# Patient Record
Sex: MALE | Race: WHITE | NOT HISPANIC OR LATINO | Employment: OTHER | URBAN - METROPOLITAN AREA
[De-identification: names, ages, dates, MRNs, and addresses within clinical notes are randomized per-mention and may not be internally consistent; named-entity substitution may affect disease eponyms.]

---

## 2017-01-03 ENCOUNTER — ALLSCRIPTS OFFICE VISIT (OUTPATIENT)
Dept: OTHER | Facility: OTHER | Age: 67
End: 2017-01-03

## 2017-05-04 DIAGNOSIS — E78.5 HYPERLIPIDEMIA: ICD-10-CM

## 2017-05-04 DIAGNOSIS — E11.40 TYPE 2 DIABETES MELLITUS WITH DIABETIC NEUROPATHY (HCC): ICD-10-CM

## 2017-05-04 DIAGNOSIS — Z12.5 ENCOUNTER FOR SCREENING FOR MALIGNANT NEOPLASM OF PROSTATE: ICD-10-CM

## 2017-05-19 ENCOUNTER — ALLSCRIPTS OFFICE VISIT (OUTPATIENT)
Dept: OTHER | Facility: OTHER | Age: 67
End: 2017-05-19

## 2017-09-21 ENCOUNTER — ALLSCRIPTS OFFICE VISIT (OUTPATIENT)
Dept: OTHER | Facility: OTHER | Age: 67
End: 2017-09-21

## 2017-09-21 ENCOUNTER — GENERIC CONVERSION - ENCOUNTER (OUTPATIENT)
Dept: OTHER | Facility: OTHER | Age: 67
End: 2017-09-21

## 2017-09-21 DIAGNOSIS — R42 DIZZINESS AND GIDDINESS: ICD-10-CM

## 2017-09-21 DIAGNOSIS — E11.40 TYPE 2 DIABETES MELLITUS WITH DIABETIC NEUROPATHY (HCC): ICD-10-CM

## 2017-09-21 DIAGNOSIS — Z12.5 ENCOUNTER FOR SCREENING FOR MALIGNANT NEOPLASM OF PROSTATE: ICD-10-CM

## 2017-09-21 DIAGNOSIS — M19.90 OSTEOARTHRITIS: ICD-10-CM

## 2017-09-21 DIAGNOSIS — E78.5 HYPERLIPIDEMIA: ICD-10-CM

## 2017-10-04 ENCOUNTER — OFFICE VISIT (OUTPATIENT)
Dept: URGENT CARE | Facility: MEDICAL CENTER | Age: 67
End: 2017-10-04
Payer: COMMERCIAL

## 2017-10-04 LAB — GLUCOSE SERPL-MCNC: 96 MG/DL (ref 65–140)

## 2017-10-04 PROCEDURE — G0463 HOSPITAL OUTPT CLINIC VISIT: HCPCS

## 2017-10-04 PROCEDURE — 82948 REAGENT STRIP/BLOOD GLUCOSE: CPT

## 2017-10-04 PROCEDURE — 99213 OFFICE O/P EST LOW 20 MIN: CPT

## 2017-10-05 NOTE — PROGRESS NOTES
Assessment  1  Vertigo (780 4) (R42)    Plan  Dizziness    · Blood Glucose- POC; Status:Resulted - Requires Verification,Retrospective By Protocol  Authorization;   Done: 83IKI6336 04:34PM  Fasting (Y/N) : No - N  Vertigo    · Meclizine HCl - 25 MG Oral Tablet; TAKE 1 TABLET 3 TIMES DAILY AS NEEDED   · *1 - [facility]  *  Status: Active  Requested for: 58BHW0889  Care Summary provided  : Yes    check labs that PCP ordered  likely vertigo as when he changes position  may benefit from PT for epley     Chief Complaint  Chief Complaint Free Text Note Form: dizziness off and on, especially when getting out of bed, feels as though he may pass out, occurring for a couple weeks, also has URI sx currently,      History of Present Illness  HPI: 78 y/o M c/o spinning and dizziness  feels like room is spinning  This has been going on for 2-3 weeks  No cp/sob  no palpitations  He says when he gets up and rolls over in bed  Saw PCP and had labs ordered but not done yet  Does not check his sugars at home  Denies dizziness with stairs or walking  Hospital Based Practices Required Assessment:   Pain Assessment   the patient states they do not have pain  Reason DV Screen not done: with wife    Depression And Suicide Screen  Reason suicide screen not done: with wife  Prefered Language is  english  Primary Language is  english  Readiness To Learn: Receptive  Barriers To Learning: none  Preferred Learning: verbal      Active Problems  1  Adhesive capsulitis of left shoulder (726 0) (M75 02)   2  Arthritis (716 90) (M19 90)   3  Depression screen (V79 0) (Z13 89)   4  Encounter for prostate cancer screening (V76 44) (Z12 5)   5  Glaucoma Screening   6  Hyperlipidemia (272 4) (E78 5)   7  Medicare annual wellness visit, subsequent (V70 0) (Z00 00)   8  Need for influenza vaccination (V04 81) (Z23)   9  Need for pneumococcal vaccination (V03 82) (Z23)   10  Pancreas cyst (577 2) (K86 2)   11   Screening for colon cancer (V76 51) (Z12 11)   12  Screening for genitourinary condition (V81 6) (Z13 89)   13  Type 2 diabetes mellitus with diabetic neuropathy (250 60,357 2) (E11 40)    Past Medical History  1  History of Anxiety (300 00) (F41 9)   2  History of Blister of left lower leg, initial encounter (916 2) (E52 356Z)   3  History of Denial of substance abuse   4  History of gastroesophageal reflux (GERD) (V12 79) (Z87 19)   5  History of hematuria (V13 09) (Z87 448)   6  History of Nuclear senile cataract of left eye (366 16) (H25 12)   7  History of Right knee pain (719 46) (M25 561)    Family History  Mother    1  Family history of CAD (coronary artery disease)  Father    2  Family history of diabetes mellitus (V18 0) (Z83 3)  Family History    3  Denied: Family history of Denial of substance abuse   4  Denied: No family history of mental disorder    Social History   · Denied: History of Drug use   · Never a smoker   · Never Drank Alcohol    Surgical History  1  History of Cataract Surgery   2  History of Cholecystectomy   3  History of Knee Surgery    Current Meds   1  GlipiZIDE 10 MG Oral Tablet; take 1 tablet by mouth twice a day; Therapy: 54LOV7716 to (Evaluate:15Bwu8758)  Requested for: 64Bir5069; Last   Rx:36Vxw9161 Ordered   2  MetFORMIN HCl - 1000 MG Oral Tablet; TAKE 1/2 TABLET BY MOUTH EVERY MORNING   AND 1 TABLET IN THE EVENING; Therapy: 58BHE0084 to (Evaluate:65Pxx7076)  Requested for: 88Jpr6782; Last   Rx:70Puc2694 Ordered    Allergies  1  No Known Drug Allergies    Vitals  Signs   Recorded: 63QIS0279 04:13PM   Temperature: 97 7 F  Heart Rate: 86  Respiration: 20  Systolic: 021  Diastolic: 90  Weight: 753 lb   BMI Calculated: 28 59  BSA Calculated: 1 99  Pain Scale: 0    Physical Exam    Constitutional   General appearance: No acute distress, well appearing and well nourished  Eyes   Conjunctiva and lids: No swelling, erythema, or discharge      Pupils and irises: Equal, round and reactive to light     Ears, Nose, Mouth, and Throat   External inspection of ears and nose: Normal     Otoscopic examination: Tympanic membrance translucent with normal light reflex  Canals patent without erythema  Nasal mucosa, septum, and turbinates: Normal without edema or erythema  Oropharynx: Normal with no erythema, edema, exudate or lesions  Pulmonary   Respiratory effort: No increased work of breathing or signs of respiratory distress  Auscultation of lungs: Clear to auscultation  Cardiovascular   Auscultation of heart: Normal rate and rhythm, normal S1 and S2, without murmurs  Abdomen   Abdomen: Non-tender, no masses  Lymphatic   Palpation of lymph nodes in neck: No lymphadenopathy  Musculoskeletal   Gait and station: Normal     Neurologic   Cranial nerves: Cranial nerves 2-12 intact  Additional Exam:  martha hallpike no nystagmus but did reproduce symptoms  Results/Data  Blood Glucose- POC [id number] 04:34PM Екатерина Gonzlaes     Test Name Result Flag Reference   Glucose Finger Stick 96                     Future Appointments    Date/Time Provider Specialty Site   12/21/2017 10:00 AM MARGO Benitez   [address] [place] [address]   10/05/2017 01:30 PM Lynnette Vail [facility] [address]   Electronically signed by : Yesy Luther [facility]; Oct  4 2017  4:51PM EST                       (Author)    Electronically signed by : CHRISTIN Quiroz ; Oct  4 2017  7:21PM EST                       (Co-author)

## 2017-10-16 ENCOUNTER — APPOINTMENT (OUTPATIENT)
Dept: PHYSICAL THERAPY | Facility: MEDICAL CENTER | Age: 67
End: 2017-10-16
Attending: PHYSICIAN ASSISTANT
Payer: COMMERCIAL

## 2017-10-16 DIAGNOSIS — R42 DIZZINESS AND GIDDINESS: ICD-10-CM

## 2017-10-16 PROCEDURE — 97162 PT EVAL MOD COMPLEX 30 MIN: CPT

## 2017-10-16 PROCEDURE — G8990 OTHER PT/OT CURRENT STATUS: HCPCS

## 2017-10-16 PROCEDURE — G8991 OTHER PT/OT GOAL STATUS: HCPCS

## 2017-10-18 ENCOUNTER — APPOINTMENT (OUTPATIENT)
Dept: PHYSICAL THERAPY | Facility: MEDICAL CENTER | Age: 67
End: 2017-10-18
Attending: PHYSICIAN ASSISTANT
Payer: COMMERCIAL

## 2017-10-18 PROCEDURE — 97140 MANUAL THERAPY 1/> REGIONS: CPT

## 2017-10-19 ENCOUNTER — GENERIC CONVERSION - ENCOUNTER (OUTPATIENT)
Dept: OTHER | Facility: OTHER | Age: 67
End: 2017-10-19

## 2017-10-30 ENCOUNTER — APPOINTMENT (OUTPATIENT)
Dept: PHYSICAL THERAPY | Facility: MEDICAL CENTER | Age: 67
End: 2017-10-30
Attending: PHYSICIAN ASSISTANT
Payer: COMMERCIAL

## 2017-11-20 ENCOUNTER — GENERIC CONVERSION - ENCOUNTER (OUTPATIENT)
Dept: OTHER | Facility: OTHER | Age: 67
End: 2017-11-20

## 2017-11-24 ENCOUNTER — OFFICE VISIT (OUTPATIENT)
Dept: URGENT CARE | Facility: MEDICAL CENTER | Age: 67
End: 2017-11-24
Payer: COMMERCIAL

## 2017-11-24 PROCEDURE — G0463 HOSPITAL OUTPT CLINIC VISIT: HCPCS

## 2017-11-24 PROCEDURE — 99203 OFFICE O/P NEW LOW 30 MIN: CPT

## 2017-12-15 ENCOUNTER — GENERIC CONVERSION - ENCOUNTER (OUTPATIENT)
Dept: OTHER | Facility: OTHER | Age: 67
End: 2017-12-15

## 2017-12-21 ENCOUNTER — LAB CONVERSION - ENCOUNTER (OUTPATIENT)
Dept: OTHER | Facility: OTHER | Age: 67
End: 2017-12-21

## 2017-12-21 LAB
A/G RATIO (HISTORICAL): 1.9 (CALC) (ref 1–2.5)
ALBUMIN SERPL BCP-MCNC: 4.1 G/DL (ref 3.6–5.1)
ALP SERPL-CCNC: 85 U/L (ref 40–115)
ALT SERPL W P-5'-P-CCNC: 11 U/L (ref 9–46)
AST SERPL W P-5'-P-CCNC: 13 U/L (ref 10–35)
BASOPHILS # BLD AUTO: 0.6 %
BASOPHILS # BLD AUTO: 47 CELLS/UL (ref 0–200)
BILIRUB SERPL-MCNC: 0.6 MG/DL (ref 0.2–1.2)
BUN SERPL-MCNC: 14 MG/DL (ref 7–25)
BUN/CREA RATIO (HISTORICAL): 11 (CALC) (ref 6–22)
CALCIUM SERPL-MCNC: 9 MG/DL (ref 8.6–10.3)
CHLORIDE SERPL-SCNC: 102 MMOL/L (ref 98–110)
CHOLEST SERPL-MCNC: 189 MG/DL
CHOLEST/HDLC SERPL: 5 (CALC)
CO2 SERPL-SCNC: 29 MMOL/L (ref 20–31)
CREAT SERPL-MCNC: 1.26 MG/DL (ref 0.7–1.25)
CREATININE, RANDOM URINE (HISTORICAL): 108 MG/DL (ref 20–370)
DEPRECATED RDW RBC AUTO: 13.1 % (ref 11–15)
EGFR AFRICAN AMERICAN (HISTORICAL): 68 ML/MIN/1.73M2
EGFR-AMERICAN CALC (HISTORICAL): 59 ML/MIN/1.73M2
EOSINOPHIL # BLD AUTO: 506 CELLS/UL (ref 15–500)
EOSINOPHIL # BLD AUTO: 6.4 %
GAMMA GLOBULIN (HISTORICAL): 2.2 G/DL (CALC) (ref 1.9–3.7)
GLUCOSE (HISTORICAL): 89 MG/DL (ref 65–99)
HBA1C MFR BLD HPLC: 6.2 % OF TOTAL HGB
HCT VFR BLD AUTO: 42.3 % (ref 38.5–50)
HDLC SERPL-MCNC: 38 MG/DL
HGB BLD-MCNC: 14.1 G/DL (ref 13.2–17.1)
LDL CHOLESTEROL (HISTORICAL): 132 MG/DL (CALC)
LYMPHOCYTES # BLD AUTO: 1201 CELLS/UL (ref 850–3900)
LYMPHOCYTES # BLD AUTO: 15.2 %
MAGNESIUM, UR (HISTORICAL): 1 MG/DL
MCH RBC QN AUTO: 28 PG (ref 27–33)
MCHC RBC AUTO-ENTMCNC: 33.3 G/DL (ref 32–36)
MCV RBC AUTO: 84.1 FL (ref 80–100)
MICROALBUMIN/CREATININE RATIO (HISTORICAL): 9 MCG/MG CREAT
MONOCYTES # BLD AUTO: 679 CELLS/UL (ref 200–950)
MONOCYTES (HISTORICAL): 8.6 %
NEUTROPHILS # BLD AUTO: 5467 CELLS/UL (ref 1500–7800)
NEUTROPHILS # BLD AUTO: 69.2 %
NON-HDL-CHOL (CHOL-HDL) (HISTORICAL): 151 MG/DL (CALC)
PLATELET # BLD AUTO: 275 THOUSAND/UL (ref 140–400)
PMV BLD AUTO: 10 FL (ref 7.5–12.5)
POTASSIUM SERPL-SCNC: 4 MMOL/L (ref 3.5–5.3)
PROSTATE SPECIFIC ANTIGEN TOTAL (HISTORICAL): 2.9 NG/ML
RBC # BLD AUTO: 5.03 MILLION/UL (ref 4.2–5.8)
SODIUM SERPL-SCNC: 139 MMOL/L (ref 135–146)
TOTAL PROTEIN (HISTORICAL): 6.3 G/DL (ref 6.1–8.1)
TRIGL SERPL-MCNC: 86 MG/DL
WBC # BLD AUTO: 7.9 THOUSAND/UL (ref 3.8–10.8)

## 2018-01-11 NOTE — PROGRESS NOTES
History of Present Illness  Care Coordination Encounter Information:   Type of Encounter: Telephonic   Contact: Follow-Up    Spoke to Spouse   Ofelia Rico  Care Coordination  Nurse [address] [place] [address] [address]:   The reason for call is to discuss outreach for follow up/needed services  Doing good  Spoke with Ofelia Rico, spouse  States has not checked Tha's blood sugar due to getting a new glucometer and not knowing how to use it  Educated Ofelia Rico step by step on how to set up and use glucometer  Has active lab orders  Again, discussed importance of having labs drawn in management of diseases  Reminded to schedule eye exam  States that Aster Aguilar is no longer going to physical therapy and feels he is doing much better  States that he denies any complaints  Denies any questions or concerns and encouraged to call if needed  Active Problems    1  Adhesive capsulitis of left shoulder (726 0) (M75 02)   2  Arthritis (716 90) (M19 90)   3  Depression screen (V79 0) (Z13 89)   4  Dizziness (780 4) (R42)   5  Encounter for prostate cancer screening (V76 44) (Z12 5)   6  Glaucoma Screening   7  Hyperlipidemia (272 4) (E78 5)   8  Medicare annual wellness visit, subsequent (V70 0) (Z00 00)   9  Need for influenza vaccination (V04 81) (Z23)   10  Need for pneumococcal vaccination (V03 82) (Z23)   11  Pancreas cyst (577 2) (K86 2)   12  Screening for colon cancer (V76 51) (Z12 11)   13  Screening for genitourinary condition (V81 6) (Z13 89)   14  Type 2 diabetes mellitus with diabetic neuropathy (250 60,357 2) (E11 40)   15  Vertigo (780 4) (R42)    Past Medical History    1  History of Anxiety (300 00) (F41 9)   2  History of Blister of left lower leg, initial encounter (916 2) (Q25 125K)   3  History of Denial of substance abuse   4  History of gastroesophageal reflux (GERD) (V12 79) (Z87 19)   5  History of hematuria (V13 09) (Z87 448)   6  History of Nuclear senile cataract of left eye (366 16) (H25 12)   7   History of Right knee pain (719 46) (M25 561)    Surgical History    1  History of Cataract Surgery   2  History of Cholecystectomy   3  History of Knee Surgery    Family History  Mother    1  Family history of CAD (coronary artery disease)  Father    2  Family history of diabetes mellitus (V18 0) (Z83 3)  Family History    3  Denied: Family history of Denial of substance abuse   4  Denied: No family history of mental disorder    Social History    · Denied: History of Drug use   · Never a smoker   · Never Drank Alcohol    Current Meds    1  GlipiZIDE 10 MG Oral Tablet; take 1 tablet by mouth twice a day; Therapy: 51KUO9071 to (Evaluate:05Jan2018)  Requested for: 60QXU9535; Last   Rx:06Nov2017 Ordered   2  MetFORMIN HCl - 1000 MG Oral Tablet; TAKE 1/2 TABLET BY MOUTH EVERY MORNING   AND 1 TABLET IN THE EVENING; Therapy: 60UOX6841 to (Evaluate:30Tpg1058)  Requested for: 11Cny6052; Last   Rx:29Ynl6909 Ordered    3  Meclizine HCl - 25 MG Oral Tablet; TAKE 1 TABLET 3 TIMES DAILY AS NEEDED; Therapy: 53OIF5674 to (Evaluate:14Oct2017)  Requested for: 04Oct2017; Last   Rx:04Oct2017 Ordered    Allergies    1  No Known Drug Allergies    End of Encounter Meds    1  GlipiZIDE 10 MG Oral Tablet; take 1 tablet by mouth twice a day; Therapy: 52PYD7320 to (Evaluate:05Jan2018)  Requested for: 85ZUX0313; Last   Rx:06Nov2017 Ordered   2  MetFORMIN HCl - 1000 MG Oral Tablet; TAKE 1/2 TABLET BY MOUTH EVERY MORNING   AND 1 TABLET IN THE EVENING; Therapy: 46EVI7919 to (Evaluate:79Usm7803)  Requested for: 52Ahi4647; Last   Rx:92Fdz2172 Ordered    3  Meclizine HCl - 25 MG Oral Tablet; TAKE 1 TABLET 3 TIMES DAILY AS NEEDED; Therapy: 20MBZ3194 to 40-45-11-94)  Requested for: 78IYZ2075; Last   Rx:59Uht1647 Ordered    Future Appointments    Date/Time Provider Specialty Site   12/21/2017 10:00 AM MARGO Mattson   [facility]     Message   Recorded as Task   Date: 11/15/2017 09:56 AM, Created By: Rashida Brand   Task Name: Call Back   Assigned To: Nay Salazar   Regarding Patient: Rosana Pineda, Status: In Progress   KavehKacykanwal Starch - 15 Nov 2017 9:56 AM     TASK CREATED  Follow up call  There was no answer  Left message on machine with my contact information to return call  [facility] - 15 Nov 2017 9:56 AM     TASK IN PROGRESS   [facility] - 20 Nov 2017 3:26 PM     TASK EDITED  Second attempt to contact patient  Spoke with spouse  See CC note       Patient Care Team    Care Team Member Role Specialty Office Number   [facility] [address] [facility] [phone]     Signatures   Electronically signed by : Radha Maciel; Nov 20 2017  3:44PM EST                       (Author)

## 2018-01-14 VITALS
BODY MASS INDEX: 30.62 KG/M2 | HEART RATE: 86 BPM | TEMPERATURE: 97.7 F | SYSTOLIC BLOOD PRESSURE: 146 MMHG | DIASTOLIC BLOOD PRESSURE: 84 MMHG | WEIGHT: 202 LBS | RESPIRATION RATE: 16 BRPM | OXYGEN SATURATION: 98 % | HEIGHT: 68 IN

## 2018-01-14 VITALS
HEIGHT: 68 IN | SYSTOLIC BLOOD PRESSURE: 130 MMHG | DIASTOLIC BLOOD PRESSURE: 82 MMHG | TEMPERATURE: 98.2 F | BODY MASS INDEX: 28.61 KG/M2 | HEART RATE: 92 BPM | WEIGHT: 188.8 LBS | RESPIRATION RATE: 16 BRPM | OXYGEN SATURATION: 97 %

## 2018-01-15 NOTE — RESULT NOTES
Verified Results  (1) BASIC METABOLIC PROFILE [id number] 08:27AM Zoe Terrell     Test Name Result Flag Reference   GLUCOSE 112 mg/dL H 65-99   Fasting reference interval   UREA NITROGEN (BUN) 15 mg/dL  7-25   CREATININE 1 06 mg/dL  0 70-1 25   For patients >52years of age, the reference limit  for Creatinine is approximately 13% higher for people  identified as -American  eGFR NON-AFR  AMERICAN 73 mL/min/1 73m2  > OR = 60   eGFR AFRICAN AMERICAN 84 mL/min/1 73m2  > OR = 60   BUN/CREATININE RATIO   3-98   NOT APPLICABLE (calc)   SODIUM 141 mmol/L  135-146   POTASSIUM 4 2 mmol/L  3 5-5 3   CHLORIDE 103 mmol/L     CARBON DIOXIDE 30 mmol/L  20-31   CALCIUM 9 1 mg/dL  8 6-10 3     (Q) HEMOGLOBIN A1c 80Orj5844 08:27AM Zoe Terrell   REPORT COMMENT:  FASTING:YES     Test Name Result Flag Reference   HEMOGLOBIN A1c 6 8 % of total Hgb H <5 7   According to ADA guidelines, hemoglobin A1c <7 0%  represents optimal control in non-pregnant diabetic  patients  Different metrics may apply to specific  patient populations  Standards of Medical Care in  [phone]  Diabetes Care  2013;36:s11-s66     For the purpose of screening for the presence of  diabetes  <5 7%       Consistent with the absence of diabetes  5 7-6 4%    Consistent with increased risk for diabetes              (prediabetes)  >or=6 5%    Consistent with diabetes     This assay result is consistent with diabetes  mellitus  Currently, no consensus exists for use of hemoglobin  A1c for diagnosis of diabetes for children

## 2018-01-18 NOTE — PROGRESS NOTES
History of Present Illness  Care Coordination Encounter Information:   Type of Encounter: Telephonic   Contact: Initial Contact    Spoke to Spouse   Rachael Pena  Care Coordination  Nurse Melba Watts:   The reason for call is to discuss outreach for follow up/needed services  Referred by Dr Aldair Glass for diabetic management and compliance  Doing good  Spoke with Rachael Pena, spouse  States that she will check Tha's blood sugar occasionally, however ran out of diabetic testing supplies and needs to go to pharmacy to   Has active lab orders  Discussed with Rachael Pena the importance of having labs drawn in management of diseases  Verbalized understanding  Offered to resend lab scripts, but states that she has lab orders at home  Discussed diabetic diet  States that she does the food shopping and will also go to food bank  Discussed food choices  Discussed scheduling an appt for an exam  States that they usually go to an eye doctor in [place] and is refusing contact information of optho consult ordered  Encouraged to call and schedule  States he is currently going to outpatient therapy for "crystals in his ear " Denies any falls  Does not use ambulatory device  All prescriptions filled and taking as prescribed  Denies any questions or concerns  My contact information provided and encouraged to call if needed  Active Problems    1  Adhesive capsulitis of left shoulder (726 0) (M75 02)   2  Arthritis (716 90) (M19 90)   3  Depression screen (V79 0) (Z13 89)   4  Dizziness (780 4) (R42)   5  Encounter for prostate cancer screening (V76 44) (Z12 5)   6  Glaucoma Screening   7  Hyperlipidemia (272 4) (E78 5)   8  Medicare annual wellness visit, subsequent (V70 0) (Z00 00)   9  Need for influenza vaccination (V04 81) (Z23)   10  Need for pneumococcal vaccination (V03 82) (Z23)   11  Pancreas cyst (577 2) (K86 2)   12  Screening for colon cancer (V76 51) (Z12 11)   13   Screening for genitourinary condition (V81 6) (Z13 89)   14  Type 2 diabetes mellitus with diabetic neuropathy (250 60,357 2) (E11 40)   15  Vertigo (780 4) (R42)    Past Medical History    1  History of Anxiety (300 00) (F41 9)   2  History of Blister of left lower leg, initial encounter (916 2) (N20 701F)   3  History of Denial of substance abuse   4  History of gastroesophageal reflux (GERD) (V12 79) (Z87 19)   5  History of hematuria (V13 09) (Z87 448)   6  History of Nuclear senile cataract of left eye (366 16) (H25 12)   7  History of Right knee pain (719 46) (M25 561)    Surgical History    1  History of Cataract Surgery   2  History of Cholecystectomy   3  History of Knee Surgery    Family History  Mother    1  Family history of CAD (coronary artery disease)  Father    2  Family history of diabetes mellitus (V18 0) (Z83 3)  Family History    3  Denied: Family history of Denial of substance abuse   4  Denied: No family history of mental disorder    Social History    · Denied: History of Drug use   · Never a smoker   · Never Drank Alcohol    Current Meds    1  GlipiZIDE 10 MG Oral Tablet; take 1 tablet by mouth twice a day; Therapy: 36JYM4777 to (Evaluate:25Dtt5818)  Requested for: 43Jgr2778; Last   Rx:40Jau9310 Ordered   2  MetFORMIN HCl - 1000 MG Oral Tablet; TAKE 1/2 TABLET BY MOUTH EVERY MORNING   AND 1 TABLET IN THE EVENING; Therapy: 36GDU8179 to (Evaluate:75Ozq8780)  Requested for: 07Apx2699; Last   Rx:47Bmb0708 Ordered    3  Meclizine HCl - 25 MG Oral Tablet; TAKE 1 TABLET 3 TIMES DAILY AS NEEDED; Therapy: 38GBT9587 to (Evaluate:67Wyb3723)  Requested for: 63Wfu8385; Last   Rx:01Xwt0392 Ordered    Allergies    1  No Known Drug Allergies    End of Encounter Meds    1  GlipiZIDE 10 MG Oral Tablet; take 1 tablet by mouth twice a day; Therapy: 98VME9388 to (Evaluate:72Cdt3478)  Requested for: 67Rfq6593; Last   Rx:50Rfl8950 Ordered   2   MetFORMIN HCl - 1000 MG Oral Tablet; TAKE 1/2 TABLET BY MOUTH EVERY MORNING   AND 1 TABLET IN THE EVENING; Therapy: 77FZX3436 to (Evaluate:03Vbl3699)  Requested for: 99Wiv6378; Last   Rx:96Uix7513 Ordered    3  Meclizine HCl - 25 MG Oral Tablet; TAKE 1 TABLET 3 TIMES DAILY AS NEEDED; Therapy: 16MBD5544 to 21 )  Requested for: 68YGI5652; Last   Rx:57Vzx7440 Ordered    Future Appointments    Date/Time Provider Specialty Site   12/21/2017 10:00 AM MARGO Wallace  Family Medicine [facility]     Message   Recorded as Task   Date: 09/21/2017 02:26 PM, Created By: Adelfo An   Task Name: Care Coordination   Assigned To: Nay Salazar   Regarding Patient: Cristy Sterling, Status: In Progress   Comment:    Shelley Dodd - 21 Sep 2017 2:26 PM     TASK CREATED  Hi Alyssia Wong,     Can you check in on pt, make sure he gets his labs done, DM  Some intellectual delay  Also due for eye exam     Echeverria Field - 28 Sep 2017 2:18 PM     TASK IN PROGRESS   Lee Srivastava - 28 Sep 2017 2:19 PM     TASK EDITED  Called patient at this time  There was no answer  Left message on machine with my contact information to return call  Nay Salazar - 10 Oct 2017 11:01 AM     TASK EDITED  Second attempt to contact patient  There was no answer  Left message on machine with my contact information to return call  Nay Salazar - 19 Oct 2017 10:20 AM     TASK EDITED  Third attempt to contact patient  See CC note       Patient Care Team    Care Team Member Role Specialty Office Number   Katie Arauz [facility]  Family Medicine [phone]     Signatures   Electronically signed by : Jazmin Briceño; Oct 19 2017 10:38AM EST                       (Author)

## 2018-01-22 VITALS
OXYGEN SATURATION: 98 % | SYSTOLIC BLOOD PRESSURE: 126 MMHG | WEIGHT: 187.25 LBS | HEIGHT: 68 IN | TEMPERATURE: 98.3 F | DIASTOLIC BLOOD PRESSURE: 78 MMHG | BODY MASS INDEX: 28.38 KG/M2 | HEART RATE: 90 BPM | RESPIRATION RATE: 16 BRPM

## 2018-01-23 NOTE — PROGRESS NOTES
History of Present Illness  Care Coordination Encounter Information:   Type of Encounter: Telephonic   Contact: Follow-Up    Spoke to Spouse  Care Coordination  Nurse [address] [facility] [address]   The reason for call is to discuss outreach for follow up/needed services  Doing good  Spoke with Landen Borrego, spouse  Patient has an appt with PCP on 12/21/17  Has active lab orders  Encouraged to have labs completed prior to appt  Patient has not schedule eye exam as previously discussed  Discussed importance of exam and encouraged to schedule  Patient not checking blood sugars at home  As per Landen Borrego, patient "does not want to be poked " Discussed importance of monitoring blood sugars  Discussed diabetic diet and food choices  Denies any falls  Feels that his balance has improved and denies concerns  Encouraged to call if needed  Active Problems    1  Adhesive capsulitis of left shoulder (726 0) (M75 02)   2  Arthritis (716 90) (M19 90)   3  Depression screen (V79 0) (Z13 89)   4  Dizziness (780 4) (R42)   5  Encounter for prostate cancer screening (V76 44) (Z12 5)   6  Glaucoma Screening   7  Hyperlipidemia (272 4) (E78 5)   8  Lower back pain (724 2) (M54 5)   9  Medicare annual wellness visit, subsequent (V70 0) (Z00 00)   10  Need for influenza vaccination (V04 81) (Z23)   11  Need for pneumococcal vaccination (V03 82) (Z23)   12  Pancreas cyst (577 2) (K86 2)   13  Screening for colon cancer (V76 51) (Z12 11)   14  Screening for genitourinary condition (V81 6) (Z13 89)   15  Type 2 diabetes mellitus with diabetic neuropathy (250 60,357 2) (E11 40)   16  Vertigo (780 4) (R42)    Past Medical History    1  History of Anxiety (300 00) (F41 9)   2  History of Blister of left lower leg, initial encounter (916 2) (P87 049K)   3  History of Denial of substance abuse   4  History of gastroesophageal reflux (GERD) (V12 79) (Z87 19)   5  History of hematuria (V13 09) (Z87 448)   6   History of Nuclear senile cataract of left eye (366 16) (H25 12)   7  History of Right knee pain (719 46) (M25 561)    Surgical History    1  History of Cataract Surgery   2  History of Cholecystectomy   3  History of Knee Surgery    Family History  Mother    1  Family history of CAD (coronary artery disease)  Father    2  Family history of diabetes mellitus (V18 0) (Z83 3)  Family History    3  Denied: Family history of Denial of substance abuse   4  Denied: No family history of mental disorder    Social History    · Denied: History of Drug use   · Never a smoker   · Never Drank Alcohol    Current Meds    1  GlipiZIDE 10 MG Oral Tablet; take 1 tablet by mouth twice a day; Therapy: 23WFX3335 to (Evaluate:05Jan2018)  Requested for: 47ZZL6021; Last   Rx:06Nov2017 Ordered   2  MetFORMIN HCl - 1000 MG Oral Tablet; TAKE 1/2 TABLET BY MOUTH EVERY MORNING   AND 1 TABLET IN THE EVENING; Therapy: 14GAR1659 to (Evaluate:97Yql8602)  Requested for: 86Bgf6935; Last   Rx:38Vgt0585 Ordered    3  Meclizine HCl - 25 MG Oral Tablet; TAKE 1 TABLET 3 TIMES DAILY AS NEEDED; Therapy: 59IPI5401 to (Evaluate:14Oct2017)  Requested for: 04Oct2017; Last   Rx:04Oct2017 Ordered    Allergies    1  No Known Drug Allergies    End of Encounter Meds    1  GlipiZIDE 10 MG Oral Tablet; take 1 tablet by mouth twice a day; Therapy: 87BYV6375 to (Evaluate:05Jan2018)  Requested for: 70PYV8858; Last   Rx:06Nov2017 Ordered   2  MetFORMIN HCl - 1000 MG Oral Tablet; TAKE 1/2 TABLET BY MOUTH EVERY MORNING   AND 1 TABLET IN THE EVENING; Therapy: 30XTE9662 to (Evaluate:08Kbj2976)  Requested for: 60Lnu4195; Last   Rx:09Sta7334 Ordered    3  Meclizine HCl - 25 MG Oral Tablet; TAKE 1 TABLET 3 TIMES DAILY AS NEEDED; Therapy: 25WNC6417 to Illona Buerger)  Requested for: 91QGC0144; Last   Rx:04Oct2017 Ordered    Future Appointments    Date/Time Provider Specialty Site   12/21/2017 10:00 AM MARGO Bangura   Family Medicine [facility]     Patient Care Team    Care Team Member Role Specialty Office Number   Essie Severs [address]  Family Medicine [phone]     Signatures   Electronically signed by : Sara Gagnon; Dec 15 2017  4:13PM EST                       (Author)

## 2018-03-01 DIAGNOSIS — E11.9 DIABETES (HCC): Primary | ICD-10-CM

## 2018-03-01 RX ORDER — GLIPIZIDE 10 MG/1
TABLET ORAL
Qty: 60 TABLET | Refills: 1 | Status: SHIPPED | OUTPATIENT
Start: 2018-03-01 | End: 2018-05-03 | Stop reason: SDUPTHER

## 2018-03-07 ENCOUNTER — APPOINTMENT (OUTPATIENT)
Dept: PHYSICAL THERAPY | Facility: MEDICAL CENTER | Age: 68
End: 2018-03-07
Attending: PHYSICIAN ASSISTANT
Payer: COMMERCIAL

## 2018-03-14 ENCOUNTER — APPOINTMENT (OUTPATIENT)
Dept: PHYSICAL THERAPY | Facility: MEDICAL CENTER | Age: 68
End: 2018-03-14
Attending: PHYSICIAN ASSISTANT
Payer: COMMERCIAL

## 2018-03-23 ENCOUNTER — APPOINTMENT (OUTPATIENT)
Dept: PHYSICAL THERAPY | Facility: MEDICAL CENTER | Age: 68
End: 2018-03-23
Attending: PHYSICIAN ASSISTANT
Payer: COMMERCIAL

## 2018-03-31 ENCOUNTER — APPOINTMENT (EMERGENCY)
Dept: RADIOLOGY | Facility: HOSPITAL | Age: 68
End: 2018-03-31
Payer: COMMERCIAL

## 2018-03-31 ENCOUNTER — HOSPITAL ENCOUNTER (EMERGENCY)
Facility: HOSPITAL | Age: 68
Discharge: HOME | End: 2018-03-31
Attending: EMERGENCY MEDICINE
Payer: COMMERCIAL

## 2018-03-31 VITALS
DIASTOLIC BLOOD PRESSURE: 82 MMHG | OXYGEN SATURATION: 99 % | HEART RATE: 90 BPM | TEMPERATURE: 98.6 F | SYSTOLIC BLOOD PRESSURE: 173 MMHG | RESPIRATION RATE: 18 BRPM
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
## 2018-03-31 DIAGNOSIS — S02.2XXA NASAL BONE FRACTURES: Primary | ICD-10-CM

## 2018-03-31 DIAGNOSIS — M54.50 ACUTE LUMBAR BACK PAIN: ICD-10-CM

## 2018-03-31 DIAGNOSIS — S01.21XA LACERATION OF NOSE, INITIAL ENCOUNTER: ICD-10-CM

## 2018-03-31 PROCEDURE — 99284 EMERGENCY DEPT VISIT MOD MDM: CPT

## 2018-03-31 PROCEDURE — 72100 X-RAY EXAM L-S SPINE 2/3 VWS: CPT

## 2018-03-31 PROCEDURE — 70450 CT HEAD/BRAIN W/O DYE: CPT

## 2018-03-31 PROCEDURE — 93005 ELECTROCARDIOGRAM TRACING: CPT

## 2018-03-31 PROCEDURE — 72125 CT NECK SPINE W/O DYE: CPT

## 2018-03-31 PROCEDURE — 90715 TDAP VACCINE 7 YRS/> IM: CPT | Performed by: EMERGENCY MEDICINE

## 2018-03-31 PROCEDURE — 71045 X-RAY EXAM CHEST 1 VIEW: CPT

## 2018-03-31 PROCEDURE — 90471 IMMUNIZATION ADMIN: CPT

## 2018-03-31 PROCEDURE — 70486 CT MAXILLOFACIAL W/O DYE: CPT

## 2018-03-31 PROCEDURE — 73560 X-RAY EXAM OF KNEE 1 OR 2: CPT

## 2018-03-31 RX ORDER — AMOXICILLIN AND CLAVULANATE POTASSIUM 875; 125 MG/1; MG/1
1 TABLET, FILM COATED ORAL EVERY 12 HOURS
Qty: 10 TABLET | Refills: 0 | Status: SHIPPED | OUTPATIENT
Start: 2018-03-31 | End: 2018-04-05

## 2018-03-31 RX ORDER — GINSENG 100 MG
1 CAPSULE ORAL ONCE
Status: DISCONTINUED | OUTPATIENT
Start: 2018-03-31 | End: 2018-03-31 | Stop reason: HOSPADM

## 2018-03-31 RX ORDER — LIDOCAINE HYDROCHLORIDE 10 MG/ML
5 INJECTION, SOLUTION EPIDURAL; INFILTRATION; INTRACAUDAL; PERINEURAL ONCE
Status: COMPLETED | OUTPATIENT
Start: 2018-03-31 | End: 2018-03-31

## 2018-03-31 RX ADMIN — TETANUS TOXOID, REDUCED DIPHTHERIA TOXOID AND ACELLULAR PERTUSSIS VACCINE, ADSORBED 0.5 ML: 5; 2.5; 8; 8; 2.5 SUSPENSION INTRAMUSCULAR at 13:49

## 2018-03-31 RX ADMIN — LIDOCAINE HYDROCHLORIDE 5 ML: 10 INJECTION, SOLUTION EPIDURAL; INFILTRATION; INTRACAUDAL; PERINEURAL at 13:49

## 2018-03-31 NOTE — ED PROVIDER NOTES
History  Chief Complaint   Patient presents with    Fall     Patient lost his balance falling forward onto the street  No LOC  Presents with b/l knee abrasions, small abrasions to both palms and abrasion to nose  A 69-year-old male with past history of diabetes; presents with a laceration over his nose, nose pain, low back pain and bilateral knee pain after a fall  Patient states that he lost his balance, causing him to fall forward striking his face on the concrete  Patient denies loss of consciousness, however was unable to get up secondary to his knee pain  Patient states he remained on the ground for approximately 20 minutes  Patient denies having dizziness, lightheadedness, chest pain or shortness of breath prior to the fall  Currently patient continues to deny headache, blurred vision, dizziness, lightheadedness, neck pain, back pain, chest pain, shortness of breath, abdominal pain, nausea, vomiting, diarrhea, paresthesias and weakness  Patient denies radiation of the back pain down his legs  Patient did initially have a nosebleed however that has spontaneously resolved  Patient denies blood thinners  A/P:  Mechanical fall, now with a laceration over the nasal bridge  Also suspect underlying nasal bone fractures due to developing swelling  Spine nontender without step-offs  Patient is neurologically intact  Will obtain imaging to rule out traumatic injury  Will plan to place suture in laceration  Will update tetanus shot  History provided by:  Patient      Prior to Admission Medications   Prescriptions Last Dose Informant Patient Reported?  Taking?   glipiZIDE (GLUCOTROL) 10 mg tablet   No Yes   Sig: TAKE 1 TABLET BY MOUTH TWICE A DAY   metFORMIN (GLUCOPHAGE) 1000 MG tablet   No Yes   Sig: TAKE 1/2 TABLET BY MOUTH EVERY MORNING AND 1 TABLET IN THE EVENING      Facility-Administered Medications: None       Past Medical History:   Diagnosis Date    Diabetes mellitus ([facility] [address] )        History reviewed  No pertinent surgical history  History reviewed  No pertinent family history  I have reviewed and agree with the history as documented  Social History   Substance Use Topics    Smoking status: Never Smoker    Smokeless tobacco: Never Used    Alcohol use No        Review of Systems   Musculoskeletal: Positive for arthralgias ( bilateral knees ) and back pain ( lumbar)  Skin: Positive for wound ( nose)  All other systems reviewed and are negative  Physical Exam  ED Triage Vitals   Temperature Pulse Respirations Blood Pressure SpO2   03/31/18 1213 03/31/18 1213 03/31/18 1213 03/31/18 1213 03/31/18 1213   98 6 °F (37 °C) 85 16 (!) 171/82 96 %      Temp Source Heart Rate Source Patient Position - Orthostatic VS BP Location FiO2 (%)   03/31/18 1213 03/31/18 1310 03/31/18 1310 03/31/18 1310 --   Oral Monitor Lying Right arm       Pain Score       --                  Orthostatic Vital Signs  Vitals:    03/31/18 1213 03/31/18 1310 03/31/18 1410   BP: (!) 171/82 156/75 (!) 173/82   Pulse: 85 87 90   Patient Position - Orthostatic VS:  Lying        Physical Exam   General Appearance: alert and oriented, nad, non toxic appearing; disheveled appearing  Skin:  Warm, dry  0 5cm laceration over bridge of the nose  Scattered abrasions to the bilateral palms and anterior knees  HEENT: Normocephalic  PERRLA, EOMI  Facial bones nontender  Teeth in tact  No septal hematomas  Neck: Supple, trachea midline  No midline cervical spine tenderness, cervical collar in place  Cardiac: RRR; no murmurs, rub, gallops  Pulmonary: lungs CTAB; no wheezes, rales, rhonchi; mild tenderness over left chest wall, no deformities appreciated, no crepitance palpated  Gastrointestinal: abdomen soft, nontender, nondistended; no guarding or rebound tenderness; good bowel sounds, no mass or bruits  Extremities:  No midline spinal tenderness, no step offs  Bilateral upper and lower extremities nontender with full ROM    No pedal edema, 2+ pulses; no calf tenderness, no clubbing, no cyanosis  Neuro:  no focal motor or sensory deficits, CN 2-12 grossly intact  Psych:  Normal mood and affect, normal judgement and insight      ED Medications  Medications   bacitracin topical ointment 1 small application (not administered)   tetanus-diphtheria-acellular pertussis (BOOSTRIX) IM injection 0 5 mL (0 5 mL Intramuscular Given 3/31/18 1349)   lidocaine (PF) (XYLOCAINE-MPF) 1 % injection 5 mL (5 mL Infiltration Given 3/31/18 1349)       Diagnostic Studies  Results Reviewed     None                 CT facial bones without contrast   Final Result by Cherylene Maiers, MD (03/31 1331)      Bilateral comminuted nasal bone fractures and bony nasal septal fracture in the midline  Bilateral lamina papyracea fractures, likely chronic  Blood in the right maxillary sinus  Workstation performed: [id number]         CT cervical spine without contrast   Final Result by Cherylene Maiers, MD (03/31 1318)      No cervical spine fracture or traumatic malalignment  Workstation performed: [id number]         CT head without contrast   Final Result by Cherylene Maiers, MD (03/31 1315)      No acute intracranial abnormality  Bilateral nasal bone fractures and bony nasal septum fracture  Workstation performed: [id number]         XR lumbar spine 2 or 3 views   ED Interpretation by Princess Wang DO (03/31 1256)   No acute disease    Severe osteoarthritis appreciated      XR chest 1 view   ED Interpretation by 90Ramos Wang DO (03/31 1256)   No acute disease       by Anjum Brito (03/31 1239)      XR knee 1 or 2 vw left   ED Interpretation by 90Ramos Wang DO (03/31 1256)   No acute fracture or dislocation       by Anjum Brito (03/31 1240)      XR knee 1 or 2 vw right   ED Interpretation by Princess Wang DO (03/31 1256)   No acute fracture or dislocation       by Anjum Brito (03/31 1240) Procedures  Lac Repair  Date/Time: 3/31/2018 1:59 PM  Performed by: Johnathan Edwards  Authorized by: Ashley Kirk   Consent: Verbal consent obtained  Risks and benefits: risks, benefits and alternatives were discussed  Consent given by: patient  Body area: head/neck  Location details: nose  Laceration length: 0 5 cm  Anesthesia: local infiltration    Anesthesia:  Local Anesthetic: lidocaine 1% without epinephrine  Anesthetic total: 4 mL      Procedure Details:  Irrigation solution: saline  Irrigation method: syringe  Amount of cleaning: standard  Debridement: none  Skin closure: 5-0 nylon  Number of sutures: 1  Technique: simple  Dressing: antibiotic ointment  Patient tolerance: Patient tolerated the procedure well with no immediate complications         ECG 12 Lead Documentation  Date/Time: today/date: 3/31/2018  Performed by: Regina Galaviz    ECG reviewed by me, the ED Provider: yes    Patient location:  ED   Previous ECG:  Compared to current, no change   Rate:  83  ECG rate assessment: normal    Rhythm: sinus rhythm    Ectopy:  none    QRS axis:  Normal  Intervals: incomplete RBBB   Q waves: None   ST segments:  Normal  T waves: normal      Impression: Incomplete RBBB otherwise NSR          Phone Consults  ED Phone Contact    ED Course  ED Course as of Mar 31 1446   Sat Mar 31, 2018   1333 Consistent with nasal bone fractures  Given overlying laceration to nasal bridge will treat for possible open fracture  Will give sinus precautions and ENT follow up  CT facial bones without contrast           Identification of Seniors at Risk    [facility] Most Recent Value   (ISAR) Identification of Seniors at Risk   Before the illness or injury that brought you to the Emergency, did you need someone to help you on a regular basis?   0 Filed at: 03/31/2018 1214   In the last 24 hours, have you needed more help than usual?  0 Filed at: 03/31/2018 1214   Have you been hospitalized for one or more nights during the past 6 months? 0 Filed at: 03/31/2018 1214   In general, do you see well?  0 Filed at: 03/31/2018 1214   In general, do you have serious problems with your memory? 0 Filed at: 03/31/2018 1214   Do you take more than three different medications every day?  0 Filed at: 03/31/2018 1214   ISAR Score  0 Filed at: 03/31/2018 1214                          [facility]  CritCare Time    Disposition  Final diagnoses:   Nasal bone fractures   Laceration of nose, initial encounter   Acute lumbar back pain   Fall from standing, initial encounter     Time reflects when diagnosis was documented in both MDM as applicable and the Disposition within this note     Time User Action Codes Description Comment    3/31/2018  2:01 PM Pedrito Groves Add [S02  2XXA] Nasal bone fractures     3/31/2018  2:01 PM [place], [address] [O36 33DE] Laceration of nose, initial encounter     3/31/2018  2:01 PM [place], [address] [M54 5] Acute lumbar back pain     3/31/2018  2:01 PM Piedad Stein Add [W19  XXXA] Fall from standing, initial encounter       ED Disposition     ED Disposition Condition Comment    Discharge  Yadi Quarto discharge to home/self care  Condition at discharge: Good        Follow-up Information     Follow up With Specialties Details Why Contact Info Additional Information    Theo Hernández DO Internal Medicine, Family Medicine Schedule an appointment as soon as possible for a visit in 3 days If symptoms worsen [address] [facility] [address] [facility] Emergency Department Emergency Medicine Go to For suture removal in 3-5 days [address] [facility] ED, [address] [place] [address], [address] [facility]  Schedule an appointment as soon as possible for a visit in 3 days For re-evaluation [address] [facility]    [address]         Discharge Medication List as of 3/31/2018 2:04 PM      START taking these medications    Details   amoxicillin-clavulanate (AUGMENTIN) 875-125 mg per tablet Take 1 tablet by mouth every 12 (twelve) hours for 5 days, Starting Sat 3/31/2018, Until u 4/5/2018, Print      sodium chloride (OCEAN) 0 65 % nasal spray 1 spray into each nostril as needed for congestion or rhinitis, Starting Sat 3/31/2018, Print         CONTINUE these medications which have NOT CHANGED    Details   glipiZIDE (GLUCOTROL) 10 mg tablet TAKE 1 TABLET BY MOUTH TWICE A DAY, Normal      metFORMIN (GLUCOPHAGE) 1000 MG tablet TAKE 1/2 TABLET BY MOUTH EVERY MORNING AND 1 TABLET IN THE EVENING, Normal           No discharge procedures on file  ED Provider  Attending physically available and evaluated Kinza Wray I managed the patient along with the ED Attending      Electronically Signed by         Kierra Pearson DO  03/31/18 0481

## 2018-03-31 NOTE — DISCHARGE INSTRUCTIONS
Keep the stitches clean and dry  You may get the stitches wet, however do not soak  Apply triple antibiotic ointment (neosporin or bacitracin) to the wound 2-3 times per day  Return to the ED immediately if you develop significant swelling, redness, drainage, bleeding or fevers  Have the stitches removed in 3-5 days  Avoid blowing your nose  Use saline nasal spray multiple times per week to keep nares moist   Follow up with ENT for re-evaluation of your nasal bone fractures  Complete course of antibiotics  Nasal Fracture   WHAT YOU NEED TO KNOW:   A nasal fracture is a crack or break in your nose  You may have a break in the upper nose (bridge), the side, or in the septum  The septum is in the middle of the nose and divides your nostrils  Nasal fractures are caused by a hard hit to the nose  They may be caused by a motor vehicle crash, sports injury, fall, or a fight  DISCHARGE INSTRUCTIONS:   Seek care immediately if:   · You feel like one or both of your nasal passages are blocked and you have trouble breathing  · Clear fluid is leaking from your nose  · Your have severe nose pain, even after you take medicine  · You have double vision or have problems moving your eyes  Contact your healthcare provider if:   · You have a fever  · You continue to have nosebleeds  · You have a headache that gets worse, even after you take pain medicine  · Your splint or packing are loose  · You have questions about your condition or care  Medicines:   · Medicine  may be given to decrease pain or help prevent a bacterial infection  Ask how to take pain medicine safely  Medicine may also be given to decrease nasal swelling and help make breathing easier  · Take your medicine as directed  Contact your healthcare provider if you think your medicine is not helping or if you have side effects  Tell him or her if you are allergic to any medicine   Keep a list of the medicines, vitamins, and herbs you take  Include the amounts, and when and why you take them  Bring the list or the pill bottles to follow-up visits  Carry your medicine list with you in case of an emergency  Wound care:  Ask your healthcare provider how to care for your wounds, splint, or packing  How to care for your nasal fracture:   · Apply ice  on your nose for 15 to 20 minutes every hour or as directed  Use an ice pack, or put crushed ice in a plastic bag  Cover it with a towel  Ice helps prevent tissue damage and decreases swelling and pain  · Elevate  your head when you lie down  This will help decrease swelling and pain  You may need to see a specialist 3 to 5 days later for tests or more treatment after swelling has decreased  · Protect your nose  to prevent bleeding, bruising, or another fracture  Try not to bump your nose on anything  You may not be able to participate in sports for up to 6 weeks  Follow up with a specialist or your healthcare provider in 2 to 5 days as directed:  Write down any questions you have so you remember to ask them during your visits  Sometimes follow-up care is needed months or even years later to correct problems  © 2017 [facility] Information is for End User's use only and may not be sold, redistributed or otherwise used for commercial purposes  All illustrations and images included in CareNotes® are the copyrighted property of [employer]  or Alberto Yu  The above information is an  only  It is not intended as medical advice for individual conditions or treatments  Talk to your doctor, nurse or pharmacist before following any medical regimen to see if it is safe and effective for you

## 2018-03-31 NOTE — ED ATTENDING ATTESTATION
Brenden Myers MD, saw and evaluated the patient  I have discussed the patient with the resident/non-physician practitioner and agree with the resident's/non-physician practitioner's findings, Plan of Care, and MDM as documented in the resident's/non-physician practitioner's note, except where noted  All available labs and Radiology studies were reviewed  At this point I agree with the current assessment done in the Emergency Department  I have conducted an independent evaluation of this patient a history and physical is as follows:    Patient comes to the emergency department after a fall  The patient states he was walking down the street when he lost his footing causing him to fall forward  The patient denies near-syncope or syncope  The patient remembers falling and striking the ground and had no loss of consciousness after the fall  The patient reports that he was in his normal state of health prior to the fall  Since the fall the patient has noticed pain in his nose, upper chest, and bilateral knees  The patient also reports mild low back pain  The patient denies any chest pain, shortness of breath, headache, or change in mental status  The person accompanying him states his mental status is unchanged  Physical exam demonstrates an elderly male in no apparent distress  The patient is immobilized in a firm cervical collar  There is a 1 cm laceration to the bridge of the nose  There is no facial bone instability  The neck was nontender to palpation  There is no bony deformity to the skull  Thoracic and lumbar spines are nontender  There is mild lumbar paraspinal muscle tenderness  The upper extremities are nontender with a full range of motion  The heart had a regular rate and rhythm  The anterior chest was mildly tender to palpation over the upper central chest   There is no deformity or crepitance  Bilateral hips are nontender with full range of motion    There is erythema to the bilateral knees but that a full range of motion without deformity or crepitance  There is no effusion to either knee  The remainder of the lower extremity exam is normal neurologic exam is normal without focal deficits      Critical Care Time  CritCare Time    Procedures

## 2018-04-01 LAB
ATRIAL RATE: 83 BPM
P AXIS: 67 DEGREES
PR INTERVAL: 144 MS
QRS AXIS: 54 DEGREES
QRSD INTERVAL: 102 MS
QT INTERVAL: 384 MS
QTC INTERVAL: 451 MS
T WAVE AXIS: 67 DEGREES
VENTRICULAR RATE: 83 BPM

## 2018-04-01 PROCEDURE — 93010 ELECTROCARDIOGRAM REPORT: CPT | Performed by: INTERNAL MEDICINE

## 2018-04-03 ENCOUNTER — APPOINTMENT (OUTPATIENT)
Dept: PHYSICAL THERAPY | Facility: MEDICAL CENTER | Age: 68
End: 2018-04-03
Attending: PHYSICIAN ASSISTANT
Payer: COMMERCIAL

## 2018-04-04 ENCOUNTER — OFFICE VISIT (OUTPATIENT)
Dept: URGENT CARE | Facility: MEDICAL CENTER | Age: 68
End: 2018-04-04
Payer: COMMERCIAL

## 2018-04-04 ENCOUNTER — EVALUATION (OUTPATIENT)
Dept: PHYSICAL THERAPY | Facility: MEDICAL CENTER | Age: 68
End: 2018-04-04
Attending: PHYSICIAN ASSISTANT
Payer: COMMERCIAL

## 2018-04-04 VITALS
WEIGHT: 184.4 LBS | BODY MASS INDEX: 28.94 KG/M2 | HEIGHT: 67 IN | OXYGEN SATURATION: 98 % | TEMPERATURE: 98.1 F | HEART RATE: 96 BPM | DIASTOLIC BLOOD PRESSURE: 88 MMHG | RESPIRATION RATE: 18 BRPM | SYSTOLIC BLOOD PRESSURE: 153 MMHG

## 2018-04-04 DIAGNOSIS — Z48.02 ENCOUNTER FOR REMOVAL OF SUTURES: Primary | ICD-10-CM

## 2018-04-04 DIAGNOSIS — H81.10 BENIGN PAROXYSMAL POSITIONAL VERTIGO, UNSPECIFIED LATERALITY: Primary | ICD-10-CM

## 2018-04-04 PROCEDURE — G0463 HOSPITAL OUTPT CLINIC VISIT: HCPCS | Performed by: PHYSICIAN ASSISTANT

## 2018-04-04 PROCEDURE — 99211 OFF/OP EST MAY X REQ PHY/QHP: CPT | Performed by: PHYSICIAN ASSISTANT

## 2018-04-04 PROCEDURE — G8978 MOBILITY CURRENT STATUS: HCPCS | Performed by: PHYSICAL THERAPIST

## 2018-04-04 PROCEDURE — 97161 PT EVAL LOW COMPLEX 20 MIN: CPT | Performed by: PHYSICAL THERAPIST

## 2018-04-04 PROCEDURE — G8979 MOBILITY GOAL STATUS: HCPCS | Performed by: PHYSICAL THERAPIST

## 2018-04-04 NOTE — PROGRESS NOTES
PT Evaluation  and Discharge    Today's date: 2018  Patient name: Corbin Pitt  : 1950  MRN: [id number]  Referring provider: Darleen Rodriguez PA-C  Dx:   Encounter Diagnosis     ICD-10-CM    1  Benign paroxysmal positional vertigo, unspecified laterality H81 10                   Assessment    Assessment details: Corbin Pitt is a 79 y o  male who presents with Benign paroxysmal positional vertigo, unspecified laterality  (primary encounter diagnosis)  Patient presents  with the above impairments  No physical therapy indicated at this time due negative vestibular exam   No further referral appears necessary at this time based upon examination results  Advised to contact PCP if symptoms arise  Plan  Plan details: No treatment required  Subjective Evaluation    History of Present Illness  Mechanism of injury: Pt presents today w/ c/o dizziness when getting in and out of bed at times  He describes symptoms as spinning and short lasting less than one minute  He denies dizziness with any other activity throughout the day  He did fall last week, but denies any dizziness  He reports fall occurred due to tripping  He saw PCP in October for dizziness and was referred to physical therapy  He was evaluated, and treated for BPPV  Symptoms were resolved up until last month  He reports that dizziness only occurs intermittently when getting in bed; not daily  He has not recently seen PCP, but reports he scheduled appt in physical therapy due to his ears making his dizzy as they previously have  He was recently seen by urgent care following fall last week which required stitches in nose  Objective     Functional Assessment     Comments  Vestibular exam: Patient does have difficulty following commands    Cranial nerve screen; wnl;  Required cues to understand tracking; no saccades or end range nystagmus noted    [place]; negative  Gaze stabilization: wnl  Hallpike to R and  L ; negative; no symptoms, no nystagmus  Roll test to R and L; negative; no symptoms, no nystagmus          Precautions: DM    Daily Treatment Diary     Manual                                                                                   Exercise Diary                                                                                                                                                                                                                                                                                      Modalities

## 2018-04-04 NOTE — PROGRESS NOTES
[address] [facility]        NAME: Lissett Reid is a 79 y o  male  : 1950    MRN: [id number]  DATE: 2018  TIME: 12:45 PM    Assessment and Plan   Encounter for removal of sutures [Z48 02]  1  Encounter for removal of sutures           Patient Instructions     Keep the area clean and dry and watch for signs of infection  Follow up with PCP in 3-5 days  Proceed to  ER if symptoms worsen  Chief Complaint     Chief Complaint   Patient presents with    Suture / Staple Removal         History of Present Illness       This is a 79year old male presenting for suture removal  He has 1 suture placed over the bridge of his nose in the ER 5 days ago after a fall  He denies any complications- no fevers, pain, spreading redness, drainage  He tolerates removal of the suture well  Review of Systems   Review of Systems   Constitutional: Negative for chills, fatigue and fever  HENT: Negative for congestion, nosebleeds, postnasal drip, rhinorrhea and sinus pain  Respiratory: Negative for cough and shortness of breath  Musculoskeletal: Negative for myalgias  Skin: Positive for wound  Neurological: Negative for dizziness           Current Medications       Current Outpatient Prescriptions:     amoxicillin-clavulanate (AUGMENTIN) 875-125 mg per tablet, Take 1 tablet by mouth every 12 (twelve) hours for 5 days, Disp: 10 tablet, Rfl: 0    glipiZIDE (GLUCOTROL) 10 mg tablet, TAKE 1 TABLET BY MOUTH TWICE A DAY, Disp: 60 tablet, Rfl: 1    metFORMIN (GLUCOPHAGE) 1000 MG tablet, TAKE 1/2 TABLET BY MOUTH EVERY MORNING AND 1 TABLET IN THE EVENING, Disp: 45 tablet, Rfl: 2    sodium chloride (OCEAN) 0 65 % nasal spray, 1 spray into each nostril as needed for congestion or rhinitis, Disp: 15 mL, Rfl: 0    Current Allergies     Allergies as of 2018    (No Known Allergies)            The following portions of the patient's history were reviewed and updated as appropriate: allergies, current medications, past family history, past medical history, past social history, past surgical history and problem list      Past Medical History:   Diagnosis Date    Diabetes mellitus ([facility] [address] )        No past surgical history on file  No family history on file  Medications have been verified  Objective   /88   Pulse 96   Temp 98 1 °F (36 7 °C) (Temporal)   Resp 18   Ht 5' 7" (1 702 m)   Wt 83 6 kg (184 lb 6 4 oz)   SpO2 98%   BMI 28 88 kg/m²        Physical Exam     Physical Exam   Constitutional: He appears well-developed and well-nourished  No distress  HENT:   Head: Normocephalic  Right Ear: External ear normal    Left Ear: External ear normal    Nose: Nose lacerations present  Mouth/Throat: Oropharynx is clear and moist  No oropharyngeal exudate  Cardiovascular: Normal rate, regular rhythm and normal heart sounds  Pulmonary/Chest: Effort normal and breath sounds normal    Neurological: He is alert  Skin: Skin is warm and dry  He is not diaphoretic  Nursing note and vitals reviewed  One suture is removed from the bridge of the nose without complication

## 2018-04-05 ENCOUNTER — OFFICE VISIT (OUTPATIENT)
Dept: FAMILY MEDICINE CLINIC | Facility: CLINIC | Age: 68
End: 2018-04-05
Payer: COMMERCIAL

## 2018-04-05 VITALS
HEIGHT: 67 IN | OXYGEN SATURATION: 97 % | SYSTOLIC BLOOD PRESSURE: 154 MMHG | BODY MASS INDEX: 29.88 KG/M2 | DIASTOLIC BLOOD PRESSURE: 86 MMHG | HEART RATE: 86 BPM | WEIGHT: 190.4 LBS | TEMPERATURE: 97.6 F

## 2018-04-05 DIAGNOSIS — R42 DIZZINESS: Primary | ICD-10-CM

## 2018-04-05 DIAGNOSIS — S02.2XXA CLOSED FRACTURE OF NASAL BONE, INITIAL ENCOUNTER: ICD-10-CM

## 2018-04-05 PROCEDURE — 99214 OFFICE O/P EST MOD 30 MIN: CPT | Performed by: PHYSICIAN ASSISTANT

## 2018-04-05 PROCEDURE — 3008F BODY MASS INDEX DOCD: CPT | Performed by: PHYSICIAN ASSISTANT

## 2018-04-05 RX ORDER — MECLIZINE HYDROCHLORIDE 25 MG/1
25 TABLET ORAL EVERY 8 HOURS SCHEDULED
Qty: 30 TABLET | Refills: 0 | Status: SHIPPED | OUTPATIENT
Start: 2018-04-05 | End: 2018-10-24 | Stop reason: SDUPTHER

## 2018-04-05 NOTE — PROGRESS NOTES
Assessment/Plan:         Diagnoses and all orders for this visit:    Dizziness  Comments:    Script for meclizine given  Patient referred to ENT  Orders:  -     meclizine (ANTIVERT) 25 mg tablet; Take 1 tablet (25 mg total) by mouth every 8 (eight) hours    Closed fracture of nasal bone, initial encounter  Comments:    Patient referred to ENT  Orders:  -     Ambulatory Referral to Otolaryngology; Future          Subjective:      Patient ID: Lorelei Boyd is a 79 y o  male  Patient presents for follow up emergency room visit for fall  Follow-up from physical therapy  And follow-up from Urgent Care  Patient states he fell says he got dizzy and fell forward landing on his hands and knees and his face  He was transported to ER by EMS  He was treated in the ER and r released  Patient sustained multiple contusions hands knees  Patient sustained fractured nasal bones  ER studies reviewed show normal CT scan of brain  Facial CT scan shows fractured nasal bones  Patient was then seen at physical therapy for dizziness/ vertigo for possible crystal therapy  Patient was informed by the physical therapist he was not a candidate at this time  Patient was then seen yesterday in urgent care for suture removal in the bridge of his nose  Patient states he has no follow-up with ENT and or plastic surgeon  Patient states he gets dizziness when he looks up and lift his head  Patient states this has been going on for quite some time  Patient denies blurry vision hearing loss or ringing in his ear  Patient has no headache  Patient states he is able to breathe through his nose and has no bleeding from his nares  The following portions of the patient's history were reviewed and updated as appropriate:   He  has a past medical history of Diabetes mellitus ([facility] )    He   Patient Active Problem List    Diagnosis Date Noted    Dizziness 10/04/2017    Pancreas cyst 05/20/2016    Type 2 diabetes mellitus with diabetic neuropathy ([facility] ) 07/31/2015    Hyperlipidemia 06/13/2012     Current Outpatient Prescriptions   Medication Sig Dispense Refill    amoxicillin-clavulanate (AUGMENTIN) 875-125 mg per tablet Take 1 tablet by mouth every 12 (twelve) hours for 5 days 10 tablet 0    glipiZIDE (GLUCOTROL) 10 mg tablet TAKE 1 TABLET BY MOUTH TWICE A DAY 60 tablet 1    meclizine (ANTIVERT) 25 mg tablet Take 1 tablet (25 mg total) by mouth every 8 (eight) hours 30 tablet 0    metFORMIN (GLUCOPHAGE) 1000 MG tablet TAKE 1/2 TABLET BY MOUTH EVERY MORNING AND 1 TABLET IN THE EVENING 45 tablet 2    sodium chloride (OCEAN) 0 65 % nasal spray 1 spray into each nostril as needed for congestion or rhinitis 15 mL 0     No current facility-administered medications for this visit  Current Outpatient Prescriptions on File Prior to Visit   Medication Sig    amoxicillin-clavulanate (AUGMENTIN) 875-125 mg per tablet Take 1 tablet by mouth every 12 (twelve) hours for 5 days    glipiZIDE (GLUCOTROL) 10 mg tablet TAKE 1 TABLET BY MOUTH TWICE A DAY    metFORMIN (GLUCOPHAGE) 1000 MG tablet TAKE 1/2 TABLET BY MOUTH EVERY MORNING AND 1 TABLET IN THE EVENING    sodium chloride (OCEAN) 0 65 % nasal spray 1 spray into each nostril as needed for congestion or rhinitis     No current facility-administered medications on file prior to visit  He has No Known Allergies       Review of Systems   Constitutional: Negative for chills, fatigue and unexpected weight change  HENT: Negative for ear pain, hearing loss and tinnitus  Eyes: Negative for photophobia and visual disturbance  Respiratory: Negative for shortness of breath  Cardiovascular: Negative for chest pain  Gastrointestinal: Negative for abdominal pain  Musculoskeletal: Negative for arthralgias  Neurological: Negative for dizziness, speech difficulty and headaches  Psychiatric/Behavioral: Negative for confusion           Objective:      /86 (BP Location: Right arm, Patient Position: Sitting, Cuff Size: Standard)   Pulse 86   Temp 97 6 °F (36 4 °C)   Ht 5' 7" (1 702 m)   Wt 86 4 kg (190 lb 6 4 oz)   SpO2 97%   BMI 29 82 kg/m²          Physical Exam   Constitutional: He is oriented to person, place, and time  He appears well-developed and well-nourished  HENT:   Right Ear: External ear normal    Left Ear: External ear normal    Mouth/Throat: Oropharynx is clear and moist    Eyes: Conjunctivae and EOM are normal  Pupils are equal, round, and reactive to light  Cardiovascular: Normal rate and regular rhythm  No murmur heard  Pulmonary/Chest: Effort normal and breath sounds normal    Musculoskeletal: He exhibits no edema  Neurological: He is alert and oriented to person, place, and time  He displays no tremor  No cranial nerve deficit  He exhibits normal muscle tone  Gait abnormal    Reflex Scores:       Bicep reflexes are 2+ on the right side and 2+ on the left side  Brachioradialis reflexes are 2+ on the right side and 2+ on the left side  Shuffling  Gait  No  Cog  Wheel  Skin:   Patient has ecchymosis right and left lower lids a cross bridge of nose  Healed superficial lacerations scabbed no infection

## 2018-05-03 DIAGNOSIS — E11.9 DIABETES (HCC): ICD-10-CM

## 2018-05-03 RX ORDER — GLIPIZIDE 10 MG/1
TABLET ORAL
Qty: 60 TABLET | Refills: 1 | Status: SHIPPED | OUTPATIENT
Start: 2018-05-03 | End: 2018-07-31 | Stop reason: SDUPTHER

## 2018-05-09 DIAGNOSIS — E11.9 TYPE 2 DIABETES MELLITUS WITHOUT COMPLICATION, WITHOUT LONG-TERM CURRENT USE OF INSULIN (HCC): Primary | ICD-10-CM

## 2018-05-21 ENCOUNTER — TELEPHONE (OUTPATIENT)
Dept: FAMILY MEDICINE CLINIC | Facility: CLINIC | Age: 68
End: 2018-05-21

## 2018-05-21 NOTE — TELEPHONE ENCOUNTER
Dr Taylor Smith in [place] saw patient for toe nail clippings on 4/9/18  He had them down for [place]  They actually have [facility]  Can you provide a referral for that date?

## 2018-07-05 ENCOUNTER — TELEPHONE (OUTPATIENT)
Dept: FAMILY MEDICINE CLINIC | Facility: CLINIC | Age: 68
End: 2018-07-05

## 2018-07-31 DIAGNOSIS — E11.9 DIABETES (HCC): ICD-10-CM

## 2018-07-31 DIAGNOSIS — E11.40 TYPE 2 DIABETES MELLITUS WITH DIABETIC NEUROPATHY, WITHOUT LONG-TERM CURRENT USE OF INSULIN (HCC): ICD-10-CM

## 2018-07-31 DIAGNOSIS — E78.5 HYPERLIPIDEMIA, UNSPECIFIED HYPERLIPIDEMIA TYPE: Primary | ICD-10-CM

## 2018-07-31 RX ORDER — GLIPIZIDE 10 MG/1
TABLET ORAL
Qty: 60 TABLET | Refills: 0 | Status: SHIPPED | OUTPATIENT
Start: 2018-07-31 | End: 2018-11-21 | Stop reason: SDUPTHER

## 2018-10-17 DIAGNOSIS — E11.9 TYPE 2 DIABETES MELLITUS WITHOUT COMPLICATION, WITHOUT LONG-TERM CURRENT USE OF INSULIN (HCC): ICD-10-CM

## 2018-10-23 ENCOUNTER — OFFICE VISIT (OUTPATIENT)
Dept: FAMILY MEDICINE CLINIC | Facility: CLINIC | Age: 68
End: 2018-10-23
Payer: COMMERCIAL

## 2018-10-23 VITALS
WEIGHT: 191 LBS | RESPIRATION RATE: 16 BRPM | SYSTOLIC BLOOD PRESSURE: 104 MMHG | OXYGEN SATURATION: 98 % | HEART RATE: 94 BPM | BODY MASS INDEX: 29.98 KG/M2 | DIASTOLIC BLOOD PRESSURE: 72 MMHG | TEMPERATURE: 98.3 F | HEIGHT: 67 IN

## 2018-10-23 DIAGNOSIS — J06.9 UPPER RESPIRATORY TRACT INFECTION, UNSPECIFIED TYPE: Primary | ICD-10-CM

## 2018-10-23 PROCEDURE — 99213 OFFICE O/P EST LOW 20 MIN: CPT | Performed by: INTERNAL MEDICINE

## 2018-10-23 RX ORDER — AMOXICILLIN 500 MG/1
500 CAPSULE ORAL EVERY 8 HOURS SCHEDULED
Qty: 30 CAPSULE | Refills: 0 | Status: SHIPPED | OUTPATIENT
Start: 2018-10-23 | End: 2018-11-02

## 2018-10-23 NOTE — PROGRESS NOTES
Assessment/Plan:         Diagnoses and all orders for this visit:    Upper respiratory tract infection, unspecified type  -     amoxicillin (AMOXIL) 500 mg capsule; Take 1 capsule (500 mg total) by mouth every 8 (eight) hours for 10 days          Subjective:      Patient ID: Yosef Clements is a 76 y o  male  Pt has runny nose and productive cough  -sore throat  Denies sob  Ill x 2 days  Denies f/s/c  Cough   Associated symptoms include headaches, postnasal drip and rhinorrhea  Pertinent negatives include no fever  The following portions of the patient's history were reviewed and updated as appropriate:   He  has a past medical history of Anxiety; Diabetes mellitus ([facility] ); GERD (gastroesophageal reflux disease); Hematuria; and Nuclear senile cataract of left eye  He   Patient Active Problem List    Diagnosis Date Noted    Dizziness 10/04/2017    Pancreas cyst 05/20/2016    Type 2 diabetes mellitus with diabetic neuropathy ([facility] ) 07/31/2015    Hyperlipidemia 06/13/2012     He  has a past surgical history that includes Cataract extraction; Cholecystectomy; and Knee surgery  His family history includes Coronary artery disease in his mother; Diabetes in his father  He  reports that he has never smoked  He has never used smokeless tobacco  He reports that he does not drink alcohol or use drugs    Current Outpatient Prescriptions   Medication Sig Dispense Refill    glipiZIDE (GLUCOTROL) 10 mg tablet TAKE 1 TABLET BY MOUTH TWICE A DAY 60 tablet 0    meclizine (ANTIVERT) 25 mg tablet Take 1 tablet (25 mg total) by mouth every 8 (eight) hours 30 tablet 0    metFORMIN (GLUCOPHAGE) 1000 MG tablet Take 1 tablet (1,000 mg total) by mouth 2 (two) times a day Take   1/2  Tablet in  Am and   1  Whole  Tablet at  supper 135 tablet 0    amoxicillin (AMOXIL) 500 mg capsule Take 1 capsule (500 mg total) by mouth every 8 (eight) hours for 10 days 30 capsule 0    sodium chloride (OCEAN) 0 65 % nasal spray 1 spray into each nostril as needed for congestion or rhinitis (Patient not taking: Reported on 10/23/2018 ) 15 mL 0     No current facility-administered medications for this visit  Current Outpatient Prescriptions on File Prior to Visit   Medication Sig    glipiZIDE (GLUCOTROL) 10 mg tablet TAKE 1 TABLET BY MOUTH TWICE A DAY    meclizine (ANTIVERT) 25 mg tablet Take 1 tablet (25 mg total) by mouth every 8 (eight) hours    metFORMIN (GLUCOPHAGE) 1000 MG tablet Take 1 tablet (1,000 mg total) by mouth 2 (two) times a day Take   1/2  Tablet in  Am and   1  Whole  Tablet at  supper    sodium chloride (OCEAN) 0 65 % nasal spray 1 spray into each nostril as needed for congestion or rhinitis (Patient not taking: Reported on 10/23/2018 )     No current facility-administered medications on file prior to visit  He has No Known Allergies       Review of Systems   Constitutional: Negative for fever  HENT: Positive for congestion, postnasal drip and rhinorrhea  Negative for voice change  Respiratory: Positive for cough  Cardiovascular: Negative  Neurological: Positive for headaches  Objective:      /72 (BP Location: Left arm, Patient Position: Sitting, Cuff Size: Large)   Pulse 94   Temp 98 3 °F (36 8 °C) (Tympanic)   Resp 16   Ht 5' 7" (1 702 m)   Wt 86 6 kg (191 lb)   SpO2 98%   BMI 29 91 kg/m²          Physical Exam   Constitutional: He appears well-developed and well-nourished  HENT:   Head: Normocephalic and atraumatic  Right Ear: External ear normal    Left Ear: External ear normal    Nose: Nose normal    Mouth/Throat: Oropharynx is clear and moist    Neck: Normal range of motion  Neck supple  Cardiovascular: Normal rate and regular rhythm      Pulmonary/Chest: Effort normal and breath sounds normal

## 2018-10-24 DIAGNOSIS — R42 DIZZINESS: ICD-10-CM

## 2018-10-24 RX ORDER — MECLIZINE HYDROCHLORIDE 25 MG/1
25 TABLET ORAL EVERY 8 HOURS SCHEDULED
Qty: 30 TABLET | Refills: 0 | Status: SHIPPED | OUTPATIENT
Start: 2018-10-24 | End: 2019-02-19

## 2018-11-14 ENCOUNTER — EVALUATION (OUTPATIENT)
Dept: PHYSICAL THERAPY | Facility: MEDICAL CENTER | Age: 68
End: 2018-11-14
Attending: PHYSICIAN ASSISTANT
Payer: COMMERCIAL

## 2018-11-14 DIAGNOSIS — R26.9 GAIT ABNORMALITY: ICD-10-CM

## 2018-11-14 DIAGNOSIS — R42 DIZZINESS: Primary | ICD-10-CM

## 2018-11-14 PROCEDURE — 97162 PT EVAL MOD COMPLEX 30 MIN: CPT | Performed by: PHYSICAL THERAPIST

## 2018-11-14 PROCEDURE — G8991 OTHER PT/OT GOAL STATUS: HCPCS | Performed by: PHYSICAL THERAPIST

## 2018-11-14 PROCEDURE — G8990 OTHER PT/OT CURRENT STATUS: HCPCS | Performed by: PHYSICAL THERAPIST

## 2018-11-14 NOTE — PROGRESS NOTES
PT Evaluation     Today's date: 2018  Patient name: Julius Litten  : 1950  MRN: [id number]  Referring provider: Margot Hansen PA-C  Dx:   Encounter Diagnosis     ICD-10-CM    1  Dizziness R42    2  Gait abnormality R26 9                   Assessment  Impairments: abnormal gait and impaired balance  Other impairment: dizziness    Assessment details: Pt is an alert and oriented 75 yo male, referred to out-pt PT with dx of dizziness and gait abnormality  Pt states onset of dizziness starting a couple of months ago, unable to state when exactly or what he was doing when dizziness occurred  Pt states he notices the room spinning when he goes to lay down or get up from bed  Pt states he also notices his balance is "off"  Upon examination, pt demonstrates gait and balance deficits, and (-) positional testing for BPPV  Unable to complete full neurological assessment on pt due to inability to follow commands of tracking with his eyes ind of his head, staying on target, or relaxing his neck muscles to assess eye reflex  Pt demonstrates "shuffling" gait pattern with amb  Pt has f/u with PCP in 2 weeks, and due to $40 co-pay each visit will perform 1 more visit of skilled PT to reassess positional tests and balance as able  Thank you for your referral   Understanding of Dx/Px/POC: fair   Prognosis: good    Goals  ST  Pt will reduce dizziness rating to at worst 2/10 in 4 weeks  2  Pt will improve balance on noncompliant surface with EC for 30sec within 4 weeks  3  Pt will demonstrate independence with HEP in 4 weeks  LT  Pt will deny dizziness in 8 weeks  2  Pt will improve balance on noncompliant surface with EC for 30 sec with head movement in 8 weeks  3  Pt will demonstrate independence with HEP in 8 weeks      Plan  Patient would benefit from: skilled physical therapy  Planned therapy interventions: manual therapy, patient education, neuromuscular re-education, gait training, home exercise program and therapeutic training  Frequency: 2x week  Duration in visits: 8  Duration in weeks: 4  Plan of Care beginning date: 2018  Plan of Care expiration date: 2018  Treatment plan discussed with: patient        Subjective Evaluation    Pain  Current pain ratin  At best pain ratin  At worst pain ratin  Location: low back  Quality: dull ache  Aggravating factors: standing    Social Support  Steps to enter house: yes  18  Stairs in house: no   Lives in: [facility]  Lives with: spouse      Diagnostic Tests  X-ray: abnormal  Treatments  Current treatment: physical therapy  Patient Goals  Patient goals for therapy: improved balance and independence with ADLs/IADLs  Patient goal: decrease dizziness with position change        Objective     Postural Observations    Additional Postural Observation Details  Observation:  Decreased lordosis, increased trunk flexion in standing    Gait:  Pt amb ind without AD, slowed, shuffling gait, toe-heel gait pattern    Neurological Testing     Sensation   Cervical/Thoracic   Left   Diminished: light touch    Right   Diminished: light touch    Additional Neurological Details  Pt reports diabetic neuropathy in b/l LE's    Tests     Additional Tests Details  Vestibular Evaluation:    Common complaints:  Disequilibrium: yes  Lightheadedness: yes  Vertigo: yes  Rocking or swaying:yes  Oscillopsia: no  Diplopia: yes  Motion sickness: yes  Floating, Swimming, Disconnected: no    PHYSICAL FINDINGS:    MCTSIB: r/o feels imbalance with trial 1, LOB on trial 2  Unable to assess foam activities    CN screen: unable to assess, pt unable to follow commands of tracking  Modified VBI screen: (-)  Oculomotor ROM:  Unable to follow commands to assess  Smooth pursuit: unable to asses  Resting nystagmus: (-)  Gaze holding nystagmus: (-)  Convergence: NA  Head thrust: unable to assess, pt unable to relax  VOR vertical: unable to assess  VOR horizontal: unable to asses    Ligament laxity testing:  Sharp-luc: (-)  Cervical ROM: 50% limited all planes  Cervical palpation: (-)  Posture: mod forward head posture, decreased lordosis    Central tests:  Vertical saccades: (-)  Horizontal saccades:  (-)  VOR cancellation: unable to assess due to inability to relax neck muscles  Positional testing: (-) b/l hallpike-martha                                (-) roll test b/l      *pt denies diplopia, dysarthria, dysphagia, drop attacks, and perioral paresthesias*                      Flowsheet Rows      Most Recent Value   PT/OT G-Codes   Current Score  26   Projected Score  62   FOTO information reviewed  Yes   Assessment Type  Evaluation   G code set  Other PT/OT Primary   Other PT Primary Current Status ()  CL   Other PT Primary Goal Status ()  CJ          Precautions: DM, arthritis, dizziness, falls risk      Daily Treatment Diary     Manual                                                                                   Exercise Diary                                                                                                                                                                                                                                                                                      Modalities

## 2018-11-21 ENCOUNTER — OFFICE VISIT (OUTPATIENT)
Dept: FAMILY MEDICINE CLINIC | Facility: CLINIC | Age: 68
End: 2018-11-21
Payer: COMMERCIAL

## 2018-11-21 VITALS
SYSTOLIC BLOOD PRESSURE: 102 MMHG | DIASTOLIC BLOOD PRESSURE: 66 MMHG | OXYGEN SATURATION: 98 % | BODY MASS INDEX: 30.37 KG/M2 | HEART RATE: 84 BPM | WEIGHT: 189 LBS | TEMPERATURE: 98 F | RESPIRATION RATE: 16 BRPM | HEIGHT: 66 IN

## 2018-11-21 DIAGNOSIS — E11.42 TYPE 2 DIABETES MELLITUS WITH DIABETIC POLYNEUROPATHY, WITHOUT LONG-TERM CURRENT USE OF INSULIN (HCC): ICD-10-CM

## 2018-11-21 DIAGNOSIS — Z12.11 SCREEN FOR COLON CANCER: ICD-10-CM

## 2018-11-21 DIAGNOSIS — E78.5 DYSLIPIDEMIA: ICD-10-CM

## 2018-11-21 DIAGNOSIS — Z00.00 MEDICARE ANNUAL WELLNESS VISIT, SUBSEQUENT: Primary | ICD-10-CM

## 2018-11-21 DIAGNOSIS — E11.40 TYPE 2 DIABETES MELLITUS WITH DIABETIC NEUROPATHY, WITHOUT LONG-TERM CURRENT USE OF INSULIN (HCC): ICD-10-CM

## 2018-11-21 LAB — SL AMB POCT HEMOGLOBIN AIC: 7.3

## 2018-11-21 PROCEDURE — 1125F AMNT PAIN NOTED PAIN PRSNT: CPT | Performed by: INTERNAL MEDICINE

## 2018-11-21 PROCEDURE — G0439 PPPS, SUBSEQ VISIT: HCPCS | Performed by: INTERNAL MEDICINE

## 2018-11-21 PROCEDURE — 83036 HEMOGLOBIN GLYCOSYLATED A1C: CPT | Performed by: INTERNAL MEDICINE

## 2018-11-21 PROCEDURE — 99214 OFFICE O/P EST MOD 30 MIN: CPT | Performed by: INTERNAL MEDICINE

## 2018-11-21 PROCEDURE — 3008F BODY MASS INDEX DOCD: CPT | Performed by: INTERNAL MEDICINE

## 2018-11-21 PROCEDURE — 3045F PR MOST RECENT HEMOGLOBIN A1C LEVEL 7.0-9.0%: CPT | Performed by: INTERNAL MEDICINE

## 2018-11-21 PROCEDURE — 1170F FXNL STATUS ASSESSED: CPT | Performed by: INTERNAL MEDICINE

## 2018-11-21 RX ORDER — GLIPIZIDE 10 MG/1
10 TABLET ORAL 2 TIMES DAILY
Qty: 60 TABLET | Refills: 5 | Status: SHIPPED | OUTPATIENT
Start: 2018-11-21 | End: 2019-01-02 | Stop reason: SDUPTHER

## 2018-11-21 NOTE — PROGRESS NOTES
Assessment and Plan:    Problem List Items Addressed This Visit     Type 2 diabetes mellitus with diabetic neuropathy ([facility] )    Relevant Orders    POCT hemoglobin A1c      Other Visit Diagnoses     Screen for colon cancer        Type 2 diabetes mellitus with diabetic polyneuropathy, without long-term current use of insulin [facility])            Health Maintenance Due   Topic Date Due    Hepatitis C Screening  1950    Medicare Annual Wellness Visit (AWV)  1950    CRC Screening: Colonoscopy  1950    DM Eye Exam  07/06/1960    Pneumococcal PPSV23/PCV13 65+ Years / Low and Medium Risk (1 of 2 - PCV13) 07/06/2015    HEMOGLOBIN A1C  03/20/2018    Diabetic Foot Exam  05/19/2018    INFLUENZA VACCINE  07/01/2018         HPI:  Donna Hernandez is a 76 y o  male here for his Subsequent Wellness Visit      Patient Active Problem List   Diagnosis    Dizziness    Hyperlipidemia    Pancreas cyst    Type 2 diabetes mellitus with diabetic neuropathy ([facility] )     Past Medical History:   Diagnosis Date    Anxiety     Diabetes mellitus ([place] [address] )     GERD (gastroesophageal reflux disease)     last assessed 5/20/16    Hematuria     Nuclear senile cataract of left eye      Past Surgical History:   Procedure Laterality Date    CATARACT EXTRACTION      CHOLECYSTECTOMY      KNEE SURGERY       Family History   Problem Relation Age of Onset    Coronary artery disease Mother     Diabetes Father      History   Smoking Status    Never Smoker   Smokeless Tobacco    Never Used     History   Alcohol Use No      History   Drug Use No       Current Outpatient Prescriptions   Medication Sig Dispense Refill    meclizine (ANTIVERT) 25 mg tablet Take 1 tablet (25 mg total) by mouth every 8 (eight) hours 30 tablet 0    metFORMIN (GLUCOPHAGE) 1000 MG tablet Take 1 tablet (1,000 mg total) by mouth 2 (two) times a day Take   1/2  Tablet in  Am and   1  Whole  Tablet at  supper 135 tablet 0    glipiZIDE (GLUCOTROL) 10 mg tablet TAKE 1 TABLET BY MOUTH TWICE A DAY (Patient not taking: Reported on 11/21/2018) 60 tablet 0    sodium chloride (OCEAN) 0 65 % nasal spray 1 spray into each nostril as needed for congestion or rhinitis (Patient not taking: Reported on 10/23/2018 ) 15 mL 0     No current facility-administered medications for this visit  No Known Allergies  Immunization History   Administered Date(s) Administered    Tdap 09/02/2016, 03/31/2018       Patient Care Team:  Nancy Lobato PA-C as PCP - General (Family Medicine)  Nancy Lobato PA-C    Medicare Screening Tests and Risk Assessments:      Health Risk Assessment:  Patient rates overall health as fair  Patient feels that their physical health rating is Slightly worse  Eyesight was rated as Same  Hearing was rated as Same  Patient feels that their emotional and mental health rating is Same  Pain experienced by patient in the last 7 days has been Some  Patient's pain rating has been 3/10  Patient states that he has experienced no weight loss or gain in last 6 months  Emotional/Mental Health:  Patient has been feeling nervous/anxious  PHQ-9 Depression Screening:    Frequency of the following problems over the past two weeks:      1  Little interest or pleasure in doing things: 0 - not at all      2  Feeling down, depressed, or hopeless: 0 - not at all  PHQ-2 Score: 0          Broken Bones/Falls: Fall Risk Assessment:    In the past year, patient has experienced: History of falling in past year     Number of falls: 1          Bladder/Bowel:  Patient has not leaked urine accidently in the last six months  Patient reports no loss of bowel control  Immunizations:  Patient has not had a flu vaccination within the last year  Patient has not received a pneumonia shot  Patient has not received a shingles shot  Home Safety:  Patient has trouble with stairs inside or outside of their home     Patient currently reports that there are working smoke alarms, no working carbon monoxide detectors  Preventative Screenings:   prostate cancer screen performed, no colon cancer screen completed, no glaucoma eye exam completed    Nutrition:  Current diet: Regular with servings of the following:    Medications:  Patient is currently taking over-the-counter supplements  Patient is able to manage medications  Lifestyle Choices:  Patient reports no tobacco use  Patient has not smoked or used tobacco in the past   Patient reports no alcohol use  Patient drives a vehicle  Patient does not wear seat belt  Activities of Daily Living:  Can get out of bed by his or her self, able to dress self, able to make own meals, able to do own shopping, able to bathe self, can do own laundry/housekeeping, can manage own money, pay bills and track expenses    Previous Hospitalizations:  Hospitalization or ED visit in past 12 months        Advanced Directives:  Patient has not decided on power of   Patient has not completed advanced directive          Preventative Screening/Counseling:      Cardiovascular:      General: Risks and Benefits Discussed and Screening Current          Diabetes:      General: Risks and Benefits Discussed and Screening Current          Colorectal Cancer:      General: Risks and Benefits Discussed and Patient Declines          Prostate Cancer:      General: Risks and Benefits Discussed and Screening Current          Osteoporosis:      General: Risks and Benefits Discussed and Patient Declines          AAA:      General: Risks and Benefits Discussed and Patient Declines          Glaucoma:      General: Risks and Benefits Discussed and Screening Current          HIV:      General: Risks and Benefits Discussed and Patient Declines          Hepatitis C:      General: Risks and Benefits Discussed and Patient Declines        Advanced Directives:   Patient has no living will for healthcare, Additional Comments: Full code    Immunizations:      Influenza: Risks & Benefits Discussed and Patient Declines      Pneumococcal: Risks & Benefits Discussed and Patient Declines      Other Preventative Counseling (Non-Medicare):   Fall Prevention

## 2018-11-21 NOTE — PROGRESS NOTES
Assessment/Plan:         Diagnoses and all orders for this visit:    Medicare annual wellness visit, subsequent    Type 2 diabetes mellitus with diabetic neuropathy, without long-term current use of insulin ([facility] )  -     POCT hemoglobin A1c  -     Comprehensive metabolic panel; Future    Screen for colon cancer    Type 2 diabetes mellitus with diabetic polyneuropathy, without long-term current use of insulin ([facility])  -     glipiZIDE (GLUCOTROL) 10 mg tablet; Take 1 tablet (10 mg total) by mouth 2 (two) times a day    Dyslipidemia  -     Lipid panel; Future    Other orders  -     Cancel: Ambulatory referral to Colorectal Surgery; Future          Subjective:      Patient ID: Vandana Goldstein is a 76 y o  male  Pt with neuropathy  He denies retinopathy or ckd        The following portions of the patient's history were reviewed and updated as appropriate:   He  has a past medical history of Anxiety; Diabetes mellitus ([facility] ); GERD (gastroesophageal reflux disease); Hematuria; and Nuclear senile cataract of left eye  He   Patient Active Problem List    Diagnosis Date Noted    Dizziness 10/04/2017    Pancreas cyst 05/20/2016    Type 2 diabetes mellitus with diabetic neuropathy ([facility] ) 07/31/2015    Hyperlipidemia 06/13/2012     He  has a past surgical history that includes Cataract extraction; Cholecystectomy; and Knee surgery  His family history includes Coronary artery disease in his mother; Diabetes in his father  He  reports that he has never smoked  He has never used smokeless tobacco  He reports that he does not drink alcohol or use drugs    Current Outpatient Prescriptions   Medication Sig Dispense Refill    meclizine (ANTIVERT) 25 mg tablet Take 1 tablet (25 mg total) by mouth every 8 (eight) hours 30 tablet 0    metFORMIN (GLUCOPHAGE) 1000 MG tablet Take 1 tablet (1,000 mg total) by mouth 2 (two) times a day Take   1/2  Tablet in  Am and   1  Whole  Tablet at  supper 135 tablet 0    glipiZIDE (GLUCOTROL) 10 mg tablet Take 1 tablet (10 mg total) by mouth 2 (two) times a day 60 tablet 5    sodium chloride (OCEAN) 0 65 % nasal spray 1 spray into each nostril as needed for congestion or rhinitis (Patient not taking: Reported on 10/23/2018 ) 15 mL 0     No current facility-administered medications for this visit  Current Outpatient Prescriptions on File Prior to Visit   Medication Sig    meclizine (ANTIVERT) 25 mg tablet Take 1 tablet (25 mg total) by mouth every 8 (eight) hours    metFORMIN (GLUCOPHAGE) 1000 MG tablet Take 1 tablet (1,000 mg total) by mouth 2 (two) times a day Take   1/2  Tablet in  Am and   1  Whole  Tablet at  supper    sodium chloride (OCEAN) 0 65 % nasal spray 1 spray into each nostril as needed for congestion or rhinitis (Patient not taking: Reported on 10/23/2018 )    [DISCONTINUED] glipiZIDE (GLUCOTROL) 10 mg tablet TAKE 1 TABLET BY MOUTH TWICE A DAY (Patient not taking: Reported on 11/21/2018)     No current facility-administered medications on file prior to visit  He has No Known Allergies       Review of Systems   Constitutional: Negative  HENT: Negative  Eyes: Negative  Respiratory: Negative  Cardiovascular: Negative  Objective:      /66 (BP Location: Left arm, Patient Position: Sitting, Cuff Size: Large)   Pulse 84   Temp 98 °F (36 7 °C) (Tympanic)   Resp 16   Ht 5' 6" (1 676 m)   Wt 85 7 kg (189 lb)   SpO2 98%   BMI 30 51 kg/m²          Physical Exam   Constitutional: He appears well-developed and well-nourished  HENT:   Head: Normocephalic and atraumatic  Right Ear: External ear normal    Left Ear: External ear normal    Nose: Nose normal    Mouth/Throat: Oropharynx is clear and moist    Neck: Normal range of motion  Neck supple  Cardiovascular: Normal rate, regular rhythm and normal heart sounds  Pulses:       Dorsalis pedis pulses are 2+ on the right side, and 2+ on the left side     Pulmonary/Chest: Effort normal and breath sounds normal    Feet:   Right Foot:   Skin Integrity: Positive for warmth  Negative for erythema  Left Foot:   Skin Integrity: Positive for erythema and warmth  Patient's shoes and socks removed  Right Foot/Ankle   Right Foot Inspection  Skin Exam: warmth no erythema                            Sensory   Vibration: intact    Monofilament testing: intact  Vascular    The right DP pulse is 2+  Left Foot/Ankle  Left Foot Inspection  Skin Exam: warmth and erythema                                         Sensory   Vibration: intact    Monofilament: intact  Vascular    The left DP pulse is 2+  Assign Risk Category:  No deformity present;  No loss of protective sensation;        Risk: 0

## 2019-01-02 ENCOUNTER — OFFICE VISIT (OUTPATIENT)
Dept: FAMILY MEDICINE CLINIC | Facility: CLINIC | Age: 69
End: 2019-01-02
Payer: COMMERCIAL

## 2019-01-02 VITALS
HEART RATE: 101 BPM | BODY MASS INDEX: 29.09 KG/M2 | RESPIRATION RATE: 16 BRPM | DIASTOLIC BLOOD PRESSURE: 70 MMHG | HEIGHT: 66 IN | OXYGEN SATURATION: 98 % | WEIGHT: 181 LBS | SYSTOLIC BLOOD PRESSURE: 134 MMHG | TEMPERATURE: 98 F

## 2019-01-02 DIAGNOSIS — E11.40 TYPE 2 DIABETES MELLITUS WITH DIABETIC NEUROPATHY, WITHOUT LONG-TERM CURRENT USE OF INSULIN (HCC): ICD-10-CM

## 2019-01-02 DIAGNOSIS — R42 VERTIGO: Primary | ICD-10-CM

## 2019-01-02 DIAGNOSIS — I10 ESSENTIAL HYPERTENSION: ICD-10-CM

## 2019-01-02 DIAGNOSIS — E11.42 TYPE 2 DIABETES MELLITUS WITH DIABETIC POLYNEUROPATHY, WITHOUT LONG-TERM CURRENT USE OF INSULIN (HCC): ICD-10-CM

## 2019-01-02 PROCEDURE — 3078F DIAST BP <80 MM HG: CPT | Performed by: INTERNAL MEDICINE

## 2019-01-02 PROCEDURE — 3075F SYST BP GE 130 - 139MM HG: CPT | Performed by: INTERNAL MEDICINE

## 2019-01-02 PROCEDURE — 99214 OFFICE O/P EST MOD 30 MIN: CPT | Performed by: INTERNAL MEDICINE

## 2019-01-02 RX ORDER — GLIPIZIDE 10 MG/1
10 TABLET ORAL 2 TIMES DAILY
Qty: 60 TABLET | Refills: 5 | Status: SHIPPED | OUTPATIENT
Start: 2019-01-02 | End: 2019-01-07 | Stop reason: SDUPTHER

## 2019-01-02 NOTE — PROGRESS NOTES
Assessment/Plan:         Diagnoses and all orders for this visit:    Vertigo  -     Ambulatory referral to Neurology; Future    Type 2 diabetes mellitus with diabetic neuropathy, without long-term current use of insulin (HCC)  -     Microalbumin / creatinine urine ratio    Type 2 diabetes mellitus with diabetic polyneuropathy, without long-term current use of insulin (HCC)  -     glipiZIDE (GLUCOTROL) 10 mg tablet; Take 1 tablet (10 mg total) by mouth 2 (two) times a day    Essential hypertension    Other orders  -     Cancel: POCT hemoglobin A1c          Subjective:      Patient ID: Vamsi Gerardo is a 76 y o  male  Pt states his bp was elevated in hospital   He was started on losartan but states he is not taking it  He was diagnosed with benign postional vertigo  He was tx'd with meclizing  He states he is not on glipizide  He thinks he is taking metformen  His d/c instructions have that he is on glipizide and not metformen!!!  The following portions of the patient's history were reviewed and updated as appropriate:   He  has a past medical history of Anxiety; Diabetes mellitus ([facility] ); GERD (gastroesophageal reflux disease); Hematuria; and Nuclear senile cataract of left eye  He   Patient Active Problem List    Diagnosis Date Noted    Dizziness 10/04/2017    Pancreas cyst 05/20/2016    Type 2 diabetes mellitus with diabetic neuropathy ([facility] ) 07/31/2015    Hyperlipidemia 06/13/2012     He  has a past surgical history that includes Cataract extraction; Cholecystectomy; and Knee surgery  His family history includes Coronary artery disease in his mother; Diabetes in his father  He  reports that he has never smoked  He has never used smokeless tobacco  He reports that he does not drink alcohol or use drugs    Current Outpatient Prescriptions   Medication Sig Dispense Refill    meclizine (ANTIVERT) 25 mg tablet Take 1 tablet (25 mg total) by mouth every 8 (eight) hours 30 tablet 0    metFORMIN (GLUCOPHAGE) 1000 MG tablet Take 1 tablet (1,000 mg total) by mouth 2 (two) times a day Take   1/2  Tablet in  Am and   1  Whole  Tablet at  supper (Patient taking differently: Take 1,000 mg by mouth 2 (two) times a day  ) 135 tablet 0    glipiZIDE (GLUCOTROL) 10 mg tablet Take 1 tablet (10 mg total) by mouth 2 (two) times a day 60 tablet 5     No current facility-administered medications for this visit  Current Outpatient Prescriptions on File Prior to Visit   Medication Sig    meclizine (ANTIVERT) 25 mg tablet Take 1 tablet (25 mg total) by mouth every 8 (eight) hours    metFORMIN (GLUCOPHAGE) 1000 MG tablet Take 1 tablet (1,000 mg total) by mouth 2 (two) times a day Take   1/2  Tablet in  Am and   1  Whole  Tablet at  supper (Patient taking differently: Take 1,000 mg by mouth 2 (two) times a day  )     No current facility-administered medications on file prior to visit  He has No Known Allergies       Review of Systems   Constitutional: Negative  HENT: Negative  Respiratory: Negative  Cardiovascular: Negative  Neurological: Negative  Objective:      /70 (BP Location: Right arm, Patient Position: Sitting, Cuff Size: Large)   Pulse 101   Temp 98 °F (36 7 °C) (Tympanic)   Resp 16   Ht 5' 6" (1 676 m)   Wt 82 1 kg (181 lb)   SpO2 98%   BMI 29 21 kg/m²          Physical Exam   Constitutional: He appears well-developed and well-nourished  HENT:   Head: Normocephalic and atraumatic  Right Ear: External ear normal    Left Ear: External ear normal    Nose: Nose normal    Neck: Normal range of motion  Neck supple  Cardiovascular: Normal rate, regular rhythm and normal heart sounds  Pulmonary/Chest: Effort normal and breath sounds normal    Abdominal: Soft   Bowel sounds are normal

## 2019-01-02 NOTE — PATIENT INSTRUCTIONS
He agrees to do fasting bld work tomorrow and rto with med bottles    We will review his meds since there are some discrepency

## 2019-01-03 ENCOUNTER — APPOINTMENT (OUTPATIENT)
Dept: LAB | Facility: MEDICAL CENTER | Age: 69
End: 2019-01-03
Payer: COMMERCIAL

## 2019-01-03 DIAGNOSIS — E78.5 DYSLIPIDEMIA: ICD-10-CM

## 2019-01-03 DIAGNOSIS — E11.40 TYPE 2 DIABETES MELLITUS WITH DIABETIC NEUROPATHY, WITHOUT LONG-TERM CURRENT USE OF INSULIN (HCC): ICD-10-CM

## 2019-01-03 LAB
ALBUMIN SERPL BCP-MCNC: 3.6 G/DL (ref 3.5–5)
ALP SERPL-CCNC: 87 U/L (ref 46–116)
ALT SERPL W P-5'-P-CCNC: 19 U/L (ref 12–78)
ANION GAP SERPL CALCULATED.3IONS-SCNC: 6 MMOL/L (ref 4–13)
AST SERPL W P-5'-P-CCNC: 9 U/L (ref 5–45)
BILIRUB SERPL-MCNC: 0.5 MG/DL (ref 0.2–1)
BUN SERPL-MCNC: 18 MG/DL (ref 5–25)
CALCIUM SERPL-MCNC: 8.9 MG/DL (ref 8.3–10.1)
CHLORIDE SERPL-SCNC: 103 MMOL/L (ref 100–108)
CHOLEST SERPL-MCNC: 182 MG/DL (ref 50–200)
CO2 SERPL-SCNC: 28 MMOL/L (ref 21–32)
CREAT SERPL-MCNC: 1.27 MG/DL (ref 0.6–1.3)
CREAT UR-MCNC: 109 MG/DL
EST. AVERAGE GLUCOSE BLD GHB EST-MCNC: 177 MG/DL
GFR SERPL CREATININE-BSD FRML MDRD: 58 ML/MIN/1.73SQ M
GLUCOSE P FAST SERPL-MCNC: 159 MG/DL (ref 65–99)
HBA1C MFR BLD: 7.8 % (ref 4.2–6.3)
HDLC SERPL-MCNC: 34 MG/DL (ref 40–60)
LDLC SERPL CALC-MCNC: 130 MG/DL (ref 0–100)
MICROALBUMIN UR-MCNC: 12 MG/L (ref 0–20)
MICROALBUMIN/CREAT 24H UR: 11 MG/G CREATININE (ref 0–30)
NONHDLC SERPL-MCNC: 148 MG/DL
POTASSIUM SERPL-SCNC: 4 MMOL/L (ref 3.5–5.3)
PROT SERPL-MCNC: 7.2 G/DL (ref 6.4–8.2)
SODIUM SERPL-SCNC: 137 MMOL/L (ref 136–145)
TRIGL SERPL-MCNC: 92 MG/DL

## 2019-01-03 PROCEDURE — 82570 ASSAY OF URINE CREATININE: CPT | Performed by: PHYSICIAN ASSISTANT

## 2019-01-03 PROCEDURE — 83036 HEMOGLOBIN GLYCOSYLATED A1C: CPT | Performed by: PHYSICIAN ASSISTANT

## 2019-01-03 PROCEDURE — 82043 UR ALBUMIN QUANTITATIVE: CPT | Performed by: PHYSICIAN ASSISTANT

## 2019-01-03 PROCEDURE — 3061F NEG MICROALBUMINURIA REV: CPT | Performed by: INTERNAL MEDICINE

## 2019-01-03 PROCEDURE — 80061 LIPID PANEL: CPT | Performed by: PHYSICIAN ASSISTANT

## 2019-01-03 PROCEDURE — 36415 COLL VENOUS BLD VENIPUNCTURE: CPT | Performed by: PHYSICIAN ASSISTANT

## 2019-01-03 PROCEDURE — 80053 COMPREHEN METABOLIC PANEL: CPT | Performed by: PHYSICIAN ASSISTANT

## 2019-01-07 ENCOUNTER — TELEPHONE (OUTPATIENT)
Dept: NEUROLOGY | Facility: CLINIC | Age: 69
End: 2019-01-07

## 2019-01-07 ENCOUNTER — OFFICE VISIT (OUTPATIENT)
Dept: FAMILY MEDICINE CLINIC | Facility: CLINIC | Age: 69
End: 2019-01-07
Payer: COMMERCIAL

## 2019-01-07 VITALS
BODY MASS INDEX: 30.53 KG/M2 | WEIGHT: 190 LBS | SYSTOLIC BLOOD PRESSURE: 128 MMHG | TEMPERATURE: 97.4 F | HEIGHT: 66 IN | HEART RATE: 98 BPM | OXYGEN SATURATION: 98 % | DIASTOLIC BLOOD PRESSURE: 70 MMHG | RESPIRATION RATE: 16 BRPM

## 2019-01-07 DIAGNOSIS — E11.9 TYPE 2 DIABETES MELLITUS WITHOUT COMPLICATION, WITHOUT LONG-TERM CURRENT USE OF INSULIN (HCC): ICD-10-CM

## 2019-01-07 DIAGNOSIS — E11.42 TYPE 2 DIABETES MELLITUS WITH DIABETIC POLYNEUROPATHY, WITHOUT LONG-TERM CURRENT USE OF INSULIN (HCC): ICD-10-CM

## 2019-01-07 PROCEDURE — 99213 OFFICE O/P EST LOW 20 MIN: CPT | Performed by: INTERNAL MEDICINE

## 2019-01-07 RX ORDER — GLIPIZIDE 10 MG/1
10 TABLET ORAL 2 TIMES DAILY
Qty: 60 TABLET | Refills: 5 | Status: SHIPPED | OUTPATIENT
Start: 2019-01-07 | End: 2020-01-07 | Stop reason: SDUPTHER

## 2019-01-07 NOTE — PROGRESS NOTES
Assessment/Plan:         Diagnoses and all orders for this visit:    Type 2 diabetes mellitus without complication, without long-term current use of insulin (HCC)  -     metFORMIN (GLUCOPHAGE) 1000 MG tablet; Take 1 tablet (1,000 mg total) by mouth 2 (two) times a day    Type 2 diabetes mellitus with diabetic polyneuropathy, without long-term current use of insulin (HCC)  -     glipiZIDE (GLUCOTROL) 10 mg tablet; Take 1 tablet (10 mg total) by mouth 2 (two) times a day          Subjective:      Patient ID: Julius Litten is a 76 y o  male  Pt returns with his meds  He states he is taking glipizide(he does not have that med)  Reviewed his meds and he does not have glipizide  Will rx it  The following portions of the patient's history were reviewed and updated as appropriate:   He  has a past medical history of Anxiety; Diabetes mellitus ([facility] ); GERD (gastroesophageal reflux disease); Hematuria; and Nuclear senile cataract of left eye  He   Patient Active Problem List    Diagnosis Date Noted    Dizziness 10/04/2017    Pancreas cyst 05/20/2016    Type 2 diabetes mellitus with diabetic neuropathy ([facility] ) 07/31/2015    Hyperlipidemia 06/13/2012     He  has a past surgical history that includes Cataract extraction; Cholecystectomy; and Knee surgery  His family history includes Coronary artery disease in his mother; Diabetes in his father  He  reports that he has never smoked  He has never used smokeless tobacco  He reports that he does not drink alcohol or use drugs    Current Outpatient Prescriptions   Medication Sig Dispense Refill    meclizine (ANTIVERT) 25 mg tablet Take 1 tablet (25 mg total) by mouth every 8 (eight) hours 30 tablet 0    metFORMIN (GLUCOPHAGE) 1000 MG tablet Take 1 tablet (1,000 mg total) by mouth 2 (two) times a day 180 tablet 5    glipiZIDE (GLUCOTROL) 10 mg tablet Take 1 tablet (10 mg total) by mouth 2 (two) times a day 60 tablet 5     No current facility-administered medications for this visit  Current Outpatient Prescriptions on File Prior to Visit   Medication Sig    meclizine (ANTIVERT) 25 mg tablet Take 1 tablet (25 mg total) by mouth every 8 (eight) hours     No current facility-administered medications on file prior to visit  He has No Known Allergies       Review of Systems   Constitutional: Negative  HENT: Negative  Respiratory: Negative  Cardiovascular: Negative  Gastrointestinal: Negative  Objective:      /70 (BP Location: Left arm, Patient Position: Sitting, Cuff Size: Large)   Pulse 98   Temp (!) 97 4 °F (36 3 °C) (Tympanic)   Resp 16   Ht 5' 6" (1 676 m)   Wt 86 2 kg (190 lb)   SpO2 98%   BMI 30 67 kg/m²          Physical Exam   Constitutional: He appears well-developed and well-nourished  HENT:   Head: Normocephalic and atraumatic  Right Ear: External ear normal    Left Ear: External ear normal    Nose: Nose normal    Mouth/Throat: Oropharynx is clear and moist    Neck: Normal range of motion  Neck supple  Cardiovascular: Normal rate, regular rhythm and normal heart sounds      Pulmonary/Chest: Effort normal and breath sounds normal

## 2019-01-14 DIAGNOSIS — E11.9 TYPE 2 DIABETES MELLITUS WITHOUT COMPLICATION, WITHOUT LONG-TERM CURRENT USE OF INSULIN (HCC): ICD-10-CM

## 2019-02-18 NOTE — PROGRESS NOTES
Assessment    1  Lower back pain (724 2) (M54 5)    Discussion/Summary  Discussion Summary:   Take tylenol as directedice and moist heat as directedto the ER for worsening symptoms: uncontrolled pain, numbness, tingling, loss of sensation, bowel/bladder incontinence or any other concerning symptoms  Medication Side Effects Reviewed: Possible side effects of new medications were reviewed with the patient/guardian today  Understands and agrees with treatment plan: The treatment plan was reviewed with the patient/guardian  The patient/guardian understands and agrees with the treatment plan   Counseling Documentation With Imm: The patient was counseled regarding diagnostic results,-- instructions for management,-- risk factor reductions,-- prognosis,-- patient and family education,-- impressions,-- risks and benefits of treatment options,-- importance of compliance with treatment  Follow Up Instructions: Follow Up with your Primary Care Provider in 1-2 days  If your symptoms worsen, go to the nearest [place] [address] Emergency Department  Chief Complaint    1  Back Pain  Chief Complaint Free Text Note Form: Pt presents with low back pain x 1week  Took Tylenol with minimal relief  Denies any other symptoms  History of Present Illness  HPI: This is a 42-year-old male complaining of right-sided lower back pain x1 week  Patient denies any injury, trauma, accident or specific event where he injured his back  Patient reports he has had similar pain in the past  Patient denies numbness, tingling, loss of sensation, bowel bladder incontinence  Patient reports relief with Tylenol  Hospital Based Practices Required Assessment:  Pain Assessment  the patient states they have pain  The pain is located in the low back  Abuse And Domestic Violence Screen   Yes, the patient is safe at home  -- The patient states no one is hurting them  Depression And Suicide Screen   No, the patient has not had thoughts of hurting themself  No, the patient has not felt depressed in the past 7 days  Prefered Language is  english  Primary Language is  english  Readiness To Learn: Receptive  Barriers To Learning: none  Preferred Learning: demonstration  Education Completed: treatment/procedure  Teaching Method: verbal  Person Taught: patient  Evaluation Of Learning: verbalized/demonstrated understanding    Back Pain: Karan Crouch presents with complaints of back pain  Review of Systems  Focused-Male:  Constitutional: no fever or chills, feels well, no tiredness, no recent weight loss or weight gain  ENT: no complaints of earache, no loss of hearing, no nosebleeds or nasal discharge, no sore throat or hoarseness  Cardiovascular: no complaints of slow or fast heart rate, no chest pain, no palpitations, no leg claudication or lower extremity edema  Respiratory: no complaints of shortness of breath, no wheezing or cough, no dyspnea on exertion, no orthopnea or PND  Gastrointestinal: no complaints of abdominal pain, no constipation, no nausea or vomiting, no diarrhea or bloody stools  Genitourinary: no complaints of dysuria or incontinence, no hesitancy, no nocturia, no genital lesion, no inadequacy of penile erection  Musculoskeletal: no complaints of arthralgia, no myalgia, no joint swelling or stiffness, no limb pain or swelling-- and-- as noted in HPI  Integumentary: no complaints of skin rash or lesion, no itching or dry skin, no skin wounds  Neurological: no complaints of headache, no confusion, no numbness or tingling, no dizziness or fainting  Active Problems  1  Adhesive capsulitis of left shoulder (726 0) (M75 02)   2  Arthritis (716 90) (M19 90)   3  Depression screen (V79 0) (Z13 89)   4  Dizziness (780 4) (R42)   5  Encounter for prostate cancer screening (V76 44) (Z12 5)   6  Glaucoma Screening   7  Hyperlipidemia (272 4) (E78 5)   8  Medicare annual wellness visit, subsequent (V70 0) (Z00 00)   9   Need for influenza vaccination (V04 81) (Z23)   10  Need for pneumococcal vaccination (V03 82) (Z23)   11  Pancreas cyst (577 2) (K86 2)   12  Screening for colon cancer (V76 51) (Z12 11)   13  Screening for genitourinary condition (V81 6) (Z13 89)   14  Type 2 diabetes mellitus with diabetic neuropathy (250 60,357 2) (E11 40)   15  Vertigo (780 4) (R42)    Past Medical History  1  History of Anxiety (300 00) (F41 9)   2  History of Blister of left lower leg, initial encounter (916 2) (X37 558A)   3  History of Denial of substance abuse   4  History of gastroesophageal reflux (GERD) (V12 79) (Z87 19)   5  History of hematuria (V13 09) (Z87 448)   6  History of Nuclear senile cataract of left eye (366 16) (H25 12)   7  History of Right knee pain (719 46) (M25 561)  Active Problems And Past Medical History Reviewed: The active problems and past medical history were reviewed and updated today  Family History  Mother    1  Family history of CAD (coronary artery disease)  Father    2  Family history of diabetes mellitus (V18 0) (Z83 3)  Family History    3  Denied: Family history of Denial of substance abuse   4  Denied: No family history of mental disorder  Family History Reviewed: The family history was reviewed and updated today  Social History   · Denied: History of Drug use   · Never a smoker   · Never Drank Alcohol  Social History Reviewed: The social history was reviewed and updated today  Surgical History    1  History of Cataract Surgery   2  History of Cholecystectomy   3  History of Knee Surgery  Surgical History Reviewed: The surgical history was reviewed and updated today  Current Meds   1  GlipiZIDE 10 MG Oral Tablet; take 1 tablet by mouth twice a day; Therapy: [id number] to (Evaluate:05Jan2018)  Requested for: 71OFW9369; Last Rx:[id number] Ordered   2  Meclizine HCl - 25 MG Oral Tablet; TAKE 1 TABLET 3 TIMES DAILY AS NEEDED;  Therapy: 14TQY7101 to ()  Requested for: 39EZC6187; Last Rx:[id number] Ordered   3  MetFORMIN HCl - 1000 MG Oral Tablet; TAKE 1/2 TABLET BY MOUTH EVERY MORNING AND 1 TABLET IN THE EVENING; Therapy: [id number] to (Evaluate:53Aef8716)  Requested for: 89Drz5910; Last Rx:[id number] Ordered  Medication List Reviewed: The medication list was reviewed and updated today  Allergies    1  No Known Drug Allergies    Vitals  Signs   Recorded: 22ROS4062 10:17AM   Temperature: 97 8 F, Temporal  Heart Rate: 90  Pulse Quality: Normal  Respiration Quality: Normal  Respiration: 18  Systolic: 829, LUE, Sitting  Diastolic: 80, LUE, Sitting  Height: 5 ft 7 in  Weight: 188 lb   BMI Calculated: 29 45  BSA Calculated: 1 97  O2 Saturation: 98, RA  Pain Scale: 5/10    Physical Exam   Constitutional  General appearance: No acute distress, well appearing and well nourished  Pulmonary  Respiratory effort: No increased work of breathing or signs of respiratory distress  Auscultation of lungs: Clear to auscultation  Cardiovascular  Palpation of heart: Normal PMI, no thrills  Auscultation of heart: Normal rate and rhythm, normal S1 and S2, without murmurs  Examination of extremities for edema and/or varicosities: Normal    Musculoskeletal  Gait and station: Normal    Digits and nails: Normal without clubbing or cyanosis  -- Lower back: TTP R paraspinal muscles of lumbar spine, No spasm no tightness, + 5 strenght lower extremties, + 2 DTR, - SLE,  Future Appointments    Date/Time Provider Specialty Site   12/21/2017 10:00 AM MARGO Landis   Family Medicine [facility]   Electronically signed by : Steve Ryan, [facility]; Nov 24 2017 11:34AM EST                       (Author)    Electronically signed by : CHRISTIN Laurent ; Nov 28 2017  4:19PM EST                       (Co-author) RADHA Blackman

## 2019-02-19 ENCOUNTER — OFFICE VISIT (OUTPATIENT)
Dept: FAMILY MEDICINE CLINIC | Facility: CLINIC | Age: 69
End: 2019-02-19
Payer: COMMERCIAL

## 2019-02-19 VITALS
DIASTOLIC BLOOD PRESSURE: 72 MMHG | SYSTOLIC BLOOD PRESSURE: 126 MMHG | HEART RATE: 95 BPM | RESPIRATION RATE: 16 BRPM | HEIGHT: 66 IN | OXYGEN SATURATION: 97 % | WEIGHT: 201.2 LBS | BODY MASS INDEX: 32.33 KG/M2 | TEMPERATURE: 99.7 F

## 2019-02-19 DIAGNOSIS — L03.90 CELLULITIS, UNSPECIFIED CELLULITIS SITE: Primary | ICD-10-CM

## 2019-02-19 DIAGNOSIS — R60.9 EDEMA, UNSPECIFIED TYPE: ICD-10-CM

## 2019-02-19 PROCEDURE — 1160F RVW MEDS BY RX/DR IN RCRD: CPT | Performed by: INTERNAL MEDICINE

## 2019-02-19 PROCEDURE — 3008F BODY MASS INDEX DOCD: CPT | Performed by: INTERNAL MEDICINE

## 2019-02-19 PROCEDURE — 99214 OFFICE O/P EST MOD 30 MIN: CPT | Performed by: INTERNAL MEDICINE

## 2019-02-19 PROCEDURE — 1036F TOBACCO NON-USER: CPT | Performed by: INTERNAL MEDICINE

## 2019-02-19 RX ORDER — FUROSEMIDE 40 MG/1
40 TABLET ORAL 2 TIMES DAILY
Qty: 5 TABLET | Refills: 0 | Status: SHIPPED | OUTPATIENT
Start: 2019-02-19 | End: 2020-01-07 | Stop reason: SDUPTHER

## 2019-02-19 RX ORDER — SULFAMETHOXAZOLE AND TRIMETHOPRIM 800; 160 MG/1; MG/1
1 TABLET ORAL EVERY 12 HOURS SCHEDULED
Qty: 20 TABLET | Refills: 0 | Status: SHIPPED | OUTPATIENT
Start: 2019-02-19 | End: 2019-02-26

## 2019-02-19 NOTE — PROGRESS NOTES
Pt   Assessment/Plan:         Diagnoses and all orders for this visit:    Cellulitis, unspecified cellulitis site  -     sulfamethoxazole-trimethoprim (BACTRIM DS) 800-160 mg per tablet; Take 1 tablet by mouth every 12 (twelve) hours for 7 days    Edema, unspecified type  -     furosemide (LASIX) 40 mg tablet; Take 1 tablet (40 mg total) by mouth 2 (two) times a day One pill daily x 5 days          Subjective:      Patient ID: Melissa Mcdonough is a 76 y o  male  Patient states he had his legs dangling he now has swelling in both of them is his left is worse than his right he is not sure how long he has had it  There is slight calor or erythema of his left foot  Denies orthopnea  -guzmán -cp/sob      The following portions of the patient's history were reviewed and updated as appropriate: He  has a past medical history of Anxiety, Diabetes mellitus ([facility] ), GERD (gastroesophageal reflux disease), Hematuria, and Nuclear senile cataract of left eye  He   Patient Active Problem List    Diagnosis Date Noted    Dizziness 10/04/2017    Pancreas cyst 05/20/2016    Type 2 diabetes mellitus with diabetic neuropathy ([facility] ) 07/31/2015    Hyperlipidemia 06/13/2012     He  has a past surgical history that includes Cataract extraction; Cholecystectomy; and Knee surgery  His family history includes Coronary artery disease in his mother; Diabetes in his father  He  reports that he has never smoked  He has never used smokeless tobacco  He reports that he does not drink alcohol or use drugs    Current Outpatient Medications   Medication Sig Dispense Refill    glipiZIDE (GLUCOTROL) 10 mg tablet Take 1 tablet (10 mg total) by mouth 2 (two) times a day 60 tablet 5    furosemide (LASIX) 40 mg tablet Take 1 tablet (40 mg total) by mouth 2 (two) times a day One pill daily x 5 days 5 tablet 0    metFORMIN (GLUCOPHAGE) 1000 MG tablet Take 1 tablet (1,000 mg total) by mouth 2 (two) times a day 180 tablet 5    metFORMIN (GLUCOPHAGE) 1000 MG tablet TAKE 1/2 TABLET BY MOUTH IN THE MORNING AND 1 TABLET AT SUPPER 135 tablet 0    sulfamethoxazole-trimethoprim (BACTRIM DS) 800-160 mg per tablet Take 1 tablet by mouth every 12 (twelve) hours for 7 days 20 tablet 0     No current facility-administered medications for this visit  Current Outpatient Medications on File Prior to Visit   Medication Sig    glipiZIDE (GLUCOTROL) 10 mg tablet Take 1 tablet (10 mg total) by mouth 2 (two) times a day    metFORMIN (GLUCOPHAGE) 1000 MG tablet Take 1 tablet (1,000 mg total) by mouth 2 (two) times a day    metFORMIN (GLUCOPHAGE) 1000 MG tablet TAKE 1/2 TABLET BY MOUTH IN THE MORNING AND 1 TABLET AT SUPPER     No current facility-administered medications on file prior to visit  He has No Known Allergies       Review of Systems   Constitutional: Negative  HENT: Negative  Respiratory: Negative  Cardiovascular: Positive for leg swelling  Skin: Positive for color change  Objective:      /72 (BP Location: Left arm, Patient Position: Sitting, Cuff Size: Large)   Pulse 95   Temp 99 7 °F (37 6 °C) (Tympanic)   Resp 16   Ht 5' 6" (1 676 m)   Wt 91 3 kg (201 lb 3 2 oz)   SpO2 97%   BMI 32 47 kg/m²          Physical Exam   Constitutional: He appears well-developed and well-nourished  No distress  HENT:   Head: Normocephalic and atraumatic  Right Ear: External ear normal    Left Ear: External ear normal    Nose: Nose normal    Mouth/Throat: Oropharynx is clear and moist  No oropharyngeal exudate  Neck: Normal range of motion  Neck supple  No thyromegaly present  Cardiovascular: Normal rate, regular rhythm, normal heart sounds and intact distal pulses  Exam reveals no friction rub  No murmur heard  Pulmonary/Chest: Effort normal and breath sounds normal  No stridor  No respiratory distress  He has no wheezes  He has no rales  Abdominal: Soft  Bowel sounds are normal  He exhibits no distension  There is no tenderness  There is no guarding  Musculoskeletal: He exhibits edema and tenderness  +erythema and calor of left foot/ankle>rt  2/4 edema   Lymphadenopathy:     He has no cervical adenopathy  Skin: Skin is warm and dry  He is not diaphoretic  There is erythema

## 2019-03-27 ENCOUNTER — OFFICE VISIT (OUTPATIENT)
Dept: NEUROLOGY | Facility: CLINIC | Age: 69
End: 2019-03-27
Payer: COMMERCIAL

## 2019-03-27 VITALS
WEIGHT: 197 LBS | DIASTOLIC BLOOD PRESSURE: 70 MMHG | HEART RATE: 107 BPM | BODY MASS INDEX: 31.66 KG/M2 | SYSTOLIC BLOOD PRESSURE: 160 MMHG | RESPIRATION RATE: 18 BRPM | HEIGHT: 66 IN

## 2019-03-27 DIAGNOSIS — E78.5 HYPERLIPIDEMIA, UNSPECIFIED HYPERLIPIDEMIA TYPE: ICD-10-CM

## 2019-03-27 DIAGNOSIS — R26.2 AMBULATORY DYSFUNCTION: ICD-10-CM

## 2019-03-27 DIAGNOSIS — R29.898 LEG WEAKNESS, BILATERAL: ICD-10-CM

## 2019-03-27 DIAGNOSIS — E11.42 DIABETIC POLYNEUROPATHY ASSOCIATED WITH TYPE 2 DIABETES MELLITUS (HCC): ICD-10-CM

## 2019-03-27 DIAGNOSIS — R42 VERTIGO: Primary | ICD-10-CM

## 2019-03-27 PROCEDURE — 99214 OFFICE O/P EST MOD 30 MIN: CPT | Performed by: PSYCHIATRY & NEUROLOGY

## 2019-03-27 RX ORDER — ATORVASTATIN CALCIUM 20 MG/1
20 TABLET, FILM COATED ORAL DAILY
Qty: 30 TABLET | Refills: 2 | Status: SHIPPED | OUTPATIENT
Start: 2019-03-27 | End: 2019-06-25 | Stop reason: SDUPTHER

## 2019-03-27 NOTE — PROGRESS NOTES
Patient ID: Ashlyn Salomon is a 76 y o  male  Assessment/Plan:    No problem-specific Assessment & Plan notes found for this encounter  Diagnoses and all orders for this visit:    Vertigo- concern for peripheral versus central etiology especially as he does have a mild left lower facial droop and new onset ambulatory dysfunction  He does have vascular risk factors including high cholesterol, diabetes only moderately well controlled  I did ask him to start an aspirin 81 mg daily as well as started him on atorvastatin 20 mg daily to help with secondary stroke prevention  If there is no MRI brain completed in [facility] records which I am awaiting,  I will obtain an MRI brain   -     Ambulatory referral to Neurology  -     CTA head and neck w wo contrast; Future  -     Ambulatory referral to Physical Therapy; Future  -     TSH, 3rd generation with Free T4 reflex; Future  -     Vitamin B12; Future  -     Folate; Future  -     BUN; Future  -     Creatinine, serum; Future  Discussed the importance good glycemic control and cholesterol control and healthy diet to help reduce risk of stroke and heart disease as well as help reduce his neuropathy progression  Leg weakness, bilateral- worse on the left lower extremity  Concern for lumbar radiculopathy superimposed on a mild likely diabetic neuropathy   -     CTA head and neck w wo contrast; Future  -     Ambulatory referral to Physical Therapy; Future  -     TSH, 3rd generation with Free T4 reflex; Future  -     Vitamin B12; Future  -     Folate; Future  -     CK (with reflex to MB); Future  -     CT spine lumbar wo contrast; Future    Ambulatory dysfunction  -     CTA head and neck w wo contrast; Future  -     Ambulatory referral to Physical Therapy; Future for vestibular therapy and balance therapy   -     TSH, 3rd generation with Free T4 reflex; Future  -     Vitamin B12; Future  -     Folate;  Future  -     CT spine lumbar wo contrast; Future    Diabetic polyneuropathy associated with type 2 diabetes mellitus ([facility] )  -     Ambulatory referral to Physical Therapy; Future  -     TSH, 3rd generation with Free T4 reflex; Future  -     Vitamin B12; Future  -     Folate; Future    Hyperlipidemia, unspecified hyperlipidemia type  -     atorvastatin (LIPITOR) 20 mg tablet; Take 1 tablet (20 mg total) by mouth daily    If sudden onset of one-sided weakness, facial droop, significant vertigo with imbalance that is persistent for more than a few minutes, vision changes, speech changes should go to the nearest ER and or call 911 as this can be potential signs of a stroke  Further recommendations pending above  Will follow up in 3-4 months time  Subjective:    HPI    Mr Janice Obando is a pleasant 75 yo male seen in consultation for dizziness  States sudden onset about a year ago and states not progressively worse  States intermittent  Describes it as room spinninig  Does not go past 10 minutes; typically 2-3 minutes  Can have one episode a day or multiple episodes a day and have few days without dizzy spells  Denies associated hearing loss  No associated headaches  No tinnitus  No nausea or emesis  Reports occasional double vision  Denies any speech changes, motor weakness, sensory loss, facial droop  States no specific triggers other than movement  States staying perfectly still allows the dizziness to pass  He did have a fall 3/31/18--- secondary to the dizziness he was experiencing and had nasal bone fractures and nasal septum fractures with CT head no contrast showing no intraparenchymal abnormality  Denies prior history of TIA or stroke or cardiac disease or blood clots or abnormal heart rhythms  He also had cellulitis of bilateral LE per recent PCP notes  Also has had LE weakness for about a year now but states this started at least a few weeks or months after the dizziness    States he is having low back pain with sometimes shoots down to his left lower extremity  States he has completed physical therapy with some benefit  He was recommended to use a walker since January of this year and has been no falls  Denies numbness and tingling in the feet or hands  States he was hospitalized in [facility] December 2018 due to excessive dizziness more so than normal with no other associated deficits with that while watching a basketball game  He states he had workup including MRI brain that was reportedly normal  I do not have these records and have requested to obtain them  Has antivert- given by [facility]     States walking upstairs and downstairs and bending forward makes it worse  Hx alcohol abuse but not since 1985  No tobacco use    The following portions of the patient's history were reviewed and updated as appropriate: allergies, current medications, past family history, past medical history, past social history, past surgical history and problem list and ROS  Objective:    Blood pressure 160/70, pulse (!) 107, resp  rate 18, height 5' 6" (1 676 m), weight 89 4 kg (197 lb)  Physical Exam   Constitutional: He appears well-developed and well-nourished  HENT:   Head: Normocephalic and atraumatic  Eyes: Pupils are equal, round, and reactive to light  Conjunctivae are normal    Neck: Normal range of motion  Neck supple  Cardiovascular: Normal rate and regular rhythm  Pulmonary/Chest: Effort normal    Musculoskeletal: Normal range of motion  Neurological: He is alert  Coordination normal    Reflex Scores:       Achilles reflexes are Tr on the right side and Tr on the left side  Nursing note and vitals reviewed  Neurological Exam  Mental Status  Alert  Oriented to person, place, time and situation  Recent and remote memory are intact  no dysarthria present  Language is fluent with no aphasia  Attention and concentration are normal  Fund of knowledge is appropriate for level of education      Cranial Nerves  CN II: Visual acuity is normal  Visual fields full to confrontation  CN III, IV, VI: Extraocular movements intact bilaterally  Pupils equal round and reactive to light bilaterally  CN V: Facial sensation is normal   CN VII:  Left: There is central facial weakness  CN VIII: Hearing is normal   CN IX, X: Palate elevates symmetrically  Normal gag reflex  CN XI: Shoulder shrug strength is normal   CN XII: Tongue midline without atrophy or fasciculations  Left lower facial droop that does innervate to some degree with smile  Motor   Normal muscle tone  Strength is 5/5 in all four extremities except as noted  LLE 4/5 proximal and distal including dorsiflexion  Sensory  Light touch is normal in upper and lower extremities  Pinprick abnormality: Temperature is normal in upper and lower extremities  Vibration abnormality:  No right-sided hemispatial neglect  No left-sided hemispatial neglect  Mildly reduced sensation distal LE  Reflexes  Deep tendon reflexes are 2+ and symmetric except as noted  Right                      Left  Achilles                                Tr                         Tr    Coordination  Finger-to-nose, rapid alternating movements and heel-to-shin normal bilaterally without dysmetria  Gait  Wide based walks with walker  ROS:    Review of Systems   Constitutional: Negative  HENT: Negative  Eyes: Negative  Respiratory: Negative  Cardiovascular: Negative  Gastrointestinal: Negative  Endocrine: Negative  Genitourinary: Negative  Musculoskeletal: Positive for gait problem  Patient states his balance is off  Patient also uses a walker to help control his balance  Skin: Negative  Allergic/Immunologic: Negative  Neurological: Positive for dizziness  Hematological: Negative  Psychiatric/Behavioral: Negative

## 2019-03-27 NOTE — PATIENT INSTRUCTIONS
Take Aspirin 81 mg daily  Take a multivitamin every day  If sudden onset of one-sided weakness, facial droop, significant vertigo with imbalance that is persistent for more than a few minutes, vision changes, speech changes should go to the nearest ER and or call 911 as this can be potential signs of a stroke

## 2019-04-03 ENCOUNTER — EVALUATION (OUTPATIENT)
Dept: PHYSICAL THERAPY | Facility: MEDICAL CENTER | Age: 69
End: 2019-04-03
Attending: PHYSICIAN ASSISTANT
Payer: COMMERCIAL

## 2019-04-03 DIAGNOSIS — R29.898 LEG WEAKNESS, BILATERAL: Primary | ICD-10-CM

## 2019-04-03 DIAGNOSIS — R42 VERTIGO: ICD-10-CM

## 2019-04-03 DIAGNOSIS — E11.42 DIABETIC POLYNEUROPATHY ASSOCIATED WITH TYPE 2 DIABETES MELLITUS (HCC): ICD-10-CM

## 2019-04-03 DIAGNOSIS — R26.2 AMBULATORY DYSFUNCTION: ICD-10-CM

## 2019-04-03 PROCEDURE — 97162 PT EVAL MOD COMPLEX 30 MIN: CPT | Performed by: PHYSICAL MEDICINE & REHABILITATION

## 2019-04-11 ENCOUNTER — HOSPITAL ENCOUNTER (OUTPATIENT)
Dept: CT IMAGING | Facility: HOSPITAL | Age: 69
Discharge: HOME | End: 2019-04-11
Attending: PSYCHIATRY & NEUROLOGY

## 2019-04-12 ENCOUNTER — OFFICE VISIT (OUTPATIENT)
Dept: FAMILY MEDICINE CLINIC | Facility: CLINIC | Age: 69
End: 2019-04-12
Payer: COMMERCIAL

## 2019-04-12 ENCOUNTER — APPOINTMENT (OUTPATIENT)
Dept: LAB | Facility: MEDICAL CENTER | Age: 69
End: 2019-04-12
Payer: COMMERCIAL

## 2019-04-12 VITALS
WEIGHT: 199 LBS | DIASTOLIC BLOOD PRESSURE: 70 MMHG | RESPIRATION RATE: 98 BRPM | SYSTOLIC BLOOD PRESSURE: 132 MMHG | BODY MASS INDEX: 31.98 KG/M2 | HEIGHT: 66 IN | TEMPERATURE: 98.3 F | HEART RATE: 96 BPM

## 2019-04-12 DIAGNOSIS — E78.5 HYPERLIPIDEMIA, UNSPECIFIED HYPERLIPIDEMIA TYPE: ICD-10-CM

## 2019-04-12 DIAGNOSIS — R29.898 LEG WEAKNESS, BILATERAL: ICD-10-CM

## 2019-04-12 DIAGNOSIS — R26.2 AMBULATORY DYSFUNCTION: ICD-10-CM

## 2019-04-12 DIAGNOSIS — N18.30 TYPE 2 DIABETES MELLITUS WITH STAGE 3 CHRONIC KIDNEY DISEASE, WITHOUT LONG-TERM CURRENT USE OF INSULIN (HCC): ICD-10-CM

## 2019-04-12 DIAGNOSIS — E11.42 DIABETIC POLYNEUROPATHY ASSOCIATED WITH TYPE 2 DIABETES MELLITUS (HCC): ICD-10-CM

## 2019-04-12 DIAGNOSIS — E11.40 TYPE 2 DIABETES MELLITUS WITH DIABETIC NEUROPATHY, UNSPECIFIED WHETHER LONG TERM INSULIN USE (HCC): Primary | ICD-10-CM

## 2019-04-12 DIAGNOSIS — E11.22 TYPE 2 DIABETES MELLITUS WITH STAGE 3 CHRONIC KIDNEY DISEASE, WITHOUT LONG-TERM CURRENT USE OF INSULIN (HCC): ICD-10-CM

## 2019-04-12 DIAGNOSIS — Z11.59 ENCOUNTER FOR HEPATITIS C SCREENING TEST FOR LOW RISK PATIENT: ICD-10-CM

## 2019-04-12 DIAGNOSIS — R42 VERTIGO: ICD-10-CM

## 2019-04-12 DIAGNOSIS — N18.30 STAGE 3 CHRONIC KIDNEY DISEASE (HCC): ICD-10-CM

## 2019-04-12 LAB
BUN SERPL-MCNC: 19 MG/DL (ref 5–25)
CK SERPL-CCNC: 50 U/L (ref 39–308)
CREAT SERPL-MCNC: 1.44 MG/DL (ref 0.6–1.3)
FOLATE SERPL-MCNC: >20 NG/ML (ref 3.1–17.5)
GFR SERPL CREATININE-BSD FRML MDRD: 50 ML/MIN/1.73SQ M
SL AMB POCT HEMOGLOBIN AIC: 6.1 (ref ?–6.5)
TSH SERPL DL<=0.05 MIU/L-ACNC: 0.83 UIU/ML (ref 0.36–3.74)
VIT B12 SERPL-MCNC: 258 PG/ML (ref 100–900)

## 2019-04-12 PROCEDURE — 82565 ASSAY OF CREATININE: CPT

## 2019-04-12 PROCEDURE — 82607 VITAMIN B-12: CPT

## 2019-04-12 PROCEDURE — 84443 ASSAY THYROID STIM HORMONE: CPT

## 2019-04-12 PROCEDURE — 84153 ASSAY OF PSA TOTAL: CPT

## 2019-04-12 PROCEDURE — 99214 OFFICE O/P EST MOD 30 MIN: CPT | Performed by: INTERNAL MEDICINE

## 2019-04-12 PROCEDURE — 84154 ASSAY OF PSA FREE: CPT

## 2019-04-12 PROCEDURE — 36415 COLL VENOUS BLD VENIPUNCTURE: CPT

## 2019-04-12 PROCEDURE — 84520 ASSAY OF UREA NITROGEN: CPT

## 2019-04-12 PROCEDURE — 82746 ASSAY OF FOLIC ACID SERUM: CPT

## 2019-04-12 PROCEDURE — 82550 ASSAY OF CK (CPK): CPT

## 2019-04-12 PROCEDURE — 83036 HEMOGLOBIN GLYCOSYLATED A1C: CPT | Performed by: INTERNAL MEDICINE

## 2019-04-12 RX ORDER — LISINOPRIL 5 MG/1
5 TABLET ORAL DAILY
Qty: 30 TABLET | Refills: 1 | Status: SHIPPED | OUTPATIENT
Start: 2019-04-12 | End: 2019-04-30 | Stop reason: SDUPTHER

## 2019-04-13 LAB
PSA FREE MFR SERPL: 31.1 %
PSA FREE SERPL-MCNC: 0.87 NG/ML
PSA SERPL-MCNC: 2.8 NG/ML (ref 0–4)

## 2019-04-16 ENCOUNTER — TELEPHONE (OUTPATIENT)
Dept: FAMILY MEDICINE CLINIC | Facility: CLINIC | Age: 69
End: 2019-04-16

## 2019-04-16 ENCOUNTER — TELEPHONE (OUTPATIENT)
Dept: NEUROLOGY | Facility: CLINIC | Age: 69
End: 2019-04-16

## 2019-04-17 ENCOUNTER — TELEPHONE (OUTPATIENT)
Dept: FAMILY MEDICINE CLINIC | Facility: CLINIC | Age: 69
End: 2019-04-17

## 2019-04-17 DIAGNOSIS — R97.20 ABNORMAL PSA: Primary | ICD-10-CM

## 2019-04-24 ENCOUNTER — APPOINTMENT (OUTPATIENT)
Dept: PHYSICAL THERAPY | Facility: MEDICAL CENTER | Age: 69
End: 2019-04-24
Attending: PHYSICIAN ASSISTANT
Payer: COMMERCIAL

## 2019-04-30 DIAGNOSIS — N18.30 STAGE 3 CHRONIC KIDNEY DISEASE (HCC): ICD-10-CM

## 2019-04-30 RX ORDER — LISINOPRIL 5 MG/1
5 TABLET ORAL DAILY
Qty: 90 TABLET | Refills: 0 | Status: SHIPPED | OUTPATIENT
Start: 2019-04-30 | End: 2019-08-08 | Stop reason: SDUPTHER

## 2019-05-01 ENCOUNTER — HOSPITAL ENCOUNTER (OUTPATIENT)
Dept: CT IMAGING | Facility: HOSPITAL | Age: 69
Discharge: HOME | End: 2019-05-01
Attending: PSYCHIATRY & NEUROLOGY
Payer: COMMERCIAL

## 2019-05-01 DIAGNOSIS — R42 VERTIGO: ICD-10-CM

## 2019-05-01 DIAGNOSIS — R29.898 LEG WEAKNESS, BILATERAL: ICD-10-CM

## 2019-05-01 DIAGNOSIS — R26.2 AMBULATORY DYSFUNCTION: ICD-10-CM

## 2019-05-01 PROCEDURE — 70498 CT ANGIOGRAPHY NECK: CPT

## 2019-05-01 PROCEDURE — 72131 CT LUMBAR SPINE W/O DYE: CPT

## 2019-05-01 PROCEDURE — 70496 CT ANGIOGRAPHY HEAD: CPT

## 2019-05-01 RX ADMIN — IODIXANOL 85 ML: 320 INJECTION, SOLUTION INTRAVASCULAR at 07:26

## 2019-05-08 ENCOUNTER — TELEPHONE (OUTPATIENT)
Dept: NEUROLOGY | Facility: CLINIC | Age: 69
End: 2019-05-08

## 2019-05-20 ENCOUNTER — TELEPHONE (OUTPATIENT)
Dept: NEUROLOGY | Facility: CLINIC | Age: 69
End: 2019-05-20

## 2019-05-21 DIAGNOSIS — R26.2 AMBULATORY DYSFUNCTION: ICD-10-CM

## 2019-05-21 DIAGNOSIS — M47.816 LUMBAR SPONDYLOSIS: Primary | ICD-10-CM

## 2019-05-21 RX ORDER — AMOXICILLIN AND CLAVULANATE POTASSIUM 875; 125 MG/1; MG/1
TABLET, FILM COATED ORAL
COMMUNITY
End: 2019-07-12

## 2019-05-21 RX ORDER — MECLIZINE HYDROCHLORIDE 25 MG/1
TABLET ORAL
COMMUNITY
End: 2019-11-25 | Stop reason: SDUPTHER

## 2019-05-22 ENCOUNTER — TELEPHONE (OUTPATIENT)
Dept: NEUROLOGY | Facility: CLINIC | Age: 69
End: 2019-05-22

## 2019-05-23 ENCOUNTER — CONSULT (OUTPATIENT)
Dept: UROLOGY | Facility: CLINIC | Age: 69
End: 2019-05-23
Payer: COMMERCIAL

## 2019-05-23 VITALS
BODY MASS INDEX: 31.63 KG/M2 | WEIGHT: 196.8 LBS | SYSTOLIC BLOOD PRESSURE: 122 MMHG | HEIGHT: 66 IN | HEART RATE: 88 BPM | DIASTOLIC BLOOD PRESSURE: 88 MMHG

## 2019-05-23 DIAGNOSIS — R31.29 MICROSCOPIC HEMATURIA: Primary | ICD-10-CM

## 2019-05-23 DIAGNOSIS — R97.20 ABNORMAL PSA: ICD-10-CM

## 2019-05-23 PROCEDURE — 99204 OFFICE O/P NEW MOD 45 MIN: CPT | Performed by: UROLOGY

## 2019-06-25 DIAGNOSIS — E78.5 HYPERLIPIDEMIA, UNSPECIFIED HYPERLIPIDEMIA TYPE: ICD-10-CM

## 2019-06-28 RX ORDER — ATORVASTATIN CALCIUM 20 MG/1
TABLET, FILM COATED ORAL
Qty: 90 TABLET | Refills: 0 | Status: SHIPPED | OUTPATIENT
Start: 2019-06-28 | End: 2019-11-22 | Stop reason: SDUPTHER

## 2019-07-12 ENCOUNTER — OFFICE VISIT (OUTPATIENT)
Dept: FAMILY MEDICINE CLINIC | Facility: CLINIC | Age: 69
End: 2019-07-12
Payer: COMMERCIAL

## 2019-07-12 VITALS
RESPIRATION RATE: 16 BRPM | OXYGEN SATURATION: 98 % | SYSTOLIC BLOOD PRESSURE: 120 MMHG | WEIGHT: 196 LBS | HEIGHT: 66 IN | BODY MASS INDEX: 31.5 KG/M2 | HEART RATE: 96 BPM | TEMPERATURE: 99.6 F | DIASTOLIC BLOOD PRESSURE: 68 MMHG

## 2019-07-12 DIAGNOSIS — E11.40 TYPE 2 DIABETES MELLITUS WITH DIABETIC NEUROPATHY, WITHOUT LONG-TERM CURRENT USE OF INSULIN (HCC): Primary | ICD-10-CM

## 2019-07-12 DIAGNOSIS — E66.9 CLASS 1 OBESITY WITH BODY MASS INDEX (BMI) OF 31.0 TO 31.9 IN ADULT, UNSPECIFIED OBESITY TYPE, UNSPECIFIED WHETHER SERIOUS COMORBIDITY PRESENT: ICD-10-CM

## 2019-07-12 DIAGNOSIS — R53.83 FATIGUE, UNSPECIFIED TYPE: ICD-10-CM

## 2019-07-12 LAB — SL AMB POCT HEMOGLOBIN AIC: 6.3 (ref ?–6.5)

## 2019-07-12 PROCEDURE — 1160F RVW MEDS BY RX/DR IN RCRD: CPT | Performed by: INTERNAL MEDICINE

## 2019-07-12 PROCEDURE — 1036F TOBACCO NON-USER: CPT | Performed by: INTERNAL MEDICINE

## 2019-07-12 PROCEDURE — 83036 HEMOGLOBIN GLYCOSYLATED A1C: CPT | Performed by: INTERNAL MEDICINE

## 2019-07-12 PROCEDURE — 3008F BODY MASS INDEX DOCD: CPT | Performed by: INTERNAL MEDICINE

## 2019-07-12 PROCEDURE — 99214 OFFICE O/P EST MOD 30 MIN: CPT | Performed by: INTERNAL MEDICINE

## 2019-07-12 PROCEDURE — 3044F HG A1C LEVEL LT 7.0%: CPT | Performed by: INTERNAL MEDICINE

## 2019-07-12 NOTE — PROGRESS NOTES
Assessment/Plan:         Diagnoses and all orders for this visit:    Class 1 obesity with body mass index (BMI) of 31 0 to 31 9 in adult, unspecified obesity type, unspecified whether serious comorbidity present  Comments:  wt reduction rec  Orders:  -     Lipid panel; Future    Type 2 diabetes mellitus with diabetic neuropathy, without long-term current use of insulin ([facility] )  Comments:  due for lab  Orders:  -     POCT hemoglobin A1c  -     Comprehensive metabolic panel; Future    Fatigue, unspecified type  Comments:  check lab  Orders:  -     CBC; Future          Subjective:      Patient ID: Mukul Al is a 71 y o  male  +neuropathy denies retinopathy or ckd  He is not taking his statin or ace  He stopped his lasix  He does not know why he stopped his meds  The following portions of the patient's history were reviewed and updated as appropriate: He  has a past medical history of Anxiety, Diabetes mellitus ([facility] ), GERD (gastroesophageal reflux disease), Hematuria, and Nuclear senile cataract of left eye  He   Patient Active Problem List    Diagnosis Date Noted    Abnormal PSA 05/23/2019    Microscopic hematuria 05/23/2019    Dizziness 10/04/2017    Pancreas cyst 05/20/2016    Type 2 diabetes mellitus with diabetic neuropathy ([facility] ) 07/31/2015    Hyperlipidemia 06/13/2012     He  has a past surgical history that includes Cataract extraction; Cholecystectomy; and Knee surgery  His family history includes Coronary artery disease in his mother; Diabetes in his father  He  reports that he has never smoked  He has never used smokeless tobacco  He reports that he does not drink alcohol or use drugs    Current Outpatient Medications   Medication Sig Dispense Refill    glipiZIDE (GLUCOTROL) 10 mg tablet Take 1 tablet (10 mg total) by mouth 2 (two) times a day 60 tablet 5    meclizine (ANTIVERT) 25 mg tablet meclizine 25 mg tablet      metFORMIN (GLUCOPHAGE) 1000 MG tablet Take 1 tablet (1,000 mg total) by mouth 2 (two) times a day 180 tablet 5    atorvastatin (LIPITOR) 20 mg tablet TAKE 1 TABLET BY MOUTH EVERY DAY (Patient not taking: Reported on 7/12/2019) 90 tablet 0    furosemide (LASIX) 40 mg tablet Take 1 tablet (40 mg total) by mouth 2 (two) times a day One pill daily x 5 days (Patient not taking: Reported on 7/12/2019) 5 tablet 0    lisinopril (ZESTRIL) 5 mg tablet Take 1 tablet (5 mg total) by mouth daily (Patient not taking: Reported on 7/12/2019) 90 tablet 0     No current facility-administered medications for this visit  Current Outpatient Medications on File Prior to Visit   Medication Sig    glipiZIDE (GLUCOTROL) 10 mg tablet Take 1 tablet (10 mg total) by mouth 2 (two) times a day    meclizine (ANTIVERT) 25 mg tablet meclizine 25 mg tablet    metFORMIN (GLUCOPHAGE) 1000 MG tablet Take 1 tablet (1,000 mg total) by mouth 2 (two) times a day    atorvastatin (LIPITOR) 20 mg tablet TAKE 1 TABLET BY MOUTH EVERY DAY (Patient not taking: Reported on 7/12/2019)    furosemide (LASIX) 40 mg tablet Take 1 tablet (40 mg total) by mouth 2 (two) times a day One pill daily x 5 days (Patient not taking: Reported on 7/12/2019)    lisinopril (ZESTRIL) 5 mg tablet Take 1 tablet (5 mg total) by mouth daily (Patient not taking: Reported on 7/12/2019)     No current facility-administered medications on file prior to visit  He has No Known Allergies       Review of Systems   Constitutional: Negative  HENT: Negative  Eyes: Negative for visual disturbance  Respiratory: Negative  Cardiovascular: Negative  Neurological: Positive for numbness  Objective:      /68 (BP Location: Left arm, Patient Position: Sitting, Cuff Size: Large)   Pulse 96   Temp 99 6 °F (37 6 °C) (Tympanic)   Resp 16   Ht 5' 6" (1 676 m)   Wt 88 9 kg (196 lb)   SpO2 98%   BMI 31 64 kg/m²          Physical Exam   Constitutional: He appears well-developed and well-nourished  No distress     HENT: Head: Normocephalic and atraumatic  Right Ear: External ear normal    Left Ear: External ear normal    Nose: Nose normal    Mouth/Throat: Oropharynx is clear and moist  No oropharyngeal exudate  Neck: Normal range of motion  Neck supple  No tracheal deviation present  No thyromegaly present  Cardiovascular: Normal rate, regular rhythm, normal heart sounds and intact distal pulses  Exam reveals no gallop and no friction rub  No murmur heard  Pulmonary/Chest: Effort normal and breath sounds normal  No stridor  No respiratory distress  He has no wheezes  He has no rales  Abdominal: Soft  Bowel sounds are normal  He exhibits no distension and no mass  There is no tenderness  There is no guarding  Lymphadenopathy:     He has no cervical adenopathy  Skin: He is not diaphoretic

## 2019-08-08 DIAGNOSIS — N18.30 STAGE 3 CHRONIC KIDNEY DISEASE (HCC): ICD-10-CM

## 2019-08-08 PROCEDURE — 4010F ACE/ARB THERAPY RXD/TAKEN: CPT | Performed by: INTERNAL MEDICINE

## 2019-08-08 RX ORDER — LISINOPRIL 5 MG/1
TABLET ORAL
Qty: 90 TABLET | Refills: 0 | Status: SHIPPED | OUTPATIENT
Start: 2019-08-08 | End: 2019-11-22 | Stop reason: SDUPTHER

## 2019-11-22 ENCOUNTER — OFFICE VISIT (OUTPATIENT)
Dept: FAMILY MEDICINE CLINIC | Facility: CLINIC | Age: 69
End: 2019-11-22
Payer: COMMERCIAL

## 2019-11-22 VITALS
TEMPERATURE: 98.7 F | WEIGHT: 196 LBS | RESPIRATION RATE: 16 BRPM | DIASTOLIC BLOOD PRESSURE: 66 MMHG | HEART RATE: 101 BPM | HEIGHT: 66 IN | SYSTOLIC BLOOD PRESSURE: 120 MMHG | OXYGEN SATURATION: 98 % | BODY MASS INDEX: 31.5 KG/M2

## 2019-11-22 DIAGNOSIS — N18.30 STAGE 3 CHRONIC KIDNEY DISEASE (HCC): ICD-10-CM

## 2019-11-22 DIAGNOSIS — E78.5 HYPERLIPIDEMIA, UNSPECIFIED HYPERLIPIDEMIA TYPE: ICD-10-CM

## 2019-11-22 DIAGNOSIS — G83.9 PARESIS (HCC): ICD-10-CM

## 2019-11-22 DIAGNOSIS — J06.9 UPPER RESPIRATORY TRACT INFECTION, UNSPECIFIED TYPE: Primary | ICD-10-CM

## 2019-11-22 DIAGNOSIS — E11.40 TYPE 2 DIABETES MELLITUS WITH DIABETIC NEUROPATHY, WITHOUT LONG-TERM CURRENT USE OF INSULIN (HCC): ICD-10-CM

## 2019-11-22 LAB
CREAT UR-MCNC: 200 MG/DL
MICROALBUMIN UR-MCNC: 36.1 MG/L (ref 0–20)
MICROALBUMIN/CREAT 24H UR: 18 MG/G CREATININE (ref 0–30)
SL AMB POCT HEMOGLOBIN AIC: 6.8 (ref ?–6.5)

## 2019-11-22 PROCEDURE — 3044F HG A1C LEVEL LT 7.0%: CPT | Performed by: INTERNAL MEDICINE

## 2019-11-22 PROCEDURE — 82043 UR ALBUMIN QUANTITATIVE: CPT | Performed by: INTERNAL MEDICINE

## 2019-11-22 PROCEDURE — 83036 HEMOGLOBIN GLYCOSYLATED A1C: CPT | Performed by: INTERNAL MEDICINE

## 2019-11-22 PROCEDURE — 82570 ASSAY OF URINE CREATININE: CPT | Performed by: INTERNAL MEDICINE

## 2019-11-22 PROCEDURE — 3066F NEPHROPATHY DOC TX: CPT | Performed by: INTERNAL MEDICINE

## 2019-11-22 PROCEDURE — 4010F ACE/ARB THERAPY RXD/TAKEN: CPT | Performed by: INTERNAL MEDICINE

## 2019-11-22 PROCEDURE — 1160F RVW MEDS BY RX/DR IN RCRD: CPT | Performed by: INTERNAL MEDICINE

## 2019-11-22 PROCEDURE — 3061F NEG MICROALBUMINURIA REV: CPT | Performed by: INTERNAL MEDICINE

## 2019-11-22 PROCEDURE — 1036F TOBACCO NON-USER: CPT | Performed by: INTERNAL MEDICINE

## 2019-11-22 PROCEDURE — 3725F SCREEN DEPRESSION PERFORMED: CPT | Performed by: INTERNAL MEDICINE

## 2019-11-22 PROCEDURE — 99214 OFFICE O/P EST MOD 30 MIN: CPT | Performed by: INTERNAL MEDICINE

## 2019-11-22 PROCEDURE — G0439 PPPS, SUBSEQ VISIT: HCPCS | Performed by: INTERNAL MEDICINE

## 2019-11-22 RX ORDER — ATORVASTATIN CALCIUM 20 MG/1
20 TABLET, FILM COATED ORAL DAILY
Qty: 90 TABLET | Refills: 1 | Status: SHIPPED | OUTPATIENT
Start: 2019-11-22 | End: 2020-01-07 | Stop reason: SDUPTHER

## 2019-11-22 RX ORDER — LISINOPRIL 5 MG/1
5 TABLET ORAL DAILY
Qty: 90 TABLET | Refills: 1 | Status: SHIPPED | OUTPATIENT
Start: 2019-11-22 | End: 2020-01-07 | Stop reason: SDUPTHER

## 2019-11-22 RX ORDER — AMOXICILLIN 500 MG/1
500 CAPSULE ORAL EVERY 8 HOURS SCHEDULED
Qty: 30 CAPSULE | Refills: 0 | Status: SHIPPED | OUTPATIENT
Start: 2019-11-22 | End: 2019-12-02

## 2019-11-22 NOTE — PROGRESS NOTES
Assessment and Plan:     Problem List Items Addressed This Visit        Endocrine    Type 2 diabetes mellitus with diabetic neuropathy ([facility] )    Relevant Orders    POCT hemoglobin A1c    Microalbumin / creatinine urine ratio       Other    Hyperlipidemia           Preventive health issues were discussed with patient, and age appropriate screening tests were ordered as noted in patient's After Visit Summary  Personalized health advice and appropriate referrals for health education or preventive services given if needed, as noted in patient's After Visit Summary       History of Present Illness:     Patient presents for Medicare Annual Wellness visit    Patient Care Team:  Raj Kiser DO as PCP - General (Internal Medicine)     Problem List:     Patient Active Problem List   Diagnosis    Dizziness    Hyperlipidemia    Pancreas cyst    Type 2 diabetes mellitus with diabetic neuropathy ([facility] )    Abnormal PSA    Microscopic hematuria      Past Medical and Surgical History:     Past Medical History:   Diagnosis Date    Anxiety     Diabetes mellitus ([facility]     GERD (gastroesophageal reflux disease)     last assessed 5/20/16    Hematuria     Nuclear senile cataract of left eye      Past Surgical History:   Procedure Laterality Date    CATARACT EXTRACTION      CHOLECYSTECTOMY      KNEE SURGERY        Family History:     Family History   Problem Relation Age of Onset    Coronary artery disease Mother     Diabetes Father       Social History:     Social History     Socioeconomic History    Marital status: /Civil Union     Spouse name: None    Number of children: None    Years of education: None    Highest education level: None   Occupational History    None   Social Needs    Financial resource strain: None    Food insecurity:     Worry: None     Inability: None    Transportation needs:     Medical: None     Non-medical: None   Tobacco Use    Smoking status: Never Smoker    Smokeless tobacco: Never Used   Substance and Sexual Activity    Alcohol use: No    Drug use: No    Sexual activity: None   Lifestyle    Physical activity:     Days per week: None     Minutes per session: None    Stress: None   Relationships    Social connections:     Talks on phone: None     Gets together: None     Attends [employer] service: None     Active member of club or organization: None     Attends meetings of clubs or organizations: None     Relationship status: None    Intimate partner violence:     Fear of current or ex partner: None     Emotionally abused: None     Physically abused: None     Forced sexual activity: None   Other Topics Concern    None   Social History Narrative    None       Medications and Allergies:     Current Outpatient Medications   Medication Sig Dispense Refill    glipiZIDE (GLUCOTROL) 10 mg tablet Take 1 tablet (10 mg total) by mouth 2 (two) times a day 60 tablet 5    metFORMIN (GLUCOPHAGE) 1000 MG tablet Take 1 tablet (1,000 mg total) by mouth 2 (two) times a day 180 tablet 5    atorvastatin (LIPITOR) 20 mg tablet TAKE 1 TABLET BY MOUTH EVERY DAY (Patient not taking: Reported on 7/12/2019) 90 tablet 0    furosemide (LASIX) 40 mg tablet Take 1 tablet (40 mg total) by mouth 2 (two) times a day One pill daily x 5 days (Patient not taking: Reported on 7/12/2019) 5 tablet 0    lisinopril (ZESTRIL) 5 mg tablet TAKE 1 TABLET BY MOUTH EVERY DAY (Patient not taking: Reported on 11/22/2019) 90 tablet 0    meclizine (ANTIVERT) 25 mg tablet meclizine 25 mg tablet       No current facility-administered medications for this visit        No Known Allergies   Immunizations:     Immunization History   Administered Date(s) Administered    Tdap 09/02/2016, 03/31/2018      Health Maintenance:         Topic Date Due    Hepatitis C Screening  1950    CRC Screening: Colonoscopy  04/12/2020 (Originally 1950)         Topic Date Due    Hepatitis B Vaccine (1 of 3 - Risk 3-dose series) 07/06/1969  Pneumococcal Vaccine: 65+ Years (1 of 2 - PCV13) 07/06/2015    Influenza Vaccine  07/01/2019      Medicare Health Risk Assessment:     /66 (BP Location: Left arm, Patient Position: Sitting, Cuff Size: Large)   Pulse 101   Temp 98 7 °F (37 1 °C) (Temporal)   Resp 16   Ht 5' 5 5" (1 664 m)   Wt 88 9 kg (196 lb)   SpO2 98%   BMI 32 12 kg/m²      Thedora Ahumada is here for his Subsequent Wellness visit  Health Risk Assessment:   Patient rates overall health as fair  Patient feels that their physical health rating is slightly worse  Eyesight was rated as same  Hearing was rated as same  Patient feels that their emotional and mental health rating is same  Pain experienced in the last 7 days has been some  Patient's pain rating has been 3/10  Patient states that he has experienced weight loss or gain in last 6 months  Depression Screening:   PHQ-2 Score: 0      Fall Risk Screening: In the past year, patient has experienced: no history of falling in past year      Home Safety:  Patient has trouble with stairs inside or outside of their home  Patient has working smoke alarms and has no working carbon monoxide detector  Home safety hazards include: none  Nutrition:   Current diet is Regular  Medications:   Patient is currently taking over-the-counter supplements  OTC medications include: see medication list  Patient is able to manage medications  Activities of Daily Living (ADLs)/Instrumental Activities of Daily Living (IADLs):   Walk and transfer into and out of bed and chair?: Yes  Dress and groom yourself?: No    Bathe or shower yourself?: Yes    Feed yourself?  Yes  Do your laundry/housekeeping?: Yes  Manage your money, pay your bills and track your expenses?: Yes  Make your own meals?: Yes    Do your own shopping?: Yes    Previous Hospitalizations:   Any hospitalizations or ED visits within the last 12 months?: No      Advance Care Planning:   Living will: No    Durable POA for healthcare: Yes    Advanced directive: No      PREVENTIVE SCREENINGS      Cardiovascular Screening:    General: Screening Not Indicated and History Lipid Disorder      Diabetes Screening:     General: Screening Not Indicated and History Diabetes      Colorectal Cancer Screening:     General: Screening Current      Prostate Cancer Screening:    General: Screening Current      Abdominal Aortic Aneurysm (AAA) Screening:    Risk factors include: age between 73-67 yo      Other Counseling Topics:   Calcium and vitamin D intake         Camilo Mcdonald, DO

## 2019-11-22 NOTE — PATIENT INSTRUCTIONS

## 2019-11-22 NOTE — PROGRESS NOTES
Assessment/Plan:         Diagnoses and all orders for this visit:    Type 2 diabetes mellitus with diabetic neuropathy, without long-term current use of insulin ([facility])  -     POCT hemoglobin A1c  -     Microalbumin / creatinine urine ratio    Hyperlipidemia, unspecified hyperlipidemia type  Comments:  restart statin    Stage 3 chronic kidney disease ([facility] [address] )  Comments:  repeat lab in 1 week          Subjective:      Patient ID: Jose Antonio Livingston is a 71 y o  male  L? If pt taking statin and ace  Will reorder to be sure +runny nose sore throat ill x 1 weeks  Denies retinopathy, denies neuropathy, +ckd-3      The following portions of the patient's history were reviewed and updated as appropriate: He  has a past medical history of Anxiety, Diabetes mellitus ([facility] ), GERD (gastroesophageal reflux disease), Hematuria, and Nuclear senile cataract of left eye  He   Patient Active Problem List    Diagnosis Date Noted    Abnormal PSA 05/23/2019    Microscopic hematuria 05/23/2019    Dizziness 10/04/2017    Pancreas cyst 05/20/2016    Type 2 diabetes mellitus with diabetic neuropathy ([facility] ) 07/31/2015    Hyperlipidemia 06/13/2012     He  has a past surgical history that includes Cataract extraction; Cholecystectomy; and Knee surgery  His family history includes Coronary artery disease in his mother; Diabetes in his father  He  reports that he has never smoked  He has never used smokeless tobacco  He reports that he does not drink alcohol or use drugs    Current Outpatient Medications   Medication Sig Dispense Refill    glipiZIDE (GLUCOTROL) 10 mg tablet Take 1 tablet (10 mg total) by mouth 2 (two) times a day 60 tablet 5    metFORMIN (GLUCOPHAGE) 1000 MG tablet Take 1 tablet (1,000 mg total) by mouth 2 (two) times a day 180 tablet 5    atorvastatin (LIPITOR) 20 mg tablet TAKE 1 TABLET BY MOUTH EVERY DAY (Patient not taking: Reported on 7/12/2019) 90 tablet 0    furosemide (LASIX) 40 mg tablet Take 1 tablet (40 mg total) by mouth 2 (two) times a day One pill daily x 5 days (Patient not taking: Reported on 7/12/2019) 5 tablet 0    lisinopril (ZESTRIL) 5 mg tablet TAKE 1 TABLET BY MOUTH EVERY DAY (Patient not taking: Reported on 11/22/2019) 90 tablet 0    meclizine (ANTIVERT) 25 mg tablet meclizine 25 mg tablet       No current facility-administered medications for this visit  Current Outpatient Medications on File Prior to Visit   Medication Sig    glipiZIDE (GLUCOTROL) 10 mg tablet Take 1 tablet (10 mg total) by mouth 2 (two) times a day    metFORMIN (GLUCOPHAGE) 1000 MG tablet Take 1 tablet (1,000 mg total) by mouth 2 (two) times a day    atorvastatin (LIPITOR) 20 mg tablet TAKE 1 TABLET BY MOUTH EVERY DAY (Patient not taking: Reported on 7/12/2019)    furosemide (LASIX) 40 mg tablet Take 1 tablet (40 mg total) by mouth 2 (two) times a day One pill daily x 5 days (Patient not taking: Reported on 7/12/2019)    lisinopril (ZESTRIL) 5 mg tablet TAKE 1 TABLET BY MOUTH EVERY DAY (Patient not taking: Reported on 11/22/2019)    meclizine (ANTIVERT) 25 mg tablet meclizine 25 mg tablet     No current facility-administered medications on file prior to visit  He has No Known Allergies       Review of Systems   Constitutional: Negative for chills and fever  HENT: Positive for postnasal drip and sore throat  Respiratory: Positive for cough  Negative for shortness of breath  Cardiovascular: Negative  Neurological: Negative for headaches  Objective:      /66 (BP Location: Left arm, Patient Position: Sitting, Cuff Size: Large)   Pulse 101   Temp 98 7 °F (37 1 °C) (Temporal)   Resp 16   Ht 5' 5 5" (1 664 m)   Wt 88 9 kg (196 lb)   SpO2 98%   BMI 32 12 kg/m²          Physical Exam   Constitutional: He appears well-developed and well-nourished  No distress  HENT:   Head: Normocephalic and atraumatic     Right Ear: External ear normal    Left Ear: External ear normal    Nose: Nose normal  Mouth/Throat: Oropharynx is clear and moist  No oropharyngeal exudate  Neck: Normal range of motion  Neck supple  No tracheal deviation present  No thyromegaly present  Cardiovascular: Normal rate, regular rhythm, normal heart sounds and intact distal pulses  Exam reveals no gallop and no friction rub  No murmur heard  Pulses:       Dorsalis pedis pulses are 2+ on the right side, and 2+ on the left side  Pulmonary/Chest: Effort normal and breath sounds normal  No respiratory distress  He has no wheezes  He exhibits no tenderness  Feet:   Right Foot:   Skin Integrity: Positive for warmth  Negative for erythema  Left Foot:   Skin Integrity: Positive for warmth  Negative for erythema  Lymphadenopathy:     He has no cervical adenopathy  Skin: He is not diaphoretic  Diabetic Foot Exam    Patient's shoes and socks removed  Right Foot/Ankle   Right Foot Inspection  Skin Exam: warmth no erythema                            Sensory   Vibration: intact    Monofilament testing: intact  Vascular    The right DP pulse is 2+  Left Foot/Ankle  Left Foot Inspection  Skin Exam: warmthno erythema                                         Sensory   Vibration: intact    Monofilament: intact  Vascular    The left DP pulse is 2+  Assign Risk Category:  No deformity present;  No loss of protective sensation;        Risk: 0

## 2019-11-25 DIAGNOSIS — R42 VERTIGO: Primary | ICD-10-CM

## 2019-11-25 RX ORDER — MECLIZINE HYDROCHLORIDE 25 MG/1
25 TABLET ORAL EVERY 8 HOURS PRN
Qty: 30 TABLET | Refills: 0 | Status: SHIPPED | OUTPATIENT
Start: 2019-11-25 | End: 2020-01-07 | Stop reason: SDUPTHER

## 2019-11-28 LAB — HBA1C MFR BLD HPLC: 6.9 %

## 2019-12-20 ENCOUNTER — TELEPHONE (OUTPATIENT)
Dept: FAMILY MEDICINE CLINIC | Facility: CLINIC | Age: 69
End: 2019-12-20

## 2019-12-20 NOTE — TELEPHONE ENCOUNTER
Pt's wife, Lolaleslie Regan called stating pt was discharged from [facility] after being transferred there for an abdominal infection  Lola Regan stated pt was d/c'd w/ DX of sepsis  Dr Giuseppe Hansen first open appt  For a FLORES is 12/31/19  I'm unsure if pt should go this period of time for an appt  I told Pinky Cid or Lolita Winn would get back to her on Monday, 12/23/19   Lola Regan stated visiting nurse's are setting up visits beginning Sat  12/21/19

## 2019-12-23 ENCOUNTER — TRANSITIONAL CARE MANAGEMENT (OUTPATIENT)
Dept: FAMILY MEDICINE CLINIC | Facility: CLINIC | Age: 69
End: 2019-12-23

## 2019-12-31 ENCOUNTER — OFFICE VISIT (OUTPATIENT)
Dept: FAMILY MEDICINE CLINIC | Facility: CLINIC | Age: 69
End: 2019-12-31
Payer: COMMERCIAL

## 2019-12-31 VITALS
HEIGHT: 66 IN | WEIGHT: 188 LBS | TEMPERATURE: 99.1 F | SYSTOLIC BLOOD PRESSURE: 132 MMHG | DIASTOLIC BLOOD PRESSURE: 64 MMHG | BODY MASS INDEX: 30.22 KG/M2 | HEART RATE: 97 BPM | RESPIRATION RATE: 16 BRPM | OXYGEN SATURATION: 98 %

## 2019-12-31 DIAGNOSIS — R26.2 AMBULATORY DYSFUNCTION: ICD-10-CM

## 2019-12-31 DIAGNOSIS — E11.40 TYPE 2 DIABETES MELLITUS WITH DIABETIC NEUROPATHY, WITHOUT LONG-TERM CURRENT USE OF INSULIN (HCC): ICD-10-CM

## 2019-12-31 DIAGNOSIS — K86.2 PANCREATIC CYST: ICD-10-CM

## 2019-12-31 DIAGNOSIS — IMO0001 TRANSITION OF CARE PERFORMED WITH SHARING OF CLINICAL SUMMARY: ICD-10-CM

## 2019-12-31 DIAGNOSIS — N17.9 AKI (ACUTE KIDNEY INJURY) (HCC): ICD-10-CM

## 2019-12-31 DIAGNOSIS — N20.0 NEPHROLITHIASIS: ICD-10-CM

## 2019-12-31 DIAGNOSIS — A41.9 SEPSIS, DUE TO UNSPECIFIED ORGANISM, UNSPECIFIED WHETHER ACUTE ORGAN DYSFUNCTION PRESENT (HCC): Primary | ICD-10-CM

## 2019-12-31 DIAGNOSIS — I10 ESSENTIAL HYPERTENSION: ICD-10-CM

## 2019-12-31 PROCEDURE — 99496 TRANSJ CARE MGMT HIGH F2F 7D: CPT | Performed by: INTERNAL MEDICINE

## 2019-12-31 RX ORDER — SULFAMETHOXAZOLE AND TRIMETHOPRIM 800; 160 MG/1; MG/1
TABLET ORAL
COMMUNITY
End: 2020-01-08 | Stop reason: SDUPTHER

## 2019-12-31 NOTE — PROGRESS NOTES
TCM Call (since 12/4/2019)     Date and time call was made  12/23/2019  2:32 PM    Patient was hospitialized at  Other (comment)    [id number]    Date of Admission  12/02/19    Date of discharge  12/20/19    Diagnosis  abdominal infection    Disposition  Home      TCM Call (since 12/4/2019)     Scheduled for follow up? Yes    Did you obtain your prescribed medications  Yes    Do you need help managing your prescriptions or medications  No    Is transportation to your appointment needed  No    I have advised the patient to call PCP with any new or worsening symptoms  Alise Townsend,     Living Arrangements  Spouse or Significiant other    Are you recieving any outpatient services  No    Are you recieving home care services  Yes    Types of home care services  Nurse visit    Are you using any community resources  No    Current waiver services  No    Have you fallen in the last 12 months  No    Interperter language line needed  No    Counseling  Caregiver    Counseling topics  instructions for management; Importance of RX compliance        Assessment/Plan:         Diagnoses and all orders for this visit:    Sepsis, due to unspecified organism, unspecified whether acute organ dysfunction present [facility])  Comments:  resolved  not sure he took out pt antibiotics ? bactrim    Orders:  -     CBC; Future    Transition of care performed with sharing of clinical summary    CARLOS EDUARDO (acute kidney injury) ([facility] [address] )  Comments:  improved    Ambulatory dysfunction  Comments:  walks now with roller walker    Nephrolithiasis    Type 2 diabetes mellitus with diabetic neuropathy, without long-term current use of insulin ([facility])  Comments:  stable  Orders:  -     Comprehensive metabolic panel; Future    Essential hypertension  Comments:  stable    Pancreatic cyst  Comments:  will call hosp again and ? need mri of abd to assess        Other orders  -     sulfamethoxazole-trimethoprim (BACTRIM DS) 800-160 mg per tablet; sulfamethoxazole 800 mg-trimethoprim 160 mg tablet          Subjective:      Patient ID: Kate Raygoza is a 71 y o  male  Pt was admitted 11/26 to 12/2/19 and transferred to in rehab 12/2 to 12/20  He was admitted with severe sepsis, carlos eduardo, amb dysfunction 2nd severe illness  He also carries diag type 2 dm, vertigo , htn   tx'd iv rocephin and flagyl  He had a complex pancreatic cyst on ct and it was rec to have mri but unknown if had this  He was given rx for d/c but they do not know what they are  Also they state did not get d/c meds because did not have the money  Will contact hosp again for d/c summary and ? Mri   Will send for lab  Do not see list of meds in d/c pack pts wife brought      The following portions of the patient's history were reviewed and updated as appropriate: He  has a past medical history of Anxiety, Diabetes mellitus ([address] ), GERD (gastroesophageal reflux disease), Hematuria, and Nuclear senile cataract of left eye  He   Patient Active Problem List    Diagnosis Date Noted    Essential hypertension 12/31/2019    Type 2 diabetes mellitus with diabetic neuropathy, without long-term current use of insulin ([facility] [address] ) 12/31/2019    Nephrolithiasis 12/31/2019    Ambulatory dysfunction 12/31/2019    CARLOS EDUARDO (acute kidney injury) ([address] ) 12/31/2019    Sepsis ([facility] [address] ) 12/31/2019    Paresis ([facility] [address] ) 11/22/2019    Abnormal PSA 05/23/2019    Microscopic hematuria 05/23/2019    Dizziness 10/04/2017    Pancreas cyst 05/20/2016    Type 2 diabetes mellitus with diabetic neuropathy ([facility] [address] ) 07/31/2015    Hyperlipidemia 06/13/2012     He  has a past surgical history that includes Cataract extraction; Cholecystectomy; and Knee surgery  His family history includes Coronary artery disease in his mother; Diabetes in his father  He  reports that he has never smoked  He has never used smokeless tobacco  He reports that he does not drink alcohol or use drugs    Current Outpatient Medications   Medication Sig Dispense Refill    atorvastatin (LIPITOR) 20 mg tablet Take 1 tablet (20 mg total) by mouth daily 90 tablet 1    furosemide (LASIX) 40 mg tablet Take 1 tablet (40 mg total) by mouth 2 (two) times a day One pill daily x 5 days 5 tablet 0    glipiZIDE (GLUCOTROL) 10 mg tablet Take 1 tablet (10 mg total) by mouth 2 (two) times a day 60 tablet 5    lisinopril (ZESTRIL) 5 mg tablet Take 1 tablet (5 mg total) by mouth daily 90 tablet 1    meclizine (ANTIVERT) 25 mg tablet Take 1 tablet (25 mg total) by mouth every 8 (eight) hours as needed for dizziness Do not drive with med 30 tablet 0    metFORMIN (GLUCOPHAGE) 1000 MG tablet Take 1 tablet (1,000 mg total) by mouth 2 (two) times a day 180 tablet 5    sulfamethoxazole-trimethoprim (BACTRIM DS) 800-160 mg per tablet sulfamethoxazole 800 mg-trimethoprim 160 mg tablet       No current facility-administered medications for this visit  Current Outpatient Medications on File Prior to Visit   Medication Sig    atorvastatin (LIPITOR) 20 mg tablet Take 1 tablet (20 mg total) by mouth daily    furosemide (LASIX) 40 mg tablet Take 1 tablet (40 mg total) by mouth 2 (two) times a day One pill daily x 5 days    glipiZIDE (GLUCOTROL) 10 mg tablet Take 1 tablet (10 mg total) by mouth 2 (two) times a day    lisinopril (ZESTRIL) 5 mg tablet Take 1 tablet (5 mg total) by mouth daily    meclizine (ANTIVERT) 25 mg tablet Take 1 tablet (25 mg total) by mouth every 8 (eight) hours as needed for dizziness Do not drive with med    metFORMIN (GLUCOPHAGE) 1000 MG tablet Take 1 tablet (1,000 mg total) by mouth 2 (two) times a day    sulfamethoxazole-trimethoprim (BACTRIM DS) 800-160 mg per tablet sulfamethoxazole 800 mg-trimethoprim 160 mg tablet     No current facility-administered medications on file prior to visit  He has No Known Allergies       Review of Systems   Constitutional: Negative  Negative for chills and fever  HENT: Negative  Respiratory: Negative  Cardiovascular: Negative  Gastrointestinal: Positive for constipation  Negative for abdominal pain and diarrhea  Musculoskeletal: Positive for gait problem  Objective:      /64 (BP Location: Right arm, Patient Position: Sitting, Cuff Size: Large)   Pulse 97   Temp 99 1 °F (37 3 °C) (Temporal)   Resp 16   Ht 5' 5 5" (1 664 m)   Wt 85 3 kg (188 lb)   SpO2 98%   BMI 30 81 kg/m²          Physical Exam   Constitutional: He appears well-developed and well-nourished  No distress  HENT:   Head: Normocephalic and atraumatic  Right Ear: External ear normal    Left Ear: External ear normal    Mouth/Throat: Oropharynx is clear and moist  No oropharyngeal exudate  Neck: Normal range of motion  Neck supple  No thyromegaly present  Cardiovascular: Normal rate, regular rhythm, normal heart sounds and intact distal pulses  Exam reveals no gallop and no friction rub  No murmur heard  Pulmonary/Chest: Effort normal and breath sounds normal  No respiratory distress  He has no wheezes  He has no rales  Abdominal: Soft  Bowel sounds are normal  He exhibits no distension  There is no tenderness  There is no guarding  Lymphadenopathy:     He has no cervical adenopathy  Neurological: Coordination abnormal    Skin: He is not diaphoretic

## 2020-01-02 ENCOUNTER — TELEPHONE (OUTPATIENT)
Dept: FAMILY MEDICINE CLINIC | Facility: CLINIC | Age: 70
End: 2020-01-02

## 2020-01-02 NOTE — TELEPHONE ENCOUNTER
Patient's wife spoke with her insurance today and expressed the need for home health services to help with Morena Rosenberg as she is doing most of the transferring of him due to weaknesses from the sepsis  The [facility] rep also feels they could probably benefit from OT and PT to help strengthen the patient  The family thought the hospital was ordering home health services for them, but nothing every became of it  Please put order in for home health care evaluation if you feel appropriate based upon your visit with them on 12/31/19  They would like to use the [employer] agency which is covered by their insurance

## 2020-01-03 ENCOUNTER — PATIENT OUTREACH (OUTPATIENT)
Dept: FAMILY MEDICINE CLINIC | Facility: CLINIC | Age: 70
End: 2020-01-03

## 2020-01-03 DIAGNOSIS — Z71.89 COORDINATION OF COMPLEX CARE: Primary | ICD-10-CM

## 2020-01-03 DIAGNOSIS — R26.9 NEUROLOGIC GAIT DYSFUNCTION: Primary | ICD-10-CM

## 2020-01-03 NOTE — PROGRESS NOTES
Pharmacy representative stated patient should have supply of all medications except Metformin and glipizide    The cost for each is $9, total $18

## 2020-01-03 NOTE — PROGRESS NOTES
Outpatient care management was explained to patient's wife, [facility]  She consented to care management for patient and herself  [facility] answered assessment questions and identified goal      Patient is not familiar with his medications  He was recently hospitalized at [facility] with rehab at [facility]  Patient is recovering from stroke  He ambulates with wheeled walker  His income is $747/mo, Jeanne's income is $922/mo  Their rent is $900/mo +utilities  [employer] has been to the house  [employer] Whe[employer] was started  Patient's wife was given multiple applications for assistance to complete, but she is not able to complete the applications independently  Patient has [facility] Emiliana Page: [phone] extension [phone]

## 2020-01-03 NOTE — PROGRESS NOTES
Patient's wife, Lucien Law, notified that only Metformin and glipizide can be refilled at this time  Patient should have 90 day supply of atorvastatin and lisinopril  Lucien Law stated that those medications were refilled prior to patient being hospitalized at [facility]   Medications went with patient to the hospital but did not return home with patient upon discharge from [facility]

## 2020-01-03 NOTE — PROGRESS NOTES
[facility] [place]  Message left with contact information for outpatient care manager and request for return call

## 2020-01-03 NOTE — PROGRESS NOTES
Vj nurse  called to report that patient has not been referred to home care yet  List of agencies that participate with [employer] was provided  Patient and patient's wife have been referred to [address] [place] [address] worker, Fahad Daniels, to complete social security LIS application which will reduce the cost of medications for patient  Vj sent several frozen meals to patient due to meals-on-wheels not delivering during the holidays  Vj  is hoping that patient will receive home care services  She encouraged outpatient care manager to call if there are any additional needs

## 2020-01-03 NOTE — PROGRESS NOTES
Patient's wife, Mackenziechristinesreedhar Calvin called to report that she found prescriptions for patient and she would prefer to use [facility] in [address] Tere Simpson was not able to understand that medication cost will vary depending on pharmacy used  Collaboration with PCP  Order for home care is pending

## 2020-01-07 DIAGNOSIS — R60.9 EDEMA, UNSPECIFIED TYPE: ICD-10-CM

## 2020-01-07 DIAGNOSIS — N18.30 STAGE 3 CHRONIC KIDNEY DISEASE (HCC): ICD-10-CM

## 2020-01-07 DIAGNOSIS — A41.9 SEPSIS, DUE TO UNSPECIFIED ORGANISM, UNSPECIFIED WHETHER ACUTE ORGAN DYSFUNCTION PRESENT (HCC): Primary | ICD-10-CM

## 2020-01-07 DIAGNOSIS — E11.9 TYPE 2 DIABETES MELLITUS WITHOUT COMPLICATION, WITHOUT LONG-TERM CURRENT USE OF INSULIN (HCC): ICD-10-CM

## 2020-01-07 DIAGNOSIS — E78.5 HYPERLIPIDEMIA, UNSPECIFIED HYPERLIPIDEMIA TYPE: ICD-10-CM

## 2020-01-07 DIAGNOSIS — R42 VERTIGO: ICD-10-CM

## 2020-01-07 DIAGNOSIS — E11.42 TYPE 2 DIABETES MELLITUS WITH DIABETIC POLYNEUROPATHY, WITHOUT LONG-TERM CURRENT USE OF INSULIN (HCC): ICD-10-CM

## 2020-01-07 PROCEDURE — 3066F NEPHROPATHY DOC TX: CPT | Performed by: INTERNAL MEDICINE

## 2020-01-07 NOTE — TELEPHONE ENCOUNTER
Dylon Kerr called stating pt can get all or most of his Rx's for free if [address] is used over the local Rx    Please send 90 day supplies of meds to [facility]

## 2020-01-08 DIAGNOSIS — A41.9 SEPSIS, DUE TO UNSPECIFIED ORGANISM, UNSPECIFIED WHETHER ACUTE ORGAN DYSFUNCTION PRESENT (HCC): Primary | ICD-10-CM

## 2020-01-08 PROCEDURE — 4010F ACE/ARB THERAPY RXD/TAKEN: CPT | Performed by: INTERNAL MEDICINE

## 2020-01-08 RX ORDER — FUROSEMIDE 40 MG/1
40 TABLET ORAL 2 TIMES DAILY
Qty: 180 TABLET | Refills: 1 | Status: SHIPPED | OUTPATIENT
Start: 2020-01-08 | End: 2020-02-04 | Stop reason: SDUPTHER

## 2020-01-08 RX ORDER — ATORVASTATIN CALCIUM 20 MG/1
20 TABLET, FILM COATED ORAL DAILY
Qty: 90 TABLET | Refills: 1 | Status: SHIPPED | OUTPATIENT
Start: 2020-01-08 | End: 2020-05-31

## 2020-01-08 RX ORDER — LISINOPRIL 5 MG/1
5 TABLET ORAL DAILY
Qty: 90 TABLET | Refills: 1 | Status: SHIPPED | OUTPATIENT
Start: 2020-01-08 | End: 2020-06-03

## 2020-01-08 RX ORDER — MECLIZINE HYDROCHLORIDE 25 MG/1
25 TABLET ORAL EVERY 8 HOURS PRN
Qty: 90 TABLET | Refills: 1 | Status: SHIPPED | OUTPATIENT
Start: 2020-01-08 | End: 2020-03-18 | Stop reason: SDUPTHER

## 2020-01-08 RX ORDER — GLIPIZIDE 10 MG/1
10 TABLET ORAL 2 TIMES DAILY
Qty: 180 TABLET | Refills: 1 | Status: SHIPPED | OUTPATIENT
Start: 2020-01-08 | End: 2020-03-18 | Stop reason: SDUPTHER

## 2020-01-08 RX ORDER — SULFAMETHOXAZOLE AND TRIMETHOPRIM 800; 160 MG/1; MG/1
1 TABLET ORAL EVERY 12 HOURS SCHEDULED
Qty: 14 TABLET | Refills: 0 | Status: SHIPPED | OUTPATIENT
Start: 2020-01-08 | End: 2020-01-15

## 2020-01-08 RX ORDER — SULFAMETHOXAZOLE AND TRIMETHOPRIM 800; 160 MG/1; MG/1
1 TABLET ORAL EVERY 12 HOURS SCHEDULED
Qty: 14 TABLET | Refills: 0 | OUTPATIENT
Start: 2020-01-08 | End: 2020-01-15

## 2020-01-09 ENCOUNTER — PATIENT OUTREACH (OUTPATIENT)
Dept: FAMILY MEDICINE CLINIC | Facility: CLINIC | Age: 70
End: 2020-01-09

## 2020-01-09 NOTE — PROGRESS NOTES
Felecia Mendez is patient's  called to update regarding services that are being provided  Tristan provided resources including transportation  Patient would need to complete application or ask for assistance with completing application  Patient never requested assistance  Felecia Mendez stated she is able to assist with welfare application for food stamps/medical assistance application  Someone at [facility] will also help with Monona Diss application  Felecia Mendez stated that all resources from the [facility] are wait listed  Felecia Mendez stated patient never answers his phone and has not responded to Tristan's communication

## 2020-01-10 ENCOUNTER — PATIENT OUTREACH (OUTPATIENT)
Dept: FAMILY MEDICINE CLINIC | Facility: CLINIC | Age: 70
End: 2020-01-10

## 2020-01-10 ENCOUNTER — OFFICE VISIT (OUTPATIENT)
Dept: FAMILY MEDICINE CLINIC | Facility: CLINIC | Age: 70
End: 2020-01-10
Payer: COMMERCIAL

## 2020-01-10 VITALS
DIASTOLIC BLOOD PRESSURE: 64 MMHG | TEMPERATURE: 99.3 F | RESPIRATION RATE: 16 BRPM | WEIGHT: 184 LBS | HEART RATE: 98 BPM | SYSTOLIC BLOOD PRESSURE: 136 MMHG | OXYGEN SATURATION: 98 % | HEIGHT: 66 IN | BODY MASS INDEX: 29.57 KG/M2

## 2020-01-10 DIAGNOSIS — K86.2 PANCREAS CYST: ICD-10-CM

## 2020-01-10 DIAGNOSIS — E11.40 TYPE 2 DIABETES MELLITUS WITH DIABETIC NEUROPATHY, WITHOUT LONG-TERM CURRENT USE OF INSULIN (HCC): ICD-10-CM

## 2020-01-10 DIAGNOSIS — I10 ESSENTIAL HYPERTENSION: ICD-10-CM

## 2020-01-10 DIAGNOSIS — A41.9 SEPSIS, DUE TO UNSPECIFIED ORGANISM, UNSPECIFIED WHETHER ACUTE ORGAN DYSFUNCTION PRESENT (HCC): Primary | ICD-10-CM

## 2020-01-10 PROCEDURE — 1160F RVW MEDS BY RX/DR IN RCRD: CPT | Performed by: INTERNAL MEDICINE

## 2020-01-10 PROCEDURE — 99214 OFFICE O/P EST MOD 30 MIN: CPT | Performed by: INTERNAL MEDICINE

## 2020-01-10 NOTE — PROGRESS NOTES
BMI Counseling: Body mass index is 30 15 kg/m²  The BMI is above normal  Nutrition recommendations include decreasing portion sizes and limiting drinks that contain sugar  Exercise recommendations include exercising 3-5 times per week  No pharmacotherapy was ordered  Patient referred to PCP due to patient being overweight  Assessment/Plan:         Diagnoses and all orders for this visit:    Sepsis, due to unspecified organism, unspecified whether acute organ dysfunction present [facility])  Comments:  resolved  Essential hypertension  Comments:  stable    Type 2 diabetes mellitus with diabetic neuropathy, without long-term current use of insulin ([address] )    Pancreas cyst  Comments:  check abd mri with contrast   asked him to hold metformen the day of and 2 days after mri  pt later declines mri  will order later if he changes his mind  Orders:  -     MRI abdomen w wo contrast; Future    Paresis ([address] )          Subjective:      Patient ID: Elliott Paez is a 71 y o  male  Pt states his bowels are back to normal   He did not have mri of pancreatic cyst   Denies pacer, or metal,  denines shrapnel  He thinks had mri in the past   States he is walking better  He declines f/u mri of his pancreas  The following portions of the patient's history were reviewed and updated as appropriate: He  has a past medical history of Anxiety, Diabetes mellitus ([address] ), GERD (gastroesophageal reflux disease), Hematuria, and Nuclear senile cataract of left eye    He   Patient Active Problem List    Diagnosis Date Noted    Essential hypertension 12/31/2019    Type 2 diabetes mellitus with diabetic neuropathy, without long-term current use of insulin ([address] ) 12/31/2019    Nephrolithiasis 12/31/2019    Ambulatory dysfunction 12/31/2019    CARLOS EDUARDO (acute kidney injury) ([address] ) 12/31/2019    Sepsis ([address] ) 12/31/2019    Paresis ([address] ) 11/22/2019    Abnormal PSA 05/23/2019    Microscopic hematuria 05/23/2019    Dizziness 10/04/2017  Pancreas cyst 05/20/2016    Type 2 diabetes mellitus with diabetic neuropathy ([facility] [address] ) 07/31/2015    Hyperlipidemia 06/13/2012     He  has a past surgical history that includes Cataract extraction; Cholecystectomy; and Knee surgery  His family history includes Coronary artery disease in his mother; Diabetes in his father  He  reports that he has never smoked  He has never used smokeless tobacco  He reports that he does not drink alcohol or use drugs  Current Outpatient Medications   Medication Sig Dispense Refill    atorvastatin (LIPITOR) 20 mg tablet Take 1 tablet (20 mg total) by mouth daily 90 tablet 1    furosemide (LASIX) 40 mg tablet Take 1 tablet (40 mg total) by mouth 2 (two) times a day 180 tablet 1    glipiZIDE (GLUCOTROL) 10 mg tablet Take 1 tablet (10 mg total) by mouth 2 (two) times a day 180 tablet 1    lisinopril (ZESTRIL) 5 mg tablet Take 1 tablet (5 mg total) by mouth daily 90 tablet 1    meclizine (ANTIVERT) 25 mg tablet Take 1 tablet (25 mg total) by mouth every 8 (eight) hours as needed for dizziness Do not drive with med 90 tablet 1    metFORMIN (GLUCOPHAGE) 1000 MG tablet Take 1 tablet (1,000 mg total) by mouth 2 (two) times a day 180 tablet 1    sulfamethoxazole-trimethoprim (BACTRIM DS) 800-160 mg per tablet Take 1 tablet by mouth every 12 (twelve) hours for 7 days 14 tablet 0     No current facility-administered medications for this visit        Current Outpatient Medications on File Prior to Visit   Medication Sig    atorvastatin (LIPITOR) 20 mg tablet Take 1 tablet (20 mg total) by mouth daily    furosemide (LASIX) 40 mg tablet Take 1 tablet (40 mg total) by mouth 2 (two) times a day    glipiZIDE (GLUCOTROL) 10 mg tablet Take 1 tablet (10 mg total) by mouth 2 (two) times a day    lisinopril (ZESTRIL) 5 mg tablet Take 1 tablet (5 mg total) by mouth daily    meclizine (ANTIVERT) 25 mg tablet Take 1 tablet (25 mg total) by mouth every 8 (eight) hours as needed for dizziness Do not drive with med    metFORMIN (GLUCOPHAGE) 1000 MG tablet Take 1 tablet (1,000 mg total) by mouth 2 (two) times a day    sulfamethoxazole-trimethoprim (BACTRIM DS) 800-160 mg per tablet Take 1 tablet by mouth every 12 (twelve) hours for 7 days     No current facility-administered medications on file prior to visit  He has No Known Allergies       Review of Systems   Constitutional: Negative  HENT: Negative  Respiratory: Negative  Cardiovascular: Negative  Gastrointestinal: Negative  Negative for abdominal distention and abdominal pain  Genitourinary: Negative  Objective:      /64 (BP Location: Right arm, Patient Position: Sitting, Cuff Size: Large)   Pulse 98   Temp 99 3 °F (37 4 °C) (Temporal)   Resp 16   Ht 5' 5 5" (1 664 m)   Wt 83 5 kg (184 lb)   SpO2 98%   BMI 30 15 kg/m²          Physical Exam   Constitutional: He appears well-developed and well-nourished  No distress  HENT:   Head: Normocephalic and atraumatic  Right Ear: External ear normal    Left Ear: External ear normal    Nose: Nose normal    Mouth/Throat: Oropharynx is clear and moist  No oropharyngeal exudate  Neck: Normal range of motion  Neck supple  No tracheal deviation present  No thyromegaly present  Cardiovascular: Regular rhythm and normal heart sounds  Exam reveals no gallop and no friction rub  No murmur heard  Pulmonary/Chest: Effort normal and breath sounds normal  No stridor  No respiratory distress  He has no wheezes  He has no rales  Abdominal: Soft  Bowel sounds are normal  He exhibits no distension and no mass  There is no tenderness  There is no rebound and no guarding  Lymphadenopathy:     He has no cervical adenopathy  Skin: He is not diaphoretic

## 2020-01-10 NOTE — PROGRESS NOTES
Patient is currently receiving home PT from [facility] [phone]  Damienzohra Quintero is patient's PT  Patient's wife, Swapna Greer, called to ask when appointment is  Outpatient care manager provided contact information and asked Swapna Greer to call Cassie Barcenas Si seemed confused and requested assistance with notifying Cassie

## 2020-01-17 ENCOUNTER — TELEPHONE (OUTPATIENT)
Dept: FAMILY MEDICINE CLINIC | Facility: CLINIC | Age: 70
End: 2020-01-17

## 2020-01-17 ENCOUNTER — PATIENT OUTREACH (OUTPATIENT)
Dept: FAMILY MEDICINE CLINIC | Facility: CLINIC | Age: 70
End: 2020-01-17

## 2020-01-17 NOTE — TELEPHONE ENCOUNTER
Per Dr Alo Velarde,  Hold the Glipizide and Metformin tonight  Then tomorrow and after, Take one Glipizide 10 mg in the am, then only 5 mg in the pm   Metformin will resume as it was tomorrow  I spoke with Jennifer Palma and gave her this information

## 2020-01-17 NOTE — PROGRESS NOTES
Patient's wife, Aviva, stated she completed applications for public assistance  Tha's blood sugar this afternoon was 80 mg/dL  Cassie RN called PCP due to patient falling in the home while his wife was out getting blood work  No injury reported  Patient is having visiting nurses from [address] [facility]  Bria Mckee is the  for Ioana Turnre [phone]  Bria Mckee was given all of the contact information for resources that are assisting patient, [facility] , [employer] , [employer], [employer] friend who transport patient  [facility] prefilled med cassette and will see patient and his wife on Monday

## 2020-01-17 NOTE — TELEPHONE ENCOUNTER
[place] from [facility] called  Ivonne Crockett had some dizziness and fell as he was exiting a car and fell onto a curb today  She is currently walking with him to evaluate, but does not see concern at this time for some left hip soreness  Also, he forgot to take his blood sugar reading today  [facility] took it and it was [de-identified]  They gave him a sandwich  They want to know if he should hold his Glipizide and/or Metformin with dinner tonight (about 5:00pm)

## 2020-01-20 ENCOUNTER — TELEPHONE (OUTPATIENT)
Dept: FAMILY MEDICINE CLINIC | Facility: CLINIC | Age: 70
End: 2020-01-20

## 2020-01-20 NOTE — TELEPHONE ENCOUNTER
Loretta Garza called back to confirm my message  Jewel Xiao is going to work on getting transportation to bring Tonya Kilpatrick into the office  Loretta Garza will be back at the home on Thursday and will update us on his situation

## 2020-01-20 NOTE — TELEPHONE ENCOUNTER
Left message advising nurse to hold lasix, but patient needs to be seen so we can check him over given the multiple things going on right now

## 2020-01-20 NOTE — TELEPHONE ENCOUNTER
Margot Farias is at the home and the patients blood pressure is 90/54  She said when she arrived, he felt a little dizzy, she had him drink some water  He said he has been urinating a lot of the weekend with the furosemide he is taking  Margot Farias said his edema on the left side is a trace to 1+ and it is in the calf, not the foot  No edema on the right side at all  She wanted to know if he should stop the lasix right now? Patient also has a stage 2 pressure ulcer on his right buttocks  No drainage and they are applying triple paste to the area  However, with the lasix, the patient is urinating a lot more and not making to the bathroom all the time which is keeping the area wet  Please let her know what she should do about the lasix

## 2020-01-22 ENCOUNTER — PATIENT OUTREACH (OUTPATIENT)
Dept: FAMILY MEDICINE CLINIC | Facility: CLINIC | Age: 70
End: 2020-01-22

## 2020-01-22 NOTE — PROGRESS NOTES
Patient's wife, Leonid Morton, called to report that patient is experiencing "stomach pain"  Last meal was at approximately 12:30 PM   Patient ate mashed potatos, corn and meatloaf delivered by [employer]  Patient denied N/V/D  Last BM yesterday in the afternoon, normal BM  Leonid Morton stated that she did not know what to do for patient  Leonid Morton telephonically reviewed OTC medications on hand  OTC gas relief medication was in the home  Leonid Morton stated she would give gas relief medication to patient per the directions on the box  Outpatient care manager asked Karmenamie Barajasjarad to call back at 3 PM with update  Leonid Morton agreed

## 2020-01-23 NOTE — TELEPHONE ENCOUNTER
Spoke with Althea Weems and she needs to arrange transportation to get to the appointments  She will call back to schedule a sooner appointment for Brian Angulo once she can arrange transportation

## 2020-01-24 ENCOUNTER — TELEPHONE (OUTPATIENT)
Dept: FAMILY MEDICINE CLINIC | Facility: CLINIC | Age: 70
End: 2020-01-24

## 2020-01-24 NOTE — TELEPHONE ENCOUNTER
Christiane Modi just wanted to let you know that the patient blood sugar was 55 last night  Frandyla Kawasaki is only taking the readings once a day and it is usually in the morning  He has been averaging 150 blood sugar readings most days  They wrote out instructions for Marvalerila Kawasaki to make sure she checks his blood sugar twice a day  They are getting help with grocery shopping now for nutrition and hopefully will help with his numbers  The house has mostly carbs in it  Not sure if you wanted to make any adjustments to his Glimepiride or not  He has an appointment on 1/29 to follow up

## 2020-01-27 ENCOUNTER — TELEPHONE (OUTPATIENT)
Dept: FAMILY MEDICINE CLINIC | Facility: CLINIC | Age: 70
End: 2020-01-27

## 2020-01-27 NOTE — TELEPHONE ENCOUNTER
Called to make pt aware to change glipizide from 10 to 5 mg daily  Visiting nurse, Sherry Ham was at the home  She stated pt's weekend fasting blood sugars were between 109 & 150 & that was with taking 10 mg of glipizide in the AM & 5 mg at dinner time  Per Dr Nancy Valentin, pt is to take 5 mg once daily at breakfast   Pt is following up in the office w/ Dr Nancy Valentin this Wed

## 2020-01-29 ENCOUNTER — OFFICE VISIT (OUTPATIENT)
Dept: FAMILY MEDICINE CLINIC | Facility: CLINIC | Age: 70
End: 2020-01-29
Payer: COMMERCIAL

## 2020-01-29 VITALS
WEIGHT: 183 LBS | HEIGHT: 66 IN | RESPIRATION RATE: 16 BRPM | HEART RATE: 110 BPM | SYSTOLIC BLOOD PRESSURE: 118 MMHG | TEMPERATURE: 97.3 F | DIASTOLIC BLOOD PRESSURE: 56 MMHG | BODY MASS INDEX: 29.41 KG/M2 | OXYGEN SATURATION: 98 %

## 2020-01-29 DIAGNOSIS — E11.40 TYPE 2 DIABETES MELLITUS WITH DIABETIC NEUROPATHY, WITHOUT LONG-TERM CURRENT USE OF INSULIN (HCC): Primary | ICD-10-CM

## 2020-01-29 DIAGNOSIS — I10 ESSENTIAL HYPERTENSION: ICD-10-CM

## 2020-01-29 DIAGNOSIS — E78.5 HYPERLIPIDEMIA, UNSPECIFIED HYPERLIPIDEMIA TYPE: ICD-10-CM

## 2020-01-29 PROCEDURE — 3008F BODY MASS INDEX DOCD: CPT | Performed by: INTERNAL MEDICINE

## 2020-01-29 PROCEDURE — 1160F RVW MEDS BY RX/DR IN RCRD: CPT | Performed by: INTERNAL MEDICINE

## 2020-01-29 PROCEDURE — 3078F DIAST BP <80 MM HG: CPT | Performed by: INTERNAL MEDICINE

## 2020-01-29 PROCEDURE — 99214 OFFICE O/P EST MOD 30 MIN: CPT | Performed by: INTERNAL MEDICINE

## 2020-01-29 PROCEDURE — 3074F SYST BP LT 130 MM HG: CPT | Performed by: INTERNAL MEDICINE

## 2020-01-29 NOTE — PROGRESS NOTES
Assessment/Plan:         Diagnoses and all orders for this visit:    Type 2 diabetes mellitus with diabetic neuropathy, without long-term current use of insulin ([facility] )  Comments:  need to f/u with bld gluc and renal function    Essential hypertension  Comments:  stable/ on ace    Hyperlipidemia, unspecified hyperlipidemia type  Comments:  on statin          Subjective:      Patient ID: Leander Coello is a 71 y o  male  Pt bld gluc in 60's  He was lowered to 5mg of glipizide  Will maintain in 60's and asked his wife to check bid and call next week with #'s  Told them it looks like it is increasing but needs few more days off higher dose  Asked her to go home and check box that he is only getting 5mg  Also needs to do his lab      The following portions of the patient's history were reviewed and updated as appropriate: He  has a past medical history of Anxiety, Diabetes mellitus ([facility] ), GERD (gastroesophageal reflux disease), Hematuria, and Nuclear senile cataract of left eye  He   Patient Active Problem List    Diagnosis Date Noted    Essential hypertension 12/31/2019    Type 2 diabetes mellitus with diabetic neuropathy, without long-term current use of insulin ([facility] ) 12/31/2019    Nephrolithiasis 12/31/2019    Ambulatory dysfunction 12/31/2019    CARLOS EDUARDO (acute kidney injury) ([facility] ) 12/31/2019    Sepsis ([facility] ) 12/31/2019    Paresis ([facility] ) 11/22/2019    Abnormal PSA 05/23/2019    Microscopic hematuria 05/23/2019    Dizziness 10/04/2017    Pancreas cyst 05/20/2016    Type 2 diabetes mellitus with diabetic neuropathy ([facility] ) 07/31/2015    Hyperlipidemia 06/13/2012     He  has a past surgical history that includes Cataract extraction; Cholecystectomy; and Knee surgery  His family history includes Coronary artery disease in his mother; Diabetes in his father  He  reports that he has never smoked  He has never used smokeless tobacco  He reports that he does not drink alcohol or use drugs    Current Outpatient Medications   Medication Sig Dispense Refill    atorvastatin (LIPITOR) 20 mg tablet Take 1 tablet (20 mg total) by mouth daily 90 tablet 1    furosemide (LASIX) 40 mg tablet Take 1 tablet (40 mg total) by mouth 2 (two) times a day 180 tablet 1    glipiZIDE (GLUCOTROL) 10 mg tablet Take 1 tablet (10 mg total) by mouth 2 (two) times a day (Patient taking differently: Take 5 mg by mouth daily ) 180 tablet 1    lisinopril (ZESTRIL) 5 mg tablet Take 1 tablet (5 mg total) by mouth daily 90 tablet 1    meclizine (ANTIVERT) 25 mg tablet Take 1 tablet (25 mg total) by mouth every 8 (eight) hours as needed for dizziness Do not drive with med 90 tablet 1    metFORMIN (GLUCOPHAGE) 1000 MG tablet Take 1 tablet (1,000 mg total) by mouth 2 (two) times a day 180 tablet 1     No current facility-administered medications for this visit  Current Outpatient Medications on File Prior to Visit   Medication Sig    atorvastatin (LIPITOR) 20 mg tablet Take 1 tablet (20 mg total) by mouth daily    furosemide (LASIX) 40 mg tablet Take 1 tablet (40 mg total) by mouth 2 (two) times a day    glipiZIDE (GLUCOTROL) 10 mg tablet Take 1 tablet (10 mg total) by mouth 2 (two) times a day (Patient taking differently: Take 5 mg by mouth daily )    lisinopril (ZESTRIL) 5 mg tablet Take 1 tablet (5 mg total) by mouth daily    meclizine (ANTIVERT) 25 mg tablet Take 1 tablet (25 mg total) by mouth every 8 (eight) hours as needed for dizziness Do not drive with med    metFORMIN (GLUCOPHAGE) 1000 MG tablet Take 1 tablet (1,000 mg total) by mouth 2 (two) times a day     No current facility-administered medications on file prior to visit  He has No Known Allergies       Review of Systems   Constitutional: Negative  Negative for chills, diaphoresis and fever  HENT: Negative  Respiratory: Negative  Cardiovascular: Negative  Genitourinary: Negative  Neurological: Negative for tremors           Objective:      /56 (BP Location: Left arm, Patient Position: Sitting, Cuff Size: Large)   Pulse (!) 110   Temp (!) 97 3 °F (36 3 °C) (Temporal)   Resp 16   Ht 5' 5 5" (1 664 m)   Wt 83 kg (183 lb)   SpO2 98%   BMI 29 99 kg/m²          Physical Exam   Constitutional: He appears well-developed and well-nourished  No distress  HENT:   Head: Normocephalic and atraumatic  Right Ear: External ear normal    Left Ear: External ear normal    Nose: Nose normal    Mouth/Throat: Oropharynx is clear and moist  No oropharyngeal exudate  Neck: Neck supple  No thyromegaly present  Cardiovascular: Normal rate, regular rhythm, normal heart sounds and intact distal pulses  Exam reveals no gallop and no friction rub  No murmur heard  Pulmonary/Chest: Effort normal  No respiratory distress  He has no wheezes  He has no rales  Lymphadenopathy:     He has no cervical adenopathy  Skin: He is not diaphoretic

## 2020-02-03 ENCOUNTER — TELEPHONE (OUTPATIENT)
Dept: FAMILY MEDICINE CLINIC | Facility: CLINIC | Age: 70
End: 2020-02-03

## 2020-02-03 NOTE — TELEPHONE ENCOUNTER
Visiting nurse from [email or web address] [facility] called inrequesting  an anti fungal powder or cream be called in because the patient is incontinent of urine and now has a rash in his groin area  Also nurse is asking if 40mg twice daily of lasix is necessary because patient does not really have much edema  Brandy Serna from [email or web address] can be reached at [phone]

## 2020-02-03 NOTE — TELEPHONE ENCOUNTER
Please tell the nurse she can cut back on the lasix to daily    Also she can use desenex powder otc for his groin rash

## 2020-02-04 ENCOUNTER — HOSPITAL ENCOUNTER (OUTPATIENT)
Dept: MRI IMAGING | Facility: HOSPITAL | Age: 70
Discharge: HOME | End: 2020-02-04
Payer: COMMERCIAL

## 2020-02-04 DIAGNOSIS — R60.9 EDEMA, UNSPECIFIED TYPE: ICD-10-CM

## 2020-02-04 DIAGNOSIS — K86.2 PANCREAS CYST: ICD-10-CM

## 2020-02-04 PROCEDURE — 74183 MRI ABD W/O CNTR FLWD CNTR: CPT

## 2020-02-04 PROCEDURE — A9585 GADOBUTROL INJECTION: HCPCS | Performed by: INTERNAL MEDICINE

## 2020-02-04 RX ORDER — FUROSEMIDE 40 MG/1
40 TABLET ORAL DAILY
Qty: 90 TABLET | Refills: 1 | Status: SHIPPED | OUTPATIENT
Start: 2020-02-04 | End: 2020-03-18 | Stop reason: SDUPTHER

## 2020-02-04 RX ADMIN — GADOBUTROL 9 ML: 604.72 INJECTION INTRAVENOUS at 12:40

## 2020-02-04 NOTE — TELEPHONE ENCOUNTER
They can't find the Lasix anywhere in the house    Need to send new rx to [facility]

## 2020-02-11 ENCOUNTER — PATIENT OUTREACH (OUTPATIENT)
Dept: FAMILY MEDICINE CLINIC | Facility: CLINIC | Age: 70
End: 2020-02-11

## 2020-02-11 NOTE — PROGRESS NOTES
Patient's wife, [facility], called and asked outpatient care manager to speak with [facility] occupational therapist     Occupational therapist is requesting shower transfer bench for patient  Contact information for Mary Zhao , Estella Fernandez, provided to OT  OT is aware that if physician's order is required outpatient care manger may be able to facilitate the order  Patient's blood sugar has been stable  This morning fasting blood sugar was 85 mg/dL  Patient continues to have difficulty with ambulation  He uses a walker consistently  Sacral wound is healing well    All Cassie at [facility] are reminding patient and patient's wife, [facility], to apply moisture barrier cream

## 2020-02-17 ENCOUNTER — TELEPHONE (OUTPATIENT)
Dept: FAMILY MEDICINE CLINIC | Facility: CLINIC | Age: 70
End: 2020-02-17

## 2020-02-17 DIAGNOSIS — R60.9 EDEMA, UNSPECIFIED TYPE: Primary | ICD-10-CM

## 2020-02-17 RX ORDER — FUROSEMIDE 40 MG/1
40 TABLET ORAL DAILY
Qty: 20 TABLET | Refills: 1 | Status: SHIPPED | OUTPATIENT
Start: 2020-02-17 | End: 2020-06-23 | Stop reason: HOSPADM

## 2020-02-17 NOTE — TELEPHONE ENCOUNTER
Cassie called patients legs are swollen and have not received lasix from mail order   Wanted to know if you could send in a few to [facility] in [place] until they get their supply

## 2020-02-17 NOTE — TELEPHONE ENCOUNTER
Helena Vera from [place] called regarding patients medication, they found the medication at home  Also she addressed the wound on leg poss dry skin that got stuck on sock and broke open, not hot to the touch, blanches upon touch cleaned it with soapy water, applied zero from and dressing and will check it in 1-2 days

## 2020-02-18 ENCOUNTER — PATIENT OUTREACH (OUTPATIENT)
Dept: FAMILY MEDICINE CLINIC | Facility: CLINIC | Age: 70
End: 2020-02-18

## 2020-02-18 NOTE — PROGRESS NOTES
Patient has bilateral leg edema  Patient's wife, Hannah Sims, stated patient took diuretic last night and was awake every hour to urinate  Patient uses walker and assist of 1 person for toileting  Patient relies on Hannah Sims for assistance with toileting and ambulation  Patient continues to receive PT weekly from Cassie for gait training, safe ambulation and endurance  Hannah Sims administers medications to [facility]  Outpatient care manager instructed Hannah Sims to give diuretic before noon each day to reduce HS toileting  Hannah Sims stated patient's leg edema was slightly improved today  Hannah Sims is checking patient's blood sugar 1 - 2 times daily  Patient often refuses to have HS blood sugar  This morning patient's blood sugar was 124 mg/dL  Yesterday morning blood sugar was 143 mg/dL  Daniel defers to Hannah Sims for any health care teaching  He relies on her for medication management, meal preparation, assistance with ADL's and personal care  He is often impatient with Hannah Sims request ongoing outpatient care management

## 2020-02-20 ENCOUNTER — TELEPHONE (OUTPATIENT)
Dept: FAMILY MEDICINE CLINIC | Facility: CLINIC | Age: 70
End: 2020-02-20

## 2020-02-21 ENCOUNTER — TELEPHONE (OUTPATIENT)
Dept: FAMILY MEDICINE CLINIC | Facility: CLINIC | Age: 70
End: 2020-02-21

## 2020-02-21 DIAGNOSIS — K86.2 PANCREAS CYST: Primary | ICD-10-CM

## 2020-02-27 ENCOUNTER — PATIENT OUTREACH (OUTPATIENT)
Dept: FAMILY MEDICINE CLINIC | Facility: CLINIC | Age: 70
End: 2020-02-27

## 2020-02-28 ENCOUNTER — TELEPHONE (OUTPATIENT)
Dept: FAMILY MEDICINE CLINIC | Facility: CLINIC | Age: 70
End: 2020-02-28

## 2020-02-28 NOTE — TELEPHONE ENCOUNTER
Sol Cole called stating pt has a rash on his buttocks  She wanted to make Dr Alo Velarde aware  Sol Cole stated that pts visiting nurse is scheduled to come to the home today

## 2020-03-02 ENCOUNTER — PATIENT OUTREACH (OUTPATIENT)
Dept: FAMILY MEDICINE CLINIC | Facility: CLINIC | Age: 70
End: 2020-03-02

## 2020-03-02 NOTE — PROGRESS NOTES
Patient's blood sugar this morning was 134 mg/dL  Patient is not independent with dressing, toileting or wound care  Patient's wife is limited in her ability to help patient  Patient's wife, [facility], is aware that she will be receiving applications for assistance in the mail  [facility] is also aware that she should contact her [facility] , Benny Reeves, for assistance in completing the applications  [facility] has Tristan's phone number and stated she will call

## 2020-03-02 NOTE — PROGRESS NOTES
Cassie nurse, Jh Singh, stated thelma Patten is sending out application for IEB  Debemanisreedhar Celestina is also sending application for local fill for blister pack medications  Guardian is another type of waiver program   Jh Singh is unsure if Guardian is an additional resource or if patient must select either Guardian or Sandor  Jh Singh will update outpatient care manager as additional information becomes available  In the meanwhile she is requesting waiver physician's certification form be completed and faxed  Jh Singh will continue to provide home care until patient's wounds and medications are appropriately managed

## 2020-03-03 ENCOUNTER — OFFICE VISIT (OUTPATIENT)
Dept: FAMILY MEDICINE CLINIC | Facility: CLINIC | Age: 70
End: 2020-03-03
Payer: COMMERCIAL

## 2020-03-03 VITALS
HEART RATE: 97 BPM | DIASTOLIC BLOOD PRESSURE: 56 MMHG | RESPIRATION RATE: 16 BRPM | BODY MASS INDEX: 29.73 KG/M2 | TEMPERATURE: 99.2 F | HEIGHT: 66 IN | WEIGHT: 185 LBS | OXYGEN SATURATION: 98 % | SYSTOLIC BLOOD PRESSURE: 100 MMHG

## 2020-03-03 DIAGNOSIS — R26.81 GAIT INSTABILITY: ICD-10-CM

## 2020-03-03 DIAGNOSIS — E78.5 HYPERLIPIDEMIA, UNSPECIFIED HYPERLIPIDEMIA TYPE: ICD-10-CM

## 2020-03-03 DIAGNOSIS — I10 ESSENTIAL HYPERTENSION: ICD-10-CM

## 2020-03-03 DIAGNOSIS — E11.40 TYPE 2 DIABETES MELLITUS WITH DIABETIC NEUROPATHY, WITHOUT LONG-TERM CURRENT USE OF INSULIN (HCC): Primary | ICD-10-CM

## 2020-03-03 LAB — SL AMB POCT HEMOGLOBIN AIC: 6.6 (ref ?–6.5)

## 2020-03-03 PROCEDURE — 3008F BODY MASS INDEX DOCD: CPT | Performed by: INTERNAL MEDICINE

## 2020-03-03 PROCEDURE — 1160F RVW MEDS BY RX/DR IN RCRD: CPT | Performed by: INTERNAL MEDICINE

## 2020-03-03 PROCEDURE — 99214 OFFICE O/P EST MOD 30 MIN: CPT | Performed by: INTERNAL MEDICINE

## 2020-03-03 PROCEDURE — 83036 HEMOGLOBIN GLYCOSYLATED A1C: CPT | Performed by: INTERNAL MEDICINE

## 2020-03-03 PROCEDURE — 3066F NEPHROPATHY DOC TX: CPT | Performed by: INTERNAL MEDICINE

## 2020-03-03 PROCEDURE — 1036F TOBACCO NON-USER: CPT | Performed by: INTERNAL MEDICINE

## 2020-03-03 PROCEDURE — 3044F HG A1C LEVEL LT 7.0%: CPT | Performed by: INTERNAL MEDICINE

## 2020-03-03 PROCEDURE — 3078F DIAST BP <80 MM HG: CPT | Performed by: INTERNAL MEDICINE

## 2020-03-03 PROCEDURE — 3074F SYST BP LT 130 MM HG: CPT | Performed by: INTERNAL MEDICINE

## 2020-03-03 NOTE — PROGRESS NOTES
Assessment/Plan:         Diagnoses and all orders for this visit:    Type 2 diabetes mellitus with diabetic neuropathy, without long-term current use of insulin ([facility])  -     POCT hemoglobin A1c    Gait instability  Comments:  PT  Orders:  -     Ambulatory Referral to [address] [place] [address] Pete Boss; Future    Hyperlipidemia, unspecified hyperlipidemia type  Comments:  on statin    Essential hypertension  Comments:  on ace          Subjective:      Patient ID: Elliott Paez is a 71 y o  male  +neuropathy,  Denies retinopathy or ckd  On statin and ace  States he check glucose daily and gets 145 fbs   a1c 6 6      The following portions of the patient's history were reviewed and updated as appropriate: He  has a past medical history of Anxiety, Diabetes mellitus ([facility] ), GERD (gastroesophageal reflux disease), Hematuria, and Nuclear senile cataract of left eye  He   Patient Active Problem List    Diagnosis Date Noted    Essential hypertension 12/31/2019    Type 2 diabetes mellitus with diabetic neuropathy, without long-term current use of insulin ([facility] ) 12/31/2019    Nephrolithiasis 12/31/2019    Ambulatory dysfunction 12/31/2019    CARLOS EDUARDO (acute kidney injury) ([facility] ) 12/31/2019    Sepsis ([facility] ) 12/31/2019    Paresis ([facility] ) 11/22/2019    Abnormal PSA 05/23/2019    Microscopic hematuria 05/23/2019    Dizziness 10/04/2017    Pancreas cyst 05/20/2016    Type [facility] with [facility] ) 07/31/2015    Hyperlipidemia 06/13/2012     He  has a past surgical history that includes Cataract extraction; Cholecystectomy; and Knee surgery  His family history includes Coronary artery disease in his mother; Diabetes in his father  He  reports that he has never smoked  He has never used smokeless tobacco  He reports that he does not drink alcohol or use drugs    Current Outpatient Medications   Medication Sig Dispense Refill    atorvastatin (LIPITOR) 20 mg tablet Take 1 tablet (20 mg total) by mouth daily 90 tablet 1    furosemide (LASIX) 40 mg tablet Take 1 tablet (40 mg total) by mouth daily 90 tablet 1    furosemide (LASIX) 40 mg tablet Take 1 tablet (40 mg total) by mouth daily 20 tablet 1    glipiZIDE (GLUCOTROL) 10 mg tablet Take 1 tablet (10 mg total) by mouth 2 (two) times a day (Patient taking differently: Take 5 mg by mouth daily ) 180 tablet 1    lisinopril (ZESTRIL) 5 mg tablet Take 1 tablet (5 mg total) by mouth daily 90 tablet 1    meclizine (ANTIVERT) 25 mg tablet Take 1 tablet (25 mg total) by mouth every 8 (eight) hours as needed for dizziness Do not drive with med 90 tablet 1    metFORMIN (GLUCOPHAGE) 1000 MG tablet Take 1 tablet (1,000 mg total) by mouth 2 (two) times a day 180 tablet 1     No current facility-administered medications for this visit  Current Outpatient Medications on File Prior to Visit   Medication Sig    atorvastatin (LIPITOR) 20 mg tablet Take 1 tablet (20 mg total) by mouth daily    furosemide (LASIX) 40 mg tablet Take 1 tablet (40 mg total) by mouth daily    furosemide (LASIX) 40 mg tablet Take 1 tablet (40 mg total) by mouth daily    glipiZIDE (GLUCOTROL) 10 mg tablet Take 1 tablet (10 mg total) by mouth 2 (two) times a day (Patient taking differently: Take 5 mg by mouth daily )    lisinopril (ZESTRIL) 5 mg tablet Take 1 tablet (5 mg total) by mouth daily    meclizine (ANTIVERT) 25 mg tablet Take 1 tablet (25 mg total) by mouth every 8 (eight) hours as needed for dizziness Do not drive with med    metFORMIN (GLUCOPHAGE) 1000 MG tablet Take 1 tablet (1,000 mg total) by mouth 2 (two) times a day     No current facility-administered medications on file prior to visit  He has No Known Allergies       Review of Systems   Constitutional: Negative  HENT: Negative  Respiratory: Negative  Cardiovascular: Negative  Gastrointestinal: Negative  Neurological: Positive for numbness           Objective:      /56 (BP Location: Left arm, Patient Position: Sitting, Cuff Size: Large)   Pulse 97   Temp 99 2 °F (37 3 °C) (Temporal)   Resp 16   Ht 5' 5 5" (1 664 m)   Wt 83 9 kg (185 lb)   SpO2 98%   BMI 30 32 kg/m²          Physical Exam   Constitutional: He appears well-developed and well-nourished  No distress  HENT:   Head: Normocephalic and atraumatic  Right Ear: External ear normal    Left Ear: External ear normal    Nose: Nose normal    Mouth/Throat: Oropharynx is clear and moist  No oropharyngeal exudate  Neck: Normal range of motion  Neck supple  No thyromegaly present  Cardiovascular: Normal rate, regular rhythm, normal heart sounds and intact distal pulses  Exam reveals no gallop and no friction rub  No murmur heard  Pulses:       Dorsalis pedis pulses are 2+ on the right side, and 2+ on the left side  Pulmonary/Chest: Effort normal and breath sounds normal  No stridor  No respiratory distress  He has no wheezes  He has no rales  He exhibits no tenderness  Musculoskeletal:   Walks with walker   Feet:   Right Foot:   Skin Integrity: Positive for warmth  Negative for erythema  Left Foot:   Skin Integrity: Positive for warmth  Negative for erythema  Lymphadenopathy:     He has no cervical adenopathy  Skin: He is not diaphoretic  Diabetic Foot Exam    Patient's shoes and socks removed  Right Foot/Ankle   Right Foot Inspection  Skin Exam: warmth no erythema                            Sensory   Vibration: intact    Monofilament testing: intact  Vascular    The right DP pulse is 2+  Left Foot/Ankle  Left Foot Inspection  Skin Exam: warmthno erythema                                         Sensory   Vibration: intact    Monofilament: intact  Vascular    The left DP pulse is 2+  Assign Risk Category:  No deformity present;  No loss of protective sensation;        Risk: 0

## 2020-03-09 DIAGNOSIS — R26.81 GAIT INSTABILITY: Primary | ICD-10-CM

## 2020-03-17 ENCOUNTER — PATIENT OUTREACH (OUTPATIENT)
Dept: FAMILY MEDICINE CLINIC | Facility: CLINIC | Age: 70
End: 2020-03-17

## 2020-03-17 NOTE — PROGRESS NOTES
Patient's wife, Alex Holguin, answered the phone  She stated that patient's blood sugar this morning was 114 mg/dL  She stated that his blood sugars have been "good"  She was not able to review the previous glucometer readings, but said that he has not had readings under 70 or over 250  Patient continues to receive visiting nursing care from [facility] for wound care and medication management  Cassie has not been able to close case due to poor understanding of hygiene needed to heal wound  Patient does have in home visit schedules with [facility]  on 3/20/20  MA waiver medical certification form was reviewed  It will need to be re-submitted  The form was initially completed with incorrect box checked

## 2020-03-18 DIAGNOSIS — E11.42 TYPE 2 DIABETES MELLITUS WITH DIABETIC POLYNEUROPATHY, WITHOUT LONG-TERM CURRENT USE OF INSULIN (HCC): ICD-10-CM

## 2020-03-18 DIAGNOSIS — R60.9 EDEMA, UNSPECIFIED TYPE: ICD-10-CM

## 2020-03-18 DIAGNOSIS — E11.9 TYPE 2 DIABETES MELLITUS WITHOUT COMPLICATION, WITHOUT LONG-TERM CURRENT USE OF INSULIN (HCC): ICD-10-CM

## 2020-03-18 DIAGNOSIS — R42 VERTIGO: ICD-10-CM

## 2020-03-19 ENCOUNTER — PATIENT OUTREACH (OUTPATIENT)
Dept: FAMILY MEDICINE CLINIC | Facility: CLINIC | Age: 70
End: 2020-03-19

## 2020-03-19 RX ORDER — FUROSEMIDE 40 MG/1
40 TABLET ORAL DAILY
Qty: 90 TABLET | Refills: 1 | Status: SHIPPED | OUTPATIENT
Start: 2020-03-19 | End: 2020-06-23 | Stop reason: HOSPADM

## 2020-03-19 RX ORDER — GLIPIZIDE 10 MG/1
10 TABLET ORAL 2 TIMES DAILY
Qty: 180 TABLET | Refills: 1 | Status: SHIPPED | OUTPATIENT
Start: 2020-03-19 | End: 2020-04-04

## 2020-03-19 RX ORDER — MECLIZINE HYDROCHLORIDE 25 MG/1
25 TABLET ORAL EVERY 8 HOURS PRN
Qty: 90 TABLET | Refills: 1 | Status: SHIPPED | OUTPATIENT
Start: 2020-03-19 | End: 2020-05-31

## 2020-03-19 NOTE — PROGRESS NOTES
Call to Nayla Maldonado at PCP office  Patient needs MA waiver physician certification forms amended  Nayla Maldonado stated that PCP is not in the office today  Outpatient care manager e-mailed amended forms to [employer] to print for signature  Patient must be declared nursing home eligible in order to qualify for home services  Based on input from [address] at [facility] nurse, patient needs ongoing assistance for ADLs and his wife is not able to adequately help patient  Patient meets criteria based on diagnosis and level of function

## 2020-03-19 NOTE — PROGRESS NOTES
Call to patient  Wife, Swathi Kennedy answered the telephone  Swathi Night stated patient is doing well  He has all of his medications  He is "about the same" as far as mobility  Patient continues to require assistance for ADLs  Swathi Night is having increased difficulty assisting patient with ADLs  Patient's blood sugar this morning was 141 mg/dL  Swathi Kennedy stated that blood sugars usually run higher at night after dinner  She was not able to provide last nights reading  ADA diet reviewed

## 2020-04-01 ENCOUNTER — PATIENT OUTREACH (OUTPATIENT)
Dept: FAMILY MEDICINE CLINIC | Facility: CLINIC | Age: 70
End: 2020-04-01

## 2020-04-02 ENCOUNTER — TELEPHONE (OUTPATIENT)
Dept: GASTROENTEROLOGY | Facility: AMBULARY SURGERY CENTER | Age: 70
End: 2020-04-02

## 2020-04-04 DIAGNOSIS — E11.42 TYPE 2 DIABETES MELLITUS WITH DIABETIC POLYNEUROPATHY, WITHOUT LONG-TERM CURRENT USE OF INSULIN (HCC): ICD-10-CM

## 2020-04-04 RX ORDER — GLIPIZIDE 10 MG/1
TABLET ORAL
Qty: 180 TABLET | Refills: 1 | Status: SHIPPED | OUTPATIENT
Start: 2020-04-04 | End: 2020-06-04

## 2020-04-05 DIAGNOSIS — E11.9 TYPE 2 DIABETES MELLITUS WITHOUT COMPLICATION, WITHOUT LONG-TERM CURRENT USE OF INSULIN (HCC): ICD-10-CM

## 2020-04-17 ENCOUNTER — TELEMEDICINE (OUTPATIENT)
Dept: GASTROENTEROLOGY | Facility: CLINIC | Age: 70
End: 2020-04-17
Payer: COMMERCIAL

## 2020-04-17 DIAGNOSIS — Z12.11 COLON CANCER SCREENING: ICD-10-CM

## 2020-04-17 DIAGNOSIS — K86.2 PANCREAS CYST: Primary | ICD-10-CM

## 2020-04-17 PROCEDURE — G2012 BRIEF CHECK IN BY MD/QHP: HCPCS | Performed by: INTERNAL MEDICINE

## 2020-04-28 ENCOUNTER — TELEPHONE (OUTPATIENT)
Dept: GASTROENTEROLOGY | Facility: AMBULARY SURGERY CENTER | Age: 70
End: 2020-04-28

## 2020-04-29 ENCOUNTER — HOSPITAL ENCOUNTER (INPATIENT)
Facility: HOSPITAL | Age: 70
LOS: 2 days | Discharge: HOME | DRG: 690 | End: 2020-05-01
Attending: EMERGENCY MEDICINE | Admitting: INTERNAL MEDICINE
Payer: COMMERCIAL

## 2020-04-29 ENCOUNTER — APPOINTMENT (EMERGENCY)
Dept: CT IMAGING | Facility: HOSPITAL | Age: 70
DRG: 690 | End: 2020-04-29
Payer: COMMERCIAL

## 2020-04-29 DIAGNOSIS — N18.30 STAGE 3 CHRONIC KIDNEY DISEASE (HCC): Chronic | ICD-10-CM

## 2020-04-29 DIAGNOSIS — N39.0 UTI (URINARY TRACT INFECTION): Primary | ICD-10-CM

## 2020-04-29 DIAGNOSIS — N21.0 BLADDER STONE: ICD-10-CM

## 2020-04-29 DIAGNOSIS — D64.9 ANEMIA: ICD-10-CM

## 2020-04-29 DIAGNOSIS — K59.00 CONSTIPATION: ICD-10-CM

## 2020-04-29 PROBLEM — N30.90 CYSTITIS: Status: ACTIVE | Noted: 2020-04-29

## 2020-04-29 PROBLEM — R31.0 GROSS HEMATURIA: Status: ACTIVE | Noted: 2020-04-29

## 2020-04-29 LAB
ALBUMIN SERPL BCP-MCNC: 3.2 G/DL (ref 3.5–5)
ALP SERPL-CCNC: 73 U/L (ref 46–116)
ALT SERPL W P-5'-P-CCNC: 14 U/L (ref 12–78)
ANION GAP SERPL CALCULATED.3IONS-SCNC: 9 MMOL/L (ref 4–13)
AST SERPL W P-5'-P-CCNC: 10 U/L (ref 5–45)
BACTERIA UR QL AUTO: ABNORMAL /HPF
BASOPHILS # BLD AUTO: 0.04 THOUSANDS/ΜL (ref 0–0.1)
BASOPHILS NFR BLD AUTO: 0 % (ref 0–1)
BILIRUB DIRECT SERPL-MCNC: 0.09 MG/DL (ref 0–0.2)
BILIRUB SERPL-MCNC: 0.31 MG/DL (ref 0.2–1)
BILIRUB UR QL STRIP: ABNORMAL
BUN SERPL-MCNC: 19 MG/DL (ref 5–25)
CALCIUM SERPL-MCNC: 8.5 MG/DL (ref 8.3–10.1)
CHLORIDE SERPL-SCNC: 104 MMOL/L (ref 100–108)
CLARITY UR: ABNORMAL
CO2 SERPL-SCNC: 26 MMOL/L (ref 21–32)
COLOR UR: ABNORMAL
CREAT SERPL-MCNC: 1.41 MG/DL (ref 0.6–1.3)
EOSINOPHIL # BLD AUTO: 0.45 THOUSAND/ΜL (ref 0–0.61)
EOSINOPHIL NFR BLD AUTO: 5 % (ref 0–6)
ERYTHROCYTE [DISTWIDTH] IN BLOOD BY AUTOMATED COUNT: 13.6 % (ref 11.6–15.1)
GFR SERPL CREATININE-BSD FRML MDRD: 50 ML/MIN/1.73SQ M
GLUCOSE SERPL-MCNC: 104 MG/DL (ref 65–140)
GLUCOSE SERPL-MCNC: 142 MG/DL (ref 65–140)
GLUCOSE SERPL-MCNC: 153 MG/DL (ref 65–140)
GLUCOSE SERPL-MCNC: 67 MG/DL (ref 65–140)
GLUCOSE UR STRIP-MCNC: NEGATIVE MG/DL
HCT VFR BLD AUTO: 35.7 % (ref 36.5–49.3)
HGB BLD-MCNC: 11.7 G/DL (ref 12–17)
HGB UR QL STRIP.AUTO: ABNORMAL
IMM GRANULOCYTES # BLD AUTO: 0.07 THOUSAND/UL (ref 0–0.2)
IMM GRANULOCYTES NFR BLD AUTO: 1 % (ref 0–2)
KETONES UR STRIP-MCNC: NEGATIVE MG/DL
LEUKOCYTE ESTERASE UR QL STRIP: ABNORMAL
LYMPHOCYTES # BLD AUTO: 1.22 THOUSANDS/ΜL (ref 0.6–4.47)
LYMPHOCYTES NFR BLD AUTO: 12 % (ref 14–44)
MCH RBC QN AUTO: 29.3 PG (ref 26.8–34.3)
MCHC RBC AUTO-ENTMCNC: 32.8 G/DL (ref 31.4–37.4)
MCV RBC AUTO: 89 FL (ref 82–98)
MONOCYTES # BLD AUTO: 0.96 THOUSAND/ΜL (ref 0.17–1.22)
MONOCYTES NFR BLD AUTO: 10 % (ref 4–12)
NEUTROPHILS # BLD AUTO: 7.26 THOUSANDS/ΜL (ref 1.85–7.62)
NEUTS SEG NFR BLD AUTO: 72 % (ref 43–75)
NITRITE UR QL STRIP: POSITIVE
NON-SQ EPI CELLS URNS QL MICRO: ABNORMAL /HPF
NRBC BLD AUTO-RTO: 0 /100 WBCS
PH UR STRIP.AUTO: 6.5 [PH]
PLATELET # BLD AUTO: 283 THOUSANDS/UL (ref 149–390)
PMV BLD AUTO: 9.7 FL (ref 8.9–12.7)
POTASSIUM SERPL-SCNC: 3.8 MMOL/L (ref 3.5–5.3)
PROT SERPL-MCNC: 6.4 G/DL (ref 6.4–8.2)
PROT UR STRIP-MCNC: >=300 MG/DL
RBC # BLD AUTO: 4 MILLION/UL (ref 3.88–5.62)
RBC #/AREA URNS AUTO: ABNORMAL /HPF
SODIUM SERPL-SCNC: 139 MMOL/L (ref 136–145)
SP GR UR STRIP.AUTO: 1.02 (ref 1–1.03)
UROBILINOGEN UR QL STRIP.AUTO: 1 E.U./DL
WBC # BLD AUTO: 10 THOUSAND/UL (ref 4.31–10.16)
WBC #/AREA URNS AUTO: ABNORMAL /HPF

## 2020-04-29 PROCEDURE — 99222 1ST HOSP IP/OBS MODERATE 55: CPT | Performed by: NURSE PRACTITIONER

## 2020-04-29 PROCEDURE — 81001 URINALYSIS AUTO W/SCOPE: CPT | Performed by: EMERGENCY MEDICINE

## 2020-04-29 PROCEDURE — 93005 ELECTROCARDIOGRAM TRACING: CPT

## 2020-04-29 PROCEDURE — 99285 EMERGENCY DEPT VISIT HI MDM: CPT

## 2020-04-29 PROCEDURE — 80048 BASIC METABOLIC PNL TOTAL CA: CPT | Performed by: EMERGENCY MEDICINE

## 2020-04-29 PROCEDURE — 36415 COLL VENOUS BLD VENIPUNCTURE: CPT | Performed by: EMERGENCY MEDICINE

## 2020-04-29 PROCEDURE — 87086 URINE CULTURE/COLONY COUNT: CPT | Performed by: INTERNAL MEDICINE

## 2020-04-29 PROCEDURE — 99223 1ST HOSP IP/OBS HIGH 75: CPT | Performed by: INTERNAL MEDICINE

## 2020-04-29 PROCEDURE — 82948 REAGENT STRIP/BLOOD GLUCOSE: CPT

## 2020-04-29 PROCEDURE — 80076 HEPATIC FUNCTION PANEL: CPT | Performed by: EMERGENCY MEDICINE

## 2020-04-29 PROCEDURE — 74176 CT ABD & PELVIS W/O CONTRAST: CPT

## 2020-04-29 PROCEDURE — 99285 EMERGENCY DEPT VISIT HI MDM: CPT | Performed by: EMERGENCY MEDICINE

## 2020-04-29 PROCEDURE — 85025 COMPLETE CBC W/AUTO DIFF WBC: CPT | Performed by: EMERGENCY MEDICINE

## 2020-04-29 RX ORDER — HYDROMORPHONE HCL/PF 1 MG/ML
0.2 SYRINGE (ML) INJECTION
Status: DISCONTINUED | OUTPATIENT
Start: 2020-04-29 | End: 2020-05-01 | Stop reason: HOSPADM

## 2020-04-29 RX ORDER — MECLIZINE HYDROCHLORIDE 25 MG/1
25 TABLET ORAL EVERY 8 HOURS PRN
Status: DISCONTINUED | OUTPATIENT
Start: 2020-04-29 | End: 2020-05-01 | Stop reason: HOSPADM

## 2020-04-29 RX ORDER — SIMETHICONE 80 MG
80 TABLET,CHEWABLE ORAL EVERY 6 HOURS PRN
Status: DISCONTINUED | OUTPATIENT
Start: 2020-04-29 | End: 2020-05-01 | Stop reason: HOSPADM

## 2020-04-29 RX ORDER — SODIUM CHLORIDE 9 MG/ML
100 INJECTION, SOLUTION INTRAVENOUS CONTINUOUS
Status: DISCONTINUED | OUTPATIENT
Start: 2020-04-29 | End: 2020-04-30

## 2020-04-29 RX ORDER — CIPROFLOXACIN 500 MG/1
500 TABLET, FILM COATED ORAL ONCE
Status: DISCONTINUED | OUTPATIENT
Start: 2020-04-29 | End: 2020-04-29

## 2020-04-29 RX ORDER — SENNOSIDES 8.6 MG
1 TABLET ORAL DAILY
Status: DISCONTINUED | OUTPATIENT
Start: 2020-04-29 | End: 2020-05-01 | Stop reason: HOSPADM

## 2020-04-29 RX ORDER — OXYCODONE HYDROCHLORIDE 5 MG/1
5 TABLET ORAL EVERY 4 HOURS PRN
Status: DISCONTINUED | OUTPATIENT
Start: 2020-04-29 | End: 2020-05-01 | Stop reason: HOSPADM

## 2020-04-29 RX ORDER — HYDRALAZINE HYDROCHLORIDE 20 MG/ML
5 INJECTION INTRAMUSCULAR; INTRAVENOUS EVERY 4 HOURS PRN
Status: DISCONTINUED | OUTPATIENT
Start: 2020-04-29 | End: 2020-05-01 | Stop reason: HOSPADM

## 2020-04-29 RX ORDER — OXYCODONE HYDROCHLORIDE 5 MG/1
2.5 TABLET ORAL EVERY 4 HOURS PRN
Status: DISCONTINUED | OUTPATIENT
Start: 2020-04-29 | End: 2020-05-01 | Stop reason: HOSPADM

## 2020-04-29 RX ORDER — POLYETHYLENE GLYCOL 3350 17 G/17G
17 POWDER, FOR SOLUTION ORAL DAILY
Status: DISCONTINUED | OUTPATIENT
Start: 2020-04-29 | End: 2020-05-01 | Stop reason: HOSPADM

## 2020-04-29 RX ORDER — ACETAMINOPHEN 325 MG/1
650 TABLET ORAL EVERY 6 HOURS PRN
Status: DISCONTINUED | OUTPATIENT
Start: 2020-04-29 | End: 2020-05-01 | Stop reason: HOSPADM

## 2020-04-29 RX ORDER — ONDANSETRON 2 MG/ML
4 INJECTION INTRAMUSCULAR; INTRAVENOUS EVERY 6 HOURS PRN
Status: DISCONTINUED | OUTPATIENT
Start: 2020-04-29 | End: 2020-04-29

## 2020-04-29 RX ORDER — CIPROFLOXACIN 500 MG/1
500 TABLET, FILM COATED ORAL 2 TIMES DAILY
Qty: 14 TABLET | Refills: 0 | Status: SHIPPED | OUTPATIENT
Start: 2020-04-29 | End: 2020-04-30 | Stop reason: HOSPADM

## 2020-04-29 RX ORDER — ATORVASTATIN CALCIUM 20 MG/1
20 TABLET, FILM COATED ORAL DAILY
Status: DISCONTINUED | OUTPATIENT
Start: 2020-04-29 | End: 2020-05-01 | Stop reason: HOSPADM

## 2020-04-29 RX ADMIN — INSULIN LISPRO 1 UNITS: 100 INJECTION, SOLUTION INTRAVENOUS; SUBCUTANEOUS at 16:56

## 2020-04-29 RX ADMIN — OXYCODONE HYDROCHLORIDE 2.5 MG: 5 TABLET ORAL at 21:46

## 2020-04-29 RX ADMIN — ATORVASTATIN CALCIUM 20 MG: 20 TABLET, FILM COATED ORAL at 12:17

## 2020-04-29 RX ADMIN — ONDANSETRON 4 MG: 2 INJECTION INTRAMUSCULAR; INTRAVENOUS at 20:03

## 2020-04-29 RX ADMIN — SIMETHICONE CHEW TAB 80 MG 80 MG: 80 TABLET ORAL at 21:45

## 2020-04-29 RX ADMIN — SENNOSIDES 8.6 MG: 8.6 TABLET, FILM COATED ORAL at 12:17

## 2020-04-29 RX ADMIN — CEFTRIAXONE 1000 MG: 1 INJECTION, POWDER, FOR SOLUTION INTRAMUSCULAR; INTRAVENOUS at 14:26

## 2020-04-29 RX ADMIN — POLYETHYLENE GLYCOL 3350 17 G: 17 POWDER, FOR SOLUTION ORAL at 12:17

## 2020-04-29 RX ADMIN — SODIUM CHLORIDE 100 ML/HR: 0.9 INJECTION, SOLUTION INTRAVENOUS at 12:17

## 2020-04-30 ENCOUNTER — TELEPHONE (OUTPATIENT)
Dept: OTHER | Facility: HOSPITAL | Age: 70
End: 2020-04-30

## 2020-04-30 LAB
ANION GAP SERPL CALCULATED.3IONS-SCNC: 6 MMOL/L (ref 4–13)
ATRIAL RATE: 83 BPM
ATRIAL RATE: 92 BPM
BACTERIA UR CULT: NORMAL
BUN SERPL-MCNC: 17 MG/DL (ref 5–25)
CALCIUM SERPL-MCNC: 8.5 MG/DL (ref 8.3–10.1)
CHLORIDE SERPL-SCNC: 103 MMOL/L (ref 100–108)
CO2 SERPL-SCNC: 27 MMOL/L (ref 21–32)
CREAT SERPL-MCNC: 1.3 MG/DL (ref 0.6–1.3)
ERYTHROCYTE [DISTWIDTH] IN BLOOD BY AUTOMATED COUNT: 13.6 % (ref 11.6–15.1)
GFR SERPL CREATININE-BSD FRML MDRD: 56 ML/MIN/1.73SQ M
GLUCOSE SERPL-MCNC: 137 MG/DL (ref 65–140)
GLUCOSE SERPL-MCNC: 150 MG/DL (ref 65–140)
GLUCOSE SERPL-MCNC: 150 MG/DL (ref 65–140)
GLUCOSE SERPL-MCNC: 193 MG/DL (ref 65–140)
GLUCOSE SERPL-MCNC: 223 MG/DL (ref 65–140)
HCT VFR BLD AUTO: 35.9 % (ref 36.5–49.3)
HGB BLD-MCNC: 12 G/DL (ref 12–17)
MCH RBC QN AUTO: 29.2 PG (ref 26.8–34.3)
MCHC RBC AUTO-ENTMCNC: 33.4 G/DL (ref 31.4–37.4)
MCV RBC AUTO: 87 FL (ref 82–98)
P AXIS: 62 DEGREES
P AXIS: 64 DEGREES
PLATELET # BLD AUTO: 293 THOUSANDS/UL (ref 149–390)
PMV BLD AUTO: 9.7 FL (ref 8.9–12.7)
POTASSIUM SERPL-SCNC: 4.3 MMOL/L (ref 3.5–5.3)
PR INTERVAL: 144 MS
PR INTERVAL: 148 MS
QRS AXIS: 48 DEGREES
QRS AXIS: 54 DEGREES
QRSD INTERVAL: 102 MS
QRSD INTERVAL: 98 MS
QT INTERVAL: 386 MS
QT INTERVAL: 392 MS
QTC INTERVAL: 460 MS
QTC INTERVAL: 477 MS
RBC # BLD AUTO: 4.11 MILLION/UL (ref 3.88–5.62)
SODIUM SERPL-SCNC: 136 MMOL/L (ref 136–145)
T WAVE AXIS: 57 DEGREES
T WAVE AXIS: 66 DEGREES
VENTRICULAR RATE: 83 BPM
VENTRICULAR RATE: 92 BPM
WBC # BLD AUTO: 13.86 THOUSAND/UL (ref 4.31–10.16)

## 2020-04-30 PROCEDURE — 80048 BASIC METABOLIC PNL TOTAL CA: CPT | Performed by: INTERNAL MEDICINE

## 2020-04-30 PROCEDURE — 93010 ELECTROCARDIOGRAM REPORT: CPT | Performed by: INTERNAL MEDICINE

## 2020-04-30 PROCEDURE — 93005 ELECTROCARDIOGRAM TRACING: CPT

## 2020-04-30 PROCEDURE — 85027 COMPLETE CBC AUTOMATED: CPT | Performed by: INTERNAL MEDICINE

## 2020-04-30 PROCEDURE — 99232 SBSQ HOSP IP/OBS MODERATE 35: CPT | Performed by: NURSE PRACTITIONER

## 2020-04-30 PROCEDURE — 82948 REAGENT STRIP/BLOOD GLUCOSE: CPT

## 2020-04-30 PROCEDURE — 99232 SBSQ HOSP IP/OBS MODERATE 35: CPT | Performed by: INTERNAL MEDICINE

## 2020-04-30 RX ORDER — POLYETHYLENE GLYCOL 3350 17 G/17G
17 POWDER, FOR SOLUTION ORAL DAILY
Qty: 14 EACH | Refills: 0 | Status: SHIPPED | OUTPATIENT
Start: 2020-05-01 | End: 2020-06-03 | Stop reason: SDUPTHER

## 2020-04-30 RX ORDER — BISACODYL 10 MG
10 SUPPOSITORY, RECTAL RECTAL DAILY
Status: DISCONTINUED | OUTPATIENT
Start: 2020-04-30 | End: 2020-05-01 | Stop reason: HOSPADM

## 2020-04-30 RX ORDER — CEPHALEXIN 500 MG/1
500 CAPSULE ORAL EVERY 12 HOURS SCHEDULED
Qty: 6 CAPSULE | Refills: 0 | Status: SHIPPED | OUTPATIENT
Start: 2020-04-30 | End: 2020-05-03

## 2020-04-30 RX ORDER — SIMETHICONE 80 MG
80 TABLET,CHEWABLE ORAL EVERY 6 HOURS PRN
Qty: 30 TABLET | Refills: 0 | Status: SHIPPED | OUTPATIENT
Start: 2020-04-30 | End: 2021-01-08

## 2020-04-30 RX ORDER — SENNOSIDES 8.6 MG
1 TABLET ORAL DAILY
Qty: 120 EACH | Refills: 0 | Status: SHIPPED | OUTPATIENT
Start: 2020-05-01 | End: 2020-09-17 | Stop reason: SDUPTHER

## 2020-04-30 RX ADMIN — BISACODYL 10 MG: 10 SUPPOSITORY RECTAL at 12:10

## 2020-04-30 RX ADMIN — INSULIN LISPRO 2 UNITS: 100 INJECTION, SOLUTION INTRAVENOUS; SUBCUTANEOUS at 17:39

## 2020-04-30 RX ADMIN — SENNOSIDES 8.6 MG: 8.6 TABLET, FILM COATED ORAL at 09:14

## 2020-04-30 RX ADMIN — CEFTRIAXONE 1000 MG: 1 INJECTION, POWDER, FOR SOLUTION INTRAMUSCULAR; INTRAVENOUS at 12:01

## 2020-04-30 RX ADMIN — ATORVASTATIN CALCIUM 20 MG: 20 TABLET, FILM COATED ORAL at 09:14

## 2020-04-30 RX ADMIN — INSULIN LISPRO 2 UNITS: 100 INJECTION, SOLUTION INTRAVENOUS; SUBCUTANEOUS at 12:11

## 2020-04-30 RX ADMIN — POLYETHYLENE GLYCOL 3350 17 G: 17 POWDER, FOR SOLUTION ORAL at 09:13

## 2020-05-01 ENCOUNTER — TELEPHONE (OUTPATIENT)
Dept: FAMILY MEDICINE CLINIC | Facility: CLINIC | Age: 70
End: 2020-05-01

## 2020-05-01 VITALS
HEART RATE: 89 BPM | BODY MASS INDEX: 30.35 KG/M2 | OXYGEN SATURATION: 94 % | RESPIRATION RATE: 16 BRPM | TEMPERATURE: 98 F | SYSTOLIC BLOOD PRESSURE: 137 MMHG | DIASTOLIC BLOOD PRESSURE: 69 MMHG | WEIGHT: 193.34 LBS | HEIGHT: 67 IN

## 2020-05-01 LAB
GLUCOSE SERPL-MCNC: 143 MG/DL (ref 65–140)
GLUCOSE SERPL-MCNC: 207 MG/DL (ref 65–140)

## 2020-05-01 PROCEDURE — 99238 HOSP IP/OBS DSCHRG MGMT 30/<: CPT | Performed by: INTERNAL MEDICINE

## 2020-05-01 PROCEDURE — 82948 REAGENT STRIP/BLOOD GLUCOSE: CPT

## 2020-05-01 RX ADMIN — ATORVASTATIN CALCIUM 20 MG: 20 TABLET, FILM COATED ORAL at 08:56

## 2020-05-01 RX ADMIN — POLYETHYLENE GLYCOL 3350 17 G: 17 POWDER, FOR SOLUTION ORAL at 08:56

## 2020-05-01 RX ADMIN — BISACODYL 10 MG: 10 SUPPOSITORY RECTAL at 08:56

## 2020-05-01 RX ADMIN — SENNOSIDES 8.6 MG: 8.6 TABLET, FILM COATED ORAL at 08:56

## 2020-05-03 ENCOUNTER — TELEPHONE (OUTPATIENT)
Dept: UROLOGY | Facility: AMBULATORY SURGERY CENTER | Age: 70
End: 2020-05-03

## 2020-05-04 DIAGNOSIS — E78.5 HYPERLIPIDEMIA, UNSPECIFIED HYPERLIPIDEMIA TYPE: Primary | ICD-10-CM

## 2020-05-04 RX ORDER — FUROSEMIDE 40 MG/1
40 TABLET ORAL DAILY
Qty: 60 TABLET | Refills: 1 | Status: SHIPPED | OUTPATIENT
Start: 2020-05-04 | End: 2020-06-23 | Stop reason: HOSPADM

## 2020-05-05 ENCOUNTER — TRANSITIONAL CARE MANAGEMENT (OUTPATIENT)
Dept: FAMILY MEDICINE CLINIC | Facility: CLINIC | Age: 70
End: 2020-05-05

## 2020-05-06 ENCOUNTER — PATIENT OUTREACH (OUTPATIENT)
Dept: FAMILY MEDICINE CLINIC | Facility: CLINIC | Age: 70
End: 2020-05-06

## 2020-05-07 ENCOUNTER — HOSPITAL ENCOUNTER (INPATIENT)
Facility: HOSPITAL | Age: 70
LOS: 5 days | Discharge: NURSING, REHAB OR LONG-TERM CARE | DRG: 872 | End: 2020-05-13
Attending: EMERGENCY MEDICINE | Admitting: INTERNAL MEDICINE
Payer: COMMERCIAL

## 2020-05-07 DIAGNOSIS — N39.0 UTI (URINARY TRACT INFECTION): ICD-10-CM

## 2020-05-07 DIAGNOSIS — N21.0 BLADDER CALCULUS: ICD-10-CM

## 2020-05-07 DIAGNOSIS — R31.9 HEMATURIA: Primary | ICD-10-CM

## 2020-05-07 DIAGNOSIS — R53.1 GENERALIZED WEAKNESS: ICD-10-CM

## 2020-05-07 PROCEDURE — 99284 EMERGENCY DEPT VISIT MOD MDM: CPT | Performed by: EMERGENCY MEDICINE

## 2020-05-07 PROCEDURE — 99284 EMERGENCY DEPT VISIT MOD MDM: CPT

## 2020-05-08 ENCOUNTER — TELEPHONE (OUTPATIENT)
Dept: UROLOGY | Facility: CLINIC | Age: 70
End: 2020-05-08

## 2020-05-08 DIAGNOSIS — N21.0 BLADDER STONES: Primary | ICD-10-CM

## 2020-05-08 PROBLEM — N39.0 UTI (URINARY TRACT INFECTION): Status: ACTIVE | Noted: 2020-05-08

## 2020-05-08 LAB
ALBUMIN SERPL BCP-MCNC: 3.2 G/DL (ref 3.5–5)
ALP SERPL-CCNC: 76 U/L (ref 46–116)
ALT SERPL W P-5'-P-CCNC: 21 U/L (ref 12–78)
ANION GAP SERPL CALCULATED.3IONS-SCNC: 10 MMOL/L (ref 4–13)
ANION GAP SERPL CALCULATED.3IONS-SCNC: 8 MMOL/L (ref 4–13)
AST SERPL W P-5'-P-CCNC: 12 U/L (ref 5–45)
BACTERIA UR QL AUTO: ABNORMAL /HPF
BASOPHILS # BLD AUTO: 0.03 THOUSANDS/ΜL (ref 0–0.1)
BASOPHILS # BLD AUTO: 0.04 THOUSANDS/ΜL (ref 0–0.1)
BASOPHILS NFR BLD AUTO: 0 % (ref 0–1)
BASOPHILS NFR BLD AUTO: 0 % (ref 0–1)
BILIRUB SERPL-MCNC: 0.46 MG/DL (ref 0.2–1)
BILIRUB UR QL STRIP: ABNORMAL
BUN SERPL-MCNC: 26 MG/DL (ref 5–25)
BUN SERPL-MCNC: 29 MG/DL (ref 5–25)
CALCIUM SERPL-MCNC: 8.1 MG/DL (ref 8.3–10.1)
CALCIUM SERPL-MCNC: 8.5 MG/DL (ref 8.3–10.1)
CHLORIDE SERPL-SCNC: 103 MMOL/L (ref 100–108)
CHLORIDE SERPL-SCNC: 104 MMOL/L (ref 100–108)
CLARITY UR: ABNORMAL
CO2 SERPL-SCNC: 27 MMOL/L (ref 21–32)
CO2 SERPL-SCNC: 27 MMOL/L (ref 21–32)
COLOR UR: ABNORMAL
CREAT SERPL-MCNC: 1.3 MG/DL (ref 0.6–1.3)
CREAT SERPL-MCNC: 1.47 MG/DL (ref 0.6–1.3)
EOSINOPHIL # BLD AUTO: 0.37 THOUSAND/ΜL (ref 0–0.61)
EOSINOPHIL # BLD AUTO: 0.45 THOUSAND/ΜL (ref 0–0.61)
EOSINOPHIL NFR BLD AUTO: 3 % (ref 0–6)
EOSINOPHIL NFR BLD AUTO: 3 % (ref 0–6)
ERYTHROCYTE [DISTWIDTH] IN BLOOD BY AUTOMATED COUNT: 13.9 % (ref 11.6–15.1)
ERYTHROCYTE [DISTWIDTH] IN BLOOD BY AUTOMATED COUNT: 13.9 % (ref 11.6–15.1)
GFR SERPL CREATININE-BSD FRML MDRD: 48 ML/MIN/1.73SQ M
GFR SERPL CREATININE-BSD FRML MDRD: 56 ML/MIN/1.73SQ M
GLUCOSE SERPL-MCNC: 125 MG/DL (ref 65–140)
GLUCOSE SERPL-MCNC: 165 MG/DL (ref 65–140)
GLUCOSE SERPL-MCNC: 493 MG/DL (ref 65–140)
GLUCOSE SERPL-MCNC: 70 MG/DL (ref 65–140)
GLUCOSE SERPL-MCNC: 80 MG/DL (ref 65–140)
GLUCOSE SERPL-MCNC: 82 MG/DL (ref 65–140)
GLUCOSE UR STRIP-MCNC: NEGATIVE MG/DL
HCT VFR BLD AUTO: 34.4 % (ref 36.5–49.3)
HCT VFR BLD AUTO: 35.8 % (ref 36.5–49.3)
HGB BLD-MCNC: 11.3 G/DL (ref 12–17)
HGB BLD-MCNC: 11.7 G/DL (ref 12–17)
HGB UR QL STRIP.AUTO: ABNORMAL
IMM GRANULOCYTES # BLD AUTO: 0.06 THOUSAND/UL (ref 0–0.2)
IMM GRANULOCYTES # BLD AUTO: 0.07 THOUSAND/UL (ref 0–0.2)
IMM GRANULOCYTES NFR BLD AUTO: 1 % (ref 0–2)
IMM GRANULOCYTES NFR BLD AUTO: 1 % (ref 0–2)
KETONES UR STRIP-MCNC: NEGATIVE MG/DL
LEUKOCYTE ESTERASE UR QL STRIP: ABNORMAL
LYMPHOCYTES # BLD AUTO: 1.14 THOUSANDS/ΜL (ref 0.6–4.47)
LYMPHOCYTES # BLD AUTO: 1.28 THOUSANDS/ΜL (ref 0.6–4.47)
LYMPHOCYTES NFR BLD AUTO: 10 % (ref 14–44)
LYMPHOCYTES NFR BLD AUTO: 10 % (ref 14–44)
MCH RBC QN AUTO: 29.3 PG (ref 26.8–34.3)
MCH RBC QN AUTO: 29.6 PG (ref 26.8–34.3)
MCHC RBC AUTO-ENTMCNC: 32.7 G/DL (ref 31.4–37.4)
MCHC RBC AUTO-ENTMCNC: 32.8 G/DL (ref 31.4–37.4)
MCV RBC AUTO: 90 FL (ref 82–98)
MCV RBC AUTO: 90 FL (ref 82–98)
MONOCYTES # BLD AUTO: 1.02 THOUSAND/ΜL (ref 0.17–1.22)
MONOCYTES # BLD AUTO: 1.17 THOUSAND/ΜL (ref 0.17–1.22)
MONOCYTES NFR BLD AUTO: 9 % (ref 4–12)
MONOCYTES NFR BLD AUTO: 9 % (ref 4–12)
NEUTROPHILS # BLD AUTO: 10.06 THOUSANDS/ΜL (ref 1.85–7.62)
NEUTROPHILS # BLD AUTO: 8.71 THOUSANDS/ΜL (ref 1.85–7.62)
NEUTS SEG NFR BLD AUTO: 77 % (ref 43–75)
NEUTS SEG NFR BLD AUTO: 77 % (ref 43–75)
NITRITE UR QL STRIP: POSITIVE
NON-SQ EPI CELLS URNS QL MICRO: ABNORMAL /HPF
NRBC BLD AUTO-RTO: 0 /100 WBCS
NRBC BLD AUTO-RTO: 0 /100 WBCS
PH UR STRIP.AUTO: 5.5 [PH]
PLATELET # BLD AUTO: 264 THOUSANDS/UL (ref 149–390)
PLATELET # BLD AUTO: 299 THOUSANDS/UL (ref 149–390)
PMV BLD AUTO: 9.6 FL (ref 8.9–12.7)
PMV BLD AUTO: 9.7 FL (ref 8.9–12.7)
POTASSIUM SERPL-SCNC: 3.7 MMOL/L (ref 3.5–5.3)
POTASSIUM SERPL-SCNC: 3.7 MMOL/L (ref 3.5–5.3)
PROT SERPL-MCNC: 6.7 G/DL (ref 6.4–8.2)
PROT UR STRIP-MCNC: ABNORMAL MG/DL
RBC # BLD AUTO: 3.82 MILLION/UL (ref 3.88–5.62)
RBC # BLD AUTO: 3.99 MILLION/UL (ref 3.88–5.62)
RBC #/AREA URNS AUTO: ABNORMAL /HPF
SODIUM SERPL-SCNC: 139 MMOL/L (ref 136–145)
SODIUM SERPL-SCNC: 140 MMOL/L (ref 136–145)
SP GR UR STRIP.AUTO: 1.02 (ref 1–1.03)
UROBILINOGEN UR QL STRIP.AUTO: 0.2 E.U./DL
WBC # BLD AUTO: 11.33 THOUSAND/UL (ref 4.31–10.16)
WBC # BLD AUTO: 13.07 THOUSAND/UL (ref 4.31–10.16)
WBC #/AREA URNS AUTO: ABNORMAL /HPF

## 2020-05-08 PROCEDURE — 99221 1ST HOSP IP/OBS SF/LOW 40: CPT | Performed by: UROLOGY

## 2020-05-08 PROCEDURE — 36415 COLL VENOUS BLD VENIPUNCTURE: CPT | Performed by: EMERGENCY MEDICINE

## 2020-05-08 PROCEDURE — 99223 1ST HOSP IP/OBS HIGH 75: CPT | Performed by: FAMILY MEDICINE

## 2020-05-08 PROCEDURE — 80048 BASIC METABOLIC PNL TOTAL CA: CPT | Performed by: NURSE PRACTITIONER

## 2020-05-08 PROCEDURE — 97163 PT EVAL HIGH COMPLEX 45 MIN: CPT

## 2020-05-08 PROCEDURE — 97530 THERAPEUTIC ACTIVITIES: CPT

## 2020-05-08 PROCEDURE — 96360 HYDRATION IV INFUSION INIT: CPT

## 2020-05-08 PROCEDURE — 80053 COMPREHEN METABOLIC PANEL: CPT | Performed by: EMERGENCY MEDICINE

## 2020-05-08 PROCEDURE — 85025 COMPLETE CBC W/AUTO DIFF WBC: CPT | Performed by: EMERGENCY MEDICINE

## 2020-05-08 PROCEDURE — 82948 REAGENT STRIP/BLOOD GLUCOSE: CPT

## 2020-05-08 PROCEDURE — 85025 COMPLETE CBC W/AUTO DIFF WBC: CPT | Performed by: NURSE PRACTITIONER

## 2020-05-08 PROCEDURE — 81001 URINALYSIS AUTO W/SCOPE: CPT | Performed by: EMERGENCY MEDICINE

## 2020-05-08 RX ORDER — POLYETHYLENE GLYCOL 3350 17 G/17G
17 POWDER, FOR SOLUTION ORAL DAILY PRN
Status: DISCONTINUED | OUTPATIENT
Start: 2020-05-08 | End: 2020-05-13 | Stop reason: HOSPADM

## 2020-05-08 RX ORDER — POLYETHYLENE GLYCOL 3350 17 G/17G
17 POWDER, FOR SOLUTION ORAL DAILY
Status: DISCONTINUED | OUTPATIENT
Start: 2020-05-08 | End: 2020-05-13 | Stop reason: HOSPADM

## 2020-05-08 RX ORDER — CALCIUM CARBONATE 200(500)MG
1000 TABLET,CHEWABLE ORAL DAILY PRN
Status: DISCONTINUED | OUTPATIENT
Start: 2020-05-08 | End: 2020-05-13 | Stop reason: HOSPADM

## 2020-05-08 RX ORDER — SODIUM CHLORIDE 9 MG/ML
75 INJECTION, SOLUTION INTRAVENOUS CONTINUOUS
Status: DISCONTINUED | OUTPATIENT
Start: 2020-05-08 | End: 2020-05-10

## 2020-05-08 RX ORDER — SIMETHICONE 80 MG
80 TABLET,CHEWABLE ORAL EVERY 6 HOURS PRN
Status: DISCONTINUED | OUTPATIENT
Start: 2020-05-08 | End: 2020-05-13 | Stop reason: HOSPADM

## 2020-05-08 RX ORDER — MECLIZINE HYDROCHLORIDE 25 MG/1
25 TABLET ORAL EVERY 8 HOURS PRN
Status: DISCONTINUED | OUTPATIENT
Start: 2020-05-08 | End: 2020-05-13 | Stop reason: HOSPADM

## 2020-05-08 RX ORDER — ACETAMINOPHEN 325 MG/1
650 TABLET ORAL EVERY 6 HOURS PRN
Status: DISCONTINUED | OUTPATIENT
Start: 2020-05-08 | End: 2020-05-13 | Stop reason: HOSPADM

## 2020-05-08 RX ORDER — ATORVASTATIN CALCIUM 20 MG/1
20 TABLET, FILM COATED ORAL DAILY
Status: DISCONTINUED | OUTPATIENT
Start: 2020-05-08 | End: 2020-05-13 | Stop reason: HOSPADM

## 2020-05-08 RX ORDER — HEPARIN SODIUM 5000 [USP'U]/ML
5000 INJECTION, SOLUTION INTRAVENOUS; SUBCUTANEOUS EVERY 8 HOURS SCHEDULED
Status: DISCONTINUED | OUTPATIENT
Start: 2020-05-08 | End: 2020-05-13 | Stop reason: HOSPADM

## 2020-05-08 RX ORDER — FINASTERIDE 5 MG/1
5 TABLET, FILM COATED ORAL DAILY
Status: DISCONTINUED | OUTPATIENT
Start: 2020-05-08 | End: 2020-05-13 | Stop reason: HOSPADM

## 2020-05-08 RX ORDER — SENNOSIDES 8.6 MG
1 TABLET ORAL DAILY
Status: DISCONTINUED | OUTPATIENT
Start: 2020-05-08 | End: 2020-05-09

## 2020-05-08 RX ORDER — HYDRALAZINE HYDROCHLORIDE 20 MG/ML
10 INJECTION INTRAMUSCULAR; INTRAVENOUS EVERY 6 HOURS PRN
Status: DISCONTINUED | OUTPATIENT
Start: 2020-05-08 | End: 2020-05-13 | Stop reason: HOSPADM

## 2020-05-08 RX ADMIN — POLYETHYLENE GLYCOL 3350 17 G: 17 POWDER, FOR SOLUTION ORAL at 09:40

## 2020-05-08 RX ADMIN — HEPARIN SODIUM 5000 UNITS: 5000 INJECTION INTRAVENOUS; SUBCUTANEOUS at 14:03

## 2020-05-08 RX ADMIN — FINASTERIDE 5 MG: 5 TABLET, FILM COATED ORAL at 09:40

## 2020-05-08 RX ADMIN — INSULIN LISPRO 5 UNITS: 100 INJECTION, SOLUTION INTRAVENOUS; SUBCUTANEOUS at 17:28

## 2020-05-08 RX ADMIN — CEFTRIAXONE SODIUM 1000 MG: 10 INJECTION, POWDER, FOR SOLUTION INTRAVENOUS at 05:01

## 2020-05-08 RX ADMIN — SODIUM CHLORIDE 100 ML/HR: 0.9 INJECTION, SOLUTION INTRAVENOUS at 05:01

## 2020-05-08 RX ADMIN — HEPARIN SODIUM 5000 UNITS: 5000 INJECTION INTRAVENOUS; SUBCUTANEOUS at 05:18

## 2020-05-08 RX ADMIN — SENNOSIDES 8.6 MG: 8.6 TABLET, FILM COATED ORAL at 09:40

## 2020-05-08 RX ADMIN — SODIUM CHLORIDE 1000 ML: 0.9 INJECTION, SOLUTION INTRAVENOUS at 01:20

## 2020-05-08 RX ADMIN — SODIUM CHLORIDE 100 ML/HR: 0.9 INJECTION, SOLUTION INTRAVENOUS at 16:25

## 2020-05-08 RX ADMIN — ATORVASTATIN CALCIUM 20 MG: 20 TABLET, FILM COATED ORAL at 09:40

## 2020-05-08 RX ADMIN — INSULIN LISPRO 1 UNITS: 100 INJECTION, SOLUTION INTRAVENOUS; SUBCUTANEOUS at 21:32

## 2020-05-08 RX ADMIN — POLYETHYLENE GLYCOL 3350 17 G: 17 POWDER, FOR SOLUTION ORAL at 17:24

## 2020-05-08 RX ADMIN — ANORECTAL OINTMENT: 15.7; .44; 24; 20.6 OINTMENT TOPICAL at 05:26

## 2020-05-08 RX ADMIN — HEPARIN SODIUM 5000 UNITS: 5000 INJECTION INTRAVENOUS; SUBCUTANEOUS at 21:30

## 2020-05-09 LAB
ANION GAP SERPL CALCULATED.3IONS-SCNC: 8 MMOL/L (ref 4–13)
BASOPHILS # BLD AUTO: 0.04 THOUSANDS/ΜL (ref 0–0.1)
BASOPHILS NFR BLD AUTO: 0 % (ref 0–1)
BUN SERPL-MCNC: 20 MG/DL (ref 5–25)
CALCIUM SERPL-MCNC: 8.6 MG/DL (ref 8.3–10.1)
CHLORIDE SERPL-SCNC: 105 MMOL/L (ref 100–108)
CO2 SERPL-SCNC: 26 MMOL/L (ref 21–32)
CREAT SERPL-MCNC: 1.16 MG/DL (ref 0.6–1.3)
EOSINOPHIL # BLD AUTO: 0.46 THOUSAND/ΜL (ref 0–0.61)
EOSINOPHIL NFR BLD AUTO: 5 % (ref 0–6)
ERYTHROCYTE [DISTWIDTH] IN BLOOD BY AUTOMATED COUNT: 13.8 % (ref 11.6–15.1)
GFR SERPL CREATININE-BSD FRML MDRD: 64 ML/MIN/1.73SQ M
GLUCOSE SERPL-MCNC: 123 MG/DL (ref 65–140)
GLUCOSE SERPL-MCNC: 138 MG/DL (ref 65–140)
GLUCOSE SERPL-MCNC: 156 MG/DL (ref 65–140)
GLUCOSE SERPL-MCNC: 158 MG/DL (ref 65–140)
GLUCOSE SERPL-MCNC: 161 MG/DL (ref 65–140)
HCT VFR BLD AUTO: 35.2 % (ref 36.5–49.3)
HGB BLD-MCNC: 11.2 G/DL (ref 12–17)
IMM GRANULOCYTES # BLD AUTO: 0.05 THOUSAND/UL (ref 0–0.2)
IMM GRANULOCYTES NFR BLD AUTO: 1 % (ref 0–2)
LYMPHOCYTES # BLD AUTO: 1.09 THOUSANDS/ΜL (ref 0.6–4.47)
LYMPHOCYTES NFR BLD AUTO: 12 % (ref 14–44)
MAGNESIUM SERPL-MCNC: 1.9 MG/DL (ref 1.6–2.6)
MCH RBC QN AUTO: 28.9 PG (ref 26.8–34.3)
MCHC RBC AUTO-ENTMCNC: 31.8 G/DL (ref 31.4–37.4)
MCV RBC AUTO: 91 FL (ref 82–98)
MONOCYTES # BLD AUTO: 0.95 THOUSAND/ΜL (ref 0.17–1.22)
MONOCYTES NFR BLD AUTO: 10 % (ref 4–12)
NEUTROPHILS # BLD AUTO: 6.51 THOUSANDS/ΜL (ref 1.85–7.62)
NEUTS SEG NFR BLD AUTO: 72 % (ref 43–75)
NRBC BLD AUTO-RTO: 0 /100 WBCS
PLATELET # BLD AUTO: 271 THOUSANDS/UL (ref 149–390)
PMV BLD AUTO: 9.9 FL (ref 8.9–12.7)
POTASSIUM SERPL-SCNC: 4.1 MMOL/L (ref 3.5–5.3)
RBC # BLD AUTO: 3.87 MILLION/UL (ref 3.88–5.62)
SODIUM SERPL-SCNC: 139 MMOL/L (ref 136–145)
WBC # BLD AUTO: 9.1 THOUSAND/UL (ref 4.31–10.16)

## 2020-05-09 PROCEDURE — 83735 ASSAY OF MAGNESIUM: CPT | Performed by: FAMILY MEDICINE

## 2020-05-09 PROCEDURE — 99232 SBSQ HOSP IP/OBS MODERATE 35: CPT | Performed by: FAMILY MEDICINE

## 2020-05-09 PROCEDURE — 80048 BASIC METABOLIC PNL TOTAL CA: CPT | Performed by: FAMILY MEDICINE

## 2020-05-09 PROCEDURE — 85025 COMPLETE CBC W/AUTO DIFF WBC: CPT | Performed by: FAMILY MEDICINE

## 2020-05-09 PROCEDURE — 82948 REAGENT STRIP/BLOOD GLUCOSE: CPT

## 2020-05-09 RX ORDER — PHENAZOPYRIDINE HYDROCHLORIDE 100 MG/1
100 TABLET, FILM COATED ORAL
Status: COMPLETED | OUTPATIENT
Start: 2020-05-09 | End: 2020-05-11

## 2020-05-09 RX ORDER — DOCUSATE SODIUM 100 MG/1
100 CAPSULE, LIQUID FILLED ORAL 2 TIMES DAILY
Status: DISCONTINUED | OUTPATIENT
Start: 2020-05-09 | End: 2020-05-13 | Stop reason: HOSPADM

## 2020-05-09 RX ORDER — LISINOPRIL 5 MG/1
5 TABLET ORAL DAILY
Status: DISCONTINUED | OUTPATIENT
Start: 2020-05-09 | End: 2020-05-13 | Stop reason: HOSPADM

## 2020-05-09 RX ADMIN — FINASTERIDE 5 MG: 5 TABLET, FILM COATED ORAL at 08:31

## 2020-05-09 RX ADMIN — HEPARIN SODIUM 5000 UNITS: 5000 INJECTION INTRAVENOUS; SUBCUTANEOUS at 21:35

## 2020-05-09 RX ADMIN — LISINOPRIL 5 MG: 5 TABLET ORAL at 11:24

## 2020-05-09 RX ADMIN — INSULIN LISPRO 1 UNITS: 100 INJECTION, SOLUTION INTRAVENOUS; SUBCUTANEOUS at 16:36

## 2020-05-09 RX ADMIN — POLYETHYLENE GLYCOL 3350 17 G: 17 POWDER, FOR SOLUTION ORAL at 08:32

## 2020-05-09 RX ADMIN — HEPARIN SODIUM 5000 UNITS: 5000 INJECTION INTRAVENOUS; SUBCUTANEOUS at 14:20

## 2020-05-09 RX ADMIN — INSULIN LISPRO 1 UNITS: 100 INJECTION, SOLUTION INTRAVENOUS; SUBCUTANEOUS at 11:25

## 2020-05-09 RX ADMIN — PHENAZOPYRIDINE 100 MG: 100 TABLET ORAL at 11:29

## 2020-05-09 RX ADMIN — ACETAMINOPHEN 650 MG: 325 TABLET, FILM COATED ORAL at 22:45

## 2020-05-09 RX ADMIN — SODIUM CHLORIDE 75 ML/HR: 0.9 INJECTION, SOLUTION INTRAVENOUS at 15:31

## 2020-05-09 RX ADMIN — PHENAZOPYRIDINE 100 MG: 100 TABLET ORAL at 16:35

## 2020-05-09 RX ADMIN — DOCUSATE SODIUM 100 MG: 100 CAPSULE, LIQUID FILLED ORAL at 14:20

## 2020-05-09 RX ADMIN — ATORVASTATIN CALCIUM 20 MG: 20 TABLET, FILM COATED ORAL at 08:31

## 2020-05-09 RX ADMIN — SENNOSIDES 8.6 MG: 8.6 TABLET, FILM COATED ORAL at 08:30

## 2020-05-10 PROBLEM — A41.9 SEPSIS (HCC): Status: RESOLVED | Noted: 2019-12-31 | Resolved: 2020-05-10

## 2020-05-10 LAB
GLUCOSE SERPL-MCNC: 132 MG/DL (ref 65–140)
GLUCOSE SERPL-MCNC: 147 MG/DL (ref 65–140)
GLUCOSE SERPL-MCNC: 155 MG/DL (ref 65–140)
GLUCOSE SERPL-MCNC: 187 MG/DL (ref 65–140)

## 2020-05-10 PROCEDURE — 82948 REAGENT STRIP/BLOOD GLUCOSE: CPT

## 2020-05-10 PROCEDURE — 99233 SBSQ HOSP IP/OBS HIGH 50: CPT | Performed by: FAMILY MEDICINE

## 2020-05-10 RX ORDER — TAMSULOSIN HYDROCHLORIDE 0.4 MG/1
0.4 CAPSULE ORAL
Status: DISCONTINUED | OUTPATIENT
Start: 2020-05-10 | End: 2020-05-13 | Stop reason: HOSPADM

## 2020-05-10 RX ORDER — KETOROLAC TROMETHAMINE 30 MG/ML
15 INJECTION, SOLUTION INTRAMUSCULAR; INTRAVENOUS EVERY 6 HOURS PRN
Status: DISPENSED | OUTPATIENT
Start: 2020-05-10 | End: 2020-05-12

## 2020-05-10 RX ORDER — KETOROLAC TROMETHAMINE 30 MG/ML
15 INJECTION, SOLUTION INTRAMUSCULAR; INTRAVENOUS ONCE
Status: COMPLETED | OUTPATIENT
Start: 2020-05-10 | End: 2020-05-10

## 2020-05-10 RX ADMIN — DOCUSATE SODIUM 100 MG: 100 CAPSULE, LIQUID FILLED ORAL at 09:11

## 2020-05-10 RX ADMIN — PHENAZOPYRIDINE 100 MG: 100 TABLET ORAL at 11:24

## 2020-05-10 RX ADMIN — CEFTRIAXONE SODIUM 1000 MG: 10 INJECTION, POWDER, FOR SOLUTION INTRAVENOUS at 05:54

## 2020-05-10 RX ADMIN — LISINOPRIL 5 MG: 5 TABLET ORAL at 09:11

## 2020-05-10 RX ADMIN — PHENAZOPYRIDINE 100 MG: 100 TABLET ORAL at 18:38

## 2020-05-10 RX ADMIN — KETOROLAC TROMETHAMINE 15 MG: 30 INJECTION, SOLUTION INTRAMUSCULAR at 11:24

## 2020-05-10 RX ADMIN — INSULIN LISPRO 1 UNITS: 100 INJECTION, SOLUTION INTRAVENOUS; SUBCUTANEOUS at 13:18

## 2020-05-10 RX ADMIN — HEPARIN SODIUM 5000 UNITS: 5000 INJECTION INTRAVENOUS; SUBCUTANEOUS at 13:18

## 2020-05-10 RX ADMIN — PHENAZOPYRIDINE 100 MG: 100 TABLET ORAL at 09:11

## 2020-05-10 RX ADMIN — SODIUM CHLORIDE 75 ML/HR: 0.9 INJECTION, SOLUTION INTRAVENOUS at 05:48

## 2020-05-10 RX ADMIN — DOCUSATE SODIUM 100 MG: 100 CAPSULE, LIQUID FILLED ORAL at 18:38

## 2020-05-10 RX ADMIN — HEPARIN SODIUM 5000 UNITS: 5000 INJECTION INTRAVENOUS; SUBCUTANEOUS at 21:33

## 2020-05-10 RX ADMIN — INSULIN LISPRO 1 UNITS: 100 INJECTION, SOLUTION INTRAVENOUS; SUBCUTANEOUS at 09:12

## 2020-05-10 RX ADMIN — ACETAMINOPHEN 650 MG: 325 TABLET, FILM COATED ORAL at 05:48

## 2020-05-10 RX ADMIN — TAMSULOSIN HYDROCHLORIDE 0.4 MG: 0.4 CAPSULE ORAL at 18:39

## 2020-05-10 RX ADMIN — POLYETHYLENE GLYCOL 3350 17 G: 17 POWDER, FOR SOLUTION ORAL at 09:12

## 2020-05-10 RX ADMIN — ATORVASTATIN CALCIUM 20 MG: 20 TABLET, FILM COATED ORAL at 09:11

## 2020-05-10 RX ADMIN — HEPARIN SODIUM 5000 UNITS: 5000 INJECTION INTRAVENOUS; SUBCUTANEOUS at 05:49

## 2020-05-10 RX ADMIN — FINASTERIDE 5 MG: 5 TABLET, FILM COATED ORAL at 09:11

## 2020-05-11 LAB
ANION GAP SERPL CALCULATED.3IONS-SCNC: 8 MMOL/L (ref 4–13)
BASOPHILS # BLD AUTO: 0.03 THOUSANDS/ΜL (ref 0–0.1)
BASOPHILS NFR BLD AUTO: 0 % (ref 0–1)
BUN SERPL-MCNC: 18 MG/DL (ref 5–25)
CALCIUM SERPL-MCNC: 8.1 MG/DL (ref 8.3–10.1)
CHLORIDE SERPL-SCNC: 102 MMOL/L (ref 100–108)
CO2 SERPL-SCNC: 27 MMOL/L (ref 21–32)
CREAT SERPL-MCNC: 1.33 MG/DL (ref 0.6–1.3)
EOSINOPHIL # BLD AUTO: 0.5 THOUSAND/ΜL (ref 0–0.61)
EOSINOPHIL NFR BLD AUTO: 5 % (ref 0–6)
ERYTHROCYTE [DISTWIDTH] IN BLOOD BY AUTOMATED COUNT: 13.5 % (ref 11.6–15.1)
GFR SERPL CREATININE-BSD FRML MDRD: 54 ML/MIN/1.73SQ M
GLUCOSE SERPL-MCNC: 127 MG/DL (ref 65–140)
GLUCOSE SERPL-MCNC: 148 MG/DL (ref 65–140)
GLUCOSE SERPL-MCNC: 192 MG/DL (ref 65–140)
GLUCOSE SERPL-MCNC: 196 MG/DL (ref 65–140)
GLUCOSE SERPL-MCNC: 214 MG/DL (ref 65–140)
HCT VFR BLD AUTO: 36.2 % (ref 36.5–49.3)
HGB BLD-MCNC: 12 G/DL (ref 12–17)
IMM GRANULOCYTES # BLD AUTO: 0.07 THOUSAND/UL (ref 0–0.2)
IMM GRANULOCYTES NFR BLD AUTO: 1 % (ref 0–2)
LYMPHOCYTES # BLD AUTO: 1.06 THOUSANDS/ΜL (ref 0.6–4.47)
LYMPHOCYTES NFR BLD AUTO: 10 % (ref 14–44)
MAGNESIUM SERPL-MCNC: 1.7 MG/DL (ref 1.6–2.6)
MCH RBC QN AUTO: 29.3 PG (ref 26.8–34.3)
MCHC RBC AUTO-ENTMCNC: 33.1 G/DL (ref 31.4–37.4)
MCV RBC AUTO: 89 FL (ref 82–98)
MONOCYTES # BLD AUTO: 0.93 THOUSAND/ΜL (ref 0.17–1.22)
MONOCYTES NFR BLD AUTO: 9 % (ref 4–12)
NEUTROPHILS # BLD AUTO: 8.03 THOUSANDS/ΜL (ref 1.85–7.62)
NEUTS SEG NFR BLD AUTO: 75 % (ref 43–75)
NRBC BLD AUTO-RTO: 0 /100 WBCS
PLATELET # BLD AUTO: 226 THOUSANDS/UL (ref 149–390)
PMV BLD AUTO: 11 FL (ref 8.9–12.7)
POTASSIUM SERPL-SCNC: 3.9 MMOL/L (ref 3.5–5.3)
RBC # BLD AUTO: 4.09 MILLION/UL (ref 3.88–5.62)
SARS-COV-2 RNA RESP QL NAA+PROBE: NEGATIVE
SODIUM SERPL-SCNC: 137 MMOL/L (ref 136–145)
WBC # BLD AUTO: 10.62 THOUSAND/UL (ref 4.31–10.16)

## 2020-05-11 PROCEDURE — 87635 SARS-COV-2 COVID-19 AMP PRB: CPT | Performed by: NURSE PRACTITIONER

## 2020-05-11 PROCEDURE — 82948 REAGENT STRIP/BLOOD GLUCOSE: CPT

## 2020-05-11 PROCEDURE — 83735 ASSAY OF MAGNESIUM: CPT | Performed by: FAMILY MEDICINE

## 2020-05-11 PROCEDURE — 99232 SBSQ HOSP IP/OBS MODERATE 35: CPT | Performed by: NURSE PRACTITIONER

## 2020-05-11 PROCEDURE — 85025 COMPLETE CBC W/AUTO DIFF WBC: CPT | Performed by: FAMILY MEDICINE

## 2020-05-11 PROCEDURE — 80048 BASIC METABOLIC PNL TOTAL CA: CPT | Performed by: FAMILY MEDICINE

## 2020-05-11 RX ORDER — FUROSEMIDE 40 MG/1
40 TABLET ORAL DAILY
Status: DISCONTINUED | OUTPATIENT
Start: 2020-05-11 | End: 2020-05-12

## 2020-05-11 RX ADMIN — DOCUSATE SODIUM 100 MG: 100 CAPSULE, LIQUID FILLED ORAL at 17:20

## 2020-05-11 RX ADMIN — FINASTERIDE 5 MG: 5 TABLET, FILM COATED ORAL at 08:54

## 2020-05-11 RX ADMIN — TAMSULOSIN HYDROCHLORIDE 0.4 MG: 0.4 CAPSULE ORAL at 17:20

## 2020-05-11 RX ADMIN — POLYETHYLENE GLYCOL 3350 17 G: 17 POWDER, FOR SOLUTION ORAL at 08:55

## 2020-05-11 RX ADMIN — INSULIN LISPRO 1 UNITS: 100 INJECTION, SOLUTION INTRAVENOUS; SUBCUTANEOUS at 21:21

## 2020-05-11 RX ADMIN — INSULIN LISPRO 1 UNITS: 100 INJECTION, SOLUTION INTRAVENOUS; SUBCUTANEOUS at 12:43

## 2020-05-11 RX ADMIN — LISINOPRIL 5 MG: 5 TABLET ORAL at 08:54

## 2020-05-11 RX ADMIN — PHENAZOPYRIDINE 100 MG: 100 TABLET ORAL at 08:54

## 2020-05-11 RX ADMIN — HEPARIN SODIUM 5000 UNITS: 5000 INJECTION INTRAVENOUS; SUBCUTANEOUS at 12:45

## 2020-05-11 RX ADMIN — DOCUSATE SODIUM 100 MG: 100 CAPSULE, LIQUID FILLED ORAL at 08:54

## 2020-05-11 RX ADMIN — ATORVASTATIN CALCIUM 20 MG: 20 TABLET, FILM COATED ORAL at 08:54

## 2020-05-11 RX ADMIN — POLYETHYLENE GLYCOL 3350 17 G: 17 POWDER, FOR SOLUTION ORAL at 21:20

## 2020-05-11 RX ADMIN — FUROSEMIDE 40 MG: 40 TABLET ORAL at 10:47

## 2020-05-11 RX ADMIN — CEFTRIAXONE SODIUM 1000 MG: 10 INJECTION, POWDER, FOR SOLUTION INTRAVENOUS at 04:39

## 2020-05-11 RX ADMIN — INSULIN LISPRO 1 UNITS: 100 INJECTION, SOLUTION INTRAVENOUS; SUBCUTANEOUS at 18:31

## 2020-05-11 RX ADMIN — HEPARIN SODIUM 5000 UNITS: 5000 INJECTION INTRAVENOUS; SUBCUTANEOUS at 04:40

## 2020-05-11 RX ADMIN — HEPARIN SODIUM 5000 UNITS: 5000 INJECTION INTRAVENOUS; SUBCUTANEOUS at 21:20

## 2020-05-12 LAB
ANION GAP SERPL CALCULATED.3IONS-SCNC: 8 MMOL/L (ref 4–13)
BASOPHILS # BLD AUTO: 0.04 THOUSANDS/ΜL (ref 0–0.1)
BASOPHILS NFR BLD AUTO: 0 % (ref 0–1)
BUN SERPL-MCNC: 26 MG/DL (ref 5–25)
CALCIUM SERPL-MCNC: 8.7 MG/DL (ref 8.3–10.1)
CHLORIDE SERPL-SCNC: 102 MMOL/L (ref 100–108)
CO2 SERPL-SCNC: 28 MMOL/L (ref 21–32)
CREAT SERPL-MCNC: 1.54 MG/DL (ref 0.6–1.3)
EOSINOPHIL # BLD AUTO: 0.5 THOUSAND/ΜL (ref 0–0.61)
EOSINOPHIL NFR BLD AUTO: 4 % (ref 0–6)
ERYTHROCYTE [DISTWIDTH] IN BLOOD BY AUTOMATED COUNT: 13.9 % (ref 11.6–15.1)
GFR SERPL CREATININE-BSD FRML MDRD: 45 ML/MIN/1.73SQ M
GLUCOSE SERPL-MCNC: 174 MG/DL (ref 65–140)
GLUCOSE SERPL-MCNC: 189 MG/DL (ref 65–140)
GLUCOSE SERPL-MCNC: 202 MG/DL (ref 65–140)
GLUCOSE SERPL-MCNC: 227 MG/DL (ref 65–140)
GLUCOSE SERPL-MCNC: 237 MG/DL (ref 65–140)
HCT VFR BLD AUTO: 39.4 % (ref 36.5–49.3)
HGB BLD-MCNC: 12.9 G/DL (ref 12–17)
IMM GRANULOCYTES # BLD AUTO: 0.1 THOUSAND/UL (ref 0–0.2)
IMM GRANULOCYTES NFR BLD AUTO: 1 % (ref 0–2)
LYMPHOCYTES # BLD AUTO: 1 THOUSANDS/ΜL (ref 0.6–4.47)
LYMPHOCYTES NFR BLD AUTO: 7 % (ref 14–44)
MCH RBC QN AUTO: 28.9 PG (ref 26.8–34.3)
MCHC RBC AUTO-ENTMCNC: 32.7 G/DL (ref 31.4–37.4)
MCV RBC AUTO: 88 FL (ref 82–98)
MONOCYTES # BLD AUTO: 1.13 THOUSAND/ΜL (ref 0.17–1.22)
MONOCYTES NFR BLD AUTO: 8 % (ref 4–12)
NEUTROPHILS # BLD AUTO: 11.02 THOUSANDS/ΜL (ref 1.85–7.62)
NEUTS SEG NFR BLD AUTO: 80 % (ref 43–75)
NRBC BLD AUTO-RTO: 0 /100 WBCS
PLATELET # BLD AUTO: 295 THOUSANDS/UL (ref 149–390)
PMV BLD AUTO: 9.9 FL (ref 8.9–12.7)
POTASSIUM SERPL-SCNC: 3.7 MMOL/L (ref 3.5–5.3)
RBC # BLD AUTO: 4.46 MILLION/UL (ref 3.88–5.62)
SODIUM SERPL-SCNC: 138 MMOL/L (ref 136–145)
WBC # BLD AUTO: 13.79 THOUSAND/UL (ref 4.31–10.16)

## 2020-05-12 PROCEDURE — 85025 COMPLETE CBC W/AUTO DIFF WBC: CPT | Performed by: NURSE PRACTITIONER

## 2020-05-12 PROCEDURE — 80048 BASIC METABOLIC PNL TOTAL CA: CPT | Performed by: NURSE PRACTITIONER

## 2020-05-12 PROCEDURE — 99232 SBSQ HOSP IP/OBS MODERATE 35: CPT | Performed by: NURSE PRACTITIONER

## 2020-05-12 PROCEDURE — 82948 REAGENT STRIP/BLOOD GLUCOSE: CPT

## 2020-05-12 RX ORDER — SODIUM CHLORIDE 9 MG/ML
50 INJECTION, SOLUTION INTRAVENOUS CONTINUOUS
Status: DISCONTINUED | OUTPATIENT
Start: 2020-05-12 | End: 2020-05-13 | Stop reason: HOSPADM

## 2020-05-12 RX ADMIN — LISINOPRIL 5 MG: 5 TABLET ORAL at 08:15

## 2020-05-12 RX ADMIN — POLYETHYLENE GLYCOL 3350 17 G: 17 POWDER, FOR SOLUTION ORAL at 08:15

## 2020-05-12 RX ADMIN — CEFTRIAXONE SODIUM 1000 MG: 10 INJECTION, POWDER, FOR SOLUTION INTRAVENOUS at 05:16

## 2020-05-12 RX ADMIN — INSULIN LISPRO 2 UNITS: 100 INJECTION, SOLUTION INTRAVENOUS; SUBCUTANEOUS at 12:17

## 2020-05-12 RX ADMIN — INSULIN LISPRO 1 UNITS: 100 INJECTION, SOLUTION INTRAVENOUS; SUBCUTANEOUS at 21:35

## 2020-05-12 RX ADMIN — DOCUSATE SODIUM 100 MG: 100 CAPSULE, LIQUID FILLED ORAL at 08:15

## 2020-05-12 RX ADMIN — HEPARIN SODIUM 5000 UNITS: 5000 INJECTION INTRAVENOUS; SUBCUTANEOUS at 14:31

## 2020-05-12 RX ADMIN — INSULIN LISPRO 2 UNITS: 100 INJECTION, SOLUTION INTRAVENOUS; SUBCUTANEOUS at 17:43

## 2020-05-12 RX ADMIN — INSULIN LISPRO 1 UNITS: 100 INJECTION, SOLUTION INTRAVENOUS; SUBCUTANEOUS at 08:46

## 2020-05-12 RX ADMIN — SODIUM CHLORIDE 50 ML/HR: 0.9 INJECTION, SOLUTION INTRAVENOUS at 07:46

## 2020-05-12 RX ADMIN — KETOROLAC TROMETHAMINE 15 MG: 30 INJECTION, SOLUTION INTRAMUSCULAR at 05:18

## 2020-05-12 RX ADMIN — DOCUSATE SODIUM 100 MG: 100 CAPSULE, LIQUID FILLED ORAL at 17:46

## 2020-05-12 RX ADMIN — HEPARIN SODIUM 5000 UNITS: 5000 INJECTION INTRAVENOUS; SUBCUTANEOUS at 21:35

## 2020-05-12 RX ADMIN — FINASTERIDE 5 MG: 5 TABLET, FILM COATED ORAL at 08:15

## 2020-05-12 RX ADMIN — TAMSULOSIN HYDROCHLORIDE 0.4 MG: 0.4 CAPSULE ORAL at 17:46

## 2020-05-12 RX ADMIN — HEPARIN SODIUM 5000 UNITS: 5000 INJECTION INTRAVENOUS; SUBCUTANEOUS at 05:29

## 2020-05-12 RX ADMIN — ATORVASTATIN CALCIUM 20 MG: 20 TABLET, FILM COATED ORAL at 08:15

## 2020-05-13 VITALS
WEIGHT: 169.53 LBS | SYSTOLIC BLOOD PRESSURE: 125 MMHG | BODY MASS INDEX: 26.55 KG/M2 | TEMPERATURE: 97.5 F | DIASTOLIC BLOOD PRESSURE: 67 MMHG | HEART RATE: 86 BPM | RESPIRATION RATE: 18 BRPM | OXYGEN SATURATION: 98 %

## 2020-05-13 LAB
ANION GAP SERPL CALCULATED.3IONS-SCNC: 7 MMOL/L (ref 4–13)
BASOPHILS # BLD AUTO: 0.04 THOUSANDS/ΜL (ref 0–0.1)
BASOPHILS NFR BLD AUTO: 0 % (ref 0–1)
BUN SERPL-MCNC: 30 MG/DL (ref 5–25)
CALCIUM SERPL-MCNC: 8.2 MG/DL (ref 8.3–10.1)
CHLORIDE SERPL-SCNC: 104 MMOL/L (ref 100–108)
CO2 SERPL-SCNC: 27 MMOL/L (ref 21–32)
CREAT SERPL-MCNC: 1.35 MG/DL (ref 0.6–1.3)
EOSINOPHIL # BLD AUTO: 0.58 THOUSAND/ΜL (ref 0–0.61)
EOSINOPHIL NFR BLD AUTO: 5 % (ref 0–6)
ERYTHROCYTE [DISTWIDTH] IN BLOOD BY AUTOMATED COUNT: 14 % (ref 11.6–15.1)
GFR SERPL CREATININE-BSD FRML MDRD: 53 ML/MIN/1.73SQ M
GLUCOSE SERPL-MCNC: 161 MG/DL (ref 65–140)
GLUCOSE SERPL-MCNC: 182 MG/DL (ref 65–140)
GLUCOSE SERPL-MCNC: 195 MG/DL (ref 65–140)
GLUCOSE SERPL-MCNC: 225 MG/DL (ref 65–140)
HCT VFR BLD AUTO: 37.2 % (ref 36.5–49.3)
HGB BLD-MCNC: 11.7 G/DL (ref 12–17)
IMM GRANULOCYTES # BLD AUTO: 0.06 THOUSAND/UL (ref 0–0.2)
IMM GRANULOCYTES NFR BLD AUTO: 1 % (ref 0–2)
LYMPHOCYTES # BLD AUTO: 1.24 THOUSANDS/ΜL (ref 0.6–4.47)
LYMPHOCYTES NFR BLD AUTO: 11 % (ref 14–44)
MCH RBC QN AUTO: 29.2 PG (ref 26.8–34.3)
MCHC RBC AUTO-ENTMCNC: 31.5 G/DL (ref 31.4–37.4)
MCV RBC AUTO: 93 FL (ref 82–98)
MONOCYTES # BLD AUTO: 0.97 THOUSAND/ΜL (ref 0.17–1.22)
MONOCYTES NFR BLD AUTO: 9 % (ref 4–12)
NEUTROPHILS # BLD AUTO: 8.46 THOUSANDS/ΜL (ref 1.85–7.62)
NEUTS SEG NFR BLD AUTO: 74 % (ref 43–75)
NRBC BLD AUTO-RTO: 0 /100 WBCS
PLATELET # BLD AUTO: 251 THOUSANDS/UL (ref 149–390)
PMV BLD AUTO: 10.1 FL (ref 8.9–12.7)
POTASSIUM SERPL-SCNC: 4.2 MMOL/L (ref 3.5–5.3)
RBC # BLD AUTO: 4.01 MILLION/UL (ref 3.88–5.62)
SODIUM SERPL-SCNC: 138 MMOL/L (ref 136–145)
WBC # BLD AUTO: 11.35 THOUSAND/UL (ref 4.31–10.16)

## 2020-05-13 PROCEDURE — 82948 REAGENT STRIP/BLOOD GLUCOSE: CPT

## 2020-05-13 PROCEDURE — 85025 COMPLETE CBC W/AUTO DIFF WBC: CPT | Performed by: NURSE PRACTITIONER

## 2020-05-13 PROCEDURE — 80048 BASIC METABOLIC PNL TOTAL CA: CPT | Performed by: NURSE PRACTITIONER

## 2020-05-13 PROCEDURE — 99239 HOSP IP/OBS DSCHRG MGMT >30: CPT | Performed by: PHYSICIAN ASSISTANT

## 2020-05-13 RX ORDER — CEPHALEXIN 500 MG/1
500 CAPSULE ORAL EVERY 12 HOURS SCHEDULED
Qty: 4 CAPSULE | Refills: 0
Start: 2020-05-14 | End: 2020-05-16

## 2020-05-13 RX ORDER — TAMSULOSIN HYDROCHLORIDE 0.4 MG/1
0.4 CAPSULE ORAL
Qty: 30 CAPSULE | Refills: 0
Start: 2020-05-13

## 2020-05-13 RX ORDER — FINASTERIDE 5 MG/1
5 TABLET, FILM COATED ORAL DAILY
Qty: 30 TABLET | Refills: 0
Start: 2020-05-13

## 2020-05-13 RX ADMIN — INSULIN LISPRO 1 UNITS: 100 INJECTION, SOLUTION INTRAVENOUS; SUBCUTANEOUS at 09:37

## 2020-05-13 RX ADMIN — HEPARIN SODIUM 5000 UNITS: 5000 INJECTION INTRAVENOUS; SUBCUTANEOUS at 06:04

## 2020-05-13 RX ADMIN — ATORVASTATIN CALCIUM 20 MG: 20 TABLET, FILM COATED ORAL at 09:37

## 2020-05-13 RX ADMIN — FINASTERIDE 5 MG: 5 TABLET, FILM COATED ORAL at 09:37

## 2020-05-13 RX ADMIN — LISINOPRIL 5 MG: 5 TABLET ORAL at 09:37

## 2020-05-13 RX ADMIN — INSULIN LISPRO 2 UNITS: 100 INJECTION, SOLUTION INTRAVENOUS; SUBCUTANEOUS at 12:05

## 2020-05-13 RX ADMIN — CEFTRIAXONE SODIUM 1000 MG: 10 INJECTION, POWDER, FOR SOLUTION INTRAVENOUS at 06:00

## 2020-05-15 ENCOUNTER — TRANSITIONAL CARE MANAGEMENT (OUTPATIENT)
Dept: FAMILY MEDICINE CLINIC | Facility: CLINIC | Age: 70
End: 2020-05-15

## 2020-05-15 ENCOUNTER — PATIENT OUTREACH (OUTPATIENT)
Dept: FAMILY MEDICINE CLINIC | Facility: CLINIC | Age: 70
End: 2020-05-15

## 2020-05-19 ENCOUNTER — PATIENT OUTREACH (OUTPATIENT)
Dept: FAMILY MEDICINE CLINIC | Facility: CLINIC | Age: 70
End: 2020-05-19

## 2020-05-30 DIAGNOSIS — R42 VERTIGO: ICD-10-CM

## 2020-05-30 DIAGNOSIS — E78.5 HYPERLIPIDEMIA, UNSPECIFIED HYPERLIPIDEMIA TYPE: ICD-10-CM

## 2020-05-30 DIAGNOSIS — K59.00 CONSTIPATION: ICD-10-CM

## 2020-05-31 RX ORDER — MECLIZINE HYDROCHLORIDE 25 MG/1
TABLET ORAL
Qty: 90 TABLET | Refills: 1 | Status: SHIPPED | OUTPATIENT
Start: 2020-05-31 | End: 2021-09-27 | Stop reason: HOSPADM

## 2020-05-31 RX ORDER — ATORVASTATIN CALCIUM 20 MG/1
TABLET, FILM COATED ORAL
Qty: 90 TABLET | Refills: 1 | Status: ON HOLD | OUTPATIENT
Start: 2020-05-31 | End: 2020-06-17

## 2020-05-31 RX ORDER — FUROSEMIDE 40 MG/1
40 TABLET ORAL DAILY
Qty: 60 TABLET | Refills: 0 | Status: SHIPPED | OUTPATIENT
Start: 2020-05-31 | End: 2020-06-23 | Stop reason: HOSPADM

## 2020-06-02 RX ORDER — POLYETHYLENE GLYCOL 3350 17 G/17G
POWDER, FOR SOLUTION ORAL
Qty: 510 G | OUTPATIENT
Start: 2020-06-02

## 2020-06-03 DIAGNOSIS — K59.00 CONSTIPATION: ICD-10-CM

## 2020-06-03 DIAGNOSIS — N18.30 STAGE 3 CHRONIC KIDNEY DISEASE (HCC): ICD-10-CM

## 2020-06-03 RX ORDER — LISINOPRIL 5 MG/1
TABLET ORAL
Qty: 90 TABLET | Refills: 1 | Status: ON HOLD | OUTPATIENT
Start: 2020-06-03 | End: 2020-06-17

## 2020-06-03 RX ORDER — POLYETHYLENE GLYCOL 3350 17 G/17G
17 POWDER, FOR SOLUTION ORAL DAILY
Qty: 14 EACH | Refills: 2 | Status: SHIPPED | OUTPATIENT
Start: 2020-06-03

## 2020-06-04 DIAGNOSIS — E11.42 TYPE 2 DIABETES MELLITUS WITH DIABETIC POLYNEUROPATHY, WITHOUT LONG-TERM CURRENT USE OF INSULIN (HCC): ICD-10-CM

## 2020-06-04 DIAGNOSIS — E11.9 TYPE 2 DIABETES MELLITUS WITHOUT COMPLICATION, WITHOUT LONG-TERM CURRENT USE OF INSULIN (HCC): ICD-10-CM

## 2020-06-04 RX ORDER — GLIPIZIDE 10 MG/1
TABLET ORAL
Qty: 180 TABLET | Refills: 1 | Status: SHIPPED | OUTPATIENT
Start: 2020-06-04 | End: 2020-11-09

## 2020-06-11 NOTE — PROGRESS NOTES
Local with sedation Visiting RN called to report that patient's home care certification period is ending  Patient continues to have wounds on buttock that persist due to patient's wife being unable to provide adequate assistance with personal care  Patient's wife is not able to correctly fill patient's medication box  Wife, Venancio Bone, is exhibiting signs of caregiver burn out  Patient has insurance care manager, [facility] , Home care agency and [employer] friends who are all providing assistance  Arron Thomas stated she was given contact information, by Venancio Bone, for an additional resource  When Arron Thomas called the phone number provided, she was not clear about what agency the phone number was connected with  The agency representative stated that their goal was to obtain guardianship of patient and Venancio Bone which would include caregivers for both patient and spouse  Arrondeclan Thomas has telephonic appointment with that agency at 4 PM today  She will update care manager after that appointment  Arron Thomas also plans to contact the IEB to expedite MA waiver application

## 2020-06-13 ENCOUNTER — APPOINTMENT (EMERGENCY)
Dept: RADIOLOGY | Facility: HOSPITAL | Age: 70
DRG: 640 | End: 2020-06-13
Payer: COMMERCIAL

## 2020-06-13 ENCOUNTER — HOSPITAL ENCOUNTER (INPATIENT)
Facility: HOSPITAL | Age: 70
LOS: 9 days | Discharge: NURSING, REHAB OR LONG-TERM CARE | DRG: 640 | End: 2020-06-23
Attending: EMERGENCY MEDICINE | Admitting: INTERNAL MEDICINE
Payer: COMMERCIAL

## 2020-06-13 DIAGNOSIS — R31.0 GROSS HEMATURIA: ICD-10-CM

## 2020-06-13 DIAGNOSIS — M54.2 NECK PAIN: ICD-10-CM

## 2020-06-13 DIAGNOSIS — E55.9 VITAMIN D DEFICIENCY: ICD-10-CM

## 2020-06-13 DIAGNOSIS — R53.1 LEFT-SIDED WEAKNESS: ICD-10-CM

## 2020-06-13 DIAGNOSIS — S09.90XA CLOSED HEAD INJURY, INITIAL ENCOUNTER: Primary | ICD-10-CM

## 2020-06-13 DIAGNOSIS — R26.2 AMBULATORY DYSFUNCTION: ICD-10-CM

## 2020-06-13 DIAGNOSIS — K59.00 CONSTIPATION: ICD-10-CM

## 2020-06-13 DIAGNOSIS — R00.0 TACHYCARDIA: ICD-10-CM

## 2020-06-13 DIAGNOSIS — R42 VERTIGO: ICD-10-CM

## 2020-06-13 DIAGNOSIS — R55 NEAR SYNCOPE: ICD-10-CM

## 2020-06-13 DIAGNOSIS — R07.89 CHEST WALL PAIN: ICD-10-CM

## 2020-06-13 DIAGNOSIS — N30.01 ACUTE CYSTITIS WITH HEMATURIA: ICD-10-CM

## 2020-06-13 DIAGNOSIS — E53.8 B12 DEFICIENCY: ICD-10-CM

## 2020-06-13 PROBLEM — E86.0 DEHYDRATION: Status: ACTIVE | Noted: 2020-06-13

## 2020-06-13 LAB
ALBUMIN SERPL BCP-MCNC: 3.4 G/DL (ref 3.5–5)
ALP SERPL-CCNC: 79 U/L (ref 46–116)
ALT SERPL W P-5'-P-CCNC: 25 U/L (ref 12–78)
ANION GAP SERPL CALCULATED.3IONS-SCNC: 6 MMOL/L (ref 4–13)
AST SERPL W P-5'-P-CCNC: 13 U/L (ref 5–45)
BASOPHILS # BLD AUTO: 0.03 THOUSANDS/ΜL (ref 0–0.1)
BASOPHILS NFR BLD AUTO: 0 % (ref 0–1)
BILIRUB SERPL-MCNC: 0.45 MG/DL (ref 0.2–1)
BUN SERPL-MCNC: 32 MG/DL (ref 5–25)
CALCIUM SERPL-MCNC: 8.3 MG/DL (ref 8.3–10.1)
CHLORIDE SERPL-SCNC: 104 MMOL/L (ref 100–108)
CO2 SERPL-SCNC: 28 MMOL/L (ref 21–32)
CREAT SERPL-MCNC: 1.74 MG/DL (ref 0.6–1.3)
EOSINOPHIL # BLD AUTO: 0.3 THOUSAND/ΜL (ref 0–0.61)
EOSINOPHIL NFR BLD AUTO: 3 % (ref 0–6)
ERYTHROCYTE [DISTWIDTH] IN BLOOD BY AUTOMATED COUNT: 13.5 % (ref 11.6–15.1)
GFR SERPL CREATININE-BSD FRML MDRD: 39 ML/MIN/1.73SQ M
GLUCOSE SERPL-MCNC: 160 MG/DL (ref 65–140)
HCT VFR BLD AUTO: 35.5 % (ref 36.5–49.3)
HGB BLD-MCNC: 11.9 G/DL (ref 12–17)
IMM GRANULOCYTES # BLD AUTO: 0.06 THOUSAND/UL (ref 0–0.2)
IMM GRANULOCYTES NFR BLD AUTO: 1 % (ref 0–2)
LYMPHOCYTES # BLD AUTO: 0.42 THOUSANDS/ΜL (ref 0.6–4.47)
LYMPHOCYTES NFR BLD AUTO: 4 % (ref 14–44)
MCH RBC QN AUTO: 29.7 PG (ref 26.8–34.3)
MCHC RBC AUTO-ENTMCNC: 33.5 G/DL (ref 31.4–37.4)
MCV RBC AUTO: 89 FL (ref 82–98)
MONOCYTES # BLD AUTO: 1.33 THOUSAND/ΜL (ref 0.17–1.22)
MONOCYTES NFR BLD AUTO: 14 % (ref 4–12)
NEUTROPHILS # BLD AUTO: 7.47 THOUSANDS/ΜL (ref 1.85–7.62)
NEUTS SEG NFR BLD AUTO: 78 % (ref 43–75)
NRBC BLD AUTO-RTO: 0 /100 WBCS
PLATELET # BLD AUTO: 269 THOUSANDS/UL (ref 149–390)
PMV BLD AUTO: 10.1 FL (ref 8.9–12.7)
POTASSIUM SERPL-SCNC: 3.9 MMOL/L (ref 3.5–5.3)
PROT SERPL-MCNC: 6.9 G/DL (ref 6.4–8.2)
RBC # BLD AUTO: 4.01 MILLION/UL (ref 3.88–5.62)
SODIUM SERPL-SCNC: 138 MMOL/L (ref 136–145)
TROPONIN I SERPL-MCNC: <0.02 NG/ML
WBC # BLD AUTO: 9.61 THOUSAND/UL (ref 4.31–10.16)

## 2020-06-13 PROCEDURE — 99285 EMERGENCY DEPT VISIT HI MDM: CPT

## 2020-06-13 PROCEDURE — 36415 COLL VENOUS BLD VENIPUNCTURE: CPT

## 2020-06-13 PROCEDURE — 70450 CT HEAD/BRAIN W/O DYE: CPT

## 2020-06-13 PROCEDURE — 71045 X-RAY EXAM CHEST 1 VIEW: CPT

## 2020-06-13 PROCEDURE — 99285 EMERGENCY DEPT VISIT HI MDM: CPT | Performed by: EMERGENCY MEDICINE

## 2020-06-13 PROCEDURE — 85025 COMPLETE CBC W/AUTO DIFF WBC: CPT | Performed by: EMERGENCY MEDICINE

## 2020-06-13 PROCEDURE — 99220 PR INITIAL OBSERVATION CARE/DAY 70 MINUTES: CPT | Performed by: INTERNAL MEDICINE

## 2020-06-13 PROCEDURE — 93005 ELECTROCARDIOGRAM TRACING: CPT

## 2020-06-13 PROCEDURE — 84484 ASSAY OF TROPONIN QUANT: CPT | Performed by: EMERGENCY MEDICINE

## 2020-06-13 PROCEDURE — 72125 CT NECK SPINE W/O DYE: CPT

## 2020-06-13 PROCEDURE — 80053 COMPREHEN METABOLIC PANEL: CPT | Performed by: EMERGENCY MEDICINE

## 2020-06-14 LAB
ANION GAP SERPL CALCULATED.3IONS-SCNC: 4 MMOL/L (ref 4–13)
ATRIAL RATE: 102 BPM
BASOPHILS # BLD AUTO: 0.02 THOUSANDS/ΜL (ref 0–0.1)
BASOPHILS NFR BLD AUTO: 0 % (ref 0–1)
BUN SERPL-MCNC: 27 MG/DL (ref 5–25)
CALCIUM SERPL-MCNC: 8.7 MG/DL (ref 8.3–10.1)
CHLORIDE SERPL-SCNC: 105 MMOL/L (ref 100–108)
CO2 SERPL-SCNC: 29 MMOL/L (ref 21–32)
CREAT SERPL-MCNC: 1.69 MG/DL (ref 0.6–1.3)
EOSINOPHIL # BLD AUTO: 0.04 THOUSAND/ΜL (ref 0–0.61)
EOSINOPHIL NFR BLD AUTO: 1 % (ref 0–6)
ERYTHROCYTE [DISTWIDTH] IN BLOOD BY AUTOMATED COUNT: 13.6 % (ref 11.6–15.1)
EST. AVERAGE GLUCOSE BLD GHB EST-MCNC: 131 MG/DL
GFR SERPL CREATININE-BSD FRML MDRD: 41 ML/MIN/1.73SQ M
GLUCOSE P FAST SERPL-MCNC: 139 MG/DL (ref 65–99)
GLUCOSE SERPL-MCNC: 127 MG/DL (ref 65–140)
GLUCOSE SERPL-MCNC: 134 MG/DL (ref 65–140)
GLUCOSE SERPL-MCNC: 139 MG/DL (ref 65–140)
GLUCOSE SERPL-MCNC: 145 MG/DL (ref 65–140)
GLUCOSE SERPL-MCNC: 201 MG/DL (ref 65–140)
HBA1C MFR BLD: 6.2 %
HCT VFR BLD AUTO: 35.4 % (ref 36.5–49.3)
HGB BLD-MCNC: 11.4 G/DL (ref 12–17)
IMM GRANULOCYTES # BLD AUTO: 0.07 THOUSAND/UL (ref 0–0.2)
IMM GRANULOCYTES NFR BLD AUTO: 1 % (ref 0–2)
LYMPHOCYTES # BLD AUTO: 0.59 THOUSANDS/ΜL (ref 0.6–4.47)
LYMPHOCYTES NFR BLD AUTO: 7 % (ref 14–44)
MAGNESIUM SERPL-MCNC: 2.3 MG/DL (ref 1.6–2.6)
MCH RBC QN AUTO: 29.2 PG (ref 26.8–34.3)
MCHC RBC AUTO-ENTMCNC: 32.2 G/DL (ref 31.4–37.4)
MCV RBC AUTO: 91 FL (ref 82–98)
MONOCYTES # BLD AUTO: 1.41 THOUSAND/ΜL (ref 0.17–1.22)
MONOCYTES NFR BLD AUTO: 16 % (ref 4–12)
NEUTROPHILS # BLD AUTO: 6.71 THOUSANDS/ΜL (ref 1.85–7.62)
NEUTS SEG NFR BLD AUTO: 75 % (ref 43–75)
NRBC BLD AUTO-RTO: 0 /100 WBCS
P AXIS: 68 DEGREES
PHOSPHATE SERPL-MCNC: 3.4 MG/DL (ref 2.3–4.1)
PLATELET # BLD AUTO: 252 THOUSANDS/UL (ref 149–390)
PMV BLD AUTO: 10 FL (ref 8.9–12.7)
POTASSIUM SERPL-SCNC: 4.1 MMOL/L (ref 3.5–5.3)
PR INTERVAL: 142 MS
QRS AXIS: 53 DEGREES
QRSD INTERVAL: 98 MS
QT INTERVAL: 354 MS
QTC INTERVAL: 461 MS
RBC # BLD AUTO: 3.91 MILLION/UL (ref 3.88–5.62)
SODIUM SERPL-SCNC: 138 MMOL/L (ref 136–145)
T WAVE AXIS: 47 DEGREES
TROPONIN I SERPL-MCNC: 0.02 NG/ML
TROPONIN I SERPL-MCNC: <0.02 NG/ML
TSH SERPL DL<=0.05 MIU/L-ACNC: 0.33 UIU/ML (ref 0.36–3.74)
VENTRICULAR RATE: 102 BPM
WBC # BLD AUTO: 8.84 THOUSAND/UL (ref 4.31–10.16)

## 2020-06-14 PROCEDURE — 84443 ASSAY THYROID STIM HORMONE: CPT | Performed by: INTERNAL MEDICINE

## 2020-06-14 PROCEDURE — 84100 ASSAY OF PHOSPHORUS: CPT | Performed by: INTERNAL MEDICINE

## 2020-06-14 PROCEDURE — 99232 SBSQ HOSP IP/OBS MODERATE 35: CPT | Performed by: INTERNAL MEDICINE

## 2020-06-14 PROCEDURE — 82948 REAGENT STRIP/BLOOD GLUCOSE: CPT

## 2020-06-14 PROCEDURE — 85025 COMPLETE CBC W/AUTO DIFF WBC: CPT | Performed by: INTERNAL MEDICINE

## 2020-06-14 PROCEDURE — 3044F HG A1C LEVEL LT 7.0%: CPT | Performed by: INTERNAL MEDICINE

## 2020-06-14 PROCEDURE — 93010 ELECTROCARDIOGRAM REPORT: CPT | Performed by: INTERNAL MEDICINE

## 2020-06-14 PROCEDURE — 84484 ASSAY OF TROPONIN QUANT: CPT | Performed by: INTERNAL MEDICINE

## 2020-06-14 PROCEDURE — 83735 ASSAY OF MAGNESIUM: CPT | Performed by: INTERNAL MEDICINE

## 2020-06-14 PROCEDURE — 80048 BASIC METABOLIC PNL TOTAL CA: CPT | Performed by: INTERNAL MEDICINE

## 2020-06-14 PROCEDURE — 83036 HEMOGLOBIN GLYCOSYLATED A1C: CPT | Performed by: INTERNAL MEDICINE

## 2020-06-14 PROCEDURE — 36415 COLL VENOUS BLD VENIPUNCTURE: CPT | Performed by: INTERNAL MEDICINE

## 2020-06-14 RX ORDER — ACETAMINOPHEN 325 MG/1
650 TABLET ORAL EVERY 6 HOURS PRN
Status: DISCONTINUED | OUTPATIENT
Start: 2020-06-14 | End: 2020-06-14

## 2020-06-14 RX ORDER — ONDANSETRON 2 MG/ML
4 INJECTION INTRAMUSCULAR; INTRAVENOUS EVERY 6 HOURS PRN
Status: DISCONTINUED | OUTPATIENT
Start: 2020-06-14 | End: 2020-06-23 | Stop reason: HOSPADM

## 2020-06-14 RX ORDER — SODIUM CHLORIDE, SODIUM GLUCONATE, SODIUM ACETATE, POTASSIUM CHLORIDE, MAGNESIUM CHLORIDE, SODIUM PHOSPHATE, DIBASIC, AND POTASSIUM PHOSPHATE .53; .5; .37; .037; .03; .012; .00082 G/100ML; G/100ML; G/100ML; G/100ML; G/100ML; G/100ML; G/100ML
100 INJECTION, SOLUTION INTRAVENOUS CONTINUOUS
Status: DISCONTINUED | OUTPATIENT
Start: 2020-06-14 | End: 2020-06-15

## 2020-06-14 RX ORDER — SENNOSIDES 8.6 MG
1 TABLET ORAL DAILY
Status: DISCONTINUED | OUTPATIENT
Start: 2020-06-14 | End: 2020-06-20

## 2020-06-14 RX ORDER — ACETAMINOPHEN 325 MG/1
975 TABLET ORAL EVERY 8 HOURS SCHEDULED
Status: DISCONTINUED | OUTPATIENT
Start: 2020-06-14 | End: 2020-06-21

## 2020-06-14 RX ORDER — SENNOSIDES 8.6 MG
1 TABLET ORAL DAILY PRN
Status: DISCONTINUED | OUTPATIENT
Start: 2020-06-14 | End: 2020-06-20

## 2020-06-14 RX ORDER — TAMSULOSIN HYDROCHLORIDE 0.4 MG/1
0.4 CAPSULE ORAL
Status: DISCONTINUED | OUTPATIENT
Start: 2020-06-14 | End: 2020-06-23 | Stop reason: HOSPADM

## 2020-06-14 RX ORDER — MAGNESIUM HYDROXIDE/ALUMINUM HYDROXICE/SIMETHICONE 120; 1200; 1200 MG/30ML; MG/30ML; MG/30ML
15 SUSPENSION ORAL EVERY 6 HOURS PRN
Status: DISCONTINUED | OUTPATIENT
Start: 2020-06-14 | End: 2020-06-23 | Stop reason: HOSPADM

## 2020-06-14 RX ORDER — FINASTERIDE 5 MG/1
5 TABLET, FILM COATED ORAL DAILY
Status: DISCONTINUED | OUTPATIENT
Start: 2020-06-14 | End: 2020-06-23 | Stop reason: HOSPADM

## 2020-06-14 RX ORDER — ATORVASTATIN CALCIUM 20 MG/1
20 TABLET, FILM COATED ORAL DAILY
Status: DISCONTINUED | OUTPATIENT
Start: 2020-06-14 | End: 2020-06-23 | Stop reason: HOSPADM

## 2020-06-14 RX ORDER — LISINOPRIL 5 MG/1
5 TABLET ORAL DAILY
Status: DISCONTINUED | OUTPATIENT
Start: 2020-06-14 | End: 2020-06-14

## 2020-06-14 RX ORDER — MECLIZINE HYDROCHLORIDE 25 MG/1
25 TABLET ORAL EVERY 8 HOURS PRN
Status: DISCONTINUED | OUTPATIENT
Start: 2020-06-14 | End: 2020-06-23 | Stop reason: HOSPADM

## 2020-06-14 RX ORDER — LIDOCAINE 50 MG/G
1 PATCH TOPICAL DAILY
Status: DISCONTINUED | OUTPATIENT
Start: 2020-06-14 | End: 2020-06-23 | Stop reason: HOSPADM

## 2020-06-14 RX ORDER — SIMETHICONE 80 MG
80 TABLET,CHEWABLE ORAL EVERY 6 HOURS PRN
Status: DISCONTINUED | OUTPATIENT
Start: 2020-06-14 | End: 2020-06-23 | Stop reason: HOSPADM

## 2020-06-14 RX ADMIN — ACETAMINOPHEN 975 MG: 325 TABLET ORAL at 21:35

## 2020-06-14 RX ADMIN — SENNOSIDES 8.6 MG: 8.6 TABLET, FILM COATED ORAL at 08:57

## 2020-06-14 RX ADMIN — ACETAMINOPHEN 975 MG: 325 TABLET ORAL at 13:54

## 2020-06-14 RX ADMIN — ACETAMINOPHEN 975 MG: 325 TABLET ORAL at 08:56

## 2020-06-14 RX ADMIN — LIDOCAINE 1 PATCH: 50 PATCH CUTANEOUS at 08:59

## 2020-06-14 RX ADMIN — ENOXAPARIN SODIUM 40 MG: 40 INJECTION SUBCUTANEOUS at 08:59

## 2020-06-14 RX ADMIN — FINASTERIDE 5 MG: 5 TABLET, FILM COATED ORAL at 08:58

## 2020-06-14 RX ADMIN — SODIUM CHLORIDE, SODIUM GLUCONATE, SODIUM ACETATE, POTASSIUM CHLORIDE, MAGNESIUM CHLORIDE, SODIUM PHOSPHATE, DIBASIC, AND POTASSIUM PHOSPHATE 100 ML/HR: .53; .5; .37; .037; .03; .012; .00082 INJECTION, SOLUTION INTRAVENOUS at 19:53

## 2020-06-14 RX ADMIN — ATORVASTATIN CALCIUM 20 MG: 20 TABLET, FILM COATED ORAL at 08:58

## 2020-06-14 RX ADMIN — TAMSULOSIN HYDROCHLORIDE 0.4 MG: 0.4 CAPSULE ORAL at 16:48

## 2020-06-14 RX ADMIN — INSULIN LISPRO 2 UNITS: 100 INJECTION, SOLUTION INTRAVENOUS; SUBCUTANEOUS at 10:46

## 2020-06-14 RX ADMIN — SODIUM CHLORIDE, SODIUM GLUCONATE, SODIUM ACETATE, POTASSIUM CHLORIDE, MAGNESIUM CHLORIDE, SODIUM PHOSPHATE, DIBASIC, AND POTASSIUM PHOSPHATE 100 ML/HR: .53; .5; .37; .037; .03; .012; .00082 INJECTION, SOLUTION INTRAVENOUS at 03:16

## 2020-06-14 RX ADMIN — SODIUM CHLORIDE, SODIUM GLUCONATE, SODIUM ACETATE, POTASSIUM CHLORIDE, MAGNESIUM CHLORIDE, SODIUM PHOSPHATE, DIBASIC, AND POTASSIUM PHOSPHATE 100 ML/HR: .53; .5; .37; .037; .03; .012; .00082 INJECTION, SOLUTION INTRAVENOUS at 10:22

## 2020-06-15 ENCOUNTER — PATIENT OUTREACH (OUTPATIENT)
Dept: FAMILY MEDICINE CLINIC | Facility: CLINIC | Age: 70
End: 2020-06-15

## 2020-06-15 ENCOUNTER — APPOINTMENT (INPATIENT)
Dept: RADIOLOGY | Facility: HOSPITAL | Age: 70
DRG: 640 | End: 2020-06-15
Payer: COMMERCIAL

## 2020-06-15 PROBLEM — E86.0 DEHYDRATION: Status: ACTIVE | Noted: 2020-06-15

## 2020-06-15 LAB
ANION GAP SERPL CALCULATED.3IONS-SCNC: 4 MMOL/L (ref 4–13)
BASOPHILS # BLD AUTO: 0.01 THOUSANDS/ΜL (ref 0–0.1)
BASOPHILS NFR BLD AUTO: 0 % (ref 0–1)
BUN SERPL-MCNC: 22 MG/DL (ref 5–25)
CALCIUM SERPL-MCNC: 7.7 MG/DL (ref 8.3–10.1)
CHLORIDE SERPL-SCNC: 107 MMOL/L (ref 100–108)
CO2 SERPL-SCNC: 28 MMOL/L (ref 21–32)
CREAT SERPL-MCNC: 1.34 MG/DL (ref 0.6–1.3)
EOSINOPHIL # BLD AUTO: 0.19 THOUSAND/ΜL (ref 0–0.61)
EOSINOPHIL NFR BLD AUTO: 4 % (ref 0–6)
ERYTHROCYTE [DISTWIDTH] IN BLOOD BY AUTOMATED COUNT: 13.9 % (ref 11.6–15.1)
GFR SERPL CREATININE-BSD FRML MDRD: 54 ML/MIN/1.73SQ M
GLUCOSE SERPL-MCNC: 102 MG/DL (ref 65–140)
GLUCOSE SERPL-MCNC: 111 MG/DL (ref 65–140)
GLUCOSE SERPL-MCNC: 124 MG/DL (ref 65–140)
GLUCOSE SERPL-MCNC: 139 MG/DL (ref 65–140)
GLUCOSE SERPL-MCNC: 99 MG/DL (ref 65–140)
HCT VFR BLD AUTO: 32.5 % (ref 36.5–49.3)
HGB BLD-MCNC: 10.4 G/DL (ref 12–17)
IMM GRANULOCYTES # BLD AUTO: 0.03 THOUSAND/UL (ref 0–0.2)
IMM GRANULOCYTES NFR BLD AUTO: 1 % (ref 0–2)
LYMPHOCYTES # BLD AUTO: 1.13 THOUSANDS/ΜL (ref 0.6–4.47)
LYMPHOCYTES NFR BLD AUTO: 23 % (ref 14–44)
MCH RBC QN AUTO: 29.5 PG (ref 26.8–34.3)
MCHC RBC AUTO-ENTMCNC: 32 G/DL (ref 31.4–37.4)
MCV RBC AUTO: 92 FL (ref 82–98)
MONOCYTES # BLD AUTO: 0.85 THOUSAND/ΜL (ref 0.17–1.22)
MONOCYTES NFR BLD AUTO: 18 % (ref 4–12)
NEUTROPHILS # BLD AUTO: 2.61 THOUSANDS/ΜL (ref 1.85–7.62)
NEUTS SEG NFR BLD AUTO: 54 % (ref 43–75)
NRBC BLD AUTO-RTO: 0 /100 WBCS
PLATELET # BLD AUTO: 186 THOUSANDS/UL (ref 149–390)
PMV BLD AUTO: 10.4 FL (ref 8.9–12.7)
POTASSIUM SERPL-SCNC: 4.2 MMOL/L (ref 3.5–5.3)
RBC # BLD AUTO: 3.53 MILLION/UL (ref 3.88–5.62)
SODIUM SERPL-SCNC: 139 MMOL/L (ref 136–145)
T4 FREE SERPL-MCNC: 1.23 NG/DL (ref 0.76–1.46)
WBC # BLD AUTO: 4.82 THOUSAND/UL (ref 4.31–10.16)

## 2020-06-15 PROCEDURE — 85025 COMPLETE CBC W/AUTO DIFF WBC: CPT | Performed by: INTERNAL MEDICINE

## 2020-06-15 PROCEDURE — 97167 OT EVAL HIGH COMPLEX 60 MIN: CPT

## 2020-06-15 PROCEDURE — 84439 ASSAY OF FREE THYROXINE: CPT | Performed by: INTERNAL MEDICINE

## 2020-06-15 PROCEDURE — 70551 MRI BRAIN STEM W/O DYE: CPT

## 2020-06-15 PROCEDURE — 99232 SBSQ HOSP IP/OBS MODERATE 35: CPT | Performed by: INTERNAL MEDICINE

## 2020-06-15 PROCEDURE — 97163 PT EVAL HIGH COMPLEX 45 MIN: CPT

## 2020-06-15 PROCEDURE — 82948 REAGENT STRIP/BLOOD GLUCOSE: CPT

## 2020-06-15 PROCEDURE — 80048 BASIC METABOLIC PNL TOTAL CA: CPT | Performed by: INTERNAL MEDICINE

## 2020-06-15 RX ADMIN — STANDARDIZED SENNA CONCENTRATE 8.6 MG: 8.6 TABLET ORAL at 08:44

## 2020-06-15 RX ADMIN — SODIUM CHLORIDE, SODIUM GLUCONATE, SODIUM ACETATE, POTASSIUM CHLORIDE, MAGNESIUM CHLORIDE, SODIUM PHOSPHATE, DIBASIC, AND POTASSIUM PHOSPHATE 100 ML/HR: .53; .5; .37; .037; .03; .012; .00082 INJECTION, SOLUTION INTRAVENOUS at 05:22

## 2020-06-15 RX ADMIN — TAMSULOSIN HYDROCHLORIDE 0.4 MG: 0.4 CAPSULE ORAL at 17:08

## 2020-06-15 RX ADMIN — LIDOCAINE 1 PATCH: 50 PATCH CUTANEOUS at 08:45

## 2020-06-15 RX ADMIN — ACETAMINOPHEN 975 MG: 325 TABLET ORAL at 22:49

## 2020-06-15 RX ADMIN — ENOXAPARIN SODIUM 40 MG: 40 INJECTION SUBCUTANEOUS at 08:44

## 2020-06-15 RX ADMIN — SODIUM CHLORIDE, SODIUM GLUCONATE, SODIUM ACETATE, POTASSIUM CHLORIDE, MAGNESIUM CHLORIDE, SODIUM PHOSPHATE, DIBASIC, AND POTASSIUM PHOSPHATE 100 ML/HR: .53; .5; .37; .037; .03; .012; .00082 INJECTION, SOLUTION INTRAVENOUS at 13:16

## 2020-06-15 RX ADMIN — ACETAMINOPHEN 975 MG: 325 TABLET ORAL at 05:22

## 2020-06-15 RX ADMIN — ATORVASTATIN CALCIUM 20 MG: 20 TABLET, FILM COATED ORAL at 08:44

## 2020-06-15 RX ADMIN — FINASTERIDE 5 MG: 5 TABLET, FILM COATED ORAL at 08:44

## 2020-06-15 RX ADMIN — ACETAMINOPHEN 975 MG: 325 TABLET ORAL at 13:15

## 2020-06-16 DIAGNOSIS — E78.5 HYPERLIPIDEMIA, UNSPECIFIED HYPERLIPIDEMIA TYPE: ICD-10-CM

## 2020-06-16 DIAGNOSIS — N18.30 STAGE 3 CHRONIC KIDNEY DISEASE (HCC): ICD-10-CM

## 2020-06-16 PROBLEM — R53.1 LEFT-SIDED WEAKNESS: Status: ACTIVE | Noted: 2020-06-16

## 2020-06-16 PROBLEM — U07.1 COVID-19: Status: ACTIVE | Noted: 2020-06-16

## 2020-06-16 LAB
ANION GAP SERPL CALCULATED.3IONS-SCNC: 4 MMOL/L (ref 4–13)
BASOPHILS # BLD AUTO: 0.01 THOUSANDS/ΜL (ref 0–0.1)
BASOPHILS NFR BLD AUTO: 0 % (ref 0–1)
BUN SERPL-MCNC: 20 MG/DL (ref 5–25)
CALCIUM SERPL-MCNC: 8.4 MG/DL (ref 8.3–10.1)
CHLORIDE SERPL-SCNC: 109 MMOL/L (ref 100–108)
CO2 SERPL-SCNC: 26 MMOL/L (ref 21–32)
CREAT SERPL-MCNC: 1.21 MG/DL (ref 0.6–1.3)
EOSINOPHIL # BLD AUTO: 0.33 THOUSAND/ΜL (ref 0–0.61)
EOSINOPHIL NFR BLD AUTO: 7 % (ref 0–6)
ERYTHROCYTE [DISTWIDTH] IN BLOOD BY AUTOMATED COUNT: 13.6 % (ref 11.6–15.1)
GFR SERPL CREATININE-BSD FRML MDRD: 61 ML/MIN/1.73SQ M
GLUCOSE SERPL-MCNC: 105 MG/DL (ref 65–140)
GLUCOSE SERPL-MCNC: 114 MG/DL (ref 65–140)
GLUCOSE SERPL-MCNC: 148 MG/DL (ref 65–140)
GLUCOSE SERPL-MCNC: 171 MG/DL (ref 65–140)
HCT VFR BLD AUTO: 31.7 % (ref 36.5–49.3)
HGB BLD-MCNC: 10.3 G/DL (ref 12–17)
IMM GRANULOCYTES # BLD AUTO: 0.02 THOUSAND/UL (ref 0–0.2)
IMM GRANULOCYTES NFR BLD AUTO: 0 % (ref 0–2)
LYMPHOCYTES # BLD AUTO: 0.87 THOUSANDS/ΜL (ref 0.6–4.47)
LYMPHOCYTES NFR BLD AUTO: 19 % (ref 14–44)
MCH RBC QN AUTO: 29.2 PG (ref 26.8–34.3)
MCHC RBC AUTO-ENTMCNC: 32.5 G/DL (ref 31.4–37.4)
MCV RBC AUTO: 90 FL (ref 82–98)
MONOCYTES # BLD AUTO: 0.56 THOUSAND/ΜL (ref 0.17–1.22)
MONOCYTES NFR BLD AUTO: 12 % (ref 4–12)
NEUTROPHILS # BLD AUTO: 2.79 THOUSANDS/ΜL (ref 1.85–7.62)
NEUTS SEG NFR BLD AUTO: 62 % (ref 43–75)
NRBC BLD AUTO-RTO: 0 /100 WBCS
PLATELET # BLD AUTO: 197 THOUSANDS/UL (ref 149–390)
PMV BLD AUTO: 10.2 FL (ref 8.9–12.7)
POTASSIUM SERPL-SCNC: 4 MMOL/L (ref 3.5–5.3)
RBC # BLD AUTO: 3.53 MILLION/UL (ref 3.88–5.62)
SARS-COV-2 RNA RESP QL NAA+PROBE: POSITIVE
SODIUM SERPL-SCNC: 139 MMOL/L (ref 136–145)
VIT B12 SERPL-MCNC: 312 PG/ML (ref 100–900)
WBC # BLD AUTO: 4.58 THOUSAND/UL (ref 4.31–10.16)

## 2020-06-16 PROCEDURE — 99222 1ST HOSP IP/OBS MODERATE 55: CPT | Performed by: PSYCHIATRY & NEUROLOGY

## 2020-06-16 PROCEDURE — 97116 GAIT TRAINING THERAPY: CPT

## 2020-06-16 PROCEDURE — 99223 1ST HOSP IP/OBS HIGH 75: CPT | Performed by: PHYSICAL MEDICINE & REHABILITATION

## 2020-06-16 PROCEDURE — 82948 REAGENT STRIP/BLOOD GLUCOSE: CPT

## 2020-06-16 PROCEDURE — 80048 BASIC METABOLIC PNL TOTAL CA: CPT | Performed by: INTERNAL MEDICINE

## 2020-06-16 PROCEDURE — 99232 SBSQ HOSP IP/OBS MODERATE 35: CPT | Performed by: GENERAL PRACTICE

## 2020-06-16 PROCEDURE — U0003 INFECTIOUS AGENT DETECTION BY NUCLEIC ACID (DNA OR RNA); SEVERE ACUTE RESPIRATORY SYNDROME CORONAVIRUS 2 (SARS-COV-2) (CORONAVIRUS DISEASE [COVID-19]), AMPLIFIED PROBE TECHNIQUE, MAKING USE OF HIGH THROUGHPUT TECHNOLOGIES AS DESCRIBED BY CMS-2020-01-R: HCPCS | Performed by: GENERAL PRACTICE

## 2020-06-16 PROCEDURE — 82306 VITAMIN D 25 HYDROXY: CPT | Performed by: INTERNAL MEDICINE

## 2020-06-16 PROCEDURE — 97110 THERAPEUTIC EXERCISES: CPT

## 2020-06-16 PROCEDURE — 85025 COMPLETE CBC W/AUTO DIFF WBC: CPT | Performed by: INTERNAL MEDICINE

## 2020-06-16 PROCEDURE — 82607 VITAMIN B-12: CPT | Performed by: INTERNAL MEDICINE

## 2020-06-16 RX ORDER — MELATONIN
2000 DAILY
Status: DISCONTINUED | OUTPATIENT
Start: 2020-06-17 | End: 2020-06-23 | Stop reason: HOSPADM

## 2020-06-16 RX ADMIN — ATORVASTATIN CALCIUM 20 MG: 20 TABLET, FILM COATED ORAL at 08:42

## 2020-06-16 RX ADMIN — TAMSULOSIN HYDROCHLORIDE 0.4 MG: 0.4 CAPSULE ORAL at 17:00

## 2020-06-16 RX ADMIN — FINASTERIDE 5 MG: 5 TABLET, FILM COATED ORAL at 08:42

## 2020-06-16 RX ADMIN — ENOXAPARIN SODIUM 40 MG: 40 INJECTION SUBCUTANEOUS at 08:42

## 2020-06-16 RX ADMIN — LIDOCAINE 1 PATCH: 50 PATCH CUTANEOUS at 08:42

## 2020-06-16 RX ADMIN — INSULIN LISPRO 1 UNITS: 100 INJECTION, SOLUTION INTRAVENOUS; SUBCUTANEOUS at 11:31

## 2020-06-16 RX ADMIN — STANDARDIZED SENNA CONCENTRATE 8.6 MG: 8.6 TABLET ORAL at 08:42

## 2020-06-16 RX ADMIN — ACETAMINOPHEN 975 MG: 325 TABLET ORAL at 21:31

## 2020-06-16 RX ADMIN — ACETAMINOPHEN 975 MG: 325 TABLET ORAL at 05:22

## 2020-06-16 RX ADMIN — ACETAMINOPHEN 975 MG: 325 TABLET ORAL at 13:37

## 2020-06-17 LAB
ABO GROUP BLD: NORMAL
ANION GAP SERPL CALCULATED.3IONS-SCNC: 4 MMOL/L (ref 4–13)
BUN SERPL-MCNC: 17 MG/DL (ref 5–25)
CALCIUM SERPL-MCNC: 8.8 MG/DL (ref 8.3–10.1)
CHLORIDE SERPL-SCNC: 107 MMOL/L (ref 100–108)
CO2 SERPL-SCNC: 26 MMOL/L (ref 21–32)
CREAT SERPL-MCNC: 1.3 MG/DL (ref 0.6–1.3)
ERYTHROCYTE [DISTWIDTH] IN BLOOD BY AUTOMATED COUNT: 13.2 % (ref 11.6–15.1)
GFR SERPL CREATININE-BSD FRML MDRD: 56 ML/MIN/1.73SQ M
GLUCOSE SERPL-MCNC: 104 MG/DL (ref 65–140)
GLUCOSE SERPL-MCNC: 139 MG/DL (ref 65–140)
GLUCOSE SERPL-MCNC: 160 MG/DL (ref 65–140)
GLUCOSE SERPL-MCNC: 183 MG/DL (ref 65–140)
GLUCOSE SERPL-MCNC: 198 MG/DL (ref 65–140)
HCT VFR BLD AUTO: 34.6 % (ref 36.5–49.3)
HGB BLD-MCNC: 10.7 G/DL (ref 12–17)
MCH RBC QN AUTO: 29.1 PG (ref 26.8–34.3)
MCHC RBC AUTO-ENTMCNC: 30.9 G/DL (ref 31.4–37.4)
MCV RBC AUTO: 94 FL (ref 82–98)
PLATELET # BLD AUTO: 139 THOUSANDS/UL (ref 149–390)
PMV BLD AUTO: 10.3 FL (ref 8.9–12.7)
POTASSIUM SERPL-SCNC: 4.2 MMOL/L (ref 3.5–5.3)
RBC # BLD AUTO: 3.68 MILLION/UL (ref 3.88–5.62)
RH BLD: POSITIVE
SODIUM SERPL-SCNC: 137 MMOL/L (ref 136–145)
WBC # BLD AUTO: 3.57 THOUSAND/UL (ref 4.31–10.16)

## 2020-06-17 PROCEDURE — 99232 SBSQ HOSP IP/OBS MODERATE 35: CPT | Performed by: GENERAL PRACTICE

## 2020-06-17 PROCEDURE — 83520 IMMUNOASSAY QUANT NOS NONAB: CPT | Performed by: GENERAL PRACTICE

## 2020-06-17 PROCEDURE — 86901 BLOOD TYPING SEROLOGIC RH(D): CPT | Performed by: GENERAL PRACTICE

## 2020-06-17 PROCEDURE — 80048 BASIC METABOLIC PNL TOTAL CA: CPT | Performed by: GENERAL PRACTICE

## 2020-06-17 PROCEDURE — 85027 COMPLETE CBC AUTOMATED: CPT | Performed by: GENERAL PRACTICE

## 2020-06-17 PROCEDURE — 4010F ACE/ARB THERAPY RXD/TAKEN: CPT | Performed by: INTERNAL MEDICINE

## 2020-06-17 PROCEDURE — 82948 REAGENT STRIP/BLOOD GLUCOSE: CPT

## 2020-06-17 PROCEDURE — 86900 BLOOD TYPING SEROLOGIC ABO: CPT | Performed by: GENERAL PRACTICE

## 2020-06-17 RX ORDER — LISINOPRIL 5 MG/1
TABLET ORAL
Qty: 90 TABLET | Refills: 1 | Status: SHIPPED | OUTPATIENT
Start: 2020-06-17 | End: 2020-12-21

## 2020-06-17 RX ORDER — ATORVASTATIN CALCIUM 20 MG/1
TABLET, FILM COATED ORAL
Qty: 90 TABLET | Refills: 1 | Status: SHIPPED | OUTPATIENT
Start: 2020-06-17 | End: 2020-12-21

## 2020-06-17 RX ADMIN — FINASTERIDE 5 MG: 5 TABLET, FILM COATED ORAL at 08:55

## 2020-06-17 RX ADMIN — ACETAMINOPHEN 975 MG: 325 TABLET ORAL at 05:12

## 2020-06-17 RX ADMIN — ATORVASTATIN CALCIUM 20 MG: 20 TABLET, FILM COATED ORAL at 08:55

## 2020-06-17 RX ADMIN — MELATONIN 2000 UNITS: at 08:55

## 2020-06-17 RX ADMIN — TAMSULOSIN HYDROCHLORIDE 0.4 MG: 0.4 CAPSULE ORAL at 15:43

## 2020-06-17 RX ADMIN — ENOXAPARIN SODIUM 40 MG: 40 INJECTION SUBCUTANEOUS at 08:54

## 2020-06-17 RX ADMIN — ACETAMINOPHEN 975 MG: 325 TABLET ORAL at 15:42

## 2020-06-17 RX ADMIN — ACETAMINOPHEN 975 MG: 325 TABLET ORAL at 21:58

## 2020-06-17 RX ADMIN — STANDARDIZED SENNA CONCENTRATE 8.6 MG: 8.6 TABLET ORAL at 08:55

## 2020-06-17 RX ADMIN — LIDOCAINE 1 PATCH: 50 PATCH CUTANEOUS at 08:55

## 2020-06-18 LAB
BACTERIA UR QL AUTO: ABNORMAL /HPF
BILIRUB UR QL STRIP: NEGATIVE
CLARITY UR: ABNORMAL
COLOR UR: ABNORMAL
GLUCOSE SERPL-MCNC: 131 MG/DL (ref 65–140)
GLUCOSE SERPL-MCNC: 133 MG/DL (ref 65–140)
GLUCOSE SERPL-MCNC: 151 MG/DL (ref 65–140)
GLUCOSE SERPL-MCNC: 215 MG/DL (ref 65–140)
GLUCOSE UR STRIP-MCNC: NEGATIVE MG/DL
HGB UR QL STRIP.AUTO: ABNORMAL
KETONES UR STRIP-MCNC: NEGATIVE MG/DL
LEUKOCYTE ESTERASE UR QL STRIP: ABNORMAL
NITRITE UR QL STRIP: NEGATIVE
NON-SQ EPI CELLS URNS QL MICRO: ABNORMAL /HPF
PH UR STRIP.AUTO: 7 [PH]
PROT UR STRIP-MCNC: ABNORMAL MG/DL
RBC #/AREA URNS AUTO: ABNORMAL /HPF
SARS-COV-2 RNA RESP QL NAA+PROBE: POSITIVE
SP GR UR STRIP.AUTO: 1.01 (ref 1–1.03)
UROBILINOGEN UR QL STRIP.AUTO: 0.2 E.U./DL
WBC #/AREA URNS AUTO: ABNORMAL /HPF

## 2020-06-18 PROCEDURE — 87077 CULTURE AEROBIC IDENTIFY: CPT | Performed by: PHYSICIAN ASSISTANT

## 2020-06-18 PROCEDURE — 82948 REAGENT STRIP/BLOOD GLUCOSE: CPT

## 2020-06-18 PROCEDURE — 81001 URINALYSIS AUTO W/SCOPE: CPT | Performed by: PHYSICIAN ASSISTANT

## 2020-06-18 PROCEDURE — 87086 URINE CULTURE/COLONY COUNT: CPT | Performed by: PHYSICIAN ASSISTANT

## 2020-06-18 PROCEDURE — 99232 SBSQ HOSP IP/OBS MODERATE 35: CPT | Performed by: GENERAL PRACTICE

## 2020-06-18 PROCEDURE — 87186 SC STD MICRODIL/AGAR DIL: CPT | Performed by: PHYSICIAN ASSISTANT

## 2020-06-18 PROCEDURE — U0003 INFECTIOUS AGENT DETECTION BY NUCLEIC ACID (DNA OR RNA); SEVERE ACUTE RESPIRATORY SYNDROME CORONAVIRUS 2 (SARS-COV-2) (CORONAVIRUS DISEASE [COVID-19]), AMPLIFIED PROBE TECHNIQUE, MAKING USE OF HIGH THROUGHPUT TECHNOLOGIES AS DESCRIBED BY CMS-2020-01-R: HCPCS | Performed by: GENERAL PRACTICE

## 2020-06-18 PROCEDURE — 87147 CULTURE TYPE IMMUNOLOGIC: CPT | Performed by: PHYSICIAN ASSISTANT

## 2020-06-18 PROCEDURE — 99232 SBSQ HOSP IP/OBS MODERATE 35: CPT | Performed by: PHYSICIAN ASSISTANT

## 2020-06-18 RX ADMIN — ENOXAPARIN SODIUM 40 MG: 40 INJECTION SUBCUTANEOUS at 08:03

## 2020-06-18 RX ADMIN — STANDARDIZED SENNA CONCENTRATE 8.6 MG: 8.6 TABLET ORAL at 08:04

## 2020-06-18 RX ADMIN — ATORVASTATIN CALCIUM 20 MG: 20 TABLET, FILM COATED ORAL at 08:04

## 2020-06-18 RX ADMIN — ACETAMINOPHEN 975 MG: 325 TABLET ORAL at 21:39

## 2020-06-18 RX ADMIN — FINASTERIDE 5 MG: 5 TABLET, FILM COATED ORAL at 08:04

## 2020-06-18 RX ADMIN — LIDOCAINE 1 PATCH: 50 PATCH CUTANEOUS at 08:03

## 2020-06-18 RX ADMIN — ACETAMINOPHEN 975 MG: 325 TABLET ORAL at 05:41

## 2020-06-18 RX ADMIN — INSULIN LISPRO 2 UNITS: 100 INJECTION, SOLUTION INTRAVENOUS; SUBCUTANEOUS at 12:16

## 2020-06-18 RX ADMIN — ACETAMINOPHEN 975 MG: 325 TABLET ORAL at 13:58

## 2020-06-18 RX ADMIN — CEFEPIME HYDROCHLORIDE 2000 MG: 2 INJECTION, POWDER, FOR SOLUTION INTRAVENOUS at 23:39

## 2020-06-18 RX ADMIN — CEFEPIME HYDROCHLORIDE 2000 MG: 2 INJECTION, POWDER, FOR SOLUTION INTRAVENOUS at 13:14

## 2020-06-18 RX ADMIN — MELATONIN 2000 UNITS: at 08:04

## 2020-06-18 RX ADMIN — TAMSULOSIN HYDROCHLORIDE 0.4 MG: 0.4 CAPSULE ORAL at 19:47

## 2020-06-19 PROBLEM — B95.2 ENTEROCOCCUS UTI: Status: ACTIVE | Noted: 2020-06-19

## 2020-06-19 PROBLEM — N39.0 ENTEROCOCCUS UTI: Status: ACTIVE | Noted: 2020-06-19

## 2020-06-19 LAB
ANION GAP SERPL CALCULATED.3IONS-SCNC: 4 MMOL/L (ref 4–13)
BUN SERPL-MCNC: 18 MG/DL (ref 5–25)
CALCIUM SERPL-MCNC: 8.6 MG/DL (ref 8.3–10.1)
CHLORIDE SERPL-SCNC: 107 MMOL/L (ref 100–108)
CO2 SERPL-SCNC: 27 MMOL/L (ref 21–32)
CREAT SERPL-MCNC: 1.17 MG/DL (ref 0.6–1.3)
ERYTHROCYTE [DISTWIDTH] IN BLOOD BY AUTOMATED COUNT: 13.2 % (ref 11.6–15.1)
GFR SERPL CREATININE-BSD FRML MDRD: 63 ML/MIN/1.73SQ M
GLUCOSE SERPL-MCNC: 129 MG/DL (ref 65–140)
GLUCOSE SERPL-MCNC: 146 MG/DL (ref 65–140)
GLUCOSE SERPL-MCNC: 150 MG/DL (ref 65–140)
GLUCOSE SERPL-MCNC: 205 MG/DL (ref 65–140)
GLUCOSE SERPL-MCNC: 214 MG/DL (ref 65–140)
HCT VFR BLD AUTO: 36.4 % (ref 36.5–49.3)
HGB BLD-MCNC: 12.1 G/DL (ref 12–17)
IL6 SERPL-MCNC: 9.7 PG/ML (ref 0–12.2)
MCH RBC QN AUTO: 29.4 PG (ref 26.8–34.3)
MCHC RBC AUTO-ENTMCNC: 33.2 G/DL (ref 31.4–37.4)
MCV RBC AUTO: 89 FL (ref 82–98)
PLATELET # BLD AUTO: 206 THOUSANDS/UL (ref 149–390)
PMV BLD AUTO: 10.3 FL (ref 8.9–12.7)
POTASSIUM SERPL-SCNC: 4 MMOL/L (ref 3.5–5.3)
PROCALCITONIN SERPL-MCNC: <0.05 NG/ML
RBC # BLD AUTO: 4.11 MILLION/UL (ref 3.88–5.62)
SODIUM SERPL-SCNC: 138 MMOL/L (ref 136–145)
WBC # BLD AUTO: 5.08 THOUSAND/UL (ref 4.31–10.16)

## 2020-06-19 PROCEDURE — 82948 REAGENT STRIP/BLOOD GLUCOSE: CPT

## 2020-06-19 PROCEDURE — 84145 PROCALCITONIN (PCT): CPT | Performed by: GENERAL PRACTICE

## 2020-06-19 PROCEDURE — 99232 SBSQ HOSP IP/OBS MODERATE 35: CPT | Performed by: GENERAL PRACTICE

## 2020-06-19 PROCEDURE — 97530 THERAPEUTIC ACTIVITIES: CPT

## 2020-06-19 PROCEDURE — 80048 BASIC METABOLIC PNL TOTAL CA: CPT | Performed by: GENERAL PRACTICE

## 2020-06-19 PROCEDURE — 85027 COMPLETE CBC AUTOMATED: CPT | Performed by: GENERAL PRACTICE

## 2020-06-19 PROCEDURE — 99232 SBSQ HOSP IP/OBS MODERATE 35: CPT | Performed by: PHYSICIAN ASSISTANT

## 2020-06-19 PROCEDURE — 97116 GAIT TRAINING THERAPY: CPT

## 2020-06-19 PROCEDURE — 97535 SELF CARE MNGMENT TRAINING: CPT

## 2020-06-19 RX ADMIN — ACETAMINOPHEN 975 MG: 325 TABLET ORAL at 15:12

## 2020-06-19 RX ADMIN — MELATONIN 2000 UNITS: at 08:11

## 2020-06-19 RX ADMIN — STANDARDIZED SENNA CONCENTRATE 8.6 MG: 8.6 TABLET ORAL at 08:10

## 2020-06-19 RX ADMIN — MECLIZINE HYDROCHLORIDE 25 MG: 25 TABLET ORAL at 08:11

## 2020-06-19 RX ADMIN — ACETAMINOPHEN 975 MG: 325 TABLET ORAL at 21:20

## 2020-06-19 RX ADMIN — TAMSULOSIN HYDROCHLORIDE 0.4 MG: 0.4 CAPSULE ORAL at 16:33

## 2020-06-19 RX ADMIN — ACETAMINOPHEN 975 MG: 325 TABLET ORAL at 05:20

## 2020-06-19 RX ADMIN — VANCOMYCIN HYDROCHLORIDE 1750 MG: 1 INJECTION, POWDER, LYOPHILIZED, FOR SOLUTION INTRAVENOUS at 12:48

## 2020-06-19 RX ADMIN — ATORVASTATIN CALCIUM 20 MG: 20 TABLET, FILM COATED ORAL at 08:11

## 2020-06-19 RX ADMIN — FINASTERIDE 5 MG: 5 TABLET, FILM COATED ORAL at 08:11

## 2020-06-19 RX ADMIN — CEFEPIME HYDROCHLORIDE 2000 MG: 2 INJECTION, POWDER, FOR SOLUTION INTRAVENOUS at 12:05

## 2020-06-19 RX ADMIN — INSULIN LISPRO 2 UNITS: 100 INJECTION, SOLUTION INTRAVENOUS; SUBCUTANEOUS at 12:08

## 2020-06-19 RX ADMIN — LIDOCAINE 1 PATCH: 50 PATCH CUTANEOUS at 08:08

## 2020-06-19 RX ADMIN — INSULIN LISPRO 2 UNITS: 100 INJECTION, SOLUTION INTRAVENOUS; SUBCUTANEOUS at 16:32

## 2020-06-20 LAB
GLUCOSE SERPL-MCNC: 140 MG/DL (ref 65–140)
GLUCOSE SERPL-MCNC: 199 MG/DL (ref 65–140)
GLUCOSE SERPL-MCNC: 209 MG/DL (ref 65–140)
GLUCOSE SERPL-MCNC: 211 MG/DL (ref 65–140)

## 2020-06-20 PROCEDURE — 82948 REAGENT STRIP/BLOOD GLUCOSE: CPT

## 2020-06-20 PROCEDURE — 99232 SBSQ HOSP IP/OBS MODERATE 35: CPT | Performed by: GENERAL PRACTICE

## 2020-06-20 RX ORDER — SENNOSIDES 8.6 MG
1 TABLET ORAL
Status: DISCONTINUED | OUTPATIENT
Start: 2020-06-20 | End: 2020-06-23 | Stop reason: HOSPADM

## 2020-06-20 RX ORDER — DOCUSATE SODIUM 100 MG/1
100 CAPSULE, LIQUID FILLED ORAL 2 TIMES DAILY
Status: DISCONTINUED | OUTPATIENT
Start: 2020-06-20 | End: 2020-06-23 | Stop reason: HOSPADM

## 2020-06-20 RX ORDER — AMOXICILLIN 500 MG/1
500 CAPSULE ORAL EVERY 8 HOURS SCHEDULED
Status: DISCONTINUED | OUTPATIENT
Start: 2020-06-20 | End: 2020-06-23 | Stop reason: HOSPADM

## 2020-06-20 RX ADMIN — ACETAMINOPHEN 975 MG: 325 TABLET ORAL at 21:36

## 2020-06-20 RX ADMIN — ATORVASTATIN CALCIUM 20 MG: 20 TABLET, FILM COATED ORAL at 08:03

## 2020-06-20 RX ADMIN — INSULIN LISPRO 2 UNITS: 100 INJECTION, SOLUTION INTRAVENOUS; SUBCUTANEOUS at 11:19

## 2020-06-20 RX ADMIN — INSULIN LISPRO 2 UNITS: 100 INJECTION, SOLUTION INTRAVENOUS; SUBCUTANEOUS at 16:40

## 2020-06-20 RX ADMIN — LIDOCAINE 1 PATCH: 50 PATCH CUTANEOUS at 08:03

## 2020-06-20 RX ADMIN — AMOXICILLIN 500 MG: 500 CAPSULE ORAL at 14:00

## 2020-06-20 RX ADMIN — ACETAMINOPHEN 975 MG: 325 TABLET ORAL at 05:51

## 2020-06-20 RX ADMIN — AMOXICILLIN 500 MG: 500 CAPSULE ORAL at 21:36

## 2020-06-20 RX ADMIN — STANDARDIZED SENNA CONCENTRATE 8.6 MG: 8.6 TABLET ORAL at 08:03

## 2020-06-20 RX ADMIN — DOCUSATE SODIUM 100 MG: 100 CAPSULE, LIQUID FILLED ORAL at 18:34

## 2020-06-20 RX ADMIN — MELATONIN 2000 UNITS: at 08:03

## 2020-06-20 RX ADMIN — TAMSULOSIN HYDROCHLORIDE 0.4 MG: 0.4 CAPSULE ORAL at 16:40

## 2020-06-20 RX ADMIN — SENNOSIDES 8.6 MG: 8.6 TABLET ORAL at 21:36

## 2020-06-20 RX ADMIN — CEFEPIME HYDROCHLORIDE 2000 MG: 2 INJECTION, POWDER, FOR SOLUTION INTRAVENOUS at 11:19

## 2020-06-20 RX ADMIN — FINASTERIDE 5 MG: 5 TABLET, FILM COATED ORAL at 08:03

## 2020-06-20 RX ADMIN — CEFEPIME HYDROCHLORIDE 2000 MG: 2 INJECTION, POWDER, FOR SOLUTION INTRAVENOUS at 00:35

## 2020-06-20 RX ADMIN — ACETAMINOPHEN 975 MG: 325 TABLET ORAL at 14:00

## 2020-06-21 LAB
BACTERIA UR CULT: ABNORMAL
BACTERIA UR CULT: ABNORMAL
GLUCOSE SERPL-MCNC: 163 MG/DL (ref 65–140)
GLUCOSE SERPL-MCNC: 171 MG/DL (ref 65–140)
GLUCOSE SERPL-MCNC: 210 MG/DL (ref 65–140)
GLUCOSE SERPL-MCNC: 249 MG/DL (ref 65–140)
GLUCOSE SERPL-MCNC: 373 MG/DL (ref 65–140)
SARS-COV-2 RNA RESP QL NAA+PROBE: POSITIVE

## 2020-06-21 PROCEDURE — 82948 REAGENT STRIP/BLOOD GLUCOSE: CPT

## 2020-06-21 PROCEDURE — U0003 INFECTIOUS AGENT DETECTION BY NUCLEIC ACID (DNA OR RNA); SEVERE ACUTE RESPIRATORY SYNDROME CORONAVIRUS 2 (SARS-COV-2) (CORONAVIRUS DISEASE [COVID-19]), AMPLIFIED PROBE TECHNIQUE, MAKING USE OF HIGH THROUGHPUT TECHNOLOGIES AS DESCRIBED BY CMS-2020-01-R: HCPCS | Performed by: GENERAL PRACTICE

## 2020-06-21 PROCEDURE — 99232 SBSQ HOSP IP/OBS MODERATE 35: CPT | Performed by: GENERAL PRACTICE

## 2020-06-21 RX ORDER — ACETAMINOPHEN 325 MG/1
650 TABLET ORAL EVERY 6 HOURS PRN
Status: DISCONTINUED | OUTPATIENT
Start: 2020-06-21 | End: 2020-06-23 | Stop reason: HOSPADM

## 2020-06-21 RX ADMIN — MELATONIN 2000 UNITS: at 08:19

## 2020-06-21 RX ADMIN — INSULIN LISPRO 6 UNITS: 100 INJECTION, SOLUTION INTRAVENOUS; SUBCUTANEOUS at 17:16

## 2020-06-21 RX ADMIN — LIDOCAINE 1 PATCH: 50 PATCH CUTANEOUS at 08:20

## 2020-06-21 RX ADMIN — DOCUSATE SODIUM 100 MG: 100 CAPSULE, LIQUID FILLED ORAL at 17:17

## 2020-06-21 RX ADMIN — SENNOSIDES 8.6 MG: 8.6 TABLET ORAL at 21:09

## 2020-06-21 RX ADMIN — ATORVASTATIN CALCIUM 20 MG: 20 TABLET, FILM COATED ORAL at 08:20

## 2020-06-21 RX ADMIN — AMOXICILLIN 500 MG: 500 CAPSULE ORAL at 21:09

## 2020-06-21 RX ADMIN — AMOXICILLIN 500 MG: 500 CAPSULE ORAL at 14:17

## 2020-06-21 RX ADMIN — DOCUSATE SODIUM 100 MG: 100 CAPSULE, LIQUID FILLED ORAL at 08:19

## 2020-06-21 RX ADMIN — TAMSULOSIN HYDROCHLORIDE 0.4 MG: 0.4 CAPSULE ORAL at 17:17

## 2020-06-21 RX ADMIN — AMOXICILLIN 500 MG: 500 CAPSULE ORAL at 05:03

## 2020-06-21 RX ADMIN — FINASTERIDE 5 MG: 5 TABLET, FILM COATED ORAL at 08:19

## 2020-06-21 RX ADMIN — ONDANSETRON 4 MG: 2 INJECTION INTRAMUSCULAR; INTRAVENOUS at 05:09

## 2020-06-21 RX ADMIN — INSULIN LISPRO 1 UNITS: 100 INJECTION, SOLUTION INTRAVENOUS; SUBCUTANEOUS at 08:33

## 2020-06-21 RX ADMIN — ACETAMINOPHEN 975 MG: 325 TABLET ORAL at 05:03

## 2020-06-21 RX ADMIN — ACETAMINOPHEN 975 MG: 325 TABLET ORAL at 14:17

## 2020-06-21 RX ADMIN — INSULIN LISPRO 2 UNITS: 100 INJECTION, SOLUTION INTRAVENOUS; SUBCUTANEOUS at 12:11

## 2020-06-22 LAB
25(OH)D2 SERPL-MCNC: <1 NG/ML
25(OH)D3 SERPL-MCNC: 13 NG/ML
25(OH)D3+25(OH)D2 SERPL-MCNC: 13 NG/ML
ALBUMIN SERPL BCP-MCNC: 2.8 G/DL (ref 3.5–5)
ALP SERPL-CCNC: 71 U/L (ref 46–116)
ALT SERPL W P-5'-P-CCNC: 45 U/L (ref 12–78)
ANION GAP SERPL CALCULATED.3IONS-SCNC: 4 MMOL/L (ref 4–13)
AST SERPL W P-5'-P-CCNC: 19 U/L (ref 5–45)
BILIRUB SERPL-MCNC: 0.5 MG/DL (ref 0.2–1)
BUN SERPL-MCNC: 15 MG/DL (ref 5–25)
CALCIUM SERPL-MCNC: 8.7 MG/DL (ref 8.3–10.1)
CHLORIDE SERPL-SCNC: 105 MMOL/L (ref 100–108)
CK SERPL-CCNC: 18 U/L (ref 39–308)
CO2 SERPL-SCNC: 27 MMOL/L (ref 21–32)
CREAT SERPL-MCNC: 1.09 MG/DL (ref 0.6–1.3)
CRP SERPL QL: 14.5 MG/L
D DIMER PPP FEU-MCNC: 1.15 UG/ML FEU
ERYTHROCYTE [DISTWIDTH] IN BLOOD BY AUTOMATED COUNT: 13.5 % (ref 11.6–15.1)
FERRITIN SERPL-MCNC: 216 NG/ML (ref 8–388)
GFR SERPL CREATININE-BSD FRML MDRD: 69 ML/MIN/1.73SQ M
GLUCOSE SERPL-MCNC: 170 MG/DL (ref 65–140)
GLUCOSE SERPL-MCNC: 172 MG/DL (ref 65–140)
GLUCOSE SERPL-MCNC: 197 MG/DL (ref 65–140)
GLUCOSE SERPL-MCNC: 217 MG/DL (ref 65–140)
GLUCOSE SERPL-MCNC: 237 MG/DL (ref 65–140)
HCT VFR BLD AUTO: 33.3 % (ref 36.5–49.3)
HGB BLD-MCNC: 11 G/DL (ref 12–17)
MCH RBC QN AUTO: 28.7 PG (ref 26.8–34.3)
MCHC RBC AUTO-ENTMCNC: 33 G/DL (ref 31.4–37.4)
MCV RBC AUTO: 87 FL (ref 82–98)
NT-PROBNP SERPL-MCNC: 246 PG/ML
PLATELET # BLD AUTO: 213 THOUSANDS/UL (ref 149–390)
PMV BLD AUTO: 9.8 FL (ref 8.9–12.7)
POTASSIUM SERPL-SCNC: 3.9 MMOL/L (ref 3.5–5.3)
PROCALCITONIN SERPL-MCNC: <0.05 NG/ML
PROT SERPL-MCNC: 6 G/DL (ref 6.4–8.2)
RBC # BLD AUTO: 3.83 MILLION/UL (ref 3.88–5.62)
SODIUM SERPL-SCNC: 136 MMOL/L (ref 136–145)
TROPONIN I SERPL-MCNC: <0.02 NG/ML
WBC # BLD AUTO: 6.48 THOUSAND/UL (ref 4.31–10.16)

## 2020-06-22 PROCEDURE — 84484 ASSAY OF TROPONIN QUANT: CPT | Performed by: GENERAL PRACTICE

## 2020-06-22 PROCEDURE — 82728 ASSAY OF FERRITIN: CPT | Performed by: GENERAL PRACTICE

## 2020-06-22 PROCEDURE — 82948 REAGENT STRIP/BLOOD GLUCOSE: CPT

## 2020-06-22 PROCEDURE — 85027 COMPLETE CBC AUTOMATED: CPT | Performed by: GENERAL PRACTICE

## 2020-06-22 PROCEDURE — 85379 FIBRIN DEGRADATION QUANT: CPT | Performed by: GENERAL PRACTICE

## 2020-06-22 PROCEDURE — 83880 ASSAY OF NATRIURETIC PEPTIDE: CPT | Performed by: GENERAL PRACTICE

## 2020-06-22 PROCEDURE — 84145 PROCALCITONIN (PCT): CPT | Performed by: GENERAL PRACTICE

## 2020-06-22 PROCEDURE — 99232 SBSQ HOSP IP/OBS MODERATE 35: CPT | Performed by: GENERAL PRACTICE

## 2020-06-22 PROCEDURE — 82550 ASSAY OF CK (CPK): CPT | Performed by: GENERAL PRACTICE

## 2020-06-22 PROCEDURE — 80053 COMPREHEN METABOLIC PANEL: CPT | Performed by: GENERAL PRACTICE

## 2020-06-22 PROCEDURE — 86140 C-REACTIVE PROTEIN: CPT | Performed by: GENERAL PRACTICE

## 2020-06-22 RX ORDER — ERGOCALCIFEROL 1.25 MG/1
50000 CAPSULE ORAL WEEKLY
Status: DISCONTINUED | OUTPATIENT
Start: 2020-06-22 | End: 2020-06-23 | Stop reason: HOSPADM

## 2020-06-22 RX ADMIN — ACETAMINOPHEN 650 MG: 325 TABLET ORAL at 12:28

## 2020-06-22 RX ADMIN — MELATONIN 2000 UNITS: at 08:07

## 2020-06-22 RX ADMIN — TAMSULOSIN HYDROCHLORIDE 0.4 MG: 0.4 CAPSULE ORAL at 17:27

## 2020-06-22 RX ADMIN — ATORVASTATIN CALCIUM 20 MG: 20 TABLET, FILM COATED ORAL at 08:07

## 2020-06-22 RX ADMIN — INSULIN LISPRO 3 UNITS: 100 INJECTION, SOLUTION INTRAVENOUS; SUBCUTANEOUS at 12:21

## 2020-06-22 RX ADMIN — AMOXICILLIN 500 MG: 500 CAPSULE ORAL at 21:12

## 2020-06-22 RX ADMIN — LIDOCAINE 1 PATCH: 50 PATCH CUTANEOUS at 08:08

## 2020-06-22 RX ADMIN — AMOXICILLIN 500 MG: 500 CAPSULE ORAL at 05:57

## 2020-06-22 RX ADMIN — DOCUSATE SODIUM 100 MG: 100 CAPSULE, LIQUID FILLED ORAL at 08:07

## 2020-06-22 RX ADMIN — DOCUSATE SODIUM 100 MG: 100 CAPSULE, LIQUID FILLED ORAL at 17:27

## 2020-06-22 RX ADMIN — AMOXICILLIN 500 MG: 500 CAPSULE ORAL at 13:33

## 2020-06-22 RX ADMIN — FINASTERIDE 5 MG: 5 TABLET, FILM COATED ORAL at 08:06

## 2020-06-22 RX ADMIN — ERGOCALCIFEROL 50000 UNITS: 1.25 CAPSULE ORAL at 17:27

## 2020-06-22 RX ADMIN — ACETAMINOPHEN 650 MG: 325 TABLET ORAL at 02:33

## 2020-06-22 RX ADMIN — SENNOSIDES 8.6 MG: 8.6 TABLET ORAL at 21:12

## 2020-06-22 RX ADMIN — INSULIN LISPRO 2 UNITS: 100 INJECTION, SOLUTION INTRAVENOUS; SUBCUTANEOUS at 17:27

## 2020-06-22 RX ADMIN — INSULIN LISPRO 1 UNITS: 100 INJECTION, SOLUTION INTRAVENOUS; SUBCUTANEOUS at 08:06

## 2020-06-23 ENCOUNTER — PATIENT OUTREACH (OUTPATIENT)
Dept: FAMILY MEDICINE CLINIC | Facility: CLINIC | Age: 70
End: 2020-06-23

## 2020-06-23 ENCOUNTER — NURSING HOME VISIT (OUTPATIENT)
Dept: FAMILY MEDICINE CLINIC | Facility: CLINIC | Age: 70
End: 2020-06-23
Payer: COMMERCIAL

## 2020-06-23 VITALS
DIASTOLIC BLOOD PRESSURE: 76 MMHG | OXYGEN SATURATION: 96 % | SYSTOLIC BLOOD PRESSURE: 138 MMHG | HEART RATE: 80 BPM | RESPIRATION RATE: 16 BRPM | TEMPERATURE: 97.7 F | WEIGHT: 183.4 LBS | BODY MASS INDEX: 28.72 KG/M2

## 2020-06-23 VITALS
TEMPERATURE: 98.5 F | HEART RATE: 91 BPM | BODY MASS INDEX: 29.44 KG/M2 | WEIGHT: 187.6 LBS | RESPIRATION RATE: 18 BRPM | OXYGEN SATURATION: 95 % | DIASTOLIC BLOOD PRESSURE: 69 MMHG | HEIGHT: 67 IN | SYSTOLIC BLOOD PRESSURE: 138 MMHG

## 2020-06-23 DIAGNOSIS — E11.40 TYPE 2 DIABETES MELLITUS WITH DIABETIC NEUROPATHY, WITHOUT LONG-TERM CURRENT USE OF INSULIN (HCC): ICD-10-CM

## 2020-06-23 DIAGNOSIS — N17.9 AKI (ACUTE KIDNEY INJURY) (HCC): ICD-10-CM

## 2020-06-23 DIAGNOSIS — I10 ESSENTIAL HYPERTENSION: ICD-10-CM

## 2020-06-23 DIAGNOSIS — N18.30 STAGE 3 CHRONIC KIDNEY DISEASE (HCC): Primary | Chronic | ICD-10-CM

## 2020-06-23 DIAGNOSIS — U07.1 COVID-19: ICD-10-CM

## 2020-06-23 DIAGNOSIS — E78.5 HYPERLIPIDEMIA, UNSPECIFIED HYPERLIPIDEMIA TYPE: ICD-10-CM

## 2020-06-23 DIAGNOSIS — D64.9 ANEMIA, UNSPECIFIED TYPE: ICD-10-CM

## 2020-06-23 DIAGNOSIS — R97.20 ABNORMAL PSA: ICD-10-CM

## 2020-06-23 DIAGNOSIS — N21.0 BLADDER STONE: ICD-10-CM

## 2020-06-23 DIAGNOSIS — N39.0 URINARY TRACT INFECTION WITHOUT HEMATURIA, SITE UNSPECIFIED: ICD-10-CM

## 2020-06-23 DIAGNOSIS — R26.2 AMBULATORY DYSFUNCTION: ICD-10-CM

## 2020-06-23 DIAGNOSIS — R53.1 LEFT-SIDED WEAKNESS: ICD-10-CM

## 2020-06-23 DIAGNOSIS — E55.9 VITAMIN D DEFICIENCY: ICD-10-CM

## 2020-06-23 PROBLEM — N30.01 ACUTE CYSTITIS WITH HEMATURIA: Status: ACTIVE | Noted: 2020-04-29

## 2020-06-23 PROBLEM — E86.0 DEHYDRATION: Status: RESOLVED | Noted: 2020-06-15 | Resolved: 2020-06-23

## 2020-06-23 PROBLEM — B95.2 ENTEROCOCCUS UTI: Status: RESOLVED | Noted: 2020-06-19 | Resolved: 2020-06-23

## 2020-06-23 PROBLEM — N30.01 ACUTE CYSTITIS WITH HEMATURIA: Status: RESOLVED | Noted: 2020-04-29 | Resolved: 2020-06-23

## 2020-06-23 LAB — GLUCOSE SERPL-MCNC: 169 MG/DL (ref 65–140)

## 2020-06-23 PROCEDURE — 82948 REAGENT STRIP/BLOOD GLUCOSE: CPT

## 2020-06-23 PROCEDURE — 99306 1ST NF CARE HIGH MDM 50: CPT | Performed by: INTERNAL MEDICINE

## 2020-06-23 PROCEDURE — 99239 HOSP IP/OBS DSCHRG MGMT >30: CPT | Performed by: INTERNAL MEDICINE

## 2020-06-23 RX ORDER — LIDOCAINE 50 MG/G
1 PATCH TOPICAL DAILY
Refills: 0
Start: 2020-06-24 | End: 2021-09-27 | Stop reason: HOSPADM

## 2020-06-23 RX ORDER — AMOXICILLIN 500 MG/1
500 CAPSULE ORAL EVERY 8 HOURS SCHEDULED
Qty: 9 CAPSULE | Refills: 0
Start: 2020-06-23 | End: 2020-06-26

## 2020-06-23 RX ORDER — ERGOCALCIFEROL 1.25 MG/1
50000 CAPSULE ORAL WEEKLY
Qty: 4 CAPSULE | Refills: 0 | Status: SHIPPED | OUTPATIENT
Start: 2020-06-29 | End: 2020-07-15

## 2020-06-23 RX ORDER — DOCUSATE SODIUM 100 MG/1
100 CAPSULE, LIQUID FILLED ORAL 2 TIMES DAILY
Qty: 10 CAPSULE | Refills: 0 | Status: SHIPPED | OUTPATIENT
Start: 2020-06-23

## 2020-06-23 RX ORDER — MELATONIN
2000 DAILY
Refills: 0
Start: 2020-06-24 | End: 2022-04-05 | Stop reason: SDUPTHER

## 2020-06-23 RX ADMIN — INSULIN LISPRO 1 UNITS: 100 INJECTION, SOLUTION INTRAVENOUS; SUBCUTANEOUS at 08:36

## 2020-06-23 RX ADMIN — AMOXICILLIN 500 MG: 500 CAPSULE ORAL at 05:08

## 2020-06-23 RX ADMIN — FINASTERIDE 5 MG: 5 TABLET, FILM COATED ORAL at 08:36

## 2020-06-23 RX ADMIN — DOCUSATE SODIUM 100 MG: 100 CAPSULE, LIQUID FILLED ORAL at 08:36

## 2020-06-23 RX ADMIN — LIDOCAINE 1 PATCH: 50 PATCH CUTANEOUS at 08:35

## 2020-06-23 RX ADMIN — ATORVASTATIN CALCIUM 20 MG: 20 TABLET, FILM COATED ORAL at 08:36

## 2020-06-23 RX ADMIN — MELATONIN 2000 UNITS: at 08:36

## 2020-07-01 ENCOUNTER — NURSING HOME VISIT (OUTPATIENT)
Dept: FAMILY MEDICINE CLINIC | Facility: CLINIC | Age: 70
End: 2020-07-01
Payer: COMMERCIAL

## 2020-07-01 DIAGNOSIS — I10 ESSENTIAL HYPERTENSION: ICD-10-CM

## 2020-07-01 DIAGNOSIS — N17.9 AKI (ACUTE KIDNEY INJURY) (HCC): ICD-10-CM

## 2020-07-01 DIAGNOSIS — H81.10 BENIGN PAROXYSMAL POSITIONAL VERTIGO, UNSPECIFIED LATERALITY: ICD-10-CM

## 2020-07-01 DIAGNOSIS — N18.30 STAGE 3 CHRONIC KIDNEY DISEASE (HCC): Chronic | ICD-10-CM

## 2020-07-01 DIAGNOSIS — U07.1 COVID-19: ICD-10-CM

## 2020-07-01 DIAGNOSIS — N39.0 URINARY TRACT INFECTION WITHOUT HEMATURIA, SITE UNSPECIFIED: ICD-10-CM

## 2020-07-01 DIAGNOSIS — E78.5 HYPERLIPIDEMIA, UNSPECIFIED HYPERLIPIDEMIA TYPE: ICD-10-CM

## 2020-07-01 DIAGNOSIS — E11.40 TYPE 2 DIABETES MELLITUS WITH DIABETIC NEUROPATHY, WITHOUT LONG-TERM CURRENT USE OF INSULIN (HCC): Primary | ICD-10-CM

## 2020-07-01 DIAGNOSIS — R97.20 ABNORMAL PSA: ICD-10-CM

## 2020-07-01 PROCEDURE — 99308 SBSQ NF CARE LOW MDM 20: CPT | Performed by: INTERNAL MEDICINE

## 2020-07-07 ENCOUNTER — NURSING HOME VISIT (OUTPATIENT)
Dept: FAMILY MEDICINE CLINIC | Facility: CLINIC | Age: 70
End: 2020-07-07
Payer: COMMERCIAL

## 2020-07-07 DIAGNOSIS — E11.40 TYPE 2 DIABETES MELLITUS WITH DIABETIC NEUROPATHY, WITHOUT LONG-TERM CURRENT USE OF INSULIN (HCC): Primary | ICD-10-CM

## 2020-07-07 DIAGNOSIS — R26.2 AMBULATORY DYSFUNCTION: ICD-10-CM

## 2020-07-07 DIAGNOSIS — H81.10 BENIGN PAROXYSMAL POSITIONAL VERTIGO, UNSPECIFIED LATERALITY: ICD-10-CM

## 2020-07-07 DIAGNOSIS — U07.1 COVID-19: ICD-10-CM

## 2020-07-07 DIAGNOSIS — E78.5 HYPERLIPIDEMIA, UNSPECIFIED HYPERLIPIDEMIA TYPE: ICD-10-CM

## 2020-07-07 DIAGNOSIS — R53.1 LEFT-SIDED WEAKNESS: ICD-10-CM

## 2020-07-07 DIAGNOSIS — I10 ESSENTIAL HYPERTENSION: ICD-10-CM

## 2020-07-07 DIAGNOSIS — N17.9 AKI (ACUTE KIDNEY INJURY) (HCC): ICD-10-CM

## 2020-07-07 PROCEDURE — 99308 SBSQ NF CARE LOW MDM 20: CPT | Performed by: INTERNAL MEDICINE

## 2020-07-08 ENCOUNTER — PATIENT OUTREACH (OUTPATIENT)
Dept: FAMILY MEDICINE CLINIC | Facility: CLINIC | Age: 70
End: 2020-07-08

## 2020-07-08 VITALS
DIASTOLIC BLOOD PRESSURE: 75 MMHG | TEMPERATURE: 98.3 F | HEART RATE: 78 BPM | OXYGEN SATURATION: 98 % | RESPIRATION RATE: 18 BRPM | SYSTOLIC BLOOD PRESSURE: 130 MMHG

## 2020-07-08 VITALS
WEIGHT: 174.2 LBS | OXYGEN SATURATION: 94 % | TEMPERATURE: 97.3 F | SYSTOLIC BLOOD PRESSURE: 145 MMHG | HEART RATE: 80 BPM | RESPIRATION RATE: 18 BRPM | DIASTOLIC BLOOD PRESSURE: 80 MMHG | BODY MASS INDEX: 27.28 KG/M2

## 2020-07-08 PROBLEM — N40.0 BENIGN PROSTATIC HYPERPLASIA: Status: ACTIVE | Noted: 2020-07-08

## 2020-07-08 PROBLEM — H81.10 BENIGN PAROXYSMAL POSITIONAL VERTIGO: Status: ACTIVE | Noted: 2020-07-08

## 2020-07-08 NOTE — ASSESSMENT & PLAN NOTE
Stable  Creatinine on 06/26 was 1 06 and BUN 17  Improved from 1 7 in hospital   Will continue to monitor

## 2020-07-08 NOTE — ASSESSMENT & PLAN NOTE
Patient remains asymptomatic  No acute respiratory symptoms, no fever, no cough, no congestion  Vital signs have been stable

## 2020-07-08 NOTE — PROGRESS NOTES
[facility] [place]  [address]  [address] [id number] [phone]   Fax: [phone]     PROGRESS NOTE      Patient Location     [address]    Reason For Visit     Follow-up visit- DM, CARLOS EDUARDO, COVID-19, HTN, BPV, HLD,    Patients care was coordinated with nursing facility staff  Recent vitals, labs and updated medications were reviewed on [facility] system of facility  Past Medical, surgical, social, medication and allergy history and patients previous records reviewed  1  Type 2 diabetes mellitus with diabetic neuropathy, without long-term current use of insulin ([facility])  Assessment & Plan:    Lab Results   Component Value Date    HGBA1C 6 2 (H) 06/14/2020   Last hemoglobin A1c on 06/26 was 6 9  Blood sugars have been variable  Patient recently had a blood sugar of 55  Where glipizide was discontinued and metformin decreased  Blood sugar this morning was slightly elevated at 246  Patient remains on metformin 500 mg b i d  And Humalog sliding scale  Will continue to monitor hyperglycemia or hypoglycemia and adjust therapy if needed  2  CARLOS EDUARDO (acute kidney injury) ([facility] [address] )  Assessment & Plan:  Stable  Creatinine on 06/26 was 1 06 and BUN 17  Improved from 1 7 in hospital   Will continue to monitor  3  COVID-19  Assessment & Plan:  No fevers currently and cough or respiratory distress have been reported  4  Essential hypertension  Assessment & Plan:  Stable  Blood pressure today 130/75  Continue lisinopril 5 mg daily  5  Benign paroxysmal positional vertigo, unspecified laterality  Assessment & Plan:  Patient remains on meclizine 25 mg every 8 hours p r n  And Zofran 4 mg every 6 hours as needed  6  Abnormal PSA  Assessment & Plan:  Continue tamsulosin 0 4 mg daily and finasteride 5 mg daily  Follow-up with urology        7  Hyperlipidemia, unspecified hyperlipidemia type  Assessment & Plan:  Patient remains on atorvastatin 20 mg daily  8  Stage 3 chronic kidney disease ([facility]  Assessment & Plan:  BUN 17 and creatinine 1 06 on 06/26  Improved from in-hospital labs  9  Urinary tract infection without hematuria, site unspecified  Assessment & Plan:  Amoxicillin finished for urosepsis  HPI      Mr Gonzalo Montana is a 35-year-old male being seen today for follow-up visit  Discussed with staff patient's status  They report no acute issues at the moment  He continues to receive PT/OT  Patient recently finished antibiotics for urosepsis  On examination he appears in no acute distress, vital signs were stable, and he has no complaints  No fever, chills, nausea, vomiting, shortness of breath, abdominal pain, back pain have been reported  Review of Systems   Constitutional: Positive for fatigue  Negative for chills and fever  HENT: Negative for nosebleeds and rhinorrhea  Eyes: Negative for discharge and redness  Respiratory: Negative for cough, shortness of breath, wheezing and stridor  Cardiovascular: Negative for chest pain and leg swelling  Gastrointestinal: Negative for abdominal distention, abdominal pain, diarrhea and vomiting  Genitourinary: Negative for hematuria  Musculoskeletal: Positive for gait problem  Negative for arthralgias and back pain  Skin: Negative for pallor  Neurological: Positive for weakness (Generalized)  Negative for tremors, seizures and syncope  Psychiatric/Behavioral: Negative for agitation, behavioral problems and confusion         Past Medical History:   Diagnosis Date    Anxiety     Diabetes mellitus ([facility] )     GERD (gastroesophageal reflux disease)     last assessed 5/20/16    Hematuria     Nuclear senile cataract of left eye        Past Surgical History:   Procedure Laterality Date    CATARACT EXTRACTION      CHOLECYSTECTOMY      KNEE SURGERY         Social History     Social History     Tobacco Use   Smoking Status Never Smoker Smokeless Tobacco Never Used       Social History     Substance and Sexual Activity   Alcohol Use Not Currently          Family History   Problem Relation Age of Onset    Coronary artery disease Mother     Diabetes Father         No Known Allergies    Medications       Current Outpatient Medications:     atorvastatin (LIPITOR) 20 mg tablet, TAKE ONE (1) TABLET BY MOUTH DAILY, Disp: 90 tablet, Rfl: 1    cholecalciferol (VITAMIN D3) 1,000 units tablet, Take 2 tablets (2,000 Units total) by mouth daily, Disp: , Rfl: 0    cyanocobalamin (VITAMIN B-12) 1000 MCG tablet, Take 1 tablet (1,000 mcg total) by mouth daily, Disp: , Rfl: 0    docusate sodium (COLACE) 100 mg capsule, Take 1 capsule (100 mg total) by mouth 2 (two) times a day, Disp: 10 capsule, Rfl: 0    ergocalciferol (VITAMIN D2) 50,000 units, Take 1 capsule (50,000 Units total) by mouth once a week for 7 doses, Disp: 4 capsule, Rfl: 0    finasteride (PROSCAR) 5 mg tablet, Take 1 tablet (5 mg total) by mouth daily, Disp: 30 tablet, Rfl: 0    glipiZIDE (GLUCOTROL) 10 mg tablet, TAKE 1 TABLET BY MOUTH 2 TIMES A DAY, Disp: 180 tablet, Rfl: 1    lidocaine (LIDODERM) 5 %, Apply 1 patch topically daily Remove & Discard patch within 12 hours or as directed by MD, Disp: , Rfl: 0    lisinopril (ZESTRIL) 5 mg tablet, TAKE ONE (1) TABLET BY MOUTH DAILY, Disp: 90 tablet, Rfl: 1    meclizine (ANTIVERT) 25 mg tablet, TAKE 1 TABLET BY MOUTH EVERY 8 HOURS AS NEEDED FOR DIZZINESS DO NOT DRIVE WITH MED, Disp: 90 tablet, Rfl: 1    metFORMIN (GLUCOPHAGE) 1000 MG tablet, TAKE 1 TABLET BY MOUTH 2 TIMES A DAY, Disp: 180 tablet, Rfl: 1    polyethylene glycol (MIRALAX) 17 g packet, Take 17 g by mouth daily, Disp: 14 each, Rfl: 2    senna (SENOKOT) 8 6 mg, Take 1 tablet (8 6 mg total) by mouth daily, Disp: 120 each, Rfl: 0    simethicone (MYLICON) 80 mg chewable tablet, Chew 1 tablet (80 mg total) every 6 (six) hours as needed for flatulence, Disp: 30 tablet, Rfl: 0   tamsulosin (FLOMAX) 0 4 mg, Take 1 capsule (0 4 mg total) by mouth daily with dinner, Disp: 30 capsule, Rfl: 0    Updated list was reviewed in [address] [place] [address]  Vitals:    07/01/20 0742   BP: 130/75   Pulse: 78   Resp: 18   Temp: 98 3 °F (36 8 °C)   SpO2: 98%       Physical Exam   Constitutional: He appears well-developed and well-nourished  No distress  HENT:   Head: Normocephalic and atraumatic  Eyes: Right eye exhibits no discharge  Left eye exhibits no discharge  No scleral icterus  Pulmonary/Chest: No respiratory distress  He has no wheezes  Abdominal: He exhibits no distension  Musculoskeletal: He exhibits no edema or deformity  Neurological: He is alert  Skin: Skin is dry  He is not diaphoretic  Psychiatric: He has a normal mood and affect  Diagnostic Data     Recent labs were reviewed  Labs from 06/26 were reviewed  Hemoglobin A1c 6 9  WBC 8 1, HGB 11 3, HCT 34 4  Na 139, K 3 6, BUN 17, creatinine 1 06  Additional Notes     Care coordination with staff

## 2020-07-08 NOTE — ASSESSMENT & PLAN NOTE
Lab Results   Component Value Date    HGBA1C 6 2 (H) 06/14/2020   Last hemoglobin A1c on 06/26 was 6 9  Blood sugars have been variable  Patient recently had a blood sugar of 55  Where glipizide was discontinued and metformin decreased  Blood sugar this morning was slightly elevated at 246  Patient remains on metformin 500 mg b i d  And Humalog sliding scale  Will continue to monitor hyperglycemia or hypoglycemia and adjust therapy if needed

## 2020-07-08 NOTE — ASSESSMENT & PLAN NOTE
Lab Results   Component Value Date    HGBA1C 6 2 (H) 06/14/2020   Hemoglobin A1c on 06/26/2020 was 6 9  Blood sugar today at 5:52 a m  Was 216  Pt had a previous BS of 5 on 6/28/20  Will continue to monitor and adjust therapy if blood sugar continues to increase  For now continue metformin 500 mg b i d  And Humalog sliding scale

## 2020-07-08 NOTE — ASSESSMENT & PLAN NOTE
Stable  Blood pressures reviewed today  Blood pressure today was 145/80  Continue lisinopril 5 mg daily

## 2020-07-08 NOTE — PROGRESS NOTES
[facility] [address]  [address] [facility] [address] [phone]   Fax: [phone]     PROGRESS NOTE      Patient Location     [address]    Reason For Visit     Followup Visit-DM, a KI, HTN, HLD, ambulatory dysfunction, Covid  Patients care was coordinated with nursing facility staff  [facility]als, labs and [facility]  Past Medical, surgical, social, medication and allergy history and patients previous records reviewed  1  Type 2 diabetes mellitus with diabetic neuropathy, without long-term current use of insulin ([facility])  Assessment & Plan:    Lab Results   Component Value Date    HGBA1C 6 2 (H) 06/14/2020   Hemoglobin A1c on 06/26/2020 was 6 9  Blood sugar today at 5:52 a m  Was 216  Pt had a previous BS of 5 on 6/28/20  Will continue to monitor and adjust therapy if blood sugar continues to increase  For now continue metformin 500 mg b i d  And Humalog sliding scale  2  CARLOS EDUARDO (acute kidney injury) ([facility] [address] )  Assessment & Plan:  Labs from 07/07/2020  BUN 36, creatinine 1 7  Will continue to monitor  3  Essential hypertension  Assessment & Plan:  Stable  Blood pressures reviewed today  Blood pressure today was 145/80  Continue lisinopril 5 mg daily  4  Hyperlipidemia, unspecified hyperlipidemia type  Assessment & Plan:  Patient remains on atorvastatin 20 mg daily  5  Ambulatory dysfunction  Assessment & Plan:  Patient remains on PT-OT  6  COVID-19  Assessment & Plan:  Patient remains asymptomatic  No acute respiratory symptoms, no fever, no cough, no congestion  Vital signs have been stable  7  Left-sided weakness  Assessment & Plan:  Left-sided weakness is chronic in nature  8  Benign paroxysmal positional vertigo, unspecified laterality  Assessment & Plan:  Patient reports no symptoms today    Continue on meclizine 25 mg every 8 hours as needed and Zofran 4 mg every 6 hours as needed for nausea and vomiting  HPI      Mr Dashawn Reece is a 80-year-old male who is being seen for follow-up visit today  On examination, he reports he is sad and also appears teary eyed today  He had a birthday yesterday and was upset that he was not currently with his wife on his birthday  Patient otherwise reports no acute issues  He is answering questions appropriately  No fever, chills, cough, congestion, shortness of breath, nausea, vomiting, abdominal pain, or urinary symptoms were reported today  Labs from 07/07/2020 were reviewed  Review of Systems   Constitutional: Positive for fatigue  Negative for chills and fever  HENT: Negative for nosebleeds and rhinorrhea  Eyes: Negative for discharge and redness  Respiratory: Negative for cough, chest tightness, shortness of breath, wheezing and stridor  Cardiovascular: Negative for chest pain and leg swelling  Gastrointestinal: Negative for abdominal distention, abdominal pain, diarrhea and vomiting  Genitourinary: Negative for dysuria, flank pain, frequency and hematuria  Musculoskeletal: Positive for gait problem and joint swelling  Negative for arthralgias and back pain  Skin: Negative for pallor  Neurological: Positive for weakness (Generalized)  Negative for tremors, seizures, syncope and headaches  Psychiatric/Behavioral: Negative for agitation, behavioral problems and confusion          Teary-eyed       Past Medical History:   Diagnosis Date    Anxiety     Diabetes mellitus ([facility] )     GERD (gastroesophageal reflux disease)     last assessed 5/20/16    Hematuria     Nuclear senile cataract of left eye        Past Surgical History:   Procedure Laterality Date    CATARACT EXTRACTION      CHOLECYSTECTOMY      KNEE SURGERY         Social History     Social History     Tobacco Use   Smoking Status Never Smoker   Smokeless Tobacco Never Used       Social History Substance and Sexual Activity   Alcohol Use Not Currently          Family History   Problem Relation Age of Onset    Coronary artery disease Mother     Diabetes Father         No Known Allergies    Medications       Current Outpatient Medications:     atorvastatin (LIPITOR) 20 mg tablet, TAKE ONE (1) TABLET BY MOUTH DAILY, Disp: 90 tablet, Rfl: 1    cholecalciferol (VITAMIN D3) 1,000 units tablet, Take 2 tablets (2,000 Units total) by mouth daily, Disp: , Rfl: 0    cyanocobalamin (VITAMIN B-12) 1000 MCG tablet, Take 1 tablet (1,000 mcg total) by mouth daily, Disp: , Rfl: 0    docusate sodium (COLACE) 100 mg capsule, Take 1 capsule (100 mg total) by mouth 2 (two) times a day, Disp: 10 capsule, Rfl: 0    ergocalciferol (VITAMIN D2) 50,000 units, Take 1 capsule (50,000 Units total) by mouth once a week for 7 doses, Disp: 4 capsule, Rfl: 0    finasteride (PROSCAR) 5 mg tablet, Take 1 tablet (5 mg total) by mouth daily, Disp: 30 tablet, Rfl: 0    glipiZIDE (GLUCOTROL) 10 mg tablet, TAKE 1 TABLET BY MOUTH 2 TIMES A DAY, Disp: 180 tablet, Rfl: 1    lidocaine (LIDODERM) 5 %, Apply 1 patch topically daily Remove & Discard patch within 12 hours or as directed by MD, Disp: , Rfl: 0    lisinopril (ZESTRIL) 5 mg tablet, TAKE ONE (1) TABLET BY MOUTH DAILY, Disp: 90 tablet, Rfl: 1    meclizine (ANTIVERT) 25 mg tablet, TAKE 1 TABLET BY MOUTH EVERY 8 HOURS AS NEEDED FOR DIZZINESS DO NOT DRIVE WITH MED, Disp: 90 tablet, Rfl: 1    metFORMIN (GLUCOPHAGE) 1000 MG tablet, TAKE 1 TABLET BY MOUTH 2 TIMES A DAY, Disp: 180 tablet, Rfl: 1    polyethylene glycol (MIRALAX) 17 g packet, Take 17 g by mouth daily, Disp: 14 each, Rfl: 2    senna (SENOKOT) 8 6 mg, Take 1 tablet (8 6 mg total) by mouth daily, Disp: 120 each, Rfl: 0    simethicone (MYLICON) 80 mg chewable tablet, Chew 1 tablet (80 mg total) every 6 (six) hours as needed for flatulence, Disp: 30 tablet, Rfl: 0    tamsulosin (FLOMAX) 0 4 mg, Take 1 capsule (0 4 mg total) by mouth daily with dinner, Disp: 30 capsule, Rfl: 0    Updated list was reviewed in [address] [place] [address]  Vitals:    07/07/20 1144   BP: 145/80   Pulse: 80   Resp: 18   Temp: (!) 97 3 °F (36 3 °C)   SpO2: 94%       Physical Exam   Constitutional: He appears well-developed and well-nourished  No distress  HENT:   Head: Normocephalic and atraumatic  Eyes: Right eye exhibits no discharge  Left eye exhibits no discharge  No scleral icterus  Cardiovascular: Normal rate  Murmur heard  Pulmonary/Chest: Effort normal  No stridor  No respiratory distress  He has no wheezes  He has no rales  He exhibits no tenderness  Abdominal: Soft  There is no tenderness  There is no guarding  Neurological: He is alert  Skin: Skin is warm and dry  He is not diaphoretic  Diagnostic Data     Recent labs from 07/07/2020 were reviewed ,  , K 4 1, BUN 36, creatinine 1 7  WBC 10 1, HGB 9 0, HCT 30    Additional Notes     Care coordination with staff

## 2020-07-08 NOTE — ASSESSMENT & PLAN NOTE
Patient reports urinating well  No reports of urinary retention  Patient remains on tamsulosin 0 4 mg daily and finasteride 5 mg daily  Will continue to monitor

## 2020-07-08 NOTE — ASSESSMENT & PLAN NOTE
Patient reports no symptoms today  Continue on meclizine 25 mg every 8 hours as needed and Zofran 4 mg every 6 hours as needed for nausea and vomiting

## 2020-07-09 ENCOUNTER — TELEPHONE (OUTPATIENT)
Dept: UROLOGY | Facility: AMBULATORY SURGERY CENTER | Age: 70
End: 2020-07-09

## 2020-07-09 NOTE — TELEPHONE ENCOUNTER
Patient was supposed to have a virtual visit today with Dr Leisa Mendez  As per Dr Leisa Mendez we need to reschedule this visit  [facility] patients [facility] who [phone]  [address] [place] [address] and got transferred to a voicemail of a Tonny Schmidt who is a Unit Clerk on that floor that he is on  Left my name and number asking for a call back so we can reschedule patients appointment  Ok to reschedule to 8/12 with Dr Leisa Mendez as per FLORENTINO Edwards

## 2020-07-10 ENCOUNTER — NURSING HOME VISIT (OUTPATIENT)
Dept: FAMILY MEDICINE CLINIC | Facility: CLINIC | Age: 70
End: 2020-07-10
Payer: COMMERCIAL

## 2020-07-10 DIAGNOSIS — R26.2 AMBULATORY DYSFUNCTION: ICD-10-CM

## 2020-07-10 DIAGNOSIS — H81.10 BENIGN PAROXYSMAL POSITIONAL VERTIGO, UNSPECIFIED LATERALITY: ICD-10-CM

## 2020-07-10 DIAGNOSIS — K59.01 SLOW TRANSIT CONSTIPATION: ICD-10-CM

## 2020-07-10 DIAGNOSIS — I10 ESSENTIAL HYPERTENSION: ICD-10-CM

## 2020-07-10 DIAGNOSIS — E11.40 TYPE 2 DIABETES MELLITUS WITH DIABETIC NEUROPATHY, WITHOUT LONG-TERM CURRENT USE OF INSULIN (HCC): Primary | ICD-10-CM

## 2020-07-10 DIAGNOSIS — N40.0 BENIGN PROSTATIC HYPERPLASIA, UNSPECIFIED WHETHER LOWER URINARY TRACT SYMPTOMS PRESENT: ICD-10-CM

## 2020-07-10 PROCEDURE — 99308 SBSQ NF CARE LOW MDM 20: CPT | Performed by: NURSE PRACTITIONER

## 2020-07-10 RX ORDER — BLOOD-GLUCOSE METER
EACH MISCELLANEOUS AS NEEDED
COMMUNITY
Start: 2020-06-16

## 2020-07-10 RX ORDER — BLOOD SUGAR DIAGNOSTIC
1 STRIP MISCELLANEOUS AS NEEDED
COMMUNITY
Start: 2020-06-16

## 2020-07-10 RX ORDER — PYRIDOXINE HCL (VITAMIN B6) 50 MG
100 TABLET ORAL DAILY
COMMUNITY
Start: 2020-06-16

## 2020-07-10 RX ORDER — PEN NEEDLE, DIABETIC 32GX 5/32"
1 NEEDLE, DISPOSABLE MISCELLANEOUS DAILY
COMMUNITY
Start: 2020-06-16

## 2020-07-15 DIAGNOSIS — E55.9 VITAMIN D DEFICIENCY: ICD-10-CM

## 2020-07-15 RX ORDER — ERGOCALCIFEROL 1.25 MG/1
50000 CAPSULE ORAL WEEKLY
Qty: 4 CAPSULE | Refills: 0 | Status: SHIPPED | OUTPATIENT
Start: 2020-07-15 | End: 2020-08-03

## 2020-07-20 ENCOUNTER — PATIENT OUTREACH (OUTPATIENT)
Dept: FAMILY MEDICINE CLINIC | Facility: CLINIC | Age: 70
End: 2020-07-20

## 2020-07-20 NOTE — PROGRESS NOTES
Pt remains at [facility]  Left message for socialservices for patient update  Continue to follow patient via careport

## 2020-07-30 ENCOUNTER — NURSING HOME VISIT (OUTPATIENT)
Dept: FAMILY MEDICINE CLINIC | Facility: CLINIC | Age: 70
End: 2020-07-30
Payer: COMMERCIAL

## 2020-07-30 VITALS
BODY MASS INDEX: 27.21 KG/M2 | TEMPERATURE: 97.8 F | RESPIRATION RATE: 16 BRPM | OXYGEN SATURATION: 95 % | HEART RATE: 90 BPM | SYSTOLIC BLOOD PRESSURE: 99 MMHG | WEIGHT: 173.7 LBS | DIASTOLIC BLOOD PRESSURE: 52 MMHG

## 2020-07-30 DIAGNOSIS — N18.30 STAGE 3 CHRONIC KIDNEY DISEASE (HCC): Chronic | ICD-10-CM

## 2020-07-30 DIAGNOSIS — N40.0 BENIGN PROSTATIC HYPERPLASIA WITHOUT LOWER URINARY TRACT SYMPTOMS: ICD-10-CM

## 2020-07-30 DIAGNOSIS — U07.1 COVID-19: ICD-10-CM

## 2020-07-30 DIAGNOSIS — D64.9 ANEMIA, UNSPECIFIED TYPE: ICD-10-CM

## 2020-07-30 DIAGNOSIS — N30.90 CYSTITIS: ICD-10-CM

## 2020-07-30 DIAGNOSIS — E11.40 TYPE 2 DIABETES MELLITUS WITH DIABETIC NEUROPATHY, WITHOUT LONG-TERM CURRENT USE OF INSULIN (HCC): Primary | ICD-10-CM

## 2020-07-30 DIAGNOSIS — E55.9 VITAMIN D DEFICIENCY: ICD-10-CM

## 2020-07-30 DIAGNOSIS — N17.9 AKI (ACUTE KIDNEY INJURY) (HCC): ICD-10-CM

## 2020-07-30 DIAGNOSIS — I10 ESSENTIAL HYPERTENSION: ICD-10-CM

## 2020-07-30 DIAGNOSIS — E78.5 HYPERLIPIDEMIA, UNSPECIFIED HYPERLIPIDEMIA TYPE: ICD-10-CM

## 2020-07-30 DIAGNOSIS — R26.2 AMBULATORY DYSFUNCTION: ICD-10-CM

## 2020-07-30 PROCEDURE — 99308 SBSQ NF CARE LOW MDM 20: CPT | Performed by: INTERNAL MEDICINE

## 2020-07-30 NOTE — ASSESSMENT & PLAN NOTE
Patient remains weak with unsteady gait  He is needing maximum assist of staff with transfers and care  Incontinent of bowel and bladder at times    Needs continued care at SNF level for now

## 2020-07-30 NOTE — ASSESSMENT & PLAN NOTE
Blood pressure have been stable with systolic blood pressure mostly running between 130-140 range  Patient had an isolated episode of low blood sugar with reading of 99/52 today    Continue lisinopril with holding parameters

## 2020-07-30 NOTE — PROGRESS NOTES
Progress Note    [place]  [facility]    Reason for visit:  Ambulatory dysfunction, recent COVID-19 infection, diabetes mellitus type 2, hypertension, hyperlipidemia, BPH, recent UTI, vitamin-D deficiency    Patients care was coordinated with nursing facility staff  Recent vitals, labs and updated medications were reviewed on [employer] system of facility  Past Medical, surgical, social, medication and allergy history and patients previous records reviewed  Problem List Items Addressed This Visit        Endocrine    Type 2 diabetes mellitus with diabetic neuropathy, without long-term current use of insulin ([facility] [address] ) - Primary       Lab Results   Component Value Date    HGBA1C 6 2 (H) 06/14/2020   Blood sugars are running on the higher side mostly in 200 range  Fasting blood sugar was 204 this morning  Will increase metformin to 1000 mg in the morning and 500 mg q p m  And follow  Recent hemoglobin A1c was well controlled at 6 2            Cardiovascular and Mediastinum    Essential hypertension     Blood pressure have been stable with systolic blood pressure mostly running between 130-140 range  Patient had an isolated episode of low blood sugar with reading of 99/52 today  Continue lisinopril with holding parameters            Genitourinary    Stage 3 chronic kidney disease (HCC) (Chronic)     Recent creatinine was high at 1 0  Patient's baseline creatinine is between 1 1-1 3         CARLOS EDUARDO (acute kidney injury) ([facility] [address] )       Resolved   Recent creatinine was  Stable at 1 0,BUN 19, K 3 9         Cystitis     Resolved  Patient recently completed ciprofloxacin course  for UTI         Benign prostatic hyperplasia     Continue tamsulosin and finasteride            Other    Hyperlipidemia     Continue statin         Ambulatory dysfunction     Patient remains weak with unsteady gait  He is needing maximum assist of staff with transfers and care  Incontinent of bowel and bladder at times    Needs continued care at SNF level for now         Anemia     Recent hemoglobin was stable at 11         COVID-19     Patient tested positive for COVID-19 several weeks ago  He has recovered well  Remains afebrile with stable oxygen saturation         Vitamin D deficiency     Continue vitamin-D supplement             HPI:  Patient is being seen for a follow-up visit today  He is doing okay at present  Dysuria symptoms have resolved  He was recently treated with ciprofloxacin for UTI  Patient remains weak needing continued physical therapy  He is requiring max assist of staff with transfers in care  Incontinent of bowel and bladder at times  Short-term memory is impaired  Patient is able to make his needs known  His appetite is good  No fever chills chest congestion dyspnea wheezing vomiting or diarrhea  Review of Systems   Constitutional: Positive for fatigue  Negative for chills and fever  HENT: Negative for nosebleeds and rhinorrhea  Eyes: Negative for discharge and redness  Respiratory: Negative for cough, shortness of breath, wheezing and stridor  Cardiovascular: Negative for chest pain and leg swelling  Gastrointestinal: Negative for abdominal distention, abdominal pain, diarrhea and vomiting  Genitourinary: Negative for hematuria  Musculoskeletal: Positive for gait problem  Negative for arthralgias and back pain  Skin: Negative for pallor  Neurological: Positive for weakness (Generalized)  Negative for tremors, seizures and syncope  Psychiatric/Behavioral: Positive for confusion  Negative for agitation and behavioral problems         Past Medical History:   Past Medical History:   Diagnosis Date    Ambulatory dysfunction     Anxiety     Diabetes mellitus (HCC)     GERD (gastroesophageal reflux disease)     last assessed 5/20/16    Hematuria     Hyperlipidemia     Hypertension     Kidney disease     Left-sided weakness     Nuclear senile cataract of left eye     Vitamin D deficiency          Past Surgical History:   Past Surgical History:   Procedure Laterality Date    CATARACT EXTRACTION      CHOLECYSTECTOMY      KNEE SURGERY             Social History:     Social History     Tobacco Use   Smoking Status Never Smoker   Smokeless Tobacco Never Used       Social History     Substance and Sexual Activity   Alcohol Use Not Currently           Family History:   Family History   Problem Relation Age of Onset    Coronary artery disease Mother     Diabetes Father           Allergies: No Known Allergies        Medications:   Current Outpatient Medications:     ACCU-CHEK GUIDE test strip, 1 strip as needed, Disp: , Rfl:     Alcohol Swabs (PRO COMFORT ALCOHOL) 70 % PADS, 1 pad daily Use a pad when testing, Disp: , Rfl:     atorvastatin (LIPITOR) 20 mg tablet, TAKE ONE (1) TABLET BY MOUTH DAILY, Disp: 90 tablet, Rfl: 1    cholecalciferol (VITAMIN D3) 1,000 units tablet, Take 2 tablets (2,000 Units total) by mouth daily, Disp: , Rfl: 0    cyanocobalamin (VITAMIN B-12) 1000 MCG tablet, Take 1 tablet (1,000 mcg total) by mouth daily, Disp: , Rfl: 0    docusate sodium (COLACE) 100 mg capsule, Take 1 capsule (100 mg total) by mouth 2 (two) times a day, Disp: 10 capsule, Rfl: 0    Easy Comfort Lancets MISC, as needed, Disp: , Rfl:     ergocalciferol (VITAMIN D2) 50,000 units, TAKE 1 CAPSULE (50,000 UNITS TOTAL) BY MOUTH ONCE A WEEK FOR 7 DOSES, Disp: 4 capsule, Rfl: 0    finasteride (PROSCAR) 5 mg tablet, Take 1 tablet (5 mg total) by mouth daily, Disp: 30 tablet, Rfl: 0    glipiZIDE (GLUCOTROL) 10 mg tablet, TAKE 1 TABLET BY MOUTH 2 TIMES A DAY, Disp: 180 tablet, Rfl: 1    glucose blood test strip, 1 strip as needed, Disp: , Rfl:     lidocaine (LIDODERM) 5 %, Apply 1 patch topically daily Remove & Discard patch within 12 hours or as directed by MD, Disp: , Rfl: 0    lisinopril (ZESTRIL) 5 mg tablet, TAKE ONE (1) TABLET BY MOUTH DAILY, Disp: 90 tablet, Rfl: 1    meclizine (ANTIVERT) 25 mg tablet, TAKE 1 TABLET BY MOUTH EVERY 8 HOURS AS NEEDED FOR DIZZINESS DO NOT DRIVE WITH MED, Disp: 90 tablet, Rfl: 1    metFORMIN (GLUCOPHAGE) 1000 MG tablet, TAKE 1 TABLET BY MOUTH 2 TIMES A DAY, Disp: 180 tablet, Rfl: 1    polyethylene glycol (MIRALAX) 17 g packet, Take 17 g by mouth daily, Disp: 14 each, Rfl: 2    senna (SENOKOT) 8 6 mg, Take 1 tablet (8 6 mg total) by mouth daily, Disp: 120 each, Rfl: 0    simethicone (MYLICON) 80 mg chewable tablet, Chew 1 tablet (80 mg total) every 6 (six) hours as needed for flatulence, Disp: 30 tablet, Rfl: 0    tamsulosin (FLOMAX) 0 4 mg, Take 1 capsule (0 4 mg total) by mouth daily with dinner, Disp: 30 capsule, Rfl: 0    vitamin B-12 (CYANOCOBALAMIN) 100 MCG TABS, Take 100 mcg by mouth daily, Disp: , Rfl:     Updated list was reviewed in [facility] of facility  Vitals:    07/30/20 0909   BP: 99/52   Pulse: 90   Resp: 16   Temp: 97 8 °F (36 6 °C)   SpO2: 95%         Physical Exam   Constitutional: He appears well-developed and well-nourished  No distress  Eyes: Right eye exhibits no discharge  Left eye exhibits no discharge  No scleral icterus  Neck: Neck supple  Cardiovascular: Normal rate, regular rhythm and normal heart sounds  Pulmonary/Chest: No stridor  He has no wheezes  Abdominal: Soft  He exhibits no distension  There is no tenderness  There is no guarding  Musculoskeletal: He exhibits no edema  Neurological: He is alert  No cranial nerve deficit  Skin: He is not diaphoretic  No pallor  Psychiatric: He has a normal mood and affect  His behavior is normal        Diagnostic Data:    Recent labs were reviewed  7/8 Na 142, K 3 9, BUN 19, creatinine 1 0  HGB 9 7      Additional Notes:   Pt remains weak   Recommend continued PT/OT for now

## 2020-07-30 NOTE — ASSESSMENT & PLAN NOTE
Patient tested positive for COVID-19 several weeks ago  He has recovered well    Remains afebrile with stable oxygen saturation

## 2020-07-30 NOTE — ASSESSMENT & PLAN NOTE
Lab Results   Component Value Date    HGBA1C 6 2 (H) 06/14/2020   Blood sugars are running on the higher side mostly in 200 range  Fasting blood sugar was 204 this morning  Will increase metformin to 1000 mg in the morning and 500 mg q p m  And follow    Recent hemoglobin A1c was well controlled at 6 2

## 2020-08-01 DIAGNOSIS — E55.9 VITAMIN D DEFICIENCY: ICD-10-CM

## 2020-08-03 DIAGNOSIS — E55.9 VITAMIN D DEFICIENCY: ICD-10-CM

## 2020-08-03 RX ORDER — ERGOCALCIFEROL 1.25 MG/1
50000 CAPSULE ORAL WEEKLY
Qty: 4 CAPSULE | Refills: 0 | Status: SHIPPED | OUTPATIENT
Start: 2020-08-03 | End: 2020-08-04

## 2020-08-04 RX ORDER — ERGOCALCIFEROL 1.25 MG/1
50000 CAPSULE ORAL WEEKLY
Qty: 4 CAPSULE | Refills: 0 | Status: SHIPPED | OUTPATIENT
Start: 2020-08-04 | End: 2020-08-31

## 2020-08-06 ENCOUNTER — NURSING HOME VISIT (OUTPATIENT)
Dept: FAMILY MEDICINE CLINIC | Facility: CLINIC | Age: 70
End: 2020-08-06
Payer: COMMERCIAL

## 2020-08-06 VITALS
SYSTOLIC BLOOD PRESSURE: 119 MMHG | RESPIRATION RATE: 18 BRPM | OXYGEN SATURATION: 97 % | DIASTOLIC BLOOD PRESSURE: 70 MMHG | TEMPERATURE: 97.9 F | HEART RATE: 70 BPM

## 2020-08-06 DIAGNOSIS — U07.1 COVID-19: ICD-10-CM

## 2020-08-06 DIAGNOSIS — R53.1 LEFT-SIDED WEAKNESS: ICD-10-CM

## 2020-08-06 DIAGNOSIS — N40.0 BENIGN PROSTATIC HYPERPLASIA, UNSPECIFIED WHETHER LOWER URINARY TRACT SYMPTOMS PRESENT: ICD-10-CM

## 2020-08-06 DIAGNOSIS — E11.40 TYPE 2 DIABETES MELLITUS WITH DIABETIC NEUROPATHY, WITHOUT LONG-TERM CURRENT USE OF INSULIN (HCC): ICD-10-CM

## 2020-08-06 DIAGNOSIS — R26.2 AMBULATORY DYSFUNCTION: ICD-10-CM

## 2020-08-06 DIAGNOSIS — E78.2 MIXED HYPERLIPIDEMIA: ICD-10-CM

## 2020-08-06 DIAGNOSIS — N30.01 ACUTE CYSTITIS WITH HEMATURIA: Primary | ICD-10-CM

## 2020-08-06 DIAGNOSIS — I10 ESSENTIAL HYPERTENSION: ICD-10-CM

## 2020-08-06 PROCEDURE — 99309 SBSQ NF CARE MODERATE MDM 30: CPT | Performed by: INTERNAL MEDICINE

## 2020-08-06 NOTE — ASSESSMENT & PLAN NOTE
· Urine analysis results:  Urine nitrates positive leukocyte esterase trace, colony count greater than 100,000    · Still awaiting sensitivity

## 2020-08-06 NOTE — ASSESSMENT & PLAN NOTE
· Continue PT OT  · To address ambulatory deficits  · As per nose patient exhibits unsteady gait and impaired balance weakness

## 2020-08-06 NOTE — ASSESSMENT & PLAN NOTE
Lab Results   Component Value Date    HGBA1C 6 2 (H) 06/14/2020   · Patient remains on a consistent carbohydrate diet  · Blood sugar today 175 mg/dL at 5:13 a m  · Blood sugars remained very mostly in the 200s  · Patient remains on metformin 500 mg b i d    · Humalog sliding scale

## 2020-08-10 DIAGNOSIS — K59.00 CONSTIPATION: ICD-10-CM

## 2020-08-13 VITALS
TEMPERATURE: 97.8 F | HEART RATE: 88 BPM | DIASTOLIC BLOOD PRESSURE: 72 MMHG | OXYGEN SATURATION: 96 % | RESPIRATION RATE: 20 BRPM | SYSTOLIC BLOOD PRESSURE: 132 MMHG

## 2020-08-13 PROBLEM — K59.00 CONSTIPATION: Status: ACTIVE | Noted: 2020-08-13

## 2020-08-13 NOTE — ASSESSMENT & PLAN NOTE
· No acute urinary problems  Patient reports he is voiding well    · Continue finasteride 5 mg daily and tamsulosin 0 4 mg daily

## 2020-08-13 NOTE — PROGRESS NOTES
[address]  [address] [facility], [facility] Mariel Vaughan  [phone]      PROGRESS NOTE  Name: Sam Soni  AGE: 79 y o  SEX: male    MRN: [id number]    DATE OF ENCOUNTER:  07/10/2020    Patient Location     [facility]    Reason For Visit/ Chief Complaint     Follow-up visit- ambulatory dysfunction, BPH, HTN diabetes  Patients care was coordinated with nursing facility staff  Recent vitals, labs and [facility]  Past Medical, surgical, social, medication and allergy history and patients previous records reviewed  1  Type 2 diabetes mellitus with diabetic neuropathy, without long-term current use of insulin ([facility] )  Assessment & Plan:    Lab Results   Component Value Date    HGBA1C 6 2 (H) 06/14/2020   · Patient had a previous episode of hypoglycemia on 06/28 where blood sugars were 55  · Metformin reduced to 500 and glipizide DCd  · Patient also remains on Humalog sliding scale  · Blood sugars are slightly elevated but will continue to monitor in light of recent hypoglycemic episode      2  Ambulatory dysfunction  Assessment & Plan:  Continue PT/OT  3  Benign prostatic hyperplasia, unspecified whether lower urinary tract symptoms present  Assessment & Plan:  · No acute urinary problems  Patient reports he is voiding well  · Continue finasteride 5 mg daily and tamsulosin 0 4 mg daily      4  Slow transit constipation  Assessment & Plan:  · Patient is on docusate 1 capsule b i d , senna 8 6 mg q h s  And polyethylene glycol 17 g daily  · Reports he had a bowel movement today  5  Benign paroxysmal positional vertigo, unspecified laterality  Assessment & Plan:  · When asked patient states he has no symptoms  · Continue meclizine 25 mg every 8 hours and Zofran every 4 hours as needed  6  Essential hypertension  Assessment & Plan:  · Stable    Blood pressure today 132/72  · Patient remains on lisinopril 10 mg daily  HPI      Yosef Clements is a 79 y o  male who is being seen today for a follow-up visit  Nurses report no acute issues at this time  Patient is pleasant confused  He reports no pain at the moment and appears comfortable resting in wheelchair  He reports he misses his wife and would like to go home  Before leaving eye dial wife in in house phone and patient appears happy  No reports of fever, chills, nausea, vomiting, abdominal pain, shortness of breath, cough, wheezing, urinary symptoms at this time  Review of Systems   Constitutional: Positive for fatigue  Negative for chills and fever  HENT: Negative for nosebleeds and rhinorrhea  Eyes: Negative for discharge and redness  Respiratory: Negative for cough, shortness of breath, wheezing and stridor  Cardiovascular: Negative for chest pain and leg swelling  Gastrointestinal: Negative for abdominal distention, abdominal pain, diarrhea and vomiting  Genitourinary: Negative for difficulty urinating, dysuria and hematuria  Musculoskeletal: Positive for gait problem  Negative for arthralgias and back pain  Skin: Negative for pallor  Neurological: Positive for weakness (Generalized)  Negative for tremors, seizures and syncope  Psychiatric/Behavioral: Positive for confusion  Negative for agitation and behavioral problems         Past Medical History:   Diagnosis Date    Ambulatory dysfunction     Anxiety     Diabetes mellitus (HCC)     GERD (gastroesophageal reflux disease)     last assessed 5/20/16    Hematuria     Hyperlipidemia     Hypertension     Kidney disease     Left-sided weakness     Nuclear senile cataract of left eye     Vitamin D deficiency        Past Surgical History:   Procedure Laterality Date    CATARACT EXTRACTION      CHOLECYSTECTOMY      KNEE SURGERY         Social History     Social History     Tobacco Use   Smoking Status Never Smoker   Smokeless Tobacco Never Used       Social History     Substance and Sexual Activity   Alcohol Use Not Currently          Family History   Problem Relation Age of Onset    Coronary artery disease Mother     Diabetes Father         No Known Allergies    Medications       Current Outpatient Medications:     ACCU-CHEK GUIDE test strip, 1 strip as needed, Disp: , Rfl:     Alcohol Swabs (PRO COMFORT ALCOHOL) 70 % PADS, 1 pad daily Use a pad when testing, Disp: , Rfl:     atorvastatin (LIPITOR) 20 mg tablet, TAKE ONE (1) TABLET BY MOUTH DAILY, Disp: 90 tablet, Rfl: 1    cholecalciferol (VITAMIN D3) 1,000 units tablet, Take 2 tablets (2,000 Units total) by mouth daily, Disp: , Rfl: 0    cyanocobalamin (VITAMIN B-12) 1000 MCG tablet, Take 1 tablet (1,000 mcg total) by mouth daily, Disp: , Rfl: 0    docusate sodium (COLACE) 100 mg capsule, Take 1 capsule (100 mg total) by mouth 2 (two) times a day, Disp: 10 capsule, Rfl: 0    Easy Comfort Lancets MISC, as needed, Disp: , Rfl:     ergocalciferol (VITAMIN D2) 50,000 units, TAKE 1 CAPSULE (50,000 UNITS TOTAL) BY MOUTH ONCE A WEEK FOR 7 DOSES, Disp: 4 capsule, Rfl: 0    finasteride (PROSCAR) 5 mg tablet, Take 1 tablet (5 mg total) by mouth daily, Disp: 30 tablet, Rfl: 0    glipiZIDE (GLUCOTROL) 10 mg tablet, TAKE 1 TABLET BY MOUTH 2 TIMES A DAY, Disp: 180 tablet, Rfl: 1    glucose blood test strip, 1 strip as needed, Disp: , Rfl:     lidocaine (LIDODERM) 5 %, Apply 1 patch topically daily Remove & Discard patch within 12 hours or as directed by MD, Disp: , Rfl: 0    lisinopril (ZESTRIL) 5 mg tablet, TAKE ONE (1) TABLET BY MOUTH DAILY, Disp: 90 tablet, Rfl: 1    meclizine (ANTIVERT) 25 mg tablet, TAKE 1 TABLET BY MOUTH EVERY 8 HOURS AS NEEDED FOR DIZZINESS DO NOT DRIVE WITH MED, Disp: 90 tablet, Rfl: 1    metFORMIN (GLUCOPHAGE) 1000 MG tablet, TAKE 1 TABLET BY MOUTH 2 TIMES A DAY, Disp: 180 tablet, Rfl: 1    polyethylene glycol (MIRALAX) 17 g packet, Take 17 g by mouth daily, Disp: 14 each, Rfl: 2    senna (SENOKOT) 8 6 mg, Take 1 tablet (8 6 mg total) by mouth daily, Disp: 120 each, Rfl: 0    simethicone (MYLICON) 80 mg chewable tablet, Chew 1 tablet (80 mg total) every 6 (six) hours as needed for flatulence, Disp: 30 tablet, Rfl: 0    tamsulosin (FLOMAX) 0 4 mg, Take 1 capsule (0 4 mg total) by mouth daily with dinner, Disp: 30 capsule, Rfl: 0    vitamin B-12 (CYANOCOBALAMIN) 100 MCG TABS, Take 100 mcg by mouth daily, Disp: , Rfl:     Updated list was reviewed in [employer] system of facility  Vitals:    07/10/20 1158   BP: 132/72   Pulse: 88   Resp: 20   Temp: 97 8 °F (36 6 °C)   SpO2: 96%       Physical Exam  Constitutional:       General: He is not in acute distress  Appearance: Normal appearance  He is well-developed  He is not diaphoretic  HENT:      Head: Normocephalic and atraumatic  Nose: Nose normal       Mouth/Throat:      Mouth: Mucous membranes are moist       Pharynx: No oropharyngeal exudate or posterior oropharyngeal erythema  Eyes:      General: No scleral icterus  Right eye: No discharge  Left eye: No discharge  Cardiovascular:      Rate and Rhythm: Normal rate  Pulses: Normal pulses  Heart sounds: Normal heart sounds  Pulmonary:      Effort: Pulmonary effort is normal  No respiratory distress  Breath sounds: Normal breath sounds  No wheezing, rhonchi or rales  Abdominal:      General: Bowel sounds are normal  There is no distension  Musculoskeletal:         General: No deformity  Right lower leg: No edema  Left lower leg: No edema  Skin:     General: Skin is warm and dry  Neurological:      General: No focal deficit present  Mental Status: He is alert  Mental status is at baseline  Psychiatric:         Mood and Affect: Mood normal          Behavior: Behavior normal          Diagnostic Data     Recent labs were reviewed     06/14/2020 Hgb A1c 6 2    Additional Notes     Care coordination with staff      This was electronically signed by AJIT Purvis

## 2020-08-13 NOTE — ASSESSMENT & PLAN NOTE
Lab Results   Component Value Date    HGBA1C 6 2 (H) 06/14/2020   · Patient had a previous episode of hypoglycemia on 06/28 where blood sugars were 55  · Metformin reduced to 500 and glipizide DCd  · Patient also remains on Humalog sliding scale    · Blood sugars are slightly elevated but will continue to monitor in light of recent hypoglycemic episode

## 2020-08-13 NOTE — ASSESSMENT & PLAN NOTE
· Patient is on docusate 1 capsule b i d , senna 8 6 mg q h s  And polyethylene glycol 17 g daily  · Reports he had a bowel movement today

## 2020-08-13 NOTE — ASSESSMENT & PLAN NOTE
· When asked patient states he has no symptoms  · Continue meclizine 25 mg every 8 hours and Zofran every 4 hours as needed

## 2020-08-19 ENCOUNTER — NURSING HOME VISIT (OUTPATIENT)
Dept: FAMILY MEDICINE CLINIC | Facility: CLINIC | Age: 70
End: 2020-08-19
Payer: COMMERCIAL

## 2020-08-19 VITALS
BODY MASS INDEX: 27.21 KG/M2 | SYSTOLIC BLOOD PRESSURE: 124 MMHG | TEMPERATURE: 98.4 F | WEIGHT: 173.7 LBS | DIASTOLIC BLOOD PRESSURE: 73 MMHG | OXYGEN SATURATION: 97 % | HEART RATE: 87 BPM | RESPIRATION RATE: 20 BRPM

## 2020-08-19 DIAGNOSIS — U07.1 COVID-19: ICD-10-CM

## 2020-08-19 DIAGNOSIS — I10 ESSENTIAL HYPERTENSION: ICD-10-CM

## 2020-08-19 DIAGNOSIS — N39.0 URINARY TRACT INFECTION WITHOUT HEMATURIA, SITE UNSPECIFIED: ICD-10-CM

## 2020-08-19 DIAGNOSIS — E11.40 TYPE 2 DIABETES MELLITUS WITH DIABETIC NEUROPATHY, WITH LONG-TERM CURRENT USE OF INSULIN (HCC): Primary | ICD-10-CM

## 2020-08-19 DIAGNOSIS — H81.10 BENIGN PAROXYSMAL POSITIONAL VERTIGO, UNSPECIFIED LATERALITY: ICD-10-CM

## 2020-08-19 DIAGNOSIS — N18.30 STAGE 3 CHRONIC KIDNEY DISEASE (HCC): Chronic | ICD-10-CM

## 2020-08-19 DIAGNOSIS — E78.2 MIXED HYPERLIPIDEMIA: ICD-10-CM

## 2020-08-19 DIAGNOSIS — E55.9 VITAMIN D DEFICIENCY: ICD-10-CM

## 2020-08-19 DIAGNOSIS — N40.0 BENIGN PROSTATIC HYPERPLASIA, UNSPECIFIED WHETHER LOWER URINARY TRACT SYMPTOMS PRESENT: ICD-10-CM

## 2020-08-19 DIAGNOSIS — D64.9 ANEMIA, UNSPECIFIED TYPE: ICD-10-CM

## 2020-08-19 DIAGNOSIS — Z79.4 TYPE 2 DIABETES MELLITUS WITH DIABETIC NEUROPATHY, WITH LONG-TERM CURRENT USE OF INSULIN (HCC): Primary | ICD-10-CM

## 2020-08-19 PROCEDURE — 99309 SBSQ NF CARE MODERATE MDM 30: CPT | Performed by: INTERNAL MEDICINE

## 2020-08-19 NOTE — PROGRESS NOTES
Progress Note    Patient location:  [facility]    Reason for visit:  Follow-up diabetes mellitus type 2, hyperlipidemia, hypertension, BPH, anemia, vitamin-D deficiency      Patients care was coordinated with nursing facility staff  Recent vitals, labs and updated medications were [facility] on [facility]  Past Medical, surgical, social, medication and allergy history and patients previous records reviewed  Problem List Items Addressed This Visit        Endocrine    Type 2 diabetes mellitus with diabetic neuropathy, with long-term current use of insulin ([facility] [address] ) - Primary       Lab Results   Component Value Date    HGBA1C 6 2 (H) 06/14/2020   Blood sugars reviewed  Post breakfast readings have been running on the higher side 354 yesterday, 254 on 8/17/20 and 312 on 08/16/2020  Fasting blood sugars are slightly high with readings ranging from 145 to 181  Patient remains on metformin 500 mg b i d  along with Humalog 4 units t i d wiith meals  He was previously taken off of glipizide secondary to episodes of hypoglycemia  Will increase metformin to 1000 mg b i d  and follow  Patient may need increase in Humalog with breakfast will re-evaluate in few days             Cardiovascular and Mediastinum    Essential hypertension     Blood pressure stable on lisinopril 5 mg daily            Nervous and Auditory    Benign paroxysmal positional vertigo     Patient has p r n  Orders for meclizine  Overall improved            Genitourinary    Stage 3 chronic kidney disease (HCC) (Chronic)     Patient's baseline creatinine is between 1 1-1 3  Recent creatinine was stable  Repeat BMP is due on 8/28   Will follow         UTI (urinary tract infection)     Patient completed course of amoxicillin several weeks ago         Benign prostatic hyperplasia     Stable on finasteride and tamsulosin            Other    Hyperlipidemia     Continue atorvastatin 20 mg daily         Anemia     Recent hemoglobin was stable at 11         COVID-19     Patient tested positive for COVID-19 more than 8 weeks ago  He has recovered well  Remains afebrile with stable respiratory status         Vitamin D deficiency     Vitamin-D level was 13 in June 20  Resume vitamin-D supplements 82511 units q week for now             HPI:  Patient is being seen for a follow-up visit today  He is doing well  Denies any dyspnea chest pain nausea vomiting diarrhea or abdominal pain  Patient's postprandial blood sugars were recently noted to be on higher side  He was started on Humalog 4 units t i d  With meals  Glycemic control has improved although post breakfast and fasting readings at times remain on higher side  Patient was previously taken off of glipizide secondary to episodes of hypoglycemia  Blood sugar had gone down to 55  He additionally remains on metformin 500 mg b i d   Patient remains incontinent of bladder  Continues to exhibit unsteady gait with impaired balance needing continued physical therapy  No other active issues have been reported by the staff  Review of Systems   Constitutional: Positive for fatigue  Negative for chills and fever  HENT: Negative for nosebleeds and rhinorrhea  Eyes: Negative for discharge and redness  Respiratory: Negative for cough, chest tightness, shortness of breath, wheezing and stridor  Cardiovascular: Negative for chest pain and leg swelling  Gastrointestinal: Negative for abdominal distention, abdominal pain, diarrhea and vomiting  Genitourinary: Negative for dysuria, flank pain and hematuria  Urinary incontinence at times   Musculoskeletal: Positive for gait problem  Negative for arthralgias and back pain  Skin: Negative for pallor  Neurological: Positive for weakness (Generalized)  Negative for tremors, seizures, syncope and headaches  Psychiatric/Behavioral: Negative for agitation, behavioral problems and confusion         Past Medical History: Past Medical History:   Diagnosis Date    Ambulatory dysfunction     Anxiety     Diabetes mellitus (HCC)     GERD (gastroesophageal reflux disease)     last assessed 5/20/16    Hematuria     Hyperlipidemia     Hypertension     Kidney disease     Left-sided weakness     Nuclear senile cataract of left eye     Vitamin D deficiency      Past Surgical History:   Past Surgical History:   Procedure Laterality Date    CATARACT EXTRACTION      CHOLECYSTECTOMY      KNEE SURGERY       Social History:     Social History     Tobacco Use   Smoking Status Never Smoker   Smokeless Tobacco Never Used       Social History     Substance and Sexual Activity   Alcohol Use Not Currently           Family History:   Family History   Problem Relation Age of Onset    Coronary artery disease Mother     Diabetes Father         Allergies: No Known Allergies      Medications:   Current Outpatient Medications:     ACCU-CHEK GUIDE test strip, 1 strip as needed, Disp: , Rfl:     Alcohol Swabs (PRO COMFORT ALCOHOL) 70 % PADS, 1 pad daily Use a pad when testing, Disp: , Rfl:     atorvastatin (LIPITOR) 20 mg tablet, TAKE ONE (1) TABLET BY MOUTH DAILY, Disp: 90 tablet, Rfl: 1    cholecalciferol (VITAMIN D3) 1,000 units tablet, Take 2 tablets (2,000 Units total) by mouth daily, Disp: , Rfl: 0    cyanocobalamin (VITAMIN B-12) 1000 MCG tablet, Take 1 tablet (1,000 mcg total) by mouth daily, Disp: , Rfl: 0    docusate sodium (COLACE) 100 mg capsule, Take 1 capsule (100 mg total) by mouth 2 (two) times a day, Disp: 10 capsule, Rfl: 0    Easy Comfort Lancets MISC, as needed, Disp: , Rfl:     ergocalciferol (VITAMIN D2) 50,000 units, TAKE 1 CAPSULE (50,000 UNITS TOTAL) BY MOUTH ONCE A WEEK FOR 7 DOSES, Disp: 4 capsule, Rfl: 0    finasteride (PROSCAR) 5 mg tablet, Take 1 tablet (5 mg total) by mouth daily, Disp: 30 tablet, Rfl: 0    glipiZIDE (GLUCOTROL) 10 mg tablet, TAKE 1 TABLET BY MOUTH 2 TIMES A DAY, Disp: 180 tablet, Rfl: 1    glucose blood test strip, 1 strip as needed, Disp: , Rfl:     lidocaine (LIDODERM) 5 %, Apply 1 patch topically daily Remove & Discard patch within 12 hours or as directed by MD, Disp: , Rfl: 0    lisinopril (ZESTRIL) 5 mg tablet, TAKE ONE (1) TABLET BY MOUTH DAILY, Disp: 90 tablet, Rfl: 1    meclizine (ANTIVERT) 25 mg tablet, TAKE 1 TABLET BY MOUTH EVERY 8 HOURS AS NEEDED FOR DIZZINESS DO NOT DRIVE WITH MED, Disp: 90 tablet, Rfl: 1    metFORMIN (GLUCOPHAGE) 1000 MG tablet, TAKE 1 TABLET BY MOUTH 2 TIMES A DAY, Disp: 180 tablet, Rfl: 1    polyethylene glycol (MIRALAX) 17 g packet, Take 17 g by mouth daily, Disp: 14 each, Rfl: 2    senna (SENOKOT) 8 6 mg, Take 1 tablet (8 6 mg total) by mouth daily, Disp: 120 each, Rfl: 0    simethicone (MYLICON) 80 mg chewable tablet, Chew 1 tablet (80 mg total) every 6 (six) hours as needed for flatulence, Disp: 30 tablet, Rfl: 0    tamsulosin (FLOMAX) 0 4 mg, Take 1 capsule (0 4 mg total) by mouth daily with dinner, Disp: 30 capsule, Rfl: 0    vitamin B-12 (CYANOCOBALAMIN) 100 MCG TABS, Take 100 mcg by mouth daily, Disp: , Rfl:     Updated list was reviewed in [facility] system of facility  Vitals:    08/19/20 1035   BP: 124/73   Pulse: 87   Resp: 20   Temp: 98 4 °F (36 9 °C)   SpO2: 97%       Physical Exam  Constitutional:       General: He is not in acute distress  Appearance: He is well-developed  He is not diaphoretic  Eyes:      General: No scleral icterus  Right eye: No discharge  Left eye: No discharge  Cardiovascular:      Rate and Rhythm: Normal rate and regular rhythm  Heart sounds: Normal heart sounds  Pulmonary:      Breath sounds: No stridor  No wheezing, rhonchi or rales  Abdominal:      General: There is no distension  Palpations: Abdomen is soft  Tenderness: There is no abdominal tenderness  There is no guarding  Skin:     Coloration: Skin is not pale  Findings: No bruising     Neurological: Mental Status: He is alert  Mental status is at baseline  Cranial Nerves: No cranial nerve deficit  Psychiatric:         Mood and Affect: Mood normal          Behavior: Behavior normal        Diagnostic Data:    Recent labs were reviewed  On 08/14/2020 WBC count was 10 4, hemoglobin 10 8, platelets 764, sodium 134, potassium 5 3, BUN 21, creatinine 1 4, calcium 8 8    Additional Notes:   Increase metformin to 1000 mg b i d     Follow-up BMP on 08/28    Last potassium was borderline high   Continue PT OT

## 2020-08-19 NOTE — ASSESSMENT & PLAN NOTE
Lab Results   Component Value Date    HGBA1C 6 2 (H) 06/14/2020   Blood sugars reviewed  Post breakfast readings have been running on the higher side 354 yesterday, 254 on 8/17/20 and 312 on 08/16/2020  Fasting blood sugars are slightly high with readings ranging from 145 to 181  Patient remains on metformin 500 mg b i d  along with Humalog 4 units t i d wiith meals  He was previously taken off of glipizide secondary to episodes of hypoglycemia  Will increase metformin to 1000 mg b i d  and follow    Patient may need increase in Humalog with breakfast will re-evaluate in few days

## 2020-08-19 NOTE — ASSESSMENT & PLAN NOTE
Patient tested positive for COVID-19 more than 8 weeks ago  He has recovered well    Remains afebrile with stable respiratory status

## 2020-08-20 ENCOUNTER — PATIENT OUTREACH (OUTPATIENT)
Dept: FAMILY MEDICINE CLINIC | Facility: CLINIC | Age: 70
End: 2020-08-20

## 2020-08-20 NOTE — PROGRESS NOTES
Spoke with Devora Gomez,   Pt is at facility long term  Family requesting pt to be transferred to [facility], this facility is presently closed to admissions  Pt will transfer when [facility] reopens  Will close to outpatient care management at this time

## 2020-08-26 ENCOUNTER — NURSING HOME VISIT (OUTPATIENT)
Dept: FAMILY MEDICINE CLINIC | Facility: CLINIC | Age: 70
End: 2020-08-26
Payer: COMMERCIAL

## 2020-08-26 VITALS
RESPIRATION RATE: 20 BRPM | WEIGHT: 173.7 LBS | OXYGEN SATURATION: 97 % | DIASTOLIC BLOOD PRESSURE: 72 MMHG | BODY MASS INDEX: 27.21 KG/M2 | HEART RATE: 84 BPM | TEMPERATURE: 98.4 F | SYSTOLIC BLOOD PRESSURE: 143 MMHG

## 2020-08-26 DIAGNOSIS — R53.1 LEFT-SIDED WEAKNESS: ICD-10-CM

## 2020-08-26 DIAGNOSIS — N40.0 BENIGN PROSTATIC HYPERPLASIA, UNSPECIFIED WHETHER LOWER URINARY TRACT SYMPTOMS PRESENT: ICD-10-CM

## 2020-08-26 DIAGNOSIS — E55.9 VITAMIN D DEFICIENCY: ICD-10-CM

## 2020-08-26 DIAGNOSIS — G47.00 INSOMNIA, UNSPECIFIED TYPE: Primary | ICD-10-CM

## 2020-08-26 DIAGNOSIS — I10 ESSENTIAL HYPERTENSION: ICD-10-CM

## 2020-08-26 DIAGNOSIS — E78.2 MIXED HYPERLIPIDEMIA: ICD-10-CM

## 2020-08-26 PROCEDURE — 99309 SBSQ NF CARE MODERATE MDM 30: CPT | Performed by: NURSE PRACTITIONER

## 2020-08-30 DIAGNOSIS — E55.9 VITAMIN D DEFICIENCY: ICD-10-CM

## 2020-08-31 RX ORDER — ERGOCALCIFEROL 1.25 MG/1
50000 CAPSULE ORAL WEEKLY
Qty: 4 CAPSULE | Refills: 0 | Status: SHIPPED | OUTPATIENT
Start: 2020-08-31 | End: 2020-10-13

## 2020-09-01 PROBLEM — G47.00 INSOMNIA: Status: ACTIVE | Noted: 2020-09-01

## 2020-09-01 NOTE — ASSESSMENT & PLAN NOTE
Stable  No urinary issues reported  Patient remains on finasteride 5 mg daily and tamsulosin 0 5 mg daily

## 2020-09-01 NOTE — ASSESSMENT & PLAN NOTE
Lab Results   Component Value Date    HGBA1C 6 2 (H) 06/14/2020   Metformin recently increased due to elevated blood sugars  Blood sugars have decreased  Will continue to monitor  Continue metformin 1000 mg b i d  And Humalog 4 units with meals

## 2020-09-10 ENCOUNTER — NURSING HOME VISIT (OUTPATIENT)
Dept: FAMILY MEDICINE CLINIC | Facility: CLINIC | Age: 70
End: 2020-09-10
Payer: COMMERCIAL

## 2020-09-10 DIAGNOSIS — R26.2 AMBULATORY DYSFUNCTION: ICD-10-CM

## 2020-09-10 DIAGNOSIS — N30.01 ACUTE CYSTITIS WITH HEMATURIA: Primary | ICD-10-CM

## 2020-09-10 DIAGNOSIS — E55.9 VITAMIN D DEFICIENCY: ICD-10-CM

## 2020-09-10 DIAGNOSIS — Z79.4 TYPE 2 DIABETES MELLITUS WITH DIABETIC NEUROPATHY, WITH LONG-TERM CURRENT USE OF INSULIN (HCC): ICD-10-CM

## 2020-09-10 DIAGNOSIS — I10 ESSENTIAL HYPERTENSION: ICD-10-CM

## 2020-09-10 DIAGNOSIS — G47.00 INSOMNIA, UNSPECIFIED TYPE: ICD-10-CM

## 2020-09-10 DIAGNOSIS — E11.40 TYPE 2 DIABETES MELLITUS WITH DIABETIC NEUROPATHY, WITH LONG-TERM CURRENT USE OF INSULIN (HCC): ICD-10-CM

## 2020-09-10 DIAGNOSIS — N20.0 NEPHROLITHIASIS: ICD-10-CM

## 2020-09-10 PROCEDURE — 99309 SBSQ NF CARE MODERATE MDM 30: CPT | Performed by: NURSE PRACTITIONER

## 2020-09-15 RX ORDER — SENNOSIDES 8.6 MG
1 TABLET ORAL DAILY
Qty: 120 TABLET | OUTPATIENT
Start: 2020-09-15

## 2020-09-17 ENCOUNTER — NURSING HOME VISIT (OUTPATIENT)
Dept: FAMILY MEDICINE CLINIC | Facility: CLINIC | Age: 70
End: 2020-09-17
Payer: COMMERCIAL

## 2020-09-17 VITALS
SYSTOLIC BLOOD PRESSURE: 110 MMHG | HEART RATE: 82 BPM | WEIGHT: 173.7 LBS | RESPIRATION RATE: 18 BRPM | BODY MASS INDEX: 27.21 KG/M2 | TEMPERATURE: 97.3 F | OXYGEN SATURATION: 97 % | DIASTOLIC BLOOD PRESSURE: 64 MMHG

## 2020-09-17 DIAGNOSIS — N40.0 BENIGN PROSTATIC HYPERPLASIA, UNSPECIFIED WHETHER LOWER URINARY TRACT SYMPTOMS PRESENT: ICD-10-CM

## 2020-09-17 DIAGNOSIS — E11.40 TYPE 2 DIABETES MELLITUS WITH DIABETIC NEUROPATHY, WITH LONG-TERM CURRENT USE OF INSULIN (HCC): ICD-10-CM

## 2020-09-17 DIAGNOSIS — N39.0 URINARY TRACT INFECTION WITHOUT HEMATURIA, SITE UNSPECIFIED: ICD-10-CM

## 2020-09-17 DIAGNOSIS — N21.0 BLADDER STONE: ICD-10-CM

## 2020-09-17 DIAGNOSIS — R26.2 AMBULATORY DYSFUNCTION: ICD-10-CM

## 2020-09-17 DIAGNOSIS — D64.9 ANEMIA, UNSPECIFIED TYPE: ICD-10-CM

## 2020-09-17 DIAGNOSIS — N18.30 STAGE 3 CHRONIC KIDNEY DISEASE (HCC): Chronic | ICD-10-CM

## 2020-09-17 DIAGNOSIS — G47.00 INSOMNIA, UNSPECIFIED TYPE: ICD-10-CM

## 2020-09-17 DIAGNOSIS — N20.0 NEPHROLITHIASIS: ICD-10-CM

## 2020-09-17 DIAGNOSIS — K59.00 CONSTIPATION: ICD-10-CM

## 2020-09-17 DIAGNOSIS — E55.9 VITAMIN D DEFICIENCY: ICD-10-CM

## 2020-09-17 DIAGNOSIS — Z79.4 TYPE 2 DIABETES MELLITUS WITH DIABETIC NEUROPATHY, WITH LONG-TERM CURRENT USE OF INSULIN (HCC): ICD-10-CM

## 2020-09-17 DIAGNOSIS — E78.2 MIXED HYPERLIPIDEMIA: ICD-10-CM

## 2020-09-17 DIAGNOSIS — I10 ESSENTIAL HYPERTENSION: ICD-10-CM

## 2020-09-17 DIAGNOSIS — Z87.440 HISTORY OF RECURRENT UTIS: Primary | ICD-10-CM

## 2020-09-17 DIAGNOSIS — E11.40 TYPE 2 DIABETES MELLITUS WITH DIABETIC NEUROPATHY, WITHOUT LONG-TERM CURRENT USE OF INSULIN (HCC): ICD-10-CM

## 2020-09-17 PROCEDURE — 99309 SBSQ NF CARE MODERATE MDM 30: CPT | Performed by: INTERNAL MEDICINE

## 2020-09-17 RX ORDER — SENNOSIDES 8.6 MG
1 TABLET ORAL DAILY
Qty: 120 EACH | Refills: 0 | Status: SHIPPED | OUTPATIENT
Start: 2020-09-17 | End: 2021-01-19 | Stop reason: ALTCHOICE

## 2020-09-17 NOTE — ASSESSMENT & PLAN NOTE
Patient has had recurrent UTIs  UA on 09/04 was positive for microscopic hematuria  Urine cultures revealed ESBL Morganella morganii and Enterococcus faecalis  Patient was treated with amoxicillin  Patient complained of dysuria to the nurse yesterday  Reports feeling better today    Will hold off any further antimicrobial therapy

## 2020-09-17 NOTE — ASSESSMENT & PLAN NOTE
Patient with known history of nephrolithiasis and bladder stone  Has history of recurrent UTIs    Follow-up with urology

## 2020-09-17 NOTE — PROGRESS NOTES
Progress Note    Patient location:  [facility]      Reason for visit:  Dysuria, history of recurrent UTIs, bladder stone, diabetes mellitus  Hypertension, CKD, BPH, hyperlipidemia    Patients care was coordinated with nursing facility staff  Recent vitals, labs and [facility]  Past Medical, surgical, social, medication and allergy history and patients previous records reviewed  Problem List Items Addressed This Visit        Endocrine    Type 2 diabetes mellitus with diabetic neuropathy ([facility] [address] )       Lab Results   Component Value Date    HGBA1C 6 2 (H) 06/14/2020   Blood sugars are noted to be intermittently high  Highest being 347 on 09/14  Fasting blood sugar was 152, 277 post breakfast  Continue metformin 1000 mg twice a day  Will resume low-dose glipizide 2 5 mg daily  Patient had been previously on glipizide which was later discontinued secondary to episodes of hypoglycemia         Type 2 diabetes mellitus with diabetic neuropathy, with long-term current use of insulin (HCC)       Cardiovascular and Mediastinum    Essential hypertension     Blood pressure remains stable on lisinopril            Genitourinary    Stage 3 chronic kidney disease (HCC) (Chronic)     Creatinine was stable at 1 1 on 08/28/2020  Will repeat BMP in 1 week         Nephrolithiasis     Patient with known history of nephrolithiasis and bladder stone  Has history of recurrent UTIs  Follow-up with urology         Bladder stone     History of bladder stone  Will arrange follow-up with urology service for possible intervention  Urological intervention was delayed before due to COVID-19 positive status  Patient has had recurrent UTIs during last 3 months         UTI (urinary tract infection)     Patient has had recurrent UTIs  UA on 09/04 was positive for microscopic hematuria  Urine cultures revealed ESBL Morganella morganii and Enterococcus faecalis    Patient was treated with amoxicillin  Patient complained of dysuria to the nurse yesterday  Reports feeling better today  Will hold off any further antimicrobial therapy         Benign prostatic hyperplasia     Continue finasteride and tamsulosin  Follow-up with urology service            Other    Hyperlipidemia     Continue atorvastatin 20 mg daily         Ambulatory dysfunction    Anemia    Vitamin D deficiency     Continue vitamin-D 60137 units once a         Insomnia     Continue melatonin         History of recurrent UTIs - Primary     Patient has history of recurrent UTIs  He was treated for E faecalis UTI in the hospital 2 months ago  Patient has been treated with antibiotics twice during rehab stay here at  [facility]  Patient recently completed course of amoxicillin therapy for ESBL E faecalis UTI  Will hold off any further antibiotic therapy  Referred to urology               HPI:  Patient is being seen for a follow-up visit today  He is doing okay at present   Patient complained of dysuria to nursing staff yesterday, reports feeling better today Of note is patient recently completed amoxicillin covers for ESBL E faecalis and Morganella morganii UTI patient has history of recurrent UTIs  He was treated for urosepsis back in June in the hospital   Since then he was treated twice for UTIs  Patient is known to have bladder stone  He was scheduled  to follow-up with urology service however this was postponed  Urological intervention for stone could not be done earlier secondary to covered positive status  Patient has not had any fever or chills  Denies any dyspnea or chest pain  No nausea vomiting diarrhea abdominal pain      Review of Systems   Constitutional: Negative for chills, fatigue and fever  HENT: Negative for nosebleeds and rhinorrhea  Eyes: Negative for discharge and redness  Respiratory: Negative for cough, chest tightness, shortness of breath, wheezing and stridor  Cardiovascular: Negative for chest pain and leg swelling  Gastrointestinal: Negative for abdominal distention, abdominal pain, diarrhea and vomiting  Genitourinary: Positive for dysuria  Negative for flank pain and hematuria  Musculoskeletal: Positive for gait problem  Negative for arthralgias and back pain  Skin: Negative for pallor  Neurological: Positive for weakness (Generalized)  Negative for tremors, seizures, syncope and headaches  Psychiatric/Behavioral: Negative for agitation, behavioral problems and confusion         Past Medical History:   Diagnosis Date    Ambulatory dysfunction     Anxiety     Diabetes mellitus (HCC)     GERD (gastroesophageal reflux disease)     last assessed 5/20/16    Hematuria     Hyperlipidemia     Hypertension     Kidney disease     Left-sided weakness     Nuclear senile cataract of left eye     Vitamin D deficiency        Past Surgical History:   Procedure Laterality Date    CATARACT EXTRACTION      CHOLECYSTECTOMY      KNEE SURGERY         Social History     Tobacco Use   Smoking Status Never Smoker   Smokeless Tobacco Never Used       Social History     Substance and Sexual Activity   Alcohol Use Not Currently       Family History   Problem Relation Age of Onset    Coronary artery disease Mother     Diabetes Father         No Known Allergies      Current Outpatient Medications:     ACCU-CHEK GUIDE test strip, 1 strip as needed, Disp: , Rfl:     Alcohol Swabs (PRO COMFORT ALCOHOL) 70 % PADS, 1 pad daily Use a pad when testing, Disp: , Rfl:     atorvastatin (LIPITOR) 20 mg tablet, TAKE ONE (1) TABLET BY MOUTH DAILY, Disp: 90 tablet, Rfl: 1    cholecalciferol (VITAMIN D3) 1,000 units tablet, Take 2 tablets (2,000 Units total) by mouth daily, Disp: , Rfl: 0    cyanocobalamin (VITAMIN B-12) 1000 MCG tablet, Take 1 tablet (1,000 mcg total) by mouth daily, Disp: , Rfl: 0    docusate sodium (COLACE) 100 mg capsule, Take 1 capsule (100 mg total) by mouth 2 (two) times a day, Disp: 10 capsule, Rfl: 0    Easy Comfort Lancets MISC, as needed, Disp: , Rfl:     ergocalciferol (VITAMIN D2) 50,000 units, TAKE 1 CAPSULE (50,000 UNITS TOTAL) BY MOUTH ONCE A WEEK FOR 7 DOSES, Disp: 4 capsule, Rfl: 0    finasteride (PROSCAR) 5 mg tablet, Take 1 tablet (5 mg total) by mouth daily, Disp: 30 tablet, Rfl: 0    glipiZIDE (GLUCOTROL) 10 mg tablet, TAKE 1 TABLET BY MOUTH 2 TIMES A DAY, Disp: 180 tablet, Rfl: 1    glucose blood test strip, 1 strip as needed, Disp: , Rfl:     lidocaine (LIDODERM) 5 %, Apply 1 patch topically daily Remove & Discard patch within 12 hours or as directed by MD, Disp: , Rfl: 0    lisinopril (ZESTRIL) 5 mg tablet, TAKE ONE (1) TABLET BY MOUTH DAILY, Disp: 90 tablet, Rfl: 1    meclizine (ANTIVERT) 25 mg tablet, TAKE 1 TABLET BY MOUTH EVERY 8 HOURS AS NEEDED FOR DIZZINESS DO NOT DRIVE WITH MED, Disp: 90 tablet, Rfl: 1    metFORMIN (GLUCOPHAGE) 1000 MG tablet, TAKE 1 TABLET BY MOUTH 2 TIMES A DAY, Disp: 180 tablet, Rfl: 1    polyethylene glycol (MIRALAX) 17 g packet, Take 17 g by mouth daily, Disp: 14 each, Rfl: 2    senna (SENOKOT) 8 6 mg, Take 1 tablet (8 6 mg total) by mouth daily, Disp: 120 each, Rfl: 0    simethicone (MYLICON) 80 mg chewable tablet, Chew 1 tablet (80 mg total) every 6 (six) hours as needed for flatulence, Disp: 30 tablet, Rfl: 0    tamsulosin (FLOMAX) 0 4 mg, Take 1 capsule (0 4 mg total) by mouth daily with dinner, Disp: 30 capsule, Rfl: 0    vitamin B-12 (CYANOCOBALAMIN) 100 MCG TABS, Take 100 mcg by mouth daily, Disp: , Rfl:     Updated list was reviewed in [facility] system of facility  Vitals:    09/17/20 1236   BP: 110/64   Pulse: 82   Resp: 18   Temp: (!) 97 3 °F (36 3 °C)   SpO2: 97%       Physical Exam  Constitutional:       General: He is not in acute distress  Appearance: He is well-developed  He is not diaphoretic  Eyes:      General: No scleral icterus  Right eye: No discharge  Left eye: No discharge  Cardiovascular:      Rate and Rhythm: Normal rate and regular rhythm  Heart sounds: Normal heart sounds  Pulmonary:      Breath sounds: No stridor  No wheezing or rales  Abdominal:      General: There is no distension  Palpations: Abdomen is soft  Tenderness: There is no abdominal tenderness  There is no right CVA tenderness, left CVA tenderness or guarding  Musculoskeletal:      Right lower leg: No edema  Left lower leg: No edema  Skin:     Coloration: Skin is not jaundiced or pale  Findings: No bruising or erythema  Neurological:      General: No focal deficit present  Mental Status: He is alert  Cranial Nerves: No cranial nerve deficit  Motor: No weakness  Comments: Awake alert in no distress   Psychiatric:         Mood and Affect: Mood normal          Behavior: Behavior normal          Diagnostic Data:    Recent labs were reviewed  On 08/28/2020 sodium was 134, potassium 4 6, BUN 17, creatinine 1 1, blood glucose 230    Additional Notes:      This note was electronically signed by Dr Paula Ross

## 2020-09-17 NOTE — ASSESSMENT & PLAN NOTE
Lab Results   Component Value Date    HGBA1C 6 2 (H) 06/14/2020   Blood sugars are noted to be intermittently high  Highest being 347 on 09/14  Fasting blood sugar was 152, 277 post breakfast  Continue metformin 1000 mg twice a day  Will resume low-dose glipizide 2 5 mg daily    Patient had been previously on glipizide which was later discontinued secondary to episodes of hypoglycemia

## 2020-09-17 NOTE — ASSESSMENT & PLAN NOTE
Patient has history of recurrent UTIs  He was treated for E faecalis UTI in the hospital 2 months ago  Patient has been treated with antibiotics twice during rehab stay here at  [facility]  Patient recently completed course of amoxicillin therapy for ESBL E faecalis UTI  Will hold off any further antibiotic therapy    Referred to urology

## 2020-09-17 NOTE — ASSESSMENT & PLAN NOTE
History of bladder stone  Will arrange follow-up with urology service for possible intervention  Urological intervention was delayed before due to COVID-19 positive status    Patient has had recurrent UTIs during last 3 months

## 2020-09-25 ENCOUNTER — NURSING HOME VISIT (OUTPATIENT)
Dept: FAMILY MEDICINE CLINIC | Facility: CLINIC | Age: 70
End: 2020-09-25
Payer: COMMERCIAL

## 2020-09-25 DIAGNOSIS — E11.40 TYPE 2 DIABETES MELLITUS WITH DIABETIC NEUROPATHY, WITH LONG-TERM CURRENT USE OF INSULIN (HCC): Primary | ICD-10-CM

## 2020-09-25 DIAGNOSIS — N40.0 BENIGN PROSTATIC HYPERPLASIA WITHOUT LOWER URINARY TRACT SYMPTOMS: ICD-10-CM

## 2020-09-25 DIAGNOSIS — R53.1 LEFT-SIDED WEAKNESS: ICD-10-CM

## 2020-09-25 DIAGNOSIS — Z79.4 TYPE 2 DIABETES MELLITUS WITH DIABETIC NEUROPATHY, WITH LONG-TERM CURRENT USE OF INSULIN (HCC): Primary | ICD-10-CM

## 2020-09-25 DIAGNOSIS — I10 ESSENTIAL HYPERTENSION: ICD-10-CM

## 2020-09-25 DIAGNOSIS — R26.2 AMBULATORY DYSFUNCTION: ICD-10-CM

## 2020-09-25 DIAGNOSIS — H81.10 BENIGN PAROXYSMAL POSITIONAL VERTIGO, UNSPECIFIED LATERALITY: ICD-10-CM

## 2020-09-25 DIAGNOSIS — N18.30 STAGE 3 CHRONIC KIDNEY DISEASE (HCC): Chronic | ICD-10-CM

## 2020-09-25 PROCEDURE — 99309 SBSQ NF CARE MODERATE MDM 30: CPT | Performed by: NURSE PRACTITIONER

## 2020-09-26 NOTE — PROGRESS NOTES
Nursing Home Visit      NAME: Vamsi Gerardo  AGE: 79 y o  SEX: male [id number]    DATE OF ENCOUNTER: 9/25/2020    Assessment and Plan     Problem List Items Addressed This Visit        Endocrine    Type 2 diabetes mellitus with diabetic neuropathy ([facility] [address] ) - Primary       Lab Results   Component Value Date    HGBA1C 6 2 (H) 06/14/2020   Blood sugars between 148 to 300s            Cardiovascular and Mediastinum    Essential hypertension     Continue lisinopril  Blood pressure stable 116/66            Nervous and Auditory    Left-sided weakness     Continue PT and OT         Benign paroxysmal positional vertigo     Continue meclizine  No complaints or reports of dizziness            Genitourinary    Stage 3 chronic kidney disease (HCC) (Chronic)     Continue to monitor BUN and creatinine         Benign prostatic hyperplasia     Continue finasteride and tamsulosin  No complaints of retention, frequency or urgency            Other    Ambulatory dysfunction     Continue PT and OT                 Chief Complaint     Chief Complaint   Patient presents with    Diabetes       History of Present Illness     Patient seen and examined  Denies any nausea, vomiting, constipation or diarrhea  No complaints of chest pain, shortness of breath or palpitations  He has a history of recurrent UTIs and awaiting an appointment with urology      The following portions of the patient's history were reviewed and updated as appropriate: allergies, current medications, past family history, past medical history, past social history, past surgical history and problem list     Review of Systems     Review of Systems   Constitutional: Negative  Negative for appetite change, chills, diaphoresis, fatigue, fever and unexpected weight change  HENT: Negative  Negative for congestion, dental problem, ear discharge, ear pain, facial swelling, mouth sores, postnasal drip, rhinorrhea and sinus pain  Eyes: Negative    Negative for pain, discharge, redness, itching and visual disturbance  Respiratory: Negative  Negative for cough, shortness of breath and wheezing  Cardiovascular: Negative  Negative for chest pain, palpitations and leg swelling  Gastrointestinal: Negative  Negative for abdominal distention, constipation, diarrhea, nausea and vomiting  Endocrine: Negative  Negative for cold intolerance, heat intolerance, polydipsia, polyphagia and polyuria  Genitourinary: Negative  Negative for difficulty urinating, frequency, hematuria and urgency  Musculoskeletal: Positive for gait problem  Negative for arthralgias, back pain and myalgias  Skin: Negative  Negative for pallor, rash and wound  Allergic/Immunologic: Negative  Negative for immunocompromised state  Neurological: Negative for tremors, seizures, speech difficulty, weakness, light-headedness, numbness and headaches  Hematological: Negative  Does not bruise/bleed easily  Psychiatric/Behavioral: Negative  Negative for agitation, behavioral problems, hallucinations, sleep disturbance and suicidal ideas         Active Problem List     Patient Active Problem List   Diagnosis    Dizziness    Hyperlipidemia    Pancreas cyst    Type 2 diabetes mellitus with diabetic neuropathy ([facility])    Abnormal PSA    Microscopic hematuria    Paresis ([facility] )    Essential hypertension    Type 2 diabetes mellitus with diabetic neuropathy, with long-term current use of insulin ([facility])    Nephrolithiasis    Ambulatory dysfunction    CARLOS EDUARDO (acute kidney injury) ([facility] )    Colon cancer screening    Cystitis    Bladder stone    Stage 3 chronic kidney disease (HCC)    Acute cystitis with hematuria    Anemia    UTI (urinary tract infection)    Left-sided weakness    COVID-19    Vitamin D deficiency    Benign paroxysmal positional vertigo    Benign prostatic hyperplasia    Constipation    Insomnia    History of recurrent UTIs       Objective     /76   Pulse 78   Temp 98 2 °F (36 8 °C)   Resp 20   Wt 78 kg (172 lb)   SpO2 97%   BMI 26 94 kg/m²     Physical Exam  Vitals signs and nursing note reviewed  Constitutional:       General: He is not in acute distress  Appearance: Normal appearance  He is well-developed  He is not ill-appearing or toxic-appearing  HENT:      Head: Normocephalic and atraumatic  Right Ear: External ear normal       Left Ear: External ear normal       Nose: Nose normal  No congestion or rhinorrhea  Mouth/Throat:      Mouth: Mucous membranes are moist       Pharynx: Oropharynx is clear  No posterior oropharyngeal erythema  Eyes:      Extraocular Movements: Extraocular movements intact  Conjunctiva/sclera: Conjunctivae normal       Pupils: Pupils are equal, round, and reactive to light  Neck:      Musculoskeletal: Normal range of motion and neck supple  No neck rigidity or muscular tenderness  Cardiovascular:      Rate and Rhythm: Normal rate and regular rhythm  Pulses: Normal pulses  Dorsalis pedis pulses are 2+ on the right side and 2+ on the left side  Posterior tibial pulses are 2+ on the right side and 2+ on the left side  Heart sounds: Normal heart sounds  No murmur  No friction rub  No gallop  Pulmonary:      Effort: Pulmonary effort is normal       Breath sounds: Normal breath sounds  No stridor  No wheezing, rhonchi or rales  Chest:      Chest wall: No tenderness  Abdominal:      General: Abdomen is flat  Bowel sounds are normal  There is no distension  Palpations: Abdomen is soft  Tenderness: There is no abdominal tenderness  Musculoskeletal: Normal range of motion  General: No swelling, tenderness or deformity  Comments: Generalized weakness   Skin:     General: Skin is warm and dry  Capillary Refill: Capillary refill takes less than 2 seconds  Coloration: Skin is not jaundiced or pale  Findings: No erythema, lesion or rash     Neurological: General: No focal deficit present  Mental Status: He is alert and oriented to person, place, and time  Mental status is at baseline  Motor: Weakness present        Gait: Gait abnormal    Psychiatric:         Mood and Affect: Mood normal          Behavior: Behavior normal          Pertinent Laboratory/Diagnostic Studies:  K 5 5 will redraw and check accuracy      Current Medications     Current Outpatient Medications:     ACCU-CHEK GUIDE test strip, 1 strip as needed, Disp: , Rfl:     Alcohol Swabs (PRO COMFORT ALCOHOL) 70 % PADS, 1 pad daily Use a pad when testing, Disp: , Rfl:     atorvastatin (LIPITOR) 20 mg tablet, TAKE ONE (1) TABLET BY MOUTH DAILY, Disp: 90 tablet, Rfl: 1    cholecalciferol (VITAMIN D3) 1,000 units tablet, Take 2 tablets (2,000 Units total) by mouth daily, Disp: , Rfl: 0    cyanocobalamin (VITAMIN B-12) 1000 MCG tablet, Take 1 tablet (1,000 mcg total) by mouth daily, Disp: , Rfl: 0    docusate sodium (COLACE) 100 mg capsule, Take 1 capsule (100 mg total) by mouth 2 (two) times a day, Disp: 10 capsule, Rfl: 0    Easy Comfort Lancets MISC, as needed, Disp: , Rfl:     ergocalciferol (VITAMIN D2) 50,000 units, TAKE 1 CAPSULE (50,000 UNITS TOTAL) BY MOUTH ONCE A WEEK FOR 7 DOSES, Disp: 4 capsule, Rfl: 0    finasteride (PROSCAR) 5 mg tablet, Take 1 tablet (5 mg total) by mouth daily, Disp: 30 tablet, Rfl: 0    glipiZIDE (GLUCOTROL) 10 mg tablet, TAKE 1 TABLET BY MOUTH 2 TIMES A DAY, Disp: 180 tablet, Rfl: 1    glucose blood test strip, 1 strip as needed, Disp: , Rfl:     lidocaine (LIDODERM) 5 %, Apply 1 patch topically daily Remove & Discard patch within 12 hours or as directed by MD, Disp: , Rfl: 0    lisinopril (ZESTRIL) 5 mg tablet, TAKE ONE (1) TABLET BY MOUTH DAILY, Disp: 90 tablet, Rfl: 1    meclizine (ANTIVERT) 25 mg tablet, TAKE 1 TABLET BY MOUTH EVERY 8 HOURS AS NEEDED FOR DIZZINESS DO NOT DRIVE WITH MED, Disp: 90 tablet, Rfl: 1    metFORMIN (GLUCOPHAGE) 1000 MG tablet, TAKE 1 TABLET BY MOUTH 2 TIMES A DAY, Disp: 180 tablet, Rfl: 1    polyethylene glycol (MIRALAX) 17 g packet, Take 17 g by mouth daily, Disp: 14 each, Rfl: 2    senna (SENOKOT) 8 6 mg, Take 1 tablet (8 6 mg total) by mouth daily, Disp: 120 each, Rfl: 0    simethicone (MYLICON) 80 mg chewable tablet, Chew 1 tablet (80 mg total) every 6 (six) hours as needed for flatulence, Disp: 30 tablet, Rfl: 0    tamsulosin (FLOMAX) 0 4 mg, Take 1 capsule (0 4 mg total) by mouth daily with dinner, Disp: 30 capsule, Rfl: 0    vitamin B-12 (CYANOCOBALAMIN) 100 MCG TABS, Take 100 mcg by mouth daily, Disp: , Rfl:

## 2020-09-27 VITALS
HEART RATE: 78 BPM | DIASTOLIC BLOOD PRESSURE: 76 MMHG | WEIGHT: 172 LBS | BODY MASS INDEX: 26.94 KG/M2 | SYSTOLIC BLOOD PRESSURE: 126 MMHG | RESPIRATION RATE: 20 BRPM | OXYGEN SATURATION: 97 % | TEMPERATURE: 98.2 F

## 2020-09-28 NOTE — TELEPHONE ENCOUNTER
Jeramie Barnes from [facility] calling to set up appointment for bladder stone and recurrent uti  Please advise how to proceed

## 2020-09-29 NOTE — TELEPHONE ENCOUNTER
1st attempt called [phone] and was transferred to a voicemail   Left message asking to call our office back to schedule

## 2020-09-30 NOTE — TELEPHONE ENCOUNTER
Spoke with Carissa  Scheduled patient 10/21 @ 10:15  Gave office location and phone number  They will call with any issues

## 2020-10-01 VITALS
RESPIRATION RATE: 90 BRPM | DIASTOLIC BLOOD PRESSURE: 59 MMHG | SYSTOLIC BLOOD PRESSURE: 110 MMHG | OXYGEN SATURATION: 98 % | HEART RATE: 90 BPM | TEMPERATURE: 97.1 F

## 2020-10-05 NOTE — TELEPHONE ENCOUNTER
Left detailed message for Kevon Young to call me back regarding time change for patients appointment

## 2020-10-12 DIAGNOSIS — E55.9 VITAMIN D DEFICIENCY: ICD-10-CM

## 2020-10-13 RX ORDER — ERGOCALCIFEROL 1.25 MG/1
50000 CAPSULE ORAL WEEKLY
Qty: 4 CAPSULE | Refills: 0 | Status: SHIPPED | OUTPATIENT
Start: 2020-10-13 | End: 2020-11-04

## 2020-10-21 ENCOUNTER — OFFICE VISIT (OUTPATIENT)
Dept: UROLOGY | Facility: AMBULATORY SURGERY CENTER | Age: 70
End: 2020-10-21
Payer: COMMERCIAL

## 2020-10-21 VITALS — DIASTOLIC BLOOD PRESSURE: 82 MMHG | SYSTOLIC BLOOD PRESSURE: 132 MMHG | HEART RATE: 80 BPM | TEMPERATURE: 97.1 F

## 2020-10-21 DIAGNOSIS — N21.0 BLADDER STONES: Primary | ICD-10-CM

## 2020-10-21 PROCEDURE — 99214 OFFICE O/P EST MOD 30 MIN: CPT | Performed by: UROLOGY

## 2020-10-21 PROCEDURE — 1160F RVW MEDS BY RX/DR IN RCRD: CPT | Performed by: UROLOGY

## 2020-10-21 PROCEDURE — 3075F SYST BP GE 130 - 139MM HG: CPT | Performed by: UROLOGY

## 2020-10-21 PROCEDURE — 1036F TOBACCO NON-USER: CPT | Performed by: UROLOGY

## 2020-10-21 PROCEDURE — 3079F DIAST BP 80-89 MM HG: CPT | Performed by: UROLOGY

## 2020-10-21 RX ORDER — CIPROFLOXACIN 2 MG/ML
400 INJECTION, SOLUTION INTRAVENOUS ONCE
Status: CANCELLED | OUTPATIENT
Start: 2021-01-04 | End: 2020-10-21

## 2020-10-21 RX ORDER — SODIUM CHLORIDE 9 MG/ML
125 INJECTION, SOLUTION INTRAVENOUS CONTINUOUS
Status: CANCELLED | OUTPATIENT
Start: 2021-01-04

## 2020-11-02 ENCOUNTER — TELEPHONE (OUTPATIENT)
Dept: UROLOGY | Facility: AMBULATORY SURGERY CENTER | Age: 70
End: 2020-11-02

## 2020-11-04 DIAGNOSIS — E55.9 VITAMIN D DEFICIENCY: ICD-10-CM

## 2020-11-04 RX ORDER — ERGOCALCIFEROL 1.25 MG/1
50000 CAPSULE ORAL WEEKLY
Qty: 4 CAPSULE | Refills: 0 | Status: SHIPPED | OUTPATIENT
Start: 2020-11-04 | End: 2020-11-30

## 2020-11-09 ENCOUNTER — PREP FOR PROCEDURE (OUTPATIENT)
Dept: UROLOGY | Facility: AMBULATORY SURGERY CENTER | Age: 70
End: 2020-11-09

## 2020-11-09 DIAGNOSIS — E11.42 TYPE 2 DIABETES MELLITUS WITH DIABETIC POLYNEUROPATHY, WITHOUT LONG-TERM CURRENT USE OF INSULIN (HCC): ICD-10-CM

## 2020-11-09 DIAGNOSIS — R39.89 SUSPECTED UTI: ICD-10-CM

## 2020-11-09 DIAGNOSIS — Z79.4 TYPE 2 DIABETES MELLITUS WITH DIABETIC NEUROPATHY, WITH LONG-TERM CURRENT USE OF INSULIN (HCC): ICD-10-CM

## 2020-11-09 DIAGNOSIS — Z11.59 SCREENING FOR VIRAL DISEASE: ICD-10-CM

## 2020-11-09 DIAGNOSIS — N40.0 BENIGN PROSTATIC HYPERPLASIA WITHOUT LOWER URINARY TRACT SYMPTOMS: Primary | ICD-10-CM

## 2020-11-09 DIAGNOSIS — E11.9 TYPE 2 DIABETES MELLITUS WITHOUT COMPLICATION, WITHOUT LONG-TERM CURRENT USE OF INSULIN (HCC): ICD-10-CM

## 2020-11-09 DIAGNOSIS — Z01.818 PREOP EXAMINATION: ICD-10-CM

## 2020-11-09 DIAGNOSIS — E11.40 TYPE 2 DIABETES MELLITUS WITH DIABETIC NEUROPATHY, WITH LONG-TERM CURRENT USE OF INSULIN (HCC): ICD-10-CM

## 2020-11-09 DIAGNOSIS — N21.0 BLADDER STONES: ICD-10-CM

## 2020-11-09 RX ORDER — GLIPIZIDE 10 MG/1
TABLET ORAL
Qty: 180 TABLET | Refills: 1 | Status: SHIPPED | OUTPATIENT
Start: 2020-11-09 | End: 2021-02-10

## 2020-11-28 DIAGNOSIS — E55.9 VITAMIN D DEFICIENCY: ICD-10-CM

## 2020-11-30 RX ORDER — ERGOCALCIFEROL 1.25 MG/1
50000 CAPSULE ORAL WEEKLY
Qty: 4 CAPSULE | Refills: 0 | Status: SHIPPED | OUTPATIENT
Start: 2020-11-30 | End: 2020-12-21

## 2020-12-19 RX ORDER — NICOTINE POLACRILEX 4 MG
15 LOZENGE BUCCAL ONCE
COMMUNITY
End: 2021-09-27 | Stop reason: HOSPADM

## 2020-12-19 RX ORDER — LANOLIN ALCOHOL/MO/W.PET/CERES
3 CREAM (GRAM) TOPICAL
COMMUNITY

## 2020-12-19 RX ORDER — ONDANSETRON 4 MG/1
4 TABLET, FILM COATED ORAL EVERY 6 HOURS PRN
COMMUNITY
End: 2021-09-27 | Stop reason: HOSPADM

## 2020-12-19 RX ORDER — ACETAMINOPHEN 325 MG/1
650 TABLET ORAL EVERY 6 HOURS PRN
COMMUNITY

## 2020-12-20 DIAGNOSIS — E55.9 VITAMIN D DEFICIENCY: ICD-10-CM

## 2020-12-20 DIAGNOSIS — E78.5 HYPERLIPIDEMIA, UNSPECIFIED HYPERLIPIDEMIA TYPE: ICD-10-CM

## 2020-12-20 DIAGNOSIS — N18.30 STAGE 3 CHRONIC KIDNEY DISEASE (HCC): ICD-10-CM

## 2020-12-20 PROCEDURE — 4010F ACE/ARB THERAPY RXD/TAKEN: CPT | Performed by: UROLOGY

## 2020-12-21 RX ORDER — LISINOPRIL 5 MG/1
TABLET ORAL
Qty: 30 TABLET | Refills: 0 | Status: SHIPPED | OUTPATIENT
Start: 2020-12-21 | End: 2021-01-17

## 2020-12-21 RX ORDER — ERGOCALCIFEROL 1.25 MG/1
50000 CAPSULE ORAL WEEKLY
Qty: 4 CAPSULE | Refills: 0 | Status: SHIPPED | OUTPATIENT
Start: 2020-12-21 | End: 2021-01-11

## 2020-12-21 RX ORDER — ATORVASTATIN CALCIUM 20 MG/1
TABLET, FILM COATED ORAL
Qty: 30 TABLET | Refills: 0 | Status: SHIPPED | OUTPATIENT
Start: 2020-12-21 | End: 2021-01-17

## 2020-12-28 NOTE — PRE-PROCEDURE INSTRUCTIONS
Pre-Surgery Instructions:   Medication Instructions    acetaminophen (TYLENOL) 325 mg tablet Instructed patient per Anesthesia Guidelines   atorvastatin (LIPITOR) 20 mg tablet Instructed patient per Anesthesia Guidelines   cholecalciferol (VITAMIN D3) 1,000 units tablet Instructed patient per Anesthesia Guidelines   docusate sodium (COLACE) 100 mg capsule Instructed patient per Anesthesia Guidelines   finasteride (PROSCAR) 5 mg tablet Instructed patient per Anesthesia Guidelines   glipiZIDE (GLUCOTROL) 10 mg tablet Instructed patient per Anesthesia Guidelines   Glucagon, rDNA, (GLUCAGON EMERGENCY IJ) Instructed patient per Anesthesia Guidelines   glucose 40 % Instructed patient per Anesthesia Guidelines   insulin aspart (NovoLOG) 100 units/mL injection Instructed patient per Anesthesia Guidelines   lisinopril (ZESTRIL) 5 mg tablet Instructed patient per Anesthesia Guidelines   meclizine (ANTIVERT) 25 mg tablet Instructed patient per Anesthesia Guidelines   melatonin 3 mg Instructed patient per Anesthesia Guidelines   metFORMIN (GLUCOPHAGE) 1000 MG tablet Instructed patient per Anesthesia Guidelines   ondansetron (ZOFRAN) 4 mg tablet Instructed patient per Anesthesia Guidelines   polyethylene glycol (MIRALAX) 17 g packet Instructed patient per Anesthesia Guidelines   senna (SENOKOT) 8 6 mg Instructed patient per Anesthesia Guidelines   simethicone (MYLICON) 80 mg chewable tablet Instructed patient per Anesthesia Guidelines   tamsulosin (FLOMAX) 0 4 mg Instructed patient per Anesthesia Guidelines  Pre-op and bathing instructions faxed to nursing home  Sent guidelines to hold asa/ibuprofen and vitamins/supplements until after sx, and for pt to take ondansetron and atorvastatin only on am DOS  Pt has chlorhexidine prescribed for pm and am showers prior to sx

## 2020-12-31 ENCOUNTER — ANESTHESIA EVENT (OUTPATIENT)
Dept: PERIOP | Facility: HOSPITAL | Age: 70
DRG: 713 | End: 2020-12-31
Payer: COMMERCIAL

## 2021-01-04 ENCOUNTER — APPOINTMENT (OUTPATIENT)
Dept: RADIOLOGY | Facility: HOSPITAL | Age: 71
DRG: 713 | End: 2021-01-04
Payer: COMMERCIAL

## 2021-01-04 ENCOUNTER — ANESTHESIA (OUTPATIENT)
Dept: PERIOP | Facility: HOSPITAL | Age: 71
DRG: 713 | End: 2021-01-04
Payer: COMMERCIAL

## 2021-01-04 ENCOUNTER — TELEPHONE (OUTPATIENT)
Dept: UROLOGY | Facility: AMBULATORY SURGERY CENTER | Age: 71
End: 2021-01-04

## 2021-01-04 ENCOUNTER — HOSPITAL ENCOUNTER (INPATIENT)
Facility: HOSPITAL | Age: 71
LOS: 2 days | Discharge: NURSING, REHAB OR LONG-TERM CARE | DRG: 713 | End: 2021-01-07
Attending: UROLOGY | Admitting: UROLOGY
Payer: COMMERCIAL

## 2021-01-04 VITALS — HEART RATE: 73 BPM

## 2021-01-04 DIAGNOSIS — N21.0 BLADDER STONES: ICD-10-CM

## 2021-01-04 DIAGNOSIS — N40.0 BENIGN PROSTATIC HYPERPLASIA WITHOUT LOWER URINARY TRACT SYMPTOMS: Primary | ICD-10-CM

## 2021-01-04 LAB
ABO GROUP BLD: NORMAL
ANION GAP SERPL CALCULATED.3IONS-SCNC: 8 MMOL/L (ref 4–13)
BLD GP AB SCN SERPL QL: NEGATIVE
BUN SERPL-MCNC: 20 MG/DL (ref 5–25)
CALCIUM SERPL-MCNC: 8.3 MG/DL (ref 8.3–10.1)
CHLORIDE SERPL-SCNC: 105 MMOL/L (ref 100–108)
CO2 SERPL-SCNC: 27 MMOL/L (ref 21–32)
CREAT SERPL-MCNC: 1.03 MG/DL (ref 0.6–1.3)
ERYTHROCYTE [DISTWIDTH] IN BLOOD BY AUTOMATED COUNT: 13.8 % (ref 11.6–15.1)
FLUAV RNA RESP QL NAA+PROBE: NEGATIVE
FLUBV RNA RESP QL NAA+PROBE: NEGATIVE
GFR SERPL CREATININE-BSD FRML MDRD: 73 ML/MIN/1.73SQ M
GLUCOSE P FAST SERPL-MCNC: 117 MG/DL (ref 65–99)
GLUCOSE SERPL-MCNC: 101 MG/DL (ref 65–140)
GLUCOSE SERPL-MCNC: 110 MG/DL (ref 65–140)
GLUCOSE SERPL-MCNC: 117 MG/DL (ref 65–140)
HCT VFR BLD AUTO: 36.8 % (ref 36.5–49.3)
HGB BLD-MCNC: 11.8 G/DL (ref 12–17)
MCH RBC QN AUTO: 28.8 PG (ref 26.8–34.3)
MCHC RBC AUTO-ENTMCNC: 32.1 G/DL (ref 31.4–37.4)
MCV RBC AUTO: 90 FL (ref 82–98)
PLATELET # BLD AUTO: 244 THOUSANDS/UL (ref 149–390)
PMV BLD AUTO: 9.5 FL (ref 8.9–12.7)
POTASSIUM SERPL-SCNC: 4.3 MMOL/L (ref 3.5–5.3)
RBC # BLD AUTO: 4.1 MILLION/UL (ref 3.88–5.62)
RH BLD: POSITIVE
RSV RNA RESP QL NAA+PROBE: NEGATIVE
SARS-COV-2 RNA RESP QL NAA+PROBE: NEGATIVE
SODIUM SERPL-SCNC: 140 MMOL/L (ref 136–145)
SPECIMEN EXPIRATION DATE: NORMAL
WBC # BLD AUTO: 8.32 THOUSAND/UL (ref 4.31–10.16)

## 2021-01-04 PROCEDURE — 0VB08ZZ EXCISION OF PROSTATE, VIA NATURAL OR ARTIFICIAL OPENING ENDOSCOPIC: ICD-10-PCS | Performed by: UROLOGY

## 2021-01-04 PROCEDURE — C1769 GUIDE WIRE: HCPCS | Performed by: UROLOGY

## 2021-01-04 PROCEDURE — 52317 REMOVE BLADDER STONE: CPT | Performed by: UROLOGY

## 2021-01-04 PROCEDURE — 0241U HB NFCT DS VIR RESP RNA 4 TRGT: CPT | Performed by: UROLOGY

## 2021-01-04 PROCEDURE — 82948 REAGENT STRIP/BLOOD GLUCOSE: CPT

## 2021-01-04 PROCEDURE — 86900 BLOOD TYPING SEROLOGIC ABO: CPT | Performed by: UROLOGY

## 2021-01-04 PROCEDURE — 52601 PROSTATECTOMY (TURP): CPT | Performed by: UROLOGY

## 2021-01-04 PROCEDURE — NC001 PR NO CHARGE: Performed by: UROLOGY

## 2021-01-04 PROCEDURE — 82360 CALCULUS ASSAY QUANT: CPT | Performed by: UROLOGY

## 2021-01-04 PROCEDURE — 85027 COMPLETE CBC AUTOMATED: CPT | Performed by: UROLOGY

## 2021-01-04 PROCEDURE — 87086 URINE CULTURE/COLONY COUNT: CPT | Performed by: UROLOGY

## 2021-01-04 PROCEDURE — 80048 BASIC METABOLIC PNL TOTAL CA: CPT | Performed by: UROLOGY

## 2021-01-04 PROCEDURE — 86850 RBC ANTIBODY SCREEN: CPT | Performed by: UROLOGY

## 2021-01-04 PROCEDURE — 88305 TISSUE EXAM BY PATHOLOGIST: CPT | Performed by: PATHOLOGY

## 2021-01-04 PROCEDURE — 86901 BLOOD TYPING SEROLOGIC RH(D): CPT | Performed by: UROLOGY

## 2021-01-04 PROCEDURE — 0TCB8ZZ EXTIRPATION OF MATTER FROM BLADDER, VIA NATURAL OR ARTIFICIAL OPENING ENDOSCOPIC: ICD-10-PCS | Performed by: UROLOGY

## 2021-01-04 RX ORDER — DOCUSATE SODIUM 100 MG/1
100 CAPSULE, LIQUID FILLED ORAL 2 TIMES DAILY
Status: DISCONTINUED | OUTPATIENT
Start: 2021-01-04 | End: 2021-01-07 | Stop reason: HOSPADM

## 2021-01-04 RX ORDER — HYDROCODONE BITARTRATE AND ACETAMINOPHEN 5; 325 MG/1; MG/1
2 TABLET ORAL EVERY 4 HOURS PRN
Status: DISCONTINUED | OUTPATIENT
Start: 2021-01-04 | End: 2021-01-07 | Stop reason: HOSPADM

## 2021-01-04 RX ORDER — ATROPA BELLADONNA AND OPIUM 16.2; 3 MG/1; MG/1
30 SUPPOSITORY RECTAL EVERY 8 HOURS PRN
Status: DISCONTINUED | OUTPATIENT
Start: 2021-01-04 | End: 2021-01-07 | Stop reason: HOSPADM

## 2021-01-04 RX ORDER — POLYETHYLENE GLYCOL 3350 17 G/17G
17 POWDER, FOR SOLUTION ORAL DAILY
Status: DISCONTINUED | OUTPATIENT
Start: 2021-01-05 | End: 2021-01-07 | Stop reason: HOSPADM

## 2021-01-04 RX ORDER — SODIUM CHLORIDE 9 MG/ML
125 INJECTION, SOLUTION INTRAVENOUS CONTINUOUS
Status: DISCONTINUED | OUTPATIENT
Start: 2021-01-04 | End: 2021-01-07 | Stop reason: HOSPADM

## 2021-01-04 RX ORDER — MAGNESIUM HYDROXIDE 1200 MG/15ML
LIQUID ORAL AS NEEDED
Status: DISCONTINUED | OUTPATIENT
Start: 2021-01-04 | End: 2021-01-04 | Stop reason: HOSPADM

## 2021-01-04 RX ORDER — FINASTERIDE 5 MG/1
5 TABLET, FILM COATED ORAL DAILY
Status: DISCONTINUED | OUTPATIENT
Start: 2021-01-05 | End: 2021-01-07 | Stop reason: HOSPADM

## 2021-01-04 RX ORDER — ONDANSETRON 2 MG/ML
4 INJECTION INTRAMUSCULAR; INTRAVENOUS EVERY 6 HOURS PRN
Status: DISCONTINUED | OUTPATIENT
Start: 2021-01-04 | End: 2021-01-07 | Stop reason: HOSPADM

## 2021-01-04 RX ORDER — ONDANSETRON 2 MG/ML
4 INJECTION INTRAMUSCULAR; INTRAVENOUS ONCE AS NEEDED
Status: COMPLETED | OUTPATIENT
Start: 2021-01-04 | End: 2021-01-04

## 2021-01-04 RX ORDER — BACITRACIN, NEOMYCIN, POLYMYXIN B 400; 3.5; 5 [USP'U]/G; MG/G; [USP'U]/G
1 OINTMENT TOPICAL 2 TIMES DAILY
Status: DISCONTINUED | OUTPATIENT
Start: 2021-01-04 | End: 2021-01-07 | Stop reason: HOSPADM

## 2021-01-04 RX ORDER — ROCURONIUM BROMIDE 10 MG/ML
INJECTION, SOLUTION INTRAVENOUS AS NEEDED
Status: DISCONTINUED | OUTPATIENT
Start: 2021-01-04 | End: 2021-01-04

## 2021-01-04 RX ORDER — DOCUSATE SODIUM 100 MG/1
100 CAPSULE, LIQUID FILLED ORAL 2 TIMES DAILY
Status: DISCONTINUED | OUTPATIENT
Start: 2021-01-04 | End: 2021-01-05 | Stop reason: SDUPTHER

## 2021-01-04 RX ORDER — CIPROFLOXACIN 2 MG/ML
400 INJECTION, SOLUTION INTRAVENOUS ONCE
Status: DISCONTINUED | OUTPATIENT
Start: 2021-01-04 | End: 2021-01-04

## 2021-01-04 RX ORDER — SENNOSIDES 8.6 MG
1 TABLET ORAL DAILY
Status: DISCONTINUED | OUTPATIENT
Start: 2021-01-05 | End: 2021-01-07 | Stop reason: HOSPADM

## 2021-01-04 RX ORDER — ONDANSETRON 2 MG/ML
INJECTION INTRAMUSCULAR; INTRAVENOUS AS NEEDED
Status: DISCONTINUED | OUTPATIENT
Start: 2021-01-04 | End: 2021-01-04

## 2021-01-04 RX ORDER — HYDROMORPHONE HCL/PF 1 MG/ML
0.5 SYRINGE (ML) INJECTION
Status: DISCONTINUED | OUTPATIENT
Start: 2021-01-04 | End: 2021-01-04 | Stop reason: HOSPADM

## 2021-01-04 RX ORDER — HYDROCODONE BITARTRATE AND ACETAMINOPHEN 5; 325 MG/1; MG/1
1 TABLET ORAL EVERY 4 HOURS PRN
Status: DISCONTINUED | OUTPATIENT
Start: 2021-01-04 | End: 2021-01-07 | Stop reason: HOSPADM

## 2021-01-04 RX ORDER — ACETAMINOPHEN 325 MG/1
650 TABLET ORAL EVERY 6 HOURS SCHEDULED
Status: DISCONTINUED | OUTPATIENT
Start: 2021-01-05 | End: 2021-01-07 | Stop reason: HOSPADM

## 2021-01-04 RX ORDER — SIMETHICONE 80 MG
80 TABLET,CHEWABLE ORAL EVERY 6 HOURS PRN
Status: DISCONTINUED | OUTPATIENT
Start: 2021-01-04 | End: 2021-01-07 | Stop reason: HOSPADM

## 2021-01-04 RX ORDER — MEPERIDINE HYDROCHLORIDE 25 MG/ML
12.5 INJECTION INTRAMUSCULAR; INTRAVENOUS; SUBCUTANEOUS ONCE AS NEEDED
Status: DISCONTINUED | OUTPATIENT
Start: 2021-01-04 | End: 2021-01-04 | Stop reason: HOSPADM

## 2021-01-04 RX ORDER — LANOLIN ALCOHOL/MO/W.PET/CERES
3 CREAM (GRAM) TOPICAL
Status: DISCONTINUED | OUTPATIENT
Start: 2021-01-04 | End: 2021-01-07 | Stop reason: HOSPADM

## 2021-01-04 RX ORDER — PROPOFOL 10 MG/ML
INJECTION, EMULSION INTRAVENOUS AS NEEDED
Status: DISCONTINUED | OUTPATIENT
Start: 2021-01-04 | End: 2021-01-04

## 2021-01-04 RX ORDER — TAMSULOSIN HYDROCHLORIDE 0.4 MG/1
0.4 CAPSULE ORAL
Status: DISCONTINUED | OUTPATIENT
Start: 2021-01-05 | End: 2021-01-07 | Stop reason: HOSPADM

## 2021-01-04 RX ORDER — GLYCINE 1.5 G/100ML
SOLUTION IRRIGATION AS NEEDED
Status: DISCONTINUED | OUTPATIENT
Start: 2021-01-04 | End: 2021-01-04 | Stop reason: HOSPADM

## 2021-01-04 RX ORDER — GLYCOPYRROLATE 0.2 MG/ML
INJECTION INTRAMUSCULAR; INTRAVENOUS AS NEEDED
Status: DISCONTINUED | OUTPATIENT
Start: 2021-01-04 | End: 2021-01-04

## 2021-01-04 RX ORDER — DEXTROSE, SODIUM CHLORIDE, SODIUM LACTATE, POTASSIUM CHLORIDE, AND CALCIUM CHLORIDE 5; .6; .31; .03; .02 G/100ML; G/100ML; G/100ML; G/100ML; G/100ML
125 INJECTION, SOLUTION INTRAVENOUS CONTINUOUS
Status: DISCONTINUED | OUTPATIENT
Start: 2021-01-04 | End: 2021-01-07 | Stop reason: HOSPADM

## 2021-01-04 RX ORDER — MAGNESIUM HYDROXIDE 1200 MG/15ML
3000 LIQUID ORAL CONTINUOUS
Status: DISCONTINUED | OUTPATIENT
Start: 2021-01-04 | End: 2021-01-07 | Stop reason: HOSPADM

## 2021-01-04 RX ORDER — NEOSTIGMINE METHYLSULFATE 1 MG/ML
INJECTION INTRAVENOUS AS NEEDED
Status: DISCONTINUED | OUTPATIENT
Start: 2021-01-04 | End: 2021-01-04

## 2021-01-04 RX ORDER — DEXMEDETOMIDINE HYDROCHLORIDE 100 UG/ML
INJECTION, SOLUTION INTRAVENOUS AS NEEDED
Status: DISCONTINUED | OUTPATIENT
Start: 2021-01-04 | End: 2021-01-04

## 2021-01-04 RX ORDER — FENTANYL CITRATE/PF 50 MCG/ML
50 SYRINGE (ML) INJECTION
Status: DISCONTINUED | OUTPATIENT
Start: 2021-01-04 | End: 2021-01-04 | Stop reason: HOSPADM

## 2021-01-04 RX ORDER — FENTANYL CITRATE 50 UG/ML
INJECTION, SOLUTION INTRAMUSCULAR; INTRAVENOUS AS NEEDED
Status: DISCONTINUED | OUTPATIENT
Start: 2021-01-04 | End: 2021-01-04

## 2021-01-04 RX ADMIN — DEXTROSE, SODIUM CHLORIDE, SODIUM LACTATE, POTASSIUM CHLORIDE, AND CALCIUM CHLORIDE 125 ML/HR: 5; .6; .31; .03; .02 INJECTION, SOLUTION INTRAVENOUS at 23:29

## 2021-01-04 RX ADMIN — ROCURONIUM BROMIDE 35 MG: 10 INJECTION, SOLUTION INTRAVENOUS at 15:32

## 2021-01-04 RX ADMIN — PROPOFOL 150 MG: 10 INJECTION, EMULSION INTRAVENOUS at 15:32

## 2021-01-04 RX ADMIN — MELATONIN 3 MG: at 23:22

## 2021-01-04 RX ADMIN — PHENYLEPHRINE HYDROCHLORIDE 100 MCG: 10 INJECTION INTRAVENOUS at 15:52

## 2021-01-04 RX ADMIN — GENTAMICIN SULFATE 160 MG: 40 INJECTION, SOLUTION INTRAMUSCULAR; INTRAVENOUS at 15:27

## 2021-01-04 RX ADMIN — CIPROFLOXACIN 400 MG: 2 INJECTION, SOLUTION INTRAVENOUS at 15:08

## 2021-01-04 RX ADMIN — ONDANSETRON 4 MG: 2 INJECTION INTRAMUSCULAR; INTRAVENOUS at 18:31

## 2021-01-04 RX ADMIN — FENTANYL CITRATE 100 MCG: 50 INJECTION, SOLUTION INTRAMUSCULAR; INTRAVENOUS at 15:32

## 2021-01-04 RX ADMIN — SODIUM CHLORIDE: 0.9 INJECTION, SOLUTION INTRAVENOUS at 16:25

## 2021-01-04 RX ADMIN — NEOSTIGMINE METHYLSULFATE 3 MG: 1 INJECTION, SOLUTION INTRAVENOUS at 16:40

## 2021-01-04 RX ADMIN — DOCUSATE SODIUM 100 MG: 100 CAPSULE, LIQUID FILLED ORAL at 23:22

## 2021-01-04 RX ADMIN — AMPICILLIN SODIUM 2000 MG: 2 INJECTION, POWDER, FOR SOLUTION INTRAMUSCULAR; INTRAVENOUS at 15:43

## 2021-01-04 RX ADMIN — DEXTROSE, SODIUM CHLORIDE, SODIUM LACTATE, POTASSIUM CHLORIDE, AND CALCIUM CHLORIDE 125 ML/HR: 5; .6; .31; .03; .02 INJECTION, SOLUTION INTRAVENOUS at 18:37

## 2021-01-04 RX ADMIN — ONDANSETRON 4 MG: 2 INJECTION INTRAMUSCULAR; INTRAVENOUS at 16:36

## 2021-01-04 RX ADMIN — ENOXAPARIN SODIUM 40 MG: 40 INJECTION SUBCUTANEOUS at 23:22

## 2021-01-04 RX ADMIN — BACITRACIN ZINC NEOMYCIN SULFATE POLYMYXIN B SULFATE 1 LARGE APPLICATION: 400; 3.5; 5 OINTMENT TOPICAL at 22:00

## 2021-01-04 RX ADMIN — DEXMEDETOMIDINE HYDROCHLORIDE 12 MCG: 100 INJECTION, SOLUTION INTRAVENOUS at 15:32

## 2021-01-04 RX ADMIN — FENTANYL CITRATE 50 MCG: 50 INJECTION INTRAMUSCULAR; INTRAVENOUS at 17:21

## 2021-01-04 RX ADMIN — ACETAMINOPHEN 650 MG: 325 TABLET ORAL at 23:22

## 2021-01-04 RX ADMIN — PHENYLEPHRINE HYDROCHLORIDE 100 MCG: 10 INJECTION INTRAVENOUS at 15:46

## 2021-01-04 RX ADMIN — PHENYLEPHRINE HYDROCHLORIDE 100 MCG: 10 INJECTION INTRAVENOUS at 16:29

## 2021-01-04 RX ADMIN — SODIUM CHLORIDE 125 ML/HR: 0.9 INJECTION, SOLUTION INTRAVENOUS at 13:00

## 2021-01-04 RX ADMIN — GLYCOPYRROLATE 0.4 MG: 0.2 INJECTION, SOLUTION INTRAMUSCULAR; INTRAVENOUS at 16:40

## 2021-01-04 RX ADMIN — PHENYLEPHRINE HYDROCHLORIDE 50 MCG: 10 INJECTION INTRAVENOUS at 15:43

## 2021-01-04 NOTE — ANESTHESIA POSTPROCEDURE EVALUATION
Post-Op Assessment Note    CV Status:  Stable  Pain Score: 5    Pain management: adequate     Mental Status:  Alert and awake   Hydration Status:  Euvolemic   PONV Controlled:  Controlled   Airway Patency:  Patent      Post Op Vitals Reviewed: Yes      Staff: Anesthesiologist         No complications documented      BP      Temp      Pulse     Resp      SpO2

## 2021-01-04 NOTE — H&P
Assessment  BPH with bladder stones        Discussion  Based on his bladder stones, size of his prostate, and lower urinary tract symptoms, I recommend proceeding with cystoscopy with transurethral resection of the prostate along with cystolitholapaxy of his bladder stones  Risk and benefits of the procedure discussed and reviewed informed consent was obtained  We again discussed the risk of incontinence, infection, retrograde ejaculation, and electrolyte abnormalities  Preoperative ampicillin and gentamicin will be administered based on prior culture            History of Present Illness  79 y o  male previously known to Dr Denver Overly  In April 2020 he had a CT scan showing BPH with bladder stones x3  In June 2020 he had COVID  He has resided at a nursing facility  He presents today to undergo transurethral resection of the prostate along with removal of his bladder stones  /66   Pulse 86   Temp 98 3 °F (36 8 °C) (Temporal)   Resp 18   Ht 5' 8" (1 727 m)   Wt 79 kg (174 lb 3 2 oz)   SpO2 99%   BMI 26 49 kg/m²     On examination he is in no acute distress  Lungs clear  Cardiac regular    Abdomen soft nontender nondistended

## 2021-01-04 NOTE — OP NOTE
OPERATIVE REPORT  PATIENT NAME: Jose Antonio Livingston    :  1950  MRN: [id number]  Pt Location: [facility] OR ROOM 04    SURGERY DATE: 2021    Surgeon(s) and Role:     * Garrick Stoddard MD - Primary    Preop Diagnosis:  Bladder stones [N21 0]    Post-Op Diagnosis Codes: * Bladder stones [N21 0]    Procedure(s) (LRB):  T U R P  (N/A)  Cystolitholopaxy w/laser bladder stones (N/A)    Specimen(s):  ID Type Source Tests Collected by Time Destination   1 : prostate chips Tissue Prostate TISSUE EXAM Garrick Stoddard MD 2021 1631    A : urine culture Urine Urine, Cystoscopic URINE CULTURE Garrick Stoddard MD 2021 1601    B : bladder stones Calculus Urinary Bladder STONE ANALYSIS Garrick Stoddard MD 2021 1614        Estimated Blood Loss:   Minimal    Drains:  External Urinary Catheter (Active)   Number of days: 197       Continuous Bladder Irrigation Three-way (Active)   Number of days: 0       Anesthesia Type:   General    Operative Indications:  Bladder stones [N21 0]      Operative Findings: Three large bladder stones decimated and removed with holmium laser lithotripsy, standard TURP    Complications:   None    Procedure and Technique:  Maru Velasco is a 72-year-old male with BPH and 3 large bladder stones identified on a CT scan  Risk and benefits of TURP along with cystolitholapaxy were discussed and reviewed  Informed consent was obtained  The patient is brought to the operative room on 2021  After the smooth induction of general LMA anesthesia, the patient was placed in the dorsal lithotomy position  Intravenous anabiotic's were administered  Venodyne boots were applied  A timeout was performed with all members of the operative team confirming the patient's identity and procedure to be performed  A 22 [place] rigid cystoscope with 30° lens was inserted  The bladder was thoroughly inspected  There was lateral lobe hyperplasia of the prostate  No significant median lobe was noted    The bladder was entered  A urine culture was obtained  The bladder was thick-walled and trabeculated  There was no evidence of mucosal abnormalities or lesions  Three large bladder stones were appreciated  Ureteral orifices were visualized along the trigonal ridge  A 1000 micron holmium laser fiber was utilized to decimated the 3 large bladder stones  The Ellik was utilized to remove the stones  The stones were sent as specimen  The cystoscope was exchanged for the continuous-flow resectoscope  Both ureteral orifices were marked just distal to the opening  A standard TURP was performed  I first took down the bladder neck followed by floor tissue from the bladder neck proximally to the verumontanum distally  At no point during the procedure did any resection occur distal to the vera montanum  Next I took down both lateral lobes of the prostate from the bladder neck proximally to the verumontanum distally  A moderate amount of anterior tissue then fell into the urethra and this was taken down as well  The Ellik last was utilized to remove all chips  The bladder was thoroughly reinspected  There were no chips remaining  Both ureteral orifices were visualized intact  Cautery was used for hemostasis  A 24 [place] three-way hematuria Razo catheter was then inserted  40 cc were placed into the balloon  Continuous bladder irrigation was initiated and was noted to be clear upon departure from the operating room  Overall the patient tolerated the procedure well and there were no complications  The patient was extubated in the operating room transferred to the PACU in stable condition at the conclusion of the case      Patient Disposition:  PACU stable and extubated    SIGNATURE: Monique Shaffer MD  DATE: January 4, 2021  TIME: 4:51 PM

## 2021-01-04 NOTE — TELEPHONE ENCOUNTER
S/p TURP  Patient with wilkerson that will need to be removed later this week  Patient currently in rehab at [facility]  Please call to remove wilkerson this week  Thank you

## 2021-01-04 NOTE — ANESTHESIA PREPROCEDURE EVALUATION
Procedure:  T U R P  (N/A Abdomen)  Cystolitholopaxy w/laser bladder stones (N/A Ureter)    Relevant Problems   CARDIO   (+) Essential hypertension   (+) Hyperlipidemia      ENDO   (+) Type 2 diabetes mellitus with diabetic neuropathy (HCC)   (+) Type 2 diabetes mellitus with diabetic neuropathy, with long-term current use of insulin (HCC)      GI/HEPATIC   (+) Pancreas cyst      /RENAL   (+) CARLOS EDUARDO (acute kidney injury) ([facility] [address] )   (+) Benign prostatic hyperplasia   (+) Nephrolithiasis   (+) Stage 3 chronic kidney disease      HEMATOLOGY   (+) Anemia      NEURO/PSYCH   (+) History of recurrent UTIs   (+) Type 2 diabetes mellitus with diabetic neuropathy (HCC)      Other   (+) Ambulatory dysfunction   (+) Bladder stone   (+) COVID-19   (+) Left-sided weakness   (+) Paresis (HCC)        Physical Exam    Airway    Mallampati score: II  TM Distance: >3 FB  Neck ROM: full     Dental   No notable dental hx     Cardiovascular  Rate: normal, Cardiovascular exam normal    Pulmonary  Breath sounds clear to auscultation, Decreased breath sounds,     Other Findings        Anesthesia Plan  ASA Score- 4     Anesthesia Type- general with ASA Monitors  Additional Monitors:   Airway Plan:     Comment: Labs, EKG in "media", from 12/21/20  Patient is resident in [facility] Plan Factors-    Chart reviewed  EKG reviewed  Existing labs reviewed  Patient summary reviewed  Patient is not a current smoker  Patient instructed to abstain from smoking on day of procedure  Patient did not smoke on day of surgery  Induction- intravenous  Postoperative Plan-     Informed Consent- Anesthetic plan and risks discussed with patient

## 2021-01-04 NOTE — PROGRESS NOTES
Pt requested to speak with his wife via phone, I called her and they were able to talk, she is updated on times & confirmed the plan for pt

## 2021-01-05 ENCOUNTER — VBI (OUTPATIENT)
Dept: ADMINISTRATIVE | Facility: OTHER | Age: 71
End: 2021-01-05

## 2021-01-05 LAB
ANION GAP SERPL CALCULATED.3IONS-SCNC: 5 MMOL/L (ref 4–13)
BUN SERPL-MCNC: 17 MG/DL (ref 5–25)
CALCIUM SERPL-MCNC: 8.1 MG/DL (ref 8.3–10.1)
CHLORIDE SERPL-SCNC: 105 MMOL/L (ref 100–108)
CO2 SERPL-SCNC: 28 MMOL/L (ref 21–32)
CREAT SERPL-MCNC: 1.32 MG/DL (ref 0.6–1.3)
ERYTHROCYTE [DISTWIDTH] IN BLOOD BY AUTOMATED COUNT: 14 % (ref 11.6–15.1)
GFR SERPL CREATININE-BSD FRML MDRD: 54 ML/MIN/1.73SQ M
GLUCOSE P FAST SERPL-MCNC: 233 MG/DL (ref 65–99)
GLUCOSE SERPL-MCNC: 166 MG/DL (ref 65–140)
GLUCOSE SERPL-MCNC: 174 MG/DL (ref 65–140)
GLUCOSE SERPL-MCNC: 187 MG/DL (ref 65–140)
GLUCOSE SERPL-MCNC: 194 MG/DL (ref 65–140)
GLUCOSE SERPL-MCNC: 233 MG/DL (ref 65–140)
HCT VFR BLD AUTO: 35.3 % (ref 36.5–49.3)
HGB BLD-MCNC: 11.4 G/DL (ref 12–17)
MCH RBC QN AUTO: 28.5 PG (ref 26.8–34.3)
MCHC RBC AUTO-ENTMCNC: 32.3 G/DL (ref 31.4–37.4)
MCV RBC AUTO: 88 FL (ref 82–98)
PLATELET # BLD AUTO: 255 THOUSANDS/UL (ref 149–390)
PMV BLD AUTO: 9.6 FL (ref 8.9–12.7)
POTASSIUM SERPL-SCNC: 4.2 MMOL/L (ref 3.5–5.3)
RBC # BLD AUTO: 4 MILLION/UL (ref 3.88–5.62)
SODIUM SERPL-SCNC: 138 MMOL/L (ref 136–145)
WBC # BLD AUTO: 11.61 THOUSAND/UL (ref 4.31–10.16)

## 2021-01-05 PROCEDURE — 85027 COMPLETE CBC AUTOMATED: CPT | Performed by: UROLOGY

## 2021-01-05 PROCEDURE — 99024 POSTOP FOLLOW-UP VISIT: CPT | Performed by: NURSE PRACTITIONER

## 2021-01-05 PROCEDURE — 82948 REAGENT STRIP/BLOOD GLUCOSE: CPT

## 2021-01-05 PROCEDURE — 80048 BASIC METABOLIC PNL TOTAL CA: CPT | Performed by: UROLOGY

## 2021-01-05 RX ADMIN — SENNOSIDES 8.6 MG: 8.6 TABLET ORAL at 08:01

## 2021-01-05 RX ADMIN — ENOXAPARIN SODIUM 40 MG: 40 INJECTION SUBCUTANEOUS at 08:00

## 2021-01-05 RX ADMIN — INSULIN LISPRO 4 UNITS: 100 INJECTION, SOLUTION INTRAVENOUS; SUBCUTANEOUS at 12:15

## 2021-01-05 RX ADMIN — ACETAMINOPHEN 650 MG: 325 TABLET ORAL at 23:07

## 2021-01-05 RX ADMIN — BACITRACIN ZINC NEOMYCIN SULFATE POLYMYXIN B SULFATE 1 LARGE APPLICATION: 400; 3.5; 5 OINTMENT TOPICAL at 08:01

## 2021-01-05 RX ADMIN — POLYETHYLENE GLYCOL 3350 17 G: 17 POWDER, FOR SOLUTION ORAL at 08:01

## 2021-01-05 RX ADMIN — ACETAMINOPHEN 650 MG: 325 TABLET ORAL at 17:33

## 2021-01-05 RX ADMIN — DOCUSATE SODIUM 100 MG: 100 CAPSULE, LIQUID FILLED ORAL at 17:34

## 2021-01-05 RX ADMIN — BACITRACIN ZINC NEOMYCIN SULFATE POLYMYXIN B SULFATE 1 LARGE APPLICATION: 400; 3.5; 5 OINTMENT TOPICAL at 17:34

## 2021-01-05 RX ADMIN — ACETAMINOPHEN 650 MG: 325 TABLET ORAL at 12:14

## 2021-01-05 RX ADMIN — ACETAMINOPHEN 650 MG: 325 TABLET ORAL at 05:20

## 2021-01-05 RX ADMIN — MELATONIN 3 MG: at 23:07

## 2021-01-05 RX ADMIN — INSULIN LISPRO 4 UNITS: 100 INJECTION, SOLUTION INTRAVENOUS; SUBCUTANEOUS at 17:34

## 2021-01-05 RX ADMIN — INSULIN LISPRO 4 UNITS: 100 INJECTION, SOLUTION INTRAVENOUS; SUBCUTANEOUS at 08:01

## 2021-01-05 RX ADMIN — FINASTERIDE 5 MG: 5 TABLET, FILM COATED ORAL at 08:01

## 2021-01-05 RX ADMIN — DOCUSATE SODIUM 100 MG: 100 CAPSULE, LIQUID FILLED ORAL at 08:01

## 2021-01-05 RX ADMIN — TAMSULOSIN HYDROCHLORIDE 0.4 MG: 0.4 CAPSULE ORAL at 17:34

## 2021-01-05 NOTE — ASSESSMENT & PLAN NOTE
POD#3 TURP  Doing well  CBI clamped, urine clear yellow to pink   D/c today with Razo, for void trial Friday 1/8

## 2021-01-05 NOTE — UTILIZATION REVIEW
Notification of Inpatient Admission/Inpatient Authorization Request   This is a Notification of Inpatient Admission for [address]  Be advised that this patient was admitted to our facility under Inpatient Status  Contact Lilian Alvarez at [phone] for additional admission information  [facility]  DEDICATED -[phone]  Patient Name:   Leander Coello   YOB: 1950       [address]   [address]:   [address]   Tax ID: [id number]  NPI: [id number] Attending Provider/NPI:  Phone:  Address: [place], [place] [[id number]]  [phone]  Same as the facility   Place of Service Code: 24 Place of Service Name:  [address] [place] [address]   Start Date: 1/5/21 1332 Discharge Date & Time: No discharge date for patient encounter  Type of Admission: Inpatient Status Discharge Disposition   (if discharged): [facility] SNF/TCU/SNU   Patient Diagnoses: Bladder stones [N21 0]     Orders: Admission Orders (From admission, onward)     Ordered        01/05/21 1332  Inpatient Admission  Once                    Assigned Utilization Review Contact: Yuliana Rosado  Utilization   Network Utilization Review Department  Phone: [phone]; Fax [phone]  Email: [email or web address]   ATTENTION PAYERS: Please call the assigned Utilization  directly with any questions or concerns ALL voicemails in the department are confidential  Send all requests for admission clinical reviews, approved or denied determinations and any other requests to dedicated fax number belonging to the campus where the patient is receiving treatment

## 2021-01-05 NOTE — ASSESSMENT & PLAN NOTE
POD#3 TURP and cystolitholapaxy  -afebrile  -denies pain  - CBI clamped now urine clear yellow to light pink  - mobility at his baseline, in [facility] today  - PT/OT consult completed  - full diet

## 2021-01-05 NOTE — PLAN OF CARE
Problem: Prexisting or High Potential for Compromised Skin Integrity  Goal: Skin integrity is maintained or improved  Description: INTERVENTIONS:  - Identify patients at risk for skin breakdown  - Assess and monitor skin integrity  - Assess and monitor nutrition and hydration status  - Monitor labs   - Assess for incontinence   - Turn and reposition patient  - Assist with mobility/ambulation  - Relieve pressure over bony prominences  - Avoid friction and shearing  - Provide appropriate hygiene as needed including keeping skin clean and dry  - Evaluate need for skin moisturizer/barrier cream  - Collaborate with interdisciplinary team   - Patient/family teaching  - Consider wound care consult   Outcome: Progressing     Problem: Potential for Falls  Goal: Patient will remain free of falls  Description: INTERVENTIONS:  - Assess patient frequently for physical needs  -  Identify cognitive and physical deficits and behaviors that affect risk of falls    -  [place] fall precautions as indicated by assessment   - Educate patient/family on patient safety including physical limitations  - Instruct patient to call for assistance with activity based on assessment  - Modify environment to reduce risk of injury  - Consider OT/PT consult to assist with strengthening/mobility  Outcome: Progressing

## 2021-01-05 NOTE — PROGRESS NOTES
Progress Note - Urology  Braulio Lipscomb 1950, 79 y o  male MRN: [id number]    Unit/Bed#: E2 -01 Encounter: [id number]    Benign prostatic hyperplasia  Assessment & Plan  POD#1 status post TURP  -CBI clamped at this time, no clots noted draining blush colored urine  -tolerating diet  -remove Razo for traction  -CREAT 1 32 today was 1 03 yesterday; baseline is from 1 03-1 35  -plan discharge later today if urine remains without clots and punch to blush color   -using incentive spirometer  -no bowel movement yet but having good bowel sounds  -afebrile    * Bladder stones  Assessment & Plan  POD#1 cystolitholapaxy  -afebrile  -denies pain  -has CBI, clamped this morning  -no clots noted, draining pink to punch colored urine        Subjective:     24 HR EVENTS:   No significant events overnight  Patient has  no complaints  Review of Systems   Constitutional: Negative for activity change, appetite change, chills, fatigue, fever and unexpected weight change  HENT: Negative for facial swelling  Eyes: Negative for discharge  Respiratory: Negative  Negative for cough and shortness of breath  Cardiovascular: Negative for chest pain and leg swelling  Gastrointestinal: Negative  Negative for abdominal distention, abdominal pain, constipation, diarrhea, nausea and vomiting  Endocrine: Negative  Genitourinary: Positive for hematuria  Negative for decreased urine volume, difficulty urinating, dysuria, enuresis, flank pain, frequency, genital sores and urgency  CBI running with blush colored urine in the tubing   Musculoskeletal: Negative for back pain and myalgias  Skin: Negative for pallor and rash  Allergic/Immunologic: Negative  Negative for immunocompromised state  Neurological: Negative for facial asymmetry and speech difficulty  Psychiatric/Behavioral: Negative for agitation and confusion         Objective:  Nursing Rounds:   Vitals: Blood pressure 130/60, pulse 88, temperature (!) 96 6 °F (35 9 °C), temperature source Tympanic, resp  rate 18, height 5' 8" (1 727 m), weight 79 kg (174 lb 3 2 oz), SpO2 96 %  ,Body mass index is 26 49 kg/m²  INS & OUTS:  I/O last 24 hours: In: 3946 3 [P O :240; I V :3706 3]  Out: 2200 [Urine:2200]    Physical Exam  Vitals signs and nursing note reviewed  Constitutional:       General: He is not in acute distress  Appearance: Normal appearance  He is not ill-appearing or toxic-appearing  HENT:      Head: Normocephalic  Cardiovascular:      Rate and Rhythm: Normal rate and regular rhythm  Heart sounds: No murmur  Pulmonary:      Effort: Pulmonary effort is normal  No respiratory distress  Breath sounds: Normal breath sounds  Abdominal:      General: Abdomen is flat  Bowel sounds are normal  There is no distension  Tenderness: There is no abdominal tenderness  There is no guarding or rebound  Genitourinary:     Comments: CBI clamped this time  No clots  Urine pink/blush in color  Musculoskeletal:         General: No swelling  Skin:     General: Skin is warm and dry  Neurological:      General: No focal deficit present  Mental Status: He is alert and oriented to person, place, and time  Psychiatric:         Mood and Affect: Mood normal          Behavior: Behavior normal          Imaging:    Imaging reviewed - both report and images personally reviewed       Labs:  Recent Labs     01/04/21  1731 01/05/21  0516   WBC 8 32 11 61*       Recent Labs     01/04/21  1731 01/05/21  0516   HGB 11 8* 11 4*     Recent Labs     01/04/21  1731 01/05/21  0516   HCT 36 8 35 3*     Recent Labs     01/04/21  1731 01/05/21  0516   CREATININE 1 03 1 32*     Lab Results   Component Value Date    HGB 11 4 (L) 01/05/2021    HCT 35 3 (L) 01/05/2021    WBC 11 61 (H) 01/05/2021     01/05/2021     Lab Results   Component Value Date     12/20/2017    K 4 2 01/05/2021     01/05/2021    CO2 28 01/05/2021    BUN 17 01/05/2021    CREATININE 1 32 (H) 01/05/2021    CALCIUM 8 1 (L) 01/05/2021     Urinalysis:  Not done  Urine Culture: Pending   -stone analysis pending    History:    Past Medical History:   Diagnosis Date    Ambulatory dysfunction     uses walker with assist of 1    Anemia     Anxiety     At risk for falls     hx of falls    Benign paroxysmal vertigo     unspecified ear    BPH (benign prostatic hyperplasia)     for TURP today 1/4/2021    Calculus in bladder     for surgical removal today 1/4/2021    Cataract     left eye    Cervicalgia     Chest pain     Chronic kidney disease     stage 3    Constipation     CTS (carpal tunnel syndrome)     left    Cyst of pancreas     Cystitis 06/23/2020    acute cystitis with hematuria    Depression     Diabetes mellitus ([address] )     type II with neuropathy    Dizziness     and giddiness    Dysphagia     Elevated PSA     Facial weakness     GERD (gastroesophageal reflux disease)     last assessed 5/20/16    Gross hematuria     Hematuria     Hyperlipidemia     Hypertension     Kidney disease     Kidney stone     Left-sided weakness     Long term (current) use of oral hypoglycemic drugs     Muscle weakness     Nuclear senile cataract of left eye     OA (osteoarthritis) of knee     b/l    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[address] )     Syncope and collapse     Tachycardia     UTI (urinary tract infection)     Vertebro-basilar artery syndrome     Vitamin B deficiency     Vitamin D deficiency     Vitamin D deficiency      Past Surgical History:   Procedure Laterality Date    CATARACT EXTRACTION      CHOLECYSTECTOMY      KNEE SURGERY      MI REMOVE BLADDER STONE,<2 5 CM N/A 1/4/2021    Procedure: Cystolitholopaxy w/laser bladder stones;  Surgeon: Fanta Ferreira MD;  [facility];  Service: Urology    MI TRANSURETHRAL ELEC-SURG PROSTATECTOM N/A 1/4/2021    Procedure: T U R P ;  Surgeon: Fanta Ferreira MD;  [facility];  Service: Urology Family History   Problem Relation Age of Onset    Coronary artery disease Mother     Diabetes Father      Social History     Socioeconomic History    Marital status: /Civil Union     Spouse name: None    Number of children: None    Years of education: None    Highest education level: None   Occupational History    None   Social Needs    Financial resource strain: None    Food insecurity     Worry: None     Inability: None    Transportation needs     Medical: None     Non-medical: None   Tobacco Use    Smoking status: Never Smoker    Smokeless tobacco: Never Used   Substance and Sexual Activity    Alcohol use: Not Currently    Drug use: No    Sexual activity: Not Currently   Lifestyle    Physical activity     Days per week: None     Minutes per session: None    Stress: None   Relationships    Social connections     Talks on phone: None     Gets together: None     Attends Anabaptist service: None     Active member of club or organization: None     Attends meetings of clubs or organizations: None     Relationship status: None    Intimate partner violence     Fear of current or ex partner: None     Emotionally abused: None     Physically abused: None     Forced sexual activity: None   Other Topics Concern    None   Social History Narrative    None       Lance Kocher, CRNP  Date: 1/5/2021 Time: 9:47 AM

## 2021-01-05 NOTE — CASE MANAGEMENT
Pt from [facility]  [facility] reporting that he will have to admit to their first floor so they will work on opening a bed for him for when he is ready to return  CM following

## 2021-01-05 NOTE — PHYSICIAN ADVISOR
Current patient class: Inpatient  The patient is currently on Hospital Day: 2 at [address] [place] [address]      This patient was originally admitted to the hospital under observation status  After admission the patient was reevaluated and determined to require further hospitalization  The patient is now documented to require at least a 2nd midnight in the hospital  As such the patient is now expected to satisfy the 2 midnight benchmark and is therefore eligible for inpatient admission  After review of the relevant documentation, labs, vital signs and test results, the patient is appropriate for INPATIENT ADMISSION  Admission to the hospital as an inpatient is a complex decision making process which requires the practitioner to consider the patients presenting complaint, history and physical examination and all relevant testing  With this in mind, in this case, the patient was deemed appropriate for INPATIENT ADMISSION  After review of the documentation and testing available at the time of the admission I concur with this clinical determination of medical necessity  Rationale is as follows: The patient is a 79 yrs Male who presented to [facility] for TURP procedure  He had procedure done on day of admission  He tolerated procedure and cbi placed  CBI was clamped this morning and noted to have cherry colored urine and clots on recheck this afternoon  CBI restarted  WBC 11 61 this morning and and hgb 11 4  His cr is 1 32 today and was 1 03 yesterday; baseline is from 1 03-1  35  He continues to require cbi    Will continue IP status        The patients vitals on arrival were   ED Triage Vitals   Temperature Pulse Respirations Blood Pressure SpO2   01/04/21 1138 01/04/21 1138 01/04/21 1138 01/04/21 1138 01/04/21 1138   98 3 °F (36 8 °C) 86 18 131/66 99 %      Temp Source Heart Rate Source Patient Position - Orthostatic VS BP Location FiO2 (%)   01/04/21 1138 01/04/21 1138 01/04/21 2104 01/04/21 2104 -- Temporal Monitor Lying Right arm       Pain Score       01/04/21 1138       2           Past Medical History:   Diagnosis Date    Ambulatory dysfunction     uses walker with assist of 1    Anemia     Anxiety     At risk for falls     hx of falls    Benign paroxysmal vertigo     unspecified ear    BPH (benign prostatic hyperplasia)     for TURP today 1/4/2021    Calculus in bladder     for surgical removal today 1/4/2021    Cataract     left eye    Cervicalgia     Chest pain     Chronic kidney disease     stage 3    Constipation     CTS (carpal tunnel syndrome)     left    Cyst of pancreas     Cystitis 06/23/2020    acute cystitis with hematuria    Depression     Diabetes mellitus [address] [address] )     type II with neuropathy    Dizziness     and giddiness    Dysphagia     Elevated PSA     Facial weakness     GERD (gastroesophageal reflux disease)     last assessed 5/20/16    Gross hematuria     Hematuria     Hyperlipidemia     Hypertension     Kidney disease     Kidney stone     Left-sided weakness     Long term (current) use of oral hypoglycemic drugs     Muscle weakness     Nuclear senile cataract of left eye     OA (osteoarthritis) of knee     b/l    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[facility] [facility] )     Syncope and collapse     Tachycardia     UTI (urinary tract infection)     Vertebro-basilar artery syndrome     Vitamin B deficiency     Vitamin D deficiency     Vitamin D deficiency      Past Surgical History:   Procedure Laterality Date    CATARACT EXTRACTION      CHOLECYSTECTOMY      KNEE SURGERY      WI REMOVE BLADDER STONE,<2 5 CM N/A 1/4/2021    Procedure: Cystolitholopaxy w/laser bladder stones;  Surgeon: Monique Shaffer MD;  [facility];  Service: Urology    WI TRANSURETHRAL ELEC-SURG PROSTATECTOM N/A 1/4/2021    Procedure: T U R P ;  Surgeon: Monique Shaffer MD;  [facility];  Service: Urology       The patient was admitted to the hospital at [phone] on 1/5/21 for the following diagnosis:  Bladder stones [N21 0]    Consults have been placed to:   IP CONSULT TO CASE MANAGEMENT    Vitals:    01/04/21 2137 01/04/21 2207 01/05/21 0720 01/05/21 1100   BP: 137/67 167/77 130/60 132/78   BP Location: Right arm Right arm     Pulse: 88 84 88 82   Resp: 16 18 18 18   Temp: 97 5 °F (36 4 °C) 97 5 °F (36 4 °C) (!) 96 6 °F (35 9 °C) 98 6 °F (37 °C)   TempSrc: Temporal Temporal Tympanic    SpO2: 97% 97% 96% 96%   Weight:       Height:           Most recent labs:    Recent Labs     01/05/21  0516   WBC 11 61*   HGB 11 4*   HCT 35 3*      K 4 2   CALCIUM 8 1*   BUN 17   CREATININE 1 32*       Scheduled Meds:  Current Facility-Administered Medications   Medication Dose Route Frequency Provider Last Rate    acetaminophen  650 mg Oral Q6H [address] González Blanchard MD      belladonna-opium  30 mg Rectal Q8H PRN González Blanchard MD      dextrose 5% lactated ringer's  125 mL/hr Intravenous Continuous González Blanchard MD Stopped (01/05/21 1211)    docusate sodium  100 mg Oral BID González Blanchard MD      enoxaparin  40 mg Subcutaneous Daily González Blanchard MD      finasteride  5 mg Oral Daily González Blanchard MD      HYDROcodone-acetaminophen  1 tablet Oral Q4H PRN González Blanchard MD      HYDROcodone-acetaminophen  2 tablet Oral Q4H PRN González Blanchard MD      insulin lispro  4 Units Subcutaneous TID With Meals González Blanchard MD      lactated ringers  1,000 mL Intravenous Once PRN González Blanchard MD      And    lactated ringers  1,000 mL Intravenous Once PRN González Blanchard MD      melatonin  3 mg Oral HS González Blanchard MD      morphine injection  2 mg Intravenous Q2H PRN González Blanchard MD      neomycin-bacitracin-polymyxin b  1 large application Topical BID González Blanchard MD      ondansetron  4 mg Intravenous Q6H PRN González Blanchard MD      polyethylene glycol  17 g Oral Daily González Blanchard MD      senna  1 tablet Oral Daily González Blanchard MD      simethicone  80 mg Oral Q6H PRN Manette Signs Cheryle Rummage, MD      sodium chloride  1,000 mL Intravenous Once PRN Ricky Lyles MD      And    sodium chloride  1,000 mL Intravenous Once PRN Ricky Lyles MD      sodium chloride  125 mL/hr Intravenous Continuous Ricky Lyles MD      sodium chloride  125 mL/hr Intravenous Continuous Ricky Lyles MD Stopped (01/04/21 1837)    sodium chloride  3,000 mL Irrigation Continuous Ricky Lyles MD Stopped (01/05/21 1211)    tamsulosin  0 4 mg Oral Daily With Augusto Dee MD       Continuous Infusions:dextrose 5% lactated ringer's, 125 mL/hr, Last Rate: Stopped (01/05/21 1211)  sodium chloride, 125 mL/hr  sodium chloride, 125 mL/hr, Last Rate: Stopped (01/04/21 1837)  sodium chloride, 3,000 mL, Last Rate: Stopped (01/05/21 1211)      PRN Meds: belladonna-opium    HYDROcodone-acetaminophen    HYDROcodone-acetaminophen    lactated ringers **AND** lactated ringers    morphine injection    ondansetron    simethicone    sodium chloride **AND** sodium chloride    Surgical procedures (if appropriate):  Procedure(s):  T U R P    Cystolitholopaxy w/laser bladder stones

## 2021-01-05 NOTE — UTILIZATION REVIEW
Initial Clinical Review    Elective   OP    surgical procedure    01/05/21 1331  Inpatient Admission Once     Transfer Service: Urology       Question Answer Comment   Admitting Physician Hemalatha Coughlin    Level of Care Med Surg    Estimated length of stay More than 2 Midnights    Certification I certify that inpatient services are medically necessary for this patient for a duration of greater than two midnights  See H&P and MD Progress Notes for additional information about the patient's course of treatment  01/05/21 1332     OP procedure   1/4  Changed to Ip admission  1/5    Age/Sex: 79 y o  male     Surgery Date:  1/4/21    Procedure: T U R P  (N/A)  Cystolitholopaxy w/laser bladder stones (N/A)    Anesthesia:    general    Operative Findings: Three large bladder stones decimated and removed with holmium laser lithotripsy, standard TURP    POD#1 Progress Note:   1/5  Continue post op care  CBI clamped this am, no clots noted,  Urine pink to punch color  Continue to monitor labs  Encourage ambulation/incentive spirometer  Tolerating diet  Post op progress note  ( 1/5)     Signed             Return to evaluate patient after stopping his CBI this morning  His catheter is draining cherry colored urine with some small clots  Hand irrigated with 180 mL of sterile water with small clot return  His CBI was restarted at this time  Will continue to monitor  Most likely will keep overnight  He continues tolerate his diet and use incentive spirometer  Admission Orders: Date/Time/Statement:   No orders of the defined types were placed in this encounter      Vital Signs: /60   Pulse 88   Temp (!) 96 6 °F (35 9 °C) (Tympanic)   Resp 18   Ht 5' 8" (1 727 m)   Wt 79 kg (174 lb 3 2 oz)   SpO2 96%   BMI 26 49 kg/m²        Diet:   regular    Mobility:    OOB  As tolerated    DVT Prophylaxis:  SCD'S    Medications/Pain Control:   Scheduled Medications:  acetaminophen, 650 mg, Oral, Q6H [address]  docusate sodium, 100 mg, Oral, BID  enoxaparin, 40 mg, Subcutaneous, Daily  finasteride, 5 mg, Oral, Daily  insulin lispro, 4 Units, Subcutaneous, TID With Meals  melatonin, 3 mg, Oral, HS  neomycin-bacitracin-polymyxin b, 1 large application, Topical, BID  polyethylene glycol, 17 g, Oral, Daily  senna, 1 tablet, Oral, Daily  tamsulosin, 0 4 mg, Oral, Daily With Dinner      Continuous IV Infusions:  dextrose 5% lactated ringer's, 125 mL/hr, Intravenous, Continuous  sodium chloride, 125 mL/hr, Intravenous, Continuous  sodium chloride, 125 mL/hr, Intravenous, Continuous  sodium chloride, 3,000 mL, Irrigation, Continuous      PRN Meds:  belladonna-opium, 30 mg, Rectal, Q8H PRN  HYDROcodone-acetaminophen, 1 tablet, Oral, Q4H PRN  HYDROcodone-acetaminophen, 2 tablet, Oral, Q4H PRN  lactated ringers, 1,000 mL, Intravenous, Once PRN    And  lactated ringers, 1,000 mL, Intravenous, Once PRN  morphine injection, 2 mg, Intravenous, Q2H PRN  ondansetron, 4 mg, Intravenous, Q6H PRN  simethicone, 80 mg, Oral, Q6H PRN  sodium chloride, 1,000 mL, Intravenous, Once PRN    And  sodium chloride, 1,000 mL, Intravenous, Once PRN        Network Utilization Review Department  ATTENTION: Please call with any questions or concerns to [phone] and carefully listen to the prompts so that you are directed to the right person  All voicemails are confidential   Lorenzo Diana all requests for admission clinical reviews, approved or denied determinations and any other requests to dedicated fax number below belonging to the campus where the patient is receiving treatment   List of dedicated fax numbers for the Facilities:  [address] [place] [address] DENIALS (Administrative/Medical Necessity) [phone]   [address] (Maternity/NICU/Pediatrics) 261 [facility],[address] Floor [place] [address] [phone] 8049 [facility] [phone]     [address]  [facility] [address]) [address] [place] [address] [address] [phone]   [address] [phone]

## 2021-01-05 NOTE — TELEPHONE ENCOUNTER
Patient remains inpatient at this time  tenatively scheduled for nurse visit wilkerson removal on Friday 1/8/2021 (as office is closer to his home)  Will monitor for discharge and confirm with patient at that time

## 2021-01-05 NOTE — DISCHARGE INSTRUCTIONS
Bladder Stones   WHAT YOU NEED TO KNOW:   A bladder stone is a hard substance in your bladder  Bladder stones may form in your bladder, or they may first form in your kidney and then travel to your bladder  Bladder stones are made up of minerals such as calcium, uric acid, oxalate, and phosphate  You may have one or more bladder stone  DISCHARGE INSTRUCTIONS:   Seek care immediately if:   · You have severe pain that does not get better with medicine  · You are vomiting  · You have a fever  Contact your healthcare provider if:   · Your symptoms do not get better, or they get worse  · You have trouble urinating  · You have questions or concerns about your condition or care  Drink plenty of liquids: Your healthcare provider may tell you to drink up to 8 (eight-ounce) cups of liquids each day  This helps flush out the stones when you urinate  It may also help prevent bladder stones from forming again  Water is the best liquid to drink  Follow up with your healthcare provider as directed: You may need to return for more tests or treatment  Write down your questions so you remember to ask them during your visits  © Copyright [employer] Information is for End User's use only and may not be sold, redistributed or otherwise used for commercial purposes  All illustrations and images included in CareNotes® are the copyrighted property of [employer] A M , [employer]  or [address] [place] [address]  The above information is an  only  It is not intended as medical advice for individual conditions or treatments  Talk to your doctor, nurse or pharmacist before following any medical regimen to see if it is safe and effective for you  Transurethral Prostatectomy   WHAT YOU NEED TO KNOW:   A transurethral prostatectomy is surgery that is done to remove part or all of your prostate gland  This surgery is also called transurethral resection of the prostate (TURP)        DISCHARGE INSTRUCTIONS:   Medicines: · Pain medicine: You may be given a prescription medicine to decrease pain  Do not wait until the pain is severe before you take this medicine  · Antibiotics: This medicine is given to fight or prevent an infection caused by bacteria  Always take your antibiotics exactly as ordered by your healthcare provider  Do not stop taking your medicine unless directed by your healthcare provider  Never save antibiotics or take leftover antibiotics that were given to you for another illness  · Take your medicine as directed  Contact your healthcare provider if you think your medicine is not helping or if you have side effects  Tell him or her if you are allergic to any medicine  Keep a list of the medicines, vitamins, and herbs you take  Include the amounts, and when and why you take them  Bring the list or the pill bottles to follow-up visits  Carry your medicine list with you in case of an emergency  Follow up with your healthcare provider or urologist as directed: You may need to return to make sure you do not have an infection, or to have your Razo catheter removed  Write down your questions so you remember to ask them during your visits  Razo catheter care: A Razo catheter is a tube put into your bladder to drain your urine into a bag  Keep the bag of urine well below your waist  Lifting the urine bag higher will make the urine flow back into your bladder, which can cause an infection  Do not pull on the catheter  This may cause pain and bleeding, and the catheter may come out  Do not let the catheter tubing kink, because this will block the flow of urine  Ask for more information about how to care for yourself when you have a Razo catheter in place  Bladder control:  After surgery, you may leak urine and have trouble controlling when you urinate   Ask for more information about the following ways to help decrease urine leakage:  · Avoid caffeine:  Caffeine can cause problems with bladder control and increase your need to urinate  · Do pelvic floor muscle exercises:  Pelvic floor muscle exercises may help improve your bladder control, if you leak urine  These exercises are done by tightening and relaxing your pelvic muscles  Ask how to do pelvic floor muscle exercises, and how often to do them  · Limit your liquids:  Drink smaller amounts of liquid throughout the day  Do not drink before bedtime  Ask if you should decrease the amount of liquid you drink each day  This may help you control your bladder  · Wear a pad or adult diapers: These may help to absorb leaking urine and decrease the odor  Activity guidelines:  Ask when it is okay for you to return to work and activities, or to have sex  Contact your healthcare provider or urologist if:   · You have a fever  · You have new or more blood in your urine  · You have trouble starting to urinate, or have a weak stream of urine when you urinate  · You feel like you have a full bladder, even after you urinate  You may also leak urine  · You often wake up during the night to urinate  You may also feel the need to urinate right away  · You feel pain and burning when you urinate  · You feel pain or pressure in your lower abdomen  · Your urine looks cloudy, and smells bad  · You have trouble getting an erection or ejaculating  · You have questions or concerns about your condition or care  Seek care immediately or call 911 if:   · You urinate little or not at all  · You have severe abdominal or back pain  · You are dizzy or confused  · You have abdominal pain, nausea, and vomiting  · Your heartbeat is slower than usual     © Copyright [employer] 2018 Information is for End User's use only and may not be sold, redistributed or otherwise used for commercial purposes   All illustrations and images included in CareNotes® are the copyrighted property of [employer]  or Maria R Zazueta  The above information is an  only  It is not intended as medical advice for individual conditions or treatments  Talk to your doctor, nurse or pharmacist before following any medical regimen to see if it is safe and effective for you

## 2021-01-05 NOTE — QUICK NOTE
Return to evaluate patient after stopping his CBI this morning  His catheter is draining cherry colored urine with some small clots  Hand irrigated with 180 mL of sterile water with small clot return  His CBI was restarted at this time  Will continue to monitor  Most likely will keep overnight  He continues tolerate his diet and use incentive spirometer

## 2021-01-06 LAB
BACTERIA UR CULT: NORMAL
GLUCOSE SERPL-MCNC: 136 MG/DL (ref 65–140)
GLUCOSE SERPL-MCNC: 157 MG/DL (ref 65–140)
GLUCOSE SERPL-MCNC: 216 MG/DL (ref 65–140)

## 2021-01-06 PROCEDURE — 99024 POSTOP FOLLOW-UP VISIT: CPT | Performed by: PHYSICIAN ASSISTANT

## 2021-01-06 PROCEDURE — 82948 REAGENT STRIP/BLOOD GLUCOSE: CPT

## 2021-01-06 PROCEDURE — NC001 PR NO CHARGE: Performed by: PHYSICIAN ASSISTANT

## 2021-01-06 RX ORDER — HYDROCODONE BITARTRATE AND ACETAMINOPHEN 5; 325 MG/1; MG/1
1 TABLET ORAL 3 TIMES DAILY PRN
Qty: 10 TABLET | Refills: 0 | Status: SHIPPED | OUTPATIENT
Start: 2021-01-06 | End: 2021-09-27 | Stop reason: HOSPADM

## 2021-01-06 RX ORDER — CEPHALEXIN 500 MG/1
500 CAPSULE ORAL 2 TIMES DAILY
Qty: 6 CAPSULE | Refills: 0 | Status: SHIPPED | OUTPATIENT
Start: 2021-01-06 | End: 2021-01-09

## 2021-01-06 RX ADMIN — TAMSULOSIN HYDROCHLORIDE 0.4 MG: 0.4 CAPSULE ORAL at 16:55

## 2021-01-06 RX ADMIN — INSULIN LISPRO 4 UNITS: 100 INJECTION, SOLUTION INTRAVENOUS; SUBCUTANEOUS at 09:29

## 2021-01-06 RX ADMIN — SENNOSIDES 8.6 MG: 8.6 TABLET ORAL at 09:29

## 2021-01-06 RX ADMIN — FINASTERIDE 5 MG: 5 TABLET, FILM COATED ORAL at 09:29

## 2021-01-06 RX ADMIN — ENOXAPARIN SODIUM 40 MG: 40 INJECTION SUBCUTANEOUS at 09:28

## 2021-01-06 RX ADMIN — MELATONIN 3 MG: at 23:07

## 2021-01-06 RX ADMIN — BACITRACIN ZINC NEOMYCIN SULFATE POLYMYXIN B SULFATE 1 LARGE APPLICATION: 400; 3.5; 5 OINTMENT TOPICAL at 09:29

## 2021-01-06 RX ADMIN — ACETAMINOPHEN 650 MG: 325 TABLET ORAL at 05:56

## 2021-01-06 RX ADMIN — POLYETHYLENE GLYCOL 3350 17 G: 17 POWDER, FOR SOLUTION ORAL at 09:29

## 2021-01-06 RX ADMIN — ACETAMINOPHEN 650 MG: 325 TABLET ORAL at 17:27

## 2021-01-06 RX ADMIN — BACITRACIN ZINC NEOMYCIN SULFATE POLYMYXIN B SULFATE 1 LARGE APPLICATION: 400; 3.5; 5 OINTMENT TOPICAL at 17:28

## 2021-01-06 RX ADMIN — INSULIN LISPRO 4 UNITS: 100 INJECTION, SOLUTION INTRAVENOUS; SUBCUTANEOUS at 11:58

## 2021-01-06 RX ADMIN — DOCUSATE SODIUM 100 MG: 100 CAPSULE, LIQUID FILLED ORAL at 09:29

## 2021-01-06 RX ADMIN — DOCUSATE SODIUM 100 MG: 100 CAPSULE, LIQUID FILLED ORAL at 17:28

## 2021-01-06 RX ADMIN — ACETAMINOPHEN 650 MG: 325 TABLET ORAL at 23:07

## 2021-01-06 RX ADMIN — INSULIN LISPRO 4 UNITS: 100 INJECTION, SOLUTION INTRAVENOUS; SUBCUTANEOUS at 16:55

## 2021-01-06 RX ADMIN — ACETAMINOPHEN 650 MG: 325 TABLET ORAL at 11:58

## 2021-01-06 NOTE — PROGRESS NOTES
Progress Note - Urology  Vernadine Conception 1950, 79 y o  male MRN: [id number]    Unit/Bed#: E2 -01 Encounter: [id number]    Benign prostatic hyperplasia  Assessment & Plan  POD#2 TURP  Doing well  CBI clamped, urine clear  D/c today with Razo, for void trial Friday 1/8    * Bladder stones  Assessment & Plan  POD#2 TURP and cystolitholapaxy  -afebrile  -denies pain  - CBI clamped now urine clear yellow to light pink  - mobility at his baseline, in WC today  - full diet      Subjective: feels good this morning, urine light pink/yellow/orange  No fevers  No pain  Review of Systems   Constitutional: Negative for activity change, appetite change, chills, fever and unexpected weight change  HENT: Negative  Respiratory: Negative  Negative for shortness of breath  Cardiovascular: Negative  Negative for chest pain  Gastrointestinal: Negative for abdominal pain, diarrhea, nausea and vomiting  Endocrine: Negative  Genitourinary: Positive for hematuria  Negative for decreased urine volume, difficulty urinating, dysuria, flank pain, frequency and urgency  Musculoskeletal: Negative for back pain and gait problem  Skin: Negative  Allergic/Immunologic: Negative  Neurological: Negative  Hematological: Negative for adenopathy  Does not bruise/bleed easily  Objective:  Vitals: Blood pressure 116/78, pulse 88, temperature 98 °F (36 7 °C), resp  rate 18, height 5' 8" (1 727 m), weight 79 kg (174 lb 3 2 oz), SpO2 94 %  ,Body mass index is 26 49 kg/m²  Intake/Output Summary (Last 24 hours) at 1/6/2021 1547  Last data filed at 1/6/2021 0913  Gross per 24 hour   Intake --   Output 1900 ml   Net -1900 ml     Invasive Devices     Peripheral Intravenous Line            Peripheral IV 01/04/21 Left Wrist 2 days          Drain            External Urinary Catheter 199 days    Continuous Bladder Irrigation Three-way 2 days                Physical Exam  Vitals signs and nursing note reviewed  Constitutional:       General: He is not in acute distress  Appearance: He is well-developed  He is not diaphoretic  Comments: Seated in wheelchair, pleasant, conversive   HENT:      Head: Normocephalic and atraumatic  Pulmonary:      Effort: Pulmonary effort is normal    Abdominal:      General: Abdomen is flat  Bowel sounds are normal  There is no distension  Tenderness: There is no abdominal tenderness  Genitourinary:     Comments: Razo catheter per urethra clear yellow urine in tubing, light pink in bag  Skin:     General: Skin is warm and dry  Neurological:      Mental Status: He is alert and oriented to person, place, and time        Gait: Gait normal    Psychiatric:         Speech: Speech normal          Behavior: Behavior normal        Labs:  Recent Labs     01/04/21  1731 01/05/21  0516   WBC 8 32 11 61*     Recent Labs     01/04/21  1731 01/05/21  0516   HGB 11 8* 11 4*       Recent Labs     01/04/21  1731 01/05/21  0516   CREATININE 1 03 1 32*       History:    Past Medical History:   Diagnosis Date    Ambulatory dysfunction     uses walker with assist of 1    Anemia     Anxiety     At risk for falls     hx of falls    Benign paroxysmal vertigo     unspecified ear    BPH (benign prostatic hyperplasia)     for TURP today 1/4/2021    Calculus in bladder     for surgical removal today 1/4/2021    Cataract     left eye    Cervicalgia     Chest pain     Chronic kidney disease     stage 3    Constipation     CTS (carpal tunnel syndrome)     left    Cyst of pancreas     Cystitis 06/23/2020    acute cystitis with hematuria    Depression     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    Dizziness     and giddiness    Dysphagia     Elevated PSA     Facial weakness     GERD (gastroesophageal reflux disease)     last assessed 5/20/16    Gross hematuria     Hematuria     Hyperlipidemia     Hypertension     Kidney disease     Kidney stone     Left-sided weakness     Long term (current) use of oral hypoglycemic drugs     Muscle weakness     Nuclear senile cataract of left eye     OA (osteoarthritis) of knee     b/l    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HCC)     Syncope and collapse     Tachycardia     UTI (urinary tract infection)     Vertebro-basilar artery syndrome     Vitamin B deficiency     Vitamin D deficiency     Vitamin D deficiency      Past Surgical History:   Procedure Laterality Date    CATARACT EXTRACTION      CHOLECYSTECTOMY      KNEE SURGERY      SC REMOVE BLADDER STONE,<2 5 CM N/A 1/4/2021    Procedure: Cystolitholopaxy w/laser bladder stones;  Surgeon: Raiza Pascual MD;  Location: [facility];  Service: Urology    SC TRANSURETHRAL ELEC-SURG PROSTATECTOM N/A 1/4/2021    Procedure: T U R P ;  Surgeon: Raiza Pascual MD;  Location[facility];  Service: Urology     Family History   Problem Relation Age of Onset    Coronary artery disease Mother     Diabetes Father      Social History     Socioeconomic History    Marital status: /Civil Union     Spouse name: None    Number of children: None    Years of education: None    Highest education level: None   Occupational History    None   Social Needs    Financial resource strain: None    Food insecurity     Worry: None     Inability: None    Transportation needs     Medical: None     Non-medical: None   Tobacco Use    Smoking status: Never Smoker    Smokeless tobacco: Never Used   Substance and Sexual Activity    Alcohol use: Not Currently    Drug use: No    Sexual activity: Not Currently   Lifestyle    Physical activity     Days per week: None     Minutes per session: None    Stress: None   Relationships    Social connections     Talks on phone: None     Gets together: None     Attends [employer] service: None     Active member of club or organization: None     Attends meetings of clubs or organizations: None     Relationship status: None    Intimate partner violence     Fear of current or ex partner: None     Emotionally abused: None     Physically abused: None     Forced sexual activity: None   Other Topics Concern    None   Social History Narrative    None         Johan Tapia  Date: 1/6/2021 Time: 3:47 PM

## 2021-01-06 NOTE — DISCHARGE SUMMARY
Discharge Summary - Asmita Collazo 79 y o  male MRN: [id number]    Unit/Bed#: E2 -01 Encounter: [id number]    Admission Date: 1/4/2021     Discharge Date: 1/7/2021  HPI: Asmita Collazo is a 79 y o  male who presented for TURP, cystolitholapaxy bladder stones by Dr Abraham Mc  Procedure(s):  T U R P  Cystolitholopaxy w/laser bladder stones  Surgeon(s):  Farida Moreno MD  1/4/2021    Hospital Course: Patient was taken to the operating room for TURP cystolitholapaxy for treatment of BPH with bladder outlet obstruction and 3 large bladder stones  There were no intraoperative complications  Following surgery, patient was recovered in the PACU and then transferred to the [facility] floor for overnight observation  He has had excellent pain control, appropriate urinary output, with clear urine on CBI overnight  He remained overnight for additional CBI for recurrent hematuria after titration, which has now resolved to yellow this morning  He is tolerating a full diet, and passing gas  He is ambulatory and comfortable  His Razo catheter will be transferred to a leg bag  He will present to the office Friday 1/8 for Razo catheter removal  He is stable for discharge to home to continue his recovery there  Verbal and written discharge instructions were reviewed with patient at the bedside including prescriptions  Discharge Diagnosis: Bladder stones    Condition at Discharge: good     Discharge Medications:  See after visit summary for reconciled discharge medications provided to patient and family  Patient was discharged home on home medications with the addition of norco, keflex  Discharge instructions/Information to patient and family:   See after visit summary for information provided to patient and family  Provisions for Follow-Up Care:  See after visit summary for information related to follow-up care and any pertinent home health orders        Disposition: Home    Planned Readmission: No    Discharge Statement   I spent 15 minutes discharging the patient  This time was spent on the day of discharge  I had direct contact with the patient on the day of discharge  Additional documentation is required if more than 30 minutes were spent on discharge  Signature:    Karyn Crockett PA-C  Date: 1/15/2021 Time: 4:18 PM

## 2021-01-06 NOTE — DISCHARGE INSTR - AVS FIRST PAGE
- You have a Razo bladder catheter which is draining urine and allowing your prostate to heal following surgery  - Please continue to care for this catheter as you have been instructed by the nurses at the hospital  - Is normal to see some blood in your urine   It is also normal to on occasion have urine draining around the tubing of the catheter  - Please stick with light activity for 2 weeks including no shoveling or lawn mowing  - Please call for problems with her catheter or if the catheter were to stop draining, poorly controlled pain, fever greater than 101, or any other concerns that you feel warrants medical attention  - He will be scheduled for a follow-up appointment at [facility] urology office with the nurse for Razo catheter removal on Friday 1/8/2021    Medications  Norco (pain, narcotic) do not drive if taking  Keflex (antibiotic)

## 2021-01-06 NOTE — CASE MANAGEMENT
Gloria Santizo reporting that they will need to get an insurance authorization prior to pt returning  They ask that he is seen by PT/OT  CM made attending aware who will be placing the consult  CM following

## 2021-01-07 VITALS
HEIGHT: 68 IN | SYSTOLIC BLOOD PRESSURE: 131 MMHG | HEART RATE: 90 BPM | RESPIRATION RATE: 20 BRPM | OXYGEN SATURATION: 97 % | WEIGHT: 174.2 LBS | TEMPERATURE: 98.4 F | DIASTOLIC BLOOD PRESSURE: 65 MMHG | BODY MASS INDEX: 26.4 KG/M2

## 2021-01-07 LAB
FLUAV RNA RESP QL NAA+PROBE: NEGATIVE
FLUBV RNA RESP QL NAA+PROBE: NEGATIVE
GLUCOSE SERPL-MCNC: 149 MG/DL (ref 65–140)
GLUCOSE SERPL-MCNC: 179 MG/DL (ref 65–140)
GLUCOSE SERPL-MCNC: 205 MG/DL (ref 65–140)
RSV RNA RESP QL NAA+PROBE: NEGATIVE
SARS-COV-2 RNA RESP QL NAA+PROBE: NEGATIVE

## 2021-01-07 PROCEDURE — 99024 POSTOP FOLLOW-UP VISIT: CPT | Performed by: NURSE PRACTITIONER

## 2021-01-07 PROCEDURE — 82948 REAGENT STRIP/BLOOD GLUCOSE: CPT

## 2021-01-07 PROCEDURE — 97163 PT EVAL HIGH COMPLEX 45 MIN: CPT | Performed by: PHYSICAL THERAPIST

## 2021-01-07 PROCEDURE — 97167 OT EVAL HIGH COMPLEX 60 MIN: CPT

## 2021-01-07 PROCEDURE — 0241U HB NFCT DS VIR RESP RNA 4 TRGT: CPT | Performed by: UROLOGY

## 2021-01-07 RX ORDER — CIPROFLOXACIN 500 MG/1
500 TABLET, FILM COATED ORAL EVERY 12 HOURS SCHEDULED
Qty: 10 TABLET | Refills: 0 | Status: SHIPPED | OUTPATIENT
Start: 2021-01-07 | End: 2021-01-08

## 2021-01-07 RX ADMIN — POLYETHYLENE GLYCOL 3350 17 G: 17 POWDER, FOR SOLUTION ORAL at 08:18

## 2021-01-07 RX ADMIN — ENOXAPARIN SODIUM 40 MG: 40 INJECTION SUBCUTANEOUS at 08:17

## 2021-01-07 RX ADMIN — FINASTERIDE 5 MG: 5 TABLET, FILM COATED ORAL at 08:19

## 2021-01-07 RX ADMIN — DOCUSATE SODIUM 100 MG: 100 CAPSULE, LIQUID FILLED ORAL at 08:19

## 2021-01-07 RX ADMIN — ACETAMINOPHEN 650 MG: 325 TABLET ORAL at 12:28

## 2021-01-07 RX ADMIN — TAMSULOSIN HYDROCHLORIDE 0.4 MG: 0.4 CAPSULE ORAL at 16:40

## 2021-01-07 RX ADMIN — BACITRACIN ZINC NEOMYCIN SULFATE POLYMYXIN B SULFATE 1 LARGE APPLICATION: 400; 3.5; 5 OINTMENT TOPICAL at 08:19

## 2021-01-07 RX ADMIN — ACETAMINOPHEN 650 MG: 325 TABLET ORAL at 05:52

## 2021-01-07 RX ADMIN — INSULIN LISPRO 4 UNITS: 100 INJECTION, SOLUTION INTRAVENOUS; SUBCUTANEOUS at 16:40

## 2021-01-07 RX ADMIN — INSULIN LISPRO 4 UNITS: 100 INJECTION, SOLUTION INTRAVENOUS; SUBCUTANEOUS at 08:17

## 2021-01-07 RX ADMIN — SENNOSIDES 8.6 MG: 8.6 TABLET ORAL at 08:19

## 2021-01-07 RX ADMIN — INSULIN LISPRO 4 UNITS: 100 INJECTION, SOLUTION INTRAVENOUS; SUBCUTANEOUS at 12:29

## 2021-01-07 NOTE — PLAN OF CARE
Problem: OCCUPATIONAL THERAPY ADULT  Goal: Performs self-care activities at highest level of function for planned discharge setting  See evaluation for individualized goals  Description: Treatment Interventions: ADL retraining, Functional transfer training, UE strengthening/ROM, Endurance training, Cognitive reorientation, Patient/family training, Equipment evaluation/education, Compensatory technique education, Continued evaluation          See flowsheet documentation for full assessment, interventions and recommendations  Note: Limitation: Decreased ADL status, Decreased Safe judgement during ADL, Decreased UE strength, Decreased cognition, Decreased endurance, Decreased high-level ADLs  Prognosis: Fair  Assessment: Pt is a 75y/o male admitted to the hospital for an elected s/p TURP, cystolithoplaxy w/laser bladder stones(1/4)  Pt with PMH anxiety, BPH, bladder calculus, DM, ambulatory dysfunction, OA, UTI, and paralytic syndrome  PTA pt states that he had assistance with his ADLs, transfers; limited ambulation--with RW; +falls=1  During initial eval, pt demonstrated deficits with his functional balance, functional mobility, ADL status, transfer safety, activity tolerance(currently fair=15-20mins), b/l UE strength, and questionable cognition(i e problem-solving, judgement/safety)  Pt would benefit from continued OT tx for the above deficts  2-3xwk/1-2wks  OT Discharge Recommendation: (continue PT/OT at SNF)  OT - OK to Discharge:  Yes

## 2021-01-07 NOTE — PHYSICAL THERAPY NOTE
Physical Therapy Evaluation      Patient Active Problem List   Diagnosis    Dizziness    Hyperlipidemia    Pancreas cyst    Type 2 diabetes mellitus with diabetic neuropathy (HCC)    Abnormal PSA    Microscopic hematuria    [facility]    Essential hypertension    Type 2 diabetes mellitus with diabetic neuropathy, with long-term current use of insulin ([facility] )    Nephrolithiasis    Ambulatory dysfunction    CARLOS EDUARDO [facility]acute kidney [facility]) [facility]    Colon cancer screening    Cystitis    Bladder stones    Stage 3 chronic kidney disease    Acute cystitis with hematuria    Anemia    UTI (urinary tract infection)    Left-sided weakness    COVID-19    Vitamin D deficiency    Benign paroxysmal positional vertigo    Benign prostatic hyperplasia    Constipation    Insomnia    History of recurrent UTIs       Past Medical History:   Diagnosis Date    Ambulatory dysfunction     uses walker with assist of 1    Anemia     Anxiety     At risk for falls     hx of falls    Benign paroxysmal vertigo     unspecified ear    BPH (benign prostatic hyperplasia)     for TURP today 1/4/2021    Calculus in bladder     for surgical removal today 1/4/2021    Cataract     left eye    Cervicalgia     Chest pain     Chronic kidney disease     stage 3    Constipation     CTS (carpal tunnel syndrome)     left    Cyst of pancreas     Cystitis 06/23/2020    acute cystitis with hematuria    Depression     Diabetes mellitus ([facility] )     type II with neuropathy    Dizziness     and giddiness    Dysphagia     Elevated PSA     Facial weakness     GERD (gastroesophageal reflux disease)     last assessed 5/20/16    Gross hematuria     Hematuria     Hyperlipidemia     Hypertension     Kidney disease     Kidney stone     Left-sided weakness     Long term (current) use of oral hypoglycemic drugs     Muscle weakness     Nuclear senile cataract of left eye     OA (osteoarthritis) of knee     b/l    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[facility]     Syncope and collapse     Tachycardia     UTI (urinary tract infection)     Vertebro-basilar artery syndrome     Vitamin B deficiency     Vitamin D deficiency     Vitamin D deficiency        Past Surgical History:   Procedure Laterality Date    CATARACT EXTRACTION      CHOLECYSTECTOMY      KNEE SURGERY      OK REMOVE BLADDER STONE,<2 5 CM N/A 1/4/2021    Procedure: Cystolitholopaxy w/laser bladder stones;  Surgeon: Elsie Hidalgo MD;  [facility];  Service: Urology    OK TRANSURETHRAL ELEC-SURG PROSTATECTOM N/A 1/4/2021    Procedure: T U R P ;  Surgeon: Elsie Hidalgo MD;  [facility];  Service: Urology        01/07/21 0925   PT Last Visit   PT Visit Date 01/07/21   Note Type   Note type Evaluation   Pain Assessment   Pain Assessment Tool 0-10   Home Living   Type of Home SNF   Home Layout One level;Ramped entrance;Elevator   Home Equipment Wheelchair-manual;Walker   Prior Function   Level of [place] Needs assistance with IADLs; Needs assistance with ADLs and functional mobility   Lives With Facility staff   Receives Help From Personal care attendant   ADL Assistance Needs assistance   IADLs Needs assistance   Falls in the last 6 months 1 to 4  (1)   Restrictions/Precautions   Weight Bearing Precautions Per Order No   Other Precautions Cognitive; Chair Alarm; Fall Risk;Multiple lines   General   Family/Caregiver Present No   Cognition   Overall Cognitive Status Impaired   Orientation Level Oriented X4   RUE Assessment   RUE Assessment WFL   LUE Assessment   LUE Assessment WFL   RLE Assessment   RLE Assessment X  (mild knee flex contracture, str 4+/5)   LLE Assessment   LLE Assessment X  (mild knee flex contracture, str 4/5)   Coordination   Movements are Fluid and Coordinated 0   Coordination and Movement Description dysmetric ble when amb   Transfers   Sit to Stand 3  Moderate assistance   Additional items Assist x 1; Increased time required;Verbal cues   Stand to Sit 3  Moderate assistance   Additional items Assist x 1; Increased time required;Verbal cues   Stand pivot 3  Moderate assistance   Additional items Assist x 1; Increased time required;Verbal cues   Ambulation/Elevation   Gait pattern Festenating   Gait Assistance 4  Minimal assist   Additional items Assist x 1;Verbal cues; Tactile cues   Assistive Device Rolling walker   Distance 2' forward and back   Wheelchair Activities   Wheelchair Cushion Pressure relieving   Wheelchair Parts Management Yes  (needs assist brakes, steering  )   Balance   Static Sitting Good   Dynamic Sitting Fair   Static Standing Fair -   Dynamic Standing Poor +   Ambulatory Poor +   Endurance Deficit   Endurance Deficit No   Activity Tolerance   Activity Tolerance Patient tolerated treatment well   Medical Staff Made Aware OT   Nurse Made Aware yes   Assessment   Prognosis Fair   Problem List Decreased strength;Decreased endurance; Impaired balance;Decreased mobility; Decreased coordination; Impaired judgement;Decreased safety awareness   Assessment pt admitted from [facility] where he is residing since last admission  pt was not ambulating and has been using w/c for mobility, needing assist for propulsion due to inability to steer w/c  chart review notes pt needed min assist using rw for 40' distances last seen in june 2020  pt demonstrated moderate to severe functional limitations due to recent surgery and chronic debility  pt was ablet o mobilize with mod assist of 1 to w/c  able to take a few steps for hernandez but stride lnegth about 2-3", festinating and off balance even with rw  pt has needed assist all adl's  pt will be appropriate to return to snf  recommend retrial of PT to iprove mobility  pt will be seen while here to focus on balance and coordination, mobility using rwo for short distances   current deficits in strength, balance, coordination, locomotion either with rw or w/c  safety awareness   Barriers to Discharge None   Goals Patient Goals none offered   STG Expiration Date 01/14/21   Short Term Goal #1 bed mobility, transfers, amb 10' using rw with min assist  improve strength and balance by 1/2 grade, demonstrate good safety practices  Plan   Treatment/Interventions Functional transfer training;LE strengthening/ROM; Therapeutic exercise; Endurance training;Patient/family training;Equipment eval/education; Bed mobility;Gait training; Compensatory technique education;Spoke to nursing;OT   PT Frequency 2-3x/wk   Recommendation   PT Discharge Recommendation Return to previous environment with social support;Home with skilled therapy  (PT at snf)   Equipment Recommended Evelyn [place]; Wheelchair   PT - OK to Discharge Yes  (back to snf)   Modified [place] Scale   Modified [place] Scale 4   Barthel Index   Feeding 10   Bathing 0   Grooming Score 5   Dressing Score 5   Bladder Score 0   Bowels Score 10   Toilet Use Score 5   Transfers (Bed/Chair) Score 5   Mobility (Level Surface) Score 0   Stairs Score 0   Barthel Index Score 40   History: co - morbidities, fall risk, use of assistive device, assist for adl's, cognition, multiple lines  Exam: impairments in locomotion, musculoskeletal, balance,coordination, cognition   Clinical: unstable/unpredictable  Complexity:high    Rebecca Erie, PT

## 2021-01-07 NOTE — PLAN OF CARE
Problem: Prexisting or High Potential for Compromised Skin Integrity  Goal: Skin integrity is maintained or improved  Description: INTERVENTIONS:  - Identify patients at risk for skin breakdown  - Assess and monitor skin integrity  - Assess and monitor nutrition and hydration status  - Monitor labs   - Assess for incontinence   - Turn and reposition patient  - Assist with mobility/ambulation  - Relieve pressure over bony prominences  - Avoid friction and shearing  - Provide appropriate hygiene as needed including keeping skin clean and dry  - Evaluate need for skin moisturizer/barrier cream  - Collaborate with interdisciplinary team   - Patient/family teaching  - Consider wound care consult   Outcome: Adequate for Discharge     Problem: Potential for Falls  Goal: Patient will remain free of falls  Description: INTERVENTIONS:  - Assess patient frequently for physical needs  -  Identify cognitive and physical deficits and behaviors that affect risk of falls    -  [place] fall precautions as indicated by assessment   - Educate patient/family on patient safety including physical limitations  - Instruct patient to call for assistance with activity based on assessment  - Modify environment to reduce risk of injury  - Consider OT/PT consult to assist with strengthening/mobility  Outcome: Adequate for Discharge

## 2021-01-07 NOTE — CASE MANAGEMENT
CM notified by TAI that  for pt is 1700 with [facility] will take pt's wc with them       Report: [facility] Fax: [phone]

## 2021-01-07 NOTE — CASE MANAGEMENT
STACY notified by Rn that pt requesting to return home  CM called pt's wife to discuss this option  She is not sure if she would be able to help care for him at home  She states that she would need to get him a rollator or a wheelchair if he were to return home  CM asked that she speaks with her  on this matter  CM attempted to transfer her to his room but the line was busy  CM provided her with the bed phone number so she can call him to discuss  CM did ask [address] if he was there for STR or LTC  Pt has been there for STR and is still a moderate assist with ADL's so would benefit from a continued stay  [address] did go for insurance auth this morning  A determination has not been made at this time  STACY will check in with pt once he has been able to speak with his wife  CM continuing to follow

## 2021-01-07 NOTE — OCCUPATIONAL THERAPY NOTE
Occupational Therapy Evaluation(time=0900-0925)     Patient Name: Gracia Lagunas  FLQYU'T Date: 1/7/2021  Problem List  Principal Problem:    Bladder stones    Past Medical History  Past Medical History:   Diagnosis Date    Ambulatory dysfunction     uses walker with assist of 1    Anemia     Anxiety     At risk for falls     hx of falls    Benign paroxysmal vertigo     unspecified ear    BPH (benign prostatic hyperplasia)     for TURP today 1/4/2021    Calculus in bladder     for surgical removal today 1/4/2021    Cataract     left eye    Cervicalgia     Chest pain     Chronic kidney disease     stage 3    Constipation     CTS (carpal tunnel syndrome)     left    Cyst of pancreas     Cystitis 06/23/2020    acute cystitis with hematuria    Depression     Diabetes mellitus ([address] )     type II with neuropathy    Dizziness     and giddiness    Dysphagia     Elevated PSA     Facial weakness     GERD (gastroesophageal reflux disease)     last assessed 5/20/16    Gross hematuria     Hematuria     Hyperlipidemia     Hypertension     Kidney disease     Kidney stone     Left-sided weakness     Long term (current) use of oral hypoglycemic drugs     Muscle weakness     Nuclear senile cataract of left eye     OA (osteoarthritis) of knee     b/l    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[address] )     Syncope and collapse     Tachycardia     UTI (urinary tract infection)     Vertebro-basilar artery syndrome     Vitamin B deficiency     Vitamin D deficiency     Vitamin D deficiency      Past Surgical History  Past Surgical History:   Procedure Laterality Date    CATARACT EXTRACTION      CHOLECYSTECTOMY      KNEE SURGERY      NC REMOVE BLADDER STONE,<2 5 CM N/A 1/4/2021    Procedure: Cystolitholopaxy w/laser bladder stones;  Surgeon: Jon Youssef MD;  [facility];  Service: Urology    NC TRANSURETHRAL ELEC-SURG PROSTATECTOM N/A 1/4/2021    Procedure: T U R P ;  Surgeon: Jon Youssef MD; Location: [facility] OR;  Service: Urology             01/07/21 0926   Note Type   Note type Evaluation   Restrictions/Precautions   Weight Bearing Precautions Per Order No   Other Precautions Fall Risk;Cognitive; Chair Alarm; Bed Alarm;Pain   Pain Assessment   Pain Assessment Tool FLACC   Pain Location/Orientation Orientation: Right;Other (Comment)  (wrist)   Pain Rating: FLACC (Rest) - Face 0   Pain Rating: FLACC (Rest) - Legs 0   Pain Rating: FLACC (Rest) - Activity 0   Pain Rating: FLACC (Rest) - Cry 0   Pain Rating: FLACC (Rest) - Consolability 0   Score: FLACC (Rest) 0   Home Living   Type of Home SNF  (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   Psychosocial   Psychosocial (WDL) WDL   Subjective   Subjective "They stopped my therapy over at [facility] "   ADL   Where Assessed Wheelchair   Eating Assistance 5  Supervision/Setup   Grooming Assistance 5  Supervision/Setup   UB Bathing Assistance 4  Minimal Assistance   LB Bathing Assistance 2  Maximal Assistance   UB Dressing Assistance 4  Minimal Assistance   LB Dressing Assistance 2  Maximal Assistance   Transfers   Sit to Stand 3  Moderate assistance   Additional items Assist x 1; Increased time required;Verbal cues   Stand to Sit 3  Moderate assistance   Additional items Assist x 1; Increased time required;Verbal cues   Functional Mobility   Functional Mobility 3  Moderate assistance   Additional Comments x1   Additional items Rolling walker   Balance   Static Sitting Fair   Dynamic Sitting Fair -   Static Standing Poor +   Dynamic Standing Poor   Activity Tolerance   Activity Tolerance Patient limited by fatigue;Patient limited by pain   Medical Staff Made Aware nsg, P T     RUE Assessment   RUE Assessment WFL  (limited shr AROM(beyond 90* flex);elbow-distal=WFLs)   RUE Strength   RUE Overall Strength Within Functional Limits - able to perform ADL tasks with strength  (4/5 throughout)   LUE Assessment   LUE Assessment WFL  (limited shr AROM(beyond 90* flex);elbow-distal=WFLs)   LUE Strength   LUE Overall Strength Within Functional Limits - able to perform ADL tasks with strength  (4/5 throughout)   Hand Function   Gross Motor Coordination Functional   Fine Motor Coordination Functional   Sensation   Light Touch No apparent deficits   Proprioception   Proprioception No apparent deficits   Vision-Basic Assessment   Current Vision   (glasses)   Vision - Complex Assessment   Acuity Able to read clock/calendar on wall without difficulty   Perception   Inattention/Neglect Appears intact   Cognition   Overall Cognitive Status Impaired   Arousal/Participation Alert   Attention Attends with cues to redirect   Orientation Level Oriented X4   Memory Decreased recall of precautions   Following Commands Follows one step commands without difficulty   Assessment   Limitation Decreased ADL status; Decreased Safe judgement during ADL;Decreased UE strength;Decreased cognition;Decreased endurance;Decreased high-level ADLs   Prognosis Fair   Assessment Pt is a 77y/o male admitted to the hospital for an elected s/p TURP, cystolithoplaxy w/laser bladder stones(1/4)  Pt with PMH anxiety, BPH, bladder calculus, DM, ambulatory dysfunction, OA, UTI, and paralytic syndrome  PTA pt states that he had assistance with his ADLs, transfers; limited ambulation--with RW; +falls=1   During initial eval, pt demonstrated deficits with his functional balance, functional mobility, ADL status, transfer safety, activity tolerance(currently fair=15-20mins), b/l UE strength, and questionable cognition(i e problem-solving, judgement/safety)  Pt would benefit from continued OT tx for the above deficts  2-3xwk/1-2wks  Goals   Patient Goals "to be able to go back home with my wife "   STG Time Frame   (1-7 days)   Short Term Goal #1 Pt will demonstrate improved activity tolerance to good(20-30mins) and standing tolerance to 3-5mins to assist with ADLs  Short Term Goal #2 Pt will tolerate continued cognitive/home-safety assessment and appropriate d/c recommendations will be provided  Short Term Goal  Pt will demonstrate Edwin with their sit-stand transfers to assist with completion of their LE dressing  LTG Time Frame   (7-14 days)   Long Term Goal #1 Pt will demonstrate proper walker/transfer safety 100% of the time  Long Term Goal #2 Pt will demonstrate supervision with his UE and mod A with his LE bathing/dressing  Long Term Goal Pt will demonstrate improved functional balance by 1 grade to assist with ADLs/transfers  Plan   Treatment Interventions ADL retraining;Functional transfer training;UE strengthening/ROM; Endurance training;Cognitive reorientation;Patient/family training;Equipment evaluation/education; Compensatory technique education;Continued evaluation   Goal Expiration Date 01/21/21   OT Treatment Day 0   OT Frequency 3-5x/wk   Recommendation   OT Discharge Recommendation   (continue PT/OT at SNF)   OT - OK to Discharge Yes   Barthel Index   Feeding 10   Bathing 0   Grooming Score 5   Dressing Score 5   Bladder Score 0   Bowels Score 10   Toilet Use Score 5   Transfers (Bed/Chair) Score 5   Mobility (Level Surface) Score 0   Stairs Score 0   Barthel Index Score 40   Lakisha Mccullough, OT

## 2021-01-07 NOTE — PROGRESS NOTES
Progress Note - Urology  [facility] 1950, 79 y o  male MRN: [id number]    Unit/Bed#: E2 -01 Encounter: [id number]    Benign prostatic hyperplasia  Assessment & Plan  POD#3 TURP  Doing well  CBI clamped, urine clear yellow to pink  D/c today with Razo, for void trial Friday 1/8    * Bladder stones  Assessment & Plan  POD#3 TURP and cystolitholapaxy  -afebrile  -denies pain  - CBI clamped now urine clear yellow to light pink  - mobility at his baseline, in [facility] today  - PT/OT consult completed  - full diet        Subjective: Sitting out of bed in chair asking  When he isgoing to go back to  [place]  Denies any complaints  Tolerating his diet  Was seen by Physical therapy and Occupational therapy today  Await insurance authorization for return to [facility]  Case management is following    24 HR EVENTS:   none      Patient has  no complaints  Review of Systems   Constitutional: Negative for activity change, appetite change, chills, fatigue, fever and unexpected weight change  HENT: Negative for facial swelling  Eyes: Negative for discharge  Respiratory: Negative  Negative for cough and shortness of breath  Cardiovascular: Negative for chest pain and leg swelling  Gastrointestinal: Negative  Negative for abdominal distention, abdominal pain, constipation, diarrhea, nausea and vomiting  Endocrine: Negative  Genitourinary: Positive for hematuria  Negative for decreased urine volume, difficulty urinating, dysuria, enuresis, flank pain, frequency, genital sores and urgency  Urine light pink without clot  Has Razo catheter   Musculoskeletal: Negative for back pain and myalgias  Skin: Negative for pallor and rash  Allergic/Immunologic: Negative  Negative for immunocompromised state  Neurological: Negative for facial asymmetry and speech difficulty  Psychiatric/Behavioral: Negative for agitation and confusion         Objective:  Nursing Rounds:   Vitals: Blood pressure 165/87, pulse 93, temperature 97 7 °F (36 5 °C), temperature source Temporal, resp  rate 20, height 5' 8" (1 727 m), weight 79 kg (174 lb 3 2 oz), SpO2 98 %  ,Body mass index is 26 49 kg/m²  INS & OUTS:  I/O last 24 hours: In: 240 [P O :240]  Out: 250 [Urine:250]    Physical Exam  Vitals signs and nursing note reviewed  Constitutional:       General: He is not in acute distress  Appearance: Normal appearance  He is not ill-appearing, toxic-appearing or diaphoretic  HENT:      Head: Normocephalic  Cardiovascular:      Rate and Rhythm: Normal rate and regular rhythm  Pulmonary:      Effort: Pulmonary effort is normal  No respiratory distress  Breath sounds: Normal breath sounds  No wheezing or rhonchi  Abdominal:      General: Abdomen is flat  There is no distension  Palpations: Abdomen is soft  Tenderness: There is no abdominal tenderness  There is no right CVA tenderness or left CVA tenderness  Musculoskeletal:         General: No swelling  Skin:     General: Skin is warm and dry  Neurological:      General: No focal deficit present  Mental Status: He is alert  Mental status is at baseline  Psychiatric:         Mood and Affect: Mood normal          Behavior: Behavior normal          Imaging:    Imaging reviewed - both report and images personally reviewed       Labs:  Recent Labs     01/04/21  1731 01/05/21  0516   WBC 8 32 11 61*       Recent Labs     01/04/21  1731 01/05/21  0516   HGB 11 8* 11 4*     Recent Labs     01/04/21  1731 01/05/21  0516   HCT 36 8 35 3*     Recent Labs     01/04/21  1731 01/05/21  0516   CREATININE 1 03 1 32*     Lab Results   Component Value Date    HGB 11 4 (L) 01/05/2021    HCT 35 3 (L) 01/05/2021    WBC 11 61 (H) 01/05/2021     01/05/2021     Lab Results   Component Value Date     12/20/2017    K 4 2 01/05/2021     01/05/2021    CO2 28 01/05/2021    BUN 17 01/05/2021    CREATININE 1 32 (H) 01/05/2021    CALCIUM 8 1 (L) 01/05/2021     Urinalysis: not done  Urine Culture:     History:    Past Medical History:   Diagnosis Date    Ambulatory dysfunction     uses walker with assist of 1    Anemia     Anxiety     At risk for falls     hx of falls    Benign paroxysmal vertigo     unspecified ear    BPH (benign prostatic hyperplasia)     for TURP today 1/4/2021    Calculus in bladder     for surgical removal today 1/4/2021    Cataract     left eye    Cervicalgia     Chest pain     Chronic kidney disease     stage 3    Constipation     CTS (carpal tunnel syndrome)     left    Cyst of pancreas     Cystitis 06/23/2020    acute cystitis with hematuria    Depression     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    Dizziness     and giddiness    Dysphagia     Elevated PSA     Facial weakness     GERD (gastroesophageal reflux disease)     last assessed 5/20/16    Gross hematuria     Hematuria     Hyperlipidemia     Hypertension     Kidney disease     Kidney stone     Left-sided weakness     Long term (current) use of oral hypoglycemic drugs     Muscle weakness     Nuclear senile cataract of left eye     OA (osteoarthritis) of knee     b/l    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HCC)     Syncope and collapse     Tachycardia     UTI (urinary tract infection)     Vertebro-basilar artery syndrome     Vitamin B deficiency     Vitamin D deficiency     Vitamin D deficiency      Past Surgical History:   Procedure Laterality Date    CATARACT EXTRACTION      CHOLECYSTECTOMY      KNEE SURGERY      TX REMOVE BLADDER STONE,<2 5 CM N/A 1/4/2021    Procedure: Cystolitholopaxy w/laser bladder stones;  Surgeon: Farida Moreno MD;  [facility];  Service: Urology    TX TRANSURETHRAL ELEC-SURG PROSTATECTOM N/A 1/4/2021    Procedure: T U R P ;  Surgeon: Farida Moreno MD;  [facility];  Service: Urology     Family History   Problem Relation Age of Onset    Coronary artery disease Mother     Diabetes Father      Social History     Socioeconomic History    Marital status: /Civil Union     Spouse name: None    Number of children: None    Years of education: None    Highest education level: None   Occupational History    None   Social Needs    Financial resource strain: None    Food insecurity     Worry: None     Inability: None    Transportation needs     Medical: None     Non-medical: None   Tobacco Use    Smoking status: Never Smoker    Smokeless tobacco: Never Used   Substance and Sexual Activity    Alcohol use: Not Currently    Drug use: No    Sexual activity: Not Currently   Lifestyle    Physical activity     Days per week: None     Minutes per session: None    Stress: None   Relationships    Social connections     Talks on phone: None     Gets together: None     Attends L[employer] service: None     Active member of club or organization: None     Attends meetings of clubs or organizations: None     Relationship status: None    Intimate partner violence     Fear of current or ex partner: None     Emotionally abused: None     Physically abused: None     Forced sexual activity: None   Other Topics Concern    None   Social History Narrative    None       AJIT Collazo  Date: 1/7/2021 Time: 12:20 PM

## 2021-01-07 NOTE — CASE MANAGEMENT
STACY met with pt at bedside to discuss his discharge plan  Pt stating that he wants to go home but later in conversation asked, "when am I going back to [facility]?"  STACY called his wife again, who is in agreement for him to return to [facility] at this time  Authorization has been obtained by [facility]  STACY asked [facility] to arrange a wcv transport  Pt's wc might need to be curried to the SNF  CM will advise

## 2021-01-07 NOTE — PLAN OF CARE
Problem: PHYSICAL THERAPY ADULT  Goal: Performs mobility at highest level of function for planned discharge setting  See evaluation for individualized goals  Description: Treatment/Interventions: Functional transfer training, LE strengthening/ROM, Therapeutic exercise, Endurance training, Patient/family training, Equipment eval/education, Bed mobility, Gait training, Compensatory technique education, Spoke to nursing, OT  Equipment Recommended: Virginia Rodriguez, Wheelchair       See flowsheet documentation for full assessment, interventions and recommendations  Note: Prognosis: Fair  Problem List: Decreased strength, Decreased endurance, Impaired balance, Decreased mobility, Decreased coordination, Impaired judgement, Decreased safety awareness  Assessment: pt admitted from [facility] where he is residing since last admission  pt was not ambulating and has been using w/c for mobility, needing assist for propulsion due to inability to steer w/c  chart review notes pt needed min assist using rw for 40' distances last seen in june 2020  pt demonstrated moderate to severe functional limitations due to recent surgery and chronic debility  pt was ablet o mobilize with mod assist of 1 to w/c  able to take a few steps for hernandez but stride lnegth about 2-3", festinating and off balance even with rw  pt has needed assist all adl's  pt will be appropriate to return to snf  recommend retrial of PT to iprove mobility  pt will be seen while here to focus on balance and coordination, mobility using rwo for short distances  current deficits in strength, balance, coordination, locomotion either with rw or w/c  safety awareness  Barriers to Discharge: None     PT Discharge Recommendation: Return to previous environment with social support, Home with skilled therapy(PT at snf)     PT - OK to Discharge: Yes(back to snf)    See flowsheet documentation for full assessment

## 2021-01-08 ENCOUNTER — NURSING HOME VISIT (OUTPATIENT)
Dept: GERIATRICS | Facility: OTHER | Age: 71
End: 2021-01-08
Payer: COMMERCIAL

## 2021-01-08 VITALS
WEIGHT: 178 LBS | OXYGEN SATURATION: 98 % | HEART RATE: 90 BPM | DIASTOLIC BLOOD PRESSURE: 80 MMHG | RESPIRATION RATE: 19 BRPM | BODY MASS INDEX: 27.06 KG/M2 | SYSTOLIC BLOOD PRESSURE: 147 MMHG | TEMPERATURE: 97.5 F

## 2021-01-08 DIAGNOSIS — E11.40 TYPE 2 DIABETES MELLITUS WITH DIABETIC NEUROPATHY, WITH LONG-TERM CURRENT USE OF INSULIN (HCC): Primary | ICD-10-CM

## 2021-01-08 DIAGNOSIS — D64.9 ANEMIA, UNSPECIFIED TYPE: ICD-10-CM

## 2021-01-08 DIAGNOSIS — E55.9 VITAMIN D DEFICIENCY: ICD-10-CM

## 2021-01-08 DIAGNOSIS — N21.0 BLADDER STONES: ICD-10-CM

## 2021-01-08 DIAGNOSIS — I10 ESSENTIAL HYPERTENSION: ICD-10-CM

## 2021-01-08 DIAGNOSIS — Z79.4 TYPE 2 DIABETES MELLITUS WITH DIABETIC NEUROPATHY, WITH LONG-TERM CURRENT USE OF INSULIN (HCC): Primary | ICD-10-CM

## 2021-01-08 DIAGNOSIS — H81.10 BENIGN PAROXYSMAL POSITIONAL VERTIGO, UNSPECIFIED LATERALITY: ICD-10-CM

## 2021-01-08 DIAGNOSIS — N18.30 STAGE 3 CHRONIC KIDNEY DISEASE, UNSPECIFIED WHETHER STAGE 3A OR 3B CKD (HCC): Chronic | ICD-10-CM

## 2021-01-08 DIAGNOSIS — N30.90 CYSTITIS: ICD-10-CM

## 2021-01-08 DIAGNOSIS — N20.0 NEPHROLITHIASIS: ICD-10-CM

## 2021-01-08 DIAGNOSIS — E78.2 MIXED HYPERLIPIDEMIA: ICD-10-CM

## 2021-01-08 PROBLEM — U07.1 COVID-19: Status: RESOLVED | Noted: 2020-06-16 | Resolved: 2021-01-08

## 2021-01-08 PROCEDURE — 99305 1ST NF CARE MODERATE MDM 35: CPT | Performed by: INTERNAL MEDICINE

## 2021-01-08 NOTE — ASSESSMENT & PLAN NOTE
Lab Results   Component Value Date    HGBA1C 6 2 (H) 06/14/2020   Patient's blood sugars have been stable for the most part continue metformin 1000 twice a day and glipizide 10 mg b i d    Repeat hemoglobin A1c next month

## 2021-01-08 NOTE — TELEPHONE ENCOUNTER
Spoke to Vicente Castillo at [facility]  Reports Patient is doing well and denies complaints at this time  Denies fever or pain  Aware of upcoming post op appointment on 01/19/2021 in [facility]  Discussed void trial and RN states able to do so in facility  Verbal order given to perform void trial today or tomorrow - denied need for written orders at this time  Razo to be replaced if Patient unable to void or retaining urine  RN aware to call office with questions or concerns and to have Patient follow up as scheduled

## 2021-01-08 NOTE — ASSESSMENT & PLAN NOTE
Lab Results   Component Value Date    EGFR 54 01/05/2021    EGFR 73 01/04/2021    EGFR 69 06/22/2020    CREATININE 1 32 (H) 01/05/2021    CREATININE 1 03 01/04/2021    CREATININE 1 09 06/22/2020   Recent creatinine was stable at 1 32    Avoid hypotension and nephrotoxins

## 2021-01-08 NOTE — ASSESSMENT & PLAN NOTE
Status post TURP and cystolitholapaxy for BPH with bladder outlet obstruction and bladder stones  Continue Flomax    Remove Razo catheter per Urology recommendation

## 2021-01-08 NOTE — ASSESSMENT & PLAN NOTE
Blood pressure slightly high at 147/80 today  Other readings have been stable      Continue lisinopril 5 mg daily

## 2021-01-08 NOTE — ASSESSMENT & PLAN NOTE
Lab Results   Component Value Date    WBC 11 61 (H) 01/05/2021    HGB 11 4 (L) 01/05/2021    HCT 35 3 (L) 01/05/2021    MCV 88 01/05/2021     01/05/2021   Recent hemoglobin was stable    Will continue to monitor

## 2021-01-08 NOTE — PROGRESS NOTES
[facility]  [address] [facility], [address] [place] [address] 32  Nursing Home Admission    NAME: Klever Garcia  AGE: 79 y o  SEX: male [id number]      Patient Location     [facility]    Patients care was coordinated with nursing facility staff  Recent vitals, labs and updated medications were reviewed on [facility] of facility  Past Medical, surgical, social, medication and allergy history and patients previous records reviewed  Assessment/Plan:    Type 2 diabetes mellitus with diabetic neuropathy, with long-term current use of insulin ([facility])    Lab Results   Component Value Date    HGBA1C 6 2 (H) 06/14/2020   Patient's blood sugars have been stable for the most part continue metformin 1000 twice a day and glipizide 10 mg b i d  Repeat hemoglobin A1c next month    Stage 3 chronic kidney disease [facility])  Lab Results   Component Value Date    EGFR 54 01/05/2021    EGFR 73 01/04/2021    EGFR 69 06/22/2020    CREATININE 1 32 (H) 01/05/2021    CREATININE 1 03 01/04/2021    CREATININE 1 09 06/22/2020   Recent creatinine was stable at 1 32  Avoid hypotension and nephrotoxins    Benign prostatic hyperplasia  Status post TURP and cystolitholapaxy for BPH with bladder outlet obstruction and bladder stones  Continue Flomax  Remove Razo catheter per Urology recommendation    Cystitis  Patient is currently finishing Keflex course    Nephrolithiasis  Status post cystolitholapaxy recently  Follow-up with urology service    Benign paroxysmal positional vertigo  Continue p r n  Meclizine    Essential hypertension  Blood pressure slightly high at 140 7/80 today  Other readings have been stable      Continue lisinopril 5 mg daily    Vitamin D deficiency  Continue vitamin-D supplement    Hyperlipidemia  Continue atorvastatin    Anemia  Lab Results   Component Value Date    WBC 11 61 (H) 01/05/2021    HGB 11 4 (L) 01/05/2021    HCT 35 3 (L) 01/05/2021    MCV 88 01/05/2021    PLT 255 01/05/2021   Recent hemoglobin was stable  Will continue to monitor          Chief Complaint       Status post elective TURP and cystolitholapaxy for BPH with bladder outlet obstruction and 3 large bladder stones  HPI     Patient is a 79 y o  male with past medical history significant for diabetes mellitus type 2, CKD 3, hyperlipidemia, hypertension, UTI and vitamin-D deficiency  Patient was electively admitted to the hospital for TURP cystolitholapaxy for BPH with bladder outlet obstruction and 3 large bladder stones  Postop course was uneventful except for hematuria which gradually subsided  Razo catheter was inserted and left in place until his next appointment  Patient was subsequently transferred to [facility] rehab where he is being seen for post hospital admission  At the time of my evaluation patient is doing okay    Denies any active complaints     Past Medical History:   Diagnosis Date    Ambulatory dysfunction     uses walker with assist of 1    Anemia     Anxiety     At risk for falls     hx of falls    Benign paroxysmal vertigo     unspecified ear    BPH (benign prostatic hyperplasia)     for TURP today 1/4/2021    Calculus in bladder     for surgical removal today 1/4/2021    Cataract     left eye    Cervicalgia     Chest pain     Chronic kidney disease     stage 3    Constipation     CTS (carpal tunnel syndrome)     left    Cyst of pancreas     Cystitis 06/23/2020    acute cystitis with hematuria    Depression     Diabetes mellitus ([facility] )     type II with neuropathy    Dizziness     and giddiness    Dysphagia     Elevated PSA     Facial weakness     GERD (gastroesophageal reflux disease)     last assessed 5/20/16    Gross hematuria     Hematuria     Hyperlipidemia     Hypertension     Kidney disease     Kidney stone     Left-sided weakness     Long term (current) use of oral hypoglycemic drugs     Muscle weakness     Nuclear senile cataract of left eye  OA (osteoarthritis) of knee     b/l    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HCC)     Syncope and collapse     Tachycardia     UTI (urinary tract infection)     Vertebro-basilar artery syndrome     Vitamin B deficiency     Vitamin D deficiency     Vitamin D deficiency        Past Surgical History:   Procedure Laterality Date    CATARACT EXTRACTION      CHOLECYSTECTOMY      KNEE SURGERY      MA REMOVE BLADDER STONE,<2 5 CM N/A 1/4/2021    Procedure: Cystolitholopaxy w/laser bladder stones;  Surgeon: Malini Owusu MD;  [facility];  Service: Urology    MA TRANSURETHRAL ELEC-SURG PROSTATECTOM N/A 1/4/2021    Procedure: T U R P ;  Surgeon: Malini Owusu MD;  [facility];  Service: Urology       Social History     Tobacco Use   Smoking Status Never Smoker   Smokeless Tobacco Never Used          Family History   Problem Relation Age of Onset    Coronary artery disease Mother     Diabetes Father         No Known Allergies       Current Outpatient Medications:     ACCU-CHEK GUIDE test strip, 1 strip as needed, Disp: , Rfl:     acetaminophen (TYLENOL) 325 mg tablet, Take 650 mg by mouth every 6 (six) hours as needed for mild pain, Disp: , Rfl:     Alcohol Swabs (PRO COMFORT ALCOHOL) 70 % PADS, 1 pad daily Use a pad when testing, Disp: , Rfl:     atorvastatin (LIPITOR) 20 mg tablet, TAKE ONE (1) TABLET BY MOUTH DAILY, Disp: 30 tablet, Rfl: 0    cephalexin (KEFLEX) 500 mg capsule, Take 1 capsule (500 mg total) by mouth 2 (two) times a day for 3 days, Disp: 6 capsule, Rfl: 0    cholecalciferol (VITAMIN D3) 1,000 units tablet, Take 2 tablets (2,000 Units total) by mouth daily (Patient taking differently: Take 50,000 Units by mouth daily ), Disp: , Rfl: 0    cyanocobalamin (VITAMIN B-12) 1000 MCG tablet, Take 1 tablet (1,000 mcg total) by mouth daily, Disp: , Rfl: 0    docusate sodium (COLACE) 100 mg capsule, Take 1 capsule (100 mg total) by mouth 2 (two) times a day, Disp: 10 capsule, Rfl: 0   Easy Comfort Lancets MISC, as needed, Disp: , Rfl:     ergocalciferol (VITAMIN D2) 50,000 units, TAKE 1 CAPSULE (50,000 UNITS TOTAL) BY MOUTH ONCE A WEEK FOR 7 DOSES, Disp: 4 capsule, Rfl: 0    finasteride (PROSCAR) 5 mg tablet, Take 1 tablet (5 mg total) by mouth daily, Disp: 30 tablet, Rfl: 0    glipiZIDE (GLUCOTROL) 10 mg tablet, TAKE 1 TABLET BY MOUTH 2 TIMES A DAY, Disp: 180 tablet, Rfl: 1    Glucagon, rDNA, (GLUCAGON EMERGENCY IJ), Inject 1 mL as directed Sugars less than 60 and conscious, Disp: , Rfl:     glucose 40 %, Take 15 g by mouth once, Disp: , Rfl:     glucose blood test strip, 1 strip as needed, Disp: , Rfl:     HYDROcodone-acetaminophen (NORCO) 5-325 mg per tablet, Take 1 tablet by mouth 3 (three) times a day as needed for painMax Daily Amount: 3 tablets, Disp: 10 tablet, Rfl: 0    lidocaine (LIDODERM) 5 %, Apply 1 patch topically daily Remove & Discard patch within 12 hours or as directed by MD (Patient not taking: Reported on 12/19/2020), Disp: , Rfl: 0    lisinopril (ZESTRIL) 5 mg tablet, TAKE ONE (1) TABLET BY MOUTH DAILY, Disp: 30 tablet, Rfl: 0    meclizine (ANTIVERT) 25 mg tablet, TAKE 1 TABLET BY MOUTH EVERY 8 HOURS AS NEEDED FOR DIZZINESS DO NOT DRIVE WITH MED, Disp: 90 tablet, Rfl: 1    melatonin 3 mg, Take 3 mg by mouth daily at bedtime, Disp: , Rfl:     metFORMIN (GLUCOPHAGE) 1000 MG tablet, TAKE 1 TABLET BY MOUTH 2 TIMES A DAY, Disp: 180 tablet, Rfl: 1    ondansetron (ZOFRAN) 4 mg tablet, Take 4 mg by mouth every 6 (six) hours as needed for nausea or vomiting, Disp: , Rfl:     polyethylene glycol (MIRALAX) 17 g packet, Take 17 g by mouth daily, Disp: 14 each, Rfl: 2    senna (SENOKOT) 8 6 mg, Take 1 tablet (8 6 mg total) by mouth daily, Disp: 120 each, Rfl: 0    tamsulosin (FLOMAX) 0 4 mg, Take 1 capsule (0 4 mg total) by mouth daily with dinner, Disp: 30 capsule, Rfl: 0    vitamin B-12 (CYANOCOBALAMIN) 100 MCG TABS, Take 100 mcg by mouth daily, Disp: , Rfl:   No current facility-administered medications for this visit  Updated list was reviewed in [address] [place] [address] system of facility  Vitals:    01/08/21 1337   BP: 147/80   Pulse: 90   Resp: 19   Temp: 97 5 °F (36 4 °C)   SpO2: 98%       Review of Systems   Constitutional: Negative for chills, fatigue and fever  HENT: Negative for nosebleeds and rhinorrhea  Eyes: Negative for discharge and redness  Respiratory: Negative for cough, chest tightness, shortness of breath, wheezing and stridor  Cardiovascular: Negative for chest pain and leg swelling  Gastrointestinal: Negative for abdominal distention, abdominal pain, diarrhea and vomiting  Genitourinary: Negative for dysuria, flank pain and hematuria  Musculoskeletal: Positive for gait problem  Negative for arthralgias and back pain  Skin: Negative for pallor  Neurological: Negative for tremors, seizures, syncope, weakness (Generalized) and headaches  Psychiatric/Behavioral: Negative for agitation, behavioral problems and confusion  Physical Exam  Constitutional:       General: He is not in acute distress  Appearance: He is well-developed  He is not diaphoretic  HENT:      Head: Normocephalic and atraumatic  Nose: No rhinorrhea  Mouth/Throat:      Pharynx: No posterior oropharyngeal erythema  Eyes:      General: No scleral icterus  Right eye: No discharge  Left eye: No discharge  Neck:      Musculoskeletal: Neck supple  Pulmonary:      Breath sounds: No stridor  No wheezing  Abdominal:      General: There is no distension  Genitourinary:     Comments: Hematuria noted in Razo bag  Musculoskeletal:      Right lower leg: Edema (Trace) present  Left lower leg: Edema (Trace) present  Skin:     Coloration: Skin is not jaundiced or pale  Neurological:      General: No focal deficit present  Mental Status: He is alert and oriented to person, place, and time        Cranial Nerves: No cranial nerve deficit  Psychiatric:         Mood and Affect: Mood normal          Behavior: Behavior normal            Diagnostic Data       Recent labs were reviewed    Lab Results   Component Value Date    WBC 11 61 (H) 01/05/2021    HGB 11 4 (L) 01/05/2021    HCT 35 3 (L) 01/05/2021    MCV 88 01/05/2021     01/05/2021         Additional notes:     Continue current meds    Follow-up with urology service  Razo catheter to be removed later today    This note was electronically signed by Dr Kike Fields

## 2021-01-11 DIAGNOSIS — E55.9 VITAMIN D DEFICIENCY: ICD-10-CM

## 2021-01-11 RX ORDER — ERGOCALCIFEROL 1.25 MG/1
50000 CAPSULE ORAL WEEKLY
Qty: 4 CAPSULE | Refills: 0 | Status: SHIPPED | OUTPATIENT
Start: 2021-01-11 | End: 2021-02-04

## 2021-01-12 LAB
COLOR STONE: NORMAL
COMMENT-STONE3: NORMAL
COMPOSITION: NORMAL
LABORATORY COMMENT REPORT: NORMAL
PHOTO: NORMAL
SIZE STONE: NORMAL MM
SPEC SOURCE SUBJ: NORMAL
STONE ANALYSIS-IMP: NORMAL
URATE MFR STONE: 100 %
WT STONE: 3849 MG

## 2021-01-15 DIAGNOSIS — N18.30 STAGE 3 CHRONIC KIDNEY DISEASE (HCC): ICD-10-CM

## 2021-01-15 DIAGNOSIS — E78.5 HYPERLIPIDEMIA, UNSPECIFIED HYPERLIPIDEMIA TYPE: ICD-10-CM

## 2021-01-15 NOTE — TELEPHONE ENCOUNTER
Attempted to follow up on void trial done week prior but unable to speak with nurse - on hold for 10 minutes  Will attempt again at a later time

## 2021-01-17 PROCEDURE — 4010F ACE/ARB THERAPY RXD/TAKEN: CPT | Performed by: NURSE PRACTITIONER

## 2021-01-17 RX ORDER — ATORVASTATIN CALCIUM 20 MG/1
TABLET, FILM COATED ORAL
Qty: 30 TABLET | Refills: 0 | Status: SHIPPED | OUTPATIENT
Start: 2021-01-17 | End: 2021-02-09

## 2021-01-17 RX ORDER — LISINOPRIL 5 MG/1
TABLET ORAL
Qty: 30 TABLET | Refills: 0 | Status: SHIPPED | OUTPATIENT
Start: 2021-01-17 | End: 2021-02-09

## 2021-01-18 NOTE — UTILIZATION REVIEW
[facility] FAXED ON 01/05 @ [facility] PM - NO AUTH ON FILE PER PORTAL - RE FAXING

## 2021-01-18 NOTE — UTILIZATION REVIEW
Initial Clinical Review    Elective   OP    surgical procedure    01/05/21 1331  Inpatient Admission Once     Transfer Service: Urology       Question Answer Comment   Admitting Physician Fredy Mart    Level of Care Med Surg    Estimated length of stay More than 2 Midnights    Certification I certify that inpatient services are medically necessary for this patient for a duration of greater than two midnights  See H&P and MD Progress Notes for additional information about the patient's course of treatment  01/05/21 1332     OP procedure   1/4  Changed to Ip admission  1/5    Age/Sex: 79 y o  male     Surgery Date:  1/4/21    Procedure: T U R P  (N/A)  Cystolitholopaxy w/laser bladder stones (N/A)    Anesthesia:    general    Operative Findings: Three large bladder stones decimated and removed with holmium laser lithotripsy, standard TURP    POD#1 Progress Note:   1/5  Continue post op care  CBI clamped this am, no clots noted,  Urine pink to punch color  Continue to monitor labs  Encourage ambulation/incentive spirometer  Tolerating diet  Post op progress note  ( 1/5)     Signed             Return to evaluate patient after stopping his CBI this morning  His catheter is draining cherry colored urine with some small clots  Hand irrigated with 180 mL of sterile water with small clot return  His CBI was restarted at this time  Will continue to monitor  Most likely will keep overnight  He continues tolerate his diet and use incentive spirometer                    Admission Orders: Date/Time/Statement:   Admission Orders (From admission, onward)     Ordered        01/05/21 1332  Inpatient Admission  Once                   Orders Placed This Encounter   Procedures    Inpatient Admission     Standing Status:   Standing     Number of Occurrences:   1     Order Specific Question:   Admitting Physician     Answer:   Mckenzie Dailey     Order Specific Question:   Level of Care     Answer:   Med Surg [16]     Order Specific Question:   Estimated length of stay     Answer:   More than 2 Midnights     Order Specific Question:   Certification     Answer:   I certify that inpatient services are medically necessary for this patient for a duration of greater than two midnights  See H&P and MD Progress Notes for additional information about the patient's course of treatment       Vital Signs: /65 (BP Location: Left arm)   Pulse 90   Temp 98 4 °F (36 9 °C) (Temporal)   Resp 20   Ht 5' 8" (1 727 m)   Wt 79 kg (174 lb 3 2 oz)   SpO2 97%   BMI 26 49 kg/m²        Diet:   regular    Mobility:    OOB  As tolerated    DVT Prophylaxis:  SCD'S    Medications/Pain Control:   Scheduled Medications:  acetaminophen, 650 mg, Oral, Q6H LIYAH  docusate sodium, 100 mg, Oral, BID  enoxaparin, 40 mg, Subcutaneous, Daily  finasteride, 5 mg, Oral, Daily  insulin lispro, 4 Units, Subcutaneous, TID With Meals  melatonin, 3 mg, Oral, HS  neomycin-bacitracin-polymyxin b, 1 large application, Topical, BID  polyethylene glycol, 17 g, Oral, Daily  senna, 1 tablet, Oral, Daily  tamsulosin, 0 4 mg, Oral, Daily With Dinner      Continuous IV Infusions:  dextrose 5% lactated ringer's, 125 mL/hr, Intravenous, Continuous  sodium chloride, 125 mL/hr, Intravenous, Continuous  sodium chloride, 125 mL/hr, Intravenous, Continuous  sodium chloride, 3,000 mL, Irrigation, Continuous      PRN Meds:  belladonna-opium, 30 mg, Rectal, Q8H PRN  HYDROcodone-acetaminophen, 1 tablet, Oral, Q4H PRN  HYDROcodone-acetaminophen, 2 tablet, Oral, Q4H PRN  lactated ringers, 1,000 mL, Intravenous, Once PRN    And  lactated ringers, 1,000 mL, Intravenous, Once PRN  morphine injection, 2 mg, Intravenous, Q2H PRN  ondansetron, 4 mg, Intravenous, Q6H PRN  simethicone, 80 mg, Oral, Q6H PRN  sodium chloride, 1,000 mL, Intravenous, Once PRN    And  sodium chloride, 1,000 mL, Intravenous, Once PRN        Network Utilization Review Department  ATTENTION: Please call with any questions or concerns to [phone] and carefully listen to the prompts so that you are directed to the right person  All voicemails are confidential   Ellen Neff all requests for admission clinical reviews, approved or denied determinations and any other requests to dedicated fax number below belonging to the campus where the patient is receiving treatment   List of dedicated fax numbers for the Facilities:  [address] DENIALS (Administrative/Medical Necessity) [phone]   [address] (Maternity/NICU/Pediatrics) [phone]   [address] De Roy [address] (  [facility]) [address] [place] [address] [address]ont [address] [place] [address] [phone]

## 2021-01-18 NOTE — UTILIZATION REVIEW
Notification of Inpatient Admission/Inpatient Authorization Request   This is a Notification of Inpatient Admission for [address]  Be advised that this patient was admitted to our facility under Inpatient Status  Contact Lilian Alvarez at [phone] for additional admission information  [facility] UR DEPT  DEDICATED -[phone]  Patient Name:   Juan Conception   YOB: 1950       [address]   [address]:   [address]   Tax ID: [id number]  NPI: [id number] Attending Provider/NPI:  Phone:  Address: Jae Prado [[id number]]  [phone]  Same as the facility   Place of Service Code: 24 Place of Service Name:  [address] [place] [address]   Start Date: 1/5/21 1332 Discharge Date & Time: 1/7/2021  6:27 PM    Type of Admission: Inpatient Status Discharge Disposition   (if discharged): [facility] SNF/TCU/SNU   Patient Diagnoses: Bladder stones [N21 0]     Orders: Admission Orders (From admission, onward)     Ordered        01/05/21 1332  Inpatient Admission  Once                    Assigned Utilization Review Contact: Robbie Patino  Utilization   Network Utilization Review Department  Phone: [phone]; Fax [phone]  Email: [email or web address]   ATTENTION PAYERS: Please call the assigned Utilization  directly with any questions or concerns ALL voicemails in the department are confidential  Send all requests for admission clinical reviews, approved or denied determinations and any other requests to dedicated fax number belonging to the campus where the patient is receiving treatment

## 2021-01-19 ENCOUNTER — OFFICE VISIT (OUTPATIENT)
Dept: UROLOGY | Facility: AMBULATORY SURGERY CENTER | Age: 71
End: 2021-01-19
Payer: COMMERCIAL

## 2021-01-19 VITALS
HEIGHT: 68 IN | WEIGHT: 174 LBS | BODY MASS INDEX: 26.37 KG/M2 | DIASTOLIC BLOOD PRESSURE: 60 MMHG | HEART RATE: 58 BPM | SYSTOLIC BLOOD PRESSURE: 100 MMHG

## 2021-01-19 DIAGNOSIS — R30.0 DYSURIA: ICD-10-CM

## 2021-01-19 DIAGNOSIS — N21.0 BLADDER STONES: Primary | ICD-10-CM

## 2021-01-19 LAB — POST-VOID RESIDUAL VOLUME, ML POC: 11 ML

## 2021-01-19 PROCEDURE — 3074F SYST BP LT 130 MM HG: CPT | Performed by: NURSE PRACTITIONER

## 2021-01-19 PROCEDURE — 3078F DIAST BP <80 MM HG: CPT | Performed by: NURSE PRACTITIONER

## 2021-01-19 PROCEDURE — 3008F BODY MASS INDEX DOCD: CPT | Performed by: NURSE PRACTITIONER

## 2021-01-19 PROCEDURE — 1160F RVW MEDS BY RX/DR IN RCRD: CPT | Performed by: NURSE PRACTITIONER

## 2021-01-19 PROCEDURE — 99214 OFFICE O/P EST MOD 30 MIN: CPT | Performed by: NURSE PRACTITIONER

## 2021-01-19 PROCEDURE — 51798 US URINE CAPACITY MEASURE: CPT | Performed by: NURSE PRACTITIONER

## 2021-01-19 RX ORDER — INSULIN ASPART 100 [IU]/ML
INJECTION, SOLUTION INTRAVENOUS; SUBCUTANEOUS
COMMUNITY
Start: 2021-01-02

## 2021-01-19 RX ORDER — PHENAZOPYRIDINE HYDROCHLORIDE 200 MG/1
200 TABLET, FILM COATED ORAL
Qty: 30 TABLET | Refills: 0 | Status: SHIPPED | OUTPATIENT
Start: 2021-01-19 | End: 2021-09-27 | Stop reason: HOSPADM

## 2021-01-19 NOTE — PROGRESS NOTES
1/19/2021      Chief Complaint   Patient presents with    Benign Prostatic Hypertrophy     Assessment and Plan    79 y o  male managed by Dr Amanuel Mills  1   Benign prostatic hyperplasia  · status post transurethral resection of prostate 01/04/2021  ·  will continue with tamsulosin and finasteride until next office visit  At that point of urinary symptoms continue to be asymptomatic will consider discontinuing tamsulosin  ·  follow up in the office in 3 months    2  Dysuria  ·  prescription for Pyridium provided  ·  urinalysis with microscopy and urine culture ordered  ·  maintain adequate hydration upwards to 40 oz per day  ·  avoid bladder irritating foods and beverages    History of Present Illness  Nazanin Berkowitz is a 79 y o  male here for follow up evaluation of   Urinary symptoms secondary benign prostatic hyperplasia  Patient is status post transurethral section of prostate 01/04/2021 by Dr Amanuel Mills  He reports mild gross hematuria with occasional voiding and occasional mild dysuria  He reports sensation of complete bladder emptying with urination  He denies urinary frequency and urgency  He reports resolution of his urinary symptoms prior to having his surgery and is currently happy with his current status  Review of Systems   Constitutional: Negative for chills and fever  Respiratory: Negative for cough and shortness of breath  Cardiovascular: Negative for chest pain  Gastrointestinal: Negative for abdominal distention, abdominal pain, blood in stool, nausea and vomiting  Genitourinary: Negative for difficulty urinating, dysuria, enuresis, flank pain, frequency, hematuria and urgency  Small episodes of gross hematuria it on initiation of urinary stream   Skin: Negative for rash  Neurological: Negative for dizziness       Past Medical History  Past Medical History:   Diagnosis Date    Ambulatory dysfunction     uses walker with assist of 1    Anemia     Anxiety     At risk for falls     hx of falls    Benign paroxysmal vertigo     unspecified ear    BPH (benign prostatic hyperplasia)     for TURP today 1/4/2021    Calculus in bladder     for surgical removal today 1/4/2021    Cataract     left eye    Cervicalgia     Chest pain     Chronic kidney disease     stage 3    Constipation     CTS (carpal tunnel syndrome)     left    Cyst of pancreas     Cystitis 06/23/2020    acute cystitis with hematuria    Depression     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    Dizziness     and giddiness    Dysphagia     Elevated PSA     Facial weakness     GERD (gastroesophageal reflux disease)     last assessed 5/20/16    Gross hematuria     Hematuria     Hyperlipidemia     Hypertension     Kidney disease     Kidney stone     Left-sided weakness     Long term (current) use of oral hypoglycemic drugs     Muscle weakness     Nuclear senile cataract of left eye     OA (osteoarthritis) of knee     b/l    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HCC)     Syncope and collapse     Tachycardia     UTI (urinary tract infection)     Vertebro-basilar artery syndrome     Vitamin B deficiency     Vitamin D deficiency     Vitamin D deficiency        Past Social History  Past Surgical History:   Procedure Laterality Date    CATARACT EXTRACTION      CHOLECYSTECTOMY      KNEE SURGERY      KY REMOVE BLADDER STONE,<2 5 CM N/A 1/4/2021    Procedure: Cystolitholopaxy w/laser bladder stones;  Surgeon: Garrick Stoddard MD;  [facility];  Service: Urology    KY TRANSURETHRAL ELEC-SURG PROSTATECTOM N/A 1/4/2021    Procedure: T U R P ;  Surgeon: Garrick Stoddard MD;  [facility];  Service: Urology     Social History     Tobacco Use   Smoking Status Never Smoker   Smokeless Tobacco Never Used       Past Family History  Family History   Problem Relation Age of Onset    Coronary artery disease Mother     Diabetes Father        Past Social history  Social History     Socioeconomic History    Marital status: /Civil Union     Spouse name: Not on file    Number of children: Not on file    Years of education: Not on file    Highest education level: Not on file   Occupational History    Not on file   Social Needs    Financial resource strain: Not on file    Food insecurity     Worry: Not on file     Inability: Not on file    Transportation needs     Medical: Not on file     Non-medical: Not on file   Tobacco Use    Smoking status: Never Smoker    Smokeless tobacco: Never Used   Substance and Sexual Activity    Alcohol use: Not Currently    Drug use: No    Sexual activity: Not Currently   Lifestyle    Physical activity     Days per week: Not on file     Minutes per session: Not on file    Stress: Not on file   Relationships    Social connections     Talks on phone: Not on file     Gets together: Not on file     Attends Christianity service: Not on file     Active member of club or organization: Not on file     Attends meetings of clubs or organizations: Not on file     Relationship status: Not on file    Intimate partner violence     Fear of current or ex partner: Not on file     Emotionally abused: Not on file     Physically abused: Not on file     Forced sexual activity: Not on file   Other Topics Concern    Not on file   Social History Narrative    Not on file       Current Medications  Current Outpatient Medications   Medication Sig Dispense Refill    ACCU-CHEK GUIDE test strip 1 strip as needed      acetaminophen (TYLENOL) 325 mg tablet Take 650 mg by mouth every 6 (six) hours as needed for mild pain      Alcohol Swabs (PRO COMFORT ALCOHOL) 70 % PADS 1 pad daily Use a pad when testing      atorvastatin (LIPITOR) 20 mg tablet TAKE ONE (1) TABLET BY MOUTH DAILY 30 tablet 0    cholecalciferol (VITAMIN D3) 1,000 units tablet Take 2 tablets (2,000 Units total) by mouth daily (Patient taking differently: Take 50,000 Units by mouth daily )  0    cyanocobalamin (VITAMIN B-12) 1000 MCG tablet Take 1 tablet (1,000 mcg total) by mouth daily  0    docusate sodium (COLACE) 100 mg capsule Take 1 capsule (100 mg total) by mouth 2 (two) times a day 10 capsule 0    Easy Comfort Lancets MISC as needed      ergocalciferol (VITAMIN D2) 50,000 units TAKE 1 CAPSULE (50,000 UNITS TOTAL) BY MOUTH ONCE A WEEK FOR 7 DOSES 4 capsule 0    glipiZIDE (GLUCOTROL) 10 mg tablet TAKE 1 TABLET BY MOUTH 2 TIMES A  tablet 1    Glucagon, rDNA, (GLUCAGON EMERGENCY IJ) Inject 1 mL as directed Sugars less than 60 and conscious      glucose 40 % Take 15 g by mouth once      glucose blood test strip 1 strip as needed      HYDROcodone-acetaminophen (NORCO) 5-325 mg per tablet Take 1 tablet by mouth 3 (three) times a day as needed for painMax Daily Amount: 3 tablets 10 tablet 0    lidocaine (LIDODERM) 5 % Apply 1 patch topically daily Remove & Discard patch within 12 hours or as directed by MD  0    lisinopril (ZESTRIL) 5 mg tablet TAKE ONE (1) TABLET BY MOUTH DAILY 30 tablet 0    meclizine (ANTIVERT) 25 mg tablet TAKE 1 TABLET BY MOUTH EVERY 8 HOURS AS NEEDED FOR DIZZINESS DO NOT DRIVE WITH MED 90 tablet 1    melatonin 3 mg Take 3 mg by mouth daily at bedtime      metFORMIN (GLUCOPHAGE) 1000 MG tablet TAKE 1 TABLET BY MOUTH 2 TIMES A  tablet 1    NovoFine Autocover 30G X 8 MM MISC       NovoLOG FlexPen 100 units/mL injection pen       ondansetron (ZOFRAN) 4 mg tablet Take 4 mg by mouth every 6 (six) hours as needed for nausea or vomiting      tamsulosin (FLOMAX) 0 4 mg Take 1 capsule (0 4 mg total) by mouth daily with dinner 30 capsule 0    vitamin B-12 (CYANOCOBALAMIN) 100 MCG TABS Take 100 mcg by mouth daily      finasteride (PROSCAR) 5 mg tablet Take 1 tablet (5 mg total) by mouth daily 30 tablet 0    phenazopyridine (PYRIDIUM) 200 mg tablet Take 1 tablet (200 mg total) by mouth 3 (three) times a day with meals 30 tablet 0    polyethylene glycol (MIRALAX) 17 g packet Take 17 g by mouth daily (Patient not taking: Reported on 1/19/2021) 14 each 2     No current facility-administered medications for this visit  Allergies  No Known Allergies      The following portions of the patient's history were reviewed and updated as appropriate: allergies, current medications, past medical history, past social history, past surgical history and problem list       Vitals  Vitals:    01/19/21 1447   BP: 100/60   Pulse: 58   Weight: 78 9 kg (174 lb)   Height: 5' 8" (1 727 m)     Physical Exam  Physical Exam  Vitals signs reviewed  Constitutional:       General: He is not in acute distress  Appearance: Normal appearance  He is normal weight  HENT:      Head: Normocephalic  Eyes:      Pupils: Pupils are equal, round, and reactive to light  Cardiovascular:      Rate and Rhythm: Normal rate  Pulmonary:      Effort: No respiratory distress  Breath sounds: Normal breath sounds  Skin:     General: Skin is warm and dry  Neurological:      General: No focal deficit present  Mental Status: He is alert and oriented to person, place, and time     Psychiatric:         Mood and Affect: Mood normal          Behavior: Behavior normal        Results  Recent Results (from the past 1 hour(s))   POCT Measure PVR    Collection Time: 01/19/21  2:54 PM   Result Value Ref Range    POST-VOID RESIDUAL VOLUME, ML POC 11 mL   ]  Lab Results   Component Value Date    PSA 2 8 04/12/2019     Lab Results   Component Value Date    CALCIUM 8 1 (L) 01/05/2021     12/20/2017    K 4 2 01/05/2021    CO2 28 01/05/2021     01/05/2021    BUN 17 01/05/2021    CREATININE 1 32 (H) 01/05/2021     Lab Results   Component Value Date    WBC 11 61 (H) 01/05/2021    HGB 11 4 (L) 01/05/2021    HCT 35 3 (L) 01/05/2021    MCV 88 01/05/2021     01/05/2021     Orders  Orders Placed This Encounter   Procedures    POCT Measure PVR       AJIT Trejo

## 2021-01-20 ENCOUNTER — TELEPHONE (OUTPATIENT)
Dept: UROLOGY | Facility: CLINIC | Age: 71
End: 2021-01-20

## 2021-01-20 NOTE — TELEPHONE ENCOUNTER
Lin Stanley (Nurse from [facility]) called because the patient had an appointment with Lynda Fowler yesterday and a script was sent over to them and he is confused because the script says to take prn but says to take 3 times a day  He wants clarification if the patient is to start the meds or if it is to only be prn  He wants someone to call him back today  Ask for Lin Stanley @ [phone]

## 2021-01-20 NOTE — TELEPHONE ENCOUNTER
Called Irene (RN as facility) back and reviewed Chi's note that stated that pyridium is a prn medication , but if he needs it can take up to 3 times a day at meals    Renu Crabtree understood

## 2021-02-04 DIAGNOSIS — E55.9 VITAMIN D DEFICIENCY: ICD-10-CM

## 2021-02-04 RX ORDER — ERGOCALCIFEROL 1.25 MG/1
50000 CAPSULE ORAL WEEKLY
Qty: 4 CAPSULE | Refills: 0 | Status: SHIPPED | OUTPATIENT
Start: 2021-02-04 | End: 2021-02-10

## 2021-02-08 NOTE — UTILIZATION REVIEW
Continued Stay Review    Date:    Additional clinical                           Current Patient Class: OP procedure   1/4  Changed to Ip admission  1/5     Current Level of Care:    Med surg    HPI:70 y o  male initially admitted on    1/4/21   Op  Surgery    T U R P  (N/A)  Cystolitholopaxy w/laser bladder stones (N/A)    Assessment/Plan:   1/6     POD   #  2  Continue PT/OT  CBI clamped and  Urine clear  Razo  Intact to leg bag  Plan  Void trial    at  [facility]  Continue to monitor  Labs  Pertinent Labs/Diagnostic Results:            Ref Range & Units 1/5/21 0516    WBC 4 31 - 10 16 Thousand/uL 11  61High     RBC 3 88 - 5 62 Million/uL 4 00    Hemoglobin 12 0 - 17 0 g/dL 11 4Low     Hematocrit 36 5 - 49 3 % 35 3Low     MCV 82 - 98 fL 88    MCH 26 8 - 34 3 pg 28 5    MCHC 31 4 - 37 4 g/dL 32 3    RDW 11 6 - 15 1 % 14 0    Platelets 918 - 083 Thousands/uL 255    MPV 8 9 - 12 7 fL 9 6      Ref Range & Units 1/5/21 0516    Sodium 136 - 145 mmol/L 138    Potassium 3 5 - 5 3 mmol/L 4 2    Chloride 100 - 108 mmol/L 105    CO2 21 - 32 mmol/L 28    ANION GAP 4 - 13 mmol/L 5    BUN 5 - 25 mg/dL 17    Creatinine 0 60 - 1 30 mg/dL 1  32High     Comment: Standardized to IDMS reference method   Glucose 65 - 140 mg/dL 233High                         Vital Signs:   98 2 °F (36 8 °C)  79  20  121/76  97 %  None (Room air)  Lying     01/06/21 2000  --  --  --  --  --  None (Room air)  --   01/06/21 1534  98 °F (36 7 °C)  88  18  116/78  94 %  --  Sitting   01/06/21 0754  98 °F (36 7 °C)  88  18  110/66  96 %  --  --         Medications:   Scheduled Medications:  Sq lovenox daily  Insulin QID  flomax daily          Continuous IV Infusions:  IVF  125/hr        PRN Meds:        Discharge Plan:    D/C  To STR  1/7/21    Network Utilization Review Department  ATTENTION: Please call with any questions or concerns to [phone] and carefully listen to the prompts so that you are directed to the right person   All voicemails are [facility] all requests for admission clinical reviews, approved or denied determinations and any other requests to dedicated fax number below belonging to the campus where the patient is receiving treatment   List of dedicated fax numbers for the Facilities:  [address] DENIALS (Administrative/Medical Necessity) [phone]   1000 [facility]/P[facility]s) [phone]   901 [facility] [address] [place] [address] [address] De Roy [address] (  [facility]) [address] [address] Toro [address] [place] [address] [phone]

## 2021-02-09 DIAGNOSIS — K59.00 CONSTIPATION: ICD-10-CM

## 2021-02-09 DIAGNOSIS — E55.9 VITAMIN D DEFICIENCY: ICD-10-CM

## 2021-02-09 DIAGNOSIS — E78.5 HYPERLIPIDEMIA, UNSPECIFIED HYPERLIPIDEMIA TYPE: ICD-10-CM

## 2021-02-09 DIAGNOSIS — N18.30 STAGE 3 CHRONIC KIDNEY DISEASE (HCC): ICD-10-CM

## 2021-02-09 DIAGNOSIS — E11.42 TYPE 2 DIABETES MELLITUS WITH DIABETIC POLYNEUROPATHY, WITHOUT LONG-TERM CURRENT USE OF INSULIN (HCC): ICD-10-CM

## 2021-02-09 DIAGNOSIS — E11.9 TYPE 2 DIABETES MELLITUS WITHOUT COMPLICATION, WITHOUT LONG-TERM CURRENT USE OF INSULIN (HCC): ICD-10-CM

## 2021-02-09 NOTE — TELEPHONE ENCOUNTER
Patient has not been seen in 1 year  Is he in a home or assisted living? Please try and verify patient status

## 2021-02-10 RX ORDER — ERGOCALCIFEROL 1.25 MG/1
50000 CAPSULE ORAL WEEKLY
Qty: 4 CAPSULE | Refills: 0 | Status: SHIPPED | OUTPATIENT
Start: 2021-02-10 | End: 2021-09-27 | Stop reason: HOSPADM

## 2021-02-10 RX ORDER — SENNOSIDES 8.6 MG
8.6 TABLET ORAL DAILY PRN
Qty: 120 TABLET | Refills: 0 | Status: SHIPPED | OUTPATIENT
Start: 2021-02-10

## 2021-02-10 RX ORDER — GLIPIZIDE 10 MG/1
TABLET ORAL
Qty: 180 TABLET | Refills: 1 | Status: SHIPPED | OUTPATIENT
Start: 2021-02-10 | End: 2022-04-05 | Stop reason: SDUPTHER

## 2021-02-10 RX ORDER — ATORVASTATIN CALCIUM 20 MG/1
TABLET, FILM COATED ORAL
Qty: 5 TABLET | Refills: 0 | Status: SHIPPED | OUTPATIENT
Start: 2021-02-10 | End: 2021-02-11 | Stop reason: SDUPTHER

## 2021-02-10 RX ORDER — LISINOPRIL 5 MG/1
TABLET ORAL
Qty: 5 TABLET | Refills: 0 | Status: SHIPPED | OUTPATIENT
Start: 2021-02-10 | End: 2021-02-11 | Stop reason: SDUPTHER

## 2021-02-11 DIAGNOSIS — N18.30 STAGE 3 CHRONIC KIDNEY DISEASE (HCC): ICD-10-CM

## 2021-02-11 DIAGNOSIS — E78.5 HYPERLIPIDEMIA, UNSPECIFIED HYPERLIPIDEMIA TYPE: ICD-10-CM

## 2021-02-11 RX ORDER — ATORVASTATIN CALCIUM 20 MG/1
20 TABLET, FILM COATED ORAL DAILY
Qty: 30 TABLET | Refills: 0 | Status: SHIPPED | OUTPATIENT
Start: 2021-02-11 | End: 2022-03-09 | Stop reason: HOSPADM

## 2021-02-11 RX ORDER — LISINOPRIL 5 MG/1
5 TABLET ORAL DAILY
Qty: 30 TABLET | Refills: 0 | Status: SHIPPED | OUTPATIENT
Start: 2021-02-11 | End: 2021-02-23

## 2021-02-11 NOTE — TELEPHONE ENCOUNTER
*Called Harry Posadas and changed the  Other med orders to a 30 day supply until the patient can be seen

## 2021-02-19 ENCOUNTER — NURSING HOME VISIT (OUTPATIENT)
Dept: GERIATRICS | Facility: OTHER | Age: 71
End: 2021-02-19
Payer: COMMERCIAL

## 2021-02-19 VITALS
TEMPERATURE: 97.8 F | BODY MASS INDEX: 27.06 KG/M2 | WEIGHT: 178 LBS | HEART RATE: 79 BPM | RESPIRATION RATE: 18 BRPM | DIASTOLIC BLOOD PRESSURE: 74 MMHG | OXYGEN SATURATION: 98 % | SYSTOLIC BLOOD PRESSURE: 126 MMHG

## 2021-02-19 DIAGNOSIS — I10 ESSENTIAL HYPERTENSION: ICD-10-CM

## 2021-02-19 DIAGNOSIS — R26.2 AMBULATORY DYSFUNCTION: ICD-10-CM

## 2021-02-19 DIAGNOSIS — E55.9 VITAMIN D DEFICIENCY: ICD-10-CM

## 2021-02-19 DIAGNOSIS — G47.00 INSOMNIA, UNSPECIFIED TYPE: ICD-10-CM

## 2021-02-19 DIAGNOSIS — N18.30 STAGE 3 CHRONIC KIDNEY DISEASE, UNSPECIFIED WHETHER STAGE 3A OR 3B CKD (HCC): Chronic | ICD-10-CM

## 2021-02-19 DIAGNOSIS — E53.8 VITAMIN B12 DEFICIENCY: ICD-10-CM

## 2021-02-19 DIAGNOSIS — D64.9 ANEMIA, UNSPECIFIED TYPE: ICD-10-CM

## 2021-02-19 DIAGNOSIS — Z79.4 TYPE 2 DIABETES MELLITUS WITH DIABETIC NEUROPATHY, WITH LONG-TERM CURRENT USE OF INSULIN (HCC): Primary | ICD-10-CM

## 2021-02-19 DIAGNOSIS — E78.2 MIXED HYPERLIPIDEMIA: ICD-10-CM

## 2021-02-19 DIAGNOSIS — E11.40 TYPE 2 DIABETES MELLITUS WITH DIABETIC NEUROPATHY, WITH LONG-TERM CURRENT USE OF INSULIN (HCC): Primary | ICD-10-CM

## 2021-02-19 DIAGNOSIS — N40.0 BENIGN PROSTATIC HYPERPLASIA WITHOUT LOWER URINARY TRACT SYMPTOMS: ICD-10-CM

## 2021-02-19 PROBLEM — N39.0 UTI (URINARY TRACT INFECTION): Status: RESOLVED | Noted: 2020-05-08 | Resolved: 2021-02-19

## 2021-02-19 PROBLEM — R31.29 MICROSCOPIC HEMATURIA: Status: RESOLVED | Noted: 2019-05-23 | Resolved: 2021-02-19

## 2021-02-19 PROBLEM — N30.01 ACUTE CYSTITIS WITH HEMATURIA: Status: RESOLVED | Noted: 2020-04-29 | Resolved: 2021-02-19

## 2021-02-19 PROBLEM — N17.9 AKI (ACUTE KIDNEY INJURY) (HCC): Status: RESOLVED | Noted: 2019-12-31 | Resolved: 2021-02-19

## 2021-02-19 PROCEDURE — 99309 SBSQ NF CARE MODERATE MDM 30: CPT | Performed by: INTERNAL MEDICINE

## 2021-02-19 NOTE — ASSESSMENT & PLAN NOTE
History of TURP and cytolitholapaxy for BPH with bladder outlet obstruction and bladder stones  Continue Flomax

## 2021-02-19 NOTE — ASSESSMENT & PLAN NOTE
Lab Results   Component Value Date    EGFR 54 01/05/2021    EGFR 73 01/04/2021    EGFR 69 06/22/2020    CREATININE 1 32 (H) 01/05/2021    CREATININE 1 03 01/04/2021    CREATININE 1 09 06/22/2020    creatinine was stable at 0 98 on 12/21/2020    Avoid hypotension and nephrotoxins

## 2021-02-19 NOTE — ASSESSMENT & PLAN NOTE
Continue atorvastatin  Lab Results   Component Value Date    CHOLESTEROL 182 01/03/2019     Lab Results   Component Value Date    HDL 34 (L) 01/03/2019    HDL 38 (L) 12/20/2017    HDL 45 04/05/2016     Lab Results   Component Value Date    TRIG 92 01/03/2019    TRIG 86 12/20/2017    TRIG 52 04/05/2016     Lab Results   Component Value Date    NONHDLC 148 01/03/2019    [place] 151 (H) 12/20/2017    [place] 120 04/05/2016

## 2021-02-19 NOTE — ASSESSMENT & PLAN NOTE
Lab Results   Component Value Date    HGBA1C 6 2 (H) 06/14/2020   Blood sugar readings are stable for the most part occasionally high  Highest reported was 399 on 02/17, lowest reading was 72 on 02/15  Patient remains on glipizide  10 mg b i d  And metformin 1000 mg b i  d  Continue same    Hemoglobin A1c was well controlled at 6 5 on 12/21/2020

## 2021-02-19 NOTE — PROGRESS NOTES
[address]  [address]   [address] [facility] [address] [facility] [address]  49     Progress Note  Code   [facility] 32    Patient Location      [address] [place] [address] rehab    Reason for visit      Follow-up hypertension, diabetes mellitus type 2, BPH, hyperlipidemia, vitamin-D and vitamin B12 deficient , ambulato    Patients care was coordinated with nursing facility staff  Recent vitals, labs and updated medications [facility]  Past Medical, surgical, social, medication and allergy history and patients previous records reviewed  Problem List Items Addressed This Visit        Endocrine    Type 2 diabetes mellitus with diabetic neuropathy ([facility] [address] ) - Primary       Lab Results   Component Value Date    HGBA1C 6 2 (H) 06/14/2020   Blood sugar readings are stable for the most part occasionally high  Highest reported was 399 on 02/17, lowest reading was 72 on 02/15  Patient remains on glipizide  10 mg b i d  And metformin 1000 mg b i  d  Continue same  Hemoglobin A1c was well controlled at 6 5 on 12/21/2020            Cardiovascular and Mediastinum    Essential hypertension      Blood pressure stable at 126/74  Continue lisinopril 5 mg daily            Genitourinary    Stage 3 chronic kidney disease (Chronic)     Lab Results   Component Value Date    EGFR 54 01/05/2021    EGFR 73 01/04/2021    EGFR 69 06/22/2020    CREATININE 1 32 (H) 01/05/2021    CREATININE 1 03 01/04/2021    CREATININE 1 09 06/22/2020    creatinine was stable at 0 98 on 12/21/2020  Avoid hypotension and nephrotoxins         Benign prostatic hyperplasia      History of TURP and cytolitholapaxy for BPH with bladder outlet obstruction and bladder stones  Continue Flomax              Other    Hyperlipidemia      Continue atorvastatin  Lab Results   Component Value Date    CHOLESTEROL 182 01/03/2019     Lab Results   Component Value Date    HDL 34 (L) 01/03/2019    HDL 38 (L) 12/20/2017    HDL 45 04/05/2016     Lab Results   Component Value Date    TRIG 92 01/03/2019    TRIG 86 12/20/2017    TRIG 52 04/05/2016     Lab Results   Component Value Date    NONHDLC 148 01/03/2019    Jt 151 (H) 12/20/2017    [place] 120 04/05/2016            Ambulatory dysfunction    Anemia      Hemoglobin level was  Stable 12 4 on 12/21/2020  Vitamin D deficiency      Continue vitamin-D supplements         Insomnia       Continue melatonin         Vitamin B12 deficiency      Continue vitamin B12 supplements                   HPI      patient is being seen for a follow-up visit today  Doing okay at present  Wants to go home  Denies any dyspnea chest pain GI or  complaints  Patient remains  incontinent of bladder  Awake alert able to make his needs known  No recent falls  No recent falls  Patient's weights have been stable around 178 lbs  Patient uses wheelchair to ambulate  Review of Systems   Constitutional: Negative for chills, fatigue and fever  HENT: Negative for nosebleeds and rhinorrhea  Eyes: Negative for discharge and redness  Respiratory: Negative for cough, chest tightness, shortness of breath, wheezing and stridor  Cardiovascular: Negative for chest pain and leg swelling  Gastrointestinal: Negative for abdominal distention, abdominal pain, diarrhea and vomiting  Genitourinary: Negative for dysuria, flank pain and hematuria  Musculoskeletal: Positive for gait problem  Negative for arthralgias and back pain  Skin: Negative for pallor  Neurological: Positive for weakness (Generalized)  Negative for tremors, seizures, syncope and headaches  Psychiatric/Behavioral: Negative for agitation, behavioral problems and confusion         Past Medical History:   Diagnosis Date    Ambulatory dysfunction     uses walker with assist of 1    Anemia     Anxiety     At risk for falls     hx of falls    Benign paroxysmal vertigo     unspecified ear    BPH (benign prostatic hyperplasia)     for TURP today 1/4/2021    Calculus in bladder     for surgical removal today 1/4/2021    Cataract     left eye    Cervicalgia     Chest pain     Chronic kidney disease     stage 3    Constipation     CTS (carpal tunnel syndrome)     left    Cyst of pancreas     Cystitis 06/23/2020    acute cystitis with hematuria    Depression     Diabetes mellitus ([facility] [address] )     type II with neuropathy    Dizziness     and giddiness    Dysphagia     Elevated PSA     Facial weakness     GERD (gastroesophageal reflux disease)     last assessed 5/20/16    Gross hematuria     Hematuria     Hyperlipidemia     Hypertension     Kidney disease     Kidney stone     Left-sided weakness     Long term (current) use of oral hypoglycemic drugs     Muscle weakness     Nuclear senile cataract of left eye     OA (osteoarthritis) of knee     b/l    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HCC)     Syncope and collapse     Tachycardia     UTI (urinary tract infection)     Vertebro-basilar artery syndrome     Vitamin B deficiency     Vitamin D deficiency     Vitamin D deficiency        Past Surgical History:   Procedure Laterality Date    CATARACT EXTRACTION      CHOLECYSTECTOMY      KNEE SURGERY      W[place] REMOVE BLADDER STONE,<2 5 CM N/A 1/4/2021    Procedure: Cystolitholopaxy w/laser bladder stones;  Surgeon: Chintan Chaudhari MD;  [facility];  Service: Urology    WV TRANSURETHRAL ELEC-SURG PROSTATECTOM N/A 1/4/2021    Procedure: T U R P ;  Surgeon: Chintan Chaudhari MD;  [facility];  Service: Urology       Social History     Tobacco Use   Smoking Status Never Smoker   Smokeless Tobacco Never Used       Family History   Problem Relation Age of Onset    Coronary artery disease Mother     Diabetes Father         No Known Allergies      Current Outpatient Medications:     ACCU-CHEK GUIDE test strip, 1 strip as needed, Disp: , Rfl:     acetaminophen (TYLENOL) 325 mg tablet, Take 650 mg by mouth every 6 (six) hours as needed for mild pain, Disp: , Rfl:     Alcohol Swabs (PRO COMFORT ALCOHOL) 70 % PADS, 1 pad daily Use a pad when testing, Disp: , Rfl:     atorvastatin (LIPITOR) 20 mg tablet, Take 1 tablet (20 mg total) by mouth daily, Disp: 30 tablet, Rfl: 0    cholecalciferol (VITAMIN D3) 1,000 units tablet, Take 2 tablets (2,000 Units total) by mouth daily (Patient taking differently: Take 50,000 Units by mouth daily ), Disp: , Rfl: 0    cyanocobalamin (VITAMIN B-12) 1000 MCG tablet, Take 1 tablet (1,000 mcg total) by mouth daily, Disp: , Rfl: 0    docusate sodium (COLACE) 100 mg capsule, Take 1 capsule (100 mg total) by mouth 2 (two) times a day, Disp: 10 capsule, Rfl: 0    Easy Comfort Lancets MISC, as needed, Disp: , Rfl:     ergocalciferol (VITAMIN D2) 50,000 units, TAKE 1 CAPSULE (50,000 UNITS TOTAL) BY MOUTH ONCE A WEEK FOR 7 DOSES, Disp: 4 capsule, Rfl: 0    ergocalciferol (VITAMIN D2) 50,000 units, TAKE 1 CAPSULE (50,000 UNITS TOTAL) BY MOUTH ONCE A WEEK FOR 7 DOSES, Disp: 4 capsule, Rfl: 0    finasteride (PROSCAR) 5 mg tablet, Take 1 tablet (5 mg total) by mouth daily, Disp: 30 tablet, Rfl: 0    glipiZIDE (GLUCOTROL) 10 mg tablet, TAKE 1 TABLET BY MOUTH 2 TIMES A DAY, Disp: 180 tablet, Rfl: 1    Glucagon, rDNA, (GLUCAGON EMERGENCY IJ), Inject 1 mL as directed Sugars less than 60 and conscious, Disp: , Rfl:     glucose 40 %, Take 15 g by mouth once, Disp: , Rfl:     glucose blood test strip, 1 strip as needed, Disp: , Rfl:     HYDROcodone-acetaminophen (NORCO) 5-325 mg per tablet, Take 1 tablet by mouth 3 (three) times a day as needed for painMax Daily Amount: 3 tablets, Disp: 10 tablet, Rfl: 0    lidocaine (LIDODERM) 5 %, Apply 1 patch topically daily Remove & Discard patch within 12 hours or as directed by MD, Disp: , Rfl: 0    lisinopril (ZESTRIL) 5 mg tablet, Take 1 tablet (5 mg total) by mouth daily, Disp: 30 tablet, Rfl: 0    meclizine (ANTIVERT) 25 mg tablet, TAKE 1 TABLET BY MOUTH EVERY 8 HOURS AS NEEDED FOR DIZZINESS DO NOT DRIVE WITH MED, Disp: 90 tablet, Rfl: 1    melatonin 3 mg, Take 3 mg by mouth daily at bedtime, Disp: , Rfl:     metFORMIN (GLUCOPHAGE) 1000 MG tablet, TAKE 1 TABLET BY MOUTH 2 TIMES A DAY, Disp: 180 tablet, Rfl: 1    NovoFine Autocover 30G X 8 MM MISC, , Disp: , Rfl:     NovoLOG FlexPen 100 units/mL injection pen, , Disp: , Rfl:     ondansetron (ZOFRAN) 4 mg tablet, Take 4 mg by mouth every 6 (six) hours as needed for nausea or vomiting, Disp: , Rfl:     phenazopyridine (PYRIDIUM) 200 mg tablet, Take 1 tablet (200 mg total) by mouth 3 (three) times a day with meals, Disp: 30 tablet, Rfl: 0    polyethylene glycol (MIRALAX) 17 g packet, Take 17 g by mouth daily (Patient not taking: Reported on 1/19/2021), Disp: 14 each, Rfl: 2    senna (SENOKOT) 8 6 mg, Take 1 tablet (8 6 mg total) by mouth daily as needed for constipation, Disp: 120 tablet, Rfl: 0    tamsulosin (FLOMAX) 0 4 mg, Take 1 capsule (0 4 mg total) by mouth daily with dinner, Disp: 30 capsule, Rfl: 0    vitamin B-12 (CYANOCOBALAMIN) 100 MCG TABS, Take 100 mcg by mouth daily, Disp: , Rfl:     [facility]  Vitals:    02/19/21 1537   BP: 126/74   Pulse: 79   Resp: 18   Temp: 97 8 °F (36 6 °C)   SpO2: 98%       Physical Exam  Constitutional:       General: He is not in acute distress  Appearance: He is well-developed  He is not diaphoretic  HENT:      Head: Normocephalic and atraumatic  Nose: No rhinorrhea  Eyes:      General: No scleral icterus  Right eye: No discharge  Left eye: No discharge  Neck:      Musculoskeletal: Neck supple  Cardiovascular:      Rate and Rhythm: Normal rate and regular rhythm  Pulmonary:      Breath sounds: No stridor  No wheezing  Abdominal:      Palpations: Abdomen is soft  Tenderness: There is no abdominal tenderness  There is no guarding  Musculoskeletal:      Right lower leg: No edema        Left lower leg: No edema  Skin:     Coloration: Skin is not jaundiced or pale  Neurological:      General: No focal deficit present  Mental Status: He is alert  Cranial Nerves: No cranial nerve deficit  Comments: Oriented in month and year   Psychiatric:         Mood and Affect: Mood normal          Behavior: Behavior normal          Diagnostic Data:    Recent labs were reviewed     labs done on 12/21/2020 revealed hemoglobin of 12 4, WBC count 8 1, platelet count 923, BUN 14, creatinine 0 98, sodium 136, potassium 4 5, hemoglobin 6 5    Additional Notes:   Continue current meds   Check lipid profile with next blood room    This note was electronically signed by Dr Su You

## 2021-02-23 DIAGNOSIS — N18.30 STAGE 3 CHRONIC KIDNEY DISEASE (HCC): ICD-10-CM

## 2021-02-23 RX ORDER — LISINOPRIL 5 MG/1
5 TABLET ORAL DAILY
Qty: 30 TABLET | Refills: 0 | Status: SHIPPED | OUTPATIENT
Start: 2021-02-23 | End: 2022-03-09 | Stop reason: HOSPADM

## 2021-03-09 DIAGNOSIS — E55.9 VITAMIN D DEFICIENCY: ICD-10-CM

## 2021-03-09 RX ORDER — ERGOCALCIFEROL 1.25 MG/1
50000 CAPSULE ORAL WEEKLY
Qty: 4 CAPSULE | Refills: 0 | OUTPATIENT
Start: 2021-03-09 | End: 2021-04-21

## 2021-03-13 DIAGNOSIS — E55.9 VITAMIN D DEFICIENCY: ICD-10-CM

## 2021-03-13 DIAGNOSIS — N18.30 STAGE 3 CHRONIC KIDNEY DISEASE (HCC): ICD-10-CM

## 2021-03-18 ENCOUNTER — PATIENT OUTREACH (OUTPATIENT)
Dept: FAMILY MEDICINE CLINIC | Facility: CLINIC | Age: 71
End: 2021-03-18

## 2021-03-18 RX ORDER — ERGOCALCIFEROL 1.25 MG/1
50000 CAPSULE ORAL WEEKLY
Qty: 4 CAPSULE | Refills: 0 | OUTPATIENT
Start: 2021-03-18 | End: 2021-04-30

## 2021-03-18 RX ORDER — LISINOPRIL 5 MG/1
5 TABLET ORAL DAILY
Qty: 30 TABLET | Refills: 0 | OUTPATIENT
Start: 2021-03-18

## 2021-03-18 NOTE — PROGRESS NOTES
Spoke with patient's wife Althea Weems  Her  was discharged from [facility] and admitted to [address] to be closer to her home

## 2021-03-24 DIAGNOSIS — E55.9 VITAMIN D DEFICIENCY: ICD-10-CM

## 2021-03-24 DIAGNOSIS — N18.30 STAGE 3 CHRONIC KIDNEY DISEASE (HCC): ICD-10-CM

## 2021-03-24 RX ORDER — ERGOCALCIFEROL 1.25 MG/1
50000 CAPSULE ORAL WEEKLY
Qty: 4 CAPSULE | Refills: 0 | OUTPATIENT
Start: 2021-03-24 | End: 2021-05-06

## 2021-03-24 RX ORDER — LISINOPRIL 5 MG/1
5 TABLET ORAL DAILY
Qty: 30 TABLET | Refills: 0 | OUTPATIENT
Start: 2021-03-24

## 2021-04-01 DIAGNOSIS — N18.30 STAGE 3 CHRONIC KIDNEY DISEASE (HCC): ICD-10-CM

## 2021-04-01 DIAGNOSIS — E55.9 VITAMIN D DEFICIENCY: ICD-10-CM

## 2021-04-01 RX ORDER — LISINOPRIL 5 MG/1
5 TABLET ORAL DAILY
Qty: 1 TABLET | Refills: 0 | OUTPATIENT
Start: 2021-04-01

## 2021-04-01 RX ORDER — ERGOCALCIFEROL 1.25 MG/1
50000 CAPSULE ORAL WEEKLY
Qty: 1 CAPSULE | Refills: 0 | OUTPATIENT
Start: 2021-04-01 | End: 2021-05-14

## 2021-04-05 DIAGNOSIS — N18.30 STAGE 3 CHRONIC KIDNEY DISEASE (HCC): ICD-10-CM

## 2021-04-05 RX ORDER — LISINOPRIL 5 MG/1
5 TABLET ORAL DAILY
Qty: 30 TABLET | Refills: 0 | OUTPATIENT
Start: 2021-04-05

## 2021-04-13 DIAGNOSIS — E11.42 TYPE 2 DIABETES MELLITUS WITH DIABETIC POLYNEUROPATHY, WITHOUT LONG-TERM CURRENT USE OF INSULIN (HCC): ICD-10-CM

## 2021-04-13 DIAGNOSIS — N18.30 STAGE 3 CHRONIC KIDNEY DISEASE (HCC): ICD-10-CM

## 2021-04-13 DIAGNOSIS — E55.9 VITAMIN D DEFICIENCY: ICD-10-CM

## 2021-04-13 RX ORDER — GLIPIZIDE 10 MG/1
TABLET ORAL
Qty: 60 TABLET | OUTPATIENT
Start: 2021-04-13

## 2021-04-13 RX ORDER — LISINOPRIL 5 MG/1
5 TABLET ORAL DAILY
Qty: 30 TABLET | Refills: 0 | OUTPATIENT
Start: 2021-04-13

## 2021-04-13 RX ORDER — ERGOCALCIFEROL 1.25 MG/1
50000 CAPSULE ORAL WEEKLY
Qty: 4 CAPSULE | Refills: 0 | OUTPATIENT
Start: 2021-04-13 | End: 2021-05-26

## 2021-04-16 DIAGNOSIS — E11.42 TYPE 2 DIABETES MELLITUS WITH DIABETIC POLYNEUROPATHY, WITHOUT LONG-TERM CURRENT USE OF INSULIN (HCC): ICD-10-CM

## 2021-04-19 RX ORDER — GLIPIZIDE 10 MG/1
TABLET ORAL
Qty: 60 TABLET | OUTPATIENT
Start: 2021-04-19

## 2021-04-20 ENCOUNTER — TELEPHONE (OUTPATIENT)
Dept: UROLOGY | Facility: AMBULATORY SURGERY CENTER | Age: 71
End: 2021-04-20

## 2021-04-20 NOTE — TELEPHONE ENCOUNTER
Emiliana Moreno, I wanted your input on this chart for tomorrow  It looks like he resides at [facility]  His urine studies were not completed and he has no recent radiological studies performed for a urological aspect  Do you want this patient rescheduled?

## 2021-04-20 NOTE — TELEPHONE ENCOUNTER
Will keep patient's appointment as scheduled  We will discuss current symptoms and move forward from there  Thank you

## 2021-04-21 ENCOUNTER — OFFICE VISIT (OUTPATIENT)
Dept: UROLOGY | Facility: AMBULATORY SURGERY CENTER | Age: 71
End: 2021-04-21
Payer: COMMERCIAL

## 2021-04-21 VITALS — DIASTOLIC BLOOD PRESSURE: 62 MMHG | HEIGHT: 68 IN | BODY MASS INDEX: 27.06 KG/M2 | SYSTOLIC BLOOD PRESSURE: 120 MMHG

## 2021-04-21 DIAGNOSIS — E11.42 TYPE 2 DIABETES MELLITUS WITH DIABETIC POLYNEUROPATHY, WITHOUT LONG-TERM CURRENT USE OF INSULIN (HCC): ICD-10-CM

## 2021-04-21 DIAGNOSIS — E55.9 VITAMIN D DEFICIENCY: ICD-10-CM

## 2021-04-21 DIAGNOSIS — N18.30 STAGE 3 CHRONIC KIDNEY DISEASE (HCC): ICD-10-CM

## 2021-04-21 DIAGNOSIS — N21.0 BLADDER STONES: Primary | ICD-10-CM

## 2021-04-21 LAB — POST-VOID RESIDUAL VOLUME, ML POC: 29 ML

## 2021-04-21 PROCEDURE — 99213 OFFICE O/P EST LOW 20 MIN: CPT | Performed by: NURSE PRACTITIONER

## 2021-04-21 PROCEDURE — 1160F RVW MEDS BY RX/DR IN RCRD: CPT | Performed by: NURSE PRACTITIONER

## 2021-04-21 PROCEDURE — 51798 US URINE CAPACITY MEASURE: CPT | Performed by: NURSE PRACTITIONER

## 2021-04-21 NOTE — PATIENT INSTRUCTIONS
Maintain adequate hydration upwards to 40-60 oz of water intake per day   avoid bladder irritating foods and beverages -coffee, soda, teas spicy foods and artificial sweeteners   Ensure complete bladder emptying with urination   follow up the office in 1 year

## 2021-04-21 NOTE — PROGRESS NOTES
4/21/2021      Chief Complaint   Patient presents with    Bladder stones     Assessment and Plan    79 y o  male managed by Dr David Norris    1  Benign prostatic hyperplasia with lower urinary tract symptoms  ·  status post TURP 01/04/2021  ·  bladder scan PVR 21 mL  ·  maintain adequate hydration upwards to 40-60 mL per day  ·  avoid bladder irritating foods and beverages-coffee, soda, teas spicy foods and artificial sweeteners  ·  timed voiding  ·  Ensure complete bladder emptying with urination  ·  follow up in the office in 1 year    2  Bladder calculi  ·  status post cystolitholapaxy 01/04/2021    History of Present Illness  Reji Davison is a 79 y o  male here for follow up evaluation of    Urinary symptoms secondary benign prostatic hyperplasia  Patient is status post transurethral section of prostate 01/04/2021 by Dr David Norris  He reports mild gross hematuria with occasional voiding and occasional mild dysuria  He reports sensation of complete bladder emptying with urination  He denies urinary frequency and urgency  He reports resolution of his urinary symptoms prior to having his surgery and is currently happy with his current status  Review of Systems   Constitutional: Negative for chills and fever  Respiratory: Negative for cough and shortness of breath  Cardiovascular: Negative for chest pain  Gastrointestinal: Negative for abdominal distention, abdominal pain, blood in stool, nausea and vomiting  Genitourinary: Negative for difficulty urinating, dysuria, enuresis, flank pain, frequency, hematuria and urgency  Musculoskeletal: Negative for back pain  Skin: Negative for rash  Neurological: Negative for dizziness       Past Medical History  Past Medical History:   Diagnosis Date    Ambulatory dysfunction     uses walker with assist of 1    Anemia     Anxiety     At risk for falls     hx of falls    Benign paroxysmal vertigo     unspecified ear    BPH (benign prostatic hyperplasia)     for TURP today 1/4/2021    Calculus in bladder     for surgical removal today 1/4/2021    Cataract     left eye    Cervicalgia     Chest pain     Chronic kidney disease     stage 3    Constipation     CTS (carpal tunnel syndrome)     left    Cyst of pancreas     Cystitis 06/23/2020    acute cystitis with hematuria    Depression     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    Dizziness     and giddiness    Dysphagia     Elevated PSA     Facial weakness     GERD (gastroesophageal reflux disease)     last assessed 5/20/16    Gross hematuria     Hematuria     Hyperlipidemia     Hypertension     Kidney disease     Kidney stone     Left-sided weakness     Long term (current) use of oral hypoglycemic drugs     Muscle weakness     Nuclear senile cataract of left eye     OA (osteoarthritis) of knee     b/l    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HCC)     Syncope and collapse     Tachycardia     UTI (urinary tract infection)     Vertebro-basilar artery syndrome     Vitamin B deficiency     Vitamin D deficiency     Vitamin D deficiency        Past Social History  Past Surgical History:   Procedure Laterality Date    CATARACT EXTRACTION      CHOLECYSTECTOMY      KNEE SURGERY      [place] REMOVE BLADDER STONE,<2 5 CM N/A 1/4/2021    Procedure: Cystolitholopaxy w/laser bladder stones;  Surgeon: Laurie Salamanca MD;  [facility];  Service: Urology    [place] TRANSURETHRAL ELEC-SURG PROSTATECTOM N/A 1/4/2021    Procedure: T U R P ;  Surgeon: Laurie Salamanca MD;  [facility];  Service: Urology     Social History     Tobacco Use   Smoking Status Never Smoker   Smokeless Tobacco Never Used       Past Family History  Family History   Problem Relation Age of Onset    Coronary artery disease Mother     Diabetes Father        Past Social history  Social History     Socioeconomic History    Marital status: /Civil Union     Spouse name: Not on file    Number of children: Not on file    Years of education: Not on file    Highest education level: Not on file   Occupational History    Not on file   Social Needs    Financial resource strain: Not on file    Food insecurity     Worry: Not on file     Inability: Not on file    Transportation needs     Medical: Not on file     Non-medical: Not on file   Tobacco Use    Smoking status: Never Smoker    Smokeless tobacco: Never Used   Substance and Sexual Activity    Alcohol use: Not Currently    Drug use: No    Sexual activity: Not Currently   Lifestyle    Physical activity     Days per week: Not on file     Minutes per session: Not on file    Stress: Not on file   Relationships    Social connections     Talks on phone: Not on file     Gets together: Not on file     Attends [employer] service: Not on file     Active member of club or organization: Not on file     Attends meetings of clubs or organizations: Not on file     Relationship status: Not on file    Intimate partner violence     Fear of current or ex partner: Not on file     Emotionally abused: Not on file     Physically abused: Not on file     Forced sexual activity: Not on file   Other Topics Concern    Not on file   Social History Narrative    Not on file       Current Medications  Current Outpatient Medications   Medication Sig Dispense Refill    ACCU-CHEK GUIDE test strip 1 strip as needed      acetaminophen (TYLENOL) 325 mg tablet Take 650 mg by mouth every 6 (six) hours as needed for mild pain      Alcohol Swabs (PRO COMFORT ALCOHOL) 70 % PADS 1 pad daily Use a pad when testing      atorvastatin (LIPITOR) 20 mg tablet Take 1 tablet (20 mg total) by mouth daily 30 tablet 0    cholecalciferol (VITAMIN D3) 1,000 units tablet Take 2 tablets (2,000 Units total) by mouth daily (Patient taking differently: Take 50,000 Units by mouth daily )  0    cyanocobalamin (VITAMIN B-12) 1000 MCG tablet Take 1 tablet (1,000 mcg total) by mouth daily  0    docusate sodium (COLACE) 100 mg capsule Take 1 capsule (100 mg total) by mouth 2 (two) times a day 10 capsule 0    Easy Comfort Lancets MISC as needed      finasteride (PROSCAR) 5 mg tablet Take 1 tablet (5 mg total) by mouth daily 30 tablet 0    glipiZIDE (GLUCOTROL) 10 mg tablet TAKE 1 TABLET BY MOUTH 2 TIMES A  tablet 1    Glucagon, rDNA, (GLUCAGON EMERGENCY IJ) Inject 1 mL as directed Sugars less than 60 and conscious      glucose 40 % Take 15 g by mouth once      glucose blood test strip 1 strip as needed      HYDROcodone-acetaminophen (NORCO) 5-325 mg per tablet Take 1 tablet by mouth 3 (three) times a day as needed for painMax Daily Amount: 3 tablets 10 tablet 0    lidocaine (LIDODERM) 5 % Apply 1 patch topically daily Remove & Discard patch within 12 hours or as directed by MD  0    lisinopril (ZESTRIL) 5 mg tablet Take 1 tablet (5 mg total) by mouth daily 30 tablet 0    meclizine (ANTIVERT) 25 mg tablet TAKE 1 TABLET BY MOUTH EVERY 8 HOURS AS NEEDED FOR DIZZINESS DO NOT DRIVE WITH MED 90 tablet 1    melatonin 3 mg Take 3 mg by mouth daily at bedtime      metFORMIN (GLUCOPHAGE) 1000 MG tablet TAKE 1 TABLET BY MOUTH 2 TIMES A  tablet 1    NovoFine Autocover 30G X 8 MM MISC       NovoLOG FlexPen 100 units/mL injection pen       ondansetron (ZOFRAN) 4 mg tablet Take 4 mg by mouth every 6 (six) hours as needed for nausea or vomiting      phenazopyridine (PYRIDIUM) 200 mg tablet Take 1 tablet (200 mg total) by mouth 3 (three) times a day with meals 30 tablet 0    senna (SENOKOT) 8 6 mg Take 1 tablet (8 6 mg total) by mouth daily as needed for constipation 120 tablet 0    tamsulosin (FLOMAX) 0 4 mg Take 1 capsule (0 4 mg total) by mouth daily with dinner 30 capsule 0    vitamin B-12 (CYANOCOBALAMIN) 100 MCG TABS Take 100 mcg by mouth daily      ergocalciferol (VITAMIN D2) 50,000 units TAKE 1 CAPSULE (50,000 UNITS TOTAL) BY MOUTH ONCE A WEEK FOR 7 DOSES 4 capsule 0    ergocalciferol (VITAMIN D2) 50,000 units TAKE 1 CAPSULE (50,000 UNITS TOTAL) BY MOUTH ONCE A WEEK FOR 7 DOSES 4 capsule 0    polyethylene glycol (MIRALAX) 17 g packet Take 17 g by mouth daily (Patient not taking: Reported on 1/19/2021) 14 each 2     No current facility-administered medications for this visit  Allergies  No Known Allergies      The following portions of the patient's history were reviewed and updated as appropriate: allergies, current medications, past medical history, past social history, past surgical history and problem list       Vitals  Vitals:    04/21/21 1417   BP: 120/62   Height: 5' 8" (1 727 m)     Physical Exam  Physical Exam  Vitals signs reviewed  Constitutional:       General: He is not in acute distress  Appearance: Normal appearance  He is normal weight  HENT:      Head: Normocephalic  Eyes:      Pupils: Pupils are equal, round, and reactive to light  Cardiovascular:      Rate and Rhythm: Normal rate  Pulmonary:      Effort: No respiratory distress  Breath sounds: Normal breath sounds  Skin:     General: Skin is warm and dry  Neurological:      General: No focal deficit present  Mental Status: He is alert and oriented to person, place, and time     Psychiatric:         Mood and Affect: Mood normal          Behavior: Behavior normal        Results  Recent Results (from the past 1 hour(s))   POCT Measure PVR    Collection Time: 04/21/21  3:00 PM   Result Value Ref Range    POST-VOID RESIDUAL VOLUME, ML POC 29 mL   ]  Lab Results   Component Value Date    PSA 2 8 04/12/2019     Lab Results   Component Value Date    CALCIUM 8 1 (L) 01/05/2021     12/20/2017    K 4 2 01/05/2021    CO2 28 01/05/2021     01/05/2021    BUN 17 01/05/2021    CREATININE 1 32 (H) 01/05/2021     Lab Results   Component Value Date    WBC 11 61 (H) 01/05/2021    HGB 11 4 (L) 01/05/2021    HCT 35 3 (L) 01/05/2021    MCV 88 01/05/2021     01/05/2021     Orders  Orders Placed This Encounter Procedures    POCT Measure PVR       Kelleen Peaks, CRNP

## 2021-04-24 RX ORDER — LISINOPRIL 5 MG/1
5 TABLET ORAL DAILY
Qty: 30 TABLET | Refills: 0 | OUTPATIENT
Start: 2021-04-24

## 2021-04-24 RX ORDER — ERGOCALCIFEROL 1.25 MG/1
50000 CAPSULE ORAL WEEKLY
Qty: 4 CAPSULE | Refills: 0 | OUTPATIENT
Start: 2021-04-24 | End: 2021-06-06

## 2021-04-24 RX ORDER — GLIPIZIDE 10 MG/1
TABLET ORAL
Qty: 60 TABLET | OUTPATIENT
Start: 2021-04-24

## 2021-04-27 DIAGNOSIS — N18.30 STAGE 3 CHRONIC KIDNEY DISEASE (HCC): ICD-10-CM

## 2021-04-27 DIAGNOSIS — E11.42 TYPE 2 DIABETES MELLITUS WITH DIABETIC POLYNEUROPATHY, WITHOUT LONG-TERM CURRENT USE OF INSULIN (HCC): ICD-10-CM

## 2021-04-27 RX ORDER — LISINOPRIL 5 MG/1
5 TABLET ORAL DAILY
Qty: 30 TABLET | Refills: 0 | OUTPATIENT
Start: 2021-04-27

## 2021-04-27 RX ORDER — GLIPIZIDE 10 MG/1
10 TABLET ORAL 2 TIMES DAILY
Qty: 60 TABLET | Refills: 1 | OUTPATIENT
Start: 2021-04-27

## 2021-04-28 ENCOUNTER — TELEPHONE (OUTPATIENT)
Dept: NEUROLOGY | Facility: CLINIC | Age: 71
End: 2021-04-28

## 2021-04-28 NOTE — TELEPHONE ENCOUNTER
Best contact number for patient: [phone]     Emergency Contact name and number: Ann Alcocer [phone]     Referring provider and telephone number:    Primary Care Provider Name and if affiliated with [facility]:     Reason for Appointment/Dx: parkinson's disease     Have you seen and followed up with a pediatric Neurologist for this disease in the past?  No     No (If yes ok to schedule with Dr Jamas Cheadle)    Neurology Location patient would like to be seen:    Order received? Yes                                                 Records Received? Yes    Have you ever seen another Neurologist?       No    Insurance Information    Insurance Name:    ID/Policy #:    Secondary Insurance:    ID/Policy#: Workman's Comp/ Accident/ School  Information      Workman's Comp/Accident/School related?        No    If yes name of Insurance company:    Claim #:    Date of Injury:    Type of Injury:     Name and Telephone Number:    Notes:                   Appointment date: 07/22/2021

## 2021-04-29 DIAGNOSIS — E11.42 TYPE 2 DIABETES MELLITUS WITH DIABETIC POLYNEUROPATHY, WITHOUT LONG-TERM CURRENT USE OF INSULIN (HCC): ICD-10-CM

## 2021-04-29 DIAGNOSIS — N18.30 STAGE 3 CHRONIC KIDNEY DISEASE (HCC): ICD-10-CM

## 2021-04-29 DIAGNOSIS — E55.9 VITAMIN D DEFICIENCY: ICD-10-CM

## 2021-04-29 PROCEDURE — 4010F ACE/ARB THERAPY RXD/TAKEN: CPT | Performed by: NURSE PRACTITIONER

## 2021-04-29 RX ORDER — LISINOPRIL 5 MG/1
5 TABLET ORAL DAILY
Qty: 1 TABLET | Refills: 0 | Status: CANCELLED
Start: 2021-04-29

## 2021-04-29 NOTE — TELEPHONE ENCOUNTER
Alex [id number] and stopped refill requests to our office at this time as the patient is being treated by an on-site physician at a facility

## 2021-05-04 RX ORDER — LISINOPRIL 5 MG/1
5 TABLET ORAL DAILY
Qty: 1 TABLET | Refills: 0
Start: 2021-05-04

## 2021-05-04 RX ORDER — GLIPIZIDE 10 MG/1
TABLET ORAL
Qty: 1 TABLET
Start: 2021-05-04

## 2021-05-04 RX ORDER — ERGOCALCIFEROL 1.25 MG/1
50000 CAPSULE ORAL WEEKLY
Qty: 1 CAPSULE | Refills: 0
Start: 2021-05-04 | End: 2021-06-16

## 2021-06-08 DIAGNOSIS — E11.9 TYPE 2 DIABETES MELLITUS WITHOUT COMPLICATION, WITHOUT LONG-TERM CURRENT USE OF INSULIN (HCC): ICD-10-CM

## 2021-06-15 DIAGNOSIS — E11.9 TYPE 2 DIABETES MELLITUS WITHOUT COMPLICATION, WITHOUT LONG-TERM CURRENT USE OF INSULIN (HCC): ICD-10-CM

## 2021-06-17 ENCOUNTER — VBI (OUTPATIENT)
Dept: ADMINISTRATIVE | Facility: OTHER | Age: 71
End: 2021-06-17

## 2021-06-17 NOTE — TELEPHONE ENCOUNTER
06/17/21 9:12 AM     See documentation in the [employer] SmartForm       Pearson Dubin, [address] [facility] Isabelle Wang

## 2021-07-21 NOTE — ASSESSMENT & PLAN NOTE
Patient presents as a consult for possible PD features   Sx: shuffling gait, weakness, sensory loss, bradykinesia, urinary incontinence, cogwheeling rigidity L > R, atypical affect and possibly slowed cognition   MRI (2020):   Diffuse, age-appropriate volume loss, chronic small vessel disease including a focus within the paramedian left saira' add'l ns linear hemosiderin focus adjacent to posterolateral right lentiform nucleus - possible sequela of remote hemorrhagic microvascular disease   Ddx: NPH, PD, MSA, CBD, LBD, PSP, FTD, spinocerebellar ataxias, drug-induced, vascular    Plan:   Repeat imaging: MRI brain, CS, LS   Consider JEAN scan if MRI is clean

## 2021-07-22 ENCOUNTER — CONSULT (OUTPATIENT)
Dept: NEUROLOGY | Facility: CLINIC | Age: 71
End: 2021-07-22
Payer: COMMERCIAL

## 2021-07-22 VITALS — HEART RATE: 91 BPM | SYSTOLIC BLOOD PRESSURE: 122 MMHG | DIASTOLIC BLOOD PRESSURE: 70 MMHG

## 2021-07-22 DIAGNOSIS — R42 DIZZINESS: ICD-10-CM

## 2021-07-22 DIAGNOSIS — E11.40 TYPE 2 DIABETES MELLITUS WITH DIABETIC NEUROPATHY (HCC): ICD-10-CM

## 2021-07-22 DIAGNOSIS — R32 URINARY INCONTINENCE: ICD-10-CM

## 2021-07-22 DIAGNOSIS — R53.1 LEFT-SIDED WEAKNESS: ICD-10-CM

## 2021-07-22 DIAGNOSIS — R26.2 AMBULATORY DYSFUNCTION: Primary | ICD-10-CM

## 2021-07-22 PROCEDURE — 99215 OFFICE O/P EST HI 40 MIN: CPT | Performed by: PSYCHIATRY & NEUROLOGY

## 2021-07-22 PROCEDURE — 1036F TOBACCO NON-USER: CPT | Performed by: PSYCHIATRY & NEUROLOGY

## 2021-07-22 RX ORDER — AMOXICILLIN 500 MG/1
CAPSULE ORAL
COMMUNITY
End: 2021-09-27 | Stop reason: HOSPADM

## 2021-07-22 NOTE — PROGRESS NOTES
Patient ID: Thalia Owusu is a 70 y o  male  Assessment/Plan:    Ambulatory dysfunction  Patient presents as a consult for possible PD features   Sx: shuffling gait, weakness, sensory loss, bradykinesia, urinary incontinence, cogwheeling rigidity L > R, atypical affect and possibly slowed cognition   MRI (2020): Diffuse, age-appropriate volume loss, chronic small vessel disease including a focus within the paramedian left saira' add'l ns linear hemosiderin focus adjacent to posterolateral right lentiform nucleus - possible sequela of remote hemorrhagic microvascular disease   Ddx: NPH, PD, MSA, CBD, LBD, PSP, FTD, spinocerebellar ataxias, drug-induced, vascular    Plan:   Repeat imaging: MRI brain, CS, LS   Consider JEAN scan if MRI is clean    Dizziness  Dizziness only very brief, in the early morning if he sits up too quickly, resolves within seconds, unlikely to be contributing to gait dysfunction at this time   Counseled on importance of good hydration   Could be component of PD or PD plus syndrome   No further workup indicated at this time other than that elsewhere indicated    Urinary incontinence  Ongoing for several years, wears diaper, sometimes does not realize when he has to go or if he has gone   Would fit w/ NPH picture or dysautonomic sx in PD/PD plus?  Continue workup as above     Diagnoses and all orders for this visit:    Ambulatory dysfunction  -     MRI brain without contrast; Future  -     MRI cervical spine wo contrast; Future  -     MRI lumbar spine without contrast; Future  -     carbidopa-levodopa (SINEMET)  mg per tablet;  Take 1 tablet by mouth 3 (three) times a day    Urinary incontinence  -     MRI brain without contrast; Future  -     MRI cervical spine wo contrast; Future  -     MRI lumbar spine without contrast; Future    Dizziness  -     MRI brain without contrast; Future  -     MRI cervical spine wo contrast; Future  -     MRI lumbar spine without contrast; Future    Left-sided weakness  -     MRI brain without contrast; Future  -     MRI cervical spine wo contrast; Future  -     MRI lumbar spine without contrast; Future  -     carbidopa-levodopa (SINEMET)  mg per tablet; Take 1 tablet by mouth 3 (three) times a day    Type 2 diabetes mellitus with diabetic neuropathy (HCC)    Other orders  -     amoxicillin (AMOXIL) 500 mg capsule; amoxicillin 500 mg capsule       Subjective:    Cielo Barrera is a 70 y o  male w/ PMH HLD, dizziness, DM w/ peripheral neuropathy who presents today for evaluation of ambulatory dysfunction though he has difficulty articulating exactly what it wrong - he currently resides at [address] [place] [address] in [place]  He notes that he started having trouble walking a couple a years ago when it started feeling like it was getting slower and slower  He also started to feel increasingly weak  He endorses numbed feet as well but is unable to associate this in time course with his other symptoms  He noted that his father had difficulty walking as well - which started in 1967 (when patient was 16) but on further discussion with his brother it appears that his father may have had GBS  No other known movement disorders in the family  No recent illnesses, no weight loss, eating and drinking ok; graduated high school  No sleep disorder, no snoring; is incontinent of urine, but usually not bowel movements  Does sometimes get tremors but these are inconsistent  He is not oriented to month, but is oriented to president and year  He also notes some dizziness, mostly in the morning when he gets up too fast but this goes away within a couple of seconds  His older brother also has gait dysfunction though only some of their sx overlap  This patient also exhibits significant left-sided weakness, including facial droop, LUE/LLE  He demonstrates cogwheeling rigidity LUE > RUE and mild LUE > RUE hyperreflexia   Unclear etiology at this time but would want to r/o spinal pathology and NPH  No clear signs of NPH on prior scans but will compare ventricular size w/ new images  Obtain MRI brain, CS, LS, trial sinemet  DDx includes PD or PD plus syndromes (e g  PSP - both he and his brother show subtle vertical gaze deficits and difficulty w/ smooth pursuit), NPH  Suspect his sensory sx are primarily related to his diabetic peripheral neuropathy  The following portions of the patient's history were reviewed and updated as appropriate:   He  has a past medical history of Ambulatory dysfunction, Anemia, Anxiety, At risk for falls, Benign paroxysmal vertigo, BPH (benign prostatic hyperplasia), Calculus in bladder, Cataract, Cervicalgia, Chest pain, Chronic kidney disease, Constipation, CTS (carpal tunnel syndrome), Cyst of pancreas, Cystitis (06/23/2020), Depression, Diabetes mellitus ([address] ), Dizziness, Dysphagia, Elevated PSA, Facial weakness, GERD (gastroesophageal reflux disease), Gross hematuria, Hematuria, Hyperlipidemia, Hypertension, Kidney disease, Kidney stone, Left-sided weakness, Long term (current) use of oral hypoglycemic drugs, Muscle weakness, Nuclear senile cataract of left eye, OA (osteoarthritis) of knee, 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[address] ), Syncope and collapse, Tachycardia, UTI (urinary tract infection), Vertebro-basilar artery syndrome, Vitamin B deficiency, Vitamin D deficiency, and Vitamin D deficiency    He   Patient Active Problem List    Diagnosis Date Noted    Urinary incontinence 07/22/2021    Vitamin B12 deficiency     History of recurrent UTIs 09/17/2020    Insomnia 09/01/2020    Constipation 08/13/2020    Benign paroxysmal positional vertigo 07/08/2020    Benign prostatic hyperplasia 07/08/2020    Vitamin D deficiency 06/23/2020    Left-sided weakness 06/16/2020    Cystitis 04/29/2020    Bladder stones 04/29/2020    Stage 3 chronic kidney disease ([address] ) 04/29/2020    Anemia 04/29/2020    Colon cancer screening 04/17/2020    Essential hypertension 12/31/2019    Type 2 diabetes mellitus with diabetic neuropathy, with long-term current use of insulin ([facility] ) 12/31/2019    Nephrolithiasis 12/31/2019    Ambulatory dysfunction 12/31/2019    Paresis ([facility] ) 11/22/2019    Abnormal PSA 05/23/2019    Dizziness 10/04/2017    Pancreas cyst 05/20/2016    Type 2 diabetes mellitus with diabetic neuropathy ([place] [address] ) 07/31/2015    Hyperlipidemia 06/13/2012     He  has a past surgical history that includes Cataract extraction; Cholecystectomy; Knee surgery; pr transurethral elec-surg prostatectom (N/A, 1/4/2021); and pr remove bladder stone,<2 5 cm (N/A, 1/4/2021)  His family history includes Coronary artery disease in his mother; Diabetes in his father  He  reports that he has never smoked  He has never used smokeless tobacco  He reports previous alcohol use  He reports that he does not use drugs    Current Outpatient Medications   Medication Sig Dispense Refill    ACCU-CHEK GUIDE test strip 1 strip as needed      acetaminophen (TYLENOL) 325 mg tablet Take 650 mg by mouth every 6 (six) hours as needed for mild pain      Alcohol Swabs (PRO COMFORT ALCOHOL) 70 % PADS 1 pad daily Use a pad when testing      atorvastatin (LIPITOR) 20 mg tablet Take 1 tablet (20 mg total) by mouth daily 30 tablet 0    cholecalciferol (VITAMIN D3) 1,000 units tablet Take 2 tablets (2,000 Units total) by mouth daily (Patient taking differently: Take 50,000 Units by mouth daily )  0    cyanocobalamin (VITAMIN B-12) 1000 MCG tablet Take 1 tablet (1,000 mcg total) by mouth daily  0    docusate sodium (COLACE) 100 mg capsule Take 1 capsule (100 mg total) by mouth 2 (two) times a day 10 capsule 0    Easy Comfort Lancets MISC as needed      finasteride (PROSCAR) 5 mg tablet Take 1 tablet (5 mg total) by mouth daily 30 tablet 0    glipiZIDE (GLUCOTROL) 10 mg tablet TAKE 1 TABLET BY MOUTH 2 TIMES A  tablet 1    Glucagon, rDNA, (GLUCAGON EMERGENCY IJ) Inject 1 mL as directed Sugars less than 60 and conscious      lisinopril (ZESTRIL) 5 mg tablet Take 1 tablet (5 mg total) by mouth daily 30 tablet 0    melatonin 3 mg Take 3 mg by mouth daily at bedtime      metFORMIN (GLUCOPHAGE) 1000 MG tablet TAKE 1 TABLET BY MOUTH 2 TIMES A  tablet 1    polyethylene glycol (MIRALAX) 17 g packet Take 17 g by mouth daily 14 each 2    tamsulosin (FLOMAX) 0 4 mg Take 1 capsule (0 4 mg total) by mouth daily with dinner 30 capsule 0    vitamin B-12 (CYANOCOBALAMIN) 100 MCG TABS Take 100 mcg by mouth daily      amoxicillin (AMOXIL) 500 mg capsule amoxicillin 500 mg capsule      carbidopa-levodopa (SINEMET)  mg per tablet Take 1 tablet by mouth 3 (three) times a day 90 tablet 11    ergocalciferol (VITAMIN D2) 50,000 units TAKE 1 CAPSULE (50,000 UNITS TOTAL) BY MOUTH ONCE A WEEK FOR 7 DOSES 4 capsule 0    ergocalciferol (VITAMIN D2) 50,000 units TAKE 1 CAPSULE (50,000 UNITS TOTAL) BY MOUTH ONCE A WEEK FOR 7 DOSES 4 capsule 0    glucose 40 % Take 15 g by mouth once      glucose blood test strip 1 strip as needed      HYDROcodone-acetaminophen (NORCO) 5-325 mg per tablet Take 1 tablet by mouth 3 (three) times a day as needed for painMax Daily Amount: 3 tablets (Patient not taking: Reported on 7/22/2021) 10 tablet 0    lidocaine (LIDODERM) 5 % Apply 1 patch topically daily Remove & Discard patch within 12 hours or as directed by MD (Patient not taking: Reported on 7/22/2021)  0    meclizine (ANTIVERT) 25 mg tablet TAKE 1 TABLET BY MOUTH EVERY 8 HOURS AS NEEDED FOR DIZZINESS DO NOT DRIVE WITH MED (Patient not taking: Reported on 7/22/2021) 90 tablet 1    NovoFine Autocover 30G X 8 MM MISC       NovoLOG FlexPen 100 units/mL injection pen       ondansetron (ZOFRAN) 4 mg tablet Take 4 mg by mouth every 6 (six) hours as needed for nausea or vomiting (Patient not taking: Reported on 7/22/2021)      phenazopyridine (PYRIDIUM) 200 mg tablet Take 1 tablet (200 mg total) by mouth 3 (three) times a day with meals (Patient not taking: Reported on 7/22/2021) 30 tablet 0    senna (SENOKOT) 8 6 mg Take 1 tablet (8 6 mg total) by mouth daily as needed for constipation 120 tablet 0     No current facility-administered medications for this visit       Current Outpatient Medications on File Prior to Visit   Medication Sig    ACCU-CHEK GUIDE test strip 1 strip as needed    acetaminophen (TYLENOL) 325 mg tablet Take 650 mg by mouth every 6 (six) hours as needed for mild pain    Alcohol Swabs (PRO COMFORT ALCOHOL) 70 % PADS 1 pad daily Use a pad when testing    atorvastatin (LIPITOR) 20 mg tablet Take 1 tablet (20 mg total) by mouth daily    cholecalciferol (VITAMIN D3) 1,000 units tablet Take 2 tablets (2,000 Units total) by mouth daily (Patient taking differently: Take 50,000 Units by mouth daily )    cyanocobalamin (VITAMIN B-12) 1000 MCG tablet Take 1 tablet (1,000 mcg total) by mouth daily    docusate sodium (COLACE) 100 mg capsule Take 1 capsule (100 mg total) by mouth 2 (two) times a day    Easy Comfort Lancets MISC as needed    finasteride (PROSCAR) 5 mg tablet Take 1 tablet (5 mg total) by mouth daily    glipiZIDE (GLUCOTROL) 10 mg tablet TAKE 1 TABLET BY MOUTH 2 TIMES A DAY    Glucagon, rDNA, (GLUCAGON EMERGENCY IJ) Inject 1 mL as directed Sugars less than 60 and conscious    lisinopril (ZESTRIL) 5 mg tablet Take 1 tablet (5 mg total) by mouth daily    melatonin 3 mg Take 3 mg by mouth daily at bedtime    metFORMIN (GLUCOPHAGE) 1000 MG tablet TAKE 1 TABLET BY MOUTH 2 TIMES A DAY    polyethylene glycol (MIRALAX) 17 g packet Take 17 g by mouth daily    tamsulosin (FLOMAX) 0 4 mg Take 1 capsule (0 4 mg total) by mouth daily with dinner    vitamin B-12 (CYANOCOBALAMIN) 100 MCG TABS Take 100 mcg by mouth daily    amoxicillin (AMOXIL) 500 mg capsule amoxicillin 500 mg capsule    ergocalciferol (VITAMIN D2) 50,000 units TAKE 1 CAPSULE (50,000 UNITS TOTAL) BY MOUTH ONCE A WEEK FOR 7 DOSES    ergocalciferol (VITAMIN D2) 50,000 units TAKE 1 CAPSULE (50,000 UNITS TOTAL) BY MOUTH ONCE A WEEK FOR 7 DOSES    glucose 40 % Take 15 g by mouth once    glucose blood test strip 1 strip as needed    HYDROcodone-acetaminophen (NORCO) 5-325 mg per tablet Take 1 tablet by mouth 3 (three) times a day as needed for painMax Daily Amount: 3 tablets (Patient not taking: Reported on 7/22/2021)    lidocaine (LIDODERM) 5 % Apply 1 patch topically daily Remove & Discard patch within 12 hours or as directed by MD (Patient not taking: Reported on 7/22/2021)    meclizine (ANTIVERT) 25 mg tablet TAKE 1 TABLET BY MOUTH EVERY 8 HOURS AS NEEDED FOR DIZZINESS DO NOT DRIVE WITH MED (Patient not taking: Reported on 7/22/2021)    NovoFine Autocover 30G X 8 MM MISC     NovoLOG FlexPen 100 units/mL injection pen     ondansetron (ZOFRAN) 4 mg tablet Take 4 mg by mouth every 6 (six) hours as needed for nausea or vomiting (Patient not taking: Reported on 7/22/2021)    phenazopyridine (PYRIDIUM) 200 mg tablet Take 1 tablet (200 mg total) by mouth 3 (three) times a day with meals (Patient not taking: Reported on 7/22/2021)    senna (SENOKOT) 8 6 mg Take 1 tablet (8 6 mg total) by mouth daily as needed for constipation     No current facility-administered medications on file prior to visit  He has No Known Allergies       Objective: Blood pressure 122/70, pulse 91  Physical Exam  Vitals reviewed  Constitutional:       General: He is not in acute distress  Appearance: Normal appearance  He is well-developed  He is not ill-appearing, toxic-appearing or diaphoretic  Comments: In wheelchair   HENT:      Head: Normocephalic and atraumatic  Right Ear: Hearing and external ear normal       Left Ear: Hearing and external ear normal       Nose: Nose normal  No congestion or rhinorrhea  Mouth/Throat:      Mouth: Mucous membranes are moist       Pharynx: Oropharynx is clear   No oropharyngeal exudate or posterior oropharyngeal erythema  Eyes:      General: No scleral icterus  Conjunctiva/sclera: Conjunctivae normal    Pulmonary:      Effort: Pulmonary effort is normal  No respiratory distress  Musculoskeletal:         General: No swelling  Right lower leg: No edema  Left lower leg: No edema  Neurological:      Mental Status: He is alert  Mental status is at baseline  Cranial Nerves: Cranial nerve deficit present  No dysarthria or facial asymmetry  Sensory: Sensory deficit present  Motor: Weakness present  No tremor, abnormal muscle tone or pronator drift  Coordination: Coordination abnormal  Finger-Nose-Finger Test normal       Gait: Gait abnormal       Deep Tendon Reflexes: Reflexes abnormal       Reflex Scores:       Bicep reflexes are 2+ on the right side and 2+ on the left side  Brachioradialis reflexes are 2+ on the right side and 2+ on the left side  Patellar reflexes are 2+ on the right side and 2+ on the left side  Neurological Exam  Mental Status  Alert  no dysarthria present  Cranial Nerves  CN II: Right normal visual field  Left normal visual field  CN III, IV, VI: Diminished smooth pursuit  CN V:  Right: Facial sensation is normal   Left: Facial sensation is normal on the left  CN VII:  Right: There is no facial weakness  Left: Primarily lower face, w/ some upper involvement  CN VIII:  Right: Hearing is normal   Left: Hearing is normal   CN IX, X: Palate elevates symmetrically  CN XI:  Right: Trapezius strength is weak  Subtly  Left: Trapezius strength is weak  Subtly  CN XII: Tongue midline without atrophy or fasciculations      Motor                                               Right                     Left   Shoulder abduction               4+                          4+  Elbow flexion                         5-                          4+  Elbow extension                    5-                          4+  Hip flexion 5-                          4+  Hip extension                         5-                          4+  Knee flexion                           5                          4+  Knee extension                      5                          4+  Plantarflexion                         5                          4+  Dorsiflexion                            5                          4+    Sensory  Light touch is normal in upper and lower extremities  Pinprick abnormality: Decreased BLE from high calf  Temperature abnormality: Decreased BLE below knee  Reflexes                                           Right                      Left  Brachioradialis                    2+                         2+  Biceps                                 2+                         2+  Patellar                                2+                         2+  Achilles                                Tr                         Tr  L > R brisk DTRs  Coordination  Right: Finger-to-nose normal   Left: Finger-to-nose abnormality:    Gait  Casual gait:  Unable to walk patient, felt too unsteady on his feet to ambulate, even w/ 2 helpers to steady him  ROS:    Review of Systems   Constitutional: Negative  Negative for appetite change and fever  HENT: Negative  Negative for hearing loss, tinnitus, trouble swallowing and voice change  Eyes: Negative  Negative for photophobia and pain  Respiratory: Negative  Negative for shortness of breath  Cardiovascular: Negative  Negative for palpitations  Gastrointestinal: Negative  Negative for nausea and vomiting  Endocrine: Negative  Negative for cold intolerance  Genitourinary: Negative  Negative for dysuria, frequency and urgency  Musculoskeletal: Positive for back pain  Negative for myalgias and neck pain  Skin: Negative  Negative for rash  Neurological: Positive for dizziness, tremors (hands), light-headedness and numbness (left foot )   Negative for seizures, syncope, facial asymmetry, speech difficulty, weakness and headaches  Hematological: Negative  Does not bruise/bleed easily  Psychiatric/Behavioral: Negative  Negative for confusion, hallucinations and sleep disturbance  All other systems reviewed and are negative

## 2021-07-23 NOTE — ASSESSMENT & PLAN NOTE
Dizziness only very brief, in the early morning if he sits up too quickly, resolves within seconds, unlikely to be contributing to gait dysfunction at this time   Counseled on importance of good hydration   Could be component of PD or PD plus syndrome   No further workup indicated at this time other than that elsewhere indicated

## 2021-07-23 NOTE — ASSESSMENT & PLAN NOTE
Ongoing for several years, wears diaper, sometimes does not realize when he has to go or if he has gone   Would fit w/ NPH picture or dysautonomic sx in PD/PD plus?    Continue workup as above

## 2021-08-13 ENCOUNTER — HOSPITAL ENCOUNTER (OUTPATIENT)
Dept: MRI IMAGING | Facility: HOSPITAL | Age: 71
Discharge: HOME | End: 2021-08-13
Payer: COMMERCIAL

## 2021-08-13 DIAGNOSIS — R42 DIZZINESS: ICD-10-CM

## 2021-08-13 DIAGNOSIS — R53.1 LEFT-SIDED WEAKNESS: ICD-10-CM

## 2021-08-13 DIAGNOSIS — R32 URINARY INCONTINENCE: ICD-10-CM

## 2021-08-13 DIAGNOSIS — R26.2 AMBULATORY DYSFUNCTION: ICD-10-CM

## 2021-08-13 PROCEDURE — 70551 MRI BRAIN STEM W/O DYE: CPT

## 2021-08-13 PROCEDURE — 72141 MRI NECK SPINE W/O DYE: CPT

## 2021-08-13 PROCEDURE — 72148 MRI LUMBAR SPINE W/O DYE: CPT

## 2021-09-25 ENCOUNTER — APPOINTMENT (EMERGENCY)
Dept: CT IMAGING | Facility: HOSPITAL | Age: 71
DRG: 392 | End: 2021-09-25
Payer: COMMERCIAL

## 2021-09-25 ENCOUNTER — HOSPITAL ENCOUNTER (INPATIENT)
Facility: HOSPITAL | Age: 71
LOS: 2 days | Discharge: NURSING, REHAB OR LONG-TERM CARE | DRG: 392 | End: 2021-09-27
Attending: EMERGENCY MEDICINE | Admitting: FAMILY MEDICINE
Payer: COMMERCIAL

## 2021-09-25 DIAGNOSIS — R10.84 GENERALIZED ABDOMINAL PAIN: Primary | ICD-10-CM

## 2021-09-25 PROBLEM — E87.20 LACTIC ACIDOSIS: Status: ACTIVE | Noted: 2021-09-25

## 2021-09-25 PROBLEM — E87.2 LACTIC ACIDOSIS: Status: ACTIVE | Noted: 2021-09-25

## 2021-09-25 LAB
ALBUMIN SERPL BCP-MCNC: 2.8 G/DL (ref 3.5–5)
ALP SERPL-CCNC: 77 U/L (ref 46–116)
ALT SERPL W P-5'-P-CCNC: 9 U/L (ref 12–78)
ANION GAP SERPL CALCULATED.3IONS-SCNC: 7 MMOL/L (ref 4–13)
AST SERPL W P-5'-P-CCNC: 18 U/L (ref 5–45)
BASOPHILS # BLD AUTO: 0.05 THOUSANDS/ΜL (ref 0–0.1)
BASOPHILS NFR BLD AUTO: 0 % (ref 0–1)
BILIRUB SERPL-MCNC: 0.38 MG/DL (ref 0.2–1)
BUN SERPL-MCNC: 28 MG/DL (ref 5–25)
CALCIUM ALBUM COR SERPL-MCNC: 9.6 MG/DL (ref 8.3–10.1)
CALCIUM SERPL-MCNC: 8.6 MG/DL (ref 8.3–10.1)
CHLORIDE SERPL-SCNC: 97 MMOL/L (ref 100–108)
CO2 SERPL-SCNC: 26 MMOL/L (ref 21–32)
CREAT SERPL-MCNC: 1.27 MG/DL (ref 0.6–1.3)
EOSINOPHIL # BLD AUTO: 0.15 THOUSAND/ΜL (ref 0–0.61)
EOSINOPHIL NFR BLD AUTO: 1 % (ref 0–6)
ERYTHROCYTE [DISTWIDTH] IN BLOOD BY AUTOMATED COUNT: 13.9 % (ref 11.6–15.1)
GFR SERPL CREATININE-BSD FRML MDRD: 56 ML/MIN/1.73SQ M
GLUCOSE SERPL-MCNC: 157 MG/DL (ref 65–140)
GLUCOSE SERPL-MCNC: 160 MG/DL (ref 65–140)
GLUCOSE SERPL-MCNC: 175 MG/DL (ref 65–140)
GLUCOSE SERPL-MCNC: 179 MG/DL (ref 65–140)
GLUCOSE SERPL-MCNC: 224 MG/DL (ref 65–140)
HCT VFR BLD AUTO: 36.2 % (ref 36.5–49.3)
HGB BLD-MCNC: 12.2 G/DL (ref 12–17)
IMM GRANULOCYTES # BLD AUTO: 0.29 THOUSAND/UL (ref 0–0.2)
IMM GRANULOCYTES NFR BLD AUTO: 2 % (ref 0–2)
LACTATE SERPL-SCNC: 1.2 MMOL/L (ref 0.5–2)
LACTATE SERPL-SCNC: 2.3 MMOL/L (ref 0.5–2)
LACTATE SERPL-SCNC: 2.5 MMOL/L (ref 0.5–2)
LIPASE SERPL-CCNC: 147 U/L (ref 73–393)
LYMPHOCYTES # BLD AUTO: 0.84 THOUSANDS/ΜL (ref 0.6–4.47)
LYMPHOCYTES NFR BLD AUTO: 5 % (ref 14–44)
MCH RBC QN AUTO: 29.4 PG (ref 26.8–34.3)
MCHC RBC AUTO-ENTMCNC: 33.7 G/DL (ref 31.4–37.4)
MCV RBC AUTO: 87 FL (ref 82–98)
MONOCYTES # BLD AUTO: 1.13 THOUSAND/ΜL (ref 0.17–1.22)
MONOCYTES NFR BLD AUTO: 7 % (ref 4–12)
NEUTROPHILS # BLD AUTO: 14.57 THOUSANDS/ΜL (ref 1.85–7.62)
NEUTS SEG NFR BLD AUTO: 85 % (ref 43–75)
NRBC BLD AUTO-RTO: 0 /100 WBCS
PLATELET # BLD AUTO: 312 THOUSANDS/UL (ref 149–390)
PMV BLD AUTO: 10.7 FL (ref 8.9–12.7)
POTASSIUM SERPL-SCNC: 4.5 MMOL/L (ref 3.5–5.3)
PROT SERPL-MCNC: 6.2 G/DL (ref 6.4–8.2)
RBC # BLD AUTO: 4.15 MILLION/UL (ref 3.88–5.62)
SODIUM SERPL-SCNC: 130 MMOL/L (ref 136–145)
WBC # BLD AUTO: 17.03 THOUSAND/UL (ref 4.31–10.16)

## 2021-09-25 PROCEDURE — 82948 REAGENT STRIP/BLOOD GLUCOSE: CPT

## 2021-09-25 PROCEDURE — 96360 HYDRATION IV INFUSION INIT: CPT

## 2021-09-25 PROCEDURE — 99284 EMERGENCY DEPT VISIT MOD MDM: CPT | Performed by: PHYSICIAN ASSISTANT

## 2021-09-25 PROCEDURE — 99285 EMERGENCY DEPT VISIT HI MDM: CPT

## 2021-09-25 PROCEDURE — 83605 ASSAY OF LACTIC ACID: CPT | Performed by: FAMILY MEDICINE

## 2021-09-25 PROCEDURE — 85025 COMPLETE CBC W/AUTO DIFF WBC: CPT | Performed by: PHYSICIAN ASSISTANT

## 2021-09-25 PROCEDURE — 99222 1ST HOSP IP/OBS MODERATE 55: CPT | Performed by: PHYSICIAN ASSISTANT

## 2021-09-25 PROCEDURE — 83690 ASSAY OF LIPASE: CPT | Performed by: PHYSICIAN ASSISTANT

## 2021-09-25 PROCEDURE — 83605 ASSAY OF LACTIC ACID: CPT | Performed by: PHYSICIAN ASSISTANT

## 2021-09-25 PROCEDURE — 80053 COMPREHEN METABOLIC PANEL: CPT | Performed by: PHYSICIAN ASSISTANT

## 2021-09-25 PROCEDURE — 99223 1ST HOSP IP/OBS HIGH 75: CPT | Performed by: FAMILY MEDICINE

## 2021-09-25 PROCEDURE — 74177 CT ABD & PELVIS W/CONTRAST: CPT

## 2021-09-25 PROCEDURE — 36415 COLL VENOUS BLD VENIPUNCTURE: CPT | Performed by: PHYSICIAN ASSISTANT

## 2021-09-25 RX ORDER — ATORVASTATIN CALCIUM 20 MG/1
20 TABLET, FILM COATED ORAL DAILY
Status: DISCONTINUED | OUTPATIENT
Start: 2021-09-26 | End: 2021-09-27 | Stop reason: HOSPADM

## 2021-09-25 RX ORDER — ACETAMINOPHEN 325 MG/1
650 TABLET ORAL EVERY 6 HOURS PRN
Status: DISCONTINUED | OUTPATIENT
Start: 2021-09-25 | End: 2021-09-25 | Stop reason: SDUPTHER

## 2021-09-25 RX ORDER — SODIUM CHLORIDE 9 MG/ML
50 INJECTION, SOLUTION INTRAVENOUS CONTINUOUS
Status: DISCONTINUED | OUTPATIENT
Start: 2021-09-25 | End: 2021-09-26

## 2021-09-25 RX ORDER — ONDANSETRON 2 MG/ML
4 INJECTION INTRAMUSCULAR; INTRAVENOUS EVERY 6 HOURS PRN
Status: DISCONTINUED | OUTPATIENT
Start: 2021-09-25 | End: 2021-09-27 | Stop reason: HOSPADM

## 2021-09-25 RX ORDER — FINASTERIDE 5 MG/1
5 TABLET, FILM COATED ORAL DAILY
Status: DISCONTINUED | OUTPATIENT
Start: 2021-09-26 | End: 2021-09-27 | Stop reason: HOSPADM

## 2021-09-25 RX ORDER — TAMSULOSIN HYDROCHLORIDE 0.4 MG/1
0.4 CAPSULE ORAL
Status: DISCONTINUED | OUTPATIENT
Start: 2021-09-25 | End: 2021-09-27 | Stop reason: HOSPADM

## 2021-09-25 RX ORDER — HEPARIN SODIUM 5000 [USP'U]/ML
5000 INJECTION, SOLUTION INTRAVENOUS; SUBCUTANEOUS EVERY 8 HOURS SCHEDULED
Status: DISCONTINUED | OUTPATIENT
Start: 2021-09-25 | End: 2021-09-27 | Stop reason: HOSPADM

## 2021-09-25 RX ORDER — ACETAMINOPHEN 325 MG/1
650 TABLET ORAL EVERY 6 HOURS PRN
Status: DISCONTINUED | OUTPATIENT
Start: 2021-09-25 | End: 2021-09-27 | Stop reason: HOSPADM

## 2021-09-25 RX ORDER — LANOLIN ALCOHOL/MO/W.PET/CERES
3 CREAM (GRAM) TOPICAL
Status: DISCONTINUED | OUTPATIENT
Start: 2021-09-25 | End: 2021-09-27 | Stop reason: HOSPADM

## 2021-09-25 RX ADMIN — HEPARIN SODIUM 5000 UNITS: 5000 INJECTION INTRAVENOUS; SUBCUTANEOUS at 21:25

## 2021-09-25 RX ADMIN — Medication 3 MG: at 21:25

## 2021-09-25 RX ADMIN — SODIUM CHLORIDE 1000 ML: 0.9 INJECTION, SOLUTION INTRAVENOUS at 05:50

## 2021-09-25 RX ADMIN — IOHEXOL 100 ML: 350 INJECTION, SOLUTION INTRAVENOUS at 03:01

## 2021-09-25 RX ADMIN — TAMSULOSIN HYDROCHLORIDE 0.4 MG: 0.4 CAPSULE ORAL at 18:28

## 2021-09-25 RX ADMIN — INSULIN LISPRO 1 UNITS: 100 INJECTION, SOLUTION INTRAVENOUS; SUBCUTANEOUS at 18:30

## 2021-09-25 RX ADMIN — SODIUM CHLORIDE 50 ML/HR: 0.9 INJECTION, SOLUTION INTRAVENOUS at 08:00

## 2021-09-25 NOTE — ED PROVIDER NOTES
History  Chief Complaint   Patient presents with    Abdominal Pain     Pt brought from [place] [id number] with c/o abdominal pain since 3 hours ago  Patient is a 77-year-old male with a past medical history significant for chronic kidney disease, diabetes, hyperlipidemia, hypertension who presents to the emergency department for evaluation of abdominal pain that began earlier in the night at his nursing home  Patient states that his pain is currently improving  Patient states that the pain is in the lower abdomen  He states that he did vomit at his nursing home but is no longer nauseous  Patient is a poor historian  He is denying any current fevers, chills, chest pain, difficulty breathing, diarrhea, headache, dizziness  No other complaints at this time  Prior to Admission Medications   Prescriptions Last Dose Informant Patient Reported? Taking?    ACCU-CHEK GUIDE test strip  Outside Facility (Specify) Yes No   Si strip as needed   Alcohol Swabs (PRO COMFORT ALCOHOL) 70 % PADS  Outside Facility (Specify) Yes No   Si pad daily Use a pad when testing   Easy Comfort Lancets MISC  Outside Facility (Specify) Yes No   Sig: as needed   Glucagon, rDNA, (GLUCAGON EMERGENCY IJ)   Yes No   Sig: Inject 1 mL as directed Sugars less than 60 and conscious   HYDROcodone-acetaminophen (NORCO) 5-325 mg per tablet   No No   Sig: Take 1 tablet by mouth 3 (three) times a day as needed for painMax Daily Amount: 3 tablets   Patient not taking: Reported on 2021   NovoFine Autocover 30G X 8 MM MISC   Yes No   NovoLOG FlexPen 100 units/mL injection pen   Yes No   acetaminophen (TYLENOL) 325 mg tablet   Yes No   Sig: Take 650 mg by mouth every 6 (six) hours as needed for mild pain   amoxicillin (AMOXIL) 500 mg capsule   Yes No   Sig: amoxicillin 500 mg capsule   atorvastatin (LIPITOR) 20 mg tablet   No No   Sig: Take 1 tablet (20 mg total) by mouth daily   carbidopa-levodopa (SINEMET)  mg per tablet   No No   Sig: Take 1 tablet by mouth 3 (three) times a day   cholecalciferol (VITAMIN D3) 1,000 units tablet  Outside Facility (Specify) No No   Sig: Take 2 tablets (2,000 Units total) by mouth daily   Patient taking differently: Take 50,000 Units by mouth daily    cyanocobalamin (VITAMIN B-12) 1000 MCG tablet  Outside Facility (Specify) No No   Sig: Take 1 tablet (1,000 mcg total) by mouth daily   docusate sodium (COLACE) 100 mg capsule  Outside Facility (Specify) No No   Sig: Take 1 capsule (100 mg total) by mouth 2 (two) times a day   ergocalciferol (VITAMIN D2) 50,000 units   No No   Sig: TAKE 1 CAPSULE (50,000 UNITS TOTAL) BY MOUTH ONCE A WEEK FOR 7 DOSES   ergocalciferol (VITAMIN D2) 50,000 units   No No   Sig: TAKE 1 CAPSULE (50,000 UNITS TOTAL) BY MOUTH ONCE A WEEK FOR 7 DOSES   finasteride (PROSCAR) 5 mg tablet  Outside Facility (Specify) No No   Sig: Take 1 tablet (5 mg total) by mouth daily   glipiZIDE (GLUCOTROL) 10 mg tablet   No No   Sig: TAKE 1 TABLET BY MOUTH 2 TIMES A DAY   glucose 40 %   Yes No   Sig: Take 15 g by mouth once   glucose blood test strip  Outside Facility (Specify) Yes No   Si strip as needed   lidocaine (LIDODERM) 5 %  Outside Facility (Specify) No No   Sig: Apply 1 patch topically daily Remove & Discard patch within 12 hours or as directed by MD   Patient not taking: Reported on 2021   lisinopril (ZESTRIL) 5 mg tablet   No No   Sig: Take 1 tablet (5 mg total) by mouth daily   meclizine (ANTIVERT) 25 mg tablet  Outside Facility (Specify) No No   Sig: TAKE 1 TABLET BY MOUTH EVERY 8 HOURS AS NEEDED FOR DIZZINESS DO NOT DRIVE WITH MED   Patient not taking: Reported on 2021   melatonin 3 mg   Yes No   Sig: Take 3 mg by mouth daily at bedtime   metFORMIN (GLUCOPHAGE) 1000 MG tablet   No No   Sig: TAKE 1 TABLET BY MOUTH 2 TIMES A DAY   ondansetron (ZOFRAN) 4 mg tablet   Yes No   Sig: Take 4 mg by mouth every 6 (six) hours as needed for nausea or vomiting   Patient not taking: Reported on 7/22/2021   phenazopyridine (PYRIDIUM) 200 mg tablet   No No   Sig: Take 1 tablet (200 mg total) by mouth 3 (three) times a day with meals   Patient not taking: Reported on 7/22/2021   polyethylene glycol (MIRALAX) 17 g packet  Outside Facility (Specify) No No   Sig: Take 17 g by mouth daily   senna (SENOKOT) 8 6 mg   No No   Sig: Take 1 tablet (8 6 mg total) by mouth daily as needed for constipation   tamsulosin (FLOMAX) 0 4 mg  Outside Facility (Specify) No No   Sig: Take 1 capsule (0 4 mg total) by mouth daily with dinner   vitamin B-12 (CYANOCOBALAMIN) 100 MCG TABS  Outside Facility (Specify) Yes No   Sig: Take 100 mcg by mouth daily      Facility-Administered Medications: None       Past Medical History:   Diagnosis Date    Ambulatory dysfunction     uses walker with assist of 1    Anemia     Anxiety     At risk for falls     hx of falls    Benign paroxysmal vertigo     unspecified ear    BPH (benign prostatic hyperplasia)     for TURP today 1/4/2021    Calculus in bladder     for surgical removal today 1/4/2021    Cataract     left eye    Cervicalgia     Chest pain     Chronic kidney disease     stage 3    Constipation     CTS (carpal tunnel syndrome)     left    Cyst of pancreas     Cystitis 06/23/2020    acute cystitis with hematuria    Depression     Diabetes mellitus ([facility] [address] )     type II with neuropathy    Dizziness     and giddiness    Dysphagia     Elevated PSA     Facial weakness     GERD (gastroesophageal reflux disease)     last assessed 5/20/16    Gross hematuria     Hematuria     Hyperlipidemia     Hypertension     Kidney disease     Kidney stone     Left-sided weakness     Long term (current) use of oral hypoglycemic drugs     Muscle weakness     Nuclear senile cataract of left eye     OA (osteoarthritis) of knee     b/l    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HCC)     Syncope and collapse     Tachycardia     UTI (urinary tract infection)     Vertebro-basilar artery syndrome     Vitamin B deficiency     Vitamin D deficiency     Vitamin D deficiency        Past Surgical History:   Procedure Laterality Date    CATARACT EXTRACTION      CHOLECYSTECTOMY      KNEE SURGERY      UT REMOVE BLADDER STONE,<2 5 CM N/A 1/4/2021    Procedure: Cystolitholopaxy w/laser bladder stones;  Surgeon: Yung Esparza MD;  Location: [facility];  Service: Urology    UT TRANSURETHRAL ELEC-SURG PROSTATECTOM N/A 1/4/2021    Procedure: T U R P ;  Surgeon: Yung Esparza MD;  Location: [facility];  Service: Urology       Family History   Problem Relation Age of Onset    Coronary artery disease Mother     Diabetes Father      I have reviewed and agree with the history as documented  E-Cigarette/Vaping    E-Cigarette Use Never User      E-Cigarette/Vaping Substances    Nicotine No     THC No     CBD No      Social History     Tobacco Use    Smoking status: Never Smoker    Smokeless tobacco: Never Used   Vaping Use    Vaping Use: Never used   Substance Use Topics    Alcohol use: Not Currently    Drug use: No       Review of Systems   Constitutional: Negative for chills, diaphoresis and fever  HENT: Negative for congestion, drooling, facial swelling, nosebleeds, sore throat and voice change  Eyes: Negative for discharge and redness  Respiratory: Negative for cough, choking, chest tightness, shortness of breath and stridor  Cardiovascular: Negative for chest pain and palpitations  Gastrointestinal: Positive for abdominal pain, nausea and vomiting  Negative for diarrhea  Genitourinary: Negative for decreased urine volume, difficulty urinating, dysuria, flank pain, frequency and urgency  Musculoskeletal: Negative for arthralgias, back pain, neck pain and neck stiffness  Skin: Negative for color change, rash and wound  Neurological: Negative for dizziness, syncope, facial asymmetry, weakness, light-headedness, numbness and headaches     Psychiatric/Behavioral: Negative for confusion and suicidal ideas  The patient is not nervous/anxious  All other systems reviewed and are negative  Physical Exam  Physical Exam  Vitals reviewed  Constitutional:       General: He is not in acute distress  Appearance: Normal appearance  He is normal weight  He is ill-appearing  He is not toxic-appearing or diaphoretic  HENT:      Head: Normocephalic and atraumatic  Right Ear: External ear normal       Left Ear: External ear normal       Mouth/Throat:      Mouth: Mucous membranes are moist       Pharynx: Oropharynx is clear  No oropharyngeal exudate or posterior oropharyngeal erythema  Eyes:      General: No scleral icterus  Right eye: No discharge  Left eye: No discharge  Extraocular Movements: Extraocular movements intact  Conjunctiva/sclera: Conjunctivae normal       Pupils: Pupils are equal, round, and reactive to light  Cardiovascular:      Rate and Rhythm: Normal rate and regular rhythm  Pulses: Normal pulses  Heart sounds: Normal heart sounds  No murmur heard  No friction rub  No gallop  Pulmonary:      Effort: Pulmonary effort is normal  No respiratory distress  Breath sounds: Normal breath sounds  No stridor  No wheezing, rhonchi or rales  Abdominal:      General: Abdomen is flat  Palpations: Abdomen is soft  Tenderness: There is generalized abdominal tenderness (Worse in the left lower quadrant)  There is no right CVA tenderness, left CVA tenderness, guarding or rebound  Musculoskeletal:         General: Normal range of motion  Cervical back: Normal range of motion and neck supple  Right lower leg: No edema  Left lower leg: No edema  Skin:     General: Skin is warm and dry  Capillary Refill: Capillary refill takes less than 2 seconds  Neurological:      General: No focal deficit present  Mental Status: He is alert and oriented to person, place, and time     Psychiatric: Mood and Affect: Mood normal          Behavior: Behavior normal          Vital Signs  ED Triage Vitals   Temperature Pulse Respirations Blood Pressure SpO2   09/25/21 0133 09/25/21 0130 09/25/21 0130 09/25/21 0130 09/25/21 0130   97 8 °F (36 6 °C) 88 17 118/56 96 %      Temp Source Heart Rate Source Patient Position - Orthostatic VS BP Location FiO2 (%)   09/25/21 0133 09/25/21 0133 09/25/21 0133 09/25/21 0133 --   Temporal Monitor Sitting Right arm       Pain Score       09/25/21 0133       2           Vitals:    09/25/21 2358 09/26/21 0658 09/26/21 1626 09/27/21 0025   BP: 119/60 113/59 120/68 113/58   Pulse: 84 88 88 81   Patient Position - Orthostatic VS: Lying Lying Lying Lying         Visual Acuity  Visual Acuity      Most Recent Value   L Pupil Size (mm)  2   R Pupil Size (mm)  2   L Pupil Shape  Round   R Pupil Shape  Round          ED Medications  Medications   atorvastatin (LIPITOR) tablet 20 mg (20 mg Oral Given 9/26/21 0932)   finasteride (PROSCAR) tablet 5 mg (5 mg Oral Given 9/26/21 0932)   melatonin tablet 3 mg (3 mg Oral Given 9/26/21 2127)   tamsulosin (FLOMAX) capsule 0 4 mg (0 4 mg Oral Given 9/26/21 1612)   ondansetron (ZOFRAN) injection 4 mg (has no administration in time range)   acetaminophen (TYLENOL) tablet 650 mg (650 mg Oral Given 9/26/21 0423)   heparin (porcine) subcutaneous injection 5,000 Units (5,000 Units Subcutaneous Given 9/27/21 0531)   docusate sodium (COLACE) capsule 100 mg (100 mg Oral Given 9/26/21 1749)   insulin lispro (HumaLOG) 100 units/mL subcutaneous injection 1-5 Units (1 Units Subcutaneous Not Given 9/26/21 1614)   iohexol (OMNIPAQUE) 350 MG/ML injection (SINGLE-DOSE) 100 mL (100 mL Intravenous Given 9/25/21 0301)   sodium chloride 0 9 % bolus 1,000 mL (0 mL Intravenous Stopped 9/25/21 0719)       Diagnostic Studies  Results Reviewed     Procedure Component Value Units Date/Time    Fingerstick Glucose (POCT) [[id number]]  (Abnormal) Collected: 09/25/21 1501    Lab Status: Final result Updated: 09/25/21 1502     POC Glucose 175 mg/dl     Fingerstick Glucose (POCT) [[id number]]  (Abnormal) Collected: 09/25/21 1102    Lab Status: Final result Updated: 09/25/21 1422     POC Glucose 157 mg/dl     Lactic acid, plasma [[id number]]  (Normal) Collected: 09/25/21 1056    Lab Status: Final result Specimen: Blood from Arm, Left Updated: 09/25/21 1133     LACTIC ACID 1 2 mmol/L     Narrative:      Result may be elevated if tourniquet was used during collection  Lactic acid 2 Hours [[id number]]  (Abnormal) Collected: 09/25/21 0555    Lab Status: Final result Specimen: Blood from Arm, Left Updated: 09/25/21 0739     LACTIC ACID 2 5 mmol/L     Narrative:      Result may be elevated if tourniquet was used during collection  Lactic acid, plasma [[id number]]  (Abnormal) Collected: 09/25/21 0402    Lab Status: Final result Specimen: Blood from Arm, Left Updated: 09/25/21 0509     LACTIC ACID 2 3 mmol/L     Narrative:      Result may be elevated if tourniquet was used during collection      Comprehensive metabolic panel [[id number]]  (Abnormal) Collected: 09/25/21 0140    Lab Status: Final result Specimen: Blood from Arm, Left Updated: 09/25/21 0259     Sodium 130 mmol/L      Potassium 4 5 mmol/L      Chloride 97 mmol/L      CO2 26 mmol/L      ANION GAP 7 mmol/L      BUN 28 mg/dL      Creatinine 1 27 mg/dL      Glucose 224 mg/dL      Calcium 8 6 mg/dL      Corrected Calcium 9 6 mg/dL      AST 18 U/L      ALT 9 U/L      Alkaline Phosphatase 77 U/L      Total Protein 6 2 g/dL      Albumin 2 8 g/dL      Total Bilirubin 0 38 mg/dL      eGFR 56 ml/min/1 73sq m     Narrative:      [place] guidelines for Chronic Kidney Disease (CKD):     Stage 1 with normal or high GFR (GFR > 90 mL/min/1 73 square meters)    Stage 2 Mild CKD (GFR = 60-89 mL/min/1 73 square meters)    Stage 3A Moderate CKD (GFR = 45-59 mL/min/1 73 square meters)    Stage 3B Moderate CKD (GFR = 30-44 mL/min/1 73 square meters)    Stage 4 Severe CKD (GFR = 15-29 mL/min/1 73 square meters)    Stage 5 End Stage CKD (GFR <15 mL/min/1 73 square meters)  Note: GFR calculation is accurate only with a steady state creatinine    Lipase [[id number]]  (Normal) Collected: 09/25/21 0140    Lab Status: Final result Specimen: Blood from Arm, Left Updated: 09/25/21 0259     Lipase 147 u/L     CBC and differential [[id number]]  (Abnormal) Collected: 09/25/21 0140    Lab Status: Final result Specimen: Blood from Arm, Left Updated: 09/25/21 0245     WBC 17 03 Thousand/uL      RBC 4 15 Million/uL      Hemoglobin 12 2 g/dL      Hematocrit 36 2 %      MCV 87 fL      MCH 29 4 pg      MCHC 33 7 g/dL      RDW 13 9 %      MPV 10 7 fL      Platelets 379 Thousands/uL      nRBC 0 /100 WBCs      Neutrophils Relative 85 %      Immat GRANS % 2 %      Lymphocytes Relative 5 %      Monocytes Relative 7 %      Eosinophils Relative 1 %      Basophils Relative 0 %      Neutrophils Absolute 14 57 Thousands/µL      Immature Grans Absolute 0 29 Thousand/uL      Lymphocytes Absolute 0 84 Thousands/µL      Monocytes Absolute 1 13 Thousand/µL      Eosinophils Absolute 0 15 Thousand/µL      Basophils Absolute 0 05 Thousands/µL                  CT abdomen pelvis with contrast   Final Result by Koko Gold MD (09/25 8765)      Multiple abnormally thick walled nondilated loops of distal small bowel extending to the terminal ileum with surrounding inflammatory stranding and mesenteric edema suspicious for an infectious or inflammatory enteritis  Although less likely, mesenteric    ischemia cannot be excluded  Small amount of intra-abdominal and pelvic free fluid  No free intraperitoneal air  No evidence of large or small bowel obstruction  Scattered colonic diverticulosis with no evidence of acute diverticulitis  Stable chronic findings as described above              Workstation performed: [id number] Procedures  Procedures         ED Course  ED Course as of Sep 27 0647   Sat Sep 25, 2021   7890 Spoke with our surgeon on-call regarding patient's CT findings as well as elevated lactic acid  Recommends admission to Medicine for serial abdominal exams  Surgery will evaluate patient this morning  Identification of Seniors at Risk      Most Recent Value   (ISAR) Identification of Seniors at Risk   Before the illness or injury that brought you to the Emergency, did you need someone to help you on a regular basis? 1 Filed at: 09/25/2021 0202   In the last 24 hours, have you needed more help than usual?  0 Filed at: 09/25/2021 4022   Have you been hospitalized for one or more nights during the past 6 months? 1 Filed at: 09/25/2021 0202   In general, do you see well?  0 Filed at: 09/25/2021 0202   In general, do you have serious problems with your memory? 0 Filed at: 09/25/2021 0202   Do you take more than three different medications every day? 1 Filed at: 09/25/2021 0202   ISAR Score  3 Filed at: 09/25/2021 0202                                  MDM  Number of Diagnoses or Management Options  Generalized abdominal pain  Diagnosis management comments: Patient is a 70-year-old male with a past medical history significant for chronic kidney disease, diabetes, hyperlipidemia, hypertension who presents to the emergency department for evaluation of abdominal pain that began earlier in the night at his nursing home  Patient states that his pain is currently improving  Patient states that the pain is in the lower abdomen  He states that he did vomit at his nursing home but is no longer nauseous  Patient is a poor historian  He is denying any current fevers, chills, chest pain, difficulty breathing, diarrhea, headache, dizziness  No other complaints at this time  Patient appears comfortable in bed  He is not any acute distress    Vital signs are normal   Physical exam remarkable for generalized abdominal tenderness that seems to be worse in the left lower quadrant  Patient does appear to be chronically ill  Heart is regular rate and rhythm  Lungs are clear to auscultation bilaterally  Lab work remarkable for leukocytosis and elevated lactic acid  Creatinine also slightly elevated at 1 27 up from a baseline of about 1 1  CT of the abdomen and pelvis revealed findings consistent with infectious versus inflammatory enteritis, however mesenteric ischemia cannot be ruled out  I spoke with our general surgeon on-call who recommends admission to Medicine for serial abdominal exams and serial labs  Scott agrees to admit the patient  Patient is agreeable with this plan  Patient stable at this time  Amount and/or Complexity of Data Reviewed  Clinical lab tests: ordered and reviewed  Tests in the radiology section of CPT®: ordered and reviewed    Patient Progress  Patient progress: stable      Disposition  Final diagnoses:   Generalized abdominal pain     Time reflects when diagnosis was documented in both MDM as applicable and the Disposition within this note     Time User Action Codes Description Comment    9/25/2021  7:24 AM Tricia Beckman Add [R10 14] Generalized abdominal pain       ED Disposition     ED Disposition Condition Date/Time Comment    Admit Stable Sat Sep 25, 2021  7:24 AM Case was discussed with [facility]/surgery and the patient's admission status was agreed to be Admission Status: inpatient status to the service of Dr Brodie Mcdowell   Follow-up Information    None         Current Discharge Medication List      CONTINUE these medications which have NOT CHANGED    Details   ! ! ACCU-CHEK GUIDE test strip 1 strip as needed      acetaminophen (TYLENOL) 325 mg tablet Take 650 mg by mouth every 6 (six) hours as needed for mild pain      Alcohol Swabs (PRO COMFORT ALCOHOL) 70 % PADS 1 pad daily Use a pad when testing      amoxicillin (AMOXIL) 500 mg capsule amoxicillin 500 mg capsule atorvastatin (LIPITOR) 20 mg tablet Take 1 tablet (20 mg total) by mouth daily  Qty: 30 tablet, Refills: 0    Comments: Patient needs appt before further refills  Associated Diagnoses: Hyperlipidemia, unspecified hyperlipidemia type      carbidopa-levodopa (SINEMET)  mg per tablet Take 1 tablet by mouth 3 (three) times a day  Qty: 90 tablet, Refills: 11    Associated Diagnoses: Ambulatory dysfunction;  Left-sided weakness      cholecalciferol (VITAMIN D3) 1,000 units tablet Take 2 tablets (2,000 Units total) by mouth daily  Refills: 0    Associated Diagnoses: Vitamin D deficiency      !! cyanocobalamin (VITAMIN B-12) 1000 MCG tablet Take 1 tablet (1,000 mcg total) by mouth daily  Refills: 0    Associated Diagnoses: B12 deficiency      docusate sodium (COLACE) 100 mg capsule Take 1 capsule (100 mg total) by mouth 2 (two) times a day  Qty: 10 capsule, Refills: 0    Associated Diagnoses: Constipation      Easy Comfort Lancets MISC as needed      ergocalciferol (VITAMIN D2) 50,000 units TAKE 1 CAPSULE (50,000 UNITS TOTAL) BY MOUTH ONCE A WEEK FOR 7 DOSES  Qty: 4 capsule, Refills: 0    Associated Diagnoses: Vitamin D deficiency      ergocalciferol (VITAMIN D2) 50,000 units TAKE 1 CAPSULE (50,000 UNITS TOTAL) BY MOUTH ONCE A WEEK FOR 7 DOSES  Qty: 4 capsule, Refills: 0    Associated Diagnoses: Vitamin D deficiency      finasteride (PROSCAR) 5 mg tablet Take 1 tablet (5 mg total) by mouth daily  Qty: 30 tablet, Refills: 0    Associated Diagnoses: UTI (urinary tract infection)      glipiZIDE (GLUCOTROL) 10 mg tablet TAKE 1 TABLET BY MOUTH 2 TIMES A DAY  Qty: 180 tablet, Refills: 1    Associated Diagnoses: Type 2 diabetes mellitus with diabetic polyneuropathy, without long-term current use of insulin (HCC)      Glucagon, rDNA, (GLUCAGON EMERGENCY IJ) Inject 1 mL as directed Sugars less than 60 and conscious      glucose 40 % Take 15 g by mouth once      !! glucose blood test strip 1 strip as needed HYDROcodone-acetaminophen (NORCO) 5-325 mg per tablet Take 1 tablet by mouth 3 (three) times a day as needed for painMax Daily Amount: 3 tablets  Qty: 10 tablet, Refills: 0    Associated Diagnoses: Bladder stones; Benign prostatic hyperplasia without lower urinary tract symptoms      lidocaine (LIDODERM) 5 % Apply 1 patch topically daily Remove & Discard patch within 12 hours or as directed by MD  Refills: 0    Associated Diagnoses: Neck pain      lisinopril (ZESTRIL) 5 mg tablet Take 1 tablet (5 mg total) by mouth daily  Qty: 30 tablet, Refills: 0    Associated Diagnoses: Stage 3 chronic kidney disease (HCC)      meclizine (ANTIVERT) 25 mg tablet TAKE 1 TABLET BY MOUTH EVERY 8 HOURS AS NEEDED FOR DIZZINESS DO NOT DRIVE WITH MED  Qty: 90 tablet, Refills: 1    Associated Diagnoses: Vertigo      melatonin 3 mg Take 3 mg by mouth daily at bedtime      metFORMIN (GLUCOPHAGE) 1000 MG tablet TAKE 1 TABLET BY MOUTH 2 TIMES A DAY  Qty: 180 tablet, Refills: 1    Associated Diagnoses: Type 2 diabetes mellitus without complication, without long-term current use of insulin ([facility])      NovoFine Autocover 30G X 8 MM MISC       NovoLOG FlexPen 100 units/mL injection pen       ondansetron (ZOFRAN) 4 mg tablet Take 4 mg by mouth every 6 (six) hours as needed for nausea or vomiting      phenazopyridine (PYRIDIUM) 200 mg tablet Take 1 tablet (200 mg total) by mouth 3 (three) times a day with meals  Qty: 30 tablet, Refills: 0    Associated Diagnoses: Dysuria      polyethylene glycol (MIRALAX) 17 g packet Take 17 g by mouth daily  Qty: 14 each, Refills: 2    Associated Diagnoses: Constipation      senna (SENOKOT) 8 6 mg Take 1 tablet (8 6 mg total) by mouth daily as needed for constipation  Qty: 120 tablet, Refills: 0    Associated Diagnoses: Constipation      tamsulosin (FLOMAX) 0 4 mg Take 1 capsule (0 4 mg total) by mouth daily with dinner  Qty: 30 capsule, Refills: 0    Associated Diagnoses: UTI (urinary tract infection)      !! vitamin B-12 (CYANOCOBALAMIN) 100 MCG TABS Take 100 mcg by mouth daily       ! ! - Potential duplicate medications found  Please discuss with provider  No discharge procedures on file      PDMP Review       Value Time User    PDMP Reviewed  Yes 4/29/2020 11:16 AM Caridad Ren MD          ED Provider  Electronically Signed by           Dejan Barger PA-C  09/27/21 9807

## 2021-09-25 NOTE — CONSULTS
Consultation - General Surgery  Corbin Pitt 70 y o  male MRN: [id number]  Unit/Bed#: ED 20 Encounter: [id number]                                                  Inpatient consult to Acute Care Surgery  Consult performed by: Pravin Mcintosh PA-C  Consult ordered by: Bertha Sims MD        Assessment/Plan   87B M w abdominal pain, nausea and vomiting  Surgery consulted to r/o bowel ischemia  - CT scan shows multiple abnormally thickened walled nondilated loops distal small bowel extending to the terminal ileum with surrounding inflammatory stranding mesenteric edema suspicious for infectious or inflammatory enteritis, less likely mesenteric ischemia, but cannot be excluded  - lactic acid 2 3, then increase to 2 5, but resolved and 1 2 after IV fluids  Patient does have leukocytosis of 17 on admission  Creatinine is slightly elevated at 1 27 with baseline appearing around 1 1 the patient has history of CKD  - on exam patient's abdomen is slightly distended but soft and patient denies any abdominal pain or tenderness exam   - patient his Parkinson's dementia and is not a good historian    Plan  -- no acute intervention at this time  Plan for conservative management  Patient's symptoms have resolved and he is passing flatus so plan to start on clear liquid diet and monitor symptoms  If patient tolerates may advance as tolerated if continuing to have bowel function  -- serial labs and abdominal exams  -- monitor bowel function  -- Internal Medicine primary  ______________________________________________________________________    CHIEF COMPLAINT:      HPI: Corbin Pitt is a 70y o  year old male with PMHx of Parkinson's dementia, type 2 diabetes mellitus, hypertension, end-stage kidney disease who presents from nursing home with complaints of generalized abdominal pain starting last night with associated episode of vomiting with no blood    No diarrhea or constipation and patient is passing flatus at this time   Patient denies any fevers or chills and states he is hungry  Patient is poor historian and is unsure of his presenting symptoms but does remember having abdominal pain  He states he is hungry and is passing gas  He states sometimes he has some abdominal pain in his mid upper abdomen but he does not have it at this time  He denies any current nausea or vomiting, denies fevers or chills, denies chest pain, palpitations, shortness of breath  Patient states he misses his wife  Previous job abdominal history includes laparoscopic cholecystectomy    Review of Systems   Constitutional: Negative for appetite change and fever  HENT: Negative  Eyes: Negative  Respiratory: Negative  Cardiovascular: Negative  Gastrointestinal: Negative for abdominal pain, constipation, diarrhea, nausea and vomiting  Endocrine: Negative  Genitourinary: Negative  Musculoskeletal: Negative  Skin: Negative  Allergic/Immunologic: Negative  Neurological: Negative  Hematological: Negative  Psychiatric/Behavioral: Negative  All other systems reviewed and are negative        Meds/Allergies   No Known Allergies   all current active meds have been reviewed       Historical Information   Past Medical History:   Diagnosis Date    Ambulatory dysfunction     uses walker with assist of 1    Anemia     Anxiety     At risk for falls     hx of falls    Benign paroxysmal vertigo     unspecified ear    BPH (benign prostatic hyperplasia)     for TURP today 1/4/2021    Calculus in bladder     for surgical removal today 1/4/2021    Cataract     left eye    Cervicalgia     Chest pain     Chronic kidney disease     stage 3    Constipation     CTS (carpal tunnel syndrome)     left    Cyst of pancreas     Cystitis 06/23/2020    acute cystitis with hematuria    Depression     Diabetes mellitus ([facility] [address] )     type II with neuropathy    Dizziness     and giddiness    Dysphagia     Elevated PSA     Facial weakness     GERD (gastroesophageal reflux disease)     last assessed 5/20/16   Eveleen Krill hematuria     Hematuria     Hyperlipidemia     Hypertension     Kidney disease     Kidney stone     Left-sided weakness     Long term (current) use of oral hypoglycemic drugs     Muscle weakness     Nuclear senile cataract of left eye     OA (osteoarthritis) of knee     b/l    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HCC)     Syncope and collapse     Tachycardia     UTI (urinary tract infection)     Vertebro-basilar artery syndrome     Vitamin B deficiency     Vitamin D deficiency     Vitamin D deficiency      Past Surgical History:   Procedure Laterality Date    CATARACT EXTRACTION      CHOLECYSTECTOMY      KNEE SURGERY      [place] REMOVE BLADDER STONE,<2 5 CM N/A 1/4/2021    Procedure: Cystolitholopaxy w/laser bladder stones;  Surgeon: Edilberto Moon MD;  [facility];  Service: Urology    AZ TRANSURETHRAL ELEC-SURG PROSTATECTOM N/A 1/4/2021    Procedure: T U R P ;  Surgeon: Edilberto Moon MD;  [facility];  Service: Urology     Social History   Social History     Substance and Sexual Activity   Alcohol Use Not Currently     Social History     Substance and Sexual Activity   Drug Use No     Social History     Tobacco Use   Smoking Status Never Smoker   Smokeless Tobacco Never Used       Family History:   Family History   Problem Relation Age of Onset    Coronary artery disease Mother     Diabetes Father          Objective   Lab Results:   Lab Results   Component Value Date    WBC 17 03 (H) 09/25/2021    WBC 7 9 12/20/2017    HGB 12 2 09/25/2021    HGB 14 1 12/20/2017    HCT 36 2 (L) 09/25/2021    HCT 42 3 12/20/2017     09/25/2021     12/20/2017     Lab Results   Component Value Date     12/20/2017    K 4 5 09/25/2021    K 4 0 12/20/2017    CL 97 (L) 09/25/2021     12/20/2017    CO2 26 09/25/2021    CO2 29 12/20/2017    BUN 28 (H) 09/25/2021    BUN 14 12/20/2017    CREATININE 1 27 09/25/2021    CREATININE 1 26 (H) 12/20/2017     Lab Results   Component Value Date    CALCIUM 8 6 09/25/2021    CALCIUM 9 0 12/20/2017    MG 2 3 06/14/2020    PHOS 3 4 06/14/2020     Lab Results   Component Value Date    COLORU Red 06/18/2020    CLARITYU Cloudy 06/18/2020    SPECGRAV 1 011 06/18/2020    PHUR 7 0 06/18/2020    GLUCOSEU Negative 06/18/2020    KETONESU Negative 06/18/2020    BLOODU Large (A) 06/18/2020    NITRITE Negative 06/18/2020    LEUKOCYTESUR Small (A) 06/18/2020    BILIRUBINUR Negative 06/18/2020    UROBILINOGEN 0 2 06/18/2020    RBCUA Innumerable (A) 06/18/2020    WBCUA Field obscured, unable to enumerate (A) 06/18/2020    BACTERIA Field obscured, unable to enumerate (A) 06/18/2020     Lab Results   Component Value Date    TROPONINI <0 02 06/22/2020         Intake/Output Summary (Last 24 hours) at 9/25/2021 1340  Last data filed at 9/25/2021 0719  Gross per 24 hour   Intake 1000 ml   Output --   Net 1000 ml     Invasive Devices     Peripheral Intravenous Line            Peripheral IV 09/25/21 Left Antecubital <1 day          Drain            External Urinary Catheter 461 days    Continuous Bladder Irrigation Three-way 264 days                Physical Exam  Vitals: /60 (BP Location: Right arm)   Pulse 84   Temp 97 8 °F (36 6 °C) (Temporal)   Resp 18   SpO2 96%   GEN: A & O x 3, cooperative   HEENT: PERRLA EOMI, sclera anicterus, oral mucosa pinl/moist  NECK: supple   LUNGS: clear throughout, no wheezes or rhonchi  COR: RRR no murmur  ABD: +BS, abdomen slightly distended but soft and NTTP, no guarding, no rigidity  EXTREM: FROM no joint deformities    Edema none BLE  SKIN: warm, dry, no rash, no jaundice  NEURO: CN II -XII intact, no tremor, affect appropriate    Imaging Studies:   CT abdomen pelvis with contrast    Result Date: 9/25/2021  Impression: Multiple abnormally thick walled nondilated loops of distal small bowel extending to the terminal ileum with surrounding inflammatory stranding and mesenteric edema suspicious for an infectious or inflammatory enteritis  Although less likely, mesenteric  ischemia cannot be excluded  Small amount of intra-abdominal and pelvic free fluid  No free intraperitoneal air  No evidence of large or small bowel obstruction  Scattered colonic diverticulosis with no evidence of acute diverticulitis  Stable chronic findings as described above   Workstation performed: [id number]         Ilnee Sharp PA-C  9/25/2021

## 2021-09-25 NOTE — H&P
[facility]  H&P- Joey Jones 1950, 70 y o  male MRN: [id number]  Unit/Bed#: ED 20 Encounter: [id number]  Primary Care Provider: Benja Aguero MD   Date and time admitted to hospital: 9/25/2021  1:28 AM    * Generalized abdominal pain  Assessment & Plan  51-year-old male from [facility] facility due to ambulatory dysfunction presented to the emergency department with sudden onset generalized stomach he ache which began last night  Patient reported this was associated with 1 episode of vomiting which was nonbilious/nonbloody  No fevers/chills  Normal appetite  Denies any diarrhea like episodes  · CT abdomen pelvis consistent with multiple abnormally thick walled nondilated loops of distal small bowel extending to the terminal ileum with surrounding inflammatory stranding and mesenteric edema suspicious for infectious/inflammatory enteritis although mesenteric ischemia could not be ruled out  · Lactate 2 3, white blood cell count 17 03  · Lipase 147/LFTs normal  · Patient currently NPO on IV fluids  · Requested General surgery evaluation  Lactic acidosis  Assessment & Plan  · Lactate 2 3 on arrival which worsened to 2 5  · Start IV fluids  · Recheck lactate at 11  · Holding off on IV antibiotics given patient denies any symptoms of fever/diarrhea and abdominal pain better controlled  · Likely viral enteritis  · Awaiting further General surgery recommendations  Benign prostatic hyperplasia  Assessment & Plan  · Continue tamsulosin/finasteride  Ambulatory dysfunction  Assessment & Plan  · Patient with underlying history of ambulatory dysfunction of unclear etiology  · He is currently a resident at [facility]  · Requested PT OT evaluation  Essential hypertension  Assessment & Plan  · Holding lisinopril for now given mild elevation in renal function  · Monitor blood pressure closely      Type 2 diabetes mellitus with diabetic neuropathy [facility])  Assessment & Plan  Lab Results   Component Value Date    HGBA1C 6 2 (H) 06/14/2020       No results for input(s): POCGLU in the last 72 hours  Blood Sugar Average: Last 72 hrs:  ·  holding metformin/glipizide  · Currently patient NPO  · Sliding scale coverage with Accu-Cheks q 6 hours  Hyperlipidemia  Assessment & Plan  · Resume statin  VTE Prophylaxis: Heparin  / sequential compression device   Code Status: level 1  POLST: POLST form is not discussed and not completed at this time  Anticipated Length of Stay:  Patient will be admitted on an Inpatient basis with an anticipated length of stay of  over 2 midnights  Justification for Hospital Stay: general surgery eval, ivf    Total Time for Visit, including Counseling / Coordination of Care: 45 minutes  Greater than 50% of this total time spent on direct patient counseling and coordination of care  Chief Complaint:   Abdominal pain    History of Present Illness:    Gracie Garcia is a 70 y o  male who presents with complaints of sudden onset generalized abdominal pain which began last night at the nursing home  It was associated with 1 episode of nonbloody/nonbilious vomiting  No associated diarrhea or constipation  No fevers/chills  Reports normal appetite  Denies bright red blood mixed with stools/melena  In the ER, patient is hemodynamically stable  Mild elevation in creatinine at 1 27 with baseline closer to 1  Lipase 147  LFTs normal  Lactate 2 3 -> 2 5  White blood cell count mildly elevated at 17 03  CT abdomen pelvis consistent with infectious versus inflammatory enteritis although mesenteric ischemia could not be ruled out  Case was discussed with on-call general surgery who recommended admission under medical service with General surgery on board as consult  Review of Systems:    Review of Systems   Constitutional: Negative for activity change, appetite change, chills, diaphoresis, fatigue and fever     HENT: Negative for congestion, postnasal drip and rhinorrhea  Respiratory: Negative for cough, shortness of breath and wheezing  Cardiovascular: Negative for chest pain, palpitations and leg swelling  Gastrointestinal: Positive for abdominal pain, nausea and vomiting  Negative for blood in stool, constipation and diarrhea  Genitourinary: Negative for dysuria, flank pain, frequency and hematuria  Musculoskeletal: Positive for gait problem  Negative for myalgias  Skin: Negative for rash  Neurological: Negative for dizziness, seizures, speech difficulty, light-headedness and headaches         Past Medical and Surgical History:     Past Medical History:   Diagnosis Date    Ambulatory dysfunction     uses walker with assist of 1    Anemia     Anxiety     At risk for falls     hx of falls    Benign paroxysmal vertigo     unspecified ear    BPH (benign prostatic hyperplasia)     for TURP today 1/4/2021    Calculus in bladder     for surgical removal today 1/4/2021    Cataract     left eye    Cervicalgia     Chest pain     Chronic kidney disease     stage 3    Constipation     CTS (carpal tunnel syndrome)     left    Cyst of pancreas     Cystitis 06/23/2020    acute cystitis with hematuria    Depression     Diabetes mellitus ([facility] [address] )     type II with neuropathy    Dizziness     and giddiness    Dysphagia     Elevated PSA     Facial weakness     GERD (gastroesophageal reflux disease)     last assessed 5/20/16    Gross hematuria     Hematuria     Hyperlipidemia     Hypertension     Kidney disease     Kidney stone     Left-sided weakness     Long term (current) use of oral hypoglycemic drugs     Muscle weakness     Nuclear senile cataract of left eye     OA (osteoarthritis) of knee     b/l    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[address] )     Syncope and collapse     Tachycardia     UTI (urinary tract infection)     Vertebro-basilar artery syndrome     Vitamin B deficiency     Vitamin D deficiency     Vitamin D deficiency        Past Surgical History:   Procedure Laterality Date    CATARACT EXTRACTION      CHOLECYSTECTOMY      KNEE SURGERY      [place] REMOVE BLADDER STONE,<2 5 CM N/A 1/4/2021    Procedure: Cystolitholopaxy w/laser bladder stones;  Surgeon: Oh Zheng MD;  [facility];  Service: Urology    [place] TRANSURETHRAL ELEC-SURG PROSTATECTOM N/A 1/4/2021    Procedure: T U R P ;  Surgeon: Oh Zheng MD;  [facility];  Service: Urology       Meds/Allergies:    Prior to Admission medications    Medication Sig Start Date End Date Taking?  Authorizing Provider   ACCU-CHEK GUIDE test strip 1 strip as needed 6/16/20   Historical Provider, MD   acetaminophen (TYLENOL) 325 mg tablet Take 650 mg by mouth every 6 (six) hours as needed for mild pain    Historical Provider, MD   Alcohol Swabs (PRO COMFORT ALCOHOL) 70 % PADS 1 pad daily Use a pad when testing 6/16/20   Historical Provider, MD   amoxicillin (AMOXIL) 500 mg capsule amoxicillin 500 mg capsule    Historical Provider, MD   atorvastatin (LIPITOR) 20 mg tablet Take 1 tablet (20 mg total) by mouth daily 2/11/21   Hemalatha Fall DO   carbidopa-levodopa (SINEMET)  mg per tablet Take 1 tablet by mouth 3 (three) times a day 7/22/21 8/21/21  Benjamin Garza MD   cholecalciferol (VITAMIN D3) 1,000 units tablet Take 2 tablets (2,000 Units total) by mouth daily  Patient taking differently: Take 50,000 Units by mouth daily  6/24/20   Kaylan Morales MD   cyanocobalamin (VITAMIN B-12) 1000 MCG tablet Take 1 tablet (1,000 mcg total) by mouth daily 6/23/20   Kaylan Morales MD   docusate sodium (COLACE) 100 mg capsule Take 1 capsule (100 mg total) by mouth 2 (two) times a day 6/23/20   Kaylan Morales MD   Easy Comfort Lancets MISC as needed 6/16/20   Historical Provider, MD   ergocalciferol (VITAMIN D2) 50,000 units TAKE 1 CAPSULE (50,000 UNITS TOTAL) BY MOUTH ONCE A WEEK FOR 7 DOSES 2/10/21 3/25/21  Hemalatha Fall DO   ergocalciferol (VITAMIN D2) 50,000 units TAKE 1 CAPSULE (50,000 UNITS TOTAL) BY MOUTH ONCE A WEEK FOR 7 DOSES 2/10/21 3/25/21  Roxanne Almodovar DO   finasteride (PROSCAR) 5 mg tablet Take 1 tablet (5 mg total) by mouth daily 5/13/20   Fox Uribe PA-C   glipiZIDE (GLUCOTROL) 10 mg tablet TAKE 1 TABLET BY MOUTH 2 TIMES A DAY 2/10/21   Roxanne Almodovar DO   Glucagon, rDNA, (GLUCAGON EMERGENCY IJ) Inject 1 mL as directed Sugars less than 60 and conscious    Historical Provider, MD   glucose 40 % Take 15 g by mouth once    Historical Provider, MD   glucose blood test strip 1 strip as needed    Historical Provider, MD   HYDROcodone-acetaminophen (NORCO) 5-325 mg per tablet Take 1 tablet by mouth 3 (three) times a day as needed for painMax Daily Amount: 3 tablets  Patient not taking: Reported on 7/22/2021 1/6/21   Stanley Fitzgerald PA-C   lidocaine (LIDODERM) 5 % Apply 1 patch topically daily Remove & Discard patch within 12 hours or as directed by MD  Patient not taking: Reported on 7/22/2021 6/24/20   Sandra Mora MD   lisinopril (ZESTRIL) 5 mg tablet Take 1 tablet (5 mg total) by mouth daily 2/23/21   Roxanne Almodovar DO   meclizine (ANTIVERT) 25 mg tablet TAKE 1 TABLET BY MOUTH EVERY 8 HOURS AS NEEDED FOR DIZZINESS DO NOT DRIVE WITH MED  Patient not taking: Reported on 7/22/2021 5/31/20   Roxanne Almodovar DO   melatonin 3 mg Take 3 mg by mouth daily at bedtime    Historical Provider, MD   metFORMIN (GLUCOPHAGE) 1000 MG tablet TAKE 1 TABLET BY MOUTH 2 TIMES A DAY 2/10/21   Roxanne Almodovar DO   NovoFine Autocover 30G X 8 MM MISC  12/9/20   Historical Provider, MD   NovoLOG FlexPen 100 units/mL injection pen  1/2/21   Historical Provider, MD   ondansetron (ZOFRAN) 4 mg tablet Take 4 mg by mouth every 6 (six) hours as needed for nausea or vomiting  Patient not taking: Reported on 7/22/2021    Historical Provider, MD   phenazopyridine (PYRIDIUM) 200 mg tablet Take 1 tablet (200 mg total) by mouth 3 (three) times a day with meals  Patient not taking: Reported on 7/22/2021 1/19/21   AJIT Brink   polyethylene glycol (MIRALAX) 17 g packet Take 17 g by mouth daily 6/3/20   Iliana Grant DO   senna (SENOKOT) 8 6 mg Take 1 tablet (8 6 mg total) by mouth daily as needed for constipation 2/10/21   Iliana Grant DO   tamsulosin [facility]) 0 4 mg Take 1 capsule (0 4 mg total) by mouth daily with dinner 5/13/20   Renee Uribe PA-C   vitamin B-12 (CYANOCOBALAMIN) 100 MCG TABS Take 100 mcg by mouth daily 6/16/20   Historical Provider, MD SANCHEZ have reviewed home medications using allscripts  Allergies: No Known Allergies    Social History:     Marital Status: /Civil Union     Substance Use History:   Social History     Substance and Sexual Activity   Alcohol Use Not Currently     Social History     Tobacco Use   Smoking Status Never Smoker   Smokeless Tobacco Never Used     Social History     Substance and Sexual Activity   Drug Use No       Family History:    non-contributory    Physical Exam:     Vitals:   Blood Pressure: 111/57 (09/25/21 0930)  Pulse: 85 (09/25/21 0930)  Temperature: 97 8 °F (36 6 °C) (09/25/21 0133)  Temp Source: Temporal (09/25/21 0133)  Respirations: 21 (09/25/21 0930)  SpO2: 96 % (09/25/21 0930)    Physical Exam  Vitals reviewed  Constitutional:       General: He is not in acute distress  Appearance: He is obese  He is ill-appearing  HENT:      Head: Normocephalic and atraumatic  Nose: Nose normal  No congestion  Mouth/Throat:      Mouth: Mucous membranes are dry  Pharynx: Oropharynx is clear  Eyes:      Conjunctiva/sclera: Conjunctivae normal       Pupils: Pupils are equal, round, and reactive to light  Cardiovascular:      Rate and Rhythm: Normal rate and regular rhythm  Pulses: Normal pulses  Pulmonary:      Effort: Pulmonary effort is normal  No respiratory distress  Breath sounds: Normal breath sounds  No wheezing or rales     Abdominal:      General: Bowel sounds are normal  There is distension  Tenderness: There is no abdominal tenderness  There is no guarding or rebound  Musculoskeletal:         General: No swelling  Normal range of motion  Cervical back: Normal range of motion and neck supple  Skin:     General: Skin is warm and dry  Findings: No rash  Neurological:      Mental Status: He is alert and oriented to person, place, and time  Psychiatric:         Mood and Affect: Mood normal          Behavior: Behavior normal        Additional Data:     Lab Results: I have personally reviewed pertinent reports  Results from last 7 days   Lab Units 09/25/21  0140   WBC Thousand/uL 17 03*   HEMOGLOBIN g/dL 12 2   HEMATOCRIT % 36 2*   PLATELETS Thousands/uL 312   NEUTROS PCT % 85*   LYMPHS PCT % 5*   MONOS PCT % 7   EOS PCT % 1     Results from last 7 days   Lab Units 09/25/21  0140   SODIUM mmol/L 130*   POTASSIUM mmol/L 4 5   CHLORIDE mmol/L 97*   CO2 mmol/L 26   BUN mg/dL 28*   CREATININE mg/dL 1 27   ANION GAP mmol/L 7   CALCIUM mg/dL 8 6   ALBUMIN g/dL 2 8*   TOTAL BILIRUBIN mg/dL 0 38   ALK PHOS U/L 77   ALT U/L 9*   AST U/L 18   GLUCOSE RANDOM mg/dL 224*                 Results from last 7 days   Lab Units 09/25/21  0555 09/25/21  0402   LACTIC ACID mmol/L 2 5* 2 3*       Imaging: I have personally reviewed pertinent reports  CT abdomen pelvis with contrast   Final Result by Diana Drew MD (09/25 1570)      Multiple abnormally thick walled nondilated loops of distal small bowel extending to the terminal ileum with surrounding inflammatory stranding and mesenteric edema suspicious for an infectious or inflammatory enteritis  Although less likely, mesenteric    ischemia cannot be excluded  Small amount of intra-abdominal and pelvic free fluid  No free intraperitoneal air  No evidence of large or small bowel obstruction  Scattered colonic diverticulosis with no evidence of acute diverticulitis        Stable chronic findings as described above  Workstation performed: [id number]             Allscripts / Epic Records Reviewed: Yes     ** Please Note: This note has been constructed using a voice recognition system   **

## 2021-09-25 NOTE — PLAN OF CARE
Problem: Potential for Falls  Goal: Patient will remain free of falls  Description: INTERVENTIONS:  - Educate patient/family on patient safety including physical limitations  - Instruct patient to call for assistance with activity   - Consult OT/PT to assist with strengthening/mobility   - Keep Call bell within reach  - Keep bed low and locked with side rails adjusted as appropriate  - Keep care items and personal belongings within reach  - Initiate and maintain comfort rounds  - Make Fall Risk Sign visible to staff  - Offer Toileting every 2 Hours, in advance of need  - Initiate/Maintain bed alarm  - Apply yellow socks and bracelet for high fall risk patients  - Consider moving patient to room near nurses station  Outcome: Progressing     Problem: MOBILITY - ADULT  Goal: Maintain or return to baseline ADL function  Description: INTERVENTIONS:  -  Assess patient's ability to carry out ADLs; assess patient's baseline for ADL function and identify physical deficits which impact ability to perform ADLs (bathing, care of mouth/teeth, toileting, grooming, dressing, etc )  - Assess/evaluate cause of self-care deficits   - Assess range of motion  - Assess patient's mobility; develop plan if impaired  - Assess patient's need for assistive devices and provide as appropriate  - Encourage maximum independence but intervene and supervise when necessary  - Involve family in performance of ADLs  - Assess for home care needs following discharge   - Consider OT consult to assist with ADL evaluation and planning for discharge  - Provide patient education as appropriate  Outcome: Progressing  Goal: Maintains/Returns to pre admission functional level  Description: INTERVENTIONS:  - Perform BMAT or MOVE assessment daily    - Set and communicate daily mobility goal to care team and patient/family/caregiver  - Collaborate with rehabilitation services on mobility goals if consulted  - Perform Range of Motion 3 times a day    - Reposition patient every 2 hours    - Dangle patient 3 times a day  - Stand patient 3 times a day  - Ambulate patient 3 times a day  - Out of bed to chair 3 times a day   - Out of bed for meals 3 times a day  - Out of bed for toileting  - Record patient progress and toleration of activity level   Outcome: Progressing     Problem: Prexisting or High Potential for Compromised Skin Integrity  Goal: Skin integrity is maintained or improved  Description: INTERVENTIONS:  - Identify patients at risk for skin breakdown  - Assess and monitor skin integrity  - Assess and monitor nutrition and hydration status  - Monitor labs   - Assess for incontinence   - Turn and reposition patient  - Assist with mobility/ambulation  - Relieve pressure over bony prominences  - Avoid friction and shearing  - Provide appropriate hygiene as needed including keeping skin clean and dry  - Evaluate need for skin moisturizer/barrier cream  - Collaborate with interdisciplinary team   - Patient/family teaching  - Consider wound care consult   Outcome: Progressing     Problem: GASTROINTESTINAL - ADULT  Goal: Minimal or absence of nausea and/or vomiting  Description: INTERVENTIONS:  - Administer IV fluids if ordered to ensure adequate hydration  - Maintain NPO status until nausea and vomiting are resolved  - Nasogastric tube if ordered  - Administer ordered antiemetic medications as needed  - Provide nonpharmacologic comfort measures as appropriate  - Advance diet as tolerated, if ordered  - Consider nutrition services referral to assist patient with adequate nutrition and appropriate food choices  Outcome: Progressing  Goal: Maintains or returns to baseline bowel function  Description: INTERVENTIONS:  - Assess bowel function  - Encourage oral fluids to ensure adequate hydration  - Administer IV fluids if ordered to ensure adequate hydration  - Administer ordered medications as needed  - Encourage mobilization and activity  - Consider nutritional services referral to assist patient with adequate nutrition and appropriate food choices  Outcome: Progressing  Goal: Maintains adequate nutritional intake  Description: INTERVENTIONS:  - Monitor percentage of each meal consumed  - Identify factors contributing to decreased intake, treat as appropriate  - Assist with meals as needed  - Monitor I&O, weight, and lab values if indicated  - Obtain nutrition services referral as needed  Outcome: Progressing

## 2021-09-25 NOTE — ASSESSMENT & PLAN NOTE
· Lactate 2 3 on arrival which worsened to 2 5  · Start IV fluids  · Recheck lactate at 11  · Holding off on IV antibiotics given patient denies any symptoms of fever/diarrhea and abdominal pain better controlled  · Likely viral enteritis  · Awaiting further General surgery recommendations

## 2021-09-25 NOTE — ASSESSMENT & PLAN NOTE
Lab Results   Component Value Date    HGBA1C 6 2 (H) 06/14/2020       No results for input(s): POCGLU in the last 72 hours  Blood Sugar Average: Last 72 hrs:  ·  holding metformin/glipizide  · Currently patient NPO  · Sliding scale coverage with Accu-Cheks q 6 hours

## 2021-09-25 NOTE — ASSESSMENT & PLAN NOTE
77-year-old male from [facility] due to ambulatory dysfunction presented to the emergency department with sudden onset generalized stomach he ache which began last night  Patient reported this was associated with 1 episode of vomiting which was nonbilious/nonbloody  No fevers/chills  Normal appetite  Denies any diarrhea like episodes  · CT abdomen pelvis consistent with multiple abnormally thick walled nondilated loops of distal small bowel extending to the terminal ileum with surrounding inflammatory stranding and mesenteric edema suspicious for infectious/inflammatory enteritis although mesenteric ischemia could not be ruled out  · Lactate 2 3, white blood cell count 17 03  · Lipase 147/LFTs normal  · Patient currently NPO on IV fluids  · Requested General surgery evaluation

## 2021-09-25 NOTE — ASSESSMENT & PLAN NOTE
· Patient with underlying history of ambulatory dysfunction of unclear etiology  · He is currently a resident at [facility]  · Requested PT OT evaluation

## 2021-09-25 NOTE — ASSESSMENT & PLAN NOTE
· Holding lisinopril for now given mild elevation in renal function  · Monitor blood pressure closely

## 2021-09-25 NOTE — PLAN OF CARE
Problem: Potential for Falls  Goal: Patient will remain free of falls  Description: INTERVENTIONS:  - Educate patient/family on patient safety including physical limitations  - Instruct patient to call for assistance with activity   - Consult OT/PT to assist with strengthening/mobility   - Keep Call bell within reach  - Keep bed low and locked with side rails adjusted as appropriate  - Keep care items and personal belongings within reach  - Initiate and maintain comfort rounds  - Make Fall Risk Sign visible to staff  - Offer Toileting every 2 Hours, in advance of need  - Initiate/Maintain bedalarm  - Obtain necessary fall risk management equipment:   - Apply yellow socks and bracelet for high fall risk patients  - Consider moving patient to room near nurses station  Outcome: Progressing     Problem: MOBILITY - ADULT  Goal: Maintain or return to baseline ADL function  Description: INTERVENTIONS:  -  Assess patient's ability to carry out ADLs; assess patient's baseline for ADL function and identify physical deficits which impact ability to perform ADLs (bathing, care of mouth/teeth, toileting, grooming, dressing, etc )  - Assess/evaluate cause of self-care deficits   - Assess range of motion  - Assess patient's mobility; develop plan if impaired  - Assess patient's need for assistive devices and provide as appropriate  - Encourage maximum independence but intervene and supervise when necessary  - Involve family in performance of ADLs  - Assess for home care needs following discharge   - Consider OT consult to assist with ADL evaluation and planning for discharge  - Provide patient education as appropriate  Outcome: Progressing  Goal: Maintains/Returns to pre admission functional level  Description: INTERVENTIONS:  - Perform BMAT or MOVE assessment daily    - Set and communicate daily mobility goal to care team and patient/family/caregiver     - Collaborate with rehabilitation services on mobility goals if consulted  - Perform Range of Motion 3 times a day  - Reposition patient every 3 hours    - Dangle patient 3 times a day  - Stand patient 3 times a day  - Ambulate patient 3 times a day  - Out of bed to chair 3 times a day   - Out of bed for meals 3 times a day  - Out of bed for toileting  - Record patient progress and toleration of activity level   Outcome: Progressing     Problem: Prexisting or High Potential for Compromised Skin Integrity  Goal: Skin integrity is maintained or improved  Description: INTERVENTIONS:  - Identify patients at risk for skin breakdown  - Assess and monitor skin integrity  - Assess and monitor nutrition and hydration status  - Monitor labs   - Assess for incontinence   - Turn and reposition patient  - Assist with mobility/ambulation  - Relieve pressure over bony prominences  - Avoid friction and shearing  - Provide appropriate hygiene as needed including keeping skin clean and dry  - Evaluate need for skin moisturizer/barrier cream  - Collaborate with interdisciplinary team   - Patient/family teaching  - Consider wound care consult   Outcome: Progressing     Problem: GASTROINTESTINAL - ADULT  Goal: Minimal or absence of nausea and/or vomiting  Description: INTERVENTIONS:  - Administer IV fluids if ordered to ensure adequate hydration  - Maintain NPO status until nausea and vomiting are resolved  - Nasogastric tube if ordered  - Administer ordered antiemetic medications as needed  - Provide nonpharmacologic comfort measures as appropriate  - Advance diet as tolerated, if ordered  - Consider nutrition services referral to assist patient with adequate nutrition and appropriate food choices  Outcome: Progressing  Goal: Maintains or returns to baseline bowel function  Description: INTERVENTIONS:  - Assess bowel function  - Encourage oral fluids to ensure adequate hydration  - Administer IV fluids if ordered to ensure adequate hydration  - Administer ordered medications as needed  - Encourage mobilization and activity  - Consider nutritional services referral to assist patient with adequate nutrition and appropriate food choices  Outcome: Progressing  Goal: Maintains adequate nutritional intake  Description: INTERVENTIONS:  - Monitor percentage of each meal consumed  - Identify factors contributing to decreased intake, treat as appropriate  - Assist with meals as needed  - Monitor I&O, weight, and lab values if indicated  - Obtain nutrition services referral as needed  Outcome: Progressing

## 2021-09-26 LAB
ALBUMIN SERPL BCP-MCNC: 2.5 G/DL (ref 3.5–5)
ALP SERPL-CCNC: 68 U/L (ref 46–116)
ALT SERPL W P-5'-P-CCNC: 13 U/L (ref 12–78)
ANION GAP SERPL CALCULATED.3IONS-SCNC: 8 MMOL/L (ref 4–13)
AST SERPL W P-5'-P-CCNC: 14 U/L (ref 5–45)
BILIRUB SERPL-MCNC: 0.44 MG/DL (ref 0.2–1)
BUN SERPL-MCNC: 14 MG/DL (ref 5–25)
CALCIUM ALBUM COR SERPL-MCNC: 9.6 MG/DL (ref 8.3–10.1)
CALCIUM SERPL-MCNC: 8.4 MG/DL (ref 8.3–10.1)
CHLORIDE SERPL-SCNC: 102 MMOL/L (ref 100–108)
CO2 SERPL-SCNC: 27 MMOL/L (ref 21–32)
CREAT SERPL-MCNC: 1.14 MG/DL (ref 0.6–1.3)
ERYTHROCYTE [DISTWIDTH] IN BLOOD BY AUTOMATED COUNT: 13.7 % (ref 11.6–15.1)
GFR SERPL CREATININE-BSD FRML MDRD: 64 ML/MIN/1.73SQ M
GLUCOSE SERPL-MCNC: 123 MG/DL (ref 65–140)
GLUCOSE SERPL-MCNC: 145 MG/DL (ref 65–140)
GLUCOSE SERPL-MCNC: 147 MG/DL (ref 65–140)
GLUCOSE SERPL-MCNC: 152 MG/DL (ref 65–140)
GLUCOSE SERPL-MCNC: 154 MG/DL (ref 65–140)
GLUCOSE SERPL-MCNC: 165 MG/DL (ref 65–140)
GLUCOSE SERPL-MCNC: 177 MG/DL (ref 65–140)
HCT VFR BLD AUTO: 31.8 % (ref 36.5–49.3)
HGB BLD-MCNC: 10.7 G/DL (ref 12–17)
MCH RBC QN AUTO: 29.3 PG (ref 26.8–34.3)
MCHC RBC AUTO-ENTMCNC: 33.6 G/DL (ref 31.4–37.4)
MCV RBC AUTO: 87 FL (ref 82–98)
PLATELET # BLD AUTO: 258 THOUSANDS/UL (ref 149–390)
PMV BLD AUTO: 10.3 FL (ref 8.9–12.7)
POTASSIUM SERPL-SCNC: 4.1 MMOL/L (ref 3.5–5.3)
PROT SERPL-MCNC: 5.7 G/DL (ref 6.4–8.2)
RBC # BLD AUTO: 3.65 MILLION/UL (ref 3.88–5.62)
SODIUM SERPL-SCNC: 137 MMOL/L (ref 136–145)
WBC # BLD AUTO: 7.57 THOUSAND/UL (ref 4.31–10.16)

## 2021-09-26 PROCEDURE — 99232 SBSQ HOSP IP/OBS MODERATE 35: CPT | Performed by: FAMILY MEDICINE

## 2021-09-26 PROCEDURE — 97163 PT EVAL HIGH COMPLEX 45 MIN: CPT

## 2021-09-26 PROCEDURE — 99231 SBSQ HOSP IP/OBS SF/LOW 25: CPT | Performed by: PHYSICIAN ASSISTANT

## 2021-09-26 PROCEDURE — 85027 COMPLETE CBC AUTOMATED: CPT | Performed by: FAMILY MEDICINE

## 2021-09-26 PROCEDURE — 80053 COMPREHEN METABOLIC PANEL: CPT | Performed by: FAMILY MEDICINE

## 2021-09-26 PROCEDURE — 82948 REAGENT STRIP/BLOOD GLUCOSE: CPT

## 2021-09-26 RX ORDER — DOCUSATE SODIUM 100 MG/1
100 CAPSULE, LIQUID FILLED ORAL 2 TIMES DAILY
Status: DISCONTINUED | OUTPATIENT
Start: 2021-09-26 | End: 2021-09-27 | Stop reason: HOSPADM

## 2021-09-26 RX ADMIN — ACETAMINOPHEN 650 MG: 325 TABLET, FILM COATED ORAL at 04:23

## 2021-09-26 RX ADMIN — TAMSULOSIN HYDROCHLORIDE 0.4 MG: 0.4 CAPSULE ORAL at 16:12

## 2021-09-26 RX ADMIN — DOCUSATE SODIUM 100 MG: 100 CAPSULE, LIQUID FILLED ORAL at 17:49

## 2021-09-26 RX ADMIN — DOCUSATE SODIUM 100 MG: 100 CAPSULE, LIQUID FILLED ORAL at 11:56

## 2021-09-26 RX ADMIN — Medication 3 MG: at 21:27

## 2021-09-26 RX ADMIN — FINASTERIDE 5 MG: 5 TABLET, FILM COATED ORAL at 09:32

## 2021-09-26 RX ADMIN — ATORVASTATIN CALCIUM 20 MG: 20 TABLET, FILM COATED ORAL at 09:32

## 2021-09-26 RX ADMIN — HEPARIN SODIUM 5000 UNITS: 5000 INJECTION INTRAVENOUS; SUBCUTANEOUS at 05:46

## 2021-09-26 RX ADMIN — HEPARIN SODIUM 5000 UNITS: 5000 INJECTION INTRAVENOUS; SUBCUTANEOUS at 21:27

## 2021-09-26 RX ADMIN — HEPARIN SODIUM 5000 UNITS: 5000 INJECTION INTRAVENOUS; SUBCUTANEOUS at 13:49

## 2021-09-26 NOTE — ASSESSMENT & PLAN NOTE
19-year-old male from [facility] due to ambulatory dysfunction presented to the emergency department with sudden onset generalized stomach he ache which began last night  Patient reported this was associated with 1 episode of vomiting which was nonbilious/nonbloody  No fevers/chills  Normal appetite  Denies any diarrhea like episodes  · CT abdomen pelvis consistent with multiple abnormally thick walled nondilated loops of distal small bowel extending to the terminal ileum with surrounding inflammatory stranding and mesenteric edema suspicious for infectious/inflammatory enteritis although mesenteric ischemia could not be ruled out  · Lactate 2 3, white blood cell count 17 03 on arrival  · Lipase 147/LFTs normal    -discussed with general surgery, likely symptoms related to ileitis/unlikely mesenteric ischemia  -patient being advanced to full liquid diet, if tolerated will advance further to regular diet  -patient is passing flatus, will continue to monitor for bowel movements  Abdominal examination is benign   -no antibiotics started    White blood cell count resolved without initiation of the same

## 2021-09-26 NOTE — PROGRESS NOTES
Progress Note -Surgery GÉNESIS Oconnor Son 70 y o  male MRN: [id number]  Unit/Bed#: -01 Encounter: [id number]      Assessment   69y M w abdominal pain, nausea and vomiting  Surgery consulted to r/o bowel ischemia  - patient tolerating CLD, no n/v  - AVSS  - CR 1 14 today  Plan   -- acute advance diet as tolerated  -- continue stool as an abdominal exams  -- suspect patient's symptoms were related to an ileitis, and less likely mesenteric ischemia  -- nothing further from surgical standpoint, please contact with any further questions  ______________________________________________________________________  Subjective:   Patient feeling well  Denies any abdominal pain, nausea or vomiting  He is passing flatus  Objective:    Vitals:  /59 (BP Location: Left arm)   Pulse 88   Temp 98 °F (36 7 °C) (Oral)   Resp 18   Ht 5' 7" (1 702 m)   Wt 86 kg (189 lb 9 5 oz)   SpO2 95%   BMI 29 69 kg/m²     I/Os:  I/O last 3 completed shifts: In: 0028 [P O :120;  I V :50; IV Piggyback:1000]  Out: -     I/O this shift:  In: 240 [P O :240]  Out: -     Invasive Devices     Peripheral Intravenous Line            Peripheral IV 09/25/21 Left Antecubital 1 day                Medications:  Current Facility-Administered Medications   Medication Dose Route Frequency    acetaminophen (TYLENOL) tablet 650 mg  650 mg Oral Q6H PRN    atorvastatin (LIPITOR) tablet 20 mg  20 mg Oral Daily    docusate sodium (COLACE) capsule 100 mg  100 mg Oral BID    finasteride (PROSCAR) tablet 5 mg  5 mg Oral Daily    heparin (porcine) subcutaneous injection 5,000 Units  5,000 Units Subcutaneous Q8H [facility]    insulin lispro (HumaLOG) 100 units/mL subcutaneous injection 1-5 Units  1-5 Units Subcutaneous Q6H [facility]    melatonin tablet 3 mg  3 mg Oral HS    ondansetron (ZOFRAN) injection 4 mg  4 mg Intravenous Q6H PRN    sodium chloride 0 9 % infusion  50 mL/hr Intravenous Continuous    tamsulosin (FLOMAX) capsule 0 4 mg  0 4 mg Oral Daily With Alberto Aimee and Cultures:   CBC with diff:   Lab Results   Component Value Date    WBC 7 57 09/26/2021    HGB 10 7 (L) 09/26/2021    HCT 31 8 (L) 09/26/2021    MCV 87 09/26/2021     09/26/2021    MCH 29 3 09/26/2021    MCHC 33 6 09/26/2021    RDW 13 7 09/26/2021    MPV 10 3 09/26/2021    NRBC 0 09/25/2021       BMP/CMP:  Lab Results   Component Value Date     12/20/2017    K 4 1 09/26/2021    K 4 0 12/20/2017     09/26/2021     12/20/2017    CO2 27 09/26/2021    CO2 29 12/20/2017    BUN 14 09/26/2021    BUN 14 12/20/2017    CREATININE 1 14 09/26/2021    CREATININE 1 26 (H) 12/20/2017    CALCIUM 8 4 09/26/2021    CALCIUM 9 0 12/20/2017    AST 14 09/26/2021    AST 13 12/20/2017    ALT 13 09/26/2021    ALT 11 12/20/2017    ALKPHOS 68 09/26/2021    ALKPHOS 85 12/20/2017    PROT 6 3 12/20/2017    BILITOT 0 6 12/20/2017    EGFR 64 09/26/2021       Lipid Panel:   Lab Results   Component Value Date    CHOL 189 12/20/2017       Coags:   No results found for: PT, PTT, INR     Urinalysis:   Lab Results   Component Value Date    COLORU Red 06/18/2020    CLARITYU Cloudy 06/18/2020    SPECGRAV 1 011 06/18/2020    PHUR 7 0 06/18/2020    LEUKOCYTESUR Small (A) 06/18/2020    NITRITE Negative 06/18/2020    GLUCOSEU Negative 06/18/2020    KETONESU Negative 06/18/2020    BILIRUBINUR Negative 06/18/2020    BLOODU Large (A) 06/18/2020      Urine Culture:   Lab Results   Component Value Date    URINECX No Growth <100 cfu/mL 01/04/2021        Physical Exam:  General Appearance:    Alert and orientated, cooperative, no distress, appears stated age   Lungs:     Clear to auscultation bilaterally, respirations unlabored, no wheezes    Heart:    Regular rate and rhythm, S1 and S2 normal, no murmur   Abdomen:    Normoactive BS, soft, non tender, non rigid, no masses, no palpated organomegaly   Extremities:  Extremities normal, no calf tenderness, no cyanosis or edema   Pulses:   2+ and symmetric all extremities   Skin:   Skin color, texture, turgor normal, no rashes   Neurologic:   CNII-XII intact, normal strength, affect appropriate       Imaging:  CT abdomen pelvis with contrast    Result Date: 9/25/2021  Impression: Multiple abnormally thick walled nondilated loops of distal small bowel extending to the terminal ileum with surrounding inflammatory stranding and mesenteric edema suspicious for an infectious or inflammatory enteritis  Although less likely, mesenteric  ischemia cannot be excluded  Small amount of intra-abdominal and pelvic free fluid  No free intraperitoneal air  No evidence of large or small bowel obstruction  Scattered colonic diverticulosis with no evidence of acute diverticulitis  Stable chronic findings as described above   Workstation performed: [id number]       VTE Pharmacologic Prophylaxis: Heparin  VTE Mechanical Prophylaxis: sequential compression device    Karoline Hodges PA-C   9/26/2021

## 2021-09-26 NOTE — PROGRESS NOTES
[facility]  Progress Note - Noel Ramírez 1950, 70 y o  male MRN: [id number]  Unit/Bed#: -01 Encounter: [id number]  Primary Care Provider: Jenn Jerome MD   Date and time admitted to hospital: 9/25/2021  1:28 AM    * Generalized abdominal pain  Assessment & Plan  68-year-old male from [facility] due to ambulatory dysfunction presented to the emergency department with sudden onset generalized stomach he ache which began last night  Patient reported this was associated with 1 episode of vomiting which was nonbilious/nonbloody  No fevers/chills  Normal appetite  Denies any diarrhea like episodes  · CT abdomen pelvis consistent with multiple abnormally thick walled nondilated loops of distal small bowel extending to the terminal ileum with surrounding inflammatory stranding and mesenteric edema suspicious for infectious/inflammatory enteritis although mesenteric ischemia could not be ruled out  · Lactate 2 3, white blood cell count 17 03 on arrival  · Lipase 147/LFTs normal    -discussed with general surgery, likely symptoms related to ileitis/unlikely mesenteric ischemia  -patient being advanced to full liquid diet, if tolerated will advance further to regular diet  -patient is passing flatus, will continue to monitor for bowel movements  Abdominal examination is benign   -no antibiotics started  White blood cell count resolved without initiation of the same    Lactic acidosis  Assessment & Plan  · Lactate 2 3 on arrival which improved to 1 2 following IV fluids  · Holding off on IV antibiotics given patient denies any symptoms of fever/diarrhea and abdominal pain better controlled  · Likely viral enteritis  Benign prostatic hyperplasia  Assessment & Plan  · Continue tamsulosin/finasteride      Ambulatory dysfunction  Assessment & Plan  · Patient with underlying history of ambulatory dysfunction of unclear etiology  · He is currently a resident at [facility]  · Requested PT OT evaluation  Essential hypertension  Assessment & Plan  · Holding lisinopril for now given mild elevation in renal function  · Monitor blood pressure closely  Type 2 diabetes mellitus with diabetic neuropathy [facility])  Assessment & Plan  Lab Results   Component Value Date    HGBA1C 6 2 (H) 2020       Recent Labs     21  2130 21  0000 21  0632 21  1147   POCGLU 160* 123 152* 147*       Blood Sugar Average: Last 72 hrs:  · (P) 156 [id number]   · holding metformin/glipizide  · Currently patient on clears  · Sliding scale coverage with Accu-Cheks q 6 hours  Hyperlipidemia  Assessment & Plan  · Resume statin  VTE Pharmacologic Prophylaxis:   Pharmacologic: Heparin  Mechanical VTE Prophylaxis in Place: Yes    Patient Centered Rounds: I have performed bedside rounds with nursing staff today  Discussions with Specialists or Other Care Team Provider: gen surgery    Education and Discussions with Family / Patient: dw patient    Time Spent for Care: 30 minutes  More than 50% of total time spent on counseling and coordination of care as described above  Current Length of Stay: 1 day(s)    Current Patient Status: Inpatient   Certification Statement: The patient will continue to require additional inpatient hospital stay due to advance diet    Discharge Plan: likely tomorrow    Code Status: Level 1 - Full Code      Subjective:   Reports no further abdominal pain  No nausea vomiting  Passing flatus but not had a bowel movement yet  No fevers/chills  Requesting advancement in diet  Objective:     Vitals:   Temp (24hrs), Av 2 °F (36 8 °C), Min:98 °F (36 7 °C), Max:98 3 °F (36 8 °C)    Temp:  [98 °F (36 7 °C)-98 3 °F (36 8 °C)] 98 °F (36 7 °C)  HR:  [84-94] 88  Resp:  [18] 18  BP: (113-136)/(59-66) 113/59  SpO2:  [95 %-98 %] 95 %  Body mass index is 29 69 kg/m²       Input and Output Summary (last 24 hours): Intake/Output Summary (Last 24 hours) at 9/26/2021 1339  Last data filed at 9/26/2021 1319  Gross per 24 hour   Intake 650 ml   Output --   Net 650 ml       Physical Exam:     Physical Exam  Constitutional:       General: He is not in acute distress  Appearance: He is obese  He is not toxic-appearing  HENT:      Mouth/Throat:      Mouth: Mucous membranes are moist       Pharynx: Oropharynx is clear  Cardiovascular:      Rate and Rhythm: Normal rate and regular rhythm  Pulses: Normal pulses  Pulmonary:      Effort: Pulmonary effort is normal       Breath sounds: Normal breath sounds  Abdominal:      General: Abdomen is flat  Bowel sounds are normal       Palpations: Abdomen is soft  Neurological:      General: No focal deficit present  Mental Status: He is alert and oriented to person, place, and time  Additional Data:     Labs:    Results from last 7 days   Lab Units 09/26/21  0538 09/25/21  0140   WBC Thousand/uL 7 57 17 03*   HEMOGLOBIN g/dL 10 7* 12 2   HEMATOCRIT % 31 8* 36 2*   PLATELETS Thousands/uL 258 312   NEUTROS PCT %  --  85*   LYMPHS PCT %  --  5*   MONOS PCT %  --  7   EOS PCT %  --  1     Results from last 7 days   Lab Units 09/26/21  0538   SODIUM mmol/L 137   POTASSIUM mmol/L 4 1   CHLORIDE mmol/L 102   CO2 mmol/L 27   BUN mg/dL 14   CREATININE mg/dL 1 14   ANION GAP mmol/L 8   CALCIUM mg/dL 8 4   ALBUMIN g/dL 2 5*   TOTAL BILIRUBIN mg/dL 0 44   ALK PHOS U/L 68   ALT U/L 13   AST U/L 14   GLUCOSE RANDOM mg/dL 154*         Results from last 7 days   Lab Units 09/26/21  1147 09/26/21  0632 09/26/21  0000 09/25/21  2130 09/25/21  1811 09/25/21  1501 09/25/21  1102   POC GLUCOSE mg/dl 147* 152* 123 160* 179* 175* 157*         Results from last 7 days   Lab Units 09/25/21  1056 09/25/21  0555 09/25/21  0402   LACTIC ACID mmol/L 1 2 2 5* 2 3*       * I Have Reviewed All Lab Data Listed Above  * Additional Pertinent Lab Tests Reviewed:  All Labs Within Last 24 Hours Reviewed    Recent Cultures (last 7 days):           Last 24 Hours Medication List:   Current Facility-Administered Medications   Medication Dose Route Frequency Provider Last Rate    acetaminophen  650 mg Oral Q6H PRN Georges Olmos MD      atorvastatin  20 mg Oral Daily Georges Olmos MD      docusate sodium  100 mg Oral BID Barbara Hutchison PA-C      finasteride  5 mg Oral Daily Georges Olmos MD      heparin (porcine)  5,000 Units Subcutaneous Q8H [facility] Georges Olmos MD      insulin lispro  1-5 Units Subcutaneous TID AC Georges Olmos MD      melatonin  3 mg Oral HS Georges Olmos MD      ondansetron  4 mg Intravenous Q6H PRN Georges Olmos MD      tamsulosin  0 4 mg Oral Daily With Ramona MD Mare          Today, Patient Was Seen By: MARGO Anton      ** Please Note: Dictation voice to text software may have been used in the creation of this document   **

## 2021-09-26 NOTE — ASSESSMENT & PLAN NOTE
Lab Results   Component Value Date    HGBA1C 6 2 (H) 06/14/2020       Recent Labs     09/25/21  2130 09/26/21  0000 09/26/21  0632 09/26/21  1147   POCGLU 160* 123 152* 147*       Blood Sugar Average: Last 72 hrs:  · (P) 156 [id number]   · holding metformin/glipizide  · Currently patient on clears  · Sliding scale coverage with Accu-Cheks q 6 hours

## 2021-09-26 NOTE — PLAN OF CARE
Problem: PHYSICAL THERAPY ADULT  Goal: Performs mobility at highest level of function for planned discharge setting  See evaluation for individualized goals  Description: Treatment/Interventions: Functional transfer training, LE strengthening/ROM, Therapeutic exercise, Endurance training, Patient/family training, Equipment eval/education, Bed mobility, Continued evaluation, Spoke to nursing          See flowsheet documentation for full assessment, interventions and recommendations  Note: Prognosis: Good  Problem List: Decreased strength, Decreased endurance, Impaired balance, Decreased mobility, Pain  Assessment: pt is a 70y/o m who presents to [facility] c abdominal pain  PMH significant for anemia, BPPV, cervicalgia, dysphagia, + facial weakness  at baseline, pt requires (A) c functional mobility tasks + primarily uses w/c  resides at [facility]: LOGAN COMER  currently requires min-mod (A)x1 to complete mobility tasks 2* deficits in strength, balance, pain, + activity tolerance noted in PT exam above  Barthel Index 40/100, Modified Pandora 4  initiated standing at bedside c RW, however standing tolerance limited to <10sec 2* fatigue  returned to supine c bed alarm activated  would benefit from skilled PT to maximize functional mobility + improve quality of life  AM-PAC raw score 11 indicating pt would benefit from skilled PT, however please refer to PT d/c recommendations for safe d/c planning  PT eval of high complexity 2* unstable med status c pt requiring ongoing medical management 2* abdominal pain  pt c multiple co-morbidities + mobility deficits requiring min-mod (A)x1  pt c poor standing tolerance hindering ability to ambulate at present  Barriers to Discharge: None        PT Discharge Recommendation: Return to facility with rehabilitation services     PT - OK to Discharge: Yes (return to SNF when stable )    See flowsheet documentation for full assessment

## 2021-09-26 NOTE — ASSESSMENT & PLAN NOTE
· Lactate 2 3 on arrival which improved to 1 2 following IV fluids  · Holding off on IV antibiotics given patient denies any symptoms of fever/diarrhea and abdominal pain better controlled  · Likely viral enteritis

## 2021-09-26 NOTE — PHYSICAL THERAPY NOTE
PT Evaluation (20min)  (8:11-8:31)    Past Medical History:   Diagnosis Date    Ambulatory dysfunction     uses walker with assist of 1    Anemia     Anxiety     At risk for falls     hx of falls    Benign paroxysmal vertigo     unspecified ear    BPH (benign prostatic hyperplasia)     for TURP today 1/4/2021    Calculus in bladder     for surgical removal today 1/4/2021    Cataract     left eye    Cervicalgia     Chest pain     Chronic kidney disease     stage 3    Constipation     CTS (carpal tunnel syndrome)     left    Cyst of pancreas     Cystitis 06/23/2020    acute cystitis with hematuria    Depression     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    Dizziness     and giddiness    Dysphagia     Elevated PSA     Facial weakness     GERD (gastroesophageal reflux disease)     last assessed 5/20/16    Gross hematuria     Hematuria     Hyperlipidemia     Hypertension     Kidney disease     Kidney stone     Left-sided weakness     Long term (current) use of oral hypoglycemic drugs     Muscle weakness     Nuclear senile cataract of left eye     OA (osteoarthritis) of knee     b/l    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HCC)     Syncope and collapse     Tachycardia     UTI (urinary tract infection)     Vertebro-basilar artery syndrome     Vitamin B deficiency     Vitamin D deficiency     Vitamin D deficiency         09/26/21 0811   PT Last Visit   PT Visit Date 09/26/21   Note Type   Note type Evaluation   Pain Assessment   Pain Assessment Tool 0-10   Pain Score 3   Pain Location/Orientation Orientation: Left; Location: Rib Cage   Hospital Pain Intervention(s) Repositioned   Home Living   Type of [facility] SNF  [facility]   Home Layout One level   Bathroom Shower/Tub Walk-in shower   Bathroom Toilet Raised   Bathroom Equipment Grab bars in shower; Shower chair;Grab bars around toilet   Jessica 21 (Comment)  (rollator) Prior Function   Level of [place] Needs assistance with ADLs and functional mobility  (propels w/c mod (I); (A)x1 for transfers)   Lives With Facility staff   Receives Help From Personal care attendant; Family  (sen[place] rise in [place])   ADL Assistance Needs assistance   IADLs Needs assistance   Falls in the last 6 months 0   Vocational Retired   Comments (-) drive   Restrictions/Precautions   Wells Tea Bearing Precautions Per Order No   Other Precautions Chair Alarm; Bed Alarm; Fall Risk;Pain   General   Additional Pertinent History pt presents to [facility] c abdominal pain  PT consulted for mobility + d/c planning  Family/Caregiver Present No   Cognition   Orientation Level Oriented X4   RUE Assessment   RUE Assessment WFL  (4/5)   LUE Assessment   LUE Assessment WFL  (4/5)   RLE Assessment   RLE Assessment WFL  (4-/5)   LLE Assessment   LLE Assessment WFL  (4-/5)   Coordination   Sensation WFL   Bed Mobility   Rolling L 4  Minimal assistance   Additional items Assist x 1;Bedrails; Increased time required;Verbal cues;LE management   Supine to Sit 3  Moderate assistance   Additional items Assist x 1;Bedrails;HOB elevated; Increased time required;Verbal cues;LE management   Sit to Supine 4  Minimal assistance   Additional items Assist x 1;Verbal cues;LE management; Increased time required   Transfers   Sit to Stand 3  Moderate assistance   Additional items Assist x 1;Verbal cues; Increased time required   Stand to Sit 3  Moderate assistance   Additional items Assist x 1;Verbal cues; Increased time required   Ambulation/Elevation   Gait pattern Not appropriate   Balance   Static Sitting Fair   Dynamic Sitting Fair -   Static Standing Poor  (standing tolerance <10sec c RW)   Endurance Deficit   Endurance Deficit Yes   Activity Tolerance   Activity Tolerance Patient limited by fatigue;Patient limited by pain   Nurse Made Aware FLORENTINO Pimentel aware of outcomes of PT eval  pt returned to supine at end of session c bed alarm activated  Assessment   Prognosis Good   Problem List Decreased strength;Decreased endurance; Impaired balance;Decreased mobility;Pain   Assessment pt is a 70y/o m who presents to [facility] c abdominal pain  PMH significant for anemia, BPPV, cervicalgia, dysphagia, + facial weakness  at baseline, pt requires (A) c functional mobility tasks + primarily uses w/c  resides at [facility]: LOGAN MORENO W  currently requires min-mod (A)x1 to complete mobility tasks 2* deficits in strength, balance, pain, + activity tolerance noted in PT exam above  Barthel Index 40/100, Modified Jocelyne 4  initiated standing at bedside c RW, however standing tolerance limited to <10sec 2* fatigue  returned to supine c bed alarm activated  would benefit from skilled PT to maximize functional mobility + improve quality of life  AM-PAC raw score 11 indicating pt would benefit from skilled PT, however please refer to PT d/c recommendations for safe d/c planning  PT eval of high complexity 2* unstable med status c pt requiring ongoing medical management 2* abdominal pain  pt c multiple co-morbidities + mobility deficits requiring min-mod (A)x1  pt c poor standing tolerance hindering ability to ambulate at present  Barriers to Discharge None   Goals   Patient Goals "to get up to the w/c"  STG Expiration Date 10/06/21   Short Term Goal #1 1  increase strength 1/2 grade to improve overall functional mobility, 2  perform bed mobility c (S) to decrease caregiver burden, 3  perform transfers c (S) to safely get in/out of w/c, 4  improve Barthel Index 10pts to improve quality of life   Plan   Treatment/Interventions Functional transfer training;LE strengthening/ROM; Therapeutic exercise; Endurance training;Patient/family training;Equipment eval/education; Bed mobility;Continued evaluation;Spoke to nursing   PT Frequency Other (Comment)  (3-5x/wk)   Recommendation   PT Discharge Recommendation Return to facility with rehabilitation services   PT - OK to Discharge Yes  (return to SNF when stable  )   AM-PAC Basic Mobility Inpatient   Turning in Bed Without Bedrails 3   Lying on Back to Sitting on Edge of Flat Bed 2   Moving Bed to Chair 2   Standing Up From Chair 2   Walk in Room 1   Climb 3-5 Stairs 1   Basic Mobility Inpatient Raw Score 11   Basic Mobility Standardized Score 30 25   Modified Middleton Scale   Modified [place] Scale 4   Barthel Index   Feeding 5   Bathing 0   Grooming Score 5   Dressing Score 5   Bladder Score 5   Bowels Score 10   Toilet Use Score 5   Transfers (Bed/Chair) Score 5   Mobility (Level Surface) Score 0   Stairs Score 0   Barthel Index Score 40     Jeana Joshi, PT, DPT

## 2021-09-26 NOTE — ASSESSMENT & PLAN NOTE
· Patient with underlying history of ambulatory dysfunction of unclear etiology  · He is currently a resident at [facility]  · Requested PT OT evaluation

## 2021-09-27 VITALS
HEART RATE: 86 BPM | TEMPERATURE: 98.2 F | OXYGEN SATURATION: 95 % | WEIGHT: 189.6 LBS | RESPIRATION RATE: 18 BRPM | DIASTOLIC BLOOD PRESSURE: 61 MMHG | BODY MASS INDEX: 29.76 KG/M2 | HEIGHT: 67 IN | SYSTOLIC BLOOD PRESSURE: 120 MMHG

## 2021-09-27 LAB
ANION GAP SERPL CALCULATED.3IONS-SCNC: 10 MMOL/L (ref 4–13)
BASOPHILS # BLD AUTO: 0.03 THOUSANDS/ΜL (ref 0–0.1)
BASOPHILS NFR BLD AUTO: 0 % (ref 0–1)
BUN SERPL-MCNC: 12 MG/DL (ref 5–25)
CALCIUM SERPL-MCNC: 8.8 MG/DL (ref 8.3–10.1)
CHLORIDE SERPL-SCNC: 101 MMOL/L (ref 100–108)
CO2 SERPL-SCNC: 27 MMOL/L (ref 21–32)
CREAT SERPL-MCNC: 1.12 MG/DL (ref 0.6–1.3)
EOSINOPHIL # BLD AUTO: 0.35 THOUSAND/ΜL (ref 0–0.61)
EOSINOPHIL NFR BLD AUTO: 5 % (ref 0–6)
ERYTHROCYTE [DISTWIDTH] IN BLOOD BY AUTOMATED COUNT: 13.4 % (ref 11.6–15.1)
GFR SERPL CREATININE-BSD FRML MDRD: 66 ML/MIN/1.73SQ M
GLUCOSE SERPL-MCNC: 137 MG/DL (ref 65–140)
GLUCOSE SERPL-MCNC: 147 MG/DL (ref 65–140)
GLUCOSE SERPL-MCNC: 154 MG/DL (ref 65–140)
GLUCOSE SERPL-MCNC: 182 MG/DL (ref 65–140)
HCT VFR BLD AUTO: 35 % (ref 36.5–49.3)
HGB BLD-MCNC: 11.4 G/DL (ref 12–17)
IMM GRANULOCYTES # BLD AUTO: 0.05 THOUSAND/UL (ref 0–0.2)
IMM GRANULOCYTES NFR BLD AUTO: 1 % (ref 0–2)
LYMPHOCYTES # BLD AUTO: 0.84 THOUSANDS/ΜL (ref 0.6–4.47)
LYMPHOCYTES NFR BLD AUTO: 11 % (ref 14–44)
MCH RBC QN AUTO: 28.6 PG (ref 26.8–34.3)
MCHC RBC AUTO-ENTMCNC: 32.6 G/DL (ref 31.4–37.4)
MCV RBC AUTO: 88 FL (ref 82–98)
MONOCYTES # BLD AUTO: 0.79 THOUSAND/ΜL (ref 0.17–1.22)
MONOCYTES NFR BLD AUTO: 10 % (ref 4–12)
NEUTROPHILS # BLD AUTO: 5.66 THOUSANDS/ΜL (ref 1.85–7.62)
NEUTS SEG NFR BLD AUTO: 73 % (ref 43–75)
NRBC BLD AUTO-RTO: 0 /100 WBCS
PLATELET # BLD AUTO: 277 THOUSANDS/UL (ref 149–390)
PMV BLD AUTO: 10.4 FL (ref 8.9–12.7)
POTASSIUM SERPL-SCNC: 4 MMOL/L (ref 3.5–5.3)
RBC # BLD AUTO: 3.99 MILLION/UL (ref 3.88–5.62)
SARS-COV-2 RNA RESP QL NAA+PROBE: NEGATIVE
SODIUM SERPL-SCNC: 138 MMOL/L (ref 136–145)
WBC # BLD AUTO: 7.72 THOUSAND/UL (ref 4.31–10.16)

## 2021-09-27 PROCEDURE — 82948 REAGENT STRIP/BLOOD GLUCOSE: CPT

## 2021-09-27 PROCEDURE — 99239 HOSP IP/OBS DSCHRG MGMT >30: CPT | Performed by: FAMILY MEDICINE

## 2021-09-27 PROCEDURE — 80048 BASIC METABOLIC PNL TOTAL CA: CPT | Performed by: FAMILY MEDICINE

## 2021-09-27 PROCEDURE — U0003 INFECTIOUS AGENT DETECTION BY NUCLEIC ACID (DNA OR RNA); SEVERE ACUTE RESPIRATORY SYNDROME CORONAVIRUS 2 (SARS-COV-2) (CORONAVIRUS DISEASE [COVID-19]), AMPLIFIED PROBE TECHNIQUE, MAKING USE OF HIGH THROUGHPUT TECHNOLOGIES AS DESCRIBED BY CMS-2020-01-R: HCPCS | Performed by: FAMILY MEDICINE

## 2021-09-27 PROCEDURE — U0005 INFEC AGEN DETEC AMPLI PROBE: HCPCS | Performed by: FAMILY MEDICINE

## 2021-09-27 PROCEDURE — 85025 COMPLETE CBC W/AUTO DIFF WBC: CPT | Performed by: FAMILY MEDICINE

## 2021-09-27 RX ADMIN — INSULIN LISPRO 1 UNITS: 100 INJECTION, SOLUTION INTRAVENOUS; SUBCUTANEOUS at 11:18

## 2021-09-27 RX ADMIN — HEPARIN SODIUM 5000 UNITS: 5000 INJECTION INTRAVENOUS; SUBCUTANEOUS at 13:25

## 2021-09-27 RX ADMIN — FINASTERIDE 5 MG: 5 TABLET, FILM COATED ORAL at 08:00

## 2021-09-27 RX ADMIN — TAMSULOSIN HYDROCHLORIDE 0.4 MG: 0.4 CAPSULE ORAL at 16:46

## 2021-09-27 RX ADMIN — HEPARIN SODIUM 5000 UNITS: 5000 INJECTION INTRAVENOUS; SUBCUTANEOUS at 05:31

## 2021-09-27 RX ADMIN — ATORVASTATIN CALCIUM 20 MG: 20 TABLET, FILM COATED ORAL at 08:00

## 2021-09-27 RX ADMIN — DOCUSATE SODIUM 100 MG: 100 CAPSULE, LIQUID FILLED ORAL at 08:00

## 2021-09-27 NOTE — ASSESSMENT & PLAN NOTE
72-year-old male from [facility] due to ambulatory dysfunction presented to the emergency department with sudden onset generalized stomach he ache which began last night  Patient reported this was associated with 1 episode of vomiting which was nonbilious/nonbloody  No fevers/chills  Normal appetite  Denies any diarrhea like episodes  · CT abdomen pelvis consistent with multiple abnormally thick walled nondilated loops of distal small bowel extending to the terminal ileum with surrounding inflammatory stranding and mesenteric edema suspicious for infectious/inflammatory enteritis although mesenteric ischemia could not be ruled out  · Lactate 2 3, white blood cell count 17 03 on arrival  · Lipase 147/LFTs normal    -discussed with general surgery, likely symptoms related to ileitis/unlikely mesenteric ischemia  -patient tolerated full liquid diet, being advanced to consistent carb diet  -patient is passing flatus, Abdominal examination is benign   -no antibiotics started    White blood cell count resolved without initiation of the same

## 2021-09-27 NOTE — DISCHARGE SUMMARY
[address], [place]  Discharge- Saleem Vitale 1950, 70 y o  male MRN: [id number]  Unit/Bed#: -02 Encounter: [id number]  Primary Care Provider: Eden Lovell MD   Date and time admitted to hospital: 9/25/2021  1:28 AM    * Generalized abdominal pain  Assessment & Plan  70-year-old male from [facility] due to ambulatory dysfunction presented to the emergency department with sudden onset generalized stomach he ache which began last night  Patient reported this was associated with 1 episode of vomiting which was nonbilious/nonbloody  No fevers/chills  Normal appetite  Denies any diarrhea like episodes  · CT abdomen pelvis consistent with multiple abnormally thick walled nondilated loops of distal small bowel extending to the terminal ileum with surrounding inflammatory stranding and mesenteric edema suspicious for infectious/inflammatory enteritis although mesenteric ischemia could not be ruled out  · Lactate 2 3, white blood cell count 17 03 on arrival  · Lipase 147/LFTs normal    -discussed with general surgery, likely symptoms related to ileitis/unlikely mesenteric ischemia  -patient tolerated full liquid diet, being advanced to consistent carb diet  -patient is passing flatus, Abdominal examination is benign   -no antibiotics started  White blood cell count resolved without initiation of the same    Lactic acidosis  Assessment & Plan  · Lactate 2 3 on arrival which improved to 1 2 following IV fluids  · Holding off on IV antibiotics given patient denies any symptoms of fever/diarrhea and abdominal pain better controlled  · Likely viral enteritis  Benign prostatic hyperplasia  Assessment & Plan  · Continue tamsulosin/finasteride      Ambulatory dysfunction  Assessment & Plan  · Patient with underlying history of ambulatory dysfunction of unclear etiology  · He is currently a resident at [facility]  · Being discharged back to nursing facility    Essential hypertension  Assessment & Plan  · resume lisinopril    Type 2 diabetes mellitus with diabetic neuropathy [facility])  Assessment & Plan  Lab Results   Component Value Date    HGBA1C 6 2 (H) 06/14/2020       Recent Labs     09/26/21  2053 09/26/21  2330 09/27/21  0743 09/27/21  1042   POCGLU 177* 165* 147* 182*       Blood Sugar Average: Last 72 hrs:  · (P) 258 [id number]   ·  metformin/glipizide  · Well controlled    Hyperlipidemia  Assessment & Plan  · Resume statin  Discharging Physician / Practitioner: Farideh Parker MD  PCP: Sneha Dietz MD  Admission Date:   Admission Orders (From admission, onward)     Ordered        09/25/21 0724  Inpatient Admission  Once                   Discharge Date: 09/27/21    Disposition:      · SNF level of care    Reason for Admission: abdominal pain    Discharge Diagnoses:     Please see assessment and plan section above for further details regarding discharge diagnoses  Medical Problems     Resolved Problems  Date Reviewed: 7/23/2021    None                Consultations During Hospital Stay:  · General surgery    Medication Adjustments and Discharge Medications:  · Summary of Medication Adjustments made as a result of this hospitalization: see med rec  · Medication Dosing Tapers - Please refer to Discharge Medication List for details on any medication dosing tapers (if applicable to patient)  · Medications being temporarily held (include recommended restart time): see med rec  · Discharge Medication List: See after visit summary for reconciled discharge medications  Diet Recommendations at Discharge:  Diet -        Diet Orders   (From admission, onward)             Start     Ordered    09/27/21 0803  Diet Magdy/CHO Controlled; Consistent Carbohydrate Diet Level 2 (5 carb servings/75 grams CHO/meal);  Full Liquid  Diet effective now     Question Answer Comment   Diet Type Magdy/CHO Controlled    Magdy/CHO Controlled Consistent Carbohydrate Diet Level 2 (5 carb servings/75 grams CHO/meal)    Other Restriction(s): Full Liquid    RD to adjust diet per protocol? Yes        09/27/21 0802                Hospital Course:     Ana Reilly is a 70 y o  male patient who originally presented to the hospital on 9/25/2021 due to complaints of sudden onset generalized stomach ache which began 1 night prior to presentation to the ED  It was associated with 1 episode of vomiting  No fever chills  No diarrhea like episodes  · CT abdomen pelvis consistent with multiple abnormally thick walled on dilated loops of distal small bowel extending to the terminal ileum with surrounding inflammatory stranding and mesenteric edema suspicious for infectious/inflammatory enteritis although mesenteric ischemia could not be ruled out  · Lactate was 2 3 with white count of 17 03 on admission    -discussed with general surgery, symptoms likely related to eye ileitis/unlikely mesenteric ischemia  Patient was kept on clear liquids which was further transition to full liquid diet and he tolerated that well  He is passing flatus  Abdominal examination is benign  General surgery signed off  Patient was never started on antibiotics and his lactate/white count improved  Likely viral enteritis  Patient being discharged back to [facility] level of care  Condition at Discharge: good     Discharge Day Visit / Exam:     Vitals: Blood Pressure: 141/68 (09/27/21 0743)  Pulse: 89 (09/27/21 0743)  Temperature: 97 5 °F (36 4 °C) (09/27/21 0743)  Temp Source: Oral (09/27/21 0743)  Respirations: 18 (09/27/21 0743)  Height: 5' 7" (170 2 cm) (09/25/21 1800)  Weight - Scale: 86 kg (189 lb 9 5 oz) (09/25/21 1800)  SpO2: 95 % (09/27/21 0025)  Exam:   Physical Exam  Constitutional:       General: He is not in acute distress  Appearance: He is obese  He is not toxic-appearing  HENT:      Mouth/Throat:      Mouth: Mucous membranes are moist       Pharynx: Oropharynx is clear     Cardiovascular:      Rate and Rhythm: Normal rate and regular rhythm  Pulses: Normal pulses  Pulmonary:      Effort: Pulmonary effort is normal       Breath sounds: Normal breath sounds  Abdominal:      General: Bowel sounds are normal       Palpations: Abdomen is soft  Tenderness: There is no abdominal tenderness  There is no guarding  Neurological:      General: No focal deficit present  Mental Status: He is alert and oriented to person, place, and time  Goals of Care Discussions:  · Code Status at Discharge: Level 1 - Full Code    Discharge instructions/Information to patient and family:   See after visit summary section titled Discharge Instructions for information provided to patient and family  Discharge Statement:  I spent 35 minutes discharging the patient  This time was spent on the day of discharge  I had direct contact with the patient on the day of discharge  Greater than 50% of the total time was spent examining patient, answering all patient questions, arranging and discussing plan of care with patient as well as directly providing post-discharge instructions  Additional time then spent on discharge activities      ** Please Note: This note has been constructed using a voice recognition system **

## 2021-09-27 NOTE — CASE MANAGEMENT
Case Management Assessment & Discharge Planning Note    Patient name Mukul Farm  Location [facility] [phone] MRN [id number]  : 1950 Date 2021       Current Admission Date: 2021  Current Admission Diagnosis:  Generalized abdominal pain  Previous Admission - Discharge Date:21   LOS (days): 2  Geometric Mean LOS (GMLOS) (days):   Days to GMLOS: Previous Discharge Diagnosis:  There are no discharge diagnoses documented for the most recent discharge  OBJECTIVE:    Risk of Unplanned Readmission Score: 13   Bundle(if applicable):    Current admission status: Inpatient       Preferred Pharmacy:   [address] [place] [address]  Phone: [phone] [facility]ax: [facility]  [place] [facility] [address]  [facility]   [address] [facility]  Phone: [phone] Fax: [phone]    Primary Care Provider: Dayday Hankins MD    Primary Insurance: [facility] [employer]  Secondary Insurance: [address] [place] [address]    ASSESSMENT:  Active Health Care Agents    There are no active Health Care Agents on file  Patient Information  Admitted from[de-identified] Facility  Mental Status: Confused  During Assessment patient was accompanied by: Not accompanied during assessment  Assessment information provided by[de-identified] Patient, Other - please comment  Primary Caregiver:  Other (Comment) ([address])  [place] of Residence: [place]  Living Arrangements: Other (Comment) ([address])  Is patient a ?: No    Activities of Daily Living Prior to Admission  Functional Status: Assistance  Completes ADLs independently?: No  Level of ADL dependence: Assistance  Ambulates independently?: No  Level of ambulatory dependence: Assistance  Does patient use assisted devices?: Yes  Assisted Devices (DME) used: Rollator  Does patient currently own DME?: No  Does patient have a history of Outpatient Therapy (PT/OT)?: No  Does the patient have a history of Short-Term Rehab?: Yes (Resides at [facility])  Does patient have a history of [address]?: No  Does patient currently have [address]?: No         Patient Information Continued  Income Source: Pension/shelter  Does patient have prescription coverage?: Yes  Does patient receive dialysis treatments?: No  Does patient have a history of substance abuse?: No  Does patient have a history of Mental Health Diagnosis?: No    PHQ 2/9 Screening   Reviewed PHQ 2/9 Depression Screening Score?: Yes    Means of Transportation  Means of Transport to Appts[de-identified] Other (Comment) (Facility transports)  In the past 12 months, has lack of transportation kept you from medical appointments or from getting medications?: No  In the past 12 months, has lack of transportation kept you from meetings, work, or from getting things needed for daily living?: No  Was application for public transport provided?: No (Not needed)    DISCHARGE DETAILS:    Discharge planning discussed with[de-identified] Cm spoke with the pt and he states that he resides at [facility] and would like to return  CM sent a referral to [facility] and they are able to accept the pt  They requested a COVID test for the pt,  Cm informed the treatment team                               We would like to be able to fill any required prescriptions on discharge at our [address] [place] [address] and have them delivered to you at discharge in your room    Would you like to participate in this program? : No - Declined    Discharge Destination Plan[de-identified] Short Term Rehab

## 2021-09-27 NOTE — TRANSPORTATION MEDICAL NECESSITY
Section I - General Information    Name of Patient: Jose Lindsey                 : 1950    Medicare #: [id number]  Transport Date: 21 (PCS is valid for round trips on this date and for all repetitive trips in the 60-day range as noted below )  Origin: [address]: [address]  Is the pt's stay covered under Medicare Part A (PPS/DRG)   []     Closest appropriate facility? If no, why is transport to more distant facility required? Yes  If hospice pt, is this transport related to pt's terminal illness? No       Section II - Medical Necessity Questionnaire  Ambulance transportation is medically necessary only if other means of transport are contraindicated or would be potentially harmful to the patient  To meet this requirement, the patient must either be "bed confined" or suffer from a condition such that transport by means other than ambulance is contraindicated by the patient's condition  The following questions must be answered by the medical professional signing below for this form to be valid:    1)  Describe the MEDICAL CONDITION (physical and/or mental) of this patient AT [address] [place] [address] that requires the patient to be transported in an ambulance and why transport by other means is contraindicated by the patient's condition:Decreased strength, decreased endurance, impaired balance, decreased mobility, Pain    2) Is the patient "bed confined" as defined below? No  To be "be confined" the patient must satisfy all three of the following conditions: (1) unable to get up from bed without Assistance; AND (2) unable to ambulate; AND (3) unable to sit in a chair or wheelchair  3) Can this patient safely be transported by car or wheelchair van (i e , seated during transport without a medical attendant or monitoring)?    No    4) In addition to completing questions 1-3 above, please check any of the following conditions that apply*:   *Note: supporting documentation for any boxes checked must be maintained in the patient's medical records  If hosp-hosp transfer, describe services needed at 2nd facility not available at 1st facility? Patient is confused  Medical attendant required   Unable to tolerate seated position for time needed to transport       Section III - Signature of Physician or Healthcare Professional  I certify that the above information is true and correct based on my evaluation of this patient, and represent that the patient requires transport by ambulance and that other forms of transport are contraindicated  I understand that this information will be used by the Centers for Medicare and Medicaid Services (CMS) to support the determination of medical necessity for ambulance services, and I represent that I have personal knowledge of the patient's condition at time of transport  []  If this box is checked, I also certify that the patient is physically or mentally incapable of signing the ambulance service's claim and that the institution with which I am affiliated has furnished care, services, or assistance to the patient  My signature below is made on behalf of the patient pursuant to 42 CFR §424 36(b)(4)  In accordance with 42 CFR §424 37, the specific reason(s) that the patient is physically or mentally incapable of signing the claim form is as follows: N/A  Signature of Physician* or Healthcare Professional______________________________________________________________  Signature Date 09/27/21 (For scheduled repetitive transports, this form is not valid for transports performed more than 60 days after this date)    Printed Name & Credentials of Physician or Healthcare Professional (MD, DO, RN, etc )___ERIKA Mena  *Form must be signed by patient's attending physician for scheduled, repetitive transports   For non-repetitive, unscheduled ambulance transports, if unable to obtain the signature of the attending physician, any of the following may sign (choose appropriate option below)  [] Physician Assistant []  Clinical Nurse Specialist []  Registered Nurse  []  Nurse Practitioner  [x] Discharge Planner

## 2021-09-27 NOTE — ASSESSMENT & PLAN NOTE
Lab Results   Component Value Date    HGBA1C 6 2 (H) 06/14/2020       Recent Labs     09/26/21 2053 09/26/21  2330 09/27/21  0743 09/27/21  1042   POCGLU 177* 165* 147* 182*       Blood Sugar Average: Last 72 hrs:  · (P) 601 [id number]   ·  metformin/glipizide  · Well controlled

## 2021-09-27 NOTE — ASSESSMENT & PLAN NOTE
· Patient with underlying history of ambulatory dysfunction of unclear etiology  · He is currently a resident at [facility]  · Being discharged back to nursing facility

## 2021-09-27 NOTE — UTILIZATION REVIEW
Initial Clinical Review    Admission: Date/Time/Statement:   Admission Orders (From admission, onward)     Ordered        09/25/21 0724  Inpatient Admission  Once                   Orders Placed This Encounter   Procedures    Inpatient Admission     Standing Status:   Standing     Number of Occurrences:   1     Order Specific Question:   Level of Care     Answer:   Med Surg [16]     Order Specific Question:   Estimated length of stay     Answer:   More than 2 Midnights     Order Specific Question:   Certification     Answer:   I certify that inpatient services are medically necessary for this patient for a duration of greater than two midnights  See H&P and MD Progress Notes for additional information about the patient's course of treatment  ED Arrival Information     Expected Arrival Acuity    - 9/25/2021 01:28 Urgent         Means of arrival Escorted by Service Admission type    [facility] Hospitalist Urgent         Arrival complaint    Abdominal pain        Chief Complaint   Patient presents with    Abdominal Pain     Pt brought from [place] [address] with c/o abdominal pain since 3 hours ago  Initial Presentation: 69 yo male to ED from home via EMS sudden onset generalized abdominal pain which began last night at the nursing home  It was associated with 1 episode of nonbloody/nonbilious vomiting  No associated diarrhea or constipation  CT abdomen pelvis consistent with infectious versus inflammatory enteritis although mesenteric ischemia could not be ruled out  Cr 1 27 baseline closer to 1, lipase 147, lactate 2 3 >2 5, wbc 17 03  Admitted IP status NPO , IVF and surgery eval   Lactic acidosis start IVF , recheck , likely from viral enteritis   HTN hold lisinopril and monitor BP       9/25 Surgery Consult   R/o bowel ischemia   lactic acid 2 3, then increase to 2 5, but resolved and 1 2 after IV fluids  Patient does have leukocytosis of 17 on admission    Creatinine is slightly elevated at 1 27 with baseline appearing around 1 1  plan for conservative mngt , start clear liq diet and monitor   Adv as mellissa if cont to bowel function   Serial labs and abd exams , monitor bowel function   Date:  9/26  Day 2: lactic acidosis improved to 1 2 following IVF   Hold off on IV abx , abd pain better controlled  Placed on full liq diet , adv as mellissa       9/26 Surgery Note   surgery consult to r/o bowel ischemia   Tolerating CLD   CR 1 14  adv diet as mellissa   Cont stool as abd exams        ED Triage Vitals   Temperature Pulse Respirations Blood Pressure SpO2   09/25/21 0133 09/25/21 0130 09/25/21 0130 09/25/21 0130 09/25/21 0130   97 8 °F (36 6 °C) 88 17 118/56 96 %      Temp Source Heart Rate Source Patient Position - Orthostatic VS BP Location FiO2 (%)   09/25/21 0133 09/25/21 0133 09/25/21 0133 09/25/21 0133 --   Temporal Monitor Sitting Right arm       Pain Score       09/25/21 0133       2          Wt Readings from Last 1 Encounters:   09/25/21 86 kg (189 lb 9 5 oz)     Additional Vital Signs:   09/26/21 1626  98 4 °F (36 9 °C)  88  18  120/68  --  97 %  None (Room air)  Lying   09/26/21 0658  98 °F (36 7 °C)  88  18  113/59  --  --  --  Lying   09/25/21 2358  98 3 °F (36 8 °C)  84  --  119/60  84  95 %  --  Lying   09/25/21 1820  98 3 °F (36 8 °C)  94  18  136/66  --  98 %  None (Room air)  Lying   09/25/21 1818  --  --  --  --  --  --  None (Room air)  --   09/25/21 1230  --  84  18  122/60  87  96 %  None (Room air)  Lying   09/25/21 1000  --  90  21  134/61  88  96 %  None (Room air)  Lying   09/25/21 0930  --  85  21  111/57  80  96 %  --  --   09/25/21 0900  --  87  20  123/58  84  96 %  None (Room air)  Sitting   09/25/21 0400  --  85  15  107/58  79  97 %  --  --   09/25/21 0230  --  86  18  95/53  71  94 %  --  --   09/25/21 0205  --  --  --  --  --  --  None (Room air)  --   09/25/21 0133  97 8 °F (36 6 °C)  80  18  118/56  --  --  None (Room air         Pertinent Labs/Diagnostic Test Results:   9/25 CT abd Multiple abnormally thick walled nondilated loops of distal small bowel extending to the terminal ileum with surrounding inflammatory stranding and mesenteric edema suspicious for an infectious or inflammatory enteritis  Although less likely, mesenteric   ischemia cannot be excluded    Small amount of intra-abdominal and pelvic free fluid    No free intraperitoneal air    No evidence of large or small bowel obstruction    Scattered colonic diverticulosis with no evidence of acute diverticulitis    Stable chronic findings as described above        Results from last 7 days   Lab Units 09/26/21  0538 09/25/21  0140   WBC Thousand/uL 7 57 17 03*   HEMOGLOBIN g/dL 10 7* 12 2   HEMATOCRIT % 31 8* 36 2*   PLATELETS Thousands/uL 258 312   NEUTROS ABS Thousands/µL  --  14 57*     Results from last 7 days   Lab Units 09/26/21  0538 09/25/21  0140   SODIUM mmol/L 137 130*   POTASSIUM mmol/L 4 1 4 5   CHLORIDE mmol/L 102 97*   CO2 mmol/L 27 26   ANION GAP mmol/L 8 7   BUN mg/dL 14 28*   CREATININE mg/dL 1 14 1 27   EGFR ml/min/1 73sq m 64 56   CALCIUM mg/dL 8 4 8 6     Results from last 7 days   Lab Units 09/26/21  0538 09/25/21  0140   AST U/L 14 18   ALT U/L 13 9*   ALK PHOS U/L 68 77   TOTAL PROTEIN g/dL 5 7* 6 2*   ALBUMIN g/dL 2 5* 2 8*   TOTAL BILIRUBIN mg/dL 0 44 0 38     Results from last 7 days   Lab Units 09/26/21  2330 09/26/21  2053 09/26/21  1614 09/26/21  1147 09/26/21  0632 09/26/21  0000 09/25/21  2130 09/25/21  1811 09/25/21  1501 09/25/21  1102   POC GLUCOSE mg/dl 165* 177* 145* 147* 152* 123 160* 179* 175* 157*     Results from last 7 days   Lab Units 09/26/21  0538 09/25/21  0140   GLUCOSE RANDOM mg/dL 154* 224*     Results from last 7 days   Lab Units 09/25/21  1056 09/25/21  0555 09/25/21  0402   LACTIC ACID mmol/L 1 2 2 5* 2 3*     Results from last 7 days   Lab Units 09/25/21  0140   LIPASE u/L 147       ED Treatment:   Medication Administration from 09/25/2021 0128 to 09/25/2021 1733       Date/Time Order Dose Route Action     09/25/2021 0550 sodium chloride 0 9 % bolus 1,000 mL 1,000 mL Intravenous New Bag     09/25/2021 0800 sodium chloride 0 9 % infusion 50 mL/hr Intravenous New Bag        Past Medical History:   Diagnosis Date    Ambulatory dysfunction     uses walker with assist of 1    Anemia     Anxiety     At risk for falls     hx of falls    Benign paroxysmal vertigo     unspecified ear    BPH (benign prostatic hyperplasia)     for TURP today 1/4/2021    Calculus in bladder     for surgical removal today 1/4/2021    Cataract     left eye    Cervicalgia     Chest pain     Chronic kidney disease     stage 3    Constipation     CTS (carpal tunnel syndrome)     left    Cyst of pancreas     Cystitis 06/23/2020    acute cystitis with hematuria    Depression     Diabetes mellitus ([facility] [address] )     type II with neuropathy    Dizziness     and giddiness    Dysphagia     Elevated PSA     Facial weakness     GERD (gastroesophageal reflux disease)     last assessed 5/20/16    Gross hematuria     Hematuria     Hyperlipidemia     Hypertension     Kidney disease     Kidney stone     Left-sided weakness     Long term (current) use of oral hypoglycemic drugs     Muscle weakness     Nuclear senile cataract of left eye     OA (osteoarthritis) of knee     b/l    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[address] )     Syncope and collapse     Tachycardia     UTI (urinary tract infection)     Vertebro-basilar artery syndrome     Vitamin B deficiency     Vitamin D deficiency     Vitamin D deficiency      Present on Admission:   Hyperlipidemia   Type 2 diabetes mellitus with diabetic neuropathy ([address] )   Essential hypertension   Ambulatory dysfunction   Benign prostatic hyperplasia      Admitting Diagnosis: Abdominal pain [R10 9]  Generalized abdominal pain [R10 84]  Age/Sex: 70 y o  male  Admission Orders:  Scheduled Medications:  atorvastatin, 20 mg, Oral, Daily  docusate sodium, 100 mg, Oral, BID  finasteride, 5 mg, Oral, Daily  heparin (porcine), 5,000 Units, Subcutaneous, Q8H [address]  insulin lispro, 1-5 Units, Subcutaneous, TID AC  melatonin, 3 mg, Oral, HS  tamsulosin, 0 4 mg, Oral, Daily With Dinner      Continuous IV Infusions:     PRN Meds:  acetaminophen, 650 mg, Oral, Q6H PRN  ondansetron, 4 mg, Intravenous, Q6H PRN    Fingerstick q6hr     IP CONSULT TO ACUTE CARE SURGERY    Network Utilization Review Department  ATTENTION: Please call with any questions or concerns to [phone] and carefully listen to the prompts so that you are directed to the right person  All voicemails are confidential   [place] Drop all requests for admission clinical reviews, approved or denied determinations and any other requests to dedicated fax number below belonging to the campus where the patient is receiving treatment   List of dedicated fax numbers for the Facilities:  [address] DENIALS (Administrative/Medical Necessity) [phone]   [address] (Maternity/NICU/Pediatrics) [phone]   [address] [place] [address] [id number] [place] [address] [address] [facility] [phone]

## 2021-09-27 NOTE — PLAN OF CARE
Problem: Potential for Falls  Goal: Patient will remain free of falls  Description: INTERVENTIONS:  - Educate patient/family on patient safety including physical limitations  - Instruct patient to call for assistance with activity   - Consult OT/PT to assist with strengthening/mobility   - Keep Call bell within reach  - Keep bed low and locked with side rails adjusted as appropriate  - Keep care items and personal belongings within reach  - Initiate and maintain comfort rounds  - Make Fall Risk Sign visible to staff  - Offer Toileting in advance of need  - Initiate/Maintain alarm  - Obtain necessary fall risk management equipment  - Apply yellow socks and bracelet for high fall risk patients  - Consider moving patient to room near nurses station  Outcome: Progressing     Problem: MOBILITY - ADULT  Goal: Maintain or return to baseline ADL function  Description: INTERVENTIONS:  -  Assess patient's ability to carry out ADLs; assess patient's baseline for ADL function and identify physical deficits which impact ability to perform ADLs (bathing, care of mouth/teeth, toileting, grooming, dressing, etc )  - Assess/evaluate cause of self-care deficits   - Assess range of motion  - Assess patient's mobility; develop plan if impaired  - Assess patient's need for assistive devices and provide as appropriate  - Encourage maximum independence but intervene and supervise when necessary  - Involve family in performance of ADLs  - Assess for home care needs following discharge   - Consider OT consult to assist with ADL evaluation and planning for discharge  - Provide patient education as appropriate  Outcome: Progressing  Goal: Maintains/Returns to pre admission functional level  Description: INTERVENTIONS:  - Perform BMAT or MOVE assessment daily    - Set and communicate daily mobility goal to care team and patient/family/caregiver     - Collaborate with rehabilitation services on mobility goals if consulted  -- Reposition every 2 hours or cue to reposition     Record patient progress and toleration of activity level   Outcome: Progressing     Problem: Prexisting or High Potential for Compromised Skin Integrity  Goal: Skin integrity is maintained or improved  Description: INTERVENTIONS:  - Identify patients at risk for skin breakdown  - Assess and monitor skin integrity  - Assess and monitor nutrition and hydration status  - Monitor labs   - Assess for incontinence   - Turn and reposition patient  - Assist with mobility/ambulation  - Relieve pressure over bony prominences  - Avoid friction and shearing  - Provide appropriate hygiene as needed including keeping skin clean and dry  - Evaluate need for skin moisturizer/barrier cream  - Collaborate with interdisciplinary team   - Patient/family teaching  - Consider wound care consult   Outcome: Progressing     Problem: GASTROINTESTINAL - ADULT  Goal: Minimal or absence of nausea and/or vomiting  Description: INTERVENTIONS:  - Administer IV fluids if ordered to ensure adequate hydration  - Maintain NPO status until nausea and vomiting are resolved  - Nasogastric tube if ordered  - Administer ordered antiemetic medications as needed  - Provide nonpharmacologic comfort measures as appropriate  - Advance diet as tolerated, if ordered  - Consider nutrition services referral to assist patient with adequate nutrition and appropriate food choices  Outcome: Progressing  Goal: Maintains or returns to baseline bowel function  Description: INTERVENTIONS:  - Assess bowel function  - Encourage oral fluids to ensure adequate hydration  - Administer IV fluids if ordered to ensure adequate hydration  - Administer ordered medications as needed  - Encourage mobilization and activity  - Consider nutritional services referral to assist patient with adequate nutrition and appropriate food choices  Outcome: Progressing  Goal: Maintains adequate nutritional intake  Description: INTERVENTIONS:  - Monitor percentage of each meal consumed  - Identify factors contributing to decreased intake, treat as appropriate  - Assist with meals as needed  - Monitor I&O, weight, and lab values if indicated  - Obtain nutrition services referral as needed  Outcome: Progressing

## 2021-10-11 ENCOUNTER — TELEPHONE (OUTPATIENT)
Dept: NEUROLOGY | Facility: CLINIC | Age: 71
End: 2021-10-11

## 2021-10-14 ENCOUNTER — OFFICE VISIT (OUTPATIENT)
Dept: NEUROLOGY | Facility: CLINIC | Age: 71
End: 2021-10-14
Payer: COMMERCIAL

## 2021-10-14 VITALS — SYSTOLIC BLOOD PRESSURE: 126 MMHG | DIASTOLIC BLOOD PRESSURE: 59 MMHG | HEART RATE: 91 BPM

## 2021-10-14 DIAGNOSIS — R26.2 AMBULATORY DYSFUNCTION: Primary | ICD-10-CM

## 2021-10-14 DIAGNOSIS — R42 DIZZINESS: ICD-10-CM

## 2021-10-14 DIAGNOSIS — R32 URINARY INCONTINENCE: ICD-10-CM

## 2021-10-14 PROCEDURE — 1036F TOBACCO NON-USER: CPT | Performed by: STUDENT IN AN ORGANIZED HEALTH CARE EDUCATION/TRAINING PROGRAM

## 2021-10-14 PROCEDURE — 1160F RVW MEDS BY RX/DR IN RCRD: CPT | Performed by: STUDENT IN AN ORGANIZED HEALTH CARE EDUCATION/TRAINING PROGRAM

## 2021-10-14 PROCEDURE — 99214 OFFICE O/P EST MOD 30 MIN: CPT | Performed by: STUDENT IN AN ORGANIZED HEALTH CARE EDUCATION/TRAINING PROGRAM

## 2021-12-28 ENCOUNTER — TELEPHONE (OUTPATIENT)
Dept: FAMILY MEDICINE CLINIC | Facility: CLINIC | Age: 71
End: 2021-12-28

## 2021-12-28 NOTE — TELEPHONE ENCOUNTER
Patients wife called, patient is currently in nursing home and patient wants to come out, it looks as though we no longer manage this patient, and he sees a provider at the facility  Patient doesn't understand why and doesn't know why he cannot get out  Please advise

## 2022-03-04 ENCOUNTER — HOSPITAL ENCOUNTER (INPATIENT)
Facility: HOSPITAL | Age: 72
LOS: 4 days | Discharge: NURSING, REHAB OR LONG-TERM CARE | DRG: 281 | End: 2022-03-09
Attending: EMERGENCY MEDICINE | Admitting: FAMILY MEDICINE
Payer: MEDICARE

## 2022-03-04 DIAGNOSIS — R77.8 ELEVATED TROPONIN: ICD-10-CM

## 2022-03-04 DIAGNOSIS — R94.31 ABNORMAL EKG: ICD-10-CM

## 2022-03-04 DIAGNOSIS — K20.90 ESOPHAGITIS: ICD-10-CM

## 2022-03-04 DIAGNOSIS — K21.9 GERD (GASTROESOPHAGEAL REFLUX DISEASE): ICD-10-CM

## 2022-03-04 DIAGNOSIS — N17.9 AKI (ACUTE KIDNEY INJURY) (HCC): ICD-10-CM

## 2022-03-04 DIAGNOSIS — I21.4 NSTEMI (NON-ST ELEVATED MYOCARDIAL INFARCTION) (HCC): ICD-10-CM

## 2022-03-04 DIAGNOSIS — R11.0 NAUSEA: Primary | ICD-10-CM

## 2022-03-04 LAB
BASOPHILS # BLD AUTO: 0.06 THOUSANDS/ΜL (ref 0–0.1)
BASOPHILS NFR BLD AUTO: 0 % (ref 0–1)
EOSINOPHIL # BLD AUTO: 0.14 THOUSAND/ΜL (ref 0–0.61)
EOSINOPHIL NFR BLD AUTO: 1 % (ref 0–6)
ERYTHROCYTE [DISTWIDTH] IN BLOOD BY AUTOMATED COUNT: 14 % (ref 11.6–15.1)
HCT VFR BLD AUTO: 38.4 % (ref 36.5–49.3)
HGB BLD-MCNC: 13.2 G/DL (ref 12–17)
IMM GRANULOCYTES # BLD AUTO: 0.08 THOUSAND/UL (ref 0–0.2)
IMM GRANULOCYTES NFR BLD AUTO: 1 % (ref 0–2)
LYMPHOCYTES # BLD AUTO: 1.11 THOUSANDS/ΜL (ref 0.6–4.47)
LYMPHOCYTES NFR BLD AUTO: 7 % (ref 14–44)
MCH RBC QN AUTO: 29.9 PG (ref 26.8–34.3)
MCHC RBC AUTO-ENTMCNC: 34.4 G/DL (ref 31.4–37.4)
MCV RBC AUTO: 87 FL (ref 82–98)
MONOCYTES # BLD AUTO: 0.96 THOUSAND/ΜL (ref 0.17–1.22)
MONOCYTES NFR BLD AUTO: 6 % (ref 4–12)
NEUTROPHILS # BLD AUTO: 12.6 THOUSANDS/ΜL (ref 1.85–7.62)
NEUTS SEG NFR BLD AUTO: 85 % (ref 43–75)
NRBC BLD AUTO-RTO: 0 /100 WBCS
PLATELET # BLD AUTO: 318 THOUSANDS/UL (ref 149–390)
PMV BLD AUTO: 10.8 FL (ref 8.9–12.7)
RBC # BLD AUTO: 4.42 MILLION/UL (ref 3.88–5.62)
WBC # BLD AUTO: 14.95 THOUSAND/UL (ref 4.31–10.16)

## 2022-03-04 PROCEDURE — 85025 COMPLETE CBC W/AUTO DIFF WBC: CPT | Performed by: PHYSICIAN ASSISTANT

## 2022-03-04 PROCEDURE — 87040 BLOOD CULTURE FOR BACTERIA: CPT | Performed by: PHYSICIAN ASSISTANT

## 2022-03-04 PROCEDURE — 84484 ASSAY OF TROPONIN QUANT: CPT | Performed by: PHYSICIAN ASSISTANT

## 2022-03-04 PROCEDURE — 99285 EMERGENCY DEPT VISIT HI MDM: CPT | Performed by: PHYSICIAN ASSISTANT

## 2022-03-04 PROCEDURE — 36415 COLL VENOUS BLD VENIPUNCTURE: CPT | Performed by: PHYSICIAN ASSISTANT

## 2022-03-04 PROCEDURE — 93005 ELECTROCARDIOGRAM TRACING: CPT

## 2022-03-04 PROCEDURE — 99285 EMERGENCY DEPT VISIT HI MDM: CPT

## 2022-03-04 PROCEDURE — 80053 COMPREHEN METABOLIC PANEL: CPT | Performed by: PHYSICIAN ASSISTANT

## 2022-03-04 PROCEDURE — 83690 ASSAY OF LIPASE: CPT | Performed by: PHYSICIAN ASSISTANT

## 2022-03-04 RX ORDER — ONDANSETRON 2 MG/ML
4 INJECTION INTRAMUSCULAR; INTRAVENOUS ONCE
Status: COMPLETED | OUTPATIENT
Start: 2022-03-05 | End: 2022-03-05

## 2022-03-05 ENCOUNTER — APPOINTMENT (EMERGENCY)
Dept: RADIOLOGY | Facility: HOSPITAL | Age: 72
DRG: 281 | End: 2022-03-05
Payer: MEDICARE

## 2022-03-05 ENCOUNTER — APPOINTMENT (EMERGENCY)
Dept: CT IMAGING | Facility: HOSPITAL | Age: 72
DRG: 281 | End: 2022-03-05
Payer: MEDICARE

## 2022-03-05 ENCOUNTER — APPOINTMENT (INPATIENT)
Dept: NON INVASIVE DIAGNOSTICS | Facility: HOSPITAL | Age: 72
DRG: 281 | End: 2022-03-05
Payer: MEDICARE

## 2022-03-05 PROBLEM — I21.4 NSTEMI (NON-ST ELEVATED MYOCARDIAL INFARCTION) (HCC): Status: ACTIVE | Noted: 2022-03-05

## 2022-03-05 LAB
2HR DELTA HS TROPONIN: 5 NG/L
4HR DELTA HS TROPONIN: 6 NG/L
ALBUMIN SERPL BCP-MCNC: 3 G/DL (ref 3.5–5)
ALBUMIN SERPL BCP-MCNC: 3.4 G/DL (ref 3.5–5)
ALP SERPL-CCNC: 102 U/L (ref 46–116)
ALP SERPL-CCNC: 89 U/L (ref 46–116)
ALT SERPL W P-5'-P-CCNC: 11 U/L (ref 12–78)
ALT SERPL W P-5'-P-CCNC: 13 U/L (ref 12–78)
ANION GAP SERPL CALCULATED.3IONS-SCNC: 10 MMOL/L (ref 4–13)
ANION GAP SERPL CALCULATED.3IONS-SCNC: 7 MMOL/L (ref 4–13)
APTT PPP: 30 SECONDS (ref 23–37)
APTT PPP: 31 SECONDS (ref 23–37)
APTT PPP: 38 SECONDS (ref 23–37)
APTT PPP: 92 SECONDS (ref 23–37)
AST SERPL W P-5'-P-CCNC: 12 U/L (ref 5–45)
AST SERPL W P-5'-P-CCNC: 14 U/L (ref 5–45)
BASOPHILS # BLD AUTO: 0.04 THOUSANDS/ΜL (ref 0–0.1)
BASOPHILS NFR BLD AUTO: 0 % (ref 0–1)
BILIRUB SERPL-MCNC: 0.41 MG/DL (ref 0.2–1)
BILIRUB SERPL-MCNC: 0.51 MG/DL (ref 0.2–1)
BUN SERPL-MCNC: 26 MG/DL (ref 5–25)
BUN SERPL-MCNC: 31 MG/DL (ref 5–25)
CALCIUM ALBUM COR SERPL-MCNC: 9.3 MG/DL (ref 8.3–10.1)
CALCIUM ALBUM COR SERPL-MCNC: 9.6 MG/DL (ref 8.3–10.1)
CALCIUM SERPL-MCNC: 8.5 MG/DL (ref 8.3–10.1)
CALCIUM SERPL-MCNC: 9.1 MG/DL (ref 8.3–10.1)
CARDIAC TROPONIN I PNL SERPL HS: 211 NG/L
CARDIAC TROPONIN I PNL SERPL HS: 216 NG/L
CARDIAC TROPONIN I PNL SERPL HS: 217 NG/L
CHLORIDE SERPL-SCNC: 102 MMOL/L (ref 100–108)
CHLORIDE SERPL-SCNC: 103 MMOL/L (ref 100–108)
CHOLEST SERPL-MCNC: 137 MG/DL
CO2 SERPL-SCNC: 25 MMOL/L (ref 21–32)
CO2 SERPL-SCNC: 26 MMOL/L (ref 21–32)
CREAT SERPL-MCNC: 1.25 MG/DL (ref 0.6–1.3)
CREAT SERPL-MCNC: 1.47 MG/DL (ref 0.6–1.3)
D DIMER PPP FEU-MCNC: 1.52 UG/ML FEU
EOSINOPHIL # BLD AUTO: 0.35 THOUSAND/ΜL (ref 0–0.61)
EOSINOPHIL NFR BLD AUTO: 3 % (ref 0–6)
ERYTHROCYTE [DISTWIDTH] IN BLOOD BY AUTOMATED COUNT: 14.3 % (ref 11.6–15.1)
ERYTHROCYTE [DISTWIDTH] IN BLOOD BY AUTOMATED COUNT: 14.4 % (ref 11.6–15.1)
EST. AVERAGE GLUCOSE BLD GHB EST-MCNC: 137 MG/DL
GFR SERPL CREATININE-BSD FRML MDRD: 47 ML/MIN/1.73SQ M
GFR SERPL CREATININE-BSD FRML MDRD: 57 ML/MIN/1.73SQ M
GLUCOSE SERPL-MCNC: 153 MG/DL (ref 65–140)
GLUCOSE SERPL-MCNC: 160 MG/DL (ref 65–140)
GLUCOSE SERPL-MCNC: 176 MG/DL (ref 65–140)
GLUCOSE SERPL-MCNC: 194 MG/DL (ref 65–140)
GLUCOSE SERPL-MCNC: 205 MG/DL (ref 65–140)
HBA1C MFR BLD: 6.4 %
HCT VFR BLD AUTO: 35 % (ref 36.5–49.3)
HCT VFR BLD AUTO: 36.6 % (ref 36.5–49.3)
HDLC SERPL-MCNC: 40 MG/DL
HGB BLD-MCNC: 12 G/DL (ref 12–17)
HGB BLD-MCNC: 12.5 G/DL (ref 12–17)
IMM GRANULOCYTES # BLD AUTO: 0.05 THOUSAND/UL (ref 0–0.2)
IMM GRANULOCYTES NFR BLD AUTO: 0 % (ref 0–2)
INR PPP: 1.02 (ref 0.84–1.19)
INR PPP: 1.05 (ref 0.84–1.19)
LACTATE SERPL-SCNC: 1.6 MMOL/L (ref 0.5–2)
LDLC SERPL CALC-MCNC: 87 MG/DL (ref 0–100)
LIPASE SERPL-CCNC: 110 U/L (ref 73–393)
LYMPHOCYTES # BLD AUTO: 1.41 THOUSANDS/ΜL (ref 0.6–4.47)
LYMPHOCYTES NFR BLD AUTO: 12 % (ref 14–44)
MAGNESIUM SERPL-MCNC: 1.8 MG/DL (ref 1.6–2.6)
MCH RBC QN AUTO: 29.6 PG (ref 26.8–34.3)
MCH RBC QN AUTO: 29.8 PG (ref 26.8–34.3)
MCHC RBC AUTO-ENTMCNC: 34.2 G/DL (ref 31.4–37.4)
MCHC RBC AUTO-ENTMCNC: 34.3 G/DL (ref 31.4–37.4)
MCV RBC AUTO: 86 FL (ref 82–98)
MCV RBC AUTO: 87 FL (ref 82–98)
MONOCYTES # BLD AUTO: 1.15 THOUSAND/ΜL (ref 0.17–1.22)
MONOCYTES NFR BLD AUTO: 10 % (ref 4–12)
NEUTROPHILS # BLD AUTO: 8.88 THOUSANDS/ΜL (ref 1.85–7.62)
NEUTS SEG NFR BLD AUTO: 75 % (ref 43–75)
NRBC BLD AUTO-RTO: 0 /100 WBCS
PLATELET # BLD AUTO: 292 THOUSANDS/UL (ref 149–390)
PLATELET # BLD AUTO: 299 THOUSANDS/UL (ref 149–390)
PMV BLD AUTO: 10.6 FL (ref 8.9–12.7)
PMV BLD AUTO: 10.8 FL (ref 8.9–12.7)
POTASSIUM SERPL-SCNC: 4.1 MMOL/L (ref 3.5–5.3)
POTASSIUM SERPL-SCNC: 4.4 MMOL/L (ref 3.5–5.3)
PROT SERPL-MCNC: 6.4 G/DL (ref 6.4–8.2)
PROT SERPL-MCNC: 7.2 G/DL (ref 6.4–8.2)
PROTHROMBIN TIME: 13.4 SECONDS (ref 11.6–14.5)
PROTHROMBIN TIME: 13.7 SECONDS (ref 11.6–14.5)
RBC # BLD AUTO: 4.06 MILLION/UL (ref 3.88–5.62)
RBC # BLD AUTO: 4.19 MILLION/UL (ref 3.88–5.62)
SODIUM SERPL-SCNC: 136 MMOL/L (ref 136–145)
SODIUM SERPL-SCNC: 137 MMOL/L (ref 136–145)
TRIGL SERPL-MCNC: 49 MG/DL
WBC # BLD AUTO: 11.88 THOUSAND/UL (ref 4.31–10.16)
WBC # BLD AUTO: 13.64 THOUSAND/UL (ref 4.31–10.16)

## 2022-03-05 PROCEDURE — 84484 ASSAY OF TROPONIN QUANT: CPT | Performed by: PHYSICIAN ASSISTANT

## 2022-03-05 PROCEDURE — 83036 HEMOGLOBIN GLYCOSYLATED A1C: CPT

## 2022-03-05 PROCEDURE — 36415 COLL VENOUS BLD VENIPUNCTURE: CPT | Performed by: PHYSICIAN ASSISTANT

## 2022-03-05 PROCEDURE — 85730 THROMBOPLASTIN TIME PARTIAL: CPT | Performed by: FAMILY MEDICINE

## 2022-03-05 PROCEDURE — 96375 TX/PRO/DX INJ NEW DRUG ADDON: CPT

## 2022-03-05 PROCEDURE — 93306 TTE W/DOPPLER COMPLETE: CPT

## 2022-03-05 PROCEDURE — 99223 1ST HOSP IP/OBS HIGH 75: CPT | Performed by: INTERNAL MEDICINE

## 2022-03-05 PROCEDURE — 83735 ASSAY OF MAGNESIUM: CPT

## 2022-03-05 PROCEDURE — 80053 COMPREHEN METABOLIC PANEL: CPT

## 2022-03-05 PROCEDURE — 85379 FIBRIN DEGRADATION QUANT: CPT | Performed by: PHYSICIAN ASSISTANT

## 2022-03-05 PROCEDURE — 70450 CT HEAD/BRAIN W/O DYE: CPT

## 2022-03-05 PROCEDURE — 71275 CT ANGIOGRAPHY CHEST: CPT

## 2022-03-05 PROCEDURE — 80061 LIPID PANEL: CPT

## 2022-03-05 PROCEDURE — 85730 THROMBOPLASTIN TIME PARTIAL: CPT | Performed by: PHYSICIAN ASSISTANT

## 2022-03-05 PROCEDURE — 85610 PROTHROMBIN TIME: CPT

## 2022-03-05 PROCEDURE — 85027 COMPLETE CBC AUTOMATED: CPT | Performed by: PHYSICIAN ASSISTANT

## 2022-03-05 PROCEDURE — 85610 PROTHROMBIN TIME: CPT | Performed by: PHYSICIAN ASSISTANT

## 2022-03-05 PROCEDURE — 74174 CTA ABD&PLVS W/CONTRAST: CPT

## 2022-03-05 PROCEDURE — 93005 ELECTROCARDIOGRAM TRACING: CPT

## 2022-03-05 PROCEDURE — 99223 1ST HOSP IP/OBS HIGH 75: CPT | Performed by: FAMILY MEDICINE

## 2022-03-05 PROCEDURE — 71045 X-RAY EXAM CHEST 1 VIEW: CPT

## 2022-03-05 PROCEDURE — 85730 THROMBOPLASTIN TIME PARTIAL: CPT

## 2022-03-05 PROCEDURE — 82948 REAGENT STRIP/BLOOD GLUCOSE: CPT

## 2022-03-05 PROCEDURE — 85025 COMPLETE CBC W/AUTO DIFF WBC: CPT

## 2022-03-05 PROCEDURE — G1004 CDSM NDSC: HCPCS

## 2022-03-05 PROCEDURE — 96374 THER/PROPH/DIAG INJ IV PUSH: CPT

## 2022-03-05 PROCEDURE — 83605 ASSAY OF LACTIC ACID: CPT | Performed by: PHYSICIAN ASSISTANT

## 2022-03-05 PROCEDURE — 85730 THROMBOPLASTIN TIME PARTIAL: CPT | Performed by: NURSE PRACTITIONER

## 2022-03-05 RX ORDER — SENNOSIDES 8.6 MG
8.6 TABLET ORAL DAILY PRN
Status: DISCONTINUED | OUTPATIENT
Start: 2022-03-05 | End: 2022-03-09 | Stop reason: HOSPADM

## 2022-03-05 RX ORDER — DOCUSATE SODIUM 100 MG/1
100 CAPSULE, LIQUID FILLED ORAL 2 TIMES DAILY
Status: DISCONTINUED | OUTPATIENT
Start: 2022-03-05 | End: 2022-03-09 | Stop reason: HOSPADM

## 2022-03-05 RX ORDER — FINASTERIDE 5 MG/1
5 TABLET, FILM COATED ORAL DAILY
Status: DISCONTINUED | OUTPATIENT
Start: 2022-03-05 | End: 2022-03-09 | Stop reason: HOSPADM

## 2022-03-05 RX ORDER — ASPIRIN 81 MG/1
81 TABLET, CHEWABLE ORAL DAILY
Status: DISCONTINUED | OUTPATIENT
Start: 2022-03-06 | End: 2022-03-09 | Stop reason: HOSPADM

## 2022-03-05 RX ORDER — LANOLIN ALCOHOL/MO/W.PET/CERES
3 CREAM (GRAM) TOPICAL
Status: DISCONTINUED | OUTPATIENT
Start: 2022-03-05 | End: 2022-03-09 | Stop reason: HOSPADM

## 2022-03-05 RX ORDER — ASPIRIN 325 MG
325 TABLET ORAL ONCE
Status: COMPLETED | OUTPATIENT
Start: 2022-03-05 | End: 2022-03-05

## 2022-03-05 RX ORDER — HEPARIN SODIUM 1000 [USP'U]/ML
4000 INJECTION, SOLUTION INTRAVENOUS; SUBCUTANEOUS ONCE
Status: COMPLETED | OUTPATIENT
Start: 2022-03-05 | End: 2022-03-05

## 2022-03-05 RX ORDER — HEPARIN SODIUM 1000 [USP'U]/ML
4000 INJECTION, SOLUTION INTRAVENOUS; SUBCUTANEOUS
Status: DISCONTINUED | OUTPATIENT
Start: 2022-03-05 | End: 2022-03-07

## 2022-03-05 RX ORDER — ATORVASTATIN CALCIUM 40 MG/1
20 TABLET, FILM COATED ORAL DAILY
Status: DISCONTINUED | OUTPATIENT
Start: 2022-03-05 | End: 2022-03-05

## 2022-03-05 RX ORDER — LISINOPRIL 5 MG/1
5 TABLET ORAL DAILY
Status: CANCELLED | OUTPATIENT
Start: 2022-03-05

## 2022-03-05 RX ORDER — POLYETHYLENE GLYCOL 3350 17 G/17G
17 POWDER, FOR SOLUTION ORAL DAILY
Status: DISCONTINUED | OUTPATIENT
Start: 2022-03-05 | End: 2022-03-09 | Stop reason: HOSPADM

## 2022-03-05 RX ORDER — INSULIN GLARGINE 100 [IU]/ML
20 INJECTION, SOLUTION SUBCUTANEOUS EVERY MORNING
Status: DISCONTINUED | OUTPATIENT
Start: 2022-03-05 | End: 2022-03-09 | Stop reason: HOSPADM

## 2022-03-05 RX ORDER — LISINOPRIL 5 MG/1
5 TABLET ORAL DAILY
Status: DISCONTINUED | OUTPATIENT
Start: 2022-03-05 | End: 2022-03-06

## 2022-03-05 RX ORDER — HEPARIN SODIUM 1000 [USP'U]/ML
2000 INJECTION, SOLUTION INTRAVENOUS; SUBCUTANEOUS
Status: DISCONTINUED | OUTPATIENT
Start: 2022-03-05 | End: 2022-03-07

## 2022-03-05 RX ORDER — ATORVASTATIN CALCIUM 40 MG/1
40 TABLET, FILM COATED ORAL DAILY
Status: DISCONTINUED | OUTPATIENT
Start: 2022-03-05 | End: 2022-03-09 | Stop reason: HOSPADM

## 2022-03-05 RX ORDER — MELATONIN
2000 DAILY
Status: DISCONTINUED | OUTPATIENT
Start: 2022-03-05 | End: 2022-03-09 | Stop reason: HOSPADM

## 2022-03-05 RX ORDER — SODIUM CHLORIDE, SODIUM GLUCONATE, SODIUM ACETATE, POTASSIUM CHLORIDE, MAGNESIUM CHLORIDE, SODIUM PHOSPHATE, DIBASIC, AND POTASSIUM PHOSPHATE .53; .5; .37; .037; .03; .012; .00082 G/100ML; G/100ML; G/100ML; G/100ML; G/100ML; G/100ML; G/100ML
100 INJECTION, SOLUTION INTRAVENOUS CONTINUOUS
Status: DISCONTINUED | OUTPATIENT
Start: 2022-03-05 | End: 2022-03-06

## 2022-03-05 RX ORDER — PANTOPRAZOLE SODIUM 40 MG/1
40 TABLET, DELAYED RELEASE ORAL
Status: DISCONTINUED | OUTPATIENT
Start: 2022-03-05 | End: 2022-03-08

## 2022-03-05 RX ORDER — TAMSULOSIN HYDROCHLORIDE 0.4 MG/1
0.4 CAPSULE ORAL
Status: DISCONTINUED | OUTPATIENT
Start: 2022-03-05 | End: 2022-03-09 | Stop reason: HOSPADM

## 2022-03-05 RX ADMIN — METOPROLOL TARTRATE 25 MG: 25 TABLET, FILM COATED ORAL at 09:12

## 2022-03-05 RX ADMIN — FAMOTIDINE 20 MG: 10 INJECTION, SOLUTION INTRAVENOUS at 00:02

## 2022-03-05 RX ADMIN — Medication 100 MCG: at 10:24

## 2022-03-05 RX ADMIN — HEPARIN SODIUM 4000 UNITS: 1000 INJECTION INTRAVENOUS; SUBCUTANEOUS at 03:37

## 2022-03-05 RX ADMIN — DOCUSATE SODIUM 100 MG: 100 CAPSULE ORAL at 09:13

## 2022-03-05 RX ADMIN — ATORVASTATIN CALCIUM 40 MG: 40 TABLET, FILM COATED ORAL at 09:12

## 2022-03-05 RX ADMIN — INSULIN LISPRO 1 UNITS: 100 INJECTION, SOLUTION INTRAVENOUS; SUBCUTANEOUS at 08:01

## 2022-03-05 RX ADMIN — SODIUM CHLORIDE, SODIUM GLUCONATE, SODIUM ACETATE, POTASSIUM CHLORIDE, MAGNESIUM CHLORIDE, SODIUM PHOSPHATE, DIBASIC, AND POTASSIUM PHOSPHATE 100 ML/HR: .53; .5; .37; .037; .03; .012; .00082 INJECTION, SOLUTION INTRAVENOUS at 17:39

## 2022-03-05 RX ADMIN — INSULIN LISPRO 2 UNITS: 100 INJECTION, SOLUTION INTRAVENOUS; SUBCUTANEOUS at 14:44

## 2022-03-05 RX ADMIN — CYANOCOBALAMIN TAB 500 MCG 1000 MCG: 500 TAB at 10:25

## 2022-03-05 RX ADMIN — CARBIDOPA AND LEVODOPA 1 TABLET: 25; 100 TABLET ORAL at 17:03

## 2022-03-05 RX ADMIN — SODIUM CHLORIDE, SODIUM GLUCONATE, SODIUM ACETATE, POTASSIUM CHLORIDE, MAGNESIUM CHLORIDE, SODIUM PHOSPHATE, DIBASIC, AND POTASSIUM PHOSPHATE 100 ML/HR: .53; .5; .37; .037; .03; .012; .00082 INJECTION, SOLUTION INTRAVENOUS at 05:23

## 2022-03-05 RX ADMIN — Medication 2000 UNITS: at 09:13

## 2022-03-05 RX ADMIN — POLYETHYLENE GLYCOL 3350 17 G: 17 POWDER, FOR SOLUTION ORAL at 09:12

## 2022-03-05 RX ADMIN — LISINOPRIL 5 MG: 5 TABLET ORAL at 09:13

## 2022-03-05 RX ADMIN — TAMSULOSIN HYDROCHLORIDE 0.4 MG: 0.4 CAPSULE ORAL at 17:03

## 2022-03-05 RX ADMIN — HEPARIN SODIUM 4000 UNITS: 1000 INJECTION INTRAVENOUS; SUBCUTANEOUS at 20:08

## 2022-03-05 RX ADMIN — Medication 3 MG: at 21:05

## 2022-03-05 RX ADMIN — CARBIDOPA AND LEVODOPA 1 TABLET: 25; 100 TABLET ORAL at 21:05

## 2022-03-05 RX ADMIN — CARBIDOPA AND LEVODOPA 1 TABLET: 25; 100 TABLET ORAL at 10:24

## 2022-03-05 RX ADMIN — ONDANSETRON 4 MG: 2 INJECTION INTRAMUSCULAR; INTRAVENOUS at 00:02

## 2022-03-05 RX ADMIN — ASPIRIN 325 MG ORAL TABLET 325 MG: 325 PILL ORAL at 06:24

## 2022-03-05 RX ADMIN — IOHEXOL 100 ML: 350 INJECTION, SOLUTION INTRAVENOUS at 01:00

## 2022-03-05 RX ADMIN — DOCUSATE SODIUM 100 MG: 100 CAPSULE ORAL at 17:04

## 2022-03-05 RX ADMIN — HEPARIN SODIUM 12 UNITS/KG/HR: 10000 INJECTION, SOLUTION INTRAVENOUS; SUBCUTANEOUS at 03:37

## 2022-03-05 RX ADMIN — INSULIN GLARGINE 20 UNITS: 100 INJECTION, SOLUTION SUBCUTANEOUS at 09:24

## 2022-03-05 RX ADMIN — FINASTERIDE 5 MG: 5 TABLET, FILM COATED ORAL at 10:24

## 2022-03-05 RX ADMIN — INSULIN LISPRO 1 UNITS: 100 INJECTION, SOLUTION INTRAVENOUS; SUBCUTANEOUS at 17:39

## 2022-03-05 NOTE — ASSESSMENT & PLAN NOTE
Lab Results   Component Value Date    EGFR 47 03/04/2022    EGFR 66 09/27/2021    EGFR 64 09/26/2021    CREATININE 1 47 (H) 03/04/2022    CREATININE 1 12 09/27/2021    CREATININE 1 14 09/26/2021     CARLOS EDUARDO on S3CKD POA      Estimated Creatinine Clearance: 47 9 mL/min (A) (by C-G formula based on SCr of 1 47 mg/dL (H))       POA; (baseline appears to be 1 1-1 2)   Likely prerenal 2/2 ACS, poor perfusion    Plan:   UA w/ microscopic, Urinary retention protocol, Bladder scan, Daily weights, I/O   IV Fluids: isolyte at 100 mL/hr   Monitor BMP daily and observe for downward trend of creatinine   Avoid hypoperfusion of the kidneys, minimize nephrotoxins   RAAS Blockers/Diuretics held: lisinopril

## 2022-03-05 NOTE — ED NOTES
Wife called back multiple times - number no longer in service; verified number with , states this is the correct #     Afia Kelly, RN  03/05/22 [id number]

## 2022-03-05 NOTE — ASSESSMENT & PLAN NOTE
Lab Results   Component Value Date    HSTNI0 211 (H) 03/04/2022    HSTNI2 216 (H) 03/05/2022    HSTNID2 5 03/05/2022       Cardiac hx: none     CTA dissection CAP: neg for acute aortic pathology or aneurysm  Extensive atheromatous changes throughout aortoiliac vasculature w/o flow-limiting stenosis or occlusion  Diffuse esophageal wall thickening, nonspecific  · EKG: sinus tachycardia rate 101  Inverted T waves V1 through V3   Q waves V1, V2, V3    Likely non ST elevation MI    YOLA Score: 4 (20% all cause mortality risk)     Plan:    STAT EKG and troponin if chest pain, Telemetry for arrhythmias   Repeat troponin q3h x 3 or until peak, Lipid panel, HbA1C, Urine tox   Nitroglycerin p r n ,  mg, Protonix 40 mg bid, ACE-I: held 2/2 CARLOS EDUARDO, Statin: continuing home lipitor   Heparin drip   Plavix 300 mg loading dose, 75 mg po qd after   Stress test based on YLOA score   Consult cardiology for further workup and possible catheterization/PCI

## 2022-03-05 NOTE — ASSESSMENT & PLAN NOTE
 POA, presented with chest pain and associated with atypical symptoms of nausea, epigastric pain   EKG without STEMI x 3 but with troponin elevation   CTA chest in ED showed significant atherosclerotic changes/plaques visualized in thoracic aorta and coronary arteries   TTE (03/05/2022) LVEF 60%    Stress test Perfusion: There is a left ventricular perfusion defect that is large in size present in the entire inferior and inferolateral location(s) that is partially reversible      Assessment: Likely coronary/NSTEMI in nature given findings; cardiology consulted and plans for catheterization tomorrow    Plan   Continue Heparin drip, ASA 81mg daily, lipitor   Continue Nitroglycerin p r n  for chest pain   Continue holding ACE-I d/t CARLOS EDUARDO    Cardiac catheterization tomorrow; NPO at midnight

## 2022-03-05 NOTE — ED PROVIDER NOTES
History  Chief Complaint   Patient presents with    Nausea     Pt arrives via home from EMS c/o nausea, ABD pain (epigastric area)  Denies urinary sx, CP, SOB, HA  Reports normal BM this AM  EMS gave 324 ASA PTA     Patient is a 66-year-old male, history of hypertension, diabetes, hyperlipidemia, kidney disease, presenting to the emergency room for evaluation  Patient states around 6:00 p m  This evening he developed sudden onset of nausea and substernal chest pain, described as his "ribs hurting  "Patient states he was unable to eat dinner due to the nausea  Patient states the pain stays on his sternum and does not radiate  Of note, he states the pain has improved since arriving to the emergency room  Patient did receive 324 of aspirin in route to the hospital   Patient denies any dizziness, headache, shortness of breath, dark stools, vomiting  History provided by:  Patient   used: No        Prior to Admission Medications   Prescriptions Last Dose Informant Patient Reported? Taking?    ACCU-CHEK GUIDE test strip  Outside Facility (Specify) Yes No   Si strip as needed   Alcohol Swabs (PRO COMFORT ALCOHOL) 70 % PADS  Outside Facility (Specify) Yes No   Si pad daily Use a pad when testing   Easy Comfort Lancets MISC  Outside Facility (Specify) Yes No   Sig: as needed   Glucagon, rDNA, (GLUCAGON EMERGENCY IJ)   Yes No   Sig: Inject 1 mL as directed Sugars less than 60 and conscious   Glucose 15 g PACK   Yes No   Sig: Take 15 g by mouth Per [facility] "give 15 gram PO every 15 minutes PRN for hypoglycemia"   NovoFine Autocover 30G X 8 MM MISC   Yes No   NovoLOG FlexPen 100 units/mL injection pen   Yes No   acetaminophen (TYLENOL) 325 mg tablet   Yes No   Sig: Take 650 mg by mouth every 6 (six) hours as needed for mild pain   atorvastatin (LIPITOR) 20 mg tablet   No No   Sig: Take 1 tablet (20 mg total) by mouth daily   carbidopa-levodopa (SINEMET)  mg per tablet   No No Sig: Take 1 tablet by mouth 3 (three) times a day   cholecalciferol (VITAMIN D3) 1,000 units tablet  Outside Facility (Specify) No No   Sig: Take 2 tablets (2,000 Units total) by mouth daily   Patient taking differently: Take 50,000 Units by mouth daily    cyanocobalamin (VITAMIN B-12) 1000 MCG tablet  Outside Facility (Specify) No No   Sig: Take 1 tablet (1,000 mcg total) by mouth daily   docusate sodium (COLACE) 100 mg capsule  Outside Facility (Specify) No No   Sig: Take 1 capsule (100 mg total) by mouth 2 (two) times a day   finasteride (PROSCAR) 5 mg tablet  Outside Facility (Specify) No No   Sig: Take 1 tablet (5 mg total) by mouth daily   glipiZIDE (GLUCOTROL) 10 mg tablet   No No   Sig: TAKE 1 TABLET BY MOUTH 2 TIMES A DAY   Patient taking differently: Per [facility] "give 5mg by mouth one time a prescriber day for DM 2"   glucose blood test strip  Outside Facility (Specify) Yes No   Si strip as needed   lisinopril (ZESTRIL) 5 mg tablet   No No   Sig: Take 1 tablet (5 mg total) by mouth daily   melatonin 3 mg   Yes No   Sig: Take 3 mg by mouth daily at bedtime Give 2 tabs at bed time for insomnia per [facility]   metFORMIN (GLUCOPHAGE) 1000 MG tablet   No No   Sig: TAKE 1 TABLET BY MOUTH 2 TIMES A DAY   polyethylene glycol (MIRALAX) 17 g packet  Outside Facility (Specify) No No   Sig: Take 17 g by mouth daily   senna (SENOKOT) 8 6 mg   No No   Sig: Take 1 tablet (8 6 mg total) by mouth daily as needed for constipation   tamsulosin (FLOMAX) 0 4 mg  Outside Facility (Specify) No No   Sig: Take 1 capsule (0 4 mg total) by mouth daily with dinner   vitamin B-12 (CYANOCOBALAMIN) 100 MCG TABS  Outside Facility (Specify) Yes No   Sig: Take 100 mcg by mouth daily      Facility-Administered Medications: None       Past Medical History:   Diagnosis Date    Ambulatory dysfunction     uses walker with assist of 1    Anemia     Anxiety     At risk for falls     hx of falls    Benign paroxysmal vertigo     unspecified ear    BPH (benign prostatic hyperplasia)     for TURP today 1/4/2021    Calculus in bladder     for surgical removal today 1/4/2021    Cataract     left eye    Cervicalgia     Chest pain     Chronic kidney disease     stage 3    Constipation     CTS (carpal tunnel syndrome)     left    Cyst of pancreas     Cystitis 06/23/2020    acute cystitis with hematuria    Depression     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    Dizziness     and giddiness    Dysphagia     Elevated PSA     Facial weakness     GERD (gastroesophageal reflux disease)     last assessed 5/20/16    Gross hematuria     Hematuria     Hyperlipidemia     Hypertension     Kidney disease     Kidney stone     Left-sided weakness     Long term (current) use of oral hypoglycemic drugs     Muscle weakness     Nuclear senile cataract of left eye     OA (osteoarthritis) of knee     b/l    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HCC)     Syncope and collapse     Tachycardia     UTI (urinary tract infection)     Vertebro-basilar artery syndrome     Vitamin B deficiency     Vitamin D deficiency     Vitamin D deficiency        Past Surgical History:   Procedure Laterality Date    CATARACT EXTRACTION      CHOLECYSTECTOMY      KNEE SURGERY      UT REMOVE BLADDER STONE,<2 5 CM N/A 1/4/2021    Procedure: Cystolitholopaxy w/laser bladder stones;  Surgeon: Melissa Tucker MD;  Location: [facility];  Service: Urology    UT TRANSURETHRAL ELEC-SURG PROSTATECTOM N/A 1/4/2021    Procedure: T U R P ;  Surgeon: Melissa Tucker MD;  Location: [facility];  Service: Urology       Family History   Problem Relation Age of Onset    Coronary artery disease Mother     Diabetes Father      I have reviewed and agree with the history as documented      E-Cigarette/Vaping    E-Cigarette Use Never User      E-Cigarette/Vaping Substances    Nicotine No     THC No     CBD No      Social History     Tobacco Use    Smoking status: Never Smoker    Smokeless tobacco: Never Used   Vaping Use    Vaping Use: Never used   Substance Use Topics    Alcohol use: Not Currently    Drug use: No       Review of Systems   Constitutional: Negative for chills and fever  HENT: Negative for ear pain and sore throat  Eyes: Negative for pain and visual disturbance  Respiratory: Negative for cough and shortness of breath  Cardiovascular: Positive for chest pain  Negative for palpitations  Gastrointestinal: Positive for nausea  Negative for abdominal pain and vomiting  Genitourinary: Negative for dysuria and hematuria  Musculoskeletal: Negative for arthralgias and back pain  Skin: Negative for color change and rash  Neurological: Negative for seizures and syncope  All other systems reviewed and are negative  Physical Exam  Physical Exam  Vitals and nursing note reviewed  Constitutional:       Appearance: He is well-developed  HENT:      Head: Normocephalic and atraumatic  Eyes:      Conjunctiva/sclera: Conjunctivae normal    Cardiovascular:      Rate and Rhythm: Normal rate and regular rhythm  Heart sounds: No murmur heard  Pulmonary:      Effort: Pulmonary effort is normal  No respiratory distress  Breath sounds: Normal breath sounds  Abdominal:      Palpations: Abdomen is soft  Tenderness: There is no abdominal tenderness  Comments: Abdomen is soft and nontender   Genitourinary:     Rectum: Guaiac result negative  Musculoskeletal:      Cervical back: Neck supple  Right lower leg: No edema  Left lower leg: No edema  Skin:     General: Skin is warm and dry  Neurological:      Mental Status: He is alert           Vital Signs  ED Triage Vitals [03/04/22 2239]   Temperature Pulse Respirations Blood Pressure SpO2   98 °F (36 7 °C) 100 18 147/77 96 %      Temp Source Heart Rate Source Patient Position - Orthostatic VS BP Location FiO2 (%)   Oral Monitor Sitting Right arm --      Pain Score No Pain           Vitals:    03/04/22 2239 03/05/22 0030 03/05/22 0130 03/05/22 0230   BP: 147/77 134/69 105/56 146/70   Pulse: 100 86 84 88   Patient Position - Orthostatic VS: Sitting            Visual Acuity  Visual Acuity      Most Recent Value   L Pupil Size (mm) 3   R Pupil Size (mm) 3   L Pupil Shape Round   R Pupil Shape Round          ED Medications  Medications   heparin (porcine) 25,000 units in 0 45% in sodium chloride 250 ml infusion (CMPD) (12 Units/kg/hr × 80 kg (Order-Specific) Intravenous New Bag 3/5/22 0337)   heparin (porcine) injection 4,000 Units (has no administration in time range)   heparin (porcine) injection 2,000 Units (has no administration in time range)   ondansetron (ZOFRAN) injection 4 mg (4 mg Intravenous Given 3/5/22 0002)   famotidine (PEPCID) injection 20 mg (20 mg Intravenous Given 3/5/22 0002)   iohexol (OMNIPAQUE) 350 MG/ML injection (SINGLE-DOSE) 100 mL (100 mL Intravenous Given 3/5/22 0100)   heparin (porcine) injection 4,000 Units (4,000 Units Intravenous Given 3/5/22 0337)       Diagnostic Studies  Results Reviewed     Procedure Component Value Units Date/Time    APTT six (6) hours after Heparin bolus/drip initiation or dosing change [[id number]]  (Normal) Collected: 03/05/22 0328    Lab Status: Final result Specimen: Blood from Arm, Left Updated: 03/05/22 0400     PTT 31 seconds     Protime-INR [[id number]]  (Normal) Collected: 03/05/22 0328    Lab Status: Final result Specimen: Blood from Arm, Left Updated: 03/05/22 0400     Protime 13 7 seconds      INR 1 05    Lactic acid, plasma [[id number]]     Lab Status: No result Specimen: Blood     CBC [[id number]]  (Abnormal) Collected: 03/05/22 0328    Lab Status: Final result Specimen: Blood from Arm, Left Updated: 03/05/22 0340     WBC 13 64 Thousand/uL      RBC 4 19 Million/uL      Hemoglobin 12 5 g/dL      Hematocrit 36 6 %      MCV 87 fL      MCH 29 8 pg      MCHC 34 2 g/dL      RDW 14 3 %      Platelets 651 Thousands/uL MPV 10 6 fL     HS Troponin I 4hr [[id number]] Collected: 03/05/22 0328    Lab Status: In process Specimen: Blood from Arm, Left Updated: 03/05/22 0334    HS Troponin I 2hr [[id number]]  (Abnormal) Collected: 03/05/22 0135    Lab Status: Final result Specimen: Blood from Arm, Left Updated: 03/05/22 0205     hs TnI 2hr 216 ng/L      Delta 2hr hsTnI 5 ng/L     Protime-INR [[id number]]  (Normal) Collected: 03/05/22 0002    Lab Status: Final result Specimen: Blood from Arm, Left Updated: 03/05/22 0108     Protime 13 4 seconds      INR 1 02    APTT [[id number]]  (Normal) Collected: 03/05/22 0002    Lab Status: Final result Specimen: Blood from Arm, Left Updated: 03/05/22 0108     PTT 30 seconds     HS Troponin 0hr (reflex protocol) [[id number]]  (Abnormal) Collected: 03/04/22 2326    Lab Status: Final result Specimen: Blood from Arm, Left Updated: 03/05/22 0030     hs TnI 0hr 211 ng/L     D-dimer, quantitative [[id number]]  (Abnormal) Collected: 03/05/22 0002    Lab Status: Final result Specimen: Blood from Arm, Left Updated: 03/05/22 0026     D-Dimer, Quant 1 52 ug/ml FEU     Narrative: In the evaluation for possible pulmonary embolism, in the appropriate (Well's Score of 4 or less) patient, the age adjusted d-dimer cutoff for this patient can be calculated as:    Age x 0 01 (in ug/mL) for Age-adjusted D-dimer exclusion threshold for a patient over 50 years      Comprehensive metabolic panel [[id number]]  (Abnormal) Collected: 03/04/22 2326    Lab Status: Final result Specimen: Blood from Arm, Left Updated: 03/05/22 0000     Sodium 137 mmol/L      Potassium 4 4 mmol/L      Chloride 102 mmol/L      CO2 25 mmol/L      ANION GAP 10 mmol/L      BUN 31 mg/dL      Creatinine 1 47 mg/dL      Glucose 205 mg/dL      Calcium 9 1 mg/dL      Corrected Calcium 9 6 mg/dL      AST 14 U/L      ALT 11 U/L      Alkaline Phosphatase 102 U/L      Total Protein 7 2 g/dL      Albumin 3 4 g/dL      Total Bilirubin 0 41 mg/dL      eGFR 47 ml/min/1 73sq m     Narrative:      National Kidney Disease Foundation guidelines for Chronic Kidney Disease (CKD):     Stage 1 with normal or high GFR (GFR > 90 mL/min/1 73 square meters)    Stage 2 Mild CKD (GFR = 60-89 mL/min/1 73 square meters)    Stage 3A Moderate CKD (GFR = 45-59 mL/min/1 73 square meters)    Stage 3B Moderate CKD (GFR = 30-44 mL/min/1 73 square meters)    Stage 4 Severe CKD (GFR = 15-29 mL/min/1 73 square meters)    Stage 5 End Stage CKD (GFR <15 mL/min/1 73 square meters)  Note: GFR calculation is accurate only with a steady state creatinine    Lipase [[id number]]  (Normal) Collected: 03/04/22 2326    Lab Status: Final result Specimen: Blood from Arm, Left Updated: 03/05/22 0000     Lipase 110 u/L     CBC and differential [[id number]]  (Abnormal) Collected: 03/04/22 2326    Lab Status: Final result Specimen: Blood from Arm, Left Updated: 03/04/22 2349     WBC 14 95 Thousand/uL      RBC 4 42 Million/uL      Hemoglobin 13 2 g/dL      Hematocrit 38 4 %      MCV 87 fL      MCH 29 9 pg      MCHC 34 4 g/dL      RDW 14 0 %      MPV 10 8 fL      Platelets 655 Thousands/uL      nRBC 0 /100 WBCs      Neutrophils Relative 85 %      Immat GRANS % 1 %      Lymphocytes Relative 7 %      Monocytes Relative 6 %      Eosinophils Relative 1 %      Basophils Relative 0 %      Neutrophils Absolute 12 60 Thousands/µL      Immature Grans Absolute 0 08 Thousand/uL      Lymphocytes Absolute 1 11 Thousands/µL      Monocytes Absolute 0 96 Thousand/µL      Eosinophils Absolute 0 14 Thousand/µL      Basophils Absolute 0 06 Thousands/µL     Blood culture #2 [[id number]] Collected: 03/04/22 2326    Lab Status: In process Specimen: Blood from Arm, Left Updated: 03/04/22 2333    Blood culture #1 [[id number]] Collected: 03/04/22 2327    Lab Status:  In process Specimen: Blood from Arm, Left Updated: 03/04/22 2332    UA (URINE) with reflex to Scope [[id number]]     Lab Status: No result Specimen: Urine                  CT head without contrast   Final Result by Adrianna Kapadia MD (03/05 7716)      No acute intracranial abnormality  Workstation performed: [id number]         CTA dissection protocol chest/abdomen/pelvis   Final Result by Valente Zuñiga MD (03/05 1204)      No evidence of acute aortic pathology or aortic aneurysm  Extensive atheromatous changes/plaque throughout the aortoiliac vasculature without flow-limiting stenosis or occlusion  Diffuse esophageal wall thickening, nonspecific  Consider follow-up GI consult  Additional findings as above        Workstation performed: [id number]         XR chest 1 view portable    (Results Pending)              Procedures  ECG 12 Lead Documentation Only    Date/Time: 3/5/2022 4:06 AM  Performed by: Isidro Grossman PA-C  Authorized by: Isidro Grossman PA-C     Indications / Diagnosis:  Substernal chest pain  ECG reviewed by me, the ED Provider: yes    Interpretation:     Interpretation: abnormal    Rate:     ECG rate:  101    ECG rate assessment: tachycardic    Rhythm:     Rhythm: sinus rhythm    Ectopy:     Ectopy: none    QRS:     QRS axis:  Normal  Conduction:     Conduction: normal    T waves:     T waves: inverted    Q waves:     Q waves:  V1, V2 and V3             ED Course  ED Course as of 03/05/22 0411   Sat Mar 05, 2022   0036 hs TnI 0hr(!): 211   0300 Guiac negative, will start heparin              HEART Risk Score      Most Recent Value   Heart Score Risk Calculator    History 1 Filed at: 03/05/2022 0407   ECG 1 Filed at: 03/05/2022 0407   Age 2 Filed at: 03/05/2022 0407   Risk Factors 2 Filed at: 03/05/2022 0407   Troponin 2 Filed at: 03/05/2022 0407   HEART Score 8 Filed at: 03/05/2022 0407                PERC Rule for PE      Most Recent Value   PERC Rule for PE    Age >=50 1 Filed at: 03/05/2022 0408   HR >=100 1 Filed at: 03/05/2022 0408   O2 Sat on room air < 95% 0 Filed at: 03/05/2022 0408   History of PE or DVT 0 Filed at: 03/05/2022 0408   Recent trauma or surgery 0 Filed at: 03/05/2022 0408   Hemoptysis 0 Filed at: 03/05/2022 0408   Exogenous estrogen 0 Filed at: 03/05/2022 0408   Unilateral leg swelling 0 Filed at: 03/05/2022 0408   PERC Rule for PE Results 2 Filed at: 03/05/2022 1798            Wells' Criteria for PE      Most Recent Value   Wells' Criteria for PE    Clinical signs and symptoms of DVT 0 Filed at: 03/05/2022 5039   PE is primary diagnosis or equally likely 0 Filed at: 03/05/2022 0408   HR >100 1 5 Filed at: 03/05/2022 0408   Immobilization at least 3 days or Surgery in the previous 4 weeks 0 Filed at: 03/05/2022 0408   Previous, objectively diagnosed PE or DVT 0 Filed at: 03/05/2022 0408   Hemoptysis 0 Filed at: 03/05/2022 0408   Malignancy with treatment within 6 months or palliative 0 Filed at: 03/05/2022 0408   Jyoti Black Criteria Total 1 5 Filed at: 03/05/2022 0408                MDM  Number of Diagnoses or Management Options  Abnormal EKG: new and requires workup  CARLOS EDUARDO (acute kidney injury) ([facility] [address] ): new and requires workup  Elevated troponin: new and requires workup  Esophagitis: new and requires workup  Nausea: new and requires workup  Diagnosis management comments: Patient is a 26-year-old male presenting to the emergency room for evaluation of nausea and substernal chest pressure that started at 6:00 p m  This evening  Patient was given 324 of aspirin in route to the hospital   On arrival to the ER, patient states his symptoms have improved  He points to his sternum when asking where his pain is  His abdomen is soft and nontender  Patient's EKG is showing new T-wave inversions in V1 through V3  Patient's initial troponin was 211  Patient's CT scan was unremarkable for any acute pathology  Patient was started on heparin for an NSTEMI  Patient was admitted to the medical service for further evaluation and treatment         Amount and/or Complexity of Data Reviewed  Clinical lab tests: ordered and reviewed  Tests in the radiology section of CPT®: ordered and reviewed    Patient Progress  Patient progress: stable      Disposition  Final diagnoses:   Nausea   Esophagitis   Abnormal EKG   Elevated troponin   CARLOS EDUARDO (acute kidney injury) ([facility] [address] )     Time reflects when diagnosis was documented in both MDM as applicable and the Disposition within this note     Time User Action Codes Description Comment    3/5/2022  2:25 AM Vallarie Schlichter Add [R11 0] Nausea     3/5/2022  2:25 AM Vallarie Schlichter Add [K20 90] Esophagitis     3/5/2022  2:25 AM Vallarie Schlichter Add [R94 31] Abnormal EKG     3/5/2022  2:36 AM Vallarie Schlichter Add [R77 8] Elevated troponin     3/5/2022  2:36 AM Vallarie Schlichter Add [N17 9] CARLOS EDUARDO (acute kidney injury) [facility])       ED Disposition     ED Disposition Condition Date/Time Comment    Admit Stable Sat Mar 5, 2022  3:49 AM Case was discussed with TEE and the patient's admission status was agreed to be Admission Status: inpatient status to the service of Dr Christi Cordoba   Follow-up Information    None         Patient's Medications   Discharge Prescriptions    No medications on file       No discharge procedures on file      PDMP Review       Value Time User    PDMP Reviewed  Yes 3/4/2022 10:51 PM Gilberto Leyden, MD          ED Provider  Electronically Signed by         Niurka Morris PA-C  03/05/22 8313

## 2022-03-05 NOTE — ASSESSMENT & PLAN NOTE
Vitals:    03/05/22 0030 03/05/22 0130 03/05/22 0230 03/05/22 0400   BP: 134/69 105/56 146/70 135/63   BP Location:    Right arm   Pulse: 86 84 88 92   Resp: 18 18 18 18   Temp:       TempSrc:       SpO2: 98% 97% 98% 97%   Weight:       Height:         Vitals per unit routine  Hold home Lisinopril for now 2/2 CARLOS EDUARDO

## 2022-03-05 NOTE — CONSULTS
Cardiology   Bella Parker 70 y o  male MRN: [id number]  Unit/Bed#: ED 17 Encounter: [id number]      Reason for Consult / Principal Problem:  NSTEMI    Physician Requesting Consult: Edson Kong MD    Outpatient Cardiologist:  None    Assessment  1  Chest pain w/ elevated troponin - rather atypical symptoms (nausea, abdominal /epigastric pain) - will r/o ischemia vs possible GI etiology    -Currently w/o c/o CP  -HS troponin level; 211--216--217  -Initial 12 lead ECG demonstrated; NSR, RBBB, ST T-wave abnormalities  -CTA chest - no evidence of acute aortic pathology or aneurysm, extensive atheromatous changes/plaque throughout the aortoiliac vasculature without flow-limiting stenosis or occlusion, atherosclerotic coronary arteries, diffuse esophageal wall thickening  -Currently on IV heparin GTT - ACS low  2  Coronary artery calcifications - per CT imaging  -CTA chest 3/4 - extensive atheromatous changes/plaque throughout the aortoiliac vasculature without flow-limiting stenosis or occlusion, atherosclerotic coronary arteries  3  Essential hypertension  -BP stable last recorded 117/58, HR 86   -On lisinopril 5 mg daily   4  Dyslipidemia  -Lipid profile 1/2019; cholesterol 182, triglycerides 92, HDL 34, and   -On atorvastatin 20 mg daily  5  CKD stage 3  -Baseline creatinine around 1 1-1 3   -Creatinine 1 47 on admission, currently 1 25   6  DM type 2  -HGB A1c level 6 2 - 6/2020    Plan  -Symptoms are rather atypical (nausea, abdominal /epigastric pain) - possible GI etiology but will r/o ischemia given his risk factors   -Obtain TTE - rule out significant structural, wall motion abnormality, or valvular heart disease    -Would recommend inpatient lexiscan MPI study for further ischemic testing  Unable to exercise on treadmill    -Start aspirin 81 mg daily, will increase his atorvastatin to 40 mg daily  Continue lisinopril 5 mg daily as previously taking    Agree with continuation of IV heparin GTT until further cardiac workup has been completed  -Repeat lipid profile with LDL reflex, HGB A1c, and TSH level  -Monitor renal function electrolytes closely - replete to maintain K +level 4 0, magnesium 2 0     -Continue monitor closely on telemetry  HPI: Terrie Lamb 70y o  year old male with a medical history of essential hypertension, dyslipidemia, and CKD stage 3  He denies a smoking history, does admit to heavy alcohol use in the past, and denies current recreational drug use  Unclear family history for heart disease  Lives at home currently with his wife  He does not routinely follow with a cardiologist   He is somewhat of a poor/unreliable historian at baseline  His mobility status at baseline is very limited and he is quite sedentary this point in time  He denied any prior subjective complaints of chest pressure, shortness of breath, dyspnea, or palpitations in the recent past   He presented to the [facility] on 3/4/2022 after waking up from an afternoon nap with complaints of a sudden onset of nausea, and substernal/epigastric pain which spread across his lower chest   EMS was summoned, he did receive 324 mg of aspirin in route and Pt had reported improvement in his symptoms upon arrival to the ED  Further workup in the ED  Hemodynamics on admission  Temp 98° F, , RR 18, /77, sat 96% on RA    Laboratory data on admission  -NA + 137, K +4 4, chloride 102, CO2 25, anion gap 10, BUN 31, creatinine 1 47, glucose 205, calcium 9 1, AST 14, ALT 11, alk-phos 102, albumin 3 4, T bili 0 41, WBC 15, HGB 13 2, platelet count 950  -Lactic acid 1 6  -HS troponin level; 211--216--217  -D-dimer 1 52  Imaging  -CTA chest - no evidence of acute aortic pathology or aneurysm, extensive atheromatous changes/plaque throughout the aortoiliac vasculature without flow-limiting stenosis or occlusion, atherosclerotic coronary arteries, diffuse esophageal wall thickening    Initial 12 lead ECG demonstrated; NSR, RBBB, ST T-wave abnormalities  Cardiology was consulted further treatment recommendations/management      Family History:   Family History   Problem Relation Age of Onset    Coronary artery disease Mother     Diabetes Father      Historical Information   Past Medical History:   Diagnosis Date    Ambulatory dysfunction     uses walker with assist of 1    Anemia     Anxiety     At risk for falls     hx of falls    Benign paroxysmal vertigo     unspecified ear    BPH (benign prostatic hyperplasia)     for TURP today 1/4/2021    Calculus in bladder     for surgical removal today 1/4/2021    Cataract     left eye    Cervicalgia     Chest pain     Chronic kidney disease     stage 3    Constipation     CTS (carpal tunnel syndrome)     left    Cyst of pancreas     Cystitis 06/23/2020    acute cystitis with hematuria    Depression     Diabetes mellitus ([facility] [address] )     type II with neuropathy    Dizziness     and giddiness    Dysphagia     Elevated PSA     Facial weakness     GERD (gastroesophageal reflux disease)     last assessed 5/20/16    Gross hematuria     Hematuria     Hyperlipidemia     Hypertension     Kidney disease     Kidney stone     Left-sided weakness     Long term (current) use of oral hypoglycemic drugs     Muscle weakness     Nuclear senile cataract of left eye     OA (osteoarthritis) of knee     b/l    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[facility] [address] )     Syncope and collapse     Tachycardia     UTI (urinary tract infection)     Vertebro-basilar artery syndrome     Vitamin B deficiency     Vitamin D deficiency     Vitamin D deficiency      Past Surgical History:   Procedure Laterality Date    CATARACT EXTRACTION      CHOLECYSTECTOMY      KNEE SURGERY      VT REMOVE BLADDER STONE,<2 5 CM N/A 1/4/2021    Procedure: Cystolitholopaxy w/laser bladder stones;  Surgeon: Shashi Ledesma MD;  Location: [facility];  Service: Urology    VT TRANSURETHRAL ELEC-SURG PROSTATECTOM N/A 1/4/2021 Procedure: T U R P ;  Surgeon: Sri Hansen MD;  Location: [facility] OR;  Service: Urology     Social History   Social History     Substance and Sexual Activity   Alcohol Use Not Currently     Social History     Substance and Sexual Activity   Drug Use No     Social History     Tobacco Use   Smoking Status Never Smoker   Smokeless Tobacco Never Used     Family History:   Family History   Problem Relation Age of Onset    Coronary artery disease Mother     Diabetes Father        Review of Systems:  Review of Systems   Constitutional: Negative for chills, fatigue and fever  Respiratory: Negative for cough, chest tightness and shortness of breath  Cardiovascular: Negative for chest pain, palpitations and leg swelling  Gastrointestinal: Negative for abdominal pain  Neurological: Negative for dizziness, light-headedness and headaches  All other systems reviewed and are negative            Scheduled Meds:  Current Facility-Administered Medications   Medication Dose Route Frequency Provider Last Rate    atorvastatin  20 mg Oral Daily Evangelina Coronado MD      carbidopa-levodopa  1 tablet Oral TID Evangelina Coronado MD      cholecalciferol  2,000 Units Oral Daily Evangelina Coronado MD      cyanocobalamin  1,000 mcg Oral Daily Evangelina Coronado MD      vitamin B-12  100 mcg Oral Daily Evangelina Coronado MD      docusate sodium  100 mg Oral BID Evangelina Coronado MD      finasteride  5 mg Oral Daily Evangelina Coronado MD      heparin (porcine)  3-20 Units/kg/hr (Order-Specific) Intravenous Titrated Isidro Grossman PA-C 12 Units/kg/hr (03/05/22 0337)    heparin (porcine)  2,000 Units Intravenous Q1H PRN Isidro Grossman PA-C      heparin (porcine)  4,000 Units Intravenous Q1H PRN Isidro Grossman PA-C      insulin glargine  20 Units Subcutaneous QAM Evangelina Coronado MD      insulin lispro  1-6 Units Subcutaneous TID [facility] Mortimer Schiff, MD      melatonin  3 mg Oral HS Mortimer Schiff, MD      multi-electrolyte  100 mL/hr Intravenous Continuous Mortimer Schiff,  mL/hr (03/05/22 0523)    pantoprazole  40 mg Oral Early Morning Mortimer Schiff, MD      polyethylene glycol  17 g Oral Daily Mortimer Schiff, MD      senna  8 6 mg Oral Daily PRN Mortimer Schiff, MD     Minh Greenbrier tamsulosin  0 4 mg Oral Daily With Palma Gabriel MD       Continuous Infusions:heparin (porcine), 3-20 Units/kg/hr (Order-Specific), Last Rate: 12 Units/kg/hr (03/05/22 0337)  multi-electrolyte, 100 mL/hr, Last Rate: 100 mL/hr (03/05/22 0523)      PRN Meds: heparin (porcine)    heparin (porcine)    senna  all current active meds have been reviewed and current meds:   Current Facility-Administered Medications   Medication Dose Route Frequency    atorvastatin (LIPITOR) tablet 20 mg  20 mg Oral Daily    carbidopa-levodopa (SINEMET)  mg per tablet 1 tablet  1 tablet Oral TID    cholecalciferol (VITAMIN D3) tablet 2,000 Units  2,000 Units Oral Daily    cyanocobalamin (VITAMIN B-12) tablet 1,000 mcg  1,000 mcg Oral Daily    cyanocobalamin (VITAMIN B-12) tablet 100 mcg  100 mcg Oral Daily    docusate sodium (COLACE) capsule 100 mg  100 mg Oral BID    finasteride (PROSCAR) tablet 5 mg  5 mg Oral Daily    heparin (porcine) 25,000 units in 0 45% in sodium chloride 250 ml infusion (CMPD)  3-20 Units/kg/hr (Order-Specific) Intravenous Titrated    heparin (porcine) injection 2,000 Units  2,000 Units Intravenous Q1H PRN    heparin (porcine) injection 4,000 Units  4,000 Units Intravenous Q1H PRN    insulin glargine (LANTUS) subcutaneous injection 20 Units 0 2 mL  20 Units Subcutaneous QAM    insulin lispro (HumaLOG) 100 units/mL subcutaneous injection 1-6 Units  1-6 Units Subcutaneous TID AC    melatonin tablet 3 mg  3 mg Oral HS    multi-electrolyte (PLASMALYTE-A/ISOLYTE-S PH 7 4) IV solution  100 mL/hr Intravenous Continuous    pantoprazole (PROTONIX) EC tablet 40 mg  40 mg Oral Early Morning    polyethylene glycol (MIRALAX) packet 17 g  17 g Oral Daily    senna (SENOKOT) tablet 8 6 mg  8 6 mg Oral Daily PRN    tamsulosin (FLOMAX) capsule 0 4 mg  0 4 mg Oral Daily With Dinner       No Known Allergies    Objective   Vitals: Blood pressure 120/58, pulse 84, temperature 98 °F (36 7 °C), temperature source Oral, resp  rate 18, height 5' 7" (1 702 m), weight 84 6 kg (186 lb 8 2 oz), SpO2 97 %  , Body mass index is 29 21 kg/m² ,   Orthostatic Blood Pressures      Most Recent Value   Blood Pressure 120/58 filed at 03/05/2022 0730   Patient Position - Orthostatic VS Lying filed at 03/05/2022 0600          No intake or output data in the 24 hours ending 03/05/22 0800    Invasive Devices  Report    Peripheral Intravenous Line            Peripheral IV 03/04/22 Left Antecubital <1 day    Peripheral IV 03/04/22 Left;Dorsal (posterior) Hand <1 day              Physical Exam:  Physical Exam  Vitals and nursing note reviewed  Constitutional:       General: He is not in acute distress  Appearance: He is obese  He is not diaphoretic  HENT:      Head: Normocephalic and atraumatic  Mouth/Throat:      Mouth: Mucous membranes are moist    Eyes:      General: No scleral icterus  Cardiovascular:      Rate and Rhythm: Normal rate and regular rhythm  Pulses: Normal pulses  Heart sounds: Normal heart sounds  No murmur heard  Pulmonary:      Effort: Pulmonary effort is normal       Breath sounds: Normal breath sounds  No wheezing  Abdominal:      Palpations: Abdomen is soft  Musculoskeletal:      Cervical back: Neck supple  Right lower leg: No edema  Left lower leg: No edema  Skin:     General: Skin is warm and dry  Capillary Refill: Capillary refill takes less than 2 seconds  Neurological:      General: No focal deficit present        Mental Status: He is alert and oriented to person, place, and time     Psychiatric:         Mood and Affect: Mood normal          Lab Results:   Recent Results (from the past 24 hour(s))   CBC and differential    Collection Time: 03/04/22 11:26 PM   Result Value Ref Range    WBC 14 95 (H) 4 31 - 10 16 Thousand/uL    RBC 4 42 3 88 - 5 62 Million/uL    Hemoglobin 13 2 12 0 - 17 0 g/dL    Hematocrit 38 4 36 5 - 49 3 %    MCV 87 82 - 98 fL    MCH 29 9 26 8 - 34 3 pg    MCHC 34 4 31 4 - 37 4 g/dL    RDW 14 0 11 6 - 15 1 %    MPV 10 8 8 9 - 12 7 fL    Platelets 261 043 - 211 Thousands/uL    nRBC 0 /100 WBCs    Neutrophils Relative 85 (H) 43 - 75 %    Immat GRANS % 1 0 - 2 %    Lymphocytes Relative 7 (L) 14 - 44 %    Monocytes Relative 6 4 - 12 %    Eosinophils Relative 1 0 - 6 %    Basophils Relative 0 0 - 1 %    Neutrophils Absolute 12 60 (H) 1 85 - 7 62 Thousands/µL    Immature Grans Absolute 0 08 0 00 - 0 20 Thousand/uL    Lymphocytes Absolute 1 11 0 60 - 4 47 Thousands/µL    Monocytes Absolute 0 96 0 17 - 1 22 Thousand/µL    Eosinophils Absolute 0 14 0 00 - 0 61 Thousand/µL    Basophils Absolute 0 06 0 00 - 0 10 Thousands/µL   Comprehensive metabolic panel    Collection Time: 03/04/22 11:26 PM   Result Value Ref Range    Sodium 137 136 - 145 mmol/L    Potassium 4 4 3 5 - 5 3 mmol/L    Chloride 102 100 - 108 mmol/L    CO2 25 21 - 32 mmol/L    ANION GAP 10 4 - 13 mmol/L    BUN 31 (H) 5 - 25 mg/dL    Creatinine 1 47 (H) 0 60 - 1 30 mg/dL    Glucose 205 (H) 65 - 140 mg/dL    Calcium 9 1 8 3 - 10 1 mg/dL    Corrected Calcium 9 6 8 3 - 10 1 mg/dL    AST 14 5 - 45 U/L    ALT 11 (L) 12 - 78 U/L    Alkaline Phosphatase 102 46 - 116 U/L    Total Protein 7 2 6 4 - 8 2 g/dL    Albumin 3 4 (L) 3 5 - 5 0 g/dL    Total Bilirubin 0 41 0 20 - 1 00 mg/dL    eGFR 47 ml/min/1 73sq m   Lipase    Collection Time: 03/04/22 11:26 PM   Result Value Ref Range    Lipase 110 73 - 393 u/L   HS Troponin 0hr (reflex protocol)    Collection Time: 03/04/22 11:26 PM   Result Value Ref Range    hs TnI 0hr 211 (H) "Refer to ACS Flowchart"- see link ng/L   D-dimer, quantitative    Collection Time: 03/05/22 12:02 AM   Result Value Ref Range    D-Dimer, Quant 1 52 (H) <0 50 ug/ml FEU   Protime-INR    Collection Time: 03/05/22 12:02 AM   Result Value Ref Range    Protime 13 4 11 6 - 14 5 seconds    INR 1 02 0 84 - 1 19   APTT    Collection Time: 03/05/22 12:02 AM   Result Value Ref Range    PTT 30 23 - 37 seconds   HS Troponin I 2hr    Collection Time: 03/05/22  1:35 AM   Result Value Ref Range    hs TnI 2hr 216 (H) "Refer to ACS Flowchart"- see link ng/L    Delta 2hr hsTnI 5 <20 ng/L   HS Troponin I 4hr    Collection Time: 03/05/22  3:28 AM   Result Value Ref Range    hs TnI 4hr 217 (H) "Refer to ACS Flowchart"- see link ng/L    Delta 4hr hsTnI 6 <20 ng/L   APTT six (6) hours after Heparin bolus/drip initiation or dosing change    Collection Time: 03/05/22  3:28 AM   Result Value Ref Range    PTT 31 23 - 37 seconds   CBC    Collection Time: 03/05/22  3:28 AM   Result Value Ref Range    WBC 13 64 (H) 4 31 - 10 16 Thousand/uL    RBC 4 19 3 88 - 5 62 Million/uL    Hemoglobin 12 5 12 0 - 17 0 g/dL    Hematocrit 36 6 36 5 - 49 3 %    MCV 87 82 - 98 fL    MCH 29 8 26 8 - 34 3 pg    MCHC 34 2 31 4 - 37 4 g/dL    RDW 14 3 11 6 - 15 1 %    Platelets 495 197 - 330 Thousands/uL    MPV 10 6 8 9 - 12 7 fL   Protime-INR    Collection Time: 03/05/22  3:28 AM   Result Value Ref Range    Protime 13 7 11 6 - 14 5 seconds    INR 1 05 0 84 - 1 19   Lactic acid, plasma    Collection Time: 03/05/22  4:18 AM   Result Value Ref Range    LACTIC ACID 1 6 0 5 - 2 0 mmol/L   Comprehensive metabolic panel    Collection Time: 03/05/22  5:17 AM   Result Value Ref Range    Sodium 136 136 - 145 mmol/L    Potassium 4 1 3 5 - 5 3 mmol/L    Chloride 103 100 - 108 mmol/L    CO2 26 21 - 32 mmol/L    ANION GAP 7 4 - 13 mmol/L    BUN 26 (H) 5 - 25 mg/dL    Creatinine 1 25 0 60 - 1 30 mg/dL    Glucose 160 (H) 65 - 140 mg/dL    Calcium 8 5 8 3 - 10 1 mg/dL    Corrected Calcium 9 3 8 3 - 10 1 mg/dL    AST 12 5 - 45 U/L    ALT 13 12 - 78 U/L    Alkaline Phosphatase 89 46 - 116 U/L    Total Protein 6 4 6 4 - 8 2 g/dL    Albumin 3 0 (L) 3 5 - 5 0 g/dL    Total Bilirubin 0 51 0 20 - 1 00 mg/dL    eGFR 57 ml/min/1 73sq m   Magnesium    Collection Time: 03/05/22  5:17 AM   Result Value Ref Range    Magnesium 1 8 1 6 - 2 6 mg/dL   CBC and differential    Collection Time: 03/05/22  5:17 AM   Result Value Ref Range    WBC 11 88 (H) 4 31 - 10 16 Thousand/uL    RBC 4 06 3 88 - 5 62 Million/uL    Hemoglobin 12 0 12 0 - 17 0 g/dL    Hematocrit 35 0 (L) 36 5 - 49 3 %    MCV 86 82 - 98 fL    MCH 29 6 26 8 - 34 3 pg    MCHC 34 3 31 4 - 37 4 g/dL    RDW 14 4 11 6 - 15 1 %    MPV 10 8 8 9 - 12 7 fL    Platelets 801 212 - 768 Thousands/uL    nRBC 0 /100 WBCs    Neutrophils Relative 75 43 - 75 %    Immat GRANS % 0 0 - 2 %    Lymphocytes Relative 12 (L) 14 - 44 %    Monocytes Relative 10 4 - 12 %    Eosinophils Relative 3 0 - 6 %    Basophils Relative 0 0 - 1 %    Neutrophils Absolute 8 88 (H) 1 85 - 7 62 Thousands/µL    Immature Grans Absolute 0 05 0 00 - 0 20 Thousand/uL    Lymphocytes Absolute 1 41 0 60 - 4 47 Thousands/µL    Monocytes Absolute 1 15 0 17 - 1 22 Thousand/µL    Eosinophils Absolute 0 35 0 00 - 0 61 Thousand/µL    Basophils Absolute 0 04 0 00 - 0 10 Thousands/µL   Fingerstick Glucose (POCT)    Collection Time: 03/05/22  7:51 AM   Result Value Ref Range    POC Glucose 153 (H) 65 - 140 mg/dl     Imaging: I have personally reviewed pertinent reports  and I have personally reviewed pertinent films in PACS  Code Status:  Level 1 full code  Epic/ Allscripts/Care Everywhere records reviewed:  Yes    * Please Note: Fluency DirectDictation voice to text software may have been used in the creation of this document   **

## 2022-03-05 NOTE — H&P
[facility]  H&P- Ceci Mathews 1950, 70 y o  male MRN: [id number]  Unit/Bed#: ED 17 Encounter: [id number]  Primary Care Provider: Patito Keith MD   Date and time admitted to hospital: 3/4/2022 10:34 PM    * NSTEMI (non-ST elevated myocardial infarction) [facility])  Assessment & Plan  Lab Results   Component Value Date    HSTNI0 211 (H) 03/04/2022    HSTNI2 216 (H) 03/05/2022    HSTNID2 5 03/05/2022       Cardiac hx: none     CTA dissection CAP: neg for acute aortic pathology or aneurysm  Extensive atheromatous changes throughout aortoiliac vasculature w/o flow-limiting stenosis or occlusion  Diffuse esophageal wall thickening, nonspecific  · EKG: sinus tachycardia rate 101  Inverted T waves V1 through V3  Q waves V1, V2, V3    Likely non ST elevation MI    YOLA Score: 4 (20% all cause mortality risk)     Plan:    STAT EKG and troponin if chest pain, Telemetry for arrhythmias   Repeat troponin q3h x 3 or until peak, Lipid panel, HbA1C, Urine tox   Nitroglycerin p r n ,  mg, Protonix 40 mg bid, ACE-I: held 2/2 CARLOS EDUARDO, Statin: continuing home lipitor   Heparin drip   Stress test based on YOLA score   Consult cardiology for further workup and possible catheterization/PCI      Stage 3 chronic kidney disease [facility])  Assessment & Plan  Lab Results   Component Value Date    EGFR 47 03/04/2022    EGFR 66 09/27/2021    EGFR 64 09/26/2021    CREATININE 1 47 (H) 03/04/2022    CREATININE 1 12 09/27/2021    CREATININE 1 14 09/26/2021     CARLOS EDUARDO on S3CKD POA      Estimated Creatinine Clearance: 47 9 mL/min (A) (by C-G formula based on SCr of 1 47 mg/dL (H))       POA; (baseline appears to be 1 1-1 2)   Likely prerenal 2/2 ACS, poor perfusion    Plan:   UA w/ microscopic, Urinary retention protocol, Bladder scan, Daily weights, I/O   IV Fluids: isolyte at 100 mL/hr   Monitor BMP daily and observe for downward trend of creatinine   Avoid hypoperfusion of the kidneys, minimize nephrotoxins   RAAS Blockers/Diuretics held: lisinopril      Type 2 diabetes mellitus with diabetic neuropathy, with long-term current use of insulin (HCC)  Assessment & Plan  Lab Results   Component Value Date    HGBA1C 6 2 (H) 06/14/2020       No results for input(s): POCGLU in the last 72 hours  Blood Sugar Average: Last 72 hrs:     Hold oral antihyperglycemics  Home Regimen: glipizide 10 mg BID, metformin 1 G BID    Plan:   Hold oral antihyperglycemics   Diet Consistent CHO Level 2 (5 CHO servings/75g CHO per meal)   Insulin regimen  o Lantus 20 units qAM  o Insulin correction ACHS   Goal BG  while admitted, adjusting insulin regimen as appropriate   Glucose checks: AC HS   Monitor for hypoglycemia and treat per protocol      Essential hypertension  Assessment & Plan  Vitals:    03/05/22 0030 03/05/22 0130 03/05/22 0230 03/05/22 0400   BP: 134/69 105/56 146/70 135/63   BP Location:    Right arm   Pulse: 86 84 88 92   Resp: 18 18 18 18   Temp:       TempSrc:       SpO2: 98% 97% 98% 97%   Weight:       Height:         Vitals per unit routine  Hold home Lisinopril for now 2/2 CARLOS EDUARDO    Vitamin B12 deficiency  Assessment & Plan  Continue home B12    Vitamin D deficiency  Assessment & Plan  Continue home vitamin D      VTE Prophylaxis: Heparin Drip  / sequential compression device   Code Status: Full Code  POLST: POLST form is not discussed and not completed at this time  Anticipated Length of Stay:  Patient will be admitted on an Inpatient basis with an anticipated length of stay of  > 2 midnights  Justification for Hospital Stay: ACS    Total Time for Visit, including Counseling / Coordination of Care: 30 minutes  Greater than 50% of this total time spent on direct patient counseling and coordination of care      Chief Complaint:   Chest pain associated with nausea    History of Present Illness:    Gary Rainey is a 70 y o  male w/PMHx HTN, T2IDDM, HLD, S3CKD, who [facility] for evaluation of sudden onset substernal CP and nausea that began at ~6:00 pm  History was limited as pt was asleep when I arrived to his room this AM and was not forthcoming with answers  Some history obtained through chart review  Patient states this evening he developed sudden onset of nausea and substernal chest pain, described as pain in his ribs/sternum  No radiation  Pain resolved by the time he arrived to ED  Patient did receive 324 of aspirin in route to the hospital   Denies recent illness, fevers, chills, dizziness, headache, SOB, vomiting  Pt states this happened previously ~1 month prior to admission and self resolved  No known hx MI/angina/ACS  Found to have elevated troponins at 211  Started on heparin gtt in ED  HEART score 8  YOLA score 4  Review of Systems:    Review of Systems   Constitutional: Negative for chills and fever  HENT: Negative for ear pain and sore throat  Eyes: Negative for pain and visual disturbance  Respiratory: Positive for chest tightness  Negative for cough and shortness of breath  Cardiovascular: Positive for chest pain  Negative for palpitations and leg swelling  Gastrointestinal: Positive for nausea  Negative for abdominal pain, diarrhea and vomiting  Endocrine: Negative  Genitourinary: Negative for dysuria and hematuria  Musculoskeletal: Negative for arthralgias and back pain  Skin: Negative for color change and rash  Allergic/Immunologic: Negative  Neurological: Negative for seizures and syncope  Hematological: Negative  Psychiatric/Behavioral: Negative  All other systems reviewed and are negative        Past Medical and Surgical History:     Past Medical History:   Diagnosis Date    Ambulatory dysfunction     uses walker with assist of 1    Anemia     Anxiety     At risk for falls     hx of falls    Benign paroxysmal vertigo     unspecified ear    BPH (benign prostatic hyperplasia)     for TURP today 1/4/2021    Calculus in bladder     for surgical removal today 1/4/2021    Cataract     left eye    Cervicalgia     Chest pain     Chronic kidney disease     stage 3    Constipation     CTS (carpal tunnel syndrome)     left    Cyst of pancreas     Cystitis 06/23/2020    acute cystitis with hematuria    Depression     Diabetes mellitus ([facility] )     type II with neuropathy    Dizziness     and giddiness    Dysphagia     Elevated PSA     Facial weakness     GERD (gastroesophageal reflux disease)     last assessed 5/20/16    Gross hematuria     Hematuria     Hyperlipidemia     Hypertension     Kidney disease     Kidney stone     Left-sided weakness     Long term (current) use of oral hypoglycemic drugs     Muscle weakness     Nuclear senile cataract of left eye     OA (osteoarthritis) of knee     b/l    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     Syncope and collapse     Tachycardia     UTI (urinary tract infection)     Vertebro-basilar artery syndrome     Vitamin B deficiency     Vitamin D deficiency     Vitamin D deficiency        Past Surgical History:   Procedure Laterality Date    CATARACT EXTRACTION      CHOLECYSTECTOMY      KNEE SURGERY      CT REMOVE BLADDER STONE,<2 5 CM N/A 1/4/2021    Procedure: Cystolitholopaxy w/laser bladder stones;  Surgeon: Crista Garza MD;  Location: [facility];  Service: Urology    CT TRANSURETHRAL ELEC-SURG PROSTATECTOM N/A 1/4/2021    Procedure: T U R P ;  Surgeon: Crista Garza MD;  Location: [facility];  Service: Urology       Meds/Allergies:    Prior to Admission medications    Medication Sig Start Date End Date Taking?  Authorizing Provider   ACCU-CHEK GUIDE test strip 1 strip as needed 6/16/20   Historical Provider, MD   acetaminophen (TYLENOL) 325 mg tablet Take 650 mg by mouth every 6 (six) hours as needed for mild pain    Historical Provider, MD   Alcohol Swabs (PRO COMFORT ALCOHOL) 70 % PADS 1 pad daily Use a pad when testing 6/16/20   Historical Provider, MD   atorvastatin (LIPITOR) 20 mg tablet Take 1 tablet (20 mg total) by mouth daily 2/11/21   Sowmya Smart DO   carbidopa-levodopa (SINEMET)  mg per tablet Take 1 tablet by mouth 3 (three) times a day 7/22/21 10/14/21  Susie Zuluaga MD   cholecalciferol (VITAMIN D3) 1,000 units tablet Take 2 tablets (2,000 Units total) by mouth daily  Patient taking differently: Take 50,000 Units by mouth daily  6/24/20   Teetee Coto MD   cyanocobalamin (VITAMIN B-12) 1000 MCG tablet Take 1 tablet (1,000 mcg total) by mouth daily 6/23/20   Teetee Coto MD   docusate sodium (COLACE) 100 mg capsule Take 1 capsule (100 mg total) by mouth 2 (two) times a day 6/23/20   Teetee Coto MD   Easy Comfort Lancets MISC as needed 6/16/20   Historical Provider, MD   finasteride (PROSCAR) 5 mg tablet Take 1 tablet (5 mg total) by mouth daily 5/13/20   Fox Uribe PA-C   glipiZIDE (GLUCOTROL) 10 mg tablet TAKE 1 TABLET BY MOUTH 2 TIMES A DAY  Patient taking differently: Per [facility] "give 5mg by mouth one time a prescriber day for DM 2" 2/10/21   Sowmya Smart DO   Glucagon, rDNA, (GLUCAGON EMERGENCY IJ) Inject 1 mL as directed Sugars less than 60 and conscious    Historical Provider, MD   Glucose 15 g PACK Take 15 g by mouth Per [facility] "give 15 gram PO every 15 minutes PRN for hypoglycemia"    Historical Provider, MD   glucose blood test strip 1 strip as needed    Historical Provider, MD   lisinopril (ZESTRIL) 5 mg tablet Take 1 tablet (5 mg total) by mouth daily 2/23/21   Sowmya Smart DO   melatonin 3 mg Take 3 mg by mouth daily at bedtime Give 2 tabs at bed time for insomnia per [facility]    Historical Provider, MD   metFORMIN (GLUCOPHAGE) 1000 MG tablet TAKE 1 TABLET BY MOUTH 2 TIMES A DAY 2/10/21   Sowmya Smart DO   NovoFine Autocover 30G X 8 MM MISC  12/9/20   Historical Provider, MD   NovoLOG FlexPen 100 units/mL injection pen  1/2/21   Historical Provider, MD   polyethylene glycol (MIRALAX) 17 g packet Take 17 g by mouth daily 6/3/20   Alexandre Jersey,    senna (SENOKOT) 8 6 mg Take 1 tablet (8 6 mg total) by mouth daily as needed for constipation 2/10/21   Alexandre Jersey, DO   tamsulosin [facility]) 0 4 mg Take 1 capsule (0 4 mg total) by mouth daily with dinner 5/13/20   Fox Uribe PA-C   vitamin B-12 (CYANOCOBALAMIN) 100 MCG TABS Take 100 mcg by mouth daily 6/16/20   Historical Provider, MD SANCHEZ have reviewed home medications with patient personally  Allergies: No Known Allergies    Social History:     Marital Status: /Civil Union   Occupation: retired  Patient Pre-hospital Living Situation: with wife  Patient Pre-hospital Level of Mobility: normal  Patient Pre-hospital Diet Restrictions: none  Substance Use History:   Social History     Substance and Sexual Activity   Alcohol Use Not Currently     Social History     Tobacco Use   Smoking Status Never Smoker   Smokeless Tobacco Never Used     Social History     Substance and Sexual Activity   Drug Use No       Family History:    non-contributory    Physical Exam:     Vitals:   Blood Pressure: 127/58 (03/05/22 0600)  Pulse: 92 (03/05/22 0600)  Temperature: 98 °F (36 7 °C) (Simultaneous filing  User may not have seen previous data ) (03/04/22 2239)  Temp Source: Oral (Simultaneous filing  User may not have seen previous data ) (03/04/22 2239)  Respirations: 18 (03/05/22 0600)  Height: 5' 7" (170 2 cm) (03/04/22 2239)  Weight - Scale: 84 6 kg (186 lb 8 2 oz) (03/04/22 2239)  SpO2: 97 % (03/05/22 0600)    Physical Exam  Vitals and nursing note reviewed  Constitutional:       Appearance: He is well-developed  HENT:      Head: Normocephalic and atraumatic  Mouth/Throat:      Mouth: Mucous membranes are moist       Pharynx: Oropharynx is clear  Eyes:      Conjunctiva/sclera: Conjunctivae normal    Cardiovascular:      Rate and Rhythm: Normal rate and regular rhythm        Pulses:           Dorsalis pedis pulses are 2+ on the right side and 2+ on the left side  Heart sounds: No murmur heard  Pulmonary:      Effort: Pulmonary effort is normal  No respiratory distress  Breath sounds: Normal breath sounds  Abdominal:      Palpations: Abdomen is soft  Tenderness: There is no abdominal tenderness  Musculoskeletal:         General: No swelling or tenderness  Cervical back: Neck supple  Feet:      Right foot:      Toenail Condition: Right toenails are abnormally thick  Left foot:      Toenail Condition: Left toenails are abnormally thick  Fungal disease present  Skin:     General: Skin is warm and dry  Neurological:      General: No focal deficit present  Mental Status: He is alert  Psychiatric:         Mood and Affect: Mood normal          Additional Data:     Lab Results: I have personally reviewed pertinent reports  Results from last 7 days   Lab Units 03/05/22  0517   WBC Thousand/uL 11 88*   HEMOGLOBIN g/dL 12 0   HEMATOCRIT % 35 0*   PLATELETS Thousands/uL 299   NEUTROS PCT % 75   LYMPHS PCT % 12*   MONOS PCT % 10   EOS PCT % 3     Results from last 7 days   Lab Units 03/05/22  0517   SODIUM mmol/L 136   POTASSIUM mmol/L 4 1   CHLORIDE mmol/L 103   CO2 mmol/L 26   BUN mg/dL 26*   CREATININE mg/dL 1 25   ANION GAP mmol/L 7   CALCIUM mg/dL 8 5   ALBUMIN g/dL 3 0*   TOTAL BILIRUBIN mg/dL 0 51   ALK PHOS U/L 89   ALT U/L 13   AST U/L 12   GLUCOSE RANDOM mg/dL 160*     Results from last 7 days   Lab Units 03/05/22  0328   INR  1 05             Results from last 7 days   Lab Units 03/05/22  0418   LACTIC ACID mmol/L 1 6       Imaging: I have personally reviewed pertinent reports  CT head without contrast   Final Result by Fam Rea MD (03/05 0109)      No acute intracranial abnormality  Workstation performed: [id number]         CTA dissection protocol chest/abdomen/pelvis   Final Result by Jo Ann Moscoso MD (03/05 0018)      No evidence of acute aortic pathology or aortic aneurysm  Extensive atheromatous changes/plaque throughout the aortoiliac vasculature without flow-limiting stenosis or occlusion  Diffuse esophageal wall thickening, nonspecific  Consider follow-up GI consult  Additional findings as above  Workstation performed: [id number]         XR chest 1 view portable    (Results Pending)       EKG, Pathology, and Other Studies Reviewed on Admission:   · EKG: sinus tachycardia rate 101  Inverted T waves V1 through V3  Q waves V1, V2, V3     Allscripts / Epic Records Reviewed: Yes     ** Please Note: This note has been constructed using a voice recognition system   **

## 2022-03-05 NOTE — ASSESSMENT & PLAN NOTE
Lab Results   Component Value Date    HGBA1C 6 2 (H) 06/14/2020       No results for input(s): POCGLU in the last 72 hours      Blood Sugar Average: Last 72 hrs:     Hold oral antihyperglycemics  Home Regimen: glipizide 10 mg BID, metformin 1 G BID    Plan:   Hold oral antihyperglycemics   Diet Consistent CHO Level 2 (5 CHO servings/75g CHO per meal)   Insulin regimen  o Lantus 20 units qAM  o Insulin correction ACHS   Goal BG  while admitted, adjusting insulin regimen as appropriate   Glucose checks: [facility]   Monitor for hypoglycemia and treat per protocol

## 2022-03-05 NOTE — PLAN OF CARE
Problem: Potential for Falls  Goal: Patient will remain free of falls  Description: INTERVENTIONS:  - Educate patient/family on patient safety including physical limitations  - Instruct patient to call for assistance with activity   - Consult OT/PT to assist with strengthening/mobility   - Keep Call bell within reach  - Keep bed low and locked with side rails adjusted as appropriate  - Keep care items and personal belongings within reach  - Initiate and maintain comfort rounds  - Make Fall Risk Sign visible to staff  - Offer Toileting every  Hours, in advance of need  - Initiate/Maintain alarm  - Obtain necessary fall risk management equipment:   - Apply yellow socks and bracelet for high fall risk patients  - Consider moving patient to room near nurses station  Outcome: Progressing     Problem: Prexisting or High Potential for Compromised Skin Integrity  Goal: Skin integrity is maintained or improved  Description: INTERVENTIONS:  - Identify patients at risk for skin breakdown  - Assess and monitor skin integrity  - Assess and monitor nutrition and hydration status  - Monitor labs   - Assess for incontinence   - Turn and reposition patient  - Assist with mobility/ambulation  - Relieve pressure over bony prominences  - Avoid friction and shearing  - Provide appropriate hygiene as needed including keeping skin clean and dry  - Evaluate need for skin moisturizer/barrier cream  - Collaborate with interdisciplinary team   - Patient/family teaching  - Consider wound care consult   Outcome: Progressing     Problem: MOBILITY - ADULT  Goal: Maintain or return to baseline ADL function  Description: INTERVENTIONS:  -  Assess patient's ability to carry out ADLs; assess patient's baseline for ADL function and identify physical deficits which impact ability to perform ADLs (bathing, care of mouth/teeth, toileting, grooming, dressing, etc )  - Assess/evaluate cause of self-care deficits   - Assess range of motion  - Assess patient's mobility; develop plan if impaired  - Assess patient's need for assistive devices and provide as appropriate  - Encourage maximum independence but intervene and supervise when necessary  - Involve family in performance of ADLs  - Assess for home care needs following discharge   - Consider OT consult to assist with ADL evaluation and planning for discharge  - Provide patient education as appropriate  Outcome: Progressing  Goal: Maintains/Returns to pre admission functional level  Description: INTERVENTIONS:  - Perform BMAT or MOVE assessment daily    - Set and communicate daily mobility goal to care team and patient/family/caregiver  - Collaborate with rehabilitation services on mobility goals if consulted  - Perform Range of Motion  times a day  - Reposition patient every  hours    - Dangle patient  times a day  - Stand patient  times a day  - Ambulate patient  times a day  - Out of bed to chair times a day   - Out of bed for meals  times a day  - Out of bed for toileting  - Record patient progress and toleration of activity level   Outcome: Progressing     Problem: CARDIOVASCULAR - ADULT  Goal: Maintains optimal cardiac output and hemodynamic stability  Description: INTERVENTIONS:  - Monitor I/O, vital signs and rhythm  - Monitor for S/S and trends of decreased cardiac output  - Administer and titrate ordered vasoactive medications to optimize hemodynamic stability  - Assess quality of pulses, skin color and temperature  - Assess for signs of decreased coronary artery perfusion  - Instruct patient to report change in severity of symptoms  Outcome: Progressing  Goal: Absence of cardiac dysrhythmias or at baseline rhythm  Description: INTERVENTIONS:  - Continuous cardiac monitoring, vital signs, obtain 12 lead EKG if ordered  - Administer antiarrhythmic and heart rate control medications as ordered  - Monitor electrolytes and administer replacement therapy as ordered  Outcome: Progressing

## 2022-03-06 ENCOUNTER — APPOINTMENT (INPATIENT)
Dept: NUCLEAR MEDICINE | Facility: HOSPITAL | Age: 72
DRG: 281 | End: 2022-03-06
Payer: MEDICARE

## 2022-03-06 ENCOUNTER — APPOINTMENT (INPATIENT)
Dept: NON INVASIVE DIAGNOSTICS | Facility: HOSPITAL | Age: 72
DRG: 281 | End: 2022-03-06
Payer: MEDICARE

## 2022-03-06 PROBLEM — I25.10 CORONARY ARTERY CALCIFICATION: Status: ACTIVE | Noted: 2022-03-06

## 2022-03-06 PROBLEM — I25.84 CORONARY ARTERY CALCIFICATION: Status: ACTIVE | Noted: 2022-03-06

## 2022-03-06 LAB
ANION GAP SERPL CALCULATED.3IONS-SCNC: 8 MMOL/L (ref 4–13)
AORTIC ROOT: 3.3 CM
APICAL FOUR CHAMBER EJECTION FRACTION: 53 %
APTT PPP: 52 SECONDS (ref 23–37)
APTT PPP: 92 SECONDS (ref 23–37)
APTT PPP: 97 SECONDS (ref 23–37)
ASCENDING AORTA: 3.1 CM (ref 2.04–3.06)
ATRIAL RATE: 101 BPM
ATRIAL RATE: 86 BPM
ATRIAL RATE: 94 BPM
BASOPHILS # BLD AUTO: 0.04 THOUSANDS/ΜL (ref 0–0.1)
BASOPHILS NFR BLD AUTO: 0 % (ref 0–1)
BUN SERPL-MCNC: 21 MG/DL (ref 5–25)
CALCIUM SERPL-MCNC: 8.7 MG/DL (ref 8.3–10.1)
CHLORIDE SERPL-SCNC: 101 MMOL/L (ref 100–108)
CO2 SERPL-SCNC: 28 MMOL/L (ref 21–32)
CREAT SERPL-MCNC: 1.14 MG/DL (ref 0.6–1.3)
E WAVE DECELERATION TIME: 183 MS
EOSINOPHIL # BLD AUTO: 0.54 THOUSAND/ΜL (ref 0–0.61)
EOSINOPHIL NFR BLD AUTO: 5 % (ref 0–6)
ERYTHROCYTE [DISTWIDTH] IN BLOOD BY AUTOMATED COUNT: 14.2 % (ref 11.6–15.1)
FRACTIONAL SHORTENING: 27 % (ref 28–44)
GFR SERPL CREATININE-BSD FRML MDRD: 64 ML/MIN/1.73SQ M
GLUCOSE SERPL-MCNC: 159 MG/DL (ref 65–140)
GLUCOSE SERPL-MCNC: 177 MG/DL (ref 65–140)
GLUCOSE SERPL-MCNC: 184 MG/DL (ref 65–140)
GLUCOSE SERPL-MCNC: 206 MG/DL (ref 65–140)
HCT VFR BLD AUTO: 32.9 % (ref 36.5–49.3)
HGB BLD-MCNC: 11.3 G/DL (ref 12–17)
IMM GRANULOCYTES # BLD AUTO: 0.03 THOUSAND/UL (ref 0–0.2)
IMM GRANULOCYTES NFR BLD AUTO: 0 % (ref 0–2)
INTERVENTRICULAR SEPTUM IN DIASTOLE (PARASTERNAL SHORT AXIS VIEW): 1.2 CM
INTERVENTRICULAR SEPTUM: 1.2 CM (ref 0.54–1)
LAAS-AP2: 17.1 CM2
LAAS-AP4: 17 CM2
LEFT ATRIUM SIZE: 3.8 CM
LEFT INTERNAL DIMENSION IN SYSTOLE: 3.2 CM (ref 3–4.54)
LEFT VENTRICULAR INTERNAL DIMENSION IN DIASTOLE: 4.4 CM (ref 4.93–7.34)
LEFT VENTRICULAR POSTERIOR WALL IN END DIASTOLE: 1.1 CM (ref 0.52–0.99)
LEFT VENTRICULAR STROKE VOLUME: 48 ML
LVSV (TEICH): 48 ML
LYMPHOCYTES # BLD AUTO: 1.42 THOUSANDS/ΜL (ref 0.6–4.47)
LYMPHOCYTES NFR BLD AUTO: 14 % (ref 14–44)
MAX HR PERCENT: 73 %
MCH RBC QN AUTO: 29.7 PG (ref 26.8–34.3)
MCHC RBC AUTO-ENTMCNC: 34.3 G/DL (ref 31.4–37.4)
MCV RBC AUTO: 87 FL (ref 82–98)
MONOCYTES # BLD AUTO: 0.9 THOUSAND/ΜL (ref 0.17–1.22)
MONOCYTES NFR BLD AUTO: 9 % (ref 4–12)
MV E'TISSUE VEL-SEP: 6 CM/S
MV PEAK A VEL: 0.78 M/S
MV PEAK E VEL: 76 CM/S
MV STENOSIS PRESSURE HALF TIME: 53 MS
MV VALVE AREA P 1/2 METHOD: 4.15 CM2
NEUTROPHILS # BLD AUTO: 7 THOUSANDS/ΜL (ref 1.85–7.62)
NEUTS SEG NFR BLD AUTO: 72 % (ref 43–75)
NRBC BLD AUTO-RTO: 0 /100 WBCS
NUC STRESS EJECTION FRACTION: 61 %
P AXIS: 57 DEGREES
P AXIS: 67 DEGREES
P AXIS: 70 DEGREES
PLATELET # BLD AUTO: 264 THOUSANDS/UL (ref 149–390)
PMV BLD AUTO: 10.5 FL (ref 8.9–12.7)
POTASSIUM SERPL-SCNC: 3.8 MMOL/L (ref 3.5–5.3)
PR INTERVAL: 132 MS
PR INTERVAL: 138 MS
PR INTERVAL: 142 MS
QRS AXIS: 48 DEGREES
QRS AXIS: 52 DEGREES
QRS AXIS: 52 DEGREES
QRSD INTERVAL: 114 MS
QRSD INTERVAL: 118 MS
QRSD INTERVAL: 118 MS
QT INTERVAL: 380 MS
QT INTERVAL: 386 MS
QT INTERVAL: 400 MS
QTC INTERVAL: 461 MS
QTC INTERVAL: 492 MS
QTC INTERVAL: 500 MS
RATE PRESSURE PRODUCT: NORMAL
RBC # BLD AUTO: 3.8 MILLION/UL (ref 3.88–5.62)
RIGHT ATRIAL 2D VOLUME: 32 ML
RIGHT ATRIUM AREA SYSTOLE A4C: 13.1 CM2
RIGHT VENTRICLE ID DIMENSION: 3.6 CM
SL CV LEFT ATRIUM LENGTH A2C: 4.4 CM
SL CV LV EF: 55
SL CV PED ECHO LEFT VENTRICLE DIASTOLIC VOLUME (MOD BIPLANE) 2D: 89 ML
SL CV PED ECHO LEFT VENTRICLE SYSTOLIC VOLUME (MOD BIPLANE) 2D: 41 ML
SL CV REST NUCLEAR ISOTOPE DOSE: 11 MCI
SL CV STRESS NUCLEAR ISOTOPE DOSE: 33 MCI
SL CV STRESS RECOVERY BP: NORMAL MMHG
SL CV STRESS RECOVERY HR: 106 BPM
SL CV STRESS RECOVERY O2 SAT: 96 %
SODIUM SERPL-SCNC: 137 MMOL/L (ref 136–145)
STRESS ANGINA INDEX: 0
STRESS BASELINE BP: NORMAL MMHG
STRESS BASELINE HR: 93 BPM
STRESS DUKE TREADMILL SCORE: 3
STRESS O2 SAT REST: 98 %
STRESS PEAK HR: 110 BPM
STRESS PERCENT HR: 73 %
STRESS POST EXERCISE DUR MIN: 3 MIN
STRESS POST EXERCISE DUR SEC: 0 SEC
STRESS POST O2 SAT PEAK: 96 %
STRESS POST PEAK BP: 116 MMHG
STRESS ST DEPRESSION: 0 MM
STRESS TARGET HR: 110 BPM
STRESS/REST PERFUSION RATIO: 1.7
T WAVE AXIS: 67 DEGREES
T WAVE AXIS: 70 DEGREES
T WAVE AXIS: 72 DEGREES
TSH SERPL DL<=0.05 MIU/L-ACNC: 0.91 UIU/ML (ref 0.36–3.74)
VENTRICULAR RATE: 101 BPM
VENTRICULAR RATE: 86 BPM
VENTRICULAR RATE: 94 BPM
WBC # BLD AUTO: 9.93 THOUSAND/UL (ref 4.31–10.16)
Z-SCORE OF ASCENDING AORTA: 2.14
Z-SCORE OF INTERVENTRICULAR SEPTUM IN END DIASTOLE: 3.6
Z-SCORE OF LEFT VENTRICULAR DIMENSION IN END DIASTOLE: -3.13
Z-SCORE OF LEFT VENTRICULAR DIMENSION IN END SYSTOLE: -1.12
Z-SCORE OF LEFT VENTRICULAR POSTERIOR WALL IN END DIASTOLE: 2.87

## 2022-03-06 PROCEDURE — 93010 ELECTROCARDIOGRAM REPORT: CPT | Performed by: INTERNAL MEDICINE

## 2022-03-06 PROCEDURE — 80048 BASIC METABOLIC PNL TOTAL CA: CPT | Performed by: FAMILY MEDICINE

## 2022-03-06 PROCEDURE — 84443 ASSAY THYROID STIM HORMONE: CPT | Performed by: FAMILY MEDICINE

## 2022-03-06 PROCEDURE — 78452 HT MUSCLE IMAGE SPECT MULT: CPT

## 2022-03-06 PROCEDURE — 85025 COMPLETE CBC W/AUTO DIFF WBC: CPT | Performed by: FAMILY MEDICINE

## 2022-03-06 PROCEDURE — 82948 REAGENT STRIP/BLOOD GLUCOSE: CPT

## 2022-03-06 PROCEDURE — 78452 HT MUSCLE IMAGE SPECT MULT: CPT | Performed by: INTERNAL MEDICINE

## 2022-03-06 PROCEDURE — 93016 CV STRESS TEST SUPVJ ONLY: CPT | Performed by: INTERNAL MEDICINE

## 2022-03-06 PROCEDURE — 93306 TTE W/DOPPLER COMPLETE: CPT | Performed by: INTERNAL MEDICINE

## 2022-03-06 PROCEDURE — 93018 CV STRESS TEST I&R ONLY: CPT | Performed by: INTERNAL MEDICINE

## 2022-03-06 PROCEDURE — 93017 CV STRESS TEST TRACING ONLY: CPT

## 2022-03-06 PROCEDURE — 85730 THROMBOPLASTIN TIME PARTIAL: CPT | Performed by: FAMILY MEDICINE

## 2022-03-06 PROCEDURE — G1004 CDSM NDSC: HCPCS

## 2022-03-06 PROCEDURE — 99233 SBSQ HOSP IP/OBS HIGH 50: CPT | Performed by: INTERNAL MEDICINE

## 2022-03-06 PROCEDURE — A9502 TC99M TETROFOSMIN: HCPCS

## 2022-03-06 PROCEDURE — 99232 SBSQ HOSP IP/OBS MODERATE 35: CPT | Performed by: FAMILY MEDICINE

## 2022-03-06 RX ORDER — MAGNESIUM HYDROXIDE/ALUMINUM HYDROXICE/SIMETHICONE 120; 1200; 1200 MG/30ML; MG/30ML; MG/30ML
30 SUSPENSION ORAL EVERY 4 HOURS PRN
Status: DISCONTINUED | OUTPATIENT
Start: 2022-03-06 | End: 2022-03-09 | Stop reason: HOSPADM

## 2022-03-06 RX ORDER — METOPROLOL SUCCINATE 25 MG/1
25 TABLET, EXTENDED RELEASE ORAL DAILY
Status: DISCONTINUED | OUTPATIENT
Start: 2022-03-06 | End: 2022-03-09 | Stop reason: HOSPADM

## 2022-03-06 RX ORDER — CLOPIDOGREL BISULFATE 75 MG/1
75 TABLET ORAL DAILY
Status: DISCONTINUED | OUTPATIENT
Start: 2022-03-07 | End: 2022-03-09 | Stop reason: HOSPADM

## 2022-03-06 RX ORDER — SODIUM CHLORIDE 9 MG/ML
75 INJECTION, SOLUTION INTRAVENOUS CONTINUOUS
Status: DISPENSED | OUTPATIENT
Start: 2022-03-07 | End: 2022-03-07

## 2022-03-06 RX ORDER — LISINOPRIL 5 MG/1
5 TABLET ORAL DAILY
Status: DISCONTINUED | OUTPATIENT
Start: 2022-03-08 | End: 2022-03-09 | Stop reason: HOSPADM

## 2022-03-06 RX ORDER — CLOPIDOGREL BISULFATE 75 MG/1
600 TABLET ORAL ONCE
Status: COMPLETED | OUTPATIENT
Start: 2022-03-06 | End: 2022-03-06

## 2022-03-06 RX ADMIN — Medication 2000 UNITS: at 08:55

## 2022-03-06 RX ADMIN — INSULIN LISPRO 1 UNITS: 100 INJECTION, SOLUTION INTRAVENOUS; SUBCUTANEOUS at 12:15

## 2022-03-06 RX ADMIN — DOCUSATE SODIUM 100 MG: 100 CAPSULE ORAL at 17:03

## 2022-03-06 RX ADMIN — INSULIN LISPRO 2 UNITS: 100 INJECTION, SOLUTION INTRAVENOUS; SUBCUTANEOUS at 17:57

## 2022-03-06 RX ADMIN — Medication 3 MG: at 21:03

## 2022-03-06 RX ADMIN — HEPARIN SODIUM 16 UNITS/KG/HR: 10000 INJECTION, SOLUTION INTRAVENOUS; SUBCUTANEOUS at 04:16

## 2022-03-06 RX ADMIN — FINASTERIDE 5 MG: 5 TABLET, FILM COATED ORAL at 08:56

## 2022-03-06 RX ADMIN — INSULIN GLARGINE 20 UNITS: 100 INJECTION, SOLUTION SUBCUTANEOUS at 08:54

## 2022-03-06 RX ADMIN — CARBIDOPA AND LEVODOPA 1 TABLET: 25; 100 TABLET ORAL at 21:03

## 2022-03-06 RX ADMIN — DOCUSATE SODIUM 100 MG: 100 CAPSULE ORAL at 08:55

## 2022-03-06 RX ADMIN — INSULIN LISPRO 1 UNITS: 100 INJECTION, SOLUTION INTRAVENOUS; SUBCUTANEOUS at 08:59

## 2022-03-06 RX ADMIN — ATORVASTATIN CALCIUM 40 MG: 40 TABLET, FILM COATED ORAL at 08:56

## 2022-03-06 RX ADMIN — Medication 100 MCG: at 08:58

## 2022-03-06 RX ADMIN — PANTOPRAZOLE SODIUM 40 MG: 40 TABLET, DELAYED RELEASE ORAL at 04:49

## 2022-03-06 RX ADMIN — CLOPIDOGREL BISULFATE 600 MG: 75 TABLET ORAL at 13:26

## 2022-03-06 RX ADMIN — CYANOCOBALAMIN TAB 500 MCG 1000 MCG: 500 TAB at 08:55

## 2022-03-06 RX ADMIN — CARBIDOPA AND LEVODOPA 1 TABLET: 25; 100 TABLET ORAL at 08:56

## 2022-03-06 RX ADMIN — REGADENOSON 0.4 MG: 0.08 INJECTION, SOLUTION INTRAVENOUS at 10:59

## 2022-03-06 RX ADMIN — TAMSULOSIN HYDROCHLORIDE 0.4 MG: 0.4 CAPSULE ORAL at 17:03

## 2022-03-06 RX ADMIN — HEPARIN SODIUM 2000 UNITS: 1000 INJECTION INTRAVENOUS; SUBCUTANEOUS at 03:02

## 2022-03-06 RX ADMIN — METOPROLOL SUCCINATE 25 MG: 25 TABLET, EXTENDED RELEASE ORAL at 13:26

## 2022-03-06 RX ADMIN — CARBIDOPA AND LEVODOPA 1 TABLET: 25; 100 TABLET ORAL at 17:03

## 2022-03-06 NOTE — ASSESSMENT & PLAN NOTE
 CTA on 3/5 showed atherosclerotic changes in thoracic aorta and coronary arteries   Likely leading to NSTEMI - management as per above   Recommend lifestyle intervention prior to discharge

## 2022-03-06 NOTE — PROGRESS NOTES
[facility]  Progress Note - Kayla Medicus 1950, 70 y o  male MRN: [id number]  Unit/Bed#: S -01 Encounter: [id number]  Primary Care Provider: Ermias Perez MD   Date and time admitted to hospital: 3/4/2022 10:34 PM    * Chest Pain with Troponin Elevations  Assessment & Plan   POA, presented with chest pain and associated with atypical symptoms of nausea, epigastric pain   EKG without STEMI x 3 but with troponin elevation   CTA chest in ED showed significant atherosclerotic changes/plaques visualized in thoracic aorta and coronary arteries   TTE (03/05/2022) LVEF 60%    Stress test Perfusion: There is a left ventricular perfusion defect that is large in size present in the entire inferior and inferolateral location(s) that is partially reversible      Assessment: Likely coronary/NSTEMI in nature given findings; cardiology consulted and plans for catheterization tomorrow    Plan   Continue Heparin drip, ASA 81mg daily, lipitor   Continue Nitroglycerin p r n  for chest pain   Continue holding ACE-I d/t CARLOS EDUARDO    Cardiac catheterization tomorrow; NPO at midnight      Essential hypertension  Assessment & Plan   BP reviewed and stable    PLAN  · Continue metoprolol  · HOLD lisinopril 2/2 CARLOS EDUARDO; likely can resume at discharge  · Monitor BP      Coronary artery calcification  Assessment & Plan   CTA on 3/5 showed atherosclerotic changes in thoracic aorta and coronary arteries   Likely leading to NSTEMI - management as per above   Recommend lifestyle intervention prior to discharge    Vitamin B12 deficiency  Assessment & Plan  Continue home B12    Vitamin D deficiency  Assessment & Plan  Continue home vitamin D    Stage 3 chronic kidney disease ([facility] [address] )  Assessment & Plan    Estimated Creatinine Clearance: 62 mL/min (by C-G formula based on SCr of 1 14 mg/dL)   POA; Cr 1 47 (baseline appears to be 1 1-1 2)   Etiologies include prerenal 2/2 ACS vs volume depletion due to poor p o  with subsequent poor perfusion   Improved with IVF hydration and PO intake - serum creatinine back to baseline    Plan:   Monitor BMP daily and observe for downward trend of creatinine   Avoid hypoperfusion of the kidneys, minimize nephrotoxins   RAAS Blockers/Diuretics held: lisinopril      Type 2 diabetes mellitus with diabetic neuropathy, with long-term current use of insulin [facility])  Assessment & Plan  Lab Results   Component Value Date    HGBA1C 6 4 (H) 2022       Recent Labs     22  1552 22  0616 22  1215 22  1534   POCGLU 176* 159* 177* 206*       Blood Sugar Average: Last 72 hrs:  (P) 177 5 blood glucose well controlled    Home Regimen: glipizide 10 mg BID, metformin 1 G BID    Plan:   Hold oral antihyperglycemics while inpatient   Diabetic diet   Insulin regimen  o Lantus 20 units qAM  o Insulin correction ACHS   Goal BG 80- 180 while admitted, adjusting insulin regimen as appropriate   Glucose checks before meals and qHS   Monitor for hypoglycemia and treat per protocol          VTE Pharmacologic Prophylaxis: VTE Score: 4 Moderate Risk (Score 3-4) - Pharmacological DVT Prophylaxis Ordered: heparin drip  Patient Centered Rounds: I performed bedside rounds with nursing staff today  Discussions with Specialists or Other Care Team Provider: discussed with attending, cardiology    Education and Discussions with Family / Patient: Updated  (wife) via phone  Current Length of Stay: 1 day(s)  Current Patient Status: Inpatient   Discharge Plan: Anticipate discharge in 48-72 hrs to home  Code Status: Level 1 - Full Code    Subjective:   Patient is feeling well  Has some mild right-sided chest pain  Denies fevers, chills, diaphoresis, shortness of a, abdominal pain, nausea, vomiting, diarrhea, constipation      Objective:     Vitals:   Temp (24hrs), Av 2 °F (36 8 °C), Min:98 °F (36 7 °C), Max:98 3 °F (36 8 °C)    Temp:  [98 °F (36 7 °C)-98 3 °F (36 8 °C)] 98 3 °F (36 8 °C)  HR:  [77-94] 77  Resp:  [18-19] 18  BP: (117-132)/(57-72) 122/58  SpO2:  [96 %-98 %] 96 %  Body mass index is 29 44 kg/m²  Input and Output Summary (last 24 hours): Intake/Output Summary (Last 24 hours) at 3/6/2022 1552  Last data filed at 3/6/2022 1325  Gross per 24 hour   Intake 480 ml   Output 721 ml   Net -241 ml       Physical Exam:   Physical Exam  Vitals and nursing note reviewed  Constitutional:       General: He is not in acute distress  Appearance: Normal appearance  He is well-developed  He is not ill-appearing, toxic-appearing or diaphoretic  Comments: Sitting comfortably in bed   HENT:      Head: Normocephalic and atraumatic  Right Ear: External ear normal       Left Ear: External ear normal       Nose: Nose normal    Eyes:      General: No scleral icterus  Right eye: No discharge  Left eye: No discharge  Conjunctiva/sclera: Conjunctivae normal    Neck:      Trachea: No tracheal deviation  Cardiovascular:      Rate and Rhythm: Normal rate and regular rhythm  Heart sounds: Normal heart sounds  No murmur heard  Pulmonary:      Effort: Pulmonary effort is normal  No respiratory distress  Breath sounds: Normal breath sounds  No stridor  No wheezing or rales  Chest:      Chest wall: No tenderness  Abdominal:      General: Bowel sounds are normal  There is no distension  Palpations: Abdomen is soft  Tenderness: There is no abdominal tenderness  There is no guarding or rebound  Musculoskeletal:         General: No tenderness  Right lower leg: No edema  Left lower leg: No edema  Skin:     General: Skin is warm  Capillary Refill: Capillary refill takes less than 2 seconds  Neurological:      Mental Status: He is alert and oriented to person, place, and time     Psychiatric:         Behavior: Behavior normal           Additional Data:     Labs:  Results from last 7 days   Lab Units 03/06/22  3930 WBC Thousand/uL 9 93   HEMOGLOBIN g/dL 11 3*   HEMATOCRIT % 32 9*   PLATELETS Thousands/uL 264   NEUTROS PCT % 72   LYMPHS PCT % 14   MONOS PCT % 9   EOS PCT % 5     Results from last 7 days   Lab Units 03/06/22  0225 03/05/22  0517 03/05/22  0517   SODIUM mmol/L 137   < > 136   POTASSIUM mmol/L 3 8   < > 4 1   CHLORIDE mmol/L 101   < > 103   CO2 mmol/L 28   < > 26   BUN mg/dL 21   < > 26*   CREATININE mg/dL 1 14   < > 1 25   ANION GAP mmol/L 8   < > 7   CALCIUM mg/dL 8 7   < > 8 5   ALBUMIN g/dL  --   --  3 0*   TOTAL BILIRUBIN mg/dL  --   --  0 51   ALK PHOS U/L  --   --  89   ALT U/L  --   --  13   AST U/L  --   --  12   GLUCOSE RANDOM mg/dL 184*   < > 160*    < > = values in this interval not displayed       Results from last 7 days   Lab Units 03/05/22  0328   INR  1 05     Results from last 7 days   Lab Units 03/06/22  1534 03/06/22  1215 03/06/22  0616 03/05/22  1552 03/05/22  1353 03/05/22  0751   POC GLUCOSE mg/dl 206* 177* 159* 176* 194* 153*     Results from last 7 days   Lab Units 03/05/22  0517   HEMOGLOBIN A1C % 6 4*     Results from last 7 days   Lab Units 03/05/22  0418   LACTIC ACID mmol/L 1 6       Lines/Drains:  Invasive Devices  Report    Peripheral Intravenous Line            Peripheral IV 03/04/22 Left Antecubital 1 day    Peripheral IV 03/04/22 Left;Dorsal (posterior) Hand 1 day                  Telemetry:  Telemetry Orders (From admission, onward)             48 Hour Telemetry Monitoring  (ED Bridging Orders Panel)  Continuous x 48 hours        References:    Telemetry Guidelines   Question:  Reason for 48 Hour Telemetry  Answer:  Acute MI, chest pain - R/O MI, or unstable angina                 Telemetry Reviewed: Normal Sinus Rhythm and 80  Indication for Continued Telemetry Use: Acute MI/Unstable Angina/Rule out ACS             Imaging: Reviewed radiology reports from this admission including: ECHO    Recent Cultures (last 7 days):   Results from last 7 days   Lab Units 03/04/22  3436 03/04/22  2326   BLOOD CULTURE  No Growth at 24 hrs  No Growth at 24 hrs         Last 24 Hours Medication List:   Current Facility-Administered Medications   Medication Dose Route Frequency Provider Last Rate    aluminum-magnesium hydroxide-simethicone  30 mL Oral Q4H PRN Natividad Diallo MD      aspirin  81 mg Oral Daily Kyliewallace Agudelo [place], CRNP      atorvastatin  40 mg Oral Daily Kylie Agudelo [place], CRNP      carbidopa-levodopa  1 tablet Oral TID Evangelina Coronado MD      cholecalciferol  2,000 Units Oral Daily MD Lesa Mario ON 3/7/2022] clopidogrel  75 mg Oral Daily Victorina Fontenot MD      cyanocobalamin  1,000 mcg Oral Daily Evangelina Coronado MD      docusate sodium  100 mg Oral BID Evangelina Coronado MD      finasteride  5 mg Oral Daily Evangelina Coronado MD      heparin (porcine)  3-20 Units/kg/hr (Order-Specific) Intravenous Titrated Isidro Grossman PA-C 14 Units/kg/hr (03/06/22 1212)    heparin (porcine)  2,000 Units Intravenous Q1H PRN Isidro Grossman PA-C      heparin (porcine)  4,000 Units Intravenous Q1H PRN Isidro Grossman PA-C      insulin glargine  20 Units Subcutaneous QAM Evangelina Coronado MD      insulin lispro  1-6 Units Subcutaneous TID MD Lesa Alvaradoey ON 3/8/2022] lisinopril  5 mg Oral Daily Victorina Fontenot MD      melatonin  3 mg Oral HS Evangelina Coronado MD      metoprolol succinate  25 mg Oral Daily Victorina Fontenot MD      pantoprazole  40 mg Oral Early Morning Evangelina Coronado MD      polyethylene glycol  17 g Oral Daily Evangelina Coronado MD      senna  8 6 mg Oral Daily PRN MD Lesa Mariokeley ON 3/7/2022] sodium chloride  75 mL/hr Intravenous Continuous Victorina Fontenot MD      tamsulosin  0 4 mg Oral Daily With Rose Joseph MD          Today, Patient Was Seen By: Analisa Goel DO    **Please Note: This note may have been constructed using a voice recognition system  **

## 2022-03-06 NOTE — PROGRESS NOTES
General Cardiology   Progress Note -  [facility]   Blanca Malloy 70 y o  male MRN: [id number]    Unit/Bed#: S -01 Encounter: [id number]    Assessment  1  Chest pain w/ elevated troponin - rather atypical symptoms (nausea, abdominal /epigastric pain) - will r/o ischemia vs possible GI etiology    -Currently w/o c/o CP  -HS troponin level; 211--216--217  -Initial 12 lead ECG demonstrated; NSR, RBBB, ST T-wave abnormalities  -CTA chest 3/4- no evidence of acute aortic pathology or aneurysm, extensive atheromatous changes/plaque throughout the aortoiliac vasculature without flow-limiting stenosis or occlusion, atherosclerotic coronary arteries, diffuse esophageal wall thickening  -Currently on IV heparin GTT - ACS low  2  Preserved biventricular systolic function  -Compensated  -TTE 3/5/2022; LVEF 60%, no regional wall motion abnormality, diastolic function normal, and mild MR   3  Coronary artery calcifications - per CT imaging  -CTA chest 3/4 - extensive atheromatous changes/plaque throughout the aortoiliac vasculature without flow-limiting stenosis or occlusion, atherosclerotic coronary arteries  -Aspirin, and high-intensity statin  4  Essential hypertension  -Average /65, last recorded 132/72, HR 85    -On lisinopril 5 mg daily   5  Dyslipidemia  -Lipid profile 3/5/2022; cholesterol 137, triglycerides 49, HDL 40, and LDL 87  -On atorvastatin 40mg daily  6  CKD stage 3  -Baseline creatinine around 1 1-1 3   -Creatinine 1 47 on admission, currently 1 15   7  DM type 2  -HGB A1c level 6 4     Plan  -Presenting were rather atypical (nausea, abdominal /epigastric pain) - possible GI etiology but will r/o ischemia given his risk factors  Proceed with Lexiscan MPI study today for further ischemic testing (pt unable to walk on treadmill)  TTE reviewed, EF preserved, no RWMA, and mild MR    -Continue aspirin 81 mg daily, and atorvastatin to 40 mg daily    Would initiate BB if ischemic testing is abnormal   -Continue lisinopril 5 mg daily   -Agree with continuation of IV heparin GTT until further cardiac workup has been completed  -Monitor renal function electrolytes closely - replete to maintain K +level 4 0, magnesium 2 0     -Continue monitor closely on telemetry      Subjective  Review of Systems   Constitutional: Negative for chills, fever and malaise/fatigue  Eyes: Negative for visual disturbance  Cardiovascular: Negative for chest pain, dyspnea on exertion, leg swelling, orthopnea and palpitations  Respiratory: Negative for cough and shortness of breath  Gastrointestinal: Negative for abdominal pain  Neurological: Negative for dizziness, headaches and light-headedness  Objective:   Physical Exam  Vitals and nursing note reviewed  Constitutional:       General: He is not in acute distress  Appearance: He is obese  He is not diaphoretic  HENT:      Head: Normocephalic and atraumatic  Mouth/Throat:      Mouth: Mucous membranes are moist    Eyes:      General: No scleral icterus  Cardiovascular:      Rate and Rhythm: Normal rate and regular rhythm  Pulses: Normal pulses  Heart sounds: Normal heart sounds  No murmur heard  Pulmonary:      Effort: Pulmonary effort is normal       Breath sounds: Normal breath sounds  No wheezing or rales  Abdominal:      General: Bowel sounds are normal       Palpations: Abdomen is soft  Tenderness: There is no abdominal tenderness  Musculoskeletal:      Cervical back: Neck supple  Right lower leg: No edema  Left lower leg: No edema  Skin:     General: Skin is warm and dry  Capillary Refill: Capillary refill takes less than 2 seconds  Neurological:      General: No focal deficit present  Mental Status: He is alert and oriented to person, place, and time     Psychiatric:         Mood and Affect: Mood normal          Vitals: Blood pressure 132/72, pulse 85, temperature 98 2 °F (36 8 °C), temperature source Oral, resp  rate 19, height 5' 7" (1 702 m), weight 85 3 kg (188 lb 0 8 oz), SpO2 97 %  ,     Body mass index is 29 45 kg/m²  ,   Systolic (82LSE), RWS:634 , Min:121 , AYK:972     Diastolic (86ZLK), FEY:21, Min:58, Max:75      Intake/Output Summary (Last 24 hours) at 3/6/2022 0836  Last data filed at 3/5/2022 1822  Gross per 24 hour   Intake 200 ml   Output 0 ml   Net 200 ml     Weight (last 2 days)     Date/Time Weight    03/05/22 1344 85 3 (188 05)    03/05/22 1216 84 4 (186)    03/04/22 2239 84 6 (186 51)          LABORATORY RESULTS      CBC with diff:   Results from last 7 days   Lab Units 03/06/22 0225 03/05/22 0517 03/05/22  0328 03/04/22  2326   WBC Thousand/uL 9 93 11 88* 13 64* 14 95*   HEMOGLOBIN g/dL 11 3* 12 0 12 5 13 2   HEMATOCRIT % 32 9* 35 0* 36 6 38 4   MCV fL 87 86 87 87   PLATELETS Thousands/uL 264 299 292 318   MCH pg 29 7 29 6 29 8 29 9   MCHC g/dL 34 3 34 3 34 2 34 4   RDW % 14 2 14 4 14 3 14 0   MPV fL 10 5 10 8 10 6 10 8   NRBC AUTO /100 WBCs 0 0  --  0       CMP:  Results from last 7 days   Lab Units 03/06/22 0225 03/05/22 0517 03/04/22  2326   POTASSIUM mmol/L 3 8 4 1 4 4   CHLORIDE mmol/L 101 103 102   CO2 mmol/L 28 26 25   BUN mg/dL 21 26* 31*   CREATININE mg/dL 1 14 1 25 1 47*   CALCIUM mg/dL 8 7 8 5 9 1   AST U/L  --  12 14   ALT U/L  --  13 11*   ALK PHOS U/L  --  89 102   EGFR ml/min/1 73sq m 64 57 47       BMP:  Results from last 7 days   Lab Units 03/06/22 0225 03/05/22 0517 03/04/22  2326   POTASSIUM mmol/L 3 8 4 1 4 4   CHLORIDE mmol/L 101 103 102   CO2 mmol/L 28 26 25   BUN mg/dL 21 26* 31*   CREATININE mg/dL 1 14 1 25 1 47*   CALCIUM mg/dL 8 7 8 5 9 1       Lab Results   Component Value Date    NTBNP 246 (H) 06/22/2020        Results from last 7 days   Lab Units 03/05/22  0517   MAGNESIUM mg/dL 1 8       Results from last 7 days   Lab Units 03/05/22  0517   HEMOGLOBIN A1C % 6 4*       Results from last 7 days   Lab Units 03/06/22  0225   TSH 3RD GENERATON uIU/mL 0 914 Results from last 7 days   Lab Units 03/05/22  0328 03/05/22  0002   INR  1 05 1 02       Lipid Profile:   Lab Results   Component Value Date    CHOL 189 12/20/2017    CHOL 165 04/05/2016     Lab Results   Component Value Date    HDL 40 03/05/2022    HDL 34 (L) 01/03/2019    HDL 38 (L) 12/20/2017     Lab Results   Component Value Date    LDLCALC 87 03/05/2022    LDLCALC 130 (H) 01/03/2019     Lab Results   Component Value Date    TRIG 49 03/05/2022    TRIG 92 01/03/2019    TRIG 86 12/20/2017       Cardiac testing:   No results found for this or any previous visit  No results found for this or any previous visit  No results found for this or any previous visit  No valid procedures specified  No results found for this or any previous visit        Meds/Allergies   all current active meds have been reviewed and current meds:   Current Facility-Administered Medications   Medication Dose Route Frequency    aspirin chewable tablet 81 mg  81 mg Oral Daily    atorvastatin (LIPITOR) tablet 40 mg  40 mg Oral Daily    carbidopa-levodopa (SINEMET)  mg per tablet 1 tablet  1 tablet Oral TID    cholecalciferol (VITAMIN D3) tablet 2,000 Units  2,000 Units Oral Daily    cyanocobalamin (VITAMIN B-12) tablet 1,000 mcg  1,000 mcg Oral Daily    cyanocobalamin (VITAMIN B-12) tablet 100 mcg  100 mcg Oral Daily    docusate sodium (COLACE) capsule 100 mg  100 mg Oral BID    finasteride (PROSCAR) tablet 5 mg  5 mg Oral Daily    heparin (porcine) 25,000 units in 0 45% in sodium chloride 250 ml infusion (CMPD)  3-20 Units/kg/hr (Order-Specific) Intravenous Titrated    heparin (porcine) injection 2,000 Units  2,000 Units Intravenous Q1H PRN    heparin (porcine) injection 4,000 Units  4,000 Units Intravenous Q1H PRN    insulin glargine (LANTUS) subcutaneous injection 20 Units 0 2 mL  20 Units Subcutaneous QAM    insulin lispro (HumaLOG) 100 units/mL subcutaneous injection 1-6 Units  1-6 Units Subcutaneous TID AC  lisinopril (ZESTRIL) tablet 5 mg  5 mg Oral Daily    melatonin tablet 3 mg  3 mg Oral HS    multi-electrolyte (PLASMALYTE-A/ISOLYTE-S PH 7 4) IV solution  100 mL/hr Intravenous Continuous    pantoprazole (PROTONIX) EC tablet 40 mg  40 mg Oral Early Morning    polyethylene glycol (MIRALAX) packet 17 g  17 g Oral Daily    senna (SENOKOT) tablet 8 6 mg  8 6 mg Oral Daily PRN    tamsulosin (FLOMAX) capsule 0 4 mg  0 4 mg Oral Daily With Dinner     heparin (porcine), 3-20 Units/kg/hr (Order-Specific), Last Rate: 16 Units/kg/hr (03/06/22 2570)  multi-electrolyte, 100 mL/hr, Last Rate: 100 mL/hr (03/05/22 0850)      EKG personally reviewed by AJIT Cavazos    Assessment:  Principal Problem:    NSTEMI (non-ST elevated myocardial infarction) [facility])  Active Problems:    Essential hypertension    Type 2 diabetes mellitus with diabetic neuropathy, with long-term current use of insulin (HCC)    Stage 3 chronic kidney disease (HCC)    Vitamin D deficiency    Vitamin B12 deficiency    Counseling / Coordination of Care  Total floor / unit time spent today 20 minutes  Greater than 50% of total time was spent with the patient and / or family counseling and / or coordination of care  ** Please Note: Dragon 360 Dictation voice to text software may have been used in the creation of this document   **

## 2022-03-06 NOTE — QUICK NOTE
70-year-old male presented to ED with nausea and chest pain, this morning says his chest pain and nausea has resolved, however he is reporting having bilateral shoulder pain  The patient is a poor historian and is mixing up the dates between when he has been discharged from nursing home  He says now he lives at home with his wife  * the patient appears to not fully comprehend increased by providers during the clinical encounter  His physical exam was benign for any specific findings apart from chest tenderness in the epigastric area, distended abdomen  The patient's neuro exam demonstrated AAOx3, normal strength and all extremities, sensation intact, coordination, gait were not assessed due to the patient refusing to get out of bed and cranial nerve exam was incomplete due to the patient's inability to properly follows directions  Cardiology has been consulted, and a discussion was held with them pertaining to possibility of unstable angina  They reported EKG findings are concerning, however the patient's symptoms are inconsistent with ACS  Cardiology ordered ECHO for today, and if there is any sign of reduced ejection fraction, or abnormal wall motions, or any signs indicating possibility of ischemia, the patient will be ordered a stress test   They also ordered Aspirin, Statins, and will follow the patient  If Cardiology does not find any etiology for the symptoms from a cardiological standpoint, then GI workup should be considered  *The patient's wife was called on the both numbers that were provided in the chart however they do not work

## 2022-03-06 NOTE — ASSESSMENT & PLAN NOTE
 BP reviewed and stable    PLAN  · Continue metoprolol  · HOLD lisinopril 2/2 CARLOS EDUARDO; likely can resume at discharge  · Monitor BP

## 2022-03-06 NOTE — ASSESSMENT & PLAN NOTE
Lab Results   Component Value Date    HGBA1C 6 4 (H) 03/05/2022       Recent Labs     03/05/22  1552 03/06/22  0616 03/06/22  1215 03/06/22  1534   POCGLU 176* 159* 177* 206*       Blood Sugar Average: Last 72 hrs:  (P) 177 5 blood glucose well controlled    Home Regimen: glipizide 10 mg BID, metformin 1 G BID    Plan:   Hold oral antihyperglycemics while inpatient   Diabetic diet   Insulin regimen  o Lantus 20 units qAM  o Insulin correction ACHS   Goal BG 80- 180 while admitted, adjusting insulin regimen as appropriate   Glucose checks before meals and qHS   Monitor for hypoglycemia and treat per protocol

## 2022-03-06 NOTE — ASSESSMENT & PLAN NOTE
Estimated Creatinine Clearance: 62 mL/min (by C-G formula based on SCr of 1 14 mg/dL)     POA; Cr 1 47 (baseline appears to be 1 1-1 2)   Etiologies include prerenal 2/2 ACS vs volume depletion due to poor p o  with subsequent poor perfusion   Improved with IVF hydration and PO intake - serum creatinine back to baseline    Plan:   Monitor BMP daily and observe for downward trend of creatinine   Avoid hypoperfusion of the kidneys, minimize nephrotoxins   RAAS Blockers/Diuretics held: lisinopril

## 2022-03-06 NOTE — PLAN OF CARE
Problem: Potential for Falls  Goal: Patient will remain free of falls  Description: INTERVENTIONS:  - Educate patient/family on patient safety including physical limitations  - Instruct patient to call for assistance with activity   - Consult OT/PT to assist with strengthening/mobility   - Keep Call bell within reach  - Keep bed low and locked with side rails adjusted as appropriate  - Keep care items and personal belongings within reach  - Initiate and maintain comfort rounds  - Make Fall Risk Sign visible to staff  - Offer Toileting every 2 Hours, in advance of need  - Initiate/Maintain bed alarm  - Obtain necessary fall risk management equipment: bed alarm  - Apply yellow socks and bracelet for high fall risk patients  - Consider moving patient to room near nurses station  Outcome: Progressing     Problem: Prexisting or High Potential for Compromised Skin Integrity  Goal: Skin integrity is maintained or improved  Description: INTERVENTIONS:  - Identify patients at risk for skin breakdown  - Assess and monitor skin integrity  - Assess and monitor nutrition and hydration status  - Monitor labs   - Assess for incontinence   - Turn and reposition patient  - Assist with mobility/ambulation  - Relieve pressure over bony prominences  - Avoid friction and shearing  - Provide appropriate hygiene as needed including keeping skin clean and dry  - Evaluate need for skin moisturizer/barrier cream  - Collaborate with interdisciplinary team   - Patient/family teaching  - Consider wound care consult   Outcome: Progressing     Problem: MOBILITY - ADULT  Goal: Maintain or return to baseline ADL function  Description: INTERVENTIONS:  -  Assess patient's ability to carry out ADLs; assess patient's baseline for ADL function and identify physical deficits which impact ability to perform ADLs (bathing, care of mouth/teeth, toileting, grooming, dressing, etc )  - Assess/evaluate cause of self-care deficits   - Assess range of motion  - Assess patient's mobility; develop plan if impaired  - Assess patient's need for assistive devices and provide as appropriate  - Encourage maximum independence but intervene and supervise when necessary  - Involve family in performance of ADLs  - Assess for home care needs following discharge   - Consider OT consult to assist with ADL evaluation and planning for discharge  - Provide patient education as appropriate  Outcome: Progressing  Goal: Maintains/Returns to pre admission functional level  Description: INTERVENTIONS:  - Perform BMAT or MOVE assessment daily    - Set and communicate daily mobility goal to care team and patient/family/caregiver  - Collaborate with rehabilitation services on mobility goals if consulted  - Perform Range of Motion 2 times a day  - Reposition patient every 2 hours    - Dangle patient 2 times a day  - Stand patient 2 times a day  - Ambulate patient 2 times a day  - Out of bed to chair 2 times a day   - Out of bed for meals 2 times a day  - Out of bed for toileting  - Record patient progress and toleration of activity level   Outcome: Progressing     Problem: CARDIOVASCULAR - ADULT  Goal: Maintains optimal cardiac output and hemodynamic stability  Description: INTERVENTIONS:  - Monitor I/O, vital signs and rhythm  - Monitor for S/S and trends of decreased cardiac output  - Administer and titrate ordered vasoactive medications to optimize hemodynamic stability  - Assess quality of pulses, skin color and temperature  - Assess for signs of decreased coronary artery perfusion  - Instruct patient to report change in severity of symptoms  Outcome: Progressing  Goal: Absence of cardiac dysrhythmias or at baseline rhythm  Description: INTERVENTIONS:  - Continuous cardiac monitoring, vital signs, obtain 12 lead EKG if ordered  - Administer antiarrhythmic and heart rate control medications as ordered  - Monitor electrolytes and administer replacement therapy as ordered  Outcome: Progressing

## 2022-03-07 LAB
ANION GAP SERPL CALCULATED.3IONS-SCNC: 10 MMOL/L (ref 4–13)
APTT PPP: 190 SECONDS (ref 23–37)
APTT PPP: 78 SECONDS (ref 23–37)
BASOPHILS # BLD AUTO: 0.06 THOUSANDS/ΜL (ref 0–0.1)
BASOPHILS NFR BLD AUTO: 1 % (ref 0–1)
BUN SERPL-MCNC: 19 MG/DL (ref 5–25)
CALCIUM SERPL-MCNC: 8.5 MG/DL (ref 8.3–10.1)
CHLORIDE SERPL-SCNC: 104 MMOL/L (ref 100–108)
CO2 SERPL-SCNC: 27 MMOL/L (ref 21–32)
CREAT SERPL-MCNC: 1.11 MG/DL (ref 0.6–1.3)
EOSINOPHIL # BLD AUTO: 0.51 THOUSAND/ΜL (ref 0–0.61)
EOSINOPHIL NFR BLD AUTO: 5 % (ref 0–6)
ERYTHROCYTE [DISTWIDTH] IN BLOOD BY AUTOMATED COUNT: 14 % (ref 11.6–15.1)
GFR SERPL CREATININE-BSD FRML MDRD: 66 ML/MIN/1.73SQ M
GLUCOSE SERPL-MCNC: 132 MG/DL (ref 65–140)
GLUCOSE SERPL-MCNC: 135 MG/DL (ref 65–140)
GLUCOSE SERPL-MCNC: 145 MG/DL (ref 65–140)
GLUCOSE SERPL-MCNC: 178 MG/DL (ref 65–140)
GLUCOSE SERPL-MCNC: 242 MG/DL (ref 65–140)
HCT VFR BLD AUTO: 26.2 % (ref 36.5–49.3)
HGB BLD-MCNC: 11.5 G/DL (ref 12–17)
IMM GRANULOCYTES # BLD AUTO: 0.05 THOUSAND/UL (ref 0–0.2)
IMM GRANULOCYTES NFR BLD AUTO: 1 % (ref 0–2)
LYMPHOCYTES # BLD AUTO: 1.46 THOUSANDS/ΜL (ref 0.6–4.47)
LYMPHOCYTES NFR BLD AUTO: 15 % (ref 14–44)
MCH RBC QN AUTO: 29.5 PG (ref 26.8–34.3)
MCHC RBC AUTO-ENTMCNC: 31.7 G/DL (ref 31.4–37.4)
MCV RBC AUTO: 92 FL (ref 82–98)
MONOCYTES # BLD AUTO: 1.01 THOUSAND/ΜL (ref 0.17–1.22)
MONOCYTES NFR BLD AUTO: 10 % (ref 4–12)
NEUTROPHILS # BLD AUTO: 6.96 THOUSANDS/ΜL (ref 1.85–7.62)
NEUTS SEG NFR BLD AUTO: 68 % (ref 43–75)
NRBC BLD AUTO-RTO: 0 /100 WBCS
PLATELET # BLD AUTO: 268 THOUSANDS/UL (ref 149–390)
PMV BLD AUTO: 10.9 FL (ref 8.9–12.7)
POTASSIUM SERPL-SCNC: 4.1 MMOL/L (ref 3.5–5.3)
RBC # BLD AUTO: 2.81 MILLION/UL (ref 3.88–5.62)
SODIUM SERPL-SCNC: 141 MMOL/L (ref 136–145)
WBC # BLD AUTO: 10.05 THOUSAND/UL (ref 4.31–10.16)

## 2022-03-07 PROCEDURE — 92920 PRQ TRLUML C ANGIOP 1ART&/BR: CPT | Performed by: INTERNAL MEDICINE

## 2022-03-07 PROCEDURE — 4A023N7 MEASUREMENT OF CARDIAC SAMPLING AND PRESSURE, LEFT HEART, PERCUTANEOUS APPROACH: ICD-10-PCS | Performed by: INTERNAL MEDICINE

## 2022-03-07 PROCEDURE — 99233 SBSQ HOSP IP/OBS HIGH 50: CPT | Performed by: INTERNAL MEDICINE

## 2022-03-07 PROCEDURE — 99153 MOD SED SAME PHYS/QHP EA: CPT | Performed by: INTERNAL MEDICINE

## 2022-03-07 PROCEDURE — C1769 GUIDE WIRE: HCPCS | Performed by: INTERNAL MEDICINE

## 2022-03-07 PROCEDURE — 85730 THROMBOPLASTIN TIME PARTIAL: CPT | Performed by: FAMILY MEDICINE

## 2022-03-07 PROCEDURE — C1894 INTRO/SHEATH, NON-LASER: HCPCS | Performed by: INTERNAL MEDICINE

## 2022-03-07 PROCEDURE — 99232 SBSQ HOSP IP/OBS MODERATE 35: CPT | Performed by: FAMILY MEDICINE

## 2022-03-07 PROCEDURE — 93571 IV DOP VEL&/PRESS C FLO 1ST: CPT | Performed by: INTERNAL MEDICINE

## 2022-03-07 PROCEDURE — C1725 CATH, TRANSLUMIN NON-LASER: HCPCS | Performed by: INTERNAL MEDICINE

## 2022-03-07 PROCEDURE — C1887 CATHETER, GUIDING: HCPCS | Performed by: INTERNAL MEDICINE

## 2022-03-07 PROCEDURE — 99152 MOD SED SAME PHYS/QHP 5/>YRS: CPT | Performed by: INTERNAL MEDICINE

## 2022-03-07 PROCEDURE — 82948 REAGENT STRIP/BLOOD GLUCOSE: CPT

## 2022-03-07 PROCEDURE — 93572 IV DOP VEL&/PRESS C FLO EA: CPT | Performed by: INTERNAL MEDICINE

## 2022-03-07 PROCEDURE — B2111ZZ FLUOROSCOPY OF MULTIPLE CORONARY ARTERIES USING LOW OSMOLAR CONTRAST: ICD-10-PCS | Performed by: INTERNAL MEDICINE

## 2022-03-07 PROCEDURE — 85025 COMPLETE CBC W/AUTO DIFF WBC: CPT | Performed by: FAMILY MEDICINE

## 2022-03-07 PROCEDURE — 93458 L HRT ARTERY/VENTRICLE ANGIO: CPT | Performed by: INTERNAL MEDICINE

## 2022-03-07 PROCEDURE — 80048 BASIC METABOLIC PNL TOTAL CA: CPT | Performed by: FAMILY MEDICINE

## 2022-03-07 PROCEDURE — 93454 CORONARY ARTERY ANGIO S&I: CPT | Performed by: INTERNAL MEDICINE

## 2022-03-07 RX ORDER — DOCUSATE SODIUM 100 MG/1
100 CAPSULE, LIQUID FILLED ORAL 2 TIMES DAILY
Status: CANCELLED | OUTPATIENT
Start: 2022-03-07

## 2022-03-07 RX ORDER — FENTANYL CITRATE 50 UG/ML
INJECTION, SOLUTION INTRAMUSCULAR; INTRAVENOUS AS NEEDED
Status: DISCONTINUED | OUTPATIENT
Start: 2022-03-07 | End: 2022-03-07 | Stop reason: HOSPADM

## 2022-03-07 RX ORDER — VERAPAMIL HCL 2.5 MG/ML
AMPUL (ML) INTRAVENOUS AS NEEDED
Status: DISCONTINUED | OUTPATIENT
Start: 2022-03-07 | End: 2022-03-07 | Stop reason: HOSPADM

## 2022-03-07 RX ORDER — PANTOPRAZOLE SODIUM 40 MG/1
40 TABLET, DELAYED RELEASE ORAL
Status: CANCELLED | OUTPATIENT
Start: 2022-03-08

## 2022-03-07 RX ORDER — ATORVASTATIN CALCIUM 40 MG/1
40 TABLET, FILM COATED ORAL DAILY
Status: CANCELLED | OUTPATIENT
Start: 2022-03-08

## 2022-03-07 RX ORDER — MELATONIN
2000 DAILY
Status: CANCELLED | OUTPATIENT
Start: 2022-03-08

## 2022-03-07 RX ORDER — HEPARIN SODIUM 1000 [USP'U]/ML
2000 INJECTION, SOLUTION INTRAVENOUS; SUBCUTANEOUS
Status: CANCELLED | OUTPATIENT
Start: 2022-03-07

## 2022-03-07 RX ORDER — HEPARIN SODIUM 1000 [USP'U]/ML
INJECTION, SOLUTION INTRAVENOUS; SUBCUTANEOUS AS NEEDED
Status: DISCONTINUED | OUTPATIENT
Start: 2022-03-07 | End: 2022-03-07 | Stop reason: HOSPADM

## 2022-03-07 RX ORDER — NITROGLYCERIN 20 MG/100ML
INJECTION INTRAVENOUS AS NEEDED
Status: DISCONTINUED | OUTPATIENT
Start: 2022-03-07 | End: 2022-03-07 | Stop reason: HOSPADM

## 2022-03-07 RX ORDER — TAMSULOSIN HYDROCHLORIDE 0.4 MG/1
0.4 CAPSULE ORAL
Status: CANCELLED | OUTPATIENT
Start: 2022-03-08

## 2022-03-07 RX ORDER — LANOLIN ALCOHOL/MO/W.PET/CERES
3 CREAM (GRAM) TOPICAL
Status: CANCELLED | OUTPATIENT
Start: 2022-03-07

## 2022-03-07 RX ORDER — LIDOCAINE HYDROCHLORIDE 10 MG/ML
INJECTION, SOLUTION EPIDURAL; INFILTRATION; INTRACAUDAL; PERINEURAL AS NEEDED
Status: DISCONTINUED | OUTPATIENT
Start: 2022-03-07 | End: 2022-03-07 | Stop reason: HOSPADM

## 2022-03-07 RX ORDER — SODIUM CHLORIDE 9 MG/ML
INJECTION, SOLUTION INTRAVENOUS
Status: COMPLETED | OUTPATIENT
Start: 2022-03-07 | End: 2022-03-07

## 2022-03-07 RX ORDER — CLOPIDOGREL BISULFATE 75 MG/1
75 TABLET ORAL DAILY
Status: CANCELLED | OUTPATIENT
Start: 2022-03-08

## 2022-03-07 RX ORDER — INSULIN GLARGINE 100 [IU]/ML
20 INJECTION, SOLUTION SUBCUTANEOUS EVERY MORNING
Status: CANCELLED | OUTPATIENT
Start: 2022-03-08

## 2022-03-07 RX ORDER — ASPIRIN 81 MG/1
81 TABLET, CHEWABLE ORAL DAILY
Status: CANCELLED | OUTPATIENT
Start: 2022-03-08

## 2022-03-07 RX ORDER — HEPARIN SODIUM 1000 [USP'U]/ML
4000 INJECTION, SOLUTION INTRAVENOUS; SUBCUTANEOUS
Status: CANCELLED | OUTPATIENT
Start: 2022-03-07

## 2022-03-07 RX ORDER — METOPROLOL SUCCINATE 25 MG/1
25 TABLET, EXTENDED RELEASE ORAL DAILY
Status: CANCELLED | OUTPATIENT
Start: 2022-03-08

## 2022-03-07 RX ORDER — LISINOPRIL 5 MG/1
5 TABLET ORAL DAILY
Status: CANCELLED | OUTPATIENT
Start: 2022-03-08

## 2022-03-07 RX ORDER — MIDAZOLAM HYDROCHLORIDE 2 MG/2ML
INJECTION, SOLUTION INTRAMUSCULAR; INTRAVENOUS AS NEEDED
Status: DISCONTINUED | OUTPATIENT
Start: 2022-03-07 | End: 2022-03-07 | Stop reason: HOSPADM

## 2022-03-07 RX ORDER — SENNOSIDES 8.6 MG
8.6 TABLET ORAL DAILY PRN
Status: CANCELLED | OUTPATIENT
Start: 2022-03-07

## 2022-03-07 RX ORDER — MAGNESIUM HYDROXIDE/ALUMINUM HYDROXICE/SIMETHICONE 120; 1200; 1200 MG/30ML; MG/30ML; MG/30ML
30 SUSPENSION ORAL EVERY 4 HOURS PRN
Status: CANCELLED | OUTPATIENT
Start: 2022-03-07

## 2022-03-07 RX ORDER — FINASTERIDE 5 MG/1
5 TABLET, FILM COATED ORAL DAILY
Status: CANCELLED | OUTPATIENT
Start: 2022-03-08

## 2022-03-07 RX ORDER — POLYETHYLENE GLYCOL 3350 17 G/17G
17 POWDER, FOR SOLUTION ORAL DAILY
Status: CANCELLED | OUTPATIENT
Start: 2022-03-08

## 2022-03-07 RX ADMIN — CARBIDOPA AND LEVODOPA 1 TABLET: 25; 100 TABLET ORAL at 17:52

## 2022-03-07 RX ADMIN — Medication 2000 UNITS: at 08:18

## 2022-03-07 RX ADMIN — METOPROLOL SUCCINATE 25 MG: 25 TABLET, EXTENDED RELEASE ORAL at 08:18

## 2022-03-07 RX ADMIN — ASPIRIN 81 MG: 81 TABLET, CHEWABLE ORAL at 08:18

## 2022-03-07 RX ADMIN — DOCUSATE SODIUM 100 MG: 100 CAPSULE ORAL at 17:52

## 2022-03-07 RX ADMIN — SODIUM CHLORIDE 75 ML/HR: 0.9 INJECTION, SOLUTION INTRAVENOUS at 00:07

## 2022-03-07 RX ADMIN — CARBIDOPA AND LEVODOPA 1 TABLET: 25; 100 TABLET ORAL at 08:18

## 2022-03-07 RX ADMIN — CYANOCOBALAMIN TAB 500 MCG 1000 MCG: 500 TAB at 08:18

## 2022-03-07 RX ADMIN — Medication 3 MG: at 22:27

## 2022-03-07 RX ADMIN — FINASTERIDE 5 MG: 5 TABLET, FILM COATED ORAL at 08:18

## 2022-03-07 RX ADMIN — TAMSULOSIN HYDROCHLORIDE 0.4 MG: 0.4 CAPSULE ORAL at 17:53

## 2022-03-07 RX ADMIN — CARBIDOPA AND LEVODOPA 1 TABLET: 25; 100 TABLET ORAL at 22:27

## 2022-03-07 RX ADMIN — ATORVASTATIN CALCIUM 40 MG: 40 TABLET, FILM COATED ORAL at 08:18

## 2022-03-07 RX ADMIN — DOCUSATE SODIUM 100 MG: 100 CAPSULE ORAL at 08:18

## 2022-03-07 RX ADMIN — HEPARIN SODIUM 12 UNITS/KG/HR: 10000 INJECTION, SOLUTION INTRAVENOUS; SUBCUTANEOUS at 00:07

## 2022-03-07 RX ADMIN — CLOPIDOGREL BISULFATE 75 MG: 75 TABLET ORAL at 08:18

## 2022-03-07 NOTE — ASSESSMENT & PLAN NOTE
 CTA on 3/5 showed atherosclerotic changes in thoracic aorta and coronary arteries   Likely leading to NSTEMI - management as per above   Cath scheduled today   Recommend lifestyle intervention prior to discharge

## 2022-03-07 NOTE — ASSESSMENT & PLAN NOTE
 POA, presented with chest pain and associated with atypical symptoms of nausea, epigastric pain   EKG without STEMI x 3 but with troponin elevation   CTA chest in ED showed significant atherosclerotic changes/plaques visualized in thoracic aorta and coronary arteries   TTE (03/05/2022) LVEF 60%    Stress test Perfusion: There is a left ventricular perfusion defect that is large in size present in the entire inferior and inferolateral location(s) that is partially reversible      Assessment: Atypical chest pain secondary to NSTEMI on perfusion study showing partially reversible large defect in inferior and inferolateral LV, with subsequent LH Cath showing   o LAD: 80% sub branch, lengthy, abnormal IR for   o Circumflex:  Large obtuse marginal branch with bridge collateral, 100% , unable to cross with standard run-through wire as well as  50  o Right coronary:  Angiographically 50 60%, IFR 0 9 for abnormal       Plan   Discharge to [facility] for further management  o CABG evaluation given OM branch is large sized; Graft consider LAD, OM, and RCA is borderline but upper moderate   Continue Heparin drip, ASA 81mg daily, lipitor   Continue Nitroglycerin p r n  for chest pain   Continue holding ACE-I d/t CARLOS EDUARDO;  Restart when Cardiology recommends   Anticipating initiation of dual antiplatelet therapy at/prior to d/c

## 2022-03-07 NOTE — ASSESSMENT & PLAN NOTE
Lab Results   Component Value Date/Time    BUN 19 03/07/2022 04:59 AM    CREATININE 1 11 03/07/2022 04:59 AM       Estimated Creatinine Clearance: 63 7 mL/min (by C-G formula based on SCr of 1 11 mg/dL)     POA; Cr 1 47 (baseline appears to be 1 1-1 2)   Improved with IVF hydration and PO intake - serum creatinine back to baseline    Plan:   Continue daily BMP   Avoid hypoperfusion of the kidneys, minimize nephrotoxins   RAAS Blockers/Diuretics held: lisinopril

## 2022-03-07 NOTE — ASSESSMENT & PLAN NOTE
 POA, presented with chest pain and associated with atypical symptoms of nausea, epigastric pain   EKG without STEMI x 3 but with troponin elevation   CTA chest in ED showed significant atherosclerotic changes/plaques visualized in thoracic aorta and coronary arteries   TTE (03/05/2022) LVEF 60%    Stress test Perfusion: There is a left ventricular perfusion defect that is large in size present in the entire inferior and inferolateral location(s) that is partially reversible      Assessment: Likely coronary/NSTEMI in nature given findings; cardiology consulted and plans for catheterization tomorrow    Plan   Cath scheduled for today   Continue Heparin drip, ASA 81mg daily, lipitor   Continue Nitroglycerin p r n  for chest pain   Continue holding ACE-I d/t CARLOS EDUARDO - Cardio rec's re-starting on 03/08   Anticipating initiation of dual antiplatelet therapy at/prior to d/c

## 2022-03-07 NOTE — PROGRESS NOTES
[facility]  Progress Note - Kelsea Montaño 1950, 70 y o  male MRN: [id number]  Unit/Bed#: S -01 Encounter: [id number]  Primary Care Provider: Leonor Rubi MD   Date and time admitted to hospital: 3/4/2022 10:34 PM    * Chest Pain with Troponin Elevations  Assessment & Plan   POA, presented with chest pain and associated with atypical symptoms of nausea, epigastric pain   EKG without STEMI x 3 but with troponin elevation   CTA chest in ED showed significant atherosclerotic changes/plaques visualized in thoracic aorta and coronary arteries   TTE (03/05/2022) LVEF 60%    Stress test Perfusion: There is a left ventricular perfusion defect that is large in size present in the entire inferior and inferolateral location(s) that is partially reversible      Assessment: Likely coronary/NSTEMI in nature given findings; cardiology consulted and plans for catheterization tomorrow    Plan   Cath scheduled for today   Continue Heparin drip, ASA 81mg daily, lipitor   Continue Nitroglycerin p r n  for chest pain   Continue holding ACE-I d/t CARLOS EDUARDO - Cardio rec's re-starting on 03/08   Anticipating initiation of dual antiplatelet therapy at/prior to d/c      Stage 3 chronic kidney disease [facility])  Assessment & Plan  Lab Results   Component Value Date/Time    BUN 19 03/07/2022 04:59 AM    CREATININE 1 11 03/07/2022 04:59 AM       Estimated Creatinine Clearance: 63 7 mL/min (by C-G formula based on SCr of 1 11 mg/dL)     POA; Cr 1 47 (baseline appears to be 1 1-1 2)   Improved with IVF hydration and PO intake - serum creatinine back to baseline    Plan:   Continue daily BMP   Avoid hypoperfusion of the kidneys, minimize nephrotoxins   RAAS Blockers/Diuretics held: lisinopril      Essential hypertension  Assessment & Plan   BP reviewed and stable    PLAN  · Continue metoprolol   · Hold lisinopril today; restart tomorrow as Cardiology recommends   · Monitor BP      Coronary artery calcification  Assessment & Plan   CTA on 3/5 showed atherosclerotic changes in thoracic aorta and coronary arteries   Likely leading to NSTEMI - management as per above   Cath scheduled today   Recommend lifestyle intervention prior to discharge    Vitamin B12 deficiency  Assessment & Plan  Continue home B12    Vitamin D deficiency  Assessment & Plan  Continue home vitamin D    Type 2 diabetes mellitus with diabetic neuropathy, with long-term current use of insulin [facility])  Assessment & Plan  Lab Results   Component Value Date    HGBA1C 6 4 (H) 03/05/2022       Recent Labs     03/06/22  1215 03/06/22  1534 03/07/22  0700 03/07/22  1104   POCGLU 177* 206* 145* 178*       Blood Sugar Average: Last 72 hrs:  (P) 173 5 blood glucose well controlled    Home Regimen: glipizide 10 mg BID, metformin 1 G BID    Plan:   Continue holding oral antihyperglycemics while inpatient   Continue Diabetic diet   Continue Insulin regimen  o Lantus 20 units qAM  o Insulin correction ACHS   Goal BG 80- 180 while admitted, adjusting insulin regimen as appropriate   Continue Glucose checks before meals and qHS   Monitor for hypoglycemia and treat per protocol          VTE Pharmacologic Prophylaxis:   VTE Score: 4 Moderate Risk (Score 3-4) - Pharmacological DVT Prophylaxis Ordered: Heparin Drip  Mechanical VTE Prophylaxis in Place: Yes    Patient Centered Rounds: I have performed bedside rounds with nursing staff today  Discussions with Specialists or Other Care Team Provider: Cardiology    Education and Discussions with Family / Patient: Updated  (wife) via phone  Current Length of Stay: 2 day(s)    Current Patient Status: Inpatient     Discharge Plan / Estimated Discharge Date: Anticipate discharge in 48 hrs to discharge location to be determined pending rehab evaluations  Code Status: Level 1 - Full Code      Subjective:    The pt reports no new problems/symptoms this AM   He was relaxing in bed comfortably and watching TV  Objective:     Vitals:   Temp (24hrs), Av 2 °F (36 8 °C), Min:98 1 °F (36 7 °C), Max:98 3 °F (36 8 °C)    Temp:  [98 1 °F (36 7 °C)-98 3 °F (36 8 °C)] 98 1 °F (36 7 °C)  HR:  [77-87] 87  Resp:  [18] 18  BP: (118-136)/(57-71) 136/71  SpO2:  [95 %-97 %] 97 %  Body mass index is 29 44 kg/m²  Input and Output Summary (last 24 hours): Intake/Output Summary (Last 24 hours) at 3/7/2022 1334  Last data filed at 3/7/2022 0700  Gross per 24 hour   Intake 240 ml   Output 1200 ml   Net -960 ml       Physical Exam:     Physical Exam  Vitals and nursing note reviewed  Constitutional:       General: He is not in acute distress  Appearance: Normal appearance  He is well-developed  He is not ill-appearing, toxic-appearing or diaphoretic  HENT:      Head: Normocephalic and atraumatic  Right Ear: External ear normal       Left Ear: External ear normal       Nose: Nose normal    Eyes:      General: No scleral icterus  Right eye: No discharge  Left eye: No discharge  Conjunctiva/sclera: Conjunctivae normal    Neck:      Trachea: No tracheal deviation  Cardiovascular:      Rate and Rhythm: Normal rate and regular rhythm  Heart sounds: Normal heart sounds  No murmur heard  Pulmonary:      Effort: Pulmonary effort is normal  No respiratory distress  Breath sounds: Normal breath sounds  No stridor  No wheezing or rales  Chest:      Chest wall: No tenderness  Abdominal:      General: Bowel sounds are normal  There is no distension  Palpations: Abdomen is soft  Tenderness: There is no abdominal tenderness  There is no guarding or rebound  Musculoskeletal:         General: No tenderness  Right lower leg: No edema  Left lower leg: No edema  Skin:     General: Skin is warm  Capillary Refill: Capillary refill takes less than 2 seconds     Neurological:      Mental Status: He is alert and oriented to person, place, and time    Psychiatric:         Behavior: Behavior normal           Additional Data:     Labs:  Results from last 7 days   Lab Units 03/07/22  0459   WBC Thousand/uL 10 05   HEMOGLOBIN g/dL 11 5*   HEMATOCRIT % 26 2*   PLATELETS Thousands/uL 268   NEUTROS PCT % 68   LYMPHS PCT % 15   MONOS PCT % 10   EOS PCT % 5     Results from last 7 days   Lab Units 03/07/22  0459 03/06/22  0225 03/05/22  0517   SODIUM mmol/L 141   < > 136   POTASSIUM mmol/L 4 1   < > 4 1   CHLORIDE mmol/L 104   < > 103   CO2 mmol/L 27   < > 26   BUN mg/dL 19   < > 26*   CREATININE mg/dL 1 11   < > 1 25   ANION GAP mmol/L 10   < > 7   CALCIUM mg/dL 8 5   < > 8 5   ALBUMIN g/dL  --   --  3 0*   TOTAL BILIRUBIN mg/dL  --   --  0 51   ALK PHOS U/L  --   --  89   ALT U/L  --   --  13   AST U/L  --   --  12   GLUCOSE RANDOM mg/dL 132   < > 160*    < > = values in this interval not displayed  Results from last 7 days   Lab Units 03/05/22  0328   INR  1 05     Results from last 7 days   Lab Units 03/07/22  1104 03/07/22  0700 03/06/22  1534 03/06/22  1215 03/06/22  0616 03/05/22  1552 03/05/22  1353 03/05/22  0751   POC GLUCOSE mg/dl 178* 145* 206* 177* 159* 176* 194* 153*     Results from last 7 days   Lab Units 03/05/22  0517   HEMOGLOBIN A1C % 6 4*     Results from last 7 days   Lab Units 03/05/22  0418   LACTIC ACID mmol/L 1 6       Imaging: Reviewed radiology reports from this admission including: chest xray and CTA dissection protocol chest/abdomen pelvis; CT head without contrast    Recent Cultures (last 7 days):     Results from last 7 days   Lab Units 03/04/22  2327 03/04/22  2326   BLOOD CULTURE  No Growth at 48 hrs  No Growth at 48 hrs         Lines/Drains:  Invasive Devices  Report    Peripheral Intravenous Line            Peripheral IV 03/04/22 Left Antecubital 2 days    Peripheral IV 03/04/22 Left;Dorsal (posterior) Hand 2 days                Telemetry:   Telemetry Orders (From admission, onward)             48 Hour Telemetry Monitoring Continuous x 48 hours        References:    Telemetry Guidelines   Question:  Reason for 48 Hour Telemetry  Answer:  Acute MI, chest pain - R/O MI, or unstable angina                    Last 24 Hours Medication List:   Current Facility-Administered Medications   Medication Dose Route Frequency Provider Last Rate    aluminum-magnesium hydroxide-simethicone  30 mL Oral Q4H PRN Maynor Ponce MD      aspirin  81 mg Oral Daily AJIT Montero      atorvastatin  40 mg Oral Daily AJIT Montero      carbidopa-levodopa  1 tablet Oral TID Terar Henry MD      cholecalciferol  2,000 Units Oral Daily Terra Henry MD      clopidogrel  75 mg Oral Daily Ignacia Beltre MD      cyanocobalamin  1,000 mcg Oral Daily Terra Henry MD      docusate sodium  100 mg Oral BID Terra Henry MD      finasteride  5 mg Oral Daily Terra Henry MD      heparin (porcine)  3-20 Units/kg/hr (Order-Specific) Intravenous Titrated Niurka Morris PA-C 9 Units/kg/hr (03/07/22 1114)    heparin (porcine)  2,000 Units Intravenous Q1H PRN Niurka Morris PA-C      heparin (porcine)  4,000 Units Intravenous Q1H PRN Niurka Morris PA-C      insulin glargine  20 Units Subcutaneous QAM Terra Henry MD      insulin lispro  1-6 Units Subcutaneous TID [id number] [facility] Rosa Ramp ON 3/8/2022] lisinopril  5 mg Oral Daily Ignacia Beltre MD      melatonin  3 mg Oral HS Terra Henry MD      metoprolol succinate  25 mg Oral Daily Ignacia Beltre MD      pantoprazole  40 mg Oral Early Morning Terra Henry MD      polyethylene glycol  17 g Oral Daily Terra Henry MD      senna  8 6 mg Oral Daily PRN Terra Henry MD      tamsulosin  0 4 mg Oral Daily With Fredis Medina MD          Today, Patient Was Seen By: Hermelinda Carmichael DO    ** Please Note: This note has been constructed using a voice recognition system   **

## 2022-03-07 NOTE — ASSESSMENT & PLAN NOTE
Lab Results   Component Value Date    HGBA1C 6 4 (H) 03/05/2022       Recent Labs     03/06/22  1215 03/06/22  1534 03/07/22  0700 03/07/22  1104   POCGLU 177* 206* 145* 178*       Blood Sugar Average: Last 72 hrs:  (P) 173 5 blood glucose well controlled    Home Regimen: glipizide 10 mg BID, metformin 1 G BID    Plan:   Continue holding oral antihyperglycemics while inpatient   Continue Diabetic diet   Continue Insulin regimen  o Lantus 20 units qAM  o Insulin correction ACHS   Goal BG 80- 180 while admitted, adjusting insulin regimen as appropriate   Continue Glucose checks before meals and qHS   Monitor for hypoglycemia and treat per protocol

## 2022-03-07 NOTE — ASSESSMENT & PLAN NOTE
Lab Results   Component Value Date    HGBA1C 6 4 (H) 03/05/2022       Recent Labs     03/06/22  1534 03/07/22  0700 03/07/22  1104 03/07/22  1548   POCGLU 206* 145* 178* 135       Blood Sugar Average: Last 72 hrs:  (P) 169 [id number] blood glucose well controlled    Home Regimen: glipizide 10 mg BID, metformin 1 G BID    Plan:   Continue holding oral antihyperglycemics while inpatient   Continue Diabetic diet   Continue Insulin regimen  o Lantus 20 units qAM  o Insulin correction ACHS   Goal BG 80- 180 while admitted, adjusting insulin regimen as appropriate   Continue Glucose checks before meals and qHS   Monitor for hypoglycemia and treat per protocol

## 2022-03-07 NOTE — UTILIZATION REVIEW
Initial Clinical Review    Admission: Date/Time/Statement:   Admission Orders (From admission, onward)     Ordered        03/05/22 0349  INPATIENT ADMISSION  Once                      Orders Placed This Encounter   Procedures    INPATIENT ADMISSION     Standing Status:   Standing     Number of Occurrences:   1     Order Specific Question:   Level of Care     Answer:   Med Surg [16]     Order Specific Question:   Estimated length of stay     Answer:   More than 2 Midnights     Order Specific Question:   Certification     Answer:   I certify that inpatient services are medically necessary for this patient for a duration of greater than two midnights  See H&P and MD Progress Notes for additional information about the patient's course of treatment  ED Arrival Information     Expected Arrival Acuity    - 3/4/2022 22:34 Urgent         Means of arrival Escorted by Service Admission type    [facility] Urgent         Arrival complaint    EMS        Chief Complaint   Patient presents with    Nausea     Pt arrives via home from EMS c/o nausea, ABD pain (epigastric area)  Denies urinary sx, CP, SOB, HA  Reports normal BM this AM  EMS gave 324 ASA PTA       Initial Presentation:   71 yo male PMHx HTN, T2IDDM, HLD, Stage 3CKD to ED by EMS presents w acute onset of chest pain w nausea; sudden substernal CP  in ribs /sternum wo radiation; received 324 mg ASA in route  In ED trops elevated, YOLA=4, EKG w sinus tachy inverted T waves  Admit Inpatient due to Chest pain w  elevated TROPS , essential HTN, DM type 2  PLAN: consult Cardiology, tele, trend TROPS, NTG prn, ASA, Protonix, stress test  RAAS blockers & diuretics held, IVF isolyte at 100 ML/HR, BMP monitoring creatine  Monitor glucose checks  CARDIOLOGY  Atypical symptoms w risk factors, recommend ECHO & NM stress   Concerning for overlap of ABD symptoms considering thickened esophagus on CT scan, scan denotes extensive atherosclerosis throughout aorta & coronary arteries  Obtain TTE, NM stress  ASA & IV Heparin, monitor creatine, potassium & MAG  Date: 3/6/2022   Day 2:   CARDIOLOGY   Intermittent anginal symptoms w abn trop, Echo reveals thinning and akinesis of portions of the inferolateral wall, w/ portions of the inferior wall with areas of hypokinesis   Stress test  w/ EKG uninterpretable, perfusion imaging shows partially reversible large defect of the inferolateral and inferior wall, with preserved EF at 61%, but with TID of 1 7, recommend cardiac cath  HOLD ACEi start Toprol, cont ASA, atorvastatin  NPU after MN  ATTENDING:  Exam:  Mild right sided CP, admit w/ CP w TROP elevation, cont IV Heparin drip, prn Nitro, holding ACEi, cont Metoprolol & monitor BP  AM BMP cath 3/7  3/7/2022 CARDIOLOGY  Variable description of chest pain, currently comfortable, stress test review w  mostly infarct w small area of ischemia, CT reveals calcifications, plan for Cardiac cath w recommendations on findings  CARDIAC CATH BRIEF NOTE CARDIOLOGY   Findings:  LAD:  80% sub branch, lengthy, abnormal IR for  Circumflex:  Large obtuse marginal branch with bridge collateral, 100% , unable to cross with standard run-through wire as well as  50  Right coronary:  Angiographically 50 60%, IFR 0 9 for abnormal   Plan: Suggest CABG given OM branch is large sized  Grafts consider LAD, OM and RCA is borderline but upper moderate    ED Triage Vitals [03/04/22 2239]   Temperature Pulse Respirations Blood Pressure SpO2   98 °F (36 7 °C) 100 18 147/77 96 %      Temp Source Heart Rate Source Patient Position - Orthostatic VS BP Location FiO2 (%)   Oral Monitor Sitting Right arm --      Pain Score       No Pain          Wt Readings from Last 1 Encounters:   03/06/22 85 3 kg (188 lb)     Additional Vital Signs:   Date/Time Temp Pulse Resp BP MAP (mmHg) SpO2 Calculated FIO2 (%) - Nasal Cannula Nasal Cannula O2 Flow Rate (L/min) O2 Device Patient Position - Orthostatic VS   03/07/22 13:59:44 -- -- -- -- -- 99 % 28 2 L/min Nasal cannula --   03/07/22 0619 98 1 °F (36 7 °C) 87 18 136/71 97 97 % -- -- None (Room air) Lying   03/06/22 2131 98 1 °F (36 7 °C) 85 18 118/57 82 95 % -- -- None (Room air) Lying   03/06/22 1530 98 3 °F (36 8 °C) 77 18 122/58 83 96 % -- -- None (Room air) Lying   03/06/22 1325 -- 94 18 117/57 -- 97 % -- -- None (Room air) Lying   03/06/22 1055 -- 93 -- 126/62 -- 98 % -- -- -- --   03/06/22 0617 98 2 °F (36 8 °C) 85 19 132/72 -- 97 % -- -- -- Lying   03/05/22 2247 98 °F (36 7 °C) 87 18 127/58 83 96 % -- -- None (Room air) Lying   03/05/22 1548 98 °F (36 7 °C) 80 18 130/60 87 97 % -- -- None (Room air) Lying   03/05/22 1344 98 6 °F (37 °C) 79 19 137/65 -- 93 % -- -- None (Room air) Lying   03/05/22 1300 -- 86 18 144/75 104 97 % -- -- None (Room air) --   03/05/22 1216 -- 84 -- 126/63 -- -- -- -- -- --   03/05/22 1000 -- 84 18 126/63 89 97 % -- -- None (Room air) --   03/05/22 0912 -- 93 -- 121/60 -- -- -- -- -- --   03/05/22 0830 -- 88 18 119/65 87 95 % -- -- None (Room air) --   03/05/22 0800 -- 86 18 117/58 83 95 % -- -- None (Room air) --   03/05/22 0730 -- 84 18 120/58 83 97 % -- -- None (Room air) --   03/05/22 0700 -- 84 18 109/56 76 98 % -- -- None (Room air) --   03/05/22 0600 -- 92 18 127/58 84 97 % -- -- None (Room air) Lying   03/05/22 0400 -- 92 18 135/63 90 97 % -- -- None (Room air) Lying   03/05/22 0230 -- 88 18 146/70 101 98 % -- -- -- --   03/05/22 0130 -- 84 18 105/56 73 97 % -- -- -- --   03/05/22 0030 -- 86 18 134/69 94 98 % -- -- -- --       Weights (last 14 days)    Date/Time Weight Weight Method Height   03/06/22 1055 85 3 kg (188 lb) -- 5' 7" (1 702 m)   03/05/22 1350 -- -- 5' 7" (1 702 m)   03/05/22 1344 85 3 kg (188 lb 0 8 oz) Standing scale --   03/05/22 1216 84 4 kg (186 lb) -- 5' 7" (1 702 m)   03/04/22 2239 84 6 kg (186 lb 8 2 oz) Bed scale 5' 7" (1 702 m       Pertinent Labs/Diagnostic Test Results:   XR chest 1 view portable Final Result by Viktoria Ramos MD (03/05 8809)      No radiographic evidence of acute intrathoracic process on this examination which is somewhat limited by low lung volumes  CT head without contrast   Final Result by Ayaka Riggs MD (03/05 8969)      No acute intracranial abnormality  CTA dissection protocol chest/abdomen/pelvis   Final Result by Jacki Astudillo MD (03/05 1652)      No evidence of acute aortic pathology or aortic aneurysm  Extensive atheromatous changes/plaque throughout the aortoiliac vasculature without flow-limiting stenosis or occlusion  Diffuse esophageal wall thickening, nonspecific  Consider follow-up GI consult  3/7/2022 Cardiac cath: 3V CAD, moderate RCA, abnormal iFR LAD  · Brief attempt crossing large OM branch with workhouse wires unsuccessful      3/6 NM STRESS test:  Stress ECG: No ST deviation is noted  Arrhythmias during stress: occasional PVCs  The ECG was not diagnostic due to pharmacological (vasodilator) stress    Stress Function: Left ventricular function post-stress is normal  Post-stress ejection fraction is 61 %  There is a defect in the entire inferior and inferolateral location(s)  The defect is akinetic    Perfusion: There is a left ventricular perfusion defect that is large in size present in the entire inferior and inferolateral location(s) that is partially reversible    Stress Combined Conclusion: Abnormal stress myoview study  Mostly scar with small amount of wilbur-infarct ischemia      3/5/2022 ECHO   Left Ventricle: Left ventricular cavity size is normal  Wall thickness is mildly increased  The left ventricular ejection fraction is 55%  Systolic function is normal  Diastolic function is normal     The following segments are akinetic: basal inferior, mid inferior, mid inferolateral and apical lateral     The following segments are hypokinetic: basal inferolateral and apical inferior      All other segments are normal     Mitral Valve: There is mild regurgitation    3/4/2022 ekg  Sinus tachycardia  Right bundle branch block  Possible Lateral infarct , age undetermined  Possible Inferior infarct , age undetermined  Abnormal ECG  When compared with ECG of 13-JUN-2020 20:43,  Borderline criteria for Lateral infarct are now Present  Borderline criteria for Inferior infarct are now Present  Inverted T waves have replaced nonspecific T wave abnormality in Anterior leads    Results from last 7 days   Lab Units 03/07/22  0459 03/06/22  0225 03/05/22  0517 03/05/22  0328 03/05/22  0328 03/04/22  2326 03/04/22  2326   WBC Thousand/uL 10 05 9 93 11 88*   < > 13 64*   < > 14 95*   HEMOGLOBIN g/dL 11 5* 11 3* 12 0  --  12 5  --  13 2   HEMATOCRIT % 26 2* 32 9* 35 0*  --  36 6  --  38 4   PLATELETS Thousands/uL 268 264 299   < > 292   < > 318   NEUTROS ABS Thousands/µL 6 96 7 00 8 88*   < >  --   --  12 60*    < > = values in this interval not displayed           Results from last 7 days   Lab Units 03/07/22 0459 03/06/22 0225 03/05/22  0517 03/04/22  2326   SODIUM mmol/L 141 137 136 137   POTASSIUM mmol/L 4 1 3 8 4 1 4 4   CHLORIDE mmol/L 104 101 103 102   CO2 mmol/L 27 28 26 25   ANION GAP mmol/L 10 8 7 10   BUN mg/dL 19 21 26* 31*   CREATININE mg/dL 1 11 1 14 1 25 1 47*   EGFR ml/min/1 73sq m 66 64 57 47   CALCIUM mg/dL 8 5 8 7 8 5 9 1   MAGNESIUM mg/dL  --   --  1 8  --      Results from last 7 days   Lab Units 03/05/22  0517 03/04/22  2326   AST U/L 12 14   ALT U/L 13 11*   ALK PHOS U/L 89 102   TOTAL PROTEIN g/dL 6 4 7 2   ALBUMIN g/dL 3 0* 3 4*   TOTAL BILIRUBIN mg/dL 0 51 0 41     Results from last 7 days   Lab Units 03/07/22  1104 03/07/22  0700 03/06/22  1534 03/06/22  1215 03/06/22  0616 03/05/22  1552 03/05/22  1353 03/05/22  0751   POC GLUCOSE mg/dl 178* 145* 206* 177* 159* 176* 194* 153*     Results from last 7 days   Lab Units 03/07/22  0459 03/06/22  0225 03/05/22  0517 03/04/22  2326   GLUCOSE RANDOM mg/dL 132 184* 160* 205*         Results from last 7 days   Lab Units 03/05/22  0517   HEMOGLOBIN A1C % 6 4*   EAG mg/dl 137     No results found for: BETA-HYDROXYBUTYRATE                   Results from last 7 days   Lab Units 03/05/22  0328 03/05/22  0135 03/04/22  2326   HS TNI 0HR ng/L  --   --  211*   HS TNI 2HR ng/L  --  216*  --    HSTNI D2 ng/L  --  5  --    HS TNI 4HR ng/L 217*  --   --    HSTNI D4 ng/L 6  --   --      Results from last 7 days   Lab Units 03/05/22  0002   D-DIMER QUANTITATIVE ug/ml FEU 1 52*     Results from last 7 days   Lab Units 03/07/22  0944 03/07/22  0047 03/06/22  1807 03/05/22  0939 03/05/22  0328 03/05/22  0002 03/05/22  0002   PROTIME seconds  --   --   --   --  13 7  --  13 4   INR   --   --   --   --  1 05  --  1 02   PTT seconds 78* 190* 97*   < > 31   < > 30    < > = values in this interval not displayed  Results from last 7 days   Lab Units 03/06/22  0225   TSH 3RD GENERATON uIU/mL 0 914         Results from last 7 days   Lab Units 03/05/22  0418   LACTIC ACID mmol/L 1 6                         Results from last 7 days   Lab Units 03/04/22  2326   LIPASE u/L 110         Results from last 7 days   Lab Units 03/04/22  2327 03/04/22  2326   BLOOD CULTURE  No Growth at 48 hrs  No Growth at 48 hrs         3/4 ekg  Sinus tachycardia  Right bundle branch block  Possible Lateral infarct , age undetermined  Possible Inferior infarct , age undetermined  Abnormal ECG  When compared with ECG of 13-JUN-2020 20:43,  Borderline criteria for Lateral infarct are now Present  Borderline criteria for Inferior infarct are now Present  Inverted T waves have replaced nonspecific T wave abnormality in Anterior leads    ED Treatment:   Medication Administration from 03/04/2022 2234 to 03/05/2022 1343       Date/Time Order Dose Route Action     03/05/2022 0002 ondansetron (ZOFRAN) injection 4 mg 4 mg Intravenous Given     03/05/2022 0002 famotidine (PEPCID) injection 20 mg 20 mg Intravenous Given 03/05/2022 0100 iohexol (OMNIPAQUE) 350 MG/ML injection (SINGLE-DOSE) 100 mL 100 mL Intravenous Given     03/05/2022 0337 heparin (porcine) injection 4,000 Units 4,000 Units Intravenous Given     03/05/2022 1200 heparin (porcine) 25,000 units in 0 45% in sodium chloride 250 ml infusion (CMPD) 10 Units/kg/hr Intravenous Rate/Dose Change     03/05/2022 0337 heparin (porcine) 25,000 units in 0 45% in sodium chloride 250 ml infusion (CMPD) 12 Units/kg/hr Intravenous New Bag     03/05/2022 1024 carbidopa-levodopa (SINEMET)  mg per tablet 1 tablet 1 tablet Oral Given     03/05/2022 0913 cholecalciferol (VITAMIN D3) tablet 2,000 Units 2,000 Units Oral Given     03/05/2022 1025 cyanocobalamin (VITAMIN B-12) tablet 1,000 mcg 1,000 mcg Oral Given     03/05/2022 0913 docusate sodium (COLACE) capsule 100 mg 100 mg Oral Given     03/05/2022 1024 finasteride (PROSCAR) tablet 5 mg 5 mg Oral Given     03/05/2022 0912 polyethylene glycol (MIRALAX) packet 17 g 17 g Oral Given     03/05/2022 1024 cyanocobalamin (VITAMIN B-12) tablet 100 mcg 100 mcg Oral Given     03/05/2022 0924 insulin glargine (LANTUS) subcutaneous injection 20 Units 0 2 mL 20 Units Subcutaneous Given     03/05/2022 0523 multi-electrolyte (PLASMALYTE-A/ISOLYTE-S PH 7 4) IV solution 100 mL/hr Intravenous New Bag     03/05/2022 0801 insulin lispro (HumaLOG) 100 units/mL subcutaneous injection 1-6 Units 1 Units Subcutaneous Given     03/05/2022 2535 aspirin tablet 325 mg 325 mg Oral Given     03/05/2022 0912 atorvastatin (LIPITOR) tablet 40 mg 40 mg Oral Given     03/05/2022 0912 metoprolol tartrate (LOPRESSOR) tablet 25 mg 25 mg Oral Given     03/05/2022 0913 lisinopril (ZESTRIL) tablet 5 mg 5 mg Oral Given        Past Medical History:   Diagnosis Date    Ambulatory dysfunction     uses walker with assist of 1    Anemia     Anxiety     At risk for falls     hx of falls    Benign paroxysmal vertigo     unspecified ear    BPH (benign prostatic hyperplasia) for TURP today 1/4/2021    Calculus in bladder     for surgical removal today 1/4/2021    Cataract     left eye    Cervicalgia     Chest pain     Chronic kidney disease     stage 3    Constipation     CTS (carpal tunnel syndrome)     left    Cyst of pancreas     Cystitis 06/23/2020    acute cystitis with hematuria    Depression     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    Dizziness     and giddiness    Dysphagia     Elevated PSA     Facial weakness     GERD (gastroesophageal reflux disease)     last assessed 5/20/16    Gross hematuria     Hematuria     Hyperlipidemia     Hypertension     Kidney disease     Kidney stone     Left-sided weakness     Long term (current) use of oral hypoglycemic drugs     Muscle weakness     Nuclear senile cataract of left eye     OA (osteoarthritis) of knee     b/l    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[facility])     Syncope and collapse     Tachycardia     UTI (urinary tract infection)     Vertebro-basilar artery syndrome     Vitamin B deficiency     Vitamin D deficiency     Vitamin D deficiency      Present on Admission:   Vitamin D deficiency   Vitamin B12 deficiency   Stage 3 chronic kidney disease ([facility])   Essential hypertension   Chest Pain with Troponin Elevations      Admitting Diagnosis: Nausea [R11 0]  Esophagitis [K20 90]  Abnormal EKG [R94 31]  Elevated troponin [R77 8]  NSTEMI (non-ST elevated myocardial infarction) ([facility]) [I21 4]  CARLOS EDUARDO (acute kidney injury) ([facility] [address] ) [N17 9]  Age/Sex: 70 y o  male  Admission Orders:  Tele  NM STRESS  ECHO CARDIAC Cath  npo  scd  Scheduled Medications:  aspirin, 81 mg, Oral, Daily  atorvastatin, 40 mg, Oral, Daily  carbidopa-levodopa, 1 tablet, Oral, TID  cholecalciferol, 2,000 Units, Oral, Daily  clopidogrel, 75 mg, Oral, Daily  cyanocobalamin, 1,000 mcg, Oral, Daily  docusate sodium, 100 mg, Oral, BID  finasteride, 5 mg, Oral, Daily  insulin glargine, 20 Units, Subcutaneous, QAM  insulin lispro, 1-6 Units, Subcutaneous, TID AC  [START ON 3/8/2022] lisinopril, 5 mg, Oral, Daily  melatonin, 3 mg, Oral, HS  metoprolol succinate, 25 mg, Oral, Daily  pantoprazole, 40 mg, Oral, Early Morning  polyethylene glycol, 17 g, Oral, Daily  tamsulosin, 0 4 mg, Oral, Daily With Dinner    Continuous IV Infusions:  heparin (porcine), 3-20 Units/kg/hr (Order-Specific), Intravenous, Titrated    PRN Meds:  aluminum-magnesium hydroxide-simethicone, 30 mL, Oral, Q4H PRN  heparin (porcine), 2,000 Units, Intravenous, Q1H PRN  heparin (porcine), 4,000 Units, Intravenous, Q1H PRN  senna, 8 6 mg, Oral, Daily PRN    IP CONSULT TO CARDIOLOGY    Network Utilization Review Department  ATTENTION: Please call with any questions or concerns to [phone] and carefully listen to the prompts so that you are directed to the right person  All voicemails are confidential   Arley Diaz all requests for admission clinical reviews, approved or denied determinations and any other requests to dedicated fax number below belonging to the campus where the patient is receiving treatment   List of dedicated fax numbers for the Facilities:  [address] DENIALS (Administrative/Medical Necessity) [phone]   [address] (Maternity/NICU/Pediatrics) [phone]   [address] [place] [address] [facility] [place] [facility] [place] [address] [id number] [place] [address] [address] [facility] [facility] [phone]

## 2022-03-07 NOTE — BRIEF OP NOTE (RAD/CATH)
Right radial catheterization    Findings:    LAD:  80% sub branch, lengthy, abnormal IR for  Circumflex:  Large obtuse marginal branch with bridge collateral, 100% , unable to cross with standard run-through wire as well as  50  Right coronary:  Angiographically 50 60%, IFR 0 9 for abnormal     Plan: Suggest CABG given OM branch is large sized  Grafts consider LAD, OM and RCA is borderline but upper moderate

## 2022-03-07 NOTE — UTILIZATION REVIEW
Inpatient Admission Authorization Request   NOTIFICATION OF INPATIENT ADMISSION/INPATIENT AUTHORIZATION REQUEST   SERVICING FACILITY:   [address]  Tax ID: [id number]  NPI: [id number]  Place of Service: Inpatient [facility]  [address]  Place of Service Code: 24     ATTENDING PROVIDER:  Attending Name and NPI#: Radha Osuna Md [[id number]]  Address: [address]  Phone: [phone]     UTILIZATION REVIEW CONTACT:  [address] Utilization   Network Utilization Review Department  Phone: [phone]  Fax: [phone]  Email: [email or web address]     PHYSICIAN ADVISORY SERVICES:  FOR [facility] REVIEW - MEDICAL NECESSITY DENIAL  Phone: [phone]  Fax: [phone]  Email: [email or web address]     TYPE OF REQUEST:  Inpatient Status     ADMISSION INFORMATION:  ADMISSION DATE/TIME: 3/5/22  3:49 AM  PATIENT DIAGNOSIS CODE/DESCRIPTION:  Nausea [R11 0]  Esophagitis [K20 90]  Abnormal EKG [R94 31]  Elevated troponin [R77 8]  NSTEMI (non-ST elevated myocardial infarction) (HCC) [I21 4]  CARLOS EDUARDO (acute kidney injury) ([facility] [address] ) [N17 9]  DISCHARGE DATE/TIME: No discharge date for patient encounter  IMPORTANT INFORMATION:  Please contact the [address] directly with any questions or concerns regarding this request  Department voicemails are confidential     Send requests for admission clinical reviews, concurrent reviews, approvals, and administrative denials due to lack of clinical to fax [phone]

## 2022-03-07 NOTE — PLAN OF CARE
Problem: Potential for Falls  Goal: Patient will remain free of falls  Description: INTERVENTIONS:  - Educate patient/family on patient safety including physical limitations  - Instruct patient to call for assistance with activity   - Consult OT/PT to assist with strengthening/mobility   - Keep Call bell within reach  - Keep bed low and locked with side rails adjusted as appropriate  - Keep care items and personal belongings within reach  - Initiate and maintain comfort rounds  - Make Fall Risk Sign visible to staff  - Offer Toileting every 2 Hours, in advance of need  - Initiate/Maintain bed alarm  - Apply yellow socks and bracelet for high fall risk patients  - Consider moving patient to room near nurses station  Outcome: Progressing     Problem: Prexisting or High Potential for Compromised Skin Integrity  Goal: Skin integrity is maintained or improved  Description: INTERVENTIONS:  - Identify patients at risk for skin breakdown  - Assess and monitor skin integrity  - Assess and monitor nutrition and hydration status  - Monitor labs   - Assess for incontinence   - Turn and reposition patient  - Assist with mobility/ambulation  - Relieve pressure over bony prominences  - Avoid friction and shearing  - Provide appropriate hygiene as needed including keeping skin clean and dry  - Evaluate need for skin moisturizer/barrier cream  - Collaborate with interdisciplinary team   - Patient/family teaching  - Consider wound care consult   Outcome: Progressing     Problem: MOBILITY - ADULT  Goal: Maintain or return to baseline ADL function  Description: INTERVENTIONS:  -  Assess patient's ability to carry out ADLs; assess patient's baseline for ADL function and identify physical deficits which impact ability to perform ADLs (bathing, care of mouth/teeth, toileting, grooming, dressing, etc )  - Assess/evaluate cause of self-care deficits   - Assess range of motion  - Assess patient's mobility; develop plan if impaired  - Assess patient's need for assistive devices and provide as appropriate  - Encourage maximum independence but intervene and supervise when necessary  - Involve family in performance of ADLs  - Assess for home care needs following discharge   - Consider OT consult to assist with ADL evaluation and planning for discharge  - Provide patient education as appropriate  Outcome: Progressing  Goal: Maintains/Returns to pre admission functional level  Description: INTERVENTIONS:  - Perform BMAT or MOVE assessment daily    - Set and communicate daily mobility goal to care team and patient/family/caregiver  - Collaborate with rehabilitation services on mobility goals if consulted  - Perform Range of Motion 3 times a day  - Reposition patient every 2 hours    - Dangle patient 3 times a day  - Stand patient 3 times a day  - Ambulate patient 3 times a day  - Out of bed to chair 3 times a day   - Out of bed for meals 3 times a day  - Out of bed for toileting  - Record patient progress and toleration of activity level   Outcome: Progressing     Problem: MOBILITY - ADULT  Goal: Maintain or return to baseline ADL function  Description: INTERVENTIONS:  -  Assess patient's ability to carry out ADLs; assess patient's baseline for ADL function and identify physical deficits which impact ability to perform ADLs (bathing, care of mouth/teeth, toileting, grooming, dressing, etc )  - Assess/evaluate cause of self-care deficits   - Assess range of motion  - Assess patient's mobility; develop plan if impaired  - Assess patient's need for assistive devices and provide as appropriate  - Encourage maximum independence but intervene and supervise when necessary  - Involve family in performance of ADLs  - Assess for home care needs following discharge   - Consider OT consult to assist with ADL evaluation and planning for discharge  - Provide patient education as appropriate  Outcome: Progressing  Goal: Maintains/Returns to pre admission functional level  Description: INTERVENTIONS:  - Perform BMAT or MOVE assessment daily    - Set and communicate daily mobility goal to care team and patient/family/caregiver  - Collaborate with rehabilitation services on mobility goals if consulted  - Perform Range of Motion 3 times a day  - Reposition patient every 2 hours    - Dangle patient 3 times a day  - Stand patient 3 times a day  - Ambulate patient 3 times a day  - Out of bed to chair 3 times a day   - Out of bed for meals 3 times a day  - Out of bed for toileting  - Record patient progress and toleration of activity level   Outcome: Progressing     Problem: CARDIOVASCULAR - ADULT  Goal: Maintains optimal cardiac output and hemodynamic stability  Description: INTERVENTIONS:  - Monitor I/O, vital signs and rhythm  - Monitor for S/S and trends of decreased cardiac output  - Administer and titrate ordered vasoactive medications to optimize hemodynamic stability  - Assess quality of pulses, skin color and temperature  - Assess for signs of decreased coronary artery perfusion  - Instruct patient to report change in severity of symptoms  Outcome: Progressing  Goal: Absence of cardiac dysrhythmias or at baseline rhythm  Description: INTERVENTIONS:  - Continuous cardiac monitoring, vital signs, obtain 12 lead EKG if ordered  - Administer antiarrhythmic and heart rate control medications as ordered  - Monitor electrolytes and administer replacement therapy as ordered  Outcome: Progressing

## 2022-03-07 NOTE — PROGRESS NOTES
Progress Note - Cardiology   Vick Chavez 70 y o  male MRN: [id number]  Unit/Bed#: S -01 Encounter: [id number]        Principal Problem:    Chest Pain with Troponin Elevations  Active Problems:    Essential hypertension    Type 2 diabetes mellitus with diabetic neuropathy, with long-term current use of insulin (HCC)    Stage 3 chronic kidney disease (HCC)    Vitamin D deficiency    Vitamin B12 deficiency    Coronary artery calcification        Assessment/Plan  1  Chest pain w/ elevated troponin -patient is poor historian for me   Consult mentions  atypical symptoms (nausea, abdominal /epigastric pain)  -Currently w/o c/o CP  -HS troponin level; 211--216--217  -Initial 12 lead ECG demonstrated; NSR, RBBB, ST T-wave abnormalities  -CTA chest 3/4- no evidence of acute aortic pathology or aneurysm, extensive atheromatous changes/plaque throughout the aortoiliac vasculature without flow-limiting stenosis or occlusion, atherosclerotic coronary arteries, diffuse esophageal wall thickening  -lexiscan myoview - inferior/inferolatereal scar with most wilbur-infarct ischemia  Normal LVEF  Plan is to proceed with [facility] today   Attending  Spoke with wife regarding cath  Discussed potential need for DAPT  - On asa, BB  Statin, IV heparin  Had plavix load followup by 75mg daily  2  Preserved biventricular systolic function  -Compensated  -TTE 3/5/2022; LVEF 60%, no regional wall motion abnormality, diastolic function normal, and mild MR   3  Coronary artery calcifications - per CT imaging  -CTA chest 3/4 - extensive atheromatous changes/plaque throughout the aortoiliac vasculature without flow-limiting stenosis or occlusion, atherosclerotic coronary arteries  -Aspirin, and high-intensity statin  4  Essential hypertension  -'s  -On lisinopril 5 mg daily , toprol 25mg   5  Dyslipidemia  -Lipid profile 3/5/2022; cholesterol 137, triglycerides 49, HDL 40, and LDL 87  -On atorvastatin 40mg daily ( was on 20mg )   6   CKD stage 3  -Baseline creatinine around 1 1-1 3   -Creatinine 1 47 on admission, currently 1 15   7  DM type 2  -HGB A1c level 6 4     Subjective/Objective   Chief Complaint/Subjective  Patient without current chest pain or shortness of breath  Patient is a poor historian has a hard time elaborating on the discomfort that brought him into the hospital   It occurred at rest   He said he is barely ambulatory  He has been in nursing home for 2 years but was discharged to the [facility] with his wife 4 days ago          Vitals: /71 (BP Location: Right arm)   Pulse 87   Temp 98 1 °F (36 7 °C) (Oral)   Resp 18   Ht 5' 7" (1 702 m)   Wt 85 3 kg (188 lb)   SpO2 97%   BMI 29 44 kg/m²     Vitals:    03/05/22 1344 03/06/22 1055   Weight: 85 3 kg (188 lb 0 8 oz) 85 3 kg (188 lb)     Orthostatic Blood Pressures      Most Recent Value   Blood Pressure 136/71 filed at 03/07/2022 0551   Patient Position - Orthostatic VS Lying filed at 03/07/2022 5494            Intake/Output Summary (Last 24 hours) at 3/7/2022 1122  Last data filed at 3/7/2022 0700  Gross per 24 hour   Intake 480 ml   Output 1600 ml   Net -1120 ml       Invasive Devices  Report    Peripheral Intravenous Line            Peripheral IV 03/04/22 Left Antecubital 2 days    Peripheral IV 03/04/22 Left;Dorsal (posterior) Hand 2 days                Current Facility-Administered Medications   Medication Dose Route Frequency    aluminum-magnesium hydroxide-simethicone (MYLANTA) oral suspension 30 mL  30 mL Oral Q4H PRN    aspirin chewable tablet 81 mg  81 mg Oral Daily    atorvastatin (LIPITOR) tablet 40 mg  40 mg Oral Daily    carbidopa-levodopa (SINEMET)  mg per tablet 1 tablet  1 tablet Oral TID    cholecalciferol (VITAMIN D3) tablet 2,000 Units  2,000 Units Oral Daily    clopidogrel (PLAVIX) tablet 75 mg  75 mg Oral Daily    cyanocobalamin (VITAMIN B-12) tablet 1,000 mcg  1,000 mcg Oral Daily    docusate sodium (COLACE) capsule 100 mg  100 mg Oral BID    finasteride (PROSCAR) tablet 5 mg  5 mg Oral Daily    heparin (porcine) 25,000 units in 0 45% in sodium chloride 250 ml infusion (CMPD)  3-20 Units/kg/hr (Order-Specific) Intravenous Titrated    heparin (porcine) injection 2,000 Units  2,000 Units Intravenous Q1H PRN    heparin (porcine) injection 4,000 Units  4,000 Units Intravenous Q1H PRN    insulin glargine (LANTUS) subcutaneous injection 20 Units 0 2 mL  20 Units Subcutaneous QAM    insulin lispro (HumaLOG) 100 units/mL subcutaneous injection 1-6 Units  1-6 Units Subcutaneous TID AC    [START ON 3/8/2022] lisinopril (ZESTRIL) tablet 5 mg  5 mg Oral Daily    melatonin tablet 3 mg  3 mg Oral HS    metoprolol succinate (TOPROL-XL) 24 hr tablet 25 mg  25 mg Oral Daily    pantoprazole (PROTONIX) EC tablet 40 mg  40 mg Oral Early Morning    polyethylene glycol (MIRALAX) packet 17 g  17 g Oral Daily    senna (SENOKOT) tablet 8 6 mg  8 6 mg Oral Daily PRN    tamsulosin (FLOMAX) capsule 0 4 mg  0 4 mg Oral Daily With Dinner         Physical Exam: /71 (BP Location: Right arm)   Pulse 87   Temp 98 1 °F (36 7 °C) (Oral)   Resp 18   Ht 5' 7" (1 702 m)   Wt 85 3 kg (188 lb)   SpO2 97%   BMI 29 44 kg/m²     General Appearance:    Alert, cooperative, no distress, appears stated age   Head:    Normocephalic, no scleral icterus   Eyes:    PERRL   Nose:   Nares normal, septum midline, no drainage    Throat:   Lips, mucosa, and tongue normal   Neck:   Supple, symmetrical, trachea midline,            Lungs:     Clear to auscultation bilaterally, respirations unlabored        Heart:    Regular rate and rhythm, S1 and S2 normal, no murmur   Abdomen:     Soft, non-tender   Extremities:   Extremities normal, atraumatic, no cyanosis or edema       Skin:   Skin color, texture, turgor normal, no rashes or lesions   Neurologic:   Alert and oriented to person place and time                 Lab Results:   Recent Results (from the past 72 hour(s))   ECG 12 lead    Collection Time: 03/04/22 10:43 PM   Result Value Ref Range    Ventricular Rate 101 BPM    Atrial Rate 101 BPM    OR Interval 132 ms    QRSD Interval 118 ms    QT Interval 380 ms    QTC Interval 492 ms    P [place] 67 degrees    QRS Axis 52 degrees    T Wave Axis 67 degrees   CBC and differential    Collection Time: 03/04/22 11:26 PM   Result Value Ref Range    WBC 14 95 (H) 4 31 - 10 16 Thousand/uL    RBC 4 42 3 88 - 5 62 Million/uL    Hemoglobin 13 2 12 0 - 17 0 g/dL    Hematocrit 38 4 36 5 - 49 3 %    MCV 87 82 - 98 fL    MCH 29 9 26 8 - 34 3 pg    MCHC 34 4 31 4 - 37 4 g/dL    RDW 14 0 11 6 - 15 1 %    MPV 10 8 8 9 - 12 7 fL    Platelets 173 921 - 230 Thousands/uL    nRBC 0 /100 WBCs    Neutrophils Relative 85 (H) 43 - 75 %    Immat GRANS % 1 0 - 2 %    Lymphocytes Relative 7 (L) 14 - 44 %    Monocytes Relative 6 4 - 12 %    Eosinophils Relative 1 0 - 6 %    Basophils Relative 0 0 - 1 %    Neutrophils Absolute 12 60 (H) 1 85 - 7 62 Thousands/µL    Immature Grans Absolute 0 08 0 00 - 0 20 Thousand/uL    Lymphocytes Absolute 1 11 0 60 - 4 47 Thousands/µL    Monocytes Absolute 0 96 0 17 - 1 22 Thousand/µL    Eosinophils Absolute 0 14 0 00 - 0 61 Thousand/µL    Basophils Absolute 0 06 0 00 - 0 10 Thousands/µL   Comprehensive metabolic panel    Collection Time: 03/04/22 11:26 PM   Result Value Ref Range    Sodium 137 136 - 145 mmol/L    Potassium 4 4 3 5 - 5 3 mmol/L    Chloride 102 100 - 108 mmol/L    CO2 25 21 - 32 mmol/L    ANION GAP 10 4 - 13 mmol/L    BUN 31 (H) 5 - 25 mg/dL    Creatinine 1 47 (H) 0 60 - 1 30 mg/dL    Glucose 205 (H) 65 - 140 mg/dL    Calcium 9 1 8 3 - 10 1 mg/dL    Corrected Calcium 9 6 8 3 - 10 1 mg/dL    AST 14 5 - 45 U/L    ALT 11 (L) 12 - 78 U/L    Alkaline Phosphatase 102 46 - 116 U/L    Total Protein 7 2 6 4 - 8 2 g/dL    Albumin 3 4 (L) 3 5 - 5 0 g/dL    Total Bilirubin 0 41 0 20 - 1 00 mg/dL    eGFR 47 ml/min/1 73sq m   Lipase    Collection Time: 03/04/22 11:26 PM   Result Value Ref Range    Lipase 110 73 - 393 u/L   HS Troponin 0hr (reflex protocol)    Collection Time: 03/04/22 11:26 PM   Result Value Ref Range    hs TnI 0hr 211 (H) "Refer to ACS Flowchart"- see link ng/L   Blood culture #2    Collection Time: 03/04/22 11:26 PM    Specimen: Arm, Left; Blood   Result Value Ref Range    Blood Culture No Growth at 48 hrs  Blood culture #1    Collection Time: 03/04/22 11:27 PM    Specimen: Arm, Left; Blood   Result Value Ref Range    Blood Culture No Growth at 48 hrs      D-dimer, quantitative    Collection Time: 03/05/22 12:02 AM   Result Value Ref Range    D-Dimer, Quant 1 52 (H) <0 50 ug/ml FEU   Protime-INR    Collection Time: 03/05/22 12:02 AM   Result Value Ref Range    Protime 13 4 11 6 - 14 5 seconds    INR 1 02 0 84 - 1 19   APTT    Collection Time: 03/05/22 12:02 AM   Result Value Ref Range    PTT 30 23 - 37 seconds   HS Troponin I 2hr    Collection Time: 03/05/22  1:35 AM   Result Value Ref Range    hs TnI 2hr 216 (H) "Refer to ACS Flowchart"- see link ng/L    Delta 2hr hsTnI 5 <20 ng/L   ECG 12 lead    Collection Time: 03/05/22  1:50 AM   Result Value Ref Range    Ventricular Rate 86 BPM    Atrial Rate 86 BPM    CO Interval 138 ms    QRSD Interval 114 ms    QT Interval 386 ms    QTC Interval 461 ms    P [place] 57 degrees    QRS Axis 48 degrees    T Wave [place] 70 degrees   HS Troponin I 4hr    Collection Time: 03/05/22  3:28 AM   Result Value Ref Range    hs TnI 4hr 217 (H) "Refer to ACS Flowchart"- see link ng/L    Delta 4hr hsTnI 6 <20 ng/L   APTT six (6) hours after Heparin bolus/drip initiation or dosing change    Collection Time: 03/05/22  3:28 AM   Result Value Ref Range    PTT 31 23 - 37 seconds   CBC    Collection Time: 03/05/22  3:28 AM   Result Value Ref Range    WBC 13 64 (H) 4 31 - 10 16 Thousand/uL    RBC 4 19 3 88 - 5 62 Million/uL    Hemoglobin 12 5 12 0 - 17 0 g/dL    Hematocrit 36 6 36 5 - 49 3 %    MCV 87 82 - 98 fL    MCH 29 8 26 8 - 34 3 pg    MCHC 34 2 31 4 - 37 4 g/dL    RDW 14 3 11 6 - 15 1 %    Platelets 438 516 - 319 Thousands/uL    MPV 10 6 8 9 - 12 7 fL   Protime-INR    Collection Time: 03/05/22  3:28 AM   Result Value Ref Range    Protime 13 7 11 6 - 14 5 seconds    INR 1 05 0 84 - 1 19   Lactic acid, plasma    Collection Time: 03/05/22  4:18 AM   Result Value Ref Range    LACTIC ACID 1 6 0 5 - 2 0 mmol/L   Comprehensive metabolic panel    Collection Time: 03/05/22  5:17 AM   Result Value Ref Range    Sodium 136 136 - 145 mmol/L    Potassium 4 1 3 5 - 5 3 mmol/L    Chloride 103 100 - 108 mmol/L    CO2 26 21 - 32 mmol/L    ANION GAP 7 4 - 13 mmol/L    BUN 26 (H) 5 - 25 mg/dL    Creatinine 1 25 0 60 - 1 30 mg/dL    Glucose 160 (H) 65 - 140 mg/dL    Calcium 8 5 8 3 - 10 1 mg/dL    Corrected Calcium 9 3 8 3 - 10 1 mg/dL    AST 12 5 - 45 U/L    ALT 13 12 - 78 U/L    Alkaline Phosphatase 89 46 - 116 U/L    Total Protein 6 4 6 4 - 8 2 g/dL    Albumin 3 0 (L) 3 5 - 5 0 g/dL    Total Bilirubin 0 51 0 20 - 1 00 mg/dL    eGFR 57 ml/min/1 73sq m   Magnesium    Collection Time: 03/05/22  5:17 AM   Result Value Ref Range    Magnesium 1 8 1 6 - 2 6 mg/dL   CBC and differential    Collection Time: 03/05/22  5:17 AM   Result Value Ref Range    WBC 11 88 (H) 4 31 - 10 16 Thousand/uL    RBC 4 06 3 88 - 5 62 Million/uL    Hemoglobin 12 0 12 0 - 17 0 g/dL    Hematocrit 35 0 (L) 36 5 - 49 3 %    MCV 86 82 - 98 fL    MCH 29 6 26 8 - 34 3 pg    MCHC 34 3 31 4 - 37 4 g/dL    RDW 14 4 11 6 - 15 1 %    MPV 10 8 8 9 - 12 7 fL    Platelets 136 995 - 857 Thousands/uL    nRBC 0 /100 WBCs    Neutrophils Relative 75 43 - 75 %    Immat GRANS % 0 0 - 2 %    Lymphocytes Relative 12 (L) 14 - 44 %    Monocytes Relative 10 4 - 12 %    Eosinophils Relative 3 0 - 6 %    Basophils Relative 0 0 - 1 %    Neutrophils Absolute 8 88 (H) 1 85 - 7 62 Thousands/µL    Immature Grans Absolute 0 05 0 00 - 0 20 Thousand/uL    Lymphocytes Absolute 1 41 0 60 - 4 47 Thousands/µL    Monocytes Absolute 1 15 0 17 - 1 22 Thousand/µL    Eosinophils Absolute 0 35 0 00 - 0 61 Thousand/µL    Basophils Absolute 0 04 0 00 - 0 10 Thousands/µL   Hemoglobin A1C    Collection Time: 03/05/22  5:17 AM   Result Value Ref Range    Hemoglobin A1C 6 4 (H) Normal 3 8-5 6%; PreDiabetic 5 7-6 4%;  Diabetic >=6 5%; Glycemic control for adults with diabetes <7 0% %     mg/dl   Lipid Panel with Direct LDL reflex    Collection Time: 03/05/22  5:17 AM   Result Value Ref Range    Cholesterol 137 See Comment mg/dL    Triglycerides 49 See Comment mg/dL    HDL, Direct 40 >=40 mg/dL    LDL Calculated 87 0 - 100 mg/dL   ECG 12 lead    Collection Time: 03/05/22  6:16 AM   Result Value Ref Range    Ventricular Rate 94 BPM    Atrial Rate 94 BPM    WV Interval 142 ms    QRSD Interval 118 ms    QT Interval 400 ms    QTC Interval 500 ms    P Axis 70 degrees    QRS Axis 52 degrees    T Wave Axis 72 degrees   Fingerstick Glucose (POCT)    Collection Time: 03/05/22  7:51 AM   Result Value Ref Range    POC Glucose 153 (H) 65 - 140 mg/dl   APTT    Collection Time: 03/05/22  9:39 AM   Result Value Ref Range    PTT 92 (H) 23 - 37 seconds   Echo complete w/ contrast if indicated    Collection Time: 03/05/22 12:45 PM   Result Value Ref Range    LA size 3 8 cm    LVPWd 1 10 0 52 - 0 99 cm    Left Atrium Area-systolic Apical Two Chamber 17 1 cm2    Left Atrium Area-systolic Four Chamber 17 cm2    MV E' Tissue Velocity Septal 6 cm/s    RA 2D Volume 32 0 mL    IVSd 7 40 cm    LV DIASTOLIC VOLUME (MOD BIPLANE) 2D 89 mL    LEFT VENTRICLE SYSTOLIC VOLUME (MOD BIPLANE) 2D 41 mL    Left ventricular stroke volume (2D) 48 00 mL    A4C EF 53 %    LA length (A2C) 4 40 cm    LVIDd 4 40 4 93 - 7 34 cm    IVS 1 2 0 54 - 1 00 cm    LVIDS 3 20 3 00 - 4 54 cm    FS 27 28 - 44 %    Asc Ao 3 1 2 04 - 3 06 cm    Ao root 3 30 cm    RVID d 3 6 cm    MV valve area p 1/2 method 4 15 cm2    E wave deceleration time 183 ms    MV Peak E Manuel 76 cm/s    MV Peak A Manuel 0 78 m/s    RAA A4C 13 1 cm2    MV stenosis pressure 1/2 time 53 ms    LVSV, 2D 48 mL    Ao asc z-score 2 14     ZLVPWD 2 87     ZLVIDS -1 12     ZLVIDD -3 13     ZIVSD 3 60     LV EF 55    Fingerstick Glucose (POCT)    Collection Time: 03/05/22  1:53 PM   Result Value Ref Range    POC Glucose 194 (H) 65 - 140 mg/dl   Fingerstick Glucose (POCT)    Collection Time: 03/05/22  3:52 PM   Result Value Ref Range    POC Glucose 176 (H) 65 - 140 mg/dl   APTT    Collection Time: 03/05/22  6:23 PM   Result Value Ref Range    PTT 38 (H) 23 - 37 seconds   APTT    Collection Time: 03/06/22  2:10 AM   Result Value Ref Range    PTT 52 (H) 23 - 37 seconds   TSH, 3rd generation with Free T4 reflex    Collection Time: 03/06/22  2:25 AM   Result Value Ref Range    TSH 3RD GENERATON 0 914 0 358 - 3 740 uIU/mL   CBC and differential    Collection Time: 03/06/22  2:25 AM   Result Value Ref Range    WBC 9 93 4 31 - 10 16 Thousand/uL    RBC 3 80 (L) 3 88 - 5 62 Million/uL    Hemoglobin 11 3 (L) 12 0 - 17 0 g/dL    Hematocrit 32 9 (L) 36 5 - 49 3 %    MCV 87 82 - 98 fL    MCH 29 7 26 8 - 34 3 pg    MCHC 34 3 31 4 - 37 4 g/dL    RDW 14 2 11 6 - 15 1 %    MPV 10 5 8 9 - 12 7 fL    Platelets 825 270 - 987 Thousands/uL    nRBC 0 /100 WBCs    Neutrophils Relative 72 43 - 75 %    Immat GRANS % 0 0 - 2 %    Lymphocytes Relative 14 14 - 44 %    Monocytes Relative 9 4 - 12 %    Eosinophils Relative 5 0 - 6 %    Basophils Relative 0 0 - 1 %    Neutrophils Absolute 7 00 1 85 - 7 62 Thousands/µL    Immature Grans Absolute 0 03 0 00 - 0 20 Thousand/uL    Lymphocytes Absolute 1 42 0 60 - 4 47 Thousands/µL    Monocytes Absolute 0 90 0 17 - 1 22 Thousand/µL    Eosinophils Absolute 0 54 0 00 - 0 61 Thousand/µL    Basophils Absolute 0 04 0 00 - 0 10 Thousands/µL   Basic metabolic panel    Collection Time: 03/06/22  2:25 AM   Result Value Ref Range    Sodium 137 136 - 145 mmol/L    Potassium 3 8 3 5 - 5 3 mmol/L    Chloride 101 100 - 108 mmol/L    CO2 28 21 - 32 mmol/L    ANION GAP 8 4 - 13 mmol/L    BUN 21 5 - 25 mg/dL    Creatinine 1 14 0 60 - 1 30 mg/dL    Glucose 184 (H) 65 - 140 mg/dL    Calcium 8 7 8 3 - 10 1 mg/dL    eGFR 64 ml/min/1 73sq m   Fingerstick Glucose (POCT)    Collection Time: 03/06/22  6:16 AM   Result Value Ref Range    POC Glucose 159 (H) 65 - 140 mg/dl   APTT    Collection Time: 03/06/22  9:09 AM   Result Value Ref Range    PTT 92 (H) 23 - 37 seconds   NM Myocardial Perfusion Spect (Pharmacological Induced Stress and/or Rest)    Collection Time: 03/06/22 12:15 PM   Result Value Ref Range    Rest Nuclear Isotope Dose 11 00 mCi    Baseline HR 93 bpm    Baseline /62 mmHg    O2 sat rest 98 %    Stress peak  bpm    Post peak  mmHg    Rate Pressure Product 12,760 0     O2 sat peak 96 %    Recovery  bpm    Recovery /54 mmHg    O2 sat recovery 96 %    Target  bpm    Percent HR 73 %    Exercise duration (min) 3 min    Exercise duration (sec) 0 sec    Angina Index 0     Max HR Percent 73 %    Stress Nuclear Isotope Dose 33 00 mCi    Clancy Treadmill Score 3     ST Depression (mm) 0 mm    Stress/rest perfusion ratio 1 70     EF (%) 61 %   Fingerstick Glucose (POCT)    Collection Time: 03/06/22 12:15 PM   Result Value Ref Range    POC Glucose 177 (H) 65 - 140 mg/dl   Fingerstick Glucose (POCT)    Collection Time: 03/06/22  3:34 PM   Result Value Ref Range    POC Glucose 206 (H) 65 - 140 mg/dl   APTT    Collection Time: 03/06/22  6:07 PM   Result Value Ref Range    PTT 97 (H) 23 - 37 seconds   APTT    Collection Time: 03/07/22 12:47 AM   Result Value Ref Range     (HH) 23 - 37 seconds   Basic metabolic panel    Collection Time: 03/07/22  4:59 AM   Result Value Ref Range    Sodium 141 136 - 145 mmol/L    Potassium 4 1 3 5 - 5 3 mmol/L    Chloride 104 100 - 108 mmol/L    CO2 27 21 - 32 mmol/L    ANION GAP 10 4 - 13 mmol/L    BUN 19 5 - 25 mg/dL    Creatinine 1 11 0 60 - 1 30 mg/dL    Glucose 132 65 - 140 mg/dL    Calcium 8 5 8 3 - 10 1 mg/dL    eGFR 66 ml/min/1 73sq m   CBC and differential    Collection Time: 03/07/22  4:59 AM   Result Value Ref Range    WBC 10 05 4 31 - 10 16 Thousand/uL    RBC 2 81 (L) 3 88 - 5 62 Million/uL    Hemoglobin 11 5 (L) 12 0 - 17 0 g/dL    Hematocrit 26 2 (L) 36 5 - 49 3 %    MCV 92 82 - 98 fL    MCH 29 5 26 8 - 34 3 pg    MCHC 31 7 31 4 - 37 4 g/dL    RDW 14 0 11 6 - 15 1 %    MPV 10 9 8 9 - 12 7 fL    Platelets 445 606 - 254 Thousands/uL    nRBC 0 /100 WBCs    Neutrophils Relative 68 43 - 75 %    Immat GRANS % 1 0 - 2 %    Lymphocytes Relative 15 14 - 44 %    Monocytes Relative 10 4 - 12 %    Eosinophils Relative 5 0 - 6 %    Basophils Relative 1 0 - 1 %    Neutrophils Absolute 6 96 1 85 - 7 62 Thousands/µL    Immature Grans Absolute 0 05 0 00 - 0 20 Thousand/uL    Lymphocytes Absolute 1 46 0 60 - 4 47 Thousands/µL    Monocytes Absolute 1 01 0 17 - 1 22 Thousand/µL    Eosinophils Absolute 0 51 0 00 - 0 61 Thousand/µL    Basophils Absolute 0 06 0 00 - 0 10 Thousands/µL   Fingerstick Glucose (POCT)    Collection Time: 03/07/22  7:00 AM   Result Value Ref Range    POC Glucose 145 (H) 65 - 140 mg/dl   APTT    Collection Time: 03/07/22  9:44 AM   Result Value Ref Range    PTT 78 (H) 23 - 37 seconds   Fingerstick Glucose (POCT)    Collection Time: 03/07/22 11:04 AM   Result Value Ref Range    POC Glucose 178 (H) 65 - 140 mg/dl     Imaging: I have personally reviewed pertinent reports    Stress ECG: No ST deviation is noted  Arrhythmias during stress: occasional PVCs  The ECG was not diagnostic due to pharmacological (vasodilator) stress    Stress Function: Left ventricular function post-stress is normal  Post-stress ejection fraction is 61 %  There is a defect in the entire inferior and inferolateral location(s)  The defect is akinetic    Perfusion: There is a left ventricular perfusion defect that is large in size present in the entire inferior and inferolateral location(s) that is partially reversible     Stress Combined Conclusion: Abnormal stress myoview study  Mostly scar with small amount of wilbur-infarct ischemia      Left Ventricle Left ventricular cavity size is normal  Wall thickness is mildly increased  The left ventricular ejection fraction is 55%  Systolic function is normal   Diastolic function is normal    Wall Scoring Baseline     The following segments are akinetic: basal inferior, mid inferior, mid inferolateral and apical lateral   The following segments are hypokinetic: basal inferolateral and apical inferior  All other segments are normal             Right Ventricle Right ventricular cavity size is normal  Systolic function is normal  Wall thickness is normal    Left Atrium The atrium is normal in size  Right Atrium The atrium is normal in size  Aortic Valve The aortic valve is trileaflet  The leaflets are not thickened  The leaflets are not calcified  The leaflets exhibit normal mobility  There is no evidence of regurgitation  There is no evidence of stenosis  Mitral Valve The mitral valve has normal structure and function  There is mild regurgitation  There is no evidence of stenosis  Tricuspid Valve Tricuspid valve structure is normal  There is no evidence of regurgitation  There is no evidence of stenosis  Pulmonic Valve Pulmonic valve structure is normal  There is no evidence of regurgitation  There is no evidence of stenosis  Ascending Aorta The aortic root is normal in size  IVC/SVC The inferior vena cava is normal in size  Pericardium There is no pericardial effusion  The pericardium is normal in appearance  Counseling / Coordination of Care  Total time spent today 30 minutes  Greater than 50% of total time was spent with the patient and / or family counseling and / or coordination of care

## 2022-03-07 NOTE — ASSESSMENT & PLAN NOTE
 BP reviewed and stable    PLAN  · Continue metoprolol   · Continue holding lisinopril until cleared by cardiology  · Monitor BP

## 2022-03-07 NOTE — ASSESSMENT & PLAN NOTE
 BP reviewed and stable    PLAN  · Continue metoprolol   · Hold lisinopril today; restart tomorrow as Cardiology recommends   · Monitor BP

## 2022-03-07 NOTE — DISCHARGE SUMMARY
[facility]  Discharge- Melissa Mcdonough 1950, 70 y o  male MRN: [id number]  Unit/Bed#: S -01 Encounter: [id number]  Primary Care Provider: Priscilla Rowe MD   Date and time admitted to hospital: 3/4/2022 10:34 PM    * Chest Pain with Troponin Elevations  Assessment & Plan   POA, presented with chest pain and associated with atypical symptoms of nausea, epigastric pain   EKG without STEMI x 3 but with troponin elevation   CTA chest in ED showed significant atherosclerotic changes/plaques visualized in thoracic aorta and coronary arteries   TTE (03/05/2022) LVEF 60%    Stress test Perfusion: There is a left ventricular perfusion defect that is large in size present in the entire inferior and inferolateral location(s) that is partially reversible      Assessment: Atypical chest pain secondary to NSTEMI on perfusion study showing partially reversible large defect in inferior and inferolateral LV, with subsequent LH Cath showing   o LAD: 80% sub branch, lengthy, abnormal IR for   o Circumflex:  Large obtuse marginal branch with bridge collateral, 100% , unable to cross with standard run-through wire as well as  50  o Right coronary:  Angiographically 50 60%, IFR 0 9 for abnormal       Plan   Discharge [facility] for [facility] management  o CABG evaluation given OM branch is large sized; Graft consider LAD, OM, and RCA is borderline but upper moderate   Continue Heparin drip, ASA 81mg daily, lipitor   Continue Nitroglycerin p r n  for chest pain   Continue holding ACE-I d/t CARLOS EDUARDO; Restart when Cardiology recommends   Anticipating initiation of dual antiplatelet therapy at/prior to d/c      Stage 3 chronic kidney disease [facility])  Assessment & Plan  Lab Results   Component Value Date/Time    BUN 19 03/07/2022 04:59 AM    CREATININE 1 11 03/07/2022 04:59 AM       Estimated Creatinine Clearance: 63 7 mL/min (by C-G formula based on SCr of 1 11 mg/dL)     POA; Cr 1 47 (baseline appears to be 1 1-1 2)   Improved with IVF hydration and PO intake - serum creatinine back to baseline    Plan:   Continue daily BMP   Avoid hypoperfusion of the kidneys, minimize nephrotoxins   RAAS Blockers/Diuretics held: lisinopril      Essential hypertension  Assessment & Plan   BP reviewed and stable    PLAN  · Continue metoprolol   · Continue holding lisinopril until cleared by cardiology  · Monitor BP      Coronary artery calcification  Assessment & Plan   CTA on 3/5 showed atherosclerotic changes in thoracic aorta and coronary arteries   Likely leading to NSTEMI - management as per above   Cath scheduled today   Recommend lifestyle intervention prior to discharge    Vitamin B12 deficiency  Assessment & Plan  Continue home B12    Vitamin D deficiency  Assessment & Plan  Continue home vitamin D    Type 2 diabetes mellitus with diabetic neuropathy, with long-term current use of insulin [facility])  Assessment & Plan  Lab Results   Component Value Date    HGBA1C 6 4 (H) 03/05/2022       Recent Labs     03/06/22  1534 03/07/22  0700 03/07/22  1104 03/07/22  1548   POCGLU 206* 145* 178* 135       Blood Sugar Average: Last 72 hrs:  (P) 169 [id number] blood glucose well controlled    Home Regimen: glipizide 10 mg BID, metformin 1 G BID    Plan:   Continue holding oral antihyperglycemics while inpatient   Continue Diabetic diet   Continue Insulin regimen  o Lantus 20 units qAM  o Insulin correction ACHS   Goal BG 80- 180 while admitted, adjusting insulin regimen as appropriate   Continue Glucose checks before meals and qHS   Monitor for hypoglycemia and treat per protocol      Discharging Resident Physician: Brooke Yeh DO  Attending:  Julian Harris MD  PCP: Yves Gonzales MD  Admission Date: 3/4/2022  Discharge Date: 03/08/22    Disposition:     Other: discharge transfer to [facility] for CABG Evaluation     Reason for Admission: Chest pain secondary to NSTEMI     Consultations During [facility] Stay:  · Cardiology     Procedures Performed:     · Left Heart Catheterization    Significant Findings / Test Results:     · ECHO 03/05: LV: EF 55% with normal systolic and diastolic function; wall thickness mildly increased; akinetic segments in: basal inferior, mid inferior, mid inferolateral and apical lateral; hypokinetic segments in basal inferolateral and apical inferior; Mild regurgitation in Mitral Valve   · CTA dissection protocol chest/abdomen/pelvis: Diffuse atheromatous changes and plaque noted throughout the aortoiliac vasculature without flow-limiting stenosis or occlusion; Heart/pulmonology will treat: Atherosclerotic changes noted in thoracic aorta and coronary arteries  · NM myocardial perfusion SPECT (Rx stress and/or rest): PERFUSION: Left ventricular perfusion defect does live inside present entire inferior inferolateral occasional partial reversible; STRESS FUNCTION:  And left ventricular function post stress is normal   Post-stress ejection fraction 61%, defect is present entire inferior inferolateral occasions the defect and akinetic  · Elevated troponin x3  · EKG NSR, RBBB, ST wave abnormalities  · Elevated WBC/leukocytosis  · Elevated BUN creatinine  · Elevated D-dimer  · Elevated APTT  · Elevated hemoglobin A1c  · Hyperglycemia    Incidental Findings:   · CXR: Aortic calcifications  · CDA dissection protocol chest/abdomen/pelvis:  Lungs:  bibasilar dependent atelectatic changes;  5 mm pulmonary nodule posterior right apex; mediastinum and reginald:  Small hiatal hernia noted, shotty subcentimeter mediastinal lymph nodes, diffuse esophageal wall thickening the is noted more pronounced distally; liver/biliary tree:   There are 1 or more as hepatic simple cysts present; spleen: Probable splenic cysts we demonstrated; kidney/ureters:  Stable left upper pole 7 mm intrarenal calculus, multiple renal cysts, 1 more sharply circumscribed subcentimeter hypodensities are present, too small to accurately characterize and statistically more likely benign findings; stomach and bowels: There is colonic diverticulosis without evidence of acute diverticulitis, mild to moderate stool burden; nonproductive organs:  Prostate is enlarged, prior TURP; osseous structures: Spinal degenerative changes compatible with Dish redemonstrated  · CT head without contrast:  Periventricular and subcortical hypoattenuating foci consistent with microangiopathic disease; visualized orbits and paranasal sinuses: Mucous retention cyst in the left maxillary sinus  Test Results Pending at Discharge (will require follow up):   · Transferred to [facility] for CABG evaluation      Outpatient Tests Requested:  · NONE; Discharged to [facility] for CABG evaluation     Complications:  [address] Course:     Sary Cabrera is a 70 y o  male patient who originally presented to the hospital on 3/4/2022 due to chest pain, and nausea  Patient has a past medical history significant for hypertension, type 2 insulin-dependent diabetes mellitus, hyperlipidemia/dyslipidemia, CKD stage 3, presented to ED for evaluation of sudden onset of substernal chest pain and nausea that began earlier that evening  Prior to arrival in the ED, he was given 324 mg of aspirin in route to the hospital   Upon initial evaluation there was reproducible sternal/epigastric discomfort, without evidence of lower extremity edema or other indications of new cardiovascular insult  The patient during in the encounter appeared confused, however he was A&O x3, and displayed no focal neurological deficits  EKG obtained in ED showed new T-wave inversions in V1 through V3, RBBB, ST T wave abnormalities, NSR, elevated troponin x3    Description of the patient's chest pain and epigastric pain, warranted CTA dissection protocol which showed extensive atheromatous changes throughout aorta iliac vasculature without flow limiting stenosis or occlusion, diffuse esophageal wall thickening which is nonspecific  The patient was admitted for possibility of NSTEMI in the setting of EKG changes, and elevated troponin x3  Cardiology consult reported that the patient's symptoms were atypical, but he had plenty of risk factors and noted that the patient needed to have an echo/Lexiscan Myoview stress test for further assessment of his symptoms  Cardiology started the patient on IV heparin drip, aspirin, and statin  The patient's ECHO/TTE showed left ventricular ejection fraction of 55%, with normal systolic and diastolic function  Left ventricular wall thickness was mildly increased, there were segments that were akinetic including the basal inferior, mild inferior, mild inferolateral apical lateral   Hypokinetic segments included had basal inferolateral apical inferior and all other segments were normal   Patient underwent NM myocardial perfusion SPECT (Rx stress and/or rest) imaging study  Stress EKG component showed no ST elevations, occasional PVCs  Stress function component demonstrated left ventricular function post stress test which was normal, post-stress ejection fraction was 61%, and a defect in the entire inferior and inferolateral medications which were akinetic  Perfusion component showed left ventricular perfusion defect that is large in size present entire inferior inferolateral locations that was shown to be partially reversible  The combined conclusion component noted abnormal stress Myoview study, and mostly scar with small amount of wilbur-infarct ischemia  Due to these findings the patient underwent cardiac catheterization the next day after being placed NPO the night before  Cardiac catheterization impression noted 3V CAD, moderate RCA, abnormal iFR LAD, with unsuccessful attempt at crossing large OM branch with workhouse wires  Primary team was notified about the need for CABG evaluation at at [facility] and discharge planning for transfer was coordinated    Than initial encounter the patient had acute on chronic kidney injury that was relieved by morning after IVF hydration  The patient's lisinopril was held, hyper perfusion of the kidneys and minimizing use of nephrotoxins was done during inpatient stay at [facility]  With respect to the patient's essential hypertension his metoprolol was continued  During inpatient stay, the patient's diabetes was managed by using insulin regimen with Lantus 20 units in the morning, insulin correction/sliding scale, and continue glucose checks before meals and at night  Prior to discharge/transfer the patient was in stable condition, his diet was restarted, and his heparin drip was discontinued  The patient will be transferred to [facility] and will be received by [facility] at Discharge: stable for transport      Discharge Day Visit / Exam:     Subjective:  Pt reported no new symptoms/overnight events today  Vitals: Blood Pressure: 123/63 (03/07/22 1750)  Pulse: 77 (03/07/22 1750)  Temperature: 97 8 °F (36 6 °C) (03/07/22 1505)  Temp Source: Oral (03/07/22 1505)  Respirations: 16 (03/07/22 1550)  Height: 5' 7" (170 2 cm) (03/06/22 1055)  Weight - Scale: 85 3 kg (188 lb) (03/06/22 1055)  SpO2: 96 % (03/07/22 1520)  Exam:   Physical Exam  Constitutional:       General: He is not in acute distress  Appearance: Normal appearance  He is not ill-appearing, toxic-appearing or diaphoretic  HENT:      Head: Normocephalic and atraumatic  Right Ear: External ear normal       Left Ear: External ear normal       Nose: Nose normal       Mouth/Throat:      Mouth: Mucous membranes are dry  Eyes:      Conjunctiva/sclera: Conjunctivae normal    Neck:      Vascular: No carotid bruit  Cardiovascular:      Rate and Rhythm: Normal rate and regular rhythm  Pulses: Normal pulses  Heart sounds: Normal heart sounds  No murmur heard  No friction rub  No gallop  Pulmonary:      Effort: Pulmonary effort is normal       Breath sounds: Normal breath sounds   No wheezing, rhonchi or rales  Chest:      Chest wall: No tenderness  Abdominal:      General: Bowel sounds are normal       Palpations: Abdomen is soft  Tenderness: There is no abdominal tenderness  Comments: Epigastric Tenderness   Musculoskeletal:         General: No swelling or tenderness  Normal range of motion  Cervical back: Neck supple  Right lower leg: No edema  Left lower leg: No edema  Skin:     General: Skin is warm  Capillary Refill: Capillary refill takes less than 2 seconds  Findings: No rash  Neurological:      General: No focal deficit present  Mental Status: He is alert and oriented to person, place, and time  Psychiatric:         Mood and Affect: Mood normal          Behavior: Behavior normal        Discussion with Family: Wife will be updated about transfer to \[facility]\"" for CABG evaluation    Discharge instructions/Information to patient and family:   See after visit summary for information provided to patient and family  Provisions for Follow-Up Care:  See after visit summary for information related to follow-up care and any pertinent home health orders  Planned Readmission: Discharged to \[facility]\"" for further management     Discharge Medications:  See after visit summary for reconciled discharge medications provided to patient and family        ** Please Note: This note has been constructed using a voice recognition system **

## 2022-03-08 LAB
ANION GAP SERPL CALCULATED.3IONS-SCNC: 9 MMOL/L (ref 4–13)
BASOPHILS # BLD AUTO: 0.04 THOUSANDS/ΜL (ref 0–0.1)
BASOPHILS NFR BLD AUTO: 0 % (ref 0–1)
BUN SERPL-MCNC: 20 MG/DL (ref 5–25)
CALCIUM SERPL-MCNC: 9.1 MG/DL (ref 8.3–10.1)
CHLORIDE SERPL-SCNC: 103 MMOL/L (ref 100–108)
CO2 SERPL-SCNC: 22 MMOL/L (ref 21–32)
CREAT SERPL-MCNC: 1.01 MG/DL (ref 0.6–1.3)
EOSINOPHIL # BLD AUTO: 0.47 THOUSAND/ΜL (ref 0–0.61)
EOSINOPHIL NFR BLD AUTO: 5 % (ref 0–6)
ERYTHROCYTE [DISTWIDTH] IN BLOOD BY AUTOMATED COUNT: 14.1 % (ref 11.6–15.1)
GFR SERPL CREATININE-BSD FRML MDRD: 74 ML/MIN/1.73SQ M
GLUCOSE SERPL-MCNC: 140 MG/DL (ref 65–140)
GLUCOSE SERPL-MCNC: 151 MG/DL (ref 65–140)
GLUCOSE SERPL-MCNC: 183 MG/DL (ref 65–140)
GLUCOSE SERPL-MCNC: 192 MG/DL (ref 65–140)
HCT VFR BLD AUTO: 33.3 % (ref 36.5–49.3)
HGB BLD-MCNC: 11.8 G/DL (ref 12–17)
IMM GRANULOCYTES # BLD AUTO: 0.06 THOUSAND/UL (ref 0–0.2)
IMM GRANULOCYTES NFR BLD AUTO: 1 % (ref 0–2)
LYMPHOCYTES # BLD AUTO: 1.17 THOUSANDS/ΜL (ref 0.6–4.47)
LYMPHOCYTES NFR BLD AUTO: 12 % (ref 14–44)
MCH RBC QN AUTO: 30.2 PG (ref 26.8–34.3)
MCHC RBC AUTO-ENTMCNC: 35.4 G/DL (ref 31.4–37.4)
MCV RBC AUTO: 85 FL (ref 82–98)
MONOCYTES # BLD AUTO: 0.91 THOUSAND/ΜL (ref 0.17–1.22)
MONOCYTES NFR BLD AUTO: 9 % (ref 4–12)
NEUTROPHILS # BLD AUTO: 7.2 THOUSANDS/ΜL (ref 1.85–7.62)
NEUTS SEG NFR BLD AUTO: 73 % (ref 43–75)
NRBC BLD AUTO-RTO: 0 /100 WBCS
PLATELET # BLD AUTO: 259 THOUSANDS/UL (ref 149–390)
PMV BLD AUTO: 10.3 FL (ref 8.9–12.7)
POTASSIUM SERPL-SCNC: 4.2 MMOL/L (ref 3.5–5.3)
POTASSIUM SERPL-SCNC: 5.4 MMOL/L (ref 3.5–5.3)
RBC # BLD AUTO: 3.91 MILLION/UL (ref 3.88–5.62)
SODIUM SERPL-SCNC: 134 MMOL/L (ref 136–145)
WBC # BLD AUTO: 9.85 THOUSAND/UL (ref 4.31–10.16)

## 2022-03-08 PROCEDURE — 85025 COMPLETE CBC W/AUTO DIFF WBC: CPT

## 2022-03-08 PROCEDURE — 97163 PT EVAL HIGH COMPLEX 45 MIN: CPT

## 2022-03-08 PROCEDURE — 82948 REAGENT STRIP/BLOOD GLUCOSE: CPT

## 2022-03-08 PROCEDURE — 84132 ASSAY OF SERUM POTASSIUM: CPT | Performed by: FAMILY MEDICINE

## 2022-03-08 PROCEDURE — 97530 THERAPEUTIC ACTIVITIES: CPT

## 2022-03-08 PROCEDURE — 99233 SBSQ HOSP IP/OBS HIGH 50: CPT | Performed by: INTERNAL MEDICINE

## 2022-03-08 PROCEDURE — 80048 BASIC METABOLIC PNL TOTAL CA: CPT

## 2022-03-08 PROCEDURE — 99232 SBSQ HOSP IP/OBS MODERATE 35: CPT | Performed by: INTERNAL MEDICINE

## 2022-03-08 RX ORDER — PANTOPRAZOLE SODIUM 40 MG/1
40 TABLET, DELAYED RELEASE ORAL
Status: DISCONTINUED | OUTPATIENT
Start: 2022-03-08 | End: 2022-03-09 | Stop reason: HOSPADM

## 2022-03-08 RX ADMIN — ENOXAPARIN SODIUM 40 MG: 40 INJECTION SUBCUTANEOUS at 17:31

## 2022-03-08 RX ADMIN — TAMSULOSIN HYDROCHLORIDE 0.4 MG: 0.4 CAPSULE ORAL at 15:32

## 2022-03-08 RX ADMIN — Medication 3 MG: at 21:34

## 2022-03-08 RX ADMIN — DOCUSATE SODIUM 100 MG: 100 CAPSULE ORAL at 08:27

## 2022-03-08 RX ADMIN — DOCUSATE SODIUM 100 MG: 100 CAPSULE ORAL at 17:31

## 2022-03-08 RX ADMIN — INSULIN LISPRO 2 UNITS: 100 INJECTION, SOLUTION INTRAVENOUS; SUBCUTANEOUS at 12:17

## 2022-03-08 RX ADMIN — CARBIDOPA AND LEVODOPA 1 TABLET: 25; 100 TABLET ORAL at 21:34

## 2022-03-08 RX ADMIN — ASPIRIN 81 MG: 81 TABLET, CHEWABLE ORAL at 08:28

## 2022-03-08 RX ADMIN — ALUMINA, MAGNESIA, AND SIMETHICONE ORAL SUSPENSION REGULAR STRENGTH 30 ML: 1200; 1200; 120 SUSPENSION ORAL at 10:05

## 2022-03-08 RX ADMIN — Medication 2000 UNITS: at 08:27

## 2022-03-08 RX ADMIN — INSULIN LISPRO 1 UNITS: 100 INJECTION, SOLUTION INTRAVENOUS; SUBCUTANEOUS at 08:31

## 2022-03-08 RX ADMIN — INSULIN LISPRO 1 UNITS: 100 INJECTION, SOLUTION INTRAVENOUS; SUBCUTANEOUS at 17:36

## 2022-03-08 RX ADMIN — CARBIDOPA AND LEVODOPA 1 TABLET: 25; 100 TABLET ORAL at 08:29

## 2022-03-08 RX ADMIN — INSULIN GLARGINE 20 UNITS: 100 INJECTION, SOLUTION SUBCUTANEOUS at 08:30

## 2022-03-08 RX ADMIN — PANTOPRAZOLE SODIUM 40 MG: 40 TABLET, DELAYED RELEASE ORAL at 15:31

## 2022-03-08 RX ADMIN — METOPROLOL SUCCINATE 25 MG: 25 TABLET, EXTENDED RELEASE ORAL at 08:29

## 2022-03-08 RX ADMIN — CLOPIDOGREL BISULFATE 75 MG: 75 TABLET ORAL at 08:28

## 2022-03-08 RX ADMIN — CYANOCOBALAMIN TAB 500 MCG 1000 MCG: 500 TAB at 08:30

## 2022-03-08 RX ADMIN — ATORVASTATIN CALCIUM 40 MG: 40 TABLET, FILM COATED ORAL at 08:28

## 2022-03-08 RX ADMIN — CARBIDOPA AND LEVODOPA 1 TABLET: 25; 100 TABLET ORAL at 15:31

## 2022-03-08 RX ADMIN — PANTOPRAZOLE SODIUM 40 MG: 40 TABLET, DELAYED RELEASE ORAL at 05:58

## 2022-03-08 RX ADMIN — FINASTERIDE 5 MG: 5 TABLET, FILM COATED ORAL at 08:29

## 2022-03-08 NOTE — PLAN OF CARE
Problem: PHYSICAL THERAPY ADULT  Goal: Performs mobility at highest level of function for planned discharge setting  See evaluation for individualized goals  Description: Treatment/Interventions: Functional transfer training,LE strengthening/ROM,Therapeutic exercise,Endurance training,Patient/family training,Equipment eval/education,Bed mobility,Gait training  Equipment Recommended: Ivelisse Quintero       See flowsheet documentation for full assessment, interventions and recommendations  3/8/2022 1339 by Catherine Puri, PT  Note: Prognosis: Fair  Problem List: Decreased strength,Decreased range of motion,Impaired balance,Decreased cognition  Assessment: Sam Soni is a 70 y o  Male who [facility] on 3/4/2022 w/ c/o chest pain and diagnosis of chest pain w/ troponin elevation  Orders for PT eval and treat received  Pt presents w/ comorbidities of HTN, anemia, CKD, L-sided weakness, anxiety, DM  Personal factors include: mobilizing w/ AD and positive fall history  At baseline, pt mobilizes w/ rollator walker vs manual and reports 1 fall in the last 6 months (w/in past week)  Upon evaluation, pt presents w/ the following deficits: weakness, decreased ROM, impaired balance and limited tolerance to functional mobility  Upon eval, pt requires Mod Ax1 for bed mobility, Mod Ax1 for transfers, and was unable to ambulate at this time despite encouragement  Pt's clinical presentation is unstable/unpredictable due to need for assist w/ all phases of mobility, need for input for mobility technique, ongoing medical monitoring/management, and recent history of falls  Patient is at an increased risk of falls due to LE weakness, impaired balance, and requirement of assistance w/ all aspects of functional mobility  Given the above findings, discharge recommendation is for Post-acute inpatient rehabilitation   During this admission, pt would benefit from continued skilled inpatient PT in the acute care setting in order to address the abovementioned deficits to maximize function and mobility before DC from acute care  PT Discharge Recommendation: Post acute rehabilitation services          See flowsheet documentation for full assessment

## 2022-03-08 NOTE — DISCHARGE INSTR - AVS FIRST PAGE
Dear Melissa Mcdonough,     It was our pleasure to care for you here at [facility]  It is our hope that we were always able to exceed the expected standards for your care during your stay  You were hospitalized due to chest pain  You were cared for on the third floor by René Wilcox DO under the service of Rajwinder Ma MD with the [facility] who covers for your primary care physician (PCP), Priscilla Rowe MD, while you were hospitalized  If you have any questions or concerns related to this hospitalization, you may contact us at [phone]  For follow up as well as any medication refills, we recommend that you follow up with your primary care physician  A registered nurse will reach out to you by phone within a few days after your discharge to answer any additional questions that you may have after going home    However, at this time we provide for you here, the most important instructions / recommendations at discharge:     Notable Medication Adjustments -   Please increase your lipitor from 20mg to 40mg daily  Continue aspirin 81 mg daily and metoprolol 25mg daily  Start taking plavix 75mg daily  Start taking protonix 40mg twice daily  Please hold your lisinopril  Testing Required after Discharge -   none  Important follow up information -   Please follow up with your PCP in the next 1-2 weeks  Please follow up with cardiology as scheduled on 3/21/2022  Please follow up with cardiothoracic surgery as scheduled on 3/28/2022 to discuss surgical options for your coronary artery disease causing your chest pain  Other Instructions -   Please return to the emergency department for worsening chest pain  Please review this entire after visit summary as additional general instructions including medication list, appointments, activity, diet, any pertinent wound care, and other additional recommendations from your care team that may be provided for you       Sincerely,     Mary Seller, DO

## 2022-03-08 NOTE — ASSESSMENT & PLAN NOTE
Lab Results   Component Value Date/Time    BUN 20 03/08/2022 06:00 AM    CREATININE 1 01 03/08/2022 06:00 AM       Estimated Creatinine Clearance: 70 mL/min (by C-G formula based on SCr of 1 01 mg/dL)     POA; Cr 1 47 (baseline appears to be 1 1-1 2)   At [facility]    Plan:   Continue daily BMP   Avoid hypoperfusion of the kidneys, minimize nephrotoxins   RAAS Blockers/Diuretics held: lisinopril

## 2022-03-08 NOTE — PLAN OF CARE
Problem: Potential for Falls  Goal: Patient will remain free of falls  Description: INTERVENTIONS:  - Educate patient/family on patient safety including physical limitations  - Instruct patient to call for assistance with activity   - Consult OT/PT to assist with strengthening/mobility   - Keep Call bell within reach  - Keep bed low and locked with side rails adjusted as appropriate  - Keep care items and personal belongings within reach  - Initiate and maintain comfort rounds  - Make Fall Risk Sign visible to staff  - Offer Toileting every 2 Hours, in advance of need  - Initiate/Maintain bed alarm  - Apply yellow socks and bracelet for high fall risk patients  - Consider moving patient to room near nurses station  Outcome: Progressing     Problem: Prexisting or High Potential for Compromised Skin Integrity  Goal: Skin integrity is maintained or improved  Description: INTERVENTIONS:  - Identify patients at risk for skin breakdown  - Assess and monitor skin integrity  - Assess and monitor nutrition and hydration status  - Monitor labs   - Assess for incontinence   - Turn and reposition patient  - Assist with mobility/ambulation  - Relieve pressure over bony prominences  - Avoid friction and shearing  - Provide appropriate hygiene as needed including keeping skin clean and dry  - Evaluate need for skin moisturizer/barrier cream  - Collaborate with interdisciplinary team   - Patient/family teaching  - Consider wound care consult   Outcome: Progressing     Problem: MOBILITY - ADULT  Goal: Maintain or return to baseline ADL function  Description: INTERVENTIONS:  -  Assess patient's ability to carry out ADLs; assess patient's baseline for ADL function and identify physical deficits which impact ability to perform ADLs (bathing, care of mouth/teeth, toileting, grooming, dressing, etc )  - Assess/evaluate cause of self-care deficits   - Assess range of motion  - Assess patient's mobility; develop plan if impaired  - Assess patient's need for assistive devices and provide as appropriate  - Encourage maximum independence but intervene and supervise when necessary  - Involve family in performance of ADLs  - Assess for home care needs following discharge   - Consider OT consult to assist with ADL evaluation and planning for discharge  - Provide patient education as appropriate  Outcome: Progressing  Goal: Maintains/Returns to pre admission functional level  Description: INTERVENTIONS:  - Perform BMAT or MOVE assessment daily    - Set and communicate daily mobility goal to care team and patient/family/caregiver  - Collaborate with rehabilitation services on mobility goals if consulted  - Perform Range of Motion 3 times a day  - Reposition patient every 2 hours    - Dangle patient 3 times a day  - Stand patient 3 times a day  - Ambulate patient 3 times a day  - Out of bed to chair 3 times a day   - Out of bed for meals 3 times a day  - Out of bed for toileting  - Record patient progress and toleration of activity level   Outcome: Progressing     Problem: CARDIOVASCULAR - ADULT  Goal: Maintains optimal cardiac output and hemodynamic stability  Description: INTERVENTIONS:  - Monitor I/O, vital signs and rhythm  - Monitor for S/S and trends of decreased cardiac output  - Administer and titrate ordered vasoactive medications to optimize hemodynamic stability  - Assess quality of pulses, skin color and temperature  - Assess for signs of decreased coronary artery perfusion  - Instruct patient to report change in severity of symptoms  Outcome: Progressing  Goal: Absence of cardiac dysrhythmias or at baseline rhythm  Description: INTERVENTIONS:  - Continuous cardiac monitoring, vital signs, obtain 12 lead EKG if ordered  - Administer antiarrhythmic and heart rate control medications as ordered  - Monitor electrolytes and administer replacement therapy as ordered  Outcome: Progressing

## 2022-03-08 NOTE — ASSESSMENT & PLAN NOTE
POA Pt with inability to ambulate with significant deficits  PT evaluated pt and noted the following deficits: weakness, decreased ROM, impaired balance and limited tolerance to functional mobility  After careful chart review the patient has been worked up by Neurology (see last note from 10/2021) for PD/Parkinson's Plus Syndrome  Held a detailed conversation with the wife over the phone pertaining to the patient's past medical history, nursing home stay, and current living situation  The patient was living at a nursing home due to his medical condition/deficits since his wife could not manage his care  The wife also reported having a nursing aid who comes for her medical conditions, and is unable to manage the patient on her own  The wife reports the patient uses a WHEELCHAIR to ambulate since he has significant deficits with his ambulation for years  She reports that the patient has not had another Neurology appointment since his last visit in 2021  Pt's detailed past medical history:  Ambulatory dysfunction, Anemia, Anxiety, At risk for falls, Benign paroxysmal vertigo, BPH (benign prostatic hyperplasia), Calculus in bladder, Cataract, Cervicalgia, Chest pain, Chronic kidney disease, Constipation, CTS (carpal tunnel syndrome), Cyst of pancreas, Cystitis (06/23/2020), Depression, Diabetes mellitus ([address] ), Dizziness, Dysphagia, Elevated PSA, Facial weakness, GERD (gastroesophageal reflux disease), Gross hematuria, Hematuria, Hyperlipidemia, Hypertension, Kidney disease, Kidney stone, Left-sided weakness, Long term (current) use of oral hypoglycemic drugs, Muscle weakness, Nuclear senile cataract of left eye, OA (osteoarthritis) of knee, 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

## 2022-03-08 NOTE — UTILIZATION REVIEW
Continued Stay Review    Date: 3/8/2022                          Current Patient Class: inpatient  Current Level of Care: med surg     HPI:71 y o  male initially admit 3/5/2022 Inpatient due to Chest pain w  elevated TROPS , essential HTN, DM type 2  Assessment/Plan:   CP w elevated TROPs type 2 MI on admit; no thrombotic lesion on cath & poor historian  EXAM HRR, no murmur, clear breath sounds, ABD distended & tender lower ABD  Consult mentions atypical symptoms of nausea/ abd/ epigastric pain today w lower abd pain, unaware of last BM or if currently constipated  Cont DAPT, ASA & Plavix  BB  Statin;   CTA chest 3/4- no evidence of acute aortic pathology or aneurysm, extensive atheromatous changes/plaque throughout the aortoiliac vasculature without flow-limiting stenosis or occlusion, atherosclerotic coronary arteries, diffuse esophageal wall thickening; lexiscan myoview - inferior/inferolatereal scar with most wilbur-infarct ischemia   Normal LVEF  CT surgical disease, patient is non ambulatory at this time & will cont appropriate medical therapy for underlying CAD & set up follow up w Cardiology & CT surgery   Patient bery fearful w limited understanding of DX; upon discussion w wife uncear if he has been taking any medications since DC from Nursing home    PM NOTE: PT EVAL: MOD assist recommend post acute rehab for maximizing functional independence    Post acute rehabilitation services     Vital Signs: /67 (BP Location: Left arm)   Pulse 84   Temp 98 4 °F (36 9 °C) (Oral)   Resp 16   Ht 5' 7" (1 702 m)   Wt 85 3 kg (188 lb)   SpO2 97%   BMI 29 44 kg/m²     Pertinent Labs/Diagnostic Results:       Results from last 7 days   Lab Units 03/08/22  0600 03/07/22  0459 03/06/22  0225 03/05/22  0517 03/05/22  0517 03/05/22  0328 03/05/22  0328   WBC Thousand/uL 9 85 10 05 9 93   < > 11 88*   < > 13 64*   HEMOGLOBIN g/dL 11 8* 11 5* 11 3*  --  12 0  --  12 5   HEMATOCRIT % 33 3* 26 2* 32 9*  --  35 0* --  36 6   PLATELETS Thousands/uL 259 268 264   < > 299   < > 292   NEUTROS ABS Thousands/µL 7 20 6 96 7 00   < > 8 88*   < >  --     < > = values in this interval not displayed  Results from last 7 days   Lab Units 03/08/22  1256 03/08/22  0600 03/07/22  0459 03/06/22  0225 03/05/22  0517 03/04/22  2326 03/04/22  2326   SODIUM mmol/L  --  134* 141 137 136  --  137   POTASSIUM mmol/L 4 2 5 4* 4 1 3 8 4 1   < > 4 4   CHLORIDE mmol/L  --  103 104 101 103  --  102   CO2 mmol/L  --  22 27 28 26  --  25   ANION GAP mmol/L  --  9 10 8 7  --  10   BUN mg/dL  --  20 19 21 26*  --  31*   CREATININE mg/dL  --  1 01 1 11 1 14 1 25  --  1 47*   EGFR ml/min/1 73sq m  --  74 66 64 57  --  47   CALCIUM mg/dL  --  9 1 8 5 8 7 8 5  --  9 1   MAGNESIUM mg/dL  --   --   --   --  1 8  --   --     < > = values in this interval not displayed       Results from last 7 days   Lab Units 03/05/22 0517 03/04/22  2326   AST U/L 12 14   ALT U/L 13 11*   ALK PHOS U/L 89 102   TOTAL PROTEIN g/dL 6 4 7 2   ALBUMIN g/dL 3 0* 3 4*   TOTAL BILIRUBIN mg/dL 0 51 0 41     Results from last 7 days   Lab Units 03/08/22  1040 03/08/22  0655 03/07/22  2130 03/07/22  1548 03/07/22  1104 03/07/22  0700 03/06/22  1534 03/06/22  1215 03/06/22  0616 03/05/22  1552 03/05/22  1353 03/05/22  0751   POC GLUCOSE mg/dl 192* 151* 242* 135 178* 145* 206* 177* 159* 176* 194* 153*     Results from last 7 days   Lab Units 03/08/22  0600 03/07/22  0459 03/06/22  0225 03/05/22  0517 03/04/22  2326   GLUCOSE RANDOM mg/dL 140 132 184* 160* 205*         Results from last 7 days   Lab Units 03/05/22  0517   HEMOGLOBIN A1C % 6 4*   EAG mg/dl 137     No results found for: BETA-HYDROXYBUTYRATE                   Results from last 7 days   Lab Units 03/05/22  0328 03/05/22  0135 03/04/22 2326   HS TNI 0HR ng/L  --   --  211*   HS TNI 2HR ng/L  --  216*  --    HSTNI D2 ng/L  --  5  --    HS TNI 4HR ng/L 217*  --   --    HSTNI D4 ng/L 6  --   --      Results from last 7 days Lab Units 03/05/22  0002   D-DIMER QUANTITATIVE ug/ml FEU 1 52*     Results from last 7 days   Lab Units 03/07/22  0944 03/07/22  0047 03/06/22  1807 03/05/22  0939 03/05/22  0328 03/05/22  0002 03/05/22  0002   PROTIME seconds  --   --   --   --  13 7  --  13 4   INR   --   --   --   --  1 05  --  1 02   PTT seconds 78* 190* 97*   < > 31   < > 30    < > = values in this interval not displayed  Results from last 7 days   Lab Units 03/06/22  0225   TSH 3RD GENERATON uIU/mL 0 914         Results from last 7 days   Lab Units 03/05/22  0418   LACTIC ACID mmol/L 1 6     Results from last 7 days   Lab Units 03/04/22  2326   LIPASE u/L 110           Results from last 7 days   Lab Units 03/04/22  2327 03/04/22  2326   BLOOD CULTURE  No Growth at 72 hrs  No Growth at 72 hrs  Medications:   Scheduled Medications:  aspirin, 81 mg, Oral, Daily  atorvastatin, 40 mg, Oral, Daily  carbidopa-levodopa, 1 tablet, Oral, TID  cholecalciferol, 2,000 Units, Oral, Daily  clopidogrel, 75 mg, Oral, Daily  cyanocobalamin, 1,000 mcg, Oral, Daily  docusate sodium, 100 mg, Oral, BID  finasteride, 5 mg, Oral, Daily  insulin glargine, 20 Units, Subcutaneous, QAM  insulin lispro, 1-6 Units, Subcutaneous, TID AC  lisinopril, 5 mg, Oral, Daily  melatonin, 3 mg, Oral, HS  metoprolol succinate, 25 mg, Oral, Daily  pantoprazole, 40 mg, Oral, BID AC  polyethylene glycol, 17 g, Oral, Daily  tamsulosin, 0 4 mg, Oral, Daily With Dinner    Continuous IV Infusions:     PRN Meds:  aluminum-magnesium hydroxide-simethicone, 30 mL, Oral, Q4H PRN  senna, 8 6 mg, Oral, Daily PRN    Discharge Plan: TBD  Network Utilization Review Department  ATTENTION: Please call with any questions or concerns to [phone] and carefully listen to the prompts so that you are directed to the right person   All voicemails are confidential   Thornton Bolster all requests for admission clinical reviews, approved or denied determinations and any other requests to dedicated fax number below belonging to the campus where the patient is receiving treatment   List of dedicated fax numbers for the Facilities:  [address] DENIALS (Administrative/Medical Necessity) [phone]   [address] (Maternity/NICU/Pediatrics) [phone]   [address]  [address] [address] [place] [address] [address] [facility] [place] [address] [phone]

## 2022-03-08 NOTE — ASSESSMENT & PLAN NOTE
Lab Results   Component Value Date    HGBA1C 6 4 (H) 03/05/2022       Recent Labs     03/07/22  1548 03/07/22  2130 03/08/22  0655 03/08/22  1040   POCGLU 135 242* 151* 192*       Blood Sugar Average: Last 72 hrs:  (P) 365 [id number] blood glucose well controlled    Home Regimen: glipizide 10 mg BID, metformin 1 G BID    Plan:   Continue holding oral antihyperglycemics while inpatient   Continue Insulin regimen  o Lantus 20 units qAM  o Insulin correction ACHS   Goal BG 80- 180 while admitted, adjusting insulin regimen as appropriate   Continue Glucose checks before meals and qHS   Monitor for hypoglycemia and treat per protocol

## 2022-03-08 NOTE — ASSESSMENT & PLAN NOTE
 POA, presented with chest pain and associated with atypical symptoms of nausea, epigastric pain   EKG without STEMI x 3 but with troponin elevation   CTA chest in ED showed significant atherosclerotic changes/plaques visualized in thoracic aorta and coronary arteries   TTE (03/05/2022) LVEF 60%    Stress test Perfusion: There is a left ventricular perfusion defect that is large in size present in the entire inferior and inferolateral location(s) that is partially reversible   LH Cath showing   o LAD: 80% sub branch, lengthy, abnormal IR for   o Circumflex:  Large obtuse marginal branch with bridge collateral, 100% , unable to cross with standard run-through wire as well as  50  o Right coronary:  Angiographically 50 60%, IFR 0 9 for abnormal     Transfer to [facility] for CABG evaluation cancelled since non-ambulatory  Medical optimization for Coronary artery disease, follow up planning for outpatient Cardiology and outpatient CT surgery consultation  Plan   Continue ASA 81mg daily, clopidogrel daily, high-intensity statin, and Metoprolol 25 mg    Continue Nitroglycerin p r n  for chest pain   Continue holding ACE-I d/t CARLOS EDUARDO;  Restart when Cardiology recommends   Consider vasodilators add-ons

## 2022-03-08 NOTE — CASE MANAGEMENT
Case Management Discharge Planning Note    Patient name Donna Hernandez  Location S /S -01 MRN [id number]  : 1950 Date 3/8/2022       Current Admission Date: 3/4/2022  Current Admission Diagnosis:Chest Pain with Troponin Elevations   Patient Active Problem List    Diagnosis Date Noted    Coronary artery calcification 2022    Chest Pain with Troponin Elevations 2022    Generalized abdominal pain 2021    Lactic acidosis 2021    Urinary incontinence 2021    Vitamin B12 deficiency     History of recurrent UTIs 2020    Insomnia 2020    Constipation 2020    Benign paroxysmal positional vertigo 2020    Benign prostatic hyperplasia 2020    Vitamin D deficiency 2020    Left-sided weakness 2020    Cystitis 2020    Bladder stones 2020    Stage 3 chronic kidney disease ([facility] ) 2020    Anemia 2020    Colon cancer screening 2020    Essential hypertension 2019    Type 2 diabetes mellitus with diabetic neuropathy, with long-term current use of insulin ([facility] ) 2019    Nephrolithiasis 2019    Ambulatory dysfunction 2019    Paresis ([facility] ) 2019    Abnormal PSA 2019    Dizziness 10/04/2017    Pancreas cyst 2016    Type 2 diabetes mellitus with diabetic neuropathy ([facility] ) 2015    Hyperlipidemia 2012      LOS (days): 3  Geometric Mean LOS (GMLOS) (days): 2 10  Days to GMLOS:-1 4     OBJECTIVE:  Risk of Unplanned Readmission Score: 16         Current admission status: Inpatient   Preferred Pharmacy:   [address] [place] [address]  Phone: [phone] Fax: Jose Carlos Mancini PA - [address]   [address] [facility]  Phone: [phone] Fax: [phone]    Primary Care Provider: Luciano Andrade MD    Primary Insurance: [employer] [facility]  Secondary Insurance: [facility]    DISCHARGE DETAILS:    Discharge planning discussed with[de-identified] Jeanne-spouse  Freedom of Choice: Yes  Comments - Freedom of Choice: FOC discussed regarding STR  CM discussed [employer] is limited to what's available  CM placed referrals to facilities in network  Contacts  Patient Contacts: Jeanne-spouse  Relationship to Patient[de-identified] Family  Contact Method: Phone  Phone Number: [phone]  Reason/Outcome: Emergency Contact,Referral    Other Referral/Resources/Interventions Provided:  Interventions: Short Term Rehab  Referral Comments: CM met with pt at bedside whom deferred CM to speak with his wife regarding rehab placement  CM placed referrals to in network facilities with [employer]    CM left message for [employer] p: [phone]      Treatment Team Recommendation: Short Term Rehab  Discharge Destination Plan[de-identified] Short Term Rehab

## 2022-03-08 NOTE — ASSESSMENT & PLAN NOTE
 CTA on 3/5 showed atherosclerotic changes in thoracic aorta and coronary arteries   Likely leading to NSTEMI - management as per above    Plan:  - Continue Aspirin, Plavix, High-intensity statin/40 mg Atorvastatin

## 2022-03-08 NOTE — PROGRESS NOTES
[facility]  Progress Note - Julius Litten 1950, 70 y o  male MRN: [id number]  Unit/Bed#: S -01 Encounter: [id number]  Primary Care Provider: Jessica Pascual MD   Date and time admitted to hospital: 3/4/2022 10:34 PM    Ambulatory dysfunction  Assessment & Plan  POA Pt with inability to ambulate with significant deficits  PT evaluated pt and noted the following deficits: weakness, decreased ROM, impaired balance and limited tolerance to functional mobility  After careful chart review the patient has been worked up by Neurology (see last note from 10/2021) for PD/Parkinson's Plus Syndrome  Held a detailed conversation with the wife over the phone pertaining to the patient's past medical history, nursing home stay, and current living situation  The patient was living at a nursing home due to his medical condition/deficits since his wife could not manage his care  The wife also reported having a nursing aid who comes for her medical conditions, and is unable to manage the patient on her own  The wife reports the patient uses a WHEELCHAIR to ambulate since he has significant deficits with his ambulation for years  She reports that the patient has not had another Neurology appointment since his last visit in 2021        Pt's detailed past medical history:  Ambulatory dysfunction, Anemia, Anxiety, At risk for falls, Benign paroxysmal vertigo, BPH (benign prostatic hyperplasia), Calculus in bladder, Cataract, Cervicalgia, Chest pain, Chronic kidney disease, Constipation, CTS (carpal tunnel syndrome), Cyst of pancreas, Cystitis (06/23/2020), Depression, Diabetes mellitus ([facility] [address] ), Dizziness, Dysphagia, Elevated PSA, Facial weakness, GERD (gastroesophageal reflux disease), Gross hematuria, Hematuria, Hyperlipidemia, Hypertension, Kidney disease, Kidney stone, Left-sided weakness, Long term (current) use of oral hypoglycemic drugs, Muscle weakness, Nuclear senile cataract of left eye, OA (osteoarthritis) of knee, 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HCC), Syncope and collapse, Tachycardia, UTI (urinary tract infection), Vertebro-basilar artery syndrome, Vitamin B deficiency, Vitamin D deficiency, and Vitamin D deficiency  Plan:  · Continue Carbidopa-Levodopa (sinemet) TID  · Continue PT while inpatient  · Coordinate with CM for discharge planning  · Consider Neurology consult while inpatient since last appointment in 10/2021 and f/u was needed post 6 months from that appointment  · Fall precautions ordered      * Chest Pain with Troponin Elevations  Assessment & Plan   POA, presented with chest pain and associated with atypical symptoms of nausea, epigastric pain   EKG without STEMI x 3 but with troponin elevation   CTA chest in ED showed significant atherosclerotic changes/plaques visualized in thoracic aorta and coronary arteries   TTE (03/05/2022) LVEF 60%    Stress test Perfusion: There is a left ventricular perfusion defect that is large in size present in the entire inferior and inferolateral location(s) that is partially reversible   LH Cath showing   o LAD: 80% sub branch, lengthy, abnormal IR for   o Circumflex:  Large obtuse marginal branch with bridge collateral, 100% , unable to cross with standard run-through wire as well as  50  o Right coronary:  Angiographically 50 60%, IFR 0 9 for abnormal     Transfer to B for CABG evaluation cancelled since non-ambulatory  Medical optimization for Coronary artery disease, follow up planning for outpatient Cardiology and outpatient CT surgery consultation  Plan   Continue ASA 81mg daily, clopidogrel daily, high-intensity statin, and Metoprolol 25 mg    Continue Nitroglycerin p r n  for chest pain   Continue holding ACE-I d/t CARLOS EDUARDO;  Restart when Cardiology recommends   Consider vasodilators add-ons       Assessment & Plan   POA, presented with chest pain and associated with atypical symptoms of nausea, epigastric pain   EKG without STEMI x 3 but with troponin elevation   CTA chest in ED showed significant atherosclerotic changes/plaques visualized in thoracic aorta and coronary arteries   TTE (03/05/2022) LVEF 60%    Stress test Perfusion: There is a left ventricular perfusion defect that is large in size present in the entire inferior and inferolateral location(s) that is partially reversible      Assessment: Atypical chest pain secondary to NSTEMI on perfusion study showing partially reversible large defect in inferior and inferolateral LV, with subsequent LH Cath showing   o LAD: 80% sub branch, lengthy, abnormal IR for   o Circumflex:  Large obtuse marginal branch with bridge collateral, 100% , unable to cross with standard run-through wire as well as  50  o Right coronary:  Angiographically 50 60%, IFR 0 9 for abnormal       Plan   Discharge to B for further management  o CABG evaluation given OM branch is large sized; Graft consider LAD, OM, and RCA is borderline but upper moderate   Continue Heparin drip, ASA 81mg daily, lipitor   Continue Nitroglycerin p r n  for chest pain   Continue holding ACE-I d/t CARLOS EDUARDO; Restart when Cardiology recommends   Anticipating initiation of dual antiplatelet therapy at/prior to d/c      Stage 3 chronic kidney disease [facility])  Assessment & Plan  Lab Results   Component Value Date/Time    BUN 20 03/08/2022 06:00 AM    CREATININE 1 01 03/08/2022 06:00 AM       Estimated Creatinine Clearance: 70 mL/min (by C-G formula based on SCr of 1 01 mg/dL)   POA; Cr 1 47 (baseline appears to be 1 1-1 2)   At [facility]    Plan:   Continue daily BMP   Avoid hypoperfusion of the kidneys, minimize nephrotoxins   RAAS Blockers/Diuretics held: lisinopril      Assessment & Plan  Lab Results   Component Value Date/Time    BUN 19 03/07/2022 04:59 AM    CREATININE 1 11 03/07/2022 04:59 AM       Estimated Creatinine Clearance: 63 7 mL/min (by C-G formula based on SCr of 1 11 mg/dL)     POA; Cr 1 47 (baseline appears to be 1 1-1 2)   Improved with IVF hydration and PO intake - serum creatinine back to baseline    Plan:   Continue daily BMP   Avoid hypoperfusion of the kidneys, minimize nephrotoxins   RAAS Blockers/Diuretics held: lisinopril      Essential hypertension  Assessment & Plan   BP reviewed and stable    PLAN  · Continue metoprolol   · Continue holding lisinopril until cleared by cardiology  · Monitor BP      Assessment & Plan   BP reviewed and stable    PLAN  · Continue metoprolol   · Continue holding lisinopril until cleared by cardiology  · Monitor BP      Coronary artery calcification  Assessment & Plan   CTA on 3/5 showed atherosclerotic changes in thoracic aorta and coronary arteries   Likely leading to NSTEMI - management as per above    Plan:  - Continue Aspirin, Plavix, High-intensity statin/40 mg Atorvastatin     Assessment & Plan   CTA on 3/5 showed atherosclerotic changes in thoracic aorta and coronary arteries   Likely leading to NSTEMI - management as per above   Cath scheduled today   Recommend lifestyle intervention prior to discharge    Vitamin B12 deficiency  Assessment & Plan  Continue home B12    Vitamin D deficiency  Assessment & Plan  Continue home vitamin D    Type 2 diabetes mellitus with diabetic neuropathy, with long-term current use of insulin [facility])  Assessment & Plan  Lab Results   Component Value Date    HGBA1C 6 4 (H) 03/05/2022       Recent Labs     03/07/22  1548 03/07/22  2130 03/08/22  0655 03/08/22  1040   POCGLU 135 242* 151* 192*       Blood Sugar Average: Last 72 hrs:  (P) 089 [id number] blood glucose well controlled    Home Regimen: glipizide 10 mg BID, metformin 1 G BID    Plan:   Continue holding oral antihyperglycemics while inpatient   Continue Insulin regimen  o Lantus 20 units qAM  o Insulin correction ACHS   Goal BG 80- 180 while admitted, adjusting insulin regimen as appropriate   Continue Glucose checks before meals and qHS   Monitor for hypoglycemia and treat per protocol      Assessment & Plan  Lab Results   Component Value Date    HGBA1C 6 4 (H) 2022       Recent Labs     22  1534 22  0700 22  1104 22  1548   POCGLU 206* 145* 178* 135       Blood Sugar Average: Last 72 hrs:  (P) 169 [id number] blood glucose well controlled    Home Regimen: glipizide 10 mg BID, metformin 1 G BID    Plan:   Continue holding oral antihyperglycemics while inpatient   Continue Diabetic diet   Continue Insulin regimen  o Lantus 20 units qAM  o Insulin correction ACHS   Goal BG 80- 180 while admitted, adjusting insulin regimen as appropriate   Continue Glucose checks before meals and qHS   Monitor for hypoglycemia and treat per protocol          VTE Pharmacologic Prophylaxis:   VTE Score: 4 Moderate Risk (Score 3-4) - Pharmacological DVT Prophylaxis Ordered: Enoxaparin (Lovenox)  Mechanical VTE Prophylaxis in Place: No    Patient Centered Rounds: I have performed bedside rounds with nursing staff today  Discussions with Specialists or Other Care Team Provider: Cardiology    Education and Discussions with Family / Patient: Updated  (wife) via phone  Current Length of Stay: 3 day(s)    Current Patient Status: Inpatient     Discharge Plan / Estimated Discharge Date: Unknow discharge    Code Status: Level 1 - Full Code      Subjective: The patient reported no problems this morning, and was sitting upright while eating his breakfast      Objective:     Vitals:   Temp (24hrs), Av 2 °F (36 8 °C), Min:98 °F (36 7 °C), Max:98 4 °F (36 9 °C)    Temp:  [98 °F (36 7 °C)-98 4 °F (36 9 °C)] 98 2 °F (36 8 °C)  HR:  [77-88] 81  Resp:  [16-18] 18  BP: ()/(53-81) 128/61  SpO2:  [94 %-97 %] 96 %  Body mass index is 29 44 kg/m²  Input and Output Summary (last 24 hours):     No intake or output data in the 24 hours ending 22 1647    Physical Exam:     Physical Exam  Vitals and nursing note reviewed  Constitutional:       General: He is not in acute distress  Appearance: Normal appearance  He is well-developed  He is not ill-appearing, toxic-appearing or diaphoretic  HENT:      Head: Normocephalic and atraumatic  Right Ear: External ear normal       Left Ear: External ear normal       Nose: Nose normal       Mouth/Throat:      Mouth: Mucous membranes are moist    Eyes:      General: No scleral icterus  Right eye: No discharge  Left eye: No discharge  Conjunctiva/sclera: Conjunctivae normal    Neck:      Trachea: No tracheal deviation  Cardiovascular:      Rate and Rhythm: Normal rate and regular rhythm  Heart sounds: Normal heart sounds  No murmur heard  Pulmonary:      Effort: Pulmonary effort is normal  No respiratory distress  Breath sounds: Normal breath sounds  No stridor  No wheezing or rales  Chest:      Chest wall: No tenderness  Abdominal:      General: Bowel sounds are normal  There is no distension  Palpations: Abdomen is soft  Tenderness: There is no abdominal tenderness  There is no guarding or rebound  Musculoskeletal:         General: No tenderness  Right lower leg: No edema  Left lower leg: No edema  Skin:     General: Skin is warm  Capillary Refill: Capillary refill takes less than 2 seconds  Neurological:      Mental Status: He is alert and oriented to person, place, and time  Comments: Ambulatory dysfunction and cannot assess his gait      Psychiatric:         Mood and Affect: Mood normal          Behavior: Behavior normal           Additional Data:     Labs:  Results from last 7 days   Lab Units 03/08/22  0600   WBC Thousand/uL 9 85   HEMOGLOBIN g/dL 11 8*   HEMATOCRIT % 33 3*   PLATELETS Thousands/uL 259   NEUTROS PCT % 73   LYMPHS PCT % 12*   MONOS PCT % 9   EOS PCT % 5     Results from last 7 days   Lab Units 03/08/22  1256 03/08/22  0600 03/08/22  0600 03/06/22  0225 03/05/22  0517   SODIUM mmol/L  -- --  134*   < > 136   POTASSIUM mmol/L 4 2   < > 5 4*   < > 4 1   CHLORIDE mmol/L  --   --  103   < > 103   CO2 mmol/L  --   --  22   < > 26   BUN mg/dL  --   --  20   < > 26*   CREATININE mg/dL  --   --  1 01   < > 1 25   ANION GAP mmol/L  --   --  9   < > 7   CALCIUM mg/dL  --   --  9 1   < > 8 5   ALBUMIN g/dL  --   --   --   --  3 0*   TOTAL BILIRUBIN mg/dL  --   --   --   --  0 51   ALK PHOS U/L  --   --   --   --  89   ALT U/L  --   --   --   --  13   AST U/L  --   --   --   --  12   GLUCOSE RANDOM mg/dL  --   --  140   < > 160*    < > = values in this interval not displayed  Results from last 7 days   Lab Units 03/05/22  0328   INR  1 05     Results from last 7 days   Lab Units 03/08/22  1040 03/08/22  0655 03/07/22  2130 03/07/22  1548 03/07/22  1104 03/07/22  0700 03/06/22  1534 03/06/22  1215 03/06/22  0616 03/05/22  1552 03/05/22  1353 03/05/22  0751   POC GLUCOSE mg/dl 192* 151* 242* 135 178* 145* 206* 177* 159* 176* 194* 153*     Results from last 7 days   Lab Units 03/05/22  0517   HEMOGLOBIN A1C % 6 4*     Results from last 7 days   Lab Units 03/05/22  0418   LACTIC ACID mmol/L 1 6       Imaging: Reviewed radiology reports from this admission including: chest xray, abdominal/pelvic CT scan, chest CT scan, CT head, MRI brain, xray(s), ECHO and procedure reports    Recent Cultures (last 7 days):     Results from last 7 days   Lab Units 03/04/22  2327 03/04/22  2326   BLOOD CULTURE  No Growth at 72 hrs  No Growth at 72 hrs         Lines/Drains:  Invasive Devices  Report    Peripheral Intravenous Line            Peripheral IV 03/08/22 Left;Proximal;Ventral (anterior) Forearm <1 day                Telemetry:   Telemetry Orders (From admission, onward)             48 Hour Telemetry Monitoring  Continuous x 48 hours        References:    Telemetry Guidelines   Question:  Reason for 48 Hour Telemetry  Answer:  Acute MI, chest pain - R/O MI, or unstable angina                    Last 24 Hours Medication List:   Current Facility-Administered Medications   Medication Dose Route Frequency Provider Last Rate    aluminum-magnesium hydroxide-simethicone  30 mL Oral Q4H PRN Amanda Castellanos MD      aspirin  81 mg Oral Daily Arias [place], CRNP      atorvastatin  40 mg Oral Daily Arias Phoenix [place], CRNP      carbidopa-levodopa  1 tablet Oral TID Robi Samples, MD      cholecalciferol  2,000 Units Oral Daily Robi Samples, MD      clopidogrel  75 mg Oral Daily Napolean Shield, MD      cyanocobalamin  1,000 mcg Oral Daily Robi Samples, MD      docusate sodium  100 mg Oral BID Robi Samples, MD      finasteride  5 mg Oral Daily Robi Samples, MD Otilia Bland insulin glargine  20 Units Subcutaneous QAM Robi Samples, MD      insulin lispro  1-6 Units Subcutaneous TID Malcom Jarrell MD      lisinopril  5 mg Oral Daily Napolean Shield, MD      melatonin  3 mg Oral HS Robi Samples, MD      metoprolol succinate  25 mg Oral Daily Napolean Shield, MD      pantoprazole  40 mg Oral BID AC Yrn Night,       polyethylene glycol  17 g Oral Daily Robi Samples, MD      senna  8 6 mg Oral Daily PRN Robi Samples, MD      tamsulosin  0 4 mg Oral Daily With Renaldo Aguilar MD          Today, Patient Was Seen By: Alicia Olivia DO    ** Please Note: This note has been constructed using a voice recognition system   **

## 2022-03-08 NOTE — PHYSICAL THERAPY NOTE
PHYSICAL THERAPY EVALUATION NOTE          Patient Name: Vick Chavez  PLXVC'I Date: 3/8/2022       AGE:   70 y o    Mrn:   [id number]  ADMIT DX:  Nausea [R11 0]  Esophagitis [K20 90]  Abnormal EKG [R94 31]  Elevated troponin [R77 8]  NSTEMI (non-ST elevated myocardial infarction) ([facility]) [I21 4]  CARLOS EDUARDO (acute kidney injury) ([facility] ) [N17 9]    Past Medical History:   Diagnosis Date    Ambulatory dysfunction     uses walker with assist of 1    Anemia     Anxiety     At risk for falls     hx of falls    Benign paroxysmal vertigo     unspecified ear    BPH (benign prostatic hyperplasia)     for TURP today 2021    Calculus in bladder     for surgical removal today 2021    Cataract     left eye    Cervicalgia     Chest pain     Chronic kidney disease     stage 3    Constipation     CTS (carpal tunnel syndrome)     left    Cyst of pancreas     Cystitis 2020    acute cystitis with hematuria    Depression     Diabetes mellitus ([facility] )     type II with neuropathy    Dizziness     and giddiness    Dysphagia     Elevated PSA     Facial weakness     GERD (gastroesophageal reflux disease)     last assessed 16    Gross hematuria     Hematuria     Hyperlipidemia     Hypertension     Kidney disease     Kidney stone     Left-sided weakness     Long term (current) use of oral hypoglycemic drugs     Muscle weakness     Nuclear senile cataract of left eye     OA (osteoarthritis) of knee     b/l    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[facility] )     Syncope and collapse     Tachycardia     UTI (urinary tract infection)     Vertebro-basilar artery syndrome     Vitamin B deficiency     Vitamin D deficiency     Vitamin D deficiency      Length Of Stay: 3  PHYSICAL THERAPY EVALUATION :   Patient's identity confirmed via 2 patient identifiers (full name and ) at start of session       22 1221   PT Last Visit   PT Visit Date 22 Note Type   Note type Evaluation   Pain Assessment   Pain Assessment Tool 0-10   Pain Score No Pain   Restrictions/Precautions   Weight Bearing Precautions Per Order No   Other Precautions Cognitive; Chair Alarm; Bed Alarm; Fall Risk   Home Living   Type of Home Apartment  (6th floor of a senior apt building)   Home Layout One level;Performs ADLs on one level   Bathroom Shower/Tub   (pt sponge-bathing)   Home Equipment Walker; Wheelchair-manual;Hospital bed  (rollator walker)   Additional Comments Pt reports moving into a 6th floor senior apt w/ wife last week  Pt previously at [facility] for 2 years  Prior Function   Level of [place] Needs assistance with IADLs   Lives With Spouse   Receives Help From Family  (aides)   ADL Assistance Needs assistance   IADLs Needs assistance   Falls in the last 6 months 1 to 4  (1 fall last week at apt)   Comments Pt reports, mobilizing w/ rollator walker vs manual WC for mobility and sleeping in hospital bed  At the apt prior to current admission, pt reports requiring assistance w/ bathing (sponge-bathing) and toileting  Meals are provided by wife vs apt facility   Pt reports 1 fall since moving into apt   General   Family/Caregiver Present No   Cognition   Overall Cognitive Status Impaired   Arousal/Participation Cooperative   Orientation Level Disoriented to situation;Oriented to person;Oriented to place;Oriented to time   Memory Decreased recall of precautions   Following Commands Follows one step commands without difficulty   Comments Pt ID via name and ; pt agreeable to PT eval  Pt pleasant throughout eval, expressed fear of falling   Subjective   Subjective "Judge Mendoza's on, I like when she yells at people"   RLE Assessment   RLE Assessment X   RLE Overall PROM   R Knee Extension unable to achieve full knee extension; limited ROM   Strength RLE   R Ankle Dorsiflexion 3+/5   R Ankle Plantar Flexion 3+/5   LLE Assessment   LLE Assessment X  (chronic L-sided weakness)   LLE Overall PROM   L Knee Extension unable to achieve full knee extension; limited ROM   Strength LLE   L Ankle Dorsiflexion 3/5   L Ankle Plantar Flexion 3/5   Light Touch   RLE Light Touch Grossly intact   LLE Light Touch Grossly intact   Bed Mobility   Supine to Sit 3  Moderate assistance   Additional items Assist x 1;HOB elevated; Increased time required;Verbal cues;LE management   Sit to Supine Unable to assess   Additional items   (pt sitting at EOB at end of eval)   Additional Comments Pt required close supervision to maintain static sitting balance at EOB   Transfers   Sit to Stand 3  Moderate assistance   Additional items Assist x 1; Increased time required;Verbal cues   Stand to Sit 3  Moderate assistance   Additional items Assist x 1; Increased time required;Verbal cues   Additional Comments Pt required mod Ax1 for STS transfer from EOB  Pt w/ fear of falling; pt unable to perform bilateral alternating in place despite encouragement and max VC (noted increased difficulty w/ clearing RLE from floor)  Pt unable to perform stand-pivot transfer to R side w/ pt requested to return to sitting  Ambulation/Elevation   Gait pattern Not appropriate; Not tested   Balance   Static Sitting Fair -   Dynamic Sitting Poor +   Static Standing Poor +   Dynamic Standing Poor   Ambulatory   (unable to assess)   Activity Tolerance   Activity Tolerance Patient limited by fatigue   Medical Staff Made Aware Resident Haley De Jesus   Nurse Made Aware RN [facility]   Assessment   Prognosis Fair   Problem List Decreased strength;Decreased range of motion; Impaired balance;Decreased cognition   Assessment Emmanuel Howell is a 70 y o  Male who [facility] on 3/4/2022 w/ c/o chest pain and diagnosis of chest pain w/ troponin elevation  Orders for PT eval and treat received  Pt presents w/ comorbidities of HTN, anemia, CKD, L-sided weakness, anxiety, DM  Personal factors include: mobilizing w/ AD and positive fall history   At baseline, pt mobilizes w/ rollator walker vs manual and reports 1 fall in the last 6 months (w/in past week)  Upon evaluation, pt presents w/ the following deficits: weakness, decreased ROM, impaired balance and limited tolerance to functional mobility  Upon eval, pt requires Mod Ax1 for bed mobility, Mod Ax1 for transfers, and was unable to ambulate at this time despite encouragement  Pt's clinical presentation is unstable/unpredictable due to need for assist w/ all phases of mobility, need for input for mobility technique, ongoing medical monitoring/management, and recent history of falls  Patient is at an increased risk of falls due to LE weakness, impaired balance, and requirement of assistance w/ all aspects of functional mobility  Given the above findings, discharge recommendation is for Post-acute inpatient rehabilitation  During this admission, pt would benefit from continued skilled inpatient PT in the acute care setting in order to address the abovementioned deficits to maximize function and mobility before DC from acute care  Goals   Patient Goals Pt stated he wants to walk   STG Expiration Date 03/18/22   Short Term Goal #1 Pt will: perform bed mobility w/ mod I to decrease caregiver burden; perform transfers w/ supervision to increase OOB mobility; self-propel at least 50' in [facility] w/ supervision to increase pt's independence w/ functional mobility; ambulate at least 48' w/ LRAD and min Ax1 to facility progress towards pt's mobility goals; increase all balance ratings by 1 grade to decrease pt's risk of falls   PT Treatment Day 0   Plan   Treatment/Interventions Functional transfer training;LE strengthening/ROM; Therapeutic exercise; Endurance training;Patient/family training;Equipment eval/education; Bed mobility;Gait training   PT Frequency 3-5x/wk   Recommendation   PT Discharge Recommendation Post acute rehabilitation services   Equipment Recommended [facility] Recommended Wheeled walker Change/add to [employer]? No   AM-PAC Basic Mobility Inpatient   Turning in Bed Without Bedrails 3   Lying on Back to Sitting on Edge of Flat Bed 2   Moving Bed to Chair 2   Standing Up From Chair 2   Walk in Room 1   Climb 3-5 Stairs 1   Basic Mobility Inpatient Raw Score 11   Basic Mobility Standardized Score 30 25   Highest Level Of Mobility   -[facility] Goal 4: Move to chair/commode   JH-HL Highest Level of Mobility 3: Sit at edge of bed   -HL Goal Achieved No   Additional Treatment Session   Start Time 1241   End Time 1253  (Total time: 12 min)   Treatment Assessment At end of eval, pt agreeable to further PT intervention  Pt expressed wanting to walk, but is fearful of falling  Emotional support and education provided w/ pt agreeable to further mobility trials  Recliner chair repositioned to pt's L side  Pt performed sit>stand transfer trial w/ mod Ax1 from EOB w/ VC for hand placement  In standing, pt able to clear RLE from floor w/ max encouragement w/ BUE support on RW and mod Ax1  Pt able to ambulate 2' to recliner chair w/ RW and mod Ax1  Pt w/ shuffling gait pattern and decreased BLE clearance from floor  Pt performed stand>sit transfer into recliner chair w/ mod Ax1; however, pt impulsive requiring VC for hand placement and for controlled descent into chair  Pt will continue to benefit from skilled PT intervention to increase pt's independence w/ functional mobility and to decrease pt's burden of care  Continue to recommend DC to post acute rehab when medically cleared  Recommend pt performs all transfers/mobility trials w/ target surface to pt's L side   Equipment Use RW   Additional Treatment Day 1   End of Consult   Patient Position at End of Consult Bedside chair;Bed/Chair alarm activated; All needs within reach  (LE elevated, offloaded on pillow)   End of Consult Comments RN and PCA updated of pt positioning and mobility technique post eval/tx     Recommend Case Management Consult; contacted Resident post eval     The patient's AM-PAC Basic Mobility Inpatient Short Form Raw Score is 11  A Raw score of less than or equal to 16 suggests the patient may benefit from discharge to post-acute rehabilitation services  Please also refer to the recommendation of the Physical Therapist for safe discharge planning      Pt would benefit from skilled inpatient PT during this admission in order to facilitate progress towards goals to maximize functional independence    DC rec: post acute rehab      Catheirne Puri, PT, DPT  03/08/22

## 2022-03-09 VITALS
RESPIRATION RATE: 19 BRPM | OXYGEN SATURATION: 97 % | DIASTOLIC BLOOD PRESSURE: 67 MMHG | WEIGHT: 188 LBS | HEART RATE: 78 BPM | BODY MASS INDEX: 29.51 KG/M2 | HEIGHT: 67 IN | SYSTOLIC BLOOD PRESSURE: 127 MMHG | TEMPERATURE: 98.2 F

## 2022-03-09 PROBLEM — I21.A1 TYPE 2 MI (MYOCARDIAL INFARCTION) (HCC): Status: ACTIVE | Noted: 2022-03-05

## 2022-03-09 LAB
ANION GAP SERPL CALCULATED.3IONS-SCNC: 7 MMOL/L (ref 4–13)
BASOPHILS # BLD AUTO: 0.03 THOUSANDS/ΜL (ref 0–0.1)
BASOPHILS NFR BLD AUTO: 0 % (ref 0–1)
BUN SERPL-MCNC: 25 MG/DL (ref 5–25)
CALCIUM SERPL-MCNC: 8.8 MG/DL (ref 8.3–10.1)
CHLORIDE SERPL-SCNC: 100 MMOL/L (ref 100–108)
CO2 SERPL-SCNC: 27 MMOL/L (ref 21–32)
CREAT SERPL-MCNC: 1.21 MG/DL (ref 0.6–1.3)
EOSINOPHIL # BLD AUTO: 0.7 THOUSAND/ΜL (ref 0–0.61)
EOSINOPHIL NFR BLD AUTO: 6 % (ref 0–6)
ERYTHROCYTE [DISTWIDTH] IN BLOOD BY AUTOMATED COUNT: 13.9 % (ref 11.6–15.1)
GFR SERPL CREATININE-BSD FRML MDRD: 59 ML/MIN/1.73SQ M
GLUCOSE SERPL-MCNC: 148 MG/DL (ref 65–140)
GLUCOSE SERPL-MCNC: 151 MG/DL (ref 65–140)
GLUCOSE SERPL-MCNC: 218 MG/DL (ref 65–140)
HCT VFR BLD AUTO: 33.2 % (ref 36.5–49.3)
HGB BLD-MCNC: 11.7 G/DL (ref 12–17)
IMM GRANULOCYTES # BLD AUTO: 0.07 THOUSAND/UL (ref 0–0.2)
IMM GRANULOCYTES NFR BLD AUTO: 1 % (ref 0–2)
LYMPHOCYTES # BLD AUTO: 0.94 THOUSANDS/ΜL (ref 0.6–4.47)
LYMPHOCYTES NFR BLD AUTO: 8 % (ref 14–44)
MCH RBC QN AUTO: 30 PG (ref 26.8–34.3)
MCHC RBC AUTO-ENTMCNC: 35.2 G/DL (ref 31.4–37.4)
MCV RBC AUTO: 85 FL (ref 82–98)
MONOCYTES # BLD AUTO: 0.97 THOUSAND/ΜL (ref 0.17–1.22)
MONOCYTES NFR BLD AUTO: 8 % (ref 4–12)
NEUTROPHILS # BLD AUTO: 8.79 THOUSANDS/ΜL (ref 1.85–7.62)
NEUTS SEG NFR BLD AUTO: 77 % (ref 43–75)
NRBC BLD AUTO-RTO: 0 /100 WBCS
PLATELET # BLD AUTO: 261 THOUSANDS/UL (ref 149–390)
PMV BLD AUTO: 10.5 FL (ref 8.9–12.7)
POTASSIUM SERPL-SCNC: 4 MMOL/L (ref 3.5–5.3)
RBC # BLD AUTO: 3.9 MILLION/UL (ref 3.88–5.62)
SODIUM SERPL-SCNC: 134 MMOL/L (ref 136–145)
WBC # BLD AUTO: 11.5 THOUSAND/UL (ref 4.31–10.16)

## 2022-03-09 PROCEDURE — 99232 SBSQ HOSP IP/OBS MODERATE 35: CPT | Performed by: INTERNAL MEDICINE

## 2022-03-09 PROCEDURE — 82948 REAGENT STRIP/BLOOD GLUCOSE: CPT

## 2022-03-09 PROCEDURE — 85025 COMPLETE CBC W/AUTO DIFF WBC: CPT

## 2022-03-09 PROCEDURE — 80048 BASIC METABOLIC PNL TOTAL CA: CPT

## 2022-03-09 PROCEDURE — 99239 HOSP IP/OBS DSCHRG MGMT >30: CPT | Performed by: INTERNAL MEDICINE

## 2022-03-09 RX ORDER — ATORVASTATIN CALCIUM 40 MG/1
40 TABLET, FILM COATED ORAL DAILY
Qty: 30 TABLET | Refills: 0 | Status: SHIPPED | OUTPATIENT
Start: 2022-03-10

## 2022-03-09 RX ORDER — ASPIRIN 81 MG/1
81 TABLET, CHEWABLE ORAL DAILY
Qty: 30 TABLET | Refills: 0 | Status: SHIPPED | OUTPATIENT
Start: 2022-03-10

## 2022-03-09 RX ORDER — PANTOPRAZOLE SODIUM 40 MG/1
40 TABLET, DELAYED RELEASE ORAL
Qty: 60 TABLET | Refills: 0 | Status: SHIPPED | OUTPATIENT
Start: 2022-03-09

## 2022-03-09 RX ORDER — CLOPIDOGREL BISULFATE 75 MG/1
75 TABLET ORAL DAILY
Qty: 30 TABLET | Refills: 0 | Status: SHIPPED | OUTPATIENT
Start: 2022-03-10

## 2022-03-09 RX ADMIN — ENOXAPARIN SODIUM 40 MG: 40 INJECTION SUBCUTANEOUS at 08:39

## 2022-03-09 RX ADMIN — CARBIDOPA AND LEVODOPA 1 TABLET: 25; 100 TABLET ORAL at 08:40

## 2022-03-09 RX ADMIN — CLOPIDOGREL BISULFATE 75 MG: 75 TABLET ORAL at 08:40

## 2022-03-09 RX ADMIN — DOCUSATE SODIUM 100 MG: 100 CAPSULE ORAL at 08:40

## 2022-03-09 RX ADMIN — INSULIN GLARGINE 20 UNITS: 100 INJECTION, SOLUTION SUBCUTANEOUS at 08:41

## 2022-03-09 RX ADMIN — LISINOPRIL 5 MG: 5 TABLET ORAL at 08:40

## 2022-03-09 RX ADMIN — METOPROLOL SUCCINATE 25 MG: 25 TABLET, EXTENDED RELEASE ORAL at 08:40

## 2022-03-09 RX ADMIN — INSULIN LISPRO 2 UNITS: 100 INJECTION, SOLUTION INTRAVENOUS; SUBCUTANEOUS at 12:06

## 2022-03-09 RX ADMIN — ATORVASTATIN CALCIUM 40 MG: 40 TABLET, FILM COATED ORAL at 08:40

## 2022-03-09 RX ADMIN — ASPIRIN 81 MG: 81 TABLET, CHEWABLE ORAL at 08:40

## 2022-03-09 RX ADMIN — Medication 2000 UNITS: at 08:40

## 2022-03-09 RX ADMIN — FINASTERIDE 5 MG: 5 TABLET, FILM COATED ORAL at 08:40

## 2022-03-09 RX ADMIN — CYANOCOBALAMIN TAB 500 MCG 1000 MCG: 500 TAB at 08:40

## 2022-03-09 RX ADMIN — PANTOPRAZOLE SODIUM 40 MG: 40 TABLET, DELAYED RELEASE ORAL at 06:13

## 2022-03-09 NOTE — ASSESSMENT & PLAN NOTE
 POA, presented with chest pain and associated with atypical symptoms of nausea, epigastric pain   EKG without STEMI x 3 but with troponin elevation   CTA chest in ED showed significant atherosclerotic changes/plaques visualized in thoracic aorta and coronary arteries   TTE (03/05/2022) LVEF 60%    Stress test Perfusion: There is a left ventricular perfusion defect that is large in size present in the entire inferior and inferolateral location(s) that is partially reversible   LH Cath showing   o LAD: 80% sub branch, lengthy, abnormal IR for   o Circumflex:  Large obtuse marginal branch with bridge collateral, 100% , unable to cross with standard run-through wire as well as  50  o Right coronary:  Angiographically 50 60%, IFR 0 9 for abnormal     Transfer to B for CABG evaluation cancelled since non-ambulatory and poor prognosis for recovery  Medical optimization for Coronary artery disease, follow up planning for outpatient Cardiology and outpatient CT surgery consultation       Plan   Continue ASA 81mg daily, clopidogrel daily, high-intensity statin, and Metoprolol 25 mg    Continue Nitroglycerin p r n  for chest pain   F/u cardiology as scheduled on 3/21/2022 and cardiothoracic surgery on 3/28/22 as scheduled

## 2022-03-09 NOTE — CASE MANAGEMENT
Case Management Discharge Planning Note    Patient name Bethesda Redd  [facility] S /S -01 MRN [id number]  : 1950 Date 3/9/2022       Current Admission Date: 3/4/2022  Current Admission Diagnosis:Chest Pain with Troponin Elevations   Patient Active Problem List    Diagnosis Date Noted    Coronary artery calcification 2022    Chest Pain with Troponin Elevations 2022    Generalized abdominal pain 2021    Lactic acidosis 2021    Urinary incontinence 2021    Vitamin B12 deficiency     History of recurrent UTIs 2020    Insomnia 2020    Constipation 2020    Benign paroxysmal positional vertigo 2020    Benign prostatic hyperplasia 2020    Vitamin D deficiency 2020    Left-sided weakness 2020    Cystitis 2020    Bladder stones 2020    Stage 3 chronic kidney disease ([facility] [place] [address] ) 2020    Anemia 2020    Colon cancer screening 2020    Essential hypertension 2019    Type 2 diabetes mellitus with diabetic neuropathy, with long-term current use of insulin ([facility] [place] [facility] [facility] ) 2019    Nephrolithiasis 2019    Ambulatory dysfunction 2019    Paresis ([facility] [place] [facility] ) 2019    Abnormal PSA 2019    Dizziness 10/04/2017    Pancreas cyst 2016    Type 2 diabetes mellitus with diabetic neuropathy ([facility] [place] [address] [facility] ) 2015    Hyperlipidemia 2012      LOS (days): 4  Geometric Mean LOS (GMLOS) (days): 2 10  Days to GMLOS:-2 2     OBJECTIVE:  Risk of Unplanned Readmission Score: 18         Current admission status: Inpatient   Preferred Pharmacy:   [address] [place] [address]  Phone: [phone] Fax: Jose Carlos Mancini PA - [address] [facility]  Phone: [phone] Fax: [phone]    Primary Care Provider: Malcolm Byers MD    Primary Insurance: [place] [employer] [facility]  Secondary Insurance: [address]    DISCHARGE DETAILS:      Contacts  Patient Contacts: Jeanne-spouse  Relationship to Patient[de-identified] Family  Contact Method: Phone  Phone Number: [phone]  Reason/Outcome: Discharge Planning,Referral      Other Referral/Resources/Interventions Provided:  Interventions: Transportation,Short Term Rehab  Referral Comments: Pt accepted to [facility] 2021  CM TC pt's spouse and confirmed she would like placement at this facility  CM requested transport via [facility] for 12noon, awaiting confirmation of time  CM informed [facility] Resident Dr Slava Almaguer, RN-Sarah, and spouse-Jeanne Bradley liaison-Cassie informed        IMM Given (Date):: 03/08/22             Accepting Facility Name, [address] : [facility] 2021  Receiving Facility/Agency Phone Number: [phone]

## 2022-03-09 NOTE — ASSESSMENT & PLAN NOTE
POA Pt with inability to ambulate with significant deficits  PT evaluated pt and noted the following deficits: weakness, decreased ROM, impaired balance and limited tolerance to functional mobility  After careful chart review the patient has been worked up by Neurology (see last note from 10/2021) for PD/Parkinson's Plus Syndrome  Held a detailed conversation with the wife over the phone pertaining to the patient's past medical history, nursing home stay, and current living situation  The patient was living at a nursing home due to his medical condition/deficits since his wife could not manage his care  The wife also reported having a nursing aid who comes for her medical conditions, and is unable to manage the patient on her own  The wife reports the patient uses a WHEELCHAIR to ambulate since he has significant deficits with his ambulation for years  She reports that the patient has not had another Neurology appointment since his last visit in 2021  Pt's detailed past medical history:  Ambulatory dysfunction, Anemia, Anxiety, At risk for falls, Benign paroxysmal vertigo, BPH (benign prostatic hyperplasia), Calculus in bladder, Cataract, Cervicalgia, Chest pain, Chronic kidney disease, Constipation, CTS (carpal tunnel syndrome), Cyst of pancreas, Cystitis (06/23/2020), Depression, Diabetes mellitus ([address] ), Dizziness, Dysphagia, Elevated PSA, Facial weakness, GERD (gastroesophageal reflux disease), Gross hematuria, Hematuria, Hyperlipidemia, Hypertension, Kidney disease, Kidney stone, Left-sided weakness, Long term (current) use of oral hypoglycemic drugs, Muscle weakness, Nuclear senile cataract of left eye, OA (osteoarthritis) of knee, 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[address] ), Syncope and collapse, Tachycardia, UTI (urinary tract infection), Vertebro-basilar artery syndrome, Vitamin B deficiency, Vitamin D deficiency, and Vitamin D deficiency      Plan:  · Continue Carbidopa-Levodopa (sinemet) TID  · Rehab placement  · Recommend follow up with neurology  · Fall precautions ordered

## 2022-03-09 NOTE — CASE MANAGEMENT
Case Management Discharge Planning Note    Patient name Anup Truong S /S -01 MRN [id number]  : 1950 Date 3/9/2022       Current Admission Date: 3/4/2022  Current Admission Diagnosis:Chest Pain with Troponin Elevations   Patient Active Problem List    Diagnosis Date Noted    Coronary artery calcification 2022    Chest Pain with Troponin Elevations 2022    Generalized abdominal pain 2021    Lactic acidosis 2021    Urinary incontinence 2021    Vitamin B12 deficiency     History of recurrent UTIs 2020    Insomnia 2020    Constipation 2020    Benign paroxysmal positional vertigo 2020    Benign prostatic hyperplasia 2020    Vitamin D deficiency 2020    Left-sided weakness 2020    Cystitis 2020    Bladder stones 2020    Stage 3 chronic kidney disease ([facility] [address] ) 2020    Anemia 2020    Colon cancer screening 2020    Essential hypertension 2019    Type 2 diabetes mellitus with diabetic neuropathy, with long-term current use of [facility] 2019    Nephrolithiasis 2019    Ambulatory dysfunction 2019    Paresis ([facility] ) 2019    Abnormal PSA 2019    Dizziness 10/04/2017    Pancreas cyst 2016    Type 2 diabetes mellitus with diabetic neuropathy [facility] 2015    Hyperlipidemia 2012      LOS (days): 4  Geometric Mean LOS (GMLOS) (days): 2 10  Days to GMLOS:-2 2     OBJECTIVE:  Risk of Unplanned Readmission Score: 18         Current admission status: Inpatient   Preferred Pharmacy:   [address] [place] [address]  Phone: [phone] Fax: GÉNESIS Hines - [address]   [address] [place] [address]  Phone: [phone] Fax: [phone]    Primary Care Provider: Quang Erickson MD    Primary Insurance: [employer] [facility]  Secondary Insurance: [address]    DISCHARGE DETAILS:      Contacts  Patient Contacts: Jeanne-spouse  Relationship to Patient[de-identified] Family  Contact Method: Phone  Phone Number: [phone]  Reason/Outcome: Discharge Planning,Referral      Other Referral/Resources/Interventions Provided:  Interventions: Transportation,Short Term Rehab  Referral Comments: Pt accepted to [facility] 2021  CM TC pt's spouse and confirmed she would like placement at this facility  CM requested transport via [facility] for 12noon, awaiting confirmation of time  CM informed [facility] Resident Dr Wendy Harmon, RN-Sarah, and spouse-Jeanne  [place] liaison-Cassie informed       Transport at Discharge : [facility]  Dispatcher Contacted: Yes  Number/Name of Dispatcher: [facility] one call  Transported by [employer] and Unit #):  [address]  ETA of Transport (Date): 03/09/22  ETA of Transport (Time): 1200     IMM Given (Date):: 03/08/22        Additional Comments: CM informed [facility] of transport time    [address] Name, [facility] [place] : [facility] 2021  Receiving Facility/Agency Phone Number: [phone]

## 2022-03-09 NOTE — ASSESSMENT & PLAN NOTE
Lab Results   Component Value Date/Time    BUN 25 03/09/2022 04:35 AM    CREATININE 1 21 03/09/2022 04:35 AM       Estimated Creatinine Clearance: 58 5 mL/min (by C-G formula based on SCr of 1 21 mg/dL)     POA; Cr 1 47 (baseline appears to be 1 1-1 2)   At [facility]    Plan:   Monitor creatinine with BMP   Avoid hypoperfusion of the kidneys, minimize nephrotoxins   RAAS Blockers/Diuretics held: lisinopril

## 2022-03-09 NOTE — ASSESSMENT & PLAN NOTE
Lab Results   Component Value Date    HGBA1C 6 4 (H) 03/05/2022       Recent Labs     03/08/22  1040 03/08/22  1702 03/09/22  0609 03/09/22  1035   POCGLU 192* 183* 148* 218*       Blood Sugar Average: Last 72 hrs:  (P) 432 [id number] blood glucose well controlled    Home Regimen: glipizide 10 mg BID, metformin 1 G BID    Plan:   Resume home meds; consider changing if poor glucose control

## 2022-03-09 NOTE — PROGRESS NOTES
Progress Note - Cardiology   Blanca Malloy 70 y o  male MRN: [id number]  Unit/Bed#: S -01 Encounter: [id number]        Principal Problem:    Chest Pain with Troponin Elevations  Active Problems:    Essential hypertension    Type 2 diabetes mellitus with diabetic neuropathy, with long-term current use of insulin (HCC)    Ambulatory dysfunction    Stage 3 chronic kidney disease (Formerly [facility])    Vitamin D deficiency    Vitamin B12 deficiency    Coronary artery calcification        Assessment/Plan     1  Chest pain w/ elevated troponin- type 2 MI ( no thrombotic lesion on cath) --patient is poor historian for me   Consult mentions  atypical symptoms (nausea, abdominal /epigastric pain)  Yesterday  c/o lower abdominal pain - constipated  -Currently w/o c/o CP  -HS troponin level; 211--216--217  -Initial 12 lead ECG demonstrated; NSR, RBBB, ST T-wave abnormalities  -CTA chest 3/4- no evidence of acute aortic pathology or aneurysm, extensive atheromatous changes/plaque throughout the aortoiliac vasculature without flow-limiting stenosis or occlusion, atherosclerotic coronary arteries, diffuse esophageal wall thickening  -lexiscan myoview - inferior/inferolatereal scar with most wilbur-infarct ischemia  Normal LVEF     - [facility] 3/7- prox LAD 80%, IFR 0 87  First marginal 100%, mid RCA 55%  IFR not significant  Angioplasty of to 1st marginal not successful   -Contine DAPT- asa/plavix , BB  Statin- increased to 40mg    - CT surgical disease  Patient at this time is non ambulatory thus there are significant concerns about rehabilitation after CT surgery  For now will continue more appropriate medical therapy for his underlying coronary artery disease and set up follow-up with Cardiology and set up a consultation with CT surgery  I spoke with patient and wife who have limited understanding of the diagnosis  They are very fearful about open heart surgery  I am also concerned about medical compliance    Patient states he has been in a nursing home for 2 years and was just discharged 4 days ago to home and has not been on any medicines since discharge      2  Preserved biventricular systolic function  -Compensated  -TTE 3/5/2022; LVEF 60%, no regional wall motion abnormality, diastolic function normal, and mild MR      3  Coronary artery calcifications - per CT imaging  -CTA chest 3/4 - extensive atheromatous changes/plaque throughout the aortoiliac vasculature without flow-limiting stenosis or occlusion, atherosclerotic coronary arteries  -Aspirin, and high-intensity statin     4  Essential hypertension  -'s  -On lisinopril 5 mg daily , toprol 25mg      5  Dyslipidemia  -Lipid profile 3/5/2022; cholesterol 137, triglycerides 49, HDL 40, and LDL 87  -On atorvastatin 40mg daily ( was on 20mg )      6  CKD stage 3  -Baseline creatinine around 1 1-1 3   -Creatinine 1 47 on admission, currently 1 2      7  DM type 2  -HGB A1c level 6 4     Subjective/Objective   Chief Complaint/Subjective    Patient without complaints of chest pain or shortness of breath  Reviewed planned again with the patient that there is no plan to transfer to [place]  Arranged follow-up with Cardiology and CT surgery        Vitals: /67 (BP Location: Left arm)   Pulse 78   Temp 98 2 °F (36 8 °C) (Oral)   Resp 19   Ht 5' 7" (1 702 m)   Wt 85 3 kg (188 lb)   SpO2 97%   BMI 29 44 kg/m²     Vitals:    03/05/22 1344 03/06/22 1055   Weight: 85 3 kg (188 lb 0 8 oz) 85 3 kg (188 lb)     Orthostatic Blood Pressures      Most Recent Value   Blood Pressure 127/67 filed at 03/09/2022 0610   Patient Position - Orthostatic VS Lying filed at 03/09/2022 0610            Intake/Output Summary (Last 24 hours) at 3/9/2022 0852  Last data filed at 3/8/2022 2138  Gross per 24 hour   Intake 480 ml   Output 450 ml   Net 30 ml       Invasive Devices  Report    Peripheral Intravenous Line            Peripheral IV 03/08/22 Left;Proximal;Ventral (anterior) Forearm 1 day Current Facility-Administered Medications   Medication Dose Route Frequency    aluminum-magnesium hydroxide-simethicone (MYLANTA) oral suspension 30 mL  30 mL Oral Q4H PRN    aspirin chewable tablet 81 mg  81 mg Oral Daily    atorvastatin (LIPITOR) tablet 40 mg  40 mg Oral Daily    carbidopa-levodopa (SINEMET)  mg per tablet 1 tablet  1 tablet Oral TID    cholecalciferol (VITAMIN D3) tablet 2,000 Units  2,000 Units Oral Daily    clopidogrel (PLAVIX) tablet 75 mg  75 mg Oral Daily    cyanocobalamin (VITAMIN B-12) tablet 1,000 mcg  1,000 mcg Oral Daily    docusate sodium (COLACE) capsule 100 mg  100 mg Oral BID    enoxaparin (LOVENOX) subcutaneous injection 40 mg  40 mg Subcutaneous Daily    finasteride (PROSCAR) tablet 5 mg  5 mg Oral Daily    insulin glargine (LANTUS) subcutaneous injection 20 Units 0 2 mL  20 Units Subcutaneous QAM    insulin lispro (HumaLOG) 100 units/mL subcutaneous injection 1-6 Units  1-6 Units Subcutaneous TID AC    lisinopril (ZESTRIL) tablet 5 mg  5 mg Oral Daily    melatonin tablet 3 mg  3 mg Oral HS    metoprolol succinate (TOPROL-XL) 24 hr tablet 25 mg  25 mg Oral Daily    pantoprazole (PROTONIX) EC tablet 40 mg  40 mg Oral BID AC    polyethylene glycol (MIRALAX) packet 17 g  17 g Oral Daily    senna (SENOKOT) tablet 8 6 mg  8 6 mg Oral Daily PRN    tamsulosin (FLOMAX) capsule 0 4 mg  0 4 mg Oral Daily With Dinner         Physical Exam: /67 (BP Location: Left arm)   Pulse 78   Temp 98 2 °F (36 8 °C) (Oral)   Resp 19   Ht 5' 7" (1 702 m)   Wt 85 3 kg (188 lb)   SpO2 97%   BMI 29 44 kg/m²     General Appearance:    Alert, cooperative, no distress, appears stated age   Head:    Normocephalic, no scleral icterus   Eyes:    PERRL   Nose:   Nares normal, septum midline, no drainage    Throat:   Lips, mucosa, and tongue normal   Neck:   Supple, symmetrical, trachea midline,              Lungs:     Clear to auscultation bilaterally, respirations unlabored   Chest Wall:    No tenderness or deformity    Heart:    Regular rate and rhythm, S1 and S2 normal, no murmur, rub   or gallop   Abdomen:     Soft, non-tender   Extremities:   Extremities normal, atraumatic, no cyanosis or edema   Pulses:   2+ and symmetric all extremities   Skin:   Skin warm   Neurologic:   Alert and oriented to person place and time                 Lab Results:   Recent Results (from the past 72 hour(s))   APTT    Collection Time: 03/06/22  9:09 AM   Result Value Ref Range    PTT 92 (H) 23 - 37 seconds   NM Myocardial Perfusion Spect (Pharmacological Induced Stress and/or Rest)    Collection Time: 03/06/22 12:15 PM   Result Value Ref Range    Rest Nuclear Isotope Dose 11 00 mCi    Baseline HR 93 bpm    Baseline /62 mmHg    O2 sat rest 98 %    Stress peak  bpm    Post peak  mmHg    Rate Pressure Product 12,760 0     O2 sat peak 96 %    Recovery  bpm    Recovery /54 mmHg    O2 sat recovery 96 %    Target  bpm    Percent HR 73 %    Exercise duration (min) 3 min    Exercise duration (sec) 0 sec    Angina Index 0     Max HR Percent 73 %    Stress Nuclear Isotope Dose 33 00 mCi    Clancy Treadmill Score 3     ST Depression (mm) 0 mm    Stress/rest perfusion ratio 1 70     EF (%) 61 %   Fingerstick Glucose (POCT)    Collection Time: 03/06/22 12:15 PM   Result Value Ref Range    POC Glucose 177 (H) 65 - 140 mg/dl   Fingerstick Glucose (POCT)    Collection Time: 03/06/22  3:34 PM   Result Value Ref Range    POC Glucose 206 (H) 65 - 140 mg/dl   APTT    Collection Time: 03/06/22  6:07 PM   Result Value Ref Range    PTT 97 (H) 23 - 37 seconds   APTT    Collection Time: 03/07/22 12:47 AM   Result Value Ref Range     (HH) 23 - 37 seconds   Basic metabolic panel    Collection Time: 03/07/22  4:59 AM   Result Value Ref Range    Sodium 141 136 - 145 mmol/L    Potassium 4 1 3 5 - 5 3 mmol/L    Chloride 104 100 - 108 mmol/L    CO2 27 21 - 32 mmol/L    ANION GAP 10 4 - 13 mmol/L    BUN 19 5 - 25 mg/dL    Creatinine 1 11 0 60 - 1 30 mg/dL    Glucose 132 65 - 140 mg/dL    Calcium 8 5 8 3 - 10 1 mg/dL    eGFR 66 ml/min/1 73sq m   CBC and differential    Collection Time: 03/07/22  4:59 AM   Result Value Ref Range    WBC 10 05 4 31 - 10 16 Thousand/uL    RBC 2 81 (L) 3 88 - 5 62 Million/uL    Hemoglobin 11 5 (L) 12 0 - 17 0 g/dL    Hematocrit 26 2 (L) 36 5 - 49 3 %    MCV 92 82 - 98 fL    MCH 29 5 26 8 - 34 3 pg    MCHC 31 7 31 4 - 37 4 g/dL    RDW 14 0 11 6 - 15 1 %    MPV 10 9 8 9 - 12 7 fL    Platelets 266 618 - 292 Thousands/uL    nRBC 0 /100 WBCs    Neutrophils Relative 68 43 - 75 %    Immat GRANS % 1 0 - 2 %    Lymphocytes Relative 15 14 - 44 %    Monocytes Relative 10 4 - 12 %    Eosinophils Relative 5 0 - 6 %    Basophils Relative 1 0 - 1 %    Neutrophils Absolute 6 96 1 85 - 7 62 Thousands/µL    Immature Grans Absolute 0 05 0 00 - 0 20 Thousand/uL    Lymphocytes Absolute 1 46 0 60 - 4 47 Thousands/µL    Monocytes Absolute 1 01 0 17 - 1 22 Thousand/µL    Eosinophils Absolute 0 51 0 00 - 0 61 Thousand/µL    Basophils Absolute 0 06 0 00 - 0 10 Thousands/µL   Fingerstick Glucose (POCT)    Collection Time: 03/07/22  7:00 AM   Result Value Ref Range    POC Glucose 145 (H) 65 - 140 mg/dl   APTT    Collection Time: 03/07/22  9:44 AM   Result Value Ref Range    PTT 78 (H) 23 - 37 seconds   Fingerstick Glucose (POCT)    Collection Time: 03/07/22 11:04 AM   Result Value Ref Range    POC Glucose 178 (H) 65 - 140 mg/dl   Fingerstick Glucose (POCT)    Collection Time: 03/07/22  3:48 PM   Result Value Ref Range    POC Glucose 135 65 - 140 mg/dl   Fingerstick Glucose (POCT)    Collection Time: 03/07/22  9:30 PM   Result Value Ref Range    POC Glucose 242 (H) 65 - 140 mg/dl   Basic metabolic panel    Collection Time: 03/08/22  6:00 AM   Result Value Ref Range    Sodium 134 (L) 136 - 145 mmol/L    Potassium 5 4 (H) 3 5 - 5 3 mmol/L    Chloride 103 100 - 108 mmol/L    CO2 22 21 - 32 mmol/L ANION GAP 9 4 - 13 mmol/L    BUN 20 5 - 25 mg/dL    Creatinine 1 01 0 60 - 1 30 mg/dL    Glucose 140 65 - 140 mg/dL    Calcium 9 1 8 3 - 10 1 mg/dL    eGFR 74 ml/min/1 73sq m   CBC and differential    Collection Time: 03/08/22  6:00 AM   Result Value Ref Range    WBC 9 85 4 31 - 10 16 Thousand/uL    RBC 3 91 3 88 - 5 62 Million/uL    Hemoglobin 11 8 (L) 12 0 - 17 0 g/dL    Hematocrit 33 3 (L) 36 5 - 49 3 %    MCV 85 82 - 98 fL    MCH 30 2 26 8 - 34 3 pg    MCHC 35 4 31 4 - 37 4 g/dL    RDW 14 1 11 6 - 15 1 %    MPV 10 3 8 9 - 12 7 fL    Platelets 599 170 - 683 Thousands/uL    nRBC 0 /100 WBCs    Neutrophils Relative 73 43 - 75 %    Immat GRANS % 1 0 - 2 %    Lymphocytes Relative 12 (L) 14 - 44 %    Monocytes Relative 9 4 - 12 %    Eosinophils Relative 5 0 - 6 %    Basophils Relative 0 0 - 1 %    Neutrophils Absolute 7 20 1 85 - 7 62 Thousands/µL    Immature Grans Absolute 0 06 0 00 - 0 20 Thousand/uL    Lymphocytes Absolute 1 17 0 60 - 4 47 Thousands/µL    Monocytes Absolute 0 91 0 17 - 1 22 Thousand/µL    Eosinophils Absolute 0 47 0 00 - 0 61 Thousand/µL    Basophils Absolute 0 04 0 00 - 0 10 Thousands/µL   Fingerstick Glucose (POCT)    Collection Time: 03/08/22  6:55 AM   Result Value Ref Range    POC Glucose 151 (H) 65 - 140 mg/dl   Fingerstick Glucose (POCT)    Collection Time: 03/08/22 10:40 AM   Result Value Ref Range    POC Glucose 192 (H) 65 - 140 mg/dl   Potassium    Collection Time: 03/08/22 12:56 PM   Result Value Ref Range    Potassium 4 2 3 5 - 5 3 mmol/L   Fingerstick Glucose (POCT)    Collection Time: 03/08/22  5:02 PM   Result Value Ref Range    POC Glucose 183 (H) 65 - 140 mg/dl   Basic metabolic panel    Collection Time: 03/09/22  4:35 AM   Result Value Ref Range    Sodium 134 (L) 136 - 145 mmol/L    Potassium 4 0 3 5 - 5 3 mmol/L    Chloride 100 100 - 108 mmol/L    CO2 27 21 - 32 mmol/L    ANION GAP 7 4 - 13 mmol/L    BUN 25 5 - 25 mg/dL    Creatinine 1 21 0 60 - 1 30 mg/dL    Glucose 151 (H) 65 - 140 mg/dL    Calcium 8 8 8 3 - 10 1 mg/dL    eGFR 59 ml/min/1 73sq m   CBC and differential    Collection Time: 03/09/22  4:35 AM   Result Value Ref Range    WBC 11 50 (H) 4 31 - 10 16 Thousand/uL    RBC 3 90 3 88 - 5 62 Million/uL    Hemoglobin 11 7 (L) 12 0 - 17 0 g/dL    Hematocrit 33 2 (L) 36 5 - 49 3 %    MCV 85 82 - 98 fL    MCH 30 0 26 8 - 34 3 pg    MCHC 35 2 31 4 - 37 4 g/dL    RDW 13 9 11 6 - 15 1 %    MPV 10 5 8 9 - 12 7 fL    Platelets 079 337 - 999 Thousands/uL    nRBC 0 /100 WBCs    Neutrophils Relative 77 (H) 43 - 75 %    Immat GRANS % 1 0 - 2 %    Lymphocytes Relative 8 (L) 14 - 44 %    Monocytes Relative 8 4 - 12 %    Eosinophils Relative 6 0 - 6 %    Basophils Relative 0 0 - 1 %    Neutrophils Absolute 8 79 (H) 1 85 - 7 62 Thousands/µL    Immature Grans Absolute 0 07 0 00 - 0 20 Thousand/uL    Lymphocytes Absolute 0 94 0 60 - 4 47 Thousands/µL    Monocytes Absolute 0 97 0 17 - 1 22 Thousand/µL    Eosinophils Absolute 0 70 (H) 0 00 - 0 61 Thousand/µL    Basophils Absolute 0 03 0 00 - 0 10 Thousands/µL   Fingerstick Glucose (POCT)    Collection Time: 03/09/22  6:09 AM   Result Value Ref Range    POC Glucose 148 (H) 65 - 140 mg/dl     Imaging: I have personally reviewed pertinent reports  Tele- n/a      Counseling / Coordination of Care  Total time spent today 20 minutes  Greater than 50% of total time was spent with the patient and / or family counseling and / or coordination of care

## 2022-03-09 NOTE — DISCHARGE SUMMARY
[facility]  Discharge- Kim Fat 1950, 70 y o  male MRN: [id number]  Unit/Bed#: S -01 Encounter: [id number]  Primary Care Provider: Jolly Lennox, MD   Date and time admitted to hospital: 3/4/2022 10:34 PM    * Type 2 MI (myocardial infarction) [facility])  Assessment & Plan   POA, presented with chest pain and associated with atypical symptoms of nausea, epigastric pain   EKG without STEMI x 3 but with troponin elevation   CTA chest in ED showed significant atherosclerotic changes/plaques visualized in thoracic aorta and coronary arteries   TTE (03/05/2022) LVEF 60%    Stress test Perfusion: There is a left ventricular perfusion defect that is large in size present in the entire inferior and inferolateral location(s) that is partially reversible   LH Cath showing   o LAD: 80% sub branch, lengthy, abnormal IR for   o Circumflex:  Large obtuse marginal branch with bridge collateral, 100% , unable to cross with standard run-through wire as well as  50  o Right coronary:  Angiographically 50 60%, IFR 0 9 for abnormal     Transfer to \[facility]\"" for CABG evaluation cancelled since non-ambulatory and poor prognosis for recovery  Medical optimization for Coronary artery disease, follow up planning for outpatient Cardiology and outpatient CT surgery consultation       Plan   Continue ASA 81mg daily, clopidogrel daily, high-intensity statin, and Metoprolol 25 mg    Continue Nitroglycerin p r n  for chest pain   F/u cardiology as scheduled on 3/21/2022 and cardiothoracic surgery on 3/28/22 as scheduled    Essential hypertension  Assessment & Plan   BP reviewed and stable    PLAN  · Continue metoprolol   · Continue holding lisinopril until cleared by cardiology  · Monitor BP      Coronary artery calcification  Assessment & Plan   CTA on 3/5 showed atherosclerotic changes in thoracic aorta and coronary arteries   Likely leading to NSTEMI - management as per above    Plan:  - Continue Aspirin, Plavix, High-intensity statin/40 mg Atorvastatin     Vitamin B12 deficiency  Assessment & Plan  Continue home B12    Vitamin D deficiency  Assessment & Plan  Continue home vitamin D    Stage 3 chronic kidney disease ([facility] [address] )  Assessment & Plan  Lab Results   Component Value Date/Time    BUN 25 03/09/2022 04:35 AM    CREATININE 1 21 03/09/2022 04:35 AM       Estimated Creatinine Clearance: 58 5 mL/min (by C-G formula based on SCr of 1 21 mg/dL)   POA; Cr 1 47 (baseline appears to be 1 1-1 2)   At baselline    Plan:   Monitor creatinine with BMP   Avoid hypoperfusion of the kidneys, minimize nephrotoxins   RAAS Blockers/Diuretics held: lisinopril      Ambulatory dysfunction  Assessment & Plan  POA Pt with inability to ambulate with significant deficits  PT evaluated pt and noted the following deficits: weakness, decreased ROM, impaired balance and limited tolerance to functional mobility  After careful chart review the patient has been worked up by Neurology (see last note from 10/2021) for PD/Parkinson's Plus Syndrome  Held a detailed conversation with the wife over the phone pertaining to the patient's past medical history, nursing home stay, and current living situation  The patient was living at a nursing home due to his medical condition/deficits since his wife could not manage his care  The wife also reported having a nursing aid who comes for her medical conditions, and is unable to manage the patient on her own  The wife reports the patient uses a WHEELCHAIR to ambulate since he has significant deficits with his ambulation for years  She reports that the patient has not had another Neurology appointment since his last visit in 2021        Pt's detailed past medical history:  Ambulatory dysfunction, Anemia, Anxiety, At risk for falls, Benign paroxysmal vertigo, BPH (benign prostatic hyperplasia), Calculus in bladder, Cataract, Cervicalgia, Chest pain, Chronic kidney disease, Constipation, CTS (carpal tunnel syndrome), Cyst of pancreas, Cystitis (06/23/2020), Depression, Diabetes mellitus ([address] ), Dizziness, Dysphagia, Elevated PSA, Facial weakness, GERD (gastroesophageal reflux disease), Gross hematuria, Hematuria, Hyperlipidemia, Hypertension, Kidney disease, Kidney stone, Left-sided weakness, Long term (current) use of oral hypoglycemic drugs, Muscle weakness, Nuclear senile cataract of left eye, OA (osteoarthritis) of knee, 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[address] ), Syncope and collapse, Tachycardia, UTI (urinary tract infection), Vertebro-basilar artery syndrome, Vitamin B deficiency, Vitamin D deficiency, and Vitamin D deficiency  Plan:  · Continue Carbidopa-Levodopa (sinemet) TID  · Rehab placement  · Recommend follow up with neurology  · Fall precautions ordered      Type 2 diabetes mellitus with diabetic neuropathy, with long-term current use of insulin [facility])  Assessment & Plan  Lab Results   Component Value Date    HGBA1C 6 4 (H) 03/05/2022       Recent Labs     03/08/22  1040 03/08/22  1702 03/09/22  0609 03/09/22  1035   POCGLU 192* 183* 148* 218*       Blood Sugar Average: Last 72 hrs:  (P) 242 [id number] blood glucose well controlled    Home Regimen: glipizide 10 mg BID, metformin 1 G BID    Plan:   Resume home meds; consider changing if poor glucose control        Medical Problems             Resolved Problems  Date Reviewed: 3/4/2022    None              Discharging Resident: Tirso Villarreal DO  Discharging Attending: No att  providers found  PCP: Carmita Baltazar MD  Admission Date:   Admission Orders (From admission, onward)     Ordered        03/05/22 0349  INPATIENT ADMISSION  Once                      Discharge Date: 03/09/22    Consultations During Hospital Stay:  · Cardiology    Procedures Performed:   · 3/7/22 - cardiac catheterization    Significant Findings / Test Results:   · 3/7 - cardiac catheterization  · Prox LAD lesion is 80% stenosed     · 1st marginal lesion is 100% stenosed  · Mid RCA 55% stenosed  · Angioplasty in 1st marginal lesion attempted but unsuccessful; 100% stenosis post-intervention  · 3/6 - NM myocardial perfusion spect (rx stress and/or rest)  · LVEF = 61%; L ventricular perfusion defect that is large in size and present in entire inferior and inferolateral location that is partially perfusable  · 3/5 - ECHO  · LVEF =  55%  Normal systolic and diastolic function  · The following segments are akinetic: basal inferior, mid inferior, mid inferolateral and apical lateral   · The following segments are hypokinetic: basal inferolateral and apical inferior   3/5 - CTA  o Extensive atheromatous changes/plaque throughout the aortoiliac vasculature without flow-limiting stenosis or occlusion   HS troponin level; 211--216--217   Lipid profile 3/5/2022; cholesterol 137, triglycerides 49, HDL 40, and LDL 87    Incidental Findings:   · none     Test Results Pending at Discharge (will require follow up):  · none     Outpatient Tests Requested:  · none    Complications:  none    Reason for Admission: chest pain - found to have type 2 MI    Hospital Course:   Kim Bryson is a 70 y o  male patient with PMHx significant for stage III CKD, ambulatory dysfunction secondary to Parkinson's, HTN, diabetes, who originally presented to the hospital on 3/4/2022 due to nausea and chest discomfort  The patient had troponin elevation without any ST changes on EKG  CTA did reveal significant atherosclerotic changes in aortoiliac vasculature  Patient suspected to NSTEMI, started on a heparin drip, high-intensity statin, aspirin  Echo was performed which did reveal some akinetic area as he a preserved ejection fraction  Stress test the following day did reveal some partially reversible perfusion defects  On 03/07 patient was taken into cardiac catheterization which did reveal significant stenosis of proximal LAD, mid RCA, and 1st marginal lesion    Angioplasty was attempted in 1st marginal lesion but was unsuccessful  Patient was initially going to be [facility]  However given significant ambulatory dysfunction was not determined to be a good candidate and recovery post CABG was determined to be poor  Patient was medically optimized with dual antiplatelet therapy, high-intensity statin, and beta-blocker  Patient recommended to have close outpatient follow-up with Cardiology and cardiothoracic surgery to determine whether or not any other in Interventional is indicated  Diagnosis of NSTEMI determined to be type 2 MI because there was no active thrombosis found on catheterization  Protonix was started to help alleviate some of his symptoms of chest pain  Patient discharged in stable condition with recommended close outpatient follow up with PCP, Cardiology, Cardiothoracic surgery  He was maintained on dual antiplatelet therapy, statin, and beta blockade  Protonix seemed to improve chest pain, so this was also maintained at discharge  Please see above list of diagnoses and related plan for additional information  Condition at Discharge: stable    Discharge Day Visit / Exam:   Subjective:  Feels better  Denies any chest pain  Wishes he could go home to see his wife but understands the importance of going to rehab  Denies fevers, chills, shortness of breath, abdominal pain, nausea, vomiting, diarrhea, constipation  Vitals: Blood Pressure: 127/67 (03/09/22 0610)  Pulse: 78 (03/09/22 0610)  Temperature: 98 2 °F (36 8 °C) (03/09/22 0610)  Temp Source: Oral (03/09/22 0610)  Respirations: 19 (03/09/22 0610)  Height: 5' 7" (170 2 cm) (03/06/22 1055)  Weight - Scale: 85 3 kg (188 lb) (03/06/22 1055)  SpO2: 97 % (03/09/22 0610)  Exam:   Physical Exam  Vitals and nursing note reviewed  Constitutional:       General: He is not in acute distress  Appearance: He is well-developed  He is not ill-appearing, toxic-appearing or diaphoretic     HENT: Head: Normocephalic and atraumatic  Right Ear: External ear normal       Left Ear: External ear normal       Nose: Nose normal    Eyes:      General: No scleral icterus  Right eye: No discharge  Left eye: No discharge  Conjunctiva/sclera: Conjunctivae normal    Neck:      Trachea: No tracheal deviation  Cardiovascular:      Rate and Rhythm: Normal rate and regular rhythm  Heart sounds: Normal heart sounds  No murmur heard  Pulmonary:      Effort: Pulmonary effort is normal  No respiratory distress  Breath sounds: Normal breath sounds  No stridor  No wheezing or rales  Chest:      Chest wall: No tenderness  Abdominal:      General: Bowel sounds are normal  There is no distension  Palpations: Abdomen is soft  Tenderness: There is no abdominal tenderness  There is no guarding or rebound  Musculoskeletal:      Right lower leg: No edema  Left lower leg: No edema  Skin:     General: Skin is warm  Capillary Refill: Capillary refill takes less than 2 seconds  Neurological:      Mental Status: He is alert  Mental status is at baseline  Psychiatric:         Behavior: Behavior normal          Discussion with Family:  discussed with patient's wife     I also spoke with his wife on the phone  Discharge instructions/Information to patient and family:   See after visit summary for information provided to patient and family  Provisions for Follow-Up Care:  See after visit summary for information related to follow-up care and any pertinent home health orders  Disposition:   Acute Rehab at [facility]    Planned Readmission: none    Discharge Medications:  See after visit summary for reconciled discharge medications provided to patient and/or family        **Please Note: This note may have been constructed using a voice recognition system**

## 2022-03-10 LAB
BACTERIA BLD CULT: NORMAL
BACTERIA BLD CULT: NORMAL
CHEST PAIN STATEMENT: NORMAL
MAX DIASTOLIC BP: 62 MMHG
MAX HEART RATE: 110 BPM
MAX PREDICTED HEART RATE: 149 BPM
MAX. SYSTOLIC BP: 126 MMHG
PROTOCOL NAME: NORMAL
REASON FOR TERMINATION: NORMAL
TARGET HR FORMULA: NORMAL
TEST INDICATION: NORMAL
TIME IN EXERCISE PHASE: NORMAL

## 2022-03-10 NOTE — UTILIZATION REVIEW
Notification of Discharge   This is a Notification of Discharge from our facility [address]  Please be advised that this patient has been discharge from our facility  Below you will find the admission and discharge date and time including the patients disposition  UTILIZATION REVIEW CONTACT:  Nilsa Irizarry MA  Utilization   Network Utilization Review Department  Phone: [phone] x carefully listen to the prompts  All voicemails are confidential   Email: [email or web address]     PHYSICIAN ADVISORY SERVICES:  FOR [id number] REVIEW - MEDICAL NECESSITY DENIAL  Phone: [phone]  Fax: [phone]  Email: [email or web address]     PRESENTATION DATE: 3/4/2022 10:34 PM  OBERVATION ADMISSION DATE:   INPATIENT ADMISSION DATE: 3/5/22  3:49 AM   DISCHARGE DATE: 3/9/2022  1:45 PM  DISPOSITION: Non [facility] SN[facility]/TC[facility]/SNU Non [facility] SNF/TCU/SNU      IMPORTANT INFORMATION:  Send all requests for admission clinical reviews, approved or denied determinations and any other requests to dedicated fax number below belonging to the campus where the patient is receiving treatment   List of dedicated fax numbers:  [address] DENIALS (Administrative/Medical Necessity) [phone]   [address] (Maternity/NICU/Pediatrics) [phone]   [facility] [phone]   [address] [place] [address] [phone]   [address] [place] [address] [phone]   [address] [phone]   [facility]  [phone]   [address] [facility]  [address] [facility] [address] [facility] [phone]

## 2022-03-28 ENCOUNTER — HOSPITAL ENCOUNTER (EMERGENCY)
Facility: HOSPITAL | Age: 72
Discharge: HOME | End: 2022-03-28
Attending: EMERGENCY MEDICINE
Payer: MEDICARE

## 2022-03-28 ENCOUNTER — APPOINTMENT (EMERGENCY)
Dept: RADIOLOGY | Facility: HOSPITAL | Age: 72
End: 2022-03-28
Payer: MEDICARE

## 2022-03-28 ENCOUNTER — APPOINTMENT (EMERGENCY)
Dept: CT IMAGING | Facility: HOSPITAL | Age: 72
End: 2022-03-28
Payer: MEDICARE

## 2022-03-28 VITALS
BODY MASS INDEX: 28.69 KG/M2 | OXYGEN SATURATION: 98 % | DIASTOLIC BLOOD PRESSURE: 47 MMHG | HEART RATE: 88 BPM | TEMPERATURE: 98.7 F | HEIGHT: 67 IN | RESPIRATION RATE: 20 BRPM | SYSTOLIC BLOOD PRESSURE: 105 MMHG | WEIGHT: 182.76 LBS

## 2022-03-28 DIAGNOSIS — Z86.69 HX OF BLURRED VISION: Primary | ICD-10-CM

## 2022-03-28 DIAGNOSIS — R07.9 CHEST PAIN: ICD-10-CM

## 2022-03-28 LAB
2HR DELTA HS TROPONIN: -1 NG/L
ANION GAP SERPL CALCULATED.3IONS-SCNC: 10 MMOL/L (ref 4–13)
BASOPHILS # BLD AUTO: 0.04 THOUSANDS/ΜL (ref 0–0.1)
BASOPHILS NFR BLD AUTO: 0 % (ref 0–1)
BUN SERPL-MCNC: 30 MG/DL (ref 5–25)
CALCIUM SERPL-MCNC: 9.1 MG/DL (ref 8.4–10.2)
CARDIAC TROPONIN I PNL SERPL HS: 8 NG/L
CARDIAC TROPONIN I PNL SERPL HS: 9 NG/L
CHLORIDE SERPL-SCNC: 100 MMOL/L (ref 96–108)
CO2 SERPL-SCNC: 27 MMOL/L (ref 21–32)
CREAT SERPL-MCNC: 1.43 MG/DL (ref 0.6–1.3)
EOSINOPHIL # BLD AUTO: 0.3 THOUSAND/ΜL (ref 0–0.61)
EOSINOPHIL NFR BLD AUTO: 3 % (ref 0–6)
ERYTHROCYTE [DISTWIDTH] IN BLOOD BY AUTOMATED COUNT: 13.5 % (ref 11.6–15.1)
GFR SERPL CREATININE-BSD FRML MDRD: 48 ML/MIN/1.73SQ M
GLUCOSE SERPL-MCNC: 157 MG/DL (ref 65–140)
HCT VFR BLD AUTO: 40.1 % (ref 36.5–49.3)
HGB BLD-MCNC: 13.2 G/DL (ref 12–17)
IMM GRANULOCYTES # BLD AUTO: 0.09 THOUSAND/UL (ref 0–0.2)
IMM GRANULOCYTES NFR BLD AUTO: 1 % (ref 0–2)
LYMPHOCYTES # BLD AUTO: 1.03 THOUSANDS/ΜL (ref 0.6–4.47)
LYMPHOCYTES NFR BLD AUTO: 9 % (ref 14–44)
MCH RBC QN AUTO: 29 PG (ref 26.8–34.3)
MCHC RBC AUTO-ENTMCNC: 32.9 G/DL (ref 31.4–37.4)
MCV RBC AUTO: 88 FL (ref 82–98)
MONOCYTES # BLD AUTO: 0.83 THOUSAND/ΜL (ref 0.17–1.22)
MONOCYTES NFR BLD AUTO: 7 % (ref 4–12)
NEUTROPHILS # BLD AUTO: 9.45 THOUSANDS/ΜL (ref 1.85–7.62)
NEUTS SEG NFR BLD AUTO: 80 % (ref 43–75)
NRBC BLD AUTO-RTO: 0 /100 WBCS
PLATELET # BLD AUTO: 309 THOUSANDS/UL (ref 149–390)
PMV BLD AUTO: 10.5 FL (ref 8.9–12.7)
POTASSIUM SERPL-SCNC: 4.4 MMOL/L (ref 3.5–5.3)
RBC # BLD AUTO: 4.55 MILLION/UL (ref 3.88–5.62)
SODIUM SERPL-SCNC: 137 MMOL/L (ref 135–147)
WBC # BLD AUTO: 11.74 THOUSAND/UL (ref 4.31–10.16)

## 2022-03-28 PROCEDURE — 99285 EMERGENCY DEPT VISIT HI MDM: CPT

## 2022-03-28 PROCEDURE — 96361 HYDRATE IV INFUSION ADD-ON: CPT

## 2022-03-28 PROCEDURE — 84484 ASSAY OF TROPONIN QUANT: CPT | Performed by: EMERGENCY MEDICINE

## 2022-03-28 PROCEDURE — 70450 CT HEAD/BRAIN W/O DYE: CPT

## 2022-03-28 PROCEDURE — 36415 COLL VENOUS BLD VENIPUNCTURE: CPT | Performed by: EMERGENCY MEDICINE

## 2022-03-28 PROCEDURE — 80048 BASIC METABOLIC PNL TOTAL CA: CPT | Performed by: EMERGENCY MEDICINE

## 2022-03-28 PROCEDURE — 96360 HYDRATION IV INFUSION INIT: CPT

## 2022-03-28 PROCEDURE — 85025 COMPLETE CBC W/AUTO DIFF WBC: CPT | Performed by: EMERGENCY MEDICINE

## 2022-03-28 PROCEDURE — 71045 X-RAY EXAM CHEST 1 VIEW: CPT

## 2022-03-28 PROCEDURE — 99285 EMERGENCY DEPT VISIT HI MDM: CPT | Performed by: EMERGENCY MEDICINE

## 2022-03-28 RX ORDER — SODIUM CHLORIDE 9 MG/ML
3 INJECTION INTRAVENOUS
Status: DISCONTINUED | OUTPATIENT
Start: 2022-03-28 | End: 2022-03-29 | Stop reason: HOSPADM

## 2022-03-28 RX ADMIN — SODIUM CHLORIDE 1000 ML: 0.9 INJECTION, SOLUTION INTRAVENOUS at 18:02

## 2022-03-28 NOTE — ED NOTES
Spoke with [facility] to arrange transport for patient  Stated ETA 2100  Will continue to monitor        Darryle Schlatter, RN  03/28/22 3184

## 2022-03-28 NOTE — ED PROVIDER NOTES
History  Chief Complaint   Patient presents with   [employer]     reports waking up from a nap with blurred vision, lasted seconds and resolved, reports this happens often s/p nap, also reprots intermittent chest pain after eating, currently states L-sided chest pain     66-year-old male previous history of type 2 NSTEMI earlier this year with multivessel disease that is being medically managed secondary to significant comorbidities/not being candidate for CABG, nonambulatory at baseline, hypertension, hyperlipidemia, Parkinson's disease  Patient presents for transient blurred vision  Patient also notes chest pain  Patient states that his vision blurs regularly  Denies other neurological deficits such as numbness, tingling, weakness  The blurred vision was transient and lasted only a few seconds  No syncopal episode  No fevers, chills, nausea, vomiting, diarrhea  Patient notes left-sided chest pain  He is unsure how long with has been present but notes that he regularly gets chest pain  Chest pain happens only when eating  Patient notes a longstanding left-sided facial droop  States it has been present for multiple decades  Lives with his wife  She helps take care of him  States that he feels safe at home  Per review    3/7 - cardiac catheterization  Prox LAD lesion is 80% stenosed  1st marginal lesion is 100% stenosed  Mid RCA 55% stenosed  Angioplasty in 1st marginal lesion attempted but unsuccessful; 100% stenosis post-intervention  3/6 - NM myocardial perfusion spect (rx stress and/or rest)  LVEF = 61%; L ventricular perfusion defect that is large in size and present in entire inferior and inferolateral location that is partially perfusable  3/5 - ECHO  LVEF =  55%   Normal systolic and diastolic function  The following segments are akinetic: basal inferior, mid inferior, mid inferolateral and apical lateral   The following segments are hypokinetic: basal inferolateral and apical inferior  3/5 - CTA  Extensive atheromatous changes/plaque throughout the aortoiliac vasculature without flow-limiting stenosis or occlusion  Chest Pain  Pain location:  L lateral chest  Pain radiates to:  Does not radiate  Pain radiates to the back: no    Pain severity:  Mild  Onset quality:  Gradual  Timing:  Intermittent  Progression:  Unchanged  Chronicity:  New  Relieved by:  Nothing  Worsened by:  Nothing tried  Ineffective treatments:  None tried      Prior to Admission Medications   Prescriptions Last Dose Informant Patient Reported? Taking?    ACCU-CHEK GUIDE test strip  Outside Facility (Specify) Yes No   Si strip as needed   Alcohol Swabs (PRO COMFORT ALCOHOL) 70 % PADS  Outside Facility (Specify) Yes No   Si pad daily Use a pad when testing   Easy Comfort Lancets MISC  Outside Facility (Specify) Yes No   Sig: as needed   Glucagon, rDNA, (GLUCAGON EMERGENCY IJ)   Yes No   Sig: Inject 1 mL as directed Sugars less than 60 and conscious   Glucose 15 g PACK   Yes No   Sig: Take 15 g by mouth Per [facility] "give 15 gram PO every 15 minutes PRN for hypoglycemia"   NovoFine Autocover 30G X 8 MM MISC   Yes No   NovoLOG FlexPen 100 units/mL injection pen   Yes No   acetaminophen (TYLENOL) 325 mg tablet   Yes No   Sig: Take 650 mg by mouth every 6 (six) hours as needed for mild pain   aspirin 81 mg chewable tablet   No No   Sig: Chew 1 tablet (81 mg total) daily   atorvastatin (LIPITOR) 40 mg tablet   No No   Sig: Take 1 tablet (40 mg total) by mouth daily   carbidopa-levodopa (SINEMET)  mg per tablet   No No   Sig: Take 1 tablet by mouth 3 (three) times a day   cholecalciferol (VITAMIN D3) 1,000 units tablet  Outside Facility (Specify) No No   Sig: Take 2 tablets (2,000 Units total) by mouth daily   Patient taking differently: Take 50,000 Units by mouth daily    clopidogrel (PLAVIX) 75 mg tablet   No No   Sig: Take 1 tablet (75 mg total) by mouth daily   cyanocobalamin (VITAMIN B-12) 1000 MCG tablet  Outside Facility (Specify) No No   Sig: Take 1 tablet (1,000 mcg total) by mouth daily   docusate sodium (COLACE) 100 mg capsule  Outside Facility (Specify) No No   Sig: Take 1 capsule (100 mg total) by mouth 2 (two) times a day   finasteride (PROSCAR) 5 mg tablet  Outside Facility (Specify) No No   Sig: Take 1 tablet (5 mg total) by mouth daily   glipiZIDE (GLUCOTROL) 10 mg tablet   No No   Sig: TAKE 1 TABLET BY MOUTH 2 TIMES A DAY   Patient taking differently: Per [facility] "give 5mg by mouth one time a prescriber day for DM 2"   glucose blood test strip  Outside Facility (Specify) Yes No   Si strip as needed   melatonin 3 mg   Yes No   Sig: Take 3 mg by mouth daily at bedtime Give 2 tabs at bed time for insomnia per [facility]   metFORMIN (GLUCOPHAGE) 1000 MG tablet   No No   Sig: TAKE 1 TABLET BY MOUTH 2 TIMES A DAY   pantoprazole (PROTONIX) 40 mg tablet   No No   Sig: Take 1 tablet (40 mg total) by mouth 2 (two) times a day before meals   polyethylene glycol (MIRALAX) 17 g packet  Outside Facility (Specify) No No   Sig: Take 17 g by mouth daily   senna (SENOKOT) 8 6 mg   No No   Sig: Take 1 tablet (8 6 mg total) by mouth daily as needed for constipation   tamsulosin (FLOMAX) 0 4 mg  Outside Facility (Specify) No No   Sig: Take 1 capsule (0 4 mg total) by mouth daily with dinner   vitamin B-12 (CYANOCOBALAMIN) 100 MCG TABS  Outside Facility (Specify) Yes No   Sig: Take 100 mcg by mouth daily      Facility-Administered Medications: None       Past Medical History:   Diagnosis Date    Ambulatory dysfunction     uses walker with assist of 1    Anemia     Anxiety     At risk for falls     hx of falls    Benign paroxysmal vertigo     unspecified ear    BPH (benign prostatic hyperplasia)     for TURP today 2021    Calculus in bladder     for surgical removal today 2021    Cataract     left eye    Cervicalgia     Chest pain     Chronic kidney disease     stage 3    Constipation     CTS (carpal tunnel syndrome)     left    Cyst of pancreas     Cystitis 06/23/2020    acute cystitis with hematuria    Depression     Diabetes mellitus ([facility] )     type II with neuropathy    Dizziness     and giddiness    Dysphagia     Elevated PSA     Facial weakness     GERD (gastroesophageal reflux disease)     last assessed 5/20/16    Gross hematuria     Hematuria     Hyperlipidemia     Hypertension     Kidney disease     Kidney stone     Left-sided weakness     Long term (current) use of oral hypoglycemic drugs     Muscle weakness     Nuclear senile cataract of left eye     OA (osteoarthritis) of knee     b/l    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[facility] )     Syncope and collapse     Tachycardia     UTI (urinary tract infection)     Vertebro-basilar artery syndrome     Vitamin B deficiency     Vitamin D deficiency     Vitamin D deficiency        Past Surgical History:   Procedure Laterality Date    CARDIAC CATHETERIZATION N/A 3/7/2022    Procedure: CARDIAC CATHETERIZATION;  Surgeon: Destiny Quinn DO;  [facility]; Service: Cardiology    CATARACT EXTRACTION      CHOLECYSTECTOMY      KNEE SURGERY      IL REMOVE BLADDER STONE,<2 5 CM N/A 1/4/2021    Procedure: Cystolitholopaxy w/laser bladder stones;  Surgeon: Henry Chew MD;  Location[facility];  Service: Urology    IL TRANSURETHRAL ELEC-SURG PROSTATECTOM N/A 1/4/2021    Procedure: T U R P ;  Surgeon: Henry Chew MD;  [facility];  Service: Urology       Family History   Problem Relation Age of Onset    Coronary artery disease Mother     Diabetes Father      I have reviewed and agree with the history as documented      E-Cigarette/Vaping    E-Cigarette Use Never User      E-Cigarette/Vaping Substances    Nicotine No     THC No     CBD No      Social History     Tobacco Use    Smoking status: Never Smoker    Smokeless tobacco: Never Used   Vaping Use    Vaping Use: Never used Substance Use Topics    Alcohol use: Not Currently    Drug use: No       Review of Systems   HENT:        Transient blurred vision   Cardiovascular: Positive for chest pain  Neurological:        Transient blurred vision   All other systems reviewed and are negative  Physical Exam  Physical Exam  Vitals and nursing note reviewed  Constitutional:       General: He is not in acute distress  Appearance: He is well-developed  He is not diaphoretic  HENT:      Head: Normocephalic and atraumatic  Right Ear: External ear normal       Left Ear: External ear normal    Eyes:      Conjunctiva/sclera: Conjunctivae normal    Neck:      Vascular: No JVD  Trachea: No tracheal deviation  Cardiovascular:      Rate and Rhythm: Normal rate and regular rhythm  Heart sounds: Normal heart sounds  No murmur heard  Pulmonary:      Effort: No respiratory distress  Breath sounds: Normal breath sounds  No stridor  No wheezing or rales  Abdominal:      General: Bowel sounds are normal  There is no distension  Palpations: Abdomen is soft  There is no mass  Tenderness: There is no abdominal tenderness  There is no guarding or rebound  Musculoskeletal:         General: No tenderness or deformity  Skin:     General: Skin is warm and dry  Capillary Refill: Capillary refill takes less than 2 seconds  Coloration: Skin is not pale  Findings: No erythema or rash  Neurological:      General: No focal deficit present  Cranial Nerves: No cranial nerve deficit  Motor: No weakness or abnormal muscle tone  Coordination: Coordination normal       Gait: Gait normal       Comments: Negative pronator drift  Cranial nerves 2-12 intact bilaterally besides left-sided facial droop primarily affecting mouth  Patient is able to hold his arms and legs up for greater than 10 seconds without drift      Sensation is intact light touch throughout the upper lower extremities bilaterally  Visual fields intact  Psychiatric:         Behavior: Behavior normal          Thought Content:  Thought content normal          Judgment: Judgment normal          Vital Signs  ED Triage Vitals [03/28/22 1621]   Temperature Pulse Respirations Blood Pressure SpO2   98 7 °F (37 1 °C) 95 20 125/64 100 %      Temp Source Heart Rate Source Patient Position - Orthostatic VS BP Location FiO2 (%)   Oral Monitor Lying Right arm --      Pain Score       5           Vitals:    03/28/22 1621 03/28/22 1733 03/28/22 1918   BP: 125/64 (!) 106/48 (!) 105/47   Pulse: 95 90 88   Patient Position - Orthostatic VS: Lying Lying Lying         Visual Acuity      ED Medications  Medications   sodium chloride (PF) 0 9 % injection 3 mL (has no administration in time range)   sodium chloride 0 9 % bolus 1,000 mL (0 mL Intravenous Stopped 3/28/22 1946)       Diagnostic Studies  Results Reviewed     Procedure Component Value Units Date/Time    HS Troponin I 2hr [[id number]]  (Normal) Collected: 03/28/22 1827    Lab Status: Final result Specimen: Blood from Arm, Left Updated: 03/28/22 1857     hs TnI 2hr 8 ng/L      Delta 2hr hsTnI -1 ng/L     HS Troponin 0hr (reflex protocol) [[id number]]  (Normal) Collected: 03/28/22 1632    Lab Status: Final result Specimen: Blood from Arm, Left Updated: 03/28/22 1704     hs TnI 0hr 9 ng/L     Basic metabolic panel [[id number]]  (Abnormal) Collected: 03/28/22 1632    Lab Status: Final result Specimen: Blood from Arm, Left Updated: 03/28/22 1659     Sodium 137 mmol/L      Potassium 4 4 mmol/L      Chloride 100 mmol/L      CO2 27 mmol/L      ANION GAP 10 mmol/L      BUN 30 mg/dL      Creatinine 1 43 mg/dL      Glucose 157 mg/dL      Calcium 9 1 mg/dL      eGFR 48 ml/min/1 73sq m     Narrative:      [place] guidelines for Chronic Kidney Disease (CKD):     Stage 1 with normal or high GFR (GFR > 90 mL/min/1 73 square meters)    Stage 2 Mild CKD (GFR = 60-89 mL/min/1 73 square meters)    Stage 3A Moderate CKD (GFR = 45-59 mL/min/1 73 square meters)    Stage 3B Moderate CKD (GFR = 30-44 mL/min/1 73 square meters)    Stage 4 Severe CKD (GFR = 15-29 mL/min/1 73 square meters)    Stage 5 End Stage CKD (GFR <15 mL/min/1 73 square meters)  Note: GFR calculation is accurate only with a steady state creatinine    CBC and differential [[id number]]  (Abnormal) Collected: 03/28/22 1632    Lab Status: Final result Specimen: Blood from Arm, Left Updated: 03/28/22 1638     WBC 11 74 Thousand/uL      RBC 4 55 Million/uL      Hemoglobin 13 2 g/dL      Hematocrit 40 1 %      MCV 88 fL      MCH 29 0 pg      MCHC 32 9 g/dL      RDW 13 5 %      MPV 10 5 fL      Platelets 902 Thousands/uL      nRBC 0 /100 WBCs      Neutrophils Relative 80 %      Immat GRANS % 1 %      Lymphocytes Relative 9 %      Monocytes Relative 7 %      Eosinophils Relative 3 %      Basophils Relative 0 %      Neutrophils Absolute 9 45 Thousands/µL      Immature Grans Absolute 0 09 Thousand/uL      Lymphocytes Absolute 1 03 Thousands/µL      Monocytes Absolute 0 83 Thousand/µL      Eosinophils Absolute 0 30 Thousand/µL      Basophils Absolute 0 04 Thousands/µL                  X-ray chest 1 view portable   ED Interpretation by Miguel Lopez DO (03/28 1810)   No acute cardiopulmonary process  CT head without contrast   Final Result by Selvin Jimenez MD (03/28 1721)      No acute intracranial abnormality  Workstation performed: [id number]                    Procedures  Procedures         ED Course  ED Course as of 03/28/22 2353   Mon Mar 28, 2022   1632 Attempted to call wife's mobile number  No answer  Home phone number disconnected  1641 Comparison EKG dated March 5th  Procedure Note: EKG  Date/Time: 03/28/22 4:41 PM   Interpreted by: Samira Mcfarland  Indications / Diagnosis: CP  ECG reviewed by me, the ED Provider: yes   The EKG demonstrates:  Rhythm: normal sinus  Intervals: normal intervals  Axis: normal axis  QRS/Blocks:  Small Q-waves in inferior leads unchanged from previous  T-wave inversions in V1 through V4 unchanged from previous  Previous inverted T-waves in V5 and V6 now upright  Right bundle-branch block pattern in v2/3, qrs 133  ST Changes: No acute ST Changes, no STD/LYNN      1704 hs TnI 0hr: 9   1801 Creatinine(!): 1 43  Baseline 1 2  Not flash   1807 Attempted to call wife  No answer  1807 Patient reports that he has nursing visits home  Up to 3 times daily  Feel safe at home  Talked to patient about possible admission  Patient prefers to be discharged home  [address] [place] [address] with patient's wife  She confirms that his facial droop is longstanding  She confirms that he is safe to be at home with multiple nurse visits daily  HEART Risk Score      Most Recent Value   Heart Score Risk Calculator    History 0 Filed at: 03/28/2022 1706   ECG 1 Filed at: 03/28/2022 1706   Age 2 Filed at: 03/28/2022 1706   Risk Factors 2 Filed at: 03/28/2022 1706   Troponin 0 Filed at: 03/28/2022 1706   HEART Score 5 Filed at: 03/28/2022 1706                                      MDM  Number of Diagnoses or Management Options  Chest pain: new and requires workup  Hx of blurred vision: new and requires workup  Diagnosis management comments: 70-year-old male with transient blurred vision bilaterally  No neurological deficits that are new on examination per the patient  Attempted to speak with the patient's wife to ensure that the history the patient is time is accurate  Unable to talk to the wife  Transient vision blurring not consistent with stroke, but will evaluate with CT of the head for intracranial bleed  Normal    Will evaluate for electrolyte abnormalities, anemia, arrhythmia, ACS, pneumothorax, pneumonia  Workup w/o significant findings  Heart score 5, but history of pain with eating  Unlikely to be cardiac  Will refer to cardiology  Return precautions given  Amount and/or Complexity of Data Reviewed  Clinical lab tests: ordered and reviewed  Tests in the radiology section of CPT®: reviewed and ordered  Review and summarize past medical records: yes  Independent visualization of images, tracings, or specimens: yes    Risk of Complications, Morbidity, and/or Mortality  Presenting problems: moderate  Diagnostic procedures: moderate  Management options: moderate        Disposition  Final diagnoses:   Hx of blurred vision   Chest pain     Time reflects when diagnosis was documented in both MDM as applicable and the Disposition within this note     Time User Action Codes Description Comment    3/28/2022  7:46 PM Tigist Rooneyger Add [Z86 69] Hx of blurred vision     3/28/2022  7:46 PM Tigist Rooneyger Add [R07 9] Chest pain       ED Disposition     ED Disposition Condition Date/Time Comment    Discharge Stable Mon Mar 28, 2022  7:46 PM Erasmo De Jesus discharge to home/self care  Follow-up Information     Follow up With Specialties Details Why Contact Info Additional [address] Emergency Department Emergency Medicine  If symptoms worsen [address] [facility],[address] [id number]  [address] [facility] Emergency Department, [address], [address] [place] [address]    Kade Alfred MD Internal Medicine  For re-evaluation as soon as possible [address]  [phone]             Discharge Medication List as of 3/28/2022  7:47 PM      CONTINUE these medications which have NOT CHANGED    Details   ! ! ACCU-CHEK GUIDE test strip 1 strip as needed, Starting Tue 6/16/2020, Historical Med      acetaminophen (TYLENOL) 325 mg tablet Take 650 mg by mouth every 6 (six) hours as needed for mild pain, Historical Med      Alcohol Swabs (PRO COMFORT ALCOHOL) 70 % PADS 1 pad daily Use a pad when testing, Starting Tue 6/16/2020, Historical Med      aspirin 81 mg chewable tablet Chew 1 tablet (81 mg total) daily, Starting Thu 3/10/2022, Normal      atorvastatin (LIPITOR) 40 mg tablet Take 1 tablet (40 mg total) by mouth daily, Starting Thu 3/10/2022, Normal      carbidopa-levodopa (SINEMET)  mg per tablet Take 1 tablet by mouth 3 (three) times a day, Starting Thu 7/22/2021, Until Thu 10/14/2021, Normal      cholecalciferol (VITAMIN D3) 1,000 units tablet Take 2 tablets (2,000 Units total) by mouth daily, Starting Wed 6/24/2020, No Print      clopidogrel (PLAVIX) 75 mg tablet Take 1 tablet (75 mg total) by mouth daily, Starting Thu 3/10/2022, Normal      !! cyanocobalamin (VITAMIN B-12) 1000 MCG tablet Take 1 tablet (1,000 mcg total) by mouth daily, Starting Tue 6/23/2020, No Print      docusate sodium (COLACE) 100 mg capsule Take 1 capsule (100 mg total) by mouth 2 (two) times a day, Starting Tue 6/23/2020, Normal      Easy Comfort Lancets MISC as needed, Starting Tue 6/16/2020, Historical Med      finasteride (PROSCAR) 5 mg tablet Take 1 tablet (5 mg total) by mouth daily, Starting Wed 5/13/2020, No Print      glipiZIDE (GLUCOTROL) 10 mg tablet TAKE 1 TABLET BY MOUTH 2 TIMES A DAY, Normal      Glucagon, rDNA, (GLUCAGON EMERGENCY IJ) Inject 1 mL as directed Sugars less than 60 and conscious, Historical Med      Glucose 15 g PACK Take 15 g by mouth Per [facility] "give 15 gram PO every 15 minutes PRN for hypoglycemia", Historical Med      !! glucose blood test strip 1 strip as needed, Historical Med      melatonin 3 mg Take 3 mg by mouth daily at bedtime Give 2 tabs at bed time for insomnia per [facility], Historical Med      metFORMIN (GLUCOPHAGE) 1000 MG tablet TAKE 1 TABLET BY MOUTH 2 TIMES A DAY, Normal      NovoFine Autocover 30G X 8 MM MISC Starting Wed 12/9/2020, Historical Med      NovoLOG FlexPen 100 units/mL injection pen Starting Sat 1/2/2021, Historical Med      pantoprazole (PROTONIX) 40 mg tablet Take 1 tablet (40 mg total) by mouth 2 (two) times a day before meals, Starting Wed 3/9/2022, Normal      polyethylene glycol (MIRALAX) 17 g packet Take 17 g by mouth daily, Starting Wed 6/3/2020, Normal      senna (SENOKOT) 8 6 mg Take 1 tablet (8 6 mg total) by mouth daily as needed for constipation, Starting Wed 2/10/2021, Normal      tamsulosin (FLOMAX) 0 4 mg Take 1 capsule (0 4 mg total) by mouth daily with dinner, Starting Wed 5/13/2020, No Print      !! vitamin B-12 (CYANOCOBALAMIN) 100 MCG TABS Take 100 mcg by mouth daily, Starting Tue 6/16/2020, Historical Med       !! - Potential duplicate medications found  Please discuss with provider                PDMP Review       Value Time User    PDMP Reviewed  Yes 3/4/2022 10:51 PM Luis Enrique Lu MD          ED Provider  Electronically Signed by           Marli Bush DO  03/28/22 2737

## 2022-03-28 NOTE — DISCHARGE INSTRUCTIONS
Follow-up with a cardiologist as soon as possible  Come back to the emergency department if you have new or worsening symptoms such as shortness of breath, pain, fevers

## 2022-03-29 NOTE — ED NOTES
Report and paperwork provided to EMS crew and patient prepared for return transport home     Kun Chow RN  03/28/22 [phone]

## 2022-04-04 ENCOUNTER — HOSPITAL ENCOUNTER (OUTPATIENT)
Facility: HOSPITAL | Age: 72
Setting detail: OBSERVATION
Discharge: HOME | End: 2022-04-05
Attending: EMERGENCY MEDICINE | Admitting: INTERNAL MEDICINE
Payer: MEDICARE

## 2022-04-04 ENCOUNTER — APPOINTMENT (EMERGENCY)
Dept: RADIOLOGY | Facility: HOSPITAL | Age: 72
End: 2022-04-04
Payer: MEDICARE

## 2022-04-04 DIAGNOSIS — E55.9 VITAMIN D DEFICIENCY: ICD-10-CM

## 2022-04-04 DIAGNOSIS — I21.A1 TYPE 2 MI (MYOCARDIAL INFARCTION) (HCC): ICD-10-CM

## 2022-04-04 DIAGNOSIS — R07.9 CHEST PAIN: Primary | ICD-10-CM

## 2022-04-04 DIAGNOSIS — E11.42 TYPE 2 DIABETES MELLITUS WITH DIABETIC POLYNEUROPATHY, WITHOUT LONG-TERM CURRENT USE OF INSULIN (HCC): ICD-10-CM

## 2022-04-04 DIAGNOSIS — R77.8 ELEVATED TROPONIN: ICD-10-CM

## 2022-04-04 DIAGNOSIS — K21.9 GERD (GASTROESOPHAGEAL REFLUX DISEASE): ICD-10-CM

## 2022-04-04 DIAGNOSIS — I21.4 NSTEMI (NON-ST ELEVATED MYOCARDIAL INFARCTION) (HCC): ICD-10-CM

## 2022-04-04 LAB
ALBUMIN SERPL BCP-MCNC: 3.3 G/DL (ref 3.5–5)
ALP SERPL-CCNC: 95 U/L (ref 46–116)
ALT SERPL W P-5'-P-CCNC: 14 U/L (ref 12–78)
ANION GAP SERPL CALCULATED.3IONS-SCNC: 12 MMOL/L (ref 4–13)
AST SERPL W P-5'-P-CCNC: 12 U/L (ref 5–45)
BASOPHILS # BLD AUTO: 0.06 THOUSANDS/ΜL (ref 0–0.1)
BASOPHILS NFR BLD AUTO: 0 % (ref 0–1)
BILIRUB SERPL-MCNC: 0.54 MG/DL (ref 0.2–1)
BUN SERPL-MCNC: 38 MG/DL (ref 5–25)
CALCIUM ALBUM COR SERPL-MCNC: 9.6 MG/DL (ref 8.3–10.1)
CALCIUM SERPL-MCNC: 9 MG/DL (ref 8.3–10.1)
CARDIAC TROPONIN I PNL SERPL HS: 491 NG/L
CHLORIDE SERPL-SCNC: 104 MMOL/L (ref 100–108)
CO2 SERPL-SCNC: 25 MMOL/L (ref 21–32)
CREAT SERPL-MCNC: 1.46 MG/DL (ref 0.6–1.3)
EOSINOPHIL # BLD AUTO: 0.16 THOUSAND/ΜL (ref 0–0.61)
EOSINOPHIL NFR BLD AUTO: 1 % (ref 0–6)
ERYTHROCYTE [DISTWIDTH] IN BLOOD BY AUTOMATED COUNT: 13.6 % (ref 11.6–15.1)
FLUAV RNA RESP QL NAA+PROBE: NEGATIVE
FLUBV RNA RESP QL NAA+PROBE: NEGATIVE
GFR SERPL CREATININE-BSD FRML MDRD: 47 ML/MIN/1.73SQ M
GLUCOSE SERPL-MCNC: 202 MG/DL (ref 65–140)
HCT VFR BLD AUTO: 38.7 % (ref 36.5–49.3)
HGB BLD-MCNC: 12.9 G/DL (ref 12–17)
IMM GRANULOCYTES # BLD AUTO: 0.11 THOUSAND/UL (ref 0–0.2)
IMM GRANULOCYTES NFR BLD AUTO: 1 % (ref 0–2)
LYMPHOCYTES # BLD AUTO: 1.16 THOUSANDS/ΜL (ref 0.6–4.47)
LYMPHOCYTES NFR BLD AUTO: 7 % (ref 14–44)
MCH RBC QN AUTO: 29.7 PG (ref 26.8–34.3)
MCHC RBC AUTO-ENTMCNC: 33.3 G/DL (ref 31.4–37.4)
MCV RBC AUTO: 89 FL (ref 82–98)
MONOCYTES # BLD AUTO: 1.15 THOUSAND/ΜL (ref 0.17–1.22)
MONOCYTES NFR BLD AUTO: 7 % (ref 4–12)
NEUTROPHILS # BLD AUTO: 15.14 THOUSANDS/ΜL (ref 1.85–7.62)
NEUTS SEG NFR BLD AUTO: 84 % (ref 43–75)
NRBC BLD AUTO-RTO: 0 /100 WBCS
PLATELET # BLD AUTO: 290 THOUSANDS/UL (ref 149–390)
PMV BLD AUTO: 10.8 FL (ref 8.9–12.7)
POTASSIUM SERPL-SCNC: 4.2 MMOL/L (ref 3.5–5.3)
PROT SERPL-MCNC: 6.9 G/DL (ref 6.4–8.2)
RBC # BLD AUTO: 4.34 MILLION/UL (ref 3.88–5.62)
RSV RNA RESP QL NAA+PROBE: NEGATIVE
SARS-COV-2 RNA RESP QL NAA+PROBE: NEGATIVE
SODIUM SERPL-SCNC: 141 MMOL/L (ref 136–145)
WBC # BLD AUTO: 17.78 THOUSAND/UL (ref 4.31–10.16)

## 2022-04-04 PROCEDURE — 36415 COLL VENOUS BLD VENIPUNCTURE: CPT | Performed by: EMERGENCY MEDICINE

## 2022-04-04 PROCEDURE — 99285 EMERGENCY DEPT VISIT HI MDM: CPT | Performed by: EMERGENCY MEDICINE

## 2022-04-04 PROCEDURE — 85379 FIBRIN DEGRADATION QUANT: CPT | Performed by: EMERGENCY MEDICINE

## 2022-04-04 PROCEDURE — 71045 X-RAY EXAM CHEST 1 VIEW: CPT

## 2022-04-04 PROCEDURE — 93005 ELECTROCARDIOGRAM TRACING: CPT

## 2022-04-04 PROCEDURE — 85025 COMPLETE CBC W/AUTO DIFF WBC: CPT | Performed by: EMERGENCY MEDICINE

## 2022-04-04 PROCEDURE — 0241U HB NFCT DS VIR RESP RNA 4 TRGT: CPT | Performed by: EMERGENCY MEDICINE

## 2022-04-04 PROCEDURE — 80053 COMPREHEN METABOLIC PANEL: CPT | Performed by: EMERGENCY MEDICINE

## 2022-04-04 PROCEDURE — 84484 ASSAY OF TROPONIN QUANT: CPT | Performed by: EMERGENCY MEDICINE

## 2022-04-04 PROCEDURE — 99285 EMERGENCY DEPT VISIT HI MDM: CPT

## 2022-04-04 NOTE — Clinical Note
Case was discussed with GÉNESIS Stanford and the patient's admission status was agreed to be Admission Status: observation status to the service of Dr Magi Hartley

## 2022-04-05 VITALS
RESPIRATION RATE: 18 BRPM | WEIGHT: 182.98 LBS | TEMPERATURE: 97.6 F | HEIGHT: 67 IN | BODY MASS INDEX: 28.72 KG/M2 | HEART RATE: 94 BPM | SYSTOLIC BLOOD PRESSURE: 124 MMHG | OXYGEN SATURATION: 96 % | DIASTOLIC BLOOD PRESSURE: 67 MMHG

## 2022-04-05 PROBLEM — R07.9 CHEST PAIN: Status: ACTIVE | Noted: 2022-04-05

## 2022-04-05 PROBLEM — D72.829 LEUKOCYTOSIS: Status: ACTIVE | Noted: 2022-04-05

## 2022-04-05 LAB
2HR DELTA HS TROPONIN: -9 NG/L
4HR DELTA HS TROPONIN: -111 NG/L
ANION GAP SERPL CALCULATED.3IONS-SCNC: 11 MMOL/L (ref 4–13)
APTT PPP: 30 SECONDS (ref 23–37)
APTT PPP: 39 SECONDS (ref 23–37)
BUN SERPL-MCNC: 35 MG/DL (ref 5–25)
CALCIUM SERPL-MCNC: 8.6 MG/DL (ref 8.3–10.1)
CARDIAC TROPONIN I PNL SERPL HS: 380 NG/L
CARDIAC TROPONIN I PNL SERPL HS: 482 NG/L
CHLORIDE SERPL-SCNC: 105 MMOL/L (ref 100–108)
CO2 SERPL-SCNC: 22 MMOL/L (ref 21–32)
CREAT SERPL-MCNC: 1.39 MG/DL (ref 0.6–1.3)
D DIMER PPP FEU-MCNC: 0.35 UG/ML FEU
ERYTHROCYTE [DISTWIDTH] IN BLOOD BY AUTOMATED COUNT: 13.7 % (ref 11.6–15.1)
GFR SERPL CREATININE-BSD FRML MDRD: 50 ML/MIN/1.73SQ M
GLUCOSE P FAST SERPL-MCNC: 181 MG/DL (ref 65–99)
GLUCOSE SERPL-MCNC: 169 MG/DL (ref 65–140)
GLUCOSE SERPL-MCNC: 178 MG/DL (ref 65–140)
GLUCOSE SERPL-MCNC: 181 MG/DL (ref 65–140)
HCT VFR BLD AUTO: 36.1 % (ref 36.5–49.3)
HGB BLD-MCNC: 12.1 G/DL (ref 12–17)
INR PPP: 1.06 (ref 0.84–1.19)
MAGNESIUM SERPL-MCNC: 1.6 MG/DL (ref 1.6–2.6)
MCH RBC QN AUTO: 29.4 PG (ref 26.8–34.3)
MCHC RBC AUTO-ENTMCNC: 33.5 G/DL (ref 31.4–37.4)
MCV RBC AUTO: 88 FL (ref 82–98)
PLATELET # BLD AUTO: 285 THOUSANDS/UL (ref 149–390)
PMV BLD AUTO: 11.1 FL (ref 8.9–12.7)
POTASSIUM SERPL-SCNC: 3.9 MMOL/L (ref 3.5–5.3)
PROTHROMBIN TIME: 13.8 SECONDS (ref 11.6–14.5)
RBC # BLD AUTO: 4.11 MILLION/UL (ref 3.88–5.62)
SODIUM SERPL-SCNC: 138 MMOL/L (ref 136–145)
WBC # BLD AUTO: 16.07 THOUSAND/UL (ref 4.31–10.16)

## 2022-04-05 PROCEDURE — 84484 ASSAY OF TROPONIN QUANT: CPT | Performed by: EMERGENCY MEDICINE

## 2022-04-05 PROCEDURE — 85730 THROMBOPLASTIN TIME PARTIAL: CPT

## 2022-04-05 PROCEDURE — 85027 COMPLETE CBC AUTOMATED: CPT | Performed by: PHYSICIAN ASSISTANT

## 2022-04-05 PROCEDURE — 99235 HOSP IP/OBS SAME DATE MOD 70: CPT | Performed by: GENERAL PRACTICE

## 2022-04-05 PROCEDURE — RECHECK: Performed by: GENERAL PRACTICE

## 2022-04-05 PROCEDURE — 85610 PROTHROMBIN TIME: CPT | Performed by: EMERGENCY MEDICINE

## 2022-04-05 PROCEDURE — 93005 ELECTROCARDIOGRAM TRACING: CPT

## 2022-04-05 PROCEDURE — 80048 BASIC METABOLIC PNL TOTAL CA: CPT | Performed by: PHYSICIAN ASSISTANT

## 2022-04-05 PROCEDURE — 85730 THROMBOPLASTIN TIME PARTIAL: CPT | Performed by: EMERGENCY MEDICINE

## 2022-04-05 PROCEDURE — 83735 ASSAY OF MAGNESIUM: CPT | Performed by: PHYSICIAN ASSISTANT

## 2022-04-05 PROCEDURE — 82948 REAGENT STRIP/BLOOD GLUCOSE: CPT

## 2022-04-05 PROCEDURE — 36415 COLL VENOUS BLD VENIPUNCTURE: CPT | Performed by: EMERGENCY MEDICINE

## 2022-04-05 PROCEDURE — 99215 OFFICE O/P EST HI 40 MIN: CPT | Performed by: INTERNAL MEDICINE

## 2022-04-05 RX ORDER — TAMSULOSIN HYDROCHLORIDE 0.4 MG/1
0.4 CAPSULE ORAL
Status: DISCONTINUED | OUTPATIENT
Start: 2022-04-05 | End: 2022-04-05 | Stop reason: HOSPADM

## 2022-04-05 RX ORDER — FAMOTIDINE 20 MG/1
20 TABLET, FILM COATED ORAL
Qty: 30 TABLET | Refills: 0 | Status: SHIPPED | OUTPATIENT
Start: 2022-04-05 | End: 2022-04-05

## 2022-04-05 RX ORDER — RANOLAZINE 500 MG/1
500 TABLET, EXTENDED RELEASE ORAL EVERY 12 HOURS SCHEDULED
Status: DISCONTINUED | OUTPATIENT
Start: 2022-04-05 | End: 2022-04-05 | Stop reason: HOSPADM

## 2022-04-05 RX ORDER — DOCUSATE SODIUM 100 MG/1
100 CAPSULE, LIQUID FILLED ORAL 2 TIMES DAILY
Status: DISCONTINUED | OUTPATIENT
Start: 2022-04-05 | End: 2022-04-05 | Stop reason: HOSPADM

## 2022-04-05 RX ORDER — HEPARIN SODIUM 1000 [USP'U]/ML
4000 INJECTION, SOLUTION INTRAVENOUS; SUBCUTANEOUS ONCE
Status: COMPLETED | OUTPATIENT
Start: 2022-04-05 | End: 2022-04-05

## 2022-04-05 RX ORDER — ATORVASTATIN CALCIUM 40 MG/1
40 TABLET, FILM COATED ORAL DAILY
Status: DISCONTINUED | OUTPATIENT
Start: 2022-04-05 | End: 2022-04-05 | Stop reason: HOSPADM

## 2022-04-05 RX ORDER — RANOLAZINE 500 MG/1
500 TABLET, EXTENDED RELEASE ORAL EVERY 12 HOURS SCHEDULED
Qty: 60 TABLET | Refills: 0 | Status: SHIPPED | OUTPATIENT
Start: 2022-04-05 | End: 2022-04-05

## 2022-04-05 RX ORDER — PANTOPRAZOLE SODIUM 40 MG/1
40 TABLET, DELAYED RELEASE ORAL
Status: DISCONTINUED | OUTPATIENT
Start: 2022-04-05 | End: 2022-04-05 | Stop reason: HOSPADM

## 2022-04-05 RX ORDER — HEPARIN SODIUM 1000 [USP'U]/ML
2000 INJECTION, SOLUTION INTRAVENOUS; SUBCUTANEOUS
Status: DISCONTINUED | OUTPATIENT
Start: 2022-04-05 | End: 2022-04-05

## 2022-04-05 RX ORDER — MELATONIN
50000 WEEKLY
Start: 2022-04-05

## 2022-04-05 RX ORDER — ASPIRIN 81 MG/1
81 TABLET, CHEWABLE ORAL DAILY
Status: DISCONTINUED | OUTPATIENT
Start: 2022-04-05 | End: 2022-04-05 | Stop reason: HOSPADM

## 2022-04-05 RX ORDER — CLOPIDOGREL BISULFATE 75 MG/1
75 TABLET ORAL DAILY
Status: DISCONTINUED | OUTPATIENT
Start: 2022-04-05 | End: 2022-04-05 | Stop reason: HOSPADM

## 2022-04-05 RX ORDER — LISINOPRIL 5 MG/1
5 TABLET ORAL DAILY
Status: DISCONTINUED | OUTPATIENT
Start: 2022-04-05 | End: 2022-04-05 | Stop reason: HOSPADM

## 2022-04-05 RX ORDER — METOPROLOL SUCCINATE 25 MG/1
25 TABLET, EXTENDED RELEASE ORAL DAILY
Refills: 0
Start: 2022-04-06

## 2022-04-05 RX ORDER — ACETAMINOPHEN 325 MG/1
650 TABLET ORAL EVERY 6 HOURS PRN
Status: DISCONTINUED | OUTPATIENT
Start: 2022-04-05 | End: 2022-04-05 | Stop reason: HOSPADM

## 2022-04-05 RX ORDER — HEPARIN SODIUM 1000 [USP'U]/ML
4000 INJECTION, SOLUTION INTRAVENOUS; SUBCUTANEOUS
Status: DISCONTINUED | OUTPATIENT
Start: 2022-04-05 | End: 2022-04-05

## 2022-04-05 RX ORDER — GLIPIZIDE 10 MG/1
TABLET ORAL
Qty: 180 TABLET | Refills: 0
Start: 2022-04-05

## 2022-04-05 RX ORDER — LISINOPRIL 5 MG/1
5 TABLET ORAL DAILY
Refills: 0
Start: 2022-04-06

## 2022-04-05 RX ORDER — SENNOSIDES 8.6 MG
8.6 TABLET ORAL DAILY PRN
Status: DISCONTINUED | OUTPATIENT
Start: 2022-04-05 | End: 2022-04-05 | Stop reason: HOSPADM

## 2022-04-05 RX ORDER — FINASTERIDE 5 MG/1
5 TABLET, FILM COATED ORAL DAILY
Status: DISCONTINUED | OUTPATIENT
Start: 2022-04-05 | End: 2022-04-05 | Stop reason: HOSPADM

## 2022-04-05 RX ORDER — RANOLAZINE 500 MG/1
500 TABLET, EXTENDED RELEASE ORAL EVERY 12 HOURS SCHEDULED
Qty: 60 TABLET | Refills: 0 | Status: SHIPPED | OUTPATIENT
Start: 2022-04-05

## 2022-04-05 RX ORDER — FAMOTIDINE 20 MG/1
20 TABLET, FILM COATED ORAL
Status: DISCONTINUED | OUTPATIENT
Start: 2022-04-05 | End: 2022-04-05 | Stop reason: HOSPADM

## 2022-04-05 RX ORDER — FAMOTIDINE 20 MG/1
20 TABLET, FILM COATED ORAL
Qty: 30 TABLET | Refills: 0 | Status: SHIPPED | OUTPATIENT
Start: 2022-04-05

## 2022-04-05 RX ORDER — METOPROLOL SUCCINATE 50 MG/1
25 TABLET, EXTENDED RELEASE ORAL DAILY
Status: DISCONTINUED | OUTPATIENT
Start: 2022-04-05 | End: 2022-04-05 | Stop reason: HOSPADM

## 2022-04-05 RX ADMIN — FINASTERIDE 5 MG: 5 TABLET, FILM COATED ORAL at 09:43

## 2022-04-05 RX ADMIN — PANTOPRAZOLE SODIUM 40 MG: 40 TABLET, DELAYED RELEASE ORAL at 07:00

## 2022-04-05 RX ADMIN — HEPARIN SODIUM 4000 UNITS: 1000 INJECTION INTRAVENOUS; SUBCUTANEOUS at 02:41

## 2022-04-05 RX ADMIN — INSULIN LISPRO 1 UNITS: 100 INJECTION, SOLUTION INTRAVENOUS; SUBCUTANEOUS at 11:03

## 2022-04-05 RX ADMIN — RANOLAZINE 500 MG: 500 TABLET, EXTENDED RELEASE ORAL at 11:03

## 2022-04-05 RX ADMIN — ATORVASTATIN CALCIUM 40 MG: 40 TABLET, FILM COATED ORAL at 09:43

## 2022-04-05 RX ADMIN — VITAM B12 100 MCG: 100 TAB at 09:43

## 2022-04-05 RX ADMIN — CARBIDOPA AND LEVODOPA 1 TABLET: 25; 100 TABLET ORAL at 09:43

## 2022-04-05 RX ADMIN — LISINOPRIL 5 MG: 5 TABLET ORAL at 10:12

## 2022-04-05 RX ADMIN — METOPROLOL SUCCINATE 25 MG: 50 TABLET, EXTENDED RELEASE ORAL at 10:12

## 2022-04-05 RX ADMIN — CLOPIDOGREL BISULFATE 75 MG: 75 TABLET ORAL at 09:43

## 2022-04-05 RX ADMIN — ASPIRIN 81 MG CHEWABLE TABLET 81 MG: 81 TABLET CHEWABLE at 09:43

## 2022-04-05 RX ADMIN — HEPARIN SODIUM 12 UNITS/KG/HR: 10000 INJECTION, SOLUTION INTRAVENOUS; SUBCUTANEOUS at 02:43

## 2022-04-05 NOTE — ED PROVIDER NOTES
History  Chief Complaint   Patient presents with    Chest Pain     right anterior chest pain for month  Wanted to come in to be seen to find out whats going on  Patient states he has been seen for the same problem in the past      35-year-old male presents the emergency department by EMS for evaluation of right-sided chest pain  Patient is a poor historian but states that he has been having chest pain intermittently for the past 1 month  Per review of the medical record patient was seen at [facility] for evaluation of blurred vision and chest pain on March 28th  He was discharged home after his workup was reassuring  Patient states that he develops right-sided pain mostly after eating  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 and was not a candidate for CABG due to multiple other underlying medical illnesses  He reports shortness of breath and occasional nausea associated with the chest pain  He denies cough or recent fever  Patient gestures to the right side of the chest indicating the area of pain  Patient states that the pain has been worsening over the past few days  Patient admits to having difficulty ambulating and does not know if the pain is exertional as he is rarely exerting himself        History provided by:  Patient and medical records   used: No    Chest Pain  Pain location:  R chest  Pain quality: aching    Pain radiates to:  Does not radiate  Pain radiates to the back: no    Pain severity:  Severe  Onset quality:  Unable to specify  Duration:  4 weeks  Timing:  Intermittent  Progression:  Worsening  Chronicity:  New  Context: at rest    Relieved by:  Nothing  Worsened by:  Nothing tried  Ineffective treatments:  None tried  Associated symptoms: cough, heartburn (? pain often occurs when eating) and weakness    Associated symptoms: no abdominal pain, no anorexia, no dizziness, no fever, no nausea, no palpitations and not vomiting    Risk factors: coronary artery disease and male sex        Prior to Admission Medications   Prescriptions Last Dose Informant Patient Reported? Taking?    ACCU-CHEK GUIDE test strip  Outside Facility (Specify) Yes Yes   Si strip as needed   Alcohol Swabs (PRO COMFORT ALCOHOL) 70 % PADS  Outside Facility (Specify) Yes Yes   Si pad daily Use a pad when testing   Easy Comfort Lancets MISC  Outside Facility (Specify) Yes Yes   Sig: as needed   Glucagon, rDNA, (GLUCAGON EMERGENCY IJ)   Yes Yes   Sig: Inject 1 mL as directed Sugars less than 60 and conscious   Glucose 15 g PACK   Yes Yes   Sig: Take 15 g by mouth Per [facility] "give 15 gram PO every 15 minutes PRN for hypoglycemia"   NovoFine Autocover 30G X 8 MM MISC   Yes Yes   NovoLOG FlexPen 100 units/mL injection pen   Yes Yes   acetaminophen (TYLENOL) 325 mg tablet   Yes Yes   Sig: Take 650 mg by mouth every 6 (six) hours as needed for mild pain   aspirin 81 mg chewable tablet   No Yes   Sig: Chew 1 tablet (81 mg total) daily   atorvastatin (LIPITOR) 40 mg tablet   No Yes   Sig: Take 1 tablet (40 mg total) by mouth daily   carbidopa-levodopa (SINEMET)  mg per tablet   No No   Sig: Take 1 tablet by mouth 3 (three) times a day   cholecalciferol (VITAMIN D3) 1,000 units tablet  Outside Facility (Specify) No Yes   Sig: Take 2 tablets (2,000 Units total) by mouth daily   Patient taking differently: Take 50,000 Units by mouth daily    clopidogrel (PLAVIX) 75 mg tablet   No Yes   Sig: Take 1 tablet (75 mg total) by mouth daily   cyanocobalamin (VITAMIN B-12) 1000 MCG tablet  Outside Facility (Specify) No Yes   Sig: Take 1 tablet (1,000 mcg total) by mouth daily   docusate sodium (COLACE) 100 mg capsule  Outside Facility (Specify) No Yes   Sig: Take 1 capsule (100 mg total) by mouth 2 (two) times a day   finasteride (PROSCAR) 5 mg tablet  Outside Facility (Specify) No Yes   Sig: Take 1 tablet (5 mg total) by mouth daily   glipiZIDE (GLUCOTROL) 10 mg tablet   No Yes   Sig: TAKE 1 TABLET BY MOUTH 2 TIMES A DAY   Patient taking differently: Per [facility] "give 5mg by mouth one time a prescriber day for DM 2"   glucose blood test strip  Outside Facility ([facility]) Yes Yes   Si strip as needed   melatonin 3 mg   Yes Yes   Sig: Take 3 mg by mouth daily at bedtime Give 2 tabs at bed time for insomnia per [facility]   metFORMIN (GLUCOPHAGE) 1000 MG tablet   No Yes   Sig: TAKE 1 TABLET BY MOUTH 2 TIMES A DAY   pantoprazole (PROTONIX) 40 mg tablet   No Yes   Sig: Take 1 tablet (40 mg total) by mouth 2 (two) times a day before meals   polyethylene glycol (MIRALAX) 17 g packet  Outside Facility (Specify) No Yes   Sig: Take 17 g by mouth daily   senna (SENOKOT) 8 6 mg   No Yes   Sig: Take 1 tablet (8 6 mg total) by mouth daily as needed for constipation   tamsulosin (FLOMAX) 0 4 mg  Outside Facility (Specify) No Yes   Sig: Take 1 capsule (0 4 mg total) by mouth daily with dinner   vitamin B-12 (CYANOCOBALAMIN) 100 MCG TABS  Outside Facility (Specify) Yes Yes   Sig: Take 100 mcg by mouth daily      Facility-Administered Medications: None       Past Medical History:   Diagnosis Date    Ambulatory dysfunction     uses walker with assist of 1    Anemia     Anxiety     At risk for falls     hx of falls    Benign paroxysmal vertigo     unspecified ear    BPH (benign prostatic hyperplasia)     for TURP today 2021    Calculus in bladder     for surgical removal today 2021    Cataract     left eye    Cervicalgia     Chest pain     Chronic kidney disease     stage 3    Constipation     CTS (carpal tunnel syndrome)     left    Cyst of pancreas     Cystitis 2020    acute cystitis with hematuria    Depression     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    Dizziness     and giddiness    Dysphagia     Elevated PSA     Facial weakness     GERD (gastroesophageal reflux disease)     last assessed 16    Gross hematuria     Hematuria     Hyperlipidemia     Hypertension     Kidney disease     Kidney stone     Left-sided weakness     Long term (current) use of oral hypoglycemic drugs     Muscle weakness     Nuclear senile cataract of left eye     OA (osteoarthritis) of knee     b/l    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HCC)     Syncope and collapse     Tachycardia     UTI (urinary tract infection)     Vertebro-basilar artery syndrome     Vitamin B deficiency     Vitamin D deficiency     Vitamin D deficiency        Past Surgical History:   Procedure Laterality Date    CARDIAC CATHETERIZATION N/A 3/7/2022    Procedure: CARDIAC CATHETERIZATION;  Surgeon: Salinas Olmos DO;  [facility]; Service: Cardiology    CATARACT EXTRACTION      CHOLECYSTECTOMY      KNEE SURGERY      RI REMOVE BLADDER STONE,<2 5 CM N/A 1/4/2021    Procedure: Cystolitholopaxy w/laser bladder stones;  Surgeon: Ismael Stoddard MD;  [facility];  Service: Urology    RI TRANSURETHRAL ELEC-SURG PROSTATECTOM N/A 1/4/2021    Procedure: T U R P ;  Surgeon: Ismael Stoddard MD;  [facility];  Service: Urology       Family History   Problem Relation Age of Onset    Coronary artery disease Mother     Diabetes Father      I have reviewed and agree with the history as documented  E-Cigarette/Vaping    E-Cigarette Use Never User      E-Cigarette/Vaping Substances    Nicotine No     THC No     CBD No      Social History     Tobacco Use    Smoking status: Never Smoker    Smokeless tobacco: Never Used   Vaping Use    Vaping Use: Never used   Substance Use Topics    Alcohol use: Not Currently    Drug use: No       Review of Systems   Constitutional: Negative for appetite change, chills and fever  HENT: Negative for congestion  Respiratory: Positive for cough and chest tightness  Cardiovascular: Positive for chest pain  Negative for palpitations and leg swelling     Gastrointestinal: Positive for heartburn (? pain often occurs when eating)  Negative for abdominal pain, anorexia, nausea and vomiting  Genitourinary: Negative for dysuria and flank pain  Musculoskeletal: Positive for gait problem  Neurological: Positive for weakness  Negative for dizziness  All other systems reviewed and are negative  Physical Exam  Physical Exam  Vitals reviewed  Constitutional:       General: He is not in acute distress  Appearance: Normal appearance  He is well-developed  He is ill-appearing  HENT:      Head: Normocephalic and atraumatic  Eyes:      Conjunctiva/sclera: Conjunctivae normal       Pupils: Pupils are equal, round, and reactive to light  Cardiovascular:      Rate and Rhythm: Normal rate and regular rhythm  Extrasystoles are present  Heart sounds: Normal heart sounds  No murmur heard  Pulmonary:      Effort: Pulmonary effort is normal  No accessory muscle usage or respiratory distress  Breath sounds: Examination of the right-lower field reveals decreased breath sounds  Examination of the left-lower field reveals decreased breath sounds  Decreased breath sounds present  No wheezing or rhonchi  Chest:      Chest wall: No tenderness  Abdominal:      General: Bowel sounds are normal  There is no distension  Palpations: Abdomen is soft  Tenderness: There is no abdominal tenderness  There is no guarding or rebound  Musculoskeletal:         General: No tenderness or deformity  Normal range of motion  Cervical back: Normal range of motion and neck supple  Right lower leg: No tenderness  No edema  Left lower leg: No tenderness  No edema  Lymphadenopathy:      Cervical: No cervical adenopathy  Skin:     General: Skin is warm and dry  Capillary Refill: Capillary refill takes less than 2 seconds  Findings: No rash  Neurological:      Mental Status: He is alert and oriented to person, place, and time        Coordination: Coordination normal       Deep Tendon Reflexes: Reflexes are normal and symmetric  Comments: Chronic left-sided facial droop, speech slow but appropriate   Psychiatric:         Mood and Affect: Mood normal          Behavior: Behavior normal          Thought Content:  Thought content normal          Judgment: Judgment normal          Vital Signs  ED Triage Vitals [04/04/22 2236]   Temperature Pulse Respirations Blood Pressure SpO2   97 6 °F (36 4 °C) 93 18 132/68 99 %      Temp Source Heart Rate Source Patient Position - Orthostatic VS BP Location FiO2 (%)   Oral Monitor Lying Left arm --      Pain Score       4           Vitals:    04/04/22 2236 04/04/22 2326   BP: 132/68 156/70   Pulse: 93 92   Patient Position - Orthostatic VS: Lying Lying         Visual Acuity      ED Medications  Medications - No data to display    Diagnostic Studies  Results Reviewed     Procedure Component Value Units Date/Time    HS Troponin 0hr (reflex protocol) [[id number]]  (Abnormal) Collected: 04/04/22 2303    Lab Status: Final result Specimen: Blood from Arm, Right Updated: 04/04/22 2340     hs TnI 0hr 491 ng/L     HS Troponin I 2hr [[id number]]     Lab Status: No result Specimen: Blood     Comprehensive metabolic panel [[id number]]  (Abnormal) Collected: 04/04/22 2303    Lab Status: Final result Specimen: Blood from Arm, Right Updated: 04/04/22 2336     Sodium 141 mmol/L      Potassium 4 2 mmol/L      Chloride 104 mmol/L      CO2 25 mmol/L      ANION GAP 12 mmol/L      BUN 38 mg/dL      Creatinine 1 46 mg/dL      Glucose 202 mg/dL      Calcium 9 0 mg/dL      Corrected Calcium 9 6 mg/dL      AST 12 U/L      ALT 14 U/L      Alkaline Phosphatase 95 U/L      Total Protein 6 9 g/dL      Albumin 3 3 g/dL      Total Bilirubin 0 54 mg/dL      eGFR 47 ml/min/1 73sq m     Narrative:      [facility] guidelines for Chronic Kidney Disease (CKD):     Stage 1 with normal or high GFR (GFR > 90 mL/min/1 73 square meters)    Stage 2 Mild CKD (GFR = 60-89 mL/min/1 73 square meters)    Stage 3A Moderate CKD (GFR = 45-59 mL/min/1 73 square meters)    Stage 3B Moderate CKD (GFR = 30-44 mL/min/1 73 square meters)    Stage 4 Severe CKD (GFR = 15-29 mL/min/1 73 square meters)    Stage 5 End Stage CKD (GFR <15 mL/min/1 73 square meters)  Note: GFR calculation is accurate only with a steady state creatinine    CBC and differential [[id number]]  (Abnormal) Collected: 04/04/22 2303    Lab Status: Final result Specimen: Blood from Arm, Right Updated: 04/04/22 2317     WBC 17 78 Thousand/uL      RBC 4 34 Million/uL      Hemoglobin 12 9 g/dL      Hematocrit 38 7 %      MCV 89 fL      MCH 29 7 pg      MCHC 33 3 g/dL      RDW 13 6 %      MPV 10 8 fL      Platelets 280 Thousands/uL      nRBC 0 /100 WBCs      Neutrophils Relative 84 %      Immat GRANS % 1 %      Lymphocytes Relative 7 %      Monocytes Relative 7 %      Eosinophils Relative 1 %      Basophils Relative 0 %      Neutrophils Absolute 15 14 Thousands/µL      Immature Grans Absolute 0 11 Thousand/uL      Lymphocytes Absolute 1 16 Thousands/µL      Monocytes Absolute 1 15 Thousand/µL      Eosinophils Absolute 0 16 Thousand/µL      Basophils Absolute 0 06 Thousands/µL     D-Dimer [[id number]] Collected: 04/04/22 2303    Lab Status: In process Specimen: Blood from Arm, Right Updated: 04/04/22 2313    COVID/FLU/RSV - 2 hour TAT [[id number]] Collected: 04/04/22 2303    Lab Status:  In process Specimen: Nares from Nose Updated: 04/04/22 2313                 XR chest 1 view portable   ED Interpretation by Ayesha Coffey DO (04/04 3855)   No acute disease                 Procedures  ECG 12 Lead Documentation Only    Date/Time: 4/4/2022 11:36 PM  Performed by: Ayesha Coffey DO  Authorized by: Ayesha Coffey DO     Indications / Diagnosis:  Chest pain  ECG reviewed by me, the ED Provider: yes    Patient location:  ED  Previous ECG:     Previous ECG:  Compared to current    Comparison ECG info:  March 28, 2022    Similarity:  No change  Interpretation: Interpretation: abnormal    Quality:     Tracing quality:  Limited by artifact  Rate:     ECG rate:  92    ECG rate assessment: normal    Rhythm:     Rhythm: sinus rhythm    Ectopy:     Ectopy: PVCs      PVCs:  Frequent  Conduction:     Conduction: abnormal      Abnormal conduction: incomplete RBBB    Q waves:     Q waves:  III and aVF             ED Course                               SBIRT 22yo+      Most Recent Value   SBIRT (22 yo +)    In order to provide better care to our patients, we are screening all of our patients for alcohol and drug use  Would it be okay to ask you these screening questions? Yes Filed at: 04/04/2022 2324   Initial Alcohol Screen: US AUDIT-C     1  How often do you have a drink containing alcohol? 0 Filed at: 04/04/2022 2324   2  How many drinks containing alcohol do you have on a typical day you are drinking? 0 Filed at: 04/04/2022 2324   3b  FEMALE Any Age, or MALE 65+: How often do you have 4 or more drinks on one occassion? 0 Filed at: 04/04/2022 2324   Audit-C Score 0 Filed at: 04/04/2022 2324   BANDAR: How many times in the past year have you    Used an illegal drug or used a prescription medication for non-medical reasons? Never Filed at: 04/04/2022 2324                    MDM    Disposition  Final diagnoses:   None     ED Disposition     None      Follow-up Information    None         Patient's Medications   Discharge Prescriptions    No medications on file       No discharge procedures on file      PDMP Review       Value Time User    PDMP Reviewed  Yes 3/4/2022 10:51 PM Jennifer Jones MD          ED Provider  Electronically Signed by           Lizz Garrison DO  04/05/22 8952

## 2022-04-05 NOTE — ASSESSMENT & PLAN NOTE
Lab Results   Component Value Date    EGFR 47 04/04/2022    EGFR 48 03/28/2022    EGFR 59 03/09/2022    CREATININE 1 46 (H) 04/04/2022    CREATININE 1 43 (H) 03/28/2022    CREATININE 1 21 03/09/2022     · Baseline Cr 1 1-1 2   · Cr slightly above baseline     · Repeat BMP in AM

## 2022-04-05 NOTE — DISCHARGE SUMMARY
[facility]  Discharge- Missael Le 1950, 70 y o  male MRN: [id number]  Unit/Bed#: ED 08 Encounter: [id number]  Primary Care Provider: Olamide Powell MD   Date and time admitted to hospital: 4/4/2022 10:25 PM    * Chest pain  Assessment & Plan  Chest pain with elevated troponin  · Presented with intermittent chest pain for the past 1 month  Pain occasionally associated with nausea and SOB  · Patient with recent admission 1 month ago for chest pain at which time LHC showed 3 vessel CAD and medical optimization was recommended given that patient is non ambulatory  Echo with EF 55%  · Troponin peak at 491  · EKG with no significant changes noted from prior  · CXR with no acute findings noted  · Telemetry no events  · Continue aspirin, Plavix, statin and BB  Continue PPI  · Cardiology appreciated - Ranexa added   · Pepcid qhs added for GI symptoms - referred to GI    Leukocytosis  Assessment & Plan  · POA with WBC 17 78  · No evidence of infection  CXR with no acute findings noted  · PCP should check CBC in 2-4 weeks    Stage 3 chronic kidney disease [facility])  Assessment & Plan  Lab Results   Component Value Date    EGFR 50 04/05/2022    EGFR 47 04/04/2022    EGFR 48 03/28/2022    CREATININE 1 39 (H) 04/05/2022    CREATININE 1 46 (H) 04/04/2022    CREATININE 1 43 (H) 03/28/2022     · Baseline Cr 1 1-1 2   · Cr slightly above baseline but stable    Essential hypertension  Assessment & Plan  · BP acceptable  · Continue metoprolol  Type 2 diabetes mellitus with diabetic neuropathy [facility])  Assessment & Plan  Lab Results   Component Value Date    HGBA1C 6 4 (H) 03/05/2022       Recent Labs     04/05/22  0815 04/05/22  1059   POCGLU 169* 178*       Blood Sugar Average: Last 72 hrs:  (P) 173 5   · Hold metformin and glipizide while inpatient and restart at d/c    Hyperlipidemia  Assessment & Plan  · Continue statin         Medical Problems             Resolved Problems  Date Reviewed: 4/5/2022    None              Discharging Physician / Practitioner: Natalio Sheppard DO  PCP: Aubree Tobar MD  Admission Date:   Admission Orders (From admission, onward)     Ordered        04/05/22 0035  Place in Observation  Once                      Discharge Date: 04/05/22    Consultations During Hospital Stay:  · cardio    Procedures Performed:   · none    Significant Findings / Test Results:   · See above    Incidental Findings:   · none     Test Results Pending at Discharge (will require follow up):   · none     Outpatient Tests Requested:  · CBC-D in 2-4 weeks    Complications:  none    Reason for Admission: chest pain w/ elevated trop    Hospital Course:   Jose Antonio Livingston is a 70 y o  male patient who originally presented to the hospital on 4/4/2022 due to CP  Trops elevated  CP likely 2/2 GERD  Put on Pepcid  Qhs   Put on Ranexa as not amenable to PCI and not a candidate for CABG  Please see above list of diagnoses and related plan for additional information  Condition at Discharge: stable    Discharge Day Visit / Exam:   Subjective:  Feeling better  Vitals: Blood Pressure: 124/67 (04/05/22 1015)  Pulse: 94 (04/05/22 1045)  Temperature: 97 6 °F (36 4 °C) (04/04/22 2236)  Temp Source: Oral (04/04/22 2236)  Respirations: 18 (04/05/22 0400)  Height: 5' 7" (170 2 cm) (04/04/22 2236)  Weight - Scale: 83 kg (182 lb 15 7 oz) (04/04/22 2236)  SpO2: 96 % (04/05/22 1045)  Exam:   Physical Exam  HENT:      Head: Normocephalic and atraumatic  Nose: Nose normal       Mouth/Throat:      Mouth: Mucous membranes are moist    Eyes:      Extraocular Movements: Extraocular movements intact  Conjunctiva/sclera: Conjunctivae normal    Cardiovascular:      Rate and Rhythm: Normal rate and regular rhythm  Pulmonary:      Effort: Pulmonary effort is normal       Breath sounds: Normal breath sounds  No wheezing or rales  Abdominal:      General: Bowel sounds are normal  There is no distension  Palpations: Abdomen is soft  Tenderness: There is abdominal tenderness (RUQ)  Musculoskeletal:         General: Normal range of motion  Cervical back: Normal range of motion and neck supple  Right lower leg: No edema  Left lower leg: No edema  Skin:     General: Skin is warm and dry  Neurological:      Mental Status: He is alert and oriented to person, place, and time  Discussion with Family: Updated  (wife) via phone  Discharge instructions/Information to patient and family:   See after visit summary for information provided to patient and family  Provisions for Follow-Up Care:  See after visit summary for information related to follow-up care and any pertinent home health orders  Disposition:   Home    Planned Readmission: no     Discharge Statement:  I spent 35 minutes discharging the patient  This time was spent on the day of discharge  I had direct contact with the patient on the day of discharge  Greater than 50% of the total time was spent examining patient, answering all patient questions, arranging and discussing plan of care with patient as well as directly providing post-discharge instructions  Additional time then spent on discharge activities  Discharge Medications:  See after visit summary for reconciled discharge medications provided to patient and/or family        **Please Note: This note may have been constructed using a voice recognition system**

## 2022-04-05 NOTE — DISCHARGE INSTRUCTIONS
PCP should recheck CBC-D in 2-4 weeks    GERD (Gastroesophageal Reflux Disease)   WHAT YOU NEED TO KNOW:   Gastroesophageal reflux disease (GERD) is reflux that happens more than 2 times a week for a few weeks  Reflux means acid and food in your stomach back up into your esophagus  GERD can cause other health problems over time if it is not treated  DISCHARGE INSTRUCTIONS:   Call your local emergency number (911 in the [address] [facility]) if:   · You have severe chest pain and sudden trouble breathing  Seek care immediately if:   · You have trouble breathing after you vomit  · You have trouble swallowing, or pain with swallowing  · Your bowel movements are black, bloody, or tarry-looking  · Your vomit looks like coffee grounds or has blood in it  Call your doctor or gastroenterologist if:   · You feel full and cannot burp or vomit  · You vomit large amounts, or you vomit often  · You are losing weight without trying  · Your symptoms get worse or do not improve with treatment  · You have questions or concerns about your condition or care  Medicines:   · Medicines  are used to decrease stomach acid  Medicine may also be used to help your lower esophageal sphincter and stomach contract (tighten) more  · Take your medicine as directed  Contact your healthcare provider if you think your medicine is not helping or if you have side effects  Tell him or her if you are allergic to any medicine  Keep a list of the medicines, vitamins, and herbs you take  Include the amounts, and when and why you take them  Bring the list or the pill bottles to follow-up visits  Carry your medicine list with you in case of an emergency  Manage GERD:       · Do not have foods or drinks that may increase heartburn  These include chocolate, peppermint, fried or fatty foods, drinks that contain caffeine, or carbonated drinks (soda)  Other foods include spicy foods, onions, tomatoes, and tomato-based foods   Do not have foods or drinks that can irritate your esophagus, such as citrus fruits, juices, and alcohol  · Do not eat large meals  When you eat a lot of food at one time, your stomach needs more acid to digest it  Eat 6 small meals each day instead of 3 large ones, and eat slowly  Do not eat meals 2 to 3 hours before bedtime  · Elevate the head of your bed  Place 6-inch blocks under the head of your bed frame  You may also use more than one pillow under your head and shoulders while you sleep  · Maintain a healthy weight  If you are overweight, weight loss may help relieve symptoms of GERD  · Do not smoke  Smoking weakens the lower esophageal sphincter and increases the risk of GERD  Ask your healthcare provider for information if you currently smoke and need help to quit  E-cigarettes or smokeless tobacco still contain nicotine  Talk to your healthcare provider before you use these products  · Do not put pressure on your abdomen  Pressure pushes acid up into your esophagus  Do not wear clothing that is tight around your waist  Do not bend over  Bend at the knees if you need to pick something up  Follow up with your doctor or gastroenterologist as directed:  Write down your questions so you remember to ask them during your visits  © Copyright [employer] 2022 Information is for End User's use only and may not be sold, redistributed or otherwise used for commercial purposes  All illustrations and images included in CareNotes® are the copyrighted property of [employer]  or Maria R Zazueta  The above information is an  only  It is not intended as medical advice for individual conditions or treatments  Talk to your doctor, nurse or pharmacist before following any medical regimen to see if it is safe and effective for you

## 2022-04-05 NOTE — DISCHARGE INSTR - AVS FIRST PAGE
Dear Addi Henao,     It was our pleasure to care for you here at [address]  It is our hope that we were always able to exceed the expected standards for your care during your stay  You were hospitalized due to chest pain  You were cared for in the ER under the service of Charlie Sterling DO with the [facility] who covers for your primary care physician (PCP), Minna Rome MD, while you were hospitalized  If you have any questions or concerns related to this hospitalization, you may contact us at [phone]  For follow up as well as medication refills, we recommend that you follow up with your primary care physician  A registered nurse will reach out to you by phone within a few days after your discharge to answer any additional questions that you may have after going home  However, at this time we provide for you here, the most important instructions / recommendations at discharge:     Notable Medication Adjustments -   Added Pepcid at night  Testing Required after Discharge -   Rec follow up with GI  Important follow up information -   N/a  Other Instructions -   N/a  Please review this entire after visit summary as additional general instructions including medication list, appointments, activity, diet, any pertinent wound care, and other additional recommendations from your care team that may be provided for you        Sincerely,     Charlie Sterling DO

## 2022-04-05 NOTE — H&P
[facility] 1950[facility] 70 [facility]  [facility] MRN: [id number]  Unit/Bed#: ED 08 Encounter: [id number]  Primary Care Provider: Sin Duron MD   Date and time admitted to hospital: 4/4/2022 10:25 PM    * Chest pain  Assessment & Plan  Chest pain with elevated troponin  · Presented with intermittent chest pain for the past 1 month  Pain occasionally associated with nausea and SOB  · Patient with recent admission 1 month ago for chest pain at which time LHC showed 3 vessel CAD and medical optimization was recommended given that patient is non ambulatory  Echo with EF 55%  · Troponin 491, 482  · EKG with no significant changes noted from prior  · CXR with no acute findings noted  · Continue to trend troponin  · Monitor on telemetry  · Continue aspirin, Plavix, statin and BB  Continue PPI  · Consult cardiology  Leukocytosis  Assessment & Plan  · POA with WBC 17 78  · No evidence of infection  CXR with no acute findings noted  · Repeat CBC in AM      Stage 3 chronic kidney disease [facility])  Assessment & Plan  Lab Results   Component Value Date    EGFR 47 04/04/2022    EGFR 48 03/28/2022    EGFR 59 03/09/2022    CREATININE 1 46 (H) 04/04/2022    CREATININE 1 43 (H) 03/28/2022    CREATININE 1 21 03/09/2022     · Baseline Cr 1 1-1 2   · Cr slightly above baseline  · Repeat BMP in AM      Essential hypertension  Assessment & Plan  · BP acceptable  · Continue metoprolol  Type 2 diabetes mellitus with diabetic neuropathy [facility])  Assessment & Plan  Lab Results   Component Value Date    HGBA1C 6 4 (H) 03/05/2022       No results for input(s): POCGLU in the last 72 hours  Blood Sugar Average: Last 72 hrs:     · Hold metformin and glipizide while inpatient  · Accu-checks q6h while NPO  · SSI for coverage  · Hypoglycemia protocol  Hyperlipidemia  Assessment & Plan  · Continue statin       VTE Pharmacologic Prophylaxis: VTE Score: 4 Moderate Risk (Score 3-4) - Pharmacological DVT Prophylaxis Ordered: heparin drip  Code Status: level 1- full code  Discussion with family: Patient declined call to   Anticipated Length of Stay: Patient will be admitted on an observation basis with an anticipated length of stay of less than 2 midnights secondary to chest pain, elevated troponin  Total Time for Visit, including Counseling / Coordination of Care: 70 minutes Greater than 50% of this total time spent on direct patient counseling and coordination of care  Chief Complaint: chest pain    History of Present Illness:  Esvin Avendano is a 70 y o  male with a PMH of multivessel CAD, HTN, HLD, CKD stage 3, type 2 DM, BPH, BPPV and Parkinson disease who presents with chest pain  Patient reports intermittent right-sided chest pain for the past 1 month  He reports noticing that his pain worsens after meals and reports occasional nausea, dry heaves and SOB associated with the pain  He denies recent fevers/ chills, cough, abdominal pain  Patient with recent admission 1 month ago for chest pain at which time echo showed EF 55%, LHC revealed 3 vessel CAD and medical optimization was recommended given that patient is non ambulatory  He reports compliance with his medications  Review of Systems:  Review of Systems   Constitutional: Negative for appetite change, chills and fever  Respiratory: Positive for shortness of breath  Negative for cough  Cardiovascular: Positive for chest pain  Negative for leg swelling  Gastrointestinal: Positive for nausea  Negative for diarrhea  Genitourinary: Negative for difficulty urinating  All other systems reviewed and are negative        Past Medical and Surgical History:   Past Medical History:   Diagnosis Date    Ambulatory dysfunction     uses walker with assist of 1    Anemia     Anxiety     At risk for falls     hx of falls    Benign paroxysmal vertigo     unspecified ear    BPH (benign prostatic hyperplasia) for TURP today 1/4/2021    Calculus in bladder     for surgical removal today 1/4/2021    Cataract     left eye    Cervicalgia     Chest pain     Chronic kidney disease     stage 3    Constipation     CTS (carpal tunnel syndrome)     left    Cyst of pancreas     Cystitis 06/23/2020    acute cystitis with hematuria    Depression     Diabetes mellitus ([facility] [address] )     type II with neuropathy    Dizziness     and giddiness    Dysphagia     Elevated PSA     Facial weakness     GERD (gastroesophageal reflux disease)     last assessed 5/20/16    Gross hematuria     Hematuria     Hyperlipidemia     Hypertension     Kidney disease     Kidney stone     Left-sided weakness     Long term (current) use of oral hypoglycemic drugs     Muscle weakness     Nuclear senile cataract of left eye     OA (osteoarthritis) of knee     b/l    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[facility] [address] )     Syncope and collapse     Tachycardia     UTI (urinary tract infection)     Vertebro-basilar artery syndrome     Vitamin B deficiency     Vitamin D deficiency     Vitamin D deficiency        Past Surgical History:   Procedure Laterality Date    CARDIAC CATHETERIZATION N/A 3/7/2022    Procedure: CARDIAC CATHETERIZATION;  Surgeon: Baltazar Sheffield DO;  [facility]; Service: Cardiology    CATARACT EXTRACTION      CHOLECYSTECTOMY      KNEE SURGERY      MI REMOVE BLADDER STONE,<2 5 CM N/A 1/4/2021    Procedure: Cystolitholopaxy w/laser bladder stones;  Surgeon: Omkar Kapadia MD;  Location: [facility];  Service: Urology    MI TRANSURETHRAL ELEC-SURG PROSTATECTOM N/A 1/4/2021    Procedure: T U R P ;  Surgeon: Omkar Kapadia MD;  [facility];  Service: Urology       Meds/Allergies:  Prior to Admission medications    Medication Sig Start Date End Date Taking?  Authorizing Provider   ACCU-CHEK GUIDE test strip 1 strip as needed 6/16/20  Yes Historical Provider, MD   acetaminophen (TYLENOL) 325 mg tablet Take 650 mg by mouth every 6 (six) hours as needed for mild pain   Yes Historical Provider, MD   Alcohol Swabs (PRO COMFORT ALCOHOL) 70 % PADS 1 pad daily Use a pad when testing 6/16/20  Yes Historical Provider, MD   aspirin 81 mg chewable tablet Chew 1 tablet (81 mg total) daily 3/10/22  Yes Ting Santana DO   atorvastatin (LIPITOR) 40 mg tablet Take 1 tablet (40 mg total) by mouth daily 3/10/22  Yes Ting Santana DO   cholecalciferol (VITAMIN D3) 1,000 units tablet Take 2 tablets (2,000 Units total) by mouth daily  Patient taking differently: Take 50,000 Units by mouth daily  6/24/20  Yes Jackeline Casanova MD   clopidogrel (PLAVIX) 75 mg tablet Take 1 tablet (75 mg total) by mouth daily 3/10/22  Yes Ting Santana DO   cyanocobalamin (VITAMIN B-12) 1000 MCG tablet Take 1 tablet (1,000 mcg total) by mouth daily 6/23/20  Yes Jackeline Casanova MD   docusate sodium (COLACE) 100 mg capsule Take 1 capsule (100 mg total) by mouth 2 (two) times a day 6/23/20  Yes Jackeline Casanova MD   Easy Comfort Lancets MISC as needed 6/16/20  Yes Historical Provider, MD   finasteride (PROSCAR) 5 mg tablet Take 1 tablet (5 mg total) by mouth daily 5/13/20  Yes Fox Uribe PA-C   glipiZIDE (GLUCOTROL) 10 mg tablet TAKE 1 TABLET BY MOUTH 2 TIMES A DAY  Patient taking differently: Per [facility] "give 5mg by mouth one time a prescriber day for DM 2" 2/10/21  Yes Oliverio Farris,    Glucagon, rDNA, (GLUCAGON EMERGENCY IJ) Inject 1 mL as directed Sugars less than 60 and conscious   Yes Historical Provider, MD   Glucose 15 g PACK Take 15 g by mouth Per [facility] "give 15 gram PO every 15 minutes PRN for hypoglycemia"   Yes Historical Provider, MD   glucose blood test strip 1 strip as needed   Yes Historical Provider, MD   melatonin 3 mg Take 3 mg by mouth daily at bedtime Give 2 tabs at bed time for insomnia per [facility]   Yes Historical Provider, MD   metFORMIN (GLUCOPHAGE) 1000 MG tablet TAKE 1 TABLET BY MOUTH 2 TIMES A DAY 2/10/21  Yes Celso Mcburney, DO   NovoFine Autocover 30G X 8 MM MISC  12/9/20  Yes Historical Provider, MD   NovoLOG FlexPen 100 units/mL injection pen  1/2/21  Yes Historical Provider, MD   pantoprazole (PROTONIX) 40 mg tablet Take 1 tablet (40 mg total) by mouth 2 (two) times a day before meals 3/9/22  Yes Gabo Solis DO   polyethylene glycol (MIRALAX) 17 g packet Take 17 g by mouth daily 6/3/20  Yes Celso Mcburney, DO   senna (SENOKOT) 8 6 mg Take 1 tablet (8 6 mg total) by mouth daily as needed for constipation 2/10/21  Yes Celso Mcburney, DO   tamsulosin [facility]) 0 4 mg Take 1 capsule (0 4 mg total) by mouth daily with dinner 5/13/20  Yes Fox Uribe PA-C   vitamin B-12 (CYANOCOBALAMIN) 100 MCG TABS Take 100 mcg by mouth daily 6/16/20  Yes Historical Provider, MD   carbidopa-levodopa (SINEMET)  mg per tablet Take 1 tablet by mouth 3 (three) times a day 7/22/21 10/14/21  Willian Genao MD     I have reviewed home medications using recent Epic encounter      Allergies: No Known Allergies    Social History:  Marital Status: /Civil Union   Occupation:   Patient Pre-hospital Living Situation: Home, With spouse  Patient Pre-hospital Level of Mobility: non-ambulatory/bed bound  Patient Pre-hospital Diet Restrictions: low carb  Substance Use History:   Social History     Substance and Sexual Activity   Alcohol Use Not Currently     Social History     Tobacco Use   Smoking Status Never Smoker   Smokeless Tobacco Never Used     Social History     Substance and Sexual Activity   Drug Use No       Family History:  Family History   Problem Relation Age of Onset    Coronary artery disease Mother     Diabetes Father        Physical Exam:     Vitals:   Blood Pressure: 140/67 (04/05/22 0200)  Pulse: 88 (04/05/22 0200)  Temperature: 97 6 °F (36 4 °C) (04/04/22 2236)  Temp Source: Oral (04/04/22 2236)  Respirations: 16 (04/05/22 0100)  Height: 5' 7" (170 2 cm) (04/04/22 2236)  Weight - Scale: 83 kg (182 lb 15 7 oz) (04/04/22 2236)  SpO2: 95 % (04/05/22 0200)    Physical Exam  Vitals and nursing note reviewed  Constitutional:       General: He is not in acute distress  Appearance: He is not diaphoretic  HENT:      Head: Normocephalic and atraumatic  Eyes:      Conjunctiva/sclera: Conjunctivae normal    Cardiovascular:      Rate and Rhythm: Normal rate and regular rhythm  Pulmonary:      Effort: Pulmonary effort is normal  No respiratory distress  Breath sounds: Normal breath sounds  Abdominal:      General: Bowel sounds are normal       Palpations: Abdomen is soft  Tenderness: There is no abdominal tenderness  Musculoskeletal:      Right lower leg: Edema (trace) present  Left lower leg: Edema (trace) present  Skin:     General: Skin is warm and dry  Coloration: Skin is not pale  Neurological:      Mental Status: He is alert and oriented to person, place, and time        Comments: Left sided facial droop, chronic per records   Psychiatric:         Mood and Affect: Mood normal          Additional Data:     Lab Results:  Results from last 7 days   Lab Units 04/04/22  2303   WBC Thousand/uL 17 78*   HEMOGLOBIN g/dL 12 9   HEMATOCRIT % 38 7   PLATELETS Thousands/uL 290   NEUTROS PCT % 84*   LYMPHS PCT % 7*   MONOS PCT % 7   EOS PCT % 1     Results from last 7 days   Lab Units 04/04/22  2303   SODIUM mmol/L 141   POTASSIUM mmol/L 4 2   CHLORIDE mmol/L 104   CO2 mmol/L 25   BUN mg/dL 38*   CREATININE mg/dL 1 46*   ANION GAP mmol/L 12   CALCIUM mg/dL 9 0   ALBUMIN g/dL 3 3*   TOTAL BILIRUBIN mg/dL 0 54   ALK PHOS U/L 95   ALT U/L 14   AST U/L 12   GLUCOSE RANDOM mg/dL 202*     Results from last 7 days   Lab Units 04/05/22  0144   INR  1 06                   Imaging: Personally reviewed the following imaging: chest xray  XR chest 1 view portable   ED Interpretation by Gee Reilly DO (04/04 6148)   No acute disease          EKG and Other Studies Reviewed on Admission:   · EKG: sinus rhythm with PVCs, incomplete RBBB  ** Please Note: This note has been constructed using a voice recognition system   **

## 2022-04-05 NOTE — ASSESSMENT & PLAN NOTE
Chest pain with elevated troponin  · Presented with intermittent chest pain for the past 1 month  Pain occasionally associated with nausea and SOB  · Patient with recent admission 1 month ago for chest pain at which time LHC showed 3 vessel CAD and medical optimization was recommended given that patient is non ambulatory  Echo with EF 55%  · Troponin peak at 491  · EKG with no significant changes noted from prior  · CXR with no acute findings noted  · Telemetry no events  · Continue aspirin, Plavix, statin and BB  Continue PPI    · Cardiology appreciated - Ranexa added   · Pepcid qhs added for GI symptoms - referred to GI

## 2022-04-05 NOTE — UTILIZATION REVIEW
Observation Admission Authorization Request   NOTIFICATION OF OBSERVATION ADMISSION/OBSERVATION AUTHORIZATION REQUEST   SERVICING FACILITY:   [address]  Tax ID: [id number]  NPI: [id number]  Place of Service: On [facility] Code: 22  CPT Code for Observation: CPT   CPT 20500     ATTENDING PROVIDER:  Attending Name and NPI#: Shahriar Socorro [[id number]]  Address: [address]  Phone: [phone]     UTILIZATION REVIEW CONTACT:  Igor Valdez Utilization   Network Utilization Review Department  Phone: [phone]  Fax: [phone]  Email: [email or web address]     PHYSICIAN ADVISORY SERVICES:  FOR [id number] REVIEW - MEDICAL NECESSITY DENIAL  Phone: [phone]  Fax: [phone]  Email: [email or web address]     TYPE OF REQUEST:  Observation  Status     ADMISSION INFORMATION:  ADMISSION DATE/TIME:   PATIENT DIAGNOSIS CODE/DESCRIPTION:  Rib pain [R07 81]  DISCHARGE DATE/TIME: No discharge date for patient encounter  IMPORTANT INFORMATION:  Please contact the [facility] directly with any questions or concerns regarding this request  Department voicemails are confidential     Send requests for admission clinical reviews, concurrent reviews, approvals, and administrative denials due to lack of clinical to fax [phone]

## 2022-04-05 NOTE — UTILIZATION REVIEW
Initial Clinical Review    Admission: Date/Time/Statement:  4/5/22 0035 Observation   Admission Orders (From admission, onward)     Ordered        04/05/22 0035  Place in Observation  Once                      Orders Placed This Encounter   Procedures    Place in Observation     Standing Status:   Standing     Number of Occurrences:   1     Order Specific Question:   Level of Care     Answer:   Med Surg [16]     ED Arrival Information     Expected Arrival Acuity    - 4/4/2022 22:25 Urgent         Means of arrival Escorted by Service Admission type    Ambulance [facility] Urgent         Arrival complaint    RIB PAIN        Chief Complaint   Patient presents with    Chest Pain     right anterior chest pain for month  Wanted to come in to be seen to find out whats going on  Patient states he has been seen for the same problem in the past        Initial Presentation: 70 y o  male from home to ED via ems on 4/4/22 and observation order placed 4/5/22 due to chest pain  Presented due to right sided chest pain starting intermittently the month prior to arrival, worse after eating with shortness of breath and intermittent nausea worse last few days  On exam extrasystoles  Lungs decreased breath sounds  Chronic left facial droop  Speech slow but appropriate  Hs Tnl 491  Bun 38 creatinine 1 46 with baseline of 1 1 - 1 2   Wbc 17 78  In the ED bolus with heparin and placed on gtt  Plan is continue heparin gtt, telemetry, trend troponin  Consult cardiology  Continue home aspirin, Plavix, statin and BB  Continue PPI  Hold home metformin and glipizide, start accuchecks with SSI         ED Triage Vitals [04/04/22 2236]   Temperature Pulse Respirations Blood Pressure SpO2   97 6 °F (36 4 °C) 93 18 132/68 99 %      Temp Source Heart Rate Source Patient Position - Orthostatic VS BP Location FiO2 (%)   Oral Monitor Lying Left arm --      Pain Score       4          Wt Readings from Last 1 Encounters: 04/04/22 83 kg (182 lb 15 7 oz)     Additional Vital Signs:   04/05/22 0800 -- 90 -- 121/67 89 96 % -- --   04/05/22 0400 -- 88 18 102/55 74 96 % None (Room air) Lying   04/05/22 0200 -- 88 -- 140/67 96 95 % None (Room air) Lying   04/05/22 0100 -- 90 16 123/61 86 97 % -- --   04/04/22 2326 -- 92 18 156/70 101 96 % None (Room air)        Pertinent Labs/Diagnostic Test Results:   XR chest 1 view portable   ED Interpretation by Carin George DO (04/04 2335)   No acute disease        4/5/22 ecg - Comparison ECG info:  March 28, 2022    Similarity:  No change  Interpretation:     Interpretation: abnormal    Quality:     Tracing quality:  Limited by artifact  Rate:     ECG rate:  92    ECG rate assessment: normal    Rhythm:     Rhythm: sinus rhythm    Ectopy:     Ectopy: PVCs      PVCs:  Frequent  Conduction:     Conduction: abnormal      Abnormal conduction: incomplete RBBB    Q waves:     Q waves:  III and aVF       Results from last 7 days   Lab Units 04/04/22  2303   SARS-COV-2  Negative     Results from last 7 days   Lab Units 04/05/22  0512 04/04/22  2303   WBC Thousand/uL 16 07* 17 78*   HEMOGLOBIN g/dL 12 1 12 9   HEMATOCRIT % 36 1* 38 7   PLATELETS Thousands/uL 285 290   NEUTROS ABS Thousands/µL  --  15 14*     Results from last 7 days   Lab Units 04/05/22  0512 04/04/22  2303   SODIUM mmol/L 138 141   POTASSIUM mmol/L 3 9 4 2   CHLORIDE mmol/L 105 104   CO2 mmol/L 22 25   ANION GAP mmol/L 11 12   BUN mg/dL 35* 38*   CREATININE mg/dL 1 39* 1 46*   EGFR ml/min/1 73sq m 50 47   CALCIUM mg/dL 8 6 9 0   MAGNESIUM mg/dL 1 6  --      Results from last 7 days   Lab Units 04/04/22  2303   AST U/L 12   ALT U/L 14   ALK PHOS U/L 95   TOTAL PROTEIN g/dL 6 9   ALBUMIN g/dL 3 3*   TOTAL BILIRUBIN mg/dL 0 54     Results from last 7 days   Lab Units 04/05/22  0815   POC GLUCOSE mg/dl 169*     Results from last 7 days   Lab Units 04/05/22  0512 04/04/22  2303   GLUCOSE RANDOM mg/dL 181* 202*     Results from last 7 days   Lab Units 04/05/22  0217 04/05/22  0003 04/04/22  2303   HS TNI 0HR ng/L  --   --  491*   HS TNI 2HR ng/L  --  482*  --    HSTNI D2 ng/L  --  -9  --    HS TNI 4HR ng/L 380*  --   --    HSTNI D4 ng/L -111  --   --      Results from last 7 days   Lab Units 04/04/22  2303   D-DIMER QUANTITATIVE ug/ml FEU 0 35     Results from last 7 days   Lab Units 04/05/22  0144   PROTIME seconds 13 8   INR  1 06   PTT seconds 30     Results from last 7 days   Lab Units 04/04/22  2303   INFLUENZA A PCR  Negative   INFLUENZA B PCR  Negative   RSV PCR  Negative       ED Treatment:   Medication Administration from 04/04/2022 2224 to 04/05/2022 0827       Date/Time Order Dose Route Action Comments     04/05/2022 0241 heparin (porcine) injection 4,000 Units 4,000 Units Intravenous Given      04/05/2022 0243 heparin (porcine) 25,000 units in 0 45% in sodium chloride 250 ml infusion (CMPD) 12 Units/kg/hr Intravenous New Bag      04/05/2022 0700 pantoprazole (PROTONIX) EC tablet 40 mg 40 mg Oral Given         Past Medical History:   Diagnosis Date    Ambulatory dysfunction     uses walker with assist of 1    Anemia     Anxiety     At risk for falls     hx of falls    Benign paroxysmal vertigo     unspecified ear    BPH (benign prostatic hyperplasia)     for TURP today 1/4/2021    Calculus in bladder     for surgical removal today 1/4/2021    Cataract     left eye    Cervicalgia     Chest pain     Chronic kidney disease     stage 3    Constipation     CTS (carpal tunnel syndrome)     left    Cyst of pancreas     Cystitis 06/23/2020    acute cystitis with hematuria    Depression     Diabetes mellitus ([facility] [address] )     type II with neuropathy    Dizziness     and giddiness    Dysphagia     Elevated PSA     Facial weakness     GERD (gastroesophageal reflux disease)     last assessed 5/20/16    Gross hematuria     Hematuria     Hyperlipidemia     Hypertension     Kidney disease     Kidney stone     Left-sided weakness     Long term (current) use of oral hypoglycemic drugs     Muscle weakness     Nuclear senile cataract of left eye     OA (osteoarthritis) of knee     b/l    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HCC)     Syncope and collapse     Tachycardia     UTI (urinary tract infection)     Vertebro-basilar artery syndrome     Vitamin B deficiency     Vitamin D deficiency     Vitamin D deficiency      Present on Admission:   Hyperlipidemia   Type 2 diabetes mellitus with diabetic neuropathy (HCC)   Essential hypertension   Stage 3 chronic kidney disease ([facility])      Admitting Diagnosis: Rib pain [R07 81]  Age/Sex: 70 y o  male  Admission Orders:  Scheduled Medications:  aspirin, 81 mg, Oral, Daily  atorvastatin, 40 mg, Oral, Daily  carbidopa-levodopa, 1 tablet, Oral, TID  clopidogrel, 75 mg, Oral, Daily  vitamin B-12, 100 mcg, Oral, Daily  docusate sodium, 100 mg, Oral, BID  finasteride, 5 mg, Oral, Daily  insulin lispro, 1-6 Units, Subcutaneous, Q6H LIYAH  pantoprazole, 40 mg, Oral, Early Morning  tamsulosin, 0 4 mg, Oral, Daily With Dinner      Continuous IV Infusions:  heparin (porcine), 3-20 Units/kg/hr (Order-Specific), Intravenous, Titrated      PRN Meds: not used   acetaminophen, 650 mg, Oral, Q6H PRN  heparin (porcine), 2,000 Units, Intravenous, Q1H PRN  heparin (porcine), 4,000 Units, Intravenous, Q1H PRN  senna, 8 6 mg, Oral, Daily PRN    Telemetry     IP CONSULT TO CARDIOLOGY    Network Utilization Review Department  ATTENTION: Please call with any questions or concerns to [phone] and carefully listen to the prompts so that you are directed to the right person  All voicemails are confidential   Leti Riggins all requests for admission clinical reviews, approved or denied determinations and any other requests to dedicated fax number below belonging to the campus where the patient is receiving treatment   List of dedicated fax numbers for the Facilities:  FACILITY NAME UR FAX NUMBER   ADMISSION DENIALS (Administrative/Medical Necessity) [address] [facility] (Maternity/NICU/Pediatrics) [address] [place] [address],[address] [facility] [address] [place] [address]  [phone]   [address] [facility] [place] [id number] [phone]   [address] [phone]

## 2022-04-05 NOTE — ASSESSMENT & PLAN NOTE
Chest pain with elevated troponin  · Presented with intermittent chest pain for the past 1 month  Pain occasionally associated with nausea and SOB  · Patient with recent admission 1 month ago for chest pain at which time LHC showed 3 vessel CAD and medical optimization was recommended given that patient is non ambulatory  Echo with EF 55%  · Troponin 491, 482  · EKG with no significant changes noted from prior  · CXR with no acute findings noted  · Continue to trend troponin  · Monitor on telemetry  · Continue aspirin, Plavix, statin and BB  Continue PPI  · Consult cardiology

## 2022-04-05 NOTE — CONSULTS
Consultation - Cardiology Team One  Leander Coello 70 y o  male MRN: [id number]  Unit/Bed#: ED 08 Encounter: [id number]    Inpatient consult to Cardiology  Consult performed by: Titi Urena PA-C  Consult ordered by: Jessica Stern PA-C          Physician Requesting Consult: Iliana Flores DO  Reason for Consult / Principal Problem:  Chest pain    Assessment:    1  Atypical Chest pain:  Presents with intermittent right-sided chest pain over the last month that occurs after eating at times associated with nausea/vomiting likely GI related  EKG with no acute ischemic change  HS troponin 491--> 482--> 380 (216--217 on 3/5)  2  Multivessel CAD:  Cardiac catheterization 3/7 with 80% prox LAD iFR 0 87, 100% OM1 without successful attempt at angioplasty, 55% mid RCA iFR negative  As patient nonambulatory and significant concerns about rehabilitation post CABG medical management was pursued with recommendation for outpatient follow-up with CT surgery  Patient and wife not interested at this point and most likely a poor candidate  · Currently on aspirin, Plavix, BB and statin  · Confirmed with wife he is taking all his medications  3  Elevated troponin:  Likely nonischemic myocardial injury  4  Preserved biventricular systolic function: EF 05% w/ RWMA, normal RV function, no significant valvular abnormality on echocardiogram 03/05/2022   5  Leukocytosis:  WBC 17 POA  Workup and Management per primary team    6  Essential hypertension:  Average /65 on Toprol-XL 25 mg daily and lisinopril 5 mg daily  7  Hyperlipidemia:  Lipid panel 03/2022 TC 37, TG 49, HDL 40, LDL 87 on atorvastatin 40 mg daily  8  Type 2 DM:  Hemoglobin A1c 6 4    Management per primary team   9  Ambulatory dysfunction:  Patient nonambulatory at baseline       Plan/Recommendations:  · No evidence of ACS therefore will discontinue heparin  · Continue PPI  · Continue medical management of CAD with current cardiac medications  · Add Ranexa 500 mg BID  · Management of leukocytosis per primary team  · No further recommendations from cardiac standpoint  · Follow-up with AJIT Izaguirre on 4/11/2022  _____________________________________________________________________________________    CC:  Chest pain      History of Present Illness   HPI: Klever Garcia is a 70y o  year old male who has essential hypertension, hyperlipidemia, type 2 DM, CKD stage 3, nonambulatory, recently admitted (3/4-3/21) and found to have multivessel CAD on catheterization 03/07/2022 with concern for poor rehabilitation after CABG and was therefore advised to follow-up with CT surgery for CABG evaluation as an outpatient who is to eventually follow with cardiologist Dr Yesenia Henson  Patient presented to the emergency room at [facility] on 04/04/2022 via EMS for complaints of intermittent right anterior chest pain for the last month  His pain occurs after eating and at times associated with nausea/vomiting  Patient is not ambulatory therefore cannot offer any exertional component of pain  On arrival to the ED patient's vital signs were stable with oxygen saturation 99% on room air  EKG revealed  CXR with no acute cardiopulmonary disease on personal review; official report pending  Labs revealed creatinine 1 46 otherwise stable CMP, HS troponin 491--> 482--> 380 (216--217 on 3/5), WBC 17 otherwise stable CBC  Respiratory panel negative for influenza/RSV/COVID-19  Cardiology has been consulted for further evaluation management of chest pain    Home medication regimen includes aspirin 81 mg daily, atorvastatin 40 mg daily, Plavix 75 mg daily, Toprol-XL 25 mg daily, lisinopril 5 mg daily    Patient resting in bed during consultation and denies any current chest pain, shortness of breath or palpitations  He has poor insight to his medical condition  His wife takes care of all his meds and he is unaware of what he is taking    He states that on and off he will have right-sided pain after he eats associated with nausea with an episode of vomiting yesterday  He denies any palpitations, lightheadedness or dizziness  Spoke to wife over the phone who confirmed looking at all the medications that he has been taking dual anti-platelet therapy, statin, beta-blocker and Ace  Both patient and his wife are very hesitant to discuss CABG and appears the office appointment was canceled  Cardiac catheterization 03/07/2022:  Impression:    · 3 vessel CAD, moderate RCA, abnormal IFR of LAD  · Unsuccessful attempt at angioplasty of large OM branch  · Recommended for CABG with grafts to LAD, om and RCA    Left Anterior Descending   Prox LAD lesion is 80% stenosed  YOLA flow is 3  The lesion is type C and diffuse  iFR was measured in the Prox LAD  iFR ratio: 0 87  The iFR was significant, not intervened  Left Circumflex   First Obtuse Marginal Branch   Collaterals   1st Mrg filled by collaterals from 1st Mrg  The collateral flow was moderate  1st Mrg lesion is 100% stenosed  Right Coronary Artery   Mid RCA lesion is 55% stenosed  YOLA flow is 3  iFR was measured in the Mid RCA  iFR ratio: 0 94  The iFR was nonsignificant, not intervened  Nuclear stress test 03/06/2022:  Large defect in the entire inferior and inferolateral wall that is partially reversible  Mostly scar with small amount of wilbur-infarct ischemia  Echocardiogram 03/05/2022:  EF 55% w/ RWMA, normal RV function, no significant valvular abnormality  EKG reviewed personally: 4/4/2022 sinus rhythm at a rate of 92 beats per minute with normal axis, RBBB, frequent PVCs      Telemetry reviewed personally:  Sinus rhythm with PVCs        ROS  Historical Information   Past Medical History:   Diagnosis Date    Ambulatory dysfunction     uses walker with assist of 1    Anemia     Anxiety     At risk for falls     hx of falls    Benign paroxysmal vertigo     unspecified ear    BPH (benign prostatic hyperplasia) for TURP today 1/4/2021    Calculus in bladder     for surgical removal today 1/4/2021    Cataract     left eye    Cervicalgia     Chest pain     Chronic kidney disease     stage 3    Constipation     CTS (carpal tunnel syndrome)     left    Cyst of pancreas     Cystitis 06/23/2020    acute cystitis with hematuria    Depression     Diabetes mellitus ([facility] [address] )     type II with neuropathy    Dizziness     and giddiness    Dysphagia     Elevated PSA     Facial weakness     GERD (gastroesophageal reflux disease)     last assessed 5/20/16    Gross hematuria     Hematuria     Hyperlipidemia     Hypertension     Kidney disease     Kidney stone     Left-sided weakness     Long term (current) use of oral hypoglycemic drugs     Muscle weakness     Nuclear senile cataract of left eye     OA (osteoarthritis) of knee     b/l    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[facility] [address] )     Syncope and collapse     Tachycardia     UTI (urinary tract infection)     Vertebro-basilar artery syndrome     Vitamin B deficiency     Vitamin D deficiency     Vitamin D deficiency      Past Surgical History:   Procedure Laterality Date    CARDIAC CATHETERIZATION N/A 3/7/2022    Procedure: CARDIAC CATHETERIZATION;  Surgeon: Wei Dorsey DO;  [facility];   Service: Cardiology    CATARACT EXTRACTION      CHOLECYSTECTOMY      KNEE SURGERY      VA REMOVE BLADDER STONE,<2 5 CM N/A 1/4/2021    Procedure: Cystolitholopaxy w/laser bladder stones;  Surgeon: Juan Jackman MD;  Location: [facility];  Service: Urology    VA TRANSURETHRAL ELEC-SURG [address] [place] [address] N/A 1/4/2021    Procedure: T U R P ;  Surgeon: Juan Jackman MD;  [facility];  Service: Urology     Social History     Substance and Sexual Activity   Alcohol Use Not Currently     Social History     Substance and Sexual Activity   Drug Use No     Social History     Tobacco Use   Smoking Status Never Smoker   Smokeless Tobacco Never Used     Family History:   Family History   Problem Relation Age of Onset    Coronary artery disease Mother     Diabetes Father        Meds/Allergies   all current active meds have been reviewed, current meds:   Current Facility-Administered Medications   Medication Dose Route Frequency    acetaminophen (TYLENOL) tablet 650 mg  650 mg Oral Q6H PRN    aspirin chewable tablet 81 mg  81 mg Oral Daily    atorvastatin (LIPITOR) tablet 40 mg  40 mg Oral Daily    carbidopa-levodopa (SINEMET)  mg per tablet 1 tablet  1 tablet Oral TID    clopidogrel (PLAVIX) tablet 75 mg  75 mg Oral Daily    cyanocobalamin (VITAMIN B-12) tablet 100 mcg  100 mcg Oral Daily    docusate sodium (COLACE) capsule 100 mg  100 mg Oral BID    famotidine (PEPCID) tablet 20 mg  20 mg Oral HS    finasteride (PROSCAR) tablet 5 mg  5 mg Oral Daily    heparin (porcine) 25,000 units in 0 45% in sodium chloride 250 ml infusion (CMPD)  3-20 Units/kg/hr (Order-Specific) Intravenous Titrated    heparin (porcine) injection 2,000 Units  2,000 Units Intravenous Q1H PRN    heparin (porcine) injection 4,000 Units  4,000 Units Intravenous Q1H PRN    insulin lispro (HumaLOG) 100 units/mL subcutaneous injection 1-6 Units  1-6 Units Subcutaneous Q6H [facility]    pantoprazole (PROTONIX) EC tablet 40 mg  40 mg Oral Early Morning    senna (SENOKOT) tablet 8 6 mg  8 6 mg Oral Daily PRN    tamsulosin (FLOMAX) capsule 0 4 mg  0 4 mg Oral Daily With Dinner    and PTA meds:   Prior to Admission Medications   Prescriptions Last Dose Informant Patient Reported? Taking?    ACCU-CHEK GUIDE test strip  Outside Facility (Specify) Yes Yes   Si strip as needed   Alcohol Swabs (PRO COMFORT ALCOHOL) 70 % PADS  Outside Facility (Specify) Yes Yes   Si pad daily Use a pad when testing   Easy Comfort Lancets MISC  Outside Facility (Specify) Yes Yes   Sig: as needed   Glucagon, rDNA, (GLUCAGON EMERGENCY IJ)   Yes Yes   Sig: Inject 1 mL as directed Sugars less than 60 and conscious Glucose 15 g PACK   Yes Yes   Sig: Take 15 g by mouth Per [facility] "give 15 gram PO every 15 minutes PRN for hypoglycemia"   NovoFine Autocover 30G X 8 MM MISC   Yes Yes   NovoLOG FlexPen 100 units/mL injection pen   Yes Yes   acetaminophen (TYLENOL) 325 mg tablet   Yes Yes   Sig: Take 650 mg by mouth every 6 (six) hours as needed for mild pain   aspirin 81 mg chewable tablet   No Yes   Sig: Chew 1 tablet (81 mg total) daily   atorvastatin (LIPITOR) 40 mg tablet   No Yes   Sig: Take 1 tablet (40 mg total) by mouth daily   carbidopa-levodopa (SINEMET)  mg per tablet   No No   Sig: Take 1 tablet by mouth 3 (three) times a day   cholecalciferol (VITAMIN D3) 1,000 units tablet  Outside Facility (Specify) No Yes   Sig: Take 2 tablets (2,000 Units total) by mouth daily   Patient taking differently: Take 50,000 Units by mouth daily    clopidogrel (PLAVIX) 75 mg tablet   No Yes   Sig: Take 1 tablet (75 mg total) by mouth daily   cyanocobalamin (VITAMIN B-12) 1000 MCG tablet  Outside Facility (Specify) No Yes   Sig: Take 1 tablet (1,000 mcg total) by mouth daily   docusate sodium (COLACE) 100 mg capsule  Outside Facility (Specify) No Yes   Sig: Take 1 capsule (100 mg total) by mouth 2 (two) times a day   finasteride (PROSCAR) 5 mg tablet  Outside Facility (Specify) No Yes   Sig: Take 1 tablet (5 mg total) by mouth daily   glipiZIDE (GLUCOTROL) 10 mg tablet   No Yes   Sig: TAKE 1 TABLET BY MOUTH 2 TIMES A DAY   Patient taking differently: Per [facility] "give 5mg by mouth one time a prescriber day for DM 2"   glucose blood test strip  Outside Facility (Specify) Yes Yes   Si strip as needed   melatonin 3 mg   Yes Yes   Sig: Take 3 mg by mouth daily at bedtime Give 2 tabs at bed time for insomnia per [facility]   metFORMIN (GLUCOPHAGE) 1000 MG tablet   No Yes   Sig: TAKE 1 TABLET BY MOUTH 2 TIMES A DAY   pantoprazole (PROTONIX) 40 mg tablet   No Yes   Sig: Take 1 tablet (40 mg total) by mouth 2 (two) times a day before meals   polyethylene glycol (MIRALAX) 17 g packet  Outside Facility (Specify) No Yes   Sig: Take 17 g by mouth daily   senna (SENOKOT) 8 6 mg   No Yes   Sig: Take 1 tablet (8 6 mg total) by mouth daily as needed for constipation   tamsulosin (FLOMAX) 0 4 mg  Outside Facility (Specify) No Yes   Sig: Take 1 capsule (0 4 mg total) by mouth daily with dinner   vitamin B-12 (CYANOCOBALAMIN) 100 MCG TABS  Outside Facility (Specify) Yes Yes   Sig: Take 100 mcg by mouth daily      Facility-Administered Medications: None     heparin (porcine), 3-20 Units/kg/hr (Order-Specific), Last Rate: 12 Units/kg/hr (04/05/22 0243)        No Known Allergies    Objective   Vitals: Blood pressure 121/67, pulse 90, temperature 97 6 °F (36 4 °C), temperature source Oral, resp  rate 18, height 5' 7" (1 702 m), weight 83 kg (182 lb 15 7 oz), SpO2 98 %  ,     Body mass index is 28 66 kg/m²  ,     Systolic (66IKR), GYW:286 , Min:102 , MARTY:223     Diastolic (74WWE), LQH:73, Min:55, Max:70    Wt Readings from Last 3 Encounters:   04/04/22 83 kg (182 lb 15 7 oz)   03/28/22 82 9 kg (182 lb 12 2 oz)   03/06/22 85 3 kg (188 lb)      Lab Results   Component Value Date    CREATININE 1 39 (H) 04/05/2022    CREATININE 1 46 (H) 04/04/2022    CREATININE 1 43 (H) 03/28/2022           No intake or output data in the 24 hours ending 04/05/22 0907  Weight (last 2 days)     Date/Time Weight    04/04/22 2236 83 (182 98)        Invasive Devices  Report    Peripheral Intravenous Line            Peripheral IV 04/04/22 Right Antecubital <1 day                  Physical Exam      LABORATORY RESULTS:      CBC with diff:   Results from last 7 days   Lab Units 04/05/22  0512 04/04/22  2303   WBC Thousand/uL 16 07* 17 78*   HEMOGLOBIN g/dL 12 1 12 9   HEMATOCRIT % 36 1* 38 7   MCV fL 88 89   PLATELETS Thousands/uL 285 290   MCH pg 29 4 29 7   MCHC g/dL 33 5 33 3   RDW % 13 7 13 6   MPV fL 11 1 10 8   NRBC AUTO /100 WBCs  --  0       CMP:  Results from last 7 days   Lab Units 04/05/22  0512 04/04/22  2303   POTASSIUM mmol/L 3 9 4 2   CHLORIDE mmol/L 105 104   CO2 mmol/L 22 25   BUN mg/dL 35* 38*   CREATININE mg/dL 1 39* 1 46*   CALCIUM mg/dL 8 6 9 0   AST U/L  --  12   ALT U/L  --  14   ALK PHOS U/L  --  95   EGFR ml/min/1 73sq m 50 47       BMP:  Results from last 7 days   Lab Units 04/05/22  0512 04/04/22  2303   POTASSIUM mmol/L 3 9 4 2   CHLORIDE mmol/L 105 104   CO2 mmol/L 22 25   BUN mg/dL 35* 38*   CREATININE mg/dL 1 39* 1 46*   CALCIUM mg/dL 8 6 9 0          Lab Results   Component Value Date    NTBNP 246 (H) 06/22/2020            Results from last 7 days   Lab Units 04/05/22  0512   MAGNESIUM mg/dL 1 6                     Results from last 7 days   Lab Units 04/05/22  0144   INR  1 06     Lipid Profile:   Lab Results   Component Value Date    CHOL 189 12/20/2017    CHOL 165 04/05/2016     Lab Results   Component Value Date    HDL 40 03/05/2022    HDL 34 (L) 01/03/2019    HDL 38 (L) 12/20/2017     Lab Results   Component Value Date    LDLCALC 87 03/05/2022    LDLCALC 130 (H) 01/03/2019     Lab Results   Component Value Date    TRIG 49 03/05/2022    TRIG 92 01/03/2019    TRIG 86 12/20/2017         Cardiac testing:   No results found for this or any previous visit  No results found for this or any previous visit  No valid procedures specified  No results found for this or any previous visit  Imaging: I have personally reviewed pertinent reports  X-ray chest 1 view portable    Result Date: 3/29/2022  Narrative: CHEST INDICATION:   chest pain  COMPARISON:  3/5/2022  EXAM PERFORMED/VIEWS:  XR CHEST PORTABLE FINDINGS: Cardiomediastinal silhouette appears unremarkable  The lungs are clear  No pneumothorax or pleural effusion  Osseous structures appear within normal limits for patient age  Impression: No acute cardiopulmonary disease   Workstation performed: [id number]     CT head without contrast    Result Date: 3/28/2022  Narrative: CT BRAIN - WITHOUT CONTRAST INDICATION:   episodic blurred vision  COMPARISON:  CT head 3/5/2022 TECHNIQUE:  CT examination of the brain was performed  In addition to axial images, sagittal and coronal 2D reformatted images were created and submitted for interpretation  Radiation dose length product (DLP) for this visit:  820 6 mGy-cm   This examination, like all CT scans performed in the [facility], was performed utilizing techniques to minimize radiation dose exposure, including the use of iterative reconstruction and automated exposure control  IMAGE QUALITY:  Diagnostic  FINDINGS: PARENCHYMA: Decreased attenuation is noted in periventricular and subcortical white matter demonstrating an appearance that is statistically most likely to represent mild microangiopathic change; this appearance is similar when compared to most recent prior examination  No CT signs of acute infarction  No intracranial mass, mass effect or midline shift  No acute parenchymal hemorrhage  VENTRICLES AND EXTRA-AXIAL SPACES:  Normal for the patient's age  VISUALIZED ORBITS AND PARANASAL SINUSES:  Unremarkable  CALVARIUM AND EXTRACRANIAL SOFT TISSUES:  Normal      Impression: No acute intracranial abnormality  Workstation performed: [id number]     Cardiac catheterization    Result Date: 3/7/2022  Narrative: · 3V CAD, moderate RCA, abnormal iFR LAD · Brief attempt crossing large OM branch with workhouse wires unsuccessful      Stress strip    Result Date: 3/10/2022  Narrative: Confirmed by Archive, Archive ([id number]),  Joi Galo ([id number]) on 3/10/2022 7:27:06 AM    NM Myocardial Perfusion Spect (Pharmacological Induced Stress and/or Rest)    Result Date: 3/6/2022  Narrative: [facility]  Stress ECG: No ST deviation is noted  Arrhythmias during stress: occasional PVCs  The ECG was not diagnostic due to pharmacological (vasodilator) stress     Stress Function: Left ventricular function post-stress is normal  Post-stress ejection fraction is 61 %  There is a defect in the entire inferior and inferolateral location(s)  The defect is akinetic    Perfusion: There is a left ventricular perfusion defect that is large in size present in the entire inferior and inferolateral location(s) that is partially reversible    Stress Combined Conclusion: Abnormal stress myoview study  Mostly scar with small amount of wilbur-infarct ischemia  Counseling / Coordination of Care  Total floor / unit time spent today 45 minutes  Greater than 50% of total time was spent with the patient and / or family counseling and / or coordination of care  A description of the counseling / coordination of care: Review of history, current assessment, development of a plan  Code Status: Level 1 - Full Code    ** Please Note: Dragon 360 Dictation voice to text software may have been used in the creation of this document   **

## 2022-04-05 NOTE — ASSESSMENT & PLAN NOTE
Lab Results   Component Value Date    HGBA1C 6 4 (H) 03/05/2022       No results for input(s): POCGLU in the last 72 hours  Blood Sugar Average: Last 72 hrs:     · Hold metformin and glipizide while inpatient  · Accu-checks q6h while NPO  · SSI for coverage  · Hypoglycemia protocol

## 2022-04-05 NOTE — ED NOTES
TRANSPORT INFO  [place] to  at [phone]  Made primary RN aware       Elizabeth Ku, RN  04/05/22 1200

## 2022-04-05 NOTE — ASSESSMENT & PLAN NOTE
Lab Results   Component Value Date    EGFR 50 04/05/2022    EGFR 47 04/04/2022    EGFR 48 03/28/2022    CREATININE 1 39 (H) 04/05/2022    CREATININE 1 46 (H) 04/04/2022    CREATININE 1 43 (H) 03/28/2022     · Baseline Cr 1 1-1 2   · Cr slightly above baseline but stable

## 2022-04-05 NOTE — ASSESSMENT & PLAN NOTE
Lab Results   Component Value Date    HGBA1C 6 4 (H) 03/05/2022       Recent Labs     04/05/22  0815 04/05/22  1059   POCGLU 169* 178*       Blood Sugar Average: Last 72 hrs:  (P) 173 5   · Hold metformin and glipizide while inpatient and restart at d/c

## 2022-04-05 NOTE — ASSESSMENT & PLAN NOTE
· POA with WBC 17 78  · No evidence of infection  CXR with no acute findings noted     · PCP should check CBC in 2-4 weeks

## 2022-04-05 NOTE — ASSESSMENT & PLAN NOTE
· POA with WBC 17 78  · No evidence of infection  CXR with no acute findings noted     · Repeat CBC in AM

## 2022-04-06 NOTE — UTILIZATION REVIEW
Notification of Discharge   This is a Notification of Discharge from our facility [address] [place] [address]  Please be advised that this patient has been discharge from our facility  Below you will find the admission and discharge date and time including the patients disposition  UTILIZATION REVIEW CONTACT:  Marjorie Pascual  Utilization   Network Utilization Review Department  Phone: [phone] x carefully listen to the prompts  All voicemails are confidential   Email: [email or web address]     PHYSICIAN ADVISORY SERVICES:  FOR [id number] REVIEW - MEDICAL NECESSITY DENIAL  Phone: [phone]  Fax: [phone]  Email: [email or web address]     PRESENTATION DATE: 4/4/2022 10:25 PM  OBERVATION ADMISSION DATE:   INPATIENT ADMISSION DATE: N/A N/A   DISCHARGE DATE: 4/5/2022  1:02 PM  DISPOSITION: Home/Self Care Home/Self Care      IMPORTANT INFORMATION:  Send all requests for admission clinical reviews, approved or denied determinations and any other requests to dedicated fax number below belonging to the campus where the patient is receiving treatment   List of dedicated fax numbers:  [address] [place] [address] DENIALS (Administrative/Medical Necessity) [phone]   [address]  (Maternity/NICU/Pediatrics) [phone]   Aundra Small [phone]   [address] [facility] [phone]   [address] [place] [address] [phone]   [address] [facility] [address] [place] [address],[address] [phone]   [facility]  [phone]   [address] [facility] [address]  [address] [facility] [address] [facility] [phone]

## 2022-04-07 LAB
ATRIAL RATE: 92 BPM
ATRIAL RATE: 93 BPM
ATRIAL RATE: 94 BPM
P AXIS: 69 DEGREES
P AXIS: 70 DEGREES
P AXIS: 80 DEGREES
PR INTERVAL: 134 MS
PR INTERVAL: 136 MS
PR INTERVAL: 138 MS
QRS AXIS: 46 DEGREES
QRS AXIS: 47 DEGREES
QRS AXIS: 62 DEGREES
QRSD INTERVAL: 126 MS
QRSD INTERVAL: 130 MS
QRSD INTERVAL: 132 MS
QT INTERVAL: 384 MS
QT INTERVAL: 396 MS
QT INTERVAL: 404 MS
QTC INTERVAL: 470 MS
QTC INTERVAL: 482 MS
QTC INTERVAL: 499 MS
T WAVE AXIS: 106 DEGREES
T WAVE AXIS: 79 DEGREES
T WAVE AXIS: 95 DEGREES
VENTRICULAR RATE: 89 BPM
VENTRICULAR RATE: 90 BPM
VENTRICULAR RATE: 92 BPM

## 2022-04-07 PROCEDURE — 93010 ELECTROCARDIOGRAM REPORT: CPT | Performed by: INTERNAL MEDICINE

## 2022-07-08 ENCOUNTER — HOSPITAL ENCOUNTER (INPATIENT)
Facility: HOSPITAL | Age: 72
LOS: 2 days | Discharge: HOME HEALTH | DRG: 690 | End: 2022-07-10
Attending: EMERGENCY MEDICINE | Admitting: HOSPITALIST
Payer: MEDICARE

## 2022-07-08 ENCOUNTER — APPOINTMENT (EMERGENCY)
Dept: RADIOLOGY | Facility: HOSPITAL | Age: 72
DRG: 690 | End: 2022-07-08
Payer: MEDICARE

## 2022-07-08 ENCOUNTER — APPOINTMENT (EMERGENCY)
Dept: CT IMAGING | Facility: HOSPITAL | Age: 72
DRG: 690 | End: 2022-07-08
Payer: MEDICARE

## 2022-07-08 DIAGNOSIS — J18.9 PNEUMONIA: ICD-10-CM

## 2022-07-08 DIAGNOSIS — R53.1 GENERALIZED WEAKNESS: Primary | ICD-10-CM

## 2022-07-08 DIAGNOSIS — R26.2 AMBULATORY DYSFUNCTION: ICD-10-CM

## 2022-07-08 DIAGNOSIS — N39.0 UTI (URINARY TRACT INFECTION): ICD-10-CM

## 2022-07-08 PROBLEM — G20.A1 PARKINSON DISEASE: Status: ACTIVE | Noted: 2022-07-08

## 2022-07-08 PROBLEM — E11.9 TYPE 2 DIABETES MELLITUS (HCC): Status: ACTIVE | Noted: 2019-12-31

## 2022-07-08 PROBLEM — G20 PARKINSON DISEASE (HCC): Status: ACTIVE | Noted: 2022-07-08
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
## 2022-07-08 LAB
2HR DELTA HS TROPONIN: -2 NG/L
ALBUMIN SERPL BCP-MCNC: 3.7 G/DL (ref 3.5–5)
ALP SERPL-CCNC: 78 U/L (ref 34–104)
ALT SERPL W P-5'-P-CCNC: 3 U/L (ref 7–52)
ANION GAP SERPL CALCULATED.3IONS-SCNC: 9 MMOL/L (ref 4–13)
AST SERPL W P-5'-P-CCNC: 12 U/L (ref 13–39)
ATRIAL RATE: 80 BPM
BACTERIA UR QL AUTO: ABNORMAL /HPF
BASOPHILS # BLD AUTO: 0.04 THOUSANDS/ΜL (ref 0–0.1)
BASOPHILS NFR BLD AUTO: 0 % (ref 0–1)
BILIRUB SERPL-MCNC: 0.82 MG/DL (ref 0.2–1)
BILIRUB UR QL STRIP: NEGATIVE
BUN SERPL-MCNC: 19 MG/DL (ref 5–25)
CALCIUM SERPL-MCNC: 8.8 MG/DL (ref 8.4–10.2)
CARDIAC TROPONIN I PNL SERPL HS: 5 NG/L
CARDIAC TROPONIN I PNL SERPL HS: 7 NG/L
CHLORIDE SERPL-SCNC: 104 MMOL/L (ref 96–108)
CLARITY UR: ABNORMAL
CO2 SERPL-SCNC: 24 MMOL/L (ref 21–32)
COLOR UR: YELLOW
CREAT SERPL-MCNC: 1.19 MG/DL (ref 0.6–1.3)
EOSINOPHIL # BLD AUTO: 0.22 THOUSAND/ΜL (ref 0–0.61)
EOSINOPHIL NFR BLD AUTO: 2 % (ref 0–6)
ERYTHROCYTE [DISTWIDTH] IN BLOOD BY AUTOMATED COUNT: 13.6 % (ref 11.6–15.1)
FLUAV RNA RESP QL NAA+PROBE: NEGATIVE
FLUBV RNA RESP QL NAA+PROBE: NEGATIVE
GFR SERPL CREATININE-BSD FRML MDRD: 60 ML/MIN/1.73SQ M
GLUCOSE SERPL-MCNC: 181 MG/DL (ref 65–140)
GLUCOSE SERPL-MCNC: 210 MG/DL (ref 65–140)
GLUCOSE UR STRIP-MCNC: ABNORMAL MG/DL
HCT VFR BLD AUTO: 38.6 % (ref 36.5–49.3)
HGB BLD-MCNC: 12.5 G/DL (ref 12–17)
HGB UR QL STRIP.AUTO: ABNORMAL
IMM GRANULOCYTES # BLD AUTO: 0.05 THOUSAND/UL (ref 0–0.2)
IMM GRANULOCYTES NFR BLD AUTO: 1 % (ref 0–2)
KETONES UR STRIP-MCNC: NEGATIVE MG/DL
LEUKOCYTE ESTERASE UR QL STRIP: ABNORMAL
LYMPHOCYTES # BLD AUTO: 0.69 THOUSANDS/ΜL (ref 0.6–4.47)
LYMPHOCYTES NFR BLD AUTO: 7 % (ref 14–44)
MCH RBC QN AUTO: 28.9 PG (ref 26.8–34.3)
MCHC RBC AUTO-ENTMCNC: 32.4 G/DL (ref 31.4–37.4)
MCV RBC AUTO: 89 FL (ref 82–98)
MONOCYTES # BLD AUTO: 0.57 THOUSAND/ΜL (ref 0.17–1.22)
MONOCYTES NFR BLD AUTO: 6 % (ref 4–12)
NEUTROPHILS # BLD AUTO: 8.03 THOUSANDS/ΜL (ref 1.85–7.62)
NEUTS SEG NFR BLD AUTO: 84 % (ref 43–75)
NITRITE UR QL STRIP: NEGATIVE
NON-SQ EPI CELLS URNS QL MICRO: ABNORMAL /HPF
NRBC BLD AUTO-RTO: 0 /100 WBCS
P AXIS: 58 DEGREES
PH UR STRIP.AUTO: 5 [PH] (ref 4.5–8)
PLATELET # BLD AUTO: 270 THOUSANDS/UL (ref 149–390)
PMV BLD AUTO: 10.8 FL (ref 8.9–12.7)
POTASSIUM SERPL-SCNC: 4.3 MMOL/L (ref 3.5–5.3)
PR INTERVAL: 140 MS
PROCALCITONIN SERPL-MCNC: <0.05 NG/ML
PROT SERPL-MCNC: 6.6 G/DL (ref 6.4–8.4)
PROT UR STRIP-MCNC: ABNORMAL MG/DL
QRS AXIS: 44 DEGREES
QRSD INTERVAL: 128 MS
QT INTERVAL: 412 MS
QTC INTERVAL: 475 MS
RBC # BLD AUTO: 4.33 MILLION/UL (ref 3.88–5.62)
RBC #/AREA URNS AUTO: ABNORMAL /HPF
RSV RNA RESP QL NAA+PROBE: NEGATIVE
SARS-COV-2 RNA RESP QL NAA+PROBE: NEGATIVE
SODIUM SERPL-SCNC: 137 MMOL/L (ref 135–147)
SP GR UR STRIP.AUTO: 1.02 (ref 1–1.03)
T WAVE AXIS: 40 DEGREES
TSH SERPL DL<=0.05 MIU/L-ACNC: 1.03 UIU/ML (ref 0.45–4.5)
UROBILINOGEN UR QL STRIP.AUTO: 0.2 E.U./DL
VENTRICULAR RATE: 80 BPM
WBC # BLD AUTO: 9.6 THOUSAND/UL (ref 4.31–10.16)
WBC #/AREA URNS AUTO: ABNORMAL /HPF

## 2022-07-08 PROCEDURE — 82948 REAGENT STRIP/BLOOD GLUCOSE: CPT

## 2022-07-08 PROCEDURE — 84443 ASSAY THYROID STIM HORMONE: CPT | Performed by: PHYSICIAN ASSISTANT

## 2022-07-08 PROCEDURE — G1004 CDSM NDSC: HCPCS

## 2022-07-08 PROCEDURE — 73080 X-RAY EXAM OF ELBOW: CPT

## 2022-07-08 PROCEDURE — 70450 CT HEAD/BRAIN W/O DYE: CPT

## 2022-07-08 PROCEDURE — 99223 1ST HOSP IP/OBS HIGH 75: CPT | Performed by: INTERNAL MEDICINE

## 2022-07-08 PROCEDURE — 99285 EMERGENCY DEPT VISIT HI MDM: CPT

## 2022-07-08 PROCEDURE — 93005 ELECTROCARDIOGRAM TRACING: CPT

## 2022-07-08 PROCEDURE — 36415 COLL VENOUS BLD VENIPUNCTURE: CPT

## 2022-07-08 PROCEDURE — 84145 PROCALCITONIN (PCT): CPT | Performed by: PHYSICIAN ASSISTANT

## 2022-07-08 PROCEDURE — 71045 X-RAY EXAM CHEST 1 VIEW: CPT

## 2022-07-08 PROCEDURE — 84484 ASSAY OF TROPONIN QUANT: CPT | Performed by: EMERGENCY MEDICINE

## 2022-07-08 PROCEDURE — 99285 EMERGENCY DEPT VISIT HI MDM: CPT | Performed by: PHYSICIAN ASSISTANT

## 2022-07-08 PROCEDURE — 81001 URINALYSIS AUTO W/SCOPE: CPT

## 2022-07-08 PROCEDURE — 0241U HB NFCT DS VIR RESP RNA 4 TRGT: CPT | Performed by: PHYSICIAN ASSISTANT

## 2022-07-08 PROCEDURE — 80053 COMPREHEN METABOLIC PANEL: CPT | Performed by: EMERGENCY MEDICINE

## 2022-07-08 PROCEDURE — 85025 COMPLETE CBC W/AUTO DIFF WBC: CPT | Performed by: EMERGENCY MEDICINE

## 2022-07-08 PROCEDURE — 87086 URINE CULTURE/COLONY COUNT: CPT

## 2022-07-08 PROCEDURE — 93010 ELECTROCARDIOGRAM REPORT: CPT | Performed by: INTERNAL MEDICINE

## 2022-07-08 RX ORDER — INSULIN LISPRO 100 [IU]/ML
1-6 INJECTION, SOLUTION INTRAVENOUS; SUBCUTANEOUS
Status: DISCONTINUED | OUTPATIENT
Start: 2022-07-08 | End: 2022-07-10 | Stop reason: HOSPADM

## 2022-07-08 RX ORDER — LANOLIN ALCOHOL/MO/W.PET/CERES
3 CREAM (GRAM) TOPICAL
Status: DISCONTINUED | OUTPATIENT
Start: 2022-07-08 | End: 2022-07-10 | Stop reason: HOSPADM

## 2022-07-08 RX ORDER — ACETAMINOPHEN 325 MG/1
650 TABLET ORAL EVERY 6 HOURS PRN
Status: DISCONTINUED | OUTPATIENT
Start: 2022-07-08 | End: 2022-07-10 | Stop reason: HOSPADM

## 2022-07-08 RX ORDER — LISINOPRIL 5 MG/1
5 TABLET ORAL DAILY
Status: DISCONTINUED | OUTPATIENT
Start: 2022-07-09 | End: 2022-07-10 | Stop reason: HOSPADM

## 2022-07-08 RX ORDER — RANOLAZINE 500 MG/1
500 TABLET, EXTENDED RELEASE ORAL EVERY 12 HOURS SCHEDULED
Status: DISCONTINUED | OUTPATIENT
Start: 2022-07-08 | End: 2022-07-08

## 2022-07-08 RX ORDER — DOCUSATE SODIUM 100 MG/1
100 CAPSULE, LIQUID FILLED ORAL 2 TIMES DAILY
Status: DISCONTINUED | OUTPATIENT
Start: 2022-07-08 | End: 2022-07-10 | Stop reason: HOSPADM

## 2022-07-08 RX ORDER — ENOXAPARIN SODIUM 100 MG/ML
40 INJECTION SUBCUTANEOUS DAILY
Status: DISCONTINUED | OUTPATIENT
Start: 2022-07-09 | End: 2022-07-10 | Stop reason: HOSPADM

## 2022-07-08 RX ORDER — ASPIRIN 81 MG/1
81 TABLET, CHEWABLE ORAL DAILY
Status: DISCONTINUED | OUTPATIENT
Start: 2022-07-09 | End: 2022-07-10 | Stop reason: HOSPADM

## 2022-07-08 RX ORDER — SENNOSIDES 8.6 MG
8.6 TABLET ORAL DAILY PRN
Status: DISCONTINUED | OUTPATIENT
Start: 2022-07-08 | End: 2022-07-10 | Stop reason: HOSPADM

## 2022-07-08 RX ORDER — INSULIN LISPRO 100 [IU]/ML
1-6 INJECTION, SOLUTION INTRAVENOUS; SUBCUTANEOUS
Status: DISCONTINUED | OUTPATIENT
Start: 2022-07-08 | End: 2022-07-08

## 2022-07-08 RX ORDER — ATORVASTATIN CALCIUM 40 MG/1
40 TABLET, FILM COATED ORAL DAILY
Status: DISCONTINUED | OUTPATIENT
Start: 2022-07-09 | End: 2022-07-10 | Stop reason: HOSPADM

## 2022-07-08 RX ORDER — CLOPIDOGREL BISULFATE 75 MG/1
75 TABLET ORAL DAILY
Status: DISCONTINUED | OUTPATIENT
Start: 2022-07-09 | End: 2022-07-10 | Stop reason: HOSPADM

## 2022-07-08 RX ORDER — TAMSULOSIN HYDROCHLORIDE 0.4 MG/1
0.4 CAPSULE ORAL
Status: DISCONTINUED | OUTPATIENT
Start: 2022-07-08 | End: 2022-07-10 | Stop reason: HOSPADM

## 2022-07-08 RX ORDER — POLYETHYLENE GLYCOL 3350 17 G/17G
17 POWDER, FOR SOLUTION ORAL DAILY
Status: DISCONTINUED | OUTPATIENT
Start: 2022-07-09 | End: 2022-07-10 | Stop reason: HOSPADM

## 2022-07-08 RX ORDER — PANTOPRAZOLE SODIUM 40 MG/1
40 TABLET, DELAYED RELEASE ORAL
Status: DISCONTINUED | OUTPATIENT
Start: 2022-07-08 | End: 2022-07-10 | Stop reason: HOSPADM

## 2022-07-08 RX ORDER — FAMOTIDINE 20 MG/1
20 TABLET, FILM COATED ORAL
Status: DISCONTINUED | OUTPATIENT
Start: 2022-07-08 | End: 2022-07-10 | Stop reason: HOSPADM

## 2022-07-08 RX ORDER — FINASTERIDE 5 MG/1
5 TABLET, FILM COATED ORAL DAILY
Status: DISCONTINUED | OUTPATIENT
Start: 2022-07-09 | End: 2022-07-10 | Stop reason: HOSPADM

## 2022-07-08 RX ORDER — CEFTRIAXONE 1 G/50ML
1000 INJECTION, SOLUTION INTRAVENOUS EVERY 24 HOURS
Status: DISCONTINUED | OUTPATIENT
Start: 2022-07-09 | End: 2022-07-10

## 2022-07-08 RX ORDER — METOPROLOL SUCCINATE 25 MG/1
25 TABLET, EXTENDED RELEASE ORAL DAILY
Status: DISCONTINUED | OUTPATIENT
Start: 2022-07-09 | End: 2022-07-10 | Stop reason: HOSPADM

## 2022-07-08 RX ADMIN — Medication 3 MG: at 21:30

## 2022-07-08 RX ADMIN — INSULIN LISPRO 2 UNITS: 100 INJECTION, SOLUTION INTRAVENOUS; SUBCUTANEOUS at 22:33

## 2022-07-08 RX ADMIN — CARBIDOPA AND LEVODOPA 1 TABLET: 25; 100 TABLET ORAL at 21:30

## 2022-07-08 RX ADMIN — PANTOPRAZOLE SODIUM 40 MG: 40 TABLET, DELAYED RELEASE ORAL at 17:58

## 2022-07-08 RX ADMIN — ACETAMINOPHEN 650 MG: 325 TABLET ORAL at 20:56

## 2022-07-08 RX ADMIN — TAMSULOSIN HYDROCHLORIDE 0.4 MG: 0.4 CAPSULE ORAL at 17:58

## 2022-07-08 RX ADMIN — FAMOTIDINE 20 MG: 20 TABLET ORAL at 21:30

## 2022-07-08 RX ADMIN — DOCUSATE SODIUM 100 MG: 100 CAPSULE, LIQUID FILLED ORAL at 17:58

## 2022-07-08 RX ADMIN — CEFTRIAXONE SODIUM 1000 MG: 10 INJECTION, POWDER, FOR SOLUTION INTRAVENOUS at 13:24

## 2022-07-08 NOTE — ASSESSMENT & PLAN NOTE
Patient patient history of Parkinson disease    Follows up with Neurology in outpatient setting  Continue Sinemet t i d

## 2022-07-08 NOTE — ASSESSMENT & PLAN NOTE
Patient on admission due to a fall  According to he he was feeling weaker compared to the previous day's  Patient with history of incontinence  On admission patient stable in terms of vital signs  CBC unremarkable  Urinalysis showing elevate leukocytes but no bacteria or nitrates  Patient complains burning with urination  He received 1 dose of strength ceftriaxone in the ED  Plan  Continue ceftriaxone 1 g considering patient being symptomatic  Follow-up on urine culture

## 2022-07-08 NOTE — ED PROVIDER NOTES
History  Chief Complaint   Patient presents with    Weakness - Generalized     Pt arrives via EMS after sliding out of his wheelchair at home and onto the floor, Denies LOC and Headstrike  States he has just been gradually getting weak  He usually is able to walk with a walker by himself, but now he can barely stand  Pt is On THINNERS  EMS reports that he does have pinpoint pupils, but family said this is baseline for him  The patient is a 71-year-old male with extensive past medical history including diabetes, CKD, hypertension and hyperlipidemia who presents to the emergency department for evaluation of generalized weakness  Patient arrives via EMS after sliding out of his wheelchair at home and landed on the floor  Denies head strike or loss of consciousness  Patient reports he has been doing physical therapy, and he was up walking with a walker yesterday  However, today he feels like he cannot even stand  Per review of chart, his baseline is ambulating with a walker, sometimes with 1 assist   He is on blood thinners  History provided by:  Patient and EMS personnel   used: No        Prior to Admission Medications   Prescriptions Last Dose Informant Patient Reported? Taking?    ACCU-CHEK GUIDE test strip  Outside Facility (Specify) Yes No   Si strip as needed   Alcohol Swabs (PRO COMFORT ALCOHOL) 70 % PADS  Outside Facility (Specify) Yes No   Si pad daily Use a pad when testing   Easy Comfort Lancets MISC  Outside Facility (Specify) Yes No   Sig: as needed   Glucagon, rDNA, (GLUCAGON EMERGENCY IJ)   Yes No   Sig: Inject 1 mL as directed Sugars less than 60 and conscious   Glucose 15 g PACK   Yes No   Sig: Take 15 g by mouth Per [facility] "give 15 gram PO every 15 minutes PRN for hypoglycemia"   NovoFine Autocover 30G X 8 MM MISC   Yes No   NovoLOG FlexPen 100 units/mL injection pen   Yes No   acetaminophen (TYLENOL) 325 mg tablet   Yes No   Sig: Take 650 mg by mouth every 6 (six) hours as needed for mild pain   aspirin 81 mg chewable tablet   No No   Sig: Chew 1 tablet (81 mg total) daily   atorvastatin (LIPITOR) 40 mg tablet   No No   Sig: Take 1 tablet (40 mg total) by mouth daily   carbidopa-levodopa (SINEMET)  mg per tablet   No No   Sig: Take 1 tablet by mouth 3 (three) times a day   cholecalciferol (VITAMIN D3) 1,000 units tablet   No No   Sig: Take 50 tablets (50,000 Units total) by mouth once a week   clopidogrel (PLAVIX) 75 mg tablet   No No   Sig: Take 1 tablet (75 mg total) by mouth daily   cyanocobalamin (VITAMIN B-12) 1000 MCG tablet  Outside Facility (Specify) No No   Sig: Take 1 tablet (1,000 mcg total) by mouth daily   docusate sodium (COLACE) 100 mg capsule  Outside Facility (Specify) No No   Sig: Take 1 capsule (100 mg total) by mouth 2 (two) times a day   famotidine (PEPCID) 20 mg tablet   No No   Sig: Take 1 tablet (20 mg total) by mouth daily at bedtime   finasteride (PROSCAR) 5 mg tablet  Outside Facility (Specify) No No   Sig: Take 1 tablet (5 mg total) by mouth daily   glipiZIDE (GLUCOTROL) 10 mg tablet   No No   Sig: Per [facility] "give 5mg by mouth one time a prescriber day for DM 2"   glucose blood test strip  Outside Facility (Specify) Yes No   Si strip as needed   lisinopril (ZESTRIL) 5 mg tablet   No No   Sig: Take 1 tablet (5 mg total) by mouth daily   melatonin 3 mg   Yes No   Sig: Take 3 mg by mouth daily at bedtime Give 2 tabs at bed time for insomnia per [facility]   metFORMIN (GLUCOPHAGE) 1000 MG tablet   No No   Sig: TAKE 1 TABLET BY MOUTH 2 TIMES A DAY   metoprolol succinate (TOPROL-XL) 25 mg 24 hr tablet   No No   Sig: Take 1 tablet (25 mg total) by mouth daily   pantoprazole (PROTONIX) 40 mg tablet   No No   Sig: Take 1 tablet (40 mg total) by mouth 2 (two) times a day before meals   polyethylene glycol (MIRALAX) 17 g packet  Outside Facility (Specify) No No   Sig: Take 17 g by mouth daily   ranolazine (RANEXA) 500 mg 12 hr tablet   No No   Sig: Take 1 tablet (500 mg total) by mouth every 12 (twelve) hours   senna (SENOKOT) 8 6 mg   No No   Sig: Take 1 tablet (8 6 mg total) by mouth daily as needed for constipation   tamsulosin (FLOMAX) 0 4 mg  Outside Facility (Specify) No No   Sig: Take 1 capsule (0 4 mg total) by mouth daily with dinner   vitamin B-12 (CYANOCOBALAMIN) 100 MCG TABS  Outside Facility (Specify) Yes No   Sig: Take 100 mcg by mouth daily      Facility-Administered Medications: None       Past Medical History:   Diagnosis Date    Ambulatory dysfunction     uses walker with assist of 1    Anemia     Anxiety     At risk for falls     hx of falls    Benign paroxysmal vertigo     unspecified ear    BPH (benign prostatic hyperplasia)     for TURP today 1/4/2021    Calculus in bladder     for surgical removal today 1/4/2021    Cataract     left eye    Cervicalgia     Chest pain     Chronic kidney disease     stage 3    Constipation     CTS (carpal tunnel syndrome)     left    Cyst of pancreas     Cystitis 06/23/2020    acute cystitis with hematuria    Depression     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    Dizziness     and giddiness    Dysphagia     Elevated PSA     Facial weakness     GERD (gastroesophageal reflux disease)     last assessed 5/20/16    Gross hematuria     Hematuria     Hyperlipidemia     Hypertension     Kidney disease     Kidney stone     Left-sided weakness     Long term (current) use of oral hypoglycemic drugs     Muscle weakness     Nuclear senile cataract of left eye     OA (osteoarthritis) of knee     b/l    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HCC)     Syncope and collapse     Tachycardia     UTI (urinary tract infection)     Vertebro-basilar artery syndrome     Vitamin B deficiency     Vitamin D deficiency     Vitamin D deficiency        Past Surgical History:   Procedure Laterality Date    CARDIAC CATHETERIZATION N/A 3/7/2022    Procedure: CARDIAC CATHETERIZATION;  Surgeon: Yolanda Pena DO;  [facility]; Service: Cardiology    CATARACT EXTRACTION      CHOLECYSTECTOMY      KNEE SURGERY      TX REMOVE BLADDER STONE,<2 5 CM N/A 1/4/2021    Procedure: Cystolitholopaxy w/laser bladder stones;  Surgeon: Ryan Gao MD;  Location: [facility];  Service: Urology    TX TRANSURETHRAL ELEC-SURG PROSTATECTOM N/A 1/4/2021    Procedure: T U R P ;  Surgeon: Ryan Gao MD;  [facility];  Service: Urology       Family History   Problem Relation Age of Onset    Coronary artery disease Mother     Diabetes Father      I have reviewed and agree with the history as documented  E-Cigarette/Vaping    E-Cigarette Use Never User      E-Cigarette/Vaping Substances    Nicotine No     THC No     CBD No      Social History     Tobacco Use    Smoking status: Never Smoker    Smokeless tobacco: Never Used   Vaping Use    Vaping Use: Never used   Substance Use Topics    Alcohol use: Not Currently    Drug use: No       Review of Systems   Constitutional: Negative for chills and fever  HENT: Negative for ear pain and sore throat  Eyes: Negative for redness and visual disturbance  Respiratory: Negative for cough, shortness of breath and wheezing  Cardiovascular: Negative for chest pain  Gastrointestinal: Negative for abdominal pain, diarrhea, nausea and vomiting  Genitourinary: Negative for dysuria and hematuria  Musculoskeletal: Positive for gait problem  Negative for back pain, neck pain and neck stiffness  Skin: Negative for color change and rash  Neurological: Positive for weakness (Generalized)  Negative for dizziness, light-headedness and headaches  All other systems reviewed and are negative  Physical Exam  Physical Exam  Vitals and nursing note reviewed  Constitutional:       General: He is not in acute distress  Appearance: He is well-developed   He is not ill-appearing or toxic-appearing  HENT:      Head: Normocephalic and atraumatic  Eyes:      Pupils: Pupils are equal, round, and reactive to light  Cardiovascular:      Rate and Rhythm: Normal rate and regular rhythm  Heart sounds: Normal heart sounds  Pulmonary:      Effort: Pulmonary effort is normal       Breath sounds: Normal breath sounds  Abdominal:      General: There is no distension  Palpations: Abdomen is soft  Tenderness: There is no abdominal tenderness  There is no guarding or rebound  Musculoskeletal:      Left elbow: Tenderness present  Cervical back: Normal range of motion and neck supple  Skin:     General: Skin is warm and dry  Neurological:      Mental Status: He is alert and oriented to person, place, and time  Cranial Nerves: Cranial nerves are intact  Sensory: Sensation is intact           Vital Signs  ED Triage Vitals [07/08/22 0929]   Temperature Pulse Respirations Blood Pressure SpO2   97 9 °F (36 6 °C) 80 18 123/58 97 %      Temp Source Heart Rate Source Patient Position - Orthostatic VS BP Location FiO2 (%)   Oral Monitor Lying Right arm --      Pain Score       No Pain           Vitals:    07/08/22 1300 07/08/22 1500 07/08/22 1600 07/08/22 1730   BP: 135/62 121/63 120/60 130/63   Pulse: 76 78 78 86   Patient Position - Orthostatic VS:             Visual Acuity  Visual Acuity    Flowsheet Row Most Recent Value   L Pupil Size (mm) 2   R Pupil Size (mm) 2          ED Medications  Medications   aspirin chewable tablet 81 mg (has no administration in time range)   atorvastatin (LIPITOR) tablet 40 mg (has no administration in time range)   carbidopa-levodopa (SINEMET)  mg per tablet 1 tablet (has no administration in time range)   clopidogrel (PLAVIX) tablet 75 mg (has no administration in time range)   cyanocobalamin (VITAMIN B-12) tablet 1,000 mcg (has no administration in time range)   docusate sodium (COLACE) capsule 100 mg (100 mg Oral Given 7/8/22 2818) famotidine (PEPCID) tablet 20 mg (has no administration in time range)   finasteride (PROSCAR) tablet 5 mg (has no administration in time range)   lisinopril (ZESTRIL) tablet 5 mg (has no administration in time range)   melatonin tablet 3 mg (has no administration in time range)   metoprolol succinate (TOPROL-XL) 24 hr tablet 25 mg (has no administration in time range)   pantoprazole (PROTONIX) EC tablet 40 mg (40 mg Oral Given 7/8/22 1758)   polyethylene glycol (MIRALAX) packet 17 g (has no administration in time range)   ranolazine (RANEXA) 12 hr tablet 500 mg (has no administration in time range)   senna (SENOKOT) tablet 8 6 mg (has no administration in time range)   tamsulosin (FLOMAX) capsule 0 4 mg (0 4 mg Oral Given 7/8/22 1758)   enoxaparin (LOVENOX) subcutaneous injection 40 mg (has no administration in time range)   acetaminophen (TYLENOL) tablet 650 mg (has no administration in time range)   insulin lispro (HumaLOG) 100 units/mL subcutaneous injection 1-6 Units (has no administration in time range)   cefTRIAXone (ROCEPHIN) IVPB (premix in dextrose) 1,000 mg 50 mL (has no administration in time range)   ceftriaxone (ROCEPHIN) 1 g/50 mL in dextrose IVPB (0 mg Intravenous Stopped 7/8/22 1514)       Diagnostic Studies  Results Reviewed     Procedure Component Value Units Date/Time    Urine Microscopic [[id number]]  (Abnormal) Collected: 07/08/22 1209    Lab Status: Final result Specimen: Urine, Clean Catch Updated: 07/08/22 1309     RBC, UA 0-1 /hpf      WBC, UA Innumerable /hpf      Epithelial Cells Occasional /hpf      Bacteria, UA Occasional /hpf     Urine culture [[id number]] Collected: 07/08/22 1209    Lab Status:  In process Specimen: Urine, Clean Catch Updated: 07/08/22 1309    Urine Macroscopic, POC [[id number]]  (Abnormal) Collected: 07/08/22 1209    Lab Status: Final result Specimen: Urine Updated: 07/08/22 1210     Color, UA Yellow     Clarity, UA Cloudy     pH, UA 5 0     Leukocytes, UA Large Nitrite, UA Negative     Protein, UA Trace mg/dl      Glucose,  (1/10%) mg/dl      Ketones, UA Negative mg/dl      Urobilinogen, UA 0 2 E U /dl      Bilirubin, UA Negative     Occult Blood, UA Moderate     Specific Gravity, UA 1 025    Narrative:      CLINITEK RESULT    COVID/FLU/RSV [[id number]]  (Normal) Collected: 07/08/22 1115    Lab Status: Final result Specimen: Nares from Nose Updated: 07/08/22 1207     SARS-CoV-2 Negative     INFLUENZA A PCR Negative     INFLUENZA B PCR Negative     RSV PCR Negative    Narrative:      FOR PEDIATRIC PATIENTS - copy/paste COVID Guidelines URL to browser: [email or web address]  [email or web address]    SARS-CoV-2 assay is a Nucleic Acid Amplification assay intended for the  qualitative detection of nucleic acid from SARS-CoV-2 in nasopharyngeal  swabs  Results are for the presumptive identification of SARS-CoV-2 RNA  Positive results are indicative of infection with SARS-CoV-2, the virus  causing COVID-19, but do not rule out bacterial infection or co-infection  with other viruses  Laboratories within the [place] and its  territories are required to report all positive results to the appropriate  public health authorities  Negative results do not preclude SARS-CoV-2  infection and should not be used as the sole basis for treatment or other  patient management decisions  Negative results must be combined with  clinical observations, patient history, and epidemiological information  This test has not been FDA cleared or approved  This test has been authorized by FDA under an Emergency Use Authorization  (EUA)  This test is only authorized for the duration of time the  declaration that circumstances exist justifying the authorization of the  emergency use of an in vitro diagnostic tests for detection of SARS-CoV-2  virus and/or diagnosis of COVID-19 infection under section 564(b)(1) of  the Act, 21 U  S C  468DSO-8(Z)(2), unless the authorization is terminated  or revoked sooner  The test has been validated but independent review by FDA  and CLIA is pending  Test performed using "[employer] GeneXpert: This RT-PCR assay targets N2,  a region unique to SARS-CoV-2  A conserved region in the E-gene was chosen  for pan-Sarbecovirus detection which includes SARS-CoV-2  HS Troponin I 2hr [[id number]]  (Normal) Collected: 07/08/22 1120    Lab Status: Final result Specimen: Blood from Arm, Right Updated: 07/08/22 1155     hs TnI 2hr 5 ng/L      Delta 2hr hsTnI -2 ng/L     Procalcitonin [[id number]]  (Normal) Collected: 07/08/22 0940    Lab Status: Final result Specimen: Blood from Arm, Right Updated: 07/08/22 1152     Procalcitonin <0 05 ng/ml     TSH [[id number]]  (Normal) Collected: 07/08/22 0940    Lab Status: Final result Specimen: Blood from Arm, Right Updated: 07/08/22 1049     TSH 3RD GENERATON 1 026 uIU/mL     Narrative:      Patients undergoing fluorescein dye angiography may retain small amounts of fluorescein in the body for 48-72 hours post procedure  Samples containing fluorescein can produce falsely depressed TSH values  If the patient had this procedure,a specimen should be resubmitted post fluorescein clearance        HS Troponin 0hr (reflex protocol) [[id number]]  (Normal) Collected: 07/08/22 0940    Lab Status: Final result Specimen: Blood from Arm, Right Updated: 07/08/22 1021     hs TnI 0hr 7 ng/L     Comprehensive metabolic panel [[id number]]  (Abnormal) Collected: 07/08/22 0940    Lab Status: Final result Specimen: Blood from Arm, Right Updated: 07/08/22 1012     Sodium 137 mmol/L      Potassium 4 3 mmol/L      Chloride 104 mmol/L      CO2 24 mmol/L      ANION GAP 9 mmol/L      BUN 19 mg/dL      Creatinine 1 19 mg/dL      Glucose 181 mg/dL      Calcium 8 8 mg/dL      AST 12 U/L      ALT 3 U/L      Alkaline Phosphatase 78 U/L      Total Protein 6 6 g/dL      Albumin 3 7 g/dL      Total Bilirubin 0 82 mg/dL      eGFR 60 ml/min/1 73sq m Narrative:      National Kidney Disease Foundation guidelines for Chronic Kidney Disease (CKD):     Stage 1 with normal or high GFR (GFR > 90 mL/min/1 73 square meters)    Stage 2 Mild CKD (GFR = 60-89 mL/min/1 73 square meters)    Stage 3A Moderate CKD (GFR = 45-59 mL/min/1 73 square meters)    Stage 3B Moderate CKD (GFR = 30-44 mL/min/1 73 square meters)    Stage 4 Severe CKD (GFR = 15-29 mL/min/1 73 square meters)    Stage 5 End Stage CKD (GFR <15 mL/min/1 73 square meters)  Note: GFR calculation is accurate only with a steady state creatinine    CBC and differential [[id number]]  (Abnormal) Collected: 07/08/22 0940    Lab Status: Final result Specimen: Blood from Arm, Right Updated: 07/08/22 0959     WBC 9 60 Thousand/uL      RBC 4 33 Million/uL      Hemoglobin 12 5 g/dL      Hematocrit 38 6 %      MCV 89 fL      MCH 28 9 pg      MCHC 32 4 g/dL      RDW 13 6 %      MPV 10 8 fL      Platelets 604 Thousands/uL      nRBC 0 /100 WBCs      Neutrophils Relative 84 %      Immat GRANS % 1 %      Lymphocytes Relative 7 %      Monocytes Relative 6 %      Eosinophils Relative 2 %      Basophils Relative 0 %      Neutrophils Absolute 8 03 Thousands/µL      Immature Grans Absolute 0 05 Thousand/uL      Lymphocytes Absolute 0 69 Thousands/µL      Monocytes Absolute 0 57 Thousand/µL      Eosinophils Absolute 0 22 Thousand/µL      Basophils Absolute 0 04 Thousands/µL                  XR elbow 3+ views LEFT   ED Interpretation by Radha Cano PA-C (07/08 1111)   Some sort of bony abnormality just distal to the radial head? Final Result by Beto Remy MD (07/08 1157)      No acute osseous abnormality  Workstation performed: [id number]         CT head without contrast   Final Result by Chuy Cevallos MD (07/08 1102)      No evidence of acute intracranial process  Chronic microangiopathy  Incidentally noted progressive left-sided paranasal sinusitis  Workstation performed: [id number]         XR chest 1 view portable   Final Result by Rigo Turcios MD (07/08 1039)      New left basilar opacity, which can represent pneumonia in the appropriate clinical setting  Workstation performed: [id number]                    Procedures  ECG 12 Lead Documentation Only    Date/Time: 7/8/2022 6:18 PM  Performed by: Manolo Granda PA-C  Authorized by: Shawadna Morales PA-C     Comments:      Normal sinus rhythm at 80  Normal axis  Right bundle-branch block  New inverted T-waves in lateral leads  Flattening of T-waves in inferior leads  ED Course                               SBIRT 22yo+    Flowsheet Row Most Recent Value   SBIRT (23 yo +)    In order to provide better care to our patients, we are screening all of our patients for alcohol and drug use  Would it be okay to ask you these screening questions? Yes Filed at: 07/08/2022 0932   Initial Alcohol Screen: US AUDIT-C     1  How often do you have a drink containing alcohol? 0 Filed at: 07/08/2022 0932   2  How many drinks containing alcohol do you have on a typical day you are drinking? 0 Filed at: 07/08/2022 0932   3a  Male UNDER 65: How often do you have five or more drinks on one occasion? 0 Filed at: 07/08/2022 0932   3b  FEMALE Any Age, or MALE 65+: How often do you have 4 or more drinks on one occassion? 0 Filed at: 07/08/2022 0932   Audit-C Score 0 Filed at: 07/08/2022 0948   BANDAR: How many times in the past year have you    Used an illegal drug or used a prescription medication for non-medical reasons?  Never Filed at: 07/08/2022 0932                    MDM  Number of Diagnoses or Management Options  Ambulatory dysfunction: new and requires workup  Generalized weakness: new and requires workup  Pneumonia: new and requires workup  UTI (urinary tract infection): new and requires workup  Diagnosis management comments: Patient presents for evaluation of generalized weakness and difficulty ambulating  Patient does have difficulty ambulating at baseline, however he generally is able to ambulate with a walker  Today he was unable to stand up from a chair, and he ended up sliding down off a chair onto the floor  Differential includes but is not limited to UTI versus pneumonia versus other infection versus arrhythmia versus CAD versus stroke  Patient has bilateral lower extremity weakness, but he does not have any focal neurologic deficit on my exam   He is a poor historian, but he is answering my questions appropriately  Labs, imaging and EKG were ordered and reviewed  UA is concerning for urinary tract infection at this time  Additionally, chest x-ray shows possible developing pneumonia in the left lower lobe  Discussed all results with the patient, and I advised him of the need for admission today regarding his weakness and developing infections  He acknowledged understanding and is agreeable to staying  I did order a dose of Rocephin to cover for both UTI and pneumonia at this time  Patient's procalcitonin did come back within normal limits  I have low suspicion for sepsis at this time  Case was discussed with [facility] who agreed to accept the patient under the service of Dr Jack Cuevas      Patient is stable for admission         Amount and/or Complexity of Data Reviewed  Clinical lab tests: ordered and reviewed  Tests in the radiology section of CPT®: ordered and reviewed  Decide to obtain previous medical records or to obtain history from someone other than the patient: yes  Review and summarize past medical records: yes    Risk of Complications, Morbidity, and/or Mortality  Presenting problems: moderate  Diagnostic procedures: moderate  Management options: moderate    Patient Progress  Patient progress: stable      Disposition  Final diagnoses:   Generalized weakness   Ambulatory dysfunction   UTI (urinary tract infection)   Pneumonia     Time reflects when diagnosis was documented in both MDM as applicable and the Disposition within this note     Time User Action Codes Description Comment    7/8/2022 12:40 PM Gala Meo, Shawanda Add [R53 1] Generalized weakness     7/8/2022 12:40 PM Gala Meo, Shawanda Add [R26 2] Ambulatory dysfunction     7/8/2022 12:40 PM Gala Meo, Shawanda Add [N39 0] UTI (urinary tract infection)     7/8/2022 12:40 PM [place] Agent Add [J18 9] Pneumonia       ED Disposition     ED Disposition   Admit    Condition   Stable    Date/Time   Fri Jul 8, 2022 12:40 PM    Comment   Case was discussed with [facility] and the patient's admission status was agreed to be Admission Status: inpatient status to the service of Dr Jack Cuevas             Follow-up Information    None         Patient's Medications   Discharge Prescriptions    No medications on file       No discharge procedures on file      PDMP Review       Value Time User    PDMP Reviewed  Yes 3/4/2022 10:51 PM Marcelo Ascencio MD          ED Provider  Electronically Signed by           Jesús England PA-C  07/08/22 [address] Damien Bello PA-C  07/08/22 6804

## 2022-07-08 NOTE — H&P
Phyllis  H&P- Manuel Tellez 1950, 67 y o  male MRN: [id number]  Unit/Bed#: ED 15 Encounter: [id number]  Primary Care Provider: Jj Patricia MD   Date and time admitted to hospital: 7/8/2022  9:23 AM    * Fall  Assessment & Plan  Patient admitted due to a fall  Per patient he slipped while trying to stand up from the wheelchair  Will contact patient's wife and will find out more details regarding the incident including patient's baseline  Patient reports falling on the left side, denies injuring the head  Patient with history of ambulatory dysfunction  Concern for UTI  On admission head CT did not show any intracranial bleeding or abnormality  Chest of the left elbow unremarkable  Chest x-ray showed left lung opacity consistent with possible pneumonia  Patient asymptomatic  Plan:  Continue antibiotics  Fall precaution  PT/OT evaluation        UTI (urinary tract infection)  Assessment & Plan  Patient on admission due to a fall  According to he he was feeling weaker compared to the previous day's  Patient with history of incontinence  On admission patient stable in terms of vital signs  CBC unremarkable  Urinalysis showing elevate leukocytes but no bacteria or nitrates  Patient complains burning with urination  He received 1 dose of strength ceftriaxone in the ED  Plan  Continue ceftriaxone 1 g considering patient being symptomatic  Follow-up on urine culture  Ambulatory dysfunction  Assessment & Plan  Patient with several years of decline in his ambulation  Patient known for the 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  Patient also with history of Parkinson disease  Currently uses wheelchair for ambulation  At times can walk with a cane  Recently felt weaker than normal   Sometime experience episodes of dizziness      PT/OT consulted  Fall precautions            Parkinson disease [facility])  Assessment & Plan  Patient patient history of Parkinson disease  Follows up with Neurology in outpatient setting  Continue Sinemet t i d     Coronary artery disease  Assessment & Plan  Recent history of non STEMI  Patient underwent cardiac catheterization which revealed significant stenosis of the LAD, mid RCA and 1st marginal lesion  It was attempted angioplasty for 1st marginal lesion but unsuccessful  Initially patient was going to some [facility] for CABG  However due to ambulatory dysfunction patient was not a candidate because of poor recovery  Was decided to be optimized on dual antiplatelet therapy, high-intensity statin as well as beta-blocker and close follow-up with Cardiology  Plan:  Continue aspirin and Plavix  Continue metoprolol 25 mg daily  Continue atorvastatin 40 mg daily    BPH (benign prostatic hyperplasia)  Assessment & Plan  Continue finasteride and tamsulosin    Type 2 diabetes mellitus ([facility] [address] )  Assessment & Plan  Lab Results   Component Value Date    HGBA1C 6 4 (H) 03/05/2022       No results for input(s): POCGLU in the last 72 hours  Blood Sugar Average: Last 72 hrs:   takes at home metformin and glipizide  Hold home medication  Insulin sliding scale  Accu-Cheks 4 times a day  Hypoglycemia protocol      Essential hypertension  Assessment & Plan  Continue lisinopril 5 mg daily metoprolol  VTE Pharmacologic Prophylaxis: VTE Score: 4 Moderate Risk (Score 3-4) - Pharmacological DVT Prophylaxis Ordered: enoxaparin (Lovenox)  Code Status: Level 1 - Full Code patient reported  Discussion with family: Attempted to update  (wife) via phone  Unable to contact  The phone was turned of  Will try again tomorrow  Anticipated Length of Stay: Patient will be admitted on an inpatient basis with an anticipated length of stay of greater than 2 midnights secondary to Fall      Chief Complaint:  Fall    History of Present Illness:  Emmanuel Howell is a 67 y o  male with a PMH of ambulatory dysfunction, non STEMI patient on dual antiplatelet, diabetes type 2, essential hypertension who presents with 1 episode of fall  Patient coming from a nursing home [facility] in [place]  He reports living with his wife  According the patient he was trying to stand up from the wheelchair and slipped on the floor  Denied any dizziness or blurry vision before falling  Reported falling on the left side and denies injuring the head  Denies any chills, fever, headache  Patient reported burning with urination  On admission patient was normotensive, afebrile stable in terms of vital signs  Lab work remarkable for changes in the urinalysis consistent elevated leukocyte but negative for bacteria or nitrates  CBC CMP unremarkable  CT head did not show any intracranial hemorrhage or abnormality  Patient was admitted under slim service for further monitoring and management for UTI and ambulatory dysfunction  Review of Systems:  Review of Systems   Constitutional: Negative for chills, diaphoresis and fever  HENT: Negative for ear pain and sore throat  Eyes: Negative for pain and visual disturbance  Respiratory: Negative for cough and shortness of breath  Cardiovascular: Negative for chest pain and palpitations  Gastrointestinal: Negative for abdominal pain and vomiting  Genitourinary: Positive for dysuria  Negative for difficulty urinating, enuresis, flank pain and hematuria  Patient reports incontinence  Musculoskeletal: Negative for arthralgias and back pain  Skin: Negative for color change and rash  Neurological: Negative for dizziness, seizures, syncope and facial asymmetry  Psychiatric/Behavioral: Negative for confusion  All other systems reviewed and are negative        Past Medical and Surgical History:   Past Medical History:   Diagnosis Date    Ambulatory dysfunction     uses walker with assist of 1    Anemia     Anxiety     At risk for falls     hx of falls    Benign paroxysmal vertigo     unspecified ear    BPH (benign prostatic hyperplasia)     for TURP today 1/4/2021    Calculus in bladder     for surgical removal today 1/4/2021    Cataract     left eye    Cervicalgia     Chest pain     Chronic kidney disease     stage 3    Constipation     CTS (carpal tunnel syndrome)     left    Cyst of pancreas     Cystitis 06/23/2020    acute cystitis with hematuria    Depression     Diabetes mellitus ([facility] [address] )     type II with neuropathy    Dizziness     and giddiness    Dysphagia     Elevated PSA     Facial weakness     GERD (gastroesophageal reflux disease)     last assessed 5/20/16    Gross hematuria     Hematuria     Hyperlipidemia     Hypertension     Kidney disease     Kidney stone     Left-sided weakness     Long term (current) use of oral hypoglycemic drugs     Muscle weakness     Nuclear senile cataract of left eye     OA (osteoarthritis) of knee     b/l    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[facility] [address] )     Syncope and collapse     Tachycardia     UTI (urinary tract infection)     Vertebro-basilar artery syndrome     Vitamin B deficiency     Vitamin D deficiency     Vitamin D deficiency        Past Surgical History:   Procedure Laterality Date    CARDIAC CATHETERIZATION N/A 3/7/2022    Procedure: CARDIAC CATHETERIZATION;  Surgeon: Kaity Kennedy DO;  [facility] C[facility]; Service: Cardiology    CATARACT EXTRACTION      CHOLECYSTECTOMY      KNEE SURGERY      LA REMOVE BLADDER STONE,<2 5 CM N/A 1/4/2021    Procedure: Cystolitholopaxy w/laser bladder stones;  Surgeon: Shashi Ledesma MD;  Location: [facility];  Service: Urology    LA TRANSURETHRAL ELEC-SURG PROSTATECTOM N/A 1/4/2021    Procedure: T U R P ;  Surgeon: Shashi Ledesma MD;  [facility];  Service: Urology       Meds/Allergies:  Prior to Admission medications    Medication Sig Start Date End Date Taking?  Authorizing Provider   ACCU-CHEK GUIDE test strip 1 strip as needed 6/16/20   Historical Provider, MD   acetaminophen (TYLENOL) 325 mg tablet Take 650 mg by mouth every 6 (six) hours as needed for mild pain    Historical Provider, MD   Alcohol Swabs (PRO COMFORT ALCOHOL) 70 % PADS 1 pad daily Use a pad when testing 6/16/20   Historical Provider, MD   aspirin 81 mg chewable tablet Chew 1 tablet (81 mg total) daily 3/10/22   Dale Garza DO   atorvastatin (LIPITOR) 40 mg tablet Take 1 tablet (40 mg total) by mouth daily 3/10/22   Dale Garza DO   carbidopa-levodopa (SINEMET)  mg per tablet Take 1 tablet by mouth 3 (three) times a day 7/22/21 10/14/21  Lexi Casas MD   cholecalciferol (VITAMIN D3) 1,000 units tablet Take 50 tablets (50,000 Units total) by mouth once a week 4/5/22   Clarice Triplett DO   clopidogrel (PLAVIX) 75 mg tablet Take 1 tablet (75 mg total) by mouth daily 3/10/22   Dale Garza DO   cyanocobalamin (VITAMIN B-12) 1000 MCG tablet Take 1 tablet (1,000 mcg total) by mouth daily 6/23/20   Eugenia Edge MD   docusate sodium (COLACE) 100 mg capsule Take 1 capsule (100 mg total) by mouth 2 (two) times a day 6/23/20   Eugenia Edge MD   Easy Comfort Lancets MISC as needed 6/16/20   Historical Provider, MD   famotidine (PEPCID) 20 mg tablet Take 1 tablet (20 mg total) by mouth daily at bedtime 4/5/22   Clarice Triplett DO   finasteride (PROSCAR) 5 mg tablet Take 1 tablet (5 mg total) by mouth daily 5/13/20   oFx Uribe PA-C   glipiZIDE (GLUCOTROL) 10 mg tablet Per [facility] "give 5mg by mouth one time a prescriber day for DM 2" 4/5/22   Clarice Triplett DO   Glucagon, rDNA, (GLUCAGON EMERGENCY IJ) Inject 1 mL as directed Sugars less than 60 and conscious    Historical Provider, MD   Glucose 15 g PACK Take 15 g by mouth Per [facility] "give 15 gram PO every 15 minutes PRN for hypoglycemia"    Historical Provider, MD   glucose blood test strip 1 strip as needed    Historical Provider, MD   lisinopril (ZESTRIL) 5 mg tablet Take 1 tablet (5 mg total) by mouth daily 4/6/22   Clarice Triplett, DO   melatonin 3 mg Take 3 mg by mouth daily at bedtime Give 2 tabs at bed time for insomnia per [facility]    Historical Provider, MD   metFORMIN (GLUCOPHAGE) 1000 MG tablet TAKE 1 TABLET BY MOUTH 2 TIMES A DAY 2/10/21   Rosie Valdes DO   metoprolol succinate (TOPROL-XL) 25 mg 24 hr tablet Take 1 tablet (25 mg total) by mouth daily 4/6/22   Maci Cowing,    NovoFine Autocover 30G X 8 MM MISC  12/9/20   Historical Provider, MD   NovoLOG FlexPen 100 units/mL injection pen  1/2/21   Historical Provider, MD   pantoprazole (PROTONIX) 40 mg tablet Take 1 tablet (40 mg total) by mouth 2 (two) times a day before meals 3/9/22   Cisse Krill, DO   polyethylene glycol (MIRALAX) 17 g packet Take 17 g by mouth daily 6/3/20   Rosie Valdes DO   ranolazine (RANEXA) 500 mg 12 hr tablet Take 1 tablet (500 mg total) by mouth every 12 (twelve) hours 4/5/22   Maci Cowjennifer,    senna (SENOKOT) 8 6 mg Take 1 tablet (8 6 mg total) by mouth daily as needed for constipation 2/10/21   Rosie Valdes DO   tamsulosin [facility]) 0 4 mg Take 1 capsule (0 4 mg total) by mouth daily with dinner 5/13/20   Fox Uribe PA-C   vitamin B-12 (CYANOCOBALAMIN) 100 MCG TABS Take 100 mcg by mouth daily 6/16/20   Historical Provider, MD     Will review medication with the wife        Allergies: No Known Allergies    Social History:  Marital Status: /Civil Union   Occupation:  None  Patient Pre-hospital [facility] Situation: [facility]  Patient Pre-hospital Level of Mobility: manual wheelchair and cane  Patient Pre-hospital Diet Restrictions:  None  Substance Use History:   Social History     Substance and Sexual Activity   Alcohol Use Not Currently     Social History     Tobacco Use   Smoking Status Never Smoker   Smokeless Tobacco Never Used     Social History     Substance and Sexual Activity   Drug Use No       Family History:  Family History   Problem Relation Age of Onset    Coronary artery disease Mother     Diabetes Father        Physical Exam:     Vitals:   Blood Pressure: 130/63 (07/08/22 1730)  Pulse: 86 (07/08/22 1730)  Temperature: 97 9 °F (36 6 °C) (07/08/22 0929)  Temp Source: Oral (07/08/22 0929)  Respirations: 18 (07/08/22 1200)  Height: 5' 7" (170 2 cm) (07/08/22 0929)  Weight - Scale: 76 7 kg (169 lb 1 5 oz) (07/08/22 0929)  SpO2: 99 % (07/08/22 1300)    Physical Exam  Vitals and nursing note reviewed  Constitutional:       Appearance: Normal appearance  He is well-developed  He is not ill-appearing  HENT:      Head: Normocephalic and atraumatic  Eyes:      Conjunctiva/sclera: Conjunctivae normal    Cardiovascular:      Rate and Rhythm: Normal rate and regular rhythm  Pulses: Normal pulses  Heart sounds: Normal heart sounds  No murmur heard  Pulmonary:      Effort: Pulmonary effort is normal  No respiratory distress  Breath sounds: Normal breath sounds  Abdominal:      Palpations: Abdomen is soft  Tenderness: There is no abdominal tenderness  Musculoskeletal:         General: Tenderness present  Cervical back: Neck supple  Right lower leg: No edema  Left lower leg: No edema  Comments: Tenderness of the left elbow  Skin:     General: Skin is warm and dry  Neurological:      General: No focal deficit present  Mental Status: He is alert and oriented to person, place, and time  Mental status is at baseline        Comments: Unclear speech        Additional Data:     Lab Results:  Results from last 7 days   Lab Units 07/08/22  0940   WBC Thousand/uL 9 60   HEMOGLOBIN g/dL 12 5   HEMATOCRIT % 38 6   PLATELETS Thousands/uL 270   NEUTROS PCT % 84*   LYMPHS PCT % 7*   MONOS PCT % 6   EOS PCT % 2     Results from last 7 days   Lab Units 07/08/22  0940   SODIUM mmol/L 137   POTASSIUM mmol/L 4 3   CHLORIDE mmol/L 104   CO2 mmol/L 24   BUN mg/dL 19   CREATININE mg/dL 1 19   ANION GAP mmol/L 9   CALCIUM mg/dL 8 8   ALBUMIN g/dL 3 7   TOTAL BILIRUBIN mg/dL 0 82   ALK PHOS U/L 78   ALT U/L 3*   AST U/L 12*   GLUCOSE RANDOM mg/dL 181*                 Results from last 7 days   Lab Units 07/08/22  0940   PROCALCITONIN ng/ml <0 05       Imaging: Reviewed radiology reports from this admission including: chest xray and CT head  XR elbow 3+ views LEFT   ED Interpretation by Caroline Navarrete PA-C (07/08 1111)   Some sort of bony abnormality just distal to the radial head? Final Result by Marley Vick MD (07/08 1157)      No acute osseous abnormality  Workstation performed: [id number]         CT head without contrast   Final Result by Andrew Silva MD (07/08 1102)      No evidence of acute intracranial process  Chronic microangiopathy  Incidentally noted progressive left-sided paranasal sinusitis  Workstation performed: [id number]         XR chest 1 view portable   Final Result by Marley Vick MD (07/08 1039)      New left basilar opacity, which can represent pneumonia in the appropriate clinical setting  Workstation performed: [id number]             EKG and Other Studies Reviewed on Admission:   · EKG: NSR  HR 80     ** Please Note: This note has been constructed using a voice recognition system   **

## 2022-07-08 NOTE — ASSESSMENT & PLAN NOTE
Patient admitted due to a fall  Per patient he slipped while trying to stand up from the wheelchair  Will contact patient's wife and will find out more details regarding the incident including patient's baseline  Patient reports falling on the left side, denies injuring the head  Patient with history of ambulatory dysfunction  Concern for UTI  On admission head CT did not show any intracranial bleeding or abnormality  Chest of the left elbow unremarkable  Chest x-ray showed left lung opacity consistent with possible pneumonia  Patient asymptomatic      Plan:  Continue antibiotics  Fall precaution  PT/OT evaluation

## 2022-07-08 NOTE — ASSESSMENT & PLAN NOTE
Recent history of non STEMI  Patient underwent cardiac catheterization which revealed significant stenosis of the LAD, mid RCA and 1st marginal lesion  It was attempted angioplasty for 1st marginal lesion but unsuccessful  Initially patient was going to some [facility] for CABG  However due to ambulatory dysfunction patient was not a candidate because of poor recovery  Was decided to be optimized on dual antiplatelet therapy, high-intensity statin as well as beta-blocker and close follow-up with Cardiology      Plan:  Continue aspirin and Plavix  Continue metoprolol 25 mg daily  Continue atorvastatin 40 mg daily

## 2022-07-08 NOTE — ASSESSMENT & PLAN NOTE
Lab Results   Component Value Date    HGBA1C 6 4 (H) 03/05/2022       No results for input(s): POCGLU in the last 72 hours  Blood Sugar Average: Last 72 hrs:   takes at home metformin and glipizide  Hold home medication    Insulin sliding scale  Accu-Cheks 4 times a day  Hypoglycemia protocol

## 2022-07-08 NOTE — ASSESSMENT & PLAN NOTE
Patient with several years of decline in his ambulation  Patient known for the 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  Patient also with history of Parkinson disease  Currently uses wheelchair for ambulation  At times can walk with a cane  Recently felt weaker than normal   Sometime experience episodes of dizziness      PT/OT consulted  Fall precautions

## 2022-07-09 LAB
ANION GAP SERPL CALCULATED.3IONS-SCNC: 7 MMOL/L (ref 4–13)
BACTERIA UR CULT: NORMAL
BUN SERPL-MCNC: 22 MG/DL (ref 5–25)
CALCIUM SERPL-MCNC: 8.7 MG/DL (ref 8.4–10.2)
CHLORIDE SERPL-SCNC: 104 MMOL/L (ref 96–108)
CO2 SERPL-SCNC: 27 MMOL/L (ref 21–32)
CREAT SERPL-MCNC: 1.04 MG/DL (ref 0.6–1.3)
ERYTHROCYTE [DISTWIDTH] IN BLOOD BY AUTOMATED COUNT: 13.6 % (ref 11.6–15.1)
GFR SERPL CREATININE-BSD FRML MDRD: 71 ML/MIN/1.73SQ M
GLUCOSE SERPL-MCNC: 151 MG/DL (ref 65–140)
GLUCOSE SERPL-MCNC: 161 MG/DL (ref 65–140)
GLUCOSE SERPL-MCNC: 185 MG/DL (ref 65–140)
GLUCOSE SERPL-MCNC: 188 MG/DL (ref 65–140)
GLUCOSE SERPL-MCNC: 234 MG/DL (ref 65–140)
HCT VFR BLD AUTO: 33.6 % (ref 36.5–49.3)
HGB BLD-MCNC: 11.1 G/DL (ref 12–17)
MCH RBC QN AUTO: 29.1 PG (ref 26.8–34.3)
MCHC RBC AUTO-ENTMCNC: 33 G/DL (ref 31.4–37.4)
MCV RBC AUTO: 88 FL (ref 82–98)
PLATELET # BLD AUTO: 245 THOUSANDS/UL (ref 149–390)
PMV BLD AUTO: 11 FL (ref 8.9–12.7)
POTASSIUM SERPL-SCNC: 3.8 MMOL/L (ref 3.5–5.3)
RBC # BLD AUTO: 3.81 MILLION/UL (ref 3.88–5.62)
SODIUM SERPL-SCNC: 138 MMOL/L (ref 135–147)
WBC # BLD AUTO: 9.16 THOUSAND/UL (ref 4.31–10.16)

## 2022-07-09 PROCEDURE — 85027 COMPLETE CBC AUTOMATED: CPT

## 2022-07-09 PROCEDURE — 82948 REAGENT STRIP/BLOOD GLUCOSE: CPT

## 2022-07-09 PROCEDURE — 80048 BASIC METABOLIC PNL TOTAL CA: CPT

## 2022-07-09 PROCEDURE — 99232 SBSQ HOSP IP/OBS MODERATE 35: CPT | Performed by: INTERNAL MEDICINE

## 2022-07-09 RX ADMIN — PANTOPRAZOLE SODIUM 40 MG: 40 TABLET, DELAYED RELEASE ORAL at 17:35

## 2022-07-09 RX ADMIN — TAMSULOSIN HYDROCHLORIDE 0.4 MG: 0.4 CAPSULE ORAL at 17:35

## 2022-07-09 RX ADMIN — FINASTERIDE 5 MG: 5 TABLET, FILM COATED ORAL at 08:13

## 2022-07-09 RX ADMIN — DOCUSATE SODIUM 100 MG: 100 CAPSULE, LIQUID FILLED ORAL at 17:35

## 2022-07-09 RX ADMIN — FAMOTIDINE 20 MG: 20 TABLET ORAL at 22:05

## 2022-07-09 RX ADMIN — METOPROLOL SUCCINATE 25 MG: 25 TABLET, EXTENDED RELEASE ORAL at 08:13

## 2022-07-09 RX ADMIN — ASPIRIN 81 MG CHEWABLE TABLET 81 MG: 81 TABLET CHEWABLE at 08:13

## 2022-07-09 RX ADMIN — INSULIN LISPRO 3 UNITS: 100 INJECTION, SOLUTION INTRAVENOUS; SUBCUTANEOUS at 08:15

## 2022-07-09 RX ADMIN — CARBIDOPA AND LEVODOPA 1 TABLET: 25; 100 TABLET ORAL at 17:35

## 2022-07-09 RX ADMIN — INSULIN LISPRO 1 UNITS: 100 INJECTION, SOLUTION INTRAVENOUS; SUBCUTANEOUS at 17:35

## 2022-07-09 RX ADMIN — ATORVASTATIN CALCIUM 40 MG: 40 TABLET, FILM COATED ORAL at 08:13

## 2022-07-09 RX ADMIN — CARBIDOPA AND LEVODOPA 1 TABLET: 25; 100 TABLET ORAL at 08:13

## 2022-07-09 RX ADMIN — Medication 3 MG: at 22:05

## 2022-07-09 RX ADMIN — CEFTRIAXONE 1000 MG: 1 INJECTION, SOLUTION INTRAVENOUS at 13:20

## 2022-07-09 RX ADMIN — CYANOCOBALAMIN TAB 500 MCG 1000 MCG: 500 TAB at 08:13

## 2022-07-09 RX ADMIN — DOCUSATE SODIUM 100 MG: 100 CAPSULE, LIQUID FILLED ORAL at 08:13

## 2022-07-09 RX ADMIN — INSULIN LISPRO 1 UNITS: 100 INJECTION, SOLUTION INTRAVENOUS; SUBCUTANEOUS at 22:05

## 2022-07-09 RX ADMIN — INSULIN LISPRO 1 UNITS: 100 INJECTION, SOLUTION INTRAVENOUS; SUBCUTANEOUS at 11:32

## 2022-07-09 RX ADMIN — PANTOPRAZOLE SODIUM 40 MG: 40 TABLET, DELAYED RELEASE ORAL at 08:13

## 2022-07-09 RX ADMIN — CARBIDOPA AND LEVODOPA 1 TABLET: 25; 100 TABLET ORAL at 22:05

## 2022-07-09 RX ADMIN — POLYETHYLENE GLYCOL 3350 17 G: 17 POWDER, FOR SOLUTION ORAL at 08:14

## 2022-07-09 RX ADMIN — CLOPIDOGREL BISULFATE 75 MG: 75 TABLET ORAL at 08:13

## 2022-07-09 RX ADMIN — LISINOPRIL 5 MG: 5 TABLET ORAL at 08:13

## 2022-07-09 RX ADMIN — ENOXAPARIN SODIUM 40 MG: 40 INJECTION SUBCUTANEOUS at 08:14

## 2022-07-09 NOTE — ASSESSMENT & PLAN NOTE
Lab Results   Component Value Date    HGBA1C 6 4 (H) 03/05/2022       Recent Labs     07/08/22 2059 07/09/22  0751 07/09/22  1045   POCGLU 210* 234* 151*       Blood Sugar Average: Last 72 hrs:  (P) 000 [id number] takes at home metformin and glipizide  Hold home medication    Insulin sliding scale  Accu-Cheks 4 times a day  Hypoglycemia protocol

## 2022-07-09 NOTE — PLAN OF CARE
Problem: Potential for Falls  Goal: Patient will remain free of falls  Description: INTERVENTIONS:  - Educate patient/family on patient safety including physical limitations  - Instruct patient to call for assistance with activity   - Consult OT/PT to assist with strengthening/mobility   - Keep Call bell within reach  - Keep bed low and locked with side rails adjusted as appropriate  - Keep care items and personal belongings within reach  - Initiate and maintain comfort rounds  - Make Fall Risk Sign visible to staff  - Apply yellow socks and bracelet for high fall risk patients  - Consider moving patient to room near nurses station  Outcome: Progressing     Problem: MOBILITY - ADULT  Goal: Maintain or return to baseline ADL function  Description: INTERVENTIONS:  -  Assess patient's ability to carry out ADLs; assess patient's baseline for ADL function and identify physical deficits which impact ability to perform ADLs (bathing, care of mouth/teeth, toileting, grooming, dressing, etc )  - Assess/evaluate cause of self-care deficits   - Assess range of motion  - Assess patient's mobility; develop plan if impaired  - Assess patient's need for assistive devices and provide as appropriate  - Encourage maximum independence but intervene and supervise when necessary  - Involve family in performance of ADLs  - Assess for home care needs following discharge   - Consider OT consult to assist with ADL evaluation and planning for discharge  - Provide patient education as appropriate  Outcome: Progressing  Goal: Maintains/Returns to pre admission functional level  Description: INTERVENTIONS:  - Perform BMAT or MOVE assessment daily    - Set and communicate daily mobility goal to care team and patient/family/caregiver     - Collaborate with rehabilitation services on mobility goals if consulted  - Out of bed for toileting  - Record patient progress and toleration of activity level   Outcome: Progressing     Problem: Prexisting or High Potential for Compromised Skin Integrity  Goal: Skin integrity is maintained or improved  Description: INTERVENTIONS:  - Identify patients at risk for skin breakdown  - Assess and monitor skin integrity  - Assess and monitor nutrition and hydration status  - Monitor labs   - Assess for incontinence   - Turn and reposition patient  - Assist with mobility/ambulation  - Relieve pressure over bony prominences  - Avoid friction and shearing  - Provide appropriate hygiene as needed including keeping skin clean and dry  - Evaluate need for skin moisturizer/barrier cream  - Collaborate with interdisciplinary team   - Patient/family teaching  - Consider wound care consult   Outcome: Progressing     Problem: PAIN - ADULT  Goal: Verbalizes/displays adequate comfort level or baseline comfort level  Description: Interventions:  - Encourage patient to monitor pain and request assistance  - Assess pain using appropriate pain scale  - Administer analgesics based on type and severity of pain and evaluate response  - Implement non-pharmacological measures as appropriate and evaluate response  - Consider cultural and social influences on pain and pain management  - Notify physician/advanced practitioner if interventions unsuccessful or patient reports new pain  Outcome: Progressing     Problem: INFECTION - ADULT  Goal: Absence or prevention of progression during hospitalization  Description: INTERVENTIONS:  - Assess and monitor for signs and symptoms of infection  - Monitor lab/diagnostic results  - Monitor all insertion sites, i e  indwelling lines, tubes, and drains  - Monitor endotracheal if appropriate and nasal secretions for changes in amount and color  - [place] appropriate cooling/warming therapies per order  - Administer medications as ordered  - Instruct and encourage patient and family to use good hand hygiene technique  - Identify and instruct in appropriate isolation precautions for identified infection/condition  Outcome: Progressing  Goal: Absence of fever/infection during neutropenic period  Description: INTERVENTIONS:  - Monitor WBC    Outcome: Progressing     Problem: SAFETY ADULT  Goal: Patient will remain free of falls  Description: INTERVENTIONS:  - Educate patient/family on patient safety including physical limitations  - Instruct patient to call for assistance with activity   - Consult OT/PT to assist with strengthening/mobility   - Keep Call bell within reach  - Keep bed low and locked with side rails adjusted as appropriate  - Keep care items and personal belongings within reach  - Initiate and maintain comfort rounds  - Make Fall Risk Sign visible to staff  - Apply yellow socks and bracelet for high fall risk patients  - Consider moving patient to room near nurses station  Outcome: Progressing  Goal: Maintain or return to baseline ADL function  Description: INTERVENTIONS:  -  Assess patient's ability to carry out ADLs; assess patient's baseline for ADL function and identify physical deficits which impact ability to perform ADLs (bathing, care of mouth/teeth, toileting, grooming, dressing, etc )  - Assess/evaluate cause of self-care deficits   - Assess range of motion  - Assess patient's mobility; develop plan if impaired  - Assess patient's need for assistive devices and provide as appropriate  - Encourage maximum independence but intervene and supervise when necessary  - Involve family in performance of ADLs  - Assess for home care needs following discharge   - Consider OT consult to assist with ADL evaluation and planning for discharge  - Provide patient education as appropriate  Outcome: Progressing  Goal: Maintains/Returns to pre admission functional level  Description: INTERVENTIONS:  - Perform BMAT or MOVE assessment daily    - Set and communicate daily mobility goal to care team and patient/family/caregiver     - Collaborate with rehabilitation services on mobility goals if consulted  - Out of bed for toileting  - Record patient progress and toleration of activity level   Outcome: Progressing

## 2022-07-09 NOTE — ASSESSMENT & PLAN NOTE
Patient admitted due to a witnessed fall  Per patient he slipped while trying to stand up from the wheelchair  Per wife patient was with her when he tried to transfer from the wheelchair slipped  She also stated that the patient lives with her at home and they have a visiting nurse to help with the medication  Patient reports falling on the left side, denies injuring the head  Patient with history of ambulatory dysfunction  Concern for UTI  On admission head CT did not show any intracranial bleeding or abnormality  Chest of the left elbow unremarkable  Chest x-ray showed left lung opacity consistent with possible pneumonia  Patient asymptomatic      Plan:  Continue antibiotics  Fall precaution  PT/OT evaluation

## 2022-07-09 NOTE — PROGRESS NOTES
[facility]  Progress Note - Benja Peacock 1950, 67 y o  male MRN: [id number]  Unit/Bed#: S -01 Encounter: [id number]  Primary Care Provider: Siddharth Torres MD   Date and time admitted to hospital: 7/8/2022  9:23 AM    * [address]  Patient admitted due to a witnessed fall  Per patient he slipped while trying to stand up from the wheelchair  Per wife patient was with her when he tried to transfer from the wheelchair slipped  She also stated that the patient lives with her at home and they have a visiting nurse to help with the medication  Patient reports falling on the left side, denies injuring the head  Patient with history of ambulatory dysfunction  Concern for UTI  On admission head CT did not show any intracranial bleeding or abnormality  Chest of the left elbow unremarkable  Chest x-ray showed left lung opacity consistent with possible pneumonia  Patient asymptomatic  Plan:  Continue antibiotics  Fall precaution  PT/OT evaluation        UTI (urinary tract infection)  Assessment & Plan  Patient on admission due to a fall  According to he he was feeling weaker compared to the previous day's  Patient with history of incontinence  On admission patient stable in terms of vital signs  CBC unremarkable  Urinalysis showing elevate leukocytes but no bacteria or nitrates  Patient complains burning with urination  He received 1 dose of strength ceftriaxone in the ED  Will wait for urine culture considering history of recurrent UTIs previous urine culture showing growth of Enterococcus faecalis  Plan  Continue ceftriaxone 1 g considering patient being symptomatic  Follow-up on urine culture  Ambulatory dysfunction  Assessment & Plan  Patient with several years of decline in his ambulation  Patient known for the 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  Patient also with history of Parkinson disease  Currently uses wheelchair for ambulation    At times can walk with a cane  Recently felt weaker than normal   Sometime experience episodes of dizziness  PT/OT consulted  Fall precautions            Parkinson disease [facility])  Assessment & Plan  Patient patient history of Parkinson disease  Follows up with Neurology in outpatient setting  Continue Sinemet t i d     Coronary artery disease  Assessment & Plan  Recent history of non STEMI  Patient underwent cardiac catheterization which revealed significant stenosis of the LAD, mid RCA and 1st marginal lesion  It was attempted angioplasty for 1st marginal lesion but unsuccessful  Initially patient was going to some [facility] for CABG  However due to ambulatory dysfunction patient was not a candidate because of poor recovery  Was decided to be optimized on dual antiplatelet therapy, high-intensity statin as well as beta-blocker and close follow-up with Cardiology  Plan:  Continue aspirin and Plavix  Continue metoprolol 25 mg daily  Continue atorvastatin 40 mg daily    BPH (benign prostatic hyperplasia)  Assessment & Plan  Continue finasteride and tamsulosin    Type 2 diabetes mellitus [facility])  Assessment & Plan  Lab Results   Component Value Date    HGBA1C 6 4 (H) 03/05/2022       Recent Labs     07/08/22  2059 07/09/22  0751 07/09/22  1045   POCGLU 210* 234* 151*       Blood Sugar Average: Last 72 hrs:  (P) 754 [id number] takes at home metformin and glipizide  Hold home medication  Insulin sliding scale  Accu-Cheks 4 times a day  Hypoglycemia protocol      Essential hypertension  Assessment & Plan  Continue lisinopril 5 mg daily and metoprolol  VTE Pharmacologic Prophylaxis: VTE Score: 4 Moderate Risk (Score 3-4) - Pharmacological DVT Prophylaxis Ordered: enoxaparin (Lovenox)  Patient Centered Rounds: Updated nurse after the table rounds    Discussions with Specialists or Other Care Team Provider:     Education and Discussions with Family / Patient: Updated  (wife) via phone     Current Length of Stay: 1 day(s)  Current Patient Status: Inpatient   Discharge Plan: Anticipate discharge in 24-48 hrs to home with home services  Code Status: Level 1 - Full Code    Subjective:   Patient was lying in bed comfortable and was having breakfast   He was complaining of left elbow pain  The range of motion limited due to pain  Denied any other complaints including chills, fever, chest pain, cough, abdominal pain, headache, dizziness  Objective:     Vitals:   Temp (24hrs), Av 9 °F (36 6 °C), Min:97 7 °F (36 5 °C), Max:98 2 °F (36 8 °C)    Temp:  [97 7 °F (36 5 °C)-98 2 °F (36 8 °C)] 97 8 °F (36 6 °C)  HR:  [70-87] 87  Resp:  [18] 18  BP: (119-135)/(53-63) 121/53  SpO2:  [96 %-99 %] 98 %  Body mass index is 27 57 kg/m²  Input and Output Summary (last 24 hours): Intake/Output Summary (Last 24 hours) at 2022 1134  Last data filed at 2022 0700  Gross per 24 hour   Intake 340 ml   Output 500 ml   Net -160 ml       Physical Exam:   Physical Exam  Vitals and nursing note reviewed  Constitutional:       Appearance: He is well-developed  He is not ill-appearing  HENT:      Head: Normocephalic and atraumatic  Eyes:      Conjunctiva/sclera: Conjunctivae normal    Cardiovascular:      Rate and Rhythm: Normal rate and regular rhythm  Pulses: Normal pulses  Heart sounds: Normal heart sounds  No murmur heard  Pulmonary:      Effort: Pulmonary effort is normal  No respiratory distress  Breath sounds: Normal breath sounds  Abdominal:      General: Bowel sounds are normal       Palpations: Abdomen is soft  Tenderness: There is no abdominal tenderness  Musculoskeletal:      Cervical back: Neck supple  Right lower leg: No edema  Left lower leg: No edema  Skin:     General: Skin is warm and dry  Neurological:      General: No focal deficit present  Mental Status: He is alert and oriented to person, place, and time   Mental status is at baseline  Psychiatric:         Mood and Affect: Mood normal          Behavior: Behavior normal          Thought Content:  Thought content normal          Judgment: Judgment normal           Additional Data:     Labs:  Results from last 7 days   Lab Units 07/09/22  0450 07/08/22  0940   WBC Thousand/uL 9 16 9 60   HEMOGLOBIN g/dL 11 1* 12 5   HEMATOCRIT % 33 6* 38 6   PLATELETS Thousands/uL 245 270   NEUTROS PCT %  --  84*   LYMPHS PCT %  --  7*   MONOS PCT %  --  6   EOS PCT %  --  2     Results from last 7 days   Lab Units 07/09/22  0450 07/08/22  0940   SODIUM mmol/L 138 137   POTASSIUM mmol/L 3 8 4 3   CHLORIDE mmol/L 104 104   CO2 mmol/L 27 24   BUN mg/dL 22 19   CREATININE mg/dL 1 04 1 19   ANION GAP mmol/L 7 9   CALCIUM mg/dL 8 7 8 8   ALBUMIN g/dL  --  3 7   TOTAL BILIRUBIN mg/dL  --  0 82   ALK PHOS U/L  --  78   ALT U/L  --  3*   AST U/L  --  12*   GLUCOSE RANDOM mg/dL 185* 181*         Results from last 7 days   Lab Units 07/09/22  1045 07/09/22  0751 07/08/22  2059   POC GLUCOSE mg/dl 151* 234* 210*         Results from last 7 days   Lab Units 07/08/22  0940   PROCALCITONIN ng/ml <0 05       Lines/Drains:  Invasive Devices  Report    Peripheral Intravenous Line  Duration           Peripheral IV 07/08/22 Right Antecubital 1 day                      Imaging: Reviewed radiology reports from this admission including: chest xray, chest CT scan and Elbow x-ray    Recent Cultures (last 7 days):         Last 24 Hours Medication List:   Current Facility-Administered Medications   Medication Dose Route Frequency Provider Last Rate    acetaminophen  650 mg Oral Q6H PRN Sarah Bartholomew MD      aspirin  81 mg Oral Daily Sarah Bartholomew MD      atorvastatin  40 mg Oral Daily Sarah Bartholomew MD      carbidopa-levodopa  1 tablet Oral TID Sarah Bartholomew MD      cefTRIAXone  1,000 mg Intravenous Q24H Sarah Bartholomew MD      clopidogrel  75 mg Oral Daily Sarah Bartholomew MD      cyanocobalamin  1,000 mcg Oral Daily Oskar Buck MD      docusate sodium  100 mg Oral BID Oskar Buck MD      enoxaparin  40 mg Subcutaneous Daily Oskar Buck MD      famotidine  20 mg Oral HS Oskar Buck MD      finasteride  5 mg Oral Daily Oskar Buck MD      insulin lispro  1-6 Units Subcutaneous 4x Daily (AC & HS) Oskar Buck MD      lisinopril  5 mg Oral Daily Oskar Buck MD      melatonin  3 mg Oral HS Oskar Buck MD      metoprolol succinate  25 mg Oral Daily Oskar Buck MD      pantoprazole  40 mg Oral BID AC Oskar Buck MD      polyethylene glycol  17 g Oral Daily Oskar Buck MD      senna  8 6 mg Oral Daily PRN Oskar Buck MD      tamsulosin  0 4 mg Oral Daily With Brittnee Osborn MD          Today, Patient Was Seen By: Oskar Buck MD    **Please Note: This note may have been constructed using a voice recognition system  **

## 2022-07-09 NOTE — PLAN OF CARE
Problem: Potential for Falls  Goal: Patient will remain free of falls  Description: INTERVENTIONS:  - Educate patient/family on patient safety including physical limitations  - Instruct patient to call for assistance with activity   - Consult OT/PT to assist with strengthening/mobility   - Keep Call bell within reach  - Keep bed low and locked with side rails adjusted as appropriate  - Keep care items and personal belongings within reach  - Initiate and maintain comfort rounds  - Make Fall Risk Sign visible to staff  - Offer Toileting every Hours, in advance of need  - Initiate/Maintain alarm  - Obtain necessary fall risk management equipment:   - Apply yellow socks and bracelet for high fall risk patients  - Consider moving patient to room near nurses station  Outcome: Progressing     Problem: MOBILITY - ADULT  Goal: Maintain or return to baseline ADL function  Description: INTERVENTIONS:  -  Assess patient's ability to carry out ADLs; assess patient's baseline for ADL function and identify physical deficits which impact ability to perform ADLs (bathing, care of mouth/teeth, toileting, grooming, dressing, etc )  - Assess/evaluate cause of self-care deficits   - Assess range of motion  - Assess patient's mobility; develop plan if impaired  - Assess patient's need for assistive devices and provide as appropriate  - Encourage maximum independence but intervene and supervise when necessary  - Involve family in performance of ADLs  - Assess for home care needs following discharge   - Consider OT consult to assist with ADL evaluation and planning for discharge  - Provide patient education as appropriate  Outcome: Progressing     Problem: PAIN - ADULT  Goal: Verbalizes/displays adequate comfort level or baseline comfort level  Description: Interventions:  - Encourage patient to monitor pain and request assistance  - Assess pain using appropriate pain scale  - Administer analgesics based on type and severity of pain and evaluate response  - Implement non-pharmacological measures as appropriate and evaluate response  - Consider cultural and social influences on pain and pain management  - Notify physician/advanced practitioner if interventions unsuccessful or patient reports new pain  Outcome: Progressing     Problem: INFECTION - ADULT  Goal: Absence of fever/infection during neutropenic period  Description: INTERVENTIONS:  - Monitor WBC    Outcome: Progressing     Problem: SAFETY ADULT  Goal: Patient will remain free of falls  Description: INTERVENTIONS:  - Educate patient/family on patient safety including physical limitations  - Instruct patient to call for assistance with activity   - Consult OT/PT to assist with strengthening/mobility   - Keep Call bell within reach  - Keep bed low and locked with side rails adjusted as appropriate  - Keep care items and personal belongings within reach  - Initiate and maintain comfort rounds  - Make Fall Risk Sign visible to staff  - Offer Toileting every Hours, in advance of need  - Initiate/Maintain alarm  - Obtain necessary fall risk management equipment:   - Apply yellow socks and bracelet for high fall risk patients  - Consider moving patient to room near nurses station  Outcome: Progressing

## 2022-07-09 NOTE — UTILIZATION REVIEW
Initial Clinical Review    Admission: Date/Time/Statement:   Admission Orders (From admission, onward)     Ordered        07/08/22 1241  INPATIENT ADMISSION  Once                      Orders Placed This Encounter   Procedures    INPATIENT ADMISSION     Standing Status:   Standing     Number of Occurrences:   1     Order Specific Question:   Level of Care     Answer:   Med Surg [16]     Order Specific Question:   Estimated length of stay     Answer:   More than 2 Midnights     Order Specific Question:   Certification     Answer:   I certify that inpatient services are medically necessary for this patient for a duration of greater than two midnights  See H&P and MD Progress Notes for additional information about the patient's course of treatment  ED Arrival Information     Expected   -    Arrival   7/8/2022 09:23    Acuity   Urgent            Means of arrival   Ambulance    Escorted by   [facility]    Service   Hospitalist    Admission type   Urgent            Arrival complaint   FALL           Chief Complaint   Patient presents with    Weakness - Generalized     Pt arrives via EMS after sliding out of his wheelchair at home and onto the floor, Denies LOC and Headstrike  States he has just been gradually getting weak  He usually is able to walk with a walker by himself, but now he can barely stand  Pt is On THINNERS  EMS reports that he does have pinpoint pupils, but family said this is baseline for him  Initial Presentation: 67 y o  male with PMH ambulatory dysfunction, NSTEMI on DAPT, DM2, HTN who [place] [facility] [place] s/p fall  Pt reports trying to stand out of wheelchair and fell onto L side, denies head strike  Pt also reports burning on urination, is incontinent urine   On exam,  reports tender L elbow, speech unclear, alert, oriented x3Labs -changes in the urinalysis consistent elevated leukocyte but negative for bacteria or nitrates  CBC CMP unremarkable  Procal WNL      CT head shows no intracranial hemorrhage or abnormality  CXR- new L basilar opacity, possible PNA  XR L elbow unremarkable   Pt given IV abx in ED  Pt admitted with s/p fall, UTI, ambulatory dysfunction  Plan -telemetry, continue IV abx, PT/OT eval, fall monitoring  F/U urine culture    Date: 7/9  Day 2:   Per wife,pt was with her when he tried to transfer from the wheelchair ,slipped  She also stated that the patient lives with her at home and they have a visiting nurse to help with the medication  Pt continues with L elbow pain, ROM limited due to pain  No other complaints   GCS 15  Alert, oriented   PlLan - F/U urine culture , pt with hx recurrent UTI's  Date 7/10  Pt afebrile, WBC 7/9 WNL  IV abx d/'ed today   Pain 3/10 L arm per pt , limited ROM LUE, BLE   Pt unable to stand independently   +2 radial pulse bilat   Pt alert, oriented but forgetful per nursing   GCS 15 Await PT/OT evals      ED Triage Vitals [07/08/22 0929]   Temperature Pulse Respirations Blood Pressure SpO2   97 9 °F (36 6 °C) 80 18 123/58 97 %      Temp Source Heart Rate Source Patient Position - Orthostatic VS BP Location FiO2 (%)   Oral Monitor Lying Right arm --      Pain Score       No Pain          Wt Readings from Last 1 Encounters:   07/08/22 79 8 kg (176 lb)     Additional Vital Signs:   Date/Time Temp Pulse Resp BP MAP (mmHg) SpO2   07/10/22 07:00:01 98 4 °F (36 9 °C) 80 18 122/63 83 96 %   07/09/22 21:32:34 98 6 °F (37 °C) -- -- 112/53 73 --   07/09/22 14:03:52 98 1 °F (36 7 °C) 78 16 113/52 72 97 %   07/09/22 07:52:55 97 8 °F (36 6 °C) 87 18 121/53 76 98 %   07/08/22 22:11:29 97 7 °F (36 5 °C) 80 18 125/58 80 96 %   07/08/22 2030 98 2 °F (36 8 °C) 70 18 121/62 -- --   07/08/22 1830 -- 80 -- 119/57 81 --   07/08/22 1730 -- 86 -- 130/63 90 --   07/08/22 1600 -- 78 -- 120/60 84 --   07/08/22 1500 -- 78 -- 121/63 86 --   07/08/22 1300 -- 76 -- 135/62 89 99 %   07/08/22 1200 -- 76 18 131/62 89 99 %   07/08/22 1130 -- 78 -- 136/61 88 99 % 07/08/22 1100 -- 74 -- 112/56 77 98 %     Date and Time Eye Opening Best Verbal Response Best Motor Response [place] Coma Scale Score   07/10/22 0900 4 5 6 15   07/09/22 0900 4 5 6 15   07/08/22 2100 4 5 6 15   07/08/22 1316 4 5 6 15   07/08/22 0945 4 5 6 15     Date and Time R Radial Pulse L Radial Pulse R Pedal Pulse L Pedal Pulse   07/10/22 0900 +2 +2 +1 +1   07/09/22 0900 +2 +2 +1 +1   07/08/22 2100 +2 +2 -- --   07/08/22 1316 +2 +2 +1 --     Date and Time R Pupil Size (mm) L Pupil Size (mm) R Pupil Reaction L Pupil Reaction   07/08/22 0945 2 2 Brisk Brisk           Pertinent Labs/Diagnostic Test Results:   XR elbow 3+ views LEFT   ED Interpretation by Marquis Spears PA-C (07/08 1111)   Some sort of bony abnormality just distal to the radial head? Final Result by Radha Giles MD (07/08 1157)      No acute osseous abnormality  Workstation performed: [id number]         CT head without contrast   Final Result by Nish Ulloa MD (07/08 1102)      No evidence of acute intracranial process  Chronic microangiopathy  Incidentally noted progressive left-sided paranasal sinusitis  Workstation performed: [id number]         XR chest 1 view portable   Final Result by Radha Giles MD (07/08 1039)      New left basilar opacity, which can represent pneumonia in the appropriate clinical setting                    Workstation performed: [id number]         7/8 ECG-Normal sinus rhythm  Right bundle branch block  Cannot rule out Inferior infarct (cited on or before 04-APR-2022)  T wave abnormality, consider lateral ischemia  Results from last 7 days   Lab Units 07/08/22  1115   SARS-COV-2  Negative     Results from last 7 days   Lab Units 07/09/22  0450 07/08/22  0940   WBC Thousand/uL 9 16 9 60   HEMOGLOBIN g/dL 11 1* 12 5   HEMATOCRIT % 33 6* 38 6   PLATELETS Thousands/uL 245 270   NEUTROS ABS Thousands/µL  --  8 03*         Results from last 7 days   Lab Units 07/09/22  0450 07/08/22  0940   SODIUM mmol/L 138 137   POTASSIUM mmol/L 3 8 4 3   CHLORIDE mmol/L 104 104   CO2 mmol/L 27 24   ANION GAP mmol/L 7 9   BUN mg/dL 22 19   CREATININE mg/dL 1 04 1 19   EGFR ml/min/1 73sq m 71 60   CALCIUM mg/dL 8 7 8 8     Results from last 7 days   Lab Units 07/08/22  0940   AST U/L 12*   ALT U/L 3*   ALK PHOS U/L 78   TOTAL PROTEIN g/dL 6 6   ALBUMIN g/dL 3 7   TOTAL BILIRUBIN mg/dL 0 82     Results from last 7 days   Lab Units 07/10/22  1109 07/10/22  0659 07/09/22  2133 07/09/22  1604 07/09/22  1045 07/09/22  0751 07/08/22  2059   POC GLUCOSE mg/dl 222* 180* 161* 188* 151* 234* 210*     Results from last 7 days   Lab Units 07/09/22  0450 07/08/22  0940   GLUCOSE RANDOM mg/dL 185* 181*               Results from last 7 days   Lab Units 07/08/22  1120 07/08/22  0940   HS TNI 0HR ng/L  --  7   HS TNI 2HR ng/L 5  --    HSTNI D2 ng/L -2  --              Results from last 7 days   Lab Units 07/08/22  0940   TSH 3RD GENERATON uIU/mL 1 026     Results from last 7 days   Lab Units 07/08/22  0940   PROCALCITONIN ng/ml <0 05               Results from last 7 days   Lab Units 07/08/22  1209   CLARITY UA  Cloudy   COLOR UA  Yellow   SPEC GRAV UA  1 025   PH UA  5 0   GLUCOSE UA mg/dl 100 (1/10%)*   KETONES UA mg/dl Negative   BLOOD UA  Moderate*   PROTEIN UA mg/dl Trace*   NITRITE UA  Negative   BILIRUBIN UA  Negative   UROBILINOGEN UA E U /dl 0 2   LEUKOCYTES UA  Large*   WBC UA /hpf Innumerable*   RBC UA /hpf 0-1   BACTERIA UA /hpf Occasional   EPITHELIAL CELLS WET PREP /hpf Occasional     Results from last 7 days   Lab Units 07/08/22  1115   INFLUENZA A PCR  Negative   INFLUENZA B PCR  Negative   RSV PCR  Negative           Results from last 7 days   Lab Units 07/08/22  1209   URINE CULTURE  50,000-59,000 cfu/ml                ED Treatment:   Medication Administration from 07/08/2022 0923 to 07/08/2022 2028       Date/Time Order Dose Route Action     07/08/2022 1324 ceftriaxone (ROCEPHIN) 1 g/50 mL in dextrose IVPB 1,000 mg Intravenous New Bag     07/08/2022 1758 docusate sodium (COLACE) capsule 100 mg 100 mg Oral Given     07/08/2022 1758 pantoprazole (PROTONIX) EC tablet 40 mg 40 mg Oral Given     07/08/2022 1758 tamsulosin (FLOMAX) capsule 0 4 mg 0 4 mg Oral Given        Past Medical History:   Diagnosis Date    Ambulatory dysfunction     uses walker with assist of 1    Anemia     Anxiety     At risk for falls     hx of falls    Benign paroxysmal vertigo     unspecified ear    BPH (benign prostatic hyperplasia)     for TURP today 1/4/2021    Calculus in bladder     for surgical removal today 1/4/2021    Cataract     left eye    Cervicalgia     Chest pain     Chronic kidney disease     stage 3    Constipation     CTS (carpal tunnel syndrome)     left    Cyst of pancreas     Cystitis 06/23/2020    acute cystitis with hematuria    Depression     Diabetes mellitus ([facility] [address] )     type II with neuropathy    Dizziness     and giddiness    Dysphagia     Elevated PSA     Facial weakness     GERD (gastroesophageal reflux disease)     last assessed 5/20/16    Gross hematuria     Hematuria     Hyperlipidemia     Hypertension     Kidney disease     Kidney stone     Left-sided weakness     Long term (current) use of oral hypoglycemic drugs     Muscle weakness     Nuclear senile cataract of left eye     OA (osteoarthritis) of knee     b/l    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[facility] [address] )     Syncope and collapse     Tachycardia     UTI (urinary tract infection)     Vertebro-basilar artery syndrome     Vitamin B deficiency     Vitamin D deficiency     Vitamin D deficiency      Present on Admission:   Ambulatory dysfunction   Essential hypertension      Admitting Diagnosis: Multiple injuries [T07  XXXA]  UTI (urinary tract infection) [N39 0]  Pneumonia [J18 9]  Generalized weakness [R53 1]  Ambulatory dysfunction [R26 2]  Age/Sex: 67 y o  male  Admission Orders:  Scheduled Medications:  aspirin, 81 mg, Oral, Daily  atorvastatin, 40 mg, Oral, Daily  carbidopa-levodopa, 1 tablet, Oral, TID  cefTRIAXone, 1,000 mg, Intravenous, [id number]: 07/10/22 1058  clopidogrel, 75 mg, Oral, Daily  cyanocobalamin, 1,000 mcg, Oral, Daily  docusate sodium, 100 mg, Oral, BID  enoxaparin, 40 mg, Subcutaneous, Daily  famotidine, 20 mg, Oral, HS  finasteride, 5 mg, Oral, Daily  insulin lispro, 1-6 Units, Subcutaneous, 4x Daily (AC & HS)  lisinopril, 5 mg, Oral, Daily  melatonin, 3 mg, Oral, HS  metoprolol succinate, 25 mg, Oral, Daily  pantoprazole, 40 mg, Oral, BID AC  polyethylene glycol, 17 g, Oral, Daily  tamsulosin, 0 4 mg, Oral, Daily With Dinner      Continuous IV Infusions:     PRN Meds:  acetaminophen, 650 mg, Oral, Q6H PRN x1 7/8 x1 7/10  senna, 8 6 mg, Oral, Daily PRN      poct glucose  SCD   fall monitoring  Telemetry x24 hrs    Network Utilization Review Department  ATTENTION: Please call with any questions or concerns to [phone] and carefully listen to the prompts so that you are directed to the right person  All voicemails are confidential   Arley Diaz all requests for admission clinical reviews, approved or denied determinations and any other requests to dedicated fax number below belonging to the campus where the patient is receiving treatment   List of dedicated fax numbers for the Facilities:  [address] DENIALS (Administrative/Medical Necessity) [phone]   [address] [place] [address] (Maternity/NICU/Pediatrics) 261 [facility],[address]  [address] [facility] [place] [phone] [place] [address] [phone] [phone]

## 2022-07-09 NOTE — ASSESSMENT & PLAN NOTE
Patient on admission due to a fall  According to he he was feeling weaker compared to the previous day's  Patient with history of incontinence  On admission patient stable in terms of vital signs  CBC unremarkable  Urinalysis showing elevate leukocytes but no bacteria or nitrates  Patient complains burning with urination  He received 1 dose of strength ceftriaxone in the ED  Will wait for urine culture considering history of recurrent UTIs previous urine culture showing growth of Enterococcus faecalis  Plan  Continue ceftriaxone 1 g considering patient being symptomatic  Follow-up on urine culture

## 2022-07-10 VITALS
WEIGHT: 176 LBS | HEART RATE: 77 BPM | HEIGHT: 67 IN | DIASTOLIC BLOOD PRESSURE: 58 MMHG | SYSTOLIC BLOOD PRESSURE: 116 MMHG | TEMPERATURE: 98.5 F | RESPIRATION RATE: 16 BRPM | OXYGEN SATURATION: 97 % | BODY MASS INDEX: 27.62 KG/M2

## 2022-07-10 LAB
GLUCOSE SERPL-MCNC: 156 MG/DL (ref 65–140)
GLUCOSE SERPL-MCNC: 180 MG/DL (ref 65–140)
GLUCOSE SERPL-MCNC: 222 MG/DL (ref 65–140)

## 2022-07-10 PROCEDURE — 82948 REAGENT STRIP/BLOOD GLUCOSE: CPT

## 2022-07-10 PROCEDURE — 99238 HOSP IP/OBS DSCHRG MGMT 30/<: CPT | Performed by: INTERNAL MEDICINE

## 2022-07-10 RX ADMIN — CARBIDOPA AND LEVODOPA 1 TABLET: 25; 100 TABLET ORAL at 16:58

## 2022-07-10 RX ADMIN — INSULIN LISPRO 1 UNITS: 100 INJECTION, SOLUTION INTRAVENOUS; SUBCUTANEOUS at 16:56

## 2022-07-10 RX ADMIN — POLYETHYLENE GLYCOL 3350 17 G: 17 POWDER, FOR SOLUTION ORAL at 08:38

## 2022-07-10 RX ADMIN — CLOPIDOGREL BISULFATE 75 MG: 75 TABLET ORAL at 08:38

## 2022-07-10 RX ADMIN — ENOXAPARIN SODIUM 40 MG: 40 INJECTION SUBCUTANEOUS at 08:38

## 2022-07-10 RX ADMIN — FINASTERIDE 5 MG: 5 TABLET, FILM COATED ORAL at 08:38

## 2022-07-10 RX ADMIN — INSULIN LISPRO 2 UNITS: 100 INJECTION, SOLUTION INTRAVENOUS; SUBCUTANEOUS at 12:43

## 2022-07-10 RX ADMIN — DOCUSATE SODIUM 100 MG: 100 CAPSULE, LIQUID FILLED ORAL at 08:38

## 2022-07-10 RX ADMIN — PANTOPRAZOLE SODIUM 40 MG: 40 TABLET, DELAYED RELEASE ORAL at 16:58

## 2022-07-10 RX ADMIN — ASPIRIN 81 MG CHEWABLE TABLET 81 MG: 81 TABLET CHEWABLE at 08:38

## 2022-07-10 RX ADMIN — ACETAMINOPHEN 650 MG: 325 TABLET ORAL at 12:45

## 2022-07-10 RX ADMIN — TAMSULOSIN HYDROCHLORIDE 0.4 MG: 0.4 CAPSULE ORAL at 16:58

## 2022-07-10 RX ADMIN — METOPROLOL SUCCINATE 25 MG: 25 TABLET, EXTENDED RELEASE ORAL at 08:38

## 2022-07-10 RX ADMIN — INSULIN LISPRO 1 UNITS: 100 INJECTION, SOLUTION INTRAVENOUS; SUBCUTANEOUS at 08:39

## 2022-07-10 RX ADMIN — LISINOPRIL 5 MG: 5 TABLET ORAL at 08:38

## 2022-07-10 RX ADMIN — CYANOCOBALAMIN TAB 500 MCG 1000 MCG: 500 TAB at 08:38

## 2022-07-10 RX ADMIN — ATORVASTATIN CALCIUM 40 MG: 40 TABLET, FILM COATED ORAL at 08:38

## 2022-07-10 RX ADMIN — PANTOPRAZOLE SODIUM 40 MG: 40 TABLET, DELAYED RELEASE ORAL at 06:10

## 2022-07-10 RX ADMIN — DOCUSATE SODIUM 100 MG: 100 CAPSULE, LIQUID FILLED ORAL at 17:02

## 2022-07-10 RX ADMIN — CARBIDOPA AND LEVODOPA 1 TABLET: 25; 100 TABLET ORAL at 08:38

## 2022-07-10 NOTE — PLAN OF CARE
Problem: Potential for Falls  Goal: Patient will remain free of falls  Description: INTERVENTIONS:  - Educate patient/family on patient safety including physical limitations  - Instruct patient to call for assistance with activity   - Consult OT/PT to assist with strengthening/mobility   - Keep Call bell within reach  - Keep bed low and locked with side rails adjusted as appropriate  - Keep care items and personal belongings within reach  - Initiate and maintain comfort rounds  - Make Fall Risk Sign visible to staff  - Apply yellow socks and bracelet for high fall risk patients  - Consider moving patient to room near nurses station  Outcome: Progressing     Problem: MOBILITY - ADULT  Goal: Maintain or return to baseline ADL function  Description: INTERVENTIONS:  -  Assess patient's ability to carry out ADLs; assess patient's baseline for ADL function and identify physical deficits which impact ability to perform ADLs (bathing, care of mouth/teeth, toileting, grooming, dressing, etc )  - Assess/evaluate cause of self-care deficits   - Assess range of motion  - Assess patient's mobility; develop plan if impaired  - Assess patient's need for assistive devices and provide as appropriate  - Encourage maximum independence but intervene and supervise when necessary  - Involve family in performance of ADLs  - Assess for home care needs following discharge   - Consider OT consult to assist with ADL evaluation and planning for discharge  - Provide patient education as appropriate  Outcome: Progressing  Goal: Maintains/Returns to pre admission functional level  Description: INTERVENTIONS:  - Perform BMAT or MOVE assessment daily    - Set and communicate daily mobility goal to care team and patient/family/caregiver     - Collaborate with rehabilitation services on mobility goals if consulted  - Out of bed for toileting  - Record patient progress and toleration of activity level   Outcome: Progressing     Problem: Prexisting or High Potential for Compromised Skin Integrity  Goal: Skin integrity is maintained or improved  Description: INTERVENTIONS:  - Identify patients at risk for skin breakdown  - Assess and monitor skin integrity  - Assess and monitor nutrition and hydration status  - Monitor labs   - Assess for incontinence   - Turn and reposition patient  - Assist with mobility/ambulation  - Relieve pressure over bony prominences  - Avoid friction and shearing  - Provide appropriate hygiene as needed including keeping skin clean and dry  - Evaluate need for skin moisturizer/barrier cream  - Collaborate with interdisciplinary team   - Patient/family teaching  - Consider wound care consult   Outcome: Progressing     Problem: PAIN - ADULT  Goal: Verbalizes/displays adequate comfort level or baseline comfort level  Description: Interventions:  - Encourage patient to monitor pain and request assistance  - Assess pain using appropriate pain scale  - Administer analgesics based on type and severity of pain and evaluate response  - Implement non-pharmacological measures as appropriate and evaluate response  - Consider cultural and social influences on pain and pain management  - Notify physician/advanced practitioner if interventions unsuccessful or patient reports new pain  Outcome: Progressing     Problem: INFECTION - ADULT  Goal: Absence or prevention of progression during hospitalization  Description: INTERVENTIONS:  - Assess and monitor for signs and symptoms of infection  - Monitor lab/diagnostic results  - Monitor all insertion sites, i e  indwelling lines, tubes, and drains  - Monitor endotracheal if appropriate and nasal secretions for changes in amount and color  - [place] appropriate cooling/warming therapies per order  - Administer medications as ordered  - Instruct and encourage patient and family to use good hand hygiene technique  - Identify and instruct in appropriate isolation precautions for identified infection/condition  Outcome: Progressing  Goal: Absence of fever/infection during neutropenic period  Description: INTERVENTIONS:  - Monitor WBC    Outcome: Progressing     Problem: SAFETY ADULT  Goal: Patient will remain free of falls  Description: INTERVENTIONS:  - Educate patient/family on patient safety including physical limitations  - Instruct patient to call for assistance with activity   - Consult OT/PT to assist with strengthening/mobility   - Keep Call bell within reach  - Keep bed low and locked with side rails adjusted as appropriate  - Keep care items and personal belongings within reach  - Initiate and maintain comfort rounds  - Make Fall Risk Sign visible to staff  - Apply yellow socks and bracelet for high fall risk patients  - Consider moving patient to room near nurses station  Outcome: Progressing  Goal: Maintain or return to baseline ADL function  Description: INTERVENTIONS:  -  Assess patient's ability to carry out ADLs; assess patient's baseline for ADL function and identify physical deficits which impact ability to perform ADLs (bathing, care of mouth/teeth, toileting, grooming, dressing, etc )  - Assess/evaluate cause of self-care deficits   - Assess range of motion  - Assess patient's mobility; develop plan if impaired  - Assess patient's need for assistive devices and provide as appropriate  - Encourage maximum independence but intervene and supervise when necessary  - Involve family in performance of ADLs  - Assess for home care needs following discharge   - Consider OT consult to assist with ADL evaluation and planning for discharge  - Provide patient education as appropriate  Outcome: Progressing  Goal: Maintains/Returns to pre admission functional level  Description: INTERVENTIONS:  - Perform BMAT or MOVE assessment daily    - Set and communicate daily mobility goal to care team and patient/family/caregiver     - Collaborate with rehabilitation services on mobility goals if consulted  - Out of bed for toileting  - Record patient progress and toleration of activity level   Outcome: Progressing

## 2022-07-10 NOTE — PHYSICAL THERAPY NOTE
PHYSICAL THERAPY NOTE    Patient Name: Blanca Malloy  MWFNG'K Date: 7/10/2022     07/10/22 1610   PT Last Visit   PT Visit Date 07/10/22   Note Type   Note type Screen   Additional Comments Notified by resident via tiger text and verbally that patient was medically ready for discharge today  In speaking with Case Management, patient's wife did not want patient to go to rehab  Care coordination among resident, case management    Resident planning to discharge patient today, will screen from IP PT services as case management is coordinating getting continued physical therapy surfaces for patient through [facility] upon discharge    Hussain Has, PT

## 2022-07-10 NOTE — UTILIZATION REVIEW
Inpatient Admission Authorization Request   NOTIFICATION OF INPATIENT ADMISSION/INPATIENT AUTHORIZATION REQUEST   SERVICING FACILITY:   [address]  Tax ID: [id number]  NPI: [id number]  Place of Service: Inpatient [address] [facility]  [address]  Place of Service Code: 24     ATTENDING PROVIDER:  Attending Name and NPI#: Elen Paulino Md [[id number]]  Address: [address]  Phone: [phone]     UTILIZATION REVIEW CONTACT:  Jarrell Sanchez Utilization   Network Utilization Review Department  Phone: [phone]  Fax: [phone]  Email: [email or web address]     PHYSICIAN ADVISORY SERVICES:  FOR [id number] REVIEW - MEDICAL NECESSITY DENIAL  Phone: [phone]  Fax: [phone]  Email: [email or web address]     TYPE OF REQUEST:  Inpatient Status     ADMISSION INFORMATION:  ADMISSION DATE/TIME: 7/8/22 12:41 PM  PATIENT DIAGNOSIS CODE/DESCRIPTION:  Multiple injuries [T07  XXXA]  UTI (urinary tract infection) [N39 0]  Pneumonia [J18 9]  Generalized weakness [R53 1]  Ambulatory dysfunction [R26 2]  DISCHARGE DATE/TIME: No discharge date for patient encounter  IMPORTANT INFORMATION:  Please contact Jarrell Sanchez directly with any questions or concerns regarding this request  Department voicemails are confidential     Send requests for admission clinical reviews, concurrent reviews, approvals, and administrative denials due to lack of clinical to fax [phone]

## 2022-07-10 NOTE — DISCHARGE INSTR - AVS FIRST PAGE
Dear Ceci Mathews,     It was our pleasure to care for you here at [facility], [address]  It is our hope that we were always able to exceed the expected standards for your care during your stay  You were hospitalized due to a fall  You were cared for on the 4th floor by Vidhi Hi DO under the service of Kaye Man MD with the [facility] who covers for your primary care physician (PCP), Patito Keith MD, while you were hospitalized  If you have any questions or concerns related to this hospitalization, you may contact us at [phone]  For follow up as well as any medication refills, we recommend that you follow up with your primary care physician  A registered nurse will reach out to you by phone within a few days after your discharge to answer any additional questions that you may have after going home  However, at this time we provide for you here, the most important instructions / recommendations at discharge:     Notable Medication Adjustments -   No changes to your home medications  Testing Required after Discharge -   None  Important follow up information -   Please follow up with your primary care physician in 1 week  Other Instructions -   Please review this entire after visit summary as additional general instructions including medication list, appointments, activity, diet, any pertinent wound care, and other additional recommendations from your care team that may be provided for you        Sincerely,     Vidhi Hi DO      Physical Exam

## 2022-07-10 NOTE — CASE MANAGEMENT
Case Management Discharge Planning Note    Patient name Benja Peacock  Location S /S -33 MRN [id number]  : 1950 Date 7/10/2022       Current Admission Date: 2022  Current Admission Diagnosis:Fall   Patient Active Problem List    Diagnosis Date Noted    Fall 2022    Parkinson disease ([place] [address] ) 2022    Chest pain 2022    Leukocytosis 2022    Coronary artery disease 2022    Type 2 MI (myocardial infarction) ([place] [address] ) 2022    Generalized abdominal pain 2021    Lactic acidosis 2021    Urinary incontinence 2021    Vitamin B12 deficiency     History of recurrent UTIs 2020    Insomnia 2020    Constipation 2020    Benign paroxysmal positional vertigo 2020    BPH (benign prostatic hyperplasia) 2020    Vitamin D deficiency 2020    Left-sided weakness 2020    UTI (urinary tract infection) 2020    Cystitis 2020    Bladder stones 2020    Stage 3 chronic kidney disease ([place] [address] ) 2020    Anemia 2020    Colon cancer screening 2020    Essential hypertension 2019    Type 2 diabetes mellitus ([place] [address] ) 2019    Nephrolithiasis 2019    Ambulatory dysfunction 2019    Paresis ([place] [address] ) 2019    Abnormal PSA 2019    Dizziness 10/04/2017    Pancreas cyst 2016    Type 2 diabetes mellitus with diabetic neuropathy ([place] [address] ) 2015    Hyperlipidemia 2012      LOS (days): 2  Geometric Mean LOS (GMLOS) (days):   Days to GMLOS:     OBJECTIVE:  Risk of Unplanned Readmission Score: 23 9         Current admission status: Inpatient   Preferred Pharmacy:   [address] [place] [address]  Phone: [phone] Fax: [place] [address], [place] - [address]  [facility]   [address]   [address] [facility]  Phone: [phone] Fax: [phone]    Primary Care Provider: Leonor Rubi MD    Primary Insurance: [facility]  Secondary Insurance:     DISCHARGE DETAILS:    Discharge planning discussed with[de-identified] Wife and patient  Freedom of Choice: Yes  Comments - Freedom of Choice: STACY informed patient and wife that he is medically ready for discharge  Wife requesting patient to return home with [facility]  STACY placed a call to [facility] and spoke to on call nurse, Cristal Dennison who confirmed [facility] will resume services at home  CM contacted family/caregiver?: Yes  Were Treatment Team discharge recommendations reviewed with patient/caregiver?: Yes  Did patient/caregiver verbalize understanding of patient care needs?: Yes  Were patient/caregiver advised of the risks associated with not following Treatment Team discharge recommendations?: Yes    Contacts  Patient Contacts: Jeanne-spouse  Relationship to Patient[de-identified] Family  Contact Method: Phone  Phone Number: [phone]  Reason/Outcome: Discharge Planning, Referral    Requested [address]         Is the patient interested in [facility] at discharge?: Yes  [address] requested[de-identified] Nursing, Occupational Therapy, Physical Therapy ([facility])  [facility] Name[de-identified] Other ([facility])  [address] [place] [address] Provider[de-identified] PCP  [address] Needed[de-identified] Strengthening/Theraputic Exercises to Improve Function, Gait/ADL Training, Evaluate Functional Status and Safety  Homebound Criteria Met[de-identified] Requires the Assistance of Another Person for Safe Ambulation or to Leave the Home, Uses an Assist Device (i e  cane, walker, etc)  Supporting Clincal Findings[de-identified] Limited Endurance, Dyspnea with Exertion, Fatigues Easliy in Short Distances    DME Referral Provided  Referral made for DME?: No    Other Referral/Resources/Interventions Provided:  Referral Comments: STACY informed patient and wife that he is medically ready for discharge  Wife requesting patient to return home with [facility]   STACY placed a call to [employer] and spoke to on call nurse, Ethan Trimble who confirmed [facility] will resume services at home           Treatment Team Recommendation: Home with [address] [place] [address]  Discharge Destination Plan[de-identified] Home with Ciara at Discharge : [address]: Yes  Number/Name of Dispatcher: Kinga     ETA of Transport (Date): 07/10/22  ETA of Transport (Time): 1700  Transport Service Arrived: No  Transfer Mode: Wheelchair  Accompanied by: EMS personnel

## 2022-07-10 NOTE — PLAN OF CARE
Problem: Potential for Falls  Goal: Patient will remain free of falls  Description: INTERVENTIONS:  - Educate patient/family on patient safety including physical limitations  - Instruct patient to call for assistance with activity   - Consult OT/PT to assist with strengthening/mobility   - Keep Call bell within reach  - Keep bed low and locked with side rails adjusted as appropriate  - Keep care items and personal belongings within reach  - Initiate and maintain comfort rounds  - Make Fall Risk Sign visible to staff  - Apply yellow socks and bracelet for high fall risk patients  - Consider moving patient to room near nurses station  7/10/2022 1723 by Pavan Begum RN  Outcome: Adequate for Discharge  7/10/2022 1723 by Pavan Begum RN  Outcome: Progressing  7/10/2022 1307 by Pavan Begum RN  Outcome: Progressing     Problem: MOBILITY - ADULT  Goal: Maintain or return to baseline ADL function  Description: INTERVENTIONS:  -  Assess patient's ability to carry out ADLs; assess patient's baseline for ADL function and identify physical deficits which impact ability to perform ADLs (bathing, care of mouth/teeth, toileting, grooming, dressing, etc )  - Assess/evaluate cause of self-care deficits   - Assess range of motion  - Assess patient's mobility; develop plan if impaired  - Assess patient's need for assistive devices and provide as appropriate  - Encourage maximum independence but intervene and supervise when necessary  - Involve family in performance of ADLs  - Assess for home care needs following discharge   - Consider OT consult to assist with ADL evaluation and planning for discharge  - Provide patient education as appropriate  7/10/2022 1723 by Pavan Begum RN  Outcome: Adequate for Discharge  7/10/2022 1723 by Pavan Begum RN  Outcome: Progressing  7/10/2022 1307 by Pavan Begum RN  Outcome: Progressing  Goal: Maintains/Returns to pre admission functional level  Description: INTERVENTIONS:  - Perform BMAT or MOVE assessment daily    - Set and communicate daily mobility goal to care team and patient/family/caregiver     - Collaborate with rehabilitation services on mobility goals if consulted  - Out of bed for toileting  - Record patient progress and toleration of activity level   7/10/2022 1723 by Valdo Sierra RN  Outcome: Adequate for Discharge  7/10/2022 1723 by Valdo Sierra RN  Outcome: Progressing  7/10/2022 1307 by Valdo Sierra RN  Outcome: Progressing     Problem: Prexisting or High Potential for Compromised Skin Integrity  Goal: Skin integrity is maintained or improved  Description: INTERVENTIONS:  - Identify patients at risk for skin breakdown  - Assess and monitor skin integrity  - Assess and monitor nutrition and hydration status  - Monitor labs   - Assess for incontinence   - Turn and reposition patient  - Assist with mobility/ambulation  - Relieve pressure over bony prominences  - Avoid friction and shearing  - Provide appropriate hygiene as needed including keeping skin clean and dry  - Evaluate need for skin moisturizer/barrier cream  - Collaborate with interdisciplinary team   - Patient/family teaching  - Consider wound care consult   7/10/2022 1723 by Valdo Sierra RN  Outcome: Adequate for Discharge  7/10/2022 1723 by Valdo Sierra RN  Outcome: Progressing  7/10/2022 1307 by Valdo Sierra RN  Outcome: Progressing     Problem: PAIN - ADULT  Goal: Verbalizes/displays adequate comfort level or baseline comfort level  Description: Interventions:  - Encourage patient to monitor pain and request assistance  - Assess pain using appropriate pain scale  - Administer analgesics based on type and severity of pain and evaluate response  - Implement non-pharmacological measures as appropriate and evaluate response  - Consider cultural and social influences on pain and pain management  - Notify physician/advanced practitioner if interventions unsuccessful or patient reports new pain  7/10/2022 1723 by Carmelina Dunlap William Rosado RN  Outcome: Adequate for Discharge  7/10/2022 1723 by Pavan Begum RN  Outcome: Progressing  7/10/2022 1307 by Pavan Begum RN  Outcome: Progressing     Problem: INFECTION - ADULT  Goal: Absence or prevention of progression during hospitalization  Description: INTERVENTIONS:  - Assess and monitor for signs and symptoms of infection  - Monitor lab/diagnostic results  - Monitor all insertion sites, i e  indwelling lines, tubes, and drains  - Monitor endotracheal if appropriate and nasal secretions for changes in amount and color  - [place] appropriate cooling/warming therapies per order  - Administer medications as ordered  - Instruct and encourage patient and family to use good hand hygiene technique  - Identify and instruct in appropriate isolation precautions for identified infection/condition  7/10/2022 1723 by Pavan Begum RN  Outcome: Adequate for Discharge  7/10/2022 1723 by Pavan Begum RN  Outcome: Progressing  7/10/2022 1307 by Pavan Begum RN  Outcome: Progressing  Goal: Absence of fever/infection during neutropenic period  Description: INTERVENTIONS:  - Monitor WBC    7/10/2022 1723 by Pavan Begum RN  Outcome: Adequate for Discharge  7/10/2022 1723 by Pavan Begum RN  Outcome: Progressing  7/10/2022 1307 by Pavan Begum RN  Outcome: Progressing     Problem: SAFETY ADULT  Goal: Patient will remain free of falls  Description: INTERVENTIONS:  - Educate patient/family on patient safety including physical limitations  - Instruct patient to call for assistance with activity   - Consult OT/PT to assist with strengthening/mobility   - Keep Call bell within reach  - Keep bed low and locked with side rails adjusted as appropriate  - Keep care items and personal belongings within reach  - Initiate and maintain comfort rounds  - Make Fall Risk Sign visible to staff  - Apply yellow socks and bracelet for high fall risk patients  - Consider moving patient to room near nurses station  7/10/2022 1723 by Hina Hendrix RN  Outcome: Adequate for Discharge  7/10/2022 1723 by Hina Hendrix RN  Outcome: Progressing  7/10/2022 1307 by Hina Hendrix RN  Outcome: Progressing  Goal: Maintain or return to baseline ADL function  Description: INTERVENTIONS:  -  Assess patient's ability to carry out ADLs; assess patient's baseline for ADL function and identify physical deficits which impact ability to perform ADLs (bathing, care of mouth/teeth, toileting, grooming, dressing, etc )  - Assess/evaluate cause of self-care deficits   - Assess range of motion  - Assess patient's mobility; develop plan if impaired  - Assess patient's need for assistive devices and provide as appropriate  - Encourage maximum independence but intervene and supervise when necessary  - Involve family in performance of ADLs  - Assess for home care needs following discharge   - Consider OT consult to assist with ADL evaluation and planning for discharge  - Provide patient education as appropriate  7/10/2022 1723 by Hina Hendrix RN  Outcome: Adequate for Discharge  7/10/2022 1723 by Hina Hendrix RN  Outcome: Progressing  7/10/2022 1307 by Hina Hendrix RN  Outcome: Progressing  Goal: Maintains/Returns to pre admission functional level  Description: INTERVENTIONS:  - Perform BMAT or MOVE assessment daily    - Set and communicate daily mobility goal to care team and patient/family/caregiver     - Collaborate with rehabilitation services on mobility goals if consulted  - Out of bed for toileting  - Record patient progress and toleration of activity level   7/10/2022 1723 by Hina Hendrix RN  Outcome: Adequate for Discharge  7/10/2022 1723 by Hina Hendrix RN  Outcome: Progressing  7/10/2022 1307 by Hina Hendrix RN  Outcome: Progressing

## 2022-07-10 NOTE — CASE MANAGEMENT
Case Management Discharge Planning Note    Patient name Emmanuel Howell  Location S /S -01 MRN [id number]  : 1950 Date 7/10/2022       Current Admission Date: 2022  Current Admission Diagnosis:Fall   Patient Active Problem List    Diagnosis Date Noted    Fall 2022    Parkinson disease ([facility] ) 2022    Chest pain 2022    Leukocytosis 2022    Coronary artery disease 2022    Type 2 MI (myocardial infarction) ([place] [address] ) 2022    Generalized abdominal pain 2021    Lactic acidosis 2021    Urinary incontinence 2021    Vitamin B12 deficiency     History of recurrent UTIs 2020    Insomnia 2020    Constipation 2020    Benign paroxysmal positional vertigo 2020    BPH (benign prostatic hyperplasia) 2020    Vitamin D deficiency 2020    Left-sided weakness 2020    UTI (urinary tract infection) 2020    Cystitis 2020    Bladder stones 2020    Stage 3 chronic kidney disease ([place] [address] ) 2020    Anemia 2020    Colon cancer screening 2020    Essential hypertension 2019    Type 2 diabetes mellitus ([place] [address] ) 2019    Nephrolithiasis 2019    Ambulatory dysfunction 2019    Paresis ([place] [address] ) 2019    Abnormal PSA 2019    Dizziness 10/04/2017    Pancreas cyst 2016    Type 2 diabetes mellitus with diabetic neuropathy ([place] [address] ) 2015    Hyperlipidemia 2012      LOS (days): 2  Geometric Mean LOS (GMLOS) (days):   Days to GMLOS:     OBJECTIVE:  Risk of Unplanned Readmission Score: 23 9         Current admission status: Inpatient   Preferred Pharmacy:   [address] [place] [address]  Phone: [phone] Fax: [address], [place] - [address]  [facility]   [address]   [address] [place] [address]  Phone: [phone] Fax: [phone]    Primary Care Provider: Teresa Randolph MD    Primary Insurance: [facility]  Secondary Insurance:     DISCHARGE DETAILS:       Other Referral/Resources/Interventions Provided:  Referral Comments: CM informed wife that he's medically ready  Wife requesting patient to return [facility] [place] [address] [facility]  CM placed a call to [employer] and spoke to an on-call nurse, Dayna Gomez who confirmed [facility] will resume services at home, PT/OT and nurse           Treatment Team Recommendation: Home with [address] at Discharge : [facility] by Tori and Unit #): [employer] Transport (Time): [facility]

## 2022-07-11 NOTE — UTILIZATION REVIEW
Notification of Discharge   This is a Notification of Discharge from our facility [address]  Please be advised that this patient has been discharge from our facility  Below you will find the admission and discharge date and time including the patients disposition  UTILIZATION REVIEW CONTACT:  Navdeep Che MA  Utilization   Network Utilization Review Department  Phone: [phone] x carefully listen to the prompts  All voicemails are confidential   Email: [email or web address]     PHYSICIAN ADVISORY SERVICES:  FOR XHWQ-CP-EDYG REVIEW - MEDICAL NECESSITY DENIAL  Phone: [phone]  Fax: [phone]  Email: [email or web address]     PRESENTATION DATE: 7/8/2022  9:23 AM  OBERVATION ADMISSION DATE:   INPATIENT ADMISSION DATE: 7/8/22 12:41 PM   DISCHARGE DATE: 7/10/2022  5:45 PM  DISPOSITION: Home/Self Care Home/Self Care      IMPORTANT INFORMATION:  Send all requests for admission clinical reviews, approved or denied determinations and any other requests to dedicated fax number below belonging to the campus where the patient is receiving treatment   List of dedicated fax numbers:  [address] DENIALS (Administrative/Medical Necessity) [phone]   [address] (Maternity/NICU/Pediatrics) [phone]   [facility] [phone]   [address] [phone]   [address] [place] [address] [phone]   2000 [facility] [address] [place] [address],[address] Floor [address] [phone]   [facility]  [phone]   [address] [place] [address], [address]  2401 [facility] 1000 [address] [facility] [phone]

## 2022-07-12 NOTE — ASSESSMENT & PLAN NOTE
Patient with several years of decline in his ambulation  Patient known for the 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  Patient also with history of Parkinson disease  Currently uses wheelchair for ambulation  At times can walk with a cane  Recently felt weaker than normal   Sometime experience episodes of dizziness      Continue home PT/OT

## 2022-07-12 NOTE — ASSESSMENT & PLAN NOTE
Lab Results   Component Value Date    HGBA1C 6 4 (H) 03/05/2022       Recent Labs     07/09/22  2133 07/10/22  0659 07/10/22  1109 07/10/22  1607   POCGLU 161* 180* 222* 156*       Blood Sugar Average: Last 72 hrs:  (P) 672 [id number] takes at home metformin and glipizide    Continue home meds

## 2022-07-12 NOTE — ASSESSMENT & PLAN NOTE
Patient admitted due to a witnessed fall  Per patient he slipped while trying to stand up from the wheelchair  Per wife patient was with her when he tried to transfer from the wheelchair slipped  She also stated that the patient lives with her at home and they have a visiting nurse to help with the medication  Patient reports falling on the left side, denies injuring the head  Patient with history of ambulatory dysfunction  On admission head CT did not show any intracranial bleeding or abnormality  Chest of the left elbow unremarkable  Chest x-ray showed left lung opacity consistent with possible pneumonia  Patient asymptomatic      Plan:  D/C antibiotics  Patient and wife not interested in inpatient rehab, are followed by outpatient home PT/OT  D/C with continued home health services

## 2022-07-12 NOTE — DISCHARGE SUMMARY
[facility]  Discharge- Natividad Stein 1950, 67 y o  male MRN: [id number]  Unit/Bed#: S -01 Encounter: [id number]  Primary Care Provider: Shabbir Briceno MD   Date and time admitted to hospital: 7/8/2022  9:23 AM    * [address]  Patient admitted due to a witnessed fall  Per patient he slipped while trying to stand up from the wheelchair  Per wife patient was with her when he tried to transfer from the wheelchair slipped  She also stated that the patient lives with her at home and they have a visiting nurse to help with the medication  Patient reports falling on the left side, denies injuring the head  Patient with history of ambulatory dysfunction  On admission head CT did not show any intracranial bleeding or abnormality  Chest of the left elbow unremarkable  Chest x-ray showed left lung opacity consistent with possible pneumonia  Patient asymptomatic  Plan:  D/C antibiotics  Patient and wife not interested in inpatient rehab, are followed by outpatient home PT/OT  D/C with continued home health services        Parkinson disease [facility])  Assessment & Plan  Patient patient history of Parkinson disease  Follows up with Neurology in outpatient setting  Continue Sinemet t i d     Coronary artery disease  Assessment & Plan  Recent history of non STEMI  Patient underwent cardiac catheterization which revealed significant stenosis of the LAD, mid RCA and 1st marginal lesion  It was attempted angioplasty for 1st marginal lesion but unsuccessful  Initially patient was going to some [facility] for CABG  However due to ambulatory dysfunction patient was not a candidate because of poor recovery  Was decided to be optimized on dual antiplatelet therapy, high-intensity statin as well as beta-blocker and close follow-up with Cardiology      Plan:  Continue aspirin and Plavix  Continue metoprolol 25 mg daily  Continue atorvastatin 40 mg daily    BPH (benign prostatic hyperplasia)  Assessment & Plan  Continue finasteride and tamsulosin    UTI (urinary tract infection)  Assessment & Plan  Patient on admission due to a fall  According to he he was feeling weaker compared to the previous day's  Patient with history of incontinence  On admission patient stable in terms of vital signs  CBC unremarkable  Urinalysis showing elevate leukocytes but no bacteria or nitrates  Patient complains burning with urination  He received 1 dose of strength ceftriaxone in the ED    Plan  Urine cultures - mixed contaminants  Stop Abx    Ambulatory dysfunction  Assessment & Plan  Patient with several years of decline in his ambulation  Patient known for the 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  Patient also with history of Parkinson disease  Currently uses wheelchair for ambulation  At times can walk with a cane  Recently felt weaker than normal   Sometime experience episodes of dizziness  Continue home PT/OT          Type 2 diabetes mellitus [facility])  Assessment & Plan  Lab Results   Component Value Date    HGBA1C 6 4 (H) 03/05/2022       Recent Labs     07/09/22  2133 07/10/22  0659 07/10/22  1109 07/10/22  1607   POCGLU 161* 180* 222* 156*       Blood Sugar Average: Last 72 hrs:  (P) 184 [id number] takes at home metformin and glipizide  Continue home meds      Essential hypertension  Assessment & Plan  Continue lisinopril 5 mg daily and metoprolol      Medical Problems             Resolved Problems  Date Reviewed: 4/5/2022   None               Discharging Resident: Isabela Escamilla DO  Discharging Attending: No att  providers found  PCP: Yves Gonzales MD  Admission Date:   Admission Orders (From admission, onward)     Ordered        07/08/22 1241  1 [facility] [facility] [place]  Once                      Discharge Date: 07/10/22    Consultations During Hospital Stay:  · None    Procedures Performed:   · None    Significant Findings / Test Results:   · CXR:  New left basilar opacity  · Head CT:  Incidentally noted progressive left-sided paranasal sinusitis  · X-ray elbow left:  No acute osseous abnormality    Incidental Findings:      Test Results Pending at Discharge (will require follow up): · None     Outpatient Tests Requested:  · None    Complications:  None    Reason for Admission:  None    Hospital Course:   Anup Kincaid is a 67 y o  male patient who originally presented to the hospital on 7/8/2022 due to fall  Patient has a history of ambulatory dysfunction, DM type 2, CAD, HTN  He was trying to stand up from his wheelchair and slipped to the floor  On admission, CT head was unremarkable  Chest XR showed possible left lung opacity but patient was asymptomatic with normal leukocytosis and temperature  His UA showed leukocytes  He was given one dose of ceftriaxone in the ED  After discussion with patient's wife, they are not interested in inpatient rehab  They have PT/OT visiting their home  His UA grew mixed contaminants  Patient is stable for discharge home  He will continue with his home services  The patient, initially admitted to the hospital as inpatient, was discharged earlier than expected given the following: Clinically improved  Please see above list of diagnoses and related plan for additional information  Condition at Discharge: stable    Discharge Day Visit / Exam:   Subjective:  Patient feels well this morning  No pain or discomfort  Vitals: Blood Pressure: 116/58 (07/10/22 1409)  Pulse: 77 (07/10/22 1409)  Temperature: 98 5 °F (36 9 °C) (07/10/22 1409)  Temp Source: Oral (07/08/22 2030)  Respirations: 16 (07/10/22 1409)  Height: 5' 7" (170 2 cm) (07/08/22 2030)  Weight - Scale: 79 8 kg (176 lb) (07/08/22 2030)  SpO2: 97 % (07/10/22 1409)    Exam:   Physical Exam  Constitutional:       Appearance: Normal appearance  HENT:      Head: Normocephalic and atraumatic  Cardiovascular:      Rate and Rhythm: Normal rate and regular rhythm     Pulmonary: Effort: Pulmonary effort is normal       Breath sounds: Normal breath sounds  Abdominal:      General: Bowel sounds are normal       Palpations: Abdomen is soft  Tenderness: There is no abdominal tenderness  Musculoskeletal:      Right lower leg: No edema  Left lower leg: No edema  Skin:     General: Skin is warm and dry  Neurological:      General: No focal deficit present  Mental Status: He is alert  Psychiatric:         Mood and Affect: Mood normal          Behavior: Behavior normal           Discussion with Family: Updated  (wife) via phone  Discharge instructions/Information to patient and family:   See after visit summary for information provided to patient and family  Provisions for Follow-Up Care:  See after visit summary for information related to follow-up care and any pertinent home health orders  Disposition:   Home with VNA Services (Reminder: Complete face to face encounter)    Planned Readmission:     Discharge Medications:  See after visit summary for reconciled discharge medications provided to patient and/or family        **Please Note: This note may have been constructed using a voice recognition system**

## 2022-07-12 NOTE — ASSESSMENT & PLAN NOTE
Patient on admission due to a fall  According to he he was feeling weaker compared to the previous day's  Patient with history of incontinence  On admission patient stable in terms of vital signs  CBC unremarkable  Urinalysis showing elevate leukocytes but no bacteria or nitrates  Patient complains burning with urination    He received 1 dose of strength ceftriaxone in the ED    Plan  Urine cultures - mixed contaminants  Stop Abx

## 2022-09-02 ENCOUNTER — APPOINTMENT (OUTPATIENT)
Dept: RADIOLOGY | Facility: HOSPITAL | Age: 72
End: 2022-09-02
Payer: MEDICARE

## 2022-09-02 ENCOUNTER — HOSPITAL ENCOUNTER (OUTPATIENT)
Facility: HOSPITAL | Age: 72
Setting detail: OBSERVATION
Discharge: HOME HEALTH | End: 2022-09-05
Attending: EMERGENCY MEDICINE | Admitting: INTERNAL MEDICINE
Payer: MEDICARE

## 2022-09-02 DIAGNOSIS — Z74.1 REQUIRES DAILY ASSISTANCE FOR ACTIVITIES OF DAILY LIVING (ADL) AND COMFORT NEEDS: ICD-10-CM

## 2022-09-02 DIAGNOSIS — R29.6 FREQUENT FALLS: ICD-10-CM

## 2022-09-02 DIAGNOSIS — Z78.9 INABILITY TO PERFORM ACTIVITIES OF DAILY LIVING: ICD-10-CM

## 2022-09-02 DIAGNOSIS — R53.1 WEAKNESS: Primary | ICD-10-CM

## 2022-09-02 LAB
ANION GAP SERPL CALCULATED.3IONS-SCNC: 10 MMOL/L (ref 4–13)
BASOPHILS # BLD AUTO: 0.05 THOUSANDS/ΜL (ref 0–0.1)
BASOPHILS NFR BLD AUTO: 0 % (ref 0–1)
BUN SERPL-MCNC: 14 MG/DL (ref 5–25)
CALCIUM SERPL-MCNC: 8.6 MG/DL (ref 8.4–10.2)
CHLORIDE SERPL-SCNC: 102 MMOL/L (ref 96–108)
CO2 SERPL-SCNC: 27 MMOL/L (ref 21–32)
CREAT SERPL-MCNC: 1.1 MG/DL (ref 0.6–1.3)
EOSINOPHIL # BLD AUTO: 0.4 THOUSAND/ΜL (ref 0–0.61)
EOSINOPHIL NFR BLD AUTO: 3 % (ref 0–6)
ERYTHROCYTE [DISTWIDTH] IN BLOOD BY AUTOMATED COUNT: 13.8 % (ref 11.6–15.1)
GFR SERPL CREATININE-BSD FRML MDRD: 66 ML/MIN/1.73SQ M
GLUCOSE SERPL-MCNC: 169 MG/DL (ref 65–140)
HCT VFR BLD AUTO: 36.2 % (ref 36.5–49.3)
HGB BLD-MCNC: 11.9 G/DL (ref 12–17)
IMM GRANULOCYTES # BLD AUTO: 0.08 THOUSAND/UL (ref 0–0.2)
IMM GRANULOCYTES NFR BLD AUTO: 1 % (ref 0–2)
LYMPHOCYTES # BLD AUTO: 1.31 THOUSANDS/ΜL (ref 0.6–4.47)
LYMPHOCYTES NFR BLD AUTO: 11 % (ref 14–44)
MCH RBC QN AUTO: 28.3 PG (ref 26.8–34.3)
MCHC RBC AUTO-ENTMCNC: 32.9 G/DL (ref 31.4–37.4)
MCV RBC AUTO: 86 FL (ref 82–98)
MONOCYTES # BLD AUTO: 0.71 THOUSAND/ΜL (ref 0.17–1.22)
MONOCYTES NFR BLD AUTO: 6 % (ref 4–12)
NEUTROPHILS # BLD AUTO: 9.1 THOUSANDS/ΜL (ref 1.85–7.62)
NEUTS SEG NFR BLD AUTO: 79 % (ref 43–75)
NRBC BLD AUTO-RTO: 0 /100 WBCS
PLATELET # BLD AUTO: 285 THOUSANDS/UL (ref 149–390)
PMV BLD AUTO: 10.6 FL (ref 8.9–12.7)
POTASSIUM SERPL-SCNC: 3.9 MMOL/L (ref 3.5–5.3)
RBC # BLD AUTO: 4.21 MILLION/UL (ref 3.88–5.62)
SODIUM SERPL-SCNC: 139 MMOL/L (ref 135–147)
WBC # BLD AUTO: 11.65 THOUSAND/UL (ref 4.31–10.16)

## 2022-09-02 PROCEDURE — 99220 PR INITIAL OBSERVATION CARE/DAY 70 MINUTES: CPT | Performed by: PHYSICIAN ASSISTANT

## 2022-09-02 PROCEDURE — 71045 X-RAY EXAM CHEST 1 VIEW: CPT

## 2022-09-02 PROCEDURE — 99285 EMERGENCY DEPT VISIT HI MDM: CPT | Performed by: PHYSICIAN ASSISTANT

## 2022-09-02 PROCEDURE — 85025 COMPLETE CBC W/AUTO DIFF WBC: CPT | Performed by: PHYSICIAN ASSISTANT

## 2022-09-02 PROCEDURE — 36415 COLL VENOUS BLD VENIPUNCTURE: CPT | Performed by: PHYSICIAN ASSISTANT

## 2022-09-02 PROCEDURE — 80048 BASIC METABOLIC PNL TOTAL CA: CPT | Performed by: PHYSICIAN ASSISTANT

## 2022-09-02 PROCEDURE — 99284 EMERGENCY DEPT VISIT MOD MDM: CPT

## 2022-09-03 LAB
GLUCOSE SERPL-MCNC: 110 MG/DL (ref 65–140)
GLUCOSE SERPL-MCNC: 116 MG/DL (ref 65–140)
GLUCOSE SERPL-MCNC: 118 MG/DL (ref 65–140)
GLUCOSE SERPL-MCNC: 153 MG/DL (ref 65–140)
GLUCOSE SERPL-MCNC: 216 MG/DL (ref 65–140)
GLUCOSE SERPL-MCNC: 86 MG/DL (ref 65–140)

## 2022-09-03 PROCEDURE — 97163 PT EVAL HIGH COMPLEX 45 MIN: CPT

## 2022-09-03 PROCEDURE — 82948 REAGENT STRIP/BLOOD GLUCOSE: CPT

## 2022-09-03 PROCEDURE — 99217 PR OBSERVATION CARE DISCHARGE MANAGEMENT: CPT | Performed by: INTERNAL MEDICINE

## 2022-09-03 RX ORDER — ACETAMINOPHEN 325 MG/1
650 TABLET ORAL EVERY 6 HOURS PRN
Status: DISCONTINUED | OUTPATIENT
Start: 2022-09-03 | End: 2022-09-05 | Stop reason: HOSPADM

## 2022-09-03 RX ORDER — GLIPIZIDE 5 MG/1
5 TABLET ORAL
Status: DISCONTINUED | OUTPATIENT
Start: 2022-09-03 | End: 2022-09-05 | Stop reason: HOSPADM

## 2022-09-03 RX ORDER — INSULIN LISPRO 100 [IU]/ML
1-6 INJECTION, SOLUTION INTRAVENOUS; SUBCUTANEOUS
Status: DISCONTINUED | OUTPATIENT
Start: 2022-09-03 | End: 2022-09-05 | Stop reason: HOSPADM

## 2022-09-03 RX ORDER — FAMOTIDINE 20 MG/1
20 TABLET, FILM COATED ORAL
Status: DISCONTINUED | OUTPATIENT
Start: 2022-09-03 | End: 2022-09-05 | Stop reason: HOSPADM

## 2022-09-03 RX ORDER — SENNOSIDES 8.6 MG
8.6 TABLET ORAL DAILY PRN
Status: DISCONTINUED | OUTPATIENT
Start: 2022-09-03 | End: 2022-09-05 | Stop reason: HOSPADM

## 2022-09-03 RX ORDER — INSULIN LISPRO 100 [IU]/ML
1-5 INJECTION, SOLUTION INTRAVENOUS; SUBCUTANEOUS
Status: DISCONTINUED | OUTPATIENT
Start: 2022-09-03 | End: 2022-09-05 | Stop reason: HOSPADM

## 2022-09-03 RX ORDER — PANTOPRAZOLE SODIUM 40 MG/1
40 TABLET, DELAYED RELEASE ORAL
Status: DISCONTINUED | OUTPATIENT
Start: 2022-09-03 | End: 2022-09-05 | Stop reason: HOSPADM

## 2022-09-03 RX ORDER — CLOPIDOGREL BISULFATE 75 MG/1
75 TABLET ORAL DAILY
Status: DISCONTINUED | OUTPATIENT
Start: 2022-09-03 | End: 2022-09-05 | Stop reason: HOSPADM

## 2022-09-03 RX ORDER — FINASTERIDE 5 MG/1
5 TABLET, FILM COATED ORAL DAILY
Status: DISCONTINUED | OUTPATIENT
Start: 2022-09-03 | End: 2022-09-05 | Stop reason: HOSPADM

## 2022-09-03 RX ORDER — LISINOPRIL 5 MG/1
5 TABLET ORAL DAILY
Status: DISCONTINUED | OUTPATIENT
Start: 2022-09-03 | End: 2022-09-05 | Stop reason: HOSPADM

## 2022-09-03 RX ORDER — TAMSULOSIN HYDROCHLORIDE 0.4 MG/1
0.4 CAPSULE ORAL
Status: DISCONTINUED | OUTPATIENT
Start: 2022-09-03 | End: 2022-09-05 | Stop reason: HOSPADM

## 2022-09-03 RX ORDER — ENOXAPARIN SODIUM 100 MG/ML
40 INJECTION SUBCUTANEOUS DAILY
Status: DISCONTINUED | OUTPATIENT
Start: 2022-09-03 | End: 2022-09-05 | Stop reason: HOSPADM

## 2022-09-03 RX ORDER — DOCUSATE SODIUM 100 MG/1
100 CAPSULE, LIQUID FILLED ORAL 2 TIMES DAILY
Status: DISCONTINUED | OUTPATIENT
Start: 2022-09-03 | End: 2022-09-05 | Stop reason: HOSPADM

## 2022-09-03 RX ORDER — ASPIRIN 81 MG/1
81 TABLET, CHEWABLE ORAL DAILY
Status: DISCONTINUED | OUTPATIENT
Start: 2022-09-03 | End: 2022-09-05 | Stop reason: HOSPADM

## 2022-09-03 RX ORDER — ATORVASTATIN CALCIUM 40 MG/1
40 TABLET, FILM COATED ORAL DAILY
Status: DISCONTINUED | OUTPATIENT
Start: 2022-09-03 | End: 2022-09-05 | Stop reason: HOSPADM

## 2022-09-03 RX ORDER — RANOLAZINE 500 MG/1
500 TABLET, EXTENDED RELEASE ORAL EVERY 12 HOURS SCHEDULED
Status: DISCONTINUED | OUTPATIENT
Start: 2022-09-03 | End: 2022-09-05 | Stop reason: HOSPADM

## 2022-09-03 RX ORDER — METOPROLOL SUCCINATE 25 MG/1
25 TABLET, EXTENDED RELEASE ORAL DAILY
Status: DISCONTINUED | OUTPATIENT
Start: 2022-09-03 | End: 2022-09-05 | Stop reason: HOSPADM

## 2022-09-03 RX ORDER — LANOLIN ALCOHOL/MO/W.PET/CERES
6 CREAM (GRAM) TOPICAL
Status: DISCONTINUED | OUTPATIENT
Start: 2022-09-03 | End: 2022-09-05 | Stop reason: HOSPADM

## 2022-09-03 RX ORDER — POLYETHYLENE GLYCOL 3350 17 G/17G
17 POWDER, FOR SOLUTION ORAL DAILY
Status: DISCONTINUED | OUTPATIENT
Start: 2022-09-03 | End: 2022-09-05 | Stop reason: HOSPADM

## 2022-09-03 RX ADMIN — TAMSULOSIN HYDROCHLORIDE 0.4 MG: 0.4 CAPSULE ORAL at 17:34

## 2022-09-03 RX ADMIN — CARBIDOPA AND LEVODOPA 1 TABLET: 25; 100 TABLET ORAL at 21:45

## 2022-09-03 RX ADMIN — ATORVASTATIN CALCIUM 40 MG: 40 TABLET, FILM COATED ORAL at 08:53

## 2022-09-03 RX ADMIN — VITAM B12 100 MCG: 100 TAB at 08:54

## 2022-09-03 RX ADMIN — RANOLAZINE 500 MG: 500 TABLET, EXTENDED RELEASE ORAL at 01:21

## 2022-09-03 RX ADMIN — FINASTERIDE 5 MG: 5 TABLET, FILM COATED ORAL at 08:54

## 2022-09-03 RX ADMIN — POLYETHYLENE GLYCOL 3350 17 G: 17 POWDER, FOR SOLUTION ORAL at 08:54

## 2022-09-03 RX ADMIN — METFORMIN HYDROCHLORIDE 1000 MG: 500 TABLET ORAL at 08:53

## 2022-09-03 RX ADMIN — PANTOPRAZOLE SODIUM 40 MG: 40 TABLET, DELAYED RELEASE ORAL at 17:34

## 2022-09-03 RX ADMIN — PANTOPRAZOLE SODIUM 40 MG: 40 TABLET, DELAYED RELEASE ORAL at 06:27

## 2022-09-03 RX ADMIN — CLOPIDOGREL BISULFATE 75 MG: 75 TABLET ORAL at 08:53

## 2022-09-03 RX ADMIN — INSULIN LISPRO 2 UNITS: 100 INJECTION, SOLUTION INTRAVENOUS; SUBCUTANEOUS at 01:40

## 2022-09-03 RX ADMIN — FAMOTIDINE 20 MG: 20 TABLET ORAL at 21:45

## 2022-09-03 RX ADMIN — DOCUSATE SODIUM 100 MG: 100 CAPSULE, LIQUID FILLED ORAL at 08:54

## 2022-09-03 RX ADMIN — CYANOCOBALAMIN TAB 500 MCG 1000 MCG: 500 TAB at 08:54

## 2022-09-03 RX ADMIN — DOCUSATE SODIUM 100 MG: 100 CAPSULE, LIQUID FILLED ORAL at 17:34

## 2022-09-03 RX ADMIN — METFORMIN HYDROCHLORIDE 1000 MG: 500 TABLET ORAL at 17:34

## 2022-09-03 RX ADMIN — INSULIN LISPRO 1 UNITS: 100 INJECTION, SOLUTION INTRAVENOUS; SUBCUTANEOUS at 08:55

## 2022-09-03 RX ADMIN — FAMOTIDINE 20 MG: 20 TABLET ORAL at 01:21

## 2022-09-03 RX ADMIN — LISINOPRIL 5 MG: 5 TABLET ORAL at 08:53

## 2022-09-03 RX ADMIN — Medication 6 MG: at 21:45

## 2022-09-03 RX ADMIN — CARBIDOPA AND LEVODOPA 1 TABLET: 25; 100 TABLET ORAL at 08:53

## 2022-09-03 RX ADMIN — METOPROLOL SUCCINATE 25 MG: 25 TABLET, EXTENDED RELEASE ORAL at 08:53

## 2022-09-03 RX ADMIN — Medication 6 MG: at 01:21

## 2022-09-03 RX ADMIN — ASPIRIN 81 MG CHEWABLE TABLET 81 MG: 81 TABLET CHEWABLE at 08:53

## 2022-09-03 RX ADMIN — RANOLAZINE 500 MG: 500 TABLET, EXTENDED RELEASE ORAL at 08:57

## 2022-09-03 RX ADMIN — ENOXAPARIN SODIUM 40 MG: 40 INJECTION SUBCUTANEOUS at 08:54

## 2022-09-03 RX ADMIN — RANOLAZINE 500 MG: 500 TABLET, EXTENDED RELEASE ORAL at 21:45

## 2022-09-03 RX ADMIN — CARBIDOPA AND LEVODOPA 1 TABLET: 25; 100 TABLET ORAL at 17:34

## 2022-09-03 RX ADMIN — GLIPIZIDE 5 MG: 5 TABLET ORAL at 08:57

## 2022-09-03 NOTE — ASSESSMENT & PLAN NOTE
Lab Results   Component Value Date    EGFR 66 09/02/2022    EGFR 71 07/09/2022    EGFR 60 07/08/2022    CREATININE 1 10 09/02/2022    CREATININE 1 04 07/09/2022    CREATININE 1 19 07/08/2022

## 2022-09-03 NOTE — ASSESSMENT & PLAN NOTE
· Severe, 2/2 L sided weakness/Parkinson's/Diabetic Neuropathy  · At baseline  · PT/OT recommending home with home PT

## 2022-09-03 NOTE — PHYSICAL THERAPY NOTE
PHYSICAL THERAPY EVALUATION  NAME: Benja Peacock  AGE:   67 y o  MRN:  [id number]  ADMIT DX: Weakness [R53 1]  Adult general medical exam [Z00 00]  Inability to perform activities of daily living [Z78 9]  Frequent falls [R29 6]    PMH:   Past Medical History:   Diagnosis Date    Ambulatory dysfunction     uses walker with assist of 1    Anemia     Anxiety     At risk for falls     hx of falls    Benign paroxysmal vertigo     unspecified ear    BPH (benign prostatic hyperplasia)     for TURP today 1/4/2021    Calculus in bladder     for surgical removal today 1/4/2021    Cataract     left eye    Cervicalgia     Chest pain     Chronic kidney disease     stage 3    Constipation     CTS (carpal tunnel syndrome)     left    Cyst of pancreas     Cystitis 06/23/2020    acute cystitis with hematuria    Depression     Diabetes mellitus ([facility] [address] )     type II with neuropathy    Dizziness     and giddiness    Dysphagia     Elevated PSA     Facial weakness     GERD (gastroesophageal reflux disease)     last assessed 5/20/16    Gross hematuria     Hematuria     Hyperlipidemia     Hypertension     Kidney disease     Kidney stone     Left-sided weakness     Long term (current) use of oral hypoglycemic drugs     Muscle weakness     Nuclear senile cataract of left eye     OA (osteoarthritis) of knee     b/l    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HCC)     Syncope and collapse     Tachycardia     UTI (urinary tract infection)     Vertebro-basilar artery syndrome     Vitamin B deficiency     Vitamin D deficiency     Vitamin D deficiency      LENGTH OF STAY: 0       09/03/22 1320   PT Last Visit   PT Visit Date 09/03/22   Note Type   Note type Evaluation   Pain Assessment   Pain Assessment Tool 0-10   Pain Score No Pain   Restrictions/Precautions   Weight Bearing Precautions Per Order No   Other Precautions Cognitive; Chair Alarm; Bed Alarm; Fall Risk   Home Living   Type of [address] [place] [address] One level;Elevator  (6th floor apartment)   Home Equipment Khadra June; Wheelchair-manual;Hospital bed   Additional Comments Pt reports he requires Ax1 for SPT with RW into WC with aides  Pt also reports PT was working on ambulating in [place] recently  Prior Function   Lives With Spouse   Receives Help From Home health; Family  (aides everyday in AM and PM; unsure of number of hours; pt also reports aides are available on-call as needed)   ADL Assistance Needs assistance   IADLs Needs assistance   General   Additional Pertinent History Wife is currently in hospital as well  Family/Caregiver Present No   Cognition   Overall Cognitive Status Impaired   Arousal/Participation Cooperative   Orientation Level Oriented to person;Oriented to place; Disoriented to time   Memory Decreased short term memory   Following Commands Follows one step commands with increased time or repetition   Comments Pt identified by name and   Subjective   Subjective Agrees to PT evaluation and is pleasant and cooperative throughout session  "I miss my wife, we met at a [employer] "   RLE Assessment   RLE Assessment X   Strength RLE   RLE Overall Strength 3+/5  (functionally)   LLE Assessment   LLE Assessment X   Strength LLE   LLE Overall Strength 3+/5  (functionally)   Coordination   Movements are Fluid and Coordinated 0   Coordination and Movement Description UE tremor; shuffled gait   Bed Mobility   Rolling R 3  Moderate assistance   Additional items Assist x 1;Bedrails; Increased time required;Verbal cues;LE management   Rolling L 3  Moderate assistance   Additional items Assist x 1;Bedrails; Increased time required;Verbal cues;LE management   Supine to Sit 3  Moderate assistance   Additional items Assist x 1;HOB elevated; Bedrails; Increased time required;Verbal cues;LE management   Sit to Supine Unable to assess  (left OOB in chair post session with alarm intact)   Additional Comments Brief donned in supine prior to mobilizing; RN aware     Transfers   Sit to Stand 3  Moderate assistance Additional items Assist x 1; Increased time required;Verbal cues   Stand to Sit 3  Moderate assistance   Additional items Increased time required;Verbal cues; Impulsive  (poor controlled descent)   Stand pivot 3  Moderate assistance   Additional items Assist x 1; Increased time required;Verbal cues  (steppage transfer bed to chair; slight retropulsion; short, shuffled steps with inconsistent sherrie)   Ambulation/Elevation   Gait pattern Not appropriate   Balance   Static Sitting Fair -   Dynamic Sitting Poor +   Static Standing Poor +   Dynamic Standing Poor   Ambulatory Poor  (with RW)   Endurance Deficit   Endurance Deficit Yes   Endurance Deficit Description limited standing tolerance   Activity Tolerance   Activity Tolerance Patient limited by fatigue   Nurse Made Aware Per RN, pt appropriate to evaluate   Assessment   Prognosis Fair   Problem List Decreased strength;Decreased endurance; Impaired balance;Decreased mobility; Decreased coordination;Decreased safety awareness; Impaired judgement   Assessment Pt is a 67 y o  male seen for PT evaluation s/p admit to [address] [place] [address] on 8/18/2022 w/ UTI (urinary tract infection)  Order placed for PT  Comorbidities affecting pt's physical performance at time of assessment include: DM, HTN and underlying neurological disorder  Personal factors affecting pt at time of IE include: limited insight into impairments  Prior to admission, pt was required A for mobility, lived in one floor environment and lived with wife/aides for unknown # of hours everyday  Upon evaluation: Pt requires mod A for bed mobility, mod A for sit to stand, and mod A for SPT with RW  (Please find full objective findings from PT assessment regarding body systems outlined above)   Impairments and limitations also listed above, especially due to  weakness, impaired balance, decreased endurance, decreased activity tolerance, decreased safety awareness, impaired judgement, fall risk and decreased cognition  Pt's clinical presentation is currently unstable/unpredictable seen in pt's presentation of decreased safety awareness, fall risk, and limited insight into deficits  Pt to benefit from continued skilled PT tx while in hospital and upon DC to address deficits as defined above and maximize level of functional mobility  From PT/mobility standpoint, recommendation at time of d/c would be HHPT and family/caregiver support pending progress in order to maximize pt's functional independence and consistency w/ mobility in order to facilitate return to PLOF  Recommend progression of ambulation and initiation of HEP as appropriate  Goals   Patient Goals to go home and see my wife   STG Expiration Date 09/13/22   Short Term Goal #1 Pt will be able to: (1) perform bed mobility with min A to promote upright posture and OOB mobility (2) perform sit to stand with min A to decrease burden of care (3) ambulate at least 30` with min A and RW to increase activity tolerance (4) increase standing balance by 1 grade to decrease risk of falls   PT Treatment Day 0   Plan   Treatment/Interventions Functional transfer training;LE strengthening/ROM; Therapeutic exercise; Endurance training;Patient/family training;Equipment eval/education; Bed mobility;Gait training   PT Frequency 3-5x/wk   Recommendation   PT Discharge Recommendation Home with home health rehabilitation   Equipment Recommended Walker; Wheelchair   AM-PAC Basic Mobility Inpatient   Turning in Bed Without Bedrails 2   Lying on Back to Sitting on Edge of Flat Bed 2   Moving Bed to Chair 2   Standing Up From Chair 2   Walk in Room 1   Climb 3-5 Stairs 1   Basic Mobility Inpatient Raw Score 10   Turning Head Towards Sound 4   Follow Simple Instructions 3   Low Function Basic Mobility Raw Score 17   Low Function Basic Mobility Standardized Score 27 46   Highest Level Of Mobility   JH-HLM Goal 4: Move to chair/commode   -HLM Achieved 4: Move to chair/commode   End of Consult   Patient Position at End of Consult Bedside chair;Bed/Chair alarm activated; All needs within reach     The patient's AM-PAC Basic Mobility Inpatient Short Form Raw Score is 10, Standardized Score is 27 46    A Raw Score of less than 16 suggests the patient may benefit from discharge to post-acute rehabilitation services, which DOES NOT coincide with CURRENT above PT recommendations  However please refer to therapist recommendation for discharge planning given other factors that may influence destination  Adapted from [employer] [facility] [employer] of AM-PAC 6-Clicks Basic Mobility and Daily Activity Scores With Discharge Destination  Physical Therapy, 2021;101:1-9   DOI: [email or web address]      Hailey Ewing PT,DPT

## 2022-09-03 NOTE — ASSESSMENT & PLAN NOTE
· Patient presenting to the ED after his wife admitted to the Trauma service s/p fall  · He requires assistance w/ all ADLs/IADLs; severe ambulatory dysfunction @ baseline, wheelchair bound  · Currently, unable to provide care for himself @ home as his wife is his sole caretaker and she is currently admitted  · CM assistance for discharge planning

## 2022-09-03 NOTE — PLAN OF CARE
Problem: Potential for Falls  Goal: Patient will remain free of falls  Description: INTERVENTIONS:  - Educate patient/family on patient safety including physical limitations  - Instruct patient to call for assistance with activity   - Consult OT/PT to assist with strengthening/mobility   - Keep Call bell within reach  - Keep bed low and locked with side rails adjusted as appropriate  - Keep care items and personal belongings within reach  - Initiate and maintain comfort rounds  - Make Fall Risk Sign visible to staff  - Offer Toileting every  Hours, in advance of need  - Initiate/Maintain alarm  - Obtain necessary fall risk management equipment:   - Apply yellow socks and bracelet for high fall risk patients  - Consider moving patient to room near nurses station  Outcome: Progressing     Problem: METABOLIC, FLUID AND ELECTROLYTES - ADULT  Goal: Electrolytes maintained within normal limits  Description: INTERVENTIONS:  - Monitor labs and assess patient for signs and symptoms of electrolyte imbalances  - Administer electrolyte replacement as ordered  - Monitor response to electrolyte replacements, including repeat lab results as appropriate  - Instruct patient on fluid and nutrition as appropriate  Outcome: Progressing  Goal: Fluid balance maintained  Description: INTERVENTIONS:  - Monitor labs   - Monitor I/O and WT  - Instruct patient on fluid and nutrition as appropriate  - Assess for signs & symptoms of volume excess or deficit  Outcome: Progressing     Problem: SKIN/TISSUE INTEGRITY - ADULT  Goal: Skin Integrity remains intact(Skin Breakdown Prevention)  Description: Assess:  -Perform Rahat assessment every   -Clean and moisturize skin every   -Inspect skin when repositioning, toileting, and assisting with ADLS  -Assess under medical devices such as  every   -Assess extremities for adequate circulation and sensation     Bed Management:  -Have minimal linens on bed & keep smooth, unwrinkled  -Change linens as needed when moist or perspiring  -Avoid sitting or lying in one position for more than  hours while in bed  -Keep HOB at degrees     Toileting:  -Offer bedside commode  -Assess for incontinence every   -Use incontinent care products after each incontinent episode such as     Activity:  -Mobilize patient  times a day  -Encourage activity and walks on unit  -Encourage or provide ROM exercises   -Turn and reposition patient every  Hours  -Use appropriate equipment to lift or move patient in bed  -Instruct/ Assist with weight shifting every  when out of bed in chair  -Consider limitation of chair time  hour intervals    Skin Care:  -Avoid use of baby powder, tape, friction and shearing, hot water or constrictive clothing  -Relieve pressure over bony prominences using   -Do not massage red bony areas    Next Steps:  -Teach patient strategies to minimize risks such as    -Consider consults to  interdisciplinary teams such as   Outcome: Progressing     Problem: SAFETY ADULT  Goal: Patient will remain free of falls  Description: INTERVENTIONS:  - Educate patient/family on patient safety including physical limitations  - Instruct patient to call for assistance with activity   - Consult OT/PT to assist with strengthening/mobility   - Keep Call bell within reach  - Keep bed low and locked with side rails adjusted as appropriate  - Keep care items and personal belongings within reach  - Initiate and maintain comfort rounds  - Make Fall Risk Sign visible to staff  - Offer Toileting every  Hours, in advance of need  - Initiate/Maintain alarm  - Obtain necessary fall risk management equipment:   - Apply yellow socks and bracelet for high fall risk patients  - Consider moving patient to room near nurses station  Outcome: Progressing  Goal: Maintain or return to baseline ADL function  Description: INTERVENTIONS:  -  Assess patient's ability to carry out ADLs; assess patient's baseline for ADL function and identify physical deficits which impact ability to perform ADLs (bathing, care of mouth/teeth, toileting, grooming, dressing, etc )  - Assess/evaluate cause of self-care deficits   - Assess range of motion  - Assess patient's mobility; develop plan if impaired  - Assess patient's need for assistive devices and provide as appropriate  - Encourage maximum independence but intervene and supervise when necessary  - Involve family in performance of ADLs  - Assess for home care needs following discharge   - Consider OT consult to assist with ADL evaluation and planning for discharge  - Provide patient education as appropriate  Outcome: Progressing  Goal: Maintains/Returns to pre admission functional level  Description: INTERVENTIONS:  - Perform BMAT or MOVE assessment daily    - Set and communicate daily mobility goal to care team and patient/family/caregiver  - Collaborate with rehabilitation services on mobility goals if consulted  - Perform Range of Motion  times a day  - Reposition patient every  hours    - Dangle patient  times a day  - Stand patient  times a day  - Ambulate patient  times a day  - Out of bed to chair  times a day   - Out of bed for meals  times a day  - Out of bed for toileting  - Record patient progress and toleration of activity level   Outcome: Progressing     Problem: DISCHARGE PLANNING  Goal: Discharge to home or other facility with appropriate resources  Description: INTERVENTIONS:  - Identify barriers to discharge w/patient and caregiver  - Arrange for needed discharge resources and transportation as appropriate  - Identify discharge learning needs (meds, wound care, etc )  - Arrange for interpretive services to assist at discharge as needed  - Refer to Case Management Department for coordinating discharge planning if the patient needs post-hospital services based on physician/advanced practitioner order or complex needs related to functional status, cognitive ability, or social support system  Outcome: Progressing Problem: Knowledge Deficit  Goal: Patient/family/caregiver demonstrates understanding of disease process, treatment plan, medications, and discharge instructions  Description: Complete learning assessment and assess knowledge base  Interventions:  - Provide teaching at level of understanding  - Provide teaching via preferred learning methods  Outcome: Progressing     Problem: MOBILITY - ADULT  Goal: Maintain or return to baseline ADL function  Description: INTERVENTIONS:  -  Assess patient's ability to carry out ADLs; assess patient's baseline for ADL function and identify physical deficits which impact ability to perform ADLs (bathing, care of mouth/teeth, toileting, grooming, dressing, etc )  - Assess/evaluate cause of self-care deficits   - Assess range of motion  - Assess patient's mobility; develop plan if impaired  - Assess patient's need for assistive devices and provide as appropriate  - Encourage maximum independence but intervene and supervise when necessary  - Involve family in performance of ADLs  - Assess for home care needs following discharge   - Consider OT consult to assist with ADL evaluation and planning for discharge  - Provide patient education as appropriate  Outcome: Progressing  Goal: Maintains/Returns to pre admission functional level  Description: INTERVENTIONS:  - Perform BMAT or MOVE assessment daily    - Set and communicate daily mobility goal to care team and patient/family/caregiver  - Collaborate with rehabilitation services on mobility goals if consulted  - Perform Range of Motion  times a day  - Reposition patient every  hours    - Dangle patient  times a day  - Stand patient  times a day  - Ambulate patient  times a day  - Out of bed to chair  times a day   - Out of bed for meals  times a day  - Out of bed for toileting  - Record patient progress and toleration of activity level   Outcome: Progressing     Problem: Prexisting or High Potential for Compromised Skin Integrity  Goal: Skin integrity is maintained or improved  Description: INTERVENTIONS:  - Identify patients at risk for skin breakdown  - Assess and monitor skin integrity  - Assess and monitor nutrition and hydration status  - Monitor labs   - Assess for incontinence   - Turn and reposition patient  - Assist with mobility/ambulation  - Relieve pressure over bony prominences  - Avoid friction and shearing  - Provide appropriate hygiene as needed including keeping skin clean and dry  - Evaluate need for skin moisturizer/barrier cream  - Collaborate with interdisciplinary team   - Patient/family teaching  - Consider wound care consult   Outcome: Progressing

## 2022-09-03 NOTE — UTILIZATION REVIEW
Observation Admission Authorization Request   NOTIFICATION OF OBSERVATION ADMISSION/OBSERVATION AUTHORIZATION REQUEST   SERVICING FACILITY:   [facility] [address] [place] [address]  Tax ID: [id number]  NPI: [id number]  Place of Service: On [address] [facility] Code: 22  CPT Code for Observation: CPT   CPT 86033     ATTENDING PROVIDER:  Attending Name and NPI#: Kevin Sellers Md [[id number]]  Address: [facility], [address] [place] [address]  Phone: [phone]     UTILIZATION REVIEW CONTACT:  Pam Bullard Utilization   Network Utilization Review Department  Phone: [phone]  Fax: [phone]  Email: [email or web address]     PHYSICIAN ADVISORY SERVICES:  FOR [id number] REVIEW - MEDICAL NECESSITY DENIAL  Phone: [phone]  Fax: [phone]  Email: [email or web address]     TYPE OF REQUEST:  Observation  Status     ADMISSION INFORMATION:  ADMISSION DATE/TIME:   PATIENT DIAGNOSIS CODE/DESCRIPTION:  Weakness [R53 1]  Adult general medical exam [Z00 00]  Inability to perform activities of daily living [Z78 9]  Frequent falls [R29 6]  DISCHARGE DATE/TIME: No discharge date for patient encounter  IMPORTANT INFORMATION:  Please contact Pma Bullard directly with any questions or concerns regarding this request  Department voicemails are confidential     Send requests for admission clinical reviews, concurrent reviews, approvals, and administrative denials due to lack of clinical to fax [phone]

## 2022-09-03 NOTE — ASSESSMENT & PLAN NOTE
· Recent NSTEMI  · Triple-vessel dz-- not candidate for surgical intervention  · Continue medical management

## 2022-09-03 NOTE — ASSESSMENT & PLAN NOTE
· Patient presenting to the ED after his wife admitted to the Trauma service s/p fall  · He requires assistance w/ all ADLs/IADLs; severe ambulatory dysfunction @ baseline, wheelchair bound  · CM arranging for home health services  · Wife is also being discharged from hospital she will also be home

## 2022-09-03 NOTE — DISCHARGE SUMMARY
[facility]  Discharge- Natividad [facility] 1950, 67 y o  male MRN: [id number]  Unit/Bed#: S -01 Encounter: [id number]  Primary Care Provider: Shabbir Briceno MD   Date and time admitted to hospital: 9/2/2022  8:42 PM    * Requires daily assistance for activities of daily living (ADL) and comfort needs  Assessment & Plan  · Patient presenting to the ED after his wife admitted to the Trauma service s/p fall  · He requires assistance w/ all ADLs/IADLs; severe ambulatory dysfunction @ baseline, wheelchair bound  · CM arranging for home health services  · Wife is also being discharged from hospital she will also be home    Parkinson disease ([facility] [address] )  Assessment & Plan  · Continue Sinemet    Coronary artery disease  Assessment & Plan  · Recent NSTEMI  · Triple-vessel dz-- not candidate for surgical intervention  · Continue medical management    Left-sided weakness  Assessment & Plan  · Chronic in setting of Parkinson's/Diabetic Neuropathy     Stage 3 chronic kidney disease [facility])  Assessment & Plan  Lab Results   Component Value Date    EGFR 66 09/02/2022    EGFR 71 07/09/2022    EGFR 60 07/08/2022    CREATININE 1 10 09/02/2022    CREATININE 1 04 07/09/2022    CREATININE 1 19 07/08/2022       Ambulatory dysfunction  Assessment & Plan  · Severe, 2/2 L sided weakness/Parkinson's/Diabetic Neuropathy  · At baseline  · PT/OT recommending home with home PT    Type 2 diabetes mellitus with diabetic neuropathy [facility])  Assessment & Plan  Lab Results   Component Value Date    HGBA1C 6 4 (H) 03/05/2022       Recent Labs     09/03/22  0120 09/03/22  0851 09/03/22  1144   POCGLU 216* 153* 118       Blood Sugar Average: Last 72 hrs:  · Continue home regimen    Medical Problems             Resolved Problems  Date Reviewed: 9/2/2022   None               Discharging Resident: Eli Dodge DO  Discharging Attending: Carmela Nichols MD  PCP: Shabbir Briceno MD  Admission Date:   Admission Orders (From admission, onward)     Ordered        09/02/22 2251  Place in Observation  Once                      Discharge Date: 09/03/22    Consultations During Hospital Stay:  · None    Procedures Performed:   · None    Significant Findings / Test Results:   · Please see hospital course    Incidental Findings:   ·  None    Test Results Pending at Discharge (will require follow up): · None     Outpatient Tests Requested:  · None    Complications:  None    Reason for Admission:  Requires daily assistance for [facility] Course:   Terrie Lamb is a 67 y o  male patient who originally presented to the hospital on 9/2/2022 due to inability to care for himself at home  Patient has a history of Parkinson's disease, DM type 2, coronary artery disease, neuropathy  His wife manages all of his activities of daily living including making meals, changing, toileting, showering  She was admitted to the hospital on the trauma service after a fall at home  She is now medically cleared for discharge  PT/OT evaluated patient and they are recommending home with home health services  Case management arranged for continued home services from patient's pre-existing service, senior life  He is stable to return home with home services  Please see above list of diagnoses and related plan for additional information  Condition at Discharge: stable    Discharge Day Visit / Exam:   Subjective:  Patient is alert and in no acute distress  He has no subjective complaints  Vitals: Blood Pressure: (!) 82/47 (09/03/22 1456)  Pulse: 92 (09/03/22 1456)  Temperature: 97 6 °F (36 4 °C) (09/03/22 1456)  Temp Source: Oral (09/02/22 2045)  Respirations: 16 (09/03/22 1456)  Weight - Scale: 80 5 kg (177 lb 7 5 oz) (09/02/22 2045)  SpO2: 94 % (09/03/22 1456)  Exam:   Physical Exam  Constitutional:       Appearance: Normal appearance  HENT:      Head: Normocephalic and atraumatic  Cardiovascular:      Rate and Rhythm: Normal rate and regular rhythm  Pulmonary:      Effort: Pulmonary effort is normal       Breath sounds: Normal breath sounds  Abdominal:      General: Bowel sounds are normal       Palpations: Abdomen is soft  Tenderness: There is no abdominal tenderness  Musculoskeletal:      Right lower leg: No edema  Left lower leg: No edema  Skin:     General: Skin is warm and dry  Neurological:      Mental Status: He is alert and oriented to person, place, and time  Psychiatric:         Mood and Affect: Mood normal          Behavior: Behavior normal         Discussion with Family: coordination with patient's wife's care team    Discharge instructions/Information to patient and family:   See after visit summary for information provided to patient and family  Provisions for Follow-Up Care:  See after visit summary for information related to follow-up care and any pertinent home health orders  Disposition:   Home with [facility] Services (Reminder: Complete face to face encounter)    Planned Readmission: None    Discharge Medications:  See after visit summary for reconciled discharge medications provided to patient and/or family        **Please Note: This note may have been constructed using a voice recognition system**

## 2022-09-03 NOTE — ASSESSMENT & PLAN NOTE
Lab Results   Component Value Date    HGBA1C 6 4 (H) 03/05/2022       Recent Labs     09/03/22  0120 09/03/22  0851 09/03/22  1144   POCGLU 216* 153* 118       Blood Sugar Average: Last 72 hrs:  · Continue home regimen

## 2022-09-03 NOTE — UTILIZATION REVIEW
Initial Clinical Review    Admission: Date/Time/Statement:   Admission Orders (From admission, onward)     Ordered        09/02/22 2251  Place in Observation  Once                      Orders Placed This Encounter   Procedures    Place in Observation     Standing Status:   Standing     Number of Occurrences:   1     Order Specific Question:   Level of Care     Answer:   Med Surg [16]     ED Arrival Information     Expected   -    Arrival   9/2/2022 20:42    Acuity   Urgent            Means of arrival   Ambulance    Escorted by   formerly [facility]    Service   Hospitalist    Admission type   Urgent            Arrival complaint   -           Chief Complaint   Patient presents with    Personal Problem     Pt arrives via EMS from home  Pts wife is being admitted  Pt sent in due to wife being the sole caretaker and he is unable to care for himself  Initial Presentation: 67 y o  male PMH of parkinson's , DM 2, CAD,recent NSTEMI neuropathy who presents to ED from home via EMS with inability to care for self at home  Patient reports that he is completely reliant on the care/assitance of his wife to perform all activities of daily living: making meals, changing, toileting, showering, and getting in and out of his wheelchair  She is currently admitted to the trauma service after falling   Pt reports has no additional help at home to assist him  Denies any medical complaints   On exam, baseline L sided weakness,lower extrem weakness >upper extrem- uses wheel chair for getting around  Alert, oriented   Labs WBC 11 65  CXR shows nothing acute  Pt admitted as OBS with inability to care for self, requirins daily assist for ADL's and comfort needs  Plan - case mgmt assist for d/c planning  Continue home meds   Date: 9/3  [facility]note 3[facility] arranging home health services, working on transportation arrangements   PT recommends home w/ HHPT upon d/c    Pt requires assist for ADL's with pts wife, caregiver currently hospitalized   Pt stable for d/c     Update- no wheelchair vans available  for transport today    Date 9/4-  Transport remains unavailable today per [facility]  Transport for today has fallen thru with different provider available tomorrow   Pt denies pain per nursing, oriented to person and place , forgetful, tremors all extremities   Limited ROM BLE  Incontinent stool and feces   ED Triage Vitals   Temperature Pulse Respirations Blood Pressure SpO2   09/02/22 2045 09/02/22 2045 09/02/22 2045 09/02/22 2045 09/02/22 2045   (!) 97 4 °F (36 3 °C) 92 16 119/62 96 %      Temp Source Heart Rate Source Patient Position - Orthostatic VS BP Location FiO2 (%)   09/02/22 2045 09/02/22 2045 09/02/22 2045 09/02/22 2045 --   Oral Monitor Sitting Right arm       Pain Score       09/03/22 0003       No Pain          Wt Readings from Last 1 Encounters:   09/02/22 80 5 kg (177 lb 7 5 oz)     Additional Vital Signs:   Date/Time Temp Pulse Resp BP MAP (mmHg) SpO2   09/03/22 2036 97 5 °F (36 4 °C) 86 16 107/56 77 97 %   09/03/22 14:56:45 97 6 °F (36 4 °C) 92 16 82/47 Abnormal  59 Abnormal  94 %       Date/Time Temp Pulse Resp BP MAP (mmHg) SpO2   09/03/22 07:43:47 98 1 °F (36 7 °C) 85 -- 124/64 84 95 %   09/03/22 07:43:37 98 1 °F (36 7 °C) 58 18 124/64 84 93 %   09/03/22 00:05:49 98 7 °F (37 1 °C) 94 -- 143/67 92 96 %   09/02/22 2346 -- 86 18 125/59 -- 96 %       Date and Time Eye Opening Best Verbal Response Best Motor Response Segundo Coma Scale Score   09/03/22 1930 4 5 6 15   09/03/22 0715 4 5 6 15       Date and Time Eye Opening Best Verbal Response Best Motor Response Segundo Coma Scale Score   09/03/22 0029 4 5 6 15   09/02/22 2132 4 5 6 15         Pertinent Labs/Diagnostic Test Results:   XR chest 1 view portable   ED Interpretation by Jae Wong PA-C (09/02 2149)   No acute findings       Final Result by Sushila Diaz MD (09/03 0035)      No acute cardiopulmonary disease                    Workstation performed: [id number] Results from last 7 days   Lab Units 09/02/22  2130   WBC Thousand/uL 11 65*   HEMOGLOBIN g/dL 11 9*   HEMATOCRIT % 36 2*   PLATELETS Thousands/uL 285   NEUTROS ABS Thousands/µL 9 10*         Results from last 7 days   Lab Units 09/02/22  2130   SODIUM mmol/L 139   POTASSIUM mmol/L 3 9   CHLORIDE mmol/L 102   CO2 mmol/L 27   ANION GAP mmol/L 10   BUN mg/dL 14   CREATININE mg/dL 1 10   EGFR ml/min/1 73sq m 66   CALCIUM mg/dL 8 6         Results from last 7 days   Lab Units 09/03/22  2134 09/03/22  2032 09/03/22  1621 09/03/22  1144 09/03/22  0851 09/03/22  0120   POC GLUCOSE mg/dl 116 86 110 118 153* 216*     Results from last 7 days   Lab Units 09/02/22  2130   GLUCOSE RANDOM mg/dL 169*             ED Treatment:   Medication Administration from 09/02/2022 2042 to 09/02/2022 2353     None        Past Medical History:   Diagnosis Date    Ambulatory dysfunction     uses walker with assist of 1    Anemia     Anxiety     At risk for falls     hx of falls    Benign paroxysmal vertigo     unspecified ear    BPH (benign prostatic hyperplasia)     for TURP today 1/4/2021    Calculus in bladder     for surgical removal today 1/4/2021    Cataract     left eye    Cervicalgia     Chest pain     Chronic kidney disease     stage 3    Constipation     CTS (carpal tunnel syndrome)     left    Cyst of pancreas     Cystitis 06/23/2020    acute cystitis with hematuria    Depression     Diabetes mellitus ([address] )     type II with neuropathy    Dizziness     and giddiness    Dysphagia     Elevated PSA     Facial weakness     GERD (gastroesophageal reflux disease)     last assessed 5/20/16    Gross hematuria     Hematuria     Hyperlipidemia     Hypertension     Kidney disease     Kidney stone     Left-sided weakness     Long term (current) use of oral hypoglycemic drugs     Muscle weakness     Nuclear senile cataract of left eye     OA (osteoarthritis) of knee     b/l    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[address] )     Syncope and collapse     Tachycardia     UTI (urinary tract infection)     Vertebro-basilar artery syndrome     Vitamin B deficiency     Vitamin D deficiency     Vitamin D deficiency      Present on Admission:   Ambulatory dysfunction   Type 2 diabetes mellitus with diabetic neuropathy (HCC)   Coronary artery disease   Parkinson disease (HCC)   Left-sided weakness   Stage 3 chronic kidney disease (HCC)      Admitting Diagnosis: Weakness [R53 1]  Adult general medical exam [Z00 00]  Inability to perform activities of daily living [Z78 9]  Frequent falls [R29 6]  Age/Sex: 67 y o  male  Admission Orders:  Scheduled Medications:  aspirin, 81 mg, Oral, Daily  atorvastatin, 40 mg, Oral, Daily  carbidopa-levodopa, 1 tablet, Oral, TID  clopidogrel, 75 mg, Oral, Daily  cyanocobalamin, 1,000 mcg, Oral, Daily  vitamin B-12, 100 mcg, Oral, Daily  docusate sodium, 100 mg, Oral, BID  enoxaparin, 40 mg, Subcutaneous, Daily  famotidine, 20 mg, Oral, HS  finasteride, 5 mg, Oral, Daily  glipiZIDE, 5 mg, Oral, Daily Before Breakfast  insulin lispro, 1-5 Units, Subcutaneous, HS  insulin lispro, 1-6 Units, Subcutaneous, TID AC  lisinopril, 5 mg, Oral, Daily  melatonin, 6 mg, Oral, HS  metFORMIN, 1,000 mg, Oral, BID  metoprolol succinate, 25 mg, Oral, Daily  pantoprazole, 40 mg, Oral, BID AC  polyethylene glycol, 17 g, Oral, Daily  ranolazine, 500 mg, Oral, Q12H LIYAH  tamsulosin, 0 4 mg, Oral, Daily With Dinner      Continuous IV Infusions:     PRN Meds:  acetaminophen, 650 mg, Oral, Q6H PRN  senna, 8 6 mg, Oral, Daily PRN      POCT glucose   Up w/ assist  SCD    Network Utilization Review Department  ATTENTION: Please call with any questions or concerns to [phone] and carefully listen to the prompts so that you are directed to the right person   All voicemails are confidential   Annabelle Ruiz all requests for admission clinical reviews, approved or denied determinations and any other requests to dedicated fax number below belonging to the campus where the patient is receiving treatment   List of dedicated fax numbers for the Facilities:  [address] DENIAL[facility] (Administrative/Medical Necessity) [phone]   [address]  (Maternity/NICU/Pediatrics) [phone]   [address]  [address] [place] [address] [address] [place] [address] [address] [facility] [phone]

## 2022-09-03 NOTE — CASE MANAGEMENT
Case Management Discharge Planning Note    Patient name Benja COWAN /S -01 MRN [id number]  : 1950 Date 9/3/2022       Current Admission Date: 2022  Current Admission Diagnosis:Requires daily assistance for activities of daily living (ADL) and comfort needs   Patient Active Problem List    Diagnosis Date Noted    Requires daily assistance for activities of daily living (ADL) and comfort needs 2022    Fall 2022    Parkinson disease ([facility] ) 2022    Chest pain 2022    Leukocytosis 2022    Coronary artery disease 2022    Type 2 MI (myocardial infarction) ([facility] ) 2022    Generalized abdominal pain 2021    Lactic acidosis 2021    Urinary incontinence 2021    Vitamin B12 deficiency     History of recurrent UTIs 2020    Insomnia 2020    Constipation 2020    Benign paroxysmal positional vertigo 2020    BPH (benign prostatic hyperplasia) 2020    Vitamin D deficiency 2020    Left-sided weakness 2020    UTI (urinary tract infection) 2020    Cystitis 2020    Bladder stones 2020    Stage 3 chronic kidney disease ([facility] ) 2020    Anemia 2020    Colon cancer screening 2020    Essential hypertension 2019    Type 2 diabetes mellitus ([facility] ) 2019    Nephrolithiasis 2019    Ambulatory dysfunction 2019    Paresis ([facility] ) 2019    Abnormal PSA 2019    Dizziness 10/04/2017    Pancreas cyst 2016    Type 2 diabetes mellitus with diabetic neuropathy ([facility] ) 2015    Hyperlipidemia 2012      LOS (days): 0  Geometric Mean LOS (GMLOS) (days):   Days to GMLOS:     OBJECTIVE:            Current admission status: Observation   Preferred Pharmacy:   [address] [place] [address]  Phone: [phone] Fax: [phone]    [id number] [address]  [facility]   [address] [facility]  Phone: [phone] Fax: [phone]    Primary Care Provider: Ermias Perez MD    Primary Insurance: [employer] [address] [facility]  Secondary Insurance:     DISCHARGE DETAILS:  CM contacted [facility]  Spoke with Yissel Weston  Still working on transport  Mckenzie updated patient's spouse needs to be at home before patient  Per Yissel Weston, no wheelchair vans available at this time  CM discussed setting up transport for both tomorrow morning  CM edited transport date/time in roundtrip  Will f/u in AM with transport time  CM updated nursing and trauma

## 2022-09-03 NOTE — ED PROVIDER NOTES
History  Chief Complaint   Patient presents with    Personal Problem     Pt arrives via EMS from home  Pts wife is being admitted  Pt sent in due to wife being the sole caretaker and he is unable to care for himself  Patient is a 35-year-old male with significant past medical history presenting to the emergency room for evaluation  Patient states his wife is his primary caregiver and she was recently admitted to the hospital today due to a fall  Patient states he is unable to perform many of his activities of daily living without her in the home  He is wheelchair-bound at baseline, but states he often falls out of the wheelchair  He states he is unable to use the restroom or cook for himself  He denies any specific complaints at this time  History provided by:  Patient   used: No        Prior to Admission Medications   Prescriptions Last Dose Informant Patient Reported? Taking?    ACCU-CHEK GUIDE test strip  Outside Facility (Specify) Yes No   Si strip as needed   Alcohol Swabs (PRO COMFORT ALCOHOL) 70 % PADS  Outside Facility (Specify) Yes No   Si pad daily Use a pad when testing   Easy Comfort Lancets MISC  Outside Facility (Specify) Yes No   Sig: as needed   Glucagon, rDNA, (GLUCAGON EMERGENCY IJ)   Yes No   Sig: Inject 1 mL as directed Sugars less than 60 and conscious   Glucose 15 g PACK   Yes No   Sig: Take 15 g by mouth Per [facility] "give 15 gram PO every 15 minutes PRN for hypoglycemia"   NovoFine Autocover 30G X 8 MM MISC   Yes No   NovoLOG FlexPen 100 units/mL injection pen   Yes No   acetaminophen (TYLENOL) 325 mg tablet   Yes No   Sig: Take 650 mg by mouth every 6 (six) hours as needed for mild pain   aspirin 81 mg chewable tablet   No No   Sig: Chew 1 tablet (81 mg total) daily   atorvastatin (LIPITOR) 40 mg tablet   No No   Sig: Take 1 tablet (40 mg total) by mouth daily   carbidopa-levodopa (SINEMET)  mg per tablet   No No   Sig: Take 1 tablet by mouth 3 (three) times a day   cholecalciferol (VITAMIN D3) 1,000 units tablet   No No   Sig: Take 50 tablets (50,000 Units total) by mouth once a week   clopidogrel (PLAVIX) 75 mg tablet   No No   Sig: Take 1 tablet (75 mg total) by mouth daily   cyanocobalamin (VITAMIN B-12) 1000 MCG tablet  Outside Facility (Specify) No No   Sig: Take 1 tablet (1,000 mcg total) by mouth daily   docusate sodium (COLACE) 100 mg capsule  Outside Facility (Specify) No No   Sig: Take 1 capsule (100 mg total) by mouth 2 (two) times a day   famotidine (PEPCID) 20 mg tablet   No No   Sig: Take 1 tablet (20 mg total) by mouth daily at bedtime   finasteride (PROSCAR) 5 mg tablet  Outside Facility (Specify) No No   Sig: Take 1 tablet (5 mg total) by mouth daily   glipiZIDE (GLUCOTROL) 10 mg tablet   No No   Sig: Per [facility] "give 5mg by mouth one time a prescriber day for DM 2"   glucose blood test strip  Outside Facility (Specify) Yes No   Si strip as needed   lisinopril (ZESTRIL) 5 mg tablet   No No   Sig: Take 1 tablet (5 mg total) by mouth daily   melatonin 3 mg   Yes No   Sig: Take 3 mg by mouth daily at bedtime Give 2 tabs at bed time for insomnia per [facility]   metFORMIN (GLUCOPHAGE) 1000 MG tablet   No No   Sig: TAKE 1 TABLET BY MOUTH 2 TIMES A DAY   metoprolol succinate (TOPROL-XL) 25 mg 24 hr tablet   No No   Sig: Take 1 tablet (25 mg total) by mouth daily   pantoprazole (PROTONIX) 40 mg tablet   No No   Sig: Take 1 tablet (40 mg total) by mouth 2 (two) times a day before meals   polyethylene glycol (MIRALAX) 17 g packet  Outside Facility (Specify) No No   Sig: Take 17 g by mouth daily   ranolazine (RANEXA) 500 mg 12 hr tablet   No No   Sig: Take 1 tablet (500 mg total) by mouth every 12 (twelve) hours   senna (SENOKOT) 8 6 mg   No No   Sig: Take 1 tablet (8 6 mg total) by mouth daily as needed for constipation   tamsulosin (FLOMAX) 0 4 mg  Outside Facility (Specify) No No   Sig: Take 1 capsule (0 4 mg total) by mouth daily with dinner   vitamin B-12 (CYANOCOBALAMIN) 100 MCG TABS  Outside Facility (Specify) Yes No   Sig: Take 100 mcg by mouth daily      Facility-Administered Medications: None       Past Medical History:   Diagnosis Date    Ambulatory dysfunction     uses walker with assist of 1    Anemia     Anxiety     At risk for falls     hx of falls    Benign paroxysmal vertigo     unspecified ear    BPH (benign prostatic hyperplasia)     for TURP today 1/4/2021    Calculus in bladder     for surgical removal today 1/4/2021    Cataract     left eye    Cervicalgia     Chest pain     Chronic kidney disease     stage 3    Constipation     CTS (carpal tunnel syndrome)     left    Cyst of pancreas     Cystitis 06/23/2020    acute cystitis with hematuria    Depression     Diabetes mellitus ([facility] [address] )     type II with neuropathy    Dizziness     and giddiness    Dysphagia     Elevated PSA     Facial weakness     GERD (gastroesophageal reflux disease)     last assessed 5/20/16    Gross hematuria     Hematuria     Hyperlipidemia     Hypertension     Kidney disease     Kidney stone     Left-sided weakness     Long term (current) use of oral hypoglycemic drugs     Muscle weakness     Nuclear senile cataract of left eye     OA (osteoarthritis) of knee     b/l    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[facility] [address] )     Syncope and collapse     Tachycardia     UTI (urinary tract infection)     Vertebro-basilar artery syndrome     Vitamin B deficiency     Vitamin D deficiency     Vitamin D deficiency        Past Surgical History:   Procedure Laterality Date    CARDIAC CATHETERIZATION N/A 3/7/2022    Procedure: CARDIAC CATHETERIZATION;  Surgeon: Evonne Hurley DO;  [facility];   Service: Cardiology    CATARACT EXTRACTION      CHOLECYSTECTOMY      KNEE SURGERY      PA REMOVE BLADDER STONE,<2 5 CM N/A 1/4/2021    Procedure: Cystolitholopaxy w/laser bladder stones;  Surgeon: Christopher Aj MD; Location: [facility];  Service: Urology    SD TRANSURETHRAL ELEC-SURG PROSTATECTOM N/A 1/4/2021    Procedure: T U R P ;  Surgeon: Robin Pool MD;  [facility];  Service: Urology       Family History   Problem Relation Age of Onset    Coronary artery disease Mother     Diabetes Father      I have reviewed and agree with the history as documented  E-Cigarette/Vaping    E-Cigarette Use Never User      E-Cigarette/Vaping Substances    Nicotine No     THC No     CBD No      Social History     Tobacco Use    Smoking status: Never Smoker    Smokeless tobacco: Never Used   Vaping Use    Vaping Use: Never used   Substance Use Topics    Alcohol use: Not Currently    Drug use: No       Review of Systems   Constitutional: Negative for chills and fever  HENT: Negative for ear pain and sore throat  Eyes: Negative for pain and visual disturbance  Respiratory: Negative for cough and shortness of breath  Cardiovascular: Negative for chest pain and palpitations  Gastrointestinal: Negative for abdominal pain and vomiting  Genitourinary: Negative for dysuria and hematuria  Musculoskeletal: Positive for gait problem  Negative for arthralgias and back pain  Skin: Negative for color change and rash  Neurological: Positive for weakness  Negative for seizures and syncope  All other systems reviewed and are negative  Physical Exam  Physical Exam  Vitals and nursing note reviewed  Constitutional:       Appearance: He is well-developed  HENT:      Head: Normocephalic and atraumatic  Eyes:      Conjunctiva/sclera: Conjunctivae normal    Cardiovascular:      Rate and Rhythm: Normal rate and regular rhythm  Heart sounds: No murmur heard  Pulmonary:      Effort: Pulmonary effort is normal  No respiratory distress  Breath sounds: Normal breath sounds  Abdominal:      Palpations: Abdomen is soft  Tenderness: There is no abdominal tenderness     Musculoskeletal: Cervical back: Neck supple  Skin:     General: Skin is warm and dry  Neurological:      Mental Status: He is alert           Vital Signs  ED Triage Vitals   Temperature Pulse Respirations Blood Pressure SpO2   09/02/22 2045 09/02/22 2045 09/02/22 2045 09/02/22 2045 09/02/22 2045   (!) 97 4 °F (36 3 °C) 92 16 119/62 96 %      Temp Source Heart Rate Source Patient Position - Orthostatic VS BP Location FiO2 (%)   09/02/22 2045 09/02/22 2045 09/02/22 2045 09/02/22 2045 --   Oral Monitor Sitting Right arm       Pain Score       09/03/22 0003       No Pain           Vitals:    09/02/22 2045 09/02/22 2346 09/03/22 0005   BP: 119/62 125/59 143/67   Pulse: 92 86 94   Patient Position - Orthostatic VS: Sitting Lying            ED Medications  Medications   acetaminophen (TYLENOL) tablet 650 mg (has no administration in time range)   aspirin chewable tablet 81 mg (has no administration in time range)   atorvastatin (LIPITOR) tablet 40 mg (has no administration in time range)   carbidopa-levodopa (SINEMET)  mg per tablet 1 tablet (has no administration in time range)   clopidogrel (PLAVIX) tablet 75 mg (has no administration in time range)   cyanocobalamin (VITAMIN B-12) tablet 1,000 mcg (has no administration in time range)   docusate sodium (COLACE) capsule 100 mg (has no administration in time range)   famotidine (PEPCID) tablet 20 mg (20 mg Oral Given 9/3/22 0121)   finasteride (PROSCAR) tablet 5 mg (has no administration in time range)   lisinopril (ZESTRIL) tablet 5 mg (has no administration in time range)   melatonin tablet 6 mg (6 mg Oral Given 9/3/22 0121)   metFORMIN (GLUCOPHAGE) tablet 1,000 mg (has no administration in time range)   metoprolol succinate (TOPROL-XL) 24 hr tablet 25 mg (has no administration in time range)   pantoprazole (PROTONIX) EC tablet 40 mg (has no administration in time range)   polyethylene glycol (MIRALAX) packet 17 g (has no administration in time range)   ranolazine (RANEXA) 12 hr tablet 500 mg (500 mg Oral Given 9/3/22 0121)   senna (SENOKOT) tablet 8 6 mg (has no administration in time range)   tamsulosin (FLOMAX) capsule 0 4 mg (has no administration in time range)   cyanocobalamin (VITAMIN B-12) tablet 100 mcg (has no administration in time range)   glipiZIDE (GLUCOTROL) tablet 5 mg (has no administration in time range)   enoxaparin (LOVENOX) subcutaneous injection 40 mg (has no administration in time range)   insulin lispro (HumaLOG) 100 units/mL subcutaneous injection 1-6 Units (has no administration in time range)   insulin lispro (HumaLOG) 100 units/mL subcutaneous injection 1-5 Units (2 Units Subcutaneous Given 9/3/22 0140)       Diagnostic Studies  Results Reviewed     Procedure Component Value Units Date/Time    UA (URINE) with reflex to Scope [[id number]]     Lab Status: No result Specimen: Urine     Basic metabolic panel [[id number]]  (Abnormal) Collected: 09/02/22 2130    Lab Status: Final result Specimen: Blood from Arm, Right Updated: 09/02/22 2205     Sodium 139 mmol/L      Potassium 3 9 mmol/L      Chloride 102 mmol/L      CO2 27 mmol/L      ANION GAP 10 mmol/L      BUN 14 mg/dL      Creatinine 1 10 mg/dL      Glucose 169 mg/dL      Calcium 8 6 mg/dL      eGFR 66 ml/min/1 73sq m     Narrative:      Toi guidelines for Chronic Kidney Disease (CKD):     Stage 1 with normal or high GFR (GFR > 90 mL/min/1 73 square meters)    Stage 2 Mild CKD (GFR = 60-89 mL/min/1 73 square meters)    Stage 3A Moderate CKD (GFR = 45-59 mL/min/1 73 square meters)    Stage 3B Moderate CKD (GFR = 30-44 mL/min/1 73 square meters)    Stage 4 Severe CKD (GFR = 15-29 mL/min/1 73 square meters)    Stage 5 End Stage CKD (GFR <15 mL/min/1 73 square meters)  Note: GFR calculation is accurate only with a steady state creatinine    CBC and differential [[id number]]  (Abnormal) Collected: 09/02/22 2130    Lab Status: Final result Specimen: Blood from Arm, Right Updated: 09/02/22 2141     WBC 11 65 Thousand/uL      RBC 4 21 Million/uL      Hemoglobin 11 9 g/dL      Hematocrit 36 2 %      MCV 86 fL      MCH 28 3 pg      MCHC 32 9 g/dL      RDW 13 8 %      MPV 10 6 fL      Platelets 099 Thousands/uL      nRBC 0 /100 WBCs      Neutrophils Relative 79 %      Immat GRANS % 1 %      Lymphocytes Relative 11 %      Monocytes Relative 6 %      Eosinophils Relative 3 %      Basophils Relative 0 %      Neutrophils Absolute 9 10 Thousands/µL      Immature Grans Absolute 0 08 Thousand/uL      Lymphocytes Absolute 1 31 Thousands/µL      Monocytes Absolute 0 71 Thousand/µL      Eosinophils Absolute 0 40 Thousand/µL      Basophils Absolute 0 05 Thousands/µL                  XR chest 1 view portable   ED Interpretation by Laura Lacey PA-C (09/02 2149)   No acute findings                    MDM  Number of Diagnoses or Management Options  Frequent falls: new and requires workup  Inability to perform activities of daily living: new and requires workup  Weakness: new and requires workup     Amount and/or Complexity of Data Reviewed  Clinical lab tests: ordered and reviewed  Tests in the radiology section of CPT®: ordered and reviewed    Risk of Complications, Morbidity, and/or Mortality  Presenting problems: high  Diagnostic procedures: high  Management options: high    Patient Progress  Patient progress: stable      Disposition  Final diagnoses:   Weakness   Inability to perform activities of daily living   Frequent falls     Time reflects when diagnosis was documented in both MDM as applicable and the Disposition within this note     Time User Action Codes Description Comment    9/2/2022 10:32 PM Arleene Canes Add [R26 2] Ambulatory dysfunction     9/2/2022 10:33 PM Arleene Canes Add [R53 1] Weakness     9/2/2022 10:42 PM Arleene Canes Modify [R53 1] Weakness     9/2/2022 10:42 PM Arleene Canes Remove [R26 2] Ambulatory dysfunction     9/2/2022 10:50 PM Arleene Canes Add [R29 6] Frequent falls     9/2/2022 10:50 PM Hilario Sanchez [Z78 9] Inability to perform activities of daily living     9/2/2022 10:50 PM Parris Cai [R29 6] Frequent falls     9/2/2022 10:50 PM Hilario Sanchez [R29 6] Frequent falls       ED Disposition     ED Disposition   Admit    Condition   Stable    Date/Time   Fri Sep 2, 2022 10:50 PM    Comment   Case was discussed with TEE and the patient's admission status was agreed to be Admission Status: observation status to the service of Dr Joi Warren   Follow-up Information    None         Current Discharge Medication List      CONTINUE these medications which have NOT CHANGED    Details   ! ! ACCU-CHEK GUIDE test strip 1 strip as needed      acetaminophen (TYLENOL) 325 mg tablet Take 650 mg by mouth every 6 (six) hours as needed for mild pain      Alcohol Swabs (PRO COMFORT ALCOHOL) 70 % PADS 1 pad daily Use a pad when testing      aspirin 81 mg chewable tablet Chew 1 tablet (81 mg total) daily  Qty: 30 tablet, Refills: 0    Associated Diagnoses: NSTEMI (non-ST elevated myocardial infarction) (HCC)      atorvastatin (LIPITOR) 40 mg tablet Take 1 tablet (40 mg total) by mouth daily  Qty: 30 tablet, Refills: 0    Associated Diagnoses: NSTEMI (non-ST elevated myocardial infarction) (HCC)      carbidopa-levodopa (SINEMET)  mg per tablet Take 1 tablet by mouth 3 (three) times a day  Qty: 90 tablet, Refills: 11    Associated Diagnoses: Ambulatory dysfunction; Left-sided weakness      cholecalciferol (VITAMIN D3) 1,000 units tablet Take 50 tablets (50,000 Units total) by mouth once a week    Associated Diagnoses: Vitamin D deficiency      clopidogrel (PLAVIX) 75 mg tablet Take 1 tablet (75 mg total) by mouth daily  Qty: 30 tablet, Refills: 0    Associated Diagnoses: NSTEMI (non-ST elevated myocardial infarction) ([address] )      ! ! cyanocobalamin (VITAMIN B-12) 1000 MCG tablet Take 1 tablet (1,000 mcg total) by mouth daily  Refills: 0    Associated Diagnoses: B12 deficiency      docusate sodium (COLACE) 100 mg capsule Take 1 capsule (100 mg total) by mouth 2 (two) times a day  Qty: 10 capsule, Refills: 0    Associated Diagnoses: Constipation      Easy Comfort Lancets MISC as needed      famotidine (PEPCID) 20 mg tablet Take 1 tablet (20 mg total) by mouth daily at bedtime  Qty: 30 tablet, Refills: 0    Associated Diagnoses: GERD (gastroesophageal reflux disease)      finasteride (PROSCAR) 5 mg tablet Take 1 tablet (5 mg total) by mouth daily  Qty: 30 tablet, Refills: 0    Associated Diagnoses: UTI (urinary tract infection)      glipiZIDE (GLUCOTROL) 10 mg tablet Per [facility] "give 5mg by mouth one time a prescriber day for DM 2"  Qty: 180 tablet, Refills: 0    Associated Diagnoses: Type 2 diabetes mellitus with diabetic polyneuropathy, without long-term current use of insulin ([facility])      Glucagon, rDNA, (GLUCAGON EMERGENCY IJ) Inject 1 mL as directed Sugars less than 60 and conscious      Glucose 15 g PACK Take 15 g by mouth Per [facility] "give 15 gram PO every 15 minutes PRN for hypoglycemia"      !! glucose blood test strip 1 strip as needed      lisinopril (ZESTRIL) 5 mg tablet Take 1 tablet (5 mg total) by mouth daily  Refills: 0    Associated Diagnoses: Type 2 MI (myocardial infarction) ([facility])      melatonin 3 mg Take 3 mg by mouth daily at bedtime Give 2 tabs at bed time for insomnia per [facility]      metFORMIN (GLUCOPHAGE) 1000 MG tablet TAKE 1 TABLET BY MOUTH 2 TIMES A DAY  Qty: 180 tablet, Refills: 1    Associated Diagnoses: Type 2 diabetes mellitus without complication, without long-term current use of insulin ([facility])      metoprolol succinate (TOPROL-XL) 25 mg 24 hr tablet Take 1 tablet (25 mg total) by mouth daily  Refills: 0    Associated Diagnoses: Type 2 MI (myocardial infarction) ([facility])      NovoFine Autocover 30G X 8 MM MISC       NovoLOG FlexPen 100 units/mL injection pen       pantoprazole (PROTONIX) 40 mg tablet Take 1 tablet (40 mg total) by mouth 2 (two) times a day before meals  Qty: 60 tablet, Refills: 0    Associated Diagnoses: GERD (gastroesophageal reflux disease)      polyethylene glycol (MIRALAX) 17 g packet Take 17 g by mouth daily  Qty: 14 each, Refills: 2    Associated Diagnoses: Constipation      ranolazine (RANEXA) 500 mg 12 hr tablet Take 1 tablet (500 mg total) by mouth every 12 (twelve) hours  Qty: 60 tablet, Refills: 0    Associated Diagnoses: Type 2 MI (myocardial infarction) (HCC)      senna (SENOKOT) 8 6 mg Take 1 tablet (8 6 mg total) by mouth daily as needed for constipation  Qty: 120 tablet, Refills: 0    Associated Diagnoses: Constipation      tamsulosin (FLOMAX) 0 4 mg Take 1 capsule (0 4 mg total) by mouth daily with dinner  Qty: 30 capsule, Refills: 0    Associated Diagnoses: UTI (urinary tract infection)      ! ! vitamin B-12 (CYANOCOBALAMIN) 100 MCG TABS Take 100 mcg by mouth daily       ! ! - Potential duplicate medications found  Please discuss with provider  No discharge procedures on file      PDMP Review       Value Time User    PDMP Reviewed  Yes 3/4/2022 10:51 PM Janak Smith MD          ED Provider  Electronically Signed by           Khushi Hein PA-C  09/03/22 [id number]

## 2022-09-03 NOTE — PLAN OF CARE
Problem: PHYSICAL THERAPY ADULT  Goal: Performs mobility at highest level of function for planned discharge setting  See evaluation for individualized goals  Description: Treatment/Interventions: Functional transfer training, LE strengthening/ROM, Therapeutic exercise, Endurance training, Patient/family training, Equipment eval/education, Bed mobility, Gait training  Equipment Recommended: Gipson Cisse, Wheelchair       See flowsheet documentation for full assessment, interventions and recommendations  Note: Prognosis: Fair  Problem List: Decreased strength, Decreased endurance, Impaired balance, Decreased mobility, Decreased coordination, Decreased safety awareness, Impaired judgement  Assessment: Pt is a 67 y o  male seen for PT evaluation s/p admit to [address] [place] [address] on 8/18/2022 w/ UTI (urinary tract infection)  Order placed for PT  Comorbidities affecting pt's physical performance at time of assessment include: DM, HTN and underlying neurological disorder  Personal factors affecting pt at time of IE include: limited insight into impairments  Prior to admission, pt was required A for mobility, lived in one floor environment and lived with wife/aides for unknown # of hours everyday  Upon evaluation: Pt requires mod A for bed mobility, mod A for sit to stand, and mod A for SPT with RW  (Please find full objective findings from PT assessment regarding body systems outlined above)  Impairments and limitations also listed above, especially due to  weakness, impaired balance, decreased endurance, decreased activity tolerance, decreased safety awareness, impaired judgement, fall risk and decreased cognition  Pt's clinical presentation is currently unstable/unpredictable seen in pt's presentation of decreased safety awareness, fall risk, and limited insight into deficits    Pt to benefit from continued skilled PT tx while in hospital and upon DC to address deficits as defined above and maximize level of functional mobility  From PT/mobility standpoint, recommendation at time of d/c would be HHPT and family/caregiver support pending progress in order to maximize pt's functional independence and consistency w/ mobility in order to facilitate return to PLOF  Recommend progression of ambulation and initiation of HEP as appropriate  PT Discharge Recommendation: Home with home health rehabilitation    See flowsheet documentation for full assessment

## 2022-09-03 NOTE — H&P
[facility]  H&P- Melissa Sharonda 1950, 67 y o  male MRN: [id number]  Unit/Bed#: ED-31 Encounter: [id number]  Primary Care Provider: Priscilla Rowe MD   Date and time admitted to hospital: 9/2/2022  8:42 PM    * Requires daily assistance for activities of daily living (ADL) and comfort needs  Assessment & Plan  · Patient presenting to the ED after his wife admitted to the Trauma service s/p fall  · He requires assistance w/ all ADLs/IADLs; severe ambulatory dysfunction @ baseline, wheelchair bound  · Currently, unable to provide care for himself @ home as his wife is his sole caretaker and she is currently admitted  · CM assistance for discharge planning    Parkinson disease ([facility] [address] )  Assessment & Plan  · Continue Sinemet    Coronary artery disease  Assessment & Plan  · Recent NSTEMI  · Triple-vessel dz-- not candidate for surgical intervention  · Continue medical management    Left-sided weakness  Assessment & Plan  · Chronic in setting of Parkinson's/Diabetic Neuropathy     Ambulatory dysfunction  Assessment & Plan  · Severe, 2/2 L sided weakness/Parkinson's/Diabetic Neuropathy  · At baseline    Type 2 diabetes mellitus with diabetic neuropathy [facility])  Assessment & Plan  Lab Results   Component Value Date    HGBA1C 6 4 (H) 03/05/2022       No results for input(s): POCGLU in the last 72 hours  Blood Sugar Average: Last 72 hrs:  · continue home medication regimen  · QID glucose checks      VTE Pharmacologic Prophylaxis: VTE Score: 3 Moderate Risk (Score 3-4) - Pharmacological DVT Prophylaxis Ordered: enoxaparin (Lovenox)  Code Status: level 1  Discussion with family: Patient declined call to   Anticipated Length of Stay: Patient will be admitted on an observation basis with an anticipated length of stay of less than 2 midnights secondary to providing assistance w/ ADLS, discharge planning      Total Time for Visit, including Counseling / Coordination of Care: 20 minutes Greater than 50% of this total time spent on direct patient counseling and coordination of care  Chief Complaint: inability to care for self at home    History of Present Illness:  Jakob Barron is a 67 y o  male with a PMH of parkinson's dz, DM type 2, CAD, neuropathy who presents with inability to care for self at home  Patient reports that he is completely reliant on the care/assitance of his wife to perform all activities of daily living: making meals, changing, toileting, showering, and getting in and out of his wheelchair  She is currently admitted to the trauma service after falling at home  He has no acute medical complaints  He has no additional help at home to assist him  Review of Systems:  Review of Systems   Constitutional: Negative  HENT: Negative  Eyes: Negative  Respiratory: Negative  Cardiovascular: Negative  Gastrointestinal: Negative  Genitourinary: Negative  Musculoskeletal: Positive for gait problem (uses wheelchair)  Skin: Negative  Neurological: Positive for weakness (L sided, baseline)  Hematological: Negative  Psychiatric/Behavioral: Negative          Past Medical and Surgical History:   Past Medical History:   Diagnosis Date    Ambulatory dysfunction     uses walker with assist of 1    Anemia     Anxiety     At risk for falls     hx of falls    Benign paroxysmal vertigo     unspecified ear    BPH (benign prostatic hyperplasia)     for TURP today 1/4/2021    Calculus in bladder     for surgical removal today 1/4/2021    Cataract     left eye    Cervicalgia     Chest pain     Chronic kidney disease     stage 3    Constipation     CTS (carpal tunnel syndrome)     left    Cyst of pancreas     Cystitis 06/23/2020    acute cystitis with hematuria    Depression     Diabetes mellitus ([facility] [address] )     type II with neuropathy    Dizziness     and giddiness    Dysphagia     Elevated PSA     Facial weakness     GERD (gastroesophageal reflux disease) last assessed 5/20/16   Floydene Ohms hematuria     Hematuria     Hyperlipidemia     Hypertension     Kidney disease     Kidney stone     Left-sided weakness     Long term (current) use of oral hypoglycemic drugs     Muscle weakness     Nuclear senile cataract of left eye     OA (osteoarthritis) of knee     b/l    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[address] )     Syncope and collapse     Tachycardia     UTI (urinary tract infection)     Vertebro-basilar artery syndrome     Vitamin B deficiency     Vitamin D deficiency     Vitamin D deficiency        Past Surgical History:   Procedure Laterality Date    CARDIAC CATHETERIZATION N/A 3/7/2022    Procedure: CARDIAC CATHETERIZATION;  Surgeon: Yandy Escamilla DO;  [facility]; Service: Cardiology    CATARACT EXTRACTION      CHOLECYSTECTOMY      KNEE SURGERY      ND REMOVE BLADDER STONE,<2 5 CM N/A 1/4/2021    Procedure: Cystolitholopaxy w/laser bladder stones;  Surgeon: Kingsley Del Valle MD;  Location: [facility];  Service: Urology    ND TRANSURETHRAL ELEC-SURG PROSTATECTOM N/A 1/4/2021    Procedure: T U R P ;  Surgeon: Kingsley Del Valle MD;  [facility];  Service: Urology       Meds/Allergies:  Prior to Admission medications    Medication Sig Start Date End Date Taking?  Authorizing Provider   ACCU-CHEK GUIDE test strip 1 strip as needed 6/16/20   Historical Provider, MD   acetaminophen (TYLENOL) 325 mg tablet Take 650 mg by mouth every 6 (six) hours as needed for mild pain    Historical Provider, MD   Alcohol Swabs (PRO COMFORT ALCOHOL) 70 % PADS 1 pad daily Use a pad when testing 6/16/20   Historical Provider, MD   aspirin 81 mg chewable tablet Chew 1 tablet (81 mg total) daily 3/10/22   Hussain Mera DO   atorvastatin (LIPITOR) 40 mg tablet Take 1 tablet (40 mg total) by mouth daily 3/10/22   Hussain Mera DO   carbidopa-levodopa (SINEMET)  mg per tablet Take 1 tablet by mouth 3 (three) times a day 7/22/21 10/14/21  Lianet Kiran MD cholecalciferol (VITAMIN D3) 1,000 units tablet Take 50 tablets (50,000 Units total) by mouth once a week 4/5/22   Camille Cross DO   clopidogrel (PLAVIX) 75 mg tablet Take 1 tablet (75 mg total) by mouth daily 3/10/22   Nichol Anguiano DO   cyanocobalamin (VITAMIN B-12) 1000 MCG tablet Take 1 tablet (1,000 mcg total) by mouth daily 6/23/20   Sherren Pax, MD   docusate sodium (COLACE) 100 mg capsule Take 1 capsule (100 mg total) by mouth 2 (two) times a day 6/23/20   Sherren Pax, MD   Easy Comfort Lancets MISC as needed 6/16/20   Historical Provider, MD   famotidine (PEPCID) 20 mg tablet Take 1 tablet (20 mg total) by mouth daily at bedtime 4/5/22   Camille Cross DO   finasteride (PROSCAR) 5 mg tablet Take 1 tablet (5 mg total) by mouth daily 5/13/20   Fox Uribe PA-C   glipiZIDE (GLUCOTROL) 10 mg tablet Per [facility] "give 5mg by mouth one time a prescriber day for DM 2" 4/5/22   Camille Cross DO   Glucagon, rDNA, (GLUCAGON EMERGENCY IJ) Inject 1 mL as directed Sugars less than 60 and conscious    Historical Provider, MD   Glucose 15 g PACK Take 15 g by mouth Per [facility] "give 15 gram PO every 15 minutes PRN for hypoglycemia"    Historical Provider, MD   glucose blood test strip 1 strip as needed    Historical Provider, MD   lisinopril (ZESTRIL) 5 mg tablet Take 1 tablet (5 mg total) by mouth daily 4/6/22   Camille Cross DO   melatonin 3 mg Take 3 mg by mouth daily at bedtime Give 2 tabs at bed time for insomnia per [facility]    Historical Provider, MD   metFORMIN (GLUCOPHAGE) 1000 MG tablet TAKE 1 TABLET BY MOUTH 2 TIMES A DAY 2/10/21   Adalid Sevilla DO   metoprolol succinate (TOPROL-XL) 25 mg 24 hr tablet Take 1 tablet (25 mg total) by mouth daily 4/6/22   Camille Cross DO   NovoFine Autocover 30G X 8 MM MISC  12/9/20   Historical Provider, MD   NovoLOG FlexPen 100 units/mL injection pen  1/2/21   Historical Provider, MD   pantoprazole (PROTONIX) 40 mg tablet Take 1 tablet (40 mg total) by mouth 2 (two) times a day before meals 3/9/22   Phyliss Peashin, DO   polyethylene glycol (MIRALAX) 17 g packet Take 17 g by mouth daily 6/3/20   Bahman EllsworthalvaroDO   ranolazine (RANEXA) 500 mg 12 hr tablet Take 1 tablet (500 mg total) by mouth every 12 (twelve) hours 4/5/22   Danette Kyle DO   senna (SENOKOT) 8 6 mg Take 1 tablet (8 6 mg total) by mouth daily as needed for constipation 2/10/21   Bahman Chisholm DO   tamsulosin [facility]) 0 4 mg Take 1 capsule (0 4 mg total) by mouth daily with dinner 5/13/20   Fox Uribe PA-C   vitamin B-12 (CYANOCOBALAMIN) 100 MCG TABS Take 100 mcg by mouth daily 6/16/20   Historical Provider, MD SANCHEZ have reviewed home medications using recent Epic encounter  Allergies: No Known Allergies    Social History:  Marital Status: /Civil Union   Occupation:   Patient Pre-hospital Living Situation: Home, With spouse  Patient Pre-hospital Level of Mobility: manual wheelchair  Patient Pre-hospital Diet Restrictions: none  Substance Use History:   Social History     Substance and Sexual Activity   Alcohol Use Not Currently     Social History     Tobacco Use   Smoking Status Never Smoker   Smokeless Tobacco Never Used     Social History     Substance and Sexual Activity   Drug Use No       Family History:  Family History   Problem Relation Age of Onset    Coronary artery disease Mother     Diabetes Father        Physical Exam:     Vitals:   Blood Pressure: 119/62 (09/02/22 2045)  Pulse: 92 (09/02/22 2045)  Temperature: (!) 97 4 °F (36 3 °C) (09/02/22 2045)  Temp Source: Oral (09/02/22 2045)  Respirations: 16 (09/02/22 2045)  Weight - Scale: 80 5 kg (177 lb 7 5 oz) (09/02/22 2045)  SpO2: 96 % (09/02/22 2045)    Physical Exam  Constitutional:       General: He is not in acute distress  Appearance: Normal appearance  He is not ill-appearing  HENT:      Head: Normocephalic  Eyes:      Pupils: Pupils are equal, round, and reactive to light  Cardiovascular:      Rate and Rhythm: Normal rate and regular rhythm  Heart sounds: No murmur heard  No friction rub  No gallop  Pulmonary:      Effort: Pulmonary effort is normal       Breath sounds: Normal breath sounds  No wheezing or rales  Abdominal:      General: Abdomen is flat  Bowel sounds are normal       Palpations: Abdomen is soft  Tenderness: There is no abdominal tenderness  Musculoskeletal:         General: Normal range of motion  Right lower leg: No edema  Left lower leg: No edema  Skin:     General: Skin is warm and dry  Neurological:      Mental Status: He is alert and oriented to person, place, and time  Mental status is at baseline  Motor: Weakness (L sided  LE > UE) present  Psychiatric:         Mood and Affect: Mood normal          Additional Data:     Lab Results:  Results from last 7 days   Lab Units 09/02/22  2130   WBC Thousand/uL 11 65*   HEMOGLOBIN g/dL 11 9*   HEMATOCRIT % 36 2*   PLATELETS Thousands/uL 285   NEUTROS PCT % 79*   LYMPHS PCT % 11*   MONOS PCT % 6   EOS PCT % 3     Results from last 7 days   Lab Units 09/02/22  2130   SODIUM mmol/L 139   POTASSIUM mmol/L 3 9   CHLORIDE mmol/L 102   CO2 mmol/L 27   BUN mg/dL 14   CREATININE mg/dL 1 10   ANION GAP mmol/L 10   CALCIUM mg/dL 8 6   GLUCOSE RANDOM mg/dL 169*                       Imaging: Reviewed radiology reports from this admission including: chest xray  XR chest 1 view portable   ED Interpretation by Ronal Kong PA-C (09/02 2149)   No acute findings           EKG and Other Studies Reviewed on Admission:   · EKG: No EKG obtained  ** Please Note: This note has been constructed using a voice recognition system   **

## 2022-09-03 NOTE — ASSESSMENT & PLAN NOTE
Lab Results   Component Value Date    HGBA1C 6 4 (H) 03/05/2022       No results for input(s): POCGLU in the last 72 hours      Blood Sugar Average: Last 72 hrs:  · continue home medication regimen  · QID glucose checks

## 2022-09-03 NOTE — PLAN OF CARE
Problem: Potential for Falls  Goal: Patient will remain free of falls  Description: INTERVENTIONS:  - Educate patient/family on patient safety including physical limitations  - Instruct patient to call for assistance with activity   - Consult OT/PT to assist with strengthening/mobility   - Keep Call bell within reach  - Keep bed low and locked with side rails adjusted as appropriate  - Keep care items and personal belongings within reach  - Initiate and maintain comfort rounds  - Make Fall Risk Sign visible to staff  - Offer Toileting every  Hours, in advance of need  - Initiate/Maintain alarm  - Obtain necessary fall risk management equipment:   - Apply yellow socks and bracelet for high fall risk patients  - Consider moving patient to room near nurses station  Outcome: Progressing     Problem: METABOLIC, FLUID AND ELECTROLYTES - ADULT  Goal: Electrolytes maintained within normal limits  Description: INTERVENTIONS:  - Monitor labs and assess patient for signs and symptoms of electrolyte imbalances  - Administer electrolyte replacement as ordered  - Monitor response to electrolyte replacements, including repeat lab results as appropriate  - Instruct patient on fluid and nutrition as appropriate  Outcome: Progressing  Goal: Fluid balance maintained  Description: INTERVENTIONS:  - Monitor labs   - Monitor I/O and WT  - Instruct patient on fluid and nutrition as appropriate  - Assess for signs & symptoms of volume excess or deficit  Outcome: Progressing     Problem: SKIN/TISSUE INTEGRITY - ADULT  Goal: Skin Integrity remains intact(Skin Breakdown Prevention)  Description: Assess:  -Perform Rahat assessment every   -Clean and moisturize skin every   -Inspect skin when repositioning, toileting, and assisting with ADLS  -Assess under medical devices such as  every   -Assess extremities for adequate circulation and sensation     Bed Management:  -Have minimal linens on bed & keep smooth, unwrinkled  -Change linens as needed when moist or perspiring  -Avoid sitting or lying in one position for more than  hours while in bed  -Keep HOB at degrees     Toileting:  -Offer bedside commode  -Assess for incontinence every   -Use incontinent care products after each incontinent episode such as     Activity:  -Mobilize patient  times a day  -Encourage activity and walks on unit  -Encourage or provide ROM exercises   -Turn and reposition patient every  Hours  -Use appropriate equipment to lift or move patient in bed  -Instruct/ Assist with weight shifting every  when out of bed in chair  -Consider limitation of chair time  hour intervals    Skin Care:  -Avoid use of baby powder, tape, friction and shearing, hot water or constrictive clothing  -Relieve pressure over bony prominences using   -Do not massage red bony areas    Next Steps:  -Teach patient strategies to minimize risks such as    -Consider consults to  interdisciplinary teams such as   Outcome: Progressing     Problem: SAFETY ADULT  Goal: Patient will remain free of falls  Description: INTERVENTIONS:  - Educate patient/family on patient safety including physical limitations  - Instruct patient to call for assistance with activity   - Consult OT/PT to assist with strengthening/mobility   - Keep Call bell within reach  - Keep bed low and locked with side rails adjusted as appropriate  - Keep care items and personal belongings within reach  - Initiate and maintain comfort rounds  - Make Fall Risk Sign visible to staff  - Offer Toileting every  Hours, in advance of need  - Initiate/Maintain alarm  - Obtain necessary fall risk management equipment:  - Apply yellow socks and bracelet for high fall risk patients  - Consider moving patient to room near nurses station  Outcome: Progressing  Goal: Maintain or return to baseline ADL function  Description: INTERVENTIONS:  -  Assess patient's ability to carry out ADLs; assess patient's baseline for ADL function and identify physical deficits which impact ability to perform ADLs (bathing, care of mouth/teeth, toileting, grooming, dressing, etc )  - Assess/evaluate cause of self-care deficits   - Assess range of motion  - Assess patient's mobility; develop plan if impaired  - Assess patient's need for assistive devices and provide as appropriate  - Encourage maximum independence but intervene and supervise when necessary  - Involve family in performance of ADLs  - Assess for home care needs following discharge   - Consider OT consult to assist with ADL evaluation and planning for discharge  - Provide patient education as appropriate  Outcome: Progressing  Goal: Maintains/Returns to pre admission functional level  Description: INTERVENTIONS:  - Perform BMAT or MOVE assessment daily    - Set and communicate daily mobility goal to care team and patient/family/caregiver  - Collaborate with rehabilitation services on mobility goals if consulted  - Perform Range of Motion  times a day  - Reposition patient every  hours    - Dangle patient  times a day  - Stand patient  times a day  - Ambulate patient  times a day  - Out of bed to chair  times a day   - Out of bed for meals  times a day  - Out of bed for toileting  - Record patient progress and toleration of activity level   Outcome: Progressing     Problem: DISCHARGE PLANNING  Goal: Discharge to home or other facility with appropriate resources  Description: INTERVENTIONS:  - Identify barriers to discharge w/patient and caregiver  - Arrange for needed discharge resources and transportation as appropriate  - Identify discharge learning needs (meds, wound care, etc )  - Arrange for interpretive services to assist at discharge as needed  - Refer to Case Management Department for coordinating discharge planning if the patient needs post-hospital services based on physician/advanced practitioner order or complex needs related to functional status, cognitive ability, or social support system  Outcome: Progressing Problem: Knowledge Deficit  Goal: Patient/family/caregiver demonstrates understanding of disease process, treatment plan, medications, and discharge instructions  Description: Complete learning assessment and assess knowledge base    Interventions:  - Provide teaching at level of understanding  - Provide teaching via preferred learning methods  Outcome: Progressing

## 2022-09-03 NOTE — CASE MANAGEMENT
Case Management Discharge Planning Note    Patient name Manuel COWAN /S -01 MRN [id number]  : 1950 Date 9/3/2022       Current Admission Date: 2022  Current Admission Diagnosis:Requires daily assistance for activities of daily living (ADL) and comfort needs   Patient Active Problem List    Diagnosis Date Noted    Requires daily assistance for activities of daily living (ADL) and comfort needs 2022    Fall 2022    Parkinson disease ([facility] 2022    Chest pain 2022    Leukocytosis 2022    Coronary artery disease 2022    Type 2 MI (myocardial infarction) ([facility] ) 2022    Generalized abdominal pain 2021    Lactic acidosis 2021    Urinary incontinence 2021    Vitamin B12 deficiency     History of recurrent UTIs 2020    Insomnia 2020    Constipation 2020    Benign paroxysmal positional vertigo 2020    BPH (benign prostatic hyperplasia) 2020    Vitamin D deficiency 2020    Left-sided weakness 2020    UTI (urinary tract infection) 2020    Cystitis 2020    Bladder stones 2020    Stage 3 chronic kidney disease ([facility] ) 2020    Anemia 2020    Colon cancer screening 2020    Essential hypertension 2019    Type 2 diabetes mellitus ([facility] 2019    Nephrolithiasis 2019    Ambulatory dysfunction 2019    Paresis ([facility] 2019    Abnormal PSA 2019    Dizziness 10/04/2017    Pancreas cyst 2016    Type 2 diabetes mellitus with diabetic neuropathy ([facility] ) 2015    Hyperlipidemia 2012      LOS (days): 0  Geometric Mean LOS (GMLOS) (days):   Days to GMLOS:     OBJECTIVE:            Current admission status: Observation   Preferred Pharmacy:   [address] [place] [address]  Phone: [phone] Fax: [phone]    [id number] [place] [facility] [address]  [facility]   [address]  Phone: [phone] Fax: [phone]    Primary Care Provider: Reese Anderson MD    Primary Insurance: [employer] [facility]  Secondary Insurance:     DISCHARGE DETAILS:    Discharge planning discussed with[de-identified]  for patient's wife  Freedom of Choice: Yes  Comments - Freedom of Choice: Plan to go home with [employer] supports in place  CM contacted family/caregiver?: No- see comments (CM for patient's wife spoke with [employer] and wife Dimitri Joseph who is patient's caretaker)                            Other Referral/Resources/Interventions Provided:  Interventions: Kuldeep Hathaway, Transportation  Referral Comments: Patient's wife confirmed with her CM that the discharge address is [address]  Spouse had the apartment keys with her  Spouse who is patient's primary caretaker is aware per physician she is medically stable for discharge today  [employer] will set up Home PT/OT services on Tuesday for both patient and spoouse  CM talked with CM to coordinate transportation as patient's wife will need to be home prior to patient  CM requested a 5:30 BLS transport time  Treatment Team Recommendation: Home with [facility]  Discharge Destination Plan[de-identified] Home with [place] at Discharge : BLS Ambulance  Dispatcher Contacted: Yes  Number/Name of Dispatcher: Requested 5:30 PM via [address] sent to primary nurse  CM department will continue to follow to assist with discharge coordination

## 2022-09-04 LAB
GLUCOSE SERPL-MCNC: 101 MG/DL (ref 65–140)
GLUCOSE SERPL-MCNC: 108 MG/DL (ref 65–140)
GLUCOSE SERPL-MCNC: 119 MG/DL (ref 65–140)
GLUCOSE SERPL-MCNC: 82 MG/DL (ref 65–140)
GLUCOSE SERPL-MCNC: 95 MG/DL (ref 65–140)

## 2022-09-04 PROCEDURE — 82948 REAGENT STRIP/BLOOD GLUCOSE: CPT

## 2022-09-04 RX ADMIN — CYANOCOBALAMIN TAB 500 MCG 1000 MCG: 500 TAB at 08:26

## 2022-09-04 RX ADMIN — RANOLAZINE 500 MG: 500 TABLET, EXTENDED RELEASE ORAL at 20:55

## 2022-09-04 RX ADMIN — VITAM B12 100 MCG: 100 TAB at 08:34

## 2022-09-04 RX ADMIN — CARBIDOPA AND LEVODOPA 1 TABLET: 25; 100 TABLET ORAL at 20:55

## 2022-09-04 RX ADMIN — FINASTERIDE 5 MG: 5 TABLET, FILM COATED ORAL at 08:25

## 2022-09-04 RX ADMIN — PANTOPRAZOLE SODIUM 40 MG: 40 TABLET, DELAYED RELEASE ORAL at 08:25

## 2022-09-04 RX ADMIN — METFORMIN HYDROCHLORIDE 1000 MG: 500 TABLET ORAL at 08:26

## 2022-09-04 RX ADMIN — METOPROLOL SUCCINATE 25 MG: 25 TABLET, EXTENDED RELEASE ORAL at 08:26

## 2022-09-04 RX ADMIN — RANOLAZINE 500 MG: 500 TABLET, EXTENDED RELEASE ORAL at 08:26

## 2022-09-04 RX ADMIN — GLIPIZIDE 5 MG: 5 TABLET ORAL at 08:25

## 2022-09-04 RX ADMIN — POLYETHYLENE GLYCOL 3350 17 G: 17 POWDER, FOR SOLUTION ORAL at 08:35

## 2022-09-04 RX ADMIN — ENOXAPARIN SODIUM 40 MG: 40 INJECTION SUBCUTANEOUS at 08:34

## 2022-09-04 RX ADMIN — CARBIDOPA AND LEVODOPA 1 TABLET: 25; 100 TABLET ORAL at 08:25

## 2022-09-04 RX ADMIN — ATORVASTATIN CALCIUM 40 MG: 40 TABLET, FILM COATED ORAL at 08:26

## 2022-09-04 RX ADMIN — DOCUSATE SODIUM 100 MG: 100 CAPSULE, LIQUID FILLED ORAL at 18:02

## 2022-09-04 RX ADMIN — ASPIRIN 81 MG CHEWABLE TABLET 81 MG: 81 TABLET CHEWABLE at 08:25

## 2022-09-04 RX ADMIN — CLOPIDOGREL BISULFATE 75 MG: 75 TABLET ORAL at 08:26

## 2022-09-04 RX ADMIN — METFORMIN HYDROCHLORIDE 1000 MG: 500 TABLET ORAL at 18:02

## 2022-09-04 RX ADMIN — TAMSULOSIN HYDROCHLORIDE 0.4 MG: 0.4 CAPSULE ORAL at 18:02

## 2022-09-04 RX ADMIN — CARBIDOPA AND LEVODOPA 1 TABLET: 25; 100 TABLET ORAL at 18:02

## 2022-09-04 RX ADMIN — PANTOPRAZOLE SODIUM 40 MG: 40 TABLET, DELAYED RELEASE ORAL at 18:02

## 2022-09-04 RX ADMIN — LISINOPRIL 5 MG: 5 TABLET ORAL at 08:26

## 2022-09-04 RX ADMIN — FAMOTIDINE 20 MG: 20 TABLET ORAL at 21:00

## 2022-09-04 RX ADMIN — Medication 6 MG: at 21:00

## 2022-09-04 RX ADMIN — DOCUSATE SODIUM 100 MG: 100 CAPSULE, LIQUID FILLED ORAL at 08:26

## 2022-09-04 NOTE — PHYSICIAN ADVISOR
Current patient class: Observation  The patient is currently on Hospital Day: 3 at [facility]        The patient was admitted to the hospital  on N/A at N/A for the following diagnosis:  Weakness [R53 1]  Adult general medical exam [Z00 00]  Inability to perform activities of daily living [Z78 9]  Frequent falls [R29 6]     After review of the relevant documentation, labs, vital signs and test results, the patient is most appropriate for OBSERVATION CLASS  Rationale is as follows: The patient is a 67 yrs   Male who presented to the ED at 9/2/2022  8:42 PM with a chief complaint of Personal Problem (Pt arrives via EMS from home  Pts wife is being admitted  Pt sent in due to wife being the sole caretaker and he is unable to care for himself ) pt was cleared for discharge but stayed yesterday due to transportation issues   He has home services arranged and discharge order has been placed at the time of this review ; OBV seems most appropriate    The patients vitals on arrival were   ED Triage Vitals   Temperature Pulse Respirations Blood Pressure SpO2   09/02/22 2045 09/02/22 2045 09/02/22 2045 09/02/22 2045 09/02/22 2045   (!) 97 4 °F (36 3 °C) 92 16 119/62 96 %      Temp Source Heart Rate Source Patient Position - Orthostatic VS BP Location FiO2 (%)   09/02/22 2045 09/02/22 2045 09/02/22 2045 09/02/22 2045 --   Oral Monitor Sitting Right arm       Pain Score       09/03/22 0003       No Pain           Past Medical History:   Diagnosis Date    Ambulatory dysfunction     uses walker with assist of 1    Anemia     Anxiety     At risk for falls     hx of falls    Benign paroxysmal vertigo     unspecified ear    BPH (benign prostatic hyperplasia)     for TURP today 1/4/2021    Calculus in bladder     for surgical removal today 1/4/2021    Cataract     left eye    Cervicalgia     Chest pain     Chronic kidney disease     stage 3    Constipation     CTS (carpal tunnel syndrome)     left    Cyst of pancreas     Cystitis 06/23/2020    acute cystitis with hematuria    Depression     Diabetes mellitus ([facility] [address] )     type II with neuropathy    Dizziness     and giddiness    Dysphagia     Elevated PSA     Facial weakness     GERD (gastroesophageal reflux disease)     last assessed 5/20/16    Gross hematuria     Hematuria     Hyperlipidemia     Hypertension     Kidney disease     Kidney stone     Left-sided weakness     Long term (current) use of oral hypoglycemic drugs     Muscle weakness     Nuclear senile cataract of left eye     OA (osteoarthritis) of knee     b/l    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[facility] [address] )     Syncope and collapse     Tachycardia     UTI (urinary tract infection)     Vertebro-basilar artery syndrome     Vitamin B deficiency     Vitamin D deficiency     Vitamin D deficiency      Past Surgical History:   Procedure Laterality Date    CARDIAC CATHETERIZATION N/A 3/7/2022    Procedure: CARDIAC CATHETERIZATION;  Surgeon: Heaven Kemp DO;  [facility];   Service: Cardiology    CATARACT EXTRACTION      CHOLECYSTECTOMY      KNEE SURGERY      AK REMOVE BLADDER STONE,<2 5 CM N/A 1/4/2021    Procedure: Cystolitholopaxy w/laser bladder stones;  Surgeon: Terry Jameson MD;  Location: [facility];  Service: Urology    AK TRANSURETHRAL ELEC-SURG PROSTATECTOM N/A 1/4/2021    Procedure: T U R P ;  Surgeon: Terry Jameson MD;  [facility];  Service: Urology           Consults have been placed to:   None    Vitals:    09/03/22 1456 09/03/22 2036 09/04/22 0738 09/04/22 1620   BP: (!) 82/47 107/56 124/64 107/55   BP Location:  Left arm  Right arm   Pulse: 92 86  78   Resp: 16 16 20 18   Temp: 97 6 °F (36 4 °C) 97 5 °F (36 4 °C) 98 °F (36 7 °C) 97 9 °F (36 6 °C)   TempSrc:  Oral  Oral   SpO2: 94% 97% 93% 95%   Weight:           Most recent labs:    Recent Labs     09/02/22  2130   WBC 11 65*   HGB 11 9*   HCT 36 2*      K 3 9 CALCIUM 8 6   BUN 14   CREATININE 1 10       Scheduled Meds:  Current Facility-Administered Medications   Medication Dose Route Frequency Provider Last Rate    acetaminophen  650 mg Oral Q6H PRN [facility], PA-C      aspirin  81 mg Oral Daily [employer], PA-C      atorvastatin  40 mg Oral Daily [facility], PA-C      carbidopa-levodopa  1 tablet Oral TID [facility], PA-C      clopidogrel  75 mg Oral Daily [facility], PA-C      cyanocobalamin  1,000 mcg Oral Daily [facility], PA-C      vitamin B-12  100 mcg Oral Daily [facility], PA-C      docusate sodium  100 mg Oral BID [facility], PA-C      enoxaparin  40 mg Subcutaneous Daily [facility], PA-C      famotidine  20 mg Oral HS [facility]      finasteride  5 mg Oral Daily [facility], PA-C      glipiZIDE  5 mg Oral Daily Before Breakfast [facility], PA-C      insulin lispro  1-5 Units Subcutaneous HS Mandy Joanne [facility], PA-C      insulin lispro  1-6 Units Subcutaneous TID AC Mandy Joanne [facility], PA-C      lisinopril  5 mg Oral Daily [facility], PA-C      melatonin  6 mg Oral HS Mandy Joanne [facility], PA-C      metFORMIN  1,000 mg Oral BID [facility], PA-C      metoprolol succinate  25 mg Oral Daily [facility], PA-C      pantoprazole  40 mg Oral BID AC Mandy Garciae [facility], PA-C      polyethylene glycol  17 g Oral Daily [facility], PA-C      ranolazine  500 mg Oral Q12H [address] Mandy Rubio [facility], PA-C      senna  8 6 mg Oral Daily PRN [facility], PA-C      tamsulosin  0 4 mg Oral Daily With [facility], PA-C       Continuous Infusions:   PRN Meds:   acetaminophen    senna

## 2022-09-04 NOTE — PLAN OF CARE
Problem: Potential for Falls  Goal: Patient will remain free of falls  Description: INTERVENTIONS:  - Educate patient/family on patient safety including physical limitations  - Instruct patient to call for assistance with activity   - Consult OT/PT to assist with strengthening/mobility   - Keep Call bell within reach  - Keep bed low and locked with side rails adjusted as appropriate  - Keep care items and personal belongings within reach  - Initiate and maintain comfort rounds  - Make Fall Risk Sign visible to staff  - Offer Toileting every 2 Hours, in advance of need  - Initiate/Maintain bed alarm  - Obtain necessary fall risk management equipment: bed alarm  - Apply yellow socks and bracelet for high fall risk patients  - Consider moving patient to room near nurses station  Outcome: Progressing     Problem: METABOLIC, FLUID AND ELECTROLYTES - ADULT  Goal: Electrolytes maintained within normal limits  Description: INTERVENTIONS:  - Monitor labs and assess patient for signs and symptoms of electrolyte imbalances  - Administer electrolyte replacement as ordered  - Monitor response to electrolyte replacements, including repeat lab results as appropriate  - Instruct patient on fluid and nutrition as appropriate  Outcome: Progressing  Goal: Fluid balance maintained  Description: INTERVENTIONS:  - Monitor labs   - Monitor I/O and WT  - Instruct patient on fluid and nutrition as appropriate  - Assess for signs & symptoms of volume excess or deficit  Outcome: Progressing     Problem: SKIN/TISSUE INTEGRITY - ADULT  Goal: Skin Integrity remains intact(Skin Breakdown Prevention)  Description: Assess:  -Perform Rahat assessment every 2  -Clean and moisturize skin every   -Inspect skin when repositioning, toileting, and assisting with ADLS  -Assess under medical devices such as  every   -Assess extremities for adequate circulation and sensation     Bed Management:  -Have minimal linens on bed & keep smooth, unwrinkled  -Change linens as needed when moist or perspiring  -Avoid sitting or lying in one position for more than  hours while in bed  -Keep HOB at 30 degrees     Toileting:  -Offer bedside commode  -Assess for incontinence every   -Use incontinent care products after each incontinent episode such as     Activity:  -Mobilize patient 2 times a day  -Encourage activity and walks on unit  -Encourage or provide ROM exercises   -Turn and reposition patient every 2 Hours  -Use appropriate equipment to lift or move patient in bed  -Instruct/ Assist with weight shifting every 2 when out of bed in chair  -Consider limitation of chair time 2 hour intervals    Skin Care:  -Avoid use of baby powder, tape, friction and shearing, hot water or constrictive clothing  -Relieve pressure over bony prominences using   -Do not massage red bony areas    Next Steps:  -Teach patient strategies to minimize risks such as    -Consider consults to  interdisciplinary teams such as   Outcome: Progressing     Problem: SAFETY ADULT  Goal: Patient will remain free of falls  Description: INTERVENTIONS:  - Educate patient/family on patient safety including physical limitations  - Instruct patient to call for assistance with activity   - Consult OT/PT to assist with strengthening/mobility   - Keep Call bell within reach  - Keep bed low and locked with side rails adjusted as appropriate  - Keep care items and personal belongings within reach  - Initiate and maintain comfort rounds  - Make Fall Risk Sign visible to staff  - Offer Toileting every 2 Hours, in advance of need  - Initiate/Maintain bed alarm  - Obtain necessary fall risk management equipment: bed alarm  - Apply yellow socks and bracelet for high fall risk patients  - Consider moving patient to room near nurses station  Outcome: Progressing  Goal: Maintain or return to baseline ADL function  Description: INTERVENTIONS:  -  Assess patient's ability to carry out ADLs; assess patient's baseline for ADL function and identify physical deficits which impact ability to perform ADLs (bathing, care of mouth/teeth, toileting, grooming, dressing, etc )  - Assess/evaluate cause of self-care deficits   - Assess range of motion  - Assess patient's mobility; develop plan if impaired  - Assess patient's need for assistive devices and provide as appropriate  - Encourage maximum independence but intervene and supervise when necessary  - Involve family in performance of ADLs  - Assess for home care needs following discharge   - Consider OT consult to assist with ADL evaluation and planning for discharge  - Provide patient education as appropriate  Outcome: Progressing  Goal: Maintains/Returns to pre admission functional level  Description: INTERVENTIONS:  - Perform BMAT or MOVE assessment daily    - Set and communicate daily mobility goal to care team and patient/family/caregiver  - Collaborate with rehabilitation services on mobility goals if consulted  - Perform Range of Motion 2 times a day  - Reposition patient every 2 hours    - Dangle patient 2 times a day  - Stand patient 2 times a day  - Ambulate patient 2 times a day  - Out of bed to chair 2 times a day   - Out of bed for meals 2 times a day  - Out of bed for toileting  - Record patient progress and toleration of activity level   Outcome: Progressing

## 2022-09-04 NOTE — PLAN OF CARE
Problem: Potential for Falls  Goal: Patient will remain free of falls  Description: INTERVENTIONS:  - Educate patient/family on patient safety including physical limitations  - Instruct patient to call for assistance with activity   - Consult OT/PT to assist with strengthening/mobility   - Keep Call bell within reach  - Keep bed low and locked with side rails adjusted as appropriate  - Keep care items and personal belongings within reach  - Initiate and maintain comfort rounds  - Make Fall Risk Sign visible to staff  - Offer Toileting every 2 Hours, in advance of need  - Initiate/Maintain bed alarm  - Obtain necessary fall risk management equipment: bed   - Apply yellow socks and bracelet for high fall risk patients  - Consider moving patient to room near nurses station  Outcome: Progressing     Problem: METABOLIC, FLUID AND ELECTROLYTES - ADULT  Goal: Electrolytes maintained within normal limits  Description: INTERVENTIONS:  - Monitor labs and assess patient for signs and symptoms of electrolyte imbalances  - Administer electrolyte replacement as ordered  - Monitor response to electrolyte replacements, including repeat lab results as appropriate  - Instruct patient on fluid and nutrition as appropriate  Outcome: Progressing  Goal: Fluid balance maintained  Description: INTERVENTIONS:  - Monitor labs   - Monitor I/O and WT  - Instruct patient on fluid and nutrition as appropriate  - Assess for signs & symptoms of volume excess or deficit  Outcome: Progressing     Problem: SKIN/TISSUE INTEGRITY - ADULT  Goal: Skin Integrity remains intact(Skin Breakdown Prevention)  Description: Assess:  -Perform Rahat assessment every   -Clean and moisturize skin every   -Inspect skin when repositioning, toileting, and assisting with ADLS  -Assess under medical devices such as  every   -Assess extremities for adequate circulation and sensation     Bed Management:  -Have minimal linens on bed & keep smooth, unwrinkled  -Change linens as needed when moist or perspiring  -Avoid sitting or lying in one position for more than  hours while in bed  -Keep HOB at 30 degrees     Toileting:  -Offer bedside commode  -Assess for incontinence every   -Use incontinent care products after each incontinent episode such as     Activity:  -Mobilize patient 2 times a day  -Encourage activity and walks on unit  -Encourage or provide ROM exercises   -Turn and reposition patient every 2 Hours  -Use appropriate equipment to lift or move patient in bed  -Instruct/ Assist with weight shifting every 2 when out of bed in chair  -Consider limitation of chair time 2 hour intervals    Skin Care:  -Avoid use of baby powder, tape, friction and shearing, hot water or constrictive clothing  -Relieve pressure over bony prominences using   -Do not massage red bony areas    Next Steps:  -Teach patient strategies to minimize risks such as    -Consider consults to  interdisciplinary teams such as   Outcome: Progressing     Problem: SAFETY ADULT  Goal: Patient will remain free of falls  Description: INTERVENTIONS:  - Educate patient/family on patient safety including physical limitations  - Instruct patient to call for assistance with activity   - Consult OT/PT to assist with strengthening/mobility   - Keep Call bell within reach  - Keep bed low and locked with side rails adjusted as appropriate  - Keep care items and personal belongings within reach  - Initiate and maintain comfort rounds  - Make Fall Risk Sign visible to staff  - Offer Toileting every 2 Hours, in advance of need  - Initiate/Maintain bed alarm  - Obtain necessary fall risk management equipment: bed   - Apply yellow socks and bracelet for high fall risk patients  - Consider moving patient to room near nurses station  Outcome: Progressing  Goal: Maintain or return to baseline ADL function  Description: INTERVENTIONS:  -  Assess patient's ability to carry out ADLs; assess patient's baseline for ADL function and identify physical deficits which impact ability to perform ADLs (bathing, care of mouth/teeth, toileting, grooming, dressing, etc )  - Assess/evaluate cause of self-care deficits   - Assess range of motion  - Assess patient's mobility; develop plan if impaired  - Assess patient's need for assistive devices and provide as appropriate  - Encourage maximum independence but intervene and supervise when necessary  - Involve family in performance of ADLs  - Assess for home care needs following discharge   - Consider OT consult to assist with ADL evaluation and planning for discharge  - Provide patient education as appropriate  Outcome: Progressing  Goal: Maintains/Returns to pre admission functional level  Description: INTERVENTIONS:  - Perform BMAT or MOVE assessment daily    - Set and communicate daily mobility goal to care team and patient/family/caregiver  - Collaborate with rehabilitation services on mobility goals if consulted  - Perform Range of Motion 2 times a day  - Reposition patient every 2 hours    - Dangle patient 2 times a day  - Stand patient 2 times a day  - Ambulate patient 2 times a day  - Out of bed to chair 2 times a day   - Out of bed for meals 2 times a day  - Out of bed for toileting  - Record patient progress and toleration of activity level   Outcome: Progressing     Problem: DISCHARGE PLANNING  Goal: Discharge to home or other facility with appropriate resources  Description: INTERVENTIONS:  - Identify barriers to discharge w/patient and caregiver  - Arrange for needed discharge resources and transportation as appropriate  - Identify discharge learning needs (meds, wound care, etc )  - Arrange for interpretive services to assist at discharge as needed  - Refer to Case Management Department for coordinating discharge planning if the patient needs post-hospital services based on physician/advanced practitioner order or complex needs related to functional status, cognitive ability, or social support system  Outcome: Progressing     Problem: Knowledge Deficit  Goal: Patient/family/caregiver demonstrates understanding of disease process, treatment plan, medications, and discharge instructions  Description: Complete learning assessment and assess knowledge base  Interventions:  - Provide teaching at level of understanding  - Provide teaching via preferred learning methods  Outcome: Progressing     Problem: MOBILITY - ADULT  Goal: Maintain or return to baseline ADL function  Description: INTERVENTIONS:  -  Assess patient's ability to carry out ADLs; assess patient's baseline for ADL function and identify physical deficits which impact ability to perform ADLs (bathing, care of mouth/teeth, toileting, grooming, dressing, etc )  - Assess/evaluate cause of self-care deficits   - Assess range of motion  - Assess patient's mobility; develop plan if impaired  - Assess patient's need for assistive devices and provide as appropriate  - Encourage maximum independence but intervene and supervise when necessary  - Involve family in performance of ADLs  - Assess for home care needs following discharge   - Consider OT consult to assist with ADL evaluation and planning for discharge  - Provide patient education as appropriate  Outcome: Progressing  Goal: Maintains/Returns to pre admission functional level  Description: INTERVENTIONS:  - Perform BMAT or MOVE assessment daily    - Set and communicate daily mobility goal to care team and patient/family/caregiver  - Collaborate with rehabilitation services on mobility goals if consulted  - Perform Range of Motion 2 times a day  - Reposition patient every 2 hours    - Dangle patient 2 times a day  - Stand patient 2 times a day  - Ambulate patient 2 times a day  - Out of bed to chair 2 times a day   - Out of bed for meals 2 times a day  - Out of bed for toileting  - Record patient progress and toleration of activity level   Outcome: Progressing     Problem: Prexisting or High Potential for Compromised Skin Integrity  Goal: Skin integrity is maintained or improved  Description: INTERVENTIONS:  - Identify patients at risk for skin breakdown  - Assess and monitor skin integrity  - Assess and monitor nutrition and hydration status  - Monitor labs   - Assess for incontinence   - Turn and reposition patient  - Assist with mobility/ambulation  - Relieve pressure over bony prominences  - Avoid friction and shearing  - Provide appropriate hygiene as needed including keeping skin clean and dry  - Evaluate need for skin moisturizer/barrier cream  - Collaborate with interdisciplinary team   - Patient/family teaching  - Consider wound care consult   Outcome: Progressing

## 2022-09-04 NOTE — CASE MANAGEMENT
Case Management Discharge Planning Note    Patient name Bella COWAN /S -01 MRN [id number]  : 1950 Date 2022       Current Admission Date: 2022  Current Admission Diagnosis:Requires daily assistance for activities of daily living (ADL) and comfort needs   Patient Active Problem List    Diagnosis Date Noted    Requires daily assistance for activities of daily living (ADL) and comfort needs 2022    Fall 2022    Parkinson disease ([facility] ) 2022    Chest pain 2022    Leukocytosis 2022    Coronary artery disease 2022    Type 2 MI (myocardial infarction) ([facility] ) 2022    Generalized abdominal pain 2021    Lactic acidosis 2021    Urinary incontinence 2021    Vitamin B12 deficiency     History of recurrent UTIs 2020    Insomnia 2020    Constipation 2020    Benign paroxysmal positional vertigo 2020    BPH (benign prostatic hyperplasia) 2020    Vitamin D deficiency 2020    Left-sided weakness 2020    UTI (urinary tract infection) 2020    Cystitis 2020    Bladder stones 2020    Stage 3 chronic kidney disease ([facility] ) 2020    Anemia 2020    Colon cancer screening 2020    Essential hypertension 2019    Type 2 diabetes mellitus ([facility] ) 2019    Nephrolithiasis 2019    Ambulatory dysfunction 2019    Paresis ([facility] ) 2019    Abnormal PSA 2019    Dizziness 10/04/2017    Pancreas cyst 2016    Type 2 diabetes mellitus with diabetic neuropathy ([facility] ) 2015    Hyperlipidemia 2012      LOS (days): 0  Geometric Mean LOS (GMLOS) (days):   Days to GMLOS:     OBJECTIVE:            Current admission status: Observation   Preferred Pharmacy:   [address] [place] [address]  Phone: [phone] Fax: [phone]    [id number] [address]    [address] [facility]  Phone: [phone] Fax: [phone]    Primary Care Provider: Delta Pedroza MD    Primary Insurance: [employer] [facility]  Secondary Insurance:     DISCHARGE DETAILS:    Discharge planning discussed with[de-identified] [facility] and [address]         [facility] the [facility]?: Yes (To be arranged by [facility])  [address] requested[de-identified] Physical Therapy, Occupational Therapy, [address] Name[de-identified] Other ([facility])  [address] [place] [address] Provider[de-identified] PCP  Home Health Services Needed[de-identified] Evaluate Functional Status and Safety, Gait/ADL Training, Strengthening/Theraputic Exercises to Improve Function  Homebound Criteria Met[de-identified] Requires the Assistance of Another Person for Safe Ambulation or to Leave the Home, Uses an Assist Device (i e  cane, walker, etc)  Supporting Clincal Findings[de-identified] Limited Endurance, Fatigues Easliy in Short Distances    DME Referral Provided  Referral made for DME?: No    Other Referral/Resources/Interventions Provided:  Interventions: HHC, Transportation  Referral Comments: STACY was [facility] was for Tuesday [facility]/6 and not today  CM asked for SLETS to find another transportation time  SLETS spoke with [facility] and the earliest they are able to provide is tomorrow  [facility] then claimed transport for tomorrow at 12 for patient  CM updated SLIM and primary nurse           [facility]: [facility]  Discharge Destination Plan[de-identified] Home with [facility] at Discharge : [facility]  Dispatcher Contacted: Yes     [facility]  ETA of Transport (Date): 09/05/22  ETA of Transport (Time): 1200                       Additional Comments: CM contacted [facility] and left a detailed message for the on call nurse with the post poned DC today until tomorrow due to transportation and that the new transport company being used was [employer]  [facility] were [facility]  CM department will continue to follow to assist with discharge coordination

## 2022-09-04 NOTE — DISCHARGE SUMMARY
Patient didn't leave yesterday because of transportation  Will be discharged today when transportation is available  Please refer discharge summary dated 9/3/22 for details

## 2022-09-05 VITALS
RESPIRATION RATE: 18 BRPM | OXYGEN SATURATION: 96 % | HEART RATE: 87 BPM | WEIGHT: 177.47 LBS | DIASTOLIC BLOOD PRESSURE: 59 MMHG | BODY MASS INDEX: 27.8 KG/M2 | TEMPERATURE: 98 F | SYSTOLIC BLOOD PRESSURE: 106 MMHG

## 2022-09-05 LAB
GLUCOSE SERPL-MCNC: 117 MG/DL (ref 65–140)
GLUCOSE SERPL-MCNC: 72 MG/DL (ref 65–140)
GLUCOSE SERPL-MCNC: 90 MG/DL (ref 65–140)

## 2022-09-05 PROCEDURE — 99217 PR OBSERVATION CARE DISCHARGE MANAGEMENT: CPT | Performed by: INTERNAL MEDICINE

## 2022-09-05 PROCEDURE — 82948 REAGENT STRIP/BLOOD GLUCOSE: CPT

## 2022-09-05 RX ADMIN — ASPIRIN 81 MG CHEWABLE TABLET 81 MG: 81 TABLET CHEWABLE at 09:05

## 2022-09-05 RX ADMIN — ATORVASTATIN CALCIUM 40 MG: 40 TABLET, FILM COATED ORAL at 09:05

## 2022-09-05 RX ADMIN — VITAM B12 100 MCG: 100 TAB at 09:11

## 2022-09-05 RX ADMIN — CARBIDOPA AND LEVODOPA 1 TABLET: 25; 100 TABLET ORAL at 09:05

## 2022-09-05 RX ADMIN — ENOXAPARIN SODIUM 40 MG: 40 INJECTION SUBCUTANEOUS at 09:11

## 2022-09-05 RX ADMIN — RANOLAZINE 500 MG: 500 TABLET, EXTENDED RELEASE ORAL at 09:05

## 2022-09-05 RX ADMIN — CYANOCOBALAMIN TAB 500 MCG 1000 MCG: 500 TAB at 09:05

## 2022-09-05 RX ADMIN — CLOPIDOGREL BISULFATE 75 MG: 75 TABLET ORAL at 09:06

## 2022-09-05 RX ADMIN — FINASTERIDE 5 MG: 5 TABLET, FILM COATED ORAL at 09:05

## 2022-09-05 RX ADMIN — METFORMIN HYDROCHLORIDE 1000 MG: 500 TABLET ORAL at 09:06

## 2022-09-05 RX ADMIN — DOCUSATE SODIUM 100 MG: 100 CAPSULE, LIQUID FILLED ORAL at 09:06

## 2022-09-05 RX ADMIN — PANTOPRAZOLE SODIUM 40 MG: 40 TABLET, DELAYED RELEASE ORAL at 05:04

## 2022-09-05 RX ADMIN — POLYETHYLENE GLYCOL 3350 17 G: 17 POWDER, FOR SOLUTION ORAL at 09:11

## 2022-09-05 RX ADMIN — GLIPIZIDE 5 MG: 5 TABLET ORAL at 05:04

## 2022-09-05 NOTE — UTILIZATION REVIEW
Continued Stay Review    Date:9/5                          Current Patient Class: OBS   Current Level of Care: ms    HPI:68 y o  male initially admitted on 9/2    Assessment/Plan:  patient who originally presented to the hospital on 9/2/2022 due to inability to care for himself at home  Claude Gums has a history of Parkinson's disease, DM type 2, coronary artery disease, neuropathy   His wife manages all of his activities of daily living including making meals, changing, toileting, Rita Antonio was admitted to the hospital on the trauma service after a fall at home  Margaret Dunn is now medically cleared for discharge   PT/OT evaluated patient and they are recommending home with home health services   Case management arranged for continued home services from patient's pre-existing service, senior yaakov Luo is stable to return home with home services        Vital Signs:   Date/Time Temp Pulse Resp BP MAP (mmHg) SpO2 O2 Device Patient Position - Orthostatic VS   09/05/22 07:11:47 98 °F (36 7 °C) -- -- 99/55 70 -- -- --   09/04/22 2035 98 2 °F (36 8 °C) 78 18 126/60 -- 96 % None (Room air) Lying   09/04/22 1620 97 9 °F (36 6 °C) 78 18 107/55 -- 95 % -- Lying         Pertinent Labs/Diagnostic Results:       Results from last 7 days   Lab Units 09/02/22  2130   WBC Thousand/uL 11 65*   HEMOGLOBIN g/dL 11 9*   HEMATOCRIT % 36 2*   PLATELETS Thousands/uL 285   NEUTROS ABS Thousands/µL 9 10*         Results from last 7 days   Lab Units 09/02/22  2130   SODIUM mmol/L 139   POTASSIUM mmol/L 3 9   CHLORIDE mmol/L 102   CO2 mmol/L 27   ANION GAP mmol/L 10   BUN mg/dL 14   CREATININE mg/dL 1 10   EGFR ml/min/1 73sq m 66   CALCIUM mg/dL 8 6         Results from last 7 days   Lab Units 09/05/22  0505 09/04/22 2033 09/04/22  1755 09/04/22  1620 09/04/22  1152 09/04/22  0738 09/03/22  2134 09/03/22  2032 09/03/22  1621 09/03/22  1144 09/03/22  0851 09/03/22  0120   POC GLUCOSE mg/dl 72 119 108 82 95 101 116 86 110 118 153* 216*     Results from last 7 days   Lab Units 09/02/22  2130   GLUCOSE RANDOM mg/dL 169*             No results found for: BETA-HYDROXYBUTYRATE                                                                                                                               Medications:   Scheduled Medications:  aspirin, 81 mg, Oral, Daily  atorvastatin, 40 mg, Oral, Daily  carbidopa-levodopa, 1 tablet, Oral, TID  clopidogrel, 75 mg, Oral, Daily  cyanocobalamin, 1,000 mcg, Oral, Daily  vitamin B-12, 100 mcg, Oral, Daily  docusate sodium, 100 mg, Oral, BID  enoxaparin, 40 mg, Subcutaneous, Daily  famotidine, 20 mg, Oral, HS  finasteride, 5 mg, Oral, Daily  glipiZIDE, 5 mg, Oral, Daily Before Breakfast  insulin lispro, 1-5 Units, Subcutaneous, HS  insulin lispro, 1-6 Units, Subcutaneous, TID AC  lisinopril, 5 mg, Oral, Daily  melatonin, 6 mg, Oral, HS  metFORMIN, 1,000 mg, Oral, BID  metoprolol succinate, 25 mg, Oral, Daily  pantoprazole, 40 mg, Oral, BID AC  polyethylene glycol, 17 g, Oral, Daily  ranolazine, 500 mg, Oral, Q12H LIYAH  tamsulosin, 0 4 mg, Oral, Daily With Dinner      Continuous IV Infusions:     PRN Meds:  acetaminophen, 650 mg, Oral, Q6H PRN  senna, 8 6 mg, Oral, Daily PRN        Discharge Plan: [facility]    Network Utilization Review Department  ATTENTION: Please call with any questions or concerns to [phone] and carefully listen to the prompts so that you are directed to the right person  All voicemails are confidential   Claudia Burleson all requests for admission clinical reviews, approved or denied determinations and any other requests to dedicated fax number below belonging to the campus where the patient is receiving treatment   List of dedicated fax numbers for the Facilities:  [address] [facility] [address] DENIALS (Administrative/Medical Necessity) [phone]   [address] (Maternity/NICU/Pediatrics) [phone]   [address] [address] [place] [address] [address] [place] [address] [phone]

## 2022-09-05 NOTE — DISCHARGE SUMMARY
[facility]  Discharge- Ryan Patel 1950, 67 y o  male MRN: [id number]  Unit/Bed#: S -01 Encounter: [id number]  Primary Care Provider: Reese Anderson MD   Date and time admitted to hospital: 9/2/2022  8:42 PM    Discharging Resident Physician: Lanny Bhakta MD  Attending: Hans Morataya MD  PCP: Reese Anderson MD  Admission Date: 9/2/2022  Discharge Date: 09/05/22    Assessment:  1  Ambulatory dysfunction  2  Parkinson  3  CAD  4  CKD 3    Disposition:     Home with VNA Services (Reminder: Complete face to face encounter)    Reason for Admission: home    Consultations During Hospital Stay:  · none      Complications:  none    Hospital Course:       Ryan Patel is a 67 y o  male patient who originally presented to the hospital on 9/2/2022 due to inability to care for himself at home  Patient has a history of Parkinson's disease, DM type 2, coronary artery disease, neuropathy  His wife manages all of his activities of daily living including making meals, changing, toileting, showering  She was admitted to the hospital on the trauma service after a fall at home  She is now medically cleared for discharge  PT/OT evaluated patient and they are recommending home with home health services  Case management arranged for continued home services from patient's pre-existing service, [facility]  He is stable to return home with home services  Condition at Discharge: stable     Discharge Day Visit / Exam:     Subjective:  No complaints apart from fatigue  Vitals: Blood Pressure: 126/60 (09/04/22 2035)  Pulse: 78 (09/04/22 2035)  Temperature: 98 2 °F (36 8 °C) (09/04/22 2035)  Temp Source: Oral (09/04/22 2035)  Respirations: 18 (09/04/22 2035)  Weight - Scale: 80 5 kg (177 lb 7 5 oz) (09/02/22 2045)  SpO2: 96 % (09/04/22 2035)  Exam:   Physical Exam  Vitals and nursing note reviewed  Constitutional:       General: He is not in acute distress       Appearance: He is not toxic-appearing  HENT:      Head: Normocephalic and atraumatic  Eyes:      General: No scleral icterus  Conjunctiva/sclera: Conjunctivae normal    Cardiovascular:      Rate and Rhythm: Normal rate and regular rhythm  Pulmonary:      Effort: Pulmonary effort is normal  No respiratory distress  Breath sounds: Normal breath sounds  Abdominal:      General: Bowel sounds are normal       Palpations: Abdomen is soft  Tenderness: There is no abdominal tenderness  Musculoskeletal:      Cervical back: Normal range of motion and neck supple  No rigidity  Right lower leg: No edema  Left lower leg: No edema  Skin:     General: Skin is warm  Coloration: Skin is not jaundiced  Neurological:      General: No focal deficit present  Mental Status: He is alert and oriented to person, place, and time  Psychiatric:         Mood and Affect: Mood normal          Behavior: Behavior normal        Nutrition Assessment and Intervention:     Reviewed food recall journal      Physical Activity Assessment and Intervention:    Activity journal completion recommended    Exercise capacity assessment recommended      Emotional and Mental Well-being, Sleep, Connectedness Assessment and Intervention:    Sleep/stress assessment performed    Depression and anxiety screening performed and reviewed    PHQ-9 or GAD7 performed for initial evaluation or follow-up      Tobacco and Toxic Substance Assessment and Intervention:     Tobacco use screening performed    Alcohol and drug use screening performed      Therapeutic Lifestyle Change Visit:     One-on-one comprehensive counseling, coaching, and health behavior change visit completed        Discharge instructions/Information to patient and family:   See after visit summary for information provided to patient and family        Provisions for Follow-Up Care:  See after visit summary for information related to follow-up care and any pertinent home health orders  Planned Readmission: no     Discharge Medications:  See after visit summary for reconciled discharge medications provided to patient and family        ** Please Note: This note has been constructed using a voice recognition system **

## 2022-09-05 NOTE — NURSING NOTE
Blood Sugar obtained at 0500 am, resulted as 72  Patient asymptomatic  Orange juice, cereal and milk was offered and eaten       Will inform oncoming nurse to recheck Blood sugar at 8 am      Saintclair Penton, RN

## 2022-09-05 NOTE — PLAN OF CARE
Problem: Potential for Falls  Goal: Patient will remain free of falls  Description: INTERVENTIONS:  - Educate patient/family on patient safety including physical limitations  - Instruct patient to call for assistance with activity   - Consult OT/PT to assist with strengthening/mobility   - Keep Call bell within reach  - Keep bed low and locked with side rails adjusted as appropriate  - Keep care items and personal belongings within reach  - Initiate and maintain comfort rounds  - Make Fall Risk Sign visible to staff  - Offer Toileting every 2 Hours, in advance of need  - Initiate/Maintain bed alarm  - Apply yellow socks and bracelet for high fall risk patients  - Consider moving patient to room near nurses station  Outcome: Progressing     Problem: METABOLIC, FLUID AND ELECTROLYTES - ADULT  Goal: Electrolytes maintained within normal limits  Description: INTERVENTIONS:  - Monitor labs and assess patient for signs and symptoms of electrolyte imbalances  - Administer electrolyte replacement as ordered  - Monitor response to electrolyte replacements, including repeat lab results as appropriate  - Instruct patient on fluid and nutrition as appropriate  Outcome: Progressing  Goal: Fluid balance maintained  Description: INTERVENTIONS:  - Monitor labs   - Monitor I/O and WT  - Instruct patient on fluid and nutrition as appropriate  - Assess for signs & symptoms of volume excess or deficit  Outcome: Progressing     Problem: SKIN/TISSUE INTEGRITY - ADULT  Goal: Skin Integrity remains intact(Skin Breakdown Prevention)  Description: Assess:  -Perform Rahat assessment every shift   -Inspect skin when repositioning, toileting, and assisting with ADLS  -Assess extremities for adequate circulation and sensation     Bed Management:  -Have minimal linens on bed & keep smooth, unwrinkled  -Change linens as needed when moist or perspiring  -Avoid sitting or lying in one position for more than 2 hours while in bed  -Keep HOB at 30 degrees     Toileting:  -Offer bedside commode    Activity:  -Mobilize patient 3 times a day  -Encourage activity and walks on unit  -Encourage or provide ROM exercises   -Turn and reposition patient every 2 Hours  -Use appropriate equipment to lift or move patient in bed  -Instruct/ Assist with weight shifting every 2 hours when out of bed in chair  -Consider limitation of chair time 2 hour intervals    Skin Care:  -Avoid use of baby powder, tape, friction and shearing, hot water or constrictive clothing  -Do not massage red bony areas    Problem: SAFETY ADULT  Goal: Patient will remain free of falls  Description: INTERVENTIONS:  - Educate patient/family on patient safety including physical limitations  - Instruct patient to call for assistance with activity   - Consult OT/PT to assist with strengthening/mobility   - Keep Call bell within reach  - Keep bed low and locked with side rails adjusted as appropriate  - Keep care items and personal belongings within reach  - Initiate and maintain comfort rounds  - Make Fall Risk Sign visible to staff  - Offer Toileting every 2 Hours, in advance of need  - Initiate/Maintain bed alarm  - Apply yellow socks and bracelet for high fall risk patients  - Consider moving patient to room near nurses station  Outcome: Progressing  Goal: Maintain or return to baseline ADL function  Description: INTERVENTIONS:  -  Assess patient's ability to carry out ADLs; assess patient's baseline for ADL function and identify physical deficits which impact ability to perform ADLs (bathing, care of mouth/teeth, toileting, grooming, dressing, etc )  - Assess/evaluate cause of self-care deficits   - Assess range of motion  - Assess patient's mobility; develop plan if impaired  - Assess patient's need for assistive devices and provide as appropriate  - Encourage maximum independence but intervene and supervise when necessary  - Involve family in performance of ADLs  - Assess for home care needs following discharge   - Consider OT consult to assist with ADL evaluation and planning for discharge  - Provide patient education as appropriate  Outcome: Progressing  Goal: Maintains/Returns to pre admission functional level  Description: INTERVENTIONS:  - Perform BMAT or MOVE assessment daily    - Set and communicate daily mobility goal to care team and patient/family/caregiver  - Collaborate with rehabilitation services on mobility goals if consulted  - Perform Range of Motion 3 times a day  - Reposition patient every 2 hours  - Dangle patient 3 times a day  - Stand patient 3 times a day  - Ambulate patient 3 times a day  - Out of bed to chair 3 times a day   - Out of bed for meals 3 times a day  - Out of bed for toileting  - Record patient progress and toleration of activity level   Outcome: Progressing     Problem: DISCHARGE PLANNING  Goal: Discharge to home or other facility with appropriate resources  Description: INTERVENTIONS:  - Identify barriers to discharge w/patient and caregiver  - Arrange for needed discharge resources and transportation as appropriate  - Identify discharge learning needs (meds, wound care, etc )  - Arrange for interpretive services to assist at discharge as needed  - Refer to Case Management Department for coordinating discharge planning if the patient needs post-hospital services based on physician/advanced practitioner order or complex needs related to functional status, cognitive ability, or social support system  Outcome: Progressing     Problem: Knowledge Deficit  Goal: Patient/family/caregiver demonstrates understanding of disease process, treatment plan, medications, and discharge instructions  Description: Complete learning assessment and assess knowledge base    Interventions:  - Provide teaching at level of understanding  - Provide teaching via preferred learning methods  Outcome: Progressing     Problem: MOBILITY - ADULT  Goal: Maintain or return to baseline ADL function  Description: INTERVENTIONS:  -  Assess patient's ability to carry out ADLs; assess patient's baseline for ADL function and identify physical deficits which impact ability to perform ADLs (bathing, care of mouth/teeth, toileting, grooming, dressing, etc )  - Assess/evaluate cause of self-care deficits   - Assess range of motion  - Assess patient's mobility; develop plan if impaired  - Assess patient's need for assistive devices and provide as appropriate  - Encourage maximum independence but intervene and supervise when necessary  - Involve family in performance of ADLs  - Assess for home care needs following discharge   - Consider OT consult to assist with ADL evaluation and planning for discharge  - Provide patient education as appropriate  Outcome: Progressing  Goal: Maintains/Returns to pre admission functional level  Description: INTERVENTIONS:  - Perform BMAT or MOVE assessment daily    - Set and communicate daily mobility goal to care team and patient/family/caregiver  - Collaborate with rehabilitation services on mobility goals if consulted  - Perform Range of Motion 3 times a day  - Reposition patient every 2 hours    - Dangle patient 3 times a day  - Stand patient 3 times a day  - Ambulate patient 3 times a day  - Out of bed to chair 3 times a day   - Out of bed for meals 3 times a day  - Out of bed for toileting  - Record patient progress and toleration of activity level   Outcome: Progressing     Problem: Prexisting or High Potential for Compromised Skin Integrity  Goal: Skin integrity is maintained or improved  Description: INTERVENTIONS:  - Identify patients at risk for skin breakdown  - Assess and monitor skin integrity  - Assess and monitor nutrition and hydration status  - Monitor labs   - Assess for incontinence   - Turn and reposition patient  - Assist with mobility/ambulation  - Relieve pressure over bony prominences  - Avoid friction and shearing  - Provide appropriate hygiene as needed including keeping skin clean and dry  - Evaluate need for skin moisturizer/barrier cream  - Collaborate with interdisciplinary team   - Patient/family teaching  - Consider wound care consult   Outcome: Progressing

## 2022-09-06 NOTE — UTILIZATION REVIEW
Notification of Discharge   This is a Notification of Discharge from our facility [address]  Please be advised that this patient has been discharge from our facility  Below you will find the admission and discharge date and time including the patients disposition  UTILIZATION REVIEW CONTACT:  Marisela Bonilla MA  Utilization   Network Utilization Review Department  Phone: [phone] x carefully listen to the prompts  All voicemails are confidential   Email: [email or web address]     PHYSICIAN ADVISORY SERVICES:  FOR [id number] REVIEW - MEDICAL NECESSITY DENIAL  Phone: [phone]  Fax: [phone]  Email: [email or web address]     PRESENTATION DATE: 9/2/2022  8:42 PM  OBERVATION ADMISSION DATE:   INPATIENT ADMISSION DATE: N/A N/A   DISCHARGE DATE: 9/5/2022  3:18 PM  DIS[facility]ITION[facility] with [address] [place] [address] Road INFORMATION:  Send all requests for admission clinical reviews, approved or denied determinations and any other requests to dedicated fax number below belonging to the campus where the patient is receiving treatment   List of dedicated fax numbers:  [address] DENIALS (Administrative/Medical Necessity) [phone]   [address] (Maternity/NICU/Pediatrics) [phone]   [facility] [phone]   [address] [facility] [phone]   [address] [place] [address] [phone]   [address] [place] [address] [address] [place] [address],[address] [phone]   [facility]  [phone]   [address] [place], [place] [address] [facility] [address] [facility] [phone]

## 2022-10-11 PROBLEM — N39.0 UTI (URINARY TRACT INFECTION): Status: RESOLVED | Noted: 2020-05-08 | Resolved: 2022-10-11

## 2022-12-31 ENCOUNTER — APPOINTMENT (EMERGENCY)
Dept: CT IMAGING | Facility: HOSPITAL | Age: 72
End: 2022-12-31

## 2022-12-31 ENCOUNTER — HOSPITAL ENCOUNTER (INPATIENT)
Facility: HOSPITAL | Age: 72
LOS: 5 days | Discharge: HOME HEALTH | End: 2023-01-05
Attending: EMERGENCY MEDICINE | Admitting: INTERNAL MEDICINE

## 2022-12-31 DIAGNOSIS — L30.4 INTERTRIGO: ICD-10-CM

## 2022-12-31 DIAGNOSIS — E11.40 TYPE 2 DIABETES MELLITUS WITH DIABETIC NEUROPATHY, WITHOUT LONG-TERM CURRENT USE OF INSULIN (HCC): ICD-10-CM

## 2022-12-31 DIAGNOSIS — H04.123 DRY EYES: ICD-10-CM

## 2022-12-31 DIAGNOSIS — R26.2 AMBULATORY DYSFUNCTION: ICD-10-CM

## 2022-12-31 DIAGNOSIS — N39.0 UTI (URINARY TRACT INFECTION): Primary | ICD-10-CM

## 2022-12-31 DIAGNOSIS — A41.9 SEPSIS (HCC): ICD-10-CM

## 2022-12-31 PROBLEM — G93.41 ACUTE METABOLIC ENCEPHALOPATHY: Status: ACTIVE | Noted: 2022-12-31

## 2022-12-31 PROBLEM — N48.1 BALANITIS: Status: ACTIVE | Noted: 2022-12-31

## 2022-12-31 PROBLEM — R65.20 SEVERE SEPSIS (HCC): Status: ACTIVE | Noted: 2019-12-31

## 2022-12-31 LAB
2HR DELTA HS TROPONIN: -4 NG/L
4HR DELTA HS TROPONIN: 1 NG/L
ALBUMIN SERPL BCP-MCNC: 3.8 G/DL (ref 3.5–5)
ALP SERPL-CCNC: 80 U/L (ref 34–104)
ALT SERPL W P-5'-P-CCNC: 3 U/L (ref 7–52)
ANION GAP SERPL CALCULATED.3IONS-SCNC: 12 MMOL/L (ref 4–13)
APTT PPP: 28 SECONDS (ref 23–37)
AST SERPL W P-5'-P-CCNC: 12 U/L (ref 13–39)
ATRIAL RATE: 104 BPM
BACTERIA UR QL AUTO: ABNORMAL /HPF
BASOPHILS # BLD AUTO: 0.02 THOUSANDS/ÂΜL (ref 0–0.1)
BASOPHILS NFR BLD AUTO: 0 % (ref 0–1)
BILIRUB SERPL-MCNC: 1.2 MG/DL (ref 0.2–1)
BILIRUB UR QL STRIP: NEGATIVE
BUN SERPL-MCNC: 23 MG/DL (ref 5–25)
CALCIUM SERPL-MCNC: 9 MG/DL (ref 8.4–10.2)
CARDIAC TROPONIN I PNL SERPL HS: 12 NG/L
CARDIAC TROPONIN I PNL SERPL HS: 13 NG/L
CARDIAC TROPONIN I PNL SERPL HS: 8 NG/L
CHLORIDE SERPL-SCNC: 100 MMOL/L (ref 96–108)
CLARITY UR: ABNORMAL
CO2 SERPL-SCNC: 24 MMOL/L (ref 21–32)
COLOR UR: ABNORMAL
CREAT SERPL-MCNC: 1.29 MG/DL (ref 0.6–1.3)
EOSINOPHIL # BLD AUTO: 0.01 THOUSAND/ÂΜL (ref 0–0.61)
EOSINOPHIL NFR BLD AUTO: 0 % (ref 0–6)
ERYTHROCYTE [DISTWIDTH] IN BLOOD BY AUTOMATED COUNT: 13.7 % (ref 11.6–15.1)
FLUAV RNA RESP QL NAA+PROBE: NEGATIVE
FLUBV RNA RESP QL NAA+PROBE: NEGATIVE
GFR SERPL CREATININE-BSD FRML MDRD: 55 ML/MIN/1.73SQ M
GLUCOSE SERPL-MCNC: 156 MG/DL (ref 65–140)
GLUCOSE SERPL-MCNC: 213 MG/DL (ref 65–140)
GLUCOSE UR STRIP-MCNC: NEGATIVE MG/DL
HCT VFR BLD AUTO: 36.9 % (ref 36.5–49.3)
HGB BLD-MCNC: 12.1 G/DL (ref 12–17)
HGB UR QL STRIP.AUTO: ABNORMAL
IMM GRANULOCYTES # BLD AUTO: 0.1 THOUSAND/UL (ref 0–0.2)
IMM GRANULOCYTES NFR BLD AUTO: 1 % (ref 0–2)
INR PPP: 1.05 (ref 0.84–1.19)
KETONES UR STRIP-MCNC: NEGATIVE MG/DL
LACTATE SERPL-SCNC: 1.4 MMOL/L (ref 0.5–2)
LACTATE SERPL-SCNC: 2.7 MMOL/L (ref 0.5–2)
LEUKOCYTE ESTERASE UR QL STRIP: ABNORMAL
LYMPHOCYTES # BLD AUTO: 0.34 THOUSANDS/ÂΜL (ref 0.6–4.47)
LYMPHOCYTES NFR BLD AUTO: 3 % (ref 14–44)
MCH RBC QN AUTO: 28.7 PG (ref 26.8–34.3)
MCHC RBC AUTO-ENTMCNC: 32.8 G/DL (ref 31.4–37.4)
MCV RBC AUTO: 88 FL (ref 82–98)
MONOCYTES # BLD AUTO: 0.74 THOUSAND/ÂΜL (ref 0.17–1.22)
MONOCYTES NFR BLD AUTO: 5 % (ref 4–12)
NEUTROPHILS # BLD AUTO: 12.37 THOUSANDS/ÂΜL (ref 1.85–7.62)
NEUTS SEG NFR BLD AUTO: 91 % (ref 43–75)
NITRITE UR QL STRIP: NEGATIVE
NON-SQ EPI CELLS URNS QL MICRO: ABNORMAL /HPF
NRBC BLD AUTO-RTO: 0 /100 WBCS
P AXIS: 67 DEGREES
PH UR STRIP.AUTO: 5 [PH]
PLATELET # BLD AUTO: 206 THOUSANDS/UL (ref 149–390)
PLATELET # BLD AUTO: 241 THOUSANDS/UL (ref 149–390)
PMV BLD AUTO: 10.4 FL (ref 8.9–12.7)
PMV BLD AUTO: 10.5 FL (ref 8.9–12.7)
POTASSIUM SERPL-SCNC: 4.2 MMOL/L (ref 3.5–5.3)
PR INTERVAL: 140 MS
PROCALCITONIN SERPL-MCNC: 0.19 NG/ML
PROT SERPL-MCNC: 6.7 G/DL (ref 6.4–8.4)
PROT UR STRIP-MCNC: ABNORMAL MG/DL
PROTHROMBIN TIME: 14 SECONDS (ref 11.6–14.5)
QRS AXIS: 51 DEGREES
QRSD INTERVAL: 120 MS
QT INTERVAL: 376 MS
QTC INTERVAL: 494 MS
RBC # BLD AUTO: 4.21 MILLION/UL (ref 3.88–5.62)
RBC #/AREA URNS AUTO: ABNORMAL /HPF
RSV RNA RESP QL NAA+PROBE: NEGATIVE
SARS-COV-2 RNA RESP QL NAA+PROBE: NEGATIVE
SODIUM SERPL-SCNC: 136 MMOL/L (ref 135–147)
SP GR UR STRIP.AUTO: 1.02 (ref 1–1.03)
T WAVE AXIS: 72 DEGREES
TSH SERPL DL<=0.05 MIU/L-ACNC: 0.72 UIU/ML (ref 0.45–4.5)
UROBILINOGEN UR STRIP-ACNC: <2 MG/DL
VENTRICULAR RATE: 104 BPM
WBC # BLD AUTO: 13.58 THOUSAND/UL (ref 4.31–10.16)
WBC #/AREA URNS AUTO: ABNORMAL /HPF
WBC CLUMPS # UR AUTO: PRESENT /UL

## 2022-12-31 RX ORDER — ACETAMINOPHEN 325 MG/1
975 TABLET ORAL ONCE
Status: COMPLETED | OUTPATIENT
Start: 2022-12-31 | End: 2022-12-31

## 2022-12-31 RX ORDER — ASPIRIN 81 MG/1
81 TABLET, CHEWABLE ORAL DAILY
Status: DISCONTINUED | OUTPATIENT
Start: 2023-01-01 | End: 2023-01-05 | Stop reason: HOSPADM

## 2022-12-31 RX ORDER — INSULIN LISPRO 100 [IU]/ML
1-6 INJECTION, SOLUTION INTRAVENOUS; SUBCUTANEOUS
Status: DISCONTINUED | OUTPATIENT
Start: 2022-12-31 | End: 2023-01-01

## 2022-12-31 RX ORDER — INSULIN LISPRO 100 [IU]/ML
1-6 INJECTION, SOLUTION INTRAVENOUS; SUBCUTANEOUS
Status: DISCONTINUED | OUTPATIENT
Start: 2022-12-31 | End: 2023-01-05 | Stop reason: HOSPADM

## 2022-12-31 RX ORDER — ATORVASTATIN CALCIUM 40 MG/1
40 TABLET, FILM COATED ORAL DAILY
Status: DISCONTINUED | OUTPATIENT
Start: 2023-01-01 | End: 2023-01-05 | Stop reason: HOSPADM

## 2022-12-31 RX ORDER — HEPARIN SODIUM 5000 [USP'U]/ML
5000 INJECTION, SOLUTION INTRAVENOUS; SUBCUTANEOUS EVERY 8 HOURS SCHEDULED
Status: DISCONTINUED | OUTPATIENT
Start: 2022-12-31 | End: 2023-01-05 | Stop reason: HOSPADM

## 2022-12-31 RX ORDER — DUTASTERIDE AND TAMSULOSIN HYDROCHLORIDE CAPSULES .5; .4 MG/1; MG/1
1 CAPSULE ORAL EVERY MORNING
COMMUNITY
End: 2023-01-05

## 2022-12-31 RX ORDER — ACETAMINOPHEN 325 MG/1
650 TABLET ORAL EVERY 6 HOURS PRN
Status: DISCONTINUED | OUTPATIENT
Start: 2022-12-31 | End: 2023-01-05 | Stop reason: HOSPADM

## 2022-12-31 RX ORDER — SODIUM CHLORIDE 9 MG/ML
75 INJECTION, SOLUTION INTRAVENOUS CONTINUOUS
Status: DISCONTINUED | OUTPATIENT
Start: 2022-12-31 | End: 2023-01-02

## 2022-12-31 RX ORDER — DOCUSATE SODIUM 100 MG/1
100 CAPSULE, LIQUID FILLED ORAL 2 TIMES DAILY
Status: DISCONTINUED | OUTPATIENT
Start: 2022-12-31 | End: 2023-01-05

## 2022-12-31 RX ORDER — LANOLIN ALCOHOL/MO/W.PET/CERES
3 CREAM (GRAM) TOPICAL
Status: DISCONTINUED | OUTPATIENT
Start: 2022-12-31 | End: 2023-01-05 | Stop reason: HOSPADM

## 2022-12-31 RX ORDER — CLOPIDOGREL BISULFATE 75 MG/1
75 TABLET ORAL DAILY
Status: DISCONTINUED | OUTPATIENT
Start: 2023-01-01 | End: 2023-01-05 | Stop reason: HOSPADM

## 2022-12-31 RX ORDER — TAMSULOSIN HYDROCHLORIDE 0.4 MG/1
0.4 CAPSULE ORAL
Status: DISCONTINUED | OUTPATIENT
Start: 2022-12-31 | End: 2023-01-05 | Stop reason: HOSPADM

## 2022-12-31 RX ORDER — METOPROLOL SUCCINATE 25 MG/1
25 TABLET, EXTENDED RELEASE ORAL DAILY
Status: DISCONTINUED | OUTPATIENT
Start: 2023-01-01 | End: 2023-01-05 | Stop reason: HOSPADM

## 2022-12-31 RX ORDER — NYSTATIN 100000 [USP'U]/G
POWDER TOPICAL 2 TIMES DAILY
Status: DISCONTINUED | OUTPATIENT
Start: 2022-12-31 | End: 2023-01-05 | Stop reason: HOSPADM

## 2022-12-31 RX ORDER — PANTOPRAZOLE SODIUM 40 MG/1
40 TABLET, DELAYED RELEASE ORAL
Status: DISCONTINUED | OUTPATIENT
Start: 2023-01-01 | End: 2023-01-05 | Stop reason: HOSPADM

## 2022-12-31 RX ORDER — RANOLAZINE 500 MG/1
500 TABLET, EXTENDED RELEASE ORAL EVERY 12 HOURS SCHEDULED
Status: DISCONTINUED | OUTPATIENT
Start: 2022-12-31 | End: 2023-01-05 | Stop reason: HOSPADM

## 2022-12-31 RX ORDER — FINASTERIDE 5 MG/1
5 TABLET, FILM COATED ORAL DAILY
Status: DISCONTINUED | OUTPATIENT
Start: 2023-01-01 | End: 2023-01-05 | Stop reason: HOSPADM

## 2022-12-31 RX ORDER — LISINOPRIL 5 MG/1
5 TABLET ORAL DAILY
Status: DISCONTINUED | OUTPATIENT
Start: 2023-01-01 | End: 2023-01-05 | Stop reason: HOSPADM

## 2022-12-31 RX ADMIN — RANOLAZINE 500 MG: 500 TABLET, FILM COATED, EXTENDED RELEASE ORAL at 21:44

## 2022-12-31 RX ADMIN — CEFTRIAXONE SODIUM 1000 MG: 10 INJECTION, POWDER, FOR SOLUTION INTRAVENOUS at 13:19

## 2022-12-31 RX ADMIN — Medication 3 MG: at 21:44

## 2022-12-31 RX ADMIN — CARBIDOPA AND LEVODOPA 1 TABLET: 25; 100 TABLET ORAL at 21:44

## 2022-12-31 RX ADMIN — INSULIN LISPRO 2 UNITS: 100 INJECTION, SOLUTION INTRAVENOUS; SUBCUTANEOUS at 21:52

## 2022-12-31 RX ADMIN — SODIUM CHLORIDE 75 ML/HR: 0.9 INJECTION, SOLUTION INTRAVENOUS at 19:29

## 2022-12-31 RX ADMIN — DOCUSATE SODIUM 100 MG: 100 CAPSULE, LIQUID FILLED ORAL at 18:49

## 2022-12-31 RX ADMIN — IOHEXOL 100 ML: 350 INJECTION, SOLUTION INTRAVENOUS at 13:44

## 2022-12-31 RX ADMIN — SODIUM CHLORIDE 1000 ML: 0.9 INJECTION, SOLUTION INTRAVENOUS at 12:15

## 2022-12-31 RX ADMIN — TAMSULOSIN HYDROCHLORIDE 0.4 MG: 0.4 CAPSULE ORAL at 18:49

## 2022-12-31 RX ADMIN — SODIUM CHLORIDE 1000 ML: 0.9 INJECTION, SOLUTION INTRAVENOUS at 13:21

## 2022-12-31 RX ADMIN — HEPARIN SODIUM 5000 UNITS: 5000 INJECTION INTRAVENOUS; SUBCUTANEOUS at 21:44

## 2022-12-31 RX ADMIN — ACETAMINOPHEN 975 MG: 325 TABLET, FILM COATED ORAL at 12:39

## 2022-12-31 NOTE — ASSESSMENT & PLAN NOTE
Left-sided weakness from Parkinson's and neuropathy  At baseline, no other focal deficits  Continue Sinemet  PT OT

## 2022-12-31 NOTE — ASSESSMENT & PLAN NOTE
Patient underwent cardiac catheterization February 2022 which revealed significant stenosis of the LAD, mid RCA and 1st marginal lesion     Patient not candidate for bypass due to several comorbidities  Medical therapy was optimized    Plan:  Continue aspirin and Plavix 20  Continue metoprolol 25 mg daily  Continue atorvastatin 40 mg daily  Ranexa 500 twice daily

## 2022-12-31 NOTE — ASSESSMENT & PLAN NOTE
Lab Results   Component Value Date    HGBA1C 6 4 (H) 03/05/2022       No results for input(s): POCGLU in the last 72 hours      Blood Sugar Average: Last 72 hrs:  Sliding scale  Accu-Cheks every 6h  Hypoglycemia protocol

## 2022-12-31 NOTE — ASSESSMENT & PLAN NOTE
POA-fever, dysuria,  Denies hematuria, flank pain, nausea or vomiting  History of UTI 7/2022- no history of MDRO    PLAN  Continue ceftriaxone 1 g every 24 hours  Fluids 25 cc/h  Follow-up urine cultures and blood cultures

## 2022-12-31 NOTE — ASSESSMENT & PLAN NOTE
Patient reportedly more sleepy and less interactive than usual  There is no confusion, but patient has mild headaches  P0A-fever, dysuria, mild headaches    No seizures, no blurred vision,  no chest pain, no shortness of breath,  Evidence of urinary tract infection with severe sepsis  Toxic metabolic encephalopathy secondary to urosepsis    PLAN  Management of urinary tract infection  Avoid hallucinogenic agents  Fall and delirium precautions

## 2022-12-31 NOTE — ASSESSMENT & PLAN NOTE
· SIRS criteria:  fever 102 9F,   · Has leukocytosis with bandemia  13 K  · Suspected source:  UTI,UA unremarkable vital signs-patient has history of UTI no notable cultures,   · Chest x-ray:  Negative   · Lactic acid:   2 7  · End organ damage:  yes  · IV Fluids:  1 L fluid bolus in ED  · IV antibiotics:   Ceftriaxone 1 G IV x1 dose in the ED    Plan  Monitor temp curve CBC, Pro-Magdy, lactic acid  Fluids saline 75 cc/h  Continue ceftriaxone 1 g/24h  Follow-up blood cultures, urine cultures

## 2022-12-31 NOTE — H&P
[facility]  H&P- Melissa Mcdonough 1950, 67 y o  male MRN: [id number]  Unit/Bed#: ED-15 Encounter: [id number]  Primary Care Provider: Priscilla Rowe MD   Date and time admitted to hospital: 12/31/2022 11:43 AM    * Severe sepsis ([facility] [address] )  Assessment & Plan  · SIRS criteria:  fever 102 9F,   · Has leukocytosis with bandemia  13 K  · Suspected source:  UTI,UA unremarkable vital signs-patient has history of UTI no notable cultures,   · Chest x-ray:  Negative   · Lactic acid:   2 7  · End organ damage:  yes  · IV Fluids:  1 L fluid bolus in ED  · IV antibiotics:   Ceftriaxone 1 G IV x1 dose in the ED    Plan  Monitor temp curve CBC, Pro-Magdy, lactic acid  Fluids saline 75 cc/h  Continue ceftriaxone 1 g/24h  Follow-up blood cultures, urine cultures    Acute metabolic encephalopathy  Assessment & Plan  Patient reportedly more sleepy and less interactive than usual  There is no confusion, but patient has mild headaches  P0A-fever, dysuria, mild headaches  No seizures, no blurred vision,  no chest pain, no shortness of breath,  Evidence of urinary tract infection with severe sepsis  Toxic metabolic encephalopathy secondary to urosepsis    PLAN  Management of urinary tract infection  Avoid hallucinogenic agents  Fall and delirium precautions    Intertrigo  Assessment & Plan  At the groin  Nystatin powder  Keep groin dry and clean    Parkinson disease [facility])  Assessment & Plan    Patient following with neurology in outpatient  Continue Sinemet 1 tablet 3 times daily    Coronary artery disease involving native coronary artery  Assessment & Plan  Patient underwent cardiac catheterization February 2022 which revealed significant stenosis of the LAD, mid RCA and 1st marginal lesion     Patient not candidate for bypass due to several comorbidities  Medical therapy was optimized    Plan:  Continue aspirin and Plavix 20  Continue metoprolol 25 mg daily  Continue atorvastatin 40 mg daily  Ranexa 500 twice daily       Left-sided weakness  Assessment & Plan  Left-sided weakness from Parkinson's and neuropathy  At baseline, no other focal deficits  Continue Sinemet  PT OT    UTI (urinary tract infection)  Assessment & Plan  POA-fever, dysuria,  Denies hematuria, flank pain, nausea or vomiting  History of UTI 7/2022- no history of MDRO    PLAN  Continue ceftriaxone 1 g every 24 hours  Fluids 25 cc/h  Follow-up urine cultures and blood cultures    Stage 3 chronic kidney disease [facility])  Assessment & Plan  Lab Results   Component Value Date    EGFR 55 12/31/2022    EGFR 66 09/02/2022    EGFR 71 07/09/2022    CREATININE 1 29 12/31/2022    CREATININE 1 10 09/02/2022    CREATININE 1 04 07/09/2022     Stable disease  Avoid nephrotoxic agents, avoid hypotension  Daily BMP    Ambulatory dysfunction  Assessment & Plan  History dysfunctional history of multiple falls  Prior ED visits and admissions due to falls     Plan    PT OT evaluation and treat  Case management consult for possible placement    Essential hypertension  Assessment & Plan  BP stable, continue lisinopril and metoprolol    Type 2 diabetes mellitus with diabetic neuropathy [facility])  Assessment & Plan  Lab Results   Component Value Date    HGBA1C 6 4 (H) 03/05/2022       No results for input(s): POCGLU in the last 72 hours  Blood Sugar Average: Last 72 hrs:  Sliding scale  Accu-Cheks every 6h  Hypoglycemia protocol    Hyperlipidemia  Assessment & Plan  Continue atorvastatin 40 mg    VTE Pharmacologic Prophylaxis: VTE Score: 6 High Risk (Score >/= 5) - Pharmacological DVT Prophylaxis Ordered: heparin  Sequential Compression Devices Ordered  Code Status: Prior   Discussion with family: Attempted to update  (wife) via phone  Unable to contact  Anticipated Length of Stay: Patient will be admitted on an observation basis with an anticipated length of stay of less than 2 midnights secondary to Urosepsis, toxic metabolic encephalopathy      Chief Complaint: Altered mental status    History of Present Illness:  Terrie Lamb is a 67 y o  male with a PMH Parkinson's disease, neuropathy, with left-sided weakness, BPH, urinary incontinence, hypertension, hyperlipidemia, coronary artery disease, type 2 diabetes,   Patient was brought by EMS  Patient's wife reports he has been more sleepy than usual, less interactive, has mild headaches but not confused  Patient has baseline slurred speech, no word finding difficulty no dysarthria no seizures or abnormal movements, blurry vision  Patient has fever temperature noted 99 5 Fahrenheit, associated with burning urination, mild abdominal pain  Patient denies hematuria, flank pain, diarrhea or constipation  Home nurse reported that patient has foul-smelling urine  At The ED, patient conscious oriented x3, hemodynamically stable  Met severe sepsis criteria with fever, leukocytosis, tachycardia, elevated lactic acid  Urinalysis with white blood cells, innumerable bacteria  CT abdomen shows colonic diverticulosisSmall bilateral simple cysts and nonobstructing left   renal calculi noted  Patient received 2 L fluid bolus and IV ceftriaxone      Review of Systems:  Review of Systems   Constitutional: Negative for activity change  Respiratory: Positive for cough  Negative for shortness of breath and wheezing  Genitourinary: Positive for difficulty urinating and dysuria  Negative for flank pain and frequency  Musculoskeletal: Positive for arthralgias  Neurological: Positive for headaches  Negative for dizziness, tremors, seizures, weakness and light-headedness  Psychiatric/Behavioral: Negative for agitation, behavioral problems and confusion         Past Medical and Surgical History:   Past Medical History:   Diagnosis Date   • Ambulatory dysfunction     uses walker with assist of 1   • Anemia    • Anxiety    • At risk for falls     hx of falls   • Benign paroxysmal vertigo     unspecified ear   • BPH (benign prostatic hyperplasia)     for TURP today 1/4/2021   • Calculus in bladder     for surgical removal today 1/4/2021   • Cataract     left eye   • Cervicalgia    • Chest pain    • Chronic kidney disease     stage 3   • Constipation    • CTS (carpal tunnel syndrome)     left   • Cyst of pancreas    • Cystitis 06/23/2020    acute cystitis with hematuria   • Depression    • Diabetes mellitus ([facility] [address] )     type II with neuropathy   • Dizziness     and giddiness   • Dysphagia    • Elevated PSA    • Facial weakness    • GERD (gastroesophageal reflux disease)     last assessed 5/20/16   • Gross hematuria    • Hematuria    • Hyperlipidemia    • Hypertension    • Kidney disease    • Kidney stone    • Left-sided weakness    • Long term (current) use of oral hypoglycemic drugs    • Muscle weakness    • Nuclear senile cataract of left eye    • OA (osteoarthritis) of knee     b/l   • 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HCC)    • Syncope and collapse    • Tachycardia    • UTI (urinary tract infection)    • Vertebro-basilar artery syndrome    • Vitamin B deficiency    • Vitamin D deficiency    • Vitamin D deficiency        Past Surgical History:   Procedure Laterality Date   • CARDIAC CATHETERIZATION N/A 3/7/2022    Procedure: CARDIAC CATHETERIZATION;  Surgeon: John Nicolas DO;  [facility]: [facility]; Service: Cardiology   • CATARACT EXTRACTION     • CHOLECYSTECTOMY     • KNEE SURGERY     • HI LITHOLAPAXY SMPL/SM <2 5 CM N/A 1/4/2021    Procedure: Cystolitholopaxy w/laser bladder stones;  Surgeon: Digna Burns MD;  Location: [facility];  Service: Urology   • HI TRURL ELECTROSURG RESCJ PROSTATE BLEED COMPLETE N/A 1/4/2021    Procedure: T U R P ;  Surgeon: Digna Burns MD;  Location: [facility];  Service: Urology       Meds/Allergies:  Prior to Admission medications    Medication Sig Start Date End Date Taking?  Authorizing Provider   aspirin 81 mg chewable tablet Chew 1 tablet (81 mg total) daily 3/10/22  Yes Jimmy Garcia Vitaliy,    atorvastatin (LIPITOR) 40 mg tablet Take 1 tablet (40 mg total) by mouth daily 3/10/22  Yes Luis F Diaz DO   carbidopa-levodopa (SINEMET)  mg per tablet Take 1 tablet by mouth 3 (three) times a day 7/22/21 12/31/22 Yes Hector Wang MD   clopidogrel (PLAVIX) 75 mg tablet Take 1 tablet (75 mg total) by mouth daily 3/10/22  Yes Luis F Diaz DO   Dutasteride-Tamsulosin HCl ([place]) 0 5-0 4 MG CAPS Take 1 capsule by mouth every morning   Yes Historical Provider, MD   metFORMIN (GLUCOPHAGE) 1000 MG tablet TAKE 1 TABLET BY MOUTH 2 TIMES A DAY 2/10/21  Yes Johnnie Calderon,    metoprolol succinate (TOPROL-XL) 25 mg 24 hr tablet Take 1 tablet (25 mg total) by mouth daily 4/6/22  Yes Neto Rebolledo DO   ACCU-CHEK GUIDE test strip 1 strip as needed 6/16/20   Historical Provider, MD   acetaminophen (TYLENOL) 325 mg tablet Take 650 mg by mouth every 6 (six) hours as needed for mild pain    Historical Provider, MD   Alcohol Swabs (PRO COMFORT ALCOHOL) 70 % PADS 1 pad daily Use a pad when testing 6/16/20   Historical Provider, MD   cholecalciferol (VITAMIN D3) 1,000 units tablet Take 50 tablets (50,000 Units total) by mouth once a week  Patient taking differently: Take 50,000 Units by mouth every 30 (thirty) days On the 15th of the month 4/5/22   Neto Rebolledo DO   cyanocobalamin (VITAMIN B-12) 1000 MCG tablet Take 1 tablet (1,000 mcg total) by mouth daily 6/23/20   Sima Gonzalez MD   docusate sodium (COLACE) 100 mg capsule Take 1 capsule (100 mg total) by mouth 2 (two) times a day 6/23/20   Sima Gonzalez MD   Easy Comfort Lancets MISC as needed 6/16/20   Historical Provider, MD   famotidine (PEPCID) 20 mg tablet Take 1 tablet (20 mg total) by mouth daily at bedtime  Patient taking differently: Take 40 mg by mouth daily at bedtime 4/5/22   Neto Rebolledo DO   finasteride (PROSCAR) 5 mg tablet Take 1 tablet (5 mg total) by mouth daily 5/13/20   Fox Uribe PA-C   glipiZIDE (GLUCOTROL) 10 mg tablet Per [facility] "give 5mg by mouth one time a prescriber day for DM 2" 4/5/22   Ivone Gamboa DO   Glucagon, rDNA, (GLUCAGON EMERGENCY IJ) Inject 1 mL as directed Sugars less than 60 and conscious    Historical Provider, MD   Glucose 15 g PACK Take 15 g by mouth Per [facility] "give 15 gram PO every 15 minutes PRN for hypoglycemia"    Historical Provider, MD   glucose blood test strip 1 strip as needed    Historical Provider, MD   lisinopril (ZESTRIL) 5 mg tablet Take 1 tablet (5 mg total) by mouth daily 4/6/22   Ivone Gamboa DO   melatonin 3 mg Take 3 mg by mouth daily at bedtime Give 2 tabs at bed time for insomnia per [address] Provider, MD   NovoFine Autocover 30G X 8 MM [address] [facility]  12/9/20   Historical Provider, MD   NovoLOG FlexPen 100 units/mL injection pen  1/2/21   Historical Provider, MD   pantoprazole (PROTONIX) 40 mg tablet Take 1 tablet (40 mg total) by mouth 2 (two) times a day before meals 3/9/22   Timmy Car DO   polyethylene glycol (MIRALAX) 17 g packet Take 17 g by mouth daily 6/3/20   Rober Snow DO   ranolazine (RANEXA) 500 mg 12 hr tablet Take 1 tablet (500 mg total) by mouth every 12 (twelve) hours 4/5/22   Ivone Gamboa DO   senna (SENOKOT) 8 6 mg Take 1 tablet (8 6 mg total) by mouth daily as needed for constipation 2/10/21   Rober Snow DO   tamsulosin [facility]) 0 4 mg Take 1 capsule (0 4 mg total) by mouth daily with dinner 5/13/20   Fox Uribe PA-C   vitamin B-12 (CYANOCOBALAMIN) 100 MCG TABS Take 100 mcg by mouth daily 6/16/20   Historical Provider, MD     I have reviewed home medications using recent Epic encounter      Allergies: No Known Allergies    Social History:  Marital Status: /Civil Union   Occupation:   Patient Pre-hospital Living Situation: Home  Patient Pre-hospital Level of Mobility: walks with walker  Patient Pre-hospital Diet Restrictions:   Substance Use History:   Social History     Substance and Sexual Activity   Alcohol Use Not Currently     Social History     Tobacco Use   Smoking Status Never   Smokeless Tobacco Never     Social History     Substance and Sexual Activity   Drug Use No       Family History:  Family History   Problem Relation Age of Onset   • Coronary artery disease Mother    • Diabetes Father        Physical Exam:     Vitals:   Blood Pressure: 115/59 (12/31/22 1545)  Pulse: 91 (12/31/22 1545)  Temperature: 100 °F (37 8 °C) (12/31/22 1545)  Temp Source: Rectal (12/31/22 1203)  Respirations: 18 (12/31/22 1300)  Weight - Scale: 76 8 kg (169 lb 5 oz) (12/31/22 1200)  SpO2: 96 % (12/31/22 1545)    Physical Exam  Vitals reviewed  Constitutional:       Appearance: He is ill-appearing  He is not toxic-appearing or diaphoretic  HENT:      Head: Normocephalic and atraumatic  Nose: No congestion or rhinorrhea  Eyes:      General:         Right eye: Discharge present  Left eye: Discharge present  Cardiovascular:      Rate and Rhythm: Tachycardia present  Heart sounds: No murmur heard  Pulmonary:      Effort: No respiratory distress  Breath sounds: No wheezing or rales  Abdominal:      General: There is no distension  Tenderness: There is no abdominal tenderness  There is no right CVA tenderness or guarding  Genitourinary:     Penis: Paraphimosis and erythema present  Comments:     Musculoskeletal:      Cervical back: No rigidity or tenderness  Lymphadenopathy:      Cervical: No cervical adenopathy  Neurological:      Mental Status: He is oriented to person, place, and time  He is lethargic  GCS: GCS eye subscore is 4  GCS verbal subscore is 5  GCS motor subscore is 6  Cranial Nerves: No cranial nerve deficit or facial asymmetry  Motor: No weakness  Comments: Patient alert oriented x3, very somnolent but easily arousable   Psychiatric:         Speech: Speech is not delayed  Behavior: Behavior is not slowed           Additional Data: Lab Results:  Results from last 7 days   Lab Units 12/31/22  1214   WBC Thousand/uL 13 58*   HEMOGLOBIN g/dL 12 1   HEMATOCRIT % 36 9   PLATELETS Thousands/uL 241   NEUTROS PCT % 91*   LYMPHS PCT % 3*   MONOS PCT % 5   EOS PCT % 0     Results from last 7 days   Lab Units 12/31/22  1214   SODIUM mmol/L 136   POTASSIUM mmol/L 4 2   CHLORIDE mmol/L 100   CO2 mmol/L 24   BUN mg/dL 23   CREATININE mg/dL 1 29   ANION GAP mmol/L 12   CALCIUM mg/dL 9 0   ALBUMIN g/dL 3 8   TOTAL BILIRUBIN mg/dL 1 20*   ALK PHOS U/L 80   ALT U/L 3*   AST U/L 12*   GLUCOSE RANDOM mg/dL 156*     Results from last 7 days   Lab Units 12/31/22  1214   INR  1 05             Results from last 7 days   Lab Units 12/31/22  1534 12/31/22  1214   LACTIC ACID mmol/L 1 4 2 7*   PROCALCITONIN ng/ml  --  0 19       Lines/Drains:  Invasive Devices     Peripheral Intravenous Line  Duration           Peripheral IV 12/31/22 Left Antecubital <1 day    Peripheral IV 12/31/22 Right Antecubital <1 day                    Imaging: Reviewed radiology reports from this admission including: abdominal/pelvic CT  CT abdomen pelvis with contrast   Final Result by Marilyn Bianchi DO (12/31 1424)      1  No acute intra-abdominal abnormality  2   11 mm indeterminate left upper pole hypodensity versus volume averaging with prominent renal pyramid  Follow-up CT abdomen with renal protocol recommended in 6 months to reevaluate this finding  Small bilateral simple cysts and nonobstructing left    renal calculi noted  3  Colonic diverticulosis without evidence of diverticulitis  Additional incidental findings as above  The study was marked in Epic for follow-up  Workstation performed: [id number]             EKG and Other Studies Reviewed on Admission:   · EKG: Sinus tachycardia, RBBB   ** Please Note: This note has been constructed using a voice recognition system   **

## 2022-12-31 NOTE — ASSESSMENT & PLAN NOTE
History dysfunctional history of multiple falls  Prior ED visits and admissions due to falls     Plan    PT OT evaluation and treat  Case management consult for possible placement

## 2022-12-31 NOTE — ED PROVIDER NOTES
History  Chief Complaint   Patient presents with   • Altered Mental Status     Per EMS, caregiver states more confused than normal today, smells like UTI  Febrile  Pt has no complaints except headache  Left sided weakness and speech at baseline     Patient is a 57-year-old male with a PMHx of BPH, CAD, CKD, DM 2, HLD, HTN, neuropathy and Parkinson's (w/ baseline left-sided weakness), presenting to the ED for evaluation of a possible UTI  Patient's wife states that he felt "warm" this morning and was noted to have a temperature of 99 5 °F   Patient's wife says that he was not confused but that he was not as talkative or interactive as he usually is  He was complaining of a mild headache and was more "sleepy" than usual    The visiting nurse noted that he had some foul-smelling urine and was concerned for UTI  Patient endorses intermittent dysuria but denies any hematuria or flank pain  He says that his headache has resolved  He denies any chest pain, SOB, nausea, vomiting, abdominal pain, diarrhea or constipation  Prior to Admission Medications   Prescriptions Last Dose Informant Patient Reported? Taking?    ACCU-CHEK GUIDE test strip   Yes No   Si strip as needed   Alcohol Swabs (PRO COMFORT ALCOHOL) 70 % PADS   Yes No   Si pad daily Use a pad when testing   Dutasteride-Tamsulosin HCl ([place]) 0 5-0 4 MG CAPS   Yes Yes   Sig: Take 1 capsule by mouth every morning   Easy Comfort Lancets MISC   Yes No   Sig: as needed   Glucagon, rDNA, (GLUCAGON EMERGENCY IJ)   Yes No   Sig: Inject 1 mL as directed Sugars less than 60 and conscious   Glucose 15 g PACK   Yes No   Sig: Take 15 g by mouth Per [facility] "give 15 gram PO every 15 minutes PRN for hypoglycemia"   NovoFine Autocover 30G X 8 MM MISC   Yes No   NovoLOG FlexPen 100 units/mL injection pen   Yes No   acetaminophen (TYLENOL) 325 mg tablet   Yes No   Sig: Take 650 mg by mouth every 6 (six) hours as needed for mild pain   aspirin 81 mg chewable tablet   No Yes   Sig: Chew 1 tablet (81 mg total) daily   atorvastatin (LIPITOR) 40 mg tablet   No Yes   Sig: Take 1 tablet (40 mg total) by mouth daily   carbidopa-levodopa (SINEMET)  mg per tablet   No Yes   Sig: Take 1 tablet by mouth 3 (three) times a day   cholecalciferol (VITAMIN D3) 1,000 units tablet   No No   Sig: Take 50 tablets (50,000 Units total) by mouth once a week   Patient taking differently: Take 50,000 Units by mouth every 30 (thirty) days On the  of the month   clopidogrel (PLAVIX) 75 mg tablet   No Yes   Sig: Take 1 tablet (75 mg total) by mouth daily   cyanocobalamin (VITAMIN B-12) 1000 MCG tablet   No No   Sig: Take 1 tablet (1,000 mcg total) by mouth daily   docusate sodium (COLACE) 100 mg capsule   No No   Sig: Take 1 capsule (100 mg total) by mouth 2 (two) times a day   famotidine (PEPCID) 20 mg tablet   No No   Sig: Take 1 tablet (20 mg total) by mouth daily at bedtime   Patient taking differently: Take 40 mg by mouth daily at bedtime   finasteride (PROSCAR) 5 mg tablet   No No   Sig: Take 1 tablet (5 mg total) by mouth daily   glipiZIDE (GLUCOTROL) 10 mg tablet   No No   Sig: Per [facility] "give 5mg by mouth one time a prescriber day for DM 2"   glucose blood test strip   Yes No   Si strip as needed   lisinopril (ZESTRIL) 5 mg tablet   No No   Sig: Take 1 tablet (5 mg total) by mouth daily   melatonin 3 mg   Yes No   Sig: Take 3 mg by mouth daily at bedtime Give 2 tabs at bed time for insomnia per [facility]   metFORMIN (GLUCOPHAGE) 1000 MG tablet   No Yes   Sig: TAKE 1 TABLET BY MOUTH 2 TIMES A DAY   metoprolol succinate (TOPROL-XL) 25 mg 24 hr tablet   No Yes   Sig: Take 1 tablet (25 mg total) by mouth daily   pantoprazole (PROTONIX) 40 mg tablet   No No   Sig: Take 1 tablet (40 mg total) by mouth 2 (two) times a day before meals   polyethylene glycol (MIRALAX) 17 g packet   No No   Sig: Take 17 g by mouth daily   ranolazine (RANEXA) 500 mg 12 hr tablet   No No   Sig: Take 1 tablet (500 mg total) by mouth every 12 (twelve) hours   senna (SENOKOT) 8 6 mg   No No   Sig: Take 1 tablet (8 6 mg total) by mouth daily as needed for constipation   tamsulosin (FLOMAX) 0 4 mg   No No   Sig: Take 1 capsule (0 4 mg total) by mouth daily with dinner   vitamin B-12 (CYANOCOBALAMIN) 100 MCG TABS   Yes No   Sig: Take 100 mcg by mouth daily      Facility-Administered Medications: None       Past Medical History:   Diagnosis Date   • Ambulatory dysfunction     uses walker with assist of 1   • Anemia    • Anxiety    • At risk for falls     hx of falls   • Benign paroxysmal vertigo     unspecified ear   • BPH (benign prostatic hyperplasia)     for TURP today 1/4/2021   • Calculus in bladder     for surgical removal today 1/4/2021   • Cataract     left eye   • Cervicalgia    • Chest pain    • Chronic kidney disease     stage 3   • Constipation    • CTS (carpal tunnel syndrome)     left   • Cyst of pancreas    • Cystitis 06/23/2020    acute cystitis with hematuria   • Depression    • Diabetes mellitus [facility] )     type II with neuropathy   • Dizziness     and giddiness   • Dysphagia    • Elevated PSA    • Facial weakness    • GERD (gastroesophageal reflux disease)     last assessed 5/20/16   • Gross hematuria    • Hematuria    • Hyperlipidemia    • Hypertension    • Kidney disease    • Kidney stone    • Left-sided weakness    • Long term (current) use of oral hypoglycemic drugs    • Muscle weakness    • Nuclear senile cataract of left eye    • OA (osteoarthritis) of knee     b/l   • 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HCC)    • Syncope and collapse    • Tachycardia    • UTI (urinary tract infection)    • Vertebro-basilar artery syndrome    • Vitamin B deficiency    • Vitamin D deficiency    • Vitamin D deficiency        Past Surgical History:   Procedure Laterality Date   • CARDIAC CATHETERIZATION N/A 3/7/2022    Procedure: CARDIAC CATHETERIZATION;  Surgeon: Margarita Virk DO;  [facility]; Service: Cardiology   • CATARACT EXTRACTION     • CHOLECYSTECTOMY     • KNEE SURGERY     • AZ LITHOLAPAXY SMPL/SM <2 5 CM N/A 1/4/2021    Procedure: Cystolitholopaxy w/laser bladder stones;  Surgeon: Sri Hansen MD;  Location: [facility];  Service: Urology   • AZ TRURL ELECTROSURG RESCJ PROSTATE BLEED COMPLETE N/A 1/4/2021    Procedure: T U R P ;  Surgeon: Sri Hansen MD;  Location: [facility];  Service: Urology       Family History   Problem Relation Age of Onset   • Coronary artery disease Mother    • Diabetes Father      I have reviewed and agree with the history as documented  E-Cigarette/Vaping   • E-Cigarette Use Never User      E-Cigarette/Vaping Substances   • Nicotine No    • THC No    • CBD No      Social History     Tobacco Use   • Smoking status: Never   • Smokeless tobacco: Never   Vaping Use   • Vaping Use: Never used   Substance Use Topics   • Alcohol use: Not Currently   • Drug use: No       Review of Systems   Constitutional: Positive for fatigue and fever  Negative for chills  HENT: Negative for congestion, rhinorrhea and sore throat  Respiratory: Negative for cough, chest tightness and shortness of breath  Cardiovascular: Negative for chest pain  Gastrointestinal: Positive for abdominal pain (mild, generalized)  Negative for blood in stool, constipation, diarrhea, nausea and vomiting  Genitourinary: Positive for dysuria  Negative for flank pain and hematuria  Musculoskeletal: Negative for back pain, myalgias and neck pain  Skin: Negative for rash  Neurological: Positive for headaches  Negative for dizziness, syncope, speech difficulty, weakness, light-headedness and numbness  All other systems reviewed and are negative  Physical Exam  Physical Exam  Vitals and nursing note reviewed  Constitutional:       General: He is awake  He is not in acute distress  Appearance: Normal appearance  He is well-developed   He is not ill-appearing or diaphoretic  HENT:      Head: Normocephalic and atraumatic  Right Ear: External ear normal       Left Ear: External ear normal       Nose: Nose normal       Mouth/Throat:      Lips: Pink  Mouth: Mucous membranes are moist    Eyes:      General: Lids are normal  No scleral icterus  Conjunctiva/sclera: Conjunctivae normal       Pupils: Pupils are equal, round, and reactive to light  Cardiovascular:      Rate and Rhythm: Normal rate and regular rhythm  Pulses: Normal pulses  Radial pulses are 2+ on the right side and 2+ on the left side  Heart sounds: Normal heart sounds, S1 normal and S2 normal    Pulmonary:      Effort: Pulmonary effort is normal  No accessory muscle usage  Breath sounds: Normal breath sounds  No stridor  No decreased breath sounds, wheezing, rhonchi or rales  Abdominal:      General: Abdomen is flat  Bowel sounds are normal  There is no distension  Palpations: Abdomen is soft  Tenderness: There is generalized abdominal tenderness (mild)  There is no right CVA tenderness, left CVA tenderness, guarding or rebound  Musculoskeletal:      Cervical back: Full passive range of motion without pain, normal range of motion and neck supple  No signs of trauma  No pain with movement  Normal range of motion  Right lower leg: No edema  Left lower leg: No edema  Lymphadenopathy:      Cervical: No cervical adenopathy  Skin:     General: Skin is warm and dry  Capillary Refill: Capillary refill takes less than 2 seconds  Coloration: Skin is not cyanotic, jaundiced or pale  Neurological:      Mental Status: He is alert and oriented to person, place, and time  GCS: GCS eye subscore is 4  GCS verbal subscore is 5  GCS motor subscore is 6  Cranial Nerves: No dysarthria  Comments: Patient is awake and alert  He is oriented to person, place, time and event  He has baseline left-sided weakness  No other focal deficits  Psychiatric:         Attention and Perception: Attention normal          Mood and Affect: Mood normal          Speech: Speech normal          Behavior: Behavior normal  Behavior is cooperative  Vital Signs  ED Triage Vitals [12/31/22 1147]   Temperature Pulse Respirations Blood Pressure SpO2   98 9 °F (37 2 °C) (!) 108 16 133/59 99 %      Temp Source Heart Rate Source Patient Position - Orthostatic VS BP Location FiO2 (%)   Oral Monitor Lying Right arm --      Pain Score       No Pain           Vitals:    12/31/22 1430 12/31/22 1445 12/31/22 1500 12/31/22 1530   BP: 97/54 103/53 103/54 111/53   Pulse: 91 94 91 89   Patient Position - Orthostatic VS:             Visual Acuity      ED Medications  Medications   acetaminophen (TYLENOL) tablet 975 mg (975 mg Oral Given 12/31/22 1239)   sodium chloride 0 9 % bolus 1,000 mL (0 mL Intravenous Stopped 12/31/22 1406)   ceftriaxone (ROCEPHIN) 1 g/50 mL in dextrose IVPB (0 mg Intravenous Stopped 12/31/22 1406)   sodium chloride 0 9 % bolus 1,000 mL (0 mL Intravenous Stopped 12/31/22 1536)   iohexol (OMNIPAQUE) 350 MG/ML injection (SINGLE-DOSE) 100 mL (100 mL Intravenous Given 12/31/22 1344)       Diagnostic Studies  Results Reviewed     Procedure Component Value Units Date/Time    Lactic acid 2 Hours [[id number]] Collected: 12/31/22 1534    Lab Status: No result Specimen: Blood from Arm, Right     Procalcitonin [[id number]] Collected: 12/31/22 1214    Lab Status:  In process Specimen: Blood from Arm, Right Updated: 12/31/22 1533    HS Troponin I 2hr [[id number]]  (Normal) Collected: 12/31/22 1406    Lab Status: Final result Specimen: Blood from Arm, Left Updated: 12/31/22 1443     hs TnI 2hr 8 ng/L      Delta 2hr hsTnI -4 ng/L     HS Troponin I 4hr [[id number]]     Lab Status: No result Specimen: Blood     CBC and differential [[id number]]  (Abnormal) Collected: 12/31/22 1214    Lab Status: Final result Specimen: Blood from Arm, Right Updated: 12/31/22 1307     WBC 13 58 Thousand/uL      RBC 4 21 Million/uL      Hemoglobin 12 1 g/dL      Hematocrit 36 9 %      MCV 88 fL      MCH 28 7 pg      MCHC 32 8 g/dL      RDW 13 7 %      MPV 10 5 fL      Platelets 924 Thousands/uL      nRBC 0 /100 WBCs      Neutrophils Relative 91 %      Immat GRANS % 1 %      Lymphocytes Relative 3 %      Monocytes Relative 5 %      Eosinophils Relative 0 %      Basophils Relative 0 %      Neutrophils Absolute 12 37 Thousands/µL      Immature Grans Absolute 0 10 Thousand/uL      Lymphocytes Absolute 0 34 Thousands/µL      Monocytes Absolute 0 74 Thousand/µL      Eosinophils Absolute 0 01 Thousand/µL      Basophils Absolute 0 02 Thousands/µL     Narrative: This is an appended report  These results have been appended to a previously verified report  FLU/RSV/COVID - if FLU/RSV clinically relevant [[id number]]  (Normal) Collected: 12/31/22 1215    Lab Status: Final result Specimen: Nares from Nasopharyngeal Swab Updated: 12/31/22 1306     SARS-CoV-2 Negative     INFLUENZA A PCR Negative     INFLUENZA B PCR Negative     RSV PCR Negative    Narrative:      FOR PEDIATRIC PATIENTS - copy/paste COVID Guidelines URL to browser: [email or web address]    SARS-CoV-2 assay is a Nucleic Acid Amplification assay intended for the  qualitative detection of nucleic acid from SARS-CoV-2 in nasopharyngeal  swabs  Results are for the presumptive identification of SARS-CoV-2 RNA  Positive results are indicative of infection with SARS-CoV-2, the virus  causing COVID-19, but do not rule out bacterial infection or co-infection  with other viruses  Laboratories within the [place] and its  territories are required to report all positive results to the appropriate  public health authorities  Negative results do not preclude SARS-CoV-2  infection and should not be used as the sole basis for treatment or other  patient management decisions   Negative results must be combined with  clinical observations, patient history, and epidemiological information  This test has not been FDA cleared or approved  This test has been authorized by FDA under an Emergency Use Authorization  (EUA)  This test is only authorized for the duration of time the  declaration that circumstances exist justifying the authorization of the  emergency use of an in vitro diagnostic tests for detection of SARS-CoV-2  virus and/or diagnosis of COVID-19 infection under section 564(b)(1) of  the Act, 21 U  S C  094VEG-7(Z)(3), unless the authorization is terminated  or revoked sooner  The test has been validated but independent review by FDA  and CLIA is pending  Test performed using [employer] GeneXpert: This RT-PCR assay targets N2,  a region unique to SARS-CoV-2  A conserved region in the E-gene was chosen  for pan-Sarbecovirus detection which includes SARS-CoV-2  According to CMS-2020-01-R, this platform meets the definition of high-throughput technology  Lactic acid [[id number]]  (Abnormal) Collected: 12/31/22 1214    Lab Status: Final result Specimen: Blood from Arm, Right Updated: 12/31/22 1257     LACTIC ACID 2 7 mmol/L     Narrative:      Result may be elevated if tourniquet was used during collection      HS Troponin 0hr (reflex protocol) [[id number]]  (Normal) Collected: 12/31/22 1214    Lab Status: Final result Specimen: Blood from Arm, Right Updated: 12/31/22 1255     hs TnI 0hr 12 ng/L     Comprehensive metabolic panel [[id number]]  (Abnormal) Collected: 12/31/22 1214    Lab Status: Final result Specimen: Blood from Arm, Right Updated: 12/31/22 1248     Sodium 136 mmol/L      Potassium 4 2 mmol/L      Chloride 100 mmol/L      CO2 24 mmol/L      ANION GAP 12 mmol/L      BUN 23 mg/dL      Creatinine 1 29 mg/dL      Glucose 156 mg/dL      Calcium 9 0 mg/dL      AST 12 U/L      ALT 3 U/L      Alkaline Phosphatase 80 U/L      Total Protein 6 7 g/dL      Albumin 3 8 g/dL      Total Bilirubin 1 20 mg/dL      eGFR 55 ml/min/1 73sq m     Narrative:      [place] guidelines for Chronic Kidney Disease (CKD):   •  Stage 1 with normal or high GFR (GFR > 90 mL/min/1 73 square meters)  •  Stage 2 Mild CKD (GFR = 60-89 mL/min/1 73 square meters)  •  Stage 3A Moderate CKD (GFR = 45-59 mL/min/1 73 square meters)  •  Stage 3B Moderate CKD (GFR = 30-44 mL/min/1 73 square meters)  •  Stage 4 Severe CKD (GFR = 15-29 mL/min/1 73 square meters)  •  Stage 5 End Stage CKD (GFR <15 mL/min/1 73 square meters)  Note: GFR calculation is accurate only with a steady state creatinine    Protime-INR [[id number]]  (Normal) Collected: 12/31/22 1214    Lab Status: Final result Specimen: Blood from Arm, Right Updated: 12/31/22 1242     Protime 14 0 seconds      INR 1 05    APTT [[id number]]  (Normal) Collected: 12/31/22 1214    Lab Status: Final result Specimen: Blood from Arm, Right Updated: 12/31/22 1242     PTT 28 seconds     Blood culture #2 [[id number]] Collected: 12/31/22 1229    Lab Status: In process Specimen: Blood from Arm, Left Updated: 12/31/22 1239    Urine Microscopic [[id number]]  (Abnormal) Collected: 12/31/22 1217    Lab Status: Final result Specimen: Urine, Clean Catch Updated: 12/31/22 1235     RBC, UA 30-50 /hpf      WBC, UA Innumerable /hpf      Epithelial Cells Occasional /hpf      Bacteria, UA None Seen /hpf      WBC Clumps Present    Urine culture [[id number]] Collected: 12/31/22 1217    Lab Status:  In process Specimen: Urine, Clean Catch Updated: 12/31/22 1235    UA w Reflex to Microscopic w Reflex to Culture [[id number]]  (Abnormal) Collected: 12/31/22 1217    Lab Status: Final result Specimen: Urine, Clean Catch Updated: 12/31/22 1231     Color, UA Light Yellow     Clarity, UA Turbid     Specific [place], UA 1 017     pH, UA 5 0     Leukocytes, UA Large     Nitrite, UA Negative     Protein, UA Trace mg/dl      Glucose, UA Negative mg/dl      Ketones, UA Negative mg/dl      Urobilinogen, UA <2 0 mg/dl      Bilirubin, UA Negative     Occult Blood, UA Large    Blood culture #1 [[id number]] Collected: 12/31/22 1214    Lab Status: In process Specimen: Blood from Arm, Right Updated: 12/31/22 1230                 CT abdomen pelvis with contrast   Final Result by Vinita Ricketts DO (12/31 7760)      1  No acute intra-abdominal abnormality  2   11 mm indeterminate left upper pole hypodensity versus volume averaging with prominent renal pyramid  Follow-up CT abdomen with renal protocol recommended in 6 months to reevaluate this finding  Small bilateral simple cysts and nonobstructing left    renal calculi noted  3  Colonic diverticulosis without evidence of diverticulitis  Additional incidental findings as above  The study was marked in Epic for follow-up  Workstation performed: [id number]                    Procedures  Procedures         ED Course  ED Course as of 12/31/22 1539   Sat Dec 31, 2022   1303 Temperature(!): 101 4 °F (38 6 °C)   1303 Pulse(!): 108   1303 LACTIC ACID(!!): 2 7   1303 WBC(!): 13 58   1303 WBC, UA(!): Innumerable   1303 RBC, UA(!): 30-50                                             MDM  Number of Diagnoses or Management Options  Sepsis ([facility] )  UTI (urinary tract infection)  Diagnosis management comments: Patient is a 70-year-old male with a PMHx of BPH, CAD, CKD, DM 2, HLD, HTN and Parkinson's, presenting to the ED for evaluation of a possible UTI  Patient met SIRS criteria on arrival with tachycardia and fever  Labs notable for a WBC count of 13 58 and lactic acid of 2 7   UA shows innumerable WBCs and 30-50 RBCs  CT showed no acute findings  Patient was given IV fluids and IV Rocephin and admitted to medicine for further work-up and management          Disposition  Final diagnoses:   UTI (urinary tract infection)   Sepsis ([facility] )     Time reflects when diagnosis was documented in both MDM as applicable and the Disposition within this note     Time User Action Codes Description Comment    12/31/2022  2:13 PM Saddie Osler Add [N39 0] UTI (urinary tract infection)     12/31/2022  2:13 PM Angelique Moore Add [A41 9] Sepsis [facility])       ED Disposition     ED Disposition   Admit    Condition   Stable    Date/Time   Sat Dec 31, 2022  2:13 PM    Comment   Case was discussed with SLIM and the patient's admission status was agreed to be Admission Status: inpatient status to the service of Dr Chilo Hernandez  Follow-up Information    None         Patient's Medications   Discharge Prescriptions    No medications on file       No discharge procedures on file      PDMP Review       Value Time User    PDMP Reviewed  Yes 3/4/2022 10:51 PM Ho Pandya MD          ED Provider  Electronically Signed by           Felecia Bryan PA-C  12/31/22 3604

## 2022-12-31 NOTE — ASSESSMENT & PLAN NOTE
Lab Results   Component Value Date    EGFR 55 12/31/2022    EGFR 66 09/02/2022    EGFR 71 07/09/2022    CREATININE 1 29 12/31/2022    CREATININE 1 10 09/02/2022    CREATININE 1 04 07/09/2022     Stable disease  Avoid nephrotoxic agents, avoid hypotension  Daily BMP

## 2023-01-01 LAB
ALBUMIN SERPL BCP-MCNC: 3.2 G/DL (ref 3.5–5)
ALP SERPL-CCNC: 64 U/L (ref 34–104)
ALT SERPL W P-5'-P-CCNC: 5 U/L (ref 7–52)
ANION GAP SERPL CALCULATED.3IONS-SCNC: 9 MMOL/L (ref 4–13)
AST SERPL W P-5'-P-CCNC: 15 U/L (ref 13–39)
BASOPHILS # BLD AUTO: 0.04 THOUSANDS/ÂΜL (ref 0–0.1)
BASOPHILS NFR BLD AUTO: 0 % (ref 0–1)
BILIRUB SERPL-MCNC: 1.09 MG/DL (ref 0.2–1)
BUN SERPL-MCNC: 19 MG/DL (ref 5–25)
CALCIUM ALBUM COR SERPL-MCNC: 8.6 MG/DL (ref 8.3–10.1)
CALCIUM SERPL-MCNC: 8 MG/DL (ref 8.4–10.2)
CHLORIDE SERPL-SCNC: 103 MMOL/L (ref 96–108)
CO2 SERPL-SCNC: 24 MMOL/L (ref 21–32)
CREAT SERPL-MCNC: 1.3 MG/DL (ref 0.6–1.3)
EOSINOPHIL # BLD AUTO: 0.01 THOUSAND/ÂΜL (ref 0–0.61)
EOSINOPHIL NFR BLD AUTO: 0 % (ref 0–6)
ERYTHROCYTE [DISTWIDTH] IN BLOOD BY AUTOMATED COUNT: 13.8 % (ref 11.6–15.1)
GFR SERPL CREATININE-BSD FRML MDRD: 54 ML/MIN/1.73SQ M
GLUCOSE SERPL-MCNC: 143 MG/DL (ref 65–140)
GLUCOSE SERPL-MCNC: 159 MG/DL (ref 65–140)
GLUCOSE SERPL-MCNC: 174 MG/DL (ref 65–140)
GLUCOSE SERPL-MCNC: 233 MG/DL (ref 65–140)
GLUCOSE SERPL-MCNC: 261 MG/DL (ref 65–140)
HCT VFR BLD AUTO: 31.3 % (ref 36.5–49.3)
HGB BLD-MCNC: 10.1 G/DL (ref 12–17)
IMM GRANULOCYTES # BLD AUTO: 0.06 THOUSAND/UL (ref 0–0.2)
IMM GRANULOCYTES NFR BLD AUTO: 1 % (ref 0–2)
LYMPHOCYTES # BLD AUTO: 0.34 THOUSANDS/ÂΜL (ref 0.6–4.47)
LYMPHOCYTES NFR BLD AUTO: 4 % (ref 14–44)
MAGNESIUM SERPL-MCNC: 1.3 MG/DL (ref 1.9–2.7)
MCH RBC QN AUTO: 28.3 PG (ref 26.8–34.3)
MCHC RBC AUTO-ENTMCNC: 32.3 G/DL (ref 31.4–37.4)
MCV RBC AUTO: 88 FL (ref 82–98)
MONOCYTES # BLD AUTO: 0.82 THOUSAND/ÂΜL (ref 0.17–1.22)
MONOCYTES NFR BLD AUTO: 9 % (ref 4–12)
NEUTROPHILS # BLD AUTO: 8.17 THOUSANDS/ÂΜL (ref 1.85–7.62)
NEUTS SEG NFR BLD AUTO: 86 % (ref 43–75)
NRBC BLD AUTO-RTO: 0 /100 WBCS
PLATELET # BLD AUTO: 211 THOUSANDS/UL (ref 149–390)
PMV BLD AUTO: 10.7 FL (ref 8.9–12.7)
POTASSIUM SERPL-SCNC: 3.8 MMOL/L (ref 3.5–5.3)
PROCALCITONIN SERPL-MCNC: 0.23 NG/ML
PROT SERPL-MCNC: 5.7 G/DL (ref 6.4–8.4)
RBC # BLD AUTO: 3.57 MILLION/UL (ref 3.88–5.62)
SODIUM SERPL-SCNC: 136 MMOL/L (ref 135–147)
VIT B12 SERPL-MCNC: 282 PG/ML (ref 100–900)
WBC # BLD AUTO: 9.44 THOUSAND/UL (ref 4.31–10.16)

## 2023-01-01 RX ORDER — INSULIN LISPRO 100 [IU]/ML
1-6 INJECTION, SOLUTION INTRAVENOUS; SUBCUTANEOUS
Status: DISCONTINUED | OUTPATIENT
Start: 2023-01-01 | End: 2023-01-01

## 2023-01-01 RX ORDER — MAGNESIUM SULFATE HEPTAHYDRATE 40 MG/ML
4 INJECTION, SOLUTION INTRAVENOUS ONCE
Status: COMPLETED | OUTPATIENT
Start: 2023-01-01 | End: 2023-01-01

## 2023-01-01 RX ORDER — INSULIN LISPRO 100 [IU]/ML
1-6 INJECTION, SOLUTION INTRAVENOUS; SUBCUTANEOUS
Status: DISCONTINUED | OUTPATIENT
Start: 2023-01-01 | End: 2023-01-05 | Stop reason: HOSPADM

## 2023-01-01 RX ADMIN — SODIUM CHLORIDE 75 ML/HR: 0.9 INJECTION, SOLUTION INTRAVENOUS at 22:11

## 2023-01-01 RX ADMIN — DOCUSATE SODIUM 100 MG: 100 CAPSULE, LIQUID FILLED ORAL at 09:38

## 2023-01-01 RX ADMIN — CLOPIDOGREL BISULFATE 75 MG: 75 TABLET ORAL at 09:38

## 2023-01-01 RX ADMIN — INSULIN LISPRO 3 UNITS: 100 INJECTION, SOLUTION INTRAVENOUS; SUBCUTANEOUS at 12:03

## 2023-01-01 RX ADMIN — GLYCERIN 1 DROP: .002; .002; .01 SOLUTION/ DROPS OPHTHALMIC at 09:38

## 2023-01-01 RX ADMIN — HEPARIN SODIUM 5000 UNITS: 5000 INJECTION INTRAVENOUS; SUBCUTANEOUS at 05:07

## 2023-01-01 RX ADMIN — RANOLAZINE 500 MG: 500 TABLET, FILM COATED, EXTENDED RELEASE ORAL at 22:21

## 2023-01-01 RX ADMIN — HEPARIN SODIUM 5000 UNITS: 5000 INJECTION INTRAVENOUS; SUBCUTANEOUS at 13:48

## 2023-01-01 RX ADMIN — INSULIN LISPRO 3 UNITS: 100 INJECTION, SOLUTION INTRAVENOUS; SUBCUTANEOUS at 22:21

## 2023-01-01 RX ADMIN — CARBIDOPA AND LEVODOPA 1 TABLET: 25; 100 TABLET ORAL at 09:38

## 2023-01-01 RX ADMIN — CYANOCOBALAMIN TAB 500 MCG 1000 MCG: 500 TAB at 09:38

## 2023-01-01 RX ADMIN — SODIUM CHLORIDE 75 ML/HR: 0.9 INJECTION, SOLUTION INTRAVENOUS at 07:45

## 2023-01-01 RX ADMIN — METOPROLOL SUCCINATE 25 MG: 25 TABLET, EXTENDED RELEASE ORAL at 09:41

## 2023-01-01 RX ADMIN — TAMSULOSIN HYDROCHLORIDE 0.4 MG: 0.4 CAPSULE ORAL at 16:46

## 2023-01-01 RX ADMIN — MAGNESIUM SULFATE HEPTAHYDRATE 4 G: 40 INJECTION, SOLUTION INTRAVENOUS at 10:38

## 2023-01-01 RX ADMIN — CARBIDOPA AND LEVODOPA 1 TABLET: 25; 100 TABLET ORAL at 22:21

## 2023-01-01 RX ADMIN — CEFTRIAXONE SODIUM 1000 MG: 10 INJECTION, POWDER, FOR SOLUTION INTRAVENOUS at 13:48

## 2023-01-01 RX ADMIN — ASPIRIN 81 MG CHEWABLE TABLET 81 MG: 81 TABLET CHEWABLE at 09:38

## 2023-01-01 RX ADMIN — HEPARIN SODIUM 5000 UNITS: 5000 INJECTION INTRAVENOUS; SUBCUTANEOUS at 22:11

## 2023-01-01 RX ADMIN — PANTOPRAZOLE SODIUM 40 MG: 40 TABLET, DELAYED RELEASE ORAL at 05:07

## 2023-01-01 RX ADMIN — NYSTATIN: 100000 POWDER TOPICAL at 09:38

## 2023-01-01 RX ADMIN — LISINOPRIL 5 MG: 5 TABLET ORAL at 09:41

## 2023-01-01 RX ADMIN — NYSTATIN: 100000 POWDER TOPICAL at 17:27

## 2023-01-01 RX ADMIN — ATORVASTATIN CALCIUM 40 MG: 40 TABLET, FILM COATED ORAL at 09:38

## 2023-01-01 RX ADMIN — GLYCERIN 1 DROP: .002; .002; .01 SOLUTION/ DROPS OPHTHALMIC at 17:27

## 2023-01-01 RX ADMIN — RANOLAZINE 500 MG: 500 TABLET, FILM COATED, EXTENDED RELEASE ORAL at 09:38

## 2023-01-01 RX ADMIN — FINASTERIDE 5 MG: 5 TABLET, FILM COATED ORAL at 09:38

## 2023-01-01 RX ADMIN — CARBIDOPA AND LEVODOPA 1 TABLET: 25; 100 TABLET ORAL at 16:46

## 2023-01-01 RX ADMIN — INSULIN LISPRO 1 UNITS: 100 INJECTION, SOLUTION INTRAVENOUS; SUBCUTANEOUS at 16:47

## 2023-01-01 RX ADMIN — DOCUSATE SODIUM 100 MG: 100 CAPSULE, LIQUID FILLED ORAL at 17:24

## 2023-01-01 RX ADMIN — Medication 3 MG: at 22:11

## 2023-01-01 NOTE — UTILIZATION REVIEW
Initial Clinical Review    Admission: Date/Time/Statement:   Admission Orders (From admission, onward)     Ordered        12/31/22 Ziyad Adam  Once                      Orders Placed This Encounter   Procedures   • INPATIENT ADMISSION     Standing Status:   Standing     Number of Occurrences:   1     Order Specific Question:   Level of Care     Answer:   Med Surg [16]     Order Specific Question:   Estimated length of stay     Answer:   More than 2 Midnights     Order Specific Question:   Certification     Answer:   I certify that inpatient services are medically necessary for this patient for a duration of greater than two midnights  See H&P and MD Progress Notes for additional information about the patient's course of treatment  ED Arrival Information     Expected   -    Arrival   12/31/2022 11:42    Acuity   Emergent            Means of arrival   Ambulance    Escorted by   [facility]    Service   Hospitalist    Admission type   Emergency            Arrival complaint   EMS/AMS           Chief Complaint   Patient presents with   • Altered Mental Status     Per EMS, caregiver states more confused than normal today, smells like UTI  Febrile  Pt has no complaints except headache  Left sided weakness and speech at baseline       Initial Presentation: 67 y o  male PMH Parkinson's disease, neuropathy, with left-sided weakness, BPH, urinary incontinence, hypertension, hyperlipidemia, coronary artery disease, type 2 diabetes to ED by EMS presents w AMS  Wife reports incr sleepiness, less interactive w mild HA, Temp 99 5F    EXAM  Febrile, tachycardia; reports dysuria, mild ABD pain; oriented x3; labs leukocytosis, elevated LA, abn UA, CT reveals renal 11 mm indeterminate left upper pole hypodensity, colonic diverticulosis  Intertrigo at groin  Given 2 L IVF bolus     Inpatient admission due to Urosepsis, toxic metabolic encephalopathy  Cont gentle IV hydration, IV antibx, follow blood/ urine CXs   Avoid hallucinogenic agents; fall/ delirium precautions  Nystatin powder to groin, serial exam  Cont home meds  Date:  1/1/2023   Day 2:   Attending MD  Encephalopathy resolved   Cont antibx, follow urine/ blood CXs, AM CBC, PT/OT eval , SSI  ED Triage Vitals [12/31/22 1147]   Temperature Pulse Respirations Blood Pressure SpO2   98 9 °F (37 2 °C) (!) 108 16 133/59 99 %      Temp Source Heart Rate Source Patient Position - Orthostatic VS BP Location FiO2 (%)   Oral Monitor Lying Right arm --      Pain Score       No Pain          Wt Readings from Last 1 Encounters:   01/01/23 81 4 kg (179 lb 7 3 oz)     Additional Vital Signs:   Date/Time Temp Pulse Resp BP MAP (mmHg) SpO2 O2 Device Patient Position - Orthostatic VS   01/01/23 14:50:18 99 2 °F (37 3 °C) 89 -- 97/51 66 95 % -- --   01/01/23 09:40:20 -- 97 20 115/64 81 95 % -- --   01/01/23 08:03:02 99 1 °F (37 3 °C) 98 16 102/67 79 93 % -- --   12/31/22 2215 -- -- -- 168/78 -- -- -- Lying   12/31/22 22:12:11 99 1 °F (37 3 °C) 118 Abnormal  18 165/127 Abnormal  140 94 % None (Room air) Lying   12/31/22 2009 -- -- -- -- -- -- None (Room air) --   12/31/22 17:37:20 98 2 °F (36 8 °C) 87 16 131/71 91 96 % None (Room air) Lying   12/31/22 1630 -- 83 18 108/53 76 94 % None (Room air) Lying   12/31/22 1545 100 °F (37 8 °C) 91 -- 115/59 83 96 % -- --   12/31/22 1530 -- 89 -- 111/53 77 96 % -- --   12/31/22 1500 -- 91 -- 103/54 74 95 % -- --   12/31/22 1445 -- 94 -- 103/53 74 96 % -- --   12/31/22 1430 -- 91 -- 97/54 73 95 % -- --   12/31/22 1415 -- 97 -- 97/54 72 96 % -- --   12/31/22 1345 -- 103 -- -- -- 96 % None (Room air) --   12/31/22 1330 -- 102 -- 132/60 87 97 % None (Room air) --   12/31/22 1315 -- 104 -- 138/62 89 96 % -- --   12/31/22 1300 -- 105 18 160/61 101 98 % -- --   12/31/22 1245 -- 107 Abnormal  -- -- -- 98 % -- --   12/31/22 1203 101 4 °F (38 6 °C) Abnormal  -- -- -- -- -- -- --   12/31/22 1200 -- 103 23 Abnormal  139/65 -- 96 % None (Room air) --   12/31/22 1147 98 9 °F (37 2 °C) 108 Abnormal  16 133/59 -- 99 % None (Room air) Lying     Weights (last 14 days)    Date/Time Weight Weight Method   01/01/23 0548 81 4 kg (179 lb 7 3 oz) Bed scale   12/31/22 1831 81 3 kg (179 lb 3 7 oz) Bed scale   12/31/22 17:37:20 81 3 kg (179 lb 3 7 oz) Bed scale   12/31/22 1200 76 8 kg (169 lb 5 oz) Bed scale   12/31/22 1147 76 4 kg (168 lb 6 9 oz) Stretcher scale     Trauma Secondary Assessment - [place] Coma Scale    Date and Time Eye Opening Best Verbal Response Best Motor Response [place] Coma Scale Score   01/01/23 0402 4 5 6 15   12/31/22 2009 4 5 6 15   12/31/22 1223 4 5 6 15       Pertinent Labs/Diagnostic Test Results:   CT abdomen pelvis with contrast   Final Result by Army Sicard, DO (12/31 3594)      1  No acute intra-abdominal abnormality  2   11 mm indeterminate left upper pole hypodensity versus volume averaging with prominent renal pyramid  Follow-up CT abdomen with renal protocol recommended in 6 months to reevaluate this finding  Small bilateral simple cysts and nonobstructing left    renal calculi noted  3  Colonic diverticulosis without evidence of diverticulitis  Additional incidental findings as above          Results from last 7 days   Lab Units 12/31/22  1215   SARS-COV-2  Negative     Results from last 7 days   Lab Units 01/01/23  0509 12/31/22 2002 12/31/22  1214   WBC Thousand/uL 9 44  --  13 58*   HEMOGLOBIN g/dL 10 1*  --  12 1   HEMATOCRIT % 31 3*  --  36 9   PLATELETS Thousands/uL 211 206 241   NEUTROS ABS Thousands/µL 8 17*  --  12 37*         Results from last 7 days   Lab Units 01/01/23  0509 12/31/22  1214   SODIUM mmol/L 136 136   POTASSIUM mmol/L 3 8 4 2   CHLORIDE mmol/L 103 100   CO2 mmol/L 24 24   ANION GAP mmol/L 9 12   BUN mg/dL 19 23   CREATININE mg/dL 1 30 1 29   EGFR ml/min/1 73sq m 54 55   CALCIUM mg/dL 8 0* 9 0   MAGNESIUM mg/dL 1 3*  --      Results from last 7 days   Lab Units 01/01/23  0509 12/31/22  1214   AST U/L 15 12*   ALT U/L 5* 3*   ALK PHOS U/L 64 80   TOTAL PROTEIN g/dL 5 7* 6 7   ALBUMIN g/dL 3 2* 3 8   TOTAL BILIRUBIN mg/dL 1 09* 1 20*     Results from last 7 days   Lab Units 01/01/23  1546 01/01/23  1138 01/01/23  0801 12/31/22  2152   POC GLUCOSE mg/dl 174* 233* 143* 213*     Results from last 7 days   Lab Units 01/01/23  0509 12/31/22  1214   GLUCOSE RANDOM mg/dL 159* 156*             No results found for: BETA-HYDROXYBUTYRATE                   Results from last 7 days   Lab Units 12/31/22  1643 12/31/22  1406 12/31/22  1214   HS TNI 0HR ng/L  --   --  12   HS TNI 2HR ng/L  --  8  --    HSTNI D2 ng/L  --  -4  --    HS TNI 4HR ng/L 13  --   --    HSTNI D4 ng/L 1  --   --          Results from last 7 days   Lab Units 12/31/22  1214   PROTIME seconds 14 0   INR  1 05   PTT seconds 28     Results from last 7 days   Lab Units 12/31/22  2002   TSH 3RD GENERATON uIU/mL 0 720     Results from last 7 days   Lab Units 01/01/23  0509 12/31/22  1214   PROCALCITONIN ng/ml 0 23 0 19     Results from last 7 days   Lab Units 12/31/22  1534 12/31/22  1214   LACTIC ACID mmol/L 1 4 2 7*                                         Results from last 7 days   Lab Units 12/31/22  1217   CLARITY UA  Turbid   COLOR UA  Light Yellow   SPEC GRAV UA  1 017   PH UA  5 0   GLUCOSE UA mg/dl Negative   KETONES UA mg/dl Negative   BLOOD UA  Large*   PROTEIN UA mg/dl Trace*   NITRITE UA  Negative   BILIRUBIN UA  Negative   UROBILINOGEN UA (BE) mg/dl <2 0   LEUKOCYTES UA  Large*   WBC UA /hpf Innumerable*   RBC UA /hpf 30-50*   BACTERIA UA /hpf None Seen   EPITHELIAL CELLS WET PREP /hpf Occasional     Results from last 7 days   Lab Units 12/31/22  1215   INFLUENZA A PCR  Negative   INFLUENZA B PCR  Negative   RSV PCR  Negative                             Results from last 7 days   Lab Units 12/31/22  1229 12/31/22  1217 12/31/22  1214   BLOOD CULTURE  No Growth at 24 hrs   --  No Growth at 24 hrs  URINE CULTURE   --  Culture results to follow    -- ED Treatment:   Medication Administration from 12/31/2022 1142 to 12/31/2022 1731       Date/Time Order Dose Route Action     12/31/2022 1239 EST acetaminophen (TYLENOL) tablet 975 mg 975 mg Oral Given     12/31/2022 1215 EST sodium chloride 0 9 % bolus 1,000 mL 1,000 mL Intravenous New Bag     12/31/2022 1319 EST ceftriaxone (ROCEPHIN) 1 g/50 mL in dextrose IVPB 1,000 mg Intravenous New Bag     12/31/2022 1321 EST sodium chloride 0 9 % bolus 1,000 mL 1,000 mL Intravenous New Bag        Past Medical History:   Diagnosis Date   • Ambulatory dysfunction     uses walker with assist of 1   • Anemia    • Anxiety    • At risk for falls     hx of falls   • Benign paroxysmal vertigo     unspecified ear   • BPH (benign prostatic hyperplasia)     for TURP today 1/4/2021   • Calculus in bladder     for surgical removal today 1/4/2021   • Cataract     left eye   • Cervicalgia    • Chest pain    • Chronic kidney disease     stage 3   • Constipation    • CTS (carpal tunnel syndrome)     left   • Cyst of pancreas    • Cystitis 06/23/2020    acute cystitis with hematuria   • Depression    • Diabetes mellitus ([facility] )     type II with neuropathy   • Dizziness     and giddiness   • Dysphagia    • Elevated PSA    • Facial weakness    • GERD (gastroesophageal reflux disease)     last assessed 5/20/16   • Gross hematuria    • Hematuria    • Hyperlipidemia    • Hypertension    • Kidney disease    • Kidney stone    • Left-sided weakness    • Long term (current) use of oral hypoglycemic drugs    • Muscle weakness    • Nuclear senile cataract of left eye    • OA (osteoarthritis) of knee     b/l   • 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HCC)    • Syncope and collapse    • Tachycardia    • UTI (urinary tract infection)    • Vertebro-basilar artery syndrome    • Vitamin B deficiency    • Vitamin D deficiency    • Vitamin D deficiency      Present on Admission:  • Stage 3 chronic kidney disease ([facility] )  • Hyperlipidemia  • Type 2 diabetes mellitus with diabetic neuropathy ([facility] )  • Left-sided weakness  • Essential hypertension  • Parkinson disease (HCC)      Admitting Diagnosis: UTI (urinary tract infection) [N39 0]  Sepsis ([facility] ) [A41 9]  AMS (altered mental status) [R41 82]  Age/Sex: 67 y o  male  Admission Orders:  Daily wt  I/O  SCD  Scheduled Medications:  aspirin, 81 mg, Oral, Daily  atorvastatin, 40 mg, Oral, Daily  carbidopa-levodopa, 1 tablet, Oral, TID  cefTRIAXone, 1,000 mg, Intravenous, Q24H  clopidogrel, 75 mg, Oral, Daily  cyanocobalamin, 1,000 mcg, Oral, Daily  docusate sodium, 100 mg, Oral, BID  finasteride, 5 mg, Oral, Daily  glycerin-hypromellose-, 1 drop, Both Eyes, BID  heparin (porcine), 5,000 Units, Subcutaneous, Q8H LIYAH  insulin lispro, 1-6 Units, Subcutaneous, HS  insulin lispro, 1-6 Units, Subcutaneous, TID AC  lisinopril, 5 mg, Oral, Daily  melatonin, 3 mg, Oral, HS  metoprolol succinate, 25 mg, Oral, Daily  nystatin, , Topical, BID  pantoprazole, 40 mg, Oral, Early Morning  ranolazine, 500 mg, Oral, Q12H LIYAH  tamsulosin, 0 4 mg, Oral, Daily With Dinner      Continuous IV Infusions:  sodium chloride, 75 mL/hr, Intravenous, Continuous      PRN Meds:  acetaminophen, 650 mg, Oral, Q6H PRN            Network Utilization Review Department  ATTENTION: Please call with any questions or concerns to [phone] and carefully listen to the prompts so that you are directed to the right person  All voicemails are confidential   [facility] all requests for admission clinical reviews, approved or denied determinations and any other requests to dedicated fax number below belonging to the campus where the patient is receiving treatment   List of dedicated fax numbers for the Facilities:  [address] DENIALS (Administrative/Medical Necessity) [phone]   [address]  (Maternity/NICU/Pediatrics) Cait Berkowitz [facility] [phone]   [facility] [phone]     [address] [place] [address] Medical [place] [address] [place] [address] [place] [id number] [place] [address] 28 [address] [facility] 310 [facility] [address] [phone]

## 2023-01-01 NOTE — SPEECH THERAPY NOTE
Speech-Language Pathology Bedside Swallow Evaluation        Patient Name: Lala Hahn    VSOVQ'T Date: 1/1/2023     Problem List  Principal Problem:    Severe sepsis ([facility] )  Active Problems:    Hyperlipidemia    Type 2 diabetes mellitus with diabetic neuropathy ([place] [address] )    Essential hypertension    Stage 3 chronic kidney disease ([place] [address] )    UTI (urinary tract infection)    Left-sided weakness    Coronary artery disease involving native coronary artery    Parkinson disease ([facility] )    Acute metabolic encephalopathy    Intertrigo         Past Medical History  Past Medical History:   Diagnosis Date   • Ambulatory dysfunction     uses walker with assist of 1   • Anemia    • Anxiety    • At risk for falls     hx of falls   • Benign paroxysmal vertigo     unspecified ear   • BPH (benign prostatic hyperplasia)     for TURP today 1/4/2021   • Calculus in bladder     for surgical removal today 1/4/2021   • Cataract     left eye   • Cervicalgia    • Chest pain    • Chronic kidney disease     stage 3   • Constipation    • CTS (carpal tunnel syndrome)     left   • Cyst of pancreas    • Cystitis 06/23/2020    acute cystitis with hematuria   • Depression    • Diabetes mellitus ([place] [address] )     type II with neuropathy   • Dizziness     and giddiness   • Dysphagia    • Elevated PSA    • Facial weakness    • GERD (gastroesophageal reflux disease)     last assessed 5/20/16   • Gross hematuria    • Hematuria    • Hyperlipidemia    • Hypertension    • Kidney disease    • Kidney stone    • Left-sided weakness    • Long term (current) use of oral hypoglycemic drugs    • Muscle weakness    • Nuclear senile cataract of left eye    • OA (osteoarthritis) of knee     b/l   • 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HCC)    • Syncope and collapse    • Tachycardia    • UTI (urinary tract infection)    • Vertebro-basilar artery syndrome    • Vitamin B deficiency    • Vitamin D deficiency    • Vitamin D deficiency        Past Surgical History  Past Surgical History:   Procedure Laterality Date   • CARDIAC CATHETERIZATION N/A 3/7/2022    Procedure: CARDIAC CATHETERIZATION;  Surgeon: Felicity Contreras DO;  [facility]: [facility]; Service: Cardiology   • CATARACT EXTRACTION     • CHOLECYSTECTOMY     • KNEE SURGERY     • [place] LITHOLAPAXY SMPL/SM <2 5 CM N/A 1/4/2021    Procedure: Cystolitholopaxy w/laser bladder stones;  Surgeon: Kody Roach MD;  Location: [facility] Main OR;  Service: Urology   • NY TRURL ELECTROSURG RESCJ PROSTATE BLEED COMPLETE N/A 1/4/2021    Procedure: T U R P ;  Surgeon: Kody Roach MD;  Location: [facility];  Service: Urology       Summary    Pt presents with mild oropharyngeal dysphagia symptoms, appears at risk for aspiration with thin liquids, consistent wet voice noted w/ thins by straw and cough x1 w/ thins by cup  Consider VBS to assess for aspiration risk     Recommendations:   Diet: regular diet and thin liquids   Meds: whole with puree   Frequent Oral care: 2x/day  Aspiration precautions  Other Recommendations/ considerations: consider VBS to assess for aspiration risk w/ thin liquids      Current Medical Status  Pt is a 67 y o  male who presented to [address] [place] [address]  with severe sepsis  Patient was brought by EMS  Patient's wife reports he has been more sleepy than usual, less interactive, has mild headaches but not confused  Patient has baseline slurred speech, no word finding difficulty no dysarthria no seizures or abnormal movements, blurry vision  Patient has fever temperature noted 99 5 Fahrenheit, associated with burning urination, mild abdominal pain  Patient denies hematuria, flank pain, diarrhea or constipation  Home nurse reported that patient has foul-smelling urine  Past medical history:   Please see H&P for details    Special Studies:  CT-A/P w/ contrast: 12/31/22 LOWER CHEST:  Mild bibasilar hypoventilatory changes  Distal esophageal thickening described previously has largely resolved  Some underdistention may be present  The esophagus would be best evaluated with barium swallow if relevant  Social/Education/Vocational Hx:  Pt lives with family    Swallow Information   Current Risks for Dysphagia & Aspiration: AMS  Current Symptoms/Concerns: AMS  Current Diet: regular diet and thin liquids   Baseline Diet: regular diet and thin liquids  Takes pills- whole w/ water     Baseline Assessment   Behavior/Cognition: alert  Speech/Language Status: able to participate in conversation and able to follow commands  Patient Positioning: upright in bed     Swallow Mechanism Exam   Facial: symmetrical  Labial: WFL  Lingual: WFL  Velum: unable to visualize  Mandible: adequate ROM  Dentition: edentulous and limited dentition  Vocal quality:clear/adequate   Volitional Cough: strong/productive   Respiratory: RA    Consistencies Assessed and Performance   Consistencies Administered: pt seen at lunch w/ pork loin, carrots, mashed potatoes, water by cup and straw  Oral Stage: pt needed some assistance w/ self feeding  Drank thin liquids by cup and straw  Suspect decreased oral control w/ straw drinking > cup  Prolonged mastication/manipulation of pork and carrots w/ limited dentition (only few lower)  Oral secretions noted  Pharyngeal Stage: swallow initiation appeared mildly delayed and complete  Consistent wet voice w/ thin liquids by straw, delayed cough x1 of 3 sips w/ thin liquids by cup         Esophageal Concerns: none reported      Results Reviewed with: patient and RN   Dysphagia Goals: pt will participate in VBS      April Alejandra Smart, [address] [place] [address]  Speech Pathologist  [place] license # [id number]  [place] license # [id number]  Available via [employer]

## 2023-01-01 NOTE — PLAN OF CARE
Cont regular diet w/ thin liquids  Single sips, preferably by cup  Aspiration precautions  Meds in applesauce

## 2023-01-01 NOTE — ASSESSMENT & PLAN NOTE
· SIRS criteria:  fever 102 9F,   · Has leukocytosis with bandemia  13 K  · Suspected source:  UTI,UA unremarkable vital signs-patient has history of UTI no notable cultures,   · Chest x-ray:  Negative   · Lactic acid:   2 7  · End organ damage:  yes  · IV Fluids:  1 L fluid bolus in ED  · IV antibiotics:   Ceftriaxone 1 G IV x1 dose in the ED  · Procalcitonin 0 23, lactic acid within normal limits    Plan  Monitor temp curve CBC  Fluids saline 75 cc/h  Continue ceftriaxone 1 g/24h  Follow-up blood cultures, urine cultures  Monitor CBC in a m

## 2023-01-01 NOTE — ASSESSMENT & PLAN NOTE
POA-fever, dysuria,  Denies hematuria, flank pain, nausea or vomiting  History of UTI 7/2022- no history of MDRO    PLAN  Continue ceftriaxone 1 g every 24 hours  Fluids 75 cc/h  Follow-up urine cultures and blood cultures

## 2023-01-01 NOTE — ASSESSMENT & PLAN NOTE
Lab Results   Component Value Date    EGFR 54 01/01/2023    EGFR 55 12/31/2022    EGFR 66 09/02/2022    CREATININE 1 30 01/01/2023    CREATININE 1 29 12/31/2022    CREATININE 1 10 09/02/2022

## 2023-01-01 NOTE — ASSESSMENT & PLAN NOTE
Left-sided weakness from Parkinson's and neuropathy  At baseline, no other focal deficits  Continue Sinemet  PT OT eval

## 2023-01-01 NOTE — ASSESSMENT & PLAN NOTE
Lab Results   Component Value Date    HGBA1C 6 4 (H) 03/05/2022       Recent Labs     12/31/22 2152 01/01/23  0801   POCGLU 213* 143*       Blood Sugar Average: Last 72 hrs:  (P) 178Sliding scale  Accu-Cheks every 6h  Hypoglycemia protocol

## 2023-01-01 NOTE — ASSESSMENT & PLAN NOTE
Lab Results   Component Value Date    EGFR 54 01/01/2023    EGFR 55 12/31/2022    EGFR 66 09/02/2022    CREATININE 1 30 01/01/2023    CREATININE 1 29 12/31/2022    CREATININE 1 10 09/02/2022     Stable disease  Avoid nephrotoxic agents, avoid hypotension  Daily BMP

## 2023-01-01 NOTE — PROGRESS NOTES
[facility]  Progress Note - Benja Peacock 1950, 67 y o  male MRN: [id number]  Unit/Bed#: W -01 Encounter: [id number]  Primary Care Provider: Siddharth Torres MD   Date and time admitted to hospital: 12/31/2022 11:43 AM    * Severe sepsis ([address] )  Assessment & Plan  · SIRS criteria:  fever 102 9F,   · Has leukocytosis with bandemia  13 K  · Suspected source:  UTI,UA unremarkable vital signs-patient has history of UTI no notable cultures,   · Chest x-ray:  Negative   · Lactic acid:   2 7  · End organ damage:  yes  · IV Fluids:  1 L fluid bolus in ED  · IV antibiotics:   Ceftriaxone 1 G IV x1 dose in the ED  · Procalcitonin 0 23, lactic acid within normal limits    Plan  Monitor temp curve CBC  Fluids saline 75 cc/h  Continue ceftriaxone 1 g/24h  Follow-up blood cultures, urine cultures  Monitor CBC in a m  Acute metabolic encephalopathy  Assessment & Plan  Patient reportedly more sleepy and less interactive than usual  There is no confusion, but patient has mild headaches  P0A-fever, dysuria, mild headaches    No seizures, no blurred vision,  no chest pain, no shortness of breath,  Evidence of urinary tract infection with severe sepsis  Toxic metabolic encephalopathy secondary to urosepsis    PLAN  Management of urinary tract infection  Avoid hallucinogenic agents  Fall and delirium precautions    UTI (urinary tract infection)  Assessment & Plan  POA-fever, dysuria,  Denies hematuria, flank pain, nausea or vomiting  History of UTI 7/2022- no history of MDRO    PLAN  Continue ceftriaxone 1 g every 24 hours  Fluids 75 cc/h  Follow-up urine cultures and blood cultures    Intertrigo  Assessment & Plan  At the groin  Can stop nystatin powder, and try InterDry  Keep groin dry and clean    Parkinson disease [facility])  Assessment & Plan  Patient following with neurology in outpatient  Continue Sinemet 1 tablet 3 times daily    Coronary artery disease involving native coronary artery  Assessment & Plan  Patient underwent cardiac catheterization February 2022 which revealed significant stenosis of the LAD, mid RCA and 1st marginal lesion  Patient not candidate for bypass due to several comorbidities  Medical therapy was optimized    Plan:  Continue aspirin and Plavix 20  Continue metoprolol 25 mg daily  Continue atorvastatin 40 mg daily  Ranexa 500 twice daily       Left-sided weakness  Assessment & Plan  Left-sided weakness from Parkinson's and neuropathy  At baseline, no other focal deficits  Continue Sinemet  PT OT eval    Stage 3 chronic kidney disease [facility])  Assessment & Plan  Lab Results   Component Value Date    EGFR 54 01/01/2023    EGFR 55 12/31/2022    EGFR 66 09/02/2022    CREATININE 1 30 01/01/2023    CREATININE 1 29 12/31/2022    CREATININE 1 10 09/02/2022     Stable disease  Avoid nephrotoxic agents, avoid hypotension  Daily BMP    Essential hypertension  Assessment & Plan  BP stable, continue lisinopril and metoprolol    Type 2 diabetes mellitus with diabetic neuropathy [facility])  Assessment & Plan  Lab Results   Component Value Date    HGBA1C 6 4 (H) 03/05/2022       Recent Labs     12/31/22  2152 01/01/23  0801   POCGLU 213* 143*       Blood Sugar Average: Last 72 hrs:  (P) 178Sliding scale  Accu-Cheks every 6h  Hypoglycemia protocol    Hyperlipidemia  Assessment & Plan  Continue atorvastatin 40 mg      VTE Pharmacologic Prophylaxis: VTE Score: 6 High Risk (Score >/= 5) - Pharmacological DVT Prophylaxis Ordered: heparin  Sequential Compression Devices Ordered  Patient Centered Rounds: I performed bedside rounds with nursing staff today  Discussions with Specialists or Other Care Team Provider: Attending, senior resident    Education and Discussions with Family / Patient: Updated  (wife) via phone      Current Length of Stay: 1 day(s)  Current Patient Status: Inpatient   Discharge Plan: Anticipate discharge tomorrow to discharge location to be determined pending rehab evaluations  Code Status: Level 1 - Full Code    Subjective:   No acute events overnight  Patient seen and examined at the bedside this morning  He denies any fever, chills, dysuria, hematuria, chest pain, or shortness of breath  He does endorse some mild suprapubic/RLQ abdominal tenderness  Objective:     Vitals:   Temp (24hrs), Av 5 °F (37 5 °C), Min:98 2 °F (36 8 °C), Max:101 4 °F (38 6 °C)    Temp:  [98 2 °F (36 8 °C)-101 4 °F (38 6 °C)] 99 1 °F (37 3 °C)  HR:  [] 97  Resp:  [16-23] 20  BP: ()/() 115/64  SpO2:  [93 %-99 %] 95 %  Body mass index is 28 11 kg/m²  Input and Output Summary (last 24 hours): Intake/Output Summary (Last 24 hours) at 2023 1026  Last data filed at 2023 0836  Gross per 24 hour   Intake 1240 ml   Output 417 ml   Net 823 ml       Physical Exam:   Physical Exam  Vitals reviewed  Constitutional:       Appearance: He is ill-appearing  He is not toxic-appearing or diaphoretic  HENT:      Head: Normocephalic and atraumatic  Nose: No congestion or rhinorrhea  Eyes:      General:         Right eye: Discharge present  Left eye: Discharge present  Cardiovascular:      Rate and Rhythm: Tachycardia present  Heart sounds: No murmur heard  Pulmonary:      Effort: No respiratory distress  Breath sounds: No wheezing or rales  Abdominal:      General: There is no distension  Tenderness: There is abdominal tenderness in the right lower quadrant and suprapubic area  There is no right CVA tenderness or guarding  Genitourinary:     Penis: Paraphimosis and erythema present  Comments:     Musculoskeletal:      Cervical back: No rigidity or tenderness  Lymphadenopathy:      Cervical: No cervical adenopathy  Neurological:      Mental Status: He is oriented to person, place, and time  He is lethargic  GCS: GCS eye subscore is 4  GCS verbal subscore is 5  GCS motor subscore is 6  Cranial Nerves:  No cranial nerve deficit or facial asymmetry  Motor: No weakness  Comments: Patient alert oriented x3, very somnolent but easily arousable   Psychiatric:         Speech: Speech is not delayed  Behavior: Behavior is not slowed  Additional Data:     Labs:  Results from last 7 days   Lab Units 01/01/23  0509   WBC Thousand/uL 9 44   HEMOGLOBIN g/dL 10 1*   HEMATOCRIT % 31 3*   PLATELETS Thousands/uL 211   NEUTROS PCT % 86*   LYMPHS PCT % 4*   MONOS PCT % 9   EOS PCT % 0     Results from last 7 days   Lab Units 01/01/23  0509   SODIUM mmol/L 136   POTASSIUM mmol/L 3 8   CHLORIDE mmol/L 103   CO2 mmol/L 24   BUN mg/dL 19   CREATININE mg/dL 1 30   ANION GAP mmol/L 9   CALCIUM mg/dL 8 0*   ALBUMIN g/dL 3 2*   TOTAL BILIRUBIN mg/dL 1 09*   ALK PHOS U/L 64   ALT U/L 5*   AST U/L 15   GLUCOSE RANDOM mg/dL 159*     Results from last 7 days   Lab Units 12/31/22  1214   INR  1 05     Results from last 7 days   Lab Units 01/01/23  0801 12/31/22  2152   POC GLUCOSE mg/dl 143* 213*         Results from last 7 days   Lab Units 01/01/23  0509 12/31/22  1534 12/31/22  1214   LACTIC ACID mmol/L  --  1 4 2 7*   PROCALCITONIN ng/ml 0 23  --  0 19       Lines/Drains:  Invasive Devices     Peripheral Intravenous Line  Duration           Peripheral IV 12/31/22 Left Antecubital <1 day    Peripheral IV 12/31/22 Right Antecubital <1 day                      Imaging: Reviewed radiology reports from this admission including: abdominal/pelvic CT    Recent Cultures (last 7 days):   Results from last 7 days   Lab Units 12/31/22  1229 12/31/22  1214   BLOOD CULTURE  Received in Microbiology Lab  Culture in Progress  Received in Microbiology Lab  Culture in Progress         Last 24 Hours Medication List:   Current Facility-Administered Medications   Medication Dose Route Frequency Provider Last Rate   • acetaminophen  650 mg Oral Q6H PRN Susana Hitchcock MD     • aspirin  81 mg Oral Daily Ashley Patiño Myaco Gill MD     • atorvastatin  40 mg Oral Daily Lidia Faust MD     • carbidopa-levodopa  1 tablet Oral TID Lidia Faust MD     • cefTRIAXone  1,000 mg Intravenous Q24H Lidia Faust MD     • clopidogrel  75 mg Oral Daily Lidia Faust MD     • cyanocobalamin  1,000 mcg Oral Daily Lidia Faust MD     • docusate sodium  100 mg Oral BID Lidia Faust MD     • finasteride  5 mg Oral Daily Lidia Faust MD     • glycerin-hypromellose-  1 drop Both Eyes BID Lidia Faust MD     • heparin (porcine)  5,000 Units Subcutaneous Q8H Lanie Desir MD     • insulin lispro  1-6 Units Subcutaneous HS Lidia Faust MD     • insulin lispro  1-6 Units Subcutaneous TID Smith Antonio MD     • lisinopril  5 mg Oral Daily Ldiia Faust MD     • magnesium sulfate  4 g Intravenous Once Edy Fernando MD     • melatonin  3 mg Oral HS Lidia Faust MD     • metoprolol succinate  25 mg Oral Daily Lidia Faust MD     • nystatin   Topical BID Lidia Faust MD     • pantoprazole  40 mg Oral Early Morning Lidia Faust MD     • ranolazine  500 mg Oral Q12H Lanie Desir MD     • sodium chloride  75 mL/hr Intravenous Continuous Lidia Faust MD 75 mL/hr (01/01/23 0745)   • tamsulosin  0 4 mg Oral Daily With Javier Davies MD          Today, Patient Was Seen By: Jacobo Flores DO    **Please Note: This note may have been constructed using a voice recognition system  **

## 2023-01-02 LAB
ANION GAP SERPL CALCULATED.3IONS-SCNC: 5 MMOL/L (ref 4–13)
BACTERIA UR CULT: ABNORMAL
BACTERIA UR CULT: ABNORMAL
BUN SERPL-MCNC: 19 MG/DL (ref 5–25)
CALCIUM SERPL-MCNC: 7.7 MG/DL (ref 8.4–10.2)
CHLORIDE SERPL-SCNC: 107 MMOL/L (ref 96–108)
CO2 SERPL-SCNC: 25 MMOL/L (ref 21–32)
CREAT SERPL-MCNC: 1.33 MG/DL (ref 0.6–1.3)
ERYTHROCYTE [DISTWIDTH] IN BLOOD BY AUTOMATED COUNT: 14 % (ref 11.6–15.1)
GFR SERPL CREATININE-BSD FRML MDRD: 53 ML/MIN/1.73SQ M
GLUCOSE SERPL-MCNC: 152 MG/DL (ref 65–140)
GLUCOSE SERPL-MCNC: 170 MG/DL (ref 65–140)
GLUCOSE SERPL-MCNC: 197 MG/DL (ref 65–140)
GLUCOSE SERPL-MCNC: 202 MG/DL (ref 65–140)
GLUCOSE SERPL-MCNC: 237 MG/DL (ref 65–140)
HCT VFR BLD AUTO: 28 % (ref 36.5–49.3)
HGB BLD-MCNC: 9 G/DL (ref 12–17)
MCH RBC QN AUTO: 28.4 PG (ref 26.8–34.3)
MCHC RBC AUTO-ENTMCNC: 32.1 G/DL (ref 31.4–37.4)
MCV RBC AUTO: 88 FL (ref 82–98)
PLATELET # BLD AUTO: 181 THOUSANDS/UL (ref 149–390)
PMV BLD AUTO: 10.5 FL (ref 8.9–12.7)
POTASSIUM SERPL-SCNC: 3.8 MMOL/L (ref 3.5–5.3)
RBC # BLD AUTO: 3.17 MILLION/UL (ref 3.88–5.62)
SODIUM SERPL-SCNC: 137 MMOL/L (ref 135–147)
WBC # BLD AUTO: 8.3 THOUSAND/UL (ref 4.31–10.16)

## 2023-01-02 RX ORDER — SULFAMETHOXAZOLE AND TRIMETHOPRIM 800; 160 MG/1; MG/1
1 TABLET ORAL EVERY 12 HOURS SCHEDULED
Status: DISCONTINUED | OUTPATIENT
Start: 2023-01-03 | End: 2023-01-05 | Stop reason: HOSPADM

## 2023-01-02 RX ORDER — INSULIN GLARGINE 100 [IU]/ML
4 INJECTION, SOLUTION SUBCUTANEOUS
Status: DISCONTINUED | OUTPATIENT
Start: 2023-01-02 | End: 2023-01-03

## 2023-01-02 RX ADMIN — RANOLAZINE 500 MG: 500 TABLET, FILM COATED, EXTENDED RELEASE ORAL at 21:30

## 2023-01-02 RX ADMIN — INSULIN GLARGINE 4 UNITS: 100 INJECTION, SOLUTION SUBCUTANEOUS at 21:30

## 2023-01-02 RX ADMIN — NYSTATIN: 100000 POWDER TOPICAL at 10:37

## 2023-01-02 RX ADMIN — NYSTATIN: 100000 POWDER TOPICAL at 19:33

## 2023-01-02 RX ADMIN — INSULIN LISPRO 3 UNITS: 100 INJECTION, SOLUTION INTRAVENOUS; SUBCUTANEOUS at 12:30

## 2023-01-02 RX ADMIN — TAMSULOSIN HYDROCHLORIDE 0.4 MG: 0.4 CAPSULE ORAL at 17:10

## 2023-01-02 RX ADMIN — CARBIDOPA AND LEVODOPA 1 TABLET: 25; 100 TABLET ORAL at 08:11

## 2023-01-02 RX ADMIN — CYANOCOBALAMIN TAB 500 MCG 1000 MCG: 500 TAB at 08:10

## 2023-01-02 RX ADMIN — GLYCERIN 1 DROP: .002; .002; .01 SOLUTION/ DROPS OPHTHALMIC at 10:37

## 2023-01-02 RX ADMIN — CEFTRIAXONE SODIUM 1000 MG: 10 INJECTION, POWDER, FOR SOLUTION INTRAVENOUS at 13:57

## 2023-01-02 RX ADMIN — RANOLAZINE 500 MG: 500 TABLET, FILM COATED, EXTENDED RELEASE ORAL at 08:10

## 2023-01-02 RX ADMIN — ASPIRIN 81 MG CHEWABLE TABLET 81 MG: 81 TABLET CHEWABLE at 08:11

## 2023-01-02 RX ADMIN — PANTOPRAZOLE SODIUM 40 MG: 40 TABLET, DELAYED RELEASE ORAL at 05:27

## 2023-01-02 RX ADMIN — SODIUM CHLORIDE 75 ML/HR: 0.9 INJECTION, SOLUTION INTRAVENOUS at 11:21

## 2023-01-02 RX ADMIN — INSULIN LISPRO 2 UNITS: 100 INJECTION, SOLUTION INTRAVENOUS; SUBCUTANEOUS at 17:10

## 2023-01-02 RX ADMIN — HEPARIN SODIUM 5000 UNITS: 5000 INJECTION INTRAVENOUS; SUBCUTANEOUS at 05:27

## 2023-01-02 RX ADMIN — INSULIN LISPRO 2 UNITS: 100 INJECTION, SOLUTION INTRAVENOUS; SUBCUTANEOUS at 21:31

## 2023-01-02 RX ADMIN — CLOPIDOGREL BISULFATE 75 MG: 75 TABLET ORAL at 08:11

## 2023-01-02 RX ADMIN — FINASTERIDE 5 MG: 5 TABLET, FILM COATED ORAL at 08:10

## 2023-01-02 RX ADMIN — HEPARIN SODIUM 5000 UNITS: 5000 INJECTION INTRAVENOUS; SUBCUTANEOUS at 14:24

## 2023-01-02 RX ADMIN — CARBIDOPA AND LEVODOPA 1 TABLET: 25; 100 TABLET ORAL at 21:30

## 2023-01-02 RX ADMIN — ATORVASTATIN CALCIUM 40 MG: 40 TABLET, FILM COATED ORAL at 08:10

## 2023-01-02 RX ADMIN — DOCUSATE SODIUM 100 MG: 100 CAPSULE, LIQUID FILLED ORAL at 08:10

## 2023-01-02 RX ADMIN — INSULIN LISPRO 1 UNITS: 100 INJECTION, SOLUTION INTRAVENOUS; SUBCUTANEOUS at 08:08

## 2023-01-02 RX ADMIN — HEPARIN SODIUM 5000 UNITS: 5000 INJECTION INTRAVENOUS; SUBCUTANEOUS at 21:30

## 2023-01-02 RX ADMIN — Medication 3 MG: at 21:30

## 2023-01-02 RX ADMIN — GLYCERIN 1 DROP: .002; .002; .01 SOLUTION/ DROPS OPHTHALMIC at 19:33

## 2023-01-02 RX ADMIN — CARBIDOPA AND LEVODOPA 1 TABLET: 25; 100 TABLET ORAL at 17:12

## 2023-01-02 RX ADMIN — DOCUSATE SODIUM 100 MG: 100 CAPSULE, LIQUID FILLED ORAL at 17:10

## 2023-01-02 NOTE — ASSESSMENT & PLAN NOTE
POA, patient reportedly more sleepy and less interactive than usual  There is no confusion, but patient has mild headaches  POA-fever, dysuria, mild headaches    No seizures, no blurred vision,  no chest pain, no shortness of breath,  Evidence of urinary tract infection with severe sepsis  Toxic metabolic encephalopathy secondary to urosepsis    PLAN  Management of urinary tract infection  Fall and delirium precautions

## 2023-01-02 NOTE — QUICK NOTE
Spoke with pt's wife Aviva to update regarding clinical course and plan of care  All questions and concerns addressed at this time

## 2023-01-02 NOTE — ASSESSMENT & PLAN NOTE
Lab Results   Component Value Date    HGBA1C 6 4 (H) 03/05/2022       Recent Labs     01/01/23  1546 01/01/23 2050 01/02/23  0752 01/02/23  1158   POCGLU 174* 261* 152* 237*       Blood Sugar Average: Last 72 hrs:  (P) 201 [id number]   Sliding scale with meals and at bedtime  Accu-Cheks with meals and at bedtime  Start Lantus 4 U [facility]  Hypoglycemia protocol

## 2023-01-02 NOTE — ASSESSMENT & PLAN NOTE
Lab Results   Component Value Date    EGFR 53 01/02/2023    EGFR 54 01/01/2023    EGFR 55 12/31/2022    CREATININE 1 33 (H) 01/02/2023    CREATININE 1 30 01/01/2023    CREATININE 1 29 12/31/2022     Stable disease  Avoid nephrotoxic agents, avoid hypotension  Daily BMP

## 2023-01-02 NOTE — CASE MANAGEMENT
Case Management Assessment    Patient name Terrie Lamb  Location W /W -46 MRN [id number]  : 1950 Date 2023       Current Admission Date: 2022  Current Admission Diagnosis:Severe sepsis [facility])   Patient Active Problem List    Diagnosis Date Noted   • Acute metabolic encephalopathy    • Intertrigo 2022   • Balanitis 2022   • Requires daily assistance for activities of daily living (ADL) and comfort needs 2022   • Fall 2022   • [facility] ([facility] ) 2022   • Chest pain 2022   • Leukocytosis 2022   • Coronary artery disease involving native coronary artery 2022   • Type 2 MI (myocardial infarction) ([facility] ) 2022   • Generalized abdominal pain 2021   • Lactic acidosis 2021   • Urinary incontinence 2021   • Vitamin B12 deficiency    • History of recurrent UTIs 2020   • Insomnia 2020   • Constipation 2020   • Benign paroxysmal positional vertigo 2020   • BPH (benign prostatic hyperplasia) 2020   • Vitamin D deficiency 2020   • Left-sided weakness 2020   • UTI (urinary tract infection) 2020   • Cystitis 2020   • Bladder stones 2020   • Stage 3 chronic kidney disease ([facility] ) 2020   • Anemia 2020   • Colon cancer screening 2020   • Essential hypertension 2019   • Type 2 diabetes mellitus ([facility] ) 2019   • Nephrolithiasis 2019   • Ambulatory dysfunction 2019   • Severe sepsis ([facility] ) 2019   • Paresis ([facility] ) 2019   • Abnormal PSA 2019   • Dizziness 10/04/2017   • Pancreas cyst 2016   • Type 2 diabetes mellitus with diabetic neuropathy ([facility] ) 2015   • Hyperlipidemia 2012      LOS (days): 2  Geometric Mean LOS (GMLOS) (days):   Days to GMLOS:     OBJECTIVE:    Risk of Unplanned Readmission Score: 24 91         Current admission status: Inpatient       Preferred Pharmacy:   [facility] [address], [place] - [address] [facility]  [address] [facility] [address]  [address] [place] [address]  Phone: [phone] Fax: [address], [facility] [address]  [facility]   [address] [facility]  Phone: [phone] Fax: [phone]    Primary Care Provider: Luke Jones MD    Primary Insurance: Net Transmit & Receive [employer] [facility]  Secondary Insurance:     ASSESSMENT:  Dean 26 Proxies    There are no active Health Care Proxies on file  Readmission Root Cause  30 Day Readmission: No    Patient Information  Admitted from[de-identified] Home  Mental Status: Alert  During Assessment patient was accompanied by: Not accompanied during assessment  Assessment information provided by[de-identified] Patient  Primary Caregiver: Spouse  Support Systems: Self, Spouse/significant other  Home entry access options  Select all that apply : No steps to enter home  Type of Current Residence: Apartment  Floor Level: 6  Upon entering residence, is there a bedroom on the main floor (no further steps)?: Yes  Upon entering residence, is there a bathroom on the main floor (no further steps)?: Yes  In the last 12 months, was there a time when you were not able to pay the mortgage or rent on time?: No  In the last 12 months, was there a time when you did not have a steady place to sleep or slept in a shelter (including now)?: No  Homeless/housing insecurity resource given?: N/A  Living Arrangements: Lives w/ Spouse/significant other    Activities of Daily Living Prior to Admission  Functional Status: Independent  Completes ADLs independently?: No  Level of ADL dependence: Assistance (Wife Maria Isabel Gray helps with ADLS)  Ambulates independently?: Yes  Does patient use assisted devices?: Yes  Assisted Devices (DME) used:  Wheelchair  Does patient currently own DME?: Yes  What DME does the patient currently own?: Wheelchair  Does patient have a history of Outpatient Therapy (PT/OT)?: No  Does the patient have a history of Short-Term Rehab?: Yes ([place](he does not want rehab))  Does patient have a history of HHC?: Yes  [facility]         Patient Information Continued  Income Source: Pension/FPC  Does patient have prescription coverage?: Yes  Within the past 12 months, you worried that your food would run out before you got the money to buy more : Never true  Within the past 12 months, the food you bought just didn't last and you didn't have money to get more : Never true  Food insecurity resource given?: N/A  Does patient receive dialysis treatments?: No  Does patient have a history of substance abuse?: No         Means of Transportation  Means of Transport to [facility][de-identified] Family transport  In the past 12 months, has lack of transportation kept you from medical appointments or from getting medications?: No  In the past 12 months, has lack of transportation kept you from meetings, work, or from getting things needed for daily living?: No  Was application for public transport provided?: N/A    Patient is refusing rehab  He would like to return home to his wife

## 2023-01-02 NOTE — PROGRESS NOTES
[facility]  Progress Note - Julius Litten 1950, 67 y o  male MRN: [id number]  Unit/Bed#: W -01 Encounter: [id number]  Primary Care Provider: Jessica Pascual MD   Date and time admitted to hospital: 12/31/2022 11:43 AM    * Severe sepsis ([address] )  Assessment & Plan  · SIRS criteria:  fever 102 9F,   · Has leukocytosis with bandemia 13 K  · Suspected source:  UTI, p/w dysuria, hx of UTI, UCx + 70-79k cfu MSSA   · End organ dysfunction: AMS  · Chest x-ray:  Negative   · Lactic acid:   2 7  · IV Fluids:  1 L fluid bolus in ED  · IV antibiotics:   Ceftriaxone 1 G IV x1 dose in the ED  · Procalcitonin 0 23,   · lactic acid normalized  · BCx [email or web address]    Plan  Monitor temp curve CBC  Fluids saline 75 cc/h  Start Bactrim DS BID  Discontinue ceftriaxone 1 g/24h  Monitor CBC in a m       UTI (urinary tract infection)  Assessment & Plan  POA-fever, dysuria,  Denies hematuria, flank pain, nausea or vomiting  History of UTI 7/2022- no history of MDRO  UCx growing S  aureus    PLAN  Start Bactrim DS BID  Disontinue ceftriaxone  DC IVF    Acute metabolic encephalopathy  Assessment & Plan  POA, patient reportedly more sleepy and less interactive than usual  There is no confusion, but patient has mild headaches  POA-fever, dysuria, mild headaches    No seizures, no blurred vision,  no chest pain, no shortness of breath,  Evidence of urinary tract infection with severe sepsis  Toxic metabolic encephalopathy secondary to urosepsis    PLAN  Management of urinary tract infection  Fall and delirium precautions    Intertrigo  Assessment & Plan  At the groin  Can stop nystatin powder, and try InterDry  Keep groin dry and clean    Type 2 diabetes mellitus with diabetic neuropathy [facility])  Assessment & Plan  Lab Results   Component Value Date    HGBA1C 6 4 (H) 03/05/2022       Recent Labs     01/01/23  1546 01/01/23  2050 01/02/23  0752 01/02/23  1158   POCGLU 174* 261* 152* 237*       Blood Sugar Average: Last 72 hrs:  (P) 201 [id number]   Sliding scale with meals and at bedtime  Accu-Cheks with meals and at bedtime  Start Lantus 4 U QHS  Hypoglycemia protocol    Left-sided weakness  Assessment & Plan  Left-sided weakness from Parkinson's and neuropathy  At baseline, no other focal deficits  Continue Sinemet  PT OT eval    Stage 3 chronic kidney disease [facility])  Assessment & Plan  Lab Results   Component Value Date    EGFR 53 2023    EGFR 54 2023    EGFR 55 2022    CREATININE 1 33 (H) 2023    CREATININE 1 30 2023    CREATININE 1 29 2022     Stable disease  Avoid nephrotoxic agents, avoid hypotension  Daily BMP      VTE Pharmacologic Prophylaxis: VTE Score: 6 High Risk (Score >/= 5) - Pharmacological DVT Prophylaxis Ordered: heparin  Sequential Compression Devices Ordered  Patient Centered Rounds: I performed bedside rounds with nursing staff today  Discussions with Specialists or Other Care Team Provider: None    Education and Discussions with Family / Patient: Will call contact if not seen at bedside  Current Length of Stay: 2 day(s)  Current Patient Status: Inpatient   Discharge Plan: Anticipate discharge in 24-48 hrs to discharge location to be determined pending rehab evaluations  Code Status: Level 1 - Full Code    Subjective: This morning, Mr Julian Uribe is seen sitting up in bed eating breakfast, alert, engaged, in no acute distress, no complaints, interested in talking football  Reports he feels well  Dysuria resolved  Denies fever, chills, sweats, CP, SOB, Abd pain  No new or worsening sxs  Objective:     Vitals:   Temp (24hrs), Av 2 °F (37 3 °C), Min:98 4 °F (36 9 °C), Max:100 °F (37 8 °C)    Temp:  [98 4 °F (36 9 °C)-100 °F (37 8 °C)] 98 4 °F (36 9 °C)  HR:  [84-87] 86  Resp:  [18] 18  BP: (105-114)/(50-64) 114/64  SpO2:  [94 %-98 %] 98 %  Body mass index is 28 11 kg/m²  Input and Output Summary (last 24 hours):      Intake/Output Summary (Last 24 hours) at 1/2/2023 1548  Last data filed at 1/2/2023 1400  Gross per 24 hour   Intake --   Output 600 ml   Net -600 ml       Physical Exam:   Physical Exam  Vitals and nursing note reviewed  Constitutional:       General: He is not in acute distress  Appearance: Normal appearance  He is not ill-appearing, toxic-appearing or diaphoretic  HENT:      Head: Normocephalic and atraumatic  Eyes:      General: No scleral icterus  Right eye: No discharge  Left eye: No discharge  Conjunctiva/sclera: Conjunctivae normal    Cardiovascular:      Rate and Rhythm: Normal rate and regular rhythm  Pulses: Normal pulses  Heart sounds: Normal heart sounds  No murmur heard  No friction rub  No gallop  Pulmonary:      Effort: Pulmonary effort is normal  No respiratory distress  Breath sounds: Normal breath sounds  No stridor  No wheezing, rhonchi or rales  Chest:      Chest wall: No tenderness  Abdominal:      General: Bowel sounds are normal  There is no distension  Palpations: Abdomen is soft  Tenderness: There is no abdominal tenderness  There is no guarding or rebound  Genitourinary:     Comments:     Musculoskeletal:         General: No tenderness  Right lower leg: No edema  Left lower leg: No edema  Comments: B/l prevalon boots to LEs   Skin:     General: Skin is warm and dry  Neurological:      Mental Status: He is alert  Psychiatric:         Mood and Affect: Mood normal          Speech: Speech is not delayed  Behavior: Behavior normal  Behavior is not slowed          Additional Data:     Labs:  Results from last 7 days   Lab Units 01/02/23  0534 01/01/23  0509   WBC Thousand/uL 8 30 9 44   HEMOGLOBIN g/dL 9 0* 10 1*   HEMATOCRIT % 28 0* 31 3*   PLATELETS Thousands/uL 181 211   NEUTROS PCT %  --  86*   LYMPHS PCT %  --  4*   MONOS PCT %  --  9   EOS PCT %  --  0     Results from last 7 days   Lab Units 01/02/23  0534 01/01/23  0259 SODIUM mmol/L 137 136   POTASSIUM mmol/L 3 8 3 8   CHLORIDE mmol/L 107 103   CO2 mmol/L 25 24   BUN mg/dL 19 19   CREATININE mg/dL 1 33* 1 30   ANION GAP mmol/L 5 9   CALCIUM mg/dL 7 7* 8 0*   ALBUMIN g/dL  --  3 2*   TOTAL BILIRUBIN mg/dL  --  1 09*   ALK PHOS U/L  --  64   ALT U/L  --  5*   AST U/L  --  15   GLUCOSE RANDOM mg/dL 170* 159*     Results from last 7 days   Lab Units 12/31/22  1214   INR  1 05     Results from last 7 days   Lab Units 01/02/23  1158 01/02/23  0752 01/01/23  2050 01/01/23  1546 01/01/23  1138 01/01/23  0801 12/31/22  2152   POC GLUCOSE mg/dl 237* 152* 261* 174* 233* 143* 213*         Results from last 7 days   Lab Units 01/01/23  0509 12/31/22  1534 12/31/22  1214   LACTIC ACID mmol/L  --  1 4 2 7*   PROCALCITONIN ng/ml 0 23  --  0 19       Lines/Drains:  Invasive Devices     Peripheral Intravenous Line  Duration           Peripheral IV 12/31/22 Left Antecubital 2 days    Peripheral IV 12/31/22 Right Antecubital 2 days                      Imaging: Reviewed radiology reports from this admission including: abdominal/pelvic CT    Recent Cultures (last 7 days):   Results from last 7 days   Lab Units 12/31/22  1229 12/31/22  1217 12/31/22  1214   BLOOD CULTURE  No Growth at 24 hrs   --  No Growth at 24 hrs     URINE CULTURE   --  70,000-79,000 cfu/ml Staphylococcus aureus*  10,000-19,000 cfu/ml  --        Last 24 Hours Medication List:   Current Facility-Administered Medications   Medication Dose Route Frequency Provider Last Rate   • acetaminophen  650 mg Oral Q6H PRN Eileen Wagner MD     • aspirin  81 mg Oral Daily Eileen Wagner MD     • atorvastatin  40 mg Oral Daily Eileen Wagner MD     • carbidopa-levodopa  1 tablet Oral TID Eileen Wagner MD     • clopidogrel  75 mg Oral Daily Eileen Wagner MD     • cyanocobalamin  1,000 mcg Oral Daily Eileen Wagner MD     • docusate sodium  100 mg Oral BID Tc Cardenas MD     • finasteride  5 mg Oral Daily Tc Cardenas MD     • glycerin-hypromellose-  1 drop Both Eyes BID Tc Cardenas MD     • heparin (porcine)  5,000 Units Subcutaneous Q8H Kayli Mera MD     • insulin glargine  4 Units Subcutaneous HS Nuvia Garza MD     • insulin lispro  1-6 Units Subcutaneous HS Tc Cardenas MD     • insulin lispro  1-6 Units Subcutaneous TID Sariah Johnson MD     • lisinopril  5 mg Oral Daily Tc Cardenas MD     • melatonin  3 mg Oral HS Tc Cardenas MD     • metoprolol succinate  25 mg Oral Daily Tc Cardenas MD     • nystatin   Topical BID Tc Cardenas MD     • pantoprazole  40 mg Oral Early Morning Tc Cardenas MD     • ranolazine  500 mg Oral Q12H [facility] Tc Cardenas MD     • [START ON 1/3/2023] sulfamethoxazole-trimethoprim  1 tablet Oral Q12H [facility] Nuvia Garza MD     • tamsulosin  0 4 mg Oral Daily With Jay Palmer MD          Today, Patient Was Seen By: Nuvia Garza MD    **Please Note: This note may have been constructed using a voice recognition system  **

## 2023-01-02 NOTE — PLAN OF CARE
Problem: Potential for Falls  Goal: Patient will remain free of falls  Description: INTERVENTIONS:  - Educate patient/family on patient safety including physical limitations  - Instruct patient to call for assistance with activity   - Consult OT/PT to assist with strengthening/mobility   - Keep Call bell within reach  - Keep bed low and locked with side rails adjusted as appropriate  - Keep care items and personal belongings within reach  - Initiate and maintain comfort rounds  - Make Fall Risk Sign visible to staff  - Offer Toileting every 4 Hours, in advance of need  - Initiate/Maintain bed alarm  - Apply yellow socks and bracelet for high fall risk patients  - Consider moving patient to room near nurses station  Outcome: Progressing     Problem: Prexisting or High Potential for Compromised Skin Integrity  Goal: Skin integrity is maintained or improved  Description: INTERVENTIONS:  - Identify patients at risk for skin breakdown  - Assess and monitor skin integrity  - Assess and monitor nutrition and hydration status  - Monitor labs   - Assess for incontinence   - Turn and reposition patient  - Assist with mobility/ambulation  - Relieve pressure over bony prominences  - Avoid friction and shearing  - Provide appropriate hygiene as needed including keeping skin clean and dry  - Evaluate need for skin moisturizer/barrier cream  - Collaborate with interdisciplinary team   - Patient/family teaching  - Consider wound care consult   Outcome: Progressing

## 2023-01-02 NOTE — ASSESSMENT & PLAN NOTE
· SIRS criteria:  fever 102 9F,   · Has leukocytosis with bandemia 13 K  · Suspected source:  UTI, p/w dysuria, hx of UTI, UCx + 70-79k cfu MSSA   · End organ dysfunction: AMS  · Chest x-ray:  Negative   · Lactic acid:   2 7  · IV Fluids:  1 L fluid bolus in ED  · IV antibiotics:   Ceftriaxone 1 G IV x1 dose in the ED  · Procalcitonin 0 23,   · lactic acid normalized  · BCx [email or web address]    Plan  Monitor temp curve CBC  Fluids saline 75 cc/h  Start Bactrim DS BID  Discontinue ceftriaxone 1 g/24h  Monitor CBC in a m

## 2023-01-02 NOTE — ASSESSMENT & PLAN NOTE
POA-fever, dysuria,  Denies hematuria, flank pain, nausea or vomiting  History of UTI 7/2022- no history of MDRO  UCx growing S  aureus    PLAN  Start Bactrim DS BID  Disontinue ceftriaxone  DC IVF

## 2023-01-03 LAB
GLUCOSE SERPL-MCNC: 174 MG/DL (ref 65–140)
GLUCOSE SERPL-MCNC: 186 MG/DL (ref 65–140)
GLUCOSE SERPL-MCNC: 226 MG/DL (ref 65–140)
GLUCOSE SERPL-MCNC: 232 MG/DL (ref 65–140)

## 2023-01-03 RX ORDER — INSULIN GLARGINE 100 [IU]/ML
6 INJECTION, SOLUTION SUBCUTANEOUS
Status: DISCONTINUED | OUTPATIENT
Start: 2023-01-03 | End: 2023-01-05 | Stop reason: HOSPADM

## 2023-01-03 RX ADMIN — CARBIDOPA AND LEVODOPA 1 TABLET: 25; 100 TABLET ORAL at 08:28

## 2023-01-03 RX ADMIN — INSULIN LISPRO 1 UNITS: 100 INJECTION, SOLUTION INTRAVENOUS; SUBCUTANEOUS at 17:27

## 2023-01-03 RX ADMIN — ASPIRIN 81 MG CHEWABLE TABLET 81 MG: 81 TABLET CHEWABLE at 08:27

## 2023-01-03 RX ADMIN — CARBIDOPA AND LEVODOPA 1 TABLET: 25; 100 TABLET ORAL at 17:26

## 2023-01-03 RX ADMIN — Medication 3 MG: at 20:46

## 2023-01-03 RX ADMIN — FINASTERIDE 5 MG: 5 TABLET, FILM COATED ORAL at 08:27

## 2023-01-03 RX ADMIN — GLYCERIN 1 DROP: .002; .002; .01 SOLUTION/ DROPS OPHTHALMIC at 17:27

## 2023-01-03 RX ADMIN — METOPROLOL SUCCINATE 25 MG: 25 TABLET, EXTENDED RELEASE ORAL at 08:28

## 2023-01-03 RX ADMIN — CARBIDOPA AND LEVODOPA 1 TABLET: 25; 100 TABLET ORAL at 20:46

## 2023-01-03 RX ADMIN — NYSTATIN: 100000 POWDER TOPICAL at 08:30

## 2023-01-03 RX ADMIN — HEPARIN SODIUM 5000 UNITS: 5000 INJECTION INTRAVENOUS; SUBCUTANEOUS at 23:28

## 2023-01-03 RX ADMIN — CLOPIDOGREL BISULFATE 75 MG: 75 TABLET ORAL at 08:27

## 2023-01-03 RX ADMIN — SULFAMETHOXAZOLE AND TRIMETHOPRIM 1 TABLET: 800; 160 TABLET ORAL at 20:49

## 2023-01-03 RX ADMIN — CYANOCOBALAMIN TAB 500 MCG 1000 MCG: 500 TAB at 08:27

## 2023-01-03 RX ADMIN — DOCUSATE SODIUM 100 MG: 100 CAPSULE, LIQUID FILLED ORAL at 17:26

## 2023-01-03 RX ADMIN — PANTOPRAZOLE SODIUM 40 MG: 40 TABLET, DELAYED RELEASE ORAL at 05:19

## 2023-01-03 RX ADMIN — INSULIN LISPRO 1 UNITS: 100 INJECTION, SOLUTION INTRAVENOUS; SUBCUTANEOUS at 08:24

## 2023-01-03 RX ADMIN — RANOLAZINE 500 MG: 500 TABLET, FILM COATED, EXTENDED RELEASE ORAL at 08:27

## 2023-01-03 RX ADMIN — NYSTATIN: 100000 POWDER TOPICAL at 17:26

## 2023-01-03 RX ADMIN — INSULIN GLARGINE 6 UNITS: 100 INJECTION, SOLUTION SUBCUTANEOUS at 23:27

## 2023-01-03 RX ADMIN — INSULIN LISPRO 2 UNITS: 100 INJECTION, SOLUTION INTRAVENOUS; SUBCUTANEOUS at 12:26

## 2023-01-03 RX ADMIN — LISINOPRIL 5 MG: 5 TABLET ORAL at 08:28

## 2023-01-03 RX ADMIN — GLYCERIN 1 DROP: .002; .002; .01 SOLUTION/ DROPS OPHTHALMIC at 08:30

## 2023-01-03 RX ADMIN — HEPARIN SODIUM 5000 UNITS: 5000 INJECTION INTRAVENOUS; SUBCUTANEOUS at 14:06

## 2023-01-03 RX ADMIN — TAMSULOSIN HYDROCHLORIDE 0.4 MG: 0.4 CAPSULE ORAL at 17:26

## 2023-01-03 RX ADMIN — RANOLAZINE 500 MG: 500 TABLET, FILM COATED, EXTENDED RELEASE ORAL at 20:46

## 2023-01-03 RX ADMIN — INSULIN LISPRO 3 UNITS: 100 INJECTION, SOLUTION INTRAVENOUS; SUBCUTANEOUS at 23:23

## 2023-01-03 RX ADMIN — ATORVASTATIN CALCIUM 40 MG: 40 TABLET, FILM COATED ORAL at 08:27

## 2023-01-03 RX ADMIN — DOCUSATE SODIUM 100 MG: 100 CAPSULE, LIQUID FILLED ORAL at 08:27

## 2023-01-03 RX ADMIN — HEPARIN SODIUM 5000 UNITS: 5000 INJECTION INTRAVENOUS; SUBCUTANEOUS at 05:19

## 2023-01-03 RX ADMIN — SULFAMETHOXAZOLE AND TRIMETHOPRIM 1 TABLET: 800; 160 TABLET ORAL at 08:28

## 2023-01-03 NOTE — OCCUPATIONAL THERAPY NOTE
Occupational Therapy Evaluation     Patient Name: Ashlyn Salomon  ODNCS'R Date: 1/3/2023  Problem List  Principal Problem:    Severe sepsis [facility])  Active Problems:    Type 2 diabetes mellitus with diabetic neuropathy ([facility] [address] )    Stage 3 chronic kidney disease ([facility] )    UTI (urinary tract infection)    Left-sided weakness    Acute metabolic encephalopathy    Intertrigo    Past Medical History  Past Medical History:   Diagnosis Date    Ambulatory dysfunction     uses walker with assist of 1    Anemia     Anxiety     At risk for falls     hx of falls    Benign paroxysmal vertigo     unspecified ear    BPH (benign prostatic hyperplasia)     for TURP today 1/4/2021    Calculus in bladder     for surgical removal today 1/4/2021    Cataract     left eye    Cervicalgia     Chest pain     Chronic kidney disease     stage 3    Constipation     CTS (carpal tunnel syndrome)     left    Cyst of pancreas     Cystitis 06/23/2020    acute cystitis with hematuria    Depression     Diabetes mellitus ([address] )     type II with neuropathy    Dizziness     and giddiness    Dysphagia     Elevated PSA     Facial weakness     GERD (gastroesophageal reflux disease)     last assessed 5/20/16    Gross hematuria     Hematuria     Hyperlipidemia     Hypertension     Kidney disease     Kidney stone     Left-sided weakness     Long term (current) use of oral hypoglycemic drugs     Muscle weakness     Nuclear senile cataract of left eye     OA (osteoarthritis) of knee     b/l    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[address] )     Syncope and collapse     Tachycardia     UTI (urinary tract infection)     Vertebro-basilar artery syndrome     Vitamin B deficiency     Vitamin D deficiency     Vitamin D deficiency      Past Surgical History  Past Surgical History:   Procedure Laterality Date    CARDIAC CATHETERIZATION N/A 3/7/2022    Procedure: CARDIAC CATHETERIZATION;  Surgeon: Myles Gamboa DO;  [facility];   Service: Cardiology    CATARACT EXTRACTION CHOLECYSTECTOMY      KNEE SURGERY      KY LITHOLAPAXY SMPL/SM <2 5 CM N/A 1/4/2021    Procedure: Cystolitholopaxy w/laser bladder stones;  Surgeon: Terry Jameson MD;  Location: [facility];  Service: Urology    KY TRURL ELECTROSURG RESCJ PROSTATE BLEED COMPLETE N/A 1/4/2021    Procedure: T U R P ;  Surgeon: Terry Jameson MD;  Location: [facility];  Service: Urology             01/03/23 1130   OT Last Visit   OT Visit Date 01/03/23   Note Type   Note type Evaluation   Pain Assessment   Pain Assessment Tool 0-10   Pain Score No Pain   Restrictions/Precautions   Weight Bearing Precautions Per Order No   Other Precautions Cognitive; Chair Alarm; Bed Alarm; Fall Risk   Home Living   Type of Home Apartment  (6th floor apartment in [facility])   Home Layout One level;Elevator   Bathroom Shower/Tub Tub/shower unit  (sponge bathes, vs getting in shower with A of aide)   [address] ; Wheelchair-manual;Hospital bed   Additional Comments Pt reports SPT to/from w/c with Ax1  Minimally completing functional mobility, only with HHPT   Prior Function   Level of [place] Needs assistance with ADLs  ((A) with bathing, dressing, toileting)   Lives With Spouse   Receives Help From Family;Home health  (x2 HHAs, HHPT)   IADLs Family/Friend/Other provides transportation; Family/Friend/Other provides meals; Family/Friend/Other provides medication management   Falls in the last 6 months 0   Comments Pt is a poor historian, providing conflicting information regarding level of assistance and how aides assist   Lifestyle   Autonomy PTA pt living with wife in apartment, pt reports Ax1 for ADLs and functional transfers, has A of HHAs, (-)falls, (-)drives, use of w/c and RW at baseline   Reciprocal Relationships supportive wife and HHAs   Service to Others retired   Intrinsic Gratification enjoys watching Terrell is Right   General   Family/Caregiver Present No Subjective   Subjective "Oh did you see that football player? That was really bad" "I don't do that, my aide does that"   ADL   Eating Assistance 5  Supervision/Setup   Grooming Assistance 3  Moderate Assistance   Grooming Deficit Increased time to complete;Supervision/safety;Verbal cueing  (washing hair and combing hair)   UB Bathing Assistance 3  Moderate Assistance   UB Bathing Deficit Increased time to complete;Supervision/safety;Verbal cueing  (A with back, abdomen)   LB Bathing Assistance 3  Moderate Assistance   LB Bathing Deficit Right lower leg including foot; Left lower leg including foot; Buttocks   UB Dressing Assistance 3  Moderate Assistance   UB Dressing Deficit Increased time to complete;Supervision/safety;Verbal cueing; Thread RUE; Thread LUE   LB Dressing Assistance 1  Total Assistance   LB Dressing Deficit Don/doff L sock; Don/doff R sock   Toileting Assistance  2  Maximal Assistance   Bed Mobility   Supine to Sit 3  Moderate assistance   Additional items Assist x 2; Increased time required;Verbal cues;LE management   Additional Comments Sitting EOB 15 min for ADL tasks wiht (S)   Transfers   Sit to Stand 3  Moderate assistance   Additional items Assist x 2; Increased time required;Verbal cues   Stand to Sit 3  Moderate assistance   Additional items Assist x 2; Increased time required;Verbal cues   Additional Comments x2 trials of sit >< stand   Functional Mobility   Functional Mobility 3  Moderate assistance   Additional Comments Ax2, A with steering RW   Additional items Rolling walker   Balance   Static Sitting Fair +   Dynamic Sitting Fair   Static Standing Poor +   Dynamic Standing Poor   Ambulatory Poor   Activity Tolerance   Activity Tolerance Patient limited by fatigue   Medical Staff Made Aware DEVIN Jimenez   Nurse Made Aware FLORENTINO Major   RUE Assessment   RUE Assessment WFL   LUE Assessment   LUE Assessment WFL   Hand Function   Gross Motor Coordination Functional   Fine Motor Coordination Functional Cognition   Overall Cognitive Status Impaired   Arousal/Participation Alert; Cooperative   Attention Within functional limits   Orientation Level Oriented to person;Oriented to place;Oriented to time;Disoriented to situation   Memory Decreased short term memory;Decreased recall of recent events   Following Commands Follows one step commands with increased time or repetition   Comments Pleasant and cooperative, limited problem solving, limited insight into situation  Repetative conversation   Assessment   Limitation Decreased ADL status; Decreased UE strength;Decreased Safe judgement during ADL;Decreased cognition;Decreased endurance;Decreased self-care trans;Decreased high-level ADLs   Prognosis Good   Assessment Pt is a 67 y o  male seen for OT evaluation s/p admission to [address] [place] [address] on 12/31/2022 due to AMS  Pt diagnosed with Severe sepsis ([facility] [address] )  Pt has a significant PMH impacting occupational performance including: DM II, UTI, acute metabolic encephalopathy  Pt with no recent admissions in the last 2 months  PTA pt living with wife in apartment, pt reports Ax1 for ADLs and functional transfers, has A of HHAs, (-)falls, (-)drives, use of w/c and RW at baseline  Pt is motivated to return to home  Personal and environmental factors supporting pt at time of IE include social support, attitude towards recovery and accessible home environment  Personal and environmental factors inhibiting engagement in occupations include advanced age, difficulty completing ADLs and difficulty completing IADLs  During evaluation pt performed as is outlined above in flowsheet  Pt required VC for safety, VC for attention to task and seated rest breaks  Standardized assessments used to assist in identifying performance deficits include AMPAC 6-Clicks and Barthel ADL Index   Performance deficits that affect the pt’s occupational performance during the initial evaluation include impaired balance, functional mobility, endurance, activity tolerance, forward functional reach, trunk control, functional standing tolerance and overall strength, communication and social skills, safety awareness, insight into deficits and problem solving  Based on pt’s functional performance and deficits the following occupations will be addressed in OT treatments in order to maximize pt’s independence and overall occupational performance: grooming, bathing/showering, toileting and toilet hygiene, dressing and functional mobility  Goals are listed below  Upon discharge from acute care setting recommend d/c to Home with continued support + HHOT vs PAR pending level of assist family able to continue to provide  This evaluation required an extensive review of medical and/or therapy records and additional review of physical, cognitive and psychosocial history related to functional performance  Based upon functional performance deficits and assessments, this evaluation has been identified as a high complexity evaluation  Goals   Patient Goals to see his wife   LTG Time Frame 10-14   Long Term Goal see goals listed below   Plan   Treatment Interventions ADL retraining;Functional transfer training; Endurance training;Cognitive reorientation;Patient/family training;Equipment evaluation/education; Compensatory technique education; Activityengagement   Goal Expiration Date 01/17/23   OT Treatment Day 0   OT Frequency 2-3x/wk   Recommendation   OT Discharge Recommendation Home with home health rehabilitation  (vs PAR pending level of support available at home, pt reports that he has Ax2 available at home)   AM-PAC Daily Activity Inpatient   Lower Body Dressing 2   Bathing 2   Toileting 2   Upper Body Dressing 3   Grooming 3   Eating 3   Daily Activity Raw Score 15   Daily Activity Standardized Score (Calc for Raw Score >=11) 34 69   AM-PAC Applied Cognition Inpatient   Following a Speech/Presentation 3   Understanding Ordinary Conversation 3   Taking Medications 2 Remembering Where Things Are Placed or Put Away 1   Remembering List of 4-5 Errands 1   Taking Care of Complicated Tasks 1   Applied Cognition Raw Score 11   Applied Cognition Standardized Score 27 03   Barthel Index   Feeding 5   Bathing 0   Grooming Score 5   Dressing Score 5   Bladder Score 0   Bowels Score 10   Toilet Use Score 5   Transfers (Bed/Chair) Score 5   Mobility (Level Surface) Score 0   Stairs Score 0   Barthel Index Score 35   End of Consult   Patient Position at End of Consult Bedside chair;Bed/Chair alarm activated; All needs within reach       GOALS:      -Patient will perform grooming tasks sitting in chair with overall Mod I in order to increase overall independence    -Patient will be Supervision with UB ADLs using AE and AD as needed in order to increase (I) with ADLs    -Patient will be Mod A  with LB ADLs with use of AE and AD as needed in order to increase (I) with ADLs    -Patient will complete toileting w/ Mod A  w/ G hygiene/thoroughness in order to reduce caregiver burden    -Patient will demonstrate Min A  with bed mobility for ability to manage own comfort and initiate OOB tasks      -Patient will perform functional transfers with Min A  to/from all surfaces using DME as needed in order to increase (I) with functional tasks    -Patient will tolerate therapeutic activities for greater than 30 min, in order to increase tolerance for functional activities      -Patient will increase OOB/sitting tolerance to 2-4 hours per day to increase participation in self-care and leisure tasks with no s/s of exertion      -Patient will engage in ongoing cognitive assessment in order to assist with safe discharge planning/recommendations  The patient's raw score on the -PAC Daily Activity inpatient short form is 15, standardized score is 34 69, less than 39 4  Patients at this level are likely to benefit from discharge to post-acute rehabilitation services   Please refer to the recommendation of the Occupational Therapist for safe discharge planning  This session, pt required and most appropriately benefited from skilled OT/PT co-eval due to extensive physical assistance of SKILLED therapists, significant regression from functional baseline, and decreased activity tolerance  OT and PT goals were addressed separately as seen in documentation       Linda Ponce MS, OTR/L

## 2023-01-03 NOTE — ASSESSMENT & PLAN NOTE
POA-fever, dysuria  Denies hematuria, flank pain, nausea or vomiting  History of UTI 7/2022- no history of MDRO  UCx growing S  aureus    PLAN  Transitioned to Bactrim DS BID from ceftriaxone, as above  Off IVF

## 2023-01-03 NOTE — ASSESSMENT & PLAN NOTE
Lab Results   Component Value Date    HGBA1C 6 4 (H) 03/05/2022       Recent Labs     01/02/23  2120 01/03/23  0747 01/03/23  1212 01/03/23  1648   POCGLU 197* 174* 226* 186*       Blood Sugar Average: Last 72 hrs:  (P) 586 [id number]   Sliding scale with meals and at bedtime  Accu-Cheks with meals and at bedtime  Increase Lantus to 6 U QHS  Hypoglycemia protocol

## 2023-01-03 NOTE — PROGRESS NOTES
[facility]  Progress Note - Blanca Malloy 1950, 67 y o  male MRN: [id number]  Unit/Bed#: W -01 Encounter: [id number]  Primary Care Provider: Yves Gonzales MD   Date and time admitted to hospital: 12/31/2022 11:43 AM    * Severe sepsis ([address] )  Assessment & Plan  · POA SIRS criteria:  fever 102 9F,   · p/w leukocytosis with bandemia 13 K  · Suspected source:  UTI, p/w dysuria, hx of UTI, UCx + 70-79k cfu MSSA   · End organ dysfunction: AMS  · Chest x-ray:  Negative   · Lactic acid:   2 7  · IV Fluids:  1 L fluid bolus in ED  · IV antibiotics:   Ceftriaxone 1 G IV x1 dose in the ED  · Procalcitonin 0 23,   · lactic acid normalized  · BCx Phillip@[employer]    Pt clinically improved, sepsis resolved  Afebrile, WBC wnl  Plan  Monitor temp curve, WBC  Cont Bactrim DS BID for 7-day duration (total 10 d abx)  Discontinued ceftriaxone 1 g/24h (received 3 doses)    UTI (urinary tract infection)  Assessment & Plan  POA-fever, dysuria  Denies hematuria, flank pain, nausea or vomiting  History of UTI 7/2022- no history of MDRO  UCx growing S  aureus    PLAN  Transitioned to Bactrim DS BID from ceftriaxone, as above  Off IVF    Acute metabolic encephalopathy  Assessment & Plan  POA, patient reportedly more sleepy and less interactive than usual  There is no confusion, but patient has mild headaches  POA-fever, dysuria, mild headaches    No seizures, no blurred vision,  no chest pain, no shortness of breath,  Evidence of urinary tract infection with severe sepsis  Toxic metabolic encephalopathy secondary to urosepsis    Resolved    PLAN  Continue Fall and delirium precautions  Tx UTI as above    Intertrigo  Assessment & Plan  At the groin  nystatin powder BID  Keep groin dry and clean    Type 2 diabetes mellitus with diabetic neuropathy [facility])  Assessment & Plan  Lab Results   Component Value Date    HGBA1C 6 4 (H) 03/05/2022       Recent Labs     01/02/23  2120 01/03/23  0747 01/03/23  1212 23  1648   POCGLU 197* 174* 226* 186*       Blood Sugar Average: Last 72 hrs:  (P) 921 [id number]   Sliding scale with meals and at bedtime  Accu-Cheks with meals and at bedtime  Increase Lantus to 6 U QHS  Hypoglycemia protocol    Left-sided weakness  Assessment & Plan  Left-sided weakness from Parkinson's and neuropathy  At baseline, no other focal deficits  Continue Sinemet  PT OT eval and treat -->pt is currently assist of 2 for transfers  recommend STR or Home with 24h care  CM aware and discussions w pt and pt's spouse are ongoing  Stage 3 chronic kidney disease [facility])  Assessment & Plan  Lab Results   Component Value Date    EGFR 53 2023    EGFR 54 2023    EGFR 55 2022    CREATININE 1 33 (H) 2023    CREATININE 1 30 2023    CREATININE 1 29 2022     Stable disease  Avoid nephrotoxic agents, avoid hypotension  AM BMP      VTE Pharmacologic Prophylaxis: VTE Score: 6 High Risk (Score >/= 5) - Pharmacological DVT Prophylaxis Ordered: heparin  Sequential Compression Devices Ordered  Patient Centered Rounds: I performed bedside rounds with nursing staff today  Discussions with Specialists or Other Care Team Provider: None    Education and Discussions with Family / Patient: Will call pt contact if not seen at bedside  Current Length of Stay: 3 day(s)  Current Patient Status: Inpatient   Discharge Plan: Anticipate discharge in 24-48 hrs to STR vs home w home health    Code Status: Level 1 - Full Code    Subjective: This morning, Nj Murphy is seen sitting up in bed, alert, engaged, in no acute distress  He reports feeling well and endorses no complaints  Denies fevers, chills, sweats, CP, SOB, abd pain, N, V, diarrhea, urinary symptoms including dysuria or frequency  Hungry for breakfast  A&Ox3  Barbara Garcia for discharge from hospital but amenable to remaining inpatient at this time for safe discharge planning      Objective:     Vitals:   Temp (24hrs), Av 2 °F (36 8 °C), Min:98 °F (36 7 °C), Max:98 3 °F (36 8 °C)    Temp:  [98 °F (36 7 °C)-98 3 °F (36 8 °C)] 98 °F (36 7 °C)  HR:  [78-87] 78  Resp:  [18] 18  BP: (113-141)/(59-73) 115/59  SpO2:  [96 %] 96 %  Body mass index is 29 66 kg/m²  Input and Output Summary (last 24 hours): Intake/Output Summary (Last 24 hours) at 1/3/2023 1814  Last data filed at 1/3/2023 1130  Gross per 24 hour   Intake 360 ml   Output 550 ml   Net -190 ml       Physical Exam:   Physical Exam  Vitals and nursing note reviewed  Constitutional:       General: He is not in acute distress  Appearance: Normal appearance  He is not ill-appearing, toxic-appearing or diaphoretic  HENT:      Head: Normocephalic and atraumatic  Eyes:      General: No scleral icterus  Right eye: No discharge  Left eye: No discharge  Conjunctiva/sclera: Conjunctivae normal    Cardiovascular:      Rate and Rhythm: Normal rate and regular rhythm  Pulses: Normal pulses  Heart sounds: Normal heart sounds  No murmur heard  No friction rub  No gallop  Pulmonary:      Effort: Pulmonary effort is normal  No respiratory distress  Breath sounds: Normal breath sounds  No stridor  No wheezing, rhonchi or rales  Chest:      Chest wall: No tenderness  Abdominal:      General: Bowel sounds are normal  There is no distension  Palpations: Abdomen is soft  Tenderness: There is no abdominal tenderness  There is no guarding or rebound  Genitourinary:     Comments:     Musculoskeletal:         General: No tenderness  Right lower leg: No edema  Left lower leg: No edema  Comments: B/l prevalon boots to LEs   Skin:     General: Skin is warm and dry  Neurological:      Mental Status: He is alert and oriented to person, place, and time  Psychiatric:         Mood and Affect: Mood normal          Speech: Speech is not delayed  Behavior: Behavior normal  Behavior is not slowed          Additional Data: Labs:  Results from last 7 days   Lab Units 01/02/23  0534 01/01/23  0509   WBC Thousand/uL 8 30 9 44   HEMOGLOBIN g/dL 9 0* 10 1*   HEMATOCRIT % 28 0* 31 3*   PLATELETS Thousands/uL 181 211   NEUTROS PCT %  --  86*   LYMPHS PCT %  --  4*   MONOS PCT %  --  9   EOS PCT %  --  0     Results from last 7 days   Lab Units 01/02/23  0534 01/01/23  0509   SODIUM mmol/L 137 136   POTASSIUM mmol/L 3 8 3 8   CHLORIDE mmol/L 107 103   CO2 mmol/L 25 24   BUN mg/dL 19 19   CREATININE mg/dL 1 33* 1 30   ANION GAP mmol/L 5 9   CALCIUM mg/dL 7 7* 8 0*   ALBUMIN g/dL  --  3 2*   TOTAL BILIRUBIN mg/dL  --  1 09*   ALK PHOS U/L  --  64   ALT U/L  --  5*   AST U/L  --  15   GLUCOSE RANDOM mg/dL 170* 159*     Results from last 7 days   Lab Units 12/31/22  1214   INR  1 05     Results from last 7 days   Lab Units 01/03/23  1648 01/03/23  1212 01/03/23  0747 01/02/23  2120 01/02/23  1617 01/02/23  1158 01/02/23  0752 01/01/23  2050 01/01/23  1546 01/01/23  1138 01/01/23  0801 12/31/22  2152   POC GLUCOSE mg/dl 186* 226* 174* 197* 202* 237* 152* 261* 174* 233* 143* 213*         Results from last 7 days   Lab Units 01/01/23  0509 12/31/22  1534 12/31/22  1214   LACTIC ACID mmol/L  --  1 4 2 7*   PROCALCITONIN ng/ml 0 23  --  0 19       Lines/Drains:  Invasive Devices     Peripheral Intravenous Line  Duration           Peripheral IV 12/31/22 Left Antecubital 3 days    Peripheral IV 12/31/22 Right Antecubital 3 days                Imaging: No pertinent imaging reviewed  Recent Cultures (last 7 days):   Results from last 7 days   Lab Units 12/31/22  1229 12/31/22  1217 12/31/22  1214   BLOOD CULTURE  No Growth at 72 hrs   --  No Growth at 72 hrs     URINE CULTURE   --  70,000-79,000 cfu/ml Staphylococcus aureus*  10,000-19,000 cfu/ml  --        Last 24 Hours Medication List:   Current Facility-Administered Medications   Medication Dose Route Frequency Provider Last Rate   • acetaminophen  650 mg Oral Q6H PRN Ashley Patiño Lisa Duncan MD     • aspirin  81 mg Oral Daily Eunice Garcia MD     • atorvastatin  40 mg Oral Daily Eunice Garcia MD     • carbidopa-levodopa  1 tablet Oral TID Eunice Garcia MD     • clopidogrel  75 mg Oral Daily Eunice Garcia MD     • cyanocobalamin  1,000 mcg Oral Daily Eunice Garcia MD     • docusate sodium  100 mg Oral BID Eunice Garcia MD     • finasteride  5 mg Oral Daily Eunice Garcia MD     • glycerin-hypromellose-  1 drop Both Eyes BID Eunice Garcia MD     • heparin (porcine)  5,000 Units Subcutaneous Q8H [facility] Eunice Garcia MD     • insulin glargine  6 Units Subcutaneous HS Raiza Marie MD     • insulin lispro  1-6 Units Subcutaneous HS Eunice Garcia MD     • insulin lispro  1-6 Units Subcutaneous TID AC Adarsh Collazo MD     • lisinopril  5 mg Oral Daily Eunice Garcia MD     • melatonin  3 mg Oral HS Eunice Garcia MD     • metoprolol succinate  25 mg Oral Daily Eunice Garcia MD     • nystatin   Topical BID Eunice Garcia MD     • pantoprazole  40 mg Oral Early Morning Eunice Garcia MD     • ranolazine  500 mg Oral Q12H Alan Mathis MD     • sulfamethoxazole-trimethoprim  1 tablet Oral Q12H [facility] Raiza Marie MD     • tamsulosin  0 4 mg Oral Daily With Deanna Cabot, MD          Today, Patient Was Seen By: Raiza aMrie MD    **Please Note: This note may have been constructed using a voice recognition system  **

## 2023-01-03 NOTE — ASSESSMENT & PLAN NOTE
POA, patient reportedly more sleepy and less interactive than usual  There is no confusion, but patient has mild headaches  POA-fever, dysuria, mild headaches    No seizures, no blurred vision,  no chest pain, no shortness of breath,  Evidence of urinary tract infection with severe sepsis  Toxic metabolic encephalopathy secondary to urosepsis    Resolved    PLAN  Continue Fall and delirium precautions  Tx UTI as above

## 2023-01-03 NOTE — PLAN OF CARE
Problem: Potential for Falls  Goal: Patient will remain free of falls  Description: INTERVENTIONS:  - Educate patient/family on patient safety including physical limitations  - Instruct patient to call for assistance with activity   - Consult OT/PT to assist with strengthening/mobility   - Keep Call bell within reach  - Keep bed low and locked with side rails adjusted as appropriate  - Keep care items and personal belongings within reach  - Initiate and maintain comfort rounds  - Make Fall Risk Sign visible to staff  - Offer Toileting every 2 Hours, in advance of need  - Initiate/Maintain bed alarm  - Obtain necessary fall risk management equipment  - Apply yellow socks and bracelet for high fall risk patients  - Consider moving patient to room near nurses station  Outcome: Progressing     Problem: Prexisting or High Potential for Compromised Skin Integrity  Goal: Skin integrity is maintained or improved  Description: INTERVENTIONS:  - Identify patients at risk for skin breakdown  - Assess and monitor skin integrity  - Assess and monitor nutrition and hydration status  - Monitor labs   - Assess for incontinence   - Turn and reposition patient  - Assist with mobility/ambulation  - Relieve pressure over bony prominences  - Avoid friction and shearing  - Provide appropriate hygiene as needed including keeping skin clean and dry  - Evaluate need for skin moisturizer/barrier cream  - Collaborate with interdisciplinary team   - Patient/family teaching  - Consider wound care consult   Outcome: Progressing     Problem: MOBILITY - ADULT  Goal: Maintain or return to baseline ADL function  Description: INTERVENTIONS:  -  Assess patient's ability to carry out ADLs; assess patient's baseline for ADL function and identify physical deficits which impact ability to perform ADLs (bathing, care of mouth/teeth, toileting, grooming, dressing, etc )  - Assess/evaluate cause of self-care deficits   - Assess range of motion  - Assess patient's mobility; develop plan if impaired  - Assess patient's need for assistive devices and provide as appropriate  - Encourage maximum independence but intervene and supervise when necessary  - Involve family in performance of ADLs  - Assess for home care needs following discharge   - Consider OT consult to assist with ADL evaluation and planning for discharge  - Provide patient education as appropriate  Outcome: Progressing     Problem: MOBILITY - ADULT  Goal: Maintains/Returns to pre admission functional level  Description: INTERVENTIONS:  - Perform BMAT or MOVE assessment daily    - Set and communicate daily mobility goal to care team and patient/family/caregiver  - Collaborate with rehabilitation services on mobility goals if consulted  - Perform Range of Motion 4 times a day  - Reposition patient every 2 hours  - Dangle patient 1 times a day  - Stand patient 1 times a day  - Ambulate patient 1 times a day  - Out of bed to chair 1 times a day   - Out of bed for meals 1 times a day  - Out of bed for toileting  - Record patient progress and toleration of activity level   Outcome: Progressing     Problem: Nutrition/Hydration-ADULT  Goal: Nutrient/Hydration intake appropriate for improving, restoring or maintaining nutritional needs  Description: Monitor and assess patient's nutrition/hydration status for malnutrition  Collaborate with interdisciplinary team and initiate plan and interventions as ordered  Monitor patient's weight and dietary intake as ordered or per policy  Utilize nutrition screening tool and intervene as necessary  Determine patient's food preferences and provide high-protein, high-caloric foods as appropriate       INTERVENTIONS:  - Monitor oral intake, urinary output, labs, and treatment plans  - Assess nutrition and hydration status and recommend course of action  - Evaluate amount of meals eaten  - Assist patient with eating if necessary   - Allow adequate time for meals  - Recommend/ encourage appropriate diets, oral nutritional supplements, and vitamin/mineral supplements  - Order, calculate, and assess calorie counts as needed  - Recommend, monitor, and adjust tube feedings and TPN/PPN based on assessed needs  - Assess need for intravenous fluids  - Provide specific nutrition/hydration education as appropriate  - Include patient/family/caregiver in decisions related to nutrition  Outcome: Progressing

## 2023-01-03 NOTE — PLAN OF CARE
Problem: OCCUPATIONAL THERAPY ADULT  Goal: Performs self-care activities at highest level of function for planned discharge setting  See evaluation for individualized goals  Description: Treatment Interventions: ADL retraining, Functional transfer training, Endurance training, Cognitive reorientation, Patient/family training, Equipment evaluation/education, Compensatory technique education, Activityengagement          See flowsheet documentation for full assessment, interventions and recommendations  Note: Limitation: Decreased ADL status, Decreased UE strength, Decreased Safe judgement during ADL, Decreased cognition, Decreased endurance, Decreased self-care trans, Decreased high-level ADLs  Prognosis: Good  Assessment: Pt is a 67 y o  male seen for OT evaluation s/p admission to [address] [place] [address] on 12/31/2022 due to AMS  Pt diagnosed with Severe sepsis ([facility] [address] )  Pt has a significant PMH impacting occupational performance including: DM II, UTI, acute metabolic encephalopathy  Pt with no recent admissions in the last 2 months  PTA pt living with wife in apartment, pt reports Ax1 for ADLs and functional transfers, has A of HHAs, (-)falls, (-)drives, use of w/c and RW at baseline  Pt is motivated to return to home  Personal and environmental factors supporting pt at time of IE include social support, attitude towards recovery and accessible home environment  Personal and environmental factors inhibiting engagement in occupations include advanced age, difficulty completing ADLs and difficulty completing IADLs  During evaluation pt performed as is outlined above in flowsheet  Pt required VC for safety, VC for attention to task and seated rest breaks  Standardized assessments used to assist in identifying performance deficits include AMPAC 6-Clicks and Barthel ADL Index   Performance deficits that affect the pt’s occupational performance during the initial evaluation include impaired balance, functional mobility, endurance, activity tolerance, forward functional reach, trunk control, functional standing tolerance and overall strength, communication and social skills, safety awareness, insight into deficits and problem solving  Based on pt’s functional performance and deficits the following occupations will be addressed in OT treatments in order to maximize pt’s independence and overall occupational performance: grooming, bathing/showering, toileting and toilet hygiene, dressing and functional mobility  Goals are listed below  Upon discharge from acute care setting recommend d/c to Home with continued support + HHOT vs PAR pending level of assist family able to continue to provide  This evaluation required an extensive review of medical and/or therapy records and additional review of physical, cognitive and psychosocial history related to functional performance  Based upon functional performance deficits and assessments, this evaluation has been identified as a high complexity evaluation       OT Discharge Recommendation: Home with home health rehabilitation (vs PAR pending level of support available at home, pt reports that he has Ax2 available at home)

## 2023-01-03 NOTE — CASE MANAGEMENT
Case Management Progress Note    Patient name Manuel Tellez  Location W /W -45 MRN [id number]  : 1950 Date 1/3/2023       LOS (days): 3  Geometric Mean LOS (GMLOS) (days):   Days to GMLOS:        OBJECTIVE:        Current admission status: Inpatient  Preferred Pharmacy:   [address] [place] [address]  Phone: [phone] Fax: [facility], [place] [address]    [address] [facility]  Phone: [phone] Fax: [phone]    Primary Care Provider: Jj Patricia MD    Primary Insurance: [employer] [facility]  Secondary Insurance:     PROGRESS NOTE:    STACY spoke with Mannykalen Salud at Q:[phone] to discuss the pt's therapy recommendations and pt's aides in the home  Per Negrito Dennison the pt has up to 1 5 hours/day 7 days/week home aides  The spouse also has aides in the home, but at a much lesser amount of time  If the pt is to go home with home health care [facility] requests ambulatory orders for Kuldeep Hathaway PT/OT services and will assess the pt the following day from discharge  If the pt is to go to rehab for STR the only facilities contracted with [facility] are [facility], and [address]  STACY spoke with the spouse Diego Kim, who would prefer her  to come home, but understands he is an assist x2 to stand and requires more assistance at this time than is provided with the home health aides  Diego Kim wishes to call the pt and discuss this with him  STACY spoke with PT about the amount of time the aides spend in the home per the recommendations of STR vs HHC  STR was recommended as he is an assist x2 for mobility  Per Mayur Maldonado is contracted with [place] for transportation  Negrito Dennison is requesting the DCI to be faxed to her upon discharge to F: [phone]

## 2023-01-03 NOTE — PLAN OF CARE
Problem: PHYSICAL THERAPY ADULT  Goal: Performs mobility at highest level of function for planned discharge setting  See evaluation for individualized goals  Description: Treatment/Interventions: Functional transfer training, LE strengthening/ROM, Therapeutic exercise, Endurance training, Cognitive reorientation, Patient/family training, Equipment eval/education, Bed mobility, Gait training (ANICETO Coats 23 training)  Equipment Recommended: Ginna Innocent       See flowsheet documentation for full assessment, interventions and recommendations  1/3/2023 1259 by Freddie Andrade, PT  Note: Prognosis: Fair  Problem List: Decreased strength, Decreased endurance, Impaired balance, Decreased mobility, Decreased cognition, Impaired judgement, Decreased safety awareness  Assessment: Ashlyn Salomon is a 67 y o  Male who [facility]/31/2022 due to AMS and diagnosis of severe sepsis  Orders for PT eval and treat received  Comorbidities affecting pt at time of evaluation include: DM, UTI, CKD, HTN, Parkinson disease  Personal factors affecting DC include: ambulating w/ assistive device, inability to ambulate household distances, inability to navigate level surfaces w/o external assistance, decreased cognition, inability to perform IADLs and inability to perform ADLs  At baseline, pt reports performing transfers bed<>WC and working w/ HHPT w/ RW for ambulation, and w/ 0 fall(s) in the previous 6 months  Upon evaluation, pt presents w/ the following deficits: weakness, impaired coordination, impaired balance and gait deviations  Pt currently requires mod Ax2 for bed mobility, mod Ax2 for transfers, and mod Ax2 w/ RW for ambulation  Pt's clinical presentation is unstable/unpredictable due to need for assist w/ all phases of mobility, tolerance to only 3 feet of ambulation, need for input for mobility technique and recent drastic decline in mobility compared to baseline   Pt is at an increased risk of falls due to impaired balance, LE weakness, and gait deviations  From a PT/mobility standpoint, given the above findings, DC recommendation is: Post-acute inpatient rehabilitation vs HHPT pending level of assistance available upon DC for functional mobility  During current admission, pt will benefit from continued skilled inpatient PT in the acute care setting in order to address the above deficits and to maximize function and mobility prior to DC from acute care  PT Discharge Recommendation: Post acute rehabilitation services (vs HHPT (please see comment below))    See flowsheet documentation for full assessment

## 2023-01-03 NOTE — ASSESSMENT & PLAN NOTE
Lab Results   Component Value Date    EGFR 53 01/02/2023    EGFR 54 01/01/2023    EGFR 55 12/31/2022    CREATININE 1 33 (H) 01/02/2023    CREATININE 1 30 01/01/2023    CREATININE 1 29 12/31/2022     Stable disease  Avoid nephrotoxic agents, avoid hypotension  AM BMP

## 2023-01-03 NOTE — UTILIZATION REVIEW
Continued Stay Review    Date: 1/2/23 and 1/3/23                           Current Patient Class: IP Current Level of Care: MS    HPI:72 y o  male initially admitted on 12/31/22 with urosepsis, toxic metabolic encephalopathy  IVF, IV abx   F/U cx   Assessment/Plan:    1/2-Pt alert, engaged,  no complaints  Reports he feels well  Dysuria resolved   UCx + 70-79k cfu MSSA   Blood cx no growth at 24 hrs   IV Ceftriaxone d/c'ed today and pt started on po Bactrim  Monitoring CBC   WBC WNL   IVF d/c'ed this afternoon   Start Lantus 4 U QHS today  Accucheks, SSI   Per ST 1/1- mild oropharyngeal dysphagia symptoms, appears at risk for aspiration with thin liquids, consistent wet voice noted w/ thins by straw and cough x1 w/ thins by cup  Reg diet w/ thin liquids   Consider VBS to assess for aspiration risk w/ thin liquids  Await PT/OT evals   1/3/22   Pt oriented to person, place,time, disoriented to situation per nsg notes  GCS 15   Pt afebrile   Denies pain   Has condom cath in place    ml last 24 hrs   Monitoring blood glucose   PT-recommends post acute rehab vs HHPTpending level of support available at home upon DC; pt currently requires Ax2 for  bed mobility, mod Ax2 for transfers, and mod Ax2 w/ RW for ambulation  OT recommendsHome with home health rehabilitation (vs PAR pending level of support available at home, pt reports that he has Ax2 available at home)     Vital Signs:   Date/Time Temp Pulse Resp BP MAP (mmHg) SpO2   01/03/23 08:23:35 98 2 °F (36 8 °C) -- 18 -- -- --   01/03/23 08:23:34 -- -- -- 113/70 84 --   01/02/23 21:21:23 98 3 °F (36 8 °C) 87 18 141/73 96 96 %   01/02/23 14:59:24 98 4 °F (36 9 °C) 86 18 114/64 81 98 %   01/02/23 07:55:39 98 5 °F (36 9 °C) 84 18 105/53 70 97 %   01/01/23 20:52:29 100 °F (37 8 °C) 87 -- 108/50 69 95 %       Pertinent Labs/Diagnostic Results:   Results from last 7 days   Lab Units 12/31/22  1215   SARS-COV-2  Negative     Results from last 7 days   Lab Units 01/02/23  0534 01/01/23  0509 12/31/22 2002 12/31/22  1214   WBC Thousand/uL 8 30 9 44  --  13 58*   HEMOGLOBIN g/dL 9 0* 10 1*  --  12 1   HEMATOCRIT % 28 0* 31 3*  --  36 9   PLATELETS Thousands/uL 181 211 206 241   NEUTROS ABS Thousands/µL  --  8 17*  --  12 37*         Results from last 7 days   Lab Units 01/02/23  0534 01/01/23  0509 12/31/22  1214   SODIUM mmol/L 137 136 136   POTASSIUM mmol/L 3 8 3 8 4 2   CHLORIDE mmol/L 107 103 100   CO2 mmol/L 25 24 24   ANION GAP mmol/L 5 9 12   BUN mg/dL 19 19 23   CREATININE mg/dL 1 33* 1 30 1 29   EGFR ml/min/1 73sq m 53 54 55   CALCIUM mg/dL 7 7* 8 0* 9 0   MAGNESIUM mg/dL  --  1 3*  --      Results from last 7 days   Lab Units 01/01/23  0509 12/31/22  1214   AST U/L 15 12*   ALT U/L 5* 3*   ALK PHOS U/L 64 80   TOTAL PROTEIN g/dL 5 7* 6 7   ALBUMIN g/dL 3 2* 3 8   TOTAL BILIRUBIN mg/dL 1 09* 1 20*     Results from last 7 days   Lab Units 01/03/23  0747 01/02/23  2120 01/02/23  1617 01/02/23  1158 01/02/23  0752 01/01/23  2050 01/01/23  1546 01/01/23  1138 01/01/23  0801 12/31/22  2152   POC GLUCOSE mg/dl 174* 197* 202* 237* 152* 261* 174* 233* 143* 213*     Results from last 7 days   Lab Units 01/02/23  0534 01/01/23  0509 12/31/22  1214   GLUCOSE RANDOM mg/dL 170* 159* 156*                   Results from last 7 days   Lab Units 12/31/22  1643 12/31/22  1406 12/31/22  1214   HS TNI 0HR ng/L  --   --  12   HS TNI 2HR ng/L  --  8  --    HSTNI D2 ng/L  --  -4  --    HS TNI 4HR ng/L 13  --   --    HSTNI D4 ng/L 1  --   --          Results from last 7 days   Lab Units 12/31/22  1214   PROTIME seconds 14 0   INR  1 05   PTT seconds 28     Results from last 7 days   Lab Units 12/31/22  2002   TSH 3RD GENERATON uIU/mL 0 720     Results from last 7 days   Lab Units 01/01/23  0509 12/31/22  1214   PROCALCITONIN ng/ml 0 23 0 19     Results from last 7 days   Lab Units 12/31/22  1534 12/31/22  1214   LACTIC ACID mmol/L 1 4 2 7*                   Results from last 7 days Lab Units 12/31/22  1217   CLARITY UA  Turbid   COLOR UA  Light Yellow   SPEC GRAV UA  1 017   PH UA  5 0   GLUCOSE UA mg/dl Negative   KETONES UA mg/dl Negative   BLOOD UA  Large*   PROTEIN UA mg/dl Trace*   NITRITE UA  Negative   BILIRUBIN UA  Negative   UROBILINOGEN UA (BE) mg/dl <2 0   LEUKOCYTES UA  Large*   WBC UA /hpf Innumerable*   RBC UA /hpf 30-50*   BACTERIA UA /hpf None Seen   EPITHELIAL CELLS WET PREP /hpf Occasional     Results from last 7 days   Lab Units 12/31/22  1215   INFLUENZA A PCR  Negative   INFLUENZA B PCR  Negative   RSV PCR  Negative                             Results from last 7 days   Lab Units 12/31/22  1229 12/31/22  1217 12/31/22  1214   BLOOD CULTURE  No Growth at 48 hrs  --  No Growth at 48 hrs     URINE CULTURE   --  70,000-79,000 cfu/ml Staphylococcus aureus*  10,000-19,000 cfu/ml  --                  Medications:   Scheduled Medications:  aspirin, 81 mg, Oral, Daily  atorvastatin, 40 mg, Oral, Daily  carbidopa-levodopa, 1 tablet, Oral, TID  clopidogrel, 75 mg, Oral, Daily  cyanocobalamin, 1,000 mcg, Oral, Daily  docusate sodium, 100 mg, Oral, BID  finasteride, 5 mg, Oral, Daily  glycerin-hypromellose-, 1 drop, Both Eyes, BID  heparin (porcine), 5,000 Units, Subcutaneous, Q8H LIYAH  insulin glargine, 4 Units, Subcutaneous, HS  insulin lispro, 1-6 Units, Subcutaneous, HS  insulin lispro, 1-6 Units, Subcutaneous, TID AC  lisinopril, 5 mg, Oral, Daily  melatonin, 3 mg, Oral, HS  metoprolol succinate, 25 mg, Oral, Daily  nystatin, , Topical, BID  pantoprazole, 40 mg, Oral, Early Morning  ranolazine, 500 mg, Oral, Q12H LIYAH  sulfamethoxazole-trimethoprim, 1 tablet, Oral, Q12H LIYAH  tamsulosin, 0 4 mg, Oral, Daily With Dinner    ceftriaxone (ROCEPHIN) 1 g/50 mL in dextrose IVPB  Dose: 1,000 mg  Freq: Every 24 hours Route: IV  Last Dose: 1,000 mg (01/02/23 1357)  Start: 01/01/23 1300 End: 01/02/23 1408    Continuous IV Infusions:    sodium chloride 0 9 % infusion  Rate: 75 mL/hr Dose: 75 mL/hr  Freq: Continuous Route: IV  Last Dose: 75 mL/hr (01/02/23 1121)  Start: 12/31/22 1845 End: 01/02/23 1541  PRN Meds:  acetaminophen, 650 mg, Oral, Q6H PRN        Discharge Plan: TBD    Network Utilization Review Department  ATTENTION: Please call with any questions or concerns to [phone] and carefully listen to the prompts so that you are directed to the right person  All voicemails are confidential   Alvina Phelps all requests for admission clinical reviews, approved or denied determinations and any other requests to dedicated fax number below belonging to the campus where the patient is receiving treatment   List of dedicated fax numbers for the Facilities:  [address] DENIALS (Administrative/Medical Necessity) [phone]   [address] (Maternity/NICU/Pediatrics) [phone]   91 Lindsey Thomase [phone]   [facility] 77 [phone]   [address] [address] [facility] [place] [address] [place] [address] [place] [address] Kenyon Ruggiero [facility] 28 [phone]   [address] Kathleen Ponce [facility] 134 815 [facility] [phone]

## 2023-01-03 NOTE — ASSESSMENT & PLAN NOTE
Left-sided weakness from Parkinson's and neuropathy  At baseline, no other focal deficits  Continue Sinemet  PT OT eval and treat -->pt is currently assist of 2 for transfers  recommend STR or Home with 24h care  CM aware and discussions w pt and pt's spouse are ongoing

## 2023-01-03 NOTE — PHYSICAL THERAPY NOTE
PHYSICAL THERAPY EVALUATION NOTE          Patient Name: Manuel Tellez  IGSHD'R Date: 1/3/2023          AGE:   67 y o  Mrn:   [id number]  ADMIT DX:  UTI (urinary tract infection) [N39 0]  Sepsis ([facility] [address] ) [A41 9]  AMS (altered mental status) [R41 82]    Past Medical History:  Past Medical History:   Diagnosis Date    Ambulatory dysfunction     uses walker with assist of 1    Anemia     Anxiety     At risk for falls     hx of falls    Benign paroxysmal vertigo     unspecified ear    BPH (benign prostatic hyperplasia)     for TURP today 1/4/2021    Calculus in bladder     for surgical removal today 1/4/2021    Cataract     left eye    Cervicalgia     Chest pain     Chronic kidney disease     stage 3    Constipation     CTS (carpal tunnel syndrome)     left    Cyst of pancreas     Cystitis 06/23/2020    acute cystitis with hematuria    Depression     Diabetes mellitus ([facility] [address] )     type II with neuropathy    Dizziness     and giddiness    Dysphagia     Elevated PSA     Facial weakness     GERD (gastroesophageal reflux disease)     last assessed 5/20/16    Gross hematuria     Hematuria     Hyperlipidemia     Hypertension     Kidney disease     Kidney stone     Left-sided weakness     Long term (current) use of oral hypoglycemic drugs     Muscle weakness     Nuclear senile cataract of left eye     OA (osteoarthritis) of knee     b/l    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[facility] [address] )     Syncope and collapse     Tachycardia     UTI (urinary tract infection)     Vertebro-basilar artery syndrome     Vitamin B deficiency     Vitamin D deficiency     Vitamin D deficiency        Past Surgical History:  Past Surgical History:   Procedure Laterality Date    CARDIAC CATHETERIZATION N/A 3/7/2022    Procedure: CARDIAC CATHETERIZATION;  Surgeon: Brittani Anderson DO;  [facility];   Service: Cardiology    CATARACT EXTRACTION      CHOLECYSTECTOMY      KNEE SURGERY      ID LITHOLAPAXY SMPL/SM <2 5 CM N/A 2021    Procedure: Cystolitholopaxy w/laser bladder stones;  Surgeon: Laurie Salamanca MD;  Location: [facility];  Service: Urology    [address] N/A 2021    Procedure: T U R P ;  Surgeon: Laurie Salamanca MD;  Location[facility];  Service: Urology     Length Of Stay: 3        PHYSICAL THERAPY EVALUATION:    Patient's identity confirmed via 2 patient identifiers (full name and ) at start of session       23 1153   PT Last Visit   PT Visit Date 23   Note Type   Note type Evaluation   Pain Assessment   Pain Assessment Tool 0-10   Pain Score No Pain   Restrictions/Precautions   Weight Bearing Precautions Per Order No   Other Precautions Cognitive; Chair Alarm; Bed Alarm   Home Living   Type of Home Apartment  (6th flr apt in Senior high rise building)   Home Layout One level;Performs ADLs on one level; Able to live on main level with bedroom/bathroom; Elevator   Bathroom Shower/Tub   (pt sponge-bathes w/ assistance)   [address] [facility]; Hospital bed; Wheelchair-manual  (RW)   Additional Comments Pt reports living w/ his wife in a 1 level Senior Apt building   Prior Function   Level of [place] Needs assistance with functional mobility; Needs assistance with ADLs; Needs assistance with IADLS   Lives With Spouse   Receives Help From Home health;Personal care attendant  (HHPT, 2 aides)   IADLs Family/Friend/Other provides transportation; Family/Friend/Other provides meals; Family/Friend/Other provides medication management   Falls in the last 6 months 0   Comments Pt poor historian, reports stand-pivoting bed<>WC, working w/ HHPT for ambulating w/ RW, aides/wife assisting w/ all ADLs and IADLs   General   Family/Caregiver Present No   Cognition   Overall Cognitive Status Impaired   Arousal/Participation Alert   Orientation Level Oriented to person;Oriented to place;Oriented to time;Disoriented to situation   Memory Decreased short term memory Following Commands Follows one step commands without difficulty   Comments Pt ID via name and ; pt agreeable to PT eval and OOB mobility   Subjective   Subjective "did you see that football player last night?"   Strength RLE   RLE Overall Strength 3/5  (grossly assessed w/ functional mobility)   Strength LLE   LLE Overall Strength 3/5  (grossly assessed w/ functional mobility)   Bed Mobility   Supine to Sit 3  Moderate assistance   Additional items Assist x 2;HOB elevated; Bedrails; Increased time required;Verbal cues;LE management   Sit to Supine Unable to assess   Additional Comments pt able to maintain static sitting balance w/ supervision   Transfers   Sit to Stand 3  Moderate assistance   Additional items Assist x 2; Increased time required;Verbal cues   Stand to Sit 3  Moderate assistance   Additional items Assist x 2;Armrests; Increased time required;Verbal cues   Additional Comments 2 sit<>stand transfers, VC for hand placemenet   Ambulation/Elevation   Gait pattern Narrow MAHAMED; Forward Flexion; Shuffling; Short stride; Excessively slow;Decreased hip extension;Decreased heel strike   Gait Assistance 3  Moderate assist   Additional items Assist x 2;Verbal cues   Assistive Device Rolling walker   Distance 3'   Ambulation/Elevation Additional Comments assist w/ RW management   Balance   Static Sitting Fair +   Dynamic Sitting Fair   Static Standing Poor +  (w/ RW)   Dynamic Standing Poor  (w/ RW)   Ambulatory Poor -  (w/ RW)   Endurance Deficit   Endurance Deficit Yes   Endurance Deficit Description decreased ambulatory endurance   Activity Tolerance   Activity Tolerance Patient limited by fatigue   Medical Staff [place] coordination w/ OT WATSON due to pt's medical complexity, regression from mobility baseline, and cognitive/safety awareness deficits requiring two skilled clinicians, individual items assessed and goals addressed; STACY Henson   Nurse Made Aware FLORENTINO [address] Problem List Decreased strength;Decreased endurance; Impaired balance;Decreased mobility; Decreased cognition; Impaired judgement;Decreased safety awareness   Assessment Emmanuel Howell is a 67 y o  Male who [facility] due to AMS and diagnosis of severe sepsis  Orders for PT eval and treat received  Comorbidities affecting pt at time of evaluation include: DM, UTI, CKD, HTN, Parkinson disease  Personal factors affecting DC include: ambulating w/ assistive device, inability to ambulate household distances, inability to navigate level surfaces w/o external assistance, decreased cognition, inability to perform IADLs and inability to perform ADLs  At baseline, pt reports performing transfers bed<>WC and working w/ HHPT w/ RW for ambulation, and w/ 0 fall(s) in the previous 6 months  Upon evaluation, pt presents w/ the following deficits: weakness, impaired coordination, impaired balance and gait deviations  Pt currently requires mod Ax2 for bed mobility, mod Ax2 for transfers, and mod Ax2 w/ RW for ambulation  Pt's clinical presentation is unstable/unpredictable due to need for assist w/ all phases of mobility, tolerance to only 3 feet of ambulation, need for input for mobility technique and recent drastic decline in mobility compared to baseline  Pt is at an increased risk of falls due to impaired balance, LE weakness, and gait deviations  From a PT/mobility standpoint, given the above findings, DC recommendation is: Post-acute inpatient rehabilitation vs HHPT pending level of assistance available upon DC for functional mobility  During current admission, pt will benefit from continued skilled inpatient PT in the acute care setting in order to address the above deficits and to maximize function and mobility prior to DC from acute care     Goals   Patient Goals to watch [employer]   STG Expiration Date 01/13/23   Short Term Goal #1 Pt will: perform bed mobility w/ supervision to decrease pt's burden of care and increase pt's independence w/ repositioning in bed; perform transfers w/ supervision to increase pt's OOB mobility; ambulate at least 20' w/ LRAD and min Ax1 to increase pt's ambulatory endurance/tolerance; self-propel manual WC at least 50' and independently manage WC parts (breaks, armrests, leg rests) as an alternative means to ambulation for mobility; increase all balance ratings by at least 1 grade to decrease pt's risk of falls   PT Treatment Day 0   Plan   Treatment/Interventions Functional transfer training;LE strengthening/ROM; Therapeutic exercise; Endurance training;Cognitive reorientation;Patient/family training;Equipment eval/education; Bed mobility;Gait training  (WC training)   PT Frequency 3-5x/wk   Recommendation   PT Discharge Recommendation Post acute rehabilitation services  (vs HHPT (please see comment below))   Equipment Recommended [facility] Recommended Wheeled walker   Change/add to [employer]? No   Additional Comments DC rec: post acute rehab vs HHPT pending level of support available at home upon DC; pt currently requires Ax2 for mobility w/ pt reporting wife and aides are present to assist as needed  [place] [address]   Turning in Bed Without Bedrails 2   Lying on Back to Sitting on Edge of Flat Bed 2   Moving Bed to Chair 2   Standing Up From Chair 2   Walk in Room 2   Climb 3-5 Stairs 1   Basic Mobility Inpatient Raw Score 11   Basic Mobility Standardized Score 30 25   Highest Level Of Mobility   -[facility] Goal 4: Move to chair/commode   -[facility] Achieved 4: Move to chair/commode   End of Consult   Patient Position at End of Consult Bedside chair;Bed/Chair alarm activated; All needs within reach       The patient's AM-PAC Basic Mobility Inpatient Short Form Raw Score is 11  A Raw score of less than or equal to 16 suggests the patient may benefit from discharge to post-acute rehabilitation services   Please also refer to the recommendation of the Physical Therapist for safe discharge planning  Pt will benefit from skilled inpatient PT during this admission in order to facilitate progress towards goals and to maximize functional independence prior to [address] rec: post acute rehab vs HHPT pending level of assistance available upon DC  (pt currently requires Ax2 for mobility w/ pt reporting wife and aides are present to assist as needed   Spoke to [employer])        Monique Dodd, PT, DPT  01/03/23

## 2023-01-03 NOTE — ASSESSMENT & PLAN NOTE
· POA SIRS criteria:  fever 102 9F,   · p/w leukocytosis with bandemia 13 K  · Suspected source:  UTI, p/w dysuria, hx of UTI, UCx + 70-79k cfu MSSA   · End organ dysfunction: AMS  · Chest x-ray:  Negative   · Lactic acid:   2 7  · IV Fluids:  1 L fluid bolus in ED  · IV antibiotics:   Ceftriaxone 1 G IV x1 dose in the ED  · Procalcitonin 0 23,   · lactic acid normalized  · BCx [email or web address]    Pt clinically improved, sepsis resolved  Afebrile, WBC wnl      Plan  Monitor temp curve, WBC  Cont Bactrim DS BID for 7-day duration (total 10 d abx)  Discontinued ceftriaxone 1 g/24h (received 3 doses)

## 2023-01-04 PROBLEM — G93.41 ACUTE METABOLIC ENCEPHALOPATHY: Status: RESOLVED | Noted: 2022-12-31 | Resolved: 2023-01-04

## 2023-01-04 LAB
ANION GAP SERPL CALCULATED.3IONS-SCNC: 5 MMOL/L (ref 4–13)
BUN SERPL-MCNC: 15 MG/DL (ref 5–25)
CALCIUM SERPL-MCNC: 8.4 MG/DL (ref 8.4–10.2)
CHLORIDE SERPL-SCNC: 104 MMOL/L (ref 96–108)
CO2 SERPL-SCNC: 26 MMOL/L (ref 21–32)
CREAT SERPL-MCNC: 1.15 MG/DL (ref 0.6–1.3)
ERYTHROCYTE [DISTWIDTH] IN BLOOD BY AUTOMATED COUNT: 13.9 % (ref 11.6–15.1)
GFR SERPL CREATININE-BSD FRML MDRD: 63 ML/MIN/1.73SQ M
GLUCOSE SERPL-MCNC: 153 MG/DL (ref 65–140)
GLUCOSE SERPL-MCNC: 158 MG/DL (ref 65–140)
GLUCOSE SERPL-MCNC: 183 MG/DL (ref 65–140)
GLUCOSE SERPL-MCNC: 190 MG/DL (ref 65–140)
GLUCOSE SERPL-MCNC: 209 MG/DL (ref 65–140)
GLUCOSE SERPL-MCNC: 241 MG/DL (ref 65–140)
HCT VFR BLD AUTO: 30 % (ref 36.5–49.3)
HGB BLD-MCNC: 9.8 G/DL (ref 12–17)
MCH RBC QN AUTO: 29.2 PG (ref 26.8–34.3)
MCHC RBC AUTO-ENTMCNC: 32.7 G/DL (ref 31.4–37.4)
MCV RBC AUTO: 89 FL (ref 82–98)
PLATELET # BLD AUTO: 231 THOUSANDS/UL (ref 149–390)
PMV BLD AUTO: 10.7 FL (ref 8.9–12.7)
POTASSIUM SERPL-SCNC: 4.1 MMOL/L (ref 3.5–5.3)
RBC # BLD AUTO: 3.36 MILLION/UL (ref 3.88–5.62)
SODIUM SERPL-SCNC: 135 MMOL/L (ref 135–147)
WBC # BLD AUTO: 8.39 THOUSAND/UL (ref 4.31–10.16)

## 2023-01-04 RX ORDER — POLYETHYLENE GLYCOL 3350 17 G/17G
17 POWDER, FOR SOLUTION ORAL DAILY
Status: DISCONTINUED | OUTPATIENT
Start: 2023-01-04 | End: 2023-01-05 | Stop reason: HOSPADM

## 2023-01-04 RX ADMIN — NYSTATIN: 100000 POWDER TOPICAL at 08:49

## 2023-01-04 RX ADMIN — CARBIDOPA AND LEVODOPA 1 TABLET: 25; 100 TABLET ORAL at 08:31

## 2023-01-04 RX ADMIN — HEPARIN SODIUM 5000 UNITS: 5000 INJECTION INTRAVENOUS; SUBCUTANEOUS at 13:13

## 2023-01-04 RX ADMIN — INSULIN GLARGINE 6 UNITS: 100 INJECTION, SOLUTION SUBCUTANEOUS at 21:55

## 2023-01-04 RX ADMIN — CARBIDOPA AND LEVODOPA 1 TABLET: 25; 100 TABLET ORAL at 21:54

## 2023-01-04 RX ADMIN — GLYCERIN 1 DROP: .002; .002; .01 SOLUTION/ DROPS OPHTHALMIC at 18:38

## 2023-01-04 RX ADMIN — POLYETHYLENE GLYCOL 3350 17 G: 17 POWDER, FOR SOLUTION ORAL at 08:30

## 2023-01-04 RX ADMIN — PANTOPRAZOLE SODIUM 40 MG: 40 TABLET, DELAYED RELEASE ORAL at 06:44

## 2023-01-04 RX ADMIN — GLYCERIN 1 DROP: .002; .002; .01 SOLUTION/ DROPS OPHTHALMIC at 08:49

## 2023-01-04 RX ADMIN — LISINOPRIL 5 MG: 5 TABLET ORAL at 08:31

## 2023-01-04 RX ADMIN — RANOLAZINE 500 MG: 500 TABLET, FILM COATED, EXTENDED RELEASE ORAL at 08:31

## 2023-01-04 RX ADMIN — TAMSULOSIN HYDROCHLORIDE 0.4 MG: 0.4 CAPSULE ORAL at 18:37

## 2023-01-04 RX ADMIN — HEPARIN SODIUM 5000 UNITS: 5000 INJECTION INTRAVENOUS; SUBCUTANEOUS at 06:44

## 2023-01-04 RX ADMIN — CLOPIDOGREL BISULFATE 75 MG: 75 TABLET ORAL at 08:30

## 2023-01-04 RX ADMIN — CYANOCOBALAMIN TAB 500 MCG 1000 MCG: 500 TAB at 08:31

## 2023-01-04 RX ADMIN — SULFAMETHOXAZOLE AND TRIMETHOPRIM 1 TABLET: 800; 160 TABLET ORAL at 08:31

## 2023-01-04 RX ADMIN — NYSTATIN: 100000 POWDER TOPICAL at 18:38

## 2023-01-04 RX ADMIN — FINASTERIDE 5 MG: 5 TABLET, FILM COATED ORAL at 08:30

## 2023-01-04 RX ADMIN — HEPARIN SODIUM 5000 UNITS: 5000 INJECTION INTRAVENOUS; SUBCUTANEOUS at 21:56

## 2023-01-04 RX ADMIN — METOPROLOL SUCCINATE 25 MG: 25 TABLET, EXTENDED RELEASE ORAL at 08:31

## 2023-01-04 RX ADMIN — ASPIRIN 81 MG CHEWABLE TABLET 81 MG: 81 TABLET CHEWABLE at 08:30

## 2023-01-04 RX ADMIN — DOCUSATE SODIUM 100 MG: 100 CAPSULE, LIQUID FILLED ORAL at 18:37

## 2023-01-04 RX ADMIN — Medication 3 MG: at 21:54

## 2023-01-04 RX ADMIN — INSULIN LISPRO 1 UNITS: 100 INJECTION, SOLUTION INTRAVENOUS; SUBCUTANEOUS at 18:37

## 2023-01-04 RX ADMIN — DOCUSATE SODIUM 100 MG: 100 CAPSULE, LIQUID FILLED ORAL at 08:49

## 2023-01-04 RX ADMIN — CARBIDOPA AND LEVODOPA 1 TABLET: 25; 100 TABLET ORAL at 18:37

## 2023-01-04 RX ADMIN — RANOLAZINE 500 MG: 500 TABLET, FILM COATED, EXTENDED RELEASE ORAL at 21:53

## 2023-01-04 RX ADMIN — ATORVASTATIN CALCIUM 40 MG: 40 TABLET, FILM COATED ORAL at 08:30

## 2023-01-04 RX ADMIN — SULFAMETHOXAZOLE AND TRIMETHOPRIM 1 TABLET: 800; 160 TABLET ORAL at 21:53

## 2023-01-04 RX ADMIN — INSULIN LISPRO 2 UNITS: 100 INJECTION, SOLUTION INTRAVENOUS; SUBCUTANEOUS at 08:49

## 2023-01-04 RX ADMIN — INSULIN LISPRO 1 UNITS: 100 INJECTION, SOLUTION INTRAVENOUS; SUBCUTANEOUS at 21:55

## 2023-01-04 RX ADMIN — INSULIN LISPRO 3 UNITS: 100 INJECTION, SOLUTION INTRAVENOUS; SUBCUTANEOUS at 13:15

## 2023-01-04 NOTE — ASSESSMENT & PLAN NOTE
Lab Results   Component Value Date    EGFR 63 01/04/2023    EGFR 53 01/02/2023    EGFR 54 01/01/2023    CREATININE 1 15 01/04/2023    CREATININE 1 33 (H) 01/02/2023    CREATININE 1 30 01/01/2023     Stable disease  Avoid nephrotoxic agents, avoid hypotension

## 2023-01-04 NOTE — PROGRESS NOTES
[facility]  Progress Note - Emmanuel Howell 1950, 67 y o  male MRN: [id number]  Unit/Bed#: W -01 Encounter: [id number]  Primary Care Provider: Luiz Louie MD   Date and time admitted to hospital: 12/31/2022 11:43 AM    * Severe sepsis ([address] )  Assessment & Plan  · POA SIRS criteria:  fever 102 9F,   · p/w leukocytosis with bandemia 13 K  · Suspected source:  UTI, p/w dysuria, hx of UTI, UCx + 70-79k cfu MSSA   · End organ dysfunction: AMS  · Chest x-ray:  Negative   · Lactic acid:   2 7  · IV Fluids:  1 L fluid bolus in ED  · IV antibiotics:   Ceftriaxone 1 G IV x1 dose in the ED  · Procalcitonin 0 23,   · lactic acid normalized  · [email or web address]    Pt clinically improved, sepsis resolved  Afebrile, WBC wnl  LA wnl  Plan  Monitor vitals  Cont Bactrim DS BID for 7-day duration through 1/9/23 (total 10 d abx)  Received ceftriaxone x 3 Days    UTI (urinary tract infection)  Assessment & Plan  POA-fever, dysuria  Denies hematuria, flank pain, nausea or vomiting  UCx growing susceptible S  Aureus  Clinically improved and now asymptomatic    PLAN  Transitioned to Bactrim DS BID from ceftriaxone, as above    Acute metabolic encephalopathy-resolved as of 1/4/2023  Assessment & Plan  POA, patient reportedly more sleepy and less interactive than usual  There is no confusion, but patient has mild headaches  POA-fever, dysuria, mild headaches    No seizures, no blurred vision,  no chest pain, no shortness of breath,  Evidence of urinary tract infection with severe sepsis  Toxic metabolic encephalopathy secondary to urosepsis    Resolved    PLAN  Continue Fall and delirium precautions while admitted  Tx UTI as above    Intertrigo  Assessment & Plan  At the groin  nystatin powder BID  Keep groin dry and clean    Type 2 diabetes mellitus with diabetic neuropathy [facility])  Assessment & Plan  Lab Results   Component Value Date    HGBA1C 6 4 (H) 03/05/2022       Recent Labs     01/03/23  8215 01/04/23  0829 01/04/23  0846 01/04/23  1151   POCGLU 232* 190* 209* 241*       Blood Sugar Average: Last 72 hrs:  (P) 203 8   Hold home oral diabetic agents while admitted, resume at discharge  Sliding scale with meals and at bedtime  Accu-Cheks with meals and at bedtime  Continue Lantus 6 U QHS  Hypoglycemia protocol    Left-sided weakness  Assessment & Plan  Left-sided weakness from Parkinson's and neuropathy  At baseline, no other focal deficits  Continue Sinemet  PT OT eval and treat -->pt is currently assist of 2 for transfers  recommend STR or Home with 24h care  CM aware and discussions w pt and pt's spouse are ongoing  Stage 3 chronic kidney disease [facility])  Assessment & Plan  Lab Results   Component Value Date    EGFR 63 01/04/2023    EGFR 53 01/02/2023    EGFR 54 01/01/2023    CREATININE 1 15 01/04/2023    CREATININE 1 33 (H) 01/02/2023    CREATININE 1 30 01/01/2023     Stable disease  Avoid nephrotoxic agents, avoid hypotension      VTE Pharmacologic Prophylaxis: VTE Score: 6 High Risk (Score >/= 5) - Pharmacological DVT Prophylaxis Ordered: heparin  Sequential Compression Devices Ordered  Patient Centered Rounds: I performed bedside rounds with nursing staff today  Discussions with Specialists or Other Care Team Provider: None    Education and Discussions with Family / Patient: Attempted to update  (wife) via phone  Unable to contact  Current Length of Stay: 4 day(s)  Current Patient Status: Inpatient   Discharge Plan: Anticipate discharge later today or tomorrow to rehab facility  Code Status: Level 1 - Full Code    Subjective: This morning, Mr Iram Ram is seen lying up in bed, awake, alert, engaged with exam, in no acute distress  He reports feeling well today and offers no complaints, however does report he has not had a bowel movement in last few days  Last BM recorded in chart on 1/1/23  Amenable to initiation of gentle bowel regimen   Denies abdominal pain or discomfort, does endorse passing flatus  No new or worsening sxs and remainder of brief ROS is negative  Objective:     Vitals:   Temp (24hrs), Av 2 °F (36 8 °C), Min:97 7 °F (36 5 °C), Max:98 8 °F (37 1 °C)    Temp:  [97 7 °F (36 5 °C)-98 8 °F (37 1 °C)] 97 7 °F (36 5 °C)  HR:  [76-83] 83  Resp:  [18-28] 28  BP: (115-127)/(56-69) 127/69  SpO2:  [96 %-97 %] 97 %  Body mass index is 29 76 kg/m²  Input and Output Summary (last 24 hours): Intake/Output Summary (Last 24 hours) at 2023 1432  Last data filed at 2023 4511  Gross per 24 hour   Intake 240 ml   Output --   Net 240 ml       Physical Exam:   Physical Exam  Vitals and nursing note reviewed  Constitutional:       General: He is not in acute distress  Appearance: Normal appearance  He is not ill-appearing, toxic-appearing or diaphoretic  HENT:      Head: Normocephalic and atraumatic  Eyes:      General: No scleral icterus  Right eye: No discharge  Left eye: No discharge  Conjunctiva/sclera: Conjunctivae normal    Cardiovascular:      Rate and Rhythm: Normal rate and regular rhythm  Pulses: Normal pulses  Heart sounds: Normal heart sounds  No murmur heard  No friction rub  No gallop  Pulmonary:      Effort: Pulmonary effort is normal  No respiratory distress  Breath sounds: Normal breath sounds  No stridor  No wheezing, rhonchi or rales  Chest:      Chest wall: No tenderness  Abdominal:      General: Bowel sounds are normal  There is no distension  Palpations: Abdomen is soft  Tenderness: There is no abdominal tenderness  There is no guarding or rebound  Musculoskeletal:         General: No tenderness  Right lower leg: No edema  Left lower leg: No edema  Skin:     General: Skin is warm and dry  Neurological:      Mental Status: He is alert and oriented to person, place, and time     Psychiatric:         Mood and Affect: Mood normal          Behavior: Behavior normal  Additional Data:     Labs:  Results from last 7 days   Lab Units 01/04/23  0745 01/02/23  0534 01/01/23  0509   WBC Thousand/uL 8 39   < > 9 44   HEMOGLOBIN g/dL 9 8*   < > 10 1*   HEMATOCRIT % 30 0*   < > 31 3*   PLATELETS Thousands/uL 231   < > 211   NEUTROS PCT %  --   --  86*   LYMPHS PCT %  --   --  4*   MONOS PCT %  --   --  9   EOS PCT %  --   --  0    < > = values in this interval not displayed  Results from last 7 days   Lab Units 01/04/23  0745 01/02/23  0534 01/01/23  0509   SODIUM mmol/L 135   < > 136   POTASSIUM mmol/L 4 1   < > 3 8   CHLORIDE mmol/L 104   < > 103   CO2 mmol/L 26   < > 24   BUN mg/dL 15   < > 19   CREATININE mg/dL 1 15   < > 1 30   ANION GAP mmol/L 5   < > 9   CALCIUM mg/dL 8 4   < > 8 0*   ALBUMIN g/dL  --   --  3 2*   TOTAL BILIRUBIN mg/dL  --   --  1 09*   ALK PHOS U/L  --   --  64   ALT U/L  --   --  5*   AST U/L  --   --  15   GLUCOSE RANDOM mg/dL 153*   < > 159*    < > = values in this interval not displayed  Results from last 7 days   Lab Units 12/31/22  1214   INR  1 05     Results from last 7 days   Lab Units 01/04/23  1151 01/04/23  0846 01/04/23  0829 01/03/23  2321 01/03/23  1648 01/03/23  1212 01/03/23  0747 01/02/23  2120 01/02/23  1617 01/02/23  1158 01/02/23  0752 01/01/23  2050   POC GLUCOSE mg/dl 241* 209* 190* 232* 186* 226* 174* 197* 202* 237* 152* 261*         Results from last 7 days   Lab Units 01/01/23  0509 12/31/22  1534 12/31/22  1214   LACTIC ACID mmol/L  --  1 4 2 7*   PROCALCITONIN ng/ml 0 23  --  0 19       Lines/Drains:  Invasive Devices     Peripheral Intravenous Line  Duration           Peripheral IV 12/31/22 Left Antecubital 4 days    Peripheral IV 01/04/23 Distal;Dorsal (posterior); Right Forearm <1 day                      Imaging: No pertinent imaging reviewed  Recent Cultures (last 7 days):   Results from last 7 days   Lab Units 12/31/22  1229 12/31/22  1217 12/31/22  1214   BLOOD CULTURE  No Growth at 72 hrs   --  No Growth at 72 hrs  URINE CULTURE   --  70,000-79,000 cfu/ml Staphylococcus aureus*  10,000-19,000 cfu/ml  --        Last 24 Hours Medication List:   Current Facility-Administered Medications   Medication Dose Route Frequency Provider Last Rate   • acetaminophen  650 mg Oral Q6H PRN Asha Rizo MD     • aspirin  81 mg Oral Daily Asha Rizo MD     • atorvastatin  40 mg Oral Daily Asha Rizo MD     • carbidopa-levodopa  1 tablet Oral TID Asha Rizo MD     • clopidogrel  75 mg Oral Daily Asha Rizo MD     • cyanocobalamin  1,000 mcg Oral Daily Asha Rizo MD     • docusate sodium  100 mg Oral BID Asha Rizo MD     • finasteride  5 mg Oral Daily Asha Rizo MD     • glycerin-hypromellose-  1 drop Both Eyes BID Asha Rizo MD     • heparin (porcine)  5,000 Units Subcutaneous Q8H [address] Asha Rizo MD     • insulin glargine  6 Units Subcutaneous HS Tera Rodriguez MD     • insulin lispro  1-6 Units Subcutaneous HS Asha Rizo MD     • insulin lispro  1-6 Units Subcutaneous TID AC Angela Leigh MD     • lisinopril  5 mg Oral Daily Asha Rizo MD     • melatonin  3 mg Oral HS Asha Rizo MD     • metoprolol succinate  25 mg Oral Daily Asha Rizo MD     • nystatin   Topical BID Asha Rizo MD     • pantoprazole  40 mg Oral Early Morning Asha Rizo MD     • polyethylene glycol  17 g Oral Daily Tera Rodriguez MD     • ranolazine  500 mg Oral Q12H [address] Asha Rizo MD     • sulfamethoxazole-trimethoprim  1 tablet Oral Q12H [address] Tera Rodriguez MD     • tamsulosin  0 4 mg Oral Daily With Maximino Marin MD          Today, Patient Was Seen By: Tera Rodriguez MD    **Please Note: This note may have been constructed using a voice recognition system  **

## 2023-01-04 NOTE — CASE MANAGEMENT
Case Management Progress Note    Patient name Sary Holding  Location W /W -94 MRN [id number]  : 1950 Date 2023       LOS (days): 4  Geometric Mean LOS (GMLOS) (days):   Days to GMLOS:        OBJECTIVE:        Current admission status: Inpatient  Preferred Pharmacy:   [address] [place] [address]  Phone: [phone] Fax: [facility], [place] [address]  [facility]   [phone] [facility]  Phone: [phone] Fax: [phone]    Primary Care Provider: Epifanio Putnam MD    Primary Insurance: [facility]  Secondary Insurance:     PROGRESS NOTE:    CM attempted to call the Aging office for Information Referral Services for a follow-up with the patient post-discharge  CM reached the  and was unable to leave a message for a return call  CM will follow-up in the morning      P: [phone]

## 2023-01-04 NOTE — SPEECH THERAPY NOTE
Speech Language/Pathology    Speech/Language Pathology Progress Note    Patient Name: Donna Hernandez  EASWJ'Z Date: 1/4/2023     Problem List  Principal Problem:    Severe sepsis [facility])  Active Problems:    Type 2 diabetes mellitus with diabetic neuropathy ([facility] )    Stage 3 chronic kidney disease ([facility] )    UTI (urinary tract infection)    Left-sided weakness    Acute metabolic encephalopathy    Intertrigo       Past Medical History  Past Medical History:   Diagnosis Date   • Ambulatory dysfunction     uses walker with assist of 1   • Anemia    • Anxiety    • At risk for falls     hx of falls   • Benign paroxysmal vertigo     unspecified ear   • BPH (benign prostatic hyperplasia)     for TURP today 1/4/2021   • Calculus in bladder     for surgical removal today 1/4/2021   • Cataract     left eye   • Cervicalgia    • Chest pain    • Chronic kidney disease     stage 3   • Constipation    • CTS (carpal tunnel syndrome)     left   • Cyst of pancreas    • Cystitis 06/23/2020    acute cystitis with hematuria   • Depression    • Diabetes mellitus ([facility] )     type II with neuropathy   • Dizziness     and giddiness   • Dysphagia    • Elevated PSA    • Facial weakness    • GERD (gastroesophageal reflux disease)     last assessed 5/20/16   • Gross hematuria    • Hematuria    • Hyperlipidemia    • Hypertension    • Kidney disease    • Kidney stone    • Left-sided weakness    • Long term (current) use of oral hypoglycemic drugs    • Muscle weakness    • Nuclear senile cataract of left eye    • OA (osteoarthritis) of knee     b/l   • 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HCC)    • Syncope and collapse    • Tachycardia    • UTI (urinary tract infection)    • Vertebro-basilar artery syndrome    • Vitamin B deficiency    • Vitamin D deficiency    • Vitamin D deficiency         Past Surgical History  Past Surgical History:   Procedure Laterality Date   • CARDIAC CATHETERIZATION N/A 3/7/2022    Procedure: CARDIAC CATHETERIZATION;  Surgeon: Efra Yan DO [place];  [facility]: [facility]; Service: Cardiology   • CATARACT EXTRACTION     • CHOLECYSTECTOMY     • KNEE SURGERY     • NV LITHOLAPAXY SMPL/SM <2 5 CM N/A 1/4/2021    Procedure: Cystolitholopaxy w/laser bladder stones;  Surgeon: Chandler Donato MD;  Location: [facility];  Service: Urology   • NV TRURL ELECTROSURG RESCJ PROSTATE BLEED COMPLETE N/A 1/4/2021    Procedure: T U R P ;  Surgeon: Chandler Donato MD;  Location: [facility];  Service: Urology         Subjective:  Pt upright in bed with lunch tray as SLP entered the room  No complaints given  Pt on room air  Objective:  Pt seen with lunch tray of pulled pork sandwich, french fries, yogurt, water, and soda  Pt was able to IND feed self but required assistance opening containers and cutting sandwich in half  Pt was able to adequately bite through solids, functional mastication, timely oral transfer and swallow initiation  No oral residue remained in the oral cavity s/p swallow  Pt was able to consume single and consecutive sips of thin liquids via straw without coughing, choking, change in respiratory functioning, or change in vocal quality  Assessment:  Per review of records, pt previously consuming thin liquids with change in vocal quality as records note that voice sounded wet  Pt consumed single and consecutive sips of thin liquids via straw without coughing, choking, change in respiratory functioning, or change in vocal quality  Pt with noted improvement with liquid intake  Plan/Recommendations:  ST to follow up x1 to monitor for s/s of aspiration/dysphagia with thin liquids

## 2023-01-04 NOTE — ASSESSMENT & PLAN NOTE
Lab Results   Component Value Date    HGBA1C 6 4 (H) 03/05/2022       Recent Labs     01/03/23  2321 01/04/23  0829 01/04/23  0846 01/04/23  1151   POCGLU 232* 190* 209* 241*       Blood Sugar Average: Last 72 hrs:  (P) 203 8   Hold home oral diabetic agents while admitted, resume at discharge  Sliding scale with meals and at bedtime  Accu-Cheks with meals and at bedtime  Continue Lantus 6 U QHS  Hypoglycemia protocol

## 2023-01-04 NOTE — ASSESSMENT & PLAN NOTE
POA, patient reportedly more sleepy and less interactive than usual  There is no confusion, but patient has mild headaches  POA-fever, dysuria, mild headaches    No seizures, no blurred vision,  no chest pain, no shortness of breath,  Evidence of urinary tract infection with severe sepsis  Toxic metabolic encephalopathy secondary to urosepsis    Resolved    PLAN  Continue Fall and delirium precautions while admitted  Tx UTI as above

## 2023-01-04 NOTE — ASSESSMENT & PLAN NOTE
· POA SIRS criteria:  fever 102 9F,   · p/w leukocytosis with bandemia 13 K  · Suspected source:  UTI, p/w dysuria, hx of UTI, UCx + 70-79k cfu MSSA   · End organ dysfunction: AMS  · Chest x-ray:  Negative   · Lactic acid:   2 7  · IV Fluids:  1 L fluid bolus in ED  · IV antibiotics:   Ceftriaxone 1 G IV x1 dose in the ED  · Procalcitonin 0 23,   · lactic acid normalized  · [email or web address]    Pt clinically improved, sepsis resolved  Afebrile, WBC wnl  LA wnl      Plan  Monitor vitals  Cont Bactrim DS BID for 7-day duration through 1/9/23 (total 10 d abx)  Received ceftriaxone x 3 Days

## 2023-01-04 NOTE — PHYSICAL THERAPY NOTE
PHYSICAL THERAPY NOTE          Patient Name: Blanca GARCIA Date: 23 1457   PT Last Visit   PT Visit Date 23   Note Type   Note Type Treatment   Pain Assessment   Pain Assessment Tool 0-10   Pain Score No Pain   Pain Location/Orientation Orientation: Lower; Location: Back   Pain Onset/Description Frequency: Intermittent   Effect of Pain on Daily Activities limited activity tolerance   Patient's Stated Pain Goal No pain   Hospital Pain Intervention(s) Repositioned; Ambulation/increased activity; Emotional support; Rest   Multiple Pain Sites No   Restrictions/Precautions   Weight Bearing Precautions Per Order No   Other Precautions Chair Alarm; Bed Alarm;Cognitive; Fall Risk   General   Chart Reviewed Yes   Additional Pertinent History pt fearful of falling while seated EOB and performing functional transfers  pt with urinary incontinence x2 in todays tx session   Response to Previous Treatment Patient with no complaints from previous session  Family/Caregiver Present No   Cognition   Overall Cognitive Status Impaired   Arousal/Participation Alert; Responsive; Cooperative   Attention Within functional limits   Orientation Level Oriented X4   Memory Decreased short term memory   Following Commands Follows one step commands with increased time or repetition   Comments pt identified by name and    Subjective   Subjective pt was agreeable to participate in PT intervention   Bed Mobility   Rolling R 3  Moderate assistance   Additional items Assist x 1;HOB elevated; Increased time required;Verbal cues; Other;LE management  (trunk management)   Rolling L 3  Moderate assistance   Additional items Assist x 1;HOB elevated; Bedrails; Increased time required;Verbal cues;LE management; Other  (trunk management)   Supine to Sit 3  Moderate assistance   Additional items Assist x 1;HOB elevated; Bedrails; Increased time required;Verbal cues;LE management; Other  (trunk management)   Sit to Supine 3  Moderate assistance   Additional items Assist x 1;Bedrails;HOB elevated; Increased time required;Verbal cues;LE management; Other  (trunk management)   Additional Comments pt with R sided and posterior lean while seated EOB that required min Ax1 to correct  pt with limited sitting  balalance and tolerance   Transfers   Sit to Stand 3  Moderate assistance   Additional items Assist x 1; Increased time required  (w/ RW)   Stand to Sit 3  Moderate assistance   Additional items Assist x 1; Armrests; Increased time required;Verbal cues  (w/ RW)   Stand pivot 2  Maximal assistance   Additional items Assist x 1; Armrests; Increased time required;Verbal cues  (w/ RW)   Additional Comments pt was able tro perform 4 STS and 2 SPT in todays tx session all with RW and mod Ax1 for STS and maxAx1 for SPT   Ambulation/Elevation   Gait pattern Not appropriate   Ambulation/Elevation Additional Comments pt with limited standing tolerance and max Ax1 for SPT and mod Ax1 STS   Balance   Static Sitting Fair +   Dynamic Sitting Fair   Static Standing Poor +   Dynamic Standing Poor   Ambulatory Poor -  (w/ RW)   Endurance Deficit   Endurance Deficit Yes   Endurance Deficit Description limited functional mobility and activity tolerance   Activity Tolerance   Activity Tolerance Patient limited by fatigue; Other (Comment)  (fear of falling)   Nurse Made Aware Spoke to RN   Exercises   Heelslides Supine;10 reps;AROM; Bilateral   Hip Abduction Sitting;10 reps;AROM; Bilateral   Hip Adduction Sitting;10 reps;AROM; Bilateral  (pillow squeezes)   Knee AROM Long Arc Quad Sitting;10 reps;AROM; Bilateral   Ankle Pumps Supine;20 reps;AROM; Bilateral   Marching Sitting;10 reps;AROM; Bilateral   Balance training  pt required between min to mod Ax1 for sitting balance while performing TE activities   Assessment   Prognosis Fair   Problem List Decreased strength;Decreased endurance;Decreased cognition; Impaired judgement;Decreased safety awareness   Assessment pt began tx session lying supin ein the bed and was agreeable to participate in PT intervention  pt continues to remain consistant with all bed mobility as pt continues to required mod Ax1 for all rolling and repositioning in the bed from L to R and completing a supine<>sit EOB transfer with LE and trunk mamanegemnt  pt demonstrated a Right sided and posterior lean while seated EOB that required min Ax1 and while performing TE activities while seated EOB pt required min to mod Ax1 for sitting balance  pt continues to remain consistant with mod Ax1 for STS but required max Ax1 for all SPT as pt was able to perform multiple functional transfers in order to strengthen LE's and increase endurance and safety with all funtional transfers  pt continues to be limited with functional mobility, activity and standing tolerance  pt would benefit from continued skilled PT intervention in order to address deficits listed above  Continue to recommedn post acute rehab services at the time of D/C in order to maximize pt functional independence and safety with all OOB mobility when appropriate  post tx pt in bed with call bell and all pt needs met   Goals   Patient Goals to go home to his wife   STG Expiration Date 01/13/23   PT Treatment Day 1   Plan   Treatment/Interventions Functional transfer training;LE strengthening/ROM; Therapeutic exercise; Endurance training;Cognitive reorientation;Patient/family training;Equipment eval/education; Bed mobility;Gait training;Spoke to nursing   Progress No functional improvements   PT Frequency 3-5x/wk   Recommendation   PT Discharge Recommendation Post acute rehabilitation services  (vs HHPT)   Equipment Recommended [facility] Recommended Wheeled walker   Change/add to [employer]?  No   AM-PAC Basic Mobility Inpatient   Turning in Bed Without Bedrails 2   Lying on Back to Sitting on Edge of Flat Bed 2   Moving Bed to Chair 2   Standing Up From Chair 2   Walk in Room 2   Climb 3-5 Stairs 1   Basic Mobility Inpatient Raw Score 11   Basic Mobility Standardized Score 30 25   Highest Level Of Mobility   -[facility] Goal 4: Move to chair/commode   -HL Achieved 4: Move to [employer] Provided Other  (bed mobility and functional transfers)   Patient Reinforcement needed   End of Consult   Patient Position at End of Consult Supine;Bed/Chair alarm activated; All needs within reach   The patient's AM-PAC Basic Mobility Inpatient Short Form Raw Score is 11  A Raw score of less than or equal to 16 suggests the patient may benefit from discharge to post-acute rehabilitation services  Please also refer to the recommendation of the Physical Therapist for safe discharge planning       Tiago Pathak

## 2023-01-04 NOTE — ASSESSMENT & PLAN NOTE
POA-fever, dysuria  Denies hematuria, flank pain, nausea or vomiting  UCx growing susceptible S   Aureus  Clinically improved and now asymptomatic    PLAN  Transitioned to Bactrim DS BID from ceftriaxone, as above

## 2023-01-04 NOTE — UTILIZATION REVIEW
Continued Stay Review    Date: 1/4/23                          Current Patient Class: IP  Current Level of Care:  MS    HPI:72 y o  male initially admitted on 12/31/22 with urosepsis, toxic metabolic encephalopathy  IV Ceftriaxone transitioned to po bactrim 1/2/23  Assessment/Plan: 1/4/23   IPCM spoke with spouse who prefers pt to come home but realizes that needing assist x 2 to stand that pt requires more assist than is provided by [facility] at this time   Pt's wife to discuss w/ pt  , IPCM reached out to wife  For f/u but wife's voice mail box was full  Lantus increased yesterday opal from 4 to 6 U   Monitoring glucose  Pt on po Bactrim - moderate colony count of MSSA in urine   Pt afebrile  WBC WNL   Oriented x 4, short term memory loss  GCS 15   Incontinent urine   SLP-Independently fed self, needed assist to open containers, cut sandwich   Able to adequately bite through solids, functional mastication, timely oral transfer and swallow initiation  No oral residue remained in the oral cavity s/p swallow  Pt was able to consume single and consecutive sips of thin liquids via straw without coughing, choking, change in respiratory functioning, or change in vocal quality      Vital Signs:   Date/Time Temp Pulse Resp BP MAP (mmHg) SpO2   01/04/23 08:33:48 97 7 °F (36 5 °C) 83 28 Abnormal  127/69 88 97 %   01/04/23 0831 -- 83 -- 127/69 -- --   01/03/23 23:19:18 98 2 °F (36 8 °C) 76 18 125/61 82 96 %   01/03/23 23:18:48 98 2 °F (36 8 °C) -- -- 125/61 82 --   01/03/23 20:45:15 98 8 °F (37 1 °C) -- 18 124/56 79 --   01/03/23 16:01:56 98 °F (36 7 °C) 78 18 115/59 78 96 %       Pertinent Labs/Diagnostic Results:   Results from last 7 days   Lab Units 12/31/22  1215   SARS-COV-2  Negative     Results from last 7 days   Lab Units 01/04/23  0745 01/02/23  0534 01/01/23  0509 12/31/22 2002 12/31/22  1214   WBC Thousand/uL 8 39 8 30 9 44  --  13 58*   HEMOGLOBIN g/dL 9 8* 9 0* 10 1*  --  12 1   HEMATOCRIT % 30 0* 28 0* 31 3* --  36 9   PLATELETS Thousands/uL 231 181 211   < > 241   NEUTROS ABS Thousands/µL  --   --  8 17*  --  12 37*    < > = values in this interval not displayed           Results from last 7 days   Lab Units 01/04/23  0745 01/02/23  0534 01/01/23  0509 12/31/22  1214   SODIUM mmol/L 135 137 136 136   POTASSIUM mmol/L 4 1 3 8 3 8 4 2   CHLORIDE mmol/L 104 107 103 100   CO2 mmol/L 26 25 24 24   ANION GAP mmol/L 5 5 9 12   BUN mg/dL 15 19 19 23   CREATININE mg/dL 1 15 1 33* 1 30 1 29   EGFR ml/min/1 73sq m 63 53 54 55   CALCIUM mg/dL 8 4 7 7* 8 0* 9 0   MAGNESIUM mg/dL  --   --  1 3*  --      Results from last 7 days   Lab Units 01/01/23  0509 12/31/22  1214   AST U/L 15 12*   ALT U/L 5* 3*   ALK PHOS U/L 64 80   TOTAL PROTEIN g/dL 5 7* 6 7   ALBUMIN g/dL 3 2* 3 8   TOTAL BILIRUBIN mg/dL 1 09* 1 20*     Results from last 7 days   Lab Units 01/04/23  0846 01/04/23  0829 01/03/23  2321 01/03/23  1648 01/03/23  1212 01/03/23  0747 01/02/23  2120 01/02/23  1617 01/02/23  1158 01/02/23  0752 01/01/23  2050 01/01/23  1546   POC GLUCOSE mg/dl 209* 190* 232* 186* 226* 174* 197* 202* 237* 152* 261* 174*     Results from last 7 days   Lab Units 01/04/23  0745 01/02/23  0534 01/01/23  0509 12/31/22  1214   GLUCOSE RANDOM mg/dL 153* 170* 159* 156*               Results from last 7 days   Lab Units 12/31/22  1643 12/31/22  1406 12/31/22  1214   HS TNI 0HR ng/L  --   --  12   HS TNI 2HR ng/L  --  8  --    HSTNI D2 ng/L  --  -4  --    HS TNI 4HR ng/L 13  --   --    HSTNI D4 ng/L 1  --   --          Results from last 7 days   Lab Units 12/31/22  1214   PROTIME seconds 14 0   INR  1 05   PTT seconds 28     Results from last 7 days   Lab Units 12/31/22  2002   TSH 3RD GENERATON uIU/mL 0 720     Results from last 7 days   Lab Units 01/01/23  0509 12/31/22  1214   PROCALCITONIN ng/ml 0 23 0 19     Results from last 7 days   Lab Units 12/31/22  1534 12/31/22  1214   LACTIC ACID mmol/L 1 4 2 7*                                         Results from last 7 days   Lab Units 12/31/22  1217   CLARITY UA  Turbid   COLOR UA  Light Yellow   SPEC GRAV UA  1 017   PH UA  5 0   GLUCOSE UA mg/dl Negative   KETONES UA mg/dl Negative   BLOOD UA  Large*   PROTEIN UA mg/dl Trace*   NITRITE UA  Negative   BILIRUBIN UA  Negative   UROBILINOGEN UA (BE) mg/dl <2 0   LEUKOCYTES UA  Large*   WBC UA /hpf Innumerable*   RBC UA /hpf 30-50*   BACTERIA UA /hpf None Seen   EPITHELIAL CELLS WET PREP /hpf Occasional     Results from last 7 days   Lab Units 12/31/22  1215   INFLUENZA A PCR  Negative   INFLUENZA B PCR  Negative   RSV PCR  Negative                             Results from last 7 days   Lab Units 12/31/22  1229 12/31/22  1217 12/31/22  1214   BLOOD CULTURE  No Growth at 72 hrs   --  No Growth at 72 hrs     URINE CULTURE   --  70,000-79,000 cfu/ml Staphylococcus aureus*  10,000-19,000 cfu/ml  --                  Medications:   Scheduled Medications:  aspirin, 81 mg, Oral, Daily  atorvastatin, 40 mg, Oral, Daily  carbidopa-levodopa, 1 tablet, Oral, TID  clopidogrel, 75 mg, Oral, Daily  cyanocobalamin, 1,000 mcg, Oral, Daily  docusate sodium, 100 mg, Oral, BID  finasteride, 5 mg, Oral, Daily  glycerin-hypromellose-, 1 drop, Both Eyes, BID  heparin (porcine), 5,000 Units, Subcutaneous, Q8H LIYAH  insulin glargine, 6 Units, Subcutaneous, HS  insulin lispro, 1-6 Units, Subcutaneous, HS  insulin lispro, 1-6 Units, Subcutaneous, TID AC  lisinopril, 5 mg, Oral, Daily  melatonin, 3 mg, Oral, HS  metoprolol succinate, 25 mg, Oral, Daily  nystatin, , Topical, BID  pantoprazole, 40 mg, Oral, Early Morning  polyethylene glycol, 17 g, Oral, Daily  ranolazine, 500 mg, Oral, Q12H LIYAH  sulfamethoxazole-trimethoprim, 1 tablet, Oral, Q12H LIYAH  tamsulosin, 0 4 mg, Oral, Daily With Dinner    insulin glargine (LANTUS) subcutaneous injection 4 Units 0 04 mL  Dose: 4 Units  Freq: Daily at bedtime Route: SC  Indications of Use: TYPE 2 DIABETES MELLITUS  Start: 01/02/23 2200 End: 01/03/23 5591  Continuous IV Infusions:     PRN Meds:  acetaminophen, 650 mg, Oral, Q6H PRN        Discharge Plan: TBD    Network Utilization Review Department  ATTENTION: Please call with any questions or concerns to [phone] and carefully listen to the prompts so that you are directed to the right person  All voicemails are confidential   Abrahan Pat all requests for admission clinical reviews, approved or denied determinations and any other requests to dedicated fax number below belonging to the campus where the patient is receiving treatment   List of dedicated fax numbers for the Facilities:  [address] DENIALS (Administrative/Medical Necessity) [phone]   [address] (Maternity/NICU/Pediatrics) [phone]   9 Lindsey Simons [phone]   Talon Zamora 77 [phone]   [address] [place] [address] [facility] [phone]   1550 [facility] Kathleen Ponce [facility] 134 5 [facility] [phone]

## 2023-01-04 NOTE — CASE MANAGEMENT
Case Management Progress Note    Patient name Reji Davison  Location W /W -42 MRN [id number]  : 1950 Date 2023       LOS (days): 4  Geometric Mean LOS (GMLOS) (days):   Days to GMLOS:        OBJECTIVE:        Current admission status: Inpatient  Preferred Pharmacy:   [address] [place] [address]  Phone: [phone] Fax: Jose Carlos Quintero [address], [place] [address]  [facility]   [address]  [address] [facility]  Phone: [phone] Fax: [phone]    Primary Care Provider: Levi Jeffery MD    [facility] [address] [place] [address]  Secondary Insurance:     [facility]   [phone]     STACY attempted to call the spouse at the listed number  No answer and unable to LVM d/t mailbox being full  CM will follow up

## 2023-01-04 NOTE — PLAN OF CARE
Problem: PHYSICAL THERAPY ADULT  Goal: Performs mobility at highest level of function for planned discharge setting  See evaluation for individualized goals  Description: Treatment/Interventions: Functional transfer training, LE strengthening/ROM, Therapeutic exercise, Endurance training, Cognitive reorientation, Patient/family training, Equipment eval/education, Bed mobility, Gait training ([facility] training)  Equipment Recommended: Eliceo López       See flowsheet documentation for full assessment, interventions and recommendations  Outcome: Not Progressing  Note: Prognosis: Fair  Problem List: Decreased strength, Decreased endurance, Decreased cognition, Impaired judgement, Decreased safety awareness  Assessment: pt began tx session lying supin ein the bed and was agreeable to participate in PT intervention  pt continues to remain consistant with all bed mobility as pt continues to required mod Ax1 for all rolling and repositioning in the bed from L to R and completing a supine<>sit EOB transfer with LE and trunk mamanegemnt  pt demonstrated a Right sided and posterior lean while seated EOB that required min Ax1 and while performing TE activities while seated EOB pt required min to mod Ax1 for sitting balance  pt continues to remain consistant with mod Ax1 for STS but required max Ax1 for all SPT as pt was able to perform multiple functional transfers in order to strengthen LE's and increase endurance and safety with all funtional transfers  pt continues to be limited with functional mobility, activity and standing tolerance  pt would benefit from continued skilled PT intervention in order to address deficits listed above  Continue to recommedn post acute rehab services at the time of D/C in order to maximize pt functional independence and safety with all OOB mobility when appropriate   post tx pt in bed with call bell and all pt needs met        PT Discharge Recommendation: Post acute rehabilitation services (vs HHPT)    See flowsheet documentation for full assessment

## 2023-01-05 VITALS
BODY MASS INDEX: 29.9 KG/M2 | SYSTOLIC BLOOD PRESSURE: 125 MMHG | DIASTOLIC BLOOD PRESSURE: 61 MMHG | WEIGHT: 190.92 LBS | RESPIRATION RATE: 16 BRPM | HEART RATE: 73 BPM | OXYGEN SATURATION: 91 % | TEMPERATURE: 98.4 F

## 2023-01-05 PROBLEM — H04.123 DRY EYES: Status: ACTIVE | Noted: 2023-01-05

## 2023-01-05 LAB
BACTERIA BLD CULT: NORMAL
BACTERIA BLD CULT: NORMAL
GLUCOSE SERPL-MCNC: 135 MG/DL (ref 65–140)
GLUCOSE SERPL-MCNC: 196 MG/DL (ref 65–140)

## 2023-01-05 RX ORDER — NYSTATIN 100000 [USP'U]/G
POWDER TOPICAL 2 TIMES DAILY
Qty: 15 G | Refills: 0 | Status: SHIPPED | OUTPATIENT
Start: 2023-01-05

## 2023-01-05 RX ORDER — SULFAMETHOXAZOLE AND TRIMETHOPRIM 800; 160 MG/1; MG/1
1 TABLET ORAL EVERY 12 HOURS SCHEDULED
Qty: 10 TABLET | Refills: 0 | Status: SHIPPED | OUTPATIENT
Start: 2023-01-05 | End: 2023-01-10

## 2023-01-05 RX ORDER — AMOXICILLIN 250 MG
1 CAPSULE ORAL ONCE
Status: COMPLETED | OUTPATIENT
Start: 2023-01-05 | End: 2023-01-05

## 2023-01-05 RX ADMIN — NYSTATIN: 100000 POWDER TOPICAL at 09:35

## 2023-01-05 RX ADMIN — POLYETHYLENE GLYCOL 3350 17 G: 17 POWDER, FOR SOLUTION ORAL at 09:28

## 2023-01-05 RX ADMIN — LISINOPRIL 5 MG: 5 TABLET ORAL at 09:29

## 2023-01-05 RX ADMIN — CYANOCOBALAMIN TAB 500 MCG 1000 MCG: 500 TAB at 09:28

## 2023-01-05 RX ADMIN — SULFAMETHOXAZOLE AND TRIMETHOPRIM 1 TABLET: 800; 160 TABLET ORAL at 09:29

## 2023-01-05 RX ADMIN — GLYCERIN 1 DROP: .002; .002; .01 SOLUTION/ DROPS OPHTHALMIC at 09:35

## 2023-01-05 RX ADMIN — CARBIDOPA AND LEVODOPA 1 TABLET: 25; 100 TABLET ORAL at 09:29

## 2023-01-05 RX ADMIN — CLOPIDOGREL BISULFATE 75 MG: 75 TABLET ORAL at 09:29

## 2023-01-05 RX ADMIN — PANTOPRAZOLE SODIUM 40 MG: 40 TABLET, DELAYED RELEASE ORAL at 05:39

## 2023-01-05 RX ADMIN — ATORVASTATIN CALCIUM 40 MG: 40 TABLET, FILM COATED ORAL at 09:29

## 2023-01-05 RX ADMIN — SENNOSIDES AND DOCUSATE SODIUM 1 TABLET: 8.6; 5 TABLET ORAL at 11:35

## 2023-01-05 RX ADMIN — METOPROLOL SUCCINATE 25 MG: 25 TABLET, EXTENDED RELEASE ORAL at 09:29

## 2023-01-05 RX ADMIN — HEPARIN SODIUM 5000 UNITS: 5000 INJECTION INTRAVENOUS; SUBCUTANEOUS at 05:39

## 2023-01-05 RX ADMIN — ASPIRIN 81 MG CHEWABLE TABLET 81 MG: 81 TABLET CHEWABLE at 09:29

## 2023-01-05 RX ADMIN — FINASTERIDE 5 MG: 5 TABLET, FILM COATED ORAL at 09:29

## 2023-01-05 RX ADMIN — INSULIN LISPRO 2 UNITS: 100 INJECTION, SOLUTION INTRAVENOUS; SUBCUTANEOUS at 12:00

## 2023-01-05 RX ADMIN — RANOLAZINE 500 MG: 500 TABLET, FILM COATED, EXTENDED RELEASE ORAL at 09:29

## 2023-01-05 NOTE — ASSESSMENT & PLAN NOTE
Left-sided weakness from Parkinson's and neuropathy  At baseline, no other focal deficits  Continue Sinemet  PT OT eval and treat -->pt is currently assist of 2 for transfers--deficit greater than pre-hospital baseline  recommend STR or Home with 24h care  Pt and wife decline referral to STR  Aides will be present in home on day of discharge to help with transitioning pt from hospital to home and [facility] will assess pt's needs in the home on day of discharge

## 2023-01-05 NOTE — ASSESSMENT & PLAN NOTE
Lab Results   Component Value Date    HGBA1C 6 4 (H) 03/05/2022       Recent Labs     01/04/23  1151 01/04/23  1614 01/04/23 2049 01/05/23  0739   POCGLU 241* 183* 158* 135       Blood Sugar Average: Last 72 hrs:  (P) 194 [id number]   -Held home oral diabetic agents and managed with sub-q insulin while admitted  -resume home orals at discharge

## 2023-01-05 NOTE — CASE MANAGEMENT
Case Management Assessment & Discharge Planning Note    Patient name Bella COMER /W -60 MRN [id number]  : 1950 Date 2023       Current Admission Date: 2022  Current Admission Diagnosis:Severe sepsis [facility])   Patient Active Problem List    Diagnosis Date Noted   • Dry eyes 2023   • Intertrigo 2022   • Balanitis 2022   • Requires daily assistance for activities of daily living (ADL) and comfort needs 2022   • Fall 2022   • Parkinson disease ([facility] ) 2022   • Chest pain 2022   • Leukocytosis 2022   • Coronary artery disease involving native coronary artery 2022   • Type 2 MI (myocardial infarction) ([place] [address] ) 2022   • Generalized abdominal pain 2021   • Lactic acidosis 2021   • Urinary incontinence 2021   • Vitamin B12 deficiency    • History of recurrent UTIs 2020   • Insomnia 2020   • Constipation 2020   • Benign paroxysmal positional vertigo 2020   • BPH (benign prostatic hyperplasia) 2020   • Vitamin D deficiency 2020   • Left-sided weakness 2020   • UTI (urinary tract infection) 2020   • Cystitis 2020   • Bladder stones 2020   • Stage 3 chronic kidney disease ([facility] ) 2020   • Anemia 2020   • Colon cancer screening 2020   • Essential hypertension 2019   • Type 2 diabetes mellitus ([facility] ) 2019   • Nephrolithiasis 2019   • Ambulatory dysfunction 2019   • Severe sepsis ([facility] ) 2019   • Paresis ([facility] ) 2019   • Abnormal PSA 2019   • Dizziness 10/04/2017   • Pancreas cyst 2016   • Type 2 diabetes mellitus with diabetic neuropathy ([facility] ) 2015   • Hyperlipidemia 2012      LOS (days): 5  Geometric Mean LOS (GMLOS) (days): 5 00  Days to GMLOS:0 2     OBJECTIVE:    Risk of Unplanned Readmission Score: 21 61         Current admission status: Inpatient       Preferred Pharmacy:    Reyna Hensley [address]  [address] includes the [facility] [address] [place] [address]  Phone: [phone] Fax: Jose Cralos Quintero [address], [place] - [address]  [facility]   [address]  [address]   Phone: [phone] Fax: [phone]    Primary Care Provider: Dominic Hayes MD    Primary Insurance: [facility] [address]  Secondary Insurance:     ASSESSMENT:  Dean 26 Proxies    There are no active Health Care Proxies on file  DISCHARGE DETAILS:                                          Other Referral/Resources/Interventions Provided:  Interventions: Transportation  Referral Comments: CM called spouse, Shanika Spain, to update her on pt's transportation time being changed to 12noon  Shanika Spain has an appointment today with [facility], which after speaking with Lawrence Welch from [employer], will be cancelled  Lawrence Welch confirmed the team will be out today to assess, and will be there to help assist the pt into the residence  CM called the [facility] information referral services, who informed  the pt is on the waiver program through [employer], and they will be the ones to determine the amount of help in the home the pt requires  Treatment Team Recommendation: SNF, Short Term Rehab  Discharge Destination Plan[de-identified] Home  Transport at Discharge : [facility]  Dispatcher Contacted: Yes  Number/Name of Dispatcher: Roundtrip  Transported by [employer] and Unit #): [facility]TS  ETA of Transport (Date): 01/05/23  ETA of Transport (Time): 1200              IMM Given (Date):: 01/05/23  IMM Given to[de-identified] Family  Family notified[de-identified] CM notified spouse of discharge plan  Spouse has no questions or concerns at this time

## 2023-01-05 NOTE — PLAN OF CARE
Problem: Potential for Falls  Goal: Patient will remain free of falls  Description: INTERVENTIONS:  - Educate patient/family on patient safety including physical limitations  - Instruct patient to call for assistance with activity   - Consult OT/PT to assist with strengthening/mobility   - Keep Call bell within reach  - Keep bed low and locked with side rails adjusted as appropriate  - Keep care items and personal belongings within reach  - Initiate and maintain comfort rounds  - Make Fall Risk Sign visible to staff  - Offer Toileting every 4 Hours, in advance of need  - Initiate/Maintain bed alarm  - Apply yellow socks and bracelet for high fall risk patients  - Consider moving patient to room near nurses station  Outcome: Completed     Problem: Prexisting or High Potential for Compromised Skin Integrity  Goal: Skin integrity is maintained or improved  Description: INTERVENTIONS:  - Identify patients at risk for skin breakdown  - Assess and monitor skin integrity  - Assess and monitor nutrition and hydration status  - Monitor labs   - Assess for incontinence   - Turn and reposition patient  - Assist with mobility/ambulation  - Relieve pressure over bony prominences  - Avoid friction and shearing  - Provide appropriate hygiene as needed including keeping skin clean and dry  - Evaluate need for skin moisturizer/barrier cream  - Collaborate with interdisciplinary team   - Patient/family teaching  - Consider wound care consult   Outcome: Completed     Problem: MOBILITY - ADULT  Goal: Maintain or return to baseline ADL function  Description: INTERVENTIONS:  -  Assess patient's ability to carry out ADLs; assess patient's baseline for ADL function and identify physical deficits which impact ability to perform ADLs (bathing, care of mouth/teeth, toileting, grooming, dressing, etc )  - Assess/evaluate cause of self-care deficits   - Assess range of motion  - Assess patient's mobility; develop plan if impaired  - Assess patient's need for assistive devices and provide as appropriate  - Encourage maximum independence but intervene and supervise when necessary  - Involve family in performance of ADLs  - Assess for home care needs following discharge   - Consider OT consult to assist with ADL evaluation and planning for discharge  - Provide patient education as appropriate  Outcome: Completed  Goal: Maintains/Returns to pre admission functional level  Description: INTERVENTIONS:  - Perform BMAT or MOVE assessment daily    - Set and communicate daily mobility goal to care team and patient/family/caregiver  - Collaborate with rehabilitation services on mobility goals if consulted  - Perform Range of Motion 3 times a day  - Reposition patient every 2 hours  - Dangle patient 3 times a day  - Record patient progress and toleration of activity level   Outcome: Completed     Problem: Nutrition/Hydration-ADULT  Goal: Nutrient/Hydration intake appropriate for improving, restoring or maintaining nutritional needs  Description: Monitor and assess patient's nutrition/hydration status for malnutrition  Collaborate with interdisciplinary team and initiate plan and interventions as ordered  Monitor patient's weight and dietary intake as ordered or per policy  Utilize nutrition screening tool and intervene as necessary  Determine patient's food preferences and provide high-protein, high-caloric foods as appropriate       INTERVENTIONS:  - Monitor oral intake, urinary output, labs, and treatment plans  - Assess nutrition and hydration status and recommend course of action  - Evaluate amount of meals eaten  - Assist patient with eating if necessary   - Allow adequate time for meals  - Recommend/ encourage appropriate diets, oral nutritional supplements, and vitamin/mineral supplements  - Order, calculate, and assess calorie counts as needed  - Recommend, monitor, and adjust tube feedings and TPN/PPN based on assessed needs  - Assess need for intravenous fluids  - Provide specific nutrition/hydration education as appropriate  - Include patient/family/caregiver in decisions related to nutrition  Outcome: Completed

## 2023-01-05 NOTE — CASE MANAGEMENT
Case Management Assessment & Discharge Planning Note    Patient name Kayleigh Peralta  Location W /W -17 MRN [id number]  : 1950 Date 2023       Current Admission Date: 2022  Current Admission Diagnosis:Severe sepsis [facility])   Patient Active Problem List    Diagnosis Date Noted   • Intertrigo 2022   • Balanitis 2022   • Requires daily assistance for activities of daily living (ADL) and comfort needs 2022   • Fall 2022   • Parkinson disease ([facility] ) 2022   • Chest pain 2022   • Leukocytosis 2022   • Coronary artery disease involving native coronary artery 2022   • Type 2 MI (myocardial infarction) ([place] [address] ) 2022   • Generalized abdominal pain 2021   • Lactic acidosis 2021   • Urinary incontinence 2021   • Vitamin B12 deficiency    • History of recurrent UTIs 2020   • Insomnia 2020   • Constipation 2020   • Benign paroxysmal positional vertigo 2020   • BPH (benign prostatic hyperplasia) 2020   • Vitamin D deficiency 2020   • Left-sided weakness 2020   • UTI (urinary tract infection) 2020   • Cystitis 2020   • Bladder stones 2020   • Stage 3 chronic kidney disease ([facility] ) 2020   • Anemia 2020   • Colon cancer screening 2020   • Essential hypertension 2019   • Type 2 diabetes mellitus ([facility] ) 2019   • Nephrolithiasis 2019   • Ambulatory dysfunction 2019   • Severe sepsis ([facility] ) 2019   • Paresis ([facility] ) 2019   • Abnormal PSA 2019   • Dizziness 10/04/2017   • Pancreas cyst 2016   • Type 2 diabetes mellitus with diabetic neuropathy ([facility] ) 2015   • Hyperlipidemia 2012      LOS (days): 4  Geometric Mean LOS (GMLOS) (days): 5 00  Days to GMLOS:0 7     OBJECTIVE:    Risk of Unplanned Readmission Score: 21 47         Current admission status: Inpatient       Preferred Pharmacy:   [facility]  - [facility] [address] [facility]  [address] [facility] [address] [facility] [address]  Phone: [phone] Fax: [address] Qu[address]o [address], [place] [address]  [facility]   [address]  [address] [facility]  Phone: [phone] Fax: [phone]    Primary Care Provider: Teresa Randolph MD    Primary Insurance: [employer] [address] [employer] [facility]  Secondary Insurance:     ASSESSMENT:  Dean 26 Proxies    There are no active Health Care Proxies on file  DISCHARGE DETAILS:    Discharge planning discussed with[de-identified] Spouse and Pt  Freedom of Choice: Yes  Comments - Freedom of Choice: Pt and spouse wish to decline short-term rehabilitation at this time  CM reviewed pt's level of care with spouse and requirements for mobility of the pt  CM contacted family/caregiver?: Yes  Were Treatment Team discharge recommendations reviewed with patient/caregiver?: Yes  Did patient/caregiver verbalize understanding of patient care needs?: Yes (Pt and spouse decline STR and wish to go home with the caregiver's provided through [employer] )  Were patient/caregiver advised of the risks associated with not following Treatment Team discharge recommendations?: Yes    Contacts  Patient Contacts: Jeanne-spouse  Relationship to Patient[de-identified] Family  Contact Method: Phone  Phone Number: [phone]  Reason/Outcome: Discharge Planning              Other Referral/Resources/Interventions Provided:  Interventions: Other (Specify), [facility]  Referral Comments: CM spoke with spouse Hazel Bloom, who reports the pt is declining rehab at this time and wishes to go home  CM relayed pt's level of care and mobility per PT and OT notes and [facility]'s assistance at this time  Hazel Bloom expressed she would be able to take care of the pt at home per his wishes    CM spoke with Em Cardenas at [facility] who relayed to [facility] they are unable to provide an assessment for more caregiver hours until the pt is discharged to home, and they require ambulatory home health care orders to arrange their preferred home health care agency for therapy sessions in the home  CM relayed to [facility] the pt's level of care needed per the PT note and enquired to what is available to the pt presently  Pt receives 1 5 hours/day/7 days/week of care in the home and they are unable to increase the amount of time presently given until their team is able to assess the pt in the home  CM relayed to [facility] a transportation time for discharge and if a team would be able to be out there at this time to assist the pt in the home and assess the pt for increased caregiver support, CM also relayed to [facility] a call to the [facility] referrals services would be made by CM to assess the pt for the waiver program in the home and safety of the pt in the home  [facility] agreed with this plan and arranged for a morning assessment team to meet the pt at the home upon discharge and transportation time  CM attempted to call spouse multiple times, unable to reach or [facility] for a return call with spouse to notify her of pt's transportation time to home at 10am   CM called the county and was unable to leave a message, CM will call again in the morning           Treatment Team Recommendation: SNF, Short Term Rehab  Discharge Destination Plan[de-identified] Home, Home with [place] at Discharge : [facility]  Dispatcher Contacted: Yes  Number/Name of Dispatcher: [facility] [address] and Unit #): TBD  ETA of Transport (Date): 01/05/23  ETA of Transport (Time): 1000

## 2023-01-05 NOTE — DISCHARGE SUMMARY
[facility]  Discharge- Meena Merrill 1950, 67 y o  male MRN: [id number]  Unit/Bed#: W -01 Encounter: [id number]  Primary Care Provider: Deborah Kim MD   Date and time admitted to hospital: 12/31/2022 11:43 AM    * Severe sepsis ([address] )  Assessment & Plan  · POA SIRS criteria:  fever 102 9F,   · p/w leukocytosis with bandemia 13 K  · Suspected source:  UTI, p/w dysuria, hx of UTI, UCx + 70-79k cfu MSSA   · End organ dysfunction: AMS  · Chest x-ray:  Negative   · Lactic acid:   2 7  · IV Fluids:  1 L fluid bolus in ED  · IV antibiotics:   Ceftriaxone 1 G IV x1 dose in the ED  · Procalcitonin 0 23,   · lactic acid normalized  · BCx [email or web address]    Pt clinically improved, sepsis resolved  Afebrile, WBC wnl  LA wnl  Plan  Cont Bactrim DS BID for 7-day course through 1/9/23 (total 10 d abx)  Received ceftriaxone x 3 Days    UTI (urinary tract infection)  Assessment & Plan  POA-fever, dysuria  Denies hematuria, flank pain, nausea or vomiting  UCx growing susceptible S  Aureus  Clinically improved and now asymptomatic    PLAN  Transitioned to Bactrim DS BID from ceftriaxone, as above    Intertrigo  Assessment & Plan  At the groin  nystatin powder BID  Keep groin dry and clean    Type 2 diabetes mellitus with diabetic neuropathy [facility])  Assessment & Plan  Lab Results   Component Value Date    HGBA1C 6 4 (H) 03/05/2022       Recent Labs     01/04/23  1151 01/04/23  1614 01/04/23  2049 01/05/23  0739   POCGLU 241* 183* 158* 135       Blood Sugar Average: Last 72 hrs:  (P) 194 [id number]   -Held home oral diabetic agents and managed with sub-q insulin while admitted  -resume home orals at discharge      Left-sided weakness  Assessment & Plan  Left-sided weakness from Parkinson's and neuropathy  At baseline, no other focal deficits  Continue Sinemet  PT OT eval and treat -->pt is currently assist of 2 for transfers--deficit greater than pre-hospital baseline   recommend STR or Home with 24h care  Pt and wife decline referral to STR  Aides will be present in home on day of discharge to help with transitioning pt from hospital to home and [facility] will assess pt's needs in the home on day of discharge  Medical Problems     Resolved Problems  Date Reviewed: 1/1/2023          Resolved    Acute metabolic encephalopathy 5/2/1957     Resolved by  Jyoti Brown MD        Discharging Resident: Jyoti Brown MD  Discharging Attending: Felecia Gibbons DO  PCP: Jeannine Allen MD  Admission Date:   Admission Orders (From admission, onward)     Ordered        12/31/22 [facility]  Once                      Discharge Date: 01/05/23    Consultations During Hospital Stay:  · None    Procedures Performed:   · None    Significant Findings / Test Results:   CT abdomen pelvis with contrast 12/31/2022: 1  No acute intra-abdominal abnormality  2   11 mm indeterminate left upper pole hypodensity versus volume averaging with prominent renal pyramid  Follow-up CT abdomen with renal protocol recommended in 6 months to reevaluate this finding  Small bilateral simple cysts and nonobstructing left renal calculi noted  3  Colonic diverticulosis without evidence of diverticulitis  Febrile  Leukocytosis w Left shift  Elevated LA  UCx growing 70-79k susceptible S  aureus cfu    Incidental Findings:   · 11 mm indeterminate left upper pole hypodensity versus volume averaging with prominent renal pyramid  Follow-up CT abdomen with renal protocol recommended in 6 months to reevaluate this finding  · I reviewed the above mentioned incidental findings with the patient and/or family and they expressed understanding  Test Results Pending at Discharge (will require follow up):   · None     Outpatient Tests Requested:  CT abdomen with renal protocol recommended in 6 months    Complications:  None    Reason for Admission: Urosepsis    Hospital Course:   Vandana Goldstein is a 67 y o  male patient who originally presented to the hospital on 12/31/2022 due to altered mental status, meeting criteria for severe sepsis on admission and found to have UTI as source of infection, initially treated with ceftriaxone IV and transitioned to Bactrim following clinical improvement and Cx and susceptibility results  Sepsis resolved  Pt is free of symptoms, with no complaints  Medically stable for discharge  Patient evaluated by PT and found to have deficits greater than his home baseline, where he has limited help from home aides and predominantly relies on wife for transfers  Pt is currently assist of 2  Pt and wife decline STR referral  [facility] will assess pt's needs in the home on day of discharge  Please see above list of diagnoses and related plan for additional information  Condition at Discharge: fair    Discharge Day Visit / Exam:   Subjective: This morning, Mr Criselda Jenkins is seen lying up in bed, alert, engaged, in no acute distress  He reports feeling well this morning and denies complaints  On questioning, does report he has not had a bowel movement in the last few days  Denies abd pain, bloating, distension  Denies fevers, chills, CP, SOB, changes in bladder habits including dysuria  Vitals: Blood Pressure: 125/61 (01/05/23 0741)  Pulse: 73 (01/05/23 0741)  Temperature: 98 4 °F (36 9 °C) (01/05/23 0741)  Temp Source: Oral (01/05/23 0741)  Respirations: 16 (01/05/23 0741)  Weight - Scale: 86 6 kg (190 lb 14 7 oz) (01/05/23 0600)  SpO2: 91 % (01/05/23 0741)  Exam:   Physical Exam  Vitals and nursing note reviewed  Constitutional:       General: He is not in acute distress  Appearance: Normal appearance  He is not ill-appearing, toxic-appearing or diaphoretic  HENT:      Head: Normocephalic and atraumatic  Eyes:      General: No scleral icterus  Right eye: No discharge  Left eye: No discharge        Conjunctiva/sclera: Conjunctivae normal    Cardiovascular:      Rate and Rhythm: Normal rate and regular rhythm  Pulses: Normal pulses  Heart sounds: Normal heart sounds  No murmur heard  No friction rub  No gallop  Pulmonary:      Effort: Pulmonary effort is normal  No respiratory distress  Breath sounds: Normal breath sounds  No stridor  No wheezing, rhonchi or rales  Chest:      Chest wall: No tenderness  Abdominal:      General: Bowel sounds are normal  There is no distension  Tenderness: There is no abdominal tenderness  There is no guarding or rebound  Musculoskeletal:         General: No tenderness  Right lower leg: No edema  Left lower leg: No edema  Skin:     General: Skin is warm and dry  Coloration: Skin is not jaundiced or pale  Neurological:      Mental Status: He is alert and oriented to person, place, and time  Psychiatric:         Mood and Affect: Mood normal          Behavior: Behavior normal         Discussion with Family: Will call contact to update if not seen at bedside  Carlos Pappas Discharge instructions/Information to patient and family:   See after visit summary for information provided to patient and family  Provisions for Follow-Up Care:  See after visit summary for information related to follow-up care and any pertinent home health orders  Disposition:   Home with [facility] (Reminder: Complete face to face encounter)    Planned Readmission: None    Discharge Medications:  See after visit summary for reconciled discharge medications provided to patient and/or family        **Please Note: This note may have been constructed using a voice recognition system**

## 2023-01-05 NOTE — DISCHARGE INSTR - AVS FIRST PAGE
Dear Manuel Tellez,     It was our pleasure to care for you here at [facility] [email or web address]  It is our hope that we were always able to exceed the expected standards for your care during your stay  You were hospitalized due to sepsis caused by a urinary tract infection  You were cared for on the [facility] 4th floor by Nuvia Garza MD under the service of Brenda Rodriguez DO with the [facility] who covers for your primary care physician (PCP), Jj Patricia MD, while you were hospitalized  If you have any questions or concerns related to this hospitalization, you may contact us at [phone]  For follow up as well as any medication refills, we recommend that you follow up with your primary care physician  A registered nurse will reach out to you by phone within a few days after your discharge to answer any additional questions that you may have after going home  However, at this time we provide for you here, the most important instructions / recommendations at discharge:     Notable Medication Adjustments -   Please take Bactrim DS 1 tablet two times daily for five days starting today, 1/5/23  Please use Artificial Tears as needed up to 2 times daily to relieve dry eyes  Please apply nystatin powder to affected skin of groin two times daily  Keep skin clean and dry  Testing Required after Discharge -   None  Important follow up information -   Please schedule an appointment with your primary care provider within one week of discharge from the hospital   Other Instructions -   Please call your provider if you develop fever, chills, abdominal pain, nausea, vomiting, increased urinary frequency, or pain with urination    Please review this entire after visit summary as additional general instructions including medication list, appointments, activity, diet, any pertinent wound care, and other additional recommendations from your care team that may be provided for you       Sincerely,     Lakeshia Hurley MD

## 2023-01-05 NOTE — PLAN OF CARE
Problem: Potential for Falls  Goal: Patient will remain free of falls  Description: INTERVENTIONS:  - Educate patient/family on patient safety including physical limitations  - Instruct patient to call for assistance with activity   - Consult OT/PT to assist with strengthening/mobility   - Keep Call bell within reach  - Keep bed low and locked with side rails adjusted as appropriate  - Keep care items and personal belongings within reach  - Initiate and maintain comfort rounds  - Make Fall Risk Sign visible to staff  - Offer Toileting every 4 Hours, in advance of need  - Initiate/Maintain bed alarm  - Apply yellow socks and bracelet for high fall risk patients  - Consider moving patient to room near nurses station  Outcome: Progressing     Problem: Prexisting or High Potential for Compromised Skin Integrity  Goal: Skin integrity is maintained or improved  Description: INTERVENTIONS:  - Identify patients at risk for skin breakdown  - Assess and monitor skin integrity  - Assess and monitor nutrition and hydration status  - Monitor labs   - Assess for incontinence   - Turn and reposition patient  - Assist with mobility/ambulation  - Relieve pressure over bony prominences  - Avoid friction and shearing  - Provide appropriate hygiene as needed including keeping skin clean and dry  - Evaluate need for skin moisturizer/barrier cream  - Collaborate with interdisciplinary team   - Patient/family teaching  - Consider wound care consult   Outcome: Progressing     Problem: MOBILITY - ADULT  Goal: Maintain or return to baseline ADL function  Description: INTERVENTIONS:  -  Assess patient's ability to carry out ADLs; assess patient's baseline for ADL function and identify physical deficits which impact ability to perform ADLs (bathing, care of mouth/teeth, toileting, grooming, dressing, etc )  - Assess/evaluate cause of self-care deficits   - Assess range of motion  - Assess patient's mobility; develop plan if impaired  - Assess patient's need for assistive devices and provide as appropriate  - Encourage maximum independence but intervene and supervise when necessary  - Involve family in performance of ADLs  - Assess for home care needs following discharge   - Consider OT consult to assist with ADL evaluation and planning for discharge  - Provide patient education as appropriate  Outcome: Progressing  Goal: Maintains/Returns to pre admission functional level  Description: INTERVENTIONS:  - Perform BMAT or MOVE assessment daily    - Set and communicate daily mobility goal to care team and patient/family/caregiver  - Collaborate with rehabilitation services on mobility goals if consulted  - Perform Range of Motion 3 times a day  - Reposition patient every 3 hours  - Dangle patient 3 times a day  - Out of bed for toileting  - Record patient progress and toleration of activity level   Outcome: Progressing     Problem: Nutrition/Hydration-ADULT  Goal: Nutrient/Hydration intake appropriate for improving, restoring or maintaining nutritional needs  Description: Monitor and assess patient's nutrition/hydration status for malnutrition  Collaborate with interdisciplinary team and initiate plan and interventions as ordered  Monitor patient's weight and dietary intake as ordered or per policy  Utilize nutrition screening tool and intervene as necessary  Determine patient's food preferences and provide high-protein, high-caloric foods as appropriate       INTERVENTIONS:  - Monitor oral intake, urinary output, labs, and treatment plans  - Assess nutrition and hydration status and recommend course of action  - Evaluate amount of meals eaten  - Assist patient with eating if necessary   - Allow adequate time for meals  - Recommend/ encourage appropriate diets, oral nutritional supplements, and vitamin/mineral supplements  - Order, calculate, and assess calorie counts as needed  - Recommend, monitor, and adjust tube feedings and TPN/PPN based on assessed needs  - Assess need for intravenous fluids  - Provide specific nutrition/hydration education as appropriate  - Include patient/family/caregiver in decisions related to nutrition  Outcome: Progressing

## 2023-01-05 NOTE — ASSESSMENT & PLAN NOTE
· POA SIRS criteria:  fever 102 9F,   · p/w leukocytosis with bandemia 13 K  · Suspected source:  UTI, p/w dysuria, hx of UTI, UCx + 70-79k cfu MSSA   · End organ dysfunction: AMS  · Chest x-ray:  Negative   · Lactic acid:   2 7  · IV Fluids:  1 L fluid bolus in ED  · IV antibiotics:   Ceftriaxone 1 G IV x1 dose in the ED  · Procalcitonin 0 23,   · lactic acid normalized  · BCx [email or web address]    Pt clinically improved, sepsis resolved  Afebrile, WBC wnl  LA wnl      Plan  Cont Bactrim DS BID for 7-day course through 1/9/23 (total 10 d abx)  Received ceftriaxone x 3 Days

## 2023-01-06 NOTE — UTILIZATION REVIEW
NOTIFICATION OF ADMISSION DISCHARGE   This is a Notification of Discharge from [facility]  Please be advised that this patient has been discharge from our facility  Below you will find the admission and discharge date and time including the patient’s disposition  UTILIZATION REVIEW CONTACT:  Nayely Black MA  Utilization   Network Utilization Review Department  Phone: [phone] x carefully listen to the prompts  All voicemails are confidential   Email: [email or web address]     ADMISSION INFORMATION  PRESENTATION DATE: 12/31/2022 11:43 AM  OBERVATION ADMISSION DATE:   INPATIENT ADMISSION DATE: 12/31/22  2:57 PM   DISCHARGE DATE: 1/5/2023 12:00 PM   DISPOSITION:Home with Home Health Care    IMPORTANT INFORMATION:  Send all requests for admission clinical reviews, approved or denied determinations and any other requests to dedicated fax number below belonging to the campus where the patient is receiving treatment   List of dedicated fax numbers:  [address] DENIALS (Administrative/Medical Necessity) [phone]   [address] (Maternity/NICU/Pediatrics) [phone]   [facility] [phone]   [place] [address] [phone]   [address] [phone]   [address] [facility] [phone]   [address] [facility] [phone]   [facility] [place] [address] [phone]   [facility]  [phone]   [address] [facility] [phone]   [address] [facility] [address] [facility] [phone]

## 2023-03-03 PROBLEM — R65.20 SEVERE SEPSIS (HCC): Status: RESOLVED | Noted: 2019-12-31 | Resolved: 2023-03-03

## 2023-03-03 PROBLEM — N39.0 UTI (URINARY TRACT INFECTION): Status: RESOLVED | Noted: 2020-05-08 | Resolved: 2023-03-03

## 2023-03-03 PROBLEM — A41.9 SEVERE SEPSIS (HCC): Status: RESOLVED | Noted: 2019-12-31 | Resolved: 2023-03-03

## 2023-07-23 ENCOUNTER — HOSPITAL ENCOUNTER (INPATIENT)
Facility: HOSPITAL | Age: 73
LOS: 5 days | Discharge: NURSING, REHAB OR LONG-TERM CARE | DRG: 698 | End: 2023-07-29
Attending: EMERGENCY MEDICINE | Admitting: INTERNAL MEDICINE
Payer: MEDICARE

## 2023-07-23 DIAGNOSIS — N48.1 BALANITIS: ICD-10-CM

## 2023-07-23 DIAGNOSIS — D50.9 IRON DEFICIENCY ANEMIA, UNSPECIFIED IRON DEFICIENCY ANEMIA TYPE: ICD-10-CM

## 2023-07-23 DIAGNOSIS — E55.9 VITAMIN D DEFICIENCY: ICD-10-CM

## 2023-07-23 DIAGNOSIS — I10 PRIMARY HYPERTENSION: ICD-10-CM

## 2023-07-23 DIAGNOSIS — D64.9 ANEMIA, UNSPECIFIED TYPE: ICD-10-CM

## 2023-07-23 DIAGNOSIS — E11.65 TYPE 2 DIABETES MELLITUS WITH HYPERGLYCEMIA, WITH LONG-TERM CURRENT USE OF INSULIN (HCC): ICD-10-CM

## 2023-07-23 DIAGNOSIS — N39.0 URINARY TRACT INFECTION ASSOCIATED WITH INDWELLING URETHRAL CATHETER (HCC): ICD-10-CM

## 2023-07-23 DIAGNOSIS — D62 ACUTE BLOOD LOSS ANEMIA: ICD-10-CM

## 2023-07-23 DIAGNOSIS — Z79.4 TYPE 2 DIABETES MELLITUS WITH HYPERGLYCEMIA, WITH LONG-TERM CURRENT USE OF INSULIN (HCC): ICD-10-CM

## 2023-07-23 DIAGNOSIS — N30.90 CYSTITIS: ICD-10-CM

## 2023-07-23 DIAGNOSIS — T83.511A URINARY TRACT INFECTION ASSOCIATED WITH INDWELLING URETHRAL CATHETER (HCC): ICD-10-CM

## 2023-07-23 DIAGNOSIS — D72.829 LEUKOCYTOSIS: ICD-10-CM

## 2023-07-23 DIAGNOSIS — A41.9 SEPSIS (HCC): Primary | ICD-10-CM

## 2023-07-23 DIAGNOSIS — D64.9 ANEMIA: ICD-10-CM

## 2023-07-23 LAB
ANION GAP SERPL CALCULATED.3IONS-SCNC: 10 MMOL/L
BACTERIA UR QL AUTO: ABNORMAL /HPF
BASE EX.OXY STD BLDV CALC-SCNC: 41.8 % (ref 60–80)
BASE EXCESS BLDV CALC-SCNC: -0.3 MMOL/L
BASOPHILS # BLD AUTO: 0.04 THOUSANDS/ÂΜL (ref 0–0.1)
BASOPHILS NFR BLD AUTO: 0 % (ref 0–1)
BILIRUB UR QL STRIP: NEGATIVE
BUN SERPL-MCNC: 18 MG/DL (ref 5–25)
CALCIUM SERPL-MCNC: 8.7 MG/DL (ref 8.4–10.2)
CHLORIDE SERPL-SCNC: 96 MMOL/L (ref 96–108)
CLARITY UR: ABNORMAL
CO2 SERPL-SCNC: 27 MMOL/L (ref 21–32)
COLOR UR: ABNORMAL
CREAT SERPL-MCNC: 1.26 MG/DL (ref 0.6–1.3)
EOSINOPHIL # BLD AUTO: 0.12 THOUSAND/ÂΜL (ref 0–0.61)
EOSINOPHIL NFR BLD AUTO: 1 % (ref 0–6)
ERYTHROCYTE [DISTWIDTH] IN BLOOD BY AUTOMATED COUNT: 16.8 % (ref 11.6–15.1)
EST. AVERAGE GLUCOSE BLD GHB EST-MCNC: 309 MG/DL
FERRITIN SERPL-MCNC: 20 NG/ML (ref 24–336)
GFR SERPL CREATININE-BSD FRML MDRD: 56 ML/MIN/1.73SQ M
GLUCOSE SERPL-MCNC: 353 MG/DL (ref 65–140)
GLUCOSE SERPL-MCNC: 362 MG/DL (ref 65–140)
GLUCOSE SERPL-MCNC: 365 MG/DL (ref 65–140)
GLUCOSE SERPL-MCNC: 440 MG/DL (ref 65–140)
GLUCOSE SERPL-MCNC: 459 MG/DL (ref 65–140)
GLUCOSE SERPL-MCNC: 464 MG/DL (ref 65–140)
GLUCOSE UR STRIP-MCNC: ABNORMAL MG/DL
HBA1C MFR BLD: 12.4 %
HCO3 BLDV-SCNC: 25.5 MMOL/L (ref 24–30)
HCT VFR BLD AUTO: 26.3 % (ref 36.5–49.3)
HGB BLD-MCNC: 7.3 G/DL (ref 12–17)
HGB UR QL STRIP.AUTO: ABNORMAL
IMM GRANULOCYTES # BLD AUTO: 0.17 THOUSAND/UL (ref 0–0.2)
IMM GRANULOCYTES NFR BLD AUTO: 1 % (ref 0–2)
IRON SATN MFR SERPL: 6 % (ref 20–50)
IRON SERPL-MCNC: 22 UG/DL (ref 65–175)
KETONES UR STRIP-MCNC: NEGATIVE MG/DL
LEUKOCYTE ESTERASE UR QL STRIP: ABNORMAL
LYMPHOCYTES # BLD AUTO: 0.82 THOUSANDS/ÂΜL (ref 0.6–4.47)
LYMPHOCYTES NFR BLD AUTO: 4 % (ref 14–44)
MCH RBC QN AUTO: 19.3 PG (ref 26.8–34.3)
MCHC RBC AUTO-ENTMCNC: 27.8 G/DL (ref 31.4–37.4)
MCV RBC AUTO: 69 FL (ref 82–98)
MONOCYTES # BLD AUTO: 1.46 THOUSAND/ÂΜL (ref 0.17–1.22)
MONOCYTES NFR BLD AUTO: 7 % (ref 4–12)
NEUTROPHILS # BLD AUTO: 17.85 THOUSANDS/ÂΜL (ref 1.85–7.62)
NEUTS SEG NFR BLD AUTO: 87 % (ref 43–75)
NITRITE UR QL STRIP: POSITIVE
NON-SQ EPI CELLS URNS QL MICRO: ABNORMAL /HPF
NRBC BLD AUTO-RTO: 0 /100 WBCS
O2 CT BLDV-SCNC: 4.8 ML/DL
PCO2 BLDV: 47.7 MM HG (ref 42–50)
PH BLDV: 7.35 [PH] (ref 7.3–7.4)
PH UR STRIP.AUTO: 6.5 [PH]
PLATELET # BLD AUTO: 534 THOUSANDS/UL (ref 149–390)
PMV BLD AUTO: 10.2 FL (ref 8.9–12.7)
PO2 BLDV: 26.7 MM HG (ref 35–45)
POTASSIUM SERPL-SCNC: 3.8 MMOL/L (ref 3.5–5.3)
PROT UR STRIP-MCNC: ABNORMAL MG/DL
RBC # BLD AUTO: 3.79 MILLION/UL (ref 3.88–5.62)
RBC #/AREA URNS AUTO: ABNORMAL /HPF
SODIUM SERPL-SCNC: 133 MMOL/L (ref 135–147)
SP GR UR STRIP.AUTO: 1.03 (ref 1–1.03)
TIBC SERPL-MCNC: 362 UG/DL (ref 250–450)
UROBILINOGEN UR STRIP-ACNC: <2 MG/DL
WBC # BLD AUTO: 20.46 THOUSAND/UL (ref 4.31–10.16)
WBC #/AREA URNS AUTO: ABNORMAL /HPF

## 2023-07-23 PROCEDURE — 85025 COMPLETE CBC W/AUTO DIFF WBC: CPT | Performed by: EMERGENCY MEDICINE

## 2023-07-23 PROCEDURE — 81001 URINALYSIS AUTO W/SCOPE: CPT | Performed by: EMERGENCY MEDICINE

## 2023-07-23 PROCEDURE — 96360 HYDRATION IV INFUSION INIT: CPT

## 2023-07-23 PROCEDURE — 82805 BLOOD GASES W/O2 SATURATION: CPT | Performed by: EMERGENCY MEDICINE

## 2023-07-23 PROCEDURE — 96361 HYDRATE IV INFUSION ADD-ON: CPT

## 2023-07-23 PROCEDURE — 80048 BASIC METABOLIC PNL TOTAL CA: CPT | Performed by: EMERGENCY MEDICINE

## 2023-07-23 PROCEDURE — 99285 EMERGENCY DEPT VISIT HI MDM: CPT | Performed by: EMERGENCY MEDICINE

## 2023-07-23 PROCEDURE — 87186 SC STD MICRODIL/AGAR DIL: CPT | Performed by: EMERGENCY MEDICINE

## 2023-07-23 PROCEDURE — 99285 EMERGENCY DEPT VISIT HI MDM: CPT

## 2023-07-23 PROCEDURE — 83540 ASSAY OF IRON: CPT | Performed by: EMERGENCY MEDICINE

## 2023-07-23 PROCEDURE — 87040 BLOOD CULTURE FOR BACTERIA: CPT

## 2023-07-23 PROCEDURE — 83550 IRON BINDING TEST: CPT | Performed by: EMERGENCY MEDICINE

## 2023-07-23 PROCEDURE — 36415 COLL VENOUS BLD VENIPUNCTURE: CPT | Performed by: EMERGENCY MEDICINE

## 2023-07-23 PROCEDURE — 82948 REAGENT STRIP/BLOOD GLUCOSE: CPT

## 2023-07-23 PROCEDURE — 83036 HEMOGLOBIN GLYCOSYLATED A1C: CPT | Performed by: EMERGENCY MEDICINE

## 2023-07-23 PROCEDURE — 96372 THER/PROPH/DIAG INJ SC/IM: CPT

## 2023-07-23 PROCEDURE — 82728 ASSAY OF FERRITIN: CPT | Performed by: EMERGENCY MEDICINE

## 2023-07-23 PROCEDURE — 87086 URINE CULTURE/COLONY COUNT: CPT | Performed by: EMERGENCY MEDICINE

## 2023-07-23 PROCEDURE — 87077 CULTURE AEROBIC IDENTIFY: CPT | Performed by: EMERGENCY MEDICINE

## 2023-07-23 PROCEDURE — 87147 CULTURE TYPE IMMUNOLOGIC: CPT | Performed by: EMERGENCY MEDICINE

## 2023-07-23 PROCEDURE — 93005 ELECTROCARDIOGRAM TRACING: CPT

## 2023-07-23 RX ORDER — SODIUM CHLORIDE, SODIUM LACTATE, POTASSIUM CHLORIDE, CALCIUM CHLORIDE 600; 310; 30; 20 MG/100ML; MG/100ML; MG/100ML; MG/100ML
1000 INJECTION, SOLUTION INTRAVENOUS CONTINUOUS
Status: DISCONTINUED | OUTPATIENT
Start: 2023-07-23 | End: 2023-07-24

## 2023-07-23 RX ORDER — INSULIN GLARGINE 100 [IU]/ML
25 INJECTION, SOLUTION SUBCUTANEOUS ONCE
Status: COMPLETED | OUTPATIENT
Start: 2023-07-23 | End: 2023-07-23

## 2023-07-23 RX ORDER — INSULIN GLARGINE 100 [IU]/ML
25 INJECTION, SOLUTION SUBCUTANEOUS ONCE
Status: DISCONTINUED | OUTPATIENT
Start: 2023-07-23 | End: 2023-07-24

## 2023-07-23 RX ADMIN — INSULIN HUMAN 4 UNITS: 100 INJECTION, SOLUTION PARENTERAL at 23:41

## 2023-07-23 RX ADMIN — INSULIN GLARGINE 25 UNITS: 100 INJECTION, SOLUTION SUBCUTANEOUS at 19:15

## 2023-07-23 RX ADMIN — METFORMIN HYDROCHLORIDE 1000 MG: 500 TABLET ORAL at 18:46

## 2023-07-23 RX ADMIN — SODIUM CHLORIDE, SODIUM LACTATE, POTASSIUM CHLORIDE, AND CALCIUM CHLORIDE 1000 ML: .6; .31; .03; .02 INJECTION, SOLUTION INTRAVENOUS at 18:44

## 2023-07-23 NOTE — ED PROVIDER NOTES
History  Chief Complaint   Patient presents with   • Hyperglycemia - no symptoms     Pt presents to the ED with c/o high blood sugar. Resides in nursing home, nursing staff checked BS today and reported over 500. Pt has no complaints. The patient is a 68year old male who presents to ED with complaint of hyperglyemia. The patient states he lives in an assisted living center with his wife and the staff give him his medication. Per nursing report, the facility staff took his blood sugar and it was read 'high' over 500. The patient states he currently feels "alright" but didn't have lunch today but had breakfast and notes he has not had a bowel movement in about 1 week. He states the facility staff give him his medication and he is unsure what he takes. He reports he uses a wheelchair to get around for the most part and has a urinary catheter in place. Denies chest pain, shortness of breath, abdominal pain, vomiting, headaches, dizziness. Prior to Admission Medications   Prescriptions Last Dose Informant Patient Reported? Taking?    ACCU-CHEK GUIDE test strip  Outside Facility (Specify) Yes No   Si strip as needed   Alcohol Swabs (PRO COMFORT ALCOHOL) 70 % PADS  Outside Facility (Specify) Yes No   Si pad daily Use a pad when testing   Easy Comfort Lancets MISC  Outside Facility (Specify) Yes No   Sig: as needed   Glucagon, rDNA, (GLUCAGON EMERGENCY IJ)   Yes No   Sig: Inject 1 mL as directed Sugars less than 60 and conscious   Glucose 15 g PACK   Yes No   Sig: Take 15 g by mouth Per [facility] "give 15 gram PO every 15 minutes PRN for hypoglycemia"   NovoFine Autocover 30G X 8 MM MISC   Yes No   NovoLOG FlexPen 100 units/mL injection pen   Yes No   acetaminophen (TYLENOL) 325 mg tablet   Yes No   Sig: Take 650 mg by mouth every 6 (six) hours as needed for mild pain   aspirin 81 mg chewable tablet   No No   Sig: Chew 1 tablet (81 mg total) daily   atorvastatin (LIPITOR) 40 mg tablet   No No Sig: Take 1 tablet (40 mg total) by mouth daily   carbidopa-levodopa (SINEMET)  mg per tablet   No No   Sig: Take 1 tablet by mouth 3 (three) times a day   cholecalciferol (VITAMIN D3) 1,000 units tablet   No No   Sig: Take 50 tablets (50,000 Units total) by mouth once a week   Patient taking differently: Take 50,000 Units by mouth every 30 (thirty) days On the  of the month   clopidogrel (PLAVIX) 75 mg tablet   No No   Sig: Take 1 tablet (75 mg total) by mouth daily   cyanocobalamin (VITAMIN B-12) 1000 MCG tablet  Outside Facility (Specify) No No   Sig: Take 1 tablet (1,000 mcg total) by mouth daily   docusate sodium (COLACE) 100 mg capsule  Outside Facility (Specify) No No   Sig: Take 1 capsule (100 mg total) by mouth 2 (two) times a day   famotidine (PEPCID) 20 mg tablet   No No   Sig: Take 1 tablet (20 mg total) by mouth daily at bedtime   finasteride (PROSCAR) 5 mg tablet  Outside Facility (Specify) No No   Sig: Take 1 tablet (5 mg total) by mouth daily   glipiZIDE (GLUCOTROL) 10 mg tablet   No No   Sig: Per [facility] "give 5mg by mouth one time a prescriber day for DM 2"   glucose blood test strip  Outside Facility (Specify) Yes No   Si strip as needed   glycerin-hypromellose- (ARTIFICIAL TEARS) 0.2-0.2-1 % SOLN   No No   Sig: Administer 1 drop to both eyes 2 (two) times a day as needed (for dry eyes)   lisinopril (ZESTRIL) 5 mg tablet   No No   Sig: Take 1 tablet (5 mg total) by mouth daily   melatonin 3 mg   Yes No   Sig: Take 3 mg by mouth daily at bedtime Give 2 tabs at bed time for insomnia per [facility]   metFORMIN (GLUCOPHAGE) 1000 MG tablet   No No   Sig: TAKE 1 TABLET BY MOUTH 2 TIMES A DAY   metoprolol succinate (TOPROL-XL) 25 mg 24 hr tablet   No No   Sig: Take 1 tablet (25 mg total) by mouth daily   nystatin (MYCOSTATIN) powder   No No   Sig: Apply topically 2 (two) times a day   pantoprazole (PROTONIX) 40 mg tablet   No No   Sig: Take 1 tablet (40 mg total) by mouth 2 (two) times a day before meals   polyethylene glycol (MIRALAX) 17 g packet  Outside Facility (Specify) No No   Sig: Take 17 g by mouth daily   ranolazine (RANEXA) 500 mg 12 hr tablet   No No   Sig: Take 1 tablet (500 mg total) by mouth every 12 (twelve) hours   senna (SENOKOT) 8.6 mg   No No   Sig: Take 1 tablet (8.6 mg total) by mouth daily as needed for constipation   tamsulosin (FLOMAX) 0.4 mg  Outside Facility (Specify) No No   Sig: Take 1 capsule (0.4 mg total) by mouth daily with dinner      Facility-Administered Medications: None       Past Medical History:   Diagnosis Date   • Ambulatory dysfunction     uses walker with assist of 1   • Anemia    • Anxiety    • At risk for falls     hx of falls   • Benign paroxysmal vertigo     unspecified ear   • BPH (benign prostatic hyperplasia)     for TURP today 1/4/2021   • Calculus in bladder     for surgical removal today 1/4/2021   • Cataract     left eye   • Cervicalgia    • Chest pain    • Chronic kidney disease     stage 3   • Constipation    • CTS (carpal tunnel syndrome)     left   • Cyst of pancreas    • Cystitis 06/23/2020    acute cystitis with hematuria   • Depression    • 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   • Dizziness     and giddiness   • Dysphagia    • Elevated PSA    • Facial weakness    • GERD (gastroesophageal reflux disease)     last assessed 5/20/16   • Gross hematuria    • Hematuria    • Hyperlipidemia    • Hypertension    • Kidney disease    • Kidney stone    • Left-sided weakness    • Long term (current) use of oral hypoglycemic drugs    • Muscle weakness    • Nuclear senile cataract of left eye    • OA (osteoarthritis) of knee     b/l   • 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HCC)    • Syncope and collapse    • Tachycardia    • UTI (urinary tract infection)    • Vertebro-basilar artery syndrome    • Vitamin B deficiency    • Vitamin D deficiency    • Vitamin D deficiency        Past Surgical History:   Procedure Laterality Date   • CARDIAC CATHETERIZATION N/A 3/7/2022    Procedure: CARDIAC CATHETERIZATION;  Surgeon: Ashwin Madsen DO;  [facility]; Service: Cardiology   • CATARACT EXTRACTION     • CHOLECYSTECTOMY     • KNEE SURGERY     • VA LITHOLAPAXY SMPL/SM <2.5 CM N/A 1/4/2021    Procedure: Cystolitholopaxy w/laser bladder stones;  Surgeon: Maryam York MD;  Location: [facility];  Service: Urology   • VA TRURL ELECTROSURG RESCJ PROSTATE BLEED COMPLETE N/A 1/4/2021    Procedure: T.U.R.P.;  Surgeon: Maryam York MD;  Location: [facility];  Service: Urology       Family History   Problem Relation Age of Onset   • Coronary artery disease Mother    • Diabetes Father      I have reviewed and agree with the history as documented. E-Cigarette/Vaping   • E-Cigarette Use Never User      E-Cigarette/Vaping Substances   • Nicotine No    • THC No    • CBD No      Social History     Tobacco Use   • Smoking status: Never   • Smokeless tobacco: Never   Vaping Use   • Vaping Use: Never used   Substance Use Topics   • Alcohol use: Not Currently   • Drug use: No        Review of Systems   Constitutional: Negative for fever. HENT: Positive for sinus pain. Respiratory: Positive for cough. Negative for shortness of breath. Cardiovascular: Negative for chest pain. Gastrointestinal: Positive for constipation. Negative for abdominal pain. Neurological: Negative for dizziness, light-headedness and headaches.        Physical Exam  ED Triage Vitals   Temperature Pulse Respirations Blood Pressure SpO2   07/23/23 1707 07/23/23 1707 07/23/23 1707 07/23/23 1707 07/23/23 1707   98.1 °F (36.7 °C) 97 16 (!) 178/77 98 %      Temp Source Heart Rate Source Patient Position - Orthostatic VS BP Location FiO2 (%)   07/23/23 1707 07/23/23 1707 07/23/23 1730 07/23/23 1707 --   Oral Monitor Sitting Right arm       Pain Score       07/23/23 1707       No Pain             Orthostatic Vital Signs  Vitals:    07/23/23 1730 07/23/23 1800 07/23/23 1830 07/23/23 1900   BP: 148/69 169/77 170/70 (!) 172/77   Pulse: 93 99 97 101   Patient Position - Orthostatic VS: Sitting Sitting Sitting Sitting       Physical Exam  Vitals and nursing note reviewed. Constitutional:       General: He is not in acute distress. HENT:      Head: Normocephalic and atraumatic. Mouth/Throat:      Mouth: Mucous membranes are moist.      Pharynx: Oropharynx is clear. Cardiovascular:      Rate and Rhythm: Normal rate and regular rhythm. Pulses: Normal pulses. Heart sounds: Normal heart sounds. Pulmonary:      Breath sounds: Normal breath sounds. No wheezing. Abdominal:      General: Bowel sounds are normal.      Palpations: Abdomen is soft. Tenderness: There is no abdominal tenderness. Musculoskeletal:         General: No tenderness or deformity. Normal range of motion. Cervical back: Normal range of motion. Skin:     General: Skin is warm and dry. Neurological:      General: No focal deficit present. Mental Status: He is alert and oriented to person, place, and time. Sensory: No sensory deficit.    Psychiatric:         Behavior: Behavior normal.         ED Medications  Medications   lactated ringers infusion 1,000 mL (1,000 mL Intravenous New Bag 7/23/23 1844)   insulin glargine (LANTUS) subcutaneous injection 25 Units 0.25 mL (has no administration in time range)   metFORMIN (GLUCOPHAGE) tablet 1,000 mg (1,000 mg Oral Given 7/23/23 1846)   insulin glargine (LANTUS) subcutaneous injection 25 Units 0.25 mL (25 Units Subcutaneous Given 7/23/23 1915)       Diagnostic Studies  Results Reviewed     Procedure Component Value Units Date/Time    Urine culture [[id number]] Collected: 07/23/23 1934    Lab Status: No result Specimen: Urine, Indwelling Razo Catheter     Urine Microscopic [[id number]]  (Abnormal) Collected: 07/23/23 1850    Lab Status: Final result Specimen: Urine, Indwelling Razo Catheter Updated: 07/23/23 1923     RBC, UA 4-10 /hpf      WBC, UA Innumerable /hpf      Epithelial Cells None Seen /hpf      Bacteria, UA None Seen /hpf     UA (URINE) with reflex to Scope [[id number]]  (Abnormal) Collected: 07/23/23 1850    Lab Status: Final result Specimen: Urine, Indwelling Razo Catheter Updated: 07/23/23 1909     Color, UA Light Yellow     Clarity, UA Turbid     Specific Gravity, UA 1.027     pH, UA 6.5     Leukocytes, UA Large     Nitrite, UA Positive     Protein, UA 50 (1+) mg/dl      Glucose, UA >=1000 (1%) mg/dl      Ketones, UA Negative mg/dl      Urobilinogen, UA <2.0 mg/dl      Bilirubin, UA Negative     Occult Blood, UA Small    Iron Saturation % [[id number]] Collected: 07/23/23 1853    Lab Status: In process Specimen: Blood from Arm, Right Updated: 07/23/23 1856    Ferritin [[id number]] Collected: 07/23/23 1853    Lab Status: In process Specimen: Blood from Arm, Right Updated: 07/23/23 1855    Hemoglobin A1C [[id number]] Collected: 07/23/23 1850    Lab Status:  In process Specimen: Blood from Arm, Right Updated: 07/23/23 4800    Basic metabolic panel [[id number]]  (Abnormal) Collected: 07/23/23 1745    Lab Status: Final result Specimen: Blood from Arm, Right Updated: 07/23/23 1837     Sodium 133 mmol/L      Potassium 3.8 mmol/L      Chloride 96 mmol/L      CO2 27 mmol/L      ANION GAP 10 mmol/L      BUN 18 mg/dL      Creatinine 1.26 mg/dL      Glucose 464 mg/dL      Calcium 8.7 mg/dL      eGFR 56 ml/min/1.73sq m     Narrative:      [place] guidelines for Chronic Kidney Disease (CKD):   •  Stage 1 with normal or high GFR (GFR > 90 mL/min/1.73 square meters)  •  Stage 2 Mild CKD (GFR = 60-89 mL/min/1.73 square meters)  •  Stage 3A Moderate CKD (GFR = 45-59 mL/min/1.73 square meters)  •  Stage 3B Moderate CKD (GFR = 30-44 mL/min/1.73 square meters)  •  Stage 4 Severe CKD (GFR = 15-29 mL/min/1.73 square meters)  •  Stage 5 End Stage CKD (GFR <15 mL/min/1.73 square meters)  Note: GFR calculation is accurate only with a steady state creatinine    Blood gas, venous [[id number]]  (Abnormal) Collected: 07/23/23 1745    Lab Status: Final result Specimen: Blood from Arm, Right Updated: 07/23/23 1820     pH, Joaquin 7.346     pCO2, Joaquin 47.7 mm Hg      pO2, Joaquin 26.7 mm Hg      HCO3, Joaquin 25.5 mmol/L      Base Excess, Joaquin -0.3 mmol/L      O2 Content, Joaquin 4.8 ml/dL      O2 HGB, VENOUS 41.8 %     CBC and differential [[id number]]  (Abnormal) Collected: 07/23/23 1745    Lab Status: Final result Specimen: Blood from Arm, Right Updated: 07/23/23 1820     WBC 20.46 Thousand/uL      RBC 3.79 Million/uL      Hemoglobin 7.3 g/dL      Hematocrit 26.3 %      MCV 69 fL      MCH 19.3 pg      MCHC 27.8 g/dL      RDW 16.8 %      MPV 10.2 fL      Platelets 315 Thousands/uL      nRBC 0 /100 WBCs      Neutrophils Relative 87 %      Immat GRANS % 1 %      Lymphocytes Relative 4 %      Monocytes Relative 7 %      Eosinophils Relative 1 %      Basophils Relative 0 %      Neutrophils Absolute 17.85 Thousands/µL      Immature Grans Absolute 0.17 Thousand/uL      Lymphocytes Absolute 0.82 Thousands/µL      Monocytes Absolute 1.46 Thousand/µL      Eosinophils Absolute 0.12 Thousand/µL      Basophils Absolute 0.04 Thousands/µL     Fingerstick Glucose (POCT) [[id number]]  (Abnormal) Collected: 07/23/23 1750    Lab Status: Final result Updated: 07/23/23 1751     POC Glucose 459 mg/dl     Fingerstick Glucose (POCT) [[id number]]  (Abnormal) Collected: 07/23/23 1711    Lab Status: Final result Updated: 07/23/23 1713     POC Glucose 440 mg/dl                  No orders to display         Procedures  ECG 12 Lead Documentation Only    Date/Time: 7/23/2023 5:15 PM    Performed by: Marcelo Lowe MD  Authorized by: Marcelo Lowe MD    ECG reviewed by me, the ED Provider: yes    Patient location:  ED  Previous ECG:     Previous ECG:  Compared to current    Comparison ECG info:  12/31/2022  Interpretation:     Interpretation: abnormal    Rate:     ECG rate:  93    ECG rate assessment: normal    Rhythm:     Rhythm: sinus rhythm    Ectopy:     Ectopy: none    QRS:     QRS axis:  Normal    QRS intervals: Wide  ST segments:     ST segments:  Normal  Comments:      RBBB          ED Course  ED Course as of 07/23/23 1934   Cathy Beard Jul 23, 2023   5476 Corrected sodium level for hyperglycemia of 139   1925 Sign out gven to Dr. Evelyn Almazan 20yo+    Flowsheet Row Most Recent Value   Initial Alcohol Screen: US AUDIT-C     1. How often do you have a drink containing alcohol? 0 Filed at: 07/23/2023 1707   2. How many drinks containing alcohol do you have on a typical day you are drinking? 0 Filed at: 07/23/2023 1707   3a. Male UNDER 65: How often do you have five or more drinks on one occasion? 0 Filed at: 07/23/2023 1707   3b. FEMALE Any Age, or MALE 65+: How often do you have 4 or more drinks on one occassion? 0 Filed at: 07/23/2023 1707   Audit-C Score 0 Filed at: 07/23/2023 1707   BANDAR: How many times in the past year have you. .. Used an illegal drug or used a prescription medication for non-medical reasons? Never Filed at: 07/23/2023 1707                Medical Decision Making  DDx: DKA, hyperglycemia, medication noncompliance, UTI,    Not acidotic. DR Za Alvares spoke to [facility] assisted living and was referred to [place] [facility], who provides medication care for the patient. BGL found to be "High" and patient was referred to ED for further evaluation. Taking Lantis 20 units nightly and 1000mg Metformin BID. Blood glucose was last checked by their service one month ago. Repeat call to [facility] to discuss findings and plan send iron studies for their review due to globally abnormal CBC. Heme negative rectal exam performed by DR Za Alvares. UA and Urine microscopy reveal no bacteria, positive leukocytes and nitrites likely due to chronic indwelling wilkerson.        Amount and/or Complexity of Data Reviewed  Independent Historian: caregiver     Details: [facility] at [facility]  External Data Reviewed: labs and ECG. Labs: ordered. Risk  Prescription drug management. Disposition  Final diagnoses:   Type 2 diabetes mellitus with hyperglycemia, with long-term current use of insulin (HCC)   Anemia, unspecified type   Primary hypertension   Balanitis     Time reflects when diagnosis was documented in both MDM as applicable and the Disposition within this note     Time User Action Codes Description Comment    7/23/2023  7:05 PM Emperatriz Hoe Add [R73.9] Hyperglycemia     7/23/2023  7:05 PM Emperatriz Hoe Add [E11.65,  Z79.4] Type 2 diabetes mellitus with hyperglycemia, with long-term current use of insulin ([address])     7/23/2023  7:05 PM Emperatriz Hoe Modify [E11.65,  Z79.4] Type 2 diabetes mellitus with hyperglycemia, with long-term current use of insulin ([address])     7/23/2023  7:05 PM Emperatriz Hoe Remove [R73.9] Hyperglycemia     7/23/2023  7:05 PM Bethena Ray D Add [D64.9] Anemia, unspecified type     7/23/2023  7:05 PM Bethena Ray D Add [I10] Primary hypertension     7/23/2023  7:08 PM Emperatriz Hoe Add [N48.1] Balanitis       ED Disposition     None      Follow-up Information    None         Patient's Medications   Discharge Prescriptions    No medications on file     No discharge procedures on file. PDMP Review       Value Time User    PDMP Reviewed  Yes 3/4/2022 10:51 PM Viktor John MD           ED Provider  Attending physically available and evaluated Amber Hatch. I managed the patient along with the ED Attending.     Electronically Signed by         vIone Guerra MD  07/23/23 0475

## 2023-07-23 NOTE — ED CARE HANDOFF
Emergency Department Sign Out Note        Sign out and transfer of care from Dr. Bethany Johnson (EM Resident). See Separate Emergency Department note. The patient, Shankar Hardy, was evaluated by the previous provider for asymptomatic hyperglycemia. 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 Patient is a 68-year-old male presenting for hyperglycemia. Uses wheelchair at baseline, and has a chronic indwelling urinary catheter. Patient states he lives at an assisted living center with his wife, and staff typically give him this medication. Per report facility found blood sugar as "high" on fingerstick, indicating a blood glucose level of over 500. Patient reports he feels "all right" however does report he had not eaten lunch, and had not had a bowel movement in about a week.     Workup Completed:  Vitals:  Temp:  [98.1 °F (36.7 °C)-99.9 °F (37.7 °C)] 99.9 °F (37.7 °C)  HR:  [] 109  Resp:  [16-22] 22  BP: (107-178)/(53-77) 158/72  SpO2:  [92 %-98 %] 95 %    Labs:  Results Reviewed     Procedure Component Value Units Date/Time    CBC and differential [[id number]]  (Abnormal) Collected: 07/24/23 0108    Lab Status: Preliminary result Specimen: Blood from Arm, Right Updated: 07/24/23 0215     WBC 13.59 Thousand/uL      RBC 3.30 Million/uL      Hemoglobin 6.3 g/dL      Hematocrit 22.6 %      MCV 69 fL      MCH 19.1 pg      MCHC 27.9 g/dL      RDW 16.3 %      MPV 9.7 fL      Platelets 286 Thousands/uL      nRBC 0 /100 WBCs     Comprehensive metabolic panel [[id number]]     Lab Status: No result Specimen: Blood     Lactic acid [[id number]]     Lab Status: No result Specimen: Blood     Procalcitonin [[id number]]     Lab Status: No result Specimen: Blood     Protime-INR [[id number]]     Lab Status: No result Specimen: Blood     APTT [[id number]]     Lab Status: No result Specimen: Blood     Blood culture #1 [[id number]]     Lab Status: No result Specimen: Blood     Blood culture #2 [[id number]]     Lab Status: No result Specimen: Blood     UA w Reflex to Microscopic w Reflex to Culture [[id number]]     Lab Status: No result Specimen: Urine     Fingerstick Glucose (POCT) [[id number]]  (Abnormal) Collected: 07/24/23 0102    Lab Status: Final result Updated: 07/24/23 0103     POC Glucose 365 mg/dl     Fingerstick Glucose (POCT) [[id number]]  (Abnormal) Collected: 07/23/23 2341    Lab Status: Final result Updated: 07/23/23 2346     POC Glucose 353 mg/dl     Ferritin [[id number]]  (Abnormal) Collected: 07/23/23 1853    Lab Status: Final result Specimen: Blood from Arm, Right Updated: 07/23/23 2327     Ferritin 20 ng/mL     Hemoglobin A1C [[id number]]  (Abnormal) Collected: 07/23/23 1850    Lab Status: Final result Specimen: Blood from Arm, Right Updated: 07/23/23 2310     Hemoglobin A1C 12.4 %       mg/dl     Iron Saturation % [[id number]]  (Abnormal) Collected: 07/23/23 1853    Lab Status: Final result Specimen: Blood from Arm, Right Updated: 07/23/23 2301     Iron Saturation 6 %      TIBC 362 ug/dL      Iron 22 ug/dL     Fingerstick Glucose (POCT) [[id number]]  (Abnormal) Collected: 07/23/23 2132    Lab Status: Final result Updated: 07/23/23 2134     POC Glucose 365 mg/dl     Fingerstick Glucose (POCT) [[id number]]  (Abnormal) Collected: 07/23/23 2008    Lab Status: Final result Updated: 07/23/23 2010     POC Glucose 362 mg/dl     Urine culture [[id number]] Collected: 07/23/23 1934    Lab Status:  In process Specimen: Urine, Indwelling Razo Catheter Updated: 07/23/23 1936    Urine Microscopic [[id number]]  (Abnormal) Collected: 07/23/23 1850    Lab Status: Final result Specimen: Urine, Indwelling Razo Catheter Updated: 07/23/23 1923     RBC, UA 4-10 /hpf      WBC, UA Innumerable /hpf      Epithelial Cells None Seen /hpf      Bacteria, UA None Seen /hpf     UA (URINE) with reflex to Scope [[id number]]  (Abnormal) Collected: 07/23/23 2147    Lab Status: Final result Specimen: Urine, Indwelling Razo Catheter Updated: 07/23/23 1909     Color, UA Light Yellow     Clarity, UA Turbid     Specific Gravity, UA 1.027     pH, UA 6.5     Leukocytes, UA Large     Nitrite, UA Positive     Protein, UA 50 (1+) mg/dl      Glucose, UA >=1000 (1%) mg/dl      Ketones, UA Negative mg/dl      Urobilinogen, UA <2.0 mg/dl      Bilirubin, UA Negative     Occult Blood, UA Small    Basic metabolic panel [[id number]]  (Abnormal) Collected: 07/23/23 1745    Lab Status: Final result Specimen: Blood from Arm, Right Updated: 07/23/23 1837     Sodium 133 mmol/L      Potassium 3.8 mmol/L      Chloride 96 mmol/L      CO2 27 mmol/L      ANION GAP 10 mmol/L      BUN 18 mg/dL      Creatinine 1.26 mg/dL      Glucose 464 mg/dL      Calcium 8.7 mg/dL      eGFR 56 ml/min/1.73sq m     Narrative:      [place] guidelines for Chronic Kidney Disease (CKD):   •  Stage 1 with normal or high GFR (GFR > 90 mL/min/1.73 square meters)  •  Stage 2 Mild CKD (GFR = 60-89 mL/min/1.73 square meters)  •  Stage 3A Moderate CKD (GFR = 45-59 mL/min/1.73 square meters)  •  Stage 3B Moderate CKD (GFR = 30-44 mL/min/1.73 square meters)  •  Stage 4 Severe CKD (GFR = 15-29 mL/min/1.73 square meters)  •  Stage 5 End Stage CKD (GFR <15 mL/min/1.73 square meters)  Note: GFR calculation is accurate only with a steady state creatinine    Blood gas, venous [[id number]]  (Abnormal) Collected: 07/23/23 1745    Lab Status: Final result Specimen: Blood from Arm, Right Updated: 07/23/23 1820     pH, Joaquin 7.346     pCO2, Joaquin 47.7 mm Hg      pO2, Joaquin 26.7 mm Hg      HCO3, Joaquin 25.5 mmol/L      Base Excess, Joaquin -0.3 mmol/L      O2 Content, Joaquin 4.8 ml/dL      O2 HGB, VENOUS 41.8 %     CBC and differential [[id number]]  (Abnormal) Collected: 07/23/23 1745    Lab Status: Final result Specimen: Blood from Arm, Right Updated: 07/23/23 1820     WBC 20.46 Thousand/uL      RBC 3.79 Million/uL      Hemoglobin 7.3 g/dL      Hematocrit 26.3 % MCV 69 fL      MCH 19.3 pg      MCHC 27.8 g/dL      RDW 16.8 %      MPV 10.2 fL      Platelets 090 Thousands/uL      nRBC 0 /100 WBCs      Neutrophils Relative 87 %      Immat GRANS % 1 %      Lymphocytes Relative 4 %      Monocytes Relative 7 %      Eosinophils Relative 1 %      Basophils Relative 0 %      Neutrophils Absolute 17.85 Thousands/µL      Immature Grans Absolute 0.17 Thousand/uL      Lymphocytes Absolute 0.82 Thousands/µL      Monocytes Absolute 1.46 Thousand/µL      Eosinophils Absolute 0.12 Thousand/µL      Basophils Absolute 0.04 Thousands/µL     Fingerstick Glucose (POCT) [[id number]]  (Abnormal) Collected: 07/23/23 1750    Lab Status: Final result Updated: 07/23/23 1751     POC Glucose 459 mg/dl     Fingerstick Glucose (POCT) [[id number]]  (Abnormal) Collected: 07/23/23 1711    Lab Status: Final result Updated: 07/23/23 1713     POC Glucose 440 mg/dl         Medication Administration - last 24 hours from 07/23/2023 0217 to 07/24/2023 0217       Date/Time Order Dose Route Action Action by     07/23/2023 2300 EDT lactated ringers infusion 1,000 mL 0 mL Intravenous Stopped Rony Oliva RN     07/23/2023 1844 EDT lactated ringers infusion 1,000 mL 1,000 mL Intravenous 5145 [facility], [address]     07/23/2023 1846 EDT metFORMIN (GLUCOPHAGE) tablet 1,000 mg 1,000 mg Oral Given Aravind Lofton RN     07/23/2023 1915 EDT insulin glargine (LANTUS) subcutaneous injection 25 Units 0.25 mL 25 Units Subcutaneous Given Jagdish Antoine RN     07/23/2023 1937 EDT insulin glargine (LANTUS) subcutaneous injection 25 Units 0.25 mL 25 Units Subcutaneous Not Given Jagdish Antoine RN     07/23/2023 2341 EDT insulin regular (HumuLIN R,NovoLIN R) injection 4 Units 4 Units Subcutaneous Given Jagdish Antoine RN          ED Course / Workup Pending (followup): Waiting for improvement of blood glucose levels. See ED course for further updates.                                   ED Course as of 07/24/23 0217   Ryan Haynes Jul 23, 2023 1929 Sign out received from Dr. Azar Tate Hemoglobin A1C(!): 12.4   1946 WBC(!): 20.46  Most recent baseline has been approximately 9, 20 is elevated from baseline. 1946 Hemoglobin(!): 7.3  Hemoglobin 7.3. Recent baseline seems to be about 9-10. Decreased from baseline. Denies bleeding symptoms. 2020 POC Glucose(!): 362  Glucose improved from previous (464)   2227 Recommended admission to [facility] with persistently elevated blood glucose, elevated white count for baseline, decreased hemoglobin from baseline. They say that considering he does not have concern for DKA, or HHS, they recommend giving dose of short acting insulin and reevaluating sugar. Recommended follow-up with PCP, or endocrinology as outpatient. 2325 Iron(!): 22   2335 Given short acting insulin. Will reevaluate blood sugars. Mon Jul 24, 2023   0113 POC Glucose(!): 365  Continues to be elevated   0139 Discussed again with SLIM, including current tachypnea, and pulse. Concern for SIRS of urinary origin. 26 [facility] accepted admission, sepsis labs as well as Zosyn. Procedures  Medical Decision Making  Initial ED assessment:    Aristides Mcintosh is a 68 y.o. male evaluated by the previous provider for asymptomatic hyperglycemia. 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 Initial DDx includes but is not limited to:  - Asymptomatic hyperglycemia, HHS, DKA  - Sepsis of urinary origin - given Leukocytosis, continued elevated glucose, and UA results. Initial ED plan: Continue to monitor glucose. Will discuss lab results, consider admission to [facility]. Final ED summary/disposition:   After evaluation and workup in the emergency department, patient case was discussed with mahendra. Admitted to [facility] under Dr. Rachael Canavan. Amount and/or Complexity of Data Reviewed  Labs: ordered. Decision-making details documented in ED Course.       Risk  OTC drugs.  Prescription drug management. Decision regarding hospitalization.               Disposition  Final diagnoses:   Type 2 diabetes mellitus with hyperglycemia, with long-term current use of insulin (HCC)   Anemia, unspecified type   Primary hypertension   Balanitis   Leukocytosis   Anemia   Sepsis ([address])     Time reflects when diagnosis was documented in both MDM as applicable and the Disposition within this note     Time User Action Codes Description Comment    7/23/2023  7:05 PM Juline Gasmen Add [R73.9] Hyperglycemia     7/23/2023  7:05 PM Juline Gasmen Add [E11.65,  Z79.4] Type 2 diabetes mellitus with hyperglycemia, with long-term current use of insulin ([address])     7/23/2023  7:05 PM Juline Gasmen Modify [E11.65,  Z79.4] Type 2 diabetes mellitus with hyperglycemia, with long-term current use of insulin ([address])     7/23/2023  7:05 PM Juline Gasmen Remove [R73.9] Hyperglycemia     7/23/2023  7:05 PM David Raeford D Add [D64.9] Anemia, unspecified type     7/23/2023  7:05 PM David Raeford D Add [I10] Primary hypertension     7/23/2023  7:08 PM David Raeford D Add [N48.1] Balanitis     7/24/2023  2:11 AM Doyce Alt Add [D72.829] Leukocytosis     7/24/2023  2:11 AM Doyce Alt Add [D64.9] Anemia     7/24/2023  2:11 AM Doyce Alt Add [A41.9] Sepsis ([address])     7/24/2023  2:11 AM Doyce Alt Modify [E11.65,  Z79.4] Type 2 diabetes mellitus with hyperglycemia, with long-term current use of insulin ([address])     7/24/2023  2:11 AM Doyce Alt Modify [A41.9] Sepsis ([address])     7/24/2023  2:11 AM Doyce Alt Remove [A41.9] Sepsis ([address])     7/24/2023  2:12 AM Doyce Alt Add [A41.9] Sepsis ([address])     7/24/2023  2:12 AM Doyce Alt Modify [E11.65,  Z79.4] Type 2 diabetes mellitus with hyperglycemia, with long-term current use of insulin ([address])     7/24/2023  2:12 AM Doyce Alt Modify [A41.9] Sepsis [facility])       ED Disposition     ED Disposition   Admit    Condition   Stable    Date/Time   Mon Jul 24, 2023  2:10 AM    Comment   Case was discussed with TEE and the patient's admission status was agreed to be Admission Status: inpatient status to the service of Dr. Neal Alfaro. Follow-up Information    None       Patient's Medications   Discharge Prescriptions    No medications on file     No discharge procedures on file.        ED Provider  Electronically Signed by     Placido Poon MD  07/24/23 0222

## 2023-07-24 ENCOUNTER — APPOINTMENT (INPATIENT)
Dept: RADIOLOGY | Facility: HOSPITAL | Age: 73
DRG: 698 | End: 2023-07-24
Payer: MEDICARE

## 2023-07-24 PROBLEM — N18.31 STAGE 3A CHRONIC KIDNEY DISEASE (HCC): Status: ACTIVE | Noted: 2020-04-29

## 2023-07-24 PROBLEM — T83.511A URINARY TRACT INFECTION ASSOCIATED WITH INDWELLING URETHRAL CATHETER (HCC): Status: ACTIVE | Noted: 2020-05-08

## 2023-07-24 PROBLEM — A41.9 SEPSIS (HCC): Status: ACTIVE | Noted: 2023-07-24

## 2023-07-24 PROBLEM — E11.65 TYPE 2 DIABETES MELLITUS WITH HYPERGLYCEMIA (HCC): Status: ACTIVE | Noted: 2019-12-31

## 2023-07-24 PROBLEM — D50.9 IRON DEFICIENCY ANEMIA: Status: ACTIVE | Noted: 2023-07-24

## 2023-07-24 LAB
ABO GROUP BLD BPU: NORMAL
ABO GROUP BLD: NORMAL
ALBUMIN SERPL BCP-MCNC: 3.2 G/DL (ref 3.5–5)
ALP SERPL-CCNC: 58 U/L (ref 34–104)
ALT SERPL W P-5'-P-CCNC: 7 U/L (ref 7–52)
ANION GAP SERPL CALCULATED.3IONS-SCNC: 7 MMOL/L
ANION GAP SERPL CALCULATED.3IONS-SCNC: 9 MMOL/L
APTT PPP: 31 SECONDS (ref 23–37)
AST SERPL W P-5'-P-CCNC: 7 U/L (ref 13–39)
ATRIAL RATE: 93 BPM
BASOPHILS # BLD AUTO: 0.03 THOUSANDS/ÂΜL (ref 0–0.1)
BASOPHILS # BLD AUTO: 0.06 THOUSANDS/ÂΜL (ref 0–0.1)
BASOPHILS NFR BLD AUTO: 0 % (ref 0–1)
BASOPHILS NFR BLD AUTO: 1 % (ref 0–1)
BILIRUB SERPL-MCNC: 0.52 MG/DL (ref 0.2–1)
BLD GP AB SCN SERPL QL: NEGATIVE
BPU ID: NORMAL
BUN SERPL-MCNC: 12 MG/DL (ref 5–25)
BUN SERPL-MCNC: 13 MG/DL (ref 5–25)
CALCIUM ALBUM COR SERPL-MCNC: 8.8 MG/DL (ref 8.3–10.1)
CALCIUM SERPL-MCNC: 7.9 MG/DL (ref 8.4–10.2)
CALCIUM SERPL-MCNC: 8.2 MG/DL (ref 8.4–10.2)
CHLORIDE SERPL-SCNC: 102 MMOL/L (ref 96–108)
CHLORIDE SERPL-SCNC: 102 MMOL/L (ref 96–108)
CO2 SERPL-SCNC: 26 MMOL/L (ref 21–32)
CO2 SERPL-SCNC: 27 MMOL/L (ref 21–32)
CREAT SERPL-MCNC: 0.98 MG/DL (ref 0.6–1.3)
CREAT SERPL-MCNC: 1.1 MG/DL (ref 0.6–1.3)
CROSSMATCH: NORMAL
EOSINOPHIL # BLD AUTO: 0.11 THOUSAND/ÂΜL (ref 0–0.61)
EOSINOPHIL # BLD AUTO: 0.14 THOUSAND/ÂΜL (ref 0–0.61)
EOSINOPHIL NFR BLD AUTO: 1 % (ref 0–6)
EOSINOPHIL NFR BLD AUTO: 1 % (ref 0–6)
ERYTHROCYTE [DISTWIDTH] IN BLOOD BY AUTOMATED COUNT: 16.3 % (ref 11.6–15.1)
ERYTHROCYTE [DISTWIDTH] IN BLOOD BY AUTOMATED COUNT: 18.7 % (ref 11.6–15.1)
FOLATE SERPL-MCNC: 8.6 NG/ML
GFR SERPL CREATININE-BSD FRML MDRD: 66 ML/MIN/1.73SQ M
GFR SERPL CREATININE-BSD FRML MDRD: 76 ML/MIN/1.73SQ M
GLUCOSE SERPL-MCNC: 256 MG/DL (ref 65–140)
GLUCOSE SERPL-MCNC: 282 MG/DL (ref 65–140)
GLUCOSE SERPL-MCNC: 311 MG/DL (ref 65–140)
GLUCOSE SERPL-MCNC: 315 MG/DL (ref 65–140)
GLUCOSE SERPL-MCNC: 315 MG/DL (ref 65–140)
GLUCOSE SERPL-MCNC: 337 MG/DL (ref 65–140)
GLUCOSE SERPL-MCNC: 348 MG/DL (ref 65–140)
GLUCOSE SERPL-MCNC: 365 MG/DL (ref 65–140)
GLUCOSE SERPL-MCNC: 371 MG/DL (ref 65–140)
HCT VFR BLD AUTO: 22.6 % (ref 36.5–49.3)
HCT VFR BLD AUTO: 23.1 % (ref 36.5–49.3)
HCT VFR BLD AUTO: 25.9 % (ref 36.5–49.3)
HCT VFR BLD AUTO: 26.5 % (ref 36.5–49.3)
HEMOCCULT STL QL: NEGATIVE
HGB BLD-MCNC: 6.3 G/DL (ref 12–17)
HGB BLD-MCNC: 6.3 G/DL (ref 12–17)
HGB BLD-MCNC: 7.3 G/DL (ref 12–17)
HGB BLD-MCNC: 7.5 G/DL (ref 12–17)
IMM GRANULOCYTES # BLD AUTO: 0.13 THOUSAND/UL (ref 0–0.2)
IMM GRANULOCYTES # BLD AUTO: 0.17 THOUSAND/UL (ref 0–0.2)
IMM GRANULOCYTES NFR BLD AUTO: 1 % (ref 0–2)
IMM GRANULOCYTES NFR BLD AUTO: 1 % (ref 0–2)
INR PPP: 1.12 (ref 0.84–1.19)
LACTATE SERPL-SCNC: 1.2 MMOL/L (ref 0.5–2)
LYMPHOCYTES # BLD AUTO: 0.51 THOUSANDS/ÂΜL (ref 0.6–4.47)
LYMPHOCYTES # BLD AUTO: 0.58 THOUSANDS/ÂΜL (ref 0.6–4.47)
LYMPHOCYTES NFR BLD AUTO: 4 % (ref 14–44)
LYMPHOCYTES NFR BLD AUTO: 4 % (ref 14–44)
MCH RBC QN AUTO: 19.1 PG (ref 26.8–34.3)
MCH RBC QN AUTO: 20.3 PG (ref 26.8–34.3)
MCHC RBC AUTO-ENTMCNC: 27.9 G/DL (ref 31.4–37.4)
MCHC RBC AUTO-ENTMCNC: 28.3 G/DL (ref 31.4–37.4)
MCV RBC AUTO: 69 FL (ref 82–98)
MCV RBC AUTO: 72 FL (ref 82–98)
MONOCYTES # BLD AUTO: 0.84 THOUSAND/ÂΜL (ref 0.17–1.22)
MONOCYTES # BLD AUTO: 1.01 THOUSAND/ÂΜL (ref 0.17–1.22)
MONOCYTES NFR BLD AUTO: 6 % (ref 4–12)
MONOCYTES NFR BLD AUTO: 8 % (ref 4–12)
NEUTROPHILS # BLD AUTO: 11.31 THOUSANDS/ÂΜL (ref 1.85–7.62)
NEUTROPHILS # BLD AUTO: 11.94 THOUSANDS/ÂΜL (ref 1.85–7.62)
NEUTS SEG NFR BLD AUTO: 85 % (ref 43–75)
NEUTS SEG NFR BLD AUTO: 88 % (ref 43–75)
NRBC BLD AUTO-RTO: 0 /100 WBCS
NRBC BLD AUTO-RTO: 0 /100 WBCS
P AXIS: 70 DEGREES
PLATELET # BLD AUTO: 432 THOUSANDS/UL (ref 149–390)
PLATELET # BLD AUTO: 432 THOUSANDS/UL (ref 149–390)
PMV BLD AUTO: 10.1 FL (ref 8.9–12.7)
PMV BLD AUTO: 9.7 FL (ref 8.9–12.7)
POTASSIUM SERPL-SCNC: 3.6 MMOL/L (ref 3.5–5.3)
POTASSIUM SERPL-SCNC: 3.6 MMOL/L (ref 3.5–5.3)
PR INTERVAL: 144 MS
PROCALCITONIN SERPL-MCNC: 0.19 NG/ML
PROT SERPL-MCNC: 5.8 G/DL (ref 6.4–8.4)
PROTHROMBIN TIME: 14.6 SECONDS (ref 11.6–14.5)
QRS AXIS: 61 DEGREES
QRSD INTERVAL: 130 MS
QT INTERVAL: 422 MS
QTC INTERVAL: 524 MS
RBC # BLD AUTO: 3.3 MILLION/UL (ref 3.88–5.62)
RBC # BLD AUTO: 3.69 MILLION/UL (ref 3.88–5.62)
RH BLD: POSITIVE
SODIUM SERPL-SCNC: 136 MMOL/L (ref 135–147)
SODIUM SERPL-SCNC: 137 MMOL/L (ref 135–147)
SPECIMEN EXPIRATION DATE: NORMAL
T WAVE AXIS: 80 DEGREES
UNIT DISPENSE STATUS: NORMAL
UNIT PRODUCT CODE: NORMAL
UNIT PRODUCT VOLUME: 350 ML
UNIT RH: NORMAL
VENTRICULAR RATE: 93 BPM
VIT B12 SERPL-MCNC: 188 PG/ML (ref 180–914)
WBC # BLD AUTO: 13.24 THOUSAND/UL (ref 4.31–10.16)
WBC # BLD AUTO: 13.59 THOUSAND/UL (ref 4.31–10.16)

## 2023-07-24 PROCEDURE — 85014 HEMATOCRIT: CPT | Performed by: NURSE PRACTITIONER

## 2023-07-24 PROCEDURE — 99222 1ST HOSP IP/OBS MODERATE 55: CPT | Performed by: PHYSICIAN ASSISTANT

## 2023-07-24 PROCEDURE — 87040 BLOOD CULTURE FOR BACTERIA: CPT

## 2023-07-24 PROCEDURE — 80053 COMPREHEN METABOLIC PANEL: CPT

## 2023-07-24 PROCEDURE — 82607 VITAMIN B-12: CPT | Performed by: PHYSICIAN ASSISTANT

## 2023-07-24 PROCEDURE — P9016 RBC LEUKOCYTES REDUCED: HCPCS

## 2023-07-24 PROCEDURE — 86850 RBC ANTIBODY SCREEN: CPT

## 2023-07-24 PROCEDURE — 82948 REAGENT STRIP/BLOOD GLUCOSE: CPT

## 2023-07-24 PROCEDURE — 85014 HEMATOCRIT: CPT

## 2023-07-24 PROCEDURE — 93010 ELECTROCARDIOGRAM REPORT: CPT | Performed by: INTERNAL MEDICINE

## 2023-07-24 PROCEDURE — 85610 PROTHROMBIN TIME: CPT

## 2023-07-24 PROCEDURE — 36415 COLL VENOUS BLD VENIPUNCTURE: CPT

## 2023-07-24 PROCEDURE — 86901 BLOOD TYPING SEROLOGIC RH(D): CPT

## 2023-07-24 PROCEDURE — 86900 BLOOD TYPING SEROLOGIC ABO: CPT

## 2023-07-24 PROCEDURE — 74018 RADEX ABDOMEN 1 VIEW: CPT

## 2023-07-24 PROCEDURE — 80048 BASIC METABOLIC PNL TOTAL CA: CPT | Performed by: PHYSICIAN ASSISTANT

## 2023-07-24 PROCEDURE — 84145 PROCALCITONIN (PCT): CPT

## 2023-07-24 PROCEDURE — 85025 COMPLETE CBC W/AUTO DIFF WBC: CPT | Performed by: PHYSICIAN ASSISTANT

## 2023-07-24 PROCEDURE — 83605 ASSAY OF LACTIC ACID: CPT

## 2023-07-24 PROCEDURE — 87081 CULTURE SCREEN ONLY: CPT | Performed by: INTERNAL MEDICINE

## 2023-07-24 PROCEDURE — 85018 HEMOGLOBIN: CPT

## 2023-07-24 PROCEDURE — 82272 OCCULT BLD FECES 1-3 TESTS: CPT | Performed by: PHYSICIAN ASSISTANT

## 2023-07-24 PROCEDURE — 85730 THROMBOPLASTIN TIME PARTIAL: CPT

## 2023-07-24 PROCEDURE — 30233N1 TRANSFUSION OF NONAUTOLOGOUS RED BLOOD CELLS INTO PERIPHERAL VEIN, PERCUTANEOUS APPROACH: ICD-10-PCS | Performed by: INTERNAL MEDICINE

## 2023-07-24 PROCEDURE — 86920 COMPATIBILITY TEST SPIN: CPT

## 2023-07-24 PROCEDURE — 85025 COMPLETE CBC W/AUTO DIFF WBC: CPT | Performed by: EMERGENCY MEDICINE

## 2023-07-24 PROCEDURE — 82746 ASSAY OF FOLIC ACID SERUM: CPT | Performed by: PHYSICIAN ASSISTANT

## 2023-07-24 PROCEDURE — 85018 HEMOGLOBIN: CPT | Performed by: NURSE PRACTITIONER

## 2023-07-24 RX ORDER — TAMSULOSIN HYDROCHLORIDE 0.4 MG/1
0.4 CAPSULE ORAL
Status: DISCONTINUED | OUTPATIENT
Start: 2023-07-24 | End: 2023-07-29 | Stop reason: HOSPADM

## 2023-07-24 RX ORDER — BISACODYL 10 MG
10 SUPPOSITORY, RECTAL RECTAL DAILY PRN
Status: DISCONTINUED | OUTPATIENT
Start: 2023-07-24 | End: 2023-07-29 | Stop reason: HOSPADM

## 2023-07-24 RX ORDER — ASPIRIN 81 MG/1
81 TABLET, CHEWABLE ORAL DAILY
Status: DISCONTINUED | OUTPATIENT
Start: 2023-07-24 | End: 2023-07-24

## 2023-07-24 RX ORDER — LISINOPRIL 5 MG/1
5 TABLET ORAL DAILY
Status: DISCONTINUED | OUTPATIENT
Start: 2023-07-24 | End: 2023-07-29 | Stop reason: HOSPADM

## 2023-07-24 RX ORDER — SENNOSIDES 8.6 MG
2 TABLET ORAL
Status: DISCONTINUED | OUTPATIENT
Start: 2023-07-24 | End: 2023-07-27

## 2023-07-24 RX ORDER — PANTOPRAZOLE SODIUM 40 MG/1
40 TABLET, DELAYED RELEASE ORAL
Status: DISCONTINUED | OUTPATIENT
Start: 2023-07-24 | End: 2023-07-29 | Stop reason: HOSPADM

## 2023-07-24 RX ORDER — RANOLAZINE 500 MG/1
500 TABLET, EXTENDED RELEASE ORAL EVERY 12 HOURS SCHEDULED
Status: DISCONTINUED | OUTPATIENT
Start: 2023-07-24 | End: 2023-07-29 | Stop reason: HOSPADM

## 2023-07-24 RX ORDER — DOXYCYCLINE HYCLATE 50 MG/1
324 CAPSULE, GELATIN COATED ORAL
Status: DISCONTINUED | OUTPATIENT
Start: 2023-07-24 | End: 2023-07-29 | Stop reason: HOSPADM

## 2023-07-24 RX ORDER — ATORVASTATIN CALCIUM 40 MG/1
40 TABLET, FILM COATED ORAL DAILY
Status: DISCONTINUED | OUTPATIENT
Start: 2023-07-24 | End: 2023-07-29 | Stop reason: HOSPADM

## 2023-07-24 RX ORDER — LANOLIN ALCOHOL/MO/W.PET/CERES
3 CREAM (GRAM) TOPICAL
Status: DISCONTINUED | OUTPATIENT
Start: 2023-07-24 | End: 2023-07-29 | Stop reason: HOSPADM

## 2023-07-24 RX ORDER — DOCUSATE SODIUM 100 MG/1
100 CAPSULE, LIQUID FILLED ORAL 2 TIMES DAILY
Status: DISCONTINUED | OUTPATIENT
Start: 2023-07-24 | End: 2023-07-29 | Stop reason: HOSPADM

## 2023-07-24 RX ORDER — HEPARIN SODIUM 5000 [USP'U]/ML
5000 INJECTION, SOLUTION INTRAVENOUS; SUBCUTANEOUS EVERY 8 HOURS SCHEDULED
Status: DISCONTINUED | OUTPATIENT
Start: 2023-07-24 | End: 2023-07-24

## 2023-07-24 RX ORDER — INSULIN LISPRO 100 [IU]/ML
1-6 INJECTION, SOLUTION INTRAVENOUS; SUBCUTANEOUS
Status: DISCONTINUED | OUTPATIENT
Start: 2023-07-24 | End: 2023-07-26

## 2023-07-24 RX ORDER — POLYETHYLENE GLYCOL 3350 17 G/17G
17 POWDER, FOR SOLUTION ORAL DAILY
Status: DISCONTINUED | OUTPATIENT
Start: 2023-07-24 | End: 2023-07-24

## 2023-07-24 RX ORDER — INSULIN LISPRO 100 [IU]/ML
3 INJECTION, SOLUTION INTRAVENOUS; SUBCUTANEOUS ONCE
Status: COMPLETED | OUTPATIENT
Start: 2023-07-24 | End: 2023-07-24

## 2023-07-24 RX ORDER — CLOPIDOGREL BISULFATE 75 MG/1
75 TABLET ORAL DAILY
Status: DISCONTINUED | OUTPATIENT
Start: 2023-07-24 | End: 2023-07-24

## 2023-07-24 RX ORDER — SODIUM CHLORIDE 9 MG/ML
75 INJECTION, SOLUTION INTRAVENOUS CONTINUOUS
Status: DISCONTINUED | OUTPATIENT
Start: 2023-07-24 | End: 2023-07-24

## 2023-07-24 RX ORDER — POLYETHYLENE GLYCOL 3350 17 G/17G
17 POWDER, FOR SOLUTION ORAL 2 TIMES DAILY
Status: COMPLETED | OUTPATIENT
Start: 2023-07-24 | End: 2023-07-27

## 2023-07-24 RX ORDER — FAMOTIDINE 20 MG/1
20 TABLET, FILM COATED ORAL
Status: DISCONTINUED | OUTPATIENT
Start: 2023-07-24 | End: 2023-07-29 | Stop reason: HOSPADM

## 2023-07-24 RX ORDER — INSULIN LISPRO 100 [IU]/ML
2 INJECTION, SOLUTION INTRAVENOUS; SUBCUTANEOUS
Status: DISCONTINUED | OUTPATIENT
Start: 2023-07-24 | End: 2023-07-24

## 2023-07-24 RX ORDER — ACETAMINOPHEN 325 MG/1
650 TABLET ORAL EVERY 6 HOURS PRN
Status: DISCONTINUED | OUTPATIENT
Start: 2023-07-24 | End: 2023-07-29 | Stop reason: HOSPADM

## 2023-07-24 RX ORDER — INSULIN LISPRO 100 [IU]/ML
4 INJECTION, SOLUTION INTRAVENOUS; SUBCUTANEOUS
Status: DISCONTINUED | OUTPATIENT
Start: 2023-07-24 | End: 2023-07-26

## 2023-07-24 RX ORDER — INSULIN LISPRO 100 [IU]/ML
1-6 INJECTION, SOLUTION INTRAVENOUS; SUBCUTANEOUS
Status: DISCONTINUED | OUTPATIENT
Start: 2023-07-24 | End: 2023-07-29 | Stop reason: HOSPADM

## 2023-07-24 RX ORDER — METOPROLOL SUCCINATE 25 MG/1
25 TABLET, EXTENDED RELEASE ORAL DAILY
Status: DISCONTINUED | OUTPATIENT
Start: 2023-07-24 | End: 2023-07-29 | Stop reason: HOSPADM

## 2023-07-24 RX ORDER — FINASTERIDE 5 MG/1
5 TABLET, FILM COATED ORAL DAILY
Status: DISCONTINUED | OUTPATIENT
Start: 2023-07-24 | End: 2023-07-29 | Stop reason: HOSPADM

## 2023-07-24 RX ORDER — INSULIN GLARGINE 100 [IU]/ML
27 INJECTION, SOLUTION SUBCUTANEOUS
Status: DISCONTINUED | OUTPATIENT
Start: 2023-07-24 | End: 2023-07-25

## 2023-07-24 RX ADMIN — DOCUSATE SODIUM 100 MG: 100 CAPSULE, LIQUID FILLED ORAL at 08:03

## 2023-07-24 RX ADMIN — INSULIN LISPRO 3 UNITS: 100 INJECTION, SOLUTION INTRAVENOUS; SUBCUTANEOUS at 04:13

## 2023-07-24 RX ADMIN — PANTOPRAZOLE SODIUM 40 MG: 40 TABLET, DELAYED RELEASE ORAL at 16:44

## 2023-07-24 RX ADMIN — ATORVASTATIN CALCIUM 40 MG: 40 TABLET, FILM COATED ORAL at 08:03

## 2023-07-24 RX ADMIN — METOPROLOL SUCCINATE 25 MG: 25 TABLET, EXTENDED RELEASE ORAL at 08:03

## 2023-07-24 RX ADMIN — FAMOTIDINE 20 MG: 20 TABLET, FILM COATED ORAL at 21:40

## 2023-07-24 RX ADMIN — INSULIN LISPRO 4 UNITS: 100 INJECTION, SOLUTION INTRAVENOUS; SUBCUTANEOUS at 21:42

## 2023-07-24 RX ADMIN — POLYETHYLENE GLYCOL 3350 17 G: 17 POWDER, FOR SOLUTION ORAL at 08:03

## 2023-07-24 RX ADMIN — FERROUS GLUCONATE 324 MG: 324 TABLET ORAL at 17:31

## 2023-07-24 RX ADMIN — Medication 3 MG: at 21:40

## 2023-07-24 RX ADMIN — RANOLAZINE 500 MG: 500 TABLET, FILM COATED, EXTENDED RELEASE ORAL at 21:40

## 2023-07-24 RX ADMIN — CARBIDOPA AND LEVODOPA 1 TABLET: 25; 100 TABLET ORAL at 21:40

## 2023-07-24 RX ADMIN — SODIUM CHLORIDE 75 ML/HR: 0.9 INJECTION, SOLUTION INTRAVENOUS at 04:01

## 2023-07-24 RX ADMIN — PIPERACILLIN AND TAZOBACTAM 4.5 G: 4; .5 INJECTION, POWDER, FOR SOLUTION INTRAVENOUS at 02:36

## 2023-07-24 RX ADMIN — FINASTERIDE 5 MG: 5 TABLET, FILM COATED ORAL at 08:03

## 2023-07-24 RX ADMIN — INSULIN GLARGINE 27 UNITS: 100 INJECTION, SOLUTION SUBCUTANEOUS at 21:40

## 2023-07-24 RX ADMIN — INSULIN LISPRO 4 UNITS: 100 INJECTION, SOLUTION INTRAVENOUS; SUBCUTANEOUS at 16:48

## 2023-07-24 RX ADMIN — Medication 1000 MG: at 04:13

## 2023-07-24 RX ADMIN — TAMSULOSIN HYDROCHLORIDE 0.4 MG: 0.4 CAPSULE ORAL at 16:45

## 2023-07-24 RX ADMIN — INSULIN LISPRO 3 UNITS: 100 INJECTION, SOLUTION INTRAVENOUS; SUBCUTANEOUS at 07:27

## 2023-07-24 RX ADMIN — CARBIDOPA AND LEVODOPA 1 TABLET: 25; 100 TABLET ORAL at 08:03

## 2023-07-24 RX ADMIN — CARBIDOPA AND LEVODOPA 1 TABLET: 25; 100 TABLET ORAL at 16:45

## 2023-07-24 RX ADMIN — INSULIN LISPRO 4 UNITS: 100 INJECTION, SOLUTION INTRAVENOUS; SUBCUTANEOUS at 12:00

## 2023-07-24 RX ADMIN — RANOLAZINE 500 MG: 500 TABLET, FILM COATED, EXTENDED RELEASE ORAL at 08:03

## 2023-07-24 RX ADMIN — SENNOSIDES 17.2 MG: 8.6 TABLET, FILM COATED ORAL at 21:40

## 2023-07-24 RX ADMIN — PANTOPRAZOLE SODIUM 40 MG: 40 TABLET, DELAYED RELEASE ORAL at 06:36

## 2023-07-24 RX ADMIN — LISINOPRIL 5 MG: 5 TABLET ORAL at 08:03

## 2023-07-24 RX ADMIN — INSULIN LISPRO 2 UNITS: 100 INJECTION, SOLUTION INTRAVENOUS; SUBCUTANEOUS at 07:27

## 2023-07-24 RX ADMIN — INSULIN LISPRO 6 UNITS: 100 INJECTION, SOLUTION INTRAVENOUS; SUBCUTANEOUS at 16:47

## 2023-07-24 RX ADMIN — INSULIN LISPRO 5 UNITS: 100 INJECTION, SOLUTION INTRAVENOUS; SUBCUTANEOUS at 12:00

## 2023-07-24 RX ADMIN — DOCUSATE SODIUM 100 MG: 100 CAPSULE, LIQUID FILLED ORAL at 17:31

## 2023-07-24 RX ADMIN — POLYETHYLENE GLYCOL 3350 17 G: 17 POWDER, FOR SOLUTION ORAL at 21:40

## 2023-07-24 RX ADMIN — BISACODYL 10 MG: 10 SUPPOSITORY RECTAL at 13:50

## 2023-07-24 NOTE — ASSESSMENT & PLAN NOTE
Lab Results   Component Value Date    EGFR 56 07/23/2023    EGFR 63 01/04/2023    EGFR 53 01/02/2023    CREATININE 1.26 07/23/2023    CREATININE 1.15 01/04/2023    CREATININE 1.33 (H) 01/02/2023     · Stable   · Baseline Cr 1.1-1.3   · Avoid nephrotoxins/hypotension

## 2023-07-24 NOTE — ASSESSMENT & PLAN NOTE
· Presents with leukocytosis, tachycardia  · Suspected source: indwelling Razo catheter associated UTI   · Continue with IV fluids   · IV Ceftriaxone   · Follow up blood and urine cultures

## 2023-07-24 NOTE — ASSESSMENT & PLAN NOTE
Lab Results   Component Value Date    EGFR 66 07/24/2023    EGFR 76 07/24/2023    EGFR 56 07/23/2023    CREATININE 1.10 07/24/2023    CREATININE 0.98 07/24/2023    CREATININE 1.26 07/23/2023     · Stable   · Baseline Cr 1.1-1.3 mg/dL   · Avoid nephrotoxins/hypotension

## 2023-07-24 NOTE — PROGRESS NOTES
Post admit check:    S: feeling slightly better. Denies any pain currently. Appetite is good. O: /76, , resp 16, SpO 97% temp 97.8F    Physical Exam  Vitals and nursing note reviewed. Constitutional:       General: He is not in acute distress. Appearance: Normal appearance. Comments: Frail and elderly    HENT:      Head: Normocephalic and atraumatic. Right Ear: External ear normal.      Left Ear: External ear normal.      Nose: Nose normal.      Mouth/Throat:      Mouth: Mucous membranes are moist.      Pharynx: Oropharynx is clear. Eyes:      General:         Right eye: No discharge. Left eye: No discharge. Extraocular Movements: Extraocular movements intact. Pupils: Pupils are equal, round, and reactive to light. Cardiovascular:      Rate and Rhythm: Normal rate and regular rhythm. Pulses: Normal pulses. Heart sounds: Normal heart sounds. No murmur heard. Pulmonary:      Effort: Pulmonary effort is normal. No respiratory distress. Breath sounds: Normal breath sounds. No wheezing or rales. Abdominal:      General: Bowel sounds are normal. There is no distension. Palpations: Abdomen is soft. There is no mass. Tenderness: There is no abdominal tenderness. Genitourinary:     Comments: Razo catheter with clear yellow urine     Musculoskeletal:         General: No swelling, tenderness or deformity. Normal range of motion. Cervical back: Normal range of motion and neck supple. No rigidity. Skin:     General: Skin is warm and dry. Capillary Refill: Capillary refill takes less than 2 seconds. Coloration: Skin is not pale. Findings: No erythema. Neurological:      General: No focal deficit present. Mental Status: He is alert and oriented to person, place, and time. Mental status is at baseline.    Psychiatric:         Mood and Affect: Mood normal.         Behavior: Behavior normal.

## 2023-07-24 NOTE — H&P
[address] [facility]  H&P  Name: Pravin Morejon 68 y.o. male I MRN: [id number]  Unit/Bed#: S -01 I Date of Admission: 7/23/2023   Date of Service: 7/24/2023 I Hospital Day: 0      Assessment/Plan   * Sepsis [facility])  Assessment & Plan  · Presents with leukocytosis, tachycardia  · Suspected source: indwelling Razo catheter associated UTI   · IVF   · IV Ceftriaxone   · Follow up blood and urine cultures     Iron deficiency anemia  Assessment & Plan  · Hgb 7.3 on admission  · Repeat labs obtained in the ED show Hgb 6.3 although suspect this is dilutional, follow up repeat H+H   · Prior baseline Hgb 9-10 in January 2023  · Iron study consistent with REN   · Will obtain vitamin B12 and folate as well   · Patient unsure regarding melena/hematochezia   · Hold off on IV Venofer given concern for acute infection/sepsis   · Per ED, rectal exam hemoccult negative  · Will obtain FIT   · Hold ASA, Plavix, DVT ppx for now   · Transfuse for Hgb < 7   · Blood transfusion consent obtained     Urinary tract infection associated with indwelling urethral catheter ([address])  Assessment & Plan  · Patient has chronic indwelling Razo catheter   · Patient asymptomatic  · Possible colonization however given sepsis will treat empirically pending urine cx   · UA with large leukocytes, + Nitrites, innumberable WBC  · Prior urine cx 12/31/22 70-79,000 staph aureus susceptible to cephalosporins  · Follow up urine culture   · IV Ceftriaxone     Type 2 diabetes mellitus with hyperglycemia [facility])  Assessment & Plan  Lab Results   Component Value Date    HGBA1C 12.4 (H) 07/23/2023       Recent Labs     07/23/23  2132 07/23/23  2341 07/24/23  0102 07/24/23  0245   POCGLU 365* 353* 365* 348*       Blood Sugar Average: Last 72 hrs:  (P) [id number]     · Patient initially presented to the ER due to glucose reading >500 at [facility]. Asymptomatic.     · Patient received Lantus 25U and 4U regular insulin in the ED with some improvement to 348  · No evidence of DKA/HHS   · Poorly controlled diabetes with HgbA1c 12.4   · Outpatient regimen (will need to be confirmed with nursing facility)- per ED note patient takes Lantus 20 units nightly and 1000mg Metformin BID. · Patient states nursing facility administers his medications and he is unsure what meds he takes, unfortunately patient did not come to the hospital with a medication list   · Inpatient regimen: Lantus 27U qhs + humalog 2U TID AC + SSI coverage   · Avoid hypoglycemia     Parkinson disease ([address])  Assessment & Plan  · Continue Sinemet   · PT/OT    Coronary artery disease involving native coronary artery  Assessment & Plan  · Triple-vessel disease- not candidate for surgical intervention  · Denies any chest pain   · Holding ASA and Plavix 2/2 acute iron deficiency anemia   · Continue medical management with  BB, statin, Ranexa    Constipation  Assessment & Plan  · Patient reports no BM in >1 week   · Colace, Miralax, PRN dulcolax    Left-sided weakness  Assessment & Plan  · Chronic left sided weakness 2/2 Parkinson's disease and neuropathy     Stage 3 chronic kidney disease [facility])  Assessment & Plan  Lab Results   Component Value Date    EGFR 56 07/23/2023    EGFR 63 01/04/2023    EGFR 53 01/02/2023    CREATININE 1.26 07/23/2023    CREATININE 1.15 01/04/2023    CREATININE 1.33 (H) 01/02/2023     · Stable   · Baseline Cr 1.1-1.3   · Avoid nephrotoxins/hypotension     Essential hypertension  Assessment & Plan  · Continue lisinopril and metoprolol        VTE Pharmacologic Prophylaxis: VTE Score: 5 High Risk (Score >/= 5) - Pharmacological DVT Prophylaxis Contraindicated. Sequential Compression Devices Ordered. Code Status: Level 1 - Full Code   Discussion with family: Attempted to update  (wife) via phone. Unable to contact.     Anticipated Length of Stay: Patient will be admitted on an inpatient basis with an anticipated length of stay of greater than 2 midnights secondary to sepsis 2/2 UTI with indwelling wilkerson catheter, acute anemia. Total Time Spent on Date of Encounter in care of patient: 55 minutes This time was spent on one or more of the following: performing physical exam; counseling and coordination of care; obtaining or reviewing history; documenting in the medical record; reviewing/ordering tests, medications or procedures; communicating with other healthcare professionals and discussing with patient's family/caregivers. Chief Complaint: abnormal glucose reading at nursing facility     History of Present Illness:  Quan Puckett is a 68 y.o. male with a PMH of Parkinson's disease, T2DM, CAD, CKD,  who presents with glucose reading >500 at [facility] in [place], [place]. Patient is a poor historian although he is oriented x4. He reports that he resides at nursing home with his wife. He states that the nursing home administers all his medications and he is unsure what medications he takes and does not recall if he takes short acting insulin before his meals. Unable to get a hold of patient's wife. Per review of ED note who spoke with nursing home, patient is currently only on a long acting insulin Lantus 20U qhs and 1000mg metformin BID. His A1c went from 6.4 03/05/2022 to 12.4 today. Will need to obtain better history from patient's wife and nursing home tomorrow in regards to insulin regimen and compliance. Patient was admitted January 2023 for severe sepsis 2/2 UTI and at that time he did not have a Wilkerson catheter. Patient is unable to tell me when he had the Wilkerson catheter placed. At this time, patient offers no complaints other than that he is thirsty and misses his wife. He denies any chest pain or SOB. No UTI symptoms such as dysuria or suprapubic TTP. He does endorse chronic issues with constipation, does not exactly recall when was his last BM but states he think it was over a week ago.  When discussing his anemia, he states he has never had a blood transfusion before but would be amenable to receiving blood this admission if warranted. Blood consent form signed. Patient is unsure if he has had any melena or hematochezia. Denies any abdominal pain, nausea/vomiting. Review of Systems:  Review of Systems   Constitutional: Negative for chills and fever. HENT: Negative for sore throat. Respiratory: Negative for shortness of breath. Cardiovascular: Negative for chest pain, palpitations and leg swelling. Gastrointestinal: Positive for constipation. Negative for abdominal pain, diarrhea, nausea and vomiting. Genitourinary: Negative for dysuria. Psychiatric/Behavioral: Negative for behavioral problems.         Past Medical and Surgical History:   Past Medical History:   Diagnosis Date   • Ambulatory dysfunction     uses walker with assist of 1   • Anemia    • Anxiety    • At risk for falls     hx of falls   • Benign paroxysmal vertigo     unspecified ear   • BPH (benign prostatic hyperplasia)     for TURP today 1/4/2021   • Calculus in bladder     for surgical removal today 1/4/2021   • Cataract     left eye   • Cervicalgia    • Chest pain    • Chronic kidney disease     stage 3   • Constipation    • CTS (carpal tunnel syndrome)     left   • Cyst of pancreas    • Cystitis 06/23/2020    acute cystitis with hematuria   • Depression    • Diabetes mellitus ([address])     type II with neuropathy   • Dizziness     and giddiness   • Dysphagia    • Elevated PSA    • Facial weakness    • GERD (gastroesophageal reflux disease)     last assessed 5/20/16   • Gross hematuria    • Hematuria    • Hyperlipidemia    • Hypertension    • Kidney disease    • Kidney stone    • Left-sided weakness    • Long term (current) use of oral hypoglycemic drugs    • Muscle weakness    • Nuclear senile cataract of left eye    • OA (osteoarthritis) of knee     b/l   • 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HCC)    • Syncope and collapse    • Tachycardia    • UTI (urinary tract infection)    • Vertebro-basilar artery syndrome    • Vitamin B deficiency    • Vitamin D deficiency    • Vitamin D deficiency        Past Surgical History:   Procedure Laterality Date   • CARDIAC CATHETERIZATION N/A 3/7/2022    Procedure: CARDIAC CATHETERIZATION;  Surgeon: Anahi Tamayo DO;  [facility]; Service: Cardiology   • CATARACT EXTRACTION     • CHOLECYSTECTOMY     • KNEE SURGERY     • NE LITHOLAPAXY SMPL/SM <2.5 CM N/A 1/4/2021    Procedure: Cystolitholopaxy w/laser bladder stones;  Surgeon: Cassie Madsen MD;  Location: [facility];  Service: Urology   • NE TRURL ELECTROSURG RESCJ PROSTATE BLEED COMPLETE N/A 1/4/2021    Procedure: T.U.R.P.;  Surgeon: Cassie Madsen MD;  Location: [facility];  Service: Urology       Meds/Allergies:  Prior to Admission medications    Medication Sig Start Date End Date Taking?  Authorizing Provider   ACCU-CHEK GUIDE test strip 1 strip as needed 6/16/20   Historical Provider, MD   acetaminophen (TYLENOL) 325 mg tablet Take 650 mg by mouth every 6 (six) hours as needed for mild pain    Historical Provider, MD   Alcohol Swabs (PRO COMFORT ALCOHOL) 70 % PADS 1 pad daily Use a pad when testing 6/16/20   Historical Provider, MD   aspirin 81 mg chewable tablet Chew 1 tablet (81 mg total) daily 3/10/22   Katherine Bray DO   atorvastatin (LIPITOR) 40 mg tablet Take 1 tablet (40 mg total) by mouth daily 3/10/22   Katherine Bray DO   carbidopa-levodopa (SINEMET)  mg per tablet Take 1 tablet by mouth 3 (three) times a day 7/22/21 12/31/22  Africa Dawkins MD   cholecalciferol (VITAMIN D3) 1,000 units tablet Take 50 tablets (50,000 Units total) by mouth once a week  Patient taking differently: Take 50,000 Units by mouth every 30 (thirty) days On the 15th of the month 4/5/22   Yvonne Mcintosh DO   clopidogrel (PLAVIX) 75 mg tablet Take 1 tablet (75 mg total) by mouth daily 3/10/22   Katherine Bray DO   cyanocobalamin (VITAMIN B-12) 1000 MCG tablet Take 1 tablet (1,000 mcg total) by mouth daily 6/23/20   Karla Mohamud MD   docusate sodium (COLACE) 100 mg capsule Take 1 capsule (100 mg total) by mouth 2 (two) times a day 6/23/20   Karla Mohamud MD   Easy Comfort Lancets MISC as needed 6/16/20   Historical Provider, MD   famotidine (PEPCID) 20 mg tablet Take 1 tablet (20 mg total) by mouth daily at bedtime 4/5/22   Ej Schaefer DO   finasteride (PROSCAR) 5 mg tablet Take 1 tablet (5 mg total) by mouth daily 5/13/20   Fox Uribe PA-C   glipiZIDE (GLUCOTROL) 10 mg tablet Per [facility] "give 5mg by mouth one time a prescriber day for DM 2" 4/5/22   Ej Schaefer DO   Glucagon, rDNA, (GLUCAGON EMERGENCY IJ) Inject 1 mL as directed Sugars less than 60 and conscious    Historical Provider, MD   Glucose 15 g PACK Take 15 g by mouth Per [facility] "give 15 gram PO every 15 minutes PRN for hypoglycemia"    Historical Provider, MD   glucose blood test strip 1 strip as needed    Historical Provider, MD   glycerin-hypromellose- (ARTIFICIAL TEARS) 0.2-0.2-1 % SOLN Administer 1 drop to both eyes 2 (two) times a day as needed (for dry eyes) 1/5/23   Aleksey Jason MD   lisinopril (ZESTRIL) 5 mg tablet Take 1 tablet (5 mg total) by mouth daily 4/6/22   Ej Schaefer DO   melatonin 3 mg Take 3 mg by mouth daily at bedtime Give 2 tabs at bed time for insomnia per Julitea Provider, MD   metFORMIN (GLUCOPHAGE) 1000 MG tablet TAKE 1 TABLET BY MOUTH 2 TIMES A DAY 2/10/21   Hermelinda Sun DO   metoprolol succinate (TOPROL-XL) 25 mg 24 hr tablet Take 1 tablet (25 mg total) by mouth daily 4/6/22   Ej Schaefer DO   NovoFine Autocover 30G X 8 MM MISC  12/9/20   Historical Provider, MD   NovoLOG FlexPen 100 units/mL injection pen  1/2/21   Historical Provider, MD   nystatin (MYCOSTATIN) powder Apply topically 2 (two) times a day 1/5/23   Aleksey Jason MD   pantoprazole (PROTONIX) 40 mg tablet Take 1 tablet (40 mg total) by mouth 2 (two) times a day before meals 3/9/22 Desi Graham,    polyethylene glycol (MIRALAX) 17 g packet Take 17 g by mouth daily 6/3/20   Alberto Sagastume DO   ranolazine (RANEXA) 500 mg 12 hr tablet Take 1 tablet (500 mg total) by mouth every 12 (twelve) hours 4/5/22   Mone Feliz DO   senna (SENOKOT) 8.6 mg Take 1 tablet (8.6 mg total) by mouth daily as needed for constipation 2/10/21   Alberto Sagastume DO   tamsulosin [facility]) 0.4 mg Take 1 capsule (0.4 mg total) by mouth daily with dinner 5/13/20   Anthony Uribe PA-C     I have reviewed home medications using recent Epic encounter. Allergies: No Known Allergies    Social History:  Marital Status: /Civil Union   Patient Pre-hospital Living Situation: Assisted Living  Patient Pre-hospital Level of Mobility: power wheelchair  Patient Pre-hospital Diet Restrictions: diabetic   Substance Use History:   Social History     Substance and Sexual Activity   Alcohol Use Not Currently     Social History     Tobacco Use   Smoking Status Never   Smokeless Tobacco Never     Social History     Substance and Sexual Activity   Drug Use No       Family History:  Family History   Problem Relation Age of Onset   • Coronary artery disease Mother    • Diabetes Father        Physical Exam:     Vitals:   Blood Pressure: 143/77 (07/24/23 0315)  Pulse: 103 (07/24/23 0315)  Temperature: 98.6 °F (37 °C) (07/24/23 0315)  Temp Source: Oral (07/24/23 0258)  Respirations: 19 (07/24/23 0315)  Height: 5' 7" (170.2 cm) (07/23/23 1730)  Weight - Scale: 87 kg (191 lb 12.8 oz) (07/23/23 1730)  SpO2: 96 % (07/24/23 0315)    Physical Exam  Constitutional:       General: He is not in acute distress. Appearance: He is not ill-appearing, toxic-appearing or diaphoretic. HENT:      Mouth/Throat:      Mouth: Mucous membranes are dry. Eyes:      General: No scleral icterus. Cardiovascular:      Rate and Rhythm: Normal rate and regular rhythm. Heart sounds: Normal heart sounds.    Pulmonary:      Breath sounds: Normal breath sounds. Abdominal:      General: Abdomen is flat. Bowel sounds are normal.      Palpations: Abdomen is soft. Tenderness: There is no abdominal tenderness. Genitourinary:     Comments: Razo catheter with clear yellow urine   Musculoskeletal:      Comments: Trace bilateral LE edema   Skin:     General: Skin is warm. Neurological:      General: No focal deficit present. Mental Status: He is alert and oriented to person, place, and time.       Comments: Chronic left sided weakness   Psychiatric:         Mood and Affect: Mood normal.          Additional Data:     Lab Results:  Results from last 7 days   Lab Units 07/24/23  0247 07/24/23  0108   WBC Thousand/uL  --  13.59*   HEMOGLOBIN g/dL 6.3* 6.3*   HEMATOCRIT % 23.1* 22.6*   PLATELETS Thousands/uL  --  432*   NEUTROS PCT %  --  88*   LYMPHS PCT %  --  4*   MONOS PCT %  --  6   EOS PCT %  --  1     Results from last 7 days   Lab Units 07/24/23  0236   SODIUM mmol/L 137   POTASSIUM mmol/L 3.6   CHLORIDE mmol/L 102   CO2 mmol/L 26   BUN mg/dL 13   CREATININE mg/dL 0.98   ANION GAP mmol/L 9   CALCIUM mg/dL 8.2*   ALBUMIN g/dL 3.2*   TOTAL BILIRUBIN mg/dL 0.52   ALK PHOS U/L 58   ALT U/L 7   AST U/L 7*   GLUCOSE RANDOM mg/dL 315*     Results from last 7 days   Lab Units 07/24/23  0236   INR  1.12     Results from last 7 days   Lab Units 07/24/23  0400 07/24/23  0245 07/24/23  0102 07/23/23  2341 07/23/23  2132 07/23/23  2008 07/23/23  1750 07/23/23  1711   POC GLUCOSE mg/dl 311* 348* 365* 353* 365* 362* 459* 440*     Results from last 7 days   Lab Units 07/23/23  1850   HEMOGLOBIN A1C % 12.4*     Results from last 7 days   Lab Units 07/24/23  0236   LACTIC ACID mmol/L 1.2   PROCALCITONIN ng/ml 0.19       Lines/Drains:  Invasive Devices     Peripheral Intravenous Line  Duration           Peripheral IV 07/23/23 Right Antecubital <1 day    Peripheral IV 07/24/23 Distal;Left;Ventral (anterior) Forearm <1 day          Drain  Duration           Urethral Catheter <1 day                    Imaging: No pertinent imaging reviewed. No orders to display       EKG and Other Studies Reviewed on Admission:   · EKG: NSR RBBB. ** Please Note: This note has been constructed using a voice recognition system.  **

## 2023-07-24 NOTE — ASSESSMENT & PLAN NOTE
· Hgb 7.3 on admission  · Repeat labs obtained in the ED show Hgb 6.3 although suspect this is dilutional, follow up repeat H+H   · Prior baseline Hgb 9-10 in January 2023  · Iron study consistent with REN   · Will obtain vitamin B12 and folate as well   · Patient unsure regarding melena/hematochezia   · Hold off on IV Venofer given concern for acute infection/sepsis   · Per ED, rectal exam hemoccult negative  · Will obtain FIT   · Hold ASA, Plavix, DVT ppx for now   · Transfuse for Hgb < 7   · Blood transfusion consent obtained

## 2023-07-24 NOTE — CASE MANAGEMENT
Case Management Assessment & Discharge Planning Note    Patient name Mckenzie Truong S /S -51 MRN [id number]  : 1950 Date 2023       Current Admission Date: 2023  Current Admission Diagnosis:Sepsis [facility])   Patient Active Problem List    Diagnosis Date Noted   • Iron deficiency anemia 2023   • Sepsis ([address]) 2023   • Dry eyes 2023   • Intertrigo 2022   • Balanitis 2022   • Requires daily assistance for activities of daily living (ADL) and comfort needs 2022   • Fall 2022   • Parkinson disease ([address]) 2022   • Chest pain 2022   • Leukocytosis 2022   • Coronary artery disease involving native coronary artery 2022   • Type 2 MI (myocardial infarction) ([address]) 2022   • Generalized abdominal pain 2021   • Lactic acidosis 2021   • Urinary incontinence 2021   • Vitamin B12 deficiency    • History of recurrent UTIs 2020   • Insomnia 2020   • Constipation 2020   • Benign paroxysmal positional vertigo 2020   • BPH (benign prostatic hyperplasia) 2020   • Vitamin D deficiency 2020   • Left-sided weakness 2020   • Urinary tract infection associated with indwelling urethral catheter ([address]) 2020   • Cystitis 2020   • Bladder stones 2020   • Stage 3 chronic kidney disease ([address]) 2020   • Anemia 2020   • Colon cancer screening 2020   • Essential hypertension 2019   • Type 2 diabetes mellitus with hyperglycemia ([address]) 2019   • Nephrolithiasis 2019   • Ambulatory dysfunction 2019   • Paresis ([address]) 2019   • Abnormal PSA 2019   • Dizziness 10/04/2017   • Pancreas cyst 2016   • Type 2 diabetes mellitus with diabetic neuropathy ([address]) 2015   • Hyperlipidemia 2012      LOS (days): 0  Geometric Mean LOS (GMLOS) (days):   Days to GMLOS:     OBJECTIVE:    Risk of Unplanned Readmission Score: 15.11         Current admission status: Inpatient       Preferred Pharmacy:   [address] [place], [place] - [address] [facility]  [address],  [address] [place] [address] [address]  Phone: [phone] Fax: [address] [facility]  [address] [facility] [address] [facility]  Phone: [phone] Fax: [phone]    Primary Care Provider: Dinah Castellano MD    Primary Insurance: [facility] [address] [facility]  Secondary Insurance:     ASSESSMENT:  [address] [facility], [address] - Spouse   Primary Phone: [phone] (Mobile)                         Readmission Root Cause  30 Day Readmission: No    Patient Information  Admitted from[de-identified] Facility [facility])  Mental Status: Confused  During Assessment patient was accompanied by: Not accompanied during assessment  Assessment information provided by[de-identified] Spouse  Primary Caregiver: Other (Comment)  Caregiver's Name[de-identified] Nilescarrie Michel Relationship to Patient[de-identified] Other (Specify)  Caregiver's Telephone Number[de-identified] [phone]  Support Systems: Spouse/significant other  [place] of Residence: [facility] [address] [facility] [address] do you live in?: [address] entry access options.  Select all that apply.: No steps to enter home  Type of Current Residence: Facility  Upon entering residence, is there a bedroom on the main floor (no further steps)?: Yes  Upon entering residence, is there a bathroom on the main floor (no further steps)?: Yes  In the last 12 months, was there a time when you were not able to pay the mortgage or rent on time?: No  In the last 12 months, how many places have you lived?: 1  In the last 12 months, was there a time when you did not have a steady place to sleep or slept in a shelter (including now)?: No  Homeless/housing insecurity resource given?: N/A  Living Arrangements: Other (Comment)  Is patient a ?: No    Activities of Daily Living Prior to Admission  Functional Status: Assistance  Completes ADLs independently?: No  Level of ADL dependence: Assistance  Ambulates independently?: No  Level of ambulatory dependence: Assistance  Does patient use assisted devices?: Yes  Assisted Devices (DME) used:  Wheelchair  Does patient currently own DME?: Yes  What DME does the patient currently own?: Wheelchair  Does patient have a history of Outpatient Therapy (PT/OT)?: No  Does the patient have a history of Short-Term Rehab?: Yes ([facility])  Does patient have a history of HHC?: Yes  Does patient currently have [address]?: No         Patient Information Continued  Income Source: Pension/skilled nursing  Does patient have prescription coverage?: Yes  Within the past 12 months, you worried that your food would run out before you got the money to buy more.: Never true  Within the past 12 months, the food you bought just didn't last and you didn't have money to get more.: Never true  Food insecurity resource given?: N/A  Does patient receive dialysis treatments?: No  Does patient have a history of substance abuse?: No  Does patient have a history of Mental Health Diagnosis?: No    PHQ 2/9 Screening   Reviewed PHQ 2/9 Depression Screening Score?: No    Means of Transportation  Means of Transport to Appts[de-identified] Other (Comment)  In the past 12 months, has lack of transportation kept you from medical appointments or from getting medications?: No  In the past 12 months, has lack of transportation kept you from meetings, work, or from getting things needed for daily living?: No  Was application for public transport provided?: N/A        DISCHARGE DETAILS:    Discharge planning discussed with[de-identified] Jeanne-spouse  Freedom of Choice: Yes  Comments - Freedom of Choice: Kelle Love reported the Pt is a resident at [facility] where she wants him to return upon d/c.  CM contacted family/caregiver?: Yes             Contacts  Patient Contacts: Kelle Love  Relationship to Patient[de-identified] Family  Contact Method: Phone  Phone Number: [phone]  Reason/Outcome: Discharge [address]         Is the patient interested in [address] at discharge?: No    DME Referral Provided  Referral made for DME?: No    Other Referral/Resources/Interventions Provided:  Interventions: Assisted Living      Treatment Team Recommendation: Assisted Living  Discharge Destination Plan[de-identified] Assisted Living

## 2023-07-24 NOTE — ASSESSMENT & PLAN NOTE
Lab Results   Component Value Date    HGBA1C 12.4 (H) 07/23/2023       Recent Labs     07/24/23  0245 07/24/23  0400 07/24/23  0702 07/24/23  1111   POCGLU 348* 311* 256* 337*       Blood Sugar Average: Last 72 hrs:  (P) 359.6     · Patient initially presented to the ER due to glucose reading >500 at [facility]. Asymptomatic. · Patient received Lantus 25U and 4U regular insulin in the ED with some improvement to 348  · No evidence of DKA/HHS   · Poorly controlled diabetes with HgbA1c 12.4   · Outpatient regimen (will need to be confirmed with nursing facility)- per ED note patient takes Lantus 20 units nightly and 1000mg Metformin BID.   · Patient states nursing facility administers his medications and he is unsure what meds he takes, unfortunately patient did not come to the hospital with a medication list   · Inpatient regimen: Lantus 27U qhs + humalog 4U TID AC + SSI coverage   · Avoid hypoglycemia

## 2023-07-24 NOTE — ASSESSMENT & PLAN NOTE
Lab Results   Component Value Date    HGBA1C 12.4 (H) 07/23/2023       Recent Labs     07/23/23  2132 07/23/23  2341 07/24/23  0102 07/24/23  0245   POCGLU 365* 353* 365* 348*       Blood Sugar Average: Last 72 hrs:  (P) [id number]     · Patient initially presented to the ER due to glucose reading >500 at [facility]. Asymptomatic. · Patient received Lantus 25U and 4U regular insulin in the ED with some improvement to 348  · No evidence of DKA/HHS   · Poorly controlled diabetes with HgbA1c 12.4   · Outpatient regimen (will need to be confirmed with nursing facility)- per ED note patient takes Lantus 20 units nightly and 1000mg Metformin BID.   · Patient states nursing facility administers his medications and he is unsure what meds he takes, unfortunately patient did not come to the hospital with a medication list   · Inpatient regimen: Lantus 27U qhs + humalog 2U TID AC + SSI coverage   · Avoid hypoglycemia

## 2023-07-24 NOTE — CASE MANAGEMENT
Case Management Progress Note    Patient name Estevan Gupta  Location S /S -92 MRN [id number]  : 1950 Date 2023       LOS (days): 0  Geometric Mean LOS (GMLOS) (days):   Days to GMLOS:        OBJECTIVE:        Current admission status: Inpatient  Preferred Pharmacy:   [address] [place] [address]  Phone: [phone] Fax: [address] [place] [address] [address]  Phone: [phone] Fax: [phone]    Primary Care Provider: Nidhi Clifton MD    Primary Insurance: [employer] [address] [facility]  Secondary Insurance:     PROGRESS NOTE:   CM called the Pt's [employer]  Zahra Mendoza and provided her with an update on the Pt. CM will continue to follow.

## 2023-07-24 NOTE — ASSESSMENT & PLAN NOTE
· Patient has chronic indwelling Razo catheter   · Patient asymptomatic  · Possible colonization however given sepsis will treat empirically pending urine cx   · UA with large leukocytes, + Nitrites, innumberable WBC  · Prior urine cx 12/31/22 70-79,000 staph aureus susceptible to cephalosporins  · Follow up urine culture   · IV Ceftriaxone

## 2023-07-24 NOTE — ASSESSMENT & PLAN NOTE
· Triple-vessel disease- not candidate for surgical intervention  · Denies any chest pain   · Holding ASA and Plavix 2/2 acute iron deficiency anemia   · Continue medical management with  BB, statin, Ranexa

## 2023-07-24 NOTE — PROGRESS NOTES
[facility]  Progress Note  Name: Saira Woodard  MRN: [id number]  Unit/Bed#: S -70 I Date of Admission: 7/23/2023   Date of Service: 7/24/2023 I Hospital Day: 0    Assessment/Plan   * Sepsis [facility])  Assessment & Plan  · Presents with leukocytosis, tachycardia  · Suspected source: indwelling Razo catheter associated UTI   · Continue with IV fluids   · IV Ceftriaxone   · Follow up blood and urine cultures       Iron deficiency anemia  Assessment & Plan  · Hgb 7.3 on admission  · Repeat labs obtained in the ED show Hgb 6.3 although suspect this is dilutional, follow up repeat H+H   · Prior baseline Hgb 9-10 in January 2023  · Iron study consistent with REN   · Will obtain vitamin B12 and folate as well   · Patient unsure regarding melena/hematochezia   · Hold off on IV Venofer given concern for acute infection/sepsis ; will start PO supplement   · Per ED, rectal exam hemoccult negative  · Will obtain FIT   · Hold ASA, Plavix, DVT ppx for now    · Transfuse for Hgb < 7   · Blood transfusion consent obtained     Parkinson disease ([address])  Assessment & Plan  · Continue Sinemet   · PT/OT     Coronary artery disease involving native coronary artery of native heart without angina pectoris  Assessment & Plan  · Triple-vessel disease- not candidate for surgical intervention  · Denies any chest pain   · Holding ASA and Plavix 2/2 acute iron deficiency anemia   · Continue medical management with  BB, statin, Ranexa     Constipation  Assessment & Plan  · Patient reports no BM in >1 week   · Colace, Miralax, senna, and PRN dulcolax     Left-sided weakness  Assessment & Plan  · Chronic left sided weakness 2/2 Parkinson's disease and neuropathy      Urinary tract infection associated with indwelling urethral catheter ([address])  Assessment & Plan  · Patient has chronic indwelling Razo catheter   · Patient asymptomatic  · Possible colonization however given sepsis will treat empirically pending urine cx · UA with large leukocytes, + Nitrites, innumberable WBC  · Prior urine cx 12/31/22 70-79,000 staph aureus susceptible to cephalosporins  · Follow up urine culture   · IV Ceftriaxone      Stage 3a chronic kidney disease [facility])  Assessment & Plan  Lab Results   Component Value Date    EGFR 66 07/24/2023    EGFR 76 07/24/2023    EGFR 56 07/23/2023    CREATININE 1.10 07/24/2023    CREATININE 0.98 07/24/2023    CREATININE 1.26 07/23/2023     · Stable   · Baseline Cr 1.1-1.3 mg/dL   · Avoid nephrotoxins/hypotension     Type 2 diabetes mellitus with hyperglycemia, with long-term current use of insulin [facility])  Assessment & Plan  Lab Results   Component Value Date    HGBA1C 12.4 (H) 07/23/2023       Recent Labs     07/24/23  0245 07/24/23  0400 07/24/23  0702 07/24/23  1111   POCGLU 348* 311* 256* 337*       Blood Sugar Average: Last 72 hrs:  (P) 359.6     · Patient initially presented to the ER due to glucose reading >500 at [facility]. Asymptomatic. · Patient received Lantus 25U and 4U regular insulin in the ED with some improvement to 348  · No evidence of DKA/HHS   · Poorly controlled diabetes with HgbA1c 12.4   · Outpatient regimen (will need to be confirmed with nursing facility)- per ED note patient takes Lantus 20 units nightly and 1000mg Metformin BID. · Patient states nursing facility administers his medications and he is unsure what meds he takes, unfortunately patient did not come to the hospital with a medication list   · Inpatient regimen: Lantus 27U qhs + humalog 4U TID AC + SSI coverage   · Avoid hypoglycemia      Essential hypertension  Assessment & Plan  · Continue lisinopril and metoprolol             S: feeling slightly better. Denies any pain currently. Appetite is good.      O: /76, , resp 16, SpO 97% temp 97.8F     Physical Exam  Vitals and nursing note reviewed. Constitutional:       General: He is not in acute distress. Appearance: Normal appearance.       Comments: Frail and elderly HENT:      Head: Normocephalic and atraumatic. Right Ear: External ear normal.      Left Ear: External ear normal.      Nose: Nose normal.      Mouth/Throat:      Mouth: Mucous membranes are moist.      Pharynx: Oropharynx is clear. Eyes:      General:         Right eye: No discharge. Left eye: No discharge. Extraocular Movements: Extraocular movements intact. Pupils: Pupils are equal, round, and reactive to light. Cardiovascular:      Rate and Rhythm: Normal rate and regular rhythm. Pulses: Normal pulses. Heart sounds: Normal heart sounds. No murmur heard. Pulmonary:      Effort: Pulmonary effort is normal. No respiratory distress. Breath sounds: Normal breath sounds. No wheezing or rales. Abdominal:      General: Bowel sounds are normal. There is no distension. Palpations: Abdomen is soft. There is no mass. Tenderness: There is no abdominal tenderness. Genitourinary:     Comments: Razo catheter with clear yellow urine     Musculoskeletal:         General: No swelling, tenderness or deformity. Normal range of motion. Cervical back: Normal range of motion and neck supple. No rigidity. Skin:     General: Skin is warm and dry. Capillary Refill: Capillary refill takes less than 2 seconds. Coloration: Skin is not pale. Findings: No erythema. Neurological:      General: No focal deficit present. Mental Status: He is alert and oriented to person, place, and time. Mental status is at baseline.    Psychiatric:         Mood and Affect: Mood normal.         Behavior: Behavior normal.

## 2023-07-24 NOTE — ASSESSMENT & PLAN NOTE
· Presents with leukocytosis, tachycardia  · Suspected source: indwelling Razo catheter associated UTI   · IVF   · IV Ceftriaxone   · Follow up blood and urine cultures

## 2023-07-24 NOTE — ASSESSMENT & PLAN NOTE
· Iron study consistent with REN   · B12 and folate within normal limits. B12 less than 400. Start oral supplementation here  · Hold off on IV Venofer given concern for acute infection/sepsis. P.o. supplementation started.

## 2023-07-25 ENCOUNTER — APPOINTMENT (INPATIENT)
Dept: ULTRASOUND IMAGING | Facility: HOSPITAL | Age: 73
DRG: 698 | End: 2023-07-25
Payer: MEDICARE

## 2023-07-25 PROBLEM — D62 ACUTE BLOOD LOSS ANEMIA: Status: ACTIVE | Noted: 2023-07-25

## 2023-07-25 LAB
ANION GAP SERPL CALCULATED.3IONS-SCNC: 5 MMOL/L
BACTERIA UR CULT: ABNORMAL
BUN SERPL-MCNC: 11 MG/DL (ref 5–25)
CALCIUM SERPL-MCNC: 7.7 MG/DL (ref 8.4–10.2)
CHLORIDE SERPL-SCNC: 104 MMOL/L (ref 96–108)
CO2 SERPL-SCNC: 28 MMOL/L (ref 21–32)
CREAT SERPL-MCNC: 1.19 MG/DL (ref 0.6–1.3)
ERYTHROCYTE [DISTWIDTH] IN BLOOD BY AUTOMATED COUNT: 18.6 % (ref 11.6–15.1)
GFR SERPL CREATININE-BSD FRML MDRD: 60 ML/MIN/1.73SQ M
GLUCOSE SERPL-MCNC: 217 MG/DL (ref 65–140)
GLUCOSE SERPL-MCNC: 223 MG/DL (ref 65–140)
GLUCOSE SERPL-MCNC: 273 MG/DL (ref 65–140)
GLUCOSE SERPL-MCNC: 349 MG/DL (ref 65–140)
GLUCOSE SERPL-MCNC: 386 MG/DL (ref 65–140)
HCT VFR BLD AUTO: 25 % (ref 36.5–49.3)
HCT VFR BLD AUTO: 27.4 % (ref 36.5–49.3)
HGB BLD-MCNC: 7 G/DL (ref 12–17)
HGB BLD-MCNC: 7.6 G/DL (ref 12–17)
MAGNESIUM SERPL-MCNC: 1.8 MG/DL (ref 1.9–2.7)
MCH RBC QN AUTO: 20.2 PG (ref 26.8–34.3)
MCHC RBC AUTO-ENTMCNC: 28 G/DL (ref 31.4–37.4)
MCV RBC AUTO: 72 FL (ref 82–98)
PLATELET # BLD AUTO: 403 THOUSANDS/UL (ref 149–390)
PMV BLD AUTO: 10.2 FL (ref 8.9–12.7)
POTASSIUM SERPL-SCNC: 3.2 MMOL/L (ref 3.5–5.3)
RBC # BLD AUTO: 3.46 MILLION/UL (ref 3.88–5.62)
SODIUM SERPL-SCNC: 137 MMOL/L (ref 135–147)
WBC # BLD AUTO: 12.4 THOUSAND/UL (ref 4.31–10.16)

## 2023-07-25 PROCEDURE — NC001 PR NO CHARGE: Performed by: UROLOGY

## 2023-07-25 PROCEDURE — 85018 HEMOGLOBIN: CPT

## 2023-07-25 PROCEDURE — 82948 REAGENT STRIP/BLOOD GLUCOSE: CPT

## 2023-07-25 PROCEDURE — 85014 HEMATOCRIT: CPT

## 2023-07-25 PROCEDURE — 83735 ASSAY OF MAGNESIUM: CPT

## 2023-07-25 PROCEDURE — 99233 SBSQ HOSP IP/OBS HIGH 50: CPT

## 2023-07-25 PROCEDURE — 76705 ECHO EXAM OF ABDOMEN: CPT

## 2023-07-25 PROCEDURE — 99222 1ST HOSP IP/OBS MODERATE 55: CPT | Performed by: UROLOGY

## 2023-07-25 PROCEDURE — 85027 COMPLETE CBC AUTOMATED: CPT | Performed by: NURSE PRACTITIONER

## 2023-07-25 PROCEDURE — 80048 BASIC METABOLIC PNL TOTAL CA: CPT | Performed by: NURSE PRACTITIONER

## 2023-07-25 RX ORDER — MAGNESIUM SULFATE 1 G/100ML
1 INJECTION INTRAVENOUS ONCE
Status: COMPLETED | OUTPATIENT
Start: 2023-07-25 | End: 2023-07-25

## 2023-07-25 RX ORDER — POTASSIUM CHLORIDE 20 MEQ/1
40 TABLET, EXTENDED RELEASE ORAL ONCE
Status: COMPLETED | OUTPATIENT
Start: 2023-07-25 | End: 2023-07-25

## 2023-07-25 RX ORDER — INSULIN GLARGINE 100 [IU]/ML
30 INJECTION, SOLUTION SUBCUTANEOUS
Status: DISCONTINUED | OUTPATIENT
Start: 2023-07-25 | End: 2023-07-26

## 2023-07-25 RX ADMIN — LISINOPRIL 5 MG: 5 TABLET ORAL at 08:30

## 2023-07-25 RX ADMIN — INSULIN LISPRO 6 UNITS: 100 INJECTION, SOLUTION INTRAVENOUS; SUBCUTANEOUS at 21:41

## 2023-07-25 RX ADMIN — PANTOPRAZOLE SODIUM 40 MG: 40 TABLET, DELAYED RELEASE ORAL at 17:12

## 2023-07-25 RX ADMIN — INSULIN LISPRO 4 UNITS: 100 INJECTION, SOLUTION INTRAVENOUS; SUBCUTANEOUS at 11:02

## 2023-07-25 RX ADMIN — Medication 100 MCG: at 08:30

## 2023-07-25 RX ADMIN — POLYETHYLENE GLYCOL 3350 17 G: 17 POWDER, FOR SOLUTION ORAL at 21:40

## 2023-07-25 RX ADMIN — DOCUSATE SODIUM 100 MG: 100 CAPSULE, LIQUID FILLED ORAL at 08:30

## 2023-07-25 RX ADMIN — FAMOTIDINE 20 MG: 20 TABLET, FILM COATED ORAL at 21:40

## 2023-07-25 RX ADMIN — METOPROLOL SUCCINATE 25 MG: 25 TABLET, EXTENDED RELEASE ORAL at 08:30

## 2023-07-25 RX ADMIN — INSULIN LISPRO 5 UNITS: 100 INJECTION, SOLUTION INTRAVENOUS; SUBCUTANEOUS at 17:12

## 2023-07-25 RX ADMIN — MAGNESIUM SULFATE HEPTAHYDRATE 1 G: 1 INJECTION, SOLUTION INTRAVENOUS at 11:02

## 2023-07-25 RX ADMIN — POTASSIUM CHLORIDE 40 MEQ: 1500 TABLET, EXTENDED RELEASE ORAL at 07:31

## 2023-07-25 RX ADMIN — FERROUS GLUCONATE 324 MG: 324 TABLET ORAL at 06:01

## 2023-07-25 RX ADMIN — CARBIDOPA AND LEVODOPA 1 TABLET: 25; 100 TABLET ORAL at 17:12

## 2023-07-25 RX ADMIN — POLYETHYLENE GLYCOL 3350 17 G: 17 POWDER, FOR SOLUTION ORAL at 08:27

## 2023-07-25 RX ADMIN — ATORVASTATIN CALCIUM 40 MG: 40 TABLET, FILM COATED ORAL at 08:30

## 2023-07-25 RX ADMIN — TAMSULOSIN HYDROCHLORIDE 0.4 MG: 0.4 CAPSULE ORAL at 17:12

## 2023-07-25 RX ADMIN — FINASTERIDE 5 MG: 5 TABLET, FILM COATED ORAL at 08:30

## 2023-07-25 RX ADMIN — INSULIN LISPRO 4 UNITS: 100 INJECTION, SOLUTION INTRAVENOUS; SUBCUTANEOUS at 07:05

## 2023-07-25 RX ADMIN — INSULIN LISPRO 4 UNITS: 100 INJECTION, SOLUTION INTRAVENOUS; SUBCUTANEOUS at 17:13

## 2023-07-25 RX ADMIN — RANOLAZINE 500 MG: 500 TABLET, FILM COATED, EXTENDED RELEASE ORAL at 08:29

## 2023-07-25 RX ADMIN — INSULIN LISPRO 2 UNITS: 100 INJECTION, SOLUTION INTRAVENOUS; SUBCUTANEOUS at 07:05

## 2023-07-25 RX ADMIN — Medication 3 MG: at 21:40

## 2023-07-25 RX ADMIN — DOCUSATE SODIUM 100 MG: 100 CAPSULE, LIQUID FILLED ORAL at 17:15

## 2023-07-25 RX ADMIN — CARBIDOPA AND LEVODOPA 1 TABLET: 25; 100 TABLET ORAL at 21:40

## 2023-07-25 RX ADMIN — CARBIDOPA AND LEVODOPA 1 TABLET: 25; 100 TABLET ORAL at 08:29

## 2023-07-25 RX ADMIN — Medication 1000 MG: at 04:10

## 2023-07-25 RX ADMIN — INSULIN GLARGINE 30 UNITS: 100 INJECTION, SOLUTION SUBCUTANEOUS at 21:42

## 2023-07-25 RX ADMIN — RANOLAZINE 500 MG: 500 TABLET, FILM COATED, EXTENDED RELEASE ORAL at 21:40

## 2023-07-25 RX ADMIN — SENNOSIDES 17.2 MG: 8.6 TABLET, FILM COATED ORAL at 21:40

## 2023-07-25 RX ADMIN — PANTOPRAZOLE SODIUM 40 MG: 40 TABLET, DELAYED RELEASE ORAL at 06:01

## 2023-07-25 NOTE — UTILIZATION REVIEW
Initial Clinical Review    Admission: Date/Time/Statement:   Admission Orders (From admission, onward)     Ordered        07/24/23 0212  INPATIENT ADMISSION  Once                      Orders Placed This Encounter   Procedures   • INPATIENT ADMISSION     Standing Status:   Standing     Number of Occurrences:   1     Order Specific Question:   Level of Care     Answer:   Med Surg [16]     Order Specific Question:   Estimated length of stay     Answer:   More than 2 Midnights     Order Specific Question:   Certification     Answer:   I certify that inpatient services are medically necessary for this patient for a duration of greater than two midnights. See H&P and MD Progress Notes for additional information about the patient's course of treatment. ED Arrival Information     Expected   -    Arrival   7/23/2023 17:04    Acuity   Emergent            Means of arrival   Ambulance    Escorted by   [facility]    Admission type   Emergency            Arrival complaint   -           Chief Complaint   Patient presents with   • Hyperglycemia - no symptoms     Pt presents to the ED with c/o high blood sugar. Resides in nursing home, nursing staff checked BS today and reported over 500. Pt has no complaints. Initial Presentation: 68 y.o. male PMH of Parkinson's disease, T2DM, CAD, CKD,severe sepsis 2/2 UTI 01/2023 , chronic L sided weakness who presents to ED from [facility]  facility with glucose reading >500 at facility. Per facility, patient is currently only on a long acting insulin Lantus 20U qhs and 1000mg metformin BID. His A1c went from 6.4 03/05/2022 to 12.4 today. On exam, pt alert, oriented, poor historian. Pt has wilkerson cath in place with clear yellow urine , reports being thirsty and last bm he thinks was 1 week ago , dry mucous membranes the patient tachycardic.rectal exam hemoccult neg.   Labs - WBC 13.59, Hgb7.3 from baseline 9-10---> 6.3(?dilutional ), plt 432, glucose 440 .UA with large leukocytes, + Nitrites, innumberable WBC. Pt given IV abx, Insulin, metformin in ED. Pt admitted as Inpatient with sepsis suspected source indwelling wilkerson associated UTI  , iron deficiency anemia. Plan - IV Ceftriaxone, F/U sepsis workup . IVF . Obtain B12, folate levels, FIT . Start po iron supplement . Hold ASA, Plavix. Transfuse Hgb <7 . Transfused 1 U PRBC . Lantus 27U qhs + humalog 2U TID AC + SSI coverage . Bowel regime . Date: 7/25  Day 2:    Day 3: Has surpassed a 2nd midnight with active treatments and services, which include :  Medicine- GI consult placed today . Hgb up to  7.0 this am. Per [facility], wilkerson placed 1 week ago 2/2 ongoing urinary incontinuence and concern for skin breakdown . Continues IV Ceftriaxone, day 2 . Urine cx >100,00 proteus . Blood cultures negative at 24 hours, follow . US RUQ ordered to assess liver with hx ETOH use- quit '85 . Awai tread KUB done 7/24- was ordered 2/2 constipation, bloating . Pt has bm yesterday , passing flatus . Protuberant abdomen. Soft to palpation. Mild tenderness. .  Skin pale . Low b12 level, to start supplement . Mag 1.8, repletion ordered. Glucose down trending to 200's today ,still elevated. Humalog increased from 2 U to 4 U yesterday TID AC. Will increase Lantus to 30 units nightly . GI consult-Hgb 7.0-->7.6 today after transfused 1 U PRBC yesterday. Pt having brown stool per [facility] . Anemia may be multifactorial due to underlying sepsis, CKD, occult GI bleeding from AVM, ulcer, lesion in the setting of Plavix/ASA use. He has never had an EGD or colonoscopy. Plan - Consider EGD/Colonoscopy on Friday when off Plavix for 5 days & when stable from infection standpoint & blood sugars stablilized. Continue PPI BID. Monitor CBC.    CL diet and NPO past Thursday MN     ED Triage Vitals   Temperature Pulse Respirations Blood Pressure SpO2   07/23/23 1707 07/23/23 1707 07/23/23 1707 07/23/23 1707 07/23/23 1707   98.1 °F (36.7 °C) 97 16 (!) 178/77 98 %      Temp Source Heart Rate Source Patient Position - Orthostatic VS BP Location FiO2 (%)   07/23/23 1707 07/23/23 1707 07/23/23 1730 07/23/23 1707 --   Oral Monitor Sitting Right arm       Pain Score       07/23/23 1707       No Pain          Wt Readings from Last 1 Encounters:   07/23/23 87 kg (191 lb 12.8 oz)     Additional Vital Signs:   /Time Temp Pulse Resp BP MAP (mmHg) SpO2 O2 Device Patient Position - Orthostatic VS   07/25/23 07:06:39 98.3 °F (36.8 °C) 91 18 116/60 79 95 % -- --   07/24/23 2300 -- -- -- -- -- -- None (Room air) --   07/24/23 21:26:19 99.2 °F (37.3 °C) 94 19 140/62 88 95 % None (Room air) Lying   07/24/23 15:29:56 98.9 °F (37.2 °C) 90 20 133/61 85 96 % None (Room air) Lying   07/24/23 0739 97.8 °F (36.6 °C) 100 16 162/76 -- -- -- --   07/24/23 07:37:45 97.8 °F (36.6 °C) 100 -- 162/76 105 97 % -- --   07/24/23 07:01:29 98.8 °F (37.1 °C) 95 18 151/76 101 96 % -- --   07/24/23 0450 99.4 °F (37.4 °C) -- 16 119/60 -- -- -- Sitting   07/24/23 0433 -- -- 16 -- -- -- -- --   07/24/23 04:28:01 98.7 °F (37.1 °C) 99 16 128/65 86 97 % -- --   07/24/23 04:27:25 -- 98 -- 128/65 86 96 % -- --   07/24/23 03:15:17 98.6 °F (37 °C) 103 19 143/77 99 96 % -- --   07/24/23 0258 99.7 °F (37.6 °C) -- -- -- -- -- -- --   07/24/23 0230 -- 106 Abnormal  22 149/65 94 95 % None (Room air) Sitting   07/24/23 0200 -- 109 Abnormal  22 158/72 103 95 % None (Room air) Sitting   07/24/23 0130 -- 110 Abnormal  22 143/67 96 94 % None (Room air) Sitting   07/24/23 0100 99.9 °F (37.7 °C) 107 Abnormal  22 136/63 91 94 % None (Room air) Sitting   07/24/23 0030 -- 107 Abnormal  18 150/69 99 95 % None (Room air) Sitting   07/23/23 2330 -- 104 18 140/67 96 92 % -- --   07/23/23 2300 -- 106 Abnormal  -- 158/75 108 93 % -- --   07/23/23 2230 -- 115 Abnormal  -- 163/77 111 92 % -- --   07/23/23 2200 -- 97 18 148/69 99 95 % None (Room air) Sitting   07/23/23 2130 -- 94 18 129/60 87 93 % None (Room air) Sitting 07/23/23 2100 -- 97 18 107/53 76 94 % None (Room air) Sitting   07/23/23 2030 -- 105 18 164/74 106 96 % None (Room air) Sitting   07/23/23 1930 -- 105 18 163/76 109 96 % None (Room air) Sitting   07/23/23 1900 -- 101 18 172/77 Abnormal  110 98 % None (Room air) Sitting   07/23/23 1830 -- 97 18 170/70 101 98 % None (Room air) Sitting   07/23/23 1800 -- 99 18 169/77 110 98 % None (Room air) Sitting   07/23/23 1730 -- 93 16 148/69 99 98 % None (Room air) Sitting     Pertinent Labs/Diagnostic Test Results:   7/23 ECG-Normal sinus rhythm  Right bundle branch block        XR abdomen 1 view kub    (Results Pending)   US right upper quadrant    (7/25/23-  1. Mild steatosis. No associated focal masses.  Smooth hepatic contour.   2. Incidental simple appearing right renal cysts.   3. Patient status post cholecystectomy           Results from last 7 days   Lab Units 07/25/23  0442 07/24/23  1423 07/24/23  0759 07/24/23  0247 07/24/23  0108 07/23/23  1745   WBC Thousand/uL 12.40*  --  13.24*  --  13.59* 20.46*   HEMOGLOBIN g/dL 7.0* 7.3* 7.5* 6.3* 6.3* 7.3*   HEMATOCRIT % 25.0* 25.9* 26.5* 23.1* 22.6* 26.3*   PLATELETS Thousands/uL 403*  --  432*  --  432* 534*   NEUTROS ABS Thousands/µL  --   --  11.31*  --  11.94* 17.85*     Component Ref Range & Units 7/25/23 1211   Hemoglobin 12.0 - 17.0 g/dL 7.6 Low     Hematocrit 36.5 - 49.3 % 27.4 Low             Results from last 7 days   Lab Units 07/25/23  0442 07/24/23  0759 07/24/23  0236 07/23/23  1745   SODIUM mmol/L 137 136 137 133*   POTASSIUM mmol/L 3.2* 3.6 3.6 3.8   CHLORIDE mmol/L 104 102 102 96   CO2 mmol/L 28 27 26 27   ANION GAP mmol/L 5 7 9 10   BUN mg/dL 11 12 13 18   CREATININE mg/dL 1.19 1.10 0.98 1.26   EGFR ml/min/1.73sq m 60 66 76 56   CALCIUM mg/dL 7.7* 7.9* 8.2* 8.7   MAGNESIUM mg/dL 1.8*  --   --   --      Results from last 7 days   Lab Units 07/24/23  0236   AST U/L 7*   ALT U/L 7   ALK PHOS U/L 58   TOTAL PROTEIN g/dL 5.8*   ALBUMIN g/dL 3.2*   TOTAL BILIRUBIN mg/dL 0.52     Results from last 7 days   Lab Units 07/25/23  0616 07/24/23  2125 07/24/23  1553 07/24/23  1111 07/24/23  0702 07/24/23  0400 07/24/23  0245 07/24/23  0102 07/23/23  2341 07/23/23  2132 07/23/23 2008 07/23/23  1750   POC GLUCOSE mg/dl 223* 282* 371* 337* 256* 311* 348* 365* 353* 365* 362* 459*     Results from last 7 days   Lab Units 07/25/23  0442 07/24/23  0759 07/24/23  0236 07/23/23  1745   GLUCOSE RANDOM mg/dL 217* 315* 315* 464*         Results from last 7 days   Lab Units 07/23/23  1850   HEMOGLOBIN A1C % 12.4*   EAG mg/dl 309     No results found for: "BETA-HYDROXYBUTYRATE"       Results from last 7 days   Lab Units 07/23/23  1745   PH SUNNY  7.346   PCO2 SUNNY mm Hg 47.7   PO2 SUNNY mm Hg 26.7*   HCO3 SUNNY mmol/L 25.5   BASE EXC SUNNY mmol/L -0.3   O2 CONTENT SUNNY ml/dL 4.8   O2 HGB, VENOUS % 41.8*                     Results from last 7 days   Lab Units 07/24/23  0236   PROTIME seconds 14.6*   INR  1.12   PTT seconds 31         Results from last 7 days   Lab Units 07/24/23  0236   PROCALCITONIN ng/ml 0.19     Results from last 7 days   Lab Units 07/24/23  0236   LACTIC ACID mmol/L 1.2                 Results from last 7 days   Lab Units 07/23/23  1853   FERRITIN ng/mL 20*   IRON SATURATION % 6*   IRON ug/dL 22*   TIBC ug/dL 362         Results from last 7 days   Lab Units 07/24/23  0547   UNIT PRODUCT CODE  [id number]   UNIT NUMBER  [id number]   UNITABO  A   UNITRH  POS   CROSSMATCH  Compatible   UNIT DISPENSE STATUS  Presumed Trans   UNIT PRODUCT VOL ml 350                         Results from last 7 days   Lab Units 07/23/23  1850   CLARITY UA  Turbid   COLOR UA  Light Yellow   SPEC GRAV UA  1.027   PH UA  6.5   GLUCOSE UA mg/dl >=1000 (1%)*   KETONES UA mg/dl Negative   BLOOD UA  Small*   PROTEIN UA mg/dl 50 (1+)*   NITRITE UA  Positive*   BILIRUBIN UA  Negative   UROBILINOGEN UA (BE) mg/dl <2.0   LEUKOCYTES UA  Large*   WBC UA /hpf Innumerable*   RBC UA /hpf 4-10*   BACTERIA UA /hpf None Seen   EPITHELIAL CELLS WET PREP /hpf None Seen                   Results from last 7 days   Lab Units 07/24/23  0236 07/23/23  1934 07/23/23  1853   BLOOD CULTURE  Received in Microbiology Lab. Culture in Progress. --  Received in Microbiology Lab. Culture in Progress.    URINE CULTURE   --  >100,000 cfu/ml Proteus mirabilis*  --                    ED Treatment:   Medication Administration from 07/23/2023 1704 to 07/24/2023 5101       Date/Time Order Dose Route Action     07/23/2023 2300 EDT lactated ringers infusion 1,000 mL 0 mL Intravenous Stopped     07/23/2023 1844 EDT lactated ringers infusion 1,000 mL 1,000 mL Intravenous New Bag     07/23/2023 1846 EDT metFORMIN (GLUCOPHAGE) tablet 1,000 mg 1,000 mg Oral Given     07/23/2023 1915 EDT insulin glargine (LANTUS) subcutaneous injection 25 Units 0.25 mL 25 Units Subcutaneous Given     07/23/2023 1937 EDT insulin glargine (LANTUS) subcutaneous injection 25 Units 0.25 mL 25 Units Subcutaneous Not Given     07/23/2023 2341 EDT insulin regular (HumuLIN R,NovoLIN R) injection 4 Units 4 Units Subcutaneous Given     07/24/2023 0236 EDT piperacillin-tazobactam (ZOSYN) 4.5 g in sodium chloride 0.9 % 100 mL IVPB 4.5 g Intravenous New Bag        Past Medical History:   Diagnosis Date   • Ambulatory dysfunction     uses walker with assist of 1   • Anemia    • Anxiety    • At risk for falls     hx of falls   • Benign paroxysmal vertigo     unspecified ear   • BPH (benign prostatic hyperplasia)     for TURP today 1/4/2021   • Calculus in bladder     for surgical removal today 1/4/2021   • Cataract     left eye   • Cervicalgia    • Chest pain    • Chronic kidney disease     stage 3   • Constipation    • CTS (carpal tunnel syndrome)     left   • Cyst of pancreas    • Cystitis 06/23/2020    acute cystitis with hematuria   • Depression    • Diabetes mellitus ([address])     type II with neuropathy   • Dizziness     and giddiness   • Dysphagia    • Elevated PSA    • Facial weakness • GERD (gastroesophageal reflux disease)     last assessed 5/20/16   • Gross hematuria    • Hematuria    • Hyperlipidemia    • Hypertension    • Kidney disease    • Kidney stone    • Left-sided weakness    • Long term (current) use of oral hypoglycemic drugs    • Muscle weakness    • Nuclear senile cataract of left eye    • OA (osteoarthritis) of knee     b/l   • 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HCC)    • Syncope and collapse    • Tachycardia    • UTI (urinary tract infection)    • Vertebro-basilar artery syndrome    • Vitamin B deficiency    • Vitamin D deficiency    • Vitamin D deficiency      Present on Admission:  • Stage 3a chronic kidney disease (HCC)  • Coronary artery disease involving native coronary artery of native heart without angina pectoris  • Essential hypertension  • Left-sided weakness  • Parkinson disease ([facility])  • Constipation  • Vitamin B12 deficiency      Admitting Diagnosis: Balanitis [N48.1]  Leukocytosis [D72.829]  Primary hypertension [I10]  Anemia [D64.9]  Hyperglycemia [R73.9]  Sepsis ([address]) [A41.9]  Anemia, unspecified type [D64.9]  Type 2 diabetes mellitus with hyperglycemia, with long-term current use of insulin ([facility]) [E11.65, Z79.4]  Age/Sex: 68 y.o. male  Admission Orders:  Scheduled Medications:  atorvastatin, 40 mg, Oral, Daily  carbidopa-levodopa, 1 tablet, Oral, TID  cefTRIAXone, 1,000 mg, Intravenous, Q24H  cyanocobalamin, 100 mcg, Oral, Daily  docusate sodium, 100 mg, Oral, BID  famotidine, 20 mg, Oral, HS  ferrous gluconate, 324 mg, Oral, Daily Before Breakfast  finasteride, 5 mg, Oral, Daily  insulin glargine, 27 Units, Subcutaneous, HS  insulin lispro, 1-6 Units, Subcutaneous, TID AC  insulin lispro, 1-6 Units, Subcutaneous, HS  insulin lispro, 4 Units, Subcutaneous, TID With Meals  lisinopril, 5 mg, Oral, Daily  melatonin, 3 mg, Oral, HS  metoprolol succinate, 25 mg, Oral, Daily  pantoprazole, 40 mg, Oral, BID AC  polyethylene glycol, 17 g, Oral, BID  ranolazine, 500 mg, Oral, Q12H [facility]  senna, 2 tablet, Oral, HS  tamsulosin, 0.4 mg, Oral, Daily With Dinner    potassium chloride (K-DUR,KLOR-CON) CR tablet 40 mEq  Dose: 40 mEq  Freq: Once Route: PO  Start: 07/25/23 0730 End: 07/25/23 0731  insulin lispro (HumaLOG) 100 units/mL subcutaneous injection 3 Units  Dose: 3 Units  Freq: Once Route: SC  Start: 07/24/23 0400 End: 07/24/23 0413        Continuous IV Infusions:    sodium chloride 0.9 % infusion  Rate: 75 mL/hr Dose: 75 mL/hr  Freq: Continuous Route: IV  Last Dose: Stopped (07/24/23 1731)  Start: 07/24/23 0400 End: 07/24/23 1518  PRN Meds:  acetaminophen, 650 mg, Oral, Q6H PRN  bisacodyl, 10 mg, Rectal, Daily PRN x1 7/24    Level 2 carb diet    IP CONSULT TO GASTROENTEROLOGY    Network Utilization Review Department  ATTENTION: Please call with any questions or concerns to [phone] and carefully listen to the prompts so that you are directed to the right person. All voicemails are confidential.  Stu Dowell all requests for admission clinical reviews, approved or denied determinations and any other requests to dedicated fax number below belonging to the campus where the patient is receiving treatment.  List of dedicated fax numbers for the Facilities:  [place] DENIALS (Administrative/Medical Necessity) [phone]   [address] [facility] (Maternity/NICU/Pediatrics) [phone]   [address] [place] [address] Drive [phone]   [facility] [address] [facility] [phone]   [address] [facility] [address] [facility] [id number] [place] [address] [phone]   [address] [facility] [address] [place] [address] [facility] [phone]

## 2023-07-25 NOTE — ASSESSMENT & PLAN NOTE
Lab Results   Component Value Date    HGBA1C 12.4 (H) 07/23/2023       Recent Labs     07/24/23  1553 07/24/23  2125 07/25/23  0616 07/25/23  1052   POCGLU 371* 282* 223* 273*       Blood Sugar Average: Last 72 hrs:  (P) [id number]     · Patient initially presented to the ER due to glucose reading >500 at [facility]. Asymptomatic. · Patient received Lantus 25U and 4U regular insulin in the ED with some improvement to 348  · No evidence of DKA/HHS   · Poorly controlled diabetes with HgbA1c 12. 4. Jump from 6.4% on 3/2022. · Outpatient regimen confirmed with nursing facility. Patient on Lantus 20 units nightly and 1000 mg metformin twice daily. Not on sliding scale. Does remain on diabetic diet. · Started on Lantus 27 units nightly and Humalog 4 units 3 times daily with meals and SSI. · Blood sugars improving, however still elevated. Will increase Lantus to 30 units nightly.   · Consult endocrinology as patient's insulin regimen will need to be adjusted on discharge and he will need close follow up in the out patient setting  · Avoid hypoglycemia

## 2023-07-25 NOTE — WOUND OSTOMY CARE
Consult Note - Wound   Kay Lowery 68 y.o. male MRN: [id number]  Unit/Bed#: S MS 28949 Encounter: [id number]        History and Present Illness:  Patient is a 69 yo male that was admitted to [address] [place] [address] for treatment of Sepsis. Patient has a PMH of Parkinson's disease, T2DM, CAD, CKD. Patient is a mod/max assist for turning and repositioning. Patient is incontinent of bowel and bladder is managed via internal urinary catheter. On assessment, patient is lying on regular mattress. Wound Care was consulted for sacral wound. Assessment Findings:   B/L heels are dry intact and yunior with no skin loss or wounds present. Recommend preventative Hydraguard Cream and proper offloading/ repositioning. B/L sacro-buttocks is dry, intact, pink in color, hyperpigmented and blanches. No skin loss or wounds present. Recommend preventative hydragaurd to area due to incontinence. 1. Penile Wound: Area of likely erosion noted on right lower aspect. Small amount of yellow drainage noted. Recommend proper wilkerson care. Primary team made aware of findings and need for inpatient urology consult for area. No induration, fluctuance, odor, warmth/temperature differences, redness, or purulence noted to the above noted wounds and skin areas assessed. New dressings applied per orders listed below. Patient tolerated well- no s/s of non-verbal pain or discomfort observed during the encounter. Bedside nurse aware of plan of care. See flow sheets for more detailed assessment findings. Orders listed below and wound care will sign off, call or tiger text with questions. Skin Care Plan:  1-Preventative Hydraguard to bilateral heels and sacro-buttocks BID and PRN. 2-Turn/reposition q2h or when medically stable for pressure re-distribution on skin . 3-Elevate heels to offload pressure. 4-Moisturize skin daily with skin nourishing cream  5-Ehob cushion in chair when out of bed.       Wounds:  Wound 07/24/23 Penis (Active)   Wound Image   07/25/23 1111 OCTAVIO Garcia, [facility] Sheyla

## 2023-07-25 NOTE — ASSESSMENT & PLAN NOTE
Lab Results   Component Value Date    EGFR 60 07/25/2023    EGFR 66 07/24/2023    EGFR 76 07/24/2023    CREATININE 1.19 07/25/2023    CREATININE 1.10 07/24/2023    CREATININE 0.98 07/24/2023     · Stable   · Baseline Cr 1.1-1.3 mg/dL   · Avoid nephrotoxins/hypotension   · Monitor I/O

## 2023-07-25 NOTE — ASSESSMENT & PLAN NOTE
· Recent levels <400, will start oral supplementation daily  · Discussed with patient and nursing home

## 2023-07-25 NOTE — ASSESSMENT & PLAN NOTE
· Patient has chronic indwelling Razo catheter  · Concern for colonization however given sepsis and recent placement we will continue to treat as noted above  · Continue with urogenital care while inpatient. Discussed with [facility] over the phone urogenital care needs to continue on discharge.   · UA with large leukocytes, + Nitrites, innumberable WBC  · Prior urine cx 12/31/22 70-79,000 staph aureus susceptible to cephalosporins  · Urine culture growing Proteus, follow-up sensitivities  · Continue IV Rocephin at this time, day #2

## 2023-07-25 NOTE — CONSULTS
Consultation - [facility]   Kaylah Chew 68 y.o. male MRN: [id number]  Unit/Bed#: S -01 Encounter: [id number]        Inpatient consult to gastroenterology  Consult performed by: AJIT Donnelly  Consult ordered by: Vanesa Mackey PA-C          Reason for Consult / Principal Problem:     Anemia      ASSESSMENT AND PLAN:      68 y.o. male with history of Parkinson's disease, type 2 diabetes, HTN, HLD, CKD 3, BPH, chronic Razo, multi vessel CAD (not a candidate for CABG) on Plavix/ASA, anemia, GERD, constipation, pancreatic cyst, and cholecystectomy who presented to the ED on 7/23 due to blood glucose reading >500 at [facility]. In the ER, he met sepsis criteria with leukocytosis (WBC 20) and tachycardia and started on ceftriaxone for suspected UTI & noted to have worsening anemia from baseline. #1 Acute on chronic iron deficiency anemia: Pt with history of CKD 3, CAD on Plavix/ASA with prior baseline Hgb 9-10 in January. On admission, Hgb noted to be 7 with no reported overt bleeding, heme occult negative. CTAP with no acute intra-abdominal abnormality. Hgb dropped to 6.3 yesterday requiring 1 unit of blood to be transfused with appropriate improvement to 7.6 this AM. Per nursing he's having brown stool. Anemia may be multifactorial due to underlying sepsis, CKD, occult GI bleeding from AVM, ulcer, lesion in the setting of Plavix/ASA use. He has never had an EGD or colonoscopy. -Diet as tolerated  -Continue PPI BID  -Continue to monitor CBC  -Consider EGD/Colonoscopy on Friday when off Plavix for 5 days & when stable from infection standpoint & blood sugars stablilized. Prep and procedure explained. Will discuss with attending.  Last dose Plavix likely 7/23 PTA  -Would need to be on clear liquid diet and NPO past midnight on Thursday    #2 Pancreatic cyst: Pt with history of 2.1 cm pancreatic cyst within the anterior aspect of the pancreatic body-unclear if there is any communication with the pancreatic duct on MRI in 2020. Patient does have history of pancreatitis & heavy alcohol use in the past. Pt was recommended for EUS but never followed up. Pancreatic cystic lesion on prior MRI not clearly visualized on CT on admission.    -Consider outpatient MRI abdomen w w/o contrast MRCP & follow up w/ GI for possible EUS      Thank you for the consultation. Case will be discussed with Dr. Jody Burkett    ______________________________________________________________________    HPI:  Mckenzie Jaeger is a 68 y.o. male with history of Parkinson's disease, type 2 diabetes, HTN, HLD, CKD 3, BPH, chronic Razo, multi vessel CAD (not a candidate for CABG) on Plavix/ASA, anemia, GERD, constipation, pancreatic cyst, and cholecystectomy who presented to the ED on 7/23 due to blood glucose reading >500 at [facility]. In the ER, he met sepsis criteria with leukocytosis (WBC 20) and tachycardia and started on ceftriaxone for suspected UTI. Hemoglobin 7.3 on admission down from prior baseline of 9-10 in January with iron studies consistent with iron deficiency. Hgb trended down to 6.3 yesterday requiring 1 unit of blood to be transfused with initial improvement to 7.5. Hgb rechecked this morning 7 & on recheck 7.6. He had occult stool checked yesterday which was negative. Plavix has been on hold. Last dose likely 7/23. Patient denies any melena, hematochezia, hematuria, nose bleeds, or recent falls. He denies any heartburn, nausea/vomiting, abdominal pain, constipation, diarrhea, weight loss. Reports he moves his bowels regularly at [facility] with very occasional constipation. He has never had EGD/Colonoscopy. Reports prior history of heavy alcohol use, but hasn't been drinking since he has been living at [facility]. Denies any tobacco or drug use. Denies family history gastric/colon cancer. He is wheelchair bound.     Last saw GI in 2020 and recommended to undergo EUS due to 2.1cm pancreatic cyst but never followed up. Reported cologuard testing by PCP at that time but no results seen in the computer. REVIEW OF SYSTEMS:    CONSTITUTIONAL: Denies any fever, chills, rigors, and weight loss. HEENT: No earache or tinnitus. Denies hearing loss or visual disturbances. CARDIOVASCULAR: No chest pain or palpitations. RESPIRATORY: Denies any cough, hemoptysis, shortness of breath or dyspnea on exertion. GASTROINTESTINAL: As noted in the History of Present Illness. GENITOURINARY: No problems with urination. Denies any hematuria or dysuria. NEUROLOGIC: No dizziness or vertigo, denies headaches. MUSCULOSKELETAL: Denies any muscle or joint pain. SKIN: Denies skin rashes or itching. ENDOCRINE: Denies excessive thirst. Denies intolerance to heat or cold. PSYCHOSOCIAL: Denies depression or anxiety. Denies any recent memory loss.        Historical Information   Past Medical History:   Diagnosis Date   • Ambulatory dysfunction     uses walker with assist of 1   • Anemia    • Anxiety    • At risk for falls     hx of falls   • Benign paroxysmal vertigo     unspecified ear   • BPH (benign prostatic hyperplasia)     for TURP today 1/4/2021   • Calculus in bladder     for surgical removal today 1/4/2021   • Cataract     left eye   • Cervicalgia    • Chest pain    • Chronic kidney disease     stage 3   • Constipation    • CTS (carpal tunnel syndrome)     left   • Cyst of pancreas    • Cystitis 06/23/2020    acute cystitis with hematuria   • Depression    • Diabetes mellitus ([address])     type II with neuropathy   • Dizziness     and giddiness   • Dysphagia    • Elevated PSA    • Facial weakness    • GERD (gastroesophageal reflux disease)     last assessed 5/20/16   • Gross hematuria    • Hematuria    • Hyperlipidemia    • Hypertension    • Kidney disease    • Kidney stone    • Left-sided weakness    • Long term (current) use of oral hypoglycemic drugs    • Muscle weakness    • Nuclear senile cataract of left eye    • OA (osteoarthritis) of knee     b/l   • 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HCC)    • Syncope and collapse    • Tachycardia    • UTI (urinary tract infection)    • Vertebro-basilar artery syndrome    • Vitamin B deficiency    • Vitamin D deficiency    • Vitamin D deficiency      Past Surgical History:   Procedure Laterality Date   • CARDIAC CATHETERIZATION N/A 3/7/2022    Procedure: CARDIAC CATHETERIZATION;  Surgeon: Sergio Barkley DO;  [facility];   Service: Cardiology   • CATARACT EXTRACTION     • CHOLECYSTECTOMY     • KNEE SURGERY     • NH LITHOLAPAXY SMPL/SM <2.5 CM N/A 1/4/2021    Procedure: Cystolitholopaxy w/laser bladder stones;  Surgeon: Royer Noguera MD;  Location: [facility];  Service: Urology   • NH TRURL ELECTROSURG RESCJ PROSTATE BLEED COMPLETE N/A 1/4/2021    Procedure: T.U.R.P.;  Surgeon: Royer Noguera MD;  Location: [facility];  Service: Urology     Social History   Social History     Substance and Sexual Activity   Alcohol Use Not Currently     Social History     Substance and Sexual Activity   Drug Use No     Social History     Tobacco Use   Smoking Status Never   Smokeless Tobacco Never     Family History   Problem Relation Age of Onset   • Coronary artery disease Mother    • Diabetes Father        Meds/Allergies     Medications Prior to Admission   Medication   • ACCU-CHEK GUIDE test strip   • acetaminophen (TYLENOL) 325 mg tablet   • Alcohol Swabs (PRO COMFORT ALCOHOL) 70 % PADS   • aspirin 81 mg chewable tablet   • atorvastatin (LIPITOR) 40 mg tablet   • carbidopa-levodopa (SINEMET)  mg per tablet   • cholecalciferol (VITAMIN D3) 1,000 units tablet   • clopidogrel (PLAVIX) 75 mg tablet   • cyanocobalamin (VITAMIN B-12) 1000 MCG tablet   • docusate sodium (COLACE) 100 mg capsule   • Easy Comfort Lancets MISC   • famotidine (PEPCID) 20 mg tablet   • finasteride (PROSCAR) 5 mg tablet   • glipiZIDE (GLUCOTROL) 10 mg tablet   • Glucagon, rDNA, (GLUCAGON EMERGENCY IJ)   • Glucose 15 g PACK   • glucose blood test strip   • glycerin-hypromellose- (ARTIFICIAL TEARS) 0.2-0.2-1 % SOLN   • lisinopril (ZESTRIL) 5 mg tablet   • melatonin 3 mg   • metFORMIN (GLUCOPHAGE) 1000 MG tablet   • metoprolol succinate (TOPROL-XL) 25 mg 24 hr tablet   • NovoFine Autocover 30G X 8 MM MISC   • NovoLOG FlexPen 100 units/mL injection pen   • nystatin (MYCOSTATIN) powder   • pantoprazole (PROTONIX) 40 mg tablet   • polyethylene glycol (MIRALAX) 17 g packet   • ranolazine (RANEXA) 500 mg 12 hr tablet   • senna (SENOKOT) 8.6 mg   • tamsulosin (FLOMAX) 0.4 mg     Current Facility-Administered Medications   Medication Dose Route Frequency   • acetaminophen (TYLENOL) tablet 650 mg  650 mg Oral Q6H PRN   • atorvastatin (LIPITOR) tablet 40 mg  40 mg Oral Daily   • bisacodyl (DULCOLAX) rectal suppository 10 mg  10 mg Rectal Daily PRN   • carbidopa-levodopa (SINEMET)  mg per tablet 1 tablet  1 tablet Oral TID   • ceftriaxone (ROCEPHIN) 1 g/50 mL in dextrose IVPB  1,000 mg Intravenous Q24H   • cyanocobalamin (VITAMIN B-12) tablet 100 mcg  100 mcg Oral Daily   • docusate sodium (COLACE) capsule 100 mg  100 mg Oral BID   • famotidine (PEPCID) tablet 20 mg  20 mg Oral HS   • ferrous gluconate (FERGON) tablet 324 mg  324 mg Oral Daily Before Breakfast   • finasteride (PROSCAR) tablet 5 mg  5 mg Oral Daily   • insulin glargine (LANTUS) subcutaneous injection 27 Units 0.27 mL  27 Units Subcutaneous HS   • insulin lispro (HumaLOG) 100 units/mL subcutaneous injection 1-6 Units  1-6 Units Subcutaneous TID AC   • insulin lispro (HumaLOG) 100 units/mL subcutaneous injection 1-6 Units  1-6 Units Subcutaneous HS   • insulin lispro (HumaLOG) 100 units/mL subcutaneous injection 4 Units  4 Units Subcutaneous TID With Meals   • lisinopril (ZESTRIL) tablet 5 mg  5 mg Oral Daily   • melatonin tablet 3 mg  3 mg Oral HS   • metoprolol succinate (TOPROL-XL) 24 hr tablet 25 mg  25 mg Oral Daily   • pantoprazole (PROTONIX) EC tablet 40 mg  40 mg Oral BID AC   • polyethylene glycol (MIRALAX) packet 17 g  17 g Oral BID   • ranolazine (RANEXA) 12 hr tablet 500 mg  500 mg Oral Q12H LIYAH   • senna (SENOKOT) tablet 17.2 mg  2 tablet Oral HS   • tamsulosin (FLOMAX) capsule 0.4 mg  0.4 mg Oral Daily With Dinner       No Known Allergies        Objective     Blood pressure 116/60, pulse 91, temperature 98.3 °F (36.8 °C), resp. rate 18, height 5' 7" (1.702 m), weight 87 kg (191 lb 12.8 oz), SpO2 95 %. Body mass index is 30.04 kg/m². Intake/Output Summary (Last 24 hours) at 7/25/2023 0900  Last data filed at 7/24/2023 2255  Gross per 24 hour   Intake --   Output 1500 ml   Net -1500 ml         PHYSICAL EXAM:      General Appearance:   Alert, cooperative, no distress   HEENT:   Normocephalic, atraumatic, anicteric. Neck:  Supple, symmetrical, trachea midline   Lungs:   Clear to auscultation bilaterally; no rales, rhonchi or wheezing; respirations unlabored    Heart[de-identified]   Regular rate and rhythm; no murmur, rub, or gallop. Abdomen:   Soft, non-tender, non-distended; normal bowel sounds; no masses, no organomegaly    Genitalia:   Deferred    Rectal:   Deferred    Extremities:  No cyanosis, clubbing or edema    Pulses:  2+ and symmetric all extremities    Skin:  No jaundice, rashes, or lesions    Lymph nodes:  No palpable cervical lymphadenopathy        Lab Results:   No results displayed because visit has over 200 results. Imaging Studies: I have personally reviewed pertinent imaging studies.

## 2023-07-25 NOTE — ASSESSMENT & PLAN NOTE
· Triple-vessel disease- not candidate for surgical intervention  · Denies any chest pain  · Holding ASA and Plavix 2/2 acute iron deficiency anemia. Held since 7/23/2023.   · Continue medical management with BB, statin, Ranexa

## 2023-07-25 NOTE — ASSESSMENT & PLAN NOTE
· Hemoglobin baseline 9-10 in January 2023  · Hemoglobin 7.3 on admission. Repeat labs obtained in ED showed hemoglobin 6.3. Status post 1 unit PRBC. · Per ED, rectal exam and Hemoccult negative. · Patient has never had an EGD or colonoscopy  · Acute anemia may be multifactorial in setting of CKD, sepsis, ASA/Plavix use. No overt signs or symptoms of bleeding on exam or per patient/nursing staff  · Hemoglobin continues to be low at 7.0 this AM.  Repeat levels in afternoon at 7.6\  · Patient is alert and oriented x4 but poor historian. Does report drinking history, quit 1985. Will obtain RUQ to assess liver morphology. · Appreciate GI recommendations  · Holding anticoagulation in setting of acute anemia. ASA/Plavix held since 7/23. Place SCD's for dvt ppx. · Closely monitor H&H. Transfuse for Hgb < 7   · Patient would likely benefit from scope, follow up recs. If so, clear diet Thursday and NPO midnight.

## 2023-07-25 NOTE — ASSESSMENT & PLAN NOTE
· Presents for elevated BS. Noted with leukocytosis, and found to be tachycardic  · Suspected source: indwelling Razo catheter associated UTI   · Per [facility] staff patient's Razo catheter was placed approximately 1 week ago given ongoing urinary incontinence and concern for skin breakdown  · Patient started on IV Rocephin, continue day #2  · Leukocytosis improving. Patient afebrile. Sepsis evolving with treatment.   · Blood cultures negative at 24 hours, follow  · Urine cultures growing Proteus, follow-up sensitivities  · Wound care consult

## 2023-07-25 NOTE — CONSULTS
Consult - Urology   Peter Ratliff 1950, 68 y.o. male MRN: [id number]    Unit/Bed#: S -01 Encounter: [id number]      Assessment & Plan:  1. Urethral erosion  - Suresh Daniels is a 69 yo male comorbid male presenting from nursing home due to hyperglycemia. - Wound care consulted for sacral wounds  - Nursing home placed wilkerson catheter one week ago for urinary diversion to present dehiscence of sacral wounds  - Urology consulted due to urethra erosion noted by Wound care today  - Blood cultures no growth at 24 hours  - Urine culture Proteus mirabilis  - WBC 12.40, improving from 20.46 on admission  - 24 UOP 1500 mL  - On exam. Wilkerson catheter in place. Mild sediment in wilkerson tubing. Ventral erosion from catheter noted on urethra. Purulence noted around catheter. Pictured below  - Continue wound care. Daily washing around catheter.   - Recommend SPT for urinary diversion in setting of sacral wounds and urethra erosion. Will refer to IR as outpatient  - No acute urologic intervention required  - Continue medication optimization and antibiosis per primary team  - Urology will sign off at this time. Please do not hesitate to reach out with any questions or concerns. Subjective:   Suresh Daniels is a 70-year-old male with past medical history Parkinson's, DM 2, CAD, CKD who presented from [facility] due to blood glucose >500. In the ED patient met sepsis criteria due to tachycardia, leukocytosis, positive UA. Patient with Wilkerson catheter, placed one week at nursing home for urinary diversion due to sacral wounds. Patient with urinary incontinence at baseline. Wound care consulted, noting urethra erosion. Urology was consulted for further management recommendations. Patient known to our practice with history of BPH with LUTS. Status post TURP 2021. History of bladder calculi, status post cystolitholopaxy 2021. Review of Systems   Constitutional: Negative for chills and fever.    Respiratory: Negative for cough and shortness of breath. Cardiovascular: Negative for chest pain and palpitations. Gastrointestinal: Negative for abdominal pain and vomiting. Genitourinary: Negative for dysuria and hematuria. Musculoskeletal: Negative for arthralgias and back pain. Skin: Negative for color change and rash. Neurological: Negative for seizures and syncope. All other systems reviewed and are negative. Objective:  Vitals: Blood pressure 134/68, pulse 88, temperature 98.4 °F (36.9 °C), resp. rate 18, height 5' 7" (1.702 m), weight 87 kg (191 lb 12.8 oz), SpO2 97 %. ,Body mass index is 30.04 kg/m². Physical Exam  Constitutional:       General: He is not in acute distress. Appearance: Normal appearance. He is obese. He is ill-appearing. He is not toxic-appearing. Comments: Chronically ill   HENT:      Head: Normocephalic and atraumatic. Right Ear: External ear normal.      Left Ear: External ear normal.      Nose: Nose normal.      Mouth/Throat:      Mouth: Mucous membranes are moist.   Eyes:      General: No scleral icterus. Conjunctiva/sclera: Conjunctivae normal.   Cardiovascular:      Rate and Rhythm: Normal rate. Pulses: Normal pulses. Pulmonary:      Effort: Pulmonary effort is normal.   Abdominal:      General: Abdomen is flat. There is no distension. Palpations: Abdomen is soft. Tenderness: There is no abdominal tenderness. There is no guarding. Comments: Distended   Genitourinary:     Comments: Ventral erosion noted with wilkerson in place draining clear yellow urine with mild sediment in wilkerson tubing. Musculoskeletal:         General: Normal range of motion. Cervical back: Normal range of motion. Skin:     General: Skin is warm. Findings: No rash. Neurological:      General: No focal deficit present. Mental Status: He is alert.  Mental status is at baseline.        Media Information      Document Information    Clinical Image - Mobile Device   7/25 07/25/2023 16:07   Attached To: Hospital Encounter on 7/23/23     Source Information    Angela Dodd PA-C  [facility] [place] [facility]         Labs:  Recent Labs     07/23/23  1745 07/24/23  0108 07/24/23  0759 07/25/23  0442   WBC 20.46* 13.59* 13.24* 12.40*     Recent Labs     07/23/23  1745 07/24/23  0108 07/24/23  0247 07/24/23  0759 07/24/23  1423 07/25/23  0442 07/25/23  1211   HGB 7.3* 6.3* 6.3* 7.5* 7.3* 7.0* 7.6*     Recent Labs     07/23/23  1745 07/24/23  0236 07/24/23  0759 07/25/23  0442   CREATININE 1.26 0.98 1.10 1.19         History:  Social History     Socioeconomic History   • Marital status: /Civil Union     Spouse name: None   • Number of children: None   • Years of education: None   • Highest education level: None   Occupational History   • None   Tobacco Use   • Smoking status: Never   • Smokeless tobacco: Never   Vaping Use   • Vaping Use: Never used   Substance and Sexual Activity   • Alcohol use: Not Currently   • Drug use: No   • Sexual activity: Not Currently   Other Topics Concern   • None   Social History Narrative   • None     Social Determinants of Health     Financial Resource Strain: Not on file   Food Insecurity: No Food Insecurity (7/24/2023)    Hunger Vital Sign    • Worried About Running Out of Food in the Last Year: Never true    • Ran Out of Food in the Last Year: Never true   Transportation Needs: No Transportation Needs (7/24/2023)    PRAPARE - Transportation    • Lack of Transportation (Medical): No    • Lack of Transportation (Non-Medical):  No   Physical Activity: Not on file   Stress: Not on file   Social Connections: Not on file   Intimate Partner Violence: Not on file   Housing Stability: Low Risk  (7/24/2023)    Housing Stability Vital Sign    • Unable to Pay for Housing in the Last Year: No    • Number of Places Lived in the Last Year: 1    • Unstable Housing in the Last Year: No     Past Medical History:   Diagnosis Date   • Ambulatory dysfunction     uses walker with assist of 1   • Anemia    • Anxiety    • At risk for falls     hx of falls   • Benign paroxysmal vertigo     unspecified ear   • BPH (benign prostatic hyperplasia)     for TURP today 1/4/2021   • Calculus in bladder     for surgical removal today 1/4/2021   • Cataract     left eye   • Cervicalgia    • Chest pain    • Chronic kidney disease     stage 3   • Constipation    • CTS (carpal tunnel syndrome)     left   • Cyst of pancreas    • Cystitis 06/23/2020    acute cystitis with hematuria   • Depression    • Diabetes mellitus ([address] )     type II with neuropathy   • Dizziness     and giddiness   • Dysphagia    • Elevated PSA    • Facial weakness    • GERD (gastroesophageal reflux disease)     last assessed 5/20/16   • Gross hematuria    • Hematuria    • Hyperlipidemia    • Hypertension    • Kidney disease    • Kidney stone    • Left-sided weakness    • Long term (current) use of oral hypoglycemic drugs    • Muscle weakness    • Nuclear senile cataract of left eye    • OA (osteoarthritis) of knee     b/l   • 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HCC)    • Syncope and collapse    • Tachycardia    • UTI (urinary tract infection)    • Vertebro-basilar artery syndrome    • Vitamin B deficiency    • Vitamin D deficiency    • Vitamin D deficiency      Past Surgical History:   Procedure Laterality Date   • CARDIAC CATHETERIZATION N/A 3/7/2022    Procedure: CARDIAC CATHETERIZATION;  Surgeon: Jameson Anguiano DO;  [facility];   Service: Cardiology   • CATARACT EXTRACTION     • CHOLECYSTECTOMY     • KNEE SURGERY     • AL LITHOLAPAXY SMPL/SM <2.5 CM N/A 1/4/2021    Procedure: Cystolitholopaxy w/laser bladder stones;  Surgeon: Sarmad Benitez MD;  Location: [facility] OR;  Service: Urology   • AL TRURL ELECTROSURG RESCJ PROSTATE BLEED COMPLETE N/A 1/4/2021    Procedure: T.U.R.P.;  Surgeon: Sarmad Benitez MD;  Location[facility];  Service: Urology     Family History   Problem Relation Age of Onset   • Coronary artery disease Mother    • Diabetes Father        Nicole Eisenmenger, [place]  Date: 7/25/2023 Time: 3:39 PM

## 2023-07-25 NOTE — ASSESSMENT & PLAN NOTE
· Patient reports no BM in >1 week   · With bowel movement yesterday, passing flatus  · Continue Colace, Miralax, senna, and PRN dulcolax

## 2023-07-25 NOTE — UTILIZATION REVIEW
NOTIFICATION OF INPATIENT ADMISSION   AUTHORIZATION REQUEST   SERVICING FACILITY:   [address]  [address] [place] [address], [address] [place] [address]  Tax ID: [id number]  NPI: [id number]   ATTENDING PROVIDER:  Attending Name and NPI#: Taty [place], [place] [[id number]]  Address: [address] [place] [address], [address] [place] [address]  Phone: [phone]     ADMISSION INFORMATION:  Place of Service: Inpatient [address]   Place of Service Code: 21  Inpatient Admission Date/Time: 7/24/23  2:12 AM  Discharge Date/Time: No discharge date for patient encounter. Admitting Diagnosis Code/Description:  Balanitis [N48.1]  Leukocytosis [D72.829]  Primary hypertension [I10]  Anemia [D64.9]  Hyperglycemia [R73.9]  Sepsis ([address]) [A41.9]  Anemia, unspecified type [D64.9]  Type 2 diabetes mellitus with hyperglycemia, with long-term current use of insulin ([address]) [E11.65, Z79.4]     UTILIZATION REVIEW CONTACT:  Guillermina Fraga Utilization   Network Utilization Review Department  Phone: [phone]  Fax: [phone]  Email: [email or web address]  Contact for approvals/pending authorizations, clinical reviews, and discharge. PHYSICIAN ADVISORY SERVICES:  Medical Necessity Denial & Dwop-sw-Cfxr Review  Phone: [phone]  Fax: [phone]  Email: [email or web address]

## 2023-07-25 NOTE — PROGRESS NOTES
[facility]  Progress Note  Name: Kali Bedolla  MRN: [id number]  Unit/Bed#: S -65 I Date of Admission: 7/23/2023   Date of Service: 7/25/2023 I Hospital Day: 1    Assessment/Plan   * Sepsis [facility])  Assessment & Plan  · Presents for elevated BS. Noted with leukocytosis, and found to be tachycardic  · Suspected source: indwelling Razo catheter associated UTI   · Per [facility] staff patient's Razo catheter was placed approximately 1 week ago given ongoing urinary incontinence and concern for skin breakdown  · Patient started on IV Rocephin, continue day #2  · Leukocytosis improving. Patient afebrile. Sepsis evolving with treatment. · Blood cultures negative at 24 hours, follow  · Urine cultures growing Proteus, follow-up sensitivities  · Wound care consult    Urinary tract infection associated with indwelling urethral catheter ([address])  Assessment & Plan  · Patient has chronic indwelling Razo catheter  · Concern for colonization however given sepsis and recent placement we will continue to treat as noted above  · Continue with urogenital care while inpatient. Discussed with [facility] over the phone urogenital care needs to continue on discharge. · UA with large leukocytes, + Nitrites, innumberable WBC  · Prior urine cx 12/31/22 70-79,000 staph aureus susceptible to cephalosporins  · Urine culture growing Proteus, follow-up sensitivities  · Continue IV Rocephin at this time, day #2    Acute blood loss anemia  Assessment & Plan  · Hemoglobin baseline 9-10 in January 2023  · Hemoglobin 7.3 on admission. Repeat labs obtained in ED showed hemoglobin 6.3. Status post 1 unit PRBC. · Per ED, rectal exam and Hemoccult negative. · Patient has never had an EGD or colonoscopy  · Acute anemia may be multifactorial in setting of CKD, sepsis, ASA/Plavix use.   No overt signs or symptoms of bleeding on exam or per patient/nursing staff  · Hemoglobin continues to be low at 7.0 this AM.  Repeat levels in afternoon at 7.6\  · Patient is alert and oriented x4 but poor historian. Does report drinking history, quit 1985. Will obtain RUQ to assess liver morphology. · Appreciate GI recommendations  · Holding anticoagulation in setting of acute anemia. ASA/Plavix held since 7/23. Place SCD's for dvt ppx. · Closely monitor H&H. Transfuse for Hgb < 7   · Patient would likely benefit from scope, follow up recs. If so, clear diet Thursday and NPO midnight. Iron deficiency anemia  Assessment & Plan  · Iron study consistent with REN   · B12 and folate within normal limits. B12 less than 400. Start oral supplementation here  · Hold off on IV Venofer given concern for acute infection/sepsis. P.o. supplementation started. Type 2 diabetes mellitus with hyperglycemia, with long-term current use of insulin [facility])  Assessment & Plan  Lab Results   Component Value Date    HGBA1C 12.4 (H) 07/23/2023       Recent Labs     07/24/23  1553 07/24/23  2125 07/25/23  0616 07/25/23  1052   POCGLU 371* 282* 223* 273*       Blood Sugar Average: Last 72 hrs:  (P) [id number]     · Patient initially presented to the ER due to glucose reading >500 at [facility]. Asymptomatic. · Patient received Lantus 25U and 4U regular insulin in the ED with some improvement to 348  · No evidence of DKA/HHS   · Poorly controlled diabetes with HgbA1c 12. 4. Jump from 6.4% on 3/2022. · Outpatient regimen confirmed with nursing facility. Patient on Lantus 20 units nightly and 1000 mg metformin twice daily. Not on sliding scale. Does remain on diabetic diet. · Started on Lantus 27 units nightly and Humalog 4 units 3 times daily with meals and SSI. · Blood sugars improving, however still elevated. Will increase Lantus to 30 units nightly.   · Consult endocrinology as patient's insulin regimen will need to be adjusted on discharge and he will need close follow up in the out patient setting  · Avoid hypoglycemia      Stage 3a chronic kidney disease [facility])  Assessment & Plan  Lab Results   Component Value Date    EGFR 60 07/25/2023    EGFR 66 07/24/2023    EGFR 76 07/24/2023    CREATININE 1.19 07/25/2023    CREATININE 1.10 07/24/2023    CREATININE 0.98 07/24/2023     · Stable   · Baseline Cr 1.1-1.3 mg/dL   · Avoid nephrotoxins/hypotension   · Monitor I/O    Coronary artery disease involving native coronary artery of native heart without angina pectoris  Assessment & Plan  · Triple-vessel disease- not candidate for surgical intervention  · Denies any chest pain  · Holding ASA and Plavix 2/2 acute iron deficiency anemia. Held since 7/23/2023. · Continue medical management with BB, statin, Ranexa    Vitamin B12 deficiency  Assessment & Plan  · Recent levels <400, will start oral supplementation daily  · Discussed with patient and nursing home    Parkinson disease [facility])  Assessment & Plan  · Continue Sinemet   · PT/OT     Left-sided weakness  Assessment & Plan  · Chronic left sided weakness 2/2 Parkinson's disease and neuropathy    · No new focal deficits    Essential hypertension  Assessment & Plan  · Blood pressure well controlled  · Continue lisinopril and metoprolol    Constipation  Assessment & Plan  · Patient reports no BM in >1 week   · With bowel movement yesterday, passing flatus  · Continue Colace, Miralax, senna, and PRN dulcolax        VTE Pharmacologic Prophylaxis: VTE Score: 5 High Risk (Score >/= 5) - Pharmacological DVT Prophylaxis Contraindicated. Sequential Compression Devices Ordered. Patient Centered Rounds: I performed bedside rounds with nursing staff today. Discussions with Specialists or Other Care Team Provider: GI    Education and Discussions with Family / Patient: Updated  (med tech at [facility]) via phone.     Total Time Spent on Date of Encounter in care of patient: 55 minutes This time was spent on one or more of the following: performing physical exam; counseling and coordination of care; obtaining or reviewing history; documenting in the medical record; reviewing/ordering tests, medications or procedures; communicating with other healthcare professionals and discussing with patient's family/caregivers. Current Length of Stay: 1 day(s)  Current Patient Status: Inpatient   Certification Statement: The patient will continue to require additional inpatient hospital stay due to anemia work up, sepsis and uti treatment  Discharge Plan: Anticipate discharge in 48-72 hrs to discharge location to be determined pending rehab evaluations. Code Status: Level 1 - Full Code    Subjective:   Patient seen and examined, he is alert and oriented x4 but is a poor historian. Reports he feels good and has no concerns. Had a bowel movement yesterday. Denies any fever, chills, chest pain, shortness of breath, dizziness, abdominal pain, dark or bloody stools. Denies any other further signs of bleeding. Denies any known history of GI bleed. He shares he does miss his wife. Patient seen and examined lying in bed. Objective:     Vitals:   Temp (24hrs), Av.7 °F (37.1 °C), Min:98.3 °F (36.8 °C), Max:99.2 °F (37.3 °C)    Temp:  [98.3 °F (36.8 °C)-99.2 °F (37.3 °C)] 98.4 °F (36.9 °C)  HR:  [88-94] 88  Resp:  [18-20] 18  BP: (116-140)/(60-68) 134/68  SpO2:  [95 %-97 %] 97 %  Body mass index is 30.04 kg/m². Input and Output Summary (last 24 hours): Intake/Output Summary (Last 24 hours) at 2023 1529  Last data filed at 2023 1447  Gross per 24 hour   Intake 120 ml   Output 1900 ml   Net -1780 ml       Physical Exam:   Physical Exam  Vitals and nursing note reviewed. Constitutional:       General: He is not in acute distress. Appearance: Normal appearance. He is well-developed. He is not ill-appearing or diaphoretic. HENT:      Head: Normocephalic and atraumatic. Cardiovascular:      Rate and Rhythm: Normal rate and regular rhythm.       Heart sounds: Normal heart sounds. Pulmonary:      Effort: Pulmonary effort is normal. No respiratory distress. Breath sounds: Normal breath sounds. Comments: Room air, non labored breathing  Abdominal:      General: Bowel sounds are normal.      Palpations: Abdomen is soft. Tenderness: There is no abdominal tenderness. Comments: Protuberant abdomen. Soft to palpation. Mild tenderness without rebound or rigidity to RUQ. Genitourinary:     Comments: Indwelling Razo catheter in place, draining yellow straw-colored urine  Musculoskeletal:      Right lower leg: No edema. Left lower leg: No edema. Skin:     General: Skin is warm and dry. Coloration: Skin is pale. Findings: No bruising. Comments: No overt signs of bleeding   Neurological:      Mental Status: He is alert and oriented to person, place, and time. Mental status is at baseline. Comments: Poor historian. Psychiatric:         Mood and Affect: Mood normal.         Behavior: Behavior normal.        Additional Data:     Labs:  Results from last 7 days   Lab Units 07/25/23  1211 07/25/23  0442 07/24/23  1423 07/24/23  0759   WBC Thousand/uL  --  12.40*  --  13.24*   HEMOGLOBIN g/dL 7.6* 7.0*   < > 7.5*   HEMATOCRIT % 27.4* 25.0*   < > 26.5*   PLATELETS Thousands/uL  --  403*  --  432*   NEUTROS PCT %  --   --   --  85*   LYMPHS PCT %  --   --   --  4*   MONOS PCT %  --   --   --  8   EOS PCT %  --   --   --  1    < > = values in this interval not displayed.      Results from last 7 days   Lab Units 07/25/23  0442 07/24/23  0759 07/24/23  0236   SODIUM mmol/L 137   < > 137   POTASSIUM mmol/L 3.2*   < > 3.6   CHLORIDE mmol/L 104   < > 102   CO2 mmol/L 28   < > 26   BUN mg/dL 11   < > 13   CREATININE mg/dL 1.19   < > 0.98   ANION GAP mmol/L 5   < > 9   CALCIUM mg/dL 7.7*   < > 8.2*   ALBUMIN g/dL  --   --  3.2*   TOTAL BILIRUBIN mg/dL  --   --  0.52   ALK PHOS U/L  --   --  58   ALT U/L  --   --  7   AST U/L  --   --  7*   GLUCOSE RANDOM mg/dL 217*   < > 315*    < > = values in this interval not displayed. Results from last 7 days   Lab Units 07/24/23  0236   INR  1.12     Results from last 7 days   Lab Units 07/25/23  1052 07/25/23  0616 07/24/23  2125 07/24/23  1553 07/24/23  1111 07/24/23  0702 07/24/23  0400 07/24/23  0245 07/24/23  0102 07/23/23  2341 07/23/23  2132 07/23/23 2008   POC GLUCOSE mg/dl 273* 223* 282* 371* 337* 256* 311* 348* 365* 353* 365* 362*     Results from last 7 days   Lab Units 07/23/23  1850   HEMOGLOBIN A1C % 12.4*     Results from last 7 days   Lab Units 07/24/23  0236   LACTIC ACID mmol/L 1.2   PROCALCITONIN ng/ml 0.19     Lines/Drains:  Invasive Devices     Peripheral Intravenous Line  Duration           Peripheral IV 07/23/23 Right Antecubital 1 day    Peripheral IV 07/24/23 Distal;Left;Ventral (anterior) Forearm 1 day          Drain  Duration           Urethral Catheter 1 day              Urinary Catheter:  Goal for removal: N/A - Chronic Razo    Recent Cultures (last 7 days):   Results from last 7 days   Lab Units 07/24/23  0236 07/23/23  1934 07/23/23  1853   BLOOD CULTURE  No Growth at 24 hrs.  --  No Growth at 24 hrs.    URINE CULTURE   --  >100,000 cfu/ml Proteus mirabilis*  --        Last 24 Hours Medication List:   Current Facility-Administered Medications   Medication Dose Route Frequency Provider Last Rate   • acetaminophen  650 mg Oral Q6H PRN Karen Tipton PA-C     • atorvastatin  40 mg Oral Daily Karen Tipton PA-C     • bisacodyl  10 mg Rectal Daily PRN Karen Tipton PA-C     • carbidopa-levodopa  1 tablet Oral TID Karen Tipton PA-C     • cefTRIAXone  1,000 mg Intravenous Q24H Karen Tipton PA-C 1,000 mg (07/25/23 0410)   • cyanocobalamin  100 mcg Oral Daily Abigail Damian PA-C     • docusate sodium  100 mg Oral BID Karen Tipton PA-C     • famotidine  20 mg Oral HS Karen Tipton PA-C     • ferrous gluconate  324 mg Oral Daily Before Breakfast Marium Carr AJIT Gomez     • finasteride  5 mg Oral Daily Della Acosta PA-C     • insulin glargine  30 Units Subcutaneous HS David Nichols PA-C     • insulin lispro  1-6 Units Subcutaneous TID AC Della Acosta PA-C     • insulin lispro  1-6 Units Subcutaneous HS Della Acosta PA-C     • insulin lispro  4 Units Subcutaneous TID With Meals AJIT Cook     • lisinopril  5 mg Oral Daily Della Acosta PA-C     • melatonin  3 mg Oral HS Della Acosta PA-C     • metoprolol succinate  25 mg Oral Daily Della Acosta PA-C     • pantoprazole  40 mg Oral BID  Della Acosta PA-C     • polyethylene glycol  17 g Oral BID AJIT Cook     • ranolazine  500 mg Oral Q12H [address] Della Acosta PA-C     • senna  2 tablet Oral HS AJIT Cook     • tamsulosin  0.4 mg Oral Daily With [address] Stacy Simons PA-C          Today, Patient Was Seen By: David Nichols PA-C    **Please Note: This note may have been constructed using a voice recognition system. **

## 2023-07-26 ENCOUNTER — PREP FOR PROCEDURE (OUTPATIENT)
Dept: INTERVENTIONAL RADIOLOGY/VASCULAR | Facility: CLINIC | Age: 73
End: 2023-07-26

## 2023-07-26 DIAGNOSIS — S31.000S WOUND OF SACRAL REGION, SEQUELA: Primary | ICD-10-CM

## 2023-07-26 LAB
ANION GAP SERPL CALCULATED.3IONS-SCNC: 5 MMOL/L
BUN SERPL-MCNC: 12 MG/DL (ref 5–25)
CALCIUM SERPL-MCNC: 7.9 MG/DL (ref 8.4–10.2)
CHLORIDE SERPL-SCNC: 105 MMOL/L (ref 96–108)
CO2 SERPL-SCNC: 27 MMOL/L (ref 21–32)
CREAT SERPL-MCNC: 1.26 MG/DL (ref 0.6–1.3)
ERYTHROCYTE [DISTWIDTH] IN BLOOD BY AUTOMATED COUNT: 19.8 % (ref 11.6–15.1)
GFR SERPL CREATININE-BSD FRML MDRD: 56 ML/MIN/1.73SQ M
GLUCOSE SERPL-MCNC: 152 MG/DL (ref 65–140)
GLUCOSE SERPL-MCNC: 202 MG/DL (ref 65–140)
GLUCOSE SERPL-MCNC: 205 MG/DL (ref 65–140)
GLUCOSE SERPL-MCNC: 289 MG/DL (ref 65–140)
GLUCOSE SERPL-MCNC: 335 MG/DL (ref 65–140)
HCT VFR BLD AUTO: 25.5 % (ref 36.5–49.3)
HGB BLD-MCNC: 7 G/DL (ref 12–17)
MAGNESIUM SERPL-MCNC: 1.9 MG/DL (ref 1.9–2.7)
MCH RBC QN AUTO: 20.1 PG (ref 26.8–34.3)
MCHC RBC AUTO-ENTMCNC: 27.5 G/DL (ref 31.4–37.4)
MCV RBC AUTO: 73 FL (ref 82–98)
MRSA NOSE QL CULT: NORMAL
PLATELET # BLD AUTO: 406 THOUSANDS/UL (ref 149–390)
PMV BLD AUTO: 10.2 FL (ref 8.9–12.7)
POTASSIUM SERPL-SCNC: 3.7 MMOL/L (ref 3.5–5.3)
RBC # BLD AUTO: 3.48 MILLION/UL (ref 3.88–5.62)
SODIUM SERPL-SCNC: 137 MMOL/L (ref 135–147)
WBC # BLD AUTO: 11.03 THOUSAND/UL (ref 4.31–10.16)

## 2023-07-26 PROCEDURE — 82948 REAGENT STRIP/BLOOD GLUCOSE: CPT

## 2023-07-26 PROCEDURE — 80048 BASIC METABOLIC PNL TOTAL CA: CPT

## 2023-07-26 PROCEDURE — 99232 SBSQ HOSP IP/OBS MODERATE 35: CPT | Performed by: NURSE PRACTITIONER

## 2023-07-26 PROCEDURE — 97163 PT EVAL HIGH COMPLEX 45 MIN: CPT

## 2023-07-26 PROCEDURE — 97167 OT EVAL HIGH COMPLEX 60 MIN: CPT

## 2023-07-26 PROCEDURE — 99232 SBSQ HOSP IP/OBS MODERATE 35: CPT | Performed by: INTERNAL MEDICINE

## 2023-07-26 PROCEDURE — 85027 COMPLETE CBC AUTOMATED: CPT

## 2023-07-26 PROCEDURE — 83735 ASSAY OF MAGNESIUM: CPT

## 2023-07-26 PROCEDURE — 99223 1ST HOSP IP/OBS HIGH 75: CPT | Performed by: INTERNAL MEDICINE

## 2023-07-26 RX ORDER — INSULIN LISPRO 100 [IU]/ML
1-5 INJECTION, SOLUTION INTRAVENOUS; SUBCUTANEOUS
Status: DISCONTINUED | OUTPATIENT
Start: 2023-07-26 | End: 2023-07-29 | Stop reason: HOSPADM

## 2023-07-26 RX ORDER — INSULIN LISPRO 100 [IU]/ML
12 INJECTION, SOLUTION INTRAVENOUS; SUBCUTANEOUS
Status: DISCONTINUED | OUTPATIENT
Start: 2023-07-26 | End: 2023-07-28

## 2023-07-26 RX ORDER — INSULIN GLARGINE 100 [IU]/ML
35 INJECTION, SOLUTION SUBCUTANEOUS
Status: DISCONTINUED | OUTPATIENT
Start: 2023-07-26 | End: 2023-07-26

## 2023-07-26 RX ORDER — INSULIN GLARGINE 100 [IU]/ML
30 INJECTION, SOLUTION SUBCUTANEOUS
Status: DISCONTINUED | OUTPATIENT
Start: 2023-07-26 | End: 2023-07-29 | Stop reason: HOSPADM

## 2023-07-26 RX ORDER — INSULIN LISPRO 100 [IU]/ML
10 INJECTION, SOLUTION INTRAVENOUS; SUBCUTANEOUS
Status: DISCONTINUED | OUTPATIENT
Start: 2023-07-26 | End: 2023-07-26

## 2023-07-26 RX ADMIN — METOPROLOL SUCCINATE 25 MG: 25 TABLET, EXTENDED RELEASE ORAL at 08:32

## 2023-07-26 RX ADMIN — INSULIN LISPRO 12 UNITS: 100 INJECTION, SOLUTION INTRAVENOUS; SUBCUTANEOUS at 17:14

## 2023-07-26 RX ADMIN — RANOLAZINE 500 MG: 500 TABLET, FILM COATED, EXTENDED RELEASE ORAL at 08:32

## 2023-07-26 RX ADMIN — FAMOTIDINE 20 MG: 20 TABLET, FILM COATED ORAL at 21:53

## 2023-07-26 RX ADMIN — INSULIN LISPRO 4 UNITS: 100 INJECTION, SOLUTION INTRAVENOUS; SUBCUTANEOUS at 12:44

## 2023-07-26 RX ADMIN — Medication 100 MCG: at 08:32

## 2023-07-26 RX ADMIN — ATORVASTATIN CALCIUM 40 MG: 40 TABLET, FILM COATED ORAL at 08:32

## 2023-07-26 RX ADMIN — LISINOPRIL 5 MG: 5 TABLET ORAL at 08:32

## 2023-07-26 RX ADMIN — PANTOPRAZOLE SODIUM 40 MG: 40 TABLET, DELAYED RELEASE ORAL at 06:05

## 2023-07-26 RX ADMIN — DOCUSATE SODIUM 100 MG: 100 CAPSULE, LIQUID FILLED ORAL at 08:32

## 2023-07-26 RX ADMIN — INSULIN LISPRO 4 UNITS: 100 INJECTION, SOLUTION INTRAVENOUS; SUBCUTANEOUS at 08:32

## 2023-07-26 RX ADMIN — INSULIN LISPRO 1 UNITS: 100 INJECTION, SOLUTION INTRAVENOUS; SUBCUTANEOUS at 21:55

## 2023-07-26 RX ADMIN — CARBIDOPA AND LEVODOPA 1 TABLET: 25; 100 TABLET ORAL at 21:53

## 2023-07-26 RX ADMIN — SENNOSIDES 17.2 MG: 8.6 TABLET, FILM COATED ORAL at 21:53

## 2023-07-26 RX ADMIN — CARBIDOPA AND LEVODOPA 1 TABLET: 25; 100 TABLET ORAL at 17:12

## 2023-07-26 RX ADMIN — Medication 3 MG: at 21:53

## 2023-07-26 RX ADMIN — INSULIN LISPRO 4 UNITS: 100 INJECTION, SOLUTION INTRAVENOUS; SUBCUTANEOUS at 17:13

## 2023-07-26 RX ADMIN — CARBIDOPA AND LEVODOPA 1 TABLET: 25; 100 TABLET ORAL at 08:32

## 2023-07-26 RX ADMIN — INSULIN GLARGINE 30 UNITS: 100 INJECTION, SOLUTION SUBCUTANEOUS at 21:53

## 2023-07-26 RX ADMIN — PANTOPRAZOLE SODIUM 40 MG: 40 TABLET, DELAYED RELEASE ORAL at 17:12

## 2023-07-26 RX ADMIN — RANOLAZINE 500 MG: 500 TABLET, FILM COATED, EXTENDED RELEASE ORAL at 21:53

## 2023-07-26 RX ADMIN — Medication 1000 MG: at 03:46

## 2023-07-26 RX ADMIN — INSULIN LISPRO 2 UNITS: 100 INJECTION, SOLUTION INTRAVENOUS; SUBCUTANEOUS at 08:33

## 2023-07-26 RX ADMIN — POLYETHYLENE GLYCOL 3350 17 G: 17 POWDER, FOR SOLUTION ORAL at 21:53

## 2023-07-26 RX ADMIN — INSULIN LISPRO 5 UNITS: 100 INJECTION, SOLUTION INTRAVENOUS; SUBCUTANEOUS at 12:43

## 2023-07-26 RX ADMIN — POLYETHYLENE GLYCOL 3350 17 G: 17 POWDER, FOR SOLUTION ORAL at 08:32

## 2023-07-26 RX ADMIN — DOCUSATE SODIUM 100 MG: 100 CAPSULE, LIQUID FILLED ORAL at 17:12

## 2023-07-26 RX ADMIN — FINASTERIDE 5 MG: 5 TABLET, FILM COATED ORAL at 08:32

## 2023-07-26 RX ADMIN — TAMSULOSIN HYDROCHLORIDE 0.4 MG: 0.4 CAPSULE ORAL at 17:12

## 2023-07-26 RX ADMIN — FERROUS GLUCONATE 324 MG: 324 TABLET ORAL at 06:05

## 2023-07-26 NOTE — CASE MANAGEMENT
Case Management Progress Note    Patient name Michelle Campoverde  Location S /S -52 MRN [id number]  : 1950 Date 2023       LOS (days): 2  Geometric Mean LOS (GMLOS) (days): 4.80  Days to GMLOS:2.4        OBJECTIVE:        Current admission status: Inpatient  Preferred Pharmacy:   [address] [place] [address]  Phone: [phone] Fax: [address] [place] [address] [address]  Phone: [phone] Fax: [phone]    Primary Care Provider: Sukumar Ambriz MD    Primary Insurance: [employer] [id number]   Secondary Insurance:     PROGRESS NOTE:  CM was in contact with [facility] who confirmed the Pt is a resident at their facility with his spouse. Nancy Wagner reported the Pt has some confusion, is a full assist with all ADLs but can feed himself. Pt uses a wheelchair which staff transfers him to. Pt is incontinent and has a wilkerson.

## 2023-07-26 NOTE — ASSESSMENT & PLAN NOTE
· Patient has chronic indwelling Razo catheter  · Concern for colonization however given sepsis and recent placement we will continue to treat as noted above  · Continue with urogenital care while inpatient. Discussed with [facility] over the phone urogenital care needs to continue on discharge.   · UA with large leukocytes, + Nitrites, innumberable WBC  · Urine culture with proteus mirabilis; staph aureus and enterococcus faecalis  · Continue IV Rocephin at this time, day #3

## 2023-07-26 NOTE — PROGRESS NOTES
[facility]  Progress Note  Name: Aliyah Barraza  MRN: [id number]  Unit/Bed#: S -90 I Date of Admission: 7/23/2023   Date of Service: 7/26/2023 I Hospital Day: 2    Assessment/Plan   * Sepsis [facility])  Assessment & Plan  · Patient presented to the ED from [facility] with a blood glucose level greater than 500. · Meeting SIRS criteria on admission, evidenced by leukocytosis and tachycardia. · In setting of indwelling wilkerson catheter associated UTI. · Urine culture positive for proteus mirabilis; staph aureus and enterococcus faecalis  · Currently on IV Ceftriaxone, today is day 3  · Blood cultures negative x 48 hours    Acute blood loss anemia  Assessment & Plan  · Patient with history of anemia; with a baseline hemoglobin of 9-10  · Noted to be 7.3 on admission with repeat hemoglobin of 6.3  · Patient is now s/p 1 unit PRBC. · Rectal exam/Hemococcult negative for blood  · Hemoglobin remains low at 7 today. · GI consult, appreciate input  · Recommending EGD/Colonoscopy to be completed. · Poor historian. Does report drinking history, quit 1985. · Holding anticoagulation in setting of acute anemia. ASA/Plavix held since 7/23. Place SCD's for dvt ppx. · Transfuse for Hgb < 7     Iron deficiency anemia  Assessment & Plan  · Iron study consistent with REN   · B12 and folate within normal limits. B12 less than 400. Start oral supplementation here  · Hold off on IV Venofer given concern for acute infection/sepsis. P.o. supplementation started. Parkinson disease ([address])  Assessment & Plan  · Continue Sinemet   · PT/OT     Coronary artery disease involving native coronary artery of native heart without angina pectoris  Assessment & Plan  · Triple-vessel disease- not candidate for surgical intervention  · Denies any chest pain  · Holding ASA and Plavix 2/2 acute iron deficiency anemia. Held since 7/23/2023.   · Continue medical management with BB, statin, Ranexa    Constipation  Assessment & Plan  · Resolved    Left-sided weakness  Assessment & Plan  · Chronic left sided weakness 2/2 Parkinson's disease and neuropathy    · No new focal deficits    Urinary tract infection associated with indwelling urethral catheter (HCC)  Assessment & Plan  · Patient has chronic indwelling Razo catheter  · Concern for colonization however given sepsis and recent placement we will continue to treat as noted above  · Continue with urogenital care while inpatient. Discussed with [facility] over the phone urogenital care needs to continue on discharge. · UA with large leukocytes, + Nitrites, innumberable WBC  · Urine culture with proteus mirabilis; staph aureus and enterococcus faecalis  · Continue IV Rocephin at this time, day #3    Stage 3a chronic kidney disease [facility])  Assessment & Plan  Lab Results   Component Value Date    EGFR 56 07/26/2023    EGFR 60 07/25/2023    EGFR 66 07/24/2023    CREATININE 1.26 07/26/2023    CREATININE 1.19 07/25/2023    CREATININE 1.10 07/24/2023     · Stable   · Baseline Cr 1.1-1.3 mg/dL   · Avoid nephrotoxins/hypotension   · Monitor I/O    Type 2 diabetes mellitus with hyperglycemia, with long-term current use of insulin [facility])  Assessment & Plan  Lab Results   Component Value Date    HGBA1C 12.4 (H) 07/23/2023       Recent Labs     07/25/23  1052 07/25/23  1532 07/25/23  2052 07/26/23  0717   POCGLU 273* 349* 386* 202*       Blood Sugar Average: Last 72 hrs:  (P) [id number]     Presented to the ER with a blood glucose level greater than 500.   In setting of uncontrolled diabetes mellitus, as evidenced by a hemoglobin A1C of 12.4  Hold home oral anti-diabetics; Glipizide/Metformin  Continue insulin sliding scale with 10 units lispro with meals TID  Increase Lantus to 35 units qhs to start tonight  Monitor blood glucose AC/HS  Endocrinology consult, appreciate input    Essential hypertension  Assessment & Plan  · Blood pressure well controlled  · Continue lisinopril and metoprolol         VTE Pharmacologic Prophylaxis: VTE Score: 5 High Risk (Score >/= 5) - Pharmacological DVT Prophylaxis Contraindicated. Sequential Compression Devices Ordered. Patient Centered Rounds: I performed bedside rounds with nursing staff today. Discussions with Specialists or Other Care Team Provider: Case Management    Education and Discussions with Family / Patient: Updated  (wife) via phone. Total Time Spent on Date of Encounter in care of patient: 35 minutes This time was spent on one or more of the following: performing physical exam; counseling and coordination of care; obtaining or reviewing history; documenting in the medical record; reviewing/ordering tests, medications or procedures; communicating with other healthcare professionals and discussing with patient's family/caregivers. Current Length of Stay: 2 day(s)  Current Patient Status: Inpatient   Certification Statement: The patient will continue to require additional inpatient hospital stay due to ongoing treatment in setting of need for EGD/Colonoscopy. Discharge Plan: Anticipate discharge in 48 hrs to discharge location to be determined pending rehab evaluations. Code Status: Level 1 - Full Code    Subjective:   Patient resting in recliner chair; reports that his back hurts. Denies complaints of chest pain, shortness of breath, fever, or chills. No abdominal pain. Objective:     Vitals:   Temp (24hrs), Av.9 °F (36.6 °C), Min:97.3 °F (36.3 °C), Max:98.5 °F (36.9 °C)    Temp:  [97.3 °F (36.3 °C)-98.5 °F (36.9 °C)] 97.4 °F (36.3 °C)  HR:  [83-90] 84  Resp:  [16-20] 18  BP: (116-134)/(57-68) 116/57  SpO2:  [95 %-98 %] 98 %  Body mass index is 30.04 kg/m². Input and Output Summary (last 24 hours):      Intake/Output Summary (Last 24 hours) at 2023 1327  Last data filed at 2023 0501  Gross per 24 hour   Intake --   Output 900 ml   Net -900 ml       Physical Exam:   Physical Exam  Vitals and nursing note reviewed. Constitutional:       General: He is not in acute distress. Appearance: He is obese. Cardiovascular:      Rate and Rhythm: Normal rate. Pulses: Normal pulses. Heart sounds: Normal heart sounds. Pulmonary:      Effort: Pulmonary effort is normal.      Breath sounds: Normal breath sounds. Abdominal:      General: Bowel sounds are normal.      Palpations: Abdomen is soft. Genitourinary:     Comments: Razo, clear yellow urine. Some sediment noted. Musculoskeletal:         General: No tenderness. Normal range of motion. Right lower leg: No edema. Left lower leg: No edema. Skin:     General: Skin is warm and dry. Neurological:      Mental Status: He is alert and oriented to person, place, and time. Psychiatric:         Mood and Affect: Mood normal.          Additional Data:     Labs:  Results from last 7 days   Lab Units 07/26/23 0429 07/24/23 1423 07/24/23  0759   WBC Thousand/uL 11.03*   < > 13.24*   HEMOGLOBIN g/dL 7.0*   < > 7.5*   HEMATOCRIT % 25.5*   < > 26.5*   PLATELETS Thousands/uL 406*   < > 432*   NEUTROS PCT %  --   --  85*   LYMPHS PCT %  --   --  4*   MONOS PCT %  --   --  8   EOS PCT %  --   --  1    < > = values in this interval not displayed. Results from last 7 days   Lab Units 07/26/23 0429 07/24/23 0759 07/24/23  0236   SODIUM mmol/L 137   < > 137   POTASSIUM mmol/L 3.7   < > 3.6   CHLORIDE mmol/L 105   < > 102   CO2 mmol/L 27   < > 26   BUN mg/dL 12   < > 13   CREATININE mg/dL 1.26   < > 0.98   ANION GAP mmol/L 5   < > 9   CALCIUM mg/dL 7.9*   < > 8.2*   ALBUMIN g/dL  --   --  3.2*   TOTAL BILIRUBIN mg/dL  --   --  0.52   ALK PHOS U/L  --   --  58   ALT U/L  --   --  7   AST U/L  --   --  7*   GLUCOSE RANDOM mg/dL 205*   < > 315*    < > = values in this interval not displayed.      Results from last 7 days   Lab Units 07/24/23  0236   INR  1.12     Results from last 7 days   Lab Units 07/26/23  1100 07/26/23  0717 07/25/23  2052 07/25/23  1532 07/25/23  1052 07/25/23  0616 07/24/23  2125 07/24/23  1553 07/24/23  1111 07/24/23  0702 07/24/23  0400 07/24/23  0245   POC GLUCOSE mg/dl 335* 202* 386* 349* 273* 223* 282* 371* 337* 256* 311* 348*     Results from last 7 days   Lab Units 07/23/23  1850   HEMOGLOBIN A1C % 12.4*     Results from last 7 days   Lab Units 07/24/23  0236   LACTIC ACID mmol/L 1.2   PROCALCITONIN ng/ml 0.19       Lines/Drains:  Invasive Devices     Peripheral Intravenous Line  Duration           Peripheral IV 07/23/23 Right Antecubital 2 days    Peripheral IV 07/24/23 Distal;Left;Ventral (anterior) Forearm 2 days          Drain  Duration           Urethral Catheter 2 days              Urinary Catheter:  Goal for removal: N/A - Chronic Razo         Imaging: No pertinent imaging reviewed. Recent Cultures (last 7 days):   Results from last 7 days   Lab Units 07/24/23  0236 07/23/23  1934 07/23/23  1853   BLOOD CULTURE  No Growth at 48 hrs. --  No Growth at 48 hrs.    URINE CULTURE   --  >100,000 cfu/ml Proteus mirabilis*  >100,000 cfu/ml Staphylococcus aureus*  >100,000 cfu/ml Enterococcus faecalis*  --        Last 24 Hours Medication List:   Current Facility-Administered Medications   Medication Dose Route Frequency Provider Last Rate   • acetaminophen  650 mg Oral Q6H PRN Lena Nielson PA-C     • atorvastatin  40 mg Oral Daily Lena Nielson PA-C     • bisacodyl  10 mg Rectal Daily PRN Lena Nielson PA-C     • carbidopa-levodopa  1 tablet Oral TID Lena Nielson PA-C     • cefTRIAXone  1,000 mg Intravenous Q24H Lena Nielson PA-C 1,000 mg (07/26/23 0346)   • cyanocobalamin  100 mcg Oral Daily Abigail Damian PA-C     • docusate sodium  100 mg Oral BID Lena Nielson PA-C     • famotidine  20 mg Oral HS Lena Nielson PA-C     • ferrous gluconate  324 mg Oral Daily Before Breakfast Jory Halsted, CRNP     • finasteride  5 mg Oral Daily Paul Hodgson PA-C     • insulin glargine  35 Units Subcutaneous HS AJIT Jurado     • insulin lispro  1-6 Units Subcutaneous TID AC Paul Hodgson PA-C     • insulin lispro  1-6 Units Subcutaneous HS Paul Hodgson PA-C     • insulin lispro  10 Units Subcutaneous TID With Meals AJIT Jurado     • lisinopril  5 mg Oral Daily Paul Hodgson PA-C     • melatonin  3 mg Oral HS Paul Hodgson PA-C     • metoprolol succinate  25 mg Oral Daily Paul Hodgson PA-C     • pantoprazole  40 mg Oral BID AC Paul Hodgson PA-C     • polyethylene glycol  17 g Oral BID AJIT Hartman     • ranolazine  500 mg Oral Q12H [facility] Paul Hodgson PA-C     • senna  2 tablet Oral HS AJIT Hartman     • tamsulosin  0.4 mg Oral Daily With [address] St[address] Simons PA-C          Today, Patient Was Seen By: AJIT Jurado    **Please Note: This note may have been constructed using a voice recognition system. **

## 2023-07-26 NOTE — PHYSICAL THERAPY NOTE
PHYSICAL THERAPY EVALUATION  NAME:  Linzy Nyhan  DATE: 07/26/23    AGE:   68 y.o. Mrn:   [id number]  Principal problem: Principal Problem:    Sepsis ([address])  Active Problems:    Essential hypertension    Type 2 diabetes mellitus with hyperglycemia, with long-term current use of insulin (HCC)    Stage 3a chronic kidney disease (HCC)    Urinary tract infection associated with indwelling urethral catheter (HCC)    Left-sided weakness    Constipation    Vitamin B12 deficiency    Coronary artery disease involving native coronary artery of native heart without angina pectoris    Parkinson disease (HCC)    Iron deficiency anemia    Acute blood loss anemia      Vitals:    07/25/23 1502 07/25/23 2142 07/26/23 0719 07/26/23 1102   BP: 134/68 122/63 120/64 116/57   BP Location:  Left arm     Pulse: 88 90 83 84   Resp: 18 20 16 18   Temp: 98.4 °F (36.9 °C) 98.5 °F (36.9 °C) (!) 97.3 °F (36.3 °C) (!) 97.4 °F (36.3 °C)   TempSrc:  Oral     SpO2: 97% 95% 96% 98%   Weight:       Height:           Length Of Stay: 2  Performed at least 2 patient identifiers during session: Name and Birthday  PHYSICAL THERAPY EVALUATION :    07/26/23 1250   PT Last Visit   PT Visit Date 07/26/23   Note Type   Note type Evaluation   Pain Assessment   Pain Assessment Tool 0-10   Pain Score No Pain   Restrictions/Precautions   Weight Bearing Precautions Per Order No   Other Precautions Bed Alarm; Chair Alarm;Cognitive; Fall Risk  (wilkerson)   Home Living   Type of Home Assisted living  Levine, Susan. \[facility].\"")   Home Layout One level   [address]  (when asked if he uses electric WC, pt stated "are you kidding? I'd kill myself (driving it)")   Prior Function   Level of [place] Needs assistance with ADLs; Needs assistance with functional mobility; Needs assistance with IADLS   Lives With Spouse  (who is his "roommate" per pt)   Receives Help From Family; Other (Comment)  (facility staff at [address] [place] [address])   IADLs Family/Friend/Other provides transportation; Family/Friend/Other provides meals; Family/Friend/Other provides medication management   Falls in the last 6 months 1 to 4  (Per OT: pt reports he has had falls however not sure how many.)   Vocational Retired   General   Family/Caregiver Present No   Cognition   Overall Cognitive Status Impaired   Arousal/Participation Alert   Attention Attends with cues to redirect   Orientation Level Oriented to person;Disoriented to time;Disoriented to place; Disoriented to situation   Memory Decreased recall of precautions;Decreased recall of recent events;Decreased short term memory   Following Commands Follows one step commands with increased time or repetition   Subjective   Subjective " I miss my wife. .I want to call her"  Pt perseverating on phone call, but was eventually agreeable to participate in order to get back to bed. RUE Assessment   RUE Assessment WFL   RUE Strength   RUE Overall Strength Within Functional Limits - able to perform ADL tasks with strength   LUE Assessment   LUE Assessment WFL   LUE Strength   LUE Overall Strength Within Functional Limits - able to perform ADL tasks with strength   RLE Assessment   RLE Assessment   (LE edema; knee ext limited from neutral)   Strength RLE   R Hip Flexion 4-/5   R Knee Extension 4/5   R Ankle Dorsiflexion 4/5   LLE Assessment   LLE Assessment   (LE edema; knee ext limited from neutral)   Strength LLE   L Hip Flexion 4-/5   L Knee Extension 4/5   L Ankle Dorsiflexion 4/5   Vision-Basic Assessment   Current Vision Does not wear glasses   Coordination   Movements are Fluid and Coordinated 0   Light Touch   RLE Light Touch Not tested   LLE Light Touch Not tested   Bed Mobility   Sit to Supine 1  Dependent   Additional items Assist x 2;Bedrails; Increased time required;Verbal cues;LE management   Additional Comments Needed dependent A of 2 for repositioning toward head of bed   Transfers   Sit to Stand 3  Moderate assistance   Additional items Assist x 1;Increased time required;Verbal cues;Armrests   Stand to Sit 3  Moderate assistance   Additional items Assist x 1; Increased time required;Verbal cues;Armrests   Stand pivot 2  Maximal assistance   Additional items Assist x 1; Increased time required;Verbal cues  (SBA of second; resistive w/ turn to approach target surface)   Additional Comments Pt reports fearful of falling especially with pivot transfers. Rigid with transitional movements especially with rotation. Jen's Baraitric body type APPLE ASCITES   Ambulation/Elevation   Gait pattern Not tested  (due to pt's request to get back to bed, fear and retreat)   Balance   Static Sitting Fair -   Static Standing Poor  (retropulsive;)   Endurance Deficit   Endurance Deficit Yes   Endurance Deficit Description limited standing tolerance   Activity Tolerance   Activity Tolerance Patient limited by fatigue  (fear and retreat)   Medical Staff Made Aware Spoke to [facility] from case management; Stefan from [facility] care coordination w/Marika RN, spoke to PCA     Assessment:   Pt is a 68 y.o. male seen for PT evaluation s/p admit to [address] [place] [address] on 7/23/2023 w/ Sepsis ([address] Order placed for PT. Prior to admission: Pt was at [facility] with his spouse, Per OT: reports stand-pivoting bed<>WC, working w/ HHPT for ambulating w/ RW, aides/wife assisting w/ all ADLs and IADLs. Upon evaluation: Pt mod A for sit<>stand, Max A for pivot transfers, and A of 2 for returning and repositioning in bed. .      Pt's clinical presentation is currently unstable/unpredictable given the functional mobility deficits above, especially (but not limited to) weakness, decreased ROM and decreased functional mobility tolerance, and combined with medical complications of tachycardia, abnormal H&H, abnormal WBCs and fear/retreat. He is at risk for falls based on hx of falls, impaired balance, decreased cognition, obesity and fear and retreat, .        During this admission, pt would benefit from continued skilled inpatient PT in the acute care setting in order to address deficits as defined above to maximize function and mobility. Recommendations:    From a PT standpoint, recommend next several sessions focus on functional mobility training including bed mobility and transfers, standing tolerance Ther Ex for improvements in flexibility. Would also attempt wheelchair mobility/propulsion management/.      Nursing Recommendations:   Mobility Plan as of 07/26/23: Pt is 1-2 person Assist with Gait belt to/from recliner and BSC. Prognosis Fair   Problem List Decreased strength;Decreased endurance;Decreased range of motion;Decreased coordination;Decreased cognition;Decreased mobility; Impaired balance;Obesity  (Fear and retreat)   Goals   Patient Goals to talk to my wife of 48 years   STG Expiration Date 08/05/23   Short Term Goal #1 Pt will: Perform rolling  bed mobility tasks with no more than min A to prepare for transfers and reposition in bed. Perform supine<>sit bed mobility tasks with consistent min A of 1 to prepare for transfers. Perform sit<>stand and pivot transfers with consistent min A of 1 to decrease risk for falls. Perform WC propulsion for >50' with Supervision to increase Indep in prior living environment. Increase standing tolerance time w/ BUE support and no more than min A to 3 min to increase activity tolerance   PT Treatment Day 0   Plan   Treatment/Interventions Functional transfer training;LE strengthening/ROM; Therapeutic exercise; Endurance training;Cognitive reorientation;Patient/family training;Equipment eval/education; Bed mobility;Spoke to nursing;Spoke to case management;OT  (WC mobility)   PT Frequency 3-5x/wk   Recommendation   PT Discharge Recommendation Return to facility with rehabilitation services  (as long as facility can assist pt at this Level of function)   Equipment Recommended Wheelchair  (and gait belt for transfers)   Additional Comments Co-morbidities affecting pt's physical performance at time of assessment include but are not limited to: Anemia, Anxiety, Benign paroxysmal vertigo,Depression, Diabetes mellitus, OA (osteoarthritis) of knee, Paralytic syndrome, Vertebro-basilar artery syndrome, Knee surgery; Cardiac catheterization (N/A, 3/7/2022). .. Personal factors affecting pt at time of IE include: limited home support, advanced age, past experience, behavioral pattern, inability to perform IADLs, inability to perform ADLs, inability to navigate community distances and limited insight into impairments. AM-PAC Basic Mobility Inpatient   Turning in Flat Bed Without Bedrails 2   Lying on Back to Sitting on Edge of Flat Bed Without Bedrails 1   Moving Bed to Chair 2   Standing Up From Chair Using Arms 2   Walk in Room 1   Climb 3-5 Stairs With Railing 1   Basic Mobility Inpatient Raw Score 9   Turning Head Towards Sound 3   Follow Simple Instructions 2   Low Function Basic Mobility Raw Score  14   Low Function Basic Mobility Standardized Score  22.01   Highest Level Of Mobility   -[facility] Goal 3: Sit at edge of bed   -HL Achieved 5: Stand (1 or more minutes)   End of Consult   Patient Position at End of Consult Supine; All needs within reach;Bed/Chair alarm activated   (Please find full objective findings from PT assessment regarding body systems outlined above). The patient's AM-PAC Basic Mobility Inpatient Short Form Raw Score is 9. A Raw score of less than or equal to 16 suggests the patient may benefit from discharge to post-acute rehabilitation services, which DOES coincide with CURRENT above PT recommendations. However please refer to therapist recommendation for discharge planning given other factors that may influence destination. Adapted from [facility] [employer] of AM-PAC “6-Clicks” Basic Mobility and Daily Activity Scores With Discharge Destination. Physical Therapy, 2021;101:1-9. DOI: 10.1093/ptj/exca738    Portions of the record may have been created with voice recognition software. Occasional wrong word or "sound a like" substitutions may have occurred due to the inherent limitations of voice recognition software.   Read the chart carefully and recognize, using context, where substitutions have occurred

## 2023-07-26 NOTE — PLAN OF CARE
Problem: OCCUPATIONAL THERAPY ADULT  Goal: Performs self-care activities at highest level of function for planned discharge setting. See evaluation for individualized goals. Description: Treatment Interventions: ADL retraining, Functional transfer training, Endurance training, Cognitive reorientation, Patient/family training, Equipment evaluation/education, Compensatory technique education, Continued evaluation, Energy conservation, Activityengagement          See flowsheet documentation for full assessment, interventions and recommendations. Note: Limitation: Decreased ADL status, Decreased Safe judgement during ADL, Decreased cognition, Decreased endurance, Decreased self-care trans, Decreased high-level ADLs (balance, fxnl mobility, act mellissa, fxnl reach, trunk control, standing mellissa, strength and fxnl sitting balance, safety awareness, insight, problem solving and initiation, response time)  Prognosis: Good  Assessment: Pt is a 68 y.o. male seen for OT evaluation s/p admit to [address] [place] [address] on 7/23/2023 w/Sepsis [facility]). Prior to admission, pt was living with spouse at [address] [place] [address], (A) with ADLs, (A) with IADLs, (+) falls, (-) . Personal and environmental factors affecting patient at time of evaluation include current habits and behavioral patterns, difficulty completing ADLs and difficulty completing IADLs. Personal factors supporting patient at time of evaluation include supportive spouse and facility staff at [facility], attitude towards recovery and (A) with all ADLs. Based upon this evaluation, pt is functioning below baseline. Pt will benefit from continued skilled inpatient OT to maximize safety, level of independence overall performance in ADLs, functional transfers, functional mobility to return to functional baseline/highest level of function.      OT Discharge Recommendation: Return to facility with rehabilitation services (return to [facility] w/ OT services)     JIN Danielle, OTANICETO/L  [place] License [id number]  [address] [facility] [id number]

## 2023-07-26 NOTE — PLAN OF CARE
Problem: PHYSICAL THERAPY ADULT  Goal: Performs mobility at highest level of function for planned discharge setting. See evaluation for individualized goals. Description: Treatment/Interventions: Functional transfer training, LE strengthening/ROM, Therapeutic exercise, Endurance training, Cognitive reorientation, Patient/family training, Equipment eval/education, Bed mobility, Spoke to nursing, Spoke to case management, OT (WC mobility)  Equipment Recommended: Wheelchair (and gait belt for transfers)       See flowsheet documentation for full assessment, interventions and recommendations. Note: Prognosis: Fair  Problem List: Decreased strength, Decreased endurance, Decreased range of motion, Decreased coordination, Decreased cognition, Decreased mobility, Impaired balance, Obesity (Fear and retreat)  Assessment: Pt is a 68 y.o. male seen for PT evaluation s/p admit to [facility] on 7/23/2023 w/ Sepsis ([address] Order placed for PT. Prior to admission: Pt was at [facility] with his spouse, Per OT: reports stand-pivoting bed<>WC, working w/ HHPT for ambulating w/ RW, aides/wife assisting w/ all ADLs and IADLs. Upon evaluation: Pt mod A for sit<>stand, Max A for pivot transfers, and A of 2 for returning and repositioning in bed. .      Pt's clinical presentation is currently unstable/unpredictable given the functional mobility deficits above, especially (but not limited to) weakness, decreased ROM and decreased functional mobility tolerance, and combined with medical complications of tachycardia, abnormal H&H, abnormal WBCs and fear/retreat. He is at risk for falls based on hx of falls, impaired balance, decreased cognition, obesity and fear and retreat, . During this admission, pt would benefit from continued skilled inpatient PT in the acute care setting in order to address deficits as defined above to maximize function and mobility.       Recommendations:    From a PT standpoint, recommend next several sessions focus on functional mobility training including bed mobility and transfers, standing tolerance Ther Ex for improvements in flexibility. Would also attempt wheelchair mobility/propulsion management/.      Nursing Recommendations:   Mobility Plan as of 07/26/23: Pt is 1-2 person Assist with Gait belt to/from recliner and BSC. PT Discharge Recommendation: Return to facility with rehabilitation services (as long as facility can assist pt at this Level of function)    See flowsheet documentation for full assessment.

## 2023-07-26 NOTE — OCCUPATIONAL THERAPY NOTE
Occupational Therapy Evaluation     Patient Name: Tianna SWENSON Date: 7/26/2023  Problem List  Principal Problem:    Sepsis [facility])  Active Problems:    Essential hypertension    Type 2 diabetes mellitus with hyperglycemia, with long-term current use of insulin ([address])    Stage 3a chronic kidney disease (HCC)    Urinary tract infection associated with indwelling urethral catheter (HCC)    Left-sided weakness    Constipation    Vitamin B12 deficiency    Coronary artery disease involving native coronary artery of native heart without angina pectoris    Parkinson disease ([address])    Iron deficiency anemia    Acute blood loss anemia    Past Medical History  Past Medical History:   Diagnosis Date    Ambulatory dysfunction     uses walker with assist of 1    Anemia     Anxiety     At risk for falls     hx of falls    Benign paroxysmal vertigo     unspecified ear    BPH (benign prostatic hyperplasia)     for TURP today 1/4/2021    Calculus in bladder     for surgical removal today 1/4/2021    Cataract     left eye    Cervicalgia     Chest pain     Chronic kidney disease     stage 3    Constipation     CTS (carpal tunnel syndrome)     left    Cyst of pancreas     Cystitis 06/23/2020    acute cystitis with hematuria    Depression     Diabetes mellitus ([address])     type II with neuropathy    Dizziness     and giddiness    Dysphagia     Elevated PSA     Facial weakness     GERD (gastroesophageal reflux disease)     last assessed 5/20/16    Gross hematuria     Hematuria     Hyperlipidemia     Hypertension     Kidney disease     Kidney stone     Left-sided weakness     Long term (current) use of oral hypoglycemic drugs     Muscle weakness     Nuclear senile cataract of left eye     OA (osteoarthritis) of knee     b/l    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HCC)     Syncope and collapse     Tachycardia     UTI (urinary tract infection)     Vertebro-basilar artery syndrome     Vitamin B deficiency     Vitamin D deficiency     Vitamin D deficiency      Past Surgical History  Past Surgical History:   Procedure Laterality Date    CARDIAC CATHETERIZATION N/A 3/7/2022    Procedure: CARDIAC CATHETERIZATION;  Surgeon: Michelle Chavez DO;  [facility]; Service: Cardiology    CATARACT EXTRACTION      CHOLECYSTECTOMY      KNEE SURGERY      TX LITHOLAPAXY SMPL/SM <2.5 CM N/A 1/4/2021    Procedure: Cystolitholopaxy w/laser bladder stones;  Surgeon: Steffi Clay MD;  Location: [facility];  Service: Urology    TX TRURL ELECTROSURG RESCJ PROSTATE BLEED COMPLETE N/A 1/4/2021    Procedure: T.U.R.P.;  Surgeon: Steffi Clay MD;  Location: [facility];  Service: Urology         07/26/23 0802   OT Last Visit   OT Visit Date 07/26/23   Note Type   Note type Evaluation   Pain Assessment   Pain Assessment Tool 0-10   Pain Score No Pain   Restrictions/Precautions   Weight Bearing Precautions Per Order No   Other Precautions Cognitive; Chair Alarm; Bed Alarm;Multiple lines; Fall Risk  (wilkerson)   Home Living   Type of Home Assisted living  [facility]   Home Layout One level;Performs ADLs on one level; Able to live on main level with bedroom/bathroom   Bathroom Shower/Tub Walk-in shower   Bathroom Toilet Raised   Bathroom Equipment Grab bars in shower; Shower chair;Grab bars around toilet   Bathroom Accessibility Accessible via wheelchair   [address]   Additional Comments Pt questionable historian. Information obtained from chart review. Prior Function   Level of [place] Needs assistance with ADLs; Needs assistance with functional mobility; Needs assistance with IADLS   Lives With Spouse  (who is his "roommate" per pt)   Receives Help From Family; Other (Comment)  (facility staff at [address] [place] [address])   IADLs Family/Friend/Other provides transportation; Family/Friend/Other provides meals; Family/Friend/Other provides medication management   Falls in the last 6 months 1 to 4  (pt reports he has had falls however not sure how many)   Vocational Retired   Comments Pt inconsistent historian. Per chart review, pt requires (A) for ADLs baseline. Pt reports varying 1-2 person assist to get to w/c. Pt reports he can self-propel sometimes if his wife doesn't help him. Lifestyle   Autonomy Pt resides at [address] [place] [address] with spouse and requires A for ADLs baseline, w/c, (+) falls, (-)    Reciprocal Relationships Supportive spouse and facility staff at [facility] to Others Retired   [address] [place] [address] Avenue Enjoys spending time with his wife, watching baseball (Mets). General   Additional Pertinent History Per chart review: Chronic left sided weakness 2/2 Parkinson's disease and neuropathy   Family/Caregiver Present No   Subjective   Subjective "I miss my wife"   ADL   Eating Assistance 5  Supervision/Setup   Eating Deficit Setup; Beverage management  (setup in recliner; (A) to open containers. Using mostly fingers for feeding)   Grooming Assistance 5  Supervision/Setup   Grooming Deficit Setup; Wash/dry face  (sitting in recliner, VC for initiation)   UB Bathing Assistance 4  Minimal Assistance   LB Bathing Assistance 2  Maximal Assistance   UB Dressing Assistance 4  Minimal Assistance   LB Dressing Assistance 2  Maximal [address]  2  Maximal Assistance   Toileting Deficit   (wilkerson)   Bed Mobility   Supine to Sit 3  Moderate assistance   Additional items Assist x 1;HOB elevated; Bedrails; Increased time required;Verbal cues;LE management   Additional Comments Able to sit EOB with (S) and BUE support on bedrails. OOB to recliner at end of session   Transfers   Sit to Stand 3  Moderate assistance   Additional items Assist x 1; Increased time required;Verbal cues   Stand to Sit 3  Moderate assistance   Additional items Assist x 1; Increased time required;Verbal cues   Stand pivot 2  Maximal assistance   Additional items Assist x 1; Increased time required;Verbal cues   Additional Comments Pt stating "I'm fearful" throughout transfer attempts, VC for sequencing/problem solving hand placement   Balance   Static Sitting Fair -   Dynamic Sitting Poor +   Static Standing Poor   Dynamic Standing Poor -   Activity Tolerance   Activity Tolerance Patient limited by fatigue   Nurse Made Aware yes, RN Marika hernandez to see pt and updated on outcomes   RUE Assessment   RUE Assessment WFL   RUE Strength   RUE Overall Strength Within Functional Limits - able to perform ADL tasks with strength   LUE Assessment   LUE Assessment WFL   LUE Strength   LUE Overall Strength Within Functional Limits - able to perform ADL tasks with strength   Hand Function   Gross Motor Coordination Functional   Fine Motor Coordination Impaired   Hand Function Comments Difficulty manipulating containers/utensils. Relies on fingers for feeding   Sensation   Light Touch No apparent deficits   Vision-Basic Assessment   Current Vision Does not wear glasses   Cognition   Overall Cognitive Status Impaired   Arousal/Participation Alert; Cooperative   Attention Attends with cues to redirect   Orientation Level Oriented to person;Disoriented to time;Disoriented to place; Disoriented to situation   Memory Decreased recall of precautions;Decreased recall of recent events;Decreased short term memory   Following Commands Follows one step commands with increased time or repetition   Comments Pleasantly confused, frequently repetative. Able to express basic wants/needs however poor insight, attention to task, problem solving. Expressing fear thorughout session. Baseline cognitive deficits   Assessment   Limitation Decreased ADL status; Decreased Safe judgement during ADL;Decreased cognition;Decreased endurance;Decreased self-care trans;Decreased high-level ADLs  (balance, fxnl mobility, act mellissa, fxnl reach, trunk control, standing mellissa, strength and fxnl sitting balance, safety awareness, insight, problem solving and initiation, response time)   Prognosis Good   Assessment Pt is a 68 y.o. male seen for OT evaluation s/p admit to [facility] on 7/23/2023 w/Sepsis [facility]). Prior to admission, pt was living with spouse at [facility], (A) with ADLs, (A) with IADLs, (+) falls, (-) . Personal and environmental factors affecting patient at time of evaluation include current habits and behavioral patterns, difficulty completing ADLs and difficulty completing IADLs. Personal factors supporting patient at time of evaluation include supportive spouse and facility staff at [facility], attitude towards recovery and (A) with all ADLs. Based upon this evaluation, pt is functioning below baseline. Pt will benefit from continued skilled inpatient OT to maximize safety, level of independence overall performance in ADLs, functional transfers, functional mobility to return to functional baseline/highest level of function. Goals   Patient Goals to see my wife; to walk again   [facility] Time Frame 10-14   Long Term Goal #1 see goals below   Plan   Treatment Interventions ADL retraining;Functional transfer training; Endurance training;Cognitive reorientation;Patient/family training;Equipment evaluation/education; Compensatory technique education;Continued evaluation; Energy conservation; Activityengagement   Goal Expiration Date 08/05/23   OT Treatment Day 0   OT Frequency 2-3x/wk   Recommendation   OT Discharge Recommendation Return to facility with rehabilitation services  (return to [facility] w/ OT services)   Additional Comments  The patient's raw score on the AM-PAC Daily Activity inpatient short form is 16, standardized score is 35.96, less than 39.4. From an OT standpoint, patients at this level may benefit from d/c to Return to facility with rehabilitation services.    AM-PAC Daily Activity Inpatient   Lower Body Dressing 2   Bathing 2   Toileting 2   Upper Body Dressing 3   Grooming 3   Eating 4   Daily Activity Raw Score 16   Daily Activity Standardized Score (Calc for Raw Score >=11) 35.96   AM-PAC Applied Cognition Inpatient   Following a Speech/Presentation 2   Understanding Ordinary Conversation 3   Taking Medications 1   Remembering Where Things Are Placed or Put Away 2   Remembering List of 4-5 Errands 1   Taking Care of Complicated Tasks 1   Applied Cognition Raw Score 10   Applied Cognition Standardized Score 24.98   End of Consult   Patient Position at End of Consult Bedside chair;Bed/Chair alarm activated; All needs within reach   Nurse Communication Nurse aware of consult     GOALS:    *Goals established to promote patient goal of to see my wife; to walk again:      *ADL transfers with Min (A) for inc'd independence with ADLs/purposeful tasks and decreased caregiver burden    *Patient will perform grooming tasks sitting EOB with (S) in order to increase overall independence and dynamic sitting balance     *UB ADL with (S) for inc'd independence with self care and dynamic sitting balance    *LB ADL with Mod (A) using AE prn for inc'd independence with self care    *Toileting with Mod (A) for clothing management and hygiene to increase hygiene/thoroughness in order to reduce caregiver burden    *Bed mobility- (S) for inc'd independence to manage own comfort and initiate EOB & OOB purposeful tasks    *Patient will verbalize 3 safety awareness/ principles to prevent falls in the home setting.      *Patient will increase functional mobility to and from commode with rolling walker with mod assist to increase independence with toileting    *Pt will consistently follow one step directions during ADL performance w/ 50-75% accuracy to max I and safety w/ ADL performance    Ede Danielle, OTR/L    [place] License [id number]  [address] [place], [place] [id number]

## 2023-07-26 NOTE — CONSULTS
[address] [facility]  Consult  Name: Mckenzie Jaeger 68 y.o. male I MRN: [id number]  Unit/Bed#: S -01 I Date of Admission: 7/23/2023   Date of Service: 7/26/2023 I Hospital Day: 2    Inpatient consult to Endocrinology  Consult performed by: Shayla Gonzalez MD  Consult ordered by: Nathan Craig PA-C          Assessment/Plan   Type 2 diabetes mellitus with hyperglycemia, with long-term current use of insulin [facility])  Assessment & Plan  Lab Results   Component Value Date    HGBA1C 12.4 (H) 07/23/2023       Recent Labs     07/25/23  2052 07/26/23  0717 07/26/23  1100 07/26/23  1531   POCGLU 386* 202* 335* 289*       Blood Sugar Average: Last 72 hrs:  (P) [id number]     Presented to the ER on 07/23/23 with a blood glucose level greater than 500. Hb A1C of 12.4% from 6.4% (in March) in the setting of acute infection with severe sepsis secondary to UTI. Elevated A1c level in the setting of acute blood loss anemia with increase RDW indicating increased turnover rate. Holding home oral anti-diabetics; Glipizide and metformin. His blood glucose range between 220s to 380s during hospital stay. Plan:  Continue insulin sliding scale. Increase short acting insulin lispro 12 units with meals TID  Decreased Lantus to 30 units qhs  Monitor blood glucose AC/HS  Continue carbohydrate controlled diet level 2. Pancreas cyst  Assessment & Plan  CT abdomen taken in 09/2021 showed 2 cm pancreatic cyst.  History of pancreatitis secondary to heavy alcohol use. Avoid GLP-1 agonist which can exacerbate pancreatitis. GI on board and considering outpatient MRI/MRCP for further evaluation of pancreatic cyst.         VTE Prophylaxis: VTE Score: 5 High Risk (Score >/= 5) - Pharmacological DVT Prophylaxis Contraindicated. Sequential Compression Devices Ordered. Collaboration of Care:  Were Recommendations Directly Discussed with Primary Treatment Team? Yes    History of Present Illness:  Mckenzie Jaeger is a 68 y.o. male who is originally admitted to the [facility] service due to hypoglycemia, and sepsis secondary to UTI from chronic indwelling catheter. We are consulted for hypoglycemia and hemoglobin A1c 12.4%. Patient who has past medical history of type 2 diabetes mellitus for 20 years, Parkinson's disease, coronary artery disease, CKD, and BPH presented to the hospital 3 days ago from [facility] with hyperglycemia (glucose reading more than 500). He has type 2 diabetes mellitus for 20 years and home meds includes insulin Lantus 20 units at bedtime, metformin 1000 mg twice daily and glipizide 10 mg daily. Nursing home administered all of his medications. His hemoglobin A1c went from 6.4% in March to 12.4% on admission. He was admitted at [address] [place] [address] in December for severe sepsis secondary to UTI. Patient was placed a Razo's catheter last week at nursing home given urinary retention secondary to BPH. He met SIRS criteria on admission with leukocytosis, tachycardia and tachypnea. GI on board for acute blood loss anemia, s/p 1 unit blood transfusion, planning endoscopy on Friday. Urology consulted for ventral urethral erosion and suprapubic diversion was recommended. Review of Systems:  Review of Systems   Constitutional: Negative for appetite change, chills, fatigue and fever. HENT: Negative for ear pain, sore throat and voice change. Eyes: Negative for pain and visual disturbance. Respiratory: Negative for cough and shortness of breath. Cardiovascular: Negative for chest pain and palpitations. Gastrointestinal: Negative for abdominal pain, blood in stool, diarrhea, nausea and vomiting. Genitourinary: Positive for genital sores. Negative for dysuria, flank pain, frequency, hematuria and urgency. Musculoskeletal: Negative for arthralgias, back pain and neck pain. Skin: Negative for color change and rash.    Neurological: Negative for dizziness, seizures, syncope, weakness and light-headedness. Hematological: Negative for adenopathy. Does not bruise/bleed easily. All other systems reviewed and are negative. Past Medical and Surgical History:   Past Medical History:   Diagnosis Date   • Ambulatory dysfunction     uses walker with assist of 1   • Anemia    • Anxiety    • At risk for falls     hx of falls   • Benign paroxysmal vertigo     unspecified ear   • BPH (benign prostatic hyperplasia)     for TURP today 1/4/2021   • Calculus in bladder     for surgical removal today 1/4/2021   • Cataract     left eye   • Cervicalgia    • Chest pain    • Chronic kidney disease     stage 3   • Constipation    • CTS (carpal tunnel syndrome)     left   • Cyst of pancreas    • Cystitis 06/23/2020    acute cystitis with hematuria   • Depression    • Diabetes mellitus ([address])     type II with neuropathy   • Dizziness     and giddiness   • Dysphagia    • Elevated PSA    • Facial weakness    • GERD (gastroesophageal reflux disease)     last assessed 5/20/16   • Gross hematuria    • Hematuria    • Hyperlipidemia    • Hypertension    • Kidney disease    • Kidney stone    • Left-sided weakness    • Long term (current) use of oral hypoglycemic drugs    • Muscle weakness    • Nuclear senile cataract of left eye    • OA (osteoarthritis) of knee     b/l   • 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HCC)    • Syncope and collapse    • Tachycardia    • UTI (urinary tract infection)    • Vertebro-basilar artery syndrome    • Vitamin B deficiency    • Vitamin D deficiency    • Vitamin D deficiency        Past Surgical History:   Procedure Laterality Date   • CARDIAC CATHETERIZATION N/A 3/7/2022    Procedure: CARDIAC CATHETERIZATION;  Surgeon: Jameson Anguiano DO;  [facility];   Service: Cardiology   • CATARACT EXTRACTION     • CHOLECYSTECTOMY     • KNEE SURGERY     • NE LITHOLAPAXY SMPL/SM <2.5 CM N/A 1/4/2021    Procedure: Cystolitholopaxy w/laser bladder stones;  Surgeon: Sarmad Benitez MD; Location: [facility] OR;  Service: Urology   • ND TRURL ELECTROSURG RESCJ PROSTATE BLEED COMPLETE N/A 1/4/2021    Procedure: T.U.R.P.;  Surgeon: Анна Castellanos MD;  Location: [facility];  Service: Urology       Meds/Allergies:  all medications and allergies reviewed    Allergies: No Known Allergies    Social History:  Marital Status: /Civil Union  Substance Use History:   Social History     Substance and Sexual Activity   Alcohol Use Not Currently     Social History     Tobacco Use   Smoking Status Never   Smokeless Tobacco Never     Social History     Substance and Sexual Activity   Drug Use No       Family History:  Family History   Problem Relation Age of Onset   • Coronary artery disease Mother    • Diabetes Father        Physical Exam:   Vitals:   Blood Pressure: 153/74 (07/26/23 1510)  Pulse: 94 (07/26/23 1510)  Temperature: 98 °F (36.7 °C) (07/26/23 1510)  Temp Source: Oral (07/25/23 2142)  Respirations: 18 (07/26/23 1102)  Height: 5' 7" (170.2 cm) (07/23/23 1730)  Weight - Scale: 87 kg (191 lb 12.8 oz) (07/23/23 1730)  SpO2: 97 % (07/26/23 1510)    Physical Exam  Constitutional:       General: He is not in acute distress. Appearance: He is obese. He is ill-appearing. HENT:      Mouth/Throat:      Mouth: Mucous membranes are moist.      Pharynx: Oropharynx is clear. Eyes:      Extraocular Movements: Extraocular movements intact. Pupils: Pupils are equal, round, and reactive to light. Cardiovascular:      Heart sounds: Normal heart sounds. No murmur heard. No gallop. Pulmonary:      Effort: No respiratory distress. Breath sounds: Normal breath sounds. No wheezing or rales. Abdominal:      General: Bowel sounds are normal. There is no distension. Tenderness: There is no abdominal tenderness. Musculoskeletal:         General: No swelling. Right lower leg: No edema. Left lower leg: No edema. Skin:     Capillary Refill: Capillary refill takes less than 2 seconds. Coloration: Skin is pale. Skin is not jaundiced. Findings: No bruising or rash. Neurological:      Mental Status: He is disoriented. Comments: Patient is oriented to self and time only. Disoriented to place and person. Additional Data:   Lab Results:    Results from last 7 days   Lab Units 07/26/23  0429 07/24/23  1423 07/24/23  0759   WBC Thousand/uL 11.03*   < > 13.24*   HEMOGLOBIN g/dL 7.0*   < > 7.5*   HEMATOCRIT % 25.5*   < > 26.5*   PLATELETS Thousands/uL 406*   < > 432*   NEUTROS PCT %  --   --  85*   LYMPHS PCT %  --   --  4*   MONOS PCT %  --   --  8   EOS PCT %  --   --  1    < > = values in this interval not displayed. Results from last 7 days   Lab Units 07/26/23 0429 07/24/23  0759 07/24/23  0236   SODIUM mmol/L 137   < > 137   POTASSIUM mmol/L 3.7   < > 3.6   CHLORIDE mmol/L 105   < > 102   CO2 mmol/L 27   < > 26   BUN mg/dL 12   < > 13   CREATININE mg/dL 1.26   < > 0.98   ANION GAP mmol/L 5   < > 9   CALCIUM mg/dL 7.9*   < > 8.2*   ALBUMIN g/dL  --   --  3.2*   TOTAL BILIRUBIN mg/dL  --   --  0.52   ALK PHOS U/L  --   --  58   ALT U/L  --   --  7   AST U/L  --   --  7*   GLUCOSE RANDOM mg/dL 205*   < > 315*    < > = values in this interval not displayed.      Results from last 7 days   Lab Units 07/24/23  0236   INR  1.12         Lab Results   Component Value Date/Time    HGBA1C 12.4 (H) 07/23/2023 06:50 PM    HGBA1C 6.4 (H) 03/05/2022 05:17 AM    HGBA1C 6.2 (H) 06/14/2020 03:11 AM    HGBA1C 6.2 (H) 12/20/2017 07:49 AM    HGBA1C 6.8 (H) 09/09/2016 08:27 AM    HGBA1C 7.1 (H) 04/05/2016 08:08 AM     Results from last 7 days   Lab Units 07/26/23  1531 07/26/23  1100 07/26/23  0717 07/25/23  2052 07/25/23  1532 07/25/23  1052 07/25/23  0616 07/24/23  2125 07/24/23  1553 07/24/23  1111 07/24/23  0702 07/24/23  0400   POC GLUCOSE mg/dl 289* 335* 202* 386* 349* 273* 223* 282* 371* 337* 256* 311*     Results from last 7 days   Lab Units 07/24/23  0236   LACTIC ACID mmol/L 1.2 PROCALCITONIN ng/ml 0.19       Imaging: Reviewed radiology reports from this admission including: ultrasound(s)  US right upper quadrant   Final Result by Kadi Domingo MD (07/25 1094)         1. Mild steatosis. No associated focal masses. Smooth hepatic contour. 2. Incidental simple appearing right renal cysts. 3. Patient status post cholecystectomy   . Resident: Diego Vidales, the attending radiologist, have reviewed the images and agree with the final report above. Workstation performed: [id number]         XR abdomen 1 view kub   Final Result by Mario Chow MD (07/26 2924)      No acute findings. Nonobstructive bowel gas pattern. Workstation performed: [id number]             EKG, Pathology, and Other Studies Reviewed on Admission:   · EKG: NSR. HR 93/min. Nutrition Assessment and Intervention:     Reviewed food recall journal      Other interventions: Patient is currently eating carbohydrate controlled diet level 2. Need to continue diabetic diet upon discharge. Physical Activity Assessment and Intervention:    Activity journal reviewed      Other interventions: Patient is not physically active secondary to multiple comorbidities. Tobacco and Toxic Substance Assessment and Intervention:       Other interventions: Currently not using cigarette smoking, alcohol or illegal drugs. ** Please Note: This note may have been constructed using a voice recognition system. **

## 2023-07-26 NOTE — ASSESSMENT & PLAN NOTE
Lab Results   Component Value Date    HGBA1C 12.4 (H) 07/23/2023       Recent Labs     07/25/23  2052 07/26/23  0717 07/26/23  1100 07/26/23  1531   POCGLU 386* 202* 335* 289*       Blood Sugar Average: Last 72 hrs:  (P) [id number]     Presented to the ER on 07/23/23 with a blood glucose level greater than 500. Hb A1C of 12.4% from 6.4% (in March) in the setting of acute infection with severe sepsis secondary to UTI. Elevated A1c level in the setting of acute blood loss anemia with increase RDW indicating increased turnover rate. Holding home oral anti-diabetics; Glipizide and metformin. His blood glucose range between 220s to 380s during hospital stay. Plan:  Continue insulin sliding scale. Increase short acting insulin lispro 12 units with meals TID  Decreased Lantus to 30 units qhs  Monitor blood glucose AC/HS  Continue carbohydrate controlled diet level 2.

## 2023-07-26 NOTE — PROGRESS NOTES
Progress Note- Bharath Acosta 68 y.o. male MRN: [id number]    Unit/Bed#: S -01 Encounter: [id number]      Assessment and Plan:    68 y.o. male with history of Parkinson's disease, type 2 diabetes, HTN, HLD, CKD 3, BPH, chronic Razo, multi vessel CAD (not a candidate for CABG) on Plavix/ASA, anemia, GERD, constipation, pancreatic cyst, and cholecystectomy who presented to the ED on 7/23 due to blood glucose reading >500 at [facility]. In the ER, he met sepsis criteria with leukocytosis (WBC 20) and tachycardia and started on ceftriaxone for suspected UTI & noted to have worsening anemia from baseline.     #1 Acute on chronic iron deficiency anemia: Pt with history of CKD 3, CAD on Plavix/ASA with prior baseline Hgb 9-10 in January. On admission, Hgb noted to be 7 with no reported overt bleeding, heme occult negative. CTAP with no acute intra-abdominal abnormality. Hgb dropped to 6.3 requiring 1 unit of blood to be transfused 7/24 with appropriate improvement to 7.6 this AM. Per nursing he's having brown stool. Anemia may be multifactorial due to underlying sepsis, CKD, occult GI bleeding from AVM, ulcer, lesion in the setting of Plavix/ASA use. He has never had an EGD or colonoscopy. Hgb 7 this AM with no evidence of bleeding per nursing     -Diet as tolerated  -Continue PPI BID  -Continue to monitor CBC  -Will plan for EGD/Colonoscopy on Friday when off Plavix for 5 days & when stable from infection standpoint & blood sugars stablilized. Prep and procedure explained. Last dose Plavix likely 7/23 PTA  -Would need to be on clear liquid diet and NPO past midnight on Thursday     #2 Pancreatic cyst: Pt with history of 2.1 cm pancreatic cyst within the anterior aspect of the pancreatic body-unclear if there is any communication with the pancreatic duct on MRI in 2020. Patient does have history of pancreatitis & heavy alcohol use in the past. Pt was recommended for EUS but never followed up.  Pancreatic cystic lesion on prior MRI not clearly visualized on CT on admission.     -Recommend outpatient MRI abdomen w w/o contrast MRCP & continued follow up with GI after discharge       ______________________________________________________________________    Subjective:     Patient seen sitting in recliner at bedside. Oriented x3. Reports he had a brown bowel movement yesterday, but no bowel movements yet today. He is passing gas and tolerating diet. Denies any abdominal pain, heartburn, nausea/vomiting. He had x-ray yesterday showing nonobstructive bowel gas pattern, no colonic distention. Right upper quadrant ultrasound negative for ascites and showed hepatic steatosis with smooth liver contour.     Medication Administration - last 24 hours from 07/25/2023 1028 to 07/26/2023 1028       Date/Time Order Dose Route Action Action by     07/26/2023 0832 EDT atorvastatin (LIPITOR) tablet 40 mg 40 mg Oral Given Lawrence Griffiths RN     07/26/2023 4994 EDT carbidopa-levodopa (SINEMET)  mg per tablet 1 tablet 1 tablet Oral Given Lawrence Griffiths RN     07/25/2023 2140 EDT carbidopa-levodopa (SINEMET)  mg per tablet 1 tablet 1 tablet Oral Given Lore Parul, FLORENTINO     07/25/2023 1712 EDT carbidopa-levodopa (SINEMET)  mg per tablet 1 tablet 1 tablet Oral Given Lore Holdalila, RN     07/26/2023 4209 EDT docusate sodium (COLACE) capsule 100 mg 100 mg Oral Given Lawrence Griffiths, FLORENTINO     07/25/2023 1715 EDT docusate sodium (COLACE) capsule 100 mg 100 mg Oral Given Lore Parul, FLORENTINO     07/25/2023 2140 EDT famotidine (PEPCID) tablet 20 mg 20 mg Oral Given Lore Parul, RN     07/26/2023 9345 EDT finasteride (PROSCAR) tablet 5 mg 5 mg Oral Given Lawrence Griffiths, FLORENTINO     07/26/2023 9203 EDT lisinopril (ZESTRIL) tablet 5 mg 5 mg Oral Given Lawrence Griffiths, FLORENTINO     07/25/2023 2140 EDT melatonin tablet 3 mg 3 mg Oral Given Lore Teran, FLORENTINO     07/26/2023 2464 EDT metoprolol succinate (TOPROL-XL) 24 hr tablet 25 mg 25 mg Oral Given Raiza Jon RN     07/26/2023 7848 EDT pantoprazole (PROTONIX) EC tablet 40 mg 40 mg Oral Given Sandee Griffiths RN     07/25/2023 1712 EDT pantoprazole (PROTONIX) EC tablet 40 mg 40 mg Oral Given Anna Marie Phillips RN     07/26/2023 3767 EDT ranolazine (RANEXA) 12 hr tablet 500 mg 500 mg Oral Given Raiza Jon RN     07/25/2023 2140 EDT ranolazine (RANEXA) 12 hr tablet 500 mg 500 mg Oral Given Anna Marie Phillips RN     07/25/2023 1712 EDT tamsulosin (FLOMAX) capsule 0.4 mg 0.4 mg Oral Given Anna Marie Phillips RN     07/26/2023 4581 EDT insulin lispro (HumaLOG) 100 units/mL subcutaneous injection 1-6 Units 2 Units Subcutaneous Given Raiza Jon RN     07/25/2023 1712 EDT insulin lispro (HumaLOG) 100 units/mL subcutaneous injection 1-6 Units 5 Units Subcutaneous Given Anna Marie Phillips RN     07/25/2023 1102 EDT insulin lispro (HumaLOG) 100 units/mL subcutaneous injection 1-6 Units 4 Units Subcutaneous Given Parish Martinez RN     66/16/7361 2141 EDT insulin lispro (HumaLOG) 100 units/mL subcutaneous injection 1-6 Units 6 Units Subcutaneous Given Anna Marie Phillips RN     07/26/2023 1557 EDT ceftriaxone (ROCEPHIN) 1 g/50 mL in dextrose IVPB 1,000 mg Intravenous [address], [place]     07/26/2023 2340 EDT insulin lispro (HumaLOG) 100 units/mL subcutaneous injection 4 Units 4 Units Subcutaneous Given Raiza Jon RN     07/25/2023 1713 EDT insulin lispro (HumaLOG) 100 units/mL subcutaneous injection 4 Units 4 Units Subcutaneous Given Anna Marie Phillips RN     07/25/2023 1102 EDT insulin lispro (HumaLOG) 100 units/mL subcutaneous injection 4 Units 4 Units Subcutaneous Given Parish Martinez RN     12/55/5047 2140 EDT senna (SENOKOT) tablet 17.2 mg 17.2 mg Oral Given Anna Marie Phillips RN     07/26/2023 4198 EDT ferrous gluconate [facility]) tablet 324 mg 324 mg Oral Given Sandee Griffiths RN     07/26/2023 3440 EDT polyethylene glycol (MIRALAX) packet 17 g 17 g Oral Given Raiza Jon RN 07/25/2023 2140 EDT polyethylene glycol (MIRALAX) packet 17 g 17 g Oral Given FLORENTINO Malone     07/26/2023 4410 EDT cyanocobalamin (VITAMIN B-12) tablet 100 mcg 100 mcg Oral Given Denver Clap, RN     07/25/2023 1102 EDT magnesium sulfate IVPB (premix) SOLN 1 g 1 g Intravenous [address] Lilly [place], [address]     44/08/7135 2142 EDT insulin glargine (LANTUS) subcutaneous injection 30 Units 0.3 mL 30 Units Subcutaneous Given FLORENTINO Malone          Objective:     Vitals: Blood pressure 120/64, pulse 83, temperature (!) 97.3 °F (36.3 °C), resp. rate 16, height 5' 7" (1.702 m), weight 87 kg (191 lb 12.8 oz), SpO2 96 %. ,Body mass index is 30.04 kg/m². Intake/Output Summary (Last 24 hours) at 7/26/2023 1028  Last data filed at 7/26/2023 0501  Gross per 24 hour   Intake --   Output 900 ml   Net -900 ml       Physical Exam:   General Appearance: Awake and alert, in no acute distress  Chest: clear to auscultation, no rales or rhonchi  Cardiac: S1 & S2 normal, no murmur or gallop  Abdomen: Soft, non-tender, non-distended; bowel sounds normal; no masses or no organomegaly    Invasive Devices     Peripheral Intravenous Line  Duration           Peripheral IV 07/23/23 Right Antecubital 2 days    Peripheral IV 07/24/23 Distal;Left;Ventral (anterior) Forearm 2 days          Drain  Duration           Urethral Catheter 2 days                Lab Results:  No results displayed because visit has over 200 results. Imaging Studies: I have personally reviewed pertinent imaging studies.

## 2023-07-26 NOTE — ASSESSMENT & PLAN NOTE
CT abdomen taken in 09/2021 showed 2 cm pancreatic cyst.  History of pancreatitis secondary to heavy alcohol use. Avoid GLP-1 agonist which can exacerbate pancreatitis.   GI on board and considering outpatient MRI/MRCP for further evaluation of pancreatic cyst.

## 2023-07-26 NOTE — ASSESSMENT & PLAN NOTE
Lab Results   Component Value Date    HGBA1C 12.4 (H) 07/23/2023       Recent Labs     07/25/23  1052 07/25/23  1532 07/25/23 2052 07/26/23  0717   POCGLU 273* 349* 386* 202*       Blood Sugar Average: Last 72 hrs:  (P) [id number]     Presented to the ER with a blood glucose level greater than 500.   In setting of uncontrolled diabetes mellitus, as evidenced by a hemoglobin A1C of 12.4  Hold home oral anti-diabetics; Glipizide/Metformin  Continue insulin sliding scale with 10 units lispro with meals TID  Increase Lantus to 35 units qhs to start tonight  Monitor blood glucose AC/HS  Endocrinology consult, appreciate input

## 2023-07-26 NOTE — ASSESSMENT & PLAN NOTE
· Patient with history of anemia; with a baseline hemoglobin of 9-10  · Noted to be 7.3 on admission with repeat hemoglobin of 6.3  · Patient is now s/p 1 unit PRBC. · Rectal exam/Hemococcult negative for blood  · Hemoglobin remains low at 7 today. · GI consult, appreciate input  · Recommending EGD/Colonoscopy to be completed. · Poor historian. Does report drinking history, quit 1985. · Holding anticoagulation in setting of acute anemia. ASA/Plavix held since 7/23. Place SCD's for dvt ppx.    · Transfuse for Hgb < 7

## 2023-07-26 NOTE — ASSESSMENT & PLAN NOTE
· Patient presented to the ED from [facility] with a blood glucose level greater than 500. · Meeting SIRS criteria on admission, evidenced by leukocytosis and tachycardia. · In setting of indwelling wilkerson catheter associated UTI.   · Urine culture positive for proteus mirabilis; staph aureus and enterococcus faecalis  · Currently on IV Ceftriaxone, today is day 3  · Blood cultures negative x 48 hours

## 2023-07-26 NOTE — ASSESSMENT & PLAN NOTE
Lab Results   Component Value Date    EGFR 56 07/26/2023    EGFR 60 07/25/2023    EGFR 66 07/24/2023    CREATININE 1.26 07/26/2023    CREATININE 1.19 07/25/2023    CREATININE 1.10 07/24/2023     · Stable   · Baseline Cr 1.1-1.3 mg/dL   · Avoid nephrotoxins/hypotension   · Monitor I/O

## 2023-07-27 PROBLEM — K59.00 CONSTIPATION: Status: RESOLVED | Noted: 2020-08-13 | Resolved: 2023-07-27

## 2023-07-27 LAB
ANION GAP SERPL CALCULATED.3IONS-SCNC: 5 MMOL/L
BUN SERPL-MCNC: 12 MG/DL (ref 5–25)
CALCIUM SERPL-MCNC: 8.2 MG/DL (ref 8.4–10.2)
CHLORIDE SERPL-SCNC: 99 MMOL/L (ref 96–108)
CO2 SERPL-SCNC: 30 MMOL/L (ref 21–32)
CREAT SERPL-MCNC: 1.15 MG/DL (ref 0.6–1.3)
ERYTHROCYTE [DISTWIDTH] IN BLOOD BY AUTOMATED COUNT: 21 % (ref 11.6–15.1)
GFR SERPL CREATININE-BSD FRML MDRD: 62 ML/MIN/1.73SQ M
GLUCOSE SERPL-MCNC: 112 MG/DL (ref 65–140)
GLUCOSE SERPL-MCNC: 175 MG/DL (ref 65–140)
GLUCOSE SERPL-MCNC: 180 MG/DL (ref 65–140)
GLUCOSE SERPL-MCNC: 238 MG/DL (ref 65–140)
GLUCOSE SERPL-MCNC: 76 MG/DL (ref 65–140)
HCT VFR BLD AUTO: 29 % (ref 36.5–49.3)
HGB BLD-MCNC: 8 G/DL (ref 12–17)
MCH RBC QN AUTO: 20.2 PG (ref 26.8–34.3)
MCHC RBC AUTO-ENTMCNC: 27.6 G/DL (ref 31.4–37.4)
MCV RBC AUTO: 73 FL (ref 82–98)
PLATELET # BLD AUTO: 408 THOUSANDS/UL (ref 149–390)
PMV BLD AUTO: 9.6 FL (ref 8.9–12.7)
POTASSIUM SERPL-SCNC: 4.1 MMOL/L (ref 3.5–5.3)
RBC # BLD AUTO: 3.97 MILLION/UL (ref 3.88–5.62)
SODIUM SERPL-SCNC: 134 MMOL/L (ref 135–147)
WBC # BLD AUTO: 11.83 THOUSAND/UL (ref 4.31–10.16)

## 2023-07-27 PROCEDURE — 82948 REAGENT STRIP/BLOOD GLUCOSE: CPT

## 2023-07-27 PROCEDURE — 85027 COMPLETE CBC AUTOMATED: CPT | Performed by: NURSE PRACTITIONER

## 2023-07-27 PROCEDURE — 99232 SBSQ HOSP IP/OBS MODERATE 35: CPT | Performed by: NURSE PRACTITIONER

## 2023-07-27 PROCEDURE — 80048 BASIC METABOLIC PNL TOTAL CA: CPT | Performed by: NURSE PRACTITIONER

## 2023-07-27 PROCEDURE — 99232 SBSQ HOSP IP/OBS MODERATE 35: CPT | Performed by: INTERNAL MEDICINE

## 2023-07-27 RX ORDER — SENNOSIDES 8.6 MG
2 TABLET ORAL
Status: DISCONTINUED | OUTPATIENT
Start: 2023-07-28 | End: 2023-07-29 | Stop reason: HOSPADM

## 2023-07-27 RX ORDER — BISACODYL 5 MG/1
10 TABLET, DELAYED RELEASE ORAL ONCE
Status: COMPLETED | OUTPATIENT
Start: 2023-07-27 | End: 2023-07-27

## 2023-07-27 RX ADMIN — DOCUSATE SODIUM 100 MG: 100 CAPSULE, LIQUID FILLED ORAL at 08:50

## 2023-07-27 RX ADMIN — Medication 3 MG: at 21:48

## 2023-07-27 RX ADMIN — CARBIDOPA AND LEVODOPA 1 TABLET: 25; 100 TABLET ORAL at 08:50

## 2023-07-27 RX ADMIN — Medication 1000 MG: at 03:27

## 2023-07-27 RX ADMIN — INSULIN LISPRO 12 UNITS: 100 INJECTION, SOLUTION INTRAVENOUS; SUBCUTANEOUS at 07:49

## 2023-07-27 RX ADMIN — TAMSULOSIN HYDROCHLORIDE 0.4 MG: 0.4 CAPSULE ORAL at 16:59

## 2023-07-27 RX ADMIN — CARBIDOPA AND LEVODOPA 1 TABLET: 25; 100 TABLET ORAL at 21:48

## 2023-07-27 RX ADMIN — POLYETHYLENE GLYCOL 3350 17 G: 17 POWDER, FOR SOLUTION ORAL at 08:50

## 2023-07-27 RX ADMIN — ATORVASTATIN CALCIUM 40 MG: 40 TABLET, FILM COATED ORAL at 08:50

## 2023-07-27 RX ADMIN — DOCUSATE SODIUM 100 MG: 100 CAPSULE, LIQUID FILLED ORAL at 17:00

## 2023-07-27 RX ADMIN — PANTOPRAZOLE SODIUM 40 MG: 40 TABLET, DELAYED RELEASE ORAL at 16:59

## 2023-07-27 RX ADMIN — CARBIDOPA AND LEVODOPA 1 TABLET: 25; 100 TABLET ORAL at 16:59

## 2023-07-27 RX ADMIN — BISACODYL 10 MG: 5 TABLET, COATED ORAL at 16:59

## 2023-07-27 RX ADMIN — INSULIN LISPRO 1 UNITS: 100 INJECTION, SOLUTION INTRAVENOUS; SUBCUTANEOUS at 07:49

## 2023-07-27 RX ADMIN — FAMOTIDINE 20 MG: 20 TABLET, FILM COATED ORAL at 21:48

## 2023-07-27 RX ADMIN — FERROUS GLUCONATE 324 MG: 324 TABLET ORAL at 06:07

## 2023-07-27 RX ADMIN — INSULIN LISPRO 12 UNITS: 100 INJECTION, SOLUTION INTRAVENOUS; SUBCUTANEOUS at 11:31

## 2023-07-27 RX ADMIN — LISINOPRIL 5 MG: 5 TABLET ORAL at 08:50

## 2023-07-27 RX ADMIN — RANOLAZINE 500 MG: 500 TABLET, FILM COATED, EXTENDED RELEASE ORAL at 21:48

## 2023-07-27 RX ADMIN — BISACODYL 10 MG: 5 TABLET, COATED ORAL at 21:48

## 2023-07-27 RX ADMIN — POLYETHYLENE GLYCOL 3350, SODIUM SULFATE ANHYDROUS, SODIUM BICARBONATE, SODIUM CHLORIDE, POTASSIUM CHLORIDE 4000 ML: 236; 22.74; 6.74; 5.86; 2.97 POWDER, FOR SOLUTION ORAL at 14:09

## 2023-07-27 RX ADMIN — INSULIN LISPRO 1 UNITS: 100 INJECTION, SOLUTION INTRAVENOUS; SUBCUTANEOUS at 11:31

## 2023-07-27 RX ADMIN — RANOLAZINE 500 MG: 500 TABLET, FILM COATED, EXTENDED RELEASE ORAL at 08:50

## 2023-07-27 RX ADMIN — Medication 100 MCG: at 08:50

## 2023-07-27 RX ADMIN — PANTOPRAZOLE SODIUM 40 MG: 40 TABLET, DELAYED RELEASE ORAL at 06:07

## 2023-07-27 RX ADMIN — METOPROLOL SUCCINATE 25 MG: 25 TABLET, EXTENDED RELEASE ORAL at 08:50

## 2023-07-27 RX ADMIN — INSULIN GLARGINE 30 UNITS: 100 INJECTION, SOLUTION SUBCUTANEOUS at 21:49

## 2023-07-27 RX ADMIN — FINASTERIDE 5 MG: 5 TABLET, FILM COATED ORAL at 08:51

## 2023-07-27 NOTE — ASSESSMENT & PLAN NOTE
· Patient presented to the ED from [facility] with a blood glucose level greater than 500. · Meeting SIRS criteria on admission, evidenced by leukocytosis and tachycardia. · In setting of indwelling wilkerson catheter associated UTI.   · Urine culture positive for proteus mirabilis; staph aureus and enterococcus faecalis  · Currently on IV Ceftriaxone, today is day 4  · Blood cultures negative x 48 hours

## 2023-07-27 NOTE — ASSESSMENT & PLAN NOTE
· Iron study consistent with REN   · B12 and folate within normal limits. B12 less than 400.    · Continue with iron supplements

## 2023-07-27 NOTE — ASSESSMENT & PLAN NOTE
· Patient with history of anemia; with a baseline hemoglobin of 9-10  · Noted to be 7.3 on admission with repeat hemoglobin of 6.3  · Patient is now s/p 1 unit PRBC. · Rectal exam/Hemococcult negative for blood  · Hemoglobin remains low at 7 as of 7/26  · Labs pending for today. · GI consult, appreciate input  · Recommending EGD/Colonoscopy to be completed; Friday as patient needs to be off Plavix x 5 days. · Poor historian. Does report drinking history, quit 1985. · Holding anticoagulation in setting of acute anemia. ASA/Plavix held since 7/23. Place SCD's for dvt ppx.    · Transfuse for Hgb < 7

## 2023-07-27 NOTE — ASSESSMENT & PLAN NOTE
Lab Results   Component Value Date    HGBA1C 12.4 (H) 07/23/2023       Recent Labs     07/26/23  1100 07/26/23  1531 07/26/23  2100 07/27/23  0709   POCGLU 335* 289* 152* 180*       Blood Sugar Average: Last 72 hrs:  (P) 291.1875     Presented to the ER with a blood glucose level greater than 500. Hb A1C of 12.4% from 6.4% (in March) in the setting of acute infection with severe sepsis secondary to UTI. Holding home oral anti-diabetics; Glipizide and metformin. Continue with insulin sliding scale with 12 units lispro with meals TID  Continue Lantus at 30 units at bedtime. Monitor blood glucose AC/HS  Continue carbohydrate controlled diet level 2.

## 2023-07-27 NOTE — ASSESSMENT & PLAN NOTE
· Patient has chronic indwelling Razo catheter  · Concern for colonization however given sepsis and recent placement we will continue to treat as noted above  · Continue with urogenital care while inpatient. Discussed with [facility] over the phone urogenital care needs to continue on discharge.   · UA with large leukocytes, + Nitrites, innumberable WBC  · Urine culture with proteus mirabilis; staph aureus and enterococcus faecalis  · Continue IV Rocephin at this time, day #4

## 2023-07-27 NOTE — PROGRESS NOTES
Progress Note- Ladonna Cushing 68 y.o. male MRN: [id number]    Unit/Bed#: S -01 Encounter: [id number]      Assessment and Plan:    68-year-old male with T2DM (last HbA1c 12.4%) on insulin, HTN, chronic indwelling Razo, Parkinson's disease, CAD on Plavix, CKD stage III admitted with hyperglycemia, found to have leukocytosis and sepsis secondary to UTI from indwelling catheter, as well as acute drop in Hgb with no evidence of overt bleeding. #1 Acute on chronic iron deficiency anemia: Hemoglobin 7 on admission down from baseline of 9-10 in January and dropped to 6.3 requiring 1 unit of blood to be transfused on 7/24 with no overt evidence of GI bleeding. CT abdomen pelvis with no acute abdominal abnormality, occult stool negative. Given iron deficiency and drop in Hgb will require EGD/Colonoscopy to evaluate for occult GI bleeding from AVM, PUD, lesion. He has never had an endoscopic workup in the past. Hgb stable at 8 this AM.     -Continue to monitor CBC, transfuse as needed    -Continue Protonix twice daily     -Clear liquid diet today, n.p.o. past midnight    -We will schedule EGD/colonoscopy tomorrow for further evaluation. Prep and procedure explained. Last dose of Plavix likely 7/23 prior to admission. #2 Pancreatic cyst: Pt with history of 2.1 cm pancreatic cyst within the anterior aspect of the pancreatic body-unclear if there is any communication with the pancreatic duct on MRI in 2020. Patient does have history of pancreatitis & heavy alcohol use in the past. Pt was recommended for EUS but never followed up. Pancreatic cystic lesion on prior MRI not clearly visualized on CT on admission.     -Recommend outpatient MRI abdomen w w/o contrast MRCP & continued follow up with GI after discharge    ______________________________________________________________________    Subjective:     Patient lying in bed. Reports missing his wife and is slightly tearful.   Denies any abdominal pain, nausea or vomiting. Tolerated liquid diet this morning.   Last bowel movement was 2 days ago and formed brown    Medication Administration - last 24 hours from 07/26/2023 1041 to 07/27/2023 1041       Date/Time Order Dose Route Action Action by     07/27/2023 0850 EDT atorvastatin (LIPITOR) tablet 40 mg 40 mg Oral Given Yazmin Godfrey, RN     07/27/2023 0850 EDT carbidopa-levodopa (SINEMET)  mg per tablet 1 tablet 1 tablet Oral Given Yazmin Godfrey, RN     07/26/2023 2153 EDT carbidopa-levodopa (SINEMET)  mg per tablet 1 tablet 1 tablet Oral Given Samantha Prudent, RN     07/26/2023 1712 EDT carbidopa-levodopa (SINEMET)  mg per tablet 1 tablet 1 tablet Oral Given Garrie Fix, RN     07/27/2023 0850 EDT docusate sodium (COLACE) capsule 100 mg 100 mg Oral Given Yazmin Godfrey, RN     07/26/2023 1712 EDT docusate sodium (COLACE) capsule 100 mg 100 mg Oral Given Garrie Fix, RN     07/26/2023 2153 EDT famotidine (PEPCID) tablet 20 mg 20 mg Oral Given Samantha Prudent, RN     07/27/2023 9497 EDT finasteride (PROSCAR) tablet 5 mg 5 mg Oral Given Yazmin Godfrey, RN     07/27/2023 0850 EDT lisinopril (ZESTRIL) tablet 5 mg 5 mg Oral Given Yazmin Godfrey, RN     07/26/2023 2153 EDT melatonin tablet 3 mg 3 mg Oral Given Samantha Prudent, RN     07/27/2023 2750 EDT metoprolol succinate (TOPROL-XL) 24 hr tablet 25 mg 25 mg Oral Given Yazmin Godfrey, RN     07/27/2023 2487 EDT pantoprazole (PROTONIX) EC tablet 40 mg 40 mg Oral Given Samantha Prudent, RN     07/26/2023 1712 EDT pantoprazole (PROTONIX) EC tablet 40 mg 40 mg Oral Given Garrie Fix, RN     07/27/2023 0850 EDT ranolazine (RANEXA) 12 hr tablet 500 mg 500 mg Oral Given Yazmin Godfrey RN     07/26/2023 2156 EDT ranolazine (RANEXA) 12 hr tablet 500 mg 500 mg Oral Given Samantha Schwab, FLORENTINO     07/26/2023 1712 EDT tamsulosin (FLOMAX) capsule 0.4 mg 0.4 mg Oral Given Christina Lopez RN     07/27/2023 0704 EDT insulin lispro (HumaLOG) 100 units/mL subcutaneous injection 1-6 Units 1 Units Subcutaneous Given Denver Lisle, RN     07/26/2023 1713 EDT insulin lispro (HumaLOG) 100 units/mL subcutaneous injection 1-6 Units 4 Units Subcutaneous Given Tripp Fritz RN     07/26/2023 1243 EDT insulin lispro (HumaLOG) 100 units/mL subcutaneous injection 1-6 Units 5 Units Subcutaneous Given Tripp Fritz RN     07/27/2023 0327 EDT ceftriaxone (ROCEPHIN) 1 g/50 mL in dextrose IVPB 1,000 mg Intravenous [address] [place], [place]     07/26/2023 1244 EDT insulin lispro (HumaLOG) 100 units/mL subcutaneous injection 4 Units 4 Units Subcutaneous Given Tripp Fritz RN     07/26/2023 2153 EDT senna (SENOKOT) tablet 17.2 mg 17.2 mg Oral Given Tiffany Elaine RN     07/27/2023 5352 EDT ferrous gluconate (FERGON) tablet 324 mg 324 mg Oral Given Tiffany Elaine RN     07/27/2023 0493 EDT polyethylene glycol (MIRALAX) packet 17 g 17 g Oral Given Denver Lisle, RN     07/26/2023 2153 EDT polyethylene glycol (MIRALAX) packet 17 g 17 g Oral Given Na Winslow     07/27/2023 9690 EDT cyanocobalamin (VITAMIN B-12) tablet 100 mcg 100 mcg Oral Given Denver Lisle, RN     07/27/2023 5790 EDT insulin lispro (HumaLOG) 100 units/mL subcutaneous injection 12 Units 12 Units Subcutaneous Given Denver Lisle, RN     07/26/2023 1714 EDT insulin lispro (HumaLOG) 100 units/mL subcutaneous injection 12 Units 12 Units Subcutaneous Given Tripp Fritz RN     07/26/2023 2153 EDT insulin glargine (LANTUS) subcutaneous injection 30 Units 0.3 mL 30 Units Subcutaneous Given Tiffany Elaine RN     07/26/2023 2155 EDT insulin lispro (HumaLOG) 100 units/mL subcutaneous injection 1-5 Units 1 Units Subcutaneous Given Tiffany Elaine RN          Objective:     Vitals: Blood pressure 126/67, pulse 94, temperature 97.8 °F (36.6 °C), resp. rate 16, height 5' 7" (1.702 m), weight 87 kg (191 lb 12.8 oz), SpO2 97 %. ,Body mass index is 30.04 kg/m².       Intake/Output Summary (Last 24 hours) at 7/27/2023 1041  Last data filed at 7/27/2023 5779  Gross per 24 hour   Intake 414 ml   Output 1550 ml   Net -1136 ml       Physical Exam:   General Appearance: Awake and alert, in no acute distress  Chest: clear to auscultation, no rales or rhonchi  Cardiac: S1 & S2 normal, no murmur or gallop  Abdomen: Soft, non-tender, non-distended; bowel sounds normal; no masses or no organomegaly    Invasive Devices     Peripheral Intravenous Line  Duration           Peripheral IV 07/23/23 Right Antecubital 3 days    Peripheral IV 07/24/23 Distal;Left;Ventral (anterior) Forearm 3 days          Drain  Duration           Urethral Catheter 3 days                Lab Results:  No results displayed because visit has over 200 results. Imaging Studies: I have personally reviewed pertinent imaging studies.

## 2023-07-27 NOTE — PROGRESS NOTES
[address] [facility]  Progress Note  Name: Silvana Paulino  MRN: [id number]  Unit/Bed#: S -44 I Date of Admission: 7/23/2023   Date of Service: 7/27/2023 I Hospital Day: 3    Assessment/Plan   * Sepsis [facility])  Assessment & Plan  · Patient presented to the ED from [facility] with a blood glucose level greater than 500. · Meeting SIRS criteria on admission, evidenced by leukocytosis and tachycardia. · In setting of indwelling wilkerson catheter associated UTI. · Urine culture positive for proteus mirabilis; staph aureus and enterococcus faecalis  · Currently on IV Ceftriaxone, today is day 4  · Blood cultures negative x 48 hours    Acute blood loss anemia  Assessment & Plan  · Patient with history of anemia; with a baseline hemoglobin of 9-10  · Noted to be 7.3 on admission with repeat hemoglobin of 6.3  · Patient is now s/p 1 unit PRBC. · Rectal exam/Hemococcult negative for blood  · Hemoglobin remains low at 7 as of 7/26  · Labs pending for today. · GI consult, appreciate input  · Recommending EGD/Colonoscopy to be completed; Friday as patient needs to be off Plavix x 5 days. · Poor historian. Does report drinking history, quit 1985. · Holding anticoagulation in setting of acute anemia. ASA/Plavix held since 7/23. Place SCD's for dvt ppx. · Transfuse for Hgb < 7     Iron deficiency anemia  Assessment & Plan  · Iron study consistent with REN   · B12 and folate within normal limits. B12 less than 400. · Continue with iron supplements    Coronary artery disease involving native coronary artery of native heart without angina pectoris  Assessment & Plan  · Triple-vessel disease- not candidate for surgical intervention  · Denies any chest pain  · Holding ASA and Plavix 2/2 acute iron deficiency anemia. Held since 7/23/2023.   · Continue medical management with BB, statin, Ranexa    Urinary tract infection associated with indwelling urethral catheter [facility])  Assessment & Plan  · Patient has chronic indwelling Razo catheter  · Concern for colonization however given sepsis and recent placement we will continue to treat as noted above  · Continue with urogenital care while inpatient. Discussed with [facility] over the phone urogenital care needs to continue on discharge. · UA with large leukocytes, + Nitrites, innumberable WBC  · Urine culture with proteus mirabilis; staph aureus and enterococcus faecalis  · Continue IV Rocephin at this time, day #4    Stage 3a chronic kidney disease (HCC)  Assessment & Plan  · Stable   · Baseline Cr 1.1-1.3 mg/dL   · Avoid nephrotoxins/hypotension   · Monitor I/O    Type 2 diabetes mellitus with hyperglycemia, with long-term current use of insulin [facility])  Assessment & Plan  Lab Results   Component Value Date    HGBA1C 12.4 (H) 07/23/2023       Recent Labs     07/26/23  1100 07/26/23  1531 07/26/23  2100 07/27/23  0709   POCGLU 335* 289* 152* 180*       Blood Sugar Average: Last 72 hrs:  (P) 291.1875     Presented to the ER with a blood glucose level greater than 500. Hb A1C of 12.4% from 6.4% (in March) in the setting of acute infection with severe sepsis secondary to UTI. Holding home oral anti-diabetics; Glipizide and metformin. Continue with insulin sliding scale with 12 units lispro with meals TID  Continue Lantus at 30 units at bedtime. Monitor blood glucose AC/HS  Continue carbohydrate controlled diet level 2. Essential hypertension  Assessment & Plan  · Blood pressure well controlled  · Continue lisinopril and metoprolol    Constipation-resolved as of 7/27/2023  Assessment & Plan  · Resolved         VTE Pharmacologic Prophylaxis: VTE Score: 5 Moderate Risk (Score 3-4) - Pharmacological DVT Prophylaxis Contraindicated. Sequential Compression Devices Ordered. Patient Centered Rounds: I performed bedside rounds with nursing staff today.   Discussions with Specialists or Other Care Team Provider: Case Management, GI Note reviewed    Education and Discussions with Family / Patient: Attempted to update  (wife) via phone. Left voicemail. Severiano Corpus @  am    Total Time Spent on Date of Encounter in care of patient: 35 minutes This time was spent on one or more of the following: performing physical exam; counseling and coordination of care; obtaining or reviewing history; documenting in the medical record; reviewing/ordering tests, medications or procedures; communicating with other healthcare professionals and discussing with patient's family/caregivers. Current Length of Stay: 3 day(s)  Current Patient Status: Inpatient   Certification Statement: The patient will continue to require additional inpatient hospital stay due to ongoing treatment in setting of need for EGD/Colonoscopy on Friday. Discharge Plan: Anticipate discharge in 48 hrs to prior assisted or independent living facility. Code Status: Level 1 - Full Code    Subjective:   Patient resting in bed, finished with his breakfast.  Reports his bottom is hurting from sitting in one spot in the bed; otherwise, feels okay. Denies complaints of chest pain, shortness of breath, fever, or chills. Objective:     Vitals:   Temp (24hrs), Av.8 °F (36.6 °C), Min:97.4 °F (36.3 °C), Max:98 °F (36.7 °C)    Temp:  [97.4 °F (36.3 °C)-98 °F (36.7 °C)] 97.8 °F (36.6 °C)  HR:  [84-94] 94  Resp:  [16-18] 16  BP: (116-153)/(57-74) 126/67  SpO2:  [97 %-98 %] 97 %  Body mass index is 30.04 kg/m². Input and Output Summary (last 24 hours): Intake/Output Summary (Last 24 hours) at 2023 0844  Last data filed at 2023 0806  Gross per 24 hour   Intake 414 ml   Output 1550 ml   Net -1136 ml       Physical Exam:   Physical Exam  Vitals and nursing note reviewed. Constitutional:       General: He is not in acute distress. Appearance: He is obese. He is ill-appearing. Cardiovascular:      Rate and Rhythm: Normal rate. Pulses: Normal pulses.       Heart sounds: Normal heart sounds. Pulmonary:      Effort: Pulmonary effort is normal.      Breath sounds: Normal breath sounds. Abdominal:      General: Abdomen is protuberant. Bowel sounds are normal.   Musculoskeletal:         General: Normal range of motion. Right lower leg: No edema. Left lower leg: No edema. Skin:     General: Skin is warm and dry. Capillary Refill: Capillary refill takes less than 2 seconds. Coloration: Skin is pale. Neurological:      Mental Status: He is alert and oriented to person, place, and time. Psychiatric:         Mood and Affect: Mood normal.          Additional Data:     Labs:  Results from last 7 days   Lab Units 07/27/23  0800 07/24/23  1423 07/24/23  0759   WBC Thousand/uL 11.83*   < > 13.24*   HEMOGLOBIN g/dL 8.0*   < > 7.5*   HEMATOCRIT % 29.0*   < > 26.5*   PLATELETS Thousands/uL 408*   < > 432*   NEUTROS PCT %  --   --  85*   LYMPHS PCT %  --   --  4*   MONOS PCT %  --   --  8   EOS PCT %  --   --  1    < > = values in this interval not displayed. Results from last 7 days   Lab Units 07/27/23  0800 07/24/23  0759 07/24/23  0236   SODIUM mmol/L 134*   < > 137   POTASSIUM mmol/L 4.1   < > 3.6   CHLORIDE mmol/L 99   < > 102   CO2 mmol/L 30   < > 26   BUN mg/dL 12   < > 13   CREATININE mg/dL 1.15   < > 0.98   ANION GAP mmol/L 5   < > 9   CALCIUM mg/dL 8.2*   < > 8.2*   ALBUMIN g/dL  --   --  3.2*   TOTAL BILIRUBIN mg/dL  --   --  0.52   ALK PHOS U/L  --   --  58   ALT U/L  --   --  7   AST U/L  --   --  7*   GLUCOSE RANDOM mg/dL 238*   < > 315*    < > = values in this interval not displayed.      Results from last 7 days   Lab Units 07/24/23  0236   INR  1.12     Results from last 7 days   Lab Units 07/27/23  0709 07/26/23  2100 07/26/23  1531 07/26/23  1100 07/26/23  0717 07/25/23  2052 07/25/23  1532 07/25/23  1052 07/25/23  0616 07/24/23  2125 07/24/23  1553 07/24/23  1111   POC GLUCOSE mg/dl 180* 152* 289* 335* 202* 386* 349* 273* 223* 282* 371* 337*     Results from last 7 days   Lab Units 07/23/23  1850   HEMOGLOBIN A1C % 12.4*     Results from last 7 days   Lab Units 07/24/23  0236   LACTIC ACID mmol/L 1.2   PROCALCITONIN ng/ml 0.19       Lines/Drains:  Invasive Devices     Peripheral Intravenous Line  Duration           Peripheral IV 07/23/23 Right Antecubital 3 days    Peripheral IV 07/24/23 Distal;Left;Ventral (anterior) Forearm 3 days          Drain  Duration           Urethral Catheter 3 days              Urinary Catheter:  Goal for removal: N/A - Chronic Razo               Imaging: No pertinent imaging reviewed. Recent Cultures (last 7 days):   Results from last 7 days   Lab Units 07/24/23  0236 07/23/23  1934 07/23/23  1853   BLOOD CULTURE  No Growth at 48 hrs. --  No Growth at 48 hrs.    URINE CULTURE   --  >100,000 cfu/ml Proteus mirabilis*  >100,000 cfu/ml Staphylococcus aureus*  >100,000 cfu/ml Enterococcus faecalis*  --        Last 24 Hours Medication List:   Current Facility-Administered Medications   Medication Dose Route Frequency Provider Last Rate   • acetaminophen  650 mg Oral Q6H PRN Lane Jones PA-C     • atorvastatin  40 mg Oral Daily Lane Jones PA-C     • bisacodyl  10 mg Rectal Daily PRN Lane Jones PA-C     • carbidopa-levodopa  1 tablet Oral TID Lane Jones PA-C     • cefTRIAXone  1,000 mg Intravenous Q24H Lane Jones PA-C 1,000 mg (07/27/23 0327)   • cyanocobalamin  100 mcg Oral Daily Abigail Damian PA-C     • docusate sodium  100 mg Oral BID Lane Jones PA-C     • famotidine  20 mg Oral HS Lane Jones PA-C     • ferrous gluconate  324 mg Oral Daily Before Breakfast AJIT Ann     • finasteride  5 mg Oral Daily Lane Jones PA-C     • insulin glargine  30 Units Subcutaneous HS Vicki Cruz MD     • insulin lispro  1-5 Units Subcutaneous HS Vicki Cruz MD     • insulin lispro  1-6 Units Subcutaneous TID AC Lane Jones PA-C     • insulin lispro  12 Units Subcutaneous TID With Meals Vandana York MD     • lisinopril  5 mg Oral Daily Moon Kiser PA-C     • melatonin  3 mg Oral HS Moon Kiser PA-C     • metoprolol succinate  25 mg Oral Daily Moon Kiser PA-C     • pantoprazole  40 mg Oral BID AC Moon Kiser PA-C     • polyethylene glycol  17 g Oral BID AJIT Ocasio     • ranolazine  500 mg Oral Q12H [facility] Moon Kiser PA-C     • senna  2 tablet Oral HS AJIT Ocasio     • tamsulosin  0.4 mg Oral Daily With [address]41 Stacy Simons PA-C          Today, Patient Was Seen By: AJIT Garcia    **Please Note: This note may have been constructed using a voice recognition system. **

## 2023-07-28 ENCOUNTER — ANESTHESIA EVENT (INPATIENT)
Dept: GASTROENTEROLOGY | Facility: HOSPITAL | Age: 73
DRG: 698 | End: 2023-07-28
Payer: MEDICARE

## 2023-07-28 ENCOUNTER — APPOINTMENT (INPATIENT)
Dept: GASTROENTEROLOGY | Facility: HOSPITAL | Age: 73
DRG: 698 | End: 2023-07-28
Payer: MEDICARE

## 2023-07-28 ENCOUNTER — ANESTHESIA (INPATIENT)
Dept: GASTROENTEROLOGY | Facility: HOSPITAL | Age: 73
DRG: 698 | End: 2023-07-28
Payer: MEDICARE

## 2023-07-28 LAB
ANION GAP SERPL CALCULATED.3IONS-SCNC: 5 MMOL/L
BUN SERPL-MCNC: 11 MG/DL (ref 5–25)
CALCIUM SERPL-MCNC: 7.9 MG/DL (ref 8.4–10.2)
CHLORIDE SERPL-SCNC: 98 MMOL/L (ref 96–108)
CO2 SERPL-SCNC: 30 MMOL/L (ref 21–32)
CREAT SERPL-MCNC: 1.23 MG/DL (ref 0.6–1.3)
ERYTHROCYTE [DISTWIDTH] IN BLOOD BY AUTOMATED COUNT: 21.3 % (ref 11.6–15.1)
GFR SERPL CREATININE-BSD FRML MDRD: 57 ML/MIN/1.73SQ M
GLUCOSE SERPL-MCNC: 100 MG/DL (ref 65–140)
GLUCOSE SERPL-MCNC: 104 MG/DL (ref 65–140)
GLUCOSE SERPL-MCNC: 118 MG/DL (ref 65–140)
GLUCOSE SERPL-MCNC: 122 MG/DL (ref 65–140)
GLUCOSE SERPL-MCNC: 140 MG/DL (ref 65–140)
GLUCOSE SERPL-MCNC: 211 MG/DL (ref 65–140)
HCT VFR BLD AUTO: 26.5 % (ref 36.5–49.3)
HGB BLD-MCNC: 7.4 G/DL (ref 12–17)
MAGNESIUM SERPL-MCNC: 1.6 MG/DL (ref 1.9–2.7)
MCH RBC QN AUTO: 20.3 PG (ref 26.8–34.3)
MCHC RBC AUTO-ENTMCNC: 27.9 G/DL (ref 31.4–37.4)
MCV RBC AUTO: 73 FL (ref 82–98)
PLATELET # BLD AUTO: 411 THOUSANDS/UL (ref 149–390)
PMV BLD AUTO: 9.8 FL (ref 8.9–12.7)
POTASSIUM SERPL-SCNC: 3.6 MMOL/L (ref 3.5–5.3)
RBC # BLD AUTO: 3.65 MILLION/UL (ref 3.88–5.62)
SODIUM SERPL-SCNC: 133 MMOL/L (ref 135–147)
WBC # BLD AUTO: 11.74 THOUSAND/UL (ref 4.31–10.16)

## 2023-07-28 PROCEDURE — 0DB68ZX EXCISION OF STOMACH, VIA NATURAL OR ARTIFICIAL OPENING ENDOSCOPIC, DIAGNOSTIC: ICD-10-PCS | Performed by: INTERNAL MEDICINE

## 2023-07-28 PROCEDURE — 88341 IMHCHEM/IMCYTCHM EA ADD ANTB: CPT | Performed by: STUDENT IN AN ORGANIZED HEALTH CARE EDUCATION/TRAINING PROGRAM

## 2023-07-28 PROCEDURE — 99232 SBSQ HOSP IP/OBS MODERATE 35: CPT | Performed by: INTERNAL MEDICINE

## 2023-07-28 PROCEDURE — 99232 SBSQ HOSP IP/OBS MODERATE 35: CPT | Performed by: NURSE PRACTITIONER

## 2023-07-28 PROCEDURE — 0DJD8ZZ INSPECTION OF LOWER INTESTINAL TRACT, VIA NATURAL OR ARTIFICIAL OPENING ENDOSCOPIC: ICD-10-PCS | Performed by: INTERNAL MEDICINE

## 2023-07-28 PROCEDURE — 43239 EGD BIOPSY SINGLE/MULTIPLE: CPT | Performed by: INTERNAL MEDICINE

## 2023-07-28 PROCEDURE — 80048 BASIC METABOLIC PNL TOTAL CA: CPT | Performed by: NURSE PRACTITIONER

## 2023-07-28 PROCEDURE — 85027 COMPLETE CBC AUTOMATED: CPT | Performed by: NURSE PRACTITIONER

## 2023-07-28 PROCEDURE — 45378 DIAGNOSTIC COLONOSCOPY: CPT | Performed by: INTERNAL MEDICINE

## 2023-07-28 PROCEDURE — 82948 REAGENT STRIP/BLOOD GLUCOSE: CPT

## 2023-07-28 PROCEDURE — 0DB98ZX EXCISION OF DUODENUM, VIA NATURAL OR ARTIFICIAL OPENING ENDOSCOPIC, DIAGNOSTIC: ICD-10-PCS | Performed by: INTERNAL MEDICINE

## 2023-07-28 PROCEDURE — 83735 ASSAY OF MAGNESIUM: CPT | Performed by: NURSE PRACTITIONER

## 2023-07-28 PROCEDURE — 88342 IMHCHEM/IMCYTCHM 1ST ANTB: CPT | Performed by: STUDENT IN AN ORGANIZED HEALTH CARE EDUCATION/TRAINING PROGRAM

## 2023-07-28 PROCEDURE — 88305 TISSUE EXAM BY PATHOLOGIST: CPT | Performed by: STUDENT IN AN ORGANIZED HEALTH CARE EDUCATION/TRAINING PROGRAM

## 2023-07-28 RX ORDER — INSULIN LISPRO 100 [IU]/ML
10 INJECTION, SOLUTION INTRAVENOUS; SUBCUTANEOUS
Status: DISCONTINUED | OUTPATIENT
Start: 2023-07-28 | End: 2023-07-29 | Stop reason: HOSPADM

## 2023-07-28 RX ORDER — LIDOCAINE HYDROCHLORIDE 20 MG/ML
INJECTION, SOLUTION EPIDURAL; INFILTRATION; INTRACAUDAL; PERINEURAL AS NEEDED
Status: DISCONTINUED | OUTPATIENT
Start: 2023-07-28 | End: 2023-07-28

## 2023-07-28 RX ORDER — PROPOFOL 10 MG/ML
INJECTION, EMULSION INTRAVENOUS AS NEEDED
Status: DISCONTINUED | OUTPATIENT
Start: 2023-07-28 | End: 2023-07-28

## 2023-07-28 RX ORDER — PROPOFOL 10 MG/ML
INJECTION, EMULSION INTRAVENOUS CONTINUOUS PRN
Status: DISCONTINUED | OUTPATIENT
Start: 2023-07-28 | End: 2023-07-28

## 2023-07-28 RX ORDER — MAGNESIUM SULFATE HEPTAHYDRATE 40 MG/ML
4 INJECTION, SOLUTION INTRAVENOUS ONCE
Status: COMPLETED | OUTPATIENT
Start: 2023-07-28 | End: 2023-07-28

## 2023-07-28 RX ORDER — SODIUM CHLORIDE, SODIUM LACTATE, POTASSIUM CHLORIDE, CALCIUM CHLORIDE 600; 310; 30; 20 MG/100ML; MG/100ML; MG/100ML; MG/100ML
INJECTION, SOLUTION INTRAVENOUS CONTINUOUS PRN
Status: DISCONTINUED | OUTPATIENT
Start: 2023-07-28 | End: 2023-07-28

## 2023-07-28 RX ADMIN — PANTOPRAZOLE SODIUM 40 MG: 40 TABLET, DELAYED RELEASE ORAL at 17:18

## 2023-07-28 RX ADMIN — ATORVASTATIN CALCIUM 40 MG: 40 TABLET, FILM COATED ORAL at 08:47

## 2023-07-28 RX ADMIN — SODIUM CHLORIDE, SODIUM LACTATE, POTASSIUM CHLORIDE, AND CALCIUM CHLORIDE: .6; .31; .03; .02 INJECTION, SOLUTION INTRAVENOUS at 15:09

## 2023-07-28 RX ADMIN — PROPOFOL 50 MG: 10 INJECTION, EMULSION INTRAVENOUS at 15:55

## 2023-07-28 RX ADMIN — TOPICAL ANESTHETIC 1 SPRAY: 200 SPRAY DENTAL; PERIODONTAL at 15:51

## 2023-07-28 RX ADMIN — DOCUSATE SODIUM 100 MG: 100 CAPSULE, LIQUID FILLED ORAL at 08:46

## 2023-07-28 RX ADMIN — PANTOPRAZOLE SODIUM 40 MG: 40 TABLET, DELAYED RELEASE ORAL at 06:09

## 2023-07-28 RX ADMIN — TAMSULOSIN HYDROCHLORIDE 0.4 MG: 0.4 CAPSULE ORAL at 17:18

## 2023-07-28 RX ADMIN — FERROUS GLUCONATE 324 MG: 324 TABLET ORAL at 06:09

## 2023-07-28 RX ADMIN — INSULIN LISPRO 10 UNITS: 100 INJECTION, SOLUTION INTRAVENOUS; SUBCUTANEOUS at 17:20

## 2023-07-28 RX ADMIN — CARBIDOPA AND LEVODOPA 1 TABLET: 25; 100 TABLET ORAL at 08:47

## 2023-07-28 RX ADMIN — Medication 3 MG: at 21:44

## 2023-07-28 RX ADMIN — RANOLAZINE 500 MG: 500 TABLET, FILM COATED, EXTENDED RELEASE ORAL at 21:45

## 2023-07-28 RX ADMIN — MAGNESIUM SULFATE HEPTAHYDRATE 4 G: 40 INJECTION, SOLUTION INTRAVENOUS at 08:47

## 2023-07-28 RX ADMIN — PHENYLEPHRINE HYDROCHLORIDE 30 MCG/MIN: 10 INJECTION INTRAVENOUS at 15:55

## 2023-07-28 RX ADMIN — SENNOSIDES 17.2 MG: 8.6 TABLET, FILM COATED ORAL at 21:45

## 2023-07-28 RX ADMIN — INSULIN LISPRO 2 UNITS: 100 INJECTION, SOLUTION INTRAVENOUS; SUBCUTANEOUS at 21:46

## 2023-07-28 RX ADMIN — DOCUSATE SODIUM 100 MG: 100 CAPSULE, LIQUID FILLED ORAL at 17:18

## 2023-07-28 RX ADMIN — FINASTERIDE 5 MG: 5 TABLET, FILM COATED ORAL at 08:47

## 2023-07-28 RX ADMIN — LIDOCAINE HYDROCHLORIDE 100 MG: 20 INJECTION, SOLUTION EPIDURAL; INFILTRATION; INTRACAUDAL; PERINEURAL at 15:55

## 2023-07-28 RX ADMIN — CARBIDOPA AND LEVODOPA 1 TABLET: 25; 100 TABLET ORAL at 21:45

## 2023-07-28 RX ADMIN — FAMOTIDINE 20 MG: 20 TABLET, FILM COATED ORAL at 21:45

## 2023-07-28 RX ADMIN — INSULIN GLARGINE 30 UNITS: 100 INJECTION, SOLUTION SUBCUTANEOUS at 21:44

## 2023-07-28 RX ADMIN — CARBIDOPA AND LEVODOPA 1 TABLET: 25; 100 TABLET ORAL at 17:18

## 2023-07-28 RX ADMIN — Medication 1000 MG: at 03:32

## 2023-07-28 RX ADMIN — Medication 100 MCG: at 08:46

## 2023-07-28 RX ADMIN — RANOLAZINE 500 MG: 500 TABLET, FILM COATED, EXTENDED RELEASE ORAL at 08:47

## 2023-07-28 RX ADMIN — PROPOFOL 60 MCG/KG/MIN: 10 INJECTION, EMULSION INTRAVENOUS at 15:55

## 2023-07-28 NOTE — ASSESSMENT & PLAN NOTE
· Patient with history of anemia; with a baseline hemoglobin of 9-10  · Noted to be 7.3 on admission with repeat hemoglobin of 6.3  · Patient is now s/p 1 unit PRBC. · Rectal exam/Hemococcult negative for blood  · Hemoglobin stable, 7.4  · GI consult, appreciate input  · Plan for EGD/Colonoscopy today  · Poor historian. Does report drinking history, quit 1985. · Holding anticoagulation in setting of acute anemia. ASA/Plavix held since 7/23. Place SCD's for dvt ppx.    · Transfuse for Hgb < 7

## 2023-07-28 NOTE — ANESTHESIA PREPROCEDURE EVALUATION
Procedure:  EGD  COLONOSCOPY    Relevant Problems   CARDIO   (+) Chest pain   (+) Coronary artery disease involving native coronary artery of native heart without angina pectoris   (+) Essential hypertension   (+) Hyperlipidemia   (+) Type 2 MI (myocardial infarction) (HCC)      ENDO   (+) Type 2 diabetes mellitus with diabetic neuropathy (HCC)   (+) Type 2 diabetes mellitus with hyperglycemia, with long-term current use of insulin (HCC)      GI/HEPATIC   (+) Pancreas cyst      /RENAL   (+) BPH (benign prostatic hyperplasia)   (+) Nephrolithiasis   (+) Stage 3a chronic kidney disease (HCC)      HEMATOLOGY   (+) Acute blood loss anemia   (+) Anemia   (+) Iron deficiency anemia      NEURO/PSYCH   (+) Type 2 diabetes mellitus with diabetic neuropathy (HCC)     Multivessel CAD non-operable. Lab Results   Component Value Date    WBC 11.74 (H) 07/28/2023    HGB 7.4 (L) 07/28/2023    HCT 26.5 (L) 07/28/2023    MCV 73 (L) 07/28/2023     (H) 07/28/2023     Lab Results   Component Value Date    SODIUM 133 (L) 07/28/2023    K 3.6 07/28/2023    CL 98 07/28/2023    CO2 30 07/28/2023    BUN 11 07/28/2023    CREATININE 1.23 07/28/2023    GLUC 104 07/28/2023    CALCIUM 7.9 (L) 07/28/2023     Lab Results   Component Value Date    INR 1.12 07/24/2023    INR 1.05 12/31/2022    INR 1.06 04/05/2022    PROTIME 14.6 (H) 07/24/2023    PROTIME 14.0 12/31/2022    PROTIME 13.8 04/05/2022     Lab Results   Component Value Date    HGBA1C 12.4 (H) 07/23/2023        Physical Exam    Airway    Mallampati score: III  TM Distance: <3 FB  Neck ROM: limited     Dental       Cardiovascular      Pulmonary      Other Findings        Anesthesia Plan  ASA Score- 4     Anesthesia Type- IV sedation with anesthesia with ASA Monitors. Additional Monitors:   Airway Plan:           Plan Factors-Exercise tolerance (METS): <4 METS. Chart reviewed. EKG reviewed. Existing labs reviewed. Patient summary reviewed.     Patient is not a current smoker. Obstructive sleep apnea risk education given perioperatively. Induction- intravenous. Postoperative Plan-     Informed Consent- Anesthetic plan and risks discussed with patient. I personally reviewed this patient with the CRNA. Discussed and agreed on the Anesthesia Plan with the CRNA. Anju Zaragoza

## 2023-07-28 NOTE — PROGRESS NOTES
[address] [facility]  Progress Note  Name: Saira Woodard  MRN: [id number]  Unit/Bed#: S -97 I Date of Admission: 7/23/2023   Date of Service: 7/28/2023 I Hospital Day: 4    Assessment/Plan   * Sepsis [facility])  Assessment & Plan  · Patient presented to the ED from [facility] with a blood glucose level greater than 500. · Meeting SIRS criteria on admission, evidenced by leukocytosis and tachycardia. · In setting of indwelling wilkerson catheter associated UTI. · Urine culture positive for proteus mirabilis; staph aureus and enterococcus faecalis  · Currently on IV Ceftriaxone, today is day 5  · Blood cultures negative x 72 hours    Acute blood loss anemia  Assessment & Plan  · Patient with history of anemia; with a baseline hemoglobin of 9-10  · Noted to be 7.3 on admission with repeat hemoglobin of 6.3  · Patient is now s/p 1 unit PRBC. · Rectal exam/Hemococcult negative for blood  · Hemoglobin stable, 7.4  · GI consult, appreciate input  · Plan for EGD/Colonoscopy today  · Poor historian. Does report drinking history, quit 1985. · Holding anticoagulation in setting of acute anemia. ASA/Plavix held since 7/23. Place SCD's for dvt ppx. · Transfuse for Hgb < 7     Iron deficiency anemia  Assessment & Plan  · Iron study consistent with REN   · B12 and folate within normal limits. B12 less than 400. · Continue with iron supplements    Parkinson disease ([address])  Assessment & Plan  · Continue Sinemet   · PT/OT     Coronary artery disease involving native coronary artery of native heart without angina pectoris  Assessment & Plan  · Triple-vessel disease- not candidate for surgical intervention  · Denies any chest pain  · Holding ASA and Plavix 2/2 acute iron deficiency anemia. Held since 7/23/2023.   · Continue medical management with BB, statin, Ranexa    Left-sided weakness  Assessment & Plan  · Chronic left sided weakness 2/2 Parkinson's disease and neuropathy    · No new focal deficits    Urinary tract infection associated with indwelling urethral catheter ([address])  Assessment & Plan  · Patient has chronic indwelling Razo catheter  · Concern for colonization however given sepsis and recent placement we will continue to treat as noted above  · Continue with urogenital care while inpatient. Discussed with [facility] over the phone urogenital care needs to continue on discharge. · UA with large leukocytes, + Nitrites, innumberable WBC  · Urine culture with proteus mirabilis; staph aureus and enterococcus faecalis  · Continue IV Rocephin at this time, day #5    Stage 3a chronic kidney disease (HCC)  Assessment & Plan  · Stable   · Baseline Cr 1.1-1.3 mg/dL   · Avoid nephrotoxins/hypotension   · Monitor I/O    Type 2 diabetes mellitus with hyperglycemia, with long-term current use of insulin [facility])  Assessment & Plan  Lab Results   Component Value Date    HGBA1C 12.4 (H) 07/23/2023       Recent Labs     07/27/23  1114 07/27/23  1607 07/27/23  2105 07/28/23  0626   POCGLU 175* 76 112 100       Blood Sugar Average: Last 72 hrs:  (P) [id number]     Presented to the ER with a blood glucose level greater than 500. Hb A1C of 12.4% from 6.4% (in March) in the setting of acute infection with severe sepsis secondary to UTI. Holding home oral anti-diabetics; Glipizide and metformin. Continue with insulin sliding scale with 12 units lispro with meals TID  Continue Lantus at 30 units at bedtime. Monitor blood glucose AC/HS  Continue carbohydrate controlled diet level 2. Essential hypertension  Assessment & Plan  · Blood pressure well controlled  · Continue lisinopril and metoprolol           VTE Pharmacologic Prophylaxis: VTE Score: 5 High Risk (Score >/= 5) - Pharmacological DVT Prophylaxis Contraindicated. Sequential Compression Devices Ordered. Patient Centered Rounds: I performed bedside rounds with nursing staff today.   Discussions with Specialists or Other Care Team Provider: Case management, GI    Education and Discussions with Family / Patient: Updated  (wife) via phone. Peter Credit at 2012 am    Total Time Spent on Date of Encounter in care of patient: 25 minutes This time was spent on one or more of the following: performing physical exam; counseling and coordination of care; obtaining or reviewing history; documenting in the medical record; reviewing/ordering tests, medications or procedures; communicating with other healthcare professionals and discussing with patient's family/caregivers. Current Length of Stay: 4 day(s)  Current Patient Status: Inpatient   Certification Statement: The patient will continue to require additional inpatient hospital stay due to ongoing treatment in setting of need for egd/colonoscopy today. Discharge Plan: Anticipate discharge tomorrow to home with home services. Code Status: Level 1 - Full Code    Subjective:   Patient resting in bed, watching TV. Denies complaints of chest pain, shortness of breath, fever, or chills. Denies complaints of abdominal pain, nausea/vomiting. Objective:     Vitals:   Temp (24hrs), Av.9 °F (36.6 °C), Min:97.5 °F (36.4 °C), Max:98.3 °F (36.8 °C)    Temp:  [97.5 °F (36.4 °C)-98.3 °F (36.8 °C)] 97.5 °F (36.4 °C)  HR:  [85-88] 88  Resp:  [16-18] 18  BP: ()/(44-59) 109/59  SpO2:  [97 %] 97 %  Body mass index is 30.04 kg/m². Input and Output Summary (last 24 hours): Intake/Output Summary (Last 24 hours) at 2023 0736  Last data filed at 2023 0601  Gross per 24 hour   Intake 414 ml   Output 1300 ml   Net -886 ml       Physical Exam:   Physical Exam  Vitals and nursing note reviewed. Constitutional:       General: He is not in acute distress. Appearance: He is ill-appearing. Cardiovascular:      Rate and Rhythm: Normal rate. Pulses: Normal pulses. Heart sounds: Normal heart sounds.    Pulmonary:      Effort: Pulmonary effort is normal. No tachypnea, bradypnea or respiratory distress. Breath sounds: Decreased breath sounds present. Abdominal:      General: Bowel sounds are normal.      Palpations: Abdomen is soft. Genitourinary:     Comments: Razo noted; clear yellow urine. Musculoskeletal:         General: Normal range of motion. Right lower leg: No edema. Left lower leg: No edema. Skin:     General: Skin is warm and dry. Neurological:      Mental Status: He is alert. Mental status is at baseline. Psychiatric:         Mood and Affect: Mood normal.          Additional Data:     Labs:  Results from last 7 days   Lab Units 07/28/23  0435 07/24/23  1423 07/24/23  0759   WBC Thousand/uL 11.74*   < > 13.24*   HEMOGLOBIN g/dL 7.4*   < > 7.5*   HEMATOCRIT % 26.5*   < > 26.5*   PLATELETS Thousands/uL 411*   < > 432*   NEUTROS PCT %  --   --  85*   LYMPHS PCT %  --   --  4*   MONOS PCT %  --   --  8   EOS PCT %  --   --  1    < > = values in this interval not displayed. Results from last 7 days   Lab Units 07/28/23  0435 07/24/23  0759 07/24/23  0236   SODIUM mmol/L 133*   < > 137   POTASSIUM mmol/L 3.6   < > 3.6   CHLORIDE mmol/L 98   < > 102   CO2 mmol/L 30   < > 26   BUN mg/dL 11   < > 13   CREATININE mg/dL 1.23   < > 0.98   ANION GAP mmol/L 5   < > 9   CALCIUM mg/dL 7.9*   < > 8.2*   ALBUMIN g/dL  --   --  3.2*   TOTAL BILIRUBIN mg/dL  --   --  0.52   ALK PHOS U/L  --   --  58   ALT U/L  --   --  7   AST U/L  --   --  7*   GLUCOSE RANDOM mg/dL 104   < > 315*    < > = values in this interval not displayed.      Results from last 7 days   Lab Units 07/24/23  0236   INR  1.12     Results from last 7 days   Lab Units 07/28/23  0626 07/27/23  2105 07/27/23  1607 07/27/23  1114 07/27/23  0709 07/26/23  2100 07/26/23  1531 07/26/23  1100 07/26/23  0717 07/25/23 2052 07/25/23  1532 07/25/23  1052   POC GLUCOSE mg/dl 100 112 76 175* 180* 152* 289* 335* 202* 386* 349* 273*     Results from last 7 days   Lab Units 07/23/23  1850   HEMOGLOBIN A1C % 12.4*     Results from last 7 days   Lab Units 07/24/23  0236   LACTIC ACID mmol/L 1.2   PROCALCITONIN ng/ml 0.19       Lines/Drains:  Invasive Devices     Peripheral Intravenous Line  Duration           Peripheral IV 07/28/23 Right;Ventral (anterior) Forearm <1 day          Drain  Duration           Urethral Catheter 4 days              Urinary Catheter:  Goal for removal: N/A - Chronic Razo               Imaging: No pertinent imaging reviewed. Recent Cultures (last 7 days):   Results from last 7 days   Lab Units 07/24/23  0236 07/23/23  1934 07/23/23  1853   BLOOD CULTURE  No Growth at 72 hrs.  --  No Growth at 72 hrs.    URINE CULTURE   --  >100,000 cfu/ml Proteus mirabilis*  >100,000 cfu/ml Staphylococcus aureus*  >100,000 cfu/ml Enterococcus faecalis*  --        Last 24 Hours Medication List:   Current Facility-Administered Medications   Medication Dose Route Frequency Provider Last Rate   • acetaminophen  650 mg Oral Q6H PRN Louisa Moreno PA-C     • atorvastatin  40 mg Oral Daily Louisa Moreno PA-C     • bisacodyl  10 mg Rectal Daily PRN Louisa Moreno PA-C     • carbidopa-levodopa  1 tablet Oral TID Louisa Moreno PA-C     • cefTRIAXone  1,000 mg Intravenous Q24H Louisa Moreno PA-C 1,000 mg (07/28/23 0332)   • cyanocobalamin  100 mcg Oral Daily Abigail Damian PA-C     • docusate sodium  100 mg Oral BID Louisa Moreno PA-C     • famotidine  20 mg Oral HS Louisa Moreno PA-C     • ferrous gluconate  324 mg Oral Daily Before Breakfast AJIT Edwards     • finasteride  5 mg Oral Daily Louisa Moreno PA-C     • insulin glargine  30 Units Subcutaneous HS Miko Guerra MD     • insulin lispro  1-5 Units Subcutaneous HS Miko Guerra MD     • insulin lispro  1-6 Units Subcutaneous TID AC Louisa Moreno PA-C     • insulin lispro  12 Units Subcutaneous TID With Meals Miko Guerra MD     • lisinopril  5 mg Oral Daily Ashanti MOY Kami Barahona PA-C     • magnesium sulfate  4 g Intravenous Once AJIT Garcia     • melatonin  3 mg Oral HS Moon Kiser PA-C     • metoprolol succinate  25 mg Oral Daily Moon Kiser PA-C     • pantoprazole  40 mg Oral BID AC Moon Kiser PA-C     • ranolazine  500 mg Oral Q12H [address] Moon Kiser PA-C     • senna  2 tablet Oral HS AJIT Fry     • tamsulosin  0.4 mg Oral Daily With Dinner Moon Kiser PA-C     • trimethobenzamide  200 mg Intramuscular Q6H PRN AJIT Courtney          Today, Patient Was Seen By: AJIT Garcia    **Please Note: This note may have been constructed using a voice recognition system. **

## 2023-07-28 NOTE — ASSESSMENT & PLAN NOTE
· Patient presented to the ED from [facility] with a blood glucose level greater than 500. · Meeting SIRS criteria on admission, evidenced by leukocytosis and tachycardia. · In setting of indwelling wilkerson catheter associated UTI.   · Urine culture positive for proteus mirabilis; staph aureus and enterococcus faecalis  · Currently on IV Ceftriaxone, today is day 5  · Blood cultures negative x 72 hours

## 2023-07-28 NOTE — ED ATTENDING ATTESTATION
7/23/2023  INeil MD, saw and evaluated the patient. I have discussed the patient with the resident/non-physician practitioner and agree with the resident's/non-physician practitioner's findings, Plan of Care, and MDM as documented in the resident's/non-physician practitioner's note, except where noted. All available labs and Radiology studies were reviewed. I was present for key portions of any procedure(s) performed by the resident/non-physician practitioner and I was immediately available to provide assistance. At this point I agree with the current assessment done in the Emergency Department. I have conducted an independent evaluation of this patient a history and physical is as follows:see h and p above     ED Course  ED Course as of 07/27/23 2105   Sun Jul 23, 2023 1826 ER MD NOTE- ER MD CALLED YIMI MANOR-  -BATH - REFERRED TO [facility]- - TALKED TO NURSE -DARLEEN--  NURSE PLACED SOUZA APPROX 1 WEEK AGO- PT WITH SACRAL WOUND - WHICH IS HEALED-- SHE WENT TO CHECK PT TODAY AT FACILITY-- THEY DO NOT CHECK PT BLOOD SUGARS-- IS ASSISTED LIVING-- SHE CHECKED HIS BLOOD SUGAR  AND WAS HIGH-- AND SENT TO ER - PT IS ON  METFORMIN 100 MG BID AND LANTUS 20 UNITS AT NIGHT FOR DIABETES- IS ON NO OTHER MEDICATION    1833 ER MD NOTE- 12 LEAD ECG REVIEWED BY ER MD - NSR RATE OF 93- RBBB NO  SIGNS OF ISCHEMIA/ INJURY    1840 - ER MD NOTE-  3/9/22 D/C SUMMARY REVIEWED BY ER MD -- - 3/5/22  ECHO AND NM STRESS REPORT REVIEWED BY ER MD -- 3/7/22- CARDIAC CATH REPORT REVIEWED BY ER MD   1845 ER MD NOTE-  ANO-RECTAL EXAM- NORMAL ANAL EXAM- ?  TRACE HEME POS BROWN STOOL IN RECTAL VAULT -- MILD  LOWER BUTTOCK CREASE  ERYTHEMA- NO OPEN AREAS --  - PT HAS WHAT LOOKS LIKE CHRONIC BILATERAL INGUINAL IRRATIONS-= HAS CRAM IN AREAS AND  NON INFECTIOUS BALANITIS-- AND SCROTAL IRRATATION -- AGAIN LOOKS NON INFECTED-  NORMAL PERINEAL AREA   1855 ER MD NOTE-  ER NOTE- ER MD CALLED BACK DARLEEN - NURSE FROM [facility]- MADE AWARE OF LOW HB - HEME NEG STOOL- LOW INDICES-  IRON PANEL SENT - AND  AT CARE FACILITY  HAS NOT HAD HIS BLOOD SUGAR CHECKED IN OVER 1 MONTH -- ER MD SENT A HB A1C- DARLEEN STATES THAT  [facility] CAN  ACCESS EPIS AND REVIEW FINDINGS AND HE WILL BE SEEN BY A NURSE TOMORROW AND A GERIATRICIAN THIS WEEK- DARLEEN ALSO MADE AWARE OF  IRRITATION APPEARING NON INFECTIOUS APPEARING BALANITIS/ SCROTAL AND INGUINAL AREA AND TO RECHECK AREA TOMORROW AT FACILITY    1902 ER MD MEDICAL DECISION MAKING NOTE- AT PRESENT -- PT APPEARS SYSTEMIC WELL HAS NO COMPLAITNS ASKING TO EAT- DCOES NOT KNOW WHY HE IS IN ER-- THOUGHT WBC CT IS ELEVATED -- WILL NOT  DO A SEPTIC WORKUP AT PRESENT -- UNLESS URINE IS INFECTED WILL NOT COVER WITH ANTIBIOTICS          Critical Care Time  Procedures

## 2023-07-28 NOTE — PROGRESS NOTES
Inpatient follow-up progress note        Assessment/plan:  68 yr male with Type 2 diabetes since 2004, HTN, HLD, PD, BPH s/p TURP, urethral obstruction s/p recent wilkerson's catheter placement, presents from SNF with sepsis suspected 2" UTI and severe hyperglycemia.     Continue Lantus 30u qhs, humalog 10u tidac plus correctional scale with algorithm 3. May consider discharge on basal bolus insulin therapy if he will be discharged to a SNF. Follow up with endocrinology/ PCP as outpatient. S:  Eating and drinking normally. O:  Patient seen and examined personally. /64   Pulse 83   Temp (!) 97.2 °F (36.2 °C) (Temporal)   Resp 18   Ht 5' 7" (1.702 m)   Wt 87 kg (191 lb 12.8 oz)   SpO2 97%   BMI 30.04 kg/m²     Physical Exam  Constitutional:       General: He is not in acute distress. Appearance: He is well-developed. He is not ill-appearing. HENT:      Head: Normocephalic and atraumatic. Nose: Nose normal.      Mouth/Throat:      Pharynx: Oropharynx is clear. Eyes:      Extraocular Movements: Extraocular movements intact. Conjunctiva/sclera: Conjunctivae normal.   Neck:      Thyroid: No thyromegaly. Cardiovascular:      Rate and Rhythm: Normal rate. Pulmonary:      Effort: Pulmonary effort is normal.   Musculoskeletal:         General: No deformity. Cervical back: Normal range of motion. Skin:     Capillary Refill: Capillary refill takes less than 2 seconds. Coloration: Skin is not pale. Findings: No rash. Neurological:      Mental Status: He is alert and oriented to person, place, and time.    Psychiatric:         Behavior: Behavior normal.         Current Facility-Administered Medications   Medication Dose Route Frequency Provider Last Rate   • acetaminophen  650 mg Oral Q6H PRN Octavio Patricia PA-C     • atorvastatin  40 mg Oral Daily Octavio Patricia PA-C     • bisacodyl  10 mg Rectal Daily PRN Octavio Patricia PA-C     • carbidopa-levodopa  1 tablet Oral TID Lena Nielson PA-C     • cefTRIAXone  1,000 mg Intravenous Q24H Lena Nielson PA-C 1,000 mg (07/28/23 0332)   • cyanocobalamin  100 mcg Oral Daily Davis Melo PA-C     • docusate sodium  100 mg Oral BID Lena Nielson PA-C     • famotidine  20 mg Oral HS Lena Nielson PA-C     • ferrous gluconate  324 mg Oral Daily Before Breakfast Jory Halsted, CRNP     • finasteride  5 mg Oral Daily Lena Nielson PA-C     • insulin glargine  30 Units Subcutaneous HS Lisa Bach MD     • insulin lispro  1-5 Units Subcutaneous HS Lisa Bach MD     • insulin lispro  1-6 Units Subcutaneous TID AC Gwen Card PA-C     • insulin lispro  10 Units Subcutaneous TID With Meals Lisa Bach MD     • lisinopril  5 mg Oral Daily Lena Nielson PA-C     • melatonin  3 mg Oral HS Lena Nielson PA-C     • metoprolol succinate  25 mg Oral Daily Lena Nielson PA-C     • pantoprazole  40 mg Oral BID AC Lena Nielson PA-C     • ranolazine  500 mg Oral Q12H [facility] Lena Nielson PA-C     • senna  2 tablet Oral HS AJIT Dee     • tamsulosin  0.4 mg Oral Daily With Dinner Lena Nielson PA-C     • trimethobenzamide  200 mg Intramuscular Q6H PRN AJIT Nicolas          Lab, Imaging and other studies:   POC Glucose (mg/dl)   Date Value   07/28/2023 118   07/28/2023 100   07/27/2023 112   07/27/2023 76   07/27/2023 175 (H)   07/27/2023 180 (H)   07/26/2023 152 (H)   07/26/2023 289 (H)   07/26/2023 335 (H)   07/26/2023 202 (H)

## 2023-07-28 NOTE — ASSESSMENT & PLAN NOTE
· Patient has chronic indwelling Razo catheter  · Concern for colonization however given sepsis and recent placement we will continue to treat as noted above  · Continue with urogenital care while inpatient. Discussed with [facility] over the phone urogenital care needs to continue on discharge.   · UA with large leukocytes, + Nitrites, innumberable WBC  · Urine culture with proteus mirabilis; staph aureus and enterococcus faecalis  · Continue IV Rocephin at this time, day #5

## 2023-07-28 NOTE — CASE MANAGEMENT
Case Management Progress Note    Patient name Quan Puckett  Location S /S -37 MRN [id number]  : 1950 Date 2023       LOS (days): 4  Geometric Mean LOS (GMLOS) (days): 4.80  Days to GMLOS:0.3        OBJECTIVE:        Current admission status: Inpatient  Preferred Pharmacy:   [facility] [address] [place] [address]  [address] [facility] [id number] [address]  Phone: [phone] Fax: [address] [place] [address] [address]  Phone: [phone] Fax: [phone]    Primary Care Provider: Brandan Garcia MD    Primary Insurance: [employer] [id number]  Secondary Insurance:     PROGRESS NOTE:    CM received a voicemail from [facility]  Lauren who was looking for an update on DCP. CM called [facility] back [phone] however, was only able to leave a detailed message with plans for likely DC over the weekend back to [facility] with therapy services. CM then called [facility] [phone] to let them know of the likely DC over the weekend however, there was no answer on several calls and CM got a message that the mailbox was full and no VM was able to be left. CM department will continue to follow to assist with discharge coordination.

## 2023-07-28 NOTE — ANESTHESIA POSTPROCEDURE EVALUATION
Post-Op Assessment Note    CV Status:  Stable  Pain Score: 0    Pain management: adequate     Mental Status:  Alert and awake   Hydration Status:  Euvolemic   PONV Controlled:  Controlled   Airway Patency:  Patent      Post Op Vitals Reviewed: Yes      Staff: CRNA, Anesthesiologist         No notable events documented.     BP   103/64   Temp  98.3   Pulse  67   Resp   17   SpO2   99

## 2023-07-29 VITALS
SYSTOLIC BLOOD PRESSURE: 111 MMHG | WEIGHT: 191.8 LBS | DIASTOLIC BLOOD PRESSURE: 60 MMHG | TEMPERATURE: 97.8 F | HEART RATE: 79 BPM | HEIGHT: 67 IN | RESPIRATION RATE: 20 BRPM | BODY MASS INDEX: 30.1 KG/M2 | OXYGEN SATURATION: 94 %

## 2023-07-29 PROBLEM — A41.9 SEPSIS (HCC): Status: RESOLVED | Noted: 2023-07-24 | Resolved: 2023-07-29

## 2023-07-29 LAB
BACTERIA BLD CULT: NORMAL
BACTERIA BLD CULT: NORMAL
BASOPHILS # BLD AUTO: 0.03 THOUSANDS/ÂΜL (ref 0–0.1)
BASOPHILS NFR BLD AUTO: 0 % (ref 0–1)
EOSINOPHIL # BLD AUTO: 0.42 THOUSAND/ÂΜL (ref 0–0.61)
EOSINOPHIL NFR BLD AUTO: 4 % (ref 0–6)
ERYTHROCYTE [DISTWIDTH] IN BLOOD BY AUTOMATED COUNT: 22.4 % (ref 11.6–15.1)
GLUCOSE SERPL-MCNC: 213 MG/DL (ref 65–140)
GLUCOSE SERPL-MCNC: 220 MG/DL (ref 65–140)
GLUCOSE SERPL-MCNC: 311 MG/DL (ref 65–140)
HCT VFR BLD AUTO: 27.3 % (ref 36.5–49.3)
HGB BLD-MCNC: 7.7 G/DL (ref 12–17)
IMM GRANULOCYTES # BLD AUTO: 0.17 THOUSAND/UL (ref 0–0.2)
IMM GRANULOCYTES NFR BLD AUTO: 2 % (ref 0–2)
LYMPHOCYTES # BLD AUTO: 0.81 THOUSANDS/ÂΜL (ref 0.6–4.47)
LYMPHOCYTES NFR BLD AUTO: 8 % (ref 14–44)
MCH RBC QN AUTO: 20.3 PG (ref 26.8–34.3)
MCHC RBC AUTO-ENTMCNC: 28.2 G/DL (ref 31.4–37.4)
MCV RBC AUTO: 72 FL (ref 82–98)
MONOCYTES # BLD AUTO: 0.89 THOUSAND/ÂΜL (ref 0.17–1.22)
MONOCYTES NFR BLD AUTO: 8 % (ref 4–12)
NEUTROPHILS # BLD AUTO: 8.44 THOUSANDS/ÂΜL (ref 1.85–7.62)
NEUTS SEG NFR BLD AUTO: 78 % (ref 43–75)
NRBC BLD AUTO-RTO: 0 /100 WBCS
PLATELET # BLD AUTO: 394 THOUSANDS/UL (ref 149–390)
PMV BLD AUTO: 9.8 FL (ref 8.9–12.7)
RBC # BLD AUTO: 3.79 MILLION/UL (ref 3.88–5.62)
WBC # BLD AUTO: 10.76 THOUSAND/UL (ref 4.31–10.16)

## 2023-07-29 PROCEDURE — 99232 SBSQ HOSP IP/OBS MODERATE 35: CPT | Performed by: INTERNAL MEDICINE

## 2023-07-29 PROCEDURE — 82948 REAGENT STRIP/BLOOD GLUCOSE: CPT

## 2023-07-29 PROCEDURE — 99239 HOSP IP/OBS DSCHRG MGMT >30: CPT | Performed by: PHYSICIAN ASSISTANT

## 2023-07-29 PROCEDURE — 85025 COMPLETE CBC W/AUTO DIFF WBC: CPT | Performed by: PHYSICIAN ASSISTANT

## 2023-07-29 RX ORDER — MELATONIN
50000
Start: 2023-07-29

## 2023-07-29 RX ORDER — INSULIN GLARGINE 100 [IU]/ML
30 INJECTION, SOLUTION SUBCUTANEOUS
Qty: 10 ML | Refills: 0 | Status: SHIPPED | OUTPATIENT
Start: 2023-07-29

## 2023-07-29 RX ORDER — INSULIN LISPRO 100 [IU]/ML
10 INJECTION, SOLUTION INTRAVENOUS; SUBCUTANEOUS
Qty: 10 ML | Refills: 0 | Status: SHIPPED | OUTPATIENT
Start: 2023-07-29

## 2023-07-29 RX ORDER — CEFDINIR 300 MG/1
300 CAPSULE ORAL EVERY 12 HOURS SCHEDULED
Qty: 2 CAPSULE | Refills: 0 | Status: SHIPPED | OUTPATIENT
Start: 2023-07-30 | End: 2023-07-31

## 2023-07-29 RX ORDER — DOXYCYCLINE HYCLATE 50 MG/1
324 CAPSULE, GELATIN COATED ORAL
Qty: 30 TABLET | Refills: 0 | Status: SHIPPED | OUTPATIENT
Start: 2023-07-30 | End: 2023-08-29

## 2023-07-29 RX ADMIN — INSULIN LISPRO 5 UNITS: 100 INJECTION, SOLUTION INTRAVENOUS; SUBCUTANEOUS at 11:25

## 2023-07-29 RX ADMIN — PANTOPRAZOLE SODIUM 40 MG: 40 TABLET, DELAYED RELEASE ORAL at 06:10

## 2023-07-29 RX ADMIN — FINASTERIDE 5 MG: 5 TABLET, FILM COATED ORAL at 09:06

## 2023-07-29 RX ADMIN — Medication 100 MCG: at 09:06

## 2023-07-29 RX ADMIN — TAMSULOSIN HYDROCHLORIDE 0.4 MG: 0.4 CAPSULE ORAL at 15:58

## 2023-07-29 RX ADMIN — ATORVASTATIN CALCIUM 40 MG: 40 TABLET, FILM COATED ORAL at 09:06

## 2023-07-29 RX ADMIN — INSULIN LISPRO 10 UNITS: 100 INJECTION, SOLUTION INTRAVENOUS; SUBCUTANEOUS at 09:07

## 2023-07-29 RX ADMIN — INSULIN LISPRO 2 UNITS: 100 INJECTION, SOLUTION INTRAVENOUS; SUBCUTANEOUS at 16:14

## 2023-07-29 RX ADMIN — DOCUSATE SODIUM 100 MG: 100 CAPSULE, LIQUID FILLED ORAL at 09:06

## 2023-07-29 RX ADMIN — CARBIDOPA AND LEVODOPA 1 TABLET: 25; 100 TABLET ORAL at 15:58

## 2023-07-29 RX ADMIN — RANOLAZINE 500 MG: 500 TABLET, FILM COATED, EXTENDED RELEASE ORAL at 09:06

## 2023-07-29 RX ADMIN — CARBIDOPA AND LEVODOPA 1 TABLET: 25; 100 TABLET ORAL at 09:06

## 2023-07-29 RX ADMIN — PANTOPRAZOLE SODIUM 40 MG: 40 TABLET, DELAYED RELEASE ORAL at 15:58

## 2023-07-29 RX ADMIN — Medication 1000 MG: at 05:12

## 2023-07-29 RX ADMIN — LISINOPRIL 5 MG: 5 TABLET ORAL at 09:06

## 2023-07-29 RX ADMIN — INSULIN LISPRO 2 UNITS: 100 INJECTION, SOLUTION INTRAVENOUS; SUBCUTANEOUS at 09:07

## 2023-07-29 RX ADMIN — METOPROLOL SUCCINATE 25 MG: 25 TABLET, EXTENDED RELEASE ORAL at 09:06

## 2023-07-29 RX ADMIN — INSULIN LISPRO 10 UNITS: 100 INJECTION, SOLUTION INTRAVENOUS; SUBCUTANEOUS at 16:15

## 2023-07-29 RX ADMIN — FERROUS GLUCONATE 324 MG: 324 TABLET ORAL at 09:07

## 2023-07-29 RX ADMIN — INSULIN LISPRO 10 UNITS: 100 INJECTION, SOLUTION INTRAVENOUS; SUBCUTANEOUS at 11:25

## 2023-07-29 NOTE — ASSESSMENT & PLAN NOTE
· Patient with history of anemia; with a baseline hemoglobin of 9-10  · Noted to be 7.3 on admission with repeat hemoglobin of 6.3  · Patient is now s/p 1 unit PRBC. · Rectal exam/Hemococcult negative for blood  · Hemoglobin stable, 7.7  · GI consult, appreciate input  · Status post EGD and colonoscopy 7/28  • EGD:Moderate erythematous and scarred mucosa with erosion in the middle third of the esophagus and lower third of the esophagus. Ulcer in the antrum; performed cold forceps biopsy. Single large lesion in the duodenal bulb; performed cold forceps biopsy. The 2nd part of the duodenum appeared normal.  • Colonoscopy: Poor prep, procedure was aborted. · Poor historian. Does report drinking history, quit 1985. · Can resume aspirin parents with discontinue Plavix.   Needs outpatient follow-up with cardiology  · Transfuse for Hgb < 7   · PPI BID

## 2023-07-29 NOTE — DISCHARGE SUMMARY
[address]  Discharge- Mckenzie Jaeger 1950, 68 y.o. male MRN: [id number]  Unit/Bed#: S -01 Encounter: [id number]  Primary Care Provider: Lizz Richardson MD   Date and time admitted to hospital: 7/23/2023  5:04 PM    * Acute blood loss anemia  Assessment & Plan  · Patient with history of anemia; with a baseline hemoglobin of 9-10  · Noted to be 7.3 on admission with repeat hemoglobin of 6.3  · Patient is now s/p 1 unit PRBC. · Rectal exam/Hemococcult negative for blood  · Hemoglobin stable, 7.7  · GI consult, appreciate input  · Status post EGD and colonoscopy 7/28  • EGD:Moderate erythematous and scarred mucosa with erosion in the middle third of the esophagus and lower third of the esophagus. Ulcer in the antrum; performed cold forceps biopsy. Single large lesion in the duodenal bulb; performed cold forceps biopsy. The 2nd part of the duodenum appeared normal.  • Colonoscopy: Poor prep, procedure was aborted. · Poor historian. Does report drinking history, quit 1985. · Can resume aspirin parents with discontinue Plavix. Needs outpatient follow-up with cardiology  · Transfuse for Hgb < 7   · PPI BID    Iron deficiency anemia  Assessment & Plan  · Iron study consistent with REN   · B12 and folate within normal limits. B12 less than 400. · Continue with iron supplements    Parkinson disease ([address])  Assessment & Plan  · Continue Sinemet   · PT/OT     Coronary artery disease involving native coronary artery of native heart without angina pectoris  Assessment & Plan  · Triple-vessel disease- not candidate for surgical intervention  · Denies any chest pain  · Holding ASA and Plavix 2/2 acute iron deficiency anemia. Held since 7/23/2023.   · Continue medical management with BB, statin, Ranexa    Left-sided weakness  Assessment & Plan  · Chronic left sided weakness 2/2 Parkinson's disease and neuropathy    · No new focal deficits    Urinary tract infection associated with indwelling urethral catheter ([address])  Assessment & Plan  · Patient has chronic indwelling Wilkerson catheter  · Concern for colonization however given sepsis and recent placement we will continue to treat as noted above  · Continue with urogenital care while inpatient. Discussed with [facility] over the phone urogenital care needs to continue on discharge. · UA with large leukocytes, + Nitrites, innumberable WBC  · Urine culture with proteus mirabilis; staph aureus and enterococcus faecalis  · Completed 6 days of IV Rocephin, will send for 1 more day of Omnicef tomorrow to complete therapy    Stage 3a chronic kidney disease (HCC)  Assessment & Plan  · Stable   · Baseline Cr 1.1-1.3 mg/dL   · Avoid nephrotoxins/hypotension   · Monitor I/O    Type 2 diabetes mellitus with hyperglycemia, with long-term current use of insulin [facility])  Assessment & Plan  Lab Results   Component Value Date    HGBA1C 12.4 (H) 07/23/2023       Recent Labs     07/28/23  1700 07/28/23  2038 07/29/23  0623 07/29/23  1102   POCGLU 122 211* 213* 311*       Blood Sugar Average: Last 72 hrs:  (P) 182.4     Presented to the ER with a blood glucose level greater than 500. Hb A1C of 12.4% from 6.4% (in March) in the setting of acute infection with severe sepsis secondary to UTI. Holding home oral anti-diabetics; Glipizide and metformin. Start 10 units lispro with meals TID  Start Lantus at 30 units at bedtime. Monitor blood glucose AC/HS   Continue carbohydrate controlled diet level 2. Endocrinology evaluation appreciated, will need outpatient follow-up. Essential hypertension  Assessment & Plan  · Blood pressure well controlled  · Continue lisinopril and metoprolol    Sepsis (HCC)-resolved as of 7/29/2023  Assessment & Plan  · Patient presented to the ED from [facility] with a blood glucose level greater than 500. · Meeting SIRS criteria on admission, evidenced by leukocytosis and tachycardia.   · In setting of indwelling wilkerson catheter associated UTI. · Urine culture positive for proteus mirabilis; staph aureus and enterococcus faecalis  · Antibiotics as below  · Blood cultures negative x 72 hours      Medical Problems     Resolved Problems  Date Reviewed: 7/28/2023          Resolved    Constipation 7/27/2023     Resolved by  AJIT Bailon    Sepsis [facility]) 7/29/2023     Resolved by  Paola Lockhart PA-C        Discharging Physician / Practitioner: Kyler Silva PA-C  PCP: Dinah Castellano MD  Admission Date:   Admission Orders (From admission, onward)     Ordered        07/24/23 0212  INPATIENT ADMISSION  Once                      Discharge Date: 07/29/23    Consultations During Hospital Stay:  · GI  · Endocrinology    Procedures Performed:   · EGD and colonoscopy 7/28 as above    Significant Findings / Test Results:   · A1c 12%  · Glucose on admission greater than 500. · Hemoglobin of 6.6, status post 1 unit PRBC with improvement to 7.7 on discharge and stabilization. · Urine culture with Proteus Mirabella's, Staph aureus, Enterococcus faecalis    Incidental Findings:   · none     Test Results Pending at Discharge (will require follow up):   · duodenal biopsy     Outpatient Tests Requested:  · EUS pending biopsy results    Complications: none    Reason for Admission: Acute blood loss anemia and sepsis    Hospital Course:   Obey Gandara is a 68 y.o. male patient who originally presented to the hospital on 7/23/2023 due to glucose reading of 500 upon admission as well as severe sepsis secondary to UTI. Patient's urine culture resulted as above. Throughout his stay was noted to have acute blood loss anemia. EGD as above noted a duodenal lesion which was biopsied. We discontinued his Plavix upon discharge and he will need to follow-up with his cardiologist.  He was also seen in consultation by endocrinology who recommended starting him on basal/bolus insulin given an A1c of 12%.   This will be Lantus 30 units nightly as well as Humalog 10 units 3 times daily with meals. Patient clinically improved on IV antibiotics, received 6 days of IV ceftriaxone. We will give an additional day of Omnicef tomorrow to complete 7 days of therapy. He will be discharged with outpatient follow-up with endocrinology needed as well as PCP. We will follow-up on biopsy results of the duodenal lesion to determine if he needs an EUS. He will be discharged today in stable condition, is eating and hemoglobin is now improving. Please see above list of diagnoses and related plan for additional information. Condition at Discharge: stable    Discharge Day Visit / Exam:   Subjective: Patient is doing well, no further bleeding. He is eating. No nausea or vomiting. He wants to go back home today. Vitals: Blood Pressure: 111/60 (07/29/23 1104)  Pulse: 79 (07/29/23 1104)  Temperature: 97.8 °F (36.6 °C) (07/29/23 1104)  Temp Source: Oral (07/28/23 1900)  Respirations: 20 (07/29/23 1104)  Height: 5' 7" (170.2 cm) (07/23/23 1730)  Weight - Scale: 87 kg (191 lb 12.8 oz) (07/23/23 1730)  SpO2: 94 % (07/29/23 1104)  Exam:   Physical Exam  Vitals and nursing note reviewed. Constitutional:       General: He is not in acute distress. Appearance: Normal appearance. He is not ill-appearing. HENT:      Head: Normocephalic. Mouth/Throat:      Mouth: Mucous membranes are moist.   Eyes:      Pupils: Pupils are equal, round, and reactive to light. Cardiovascular:      Rate and Rhythm: Normal rate and regular rhythm. Pulses: Normal pulses. Heart sounds: Normal heart sounds. No murmur heard. Pulmonary:      Effort: Pulmonary effort is normal. No tachypnea, bradypnea or respiratory distress. Breath sounds: Decreased breath sounds present. No wheezing, rhonchi or rales. Abdominal:      General: Bowel sounds are normal. There is no distension. Palpations: Abdomen is soft. Tenderness: There is no abdominal tenderness.  There is no guarding. Genitourinary:     Comments: Razo noted; clear yellow urine. Musculoskeletal:         General: No deformity. Normal range of motion. Cervical back: Normal range of motion. Right lower leg: No edema. Left lower leg: No edema. Skin:     General: Skin is warm and dry. Capillary Refill: Capillary refill takes less than 2 seconds. Neurological:      General: No focal deficit present. Mental Status: He is alert and oriented to person, place, and time. Mental status is at baseline. Psychiatric:         Mood and Affect: Mood normal.          Discussion with Family: Attempted to update  (wife) via phone. Left voicemail. Discharge instructions/Information to patient and family:   See after visit summary for information provided to patient and family. Provisions for Follow-Up Care:  See after visit summary for information related to follow-up care and any pertinent home health orders. Disposition:   [address] [facility] Readmission: no     Discharge Statement:  I spent 45 minutes discharging the patient. This time was spent on the day of discharge. I had direct contact with the patient on the day of discharge. Greater than 50% of the total time was spent examining patient, answering all patient questions, arranging and discussing plan of care with patient as well as directly providing post-discharge instructions. Additional time then spent on discharge activities. Discharge Medications:  See after visit summary for reconciled discharge medications provided to patient and/or family.       **Please Note: This note may have been constructed using a voice recognition system**

## 2023-07-29 NOTE — ASSESSMENT & PLAN NOTE
· Patient has chronic indwelling Razo catheter  · Concern for colonization however given sepsis and recent placement we will continue to treat as noted above  · Continue with urogenital care while inpatient. Discussed with [facility] over the phone urogenital care needs to continue on discharge.   · UA with large leukocytes, + Nitrites, innumberable WBC  · Urine culture with proteus mirabilis; staph aureus and enterococcus faecalis  · Completed 6 days of IV Rocephin, will send for 1 more day of Omnicef tomorrow to complete therapy

## 2023-07-29 NOTE — ASSESSMENT & PLAN NOTE
Lab Results   Component Value Date    HGBA1C 12.4 (H) 07/23/2023       Recent Labs     07/28/23  1700 07/28/23 2038 07/29/23  0623 07/29/23  1102   POCGLU 122 211* 213* 311*       Blood Sugar Average: Last 72 hrs:  (P) 182.4     Presented to the ER with a blood glucose level greater than 500. Hb A1C of 12.4% from 6.4% (in March) in the setting of acute infection with severe sepsis secondary to UTI. Holding home oral anti-diabetics; Glipizide and metformin. Start 10 units lispro with meals TID  Start Lantus at 30 units at bedtime. Monitor blood glucose AC/HS   Continue carbohydrate controlled diet level 2. Endocrinology evaluation appreciated, will need outpatient follow-up.

## 2023-07-29 NOTE — PROGRESS NOTES
Progress Note - Kay Lowery 68 y.o. male MRN: [id number]    Unit/Bed#: S -01 Encounter: [id number]        Assessment/Plan:  40-year-old male with T2DM (last HbA1c 12.4%) on insulin, HTN, chronic indwelling Razo, Parkinson's disease, CAD on Plavix, CKD stage III admitted with hyperglycemia, found to have leukocytosis and sepsis secondary to UTI from indwelling catheter, as well as acute drop in Hgb with no evidence of overt bleeding.     #1 Acute on chronic iron deficiency anemia: Hemoglobin 7 on admission down from baseline of 9-10 in January.     -Continue to monitor CBC, transfuse as needed     -Continue Protonix twice daily      -EGD/Colon done yesterday which revealed poor prep so procedure was aborted but was noted to have a friable duodenal mass which was biopsied and gastric ulcer  -hgb stable at 7.7  -Follow-up pathology  -Regular nonulcerogenic diet ordered  -Recommend EUS pending biopsy report  -Patient high risk of recurrent bleeding if continued Plavix use     #2 Pancreatic cyst: Pt with history of 2.1 cm pancreatic cyst within the anterior aspect of the pancreatic body-unclear if there is any communication with the pancreatic duct on MRI in 2020. Patient does have history of pancreatitis & heavy alcohol use in the past. Pt was recommended for EUS but never followed up. Pancreatic cystic lesion on prior MRI not clearly visualized on CT on admission.   -Recommend outpatient MRI abdomen w w/o contrast MRCP & continued follow up with GI after discharge    Subjective:   Patient is lying in bed. Reports no bowel movements today. Went over EGD findings. Denies any abdominal pain, nausea or vomiting and reports he is tolerating a diet.     Objective:     Vitals: /74   Pulse 89   Temp 97.9 °F (36.6 °C)   Resp 20   Ht 5' 7" (1.702 m)   Wt 87 kg (191 lb 12.8 oz)   SpO2 96%   BMI 30.04 kg/m²       Physical Exam:  Gen-alert no acute distress  Abd-soft positive bowel sounds nontender nondistended no rebound or guarding       Lab, Imaging and other studies:   Recent Results (from the past 72 hour(s))   Fingerstick Glucose (POCT)    Collection Time: 07/26/23 11:00 AM   Result Value Ref Range    POC Glucose 335 (H) 65 - 140 mg/dl   Fingerstick Glucose (POCT)    Collection Time: 07/26/23  3:31 PM   Result Value Ref Range    POC Glucose 289 (H) 65 - 140 mg/dl   Fingerstick Glucose (POCT)    Collection Time: 07/26/23  9:00 PM   Result Value Ref Range    POC Glucose 152 (H) 65 - 140 mg/dl   Fingerstick Glucose (POCT)    Collection Time: 07/27/23  7:09 AM   Result Value Ref Range    POC Glucose 180 (H) 65 - 140 mg/dl   CBC and Platelet    Collection Time: 07/27/23  8:00 AM   Result Value Ref Range    WBC 11.83 (H) 4.31 - 10.16 Thousand/uL    RBC 3.97 3.88 - 5.62 Million/uL    Hemoglobin 8.0 (L) 12.0 - 17.0 g/dL    Hematocrit 29.0 (L) 36.5 - 49.3 %    MCV 73 (L) 82 - 98 fL    MCH 20.2 (L) 26.8 - 34.3 pg    MCHC 27.6 (L) 31.4 - 37.4 g/dL    RDW 21.0 (H) 11.6 - 15.1 %    Platelets 770 (H) 270 - 390 Thousands/uL    MPV 9.6 8.9 - 12.7 fL   Basic metabolic panel    Collection Time: 07/27/23  8:00 AM   Result Value Ref Range    Sodium 134 (L) 135 - 147 mmol/L    Potassium 4.1 3.5 - 5.3 mmol/L    Chloride 99 96 - 108 mmol/L    CO2 30 21 - 32 mmol/L    ANION GAP 5 mmol/L    BUN 12 5 - 25 mg/dL    Creatinine 1.15 0.60 - 1.30 mg/dL    Glucose 238 (H) 65 - 140 mg/dL    Calcium 8.2 (L) 8.4 - 10.2 mg/dL    eGFR 62 ml/min/1.73sq m   Fingerstick Glucose (POCT)    Collection Time: 07/27/23 11:14 AM   Result Value Ref Range    POC Glucose 175 (H) 65 - 140 mg/dl   Fingerstick Glucose (POCT)    Collection Time: 07/27/23  4:07 PM   Result Value Ref Range    POC Glucose 76 65 - 140 mg/dl   Fingerstick Glucose (POCT)    Collection Time: 07/27/23  9:05 PM   Result Value Ref Range    POC Glucose 112 65 - 140 mg/dl   Basic metabolic panel    Collection Time: 07/28/23  4:35 AM   Result Value Ref Range    Sodium 133 (L) 135 - 147 mmol/L    Potassium 3.6 3.5 - 5.3 mmol/L    Chloride 98 96 - 108 mmol/L    CO2 30 21 - 32 mmol/L    ANION GAP 5 mmol/L    BUN 11 5 - 25 mg/dL    Creatinine 1.23 0.60 - 1.30 mg/dL    Glucose 104 65 - 140 mg/dL    Calcium 7.9 (L) 8.4 - 10.2 mg/dL    eGFR 57 ml/min/1.73sq m   CBC    Collection Time: 07/28/23  4:35 AM   Result Value Ref Range    WBC 11.74 (H) 4.31 - 10.16 Thousand/uL    RBC 3.65 (L) 3.88 - 5.62 Million/uL    Hemoglobin 7.4 (L) 12.0 - 17.0 g/dL    Hematocrit 26.5 (L) 36.5 - 49.3 %    MCV 73 (L) 82 - 98 fL    MCH 20.3 (L) 26.8 - 34.3 pg    MCHC 27.9 (L) 31.4 - 37.4 g/dL    RDW 21.3 (H) 11.6 - 15.1 %    Platelets 740 (H) 430 - 390 Thousands/uL    MPV 9.8 8.9 - 12.7 fL   Magnesium    Collection Time: 07/28/23  4:35 AM   Result Value Ref Range    Magnesium 1.6 (L) 1.9 - 2.7 mg/dL   Fingerstick Glucose (POCT)    Collection Time: 07/28/23  6:26 AM   Result Value Ref Range    POC Glucose 100 65 - 140 mg/dl   Fingerstick Glucose (POCT)    Collection Time: 07/28/23 10:40 AM   Result Value Ref Range    POC Glucose 118 65 - 140 mg/dl   Fingerstick Glucose (POCT)    Collection Time: 07/28/23  2:54 PM   Result Value Ref Range    POC Glucose 140 65 - 140 mg/dl   Fingerstick Glucose (POCT)    Collection Time: 07/28/23  5:00 PM   Result Value Ref Range    POC Glucose 122 65 - 140 mg/dl   Fingerstick Glucose (POCT)    Collection Time: 07/28/23  8:38 PM   Result Value Ref Range    POC Glucose 211 (H) 65 - 140 mg/dl   Fingerstick Glucose (POCT)    Collection Time: 07/29/23  6:23 AM   Result Value Ref Range    POC Glucose 213 (H) 65 - 140 mg/dl   CBC and differential    Collection Time: 07/29/23  7:47 AM   Result Value Ref Range    WBC 10.76 (H) 4.31 - 10.16 Thousand/uL    RBC 3.79 (L) 3.88 - 5.62 Million/uL    Hemoglobin 7.7 (L) 12.0 - 17.0 g/dL    Hematocrit 27.3 (L) 36.5 - 49.3 %    MCV 72 (L) 82 - 98 fL    MCH 20.3 (L) 26.8 - 34.3 pg    MCHC 28.2 (L) 31.4 - 37.4 g/dL    RDW 22.4 (H) 11.6 - 15.1 %    MPV 9.8 8.9 - 12.7 fL    Platelets 597 (H) 686 - 390 Thousands/uL    nRBC 0 /100 WBCs    Neutrophils Relative 78 (H) 43 - 75 %    Immat GRANS % 2 0 - 2 %    Lymphocytes Relative 8 (L) 14 - 44 %    Monocytes Relative 8 4 - 12 %    Eosinophils Relative 4 0 - 6 %    Basophils Relative 0 0 - 1 %    Neutrophils Absolute 8.44 (H) 1.85 - 7.62 Thousands/µL    Immature Grans Absolute 0.17 0.00 - 0.20 Thousand/uL    Lymphocytes Absolute 0.81 0.60 - 4.47 Thousands/µL    Monocytes Absolute 0.89 0.17 - 1.22 Thousand/µL    Eosinophils Absolute 0.42 0.00 - 0.61 Thousand/µL    Basophils Absolute 0.03 0.00 - 0.10 Thousands/µL

## 2023-07-29 NOTE — ASSESSMENT & PLAN NOTE
· Patient presented to the ED from [facility] with a blood glucose level greater than 500. · Meeting SIRS criteria on admission, evidenced by leukocytosis and tachycardia. · In setting of indwelling wilkerson catheter associated UTI.   · Urine culture positive for proteus mirabilis; staph aureus and enterococcus faecalis  · Antibiotics as below  · Blood cultures negative x 72 hours

## 2023-07-31 NOTE — UTILIZATION REVIEW
NOTIFICATION OF ADMISSION DISCHARGE   This is a Notification of Discharge from [facility]. Please be advised that this patient has been discharge from our facility. Below you will find the admission and discharge date and time including the patient’s disposition. UTILIZATION REVIEW CONTACT:  Marquez Gould  Utilization   Network Utilization Review Department  Phone: [phone] x carefully listen to the prompts. All voicemails are confidential.  Email: [email or web address]     ADMISSION INFORMATION  PRESENTATION DATE: 7/23/2023  5:04 PM  OBERVATION ADMISSION DATE:   INPATIENT ADMISSION DATE: 7/24/23  2:12 AM   DISCHARGE DATE: 7/29/2023  5:08 PM   DISPOSITION:Long Term SNF    IMPORTANT INFORMATION:  Send all requests for admission clinical reviews, approved or denied determinations and any other requests to dedicated fax number below belonging to the campus where the patient is receiving treatment.  List of dedicated fax numbers:  [place] DENIALS (Administrative/Medical Necessity) [phone] [address] [facility] (Maternity/NICU/Pediatrics) [phone]   [facility] [phone]   [facility] [phone]   [address] [facility] [phone]   [address] [place] [address] [phone]   [facility] [phone]   [address] [place] [address] [address] [facility] [phone]   [address] [facility] [phone] [address] [facility] [phone] [address] [facility] [address] [facility] [phone]

## 2023-08-04 ENCOUNTER — PREP FOR PROCEDURE (OUTPATIENT)
Dept: GASTROENTEROLOGY | Facility: AMBULARY SURGERY CENTER | Age: 73
End: 2023-08-04

## 2023-08-04 DIAGNOSIS — D50.9 IRON DEFICIENCY ANEMIA, UNSPECIFIED IRON DEFICIENCY ANEMIA TYPE: ICD-10-CM

## 2023-08-04 DIAGNOSIS — K31.89 DUODENAL NODULE: Primary | ICD-10-CM

## 2023-08-04 PROCEDURE — 88341 IMHCHEM/IMCYTCHM EA ADD ANTB: CPT | Performed by: STUDENT IN AN ORGANIZED HEALTH CARE EDUCATION/TRAINING PROGRAM

## 2023-08-04 PROCEDURE — 88305 TISSUE EXAM BY PATHOLOGIST: CPT | Performed by: STUDENT IN AN ORGANIZED HEALTH CARE EDUCATION/TRAINING PROGRAM

## 2023-08-04 PROCEDURE — 88342 IMHCHEM/IMCYTCHM 1ST ANTB: CPT | Performed by: STUDENT IN AN ORGANIZED HEALTH CARE EDUCATION/TRAINING PROGRAM

## 2023-08-06 ENCOUNTER — HOSPITAL ENCOUNTER (EMERGENCY)
Facility: HOSPITAL | Age: 73
Discharge: HOME | End: 2023-08-06
Attending: EMERGENCY MEDICINE
Payer: MEDICARE

## 2023-08-06 VITALS
DIASTOLIC BLOOD PRESSURE: 68 MMHG | BODY MASS INDEX: 29.52 KG/M2 | SYSTOLIC BLOOD PRESSURE: 142 MMHG | HEART RATE: 97 BPM | RESPIRATION RATE: 19 BRPM | TEMPERATURE: 98.1 F | OXYGEN SATURATION: 98 % | WEIGHT: 188.49 LBS

## 2023-08-06 DIAGNOSIS — N48.89 PENILE PAIN: Primary | ICD-10-CM

## 2023-08-06 LAB — GLUCOSE SERPL-MCNC: 126 MG/DL (ref 65–140)

## 2023-08-06 PROCEDURE — 82948 REAGENT STRIP/BLOOD GLUCOSE: CPT

## 2023-08-06 PROCEDURE — 99284 EMERGENCY DEPT VISIT MOD MDM: CPT

## 2023-08-06 RX ORDER — LIDOCAINE 40 MG/G
CREAM TOPICAL ONCE
Status: COMPLETED | OUTPATIENT
Start: 2023-08-06 | End: 2023-08-06

## 2023-08-06 RX ORDER — ACETAMINOPHEN 325 MG/1
650 TABLET ORAL ONCE
Status: COMPLETED | OUTPATIENT
Start: 2023-08-06 | End: 2023-08-06

## 2023-08-06 RX ORDER — LIDOCAINE 50 MG/G
OINTMENT TOPICAL 2 TIMES DAILY PRN
Qty: 35.44 G | Refills: 0 | Status: SHIPPED | OUTPATIENT
Start: 2023-08-06

## 2023-08-06 RX ADMIN — ACETAMINOPHEN 650 MG: 325 TABLET, FILM COATED ORAL at 10:08

## 2023-08-06 RX ADMIN — LIDOCAINE: 40 CREAM TOPICAL at 10:08

## 2023-08-06 NOTE — DISCHARGE INSTRUCTIONS
DIAGNOSIS; PENILE PAIN- EROSION THROUGH GLANS PENIS DO TO  SOUZA CATHETER     - PLEASE MAKE SURE THERE IS NO TRACTION ON GLANS FROM SOUZA PULLING    -  APPLY LIDOCAINE AS NEEDED  TO EROSION ON GLANS FOR SEVERE PAIN     - PLEASE RETURN TO  THE ER FOR ANY FEVER  TEMP > 100.4/ OR ANY INCREASING UNCONTROLLABEL PENILE PAIN-SWELLING/ REDNESS

## 2023-08-06 NOTE — ED PROVIDER NOTES
History  Chief Complaint   Patient presents with   • Urinary Catheter Problem     Pt arrives bls from [facility]. Rzao catheter inplace for a week. Complaints of penial pain. Cloudy yellow urine in bag. New bag placed upon arrival.       HPI    66-year-old male with history of chronic indwelling Razo catheter, CAD, DM II, Parkinson's disease presents today for penile pain. This catheter was placed about a week ago, patient states it has been hurting since then. Denies fever, headaches, SOB, nausea, vomiting, abdominal pain. 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 Recently discharged from hospital for sepsis secondary to UTI and severe hyperglycemia. On exam, in no acute distress, vital signs stable, afebrile. Chronic left-sided facial droop at baseline. Meatal erosion of penis, no redness, tenderness or discharge. Razo in place and draining clear yellow urine. Per nursing note on arrival, no signs of urinary retention. Blood glucose 126. Prior to Admission Medications   Prescriptions Last Dose Informant Patient Reported? Taking?    ACCU-CHEK GUIDE test strip  Outside Facility (Specify) Yes No   Si strip as needed   Alcohol Swabs (PRO COMFORT ALCOHOL) 70 % PADS  Outside Facility (Specify) Yes No   Si pad daily Use a pad when testing   Easy Comfort Lancets MISC  Outside Facility (Specify) Yes No   Sig: as needed   Glucagon, rDNA, (GLUCAGON EMERGENCY IJ)   Yes No   Sig: Inject 1 mL as directed Sugars less than 60 and conscious   Glucose 15 g PACK   Yes No   Sig: Take 15 g by mouth Per [facility] "give 15 gram PO every 15 minutes PRN for hypoglycemia"   NovoFine Autocover 30G X 8 MM MISC   Yes No   acetaminophen (TYLENOL) 325 mg tablet   Yes No   Sig: Take 650 mg by mouth every 6 (six) hours as needed for mild pain   aspirin 81 mg chewable tablet   No No   Sig: Chew 1 tablet (81 mg total) daily   atorvastatin (LIPITOR) 40 mg tablet   No No   Sig: Take 1 tablet (40 mg total) by mouth daily   carbidopa-levodopa (SINEMET)  mg per tablet   No No   Sig: Take 1 tablet by mouth 3 (three) times a day   cholecalciferol (VITAMIN D3) 1,000 units tablet   No No   Sig: Take 50 tablets (50,000 Units total) by mouth every 30 (thirty) days On the  of the month   cyanocobalamin (VITAMIN B-12) 1000 MCG tablet  Outside Facility (Specify) No No   Sig: Take 1 tablet (1,000 mcg total) by mouth daily   docusate sodium (COLACE) 100 mg capsule  Outside Facility (Specify) No No   Sig: Take 1 capsule (100 mg total) by mouth 2 (two) times a day   famotidine (PEPCID) 20 mg tablet   No No   Sig: Take 1 tablet (20 mg total) by mouth daily at bedtime   ferrous gluconate (FERGON) 324 mg tablet   No No   Sig: Take 1 tablet (324 mg total) by mouth daily before breakfast Do not start before 2023.    finasteride (PROSCAR) 5 mg tablet  Outside Facility (Specify) No No   Sig: Take 1 tablet (5 mg total) by mouth daily   glucose blood test strip  Outside Facility (Specify) Yes No   Si strip as needed   glycerin-hypromellose- (ARTIFICIAL TEARS) 0.2-0.2-1 % SOLN   No No   Sig: Administer 1 drop to both eyes 2 (two) times a day as needed (for dry eyes)   insulin glargine (LANTUS) 100 units/mL subcutaneous injection   No No   Sig: Inject 30 Units under the skin daily at bedtime   insulin lispro (HumaLOG) 100 units/mL injection   No No   Sig: Inject 10 Units under the skin 3 (three) times a day with meals   lisinopril (ZESTRIL) 5 mg tablet   No No   Sig: Take 1 tablet (5 mg total) by mouth daily   melatonin 3 mg   Yes No   Sig: Take 3 mg by mouth daily at bedtime Give 2 tabs at bed time for insomnia per [facility]   metoprolol succinate (TOPROL-XL) 25 mg 24 hr tablet   No No   Sig: Take 1 tablet (25 mg total) by mouth daily   nystatin (MYCOSTATIN) powder   No No   Sig: Apply topically 2 (two) times a day pantoprazole (PROTONIX) 40 mg tablet   No No   Sig: Take 1 tablet (40 mg total) by mouth 2 (two) times a day before meals   polyethylene glycol (MIRALAX) 17 g packet  Outside Facility (Specify) No No   Sig: Take 17 g by mouth daily   ranolazine (RANEXA) 500 mg 12 hr tablet   No No   Sig: Take 1 tablet (500 mg total) by mouth every 12 (twelve) hours   senna (SENOKOT) 8.6 mg   No No   Sig: Take 1 tablet (8.6 mg total) by mouth daily as needed for constipation   tamsulosin (FLOMAX) 0.4 mg  Outside Facility (Specify) No No   Sig: Take 1 capsule (0.4 mg total) by mouth daily with dinner      Facility-Administered Medications: None       Past Medical History:   Diagnosis Date   • Ambulatory dysfunction     uses walker with assist of 1   • Anemia    • Anxiety    • At risk for falls     hx of falls   • Benign paroxysmal vertigo     unspecified ear   • BPH (benign prostatic hyperplasia)     for TURP today 1/4/2021   • Calculus in bladder     for surgical removal today 1/4/2021   • Cataract     left eye   • Cervicalgia    • Chest pain    • Chronic kidney disease     stage 3   • Constipation    • CTS (carpal tunnel syndrome)     left   • Cyst of pancreas    • Cystitis 06/23/2020    acute cystitis with hematuria   • Depression    • 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   • Dizziness     and giddiness   • Dysphagia    • Elevated PSA    • Facial weakness    • GERD (gastroesophageal reflux disease)     last assessed 5/20/16   • Gross hematuria    • Hematuria    • Hyperlipidemia    • Hypertension    • Kidney disease    • Kidney stone    • Left-sided weakness    • Long term (current) use of oral hypoglycemic drugs    • Muscle weakness    • Nuclear senile cataract of left eye    • OA (osteoarthritis) of knee     b/l   • 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HCC)    • Syncope and collapse    • Tachycardia    • UTI (urinary tract infection)    • Vertebro-basilar artery syndrome    • Vitamin B deficiency    • Vitamin D deficiency    • Vitamin D deficiency        Past Surgical History:   Procedure Laterality Date   • CARDIAC CATHETERIZATION N/A 3/7/2022    Procedure: CARDIAC CATHETERIZATION;  Surgeon: Blanka Vail DO;  Location: [facility]; Service: Cardiology   • CATARACT EXTRACTION     • CHOLECYSTECTOMY     • KNEE SURGERY     • AR LITHOLAPAXY SMPL/SM <2.5 CM N/A 1/4/2021    Procedure: Cystolitholopaxy w/laser bladder stones;  Surgeon: Annette Rollins MD;  Location: [facility];  Service: Urology   • AR TRURL ELECTROSURG RESCJ PROSTATE BLEED COMPLETE N/A 1/4/2021    Procedure: T.U.R.P.;  Surgeon: Annette Rollins MD;  Location: [facility];  Service: Urology       Family History   Problem Relation Age of Onset   • Coronary artery disease Mother    • Diabetes Father      I have reviewed and agree with the history as documented. E-Cigarette/Vaping   • E-Cigarette Use Never User      E-Cigarette/Vaping Substances   • Nicotine No    • THC No    • CBD No      Social History     Tobacco Use   • Smoking status: Never   • Smokeless tobacco: Never   Vaping Use   • Vaping Use: Never used   Substance Use Topics   • Alcohol use: Not Currently   • Drug use: No        Review of Systems   Constitutional: Negative for chills and fever. Respiratory: Negative for cough and shortness of breath. Cardiovascular: Negative for chest pain. Gastrointestinal: Negative for abdominal pain, nausea and vomiting. Genitourinary: Positive for penile pain. Negative for hematuria and testicular pain. Skin: Negative for color change. Neurological: Negative for dizziness and headaches.        Physical Exam  ED Triage Vitals [08/06/23 0805]   Temperature Pulse Respirations Blood Pressure SpO2   98.1 °F (36.7 °C) 91 18 137/65 98 %      Temp Source Heart Rate Source Patient Position - Orthostatic VS BP Location FiO2 (%)   Oral Monitor Lying Right arm --      Pain Score       3             Orthostatic Vital Signs  Vitals:    08/06/23 0805 08/06/23 1204 BP: 137/65 142/68   Pulse: 91 97   Patient Position - Orthostatic VS: Lying Lying       Physical Exam  Vitals and nursing note reviewed. Constitutional:       General: He is not in acute distress. Appearance: He is well-developed. HENT:      Head: Normocephalic and atraumatic. Eyes:      Conjunctiva/sclera: Conjunctivae normal.   Cardiovascular:      Rate and Rhythm: Normal rate and regular rhythm. Heart sounds: No murmur heard. Pulmonary:      Effort: Pulmonary effort is normal. No respiratory distress. Breath sounds: Normal breath sounds. Abdominal:      Palpations: Abdomen is soft. Tenderness: There is no abdominal tenderness. Genitourinary:     Penis: No erythema, tenderness, discharge or swelling. Testes: Normal.      Comments: Meatal erosion on the ventral aspect of the penis, wilkerson catheter in place. Musculoskeletal:         General: No swelling. Cervical back: Neck supple. Skin:     General: Skin is warm and dry. Capillary Refill: Capillary refill takes less than 2 seconds. Neurological:      Mental Status: He is alert and oriented to person, place, and time. Cranial Nerves: Facial asymmetry (chronic L facial droop, stable) present.    Psychiatric:         Mood and Affect: Mood normal.         ED Medications  Medications   lidocaine (LMX) 4 % cream ( Topical Given by Other 8/6/23 1008)   acetaminophen (TYLENOL) tablet 650 mg (650 mg Oral Given 8/6/23 1008)       Diagnostic Studies  Results Reviewed     Procedure Component Value Units Date/Time    Fingerstick Glucose (POCT) [[id number]]  (Normal) Collected: 08/06/23 1010    Lab Status: Final result Updated: 08/06/23 1014     POC Glucose 126 mg/dl                  No orders to display         Procedures  Procedures      ED Course  ED Course as of 08/06/23 1653   Sun Aug 06, 2023   268 70-year-old male with history of chronic indwelling Wilkerson catheter, CAD, DM II, Parkinson's disease presents today for penile pain. This catheter was placed about a week ago, patient states it has been hurting since then. Denies fever, headaches, SOB, nausea, vomiting, abdominal pain. 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 Recently discharged from hospital for sepsis secondary to UTI and severe hyperglycemia. On exam, in no acute distress, vital signs stable, afebrile. Chronic left-sided facial droop at baseline. Meatal erosion of penis, no redness, tenderness or discharge. Razo in place and draining clear yellow urine. Per nursing note on arrival, no signs of urinary retention. Blood glucose 126.   1040 Given patient's history and exam findings, pain likely due to meatal erosion from the chronic indwelling catheter. Infection not suspected at this time. Patient has seen urology, and has referral to IR for placement of suprapubic catheter. Blood glucose is 126. Lidocaine cream applied to urethral meatus, and Tylenol given for pain. Will reevaluate and discharge patient to follow-up with IR. Medical Decision Making  See ED course. Amount and/or Complexity of Data Reviewed  Labs: ordered. Risk  OTC drugs. Prescription drug management. Disposition  Final diagnoses:   Penile pain     Time reflects when diagnosis was documented in both MDM as applicable and the Disposition within this note     Time User Action Codes Description Comment    8/6/2023 10:52 AM Monse Osuna Add [N56.03] Penile pain       ED Disposition     ED Disposition   Discharge    Condition   Stable    Date/Time   Sun Aug 6, 2023 10:51 AM    Comment   [address] discharge to home/self care.                Follow-up Information    None         Discharge Medication List as of 8/6/2023 10:55 AM      START taking these medications    Details   lidocaine (XYLOCAINE) 5 % ointment Apply topically 2 (two) times a day as needed for mild pain or moderate pain, Starting Sun 8/6/2023, Normal         CONTINUE these medications which have NOT CHANGED    Details   ! ! ACCU-CHEK GUIDE test strip 1 strip as needed, Starting Tue 6/16/2020, Historical Med      acetaminophen (TYLENOL) 325 mg tablet Take 650 mg by mouth every 6 (six) hours as needed for mild pain, Historical Med      Alcohol Swabs (PRO COMFORT ALCOHOL) 70 % PADS 1 pad daily Use a pad when testing, Starting Tue 6/16/2020, Historical Med      aspirin 81 mg chewable tablet Chew 1 tablet (81 mg total) daily, Starting Thu 3/10/2022, Normal      atorvastatin (LIPITOR) 40 mg tablet Take 1 tablet (40 mg total) by mouth daily, Starting Thu 3/10/2022, Normal      carbidopa-levodopa (SINEMET)  mg per tablet Take 1 tablet by mouth 3 (three) times a day, Starting Thu 7/22/2021, Until Sat 12/31/2022, Normal      cholecalciferol (VITAMIN D3) 1,000 units tablet Take 50 tablets (50,000 Units total) by mouth every 30 (thirty) days On the 15th of the month, Starting Sat 7/29/2023, No Print      cyanocobalamin (VITAMIN B-12) 1000 MCG tablet Take 1 tablet (1,000 mcg total) by mouth daily, Starting Tue 6/23/2020, No Print      docusate sodium (COLACE) 100 mg capsule Take 1 capsule (100 mg total) by mouth 2 (two) times a day, Starting Tue 6/23/2020, Normal      Easy Comfort Lancets MISC as needed, Starting Tue 6/16/2020, Historical Med      famotidine (PEPCID) 20 mg tablet Take 1 tablet (20 mg total) by mouth daily at bedtime, Starting Tue 4/5/2022, Print      ferrous gluconate (FERGON) 324 mg tablet Take 1 tablet (324 mg total) by mouth daily before breakfast Do not start before July 30, 2023., Starting Sun 7/30/2023, Until Tue 8/29/2023, Print      finasteride (PROSCAR) 5 mg tablet Take 1 tablet (5 mg total) by mouth daily, Starting Wed 5/13/2020, No Print      Glucagon, rDNA, (GLUCAGON EMERGENCY IJ) Inject 1 mL as directed Sugars less than 60 and conscious, Historical Med Glucose 15 g PACK Take 15 g by mouth Per [facility] "give 15 gram PO every 15 minutes PRN for hypoglycemia", Historical Med      !! glucose blood test strip 1 strip as needed, Historical Med      glycerin-hypromellose- (ARTIFICIAL TEARS) 0.2-0.2-1 % SOLN Administer 1 drop to both eyes 2 (two) times a day as needed (for dry eyes), Starting Thu 1/5/2023, Normal      insulin glargine (LANTUS) 100 units/mL subcutaneous injection Inject 30 Units under the skin daily at bedtime, Starting Sat 7/29/2023, Print      insulin lispro (HumaLOG) 100 units/mL injection Inject 10 Units under the skin 3 (three) times a day with meals, Starting Sat 7/29/2023, Print      lisinopril (ZESTRIL) 5 mg tablet Take 1 tablet (5 mg total) by mouth daily, Starting Wed 4/6/2022, No Print      melatonin 3 mg Take 3 mg by mouth daily at bedtime Give 2 tabs at bed time for insomnia per [facility], Historical Med      metoprolol succinate (TOPROL-XL) 25 mg 24 hr tablet Take 1 tablet (25 mg total) by mouth daily, Starting Wed 4/6/2022, No Print      NovoFine Autocover 30G X 8 MM MISC Starting Wed 12/9/2020, Historical Med      nystatin (MYCOSTATIN) powder Apply topically 2 (two) times a day, Starting Thu 1/5/2023, Normal      pantoprazole (PROTONIX) 40 mg tablet Take 1 tablet (40 mg total) by mouth 2 (two) times a day before meals, Starting Wed 3/9/2022, Normal      polyethylene glycol (MIRALAX) 17 g packet Take 17 g by mouth daily, Starting Wed 6/3/2020, Normal      ranolazine (RANEXA) 500 mg 12 hr tablet Take 1 tablet (500 mg total) by mouth every 12 (twelve) hours, Starting Tue 4/5/2022, Print      senna (SENOKOT) 8.6 mg Take 1 tablet (8.6 mg total) by mouth daily as needed for constipation, Starting Wed 2/10/2021, Normal      tamsulosin (FLOMAX) 0.4 mg Take 1 capsule (0.4 mg total) by mouth daily with dinner, Starting Wed 5/13/2020, No Print       !! - Potential duplicate medications found. Please discuss with provider. No discharge procedures on file. PDMP Review       Value Time User    PDMP Reviewed  Yes 3/4/2022 10:51 PM Eliza Guthrie MD           ED Provider  Attending physically available and evaluated Chantal Clifton. I managed the patient along with the ED Attending.     Electronically Signed by MD Valente Castellanos MD  08/06/23 0875

## 2023-08-06 NOTE — ED NOTES
Arranged BLS transport for pt through [address] [place] [address]. Estimated  time - 14:00.       Susy Atkins RN  08/06/23 5425

## 2023-08-06 NOTE — ED NOTES
Sterile lube placed around meatus to ease discomfort. Pt free of retention, meatal erosion noted with catheter in place. Urine freely draining into standard drainage bag.         Jacquelynn Bumpers, RN  08/06/23 1952

## 2023-08-07 NOTE — ED ATTENDING ATTESTATION
8/6/2023  I, Samm Izaguirre MD, saw and evaluated the patient. I have discussed the patient with the resident/non-physician practitioner and agree with the resident's/non-physician practitioner's findings, Plan of Care, and MDM as documented in the resident's/non-physician practitioner's note, except where noted. All available labs and Radiology studies were reviewed. I was present for key portions of any procedure(s) performed by the resident/non-physician practitioner and I was immediately available to provide assistance. At this point I agree with the current assessment done in the Emergency Department.   I have conducted an independent evaluation of this patient a history and physical is as follows:see h and p above     ED Course  ED Course as of 08/07/23 1011   Sun Aug 06, 2023   1051 ER MD NOT-E BLADDER SCAN -- 35 ML          Critical Care Time  Procedures

## 2023-08-09 ENCOUNTER — HOSPITAL ENCOUNTER (EMERGENCY)
Facility: HOSPITAL | Age: 73
Discharge: HOME | End: 2023-08-09
Attending: EMERGENCY MEDICINE
Payer: MEDICARE

## 2023-08-09 VITALS
DIASTOLIC BLOOD PRESSURE: 56 MMHG | HEART RATE: 81 BPM | WEIGHT: 197.09 LBS | OXYGEN SATURATION: 97 % | RESPIRATION RATE: 16 BRPM | SYSTOLIC BLOOD PRESSURE: 100 MMHG | BODY MASS INDEX: 30.87 KG/M2 | TEMPERATURE: 98.1 F

## 2023-08-09 DIAGNOSIS — N48.89 PENILE PAIN: ICD-10-CM

## 2023-08-09 DIAGNOSIS — N39.0 UTI (URINARY TRACT INFECTION): Primary | ICD-10-CM

## 2023-08-09 DIAGNOSIS — Z97.8 CHRONIC INDWELLING FOLEY CATHETER: ICD-10-CM

## 2023-08-09 DIAGNOSIS — N48.89 PENILE EROSION: ICD-10-CM

## 2023-08-09 LAB
BACTERIA UR QL AUTO: ABNORMAL /HPF
BILIRUB UR QL STRIP: NEGATIVE
BUDDING YEAST: PRESENT
CLARITY UR: ABNORMAL
COLOR UR: YELLOW
GLUCOSE UR STRIP-MCNC: NEGATIVE MG/DL
HGB UR QL STRIP.AUTO: ABNORMAL
KETONES UR STRIP-MCNC: NEGATIVE MG/DL
LEUKOCYTE ESTERASE UR QL STRIP: ABNORMAL
MUCOUS THREADS UR QL AUTO: ABNORMAL
NITRITE UR QL STRIP: POSITIVE
NON-SQ EPI CELLS URNS QL MICRO: ABNORMAL /HPF
PH UR STRIP.AUTO: 5.5 [PH]
PROT UR STRIP-MCNC: ABNORMAL MG/DL
RBC #/AREA URNS AUTO: ABNORMAL /HPF
SP GR UR STRIP.AUTO: 1.02 (ref 1–1.03)
UROBILINOGEN UR STRIP-ACNC: <2 MG/DL
WBC #/AREA URNS AUTO: ABNORMAL /HPF
WBC CLUMPS # UR AUTO: PRESENT /UL

## 2023-08-09 PROCEDURE — 87186 SC STD MICRODIL/AGAR DIL: CPT

## 2023-08-09 PROCEDURE — 87086 URINE CULTURE/COLONY COUNT: CPT

## 2023-08-09 PROCEDURE — 81001 URINALYSIS AUTO W/SCOPE: CPT

## 2023-08-09 RX ORDER — LIDOCAINE HYDROCHLORIDE 20 MG/ML
1 JELLY TOPICAL ONCE
Status: COMPLETED | OUTPATIENT
Start: 2023-08-09 | End: 2023-08-09

## 2023-08-09 RX ORDER — SULFAMETHOXAZOLE AND TRIMETHOPRIM 800; 160 MG/1; MG/1
1 TABLET ORAL 2 TIMES DAILY
Qty: 14 TABLET | Refills: 0 | Status: SHIPPED | OUTPATIENT
Start: 2023-08-09 | End: 2023-08-16

## 2023-08-09 RX ORDER — SULFAMETHOXAZOLE AND TRIMETHOPRIM 800; 160 MG/1; MG/1
1 TABLET ORAL ONCE
Status: COMPLETED | OUTPATIENT
Start: 2023-08-09 | End: 2023-08-09

## 2023-08-09 RX ORDER — NITROFURANTOIN 25; 75 MG/1; MG/1
100 CAPSULE ORAL ONCE
Status: COMPLETED | OUTPATIENT
Start: 2023-08-09 | End: 2023-08-09

## 2023-08-09 RX ORDER — NITROFURANTOIN 25; 75 MG/1; MG/1
100 CAPSULE ORAL 2 TIMES DAILY
Qty: 14 CAPSULE | Refills: 0 | Status: SHIPPED | OUTPATIENT
Start: 2023-08-09 | End: 2023-08-16

## 2023-08-09 RX ADMIN — LIDOCAINE HYDROCHLORIDE 1 APPLICATION: 20 JELLY TOPICAL at 01:33

## 2023-08-09 RX ADMIN — NITROFURANTOIN (MONOHYDRATE/MACROCRYSTALS) 100 MG: 75; 25 CAPSULE ORAL at 03:56

## 2023-08-09 RX ADMIN — SULFAMETHOXAZOLE AND TRIMETHOPRIM 1 TABLET: 800; 160 TABLET ORAL at 03:57

## 2023-08-09 NOTE — ED PROVIDER NOTES
History  Chief Complaint   Patient presents with   • Urinary Catheter Problem     Patient reports he wants his urinary catheter out because he can't sleep. Patient having pain in penis dt catheter     HPI    70-year-old male with hx of chronic indwelling Razo catheter, CKD presents with penile pain from Razo catheter. Patient was recently seen in the ED on  for similar complaint. 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 On last visit, patient was given lidocaine jelly which improved pain, and was sent home with a prescription. Today, patient returns stating the pain has returned, keeping him from sleep. Denies fevers, chills, hematuria, suprapubic pain. On exam, patient in no acute distress, nontender abdomen, Penis tender, with erosion of urethral meatus into the glans. No erythema, purulent discharge from catheter. Patient in soiled diaper, unsure when last it was changed. Scrotum, testicles normal.    Prior to Admission Medications   Prescriptions Last Dose Informant Patient Reported? Taking?    ACCU-CHEK GUIDE test strip  Outside Facility (Specify) Yes No   Si strip as needed   Alcohol Swabs (PRO COMFORT ALCOHOL) 70 % PADS  Outside Facility (Specify) Yes No   Si pad daily Use a pad when testing   Easy Comfort Lancets MISC  Outside Facility (Specify) Yes No   Sig: as needed   Glucagon, rDNA, (GLUCAGON EMERGENCY IJ)   Yes No   Sig: Inject 1 mL as directed Sugars less than 60 and conscious   Glucose 15 g PACK   Yes No   Sig: Take 15 g by mouth Per [facility] "give 15 gram PO every 15 minutes PRN for hypoglycemia"   NovoFine Autocover 30G X 8 MM MISC   Yes No   acetaminophen (TYLENOL) 325 mg tablet   Yes No   Sig: Take 650 mg by mouth every 6 (six) hours as needed for mild pain   aspirin 81 mg chewable tablet   No No   Sig: Chew 1 tablet (81 mg total) daily   atorvastatin (LIPITOR) 40 mg tablet   No No   Sig: Take 1 tablet (40 mg total) by mouth daily   carbidopa-levodopa (SINEMET)  mg per tablet   No No   Sig: Take 1 tablet by mouth 3 (three) times a day   cholecalciferol (VITAMIN D3) 1,000 units tablet   No No   Sig: Take 50 tablets (50,000 Units total) by mouth every 30 (thirty) days On the  of the month   cyanocobalamin (VITAMIN B-12) 1000 MCG tablet  Outside Facility (Specify) No No   Sig: Take 1 tablet (1,000 mcg total) by mouth daily   docusate sodium (COLACE) 100 mg capsule  Outside Facility (Specify) No No   Sig: Take 1 capsule (100 mg total) by mouth 2 (two) times a day   famotidine (PEPCID) 20 mg tablet   No No   Sig: Take 1 tablet (20 mg total) by mouth daily at bedtime   ferrous gluconate (FERGON) 324 mg tablet   No No   Sig: Take 1 tablet (324 mg total) by mouth daily before breakfast Do not start before 2023.    finasteride (PROSCAR) 5 mg tablet  Outside Facility (Specify) No No   Sig: Take 1 tablet (5 mg total) by mouth daily   glucose blood test strip  Outside Facility (Specify) Yes No   Si strip as needed   glycerin-hypromellose- (ARTIFICIAL TEARS) 0.2-0.2-1 % SOLN   No No   Sig: Administer 1 drop to both eyes 2 (two) times a day as needed (for dry eyes)   insulin glargine (LANTUS) 100 units/mL subcutaneous injection   No No   Sig: Inject 30 Units under the skin daily at bedtime   insulin lispro (HumaLOG) 100 units/mL injection   No No   Sig: Inject 10 Units under the skin 3 (three) times a day with meals   lidocaine (XYLOCAINE) 5 % ointment   No No   Sig: Apply topically 2 (two) times a day as needed for mild pain or moderate pain   lisinopril (ZESTRIL) 5 mg tablet   No No   Sig: Take 1 tablet (5 mg total) by mouth daily   melatonin 3 mg   Yes No   Sig: Take 3 mg by mouth daily at bedtime Give 2 tabs at bed time for insomnia per [facility]   metoprolol succinate (TOPROL-XL) 25 mg 24 hr tablet   No No   Sig: Take 1 tablet (25 mg total) by mouth daily   nystatin (MYCOSTATIN) powder   No No   Sig: Apply topically 2 (two) times a day   pantoprazole (PROTONIX) 40 mg tablet   No No   Sig: Take 1 tablet (40 mg total) by mouth 2 (two) times a day before meals   polyethylene glycol (MIRALAX) 17 g packet  Outside Facility (Specify) No No   Sig: Take 17 g by mouth daily   ranolazine (RANEXA) 500 mg 12 hr tablet   No No   Sig: Take 1 tablet (500 mg total) by mouth every 12 (twelve) hours   senna (SENOKOT) 8.6 mg   No No   Sig: Take 1 tablet (8.6 mg total) by mouth daily as needed for constipation   tamsulosin (FLOMAX) 0.4 mg  Outside Facility (Specify) No No   Sig: Take 1 capsule (0.4 mg total) by mouth daily with dinner      Facility-Administered Medications: None       Past Medical History:   Diagnosis Date   • Ambulatory dysfunction     uses walker with assist of 1   • Anemia    • Anxiety    • At risk for falls     hx of falls   • Benign paroxysmal vertigo     unspecified ear   • BPH (benign prostatic hyperplasia)     for TURP today 1/4/2021   • Calculus in bladder     for surgical removal today 1/4/2021   • Cataract     left eye   • Cervicalgia    • Chest pain    • Chronic kidney disease     stage 3   • Constipation    • CTS (carpal tunnel syndrome)     left   • Cyst of pancreas    • Cystitis 06/23/2020    acute cystitis with hematuria   • Depression    • 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   • Dizziness     and giddiness   • Dysphagia    • Elevated PSA    • Facial weakness    • GERD (gastroesophageal reflux disease)     last assessed 5/20/16   • Gross hematuria    • Hematuria    • Hyperlipidemia    • Hypertension    • Kidney disease    • Kidney stone    • Left-sided weakness    • Long term (current) use of oral hypoglycemic drugs    • Muscle weakness    • Nuclear senile cataract of left eye    • OA (osteoarthritis) of knee     b/l   • 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HCC)    • Syncope and collapse    • Tachycardia    • UTI (urinary tract infection)    • Vertebro-basilar artery syndrome    • Vitamin B deficiency    • Vitamin D deficiency    • Vitamin D deficiency        Past Surgical History:   Procedure Laterality Date   • CARDIAC CATHETERIZATION N/A 3/7/2022    Procedure: CARDIAC CATHETERIZATION;  Surgeon: Anahi Tamayo DO;  [facility]; Service: Cardiology   • CATARACT EXTRACTION     • CHOLECYSTECTOMY     • KNEE SURGERY     • KY LITHOLAPAXY SMPL/SM <2.5 CM N/A 1/4/2021    Procedure: Cystolitholopaxy w/laser bladder stones;  Surgeon: Cassie Madsen MD;  Location: [facility];  Service: Urology   • [place] TRURL ELECTROSURG RESCJ PROSTATE BLEED COMPLETE N/A 1/4/2021    Procedure: T.U.R.P.;  Surgeon: Cassie Madsen MD;  [facility];  Service: Urology       Family History   Problem Relation Age of Onset   • Coronary artery disease Mother    • Diabetes Father      I have reviewed and agree with the history as documented. E-Cigarette/Vaping   • E-Cigarette Use Never User      E-Cigarette/Vaping Substances   • Nicotine No    • THC No    • CBD No      Social History     Tobacco Use   • Smoking status: Never   • Smokeless tobacco: Never   Vaping Use   • Vaping Use: Never used   Substance Use Topics   • Alcohol use: Not Currently   • Drug use: No        Review of Systems   Constitutional: Negative for chills and fever. Respiratory: Negative for shortness of breath. Cardiovascular: Negative for chest pain. Gastrointestinal: Negative for abdominal pain, nausea and vomiting. Genitourinary: Positive for penile pain. Negative for penile swelling. Skin: Negative for color change. Neurological: Negative for dizziness and headaches. All other systems reviewed and are negative.       Physical Exam  ED Triage Vitals [08/09/23 0042]   Temperature Pulse Respirations Blood Pressure SpO2   98.1 °F (36.7 °C) 92 16 128/63 98 %      Temp Source Heart Rate Source Patient Position - Orthostatic VS BP Location FiO2 (%)   Oral Monitor Sitting Right arm --      Pain Score       4             Orthostatic Vital Signs  Vitals:    08/09/23 0042 08/09/23 0200   BP: 128/63 100/56   Pulse: 92 81   Patient Position - Orthostatic VS: Sitting Sitting       Physical Exam  Vitals and nursing note reviewed. Constitutional:       General: He is not in acute distress. Appearance: He is well-developed. HENT:      Head: Normocephalic and atraumatic. Eyes:      Conjunctiva/sclera: Conjunctivae normal.   Cardiovascular:      Rate and Rhythm: Normal rate and regular rhythm. Pulmonary:      Effort: Pulmonary effort is normal. No respiratory distress. Breath sounds: Normal breath sounds. Abdominal:      Palpations: Abdomen is soft. Tenderness: There is no abdominal tenderness. Genitourinary:     Penis: Tenderness present. No erythema or discharge. Testes: Normal.       Musculoskeletal:         General: No swelling. Cervical back: Neck supple. Skin:     General: Skin is warm and dry. Capillary Refill: Capillary refill takes less than 2 seconds. Neurological:      Mental Status: He is alert. Mental status is at baseline.    Psychiatric:         Mood and Affect: Mood normal.         ED Medications  Medications   lidocaine (URO-JET) 2 % urethral/mucosal gel 1 Application (1 Application Urethral Given 8/9/23 0133)   nitrofurantoin (MACROBID) extended-release capsule 100 mg (100 mg Oral Given 8/9/23 0356)   sulfamethoxazole-trimethoprim (BACTRIM DS) 800-160 mg per tablet 1 tablet (1 tablet Oral Given 8/9/23 0357)       Diagnostic Studies  Results Reviewed     Procedure Component Value Units Date/Time    Urine Microscopic [[id number]]  (Abnormal) Collected: 08/09/23 0133    Lab Status: Final result Specimen: Urine, Indwelling Razo Catheter Updated: 08/09/23 0225     RBC, UA 10-20 /hpf      WBC, UA Innumerable /hpf      Epithelial Cells Occasional /hpf      Bacteria, UA Occasional /hpf      MUCUS THREADS Occasional     WBC Clumps Present     Budding Yeast Present    Urine culture [[id number]] Collected: 08/09/23 0133    Lab Status: In process Specimen: Urine, Indwelling Razo Catheter Updated: 08/09/23 0225    UA w Reflex to Microscopic w Reflex to Culture [[id number]]  (Abnormal) Collected: 08/09/23 0133    Lab Status: Final result Specimen: Urine, Indwelling Razo Catheter Updated: 08/09/23 0158     Color, UA Yellow     Clarity, UA Turbid     Specific Gravity, UA 1.025     pH, UA 5.5     Leukocytes, UA Large     Nitrite, UA Positive     Protein, UA 70 (1+) mg/dl      Glucose, UA Negative mg/dl      Ketones, UA Negative mg/dl      Urobilinogen, UA <2.0 mg/dl      Bilirubin, UA Negative     Occult Blood, UA Small                 No orders to display         Procedures  Procedures      ED Course  ED Course as of 08/09/23 0750   Wed Aug 09, 2023   364 77-year-old male with hx of chronic indwelling catheter presents with penile pain   0313 Urine Microscopic(!)  Innumerable WBC, occasional bacteria, WBC clumps, budding yeast present. Patient will be treated for UTI. Last urine culture grew Proteus, Staph aureus and Enterococcus. Patient clinically stable, and can be discharged with antibiotics. Will place patient on Bactrim and Macrobid, first dose given in ED.   0421 Given pt's history and exam findings, differentials include DDx including but not limited to: urethritis, GC, chlamydia, UTI, STD, doubt prostatitis. SBIRT 22yo+    Flowsheet Row Most Recent Value   Initial Alcohol Screen: US AUDIT-C     1. How often do you have a drink containing alcohol? 0 Filed at: 08/09/2023 0042   2. How many drinks containing alcohol do you have on a typical day you are drinking? 0 Filed at: 08/09/2023 0042   3a. Male UNDER 65: How often do you have five or more drinks on one occasion? 0 Filed at: 08/09/2023 0042   3b. FEMALE Any Age, or MALE 65+: How often do you have 4 or more drinks on one occassion?  0 Filed at: 08/09/2023 0042   Audit-C Score 0 Filed at: 08/09/2023 3650   BANDAR: How many times in the past year have you. .. Used an illegal drug or used a prescription medication for non-medical reasons? Never Filed at: 08/09/2023 [facility] Making  See ED course. Amount and/or Complexity of Data Reviewed  Labs: ordered. Decision-making details documented in ED Course. Risk  Prescription drug management. Disposition  Final diagnoses:   UTI (urinary tract infection)   Penile erosion   Penile pain   Chronic indwelling Razo catheter     Time reflects when diagnosis was documented in both MDM as applicable and the Disposition within this note     Time User Action Codes Description Comment    8/9/2023  3:36 AM [place] Rylee Add [N39.0] UTI (urinary tract infection)     8/9/2023  3:36 AM [place] Rylee Add [N48.89] Penile erosion     8/9/2023  3:37 AM [place] Rylee Add [N48.89] Penile pain     8/9/2023  3:42 AM [place] Rylee Add [Z97.8] Chronic indwelling Razo catheter       ED Disposition     ED Disposition   Discharge    Condition   Stable    Date/Time   Wed Aug 9, 2023  4:03 AM    Comment   [address] discharge to home/self care. Follow-up Information    None         Discharge Medication List as of 8/9/2023  4:05 AM      START taking these medications    Details   nitrofurantoin (Macrobid) 100 mg capsule Take 1 capsule (100 mg total) by mouth 2 (two) times a day for 7 days, Starting Wed 8/9/2023, Until Wed 8/16/2023, Print      sulfamethoxazole-trimethoprim (BACTRIM DS) 800-160 mg per tablet Take 1 tablet by mouth 2 (two) times a day for 7 days smx-tmp DS (BACTRIM) 800-160 mg tabs (1tab q12 D10), Starting Wed 8/9/2023, Until Wed 8/16/2023, Normal         CONTINUE these medications which have NOT CHANGED    Details   ! ! ACCU-CHEK GUIDE test strip 1 strip as needed, Starting Tue 6/16/2020, Historical Med      acetaminophen (TYLENOL) 325 mg tablet Take 650 mg by mouth every 6 (six) hours as needed for mild pain, Historical Med      Alcohol Swabs (PRO COMFORT ALCOHOL) 70 % PADS 1 pad daily Use a pad when testing, Starting Tue 6/16/2020, Historical Med      aspirin 81 mg chewable tablet Chew 1 tablet (81 mg total) daily, Starting Thu 3/10/2022, Normal      atorvastatin (LIPITOR) 40 mg tablet Take 1 tablet (40 mg total) by mouth daily, Starting Thu 3/10/2022, Normal      carbidopa-levodopa (SINEMET)  mg per tablet Take 1 tablet by mouth 3 (three) times a day, Starting Thu 7/22/2021, Until Sat 12/31/2022, Normal      cholecalciferol (VITAMIN D3) 1,000 units tablet Take 50 tablets (50,000 Units total) by mouth every 30 (thirty) days On the 15th of the month, Starting Sat 7/29/2023, No Print      cyanocobalamin (VITAMIN B-12) 1000 MCG tablet Take 1 tablet (1,000 mcg total) by mouth daily, Starting Tue 6/23/2020, No Print      docusate sodium (COLACE) 100 mg capsule Take 1 capsule (100 mg total) by mouth 2 (two) times a day, Starting Tue 6/23/2020, Normal      Easy Comfort Lancets MISC as needed, Starting Tue 6/16/2020, Historical Med      famotidine (PEPCID) 20 mg tablet Take 1 tablet (20 mg total) by mouth daily at bedtime, Starting Tue 4/5/2022, Print      ferrous gluconate (FERGON) 324 mg tablet Take 1 tablet (324 mg total) by mouth daily before breakfast Do not start before July 30, 2023., Starting Sun 7/30/2023, Until Tue 8/29/2023, Print      finasteride (PROSCAR) 5 mg tablet Take 1 tablet (5 mg total) by mouth daily, Starting Wed 5/13/2020, No Print      Glucagon, rDNA, (GLUCAGON EMERGENCY IJ) Inject 1 mL as directed Sugars less than 60 and conscious, Historical Med      Glucose 15 g PACK Take 15 g by mouth Per [facility] "give 15 gram PO every 15 minutes PRN for hypoglycemia", Historical Med      !! glucose blood test strip 1 strip as needed, Historical Med      glycerin-hypromellose- (ARTIFICIAL TEARS) 0.2-0.2-1 % SOLN Administer 1 drop to both eyes 2 (two) times a day as needed (for dry eyes), Starting Thu 1/5/2023, Normal      insulin glargine (LANTUS) 100 units/mL subcutaneous injection Inject 30 Units under the skin daily at bedtime, Starting Sat 7/29/2023, Print      insulin lispro (HumaLOG) 100 units/mL injection Inject 10 Units under the skin 3 (three) times a day with meals, Starting Sat 7/29/2023, Print      lidocaine (XYLOCAINE) 5 % ointment Apply topically 2 (two) times a day as needed for mild pain or moderate pain, Starting Sun 8/6/2023, Normal      lisinopril (ZESTRIL) 5 mg tablet Take 1 tablet (5 mg total) by mouth daily, Starting Wed 4/6/2022, No Print      melatonin 3 mg Take 3 mg by mouth daily at bedtime Give 2 tabs at bed time for insomnia per [facility], Historical Med      metoprolol succinate (TOPROL-XL) 25 mg 24 hr tablet Take 1 tablet (25 mg total) by mouth daily, Starting Wed 4/6/2022, No Print      NovoFine Autocover 30G X 8 MM MISC Starting Wed 12/9/2020, Historical Med      nystatin (MYCOSTATIN) powder Apply topically 2 (two) times a day, Starting Thu 1/5/2023, Normal      pantoprazole (PROTONIX) 40 mg tablet Take 1 tablet (40 mg total) by mouth 2 (two) times a day before meals, Starting Wed 3/9/2022, Normal      polyethylene glycol (MIRALAX) 17 g packet Take 17 g by mouth daily, Starting Wed 6/3/2020, Normal      ranolazine (RANEXA) 500 mg 12 hr tablet Take 1 tablet (500 mg total) by mouth every 12 (twelve) hours, Starting Tue 4/5/2022, Print      senna (SENOKOT) 8.6 mg Take 1 tablet (8.6 mg total) by mouth daily as needed for constipation, Starting Wed 2/10/2021, Normal      tamsulosin (FLOMAX) 0.4 mg Take 1 capsule (0.4 mg total) by mouth daily with dinner, Starting Wed 5/13/2020, No Print       !! - Potential duplicate medications found. Please discuss with provider. No discharge procedures on file.     PDMP Review       Value Time User    PDMP Reviewed  Yes 8/9/2023 12:50 AM Kali Tam, MD           ED Provider  Attending physically available and evaluated Mckenzie Jaeger. I managed the patient along with the ED Attending.     Electronically Signed by         Ignacio Moore MD  08/09/23 2197

## 2023-08-09 NOTE — ED ATTENDING ATTESTATION
8/9/2023  I, Ivet Macias MD, saw and evaluated the patient. I have discussed the patient with the resident/non-physician practitioner and agree with the resident's/non-physician practitioner's findings, Plan of Care, and MDM as documented in the resident's/non-physician practitioner's note, except where noted. All available labs and Radiology studies were reviewed. I was present for key portions of any procedure(s) performed by the resident/non-physician practitioner and I was immediately available to provide assistance. At this point I agree with the current assessment done in the Emergency Department. I have conducted an independent evaluation of this patient a history and physical is as follows: Patient is a 68year old male with worsening penile pain for past 1 week. Has chronic wilkerson catheter for BPH and is supposed to get a suprapubic catheter from IR. No fever. No N/V. No diarrhea. Was last seen in this ED on 8/6/23 for penile pain. (+) difficulty sleeping due to pain. [facility] website checked on this patient and no Rx found. NCAT. Moist mucous membranes. Lungs clear. Heart regular without murmur. Abdomen soft and nontender. (+) wilkerson in place. No penile rash. No penile bleeding. No discharge noted. DDx including but not limited to: penile pain, penile irritation; doubt wilkerson catheter malfunction; urethral tear, UTI, hematuria; doubt need for new wilkerson catheter, metabolic abnormality, dehydration, CARLOS EDUARDO; doubt tumor or renal colic. Will check UA and give lidocaine gel for pain.      ED Course         Critical Care Time  Procedures

## 2023-08-11 LAB — BACTERIA UR CULT: ABNORMAL

## 2023-08-15 ENCOUNTER — LAB REQUISITION (OUTPATIENT)
Dept: LAB | Facility: HOSPITAL | Age: 73
End: 2023-08-15
Payer: MEDICARE

## 2023-08-15 ENCOUNTER — TELEPHONE (OUTPATIENT)
Dept: GASTROENTEROLOGY | Facility: CLINIC | Age: 73
End: 2023-08-15

## 2023-08-15 DIAGNOSIS — Z00.00 ENCOUNTER FOR GENERAL ADULT MEDICAL EXAMINATION WITHOUT ABNORMAL FINDINGS: ICD-10-CM

## 2023-08-15 LAB
25(OH)D3 SERPL-MCNC: 29.1 NG/ML (ref 30–100)
ALBUMIN SERPL BCP-MCNC: 3.2 G/DL (ref 3.5–5)
ALP SERPL-CCNC: 89 U/L (ref 46–116)
ALT SERPL W P-5'-P-CCNC: 14 U/L (ref 12–78)
ANION GAP SERPL CALCULATED.3IONS-SCNC: 12 MMOL/L
AST SERPL W P-5'-P-CCNC: 6 U/L (ref 5–45)
BASOPHILS # BLD AUTO: 0.02 THOUSANDS/ÂΜL (ref 0–0.1)
BASOPHILS NFR BLD AUTO: 0 % (ref 0–1)
BILIRUB SERPL-MCNC: 0.46 MG/DL (ref 0.2–1)
BUN SERPL-MCNC: 16 MG/DL (ref 5–25)
CALCIUM ALBUM COR SERPL-MCNC: 9.3 MG/DL (ref 8.3–10.1)
CALCIUM SERPL-MCNC: 8.7 MG/DL (ref 8.3–10.1)
CHLORIDE SERPL-SCNC: 106 MMOL/L (ref 96–108)
CHOLEST SERPL-MCNC: 111 MG/DL
CO2 SERPL-SCNC: 22 MMOL/L (ref 21–32)
CREAT SERPL-MCNC: 1.26 MG/DL (ref 0.6–1.3)
EOSINOPHIL # BLD AUTO: 0.46 THOUSAND/ÂΜL (ref 0–0.61)
EOSINOPHIL NFR BLD AUTO: 5 % (ref 0–6)
ERYTHROCYTE [DISTWIDTH] IN BLOOD BY AUTOMATED COUNT: 24.2 % (ref 11.6–15.1)
EST. AVERAGE GLUCOSE BLD GHB EST-MCNC: 217 MG/DL
GFR SERPL CREATININE-BSD FRML MDRD: 56 ML/MIN/1.73SQ M
GLUCOSE SERPL-MCNC: 130 MG/DL (ref 65–140)
HBA1C MFR BLD: 9.2 %
HCT VFR BLD AUTO: 31.1 % (ref 36.5–49.3)
HDLC SERPL-MCNC: 44 MG/DL
HGB BLD-MCNC: 8.5 G/DL (ref 12–17)
IMM GRANULOCYTES # BLD AUTO: 0.04 THOUSAND/UL (ref 0–0.2)
IMM GRANULOCYTES NFR BLD AUTO: 0 % (ref 0–2)
LDLC SERPL CALC-MCNC: 48 MG/DL (ref 0–100)
LYMPHOCYTES # BLD AUTO: 0.82 THOUSANDS/ÂΜL (ref 0.6–4.47)
LYMPHOCYTES NFR BLD AUTO: 9 % (ref 14–44)
MCH RBC QN AUTO: 21.2 PG (ref 26.8–34.3)
MCHC RBC AUTO-ENTMCNC: 27.3 G/DL (ref 31.4–37.4)
MCV RBC AUTO: 78 FL (ref 82–98)
MONOCYTES # BLD AUTO: 0.64 THOUSAND/ÂΜL (ref 0.17–1.22)
MONOCYTES NFR BLD AUTO: 7 % (ref 4–12)
NEUTROPHILS # BLD AUTO: 7.03 THOUSANDS/ÂΜL (ref 1.85–7.62)
NEUTS SEG NFR BLD AUTO: 79 % (ref 43–75)
NONHDLC SERPL-MCNC: 67 MG/DL
NRBC BLD AUTO-RTO: 0 /100 WBCS
PLATELET # BLD AUTO: 465 THOUSANDS/UL (ref 149–390)
PMV BLD AUTO: 10 FL (ref 8.9–12.7)
POTASSIUM SERPL-SCNC: 4.4 MMOL/L (ref 3.5–5.3)
PROT SERPL-MCNC: 6.8 G/DL (ref 6.4–8.4)
RBC # BLD AUTO: 4.01 MILLION/UL (ref 3.88–5.62)
SODIUM SERPL-SCNC: 140 MMOL/L (ref 135–147)
T4 FREE SERPL-MCNC: 1.08 NG/DL (ref 0.61–1.12)
TRIGL SERPL-MCNC: 95 MG/DL
TSH SERPL DL<=0.05 MIU/L-ACNC: 1.21 UIU/ML (ref 0.45–4.5)
WBC # BLD AUTO: 9.01 THOUSAND/UL (ref 4.31–10.16)

## 2023-08-15 PROCEDURE — 84439 ASSAY OF FREE THYROXINE: CPT | Performed by: FAMILY MEDICINE

## 2023-08-15 PROCEDURE — 83036 HEMOGLOBIN GLYCOSYLATED A1C: CPT | Performed by: FAMILY MEDICINE

## 2023-08-15 PROCEDURE — 84443 ASSAY THYROID STIM HORMONE: CPT | Performed by: FAMILY MEDICINE

## 2023-08-15 PROCEDURE — 85025 COMPLETE CBC W/AUTO DIFF WBC: CPT | Performed by: FAMILY MEDICINE

## 2023-08-15 PROCEDURE — 82306 VITAMIN D 25 HYDROXY: CPT | Performed by: FAMILY MEDICINE

## 2023-08-15 PROCEDURE — 80053 COMPREHEN METABOLIC PANEL: CPT | Performed by: FAMILY MEDICINE

## 2023-08-15 PROCEDURE — 80061 LIPID PANEL: CPT | Performed by: FAMILY MEDICINE

## 2023-08-15 NOTE — TELEPHONE ENCOUNTER
[facility]  Please schedule EUS with patient. . Thank you       Clinical Information    EGD Findings:   · Moderate erythematous and scarred mucosa with erosion in the middle third of the esophagus and lower third of the esophagus   · Small ulcer in the antrum; performed cold forceps biopsy   · Single large lesion in the duodenal bulb; performed cold forceps biopsy. Lesion was friable, with easy bleeding occurring after diagnostic biopsies were performed.    · The 2nd part of the duodenum appeared normal.

## 2023-08-17 NOTE — TELEPHONE ENCOUNTER
I lmom for pt to please call back to schedule an EUS procedure with Dr. Devorah Quintero. Will call pt again in one week if do not hear back from him.

## 2023-08-24 NOTE — TELEPHONE ENCOUNTER
Scheduled date of EUS w/ fna & cytology(as of today): 11/27/23  Physician performing: Dr. Betito Oswald  Location of: [facility]  Instructions reviewed with patient by: after speaking with wife she informed me to contact [facility] to be sure that ride was set up [phone]. I did call numerous times with no answer.   Will try to call again tomorrow as will need to find out what blood thinner pt is on and also will need to obtain fax number   Clearances: pt states on blood thinner, however, nothing listed in chart

## 2023-08-25 NOTE — TELEPHONE ENCOUNTER
Called [facility] in [address] [place] [address] for someone to please call me back as I scheduled pt for an EUS procedure and need to speak to someone regarding pt's blood thinner that he states he is on and also need a fax number to send instructions. Will call again in one week if do not hear back from anyone.

## 2023-08-29 RX ORDER — SULFAMETHOXAZOLE AND TRIMETHOPRIM 800; 160 MG/1; MG/1
TABLET ORAL
COMMUNITY

## 2023-08-29 RX ORDER — AMOXICILLIN 500 MG/1
CAPSULE ORAL
COMMUNITY

## 2023-08-30 ENCOUNTER — OFFICE VISIT (OUTPATIENT)
Dept: GASTROENTEROLOGY | Facility: CLINIC | Age: 73
End: 2023-08-30

## 2023-08-30 ENCOUNTER — TELEPHONE (OUTPATIENT)
Dept: GASTROENTEROLOGY | Facility: CLINIC | Age: 73
End: 2023-08-30

## 2023-08-30 DIAGNOSIS — K86.2 PANCREAS CYST: Primary | ICD-10-CM

## 2023-08-30 DIAGNOSIS — K31.89 DUODENAL MASS: ICD-10-CM

## 2023-08-30 NOTE — TELEPHONE ENCOUNTER
[facility] spoke with [address] [place] [address] for patient  she stated [facility] [employer] is in charge of transportation and provided [phone], called and advised patient has been waiting 30 mins for  and she stated they are in [facility] picking up other patients and patient will have to wait another 45 minutes.

## 2023-08-30 NOTE — PROGRESS NOTES
[facility] - Outpatient Note  Anaid Moraes 68 y.o. male MRN: [id number]  Unit/Bed#:  Encounter: [id number]          ASSESSMENT AND PLAN:      68 y.o. male with medical history significant for alcohol use d/o, tobacco use d/o, HTN, T2DM, Parkinson's, BPPV, CAD, who presents for hospital follow up. He has a known history of a stable 2 cm pancreatic cyst for which we will obtain MRI/MRCP for surveillance. He was found to have a duodenal mass s/p biopsies while in the hospital with benign biopsy results. He is planned for outpatient EUS and FNB for further evaluation of this duodenal mass at which time his pancreatic cyst should also be evaluated. He is otherwise clinically stable and asymptomatic.     - EUS + FNB for duodenal mass   - MRI/MRCP for stable 2cm pancreatic cyst.      FOLLOW-UP:  No follow-ups on file. VISIT DIAGNOSES AND ORDERS:      1. Pancreas cyst    2. Duodenal mass      Orders Placed This Encounter   Procedures   • MRI abdomen w wo contrast and mrcp         ______________________________________________________________________    HPI:  Mr. Anaid Moraes is a 68 y.o. male with medical history significant for alcohol use d/o, tobacco use d/o, HTN, T2DM, Parkinson's, BPPV, CAD, who presents for follow up visit. Patient was recently admitted to the hospital for hyperglycemia with hospital course c/b leukocytosis and sepsis 2/2 UTI. He was seen by GI for acute blood loss anemia and is s/p EGD/Colonoscopy which revealed a gastric ulcer and friable duodenal mass s/p bx c/w inflammation. During this admission, 2cm pancreatic cyst in the body without ductal dilation was re-demonstrated on CT imaging. He does have a history of pancreatitis and alcohol use. Plan was for outpatient MRI/MRCP for further evaluation. He reports feeling well overall.  No heartburn, dysphagia, odynophagia, nausea, vomiting, diarrhea, constipation, BRBPR, melena, abdominal pain, change in bowel habits, unintentional weight loss. Last hgb in 8.5 on 8/15 from baseline on 11-12. No FH of colon cancer or other GI related issues    REVIEW OF SYSTEMS:    10 point ROS reviewed and negative except otherwise noted in the HPI above. Historical Information   Past Medical History:   Diagnosis Date   • Ambulatory dysfunction     uses walker with assist of 1   • Anemia    • Anxiety    • At risk for falls     hx of falls   • Benign paroxysmal vertigo     unspecified ear   • BPH (benign prostatic hyperplasia)     for TURP today 1/4/2021   • Calculus in bladder     for surgical removal today 1/4/2021   • Cataract     left eye   • Cervicalgia    • Chest pain    • Chronic kidney disease     stage 3   • Constipation    • CTS (carpal tunnel syndrome)     left   • Cyst of pancreas    • Cystitis 06/23/2020    acute cystitis with hematuria   • Depression    • Diabetes mellitus ([facility])     type II with neuropathy   • Dizziness     and giddiness   • Dysphagia    • Elevated PSA    • Facial weakness    • GERD (gastroesophageal reflux disease)     last assessed 5/20/16   • Gross hematuria    • Hematuria    • Hyperlipidemia    • Hypertension    • Kidney disease    • Kidney stone    • Left-sided weakness    • Long term (current) use of oral hypoglycemic drugs    • Muscle weakness    • Nuclear senile cataract of left eye    • OA (osteoarthritis) of knee     b/l   • 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HCC)    • Syncope and collapse    • Tachycardia    • UTI (urinary tract infection)    • Vertebro-basilar artery syndrome    • Vitamin B deficiency    • Vitamin D deficiency    • Vitamin D deficiency      Past Surgical History:   Procedure Laterality Date   • CARDIAC CATHETERIZATION N/A 3/7/2022    Procedure: CARDIAC CATHETERIZATION;  Surgeon: Sheri Chapin DO;  [facility]: [facility];   Service: Cardiology   • CATARACT EXTRACTION     • CHOLECYSTECTOMY     • KNEE SURGERY     • AZ LITHOLAPAXY SMPL/SM <2.5 CM N/A 1/4/2021    Procedure: Cystolitholopaxy w/laser bladder stones;  Surgeon: Tiffani Sellers MD;  Location: [facility];  Service: Urology   • HI TRURL ELECTROSURG RESCJ PROSTATE BLEED COMPLETE N/A 1/4/2021    Procedure: T.URocioR.P.;  Surgeon: Tiffani Sellers MD;  Location: [facility];  Service: Urology     Social History   Social History     Substance and Sexual Activity   Alcohol Use Not Currently     Social History     Substance and Sexual Activity   Drug Use No     Social History     Tobacco Use   Smoking Status Never   Smokeless Tobacco Never     Family History   Problem Relation Age of Onset   • Coronary artery disease Mother    • Diabetes Father        Meds/Allergies       Current Outpatient Medications:   •  ACCU-CHEK GUIDE test strip  •  acetaminophen (TYLENOL) 325 mg tablet  •  Alcohol Swabs (PRO COMFORT ALCOHOL) 70 % PADS  •  amoxicillin (AMOXIL) 500 mg capsule  •  aspirin 81 mg chewable tablet  •  atorvastatin (LIPITOR) 40 mg tablet  •  cholecalciferol (VITAMIN D3) 1,000 units tablet  •  cyanocobalamin (VITAMIN B-12) 1000 MCG tablet  •  docusate sodium (COLACE) 100 mg capsule  •  Easy Comfort Lancets MISC  •  famotidine (PEPCID) 20 mg tablet  •  finasteride (PROSCAR) 5 mg tablet  •  Glucagon, rDNA, (GLUCAGON EMERGENCY IJ)  •  Glucose 15 g PACK  •  glucose blood test strip  •  glycerin-hypromellose- (ARTIFICIAL TEARS) 0.2-0.2-1 % SOLN  •  insulin glargine (LANTUS) 100 units/mL subcutaneous injection  •  insulin lispro (HumaLOG) 100 units/mL injection  •  lidocaine (XYLOCAINE) 5 % ointment  •  lisinopril (ZESTRIL) 5 mg tablet  •  melatonin 3 mg  •  metoprolol succinate (TOPROL-XL) 25 mg 24 hr tablet  •  NovoFine Autocover 30G X 8 MM MISC  •  nystatin (MYCOSTATIN) powder  •  pantoprazole (PROTONIX) 40 mg tablet  •  polyethylene glycol (MIRALAX) 17 g packet  •  ranolazine (RANEXA) 500 mg 12 hr tablet  •  senna (SENOKOT) 8.6 mg  •  sulfamethoxazole-trimethoprim (BACTRIM DS) 800-160 mg per tablet  •  tamsulosin (FLOMAX) 0.4 mg  • carbidopa-levodopa (SINEMET)  mg per tablet  •  ferrous gluconate (FERGON) 324 mg tablet    No Known Allergies    Objective     There were no vitals taken for this visit. PHYSICAL EXAM:    General: Well-appearing, NAD  Eyes:  no conjunctival icterus or pallor  Abdominal: Soft, non-tender, non-distended  Neuro: alert and oriented  Psych: Normal affect    Lab Results:   Lab Results   Component Value Date     12/20/2017    K 4.4 08/15/2023    CO2 22 08/15/2023     08/15/2023    BUN 16 08/15/2023    CREATININE 1.26 08/15/2023     Lab Results   Component Value Date    WBC 9.01 08/15/2023    HGB 8.5 (L) 08/15/2023    HCT 31.1 (L) 08/15/2023    MCV 78 (L) 08/15/2023     (H) 08/15/2023     Lab Results   Component Value Date    TP 6.8 08/15/2023    AST 6 08/15/2023    ALT 14 08/15/2023    BILITOT 0.6 12/20/2017    BILIDIR 0.09 04/29/2020    INR 1.12 07/24/2023      Lab Results   Component Value Date    IRON 22 (L) 07/23/2023    FERRITIN 20 (L) 07/23/2023     Lab Results   Component Value Date    CHOL 189 12/20/2017    HDL 44 08/15/2023    TRIG 95 08/15/2023       Radiology Results:   I have reviewed the results of any radiology studies performed within the last 90 days. Patricia Bull D.O.   Gastroenterology Fellow  PGY-4  Available on [facility]  8/30/2023 4:26 PM

## 2023-08-31 NOTE — PATIENT INSTRUCTIONS
Patient is already scheduled for an EUS at [facility] with Betito Oswald for 11/17/2023   He was given script for MRI and did not schedule as the Nursing Home will schedule. Put in for a 3 month recall for follow up .     Patient is Diabetic

## 2023-09-20 ENCOUNTER — OFFICE VISIT (OUTPATIENT)
Dept: CARDIOLOGY CLINIC | Facility: CLINIC | Age: 73
End: 2023-09-20
Payer: MEDICARE

## 2023-09-20 VITALS — SYSTOLIC BLOOD PRESSURE: 130 MMHG | HEART RATE: 91 BPM | OXYGEN SATURATION: 98 % | DIASTOLIC BLOOD PRESSURE: 60 MMHG

## 2023-09-20 DIAGNOSIS — R26.2 AMBULATORY DYSFUNCTION: ICD-10-CM

## 2023-09-20 DIAGNOSIS — Z01.810 PRE-OPERATIVE CARDIOVASCULAR EXAMINATION: Primary | ICD-10-CM

## 2023-09-20 DIAGNOSIS — Z79.4 TYPE 2 DIABETES MELLITUS WITH HYPERGLYCEMIA, WITH LONG-TERM CURRENT USE OF INSULIN (HCC): ICD-10-CM

## 2023-09-20 DIAGNOSIS — I10 ESSENTIAL HYPERTENSION: ICD-10-CM

## 2023-09-20 DIAGNOSIS — E11.65 TYPE 2 DIABETES MELLITUS WITH HYPERGLYCEMIA, WITH LONG-TERM CURRENT USE OF INSULIN (HCC): ICD-10-CM

## 2023-09-20 DIAGNOSIS — I25.10 CORONARY ARTERY DISEASE INVOLVING NATIVE CORONARY ARTERY OF NATIVE HEART WITHOUT ANGINA PECTORIS: ICD-10-CM

## 2023-09-20 PROCEDURE — 99204 OFFICE O/P NEW MOD 45 MIN: CPT | Performed by: INTERNAL MEDICINE

## 2023-09-20 PROCEDURE — 93000 ELECTROCARDIOGRAM COMPLETE: CPT | Performed by: INTERNAL MEDICINE

## 2023-09-20 NOTE — PATIENT INSTRUCTIONS
You were seen today in the Cardiology office for follow up evaluation. Please continue your current cardiac medications as prescribed. Thank you for choosing [address] [place] [address]. Please call our office or use [employer] with any questions.

## 2023-09-20 NOTE — PROGRESS NOTES
[facility]    Krista Hernández   DOS: 9/20/2023     Chief Complaint:   Chief Complaint   Patient presents with   • Pre-op Clearance       HISTORY OF PRESENT ILLNESS:      HPI:  Corrie Mac is a 68 y.o. male. He  has a past medical history of Ambulatory dysfunction, Anemia, Anxiety, At risk for falls, Benign paroxysmal vertigo, BPH (benign prostatic hyperplasia), Calculus in bladder, Cataract, Cervicalgia, Chest pain, Chronic kidney disease, Constipation, CTS (carpal tunnel syndrome), Cyst of pancreas, Cystitis (06/23/2020), Depression, Diabetes mellitus ([address]), Dizziness, Dysphagia, Elevated PSA, Facial weakness, GERD (gastroesophageal reflux disease), Gross hematuria, Hematuria, Hyperlipidemia, Hypertension, Kidney disease, Kidney stone, Left-sided weakness, Long term (current) use of oral hypoglycemic drugs, Muscle weakness, Nuclear senile cataract of left eye, OA (osteoarthritis) of knee, 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[address]), Syncope and collapse, Tachycardia, UTI (urinary tract infection), Vertebro-basilar artery syndrome, Vitamin B deficiency, Vitamin D deficiency, and Vitamin D deficiency. He has a past medical history significant for hypertension, hyperlipidemia, type 2 diabetes, ambulatory dysfunction (wheelchair-bound), multivessel CAD with Cardiac catheterization 3/7 with 80% prox LAD iFR 0.87, 100%  OM1 without successful attempt at angioplasty, 55% mid RCA iFR negative. His CAD was considered to be appropriate for surgical evaluation, however he was recommended for outpatient CT surgery follow up and it does not appear that he ever established care with our team or CT-Surgery until now. For now, it appears that his CAD is chronically being managed medically. He presents for follow up evaluation. At the time his appointment was made, it was reported that the patient would be presenting for preoperative cardiac risk assessment prior to planned dental procedure.   The patient reports that he is unaware of any upcoming dental surgery, and states that he is not quite sure why he is here. He denies active cardiopulmonary complaints, and specifically denies chest pain, shortness of breath, diaphoresis, dizziness, palpitations, orthopnea, edema, syncope. He is wheelchair-bound, does not walk around or exert himself much. ROS    ROS: Pertinent positives and negatives as described in History of Present Illness. Remainder of a 14 point review of systems was negative.      No Known Allergies     Current Outpatient Medications on File Prior to Visit   Medication Sig Dispense Refill   • ACCU-CHEK GUIDE test strip 1 strip as needed     • acetaminophen (TYLENOL) 325 mg tablet Take 650 mg by mouth every 6 (six) hours as needed for mild pain     • Alcohol Swabs (PRO COMFORT ALCOHOL) 70 % PADS 1 pad daily Use a pad when testing     • amoxicillin (AMOXIL) 500 mg capsule      • aspirin 81 mg chewable tablet Chew 1 tablet (81 mg total) daily 30 tablet 0   • atorvastatin (LIPITOR) 40 mg tablet Take 1 tablet (40 mg total) by mouth daily 30 tablet 0   • carbidopa-levodopa (SINEMET)  mg per tablet Take 1 tablet by mouth 3 (three) times a day 90 tablet 11   • cholecalciferol (VITAMIN D3) 1,000 units tablet Take 50 tablets (50,000 Units total) by mouth every 30 (thirty) days On the 15th of the month     • cyanocobalamin (VITAMIN B-12) 1000 MCG tablet Take 1 tablet (1,000 mcg total) by mouth daily  0   • docusate sodium (COLACE) 100 mg capsule Take 1 capsule (100 mg total) by mouth 2 (two) times a day 10 capsule 0   • Easy Comfort Lancets MISC as needed     • famotidine (PEPCID) 20 mg tablet Take 1 tablet (20 mg total) by mouth daily at bedtime 30 tablet 0   • finasteride (PROSCAR) 5 mg tablet Take 1 tablet (5 mg total) by mouth daily 30 tablet 0   • Glucagon, rDNA, (GLUCAGON EMERGENCY IJ) Inject 1 mL as directed Sugars less than 60 and conscious     • Glucose 15 g PACK Take 15 g by mouth Per [facility] "give 15 gram PO every 15 minutes PRN for hypoglycemia"     • glucose blood test strip 1 strip as needed     • glycerin-hypromellose- (ARTIFICIAL TEARS) 0.2-0.2-1 % SOLN Administer 1 drop to both eyes 2 (two) times a day as needed (for dry eyes) 15 mL 0   • insulin glargine (LANTUS) 100 units/mL subcutaneous injection Inject 30 Units under the skin daily at bedtime 10 mL 0   • insulin lispro (HumaLOG) 100 units/mL injection Inject 10 Units under the skin 3 (three) times a day with meals 10 mL 0   • lidocaine (XYLOCAINE) 5 % ointment Apply topically 2 (two) times a day as needed for mild pain or moderate pain 35.44 g 0   • lisinopril (ZESTRIL) 5 mg tablet Take 1 tablet (5 mg total) by mouth daily  0   • melatonin 3 mg Take 3 mg by mouth daily at bedtime Give 2 tabs at bed time for insomnia per [facility]     • metoprolol succinate (TOPROL-XL) 25 mg 24 hr tablet Take 1 tablet (25 mg total) by mouth daily  0   • NovoFine Autocover 30G X 8 MM MISC      • nystatin (MYCOSTATIN) powder Apply topically 2 (two) times a day 15 g 0   • pantoprazole (PROTONIX) 40 mg tablet Take 1 tablet (40 mg total) by mouth 2 (two) times a day before meals 60 tablet 0   • polyethylene glycol (MIRALAX) 17 g packet Take 17 g by mouth daily 14 each 2   • ranolazine (RANEXA) 500 mg 12 hr tablet Take 1 tablet (500 mg total) by mouth every 12 (twelve) hours 60 tablet 0   • senna (SENOKOT) 8.6 mg Take 1 tablet (8.6 mg total) by mouth daily as needed for constipation 120 tablet 0   • sulfamethoxazole-trimethoprim (BACTRIM DS) 800-160 mg per tablet      • tamsulosin (FLOMAX) 0.4 mg Take 1 capsule (0.4 mg total) by mouth daily with dinner 30 capsule 0   • ferrous gluconate (FERGON) 324 mg tablet Take 1 tablet (324 mg total) by mouth daily before breakfast Do not start before July 30, 2023. 30 tablet 0     No current facility-administered medications on file prior to visit.        Past Medical History:   Diagnosis Date   • Ambulatory dysfunction     uses walker with assist of 1   • Anemia    • Anxiety    • At risk for falls     hx of falls   • Benign paroxysmal vertigo     unspecified ear   • BPH (benign prostatic hyperplasia)     for TURP today 1/4/2021   • Calculus in bladder     for surgical removal today 1/4/2021   • Cataract     left eye   • Cervicalgia    • Chest pain    • Chronic kidney disease     stage 3   • Constipation    • CTS (carpal tunnel syndrome)     left   • Cyst of pancreas    • Cystitis 06/23/2020    acute cystitis with hematuria   • Depression    • Diabetes mellitus ([address])     type II with neuropathy   • Dizziness     and giddiness   • Dysphagia    • Elevated PSA    • Facial weakness    • GERD (gastroesophageal reflux disease)     last assessed 5/20/16   • Gross hematuria    • Hematuria    • Hyperlipidemia    • Hypertension    • Kidney disease    • Kidney stone    • Left-sided weakness    • Long term (current) use of oral hypoglycemic drugs    • Muscle weakness    • Nuclear senile cataract of left eye    • OA (osteoarthritis) of knee     b/l   • 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HCC)    • Syncope and collapse    • Tachycardia    • UTI (urinary tract infection)    • Vertebro-basilar artery syndrome    • Vitamin B deficiency    • Vitamin D deficiency    • Vitamin D deficiency        Past Surgical History:   Procedure Laterality Date   • CARDIAC CATHETERIZATION N/A 3/7/2022    Procedure: CARDIAC CATHETERIZATION;  Surgeon: Lynne Hernandez DO;  [facility];   Service: Cardiology   • CATARACT EXTRACTION     • CHOLECYSTECTOMY     • KNEE SURGERY     • MD LITHOLAPAXY SMPL/SM <2.5 CM N/A 1/4/2021    Procedure: Cystolitholopaxy w/laser bladder stones;  Surgeon: Jeannie Shepard MD;  [facility];  Service: Urology   • MD TRURL ELECTROSURG RESCJ PROSTATE BLEED COMPLETE N/A 1/4/2021    Procedure: T.U.R.P.;  Surgeon: Jeannie Shepard MD;  [facility];  Service: Urology       Family History   Problem Relation Age of Onset   • Coronary artery disease Mother    • Diabetes Father        Social History     Socioeconomic History   • Marital status: /Civil Union     Spouse name: Not on file   • Number of children: Not on file   • Years of education: Not on file   • Highest education level: Not on file   Occupational History   • Not on file   Tobacco Use   • Smoking status: Never   • Smokeless tobacco: Never   Vaping Use   • Vaping Use: Never used   Substance and Sexual Activity   • Alcohol use: Not Currently   • Drug use: No   • Sexual activity: Not Currently   Other Topics Concern   • Not on file   Social History Narrative   • Not on file     Social Determinants of Health     Financial Resource Strain: Not on file   Food Insecurity: No Food Insecurity (7/24/2023)    Hunger Vital Sign    • Worried About Running Out of Food in the Last Year: Never true    • Ran Out of Food in the Last Year: Never true   Transportation Needs: No Transportation Needs (7/24/2023)    PRAPARE - Transportation    • Lack of Transportation (Medical): No    • Lack of Transportation (Non-Medical): No   Physical Activity: Not on file   Stress: Not on file   Social Connections: Not on file   Intimate Partner Violence: Not on file   Housing Stability: Low Risk  (7/24/2023)    Housing Stability Vital Sign    • Unable to Pay for Housing in the Last Year: No    • Number of Places Lived in the Last Year: 1    • Unstable Housing in the Last Year: No       OBJECTIVE:    /60 (BP Location: Left arm, Patient Position: Sitting, Cuff Size: Standard)   Pulse 91   SpO2 98%      BP Readings from Last 3 Encounters:   09/20/23 130/60   08/09/23 100/56   08/06/23 142/68       Wt Readings from Last 3 Encounters:   08/09/23 89.4 kg (197 lb 1.5 oz)   08/06/23 85.5 kg (188 lb 7.9 oz)   07/23/23 87 kg (191 lb 12.8 oz)         Physical Exam  Vitals reviewed. Constitutional:       General: He is not in acute distress. Appearance: Normal appearance.  He is ill-appearing. He is not diaphoretic. Comments: Wheelchair bound. HENT:      Head: Normocephalic and atraumatic. Eyes:      Conjunctiva/sclera: Conjunctivae normal.   Neck:      Vascular: No carotid bruit or JVD. Cardiovascular:      Rate and Rhythm: Normal rate and regular rhythm. Pulses: Normal pulses. Heart sounds: Normal heart sounds. No murmur heard. No friction rub. No gallop. Pulmonary:      Effort: Pulmonary effort is normal.      Breath sounds: Normal breath sounds. No wheezing, rhonchi or rales. Abdominal:      General: Abdomen is flat. Bowel sounds are normal. There is no distension. Palpations: Abdomen is soft. Musculoskeletal:      Right lower leg: No edema. Left lower leg: No edema. Skin:     General: Skin is warm and dry. Neurological:      General: No focal deficit present. Mental Status: He is alert and oriented to person, place, and time. Psychiatric:         Mood and Affect: Mood normal.         Behavior: Behavior normal.                                                       Cardiac testing:   EKG reviewed personally as performed in office today. My read: Sinus rhythm, RBBB. Cardiac Catheterization 3/7/22:  Left Anterior Descending   Prox LAD lesion is 80% stenosed. YOLA flow is 3. The lesion is type C and diffuse. iFR was measured in the Prox LAD. iFR ratio: 0.87. The iFR was significant, not intervened. Left Circumflex      First Obtuse Marginal Branch   Collaterals   1st Mrg filled by collaterals from 1st Mrg. The collateral flow was moderate. 1st Mrg lesion is 100% stenosed. Right Coronary Artery   Mid RCA lesion is 55% stenosed. YOLA flow is 3. iFR was measured in the Mid RCA. iFR ratio: 0.94. The iFR was nonsignificant, not intervened.        Suggest cardiac surgery eval cabg, grafts to consider LAD, OM and RCA is borderline      ECHO 3/5/22:  Left Ventricle Left ventricular cavity size is normal. Wall thickness is mildly increased. The left ventricular ejection fraction is 55%. Systolic function is normal.  Diastolic function is normal.      Wall Scoring Baseline     The following segments are akinetic: basal inferior, mid inferior, mid inferolateral and apical lateral.  The following segments are hypokinetic: basal inferolateral and apical inferior. All other segments are normal.               Right Ventricle Right ventricular cavity size is normal. Systolic function is normal. Wall thickness is normal.      Left Atrium The atrium is normal in size. Right Atrium The atrium is normal in size. Aortic Valve The aortic valve is trileaflet. The leaflets are not thickened. The leaflets are not calcified. The leaflets exhibit normal mobility. There is no evidence of regurgitation. There is no evidence of stenosis. Mitral Valve The mitral valve has normal structure and function. There is mild regurgitation. There is no evidence of stenosis. Tricuspid Valve Tricuspid valve structure is normal. There is no evidence of regurgitation. There is no evidence of stenosis. Pulmonic Valve Pulmonic valve structure is normal. There is no evidence of regurgitation. There is no evidence of stenosis. Ascending Aorta The aortic root is normal in size. IVC/SVC The inferior vena cava is normal in size. Pericardium There is no pericardial effusion. The pericardium is normal in appearance.               LABS:  Lab Results   Component Value Date    BUN 16 08/15/2023    CREATININE 1.26 08/15/2023    CALCIUM 8.7 08/15/2023     12/20/2017    K 4.4 08/15/2023    CO2 22 08/15/2023     08/15/2023    ALKPHOS 89 08/15/2023    BILITOT 0.6 12/20/2017    PROT 6.3 12/20/2017    AST 6 08/15/2023    ALT 14 08/15/2023        Lab Results   Component Value Date    WBC 9.01 08/15/2023    HGB 8.5 (L) 08/15/2023    HCT 31.1 (L) 08/15/2023    MCV 78 (L) 08/15/2023     (H) 08/15/2023       Lab Results   Component Value Date CHOL 189 12/20/2017    HDL 44 08/15/2023    LDLCALC 48 08/15/2023    TRIG 95 08/15/2023       Lab Results   Component Value Date    HGBA1C 9.2 (H) 08/15/2023       ASSESSMENT/PLAN:  Diagnoses and all orders for this visit:    Pre-operative cardiovascular examination  -     POCT ECG    Coronary artery disease involving native coronary artery of native heart without angina pectoris    Essential hypertension    Ambulatory dysfunction    Type 2 diabetes mellitus with hyperglycemia, with long-term current use of insulin ([address])    This is a 79-year-old male with severe triple-vessel coronary disease that is presently being medically managed, presenting for follow-up evaluation after being lost to follow-up posthospitalization early last year. He is not personally aware of any surgery including dental surgery upcoming, however I did advise him as to the reason for his appointment was preoperative cardiac risk assessment I would provide him with risk assessment recommendations. As outlined above, he has surgical coronary disease but has opted not to pursue surgical evaluation. This is clearly documented in prior cardiology visit notes during his inpatient stay, and these conversations included his spouse as well. Had this conversation again with the patient, noting that his coronary artery disease is not amendable to percutaneous coronary intervention and he may benefit from surgical evaluation. He is asymptomatic, and does not want to undergo open heart surgery. He is clinically euvolemic by exam, with no overt evidence of heart failure or low output. His type 2 diabetes is poorly controlled, and I have recommended follow-up with his PCP to discuss optimization of his blood glucose. His blood pressure is well controlled. I have recommended continued medical management of coronary disease with aspirin 81 mg once daily, atorvastatin 40 mg once daily, Toprol-XL 25 mg once daily, and ranolazine 500 mg twice daily. I counseled the patient extensively on the importance of symptom monitoring and early notification. He is aware that if he changes his mind, I will gladly refer him to establish care with a cardiothoracic surgeon to discuss surgical candidacy. In regards to preoperative cardiac risk assessment, the patient is high risk for any surgery requiring general anesthesia due to unrevascularized severe coronary artery disease. As he is asymptomatic, his preoperative risk for minor surgery or surgeries requiring local or nerve block anesthesia is intermediate and not prohibitive. I do not anticipate that additional cardiac testing at this time would further optimize his cardiac risk profile. I advised him to have his surgeon/proceduralist reach out to my office for preoperative risk assessment letter if/when the patient needs surgery. Jacqualin Claude, MD      Portions of the record may have been created with voice recognition software. Occasional wrong word or "sound alike" substitutions may have occurred due to the inherent limitations of voice recognition software. Please review the chart carefully and recognize, using context, where substitutions/typographical errors may have occurred.

## 2023-09-23 PROBLEM — N39.0 URINARY TRACT INFECTION ASSOCIATED WITH INDWELLING URETHRAL CATHETER: Status: RESOLVED | Noted: 2020-05-08 | Resolved: 2023-09-23

## 2023-09-23 PROBLEM — T83.511A URINARY TRACT INFECTION ASSOCIATED WITH INDWELLING URETHRAL CATHETER: Status: RESOLVED | Noted: 2020-05-08 | Resolved: 2023-09-23

## 2023-10-19 ENCOUNTER — TELEPHONE (OUTPATIENT)
Dept: GASTROENTEROLOGY | Facility: AMBULARY SURGERY CENTER | Age: 73
End: 2023-10-19

## 2023-10-19 NOTE — TELEPHONE ENCOUNTER
Per Dr. Brenna Arboleda procedure note 7/27/2023 patient should have repeat colonoscopy in 3 months due to inadequate prep and incomplete procedure.  Thank you

## 2023-11-09 ENCOUNTER — TELEPHONE (OUTPATIENT)
Dept: GASTROENTEROLOGY | Facility: CLINIC | Age: 73
End: 2023-11-09

## 2023-11-16 ENCOUNTER — TELEPHONE (OUTPATIENT)
Dept: GASTROENTEROLOGY | Facility: CLINIC | Age: 73
End: 2023-11-16

## 2023-11-16 NOTE — TELEPHONE ENCOUNTER
Spoke with wife and pt confirming EUS on 11/27. Senior living will bring from [facility] and they have instructions. They will be called Friday prior with arrival time.

## 2023-11-27 ENCOUNTER — HOSPITAL ENCOUNTER (OUTPATIENT)
Dept: GASTROENTEROLOGY | Facility: HOSPITAL | Age: 73
Setting detail: OUTPATIENT SURGERY
Discharge: HOME | End: 2023-11-27
Attending: INTERNAL MEDICINE
Payer: MEDICARE

## 2023-11-27 ENCOUNTER — ANESTHESIA EVENT (OUTPATIENT)
Dept: GASTROENTEROLOGY | Facility: HOSPITAL | Age: 73
End: 2023-11-27

## 2023-11-27 ENCOUNTER — ANESTHESIA (OUTPATIENT)
Dept: GASTROENTEROLOGY | Facility: HOSPITAL | Age: 73
End: 2023-11-27

## 2023-11-27 VITALS
RESPIRATION RATE: 18 BRPM | TEMPERATURE: 97 F | DIASTOLIC BLOOD PRESSURE: 59 MMHG | HEIGHT: 67 IN | SYSTOLIC BLOOD PRESSURE: 128 MMHG | WEIGHT: 197 LBS | BODY MASS INDEX: 30.92 KG/M2 | HEART RATE: 86 BPM | OXYGEN SATURATION: 98 %

## 2023-11-27 DIAGNOSIS — K86.2 PANCREAS CYST: Primary | ICD-10-CM

## 2023-11-27 DIAGNOSIS — D50.9 IRON DEFICIENCY ANEMIA, UNSPECIFIED IRON DEFICIENCY ANEMIA TYPE: ICD-10-CM

## 2023-11-27 DIAGNOSIS — K21.9 GERD (GASTROESOPHAGEAL REFLUX DISEASE): ICD-10-CM

## 2023-11-27 DIAGNOSIS — K31.89 DUODENAL NODULE: ICD-10-CM

## 2023-11-27 LAB — GLUCOSE SERPL-MCNC: 210 MG/DL (ref 65–140)

## 2023-11-27 PROCEDURE — 43242 EGD US FINE NEEDLE BX/ASPIR: CPT | Performed by: INTERNAL MEDICINE

## 2023-11-27 PROCEDURE — C1889 IMPLANT/INSERT DEVICE, NOC: HCPCS

## 2023-11-27 PROCEDURE — 88305 TISSUE EXAM BY PATHOLOGIST: CPT | Performed by: PATHOLOGY

## 2023-11-27 PROCEDURE — 82948 REAGENT STRIP/BLOOD GLUCOSE: CPT

## 2023-11-27 PROCEDURE — 43251 EGD REMOVE LESION SNARE: CPT | Performed by: INTERNAL MEDICINE

## 2023-11-27 RX ORDER — PROPOFOL 10 MG/ML
INJECTION, EMULSION INTRAVENOUS CONTINUOUS PRN
Status: DISCONTINUED | OUTPATIENT
Start: 2023-11-27 | End: 2023-11-27

## 2023-11-27 RX ORDER — OXYBUTYNIN CHLORIDE 5 MG/1
5 TABLET, EXTENDED RELEASE ORAL DAILY
COMMUNITY

## 2023-11-27 RX ORDER — CIPROFLOXACIN 500 MG/1
500 TABLET, FILM COATED ORAL EVERY 12 HOURS SCHEDULED
Qty: 6 TABLET | Refills: 0 | Status: SHIPPED | OUTPATIENT
Start: 2023-11-27 | End: 2023-11-30

## 2023-11-27 RX ORDER — PANTOPRAZOLE SODIUM 40 MG/1
40 TABLET, DELAYED RELEASE ORAL
Qty: 60 TABLET | Refills: 3 | Status: SHIPPED | OUTPATIENT
Start: 2023-11-27

## 2023-11-27 RX ORDER — PHENYLEPHRINE HCL IN 0.9% NACL 1 MG/10 ML
SYRINGE (ML) INTRAVENOUS AS NEEDED
Status: DISCONTINUED | OUTPATIENT
Start: 2023-11-27 | End: 2023-11-27

## 2023-11-27 RX ORDER — PROPOFOL 10 MG/ML
INJECTION, EMULSION INTRAVENOUS AS NEEDED
Status: DISCONTINUED | OUTPATIENT
Start: 2023-11-27 | End: 2023-11-27

## 2023-11-27 RX ORDER — CIPROFLOXACIN 2 MG/ML
400 INJECTION, SOLUTION INTRAVENOUS ONCE
Status: COMPLETED | OUTPATIENT
Start: 2023-11-27 | End: 2023-11-27

## 2023-11-27 RX ORDER — LIDOCAINE HYDROCHLORIDE 20 MG/ML
INJECTION, SOLUTION EPIDURAL; INFILTRATION; INTRACAUDAL; PERINEURAL AS NEEDED
Status: DISCONTINUED | OUTPATIENT
Start: 2023-11-27 | End: 2023-11-27

## 2023-11-27 RX ORDER — SODIUM CHLORIDE 9 MG/ML
INJECTION, SOLUTION INTRAVENOUS CONTINUOUS PRN
Status: DISCONTINUED | OUTPATIENT
Start: 2023-11-27 | End: 2023-11-27

## 2023-11-27 RX ADMIN — CIPROFLOXACIN: 2 INJECTION, SOLUTION INTRAVENOUS at 07:55

## 2023-11-27 RX ADMIN — PROPOFOL 50 MG: 10 INJECTION, EMULSION INTRAVENOUS at 07:41

## 2023-11-27 RX ADMIN — LIDOCAINE HYDROCHLORIDE 100 MG: 20 INJECTION, SOLUTION EPIDURAL; INFILTRATION; INTRACAUDAL; PERINEURAL at 07:35

## 2023-11-27 RX ADMIN — Medication 200 MCG: at 07:49

## 2023-11-27 RX ADMIN — PROPOFOL 50 MCG/KG/MIN: 10 INJECTION, EMULSION INTRAVENOUS at 07:35

## 2023-11-27 RX ADMIN — PROPOFOL 80 MG: 10 INJECTION, EMULSION INTRAVENOUS at 07:35

## 2023-11-27 RX ADMIN — Medication 200 MCG: at 07:43

## 2023-11-27 RX ADMIN — Medication 100 MCG: at 07:37

## 2023-11-27 RX ADMIN — Medication 100 MCG: at 07:41

## 2023-11-27 RX ADMIN — PROPOFOL 20 MG: 10 INJECTION, EMULSION INTRAVENOUS at 07:36

## 2023-11-27 RX ADMIN — SODIUM CHLORIDE: 0.9 INJECTION, SOLUTION INTRAVENOUS at 07:31

## 2023-11-27 RX ADMIN — SODIUM CHLORIDE: 0.9 INJECTION, SOLUTION INTRAVENOUS at 08:18

## 2023-11-27 RX ADMIN — PROPOFOL 20 MG: 10 INJECTION, EMULSION INTRAVENOUS at 08:16

## 2023-11-27 RX ADMIN — Medication 200 MCG: at 08:00

## 2023-11-27 NOTE — ANESTHESIA PREPROCEDURE EVALUATION
Procedure:  ENDOSCOPIC ULTRASOUND (UPPER)    Relevant Problems   CARDIO   (+) Chest pain   (+) Coronary artery disease involving native coronary artery of native heart without angina pectoris   (+) Essential hypertension   (+) Hyperlipidemia   (+) Type 2 MI (myocardial infarction) (HCC)      ENDO   (+) Type 2 diabetes mellitus with diabetic neuropathy (HCC)   (+) Type 2 diabetes mellitus with hyperglycemia, with long-term current use of insulin (HCC)      GI/HEPATIC   (+) Pancreas cyst      /RENAL   (+) BPH (benign prostatic hyperplasia)   (+) Nephrolithiasis   (+) Stage 3a chronic kidney disease (HCC)      HEMATOLOGY   (+) Acute blood loss anemia   (+) Anemia   (+) Iron deficiency anemia      NEURO/PSYCH   (+) Type 2 diabetes mellitus with diabetic neuropathy ([address])    Patient mildly confused as to why he's here today but knows that he needs a procedure for his pancreas is AAOx3 and understands risk/benefits/alternatives to anesthesia. Physical Exam    Airway    Mallampati score: IV  TM Distance: >3 FB  Neck ROM: full     Dental    upper dentures,     Cardiovascular  Cardiovascular exam normal    Pulmonary  Pulmonary exam normal     Other Findings        Anesthesia Plan  ASA Score- 3     Anesthesia Type- IV sedation with anesthesia with ASA Monitors. Additional Monitors:     Airway Plan:            Plan Factors-Exercise tolerance (METS): >4 METS. Chart reviewed. EKG reviewed. Existing labs reviewed. Patient summary reviewed. Patient is not a current smoker. Patient did not smoke on day of surgery. Obstructive sleep apnea risk education given perioperatively. Induction- intravenous. Postoperative Plan- Plan for postoperative opioid use. Informed Consent- Anesthetic plan and risks discussed with patient. I personally reviewed this patient with the CRNA. Discussed and agreed on the Anesthesia Plan with the CRNA. Pauly Reynolds

## 2023-11-27 NOTE — H&P
History and Physical - [facility]  Branden Zendejas 68 y.o. male MRN: [id number]    HPI: Branden Zendejas is a 68y.o. year old male who presents with pancreatic cyst and duodenal mass      Review of Systems    Historical Information   Past Medical History:   Diagnosis Date    Ambulatory dysfunction     uses walker with assist of 1    Anemia     Anxiety     At risk for falls     hx of falls    Benign paroxysmal vertigo     unspecified ear    BPH (benign prostatic hyperplasia)     for TURP today 1/4/2021    Calculus in bladder     for surgical removal today 1/4/2021    Cataract     left eye    Cervicalgia     Chest pain     Chronic kidney disease     stage 3    Constipation     CTS (carpal tunnel syndrome)     left    Cyst of pancreas     Cystitis 06/23/2020    acute cystitis with hematuria    Depression     Diabetes mellitus ([address])     type II with neuropathy    Dizziness     and giddiness    Dysphagia     Elevated PSA     Facial weakness     GERD (gastroesophageal reflux disease)     last assessed 5/20/16    Gross hematuria     Hematuria     Hyperlipidemia     Hypertension     Kidney disease     Kidney stone     Left-sided weakness     Long term (current) use of oral hypoglycemic drugs     Muscle weakness     Nuclear senile cataract of left eye     OA (osteoarthritis) of knee     b/l    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[address])     Syncope and collapse     Tachycardia     UTI (urinary tract infection)     Vertebro-basilar artery syndrome     Vitamin B deficiency     Vitamin D deficiency     Vitamin D deficiency      Past Surgical History:   Procedure Laterality Date    CARDIAC CATHETERIZATION N/A 3/7/2022    Procedure: CARDIAC CATHETERIZATION;  Surgeon: Chilo Hull DO;  [facility];   Service: Cardiology    CATARACT EXTRACTION      CHOLECYSTECTOMY      KNEE SURGERY      [place] LITHOLAPAXY SMPL/SM <2.5 CM N/A 1/4/2021    Procedure: Cystolitholopaxy w/laser bladder stones;  Surgeon: Janes Sultana Puja Cannon MD;  [facility];  Service: Urology    MD TRURL ELECTROSURG Tigre Hensley COMPLETE N/A 1/4/2021    Procedure: T.U.R.P.;  Surgeon: Patty Hahn MD;  [facility];  Service: Urology     Social History   Social History     Substance and Sexual Activity   Alcohol Use Not Currently     Social History     Substance and Sexual Activity   Drug Use No     Social History     Tobacco Use   Smoking Status Never   Smokeless Tobacco Never     Family History   Problem Relation Age of Onset    Coronary artery disease Mother     Diabetes Father        Meds/Allergies     (Not in a hospital admission)      No Known Allergies    Objective     /64   Pulse 98   Temp (!) 95.8 °F (35.4 °C) (Temporal)   Resp 20   Ht 5' 7" (1.702 m)   Wt 89.4 kg (197 lb)   SpO2 98%   BMI 30.85 kg/m²       PHYSICAL EXAM    Gen: NAD  CV: RRR  CHEST: Clear  ABD: soft, NT/ND  EXT: no edema  Neuro: AAO      ASSESSMENT/PLAN:  This is a 68y.o. year old male here for pancreatic cyst and duodenal mass      PLAN:   Procedure: egd/eus

## 2023-11-27 NOTE — ANESTHESIA PROCEDURE NOTES
Anesthesia Notable Event    Date/Time: 11/27/2023 1:40 PM    Patient location during procedure: PACU  Performed by: Orestes Roman MD  Authorized by: Orestes Roman MD

## 2023-11-27 NOTE — ANESTHESIA POSTPROCEDURE EVALUATION
Post-Op Assessment Note    CV Status:  Stable  Pain Score: 0    Pain management: adequate       Mental Status:  Sleepy and arousable   Hydration Status:  Euvolemic   PONV Controlled:  Controlled   Airway Patency:  Patent     Post Op Vitals Reviewed: Yes    No anethesia notable event occurred.     Staff: CRNA               BP   110/59   Temp     Pulse   86   Resp   12   SpO2   100% 3L FACE MASK

## 2023-11-28 ENCOUNTER — NURSE TRIAGE (OUTPATIENT)
Age: 73
End: 2023-11-28

## 2023-11-28 NOTE — TELEPHONE ENCOUNTER
Personnel from [facility] called to clarify EUS requisition form. Document not scanned into media. Provided personnel with fax number. Additional Information   Negative: Information only question and nurse able to answer    Answer Assessment - Initial Assessment Questions  1. REASON FOR CALL or QUESTION: "What is your reason for calling today?" or "How can I best help you?" or "What question do you have that I can help answer?"      [facility] personnel requesting EUS requisition form. Protocols used:  Information Only Call - No Triage-ADULT-[place]

## 2023-11-30 PROCEDURE — 88305 TISSUE EXAM BY PATHOLOGIST: CPT | Performed by: PATHOLOGY

## 2023-12-04 ENCOUNTER — TELEPHONE (OUTPATIENT)
Age: 73
End: 2023-12-04

## 2023-12-04 ENCOUNTER — TELEPHONE (OUTPATIENT)
Dept: GASTROENTEROLOGY | Facility: CLINIC | Age: 73
End: 2023-12-04

## 2023-12-04 NOTE — TELEPHONE ENCOUNTER
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 As we discussed he should have a repeat upper endoscopy in 3 months time. I would repeat abdominal imaging in 6 months time. 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 Please make sure patient has an office follow-up in 4 to 5 months.

## 2023-12-04 NOTE — TELEPHONE ENCOUNTER
Patients GI provider:  Dr. Stefani Garzon    Number to return call: [phone]    Reason for call: Nimesh Bonilla from [facility] calling to request EUS report.  Please fax over to [phone]     Scheduled procedure/appointment date if applicable: N/A

## 2023-12-04 NOTE — TELEPHONE ENCOUNTER
Tried calling pt to schedule a repeat EGD with Dr. Merlinda Kempf for an adenoma polyp of the pyloric gland in 3 months(Feb) at [facility]. I received a message that my call couldn't be completed at this time, to hang up and try my call later. He also needs a f/u ov in 4-5 months. Will try calling him later in the week.

## 2023-12-05 ENCOUNTER — TELEPHONE (OUTPATIENT)
Dept: GASTROENTEROLOGY | Facility: CLINIC | Age: 73
End: 2023-12-05

## 2023-12-05 NOTE — TELEPHONE ENCOUNTER
----- Message from Diana Calderon sent at 12/5/2023  8:02 AM EST -----    ----- Message -----  From: Hubert Mancilla DO  Sent: 12/5/2023   7:57 AM EST  To: #    Hello,    Can you please schedule this patient for follow up in the clinic with me in 6 months? Thank you!   Michael Martins

## 2023-12-07 NOTE — TELEPHONE ENCOUNTER
Tried calling pt again and received the same message that my call can't be completed. To try my call later. Will try one more time in a week.

## 2023-12-11 NOTE — TELEPHONE ENCOUNTER
Tried calling pt a third time and received the message my call couldn't be completed at this time, to hang up and try my call later. I will send patient a contact letter since I am unable to get a hold of patient.

## 2023-12-27 ENCOUNTER — HOSPITAL ENCOUNTER (OUTPATIENT)
Dept: RADIOLOGY | Facility: HOSPITAL | Age: 73
Discharge: HOME | End: 2023-12-27

## 2024-01-23 DIAGNOSIS — K21.9 GERD (GASTROESOPHAGEAL REFLUX DISEASE): ICD-10-CM

## 2024-01-23 RX ORDER — PANTOPRAZOLE SODIUM 40 MG/1
40 TABLET, DELAYED RELEASE ORAL
Qty: 60 TABLET | Refills: 4 | Status: SHIPPED | OUTPATIENT
Start: 2024-01-23

## 2024-02-16 ENCOUNTER — PREP FOR PROCEDURE (OUTPATIENT)
Age: 74
End: 2024-02-16

## 2024-02-16 ENCOUNTER — TELEPHONE (OUTPATIENT)
Dept: GASTROENTEROLOGY | Facility: CLINIC | Age: 74
End: 2024-02-16

## 2024-02-16 DIAGNOSIS — D50.9 IRON DEFICIENCY ANEMIA, UNSPECIFIED IRON DEFICIENCY ANEMIA TYPE: Primary | ICD-10-CM

## 2024-02-16 DIAGNOSIS — K31.89 DUODENAL NODULE: ICD-10-CM

## 2024-02-16 NOTE — TELEPHONE ENCOUNTER
Patient is due for an EGD recall in early March with Dr. Raza for adenoma polyp of duodenum. Callled SrRocio Duncan at [phone] and was transferred to the . Had to leave  for them to call back to schedule.

## 2024-02-16 NOTE — TELEPHONE ENCOUNTER
Scheduled date of EGD(as of today): 4/1/24  Physician performing EGD:  (requested first provider available)  Location of EGD: [facility]  Instructions reviewed with patient by:  Clearances:       Is asking for EGD prep instructions to be faxed to [phone] attrafa Hanna

## 2024-02-16 NOTE — TELEPHONE ENCOUNTER
OA Questions for EGD  Date: [2/16/24]  Screened by: [md]     Referring Provider: [  ]     Pre-Screening: BMI [30.85]    Past EGD? If yes - Date: [11/27/23]     SCHEDULING STAFF: If the patient is over 75 years old, please schedule an office visit.  ·      Does the patient want to see a gastroenterologist prior to their procedure to discuss any GI symptoms? no  ·      Has the patient been hospitalized or had abdominal surgery in the past 6 months? no  ·      Does the patient use supplemental oxygen? no  ·      Does the patient take [Coumadin], [Lovenox], [Plavix], [Eliquis], [Xarelto], or other blood thinning medication? no  ·      Has the patient had a stroke, cardiac event, or stent placed in the past year? no

## 2024-02-21 PROBLEM — Z12.11 COLON CANCER SCREENING: Status: RESOLVED | Noted: 2020-04-17 | Resolved: 2024-02-21

## 2024-03-25 ENCOUNTER — TELEPHONE (OUTPATIENT)
Dept: GASTROENTEROLOGY | Facility: CLINIC | Age: 74
End: 2024-03-25

## 2024-03-25 NOTE — TELEPHONE ENCOUNTER
Confirming Upcoming Procedure: egd on April 1st  Physician performing: Dr. Suarez  Location of procedure:  [facility]  Prep: EGD    Faxed prep & dm medicine instructions as requested in February and not sure if already sent

## 2024-04-01 ENCOUNTER — ANESTHESIA EVENT (OUTPATIENT)
Dept: GASTROENTEROLOGY | Facility: HOSPITAL | Age: 74
End: 2024-04-01

## 2024-04-01 ENCOUNTER — ANESTHESIA (OUTPATIENT)
Dept: GASTROENTEROLOGY | Facility: HOSPITAL | Age: 74
End: 2024-04-01

## 2024-04-01 ENCOUNTER — HOSPITAL ENCOUNTER (OUTPATIENT)
Dept: GASTROENTEROLOGY | Facility: HOSPITAL | Age: 74
Setting detail: OUTPATIENT SURGERY
Discharge: HOME | End: 2024-04-01
Attending: INTERNAL MEDICINE
Payer: MEDICARE

## 2024-04-01 VITALS
BODY MASS INDEX: 29.95 KG/M2 | HEIGHT: 68 IN | SYSTOLIC BLOOD PRESSURE: 134 MMHG | TEMPERATURE: 97.5 F | HEART RATE: 78 BPM | OXYGEN SATURATION: 97 % | DIASTOLIC BLOOD PRESSURE: 66 MMHG | RESPIRATION RATE: 18 BRPM

## 2024-04-01 DIAGNOSIS — K31.89 DUODENAL NODULE: ICD-10-CM

## 2024-04-01 DIAGNOSIS — D50.9 IRON DEFICIENCY ANEMIA, UNSPECIFIED IRON DEFICIENCY ANEMIA TYPE: ICD-10-CM

## 2024-04-01 LAB — GLUCOSE SERPL-MCNC: 234 MG/DL (ref 65–140)

## 2024-04-01 PROCEDURE — 82948 REAGENT STRIP/BLOOD GLUCOSE: CPT

## 2024-04-01 PROCEDURE — 88305 TISSUE EXAM BY PATHOLOGIST: CPT | Performed by: PATHOLOGY

## 2024-04-01 RX ORDER — SODIUM CHLORIDE, SODIUM LACTATE, POTASSIUM CHLORIDE, CALCIUM CHLORIDE 600; 310; 30; 20 MG/100ML; MG/100ML; MG/100ML; MG/100ML
INJECTION, SOLUTION INTRAVENOUS CONTINUOUS PRN
Status: DISCONTINUED | OUTPATIENT
Start: 2024-04-01 | End: 2024-04-01

## 2024-04-01 RX ORDER — CHLORHEXIDINE GLUCONATE ORAL RINSE 1.2 MG/ML
15 SOLUTION DENTAL 2 TIMES DAILY
COMMUNITY

## 2024-04-01 RX ORDER — PROPOFOL 10 MG/ML
INJECTION, EMULSION INTRAVENOUS AS NEEDED
Status: DISCONTINUED | OUTPATIENT
Start: 2024-04-01 | End: 2024-04-01

## 2024-04-01 RX ORDER — EPHEDRINE SULFATE 50 MG/ML
INJECTION INTRAVENOUS AS NEEDED
Status: DISCONTINUED | OUTPATIENT
Start: 2024-04-01 | End: 2024-04-01

## 2024-04-01 RX ORDER — LIDOCAINE HYDROCHLORIDE 10 MG/ML
INJECTION, SOLUTION EPIDURAL; INFILTRATION; INTRACAUDAL; PERINEURAL AS NEEDED
Status: DISCONTINUED | OUTPATIENT
Start: 2024-04-01 | End: 2024-04-01

## 2024-04-01 RX ADMIN — EPHEDRINE SULFATE 10 MG: 50 INJECTION INTRAVENOUS at 11:19

## 2024-04-01 RX ADMIN — PROPOFOL 50 MG: 10 INJECTION, EMULSION INTRAVENOUS at 11:21

## 2024-04-01 RX ADMIN — PROPOFOL 30 MG: 10 INJECTION, EMULSION INTRAVENOUS at 11:24

## 2024-04-01 RX ADMIN — LIDOCAINE HYDROCHLORIDE 50 MG: 10 INJECTION, SOLUTION EPIDURAL; INFILTRATION; INTRACAUDAL at 11:21

## 2024-04-01 RX ADMIN — SODIUM CHLORIDE, SODIUM LACTATE, POTASSIUM CHLORIDE, AND CALCIUM CHLORIDE: .6; .31; .03; .02 INJECTION, SOLUTION INTRAVENOUS at 11:15

## 2024-04-01 NOTE — ANESTHESIA PREPROCEDURE EVALUATION
Procedure:  EGD    Relevant Problems   ANESTHESIA (within normal limits)      CARDIO   (+) Chest pain   (+) Coronary artery disease involving native coronary artery of native heart without angina pectoris   (+) Essential hypertension   (+) Hyperlipidemia   (+) Type 2 MI (myocardial infarction) (HCC)      ENDO   (+) Type 2 diabetes mellitus with diabetic neuropathy (HCC)   (+) Type 2 diabetes mellitus with hyperglycemia, with long-term current use of insulin (HCC)      GI/HEPATIC   (+) Pancreas cyst      /RENAL   (+) BPH (benign prostatic hyperplasia)   (+) Nephrolithiasis   (+) Stage 3a chronic kidney disease (HCC)      HEMATOLOGY   (+) Acute blood loss anemia   (+) Anemia   (+) Iron deficiency anemia      NEURO/PSYCH   (+) Type 2 diabetes mellitus with diabetic neuropathy (HCC)        Cardiac Cath 3/7/2022  3V CAD, moderate RCA, abnormal iFR LAD  Brief attempt crossing large OM branch with workhouse wires unsuccessful     Recommendation    Suggest cardiac surgery eval cabg, grafts to consider LAD, OM and RCA is borderline       TTE 3/5/2022    Left Ventricle: Left ventricular cavity size is normal. Wall thickness is mildly increased. The left ventricular ejection fraction is 55%. Systolic function is normal. Diastolic function is normal.    The following segments are akinetic: basal inferior, mid inferior, mid inferolateral and apical lateral.    The following segments are hypokinetic: basal inferolateral and apical inferior.    All other segments are normal.    Mitral Valve: There is mild regurgitation.      Physical Exam    Airway    Mallampati score: III  TM Distance: >3 FB  Neck ROM: full     Dental   Comment: Overall poor dentition; numerous missing teeth; denies loose teeth     Cardiovascular      Pulmonary      Other Findings        Anesthesia Plan  ASA Score- 3     Anesthesia Type- IV sedation with anesthesia with ASA Monitors.         Additional Monitors:     Airway Plan:     Comment: Patient AAOx3 at  the time of my exam.  Discussed the patient's current condition and mental status with Dr. Fuentes  I recommended to the patient that he appoint a POA in the event he is not able to consent for himself in the future..       Plan Factors-Exercise tolerance (METS): >4 METS.    Chart reviewed. EKG reviewed.  Existing labs reviewed. Patient summary reviewed.    Patient is not a current smoker.              Induction- intravenous.    Postoperative Plan-     Informed Consent- Anesthetic plan and risks discussed with patient.  I personally reviewed this patient with the CRNA. Discussed and agreed on the Anesthesia Plan with the CRNA..

## 2024-04-01 NOTE — H&P
History and Physical -  [facility]  Tha Weems 73 y.o. male MRN: [id number]                  HPI: Tha Weems is a 73 y.o. year old male who presents for prior pyloric gland adenoma.  Hemoglobin 8.5, platelets 465.  INR normal      REVIEW OF SYSTEMS: Per the HPI, and otherwise unremarkable.    Historical Information   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    Dizziness     and giddiness    Dysphagia     Elevated PSA     Facial weakness     GERD (gastroesophageal reflux disease)     last assessed 5/20/16    Gross hematuria     Hematuria     Hyperlipidemia     Hypertension     Kidney disease     Kidney stone     Left-sided weakness     Long term (current) use of oral hypoglycemic drugs     Muscle weakness     Nuclear senile cataract of left eye     OA (osteoarthritis) of knee     b/l    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HCC)     Syncope and collapse     Tachycardia     UTI (urinary tract infection)     Vertebro-basilar artery syndrome     Vitamin B deficiency     Vitamin D deficiency     Vitamin D deficiency      Past Surgical History:   Procedure Laterality Date    CARDIAC CATHETERIZATION N/A 3/7/2022    Procedure: CARDIAC CATHETERIZATION;  Surgeon: Pako Reid DO;  [facility];  Service: Cardiology    CATARACT EXTRACTION      CHOLECYSTECTOMY      KNEE SURGERY      NJ LITHOLAPAXY SMPL/SM <2.5  CM N/A 1/4/2021    Procedure: Cystolitholopaxy w/laser bladder stones;  Surgeon: Kyle Jones MD;  Location: [facility];  Service: Urology    TN TRURL ELECTROSURG RESCJ PROSTATE BLEED COMPLETE N/A 1/4/2021    Procedure: T.U.R.P.;  Surgeon: Kyle Jones MD;  Location: [facility];  Service: Urology     Social History   Social History     Substance and Sexual Activity   Alcohol Use Not Currently     Social History     Substance and Sexual Activity   Drug Use No     Social History     Tobacco Use   Smoking Status Never   Smokeless Tobacco Never     Family History   Problem Relation Age of Onset    Coronary artery disease Mother     Diabetes Father        Meds/Allergies     (Not in a hospital admission)      No Known Allergies    Objective     There were no vitals taken for this visit.      PHYSICAL EXAMINATION:    General Appearance:   Alert, cooperative, no distress   HEENT:  Normocephalic, atraumatic, anicteric. Neck supple, symmetrical, trachea midline.   Lungs:   Equal chest rise and unlabored breathing, normal effort, no coughing.   Cardiovascular:   No visualized JVD.   Abdomen:   No abdominal distension.   Skin:   No jaundice, rashes, or lesions.    Musculoskeletal:   Normal range of motion visualized.   Psych:  Normal affect and normal insight.   Neuro:  Alert and appropriate.           ASSESSMENT/PLAN:  This is a 73 y.o. year old male here for EGD, and he is stable and optimized for his procedure.

## 2024-04-03 PROCEDURE — 88305 TISSUE EXAM BY PATHOLOGIST: CPT | Performed by: PATHOLOGY

## 2024-04-10 ENCOUNTER — TELEPHONE (OUTPATIENT)
Dept: GASTROENTEROLOGY | Facility: CLINIC | Age: 74
End: 2024-04-10

## 2024-04-10 ENCOUNTER — APPOINTMENT (EMERGENCY)
Dept: CT IMAGING | Facility: HOSPITAL | Age: 74
End: 2024-04-10
Payer: MEDICARE

## 2024-04-10 ENCOUNTER — HOSPITAL ENCOUNTER (EMERGENCY)
Facility: HOSPITAL | Age: 74
Discharge: HOME | End: 2024-04-10
Attending: EMERGENCY MEDICINE
Payer: MEDICARE

## 2024-04-10 VITALS
OXYGEN SATURATION: 95 % | RESPIRATION RATE: 18 BRPM | HEART RATE: 87 BPM | SYSTOLIC BLOOD PRESSURE: 159 MMHG | TEMPERATURE: 98.1 F | DIASTOLIC BLOOD PRESSURE: 75 MMHG
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
## 2024-04-10 DIAGNOSIS — S09.90XA CLOSED HEAD INJURY, INITIAL ENCOUNTER: Primary | ICD-10-CM

## 2024-04-10 DIAGNOSIS — R73.9 HYPERGLYCEMIA: ICD-10-CM

## 2024-04-10 LAB
ALBUMIN SERPL BCP-MCNC: 3.9 G/DL (ref 3.5–5)
ALP SERPL-CCNC: 83 U/L (ref 34–104)
ALT SERPL W P-5'-P-CCNC: 4 U/L (ref 7–52)
ANION GAP SERPL CALCULATED.3IONS-SCNC: 10 MMOL/L (ref 4–13)
AST SERPL W P-5'-P-CCNC: 10 U/L (ref 13–39)
BASOPHILS # BLD AUTO: 0.05 THOUSANDS/ÂΜL (ref 0–0.1)
BASOPHILS NFR BLD AUTO: 0 % (ref 0–1)
BILIRUB SERPL-MCNC: 0.45 MG/DL (ref 0.2–1)
BUN SERPL-MCNC: 21 MG/DL (ref 5–25)
CALCIUM SERPL-MCNC: 9 MG/DL (ref 8.4–10.2)
CHLORIDE SERPL-SCNC: 99 MMOL/L (ref 96–108)
CO2 SERPL-SCNC: 27 MMOL/L (ref 21–32)
CREAT SERPL-MCNC: 1.41 MG/DL (ref 0.6–1.3)
EOSINOPHIL # BLD AUTO: 0.3 THOUSAND/ÂΜL (ref 0–0.61)
EOSINOPHIL NFR BLD AUTO: 2 % (ref 0–6)
ERYTHROCYTE [DISTWIDTH] IN BLOOD BY AUTOMATED COUNT: 21.7 % (ref 11.6–15.1)
GFR SERPL CREATININE-BSD FRML MDRD: 49 ML/MIN/1.73SQ M
GLUCOSE SERPL-MCNC: 390 MG/DL (ref 65–140)
HCT VFR BLD AUTO: 39.1 % (ref 36.5–49.3)
HGB BLD-MCNC: 12.1 G/DL (ref 12–17)
IMM GRANULOCYTES # BLD AUTO: 0.15 THOUSAND/UL (ref 0–0.2)
IMM GRANULOCYTES NFR BLD AUTO: 1 % (ref 0–2)
LYMPHOCYTES # BLD AUTO: 1.42 THOUSANDS/ÂΜL (ref 0.6–4.47)
LYMPHOCYTES NFR BLD AUTO: 9 % (ref 14–44)
MCH RBC QN AUTO: 25.2 PG (ref 26.8–34.3)
MCHC RBC AUTO-ENTMCNC: 30.9 G/DL (ref 31.4–37.4)
MCV RBC AUTO: 81 FL (ref 82–98)
MONOCYTES # BLD AUTO: 1.16 THOUSAND/ÂΜL (ref 0.17–1.22)
MONOCYTES NFR BLD AUTO: 8 % (ref 4–12)
NEUTROPHILS # BLD AUTO: 11.99 THOUSANDS/ÂΜL (ref 1.85–7.62)
NEUTS SEG NFR BLD AUTO: 80 % (ref 43–75)
NRBC BLD AUTO-RTO: 0 /100 WBCS
PLATELET # BLD AUTO: 291 THOUSANDS/UL (ref 149–390)
PMV BLD AUTO: 11.2 FL (ref 8.9–12.7)
POTASSIUM SERPL-SCNC: 4.4 MMOL/L (ref 3.5–5.3)
PROT SERPL-MCNC: 6.8 G/DL (ref 6.4–8.4)
RBC # BLD AUTO: 4.81 MILLION/UL (ref 3.88–5.62)
SODIUM SERPL-SCNC: 136 MMOL/L (ref 135–147)
WBC # BLD AUTO: 15.07 THOUSAND/UL (ref 4.31–10.16)

## 2024-04-10 PROCEDURE — 99284 EMERGENCY DEPT VISIT MOD MDM: CPT

## 2024-04-10 PROCEDURE — 96360 HYDRATION IV INFUSION INIT: CPT

## 2024-04-10 PROCEDURE — 70450 CT HEAD/BRAIN W/O DYE: CPT

## 2024-04-10 PROCEDURE — 36415 COLL VENOUS BLD VENIPUNCTURE: CPT | Performed by: EMERGENCY MEDICINE

## 2024-04-10 PROCEDURE — 72125 CT NECK SPINE W/O DYE: CPT

## 2024-04-10 PROCEDURE — 80053 COMPREHEN METABOLIC PANEL: CPT | Performed by: EMERGENCY MEDICINE

## 2024-04-10 PROCEDURE — 93005 ELECTROCARDIOGRAM TRACING: CPT

## 2024-04-10 PROCEDURE — 85025 COMPLETE CBC W/AUTO DIFF WBC: CPT | Performed by: EMERGENCY MEDICINE

## 2024-04-10 RX ADMIN — SODIUM CHLORIDE 500 ML: 0.9 INJECTION, SOLUTION INTRAVENOUS at 22:15

## 2024-04-10 NOTE — TELEPHONE ENCOUNTER
----- Message from Monica Suarez MD sent at 4/3/2024  1:13 PM EDT -----  Hi,    Can you please call the patient or where he is staying and let him know that his biopsies came back and there was some irritation/inflammation in the stomach and esophagus but overall nothing of concern.    Thank you!

## 2024-04-10 NOTE — TELEPHONE ENCOUNTER
I was able to speak with brodie from the senior living home where pt is currently staying. Pt was not present but Brodie will rely message to pt and if pt has any questions or concerns will reach out.

## 2024-04-11 NOTE — ED NOTES
[facility] will arrive at approx 2310 to transport pt back to [facility].      Sangeeta Negrete RN  04/10/24 [id number]

## 2024-04-11 NOTE — ED ATTENDING ATTESTATION
4/10/2024  I, Andrea Wagner MD, saw and evaluated the patient. I have discussed the patient with the resident/non-physician practitioner and agree with the resident's/non-physician practitioner's findings, Plan of Care, and MDM as documented in the resident's/non-physician practitioner's note, except where noted. All available labs and Radiology studies were reviewed.  I was present for key portions of any procedure(s) performed by the resident/non-physician practitioner and I was immediately available to provide assistance.       At this point I agree with the current assessment done in the Emergency Department.  I have conducted an independent evaluation of this patient a history and physical is as follows: Mechanical fall, head strike on aspirin.  Patient denies additional areas of concern.  CT head and cervical spine, basic laboratory studies.  Patient denies any pain is in his extremities.  Denies any syncope or presyncopal symptoms.    CT head and cervical spine with no acute fracture.  Laboratory studies with no acute emergent abnormalities.  Blood sugar elevated, nonfasting, no DKA.     Patient given gentle IV fluids, continue to be asymptomatic, stable at time of discharge home.    Results Reviewed       Procedure Component Value Units Date/Time    Comprehensive metabolic panel [[id number]]  (Abnormal) Collected: 04/10/24 2125    Lab Status: Final result Specimen: Blood from Arm, Right Updated: 04/10/24 2204     Sodium 136 mmol/L      Potassium 4.4 mmol/L      Chloride 99 mmol/L      CO2 27 mmol/L      ANION GAP 10 mmol/L      BUN 21 mg/dL      Creatinine 1.41 mg/dL      Glucose 390 mg/dL      Calcium 9.0 mg/dL      AST 10 U/L      ALT 4 U/L      Alkaline Phosphatase 83 U/L      Total Protein 6.8 g/dL      Albumin 3.9 g/dL      Total Bilirubin 0.45 mg/dL      eGFR 49 ml/min/1.73sq m     Narrative:      National Kidney Disease Foundation guidelines for Chronic Kidney Disease (CKD):     Stage 1 with  normal or high GFR (GFR > 90 mL/min/1.73 square meters)    Stage 2 Mild CKD (GFR = 60-89 mL/min/1.73 square meters)    Stage 3A Moderate CKD (GFR = 45-59 mL/min/1.73 square meters)    Stage 3B Moderate CKD (GFR = 30-44 mL/min/1.73 square meters)    Stage 4 Severe CKD (GFR = 15-29 mL/min/1.73 square meters)    Stage 5 End Stage CKD (GFR <15 mL/min/1.73 square meters)  Note: GFR calculation is accurate only with a steady state creatinine    CBC and differential [[id number]]  (Abnormal) Collected: 04/10/24 2125    Lab Status: Final result Specimen: Blood from Arm, Right Updated: 04/10/24 2138     WBC 15.07 Thousand/uL      RBC 4.81 Million/uL      Hemoglobin 12.1 g/dL      Hematocrit 39.1 %      MCV 81 fL      MCH 25.2 pg      MCHC 30.9 g/dL      RDW 21.7 %      MPV 11.2 fL      Platelets 291 Thousands/uL      nRBC 0 /100 WBCs      Segmented % 80 %      Immature Grans % 1 %      Lymphocytes % 9 %      Monocytes % 8 %      Eosinophils Relative 2 %      Basophils Relative 0 %      Absolute Neutrophils 11.99 Thousands/µL      Absolute Immature Grans 0.15 Thousand/uL      Absolute Lymphocytes 1.42 Thousands/µL      Absolute Monocytes 1.16 Thousand/µL      Eosinophils Absolute 0.30 Thousand/µL      Basophils Absolute 0.05 Thousands/µL           CT head without contrast   Final Result by Caridad Lambert MD (04/10 2138)      No acute intracranial abnormality.      The study was marked in EPIC for immediate notification.            Workstation performed: [id number]         CT cervical spine without contrast   Final Result by Caridad Lambert MD (04/10 2137)      No cervical spine fracture or traumatic malalignment.      The study was marked in EPIC for immediate notification.            Workstation performed: [id number]               ED Course         Critical Care Time  CriticalCare Time    Date/Time: 4/10/2024 9:35 PM    Performed by: Andrea Wagner MD  Authorized by: Andrea Wagner MD    Critical care  provider statement:     Critical care time (minutes):  30    Critical care time was exclusive of:  Separately billable procedures and treating other patients and teaching time    Critical care was necessary to treat or prevent imminent or life-threatening deterioration of the following conditions:  Trauma    Critical care was time spent personally by me on the following activities:  Examination of patient, evaluation of patient's response to treatment, ordering and review of laboratory studies, ordering and review of radiographic studies and re-evaluation of patient's condition    I assumed direction of critical care for this patient from another provider in my specialty: no    Comments:      Fall, head strike, aspirin use, trauma level C requiring immediate bedside presence for further evaluation, treatment, advanced imaging, reassessment.

## 2024-04-11 NOTE — DISCHARGE INSTRUCTIONS
Follow-up with pcp this coming week. Continue hydration at home. Continue medications as prescribed. Please return to the ED as needed for new/worsening symptoms.

## 2024-04-11 NOTE — ED PROVIDER NOTES
"History  Chief Complaint   Patient presents with    Fall     Pt arrives to ED via EMS from [facility] in [place]. Pt was walking with walker when he lost his balance, fell, and hit his head on dresser. -LOC, +ASA. Pt denies HA & dizziness.      HPI Pt is a 72 y/o male presenting to the ED s/p fall at nursing home, lost balance standing out of bed and struck back of head on dresser.On ASA. He denies any symptoms. No recent illness. Walks with walker at baseline. Hx type II DM.    Prior to Admission Medications   Prescriptions Last Dose Informant Patient Reported? Taking?   ACCU-CHEK GUIDE test strip  Outside Facility (Specify), Self Yes No   Si strip as needed   Alcohol Swabs (PRO COMFORT ALCOHOL) 70 % PADS  Outside Facility (Specify), Self Yes No   Si pad daily Use a pad when testing   Easy Comfort Lancets MISC  Outside Facility (Specify), Self Yes No   Sig: as needed   Glucagon, rDNA, (GLUCAGON EMERGENCY IJ)  Self Yes No   Sig: Inject 1 mL as directed Sugars less than 60 and conscious   Glucose 15 g PACK  Self Yes No   Sig: Take 15 g by mouth Per [facility] \"give 15 gram PO every 15 minutes PRN for hypoglycemia\"   NovoFine Autocover 30G X 8 MM MISC  Self Yes No   acetaminophen (TYLENOL) 325 mg tablet  Self Yes No   Sig: Take 650 mg by mouth every 6 (six) hours as needed for mild pain   amoxicillin (AMOXIL) 500 mg capsule  Self Yes No   aspirin 81 mg chewable tablet  Self No No   Sig: Chew 1 tablet (81 mg total) daily   atorvastatin (LIPITOR) 40 mg tablet  Self No No   Sig: Take 1 tablet (40 mg total) by mouth daily   carbidopa-levodopa (SINEMET)  mg per tablet   No No   Sig: Take 1 tablet by mouth 3 (three) times a day   chlorhexidine (PERIDEX) 0.12 % solution  Outside Facility (Specify) Yes No   Sig: Apply 15 mL to the mouth or throat 2 (two) times a day   cholecalciferol (VITAMIN D3) 1,000 units tablet  Self No No   Sig: Take 50 tablets (50,000 Units total) by mouth every 30 (thirty) days " On the  of the month   cyanocobalamin (VITAMIN B-12) 1000 MCG tablet  Outside Facility (Specify), Self No No   Sig: Take 1 tablet (1,000 mcg total) by mouth daily   docusate sodium (COLACE) 100 mg capsule  Outside Facility (Specify), Self No No   Sig: Take 1 capsule (100 mg total) by mouth 2 (two) times a day   famotidine (PEPCID) 20 mg tablet  Self No No   Sig: Take 1 tablet (20 mg total) by mouth daily at bedtime   ferrous gluconate (FERGON) 324 mg tablet   No No   Sig: Take 1 tablet (324 mg total) by mouth daily before breakfast Do not start before 2023.   finasteride (PROSCAR) 5 mg tablet  Outside Facility (Specify), Self No No   Sig: Take 1 tablet (5 mg total) by mouth daily   glucose blood test strip  Outside Facility (Specify), Self Yes No   Si strip as needed   glycerin-hypromellose- (ARTIFICIAL TEARS) 0.2-0.2-1 % SOLN  Self No No   Sig: Administer 1 drop to both eyes 2 (two) times a day as needed (for dry eyes)   insulin glargine (LANTUS) 100 units/mL subcutaneous injection  Self No No   Sig: Inject 30 Units under the skin daily at bedtime   Patient taking differently: Inject 26 Units under the skin daily at bedtime   insulin lispro (HumaLOG) 100 units/mL injection  Self No No   Sig: Inject 10 Units under the skin 3 (three) times a day with meals   lidocaine (XYLOCAINE) 5 % ointment  Self No No   Sig: Apply topically 2 (two) times a day as needed for mild pain or moderate pain   lisinopril (ZESTRIL) 5 mg tablet  Self No No   Sig: Take 1 tablet (5 mg total) by mouth daily   melatonin 3 mg  Self Yes No   Sig: Take 3 mg by mouth daily at bedtime Give 2 tabs at bed time for insomnia per [facility]   metFORMIN (GLUCOPHAGE) 1000 MG tablet   Yes No   Sig: Take 1,000 mg by mouth 2 (two) times a day with meals   metoprolol succinate (TOPROL-XL) 25 mg 24 hr tablet  Self No No   Sig: Take 1 tablet (25 mg total) by mouth daily   nystatin (MYCOSTATIN) powder  Self No No   Sig: Apply  topically 2 (two) times a day   oxybutynin (DITROPAN-XL) 5 mg 24 hr tablet   Yes No   Sig: Take 5 mg by mouth daily   pantoprazole (PROTONIX) 40 mg tablet   No No   Sig: TAKE 1 TABLET (40 MG TOTAL) BY MOUTH 2 (TWO) TIMES A DAY BEFORE MEALS   polyethylene glycol (MIRALAX) 17 g packet  Outside Facility (Specify), Self No No   Sig: Take 17 g by mouth daily   ranolazine (RANEXA) 500 mg 12 hr tablet  Self No No   Sig: Take 1 tablet (500 mg total) by mouth every 12 (twelve) hours   senna (SENOKOT) 8.6 mg  Self No No   Sig: Take 1 tablet (8.6 mg total) by mouth daily as needed for constipation   sulfamethoxazole-trimethoprim (BACTRIM DS) 800-160 mg per tablet  Self Yes No   tamsulosin (FLOMAX) 0.4 mg  Outside Facility (Specify), Self No No   Sig: Take 1 capsule (0.4 mg total) by mouth daily with dinner      Facility-Administered Medications: None       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    Dizziness     and giddiness    Dysphagia     Elevated PSA     Facial weakness     GERD (gastroesophageal reflux disease)     last assessed 5/20/16    Gross hematuria     Hematuria     Hyperlipidemia     Hypertension     Kidney disease     Kidney stone     Left-sided weakness     Long term (current) use of oral hypoglycemic drugs     Muscle weakness     Nuclear senile cataract of left eye     OA (osteoarthritis) of knee     b/l    Paralytic syndrome  (HCC)     Syncope and collapse     Tachycardia     UTI (urinary tract infection)     Vertebro-basilar artery syndrome     Vitamin B deficiency     Vitamin D deficiency     Vitamin D deficiency        Past Surgical History:   Procedure Laterality Date    CARDIAC CATHETERIZATION N/A 3/7/2022    Procedure: CARDIAC CATHETERIZATION;  Surgeon: Pako Reid DO;  [facility]: [facility];  Service: Cardiology    CATARACT EXTRACTION      CHOLECYSTECTOMY      KNEE SURGERY      MD LITHOLAPAXY SMPL/SM <2.5 CM N/A 1/4/2021    Procedure: Cystolitholopaxy w/laser bladder stones;  Surgeon: Kyle Jones MD;  Location: [facility];  Service: Urology    MD TRURL ELECTROSURG RESCJ PROSTATE BLEED COMPLETE N/A 1/4/2021    Procedure: T.U.R.P.;  Surgeon: Kyle Jones MD;  Location: [facility];  Service: Urology       Family History   Problem Relation Age of Onset    Coronary artery disease Mother     Diabetes Father      I have reviewed and agree with the history as documented.    E-Cigarette/Vaping    E-Cigarette Use Never User      E-Cigarette/Vaping Substances    Nicotine No     THC No     CBD No      Social History     Tobacco Use    Smoking status: Never    Smokeless tobacco: Never   Vaping Use    Vaping status: Never Used   Substance Use Topics    Alcohol use: Not Currently    Drug use: No        Review of Systems   Constitutional: Negative.    HENT: Negative.     Respiratory: Negative.     Cardiovascular: Negative.    Gastrointestinal: Negative.    Endocrine: Negative.    Genitourinary: Negative.    Musculoskeletal: Negative.    Skin: Negative.    Neurological: Negative.    Hematological: Negative.    Psychiatric/Behavioral: Negative.     All other systems reviewed and are negative.      Physical Exam  ED Triage Vitals   Temperature Pulse Respirations Blood Pressure SpO2   04/10/24 2100 04/10/24 2100 04/10/24 2100 04/10/24 2100 04/10/24 2100   98.1 °F (36.7 °C) 91 18 150/69 97 %      Temp Source Heart Rate Source  Patient Position - Orthostatic VS BP Location FiO2 (%)   04/10/24 2100 04/10/24 2100 04/10/24 2100 04/10/24 2215 --   Oral Monitor Sitting Left arm       Pain Score       --                    Orthostatic Vital Signs  Vitals:    04/10/24 2130 04/10/24 2145 04/10/24 2200 04/10/24 2215   BP: 140/66 149/72 157/74 159/75   Pulse: 90 88 87 87   Patient Position - Orthostatic VS: Sitting  Sitting Sitting       Physical Exam  Vitals and nursing note reviewed.   Constitutional:       General: He is not in acute distress.     Appearance: Normal appearance. He is well-developed. He is not ill-appearing, toxic-appearing or diaphoretic.   HENT:      Head: Normocephalic and atraumatic.      Nose: Nose normal.      Mouth/Throat:      Mouth: Mucous membranes are moist.      Pharynx: Oropharynx is clear.   Eyes:      Extraocular Movements: Extraocular movements intact.      Conjunctiva/sclera: Conjunctivae normal.      Pupils: Pupils are equal, round, and reactive to light.   Cardiovascular:      Rate and Rhythm: Normal rate and regular rhythm.      Pulses: Normal pulses.      Heart sounds: Normal heart sounds. No murmur heard.  Pulmonary:      Effort: Pulmonary effort is normal. No respiratory distress.      Breath sounds: Normal breath sounds. No stridor. No wheezing, rhonchi or rales.   Chest:      Chest wall: No tenderness.   Abdominal:      General: There is no distension.      Palpations: Abdomen is soft. There is no mass.      Tenderness: There is no abdominal tenderness. There is no right CVA tenderness, left CVA tenderness, guarding or rebound.      Hernia: No hernia is present.   Musculoskeletal:         General: No swelling, tenderness, deformity or signs of injury. Normal range of motion.      Cervical back: Normal range of motion and neck supple. No rigidity or tenderness.      Right lower leg: No edema.      Left lower leg: No edema.   Skin:     General: Skin is warm and dry.      Capillary Refill: Capillary refill  takes less than 2 seconds.      Coloration: Skin is not jaundiced or pale.      Findings: No bruising, erythema, lesion or rash.   Neurological:      General: No focal deficit present.      Mental Status: He is alert and oriented to person, place, and time.      Cranial Nerves: No cranial nerve deficit.      Sensory: No sensory deficit.      Motor: No weakness.      Coordination: Coordination normal.   Psychiatric:         Mood and Affect: Mood normal.         Behavior: Behavior normal.         ED Medications  Medications   sodium chloride 0.9 % bolus 500 mL (0 mL Intravenous Stopped 4/10/24 2326)       Diagnostic Studies  Results Reviewed       Procedure Component Value Units Date/Time    Comprehensive metabolic panel [[id number]]  (Abnormal) Collected: 04/10/24 2125    Lab Status: Final result Specimen: Blood from Arm, Right Updated: 04/10/24 2204     Sodium 136 mmol/L      Potassium 4.4 mmol/L      Chloride 99 mmol/L      CO2 27 mmol/L      ANION GAP 10 mmol/L      BUN 21 mg/dL      Creatinine 1.41 mg/dL      Glucose 390 mg/dL      Calcium 9.0 mg/dL      AST 10 U/L      ALT 4 U/L      Alkaline Phosphatase 83 U/L      Total Protein 6.8 g/dL      Albumin 3.9 g/dL      Total Bilirubin 0.45 mg/dL      eGFR 49 ml/min/1.73sq m     Narrative:      National Kidney Disease Foundation guidelines for Chronic Kidney Disease (CKD):     Stage 1 with normal or high GFR (GFR > 90 mL/min/1.73 square meters)    Stage 2 Mild CKD (GFR = 60-89 mL/min/1.73 square meters)    Stage 3A Moderate CKD (GFR = 45-59 mL/min/1.73 square meters)    Stage 3B Moderate CKD (GFR = 30-44 mL/min/1.73 square meters)    Stage 4 Severe CKD (GFR = 15-29 mL/min/1.73 square meters)    Stage 5 End Stage CKD (GFR <15 mL/min/1.73 square meters)  Note: GFR calculation is accurate only with a steady state creatinine    CBC and differential [[id number]]  (Abnormal) Collected: 04/10/24 2125    Lab Status: Final result Specimen: Blood from Arm, Right Updated:  04/10/24 2138     WBC 15.07 Thousand/uL      RBC 4.81 Million/uL      Hemoglobin 12.1 g/dL      Hematocrit 39.1 %      MCV 81 fL      MCH 25.2 pg      MCHC 30.9 g/dL      RDW 21.7 %      MPV 11.2 fL      Platelets 291 Thousands/uL      nRBC 0 /100 WBCs      Segmented % 80 %      Immature Grans % 1 %      Lymphocytes % 9 %      Monocytes % 8 %      Eosinophils Relative 2 %      Basophils Relative 0 %      Absolute Neutrophils 11.99 Thousands/µL      Absolute Immature Grans 0.15 Thousand/uL      Absolute Lymphocytes 1.42 Thousands/µL      Absolute Monocytes 1.16 Thousand/µL      Eosinophils Absolute 0.30 Thousand/µL      Basophils Absolute 0.05 Thousands/µL                    CT head without contrast   Final Result by Caridad Lambert MD (04/10 2138)      No acute intracranial abnormality.      The study was marked in EPIC for immediate notification.            Workstation performed: [id number]         CT cervical spine without contrast   Final Result by Caridad Lambert MD (04/10 2137)      No cervical spine fracture or traumatic malalignment.      The study was marked in EPIC for immediate notification.            Workstation performed: [id number]               Procedures  ECG 12 Lead Documentation Only    Date/Time: 4/10/2024 9:51 PM    Performed by: Baudilio Gonzalez DO  Authorized by: Baudilio Gonzalez DO    Indications / Diagnosis:  Trauma c, asymptomatic  ECG reviewed by me, the ED Provider: yes    Patient location:  ED  Previous ECG:     Previous ECG:  Compared to current    Similarity:  No change    Comparison to cardiac monitor: Yes    Interpretation:     Interpretation: non-specific    Quality:     Tracing quality:  Limited by artifact  Rate:     ECG rate:  89    ECG rate assessment: normal    Rhythm:     Rhythm: sinus rhythm    Ectopy:     Ectopy: none    QRS:     QRS axis:  Normal    QRS intervals:  Normal  Conduction:     Conduction: abnormal      Abnormal conduction: complete RBBB     ST segments:     ST segments:  Non-specific  T waves:     T waves: non-specific          ED Course  ED Course as of 04/13/24 0122   Wed Apr 10, 2024   2307 Pt is asymptomatic, no acute distress, and hemodynamically stable. 500ml NS bolus due to hyperglycemia. Imaging negative. EKG unchanged from prior without symptoms. He is agreeable to plan for discharge, return precautions, and pcp f/u.                             SBIRT 22yo+      Flowsheet Row Most Recent Value   Initial Alcohol Screen: US AUDIT-C     1. How often do you have a drink containing alcohol? 0 Filed at: 04/10/2024 2100   2. How many drinks containing alcohol do you have on a typical day you are drinking?  0 Filed at: 04/10/2024 2100   3a. Male UNDER 65: How often do you have five or more drinks on one occasion? 0 Filed at: 04/10/2024 2100   3b. FEMALE Any Age, or MALE 65+: How often do you have 4 or more drinks on one occassion? 0 Filed at: 04/10/2024 2100   Audit-C Score 0 Filed at: 04/10/2024 2100   BANDAR: How many times in the past year have you...    Used an illegal drug or used a prescription medication for non-medical reasons? Never Filed at: 04/10/2024 2100                  Medical Decision Making  DDx including but not limited to: intracranial injury, concussion, cervical injury, intrathoracic injury, intraabdominal injury, extremity injury--fracture, dislocation, strain, sprain, contusion.      74 y/o male presenting to the ED s/p fall at nursing home, lost balance standing out of bed and struck back of head on dresser.On ASA. He denies any symptoms. No recent illness. Walks with walker at baseline. Hx type II DM.    Pt is asymptomatic, no acute distress, and hemodynamically stable. 500ml NS bolus due to hyperglycemia. Imaging negative. EKG unchanged from prior without symptoms. He is agreeable to plan for discharge, return precautions, and pcp f/u.      Amount and/or Complexity of Data Reviewed  Labs: ordered.  Radiology:  ordered.          Disposition  Final diagnoses:   Closed head injury, initial encounter   Fall, initial encounter   Hyperglycemia     Time reflects when diagnosis was documented in both MDM as applicable and the Disposition within this note       Time User Action Codes Description Comment    4/10/2024 10:10 PM Baudilio Gonzalez Add [S09.90XA] Closed head injury, initial encounter     4/10/2024 10:12 PM Baudilio Gonzalez Add [W19.XXXA] Fall, initial encounter     4/10/2024 10:13 PM Baudilio Gonzalez Add [R73.9] Hyperglycemia           ED Disposition       ED Disposition   Discharge    Condition   Stable    Date/Time   Wed Apr 10, 2024 2210    Comment   Tha Weems discharge to home/self care.                   Follow-up Information       Follow up With Specialties Details Why Contact Info Additional Information    Geovanna Fuentes MD Palliative Care In 1 week As needed [address] [facility] [address]  [phone]       [facility] Emergency Department Emergency Medicine  As needed [address] [facility] [address]  [phone] [facility] Emergency Department, [facility] [place], [place], [address]            Discharge Medication List as of 4/10/2024 10:15 PM        CONTINUE these medications which have NOT CHANGED    Details   !! ACCU-CHEK GUIDE test strip 1 strip as needed, Starting Tue 6/16/2020, Historical Med      acetaminophen (TYLENOL) 325 mg tablet Take 650 mg by mouth every 6 (six) hours as needed for mild pain, Historical Med      Alcohol Swabs (PRO COMFORT ALCOHOL) 70 % PADS 1 pad daily Use a pad when testing, Starting Tue 6/16/2020, Historical Med      amoxicillin (AMOXIL) 500 mg capsule Historical Med      aspirin 81 mg chewable tablet Chew 1 tablet (81 mg total) daily, Starting Thu 3/10/2022, Normal      atorvastatin (LIPITOR) 40 mg tablet Take 1 tablet (40 mg total) by mouth daily, Starting Thu 3/10/2022, Normal  "     carbidopa-levodopa (SINEMET)  mg per tablet Take 1 tablet by mouth 3 (three) times a day, Starting Thu 7/22/2021, Until Mon 4/1/2024, Normal      chlorhexidine (PERIDEX) 0.12 % solution Apply 15 mL to the mouth or throat 2 (two) times a day, Historical Med      cholecalciferol (VITAMIN D3) 1,000 units tablet Take 50 tablets (50,000 Units total) by mouth every 30 (thirty) days On the 15th of the month, Starting Sat 7/29/2023, No Print      cyanocobalamin (VITAMIN B-12) 1000 MCG tablet Take 1 tablet (1,000 mcg total) by mouth daily, Starting Tue 6/23/2020, No Print      docusate sodium (COLACE) 100 mg capsule Take 1 capsule (100 mg total) by mouth 2 (two) times a day, Starting Tue 6/23/2020, Normal      Easy Comfort Lancets MISC as needed, Starting Tue 6/16/2020, Historical Med      famotidine (PEPCID) 20 mg tablet Take 1 tablet (20 mg total) by mouth daily at bedtime, Starting Tue 4/5/2022, Print      ferrous gluconate (FERGON) 324 mg tablet Take 1 tablet (324 mg total) by mouth daily before breakfast Do not start before July 30, 2023., Starting Sun 7/30/2023, Until Mon 4/1/2024, Print      finasteride (PROSCAR) 5 mg tablet Take 1 tablet (5 mg total) by mouth daily, Starting Wed 5/13/2020, No Print      Glucagon, rDNA, (GLUCAGON EMERGENCY IJ) Inject 1 mL as directed Sugars less than 60 and conscious, Historical Med      Glucose 15 g PACK Take 15 g by mouth Per [facility] [place] [facility] \"give 15 gram PO every 15 minutes PRN for hypoglycemia\", Historical Med      !! glucose blood test strip 1 strip as needed, Historical Med      glycerin-hypromellose- (ARTIFICIAL TEARS) 0.2-0.2-1 % SOLN Administer 1 drop to both eyes 2 (two) times a day as needed (for dry eyes), Starting Thu 1/5/2023, Normal      insulin glargine (LANTUS) 100 units/mL subcutaneous injection Inject 30 Units under the skin daily at bedtime, Starting Sat 7/29/2023, Print      insulin lispro (HumaLOG) 100 units/mL injection Inject 10 Units under " the skin 3 (three) times a day with meals, Starting Sat 7/29/2023, Print      lidocaine (XYLOCAINE) 5 % ointment Apply topically 2 (two) times a day as needed for mild pain or moderate pain, Starting Sun 8/6/2023, Normal      lisinopril (ZESTRIL) 5 mg tablet Take 1 tablet (5 mg total) by mouth daily, Starting Wed 4/6/2022, No Print      melatonin 3 mg Take 3 mg by mouth daily at bedtime Give 2 tabs at bed time for insomnia per [facility], Historical Med      metFORMIN (GLUCOPHAGE) 1000 MG tablet Take 1,000 mg by mouth 2 (two) times a day with meals, Historical Med      metoprolol succinate (TOPROL-XL) 25 mg 24 hr tablet Take 1 tablet (25 mg total) by mouth daily, Starting Wed 4/6/2022, No Print      NovoFine Autocover 30G X 8 MM MISC Starting Wed 12/9/2020, Historical Med      nystatin (MYCOSTATIN) powder Apply topically 2 (two) times a day, Starting Thu 1/5/2023, Normal      oxybutynin (DITROPAN-XL) 5 mg 24 hr tablet Take 5 mg by mouth daily, Historical Med      pantoprazole (PROTONIX) 40 mg tablet TAKE 1 TABLET (40 MG TOTAL) BY MOUTH 2 (TWO) TIMES A DAY BEFORE MEALS, Starting Tue 1/23/2024, Normal      polyethylene glycol (MIRALAX) 17 g packet Take 17 g by mouth daily, Starting Wed 6/3/2020, Normal      ranolazine (RANEXA) 500 mg 12 hr tablet Take 1 tablet (500 mg total) by mouth every 12 (twelve) hours, Starting Tue 4/5/2022, Print      senna (SENOKOT) 8.6 mg Take 1 tablet (8.6 mg total) by mouth daily as needed for constipation, Starting Wed 2/10/2021, Normal      sulfamethoxazole-trimethoprim (BACTRIM DS) 800-160 mg per tablet Historical Med      tamsulosin (FLOMAX) 0.4 mg Take 1 capsule (0.4 mg total) by mouth daily with dinner, Starting Wed 5/13/2020, No Print       !! - Potential duplicate medications found. Please discuss with provider.        No discharge procedures on file.    PDMP Review         Value Time User    PDMP Reviewed  Yes 8/9/2023 12:50 AM Syd Norman MD             ED  Provider  Attending physically available and evaluated Tha Weems. I managed the patient along with the ED Attending.    Electronically Signed by           Baudilio Gonzalez DO  04/13/24 0122

## 2024-04-12 LAB
ATRIAL RATE: 89 BPM
P AXIS: 63 DEGREES
PR INTERVAL: 144 MS
QRS AXIS: 62 DEGREES
QRSD INTERVAL: 130 MS
QT INTERVAL: 420 MS
QTC INTERVAL: 511 MS
T WAVE AXIS: 82 DEGREES
VENTRICULAR RATE: 89 BPM

## 2024-04-12 PROCEDURE — 93010 ELECTROCARDIOGRAM REPORT: CPT | Performed by: INTERNAL MEDICINE

## 2024-05-07 ENCOUNTER — TELEPHONE (OUTPATIENT)
Dept: GASTROENTEROLOGY | Facility: CLINIC | Age: 74
End: 2024-05-07

## 2024-05-07 DIAGNOSIS — K86.2 PANCREAS CYST: Primary | ICD-10-CM

## 2024-05-07 NOTE — TELEPHONE ENCOUNTER
Please call and inform patient to schedule CT of abdomen for evaluation  of pancreatic cysts.  Patient will also need repeat BUN and creatinine done because his previous creatinine was elevated and may not be able to do CT scan with contrast. Orders are in Epic. Thank you

## 2024-05-09 NOTE — TELEPHONE ENCOUNTER
Called and informed [facility] staff of orders and faxed them over per their request with detailed instructions.

## 2024-05-10 ENCOUNTER — HOSPITAL ENCOUNTER (EMERGENCY)
Facility: HOSPITAL | Age: 74
Discharge: HOME | End: 2024-05-11
Attending: EMERGENCY MEDICINE | Admitting: EMERGENCY MEDICINE
Payer: MEDICARE

## 2024-05-10 VITALS
HEART RATE: 80 BPM | OXYGEN SATURATION: 98 % | TEMPERATURE: 98.3 F | DIASTOLIC BLOOD PRESSURE: 63 MMHG | SYSTOLIC BLOOD PRESSURE: 138 MMHG | RESPIRATION RATE: 18 BRPM

## 2024-05-10 DIAGNOSIS — S91.209A AVULSION OF TOENAIL, INITIAL ENCOUNTER: Primary | ICD-10-CM

## 2024-05-10 PROCEDURE — 99283 EMERGENCY DEPT VISIT LOW MDM: CPT

## 2024-05-10 PROCEDURE — 99284 EMERGENCY DEPT VISIT MOD MDM: CPT

## 2024-05-11 NOTE — DISCHARGE INSTRUCTIONS
Please follow-up with your primary care provider or your podiatrist.  Keep the toe clean and dry.  Encourage you to keep it wrapped.  Hade your tetanus updated today.  Return to the ER if you see red streaking coming from the toe, drainage coming out of the toe or you have increased pain.

## 2024-05-11 NOTE — ED NOTES
Earliest p/u time at this moment is 0845 tomorrow. Pt resting comfortably in the stretcher with a soda. Pt cleaned up by staff.      Isabel Wall RN  05/10/24 5829

## 2024-05-11 NOTE — ED NOTES
Pt brief changed at this time. Skin care done. Bed in lowest position with call avery in place.     Suzi Martin RN  05/11/24 [id number]

## 2024-05-11 NOTE — ED PROVIDER NOTES
"History  Chief Complaint   Patient presents with    Nail Problem     Pt sent from facility for toe nail falling off on L foot, third toe. Nail is hanging off at thistime. T1DM. [id number] for ems. Pt has no complaints. Pt incontinent of urine on arrival, which is his baseline.     Tha Weems is a 73 y.o. male with a PMH of onychomycosis presenting to the ED on May 10, 2024 with nail problem.  Patient is coming from care facility via EMS as when they tried to get take his socks off for bed they noticed that his nail had ripped off.  The affected nail is the third toe on his left foot.  Patient denies any pain or trauma to the foot.  Patient is incontinent of urine on arrival which is his baseline.  He is pleasantly confused and has no complaints.          Prior to Admission Medications   Prescriptions Last Dose Informant Patient Reported? Taking?   ACCU-CHEK GUIDE test strip  Outside Facility (Specify), Self Yes No   Si strip as needed   Alcohol Swabs (PRO COMFORT ALCOHOL) 70 % PADS  Outside Facility (Specify), Self Yes No   Si pad daily Use a pad when testing   Easy Comfort Lancets MISC  Outside Facility (Specify), Self Yes No   Sig: as needed   Glucagon, rDNA, (GLUCAGON EMERGENCY IJ)  Self Yes No   Sig: Inject 1 mL as directed Sugars less than 60 and conscious   Glucose 15 g PACK  Self Yes No   Sig: Take 15 g by mouth Per [facility] \"give 15 gram PO every 15 minutes PRN for hypoglycemia\"   NovoFine Autocover 30G X 8 MM MISC  Self Yes No   acetaminophen (TYLENOL) 325 mg tablet  Self Yes No   Sig: Take 650 mg by mouth every 6 (six) hours as needed for mild pain   amoxicillin (AMOXIL) 500 mg capsule  Self Yes No   aspirin 81 mg chewable tablet  Self No No   Sig: Chew 1 tablet (81 mg total) daily   atorvastatin (LIPITOR) 40 mg tablet  Self No No   Sig: Take 1 tablet (40 mg total) by mouth daily   carbidopa-levodopa (SINEMET)  mg per tablet   No No   Sig: Take 1 tablet by mouth 3 (three) times a day "   chlorhexidine (PERIDEX) 0.12 % solution  Outside Facility (Specify) Yes No   Sig: Apply 15 mL to the mouth or throat 2 (two) times a day   cholecalciferol (VITAMIN D3) 1,000 units tablet  Self No No   Sig: Take 50 tablets (50,000 Units total) by mouth every 30 (thirty) days On the  of the month   cyanocobalamin (VITAMIN B-12) 1000 MCG tablet  Outside Facility (Specify), Self No No   Sig: Take 1 tablet (1,000 mcg total) by mouth daily   docusate sodium (COLACE) 100 mg capsule  Outside Facility (Specify), Self No No   Sig: Take 1 capsule (100 mg total) by mouth 2 (two) times a day   famotidine (PEPCID) 20 mg tablet  Self No No   Sig: Take 1 tablet (20 mg total) by mouth daily at bedtime   ferrous gluconate (FERGON) 324 mg tablet   No No   Sig: Take 1 tablet (324 mg total) by mouth daily before breakfast Do not start before 2023.   finasteride (PROSCAR) 5 mg tablet  Outside Facility (Specify), Self No No   Sig: Take 1 tablet (5 mg total) by mouth daily   glucose blood test strip  Outside Facility (Specify), Self Yes No   Si strip as needed   glycerin-hypromellose- (ARTIFICIAL TEARS) 0.2-0.2-1 % SOLN  Self No No   Sig: Administer 1 drop to both eyes 2 (two) times a day as needed (for dry eyes)   insulin glargine (LANTUS) 100 units/mL subcutaneous injection  Self No No   Sig: Inject 30 Units under the skin daily at bedtime   Patient taking differently: Inject 26 Units under the skin daily at bedtime   insulin lispro (HumaLOG) 100 units/mL injection  Self No No   Sig: Inject 10 Units under the skin 3 (three) times a day with meals   lidocaine (XYLOCAINE) 5 % ointment  Self No No   Sig: Apply topically 2 (two) times a day as needed for mild pain or moderate pain   lisinopril (ZESTRIL) 5 mg tablet  Self No No   Sig: Take 1 tablet (5 mg total) by mouth daily   melatonin 3 mg  Self Yes No   Sig: Take 3 mg by mouth daily at bedtime Give 2 tabs at bed time for insomnia per [facility]  (GLUCOPHAGE) 1000 MG tablet   Yes No   Sig: Take 1,000 mg by mouth 2 (two) times a day with meals   metoprolol succinate (TOPROL-XL) 25 mg 24 hr tablet  Self No No   Sig: Take 1 tablet (25 mg total) by mouth daily   nystatin (MYCOSTATIN) powder  Self No No   Sig: Apply topically 2 (two) times a day   oxybutynin (DITROPAN-XL) 5 mg 24 hr tablet   Yes No   Sig: Take 5 mg by mouth daily   pantoprazole (PROTONIX) 40 mg tablet   No No   Sig: TAKE 1 TABLET (40 MG TOTAL) BY MOUTH 2 (TWO) TIMES A DAY BEFORE MEALS   polyethylene glycol (MIRALAX) 17 g packet  Outside Facility (Specify), Self No No   Sig: Take 17 g by mouth daily   ranolazine (RANEXA) 500 mg 12 hr tablet  Self No No   Sig: Take 1 tablet (500 mg total) by mouth every 12 (twelve) hours   senna (SENOKOT) 8.6 mg  Self No No   Sig: Take 1 tablet (8.6 mg total) by mouth daily as needed for constipation   sulfamethoxazole-trimethoprim (BACTRIM DS) 800-160 mg per tablet  Self Yes No   tamsulosin (FLOMAX) 0.4 mg  Outside Facility (Specify), Self No No   Sig: Take 1 capsule (0.4 mg total) by mouth daily with dinner      Facility-Administered Medications: None       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    Dizziness     and giddiness    Dysphagia     Elevated PSA     Facial weakness     GERD (gastroesophageal reflux disease)     last assessed 5/20/16    Gross hematuria     Hematuria      Hyperlipidemia     Hypertension     Kidney disease     Kidney stone     Left-sided weakness     Long term (current) use of oral hypoglycemic drugs     Muscle weakness     Nuclear senile cataract of left eye     OA (osteoarthritis) of knee     b/l    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HCC)     Syncope and collapse     Tachycardia     UTI (urinary tract infection)     Vertebro-basilar artery syndrome     Vitamin B deficiency     Vitamin D deficiency     Vitamin D deficiency        Past Surgical History:   Procedure Laterality Date    CARDIAC CATHETERIZATION N/A 3/7/2022    Procedure: CARDIAC CATHETERIZATION;  Surgeon: Pako Reid DO;  [facility];  Service: Cardiology    CATARACT EXTRACTION      CHOLECYSTECTOMY      KNEE SURGERY      [place] LITHOLAPAXY SMPL/SM <2.5 CM N/A 1/4/2021    Procedure: Cystolitholopaxy w/laser bladder stones;  Surgeon: Kyle Jones MD;  [facility];  Service: Urology    [place] TRURL ELECTROSURG RESCJ PROSTATE BLEED COMPLETE N/A 1/4/2021    Procedure: T.U.R.P.;  Surgeon: Kyle Jones MD;  [facility];  Service: Urology       Family History   Problem Relation Age of Onset    Coronary artery disease Mother     Diabetes Father      I have reviewed and agree with the history as documented.    E-Cigarette/Vaping    E-Cigarette Use Never User      E-Cigarette/Vaping Substances    Nicotine No     THC No     CBD No      Social History     Tobacco Use    Smoking status: Never    Smokeless tobacco: Never   Vaping Use    Vaping status: Never Used   Substance Use Topics    Alcohol use: Not Currently    Drug use: No       Review of Systems   Unable to perform ROS: Dementia       Physical Exam  Physical Exam  Vitals and nursing note reviewed.   Constitutional:       General: He is not in acute distress.     Appearance: Normal appearance. He is normal weight. He is not ill-appearing, toxic-appearing or diaphoretic.   HENT:      Head: Normocephalic and atraumatic.      Right Ear:  External ear normal.      Left Ear: External ear normal.      Nose: Nose normal. No congestion or rhinorrhea.      Mouth/Throat:      Mouth: Mucous membranes are moist.   Eyes:      General: No scleral icterus.        Right eye: No discharge.         Left eye: No discharge.      Extraocular Movements: Extraocular movements intact.      Conjunctiva/sclera: Conjunctivae normal.   Cardiovascular:      Rate and Rhythm: Normal rate and regular rhythm.      Pulses:           Dorsalis pedis pulses are 2+ on the right side and 2+ on the left side.      Heart sounds: Normal heart sounds. No murmur heard.     No friction rub. No gallop.   Pulmonary:      Effort: Pulmonary effort is normal. No respiratory distress.      Breath sounds: Normal breath sounds. No wheezing, rhonchi or rales.   Abdominal:      General: Abdomen is flat.   Musculoskeletal:         General: No signs of injury. Normal range of motion.      Cervical back: Normal range of motion and neck supple. No rigidity.      Right foot: Normal range of motion.      Left foot: Normal range of motion.        Feet:    Feet:      Right foot:      Skin integrity: Dry skin present.      Toenail Condition: Right toenails are abnormally thick.      Left foot:      Skin integrity: Dry skin present.      Toenail Condition: Left toenails are abnormally thick.      Comments: Nail on third foot is hanging off entirely.  Nailbed is pink and bloody however not actively bleeding.  Patient has full ROM and denies pain.  Skin:     General: Skin is warm.      Capillary Refill: Capillary refill takes less than 2 seconds.      Coloration: Skin is not pale.      Findings: No bruising, erythema, lesion or rash.   Neurological:      General: No focal deficit present.      Mental Status: He is alert and oriented to person, place, and time. Mental status is at baseline.      Motor: No weakness.      Gait: Gait normal.   Psychiatric:         Mood and Affect: Mood normal.         Behavior:  Behavior normal.         Vital Signs  ED Triage Vitals [05/10/24 2025]   Temperature Pulse Respirations Blood Pressure SpO2   98.3 °F (36.8 °C) 80 18 138/63 98 %      Temp Source Heart Rate Source Patient Position - Orthostatic VS BP Location FiO2 (%)   Oral Monitor Sitting Right arm --      Pain Score       --           Vitals:    05/10/24 2025   BP: 138/63   Pulse: 80   Patient Position - Orthostatic VS: Sitting         Visual Acuity      ED Medications  Medications - No data to display    Diagnostic Studies  Results Reviewed       None                   No orders to display              Procedures  Procedures         ED Course                                             Medical Decision Making  Patient seen and examined noted to have avulsion of toenail.  Patient is pleasantly confused at baseline and toenail is entirely being off.  Attempted to call his wife who is his contact listed however that number was not connected.  Had a discussion with patient about either just wrapping the toe up or placing foil underneath his nail with a digital block and he elected to just wrap the toe.  Felt that this was an appropriate decision as literature shows that splinting is not necessary for the nail to fully grow back.  Patient also expressed that he did not mind if his toenail did not fully grow back.  The remnants of the nail were removed and patient's foot was irrigated.  Tetanus in 2018.  Toe was wrapped in Xeroform and gauze and will tell facility to change the dressing daily.  Strict return precautions were given which patient expresses understanding of.      Differential diagnosis includes but is not limited to toenail avulsion, fracture, dislocation, subungual hematoma    Patient appears well, is nontoxic appearing, he expresses understanding and agrees with plan of care at this time.  In light of this patient would benefit from outpatient management.    Patient at time of discharge well-appearing in no acute  "distress, all questions answered. Patient agreeable to plan.  Patient's vitals, lab/imaging results, diagnosis, and treatment plan were discussed with the patient. All new/changed medications were discussed with patient, specifically, route of administration, how often and when to take, and where they can be picked up. Strict return precautions as well as close follow up with PCP was discussed with the patient and the patient was agreeable to my recommendations. Patient verbally acknowledged understanding of the above communications. Strict return precautions were provided. All labs reviewed and utilized in the medical decision making process (if labs were ordered). Portions of the record may have been created with voice recognition software.  Occasional wrong word or \"sound a like\" substitutions may have occurred due to the inherent limitations of voice recognition software.  Read the chart carefully and recognize, using context, where substitutions have occurred.               Disposition  Final diagnoses:   Avulsion of toenail, initial encounter     Time reflects when diagnosis was documented in both MDM as applicable and the Disposition within this note       Time User Action Codes Description Comment    5/10/2024  8:48 PM Freda Damian Add [S91.209A] Avulsion of toenail, initial encounter           ED Disposition       ED Disposition   Discharge    Condition   Stable    Date/Time   Fri May 10, 2024  8:48 PM    Comment   Tha Weems discharge to home/self care.                   Follow-up Information       Follow up With Specialties Details Why Contact Info Additional Information    Geovanna Fuentes MD Palliative Care Call   [facility] [id number] [phone]       [facility] Emergency Medicine  If symptoms worsen, As needed [address] [facility] [address]  [phone] [facility], [address] [place], [place], [address]            Patient's Medications   Discharge Prescriptions    No medications on file       No discharge procedures on file.    PDMP Review         Value Time User    PDMP Reviewed  Yes 8/9/2023 12:50 AM Syd Norman MD            ED Provider  Electronically Signed by             Freda Damian PA-C  05/10/24 [id number]

## 2024-05-11 NOTE — ED NOTES
Attempted to call report to Kayla Black no answer mailbox full     Batsheva Price, FLORENTINO  05/11/24 2827

## 2024-05-24 ENCOUNTER — HOSPITAL ENCOUNTER (OUTPATIENT)
Dept: RADIOLOGY | Age: 74
Discharge: HOME | End: 2024-05-24
Payer: MEDICARE

## 2024-05-24 DIAGNOSIS — K86.2 PANCREAS CYST: ICD-10-CM

## 2024-05-24 PROCEDURE — 74160 CT ABDOMEN W/CONTRAST: CPT

## 2024-05-24 RX ADMIN — IOHEXOL 100 ML: 350 INJECTION, SOLUTION INTRAVENOUS at 13:05

## 2024-05-28 ENCOUNTER — OFFICE VISIT (OUTPATIENT)
Dept: GASTROENTEROLOGY | Facility: AMBULARY SURGERY CENTER | Age: 74
End: 2024-05-28
Payer: MEDICARE

## 2024-05-28 VITALS — DIASTOLIC BLOOD PRESSURE: 64 MMHG | HEART RATE: 74 BPM | OXYGEN SATURATION: 98 % | SYSTOLIC BLOOD PRESSURE: 140 MMHG

## 2024-05-28 DIAGNOSIS — N28.9 KIDNEY LESION: Primary | ICD-10-CM

## 2024-05-28 DIAGNOSIS — K86.2 PANCREAS CYST: Primary | ICD-10-CM

## 2024-05-28 DIAGNOSIS — D50.8 OTHER IRON DEFICIENCY ANEMIA: ICD-10-CM

## 2024-05-28 PROBLEM — D62 ACUTE BLOOD LOSS ANEMIA: Status: RESOLVED | Noted: 2023-07-25 | Resolved: 2024-05-28

## 2024-05-28 PROBLEM — D64.9 ANEMIA: Status: RESOLVED | Noted: 2020-04-29 | Resolved: 2024-05-28

## 2024-05-28 PROBLEM — D50.9 IRON DEFICIENCY ANEMIA: Status: RESOLVED | Noted: 2023-07-24 | Resolved: 2024-05-28

## 2024-05-28 PROCEDURE — 99214 OFFICE O/P EST MOD 30 MIN: CPT | Performed by: INTERNAL MEDICINE

## 2024-05-28 NOTE — PROGRESS NOTES
[facility]  Tha Weems 73 y.o. male MRN: [id number]            Assessment & Plan:  73-year-old gentleman coming from a nursing facility with a history of heavy alcohol abuse in the past,, hypertension, diabetes, Parkinson's here for follow-up.    1.  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      Tha was seen today for follow-up.    Diagnoses and all orders for this visit:    Pancreas cyst    Other iron deficiency anemia            _____________________________________________________________        CC: Follow-up    HPI:  Tha Weems is a 73 y.o.male who is here for follow-up.  Patient is unable to provide meaningful history.  He denies any complaints.  Tried to review his chart, he was seen in the past has a history of anemia, GI bleed, was found to have a large adenomatous polyp which had been resected on his last EGD and EUS.  He has a pancreatic cyst which is a cystadenoma.  He had repeat EGD which was unremarkable.  Most recent hemoglobin was normal.  Patient is unable to provide meaningful history.  No paperwork sent with him from his nursing facility.      ROS:  Unable to obtain      Allergies: Patient has no known allergies.    Medications:   Current Outpatient Medications:     ACCU-CHEK GUIDE test strip, 1 strip as needed, Disp: , Rfl:     acetaminophen (TYLENOL) 325 mg tablet, Take 650 mg by mouth every 6 (six) hours as needed for mild pain, Disp: , Rfl:     Alcohol Swabs (PRO COMFORT ALCOHOL) 70 % PADS, 1 pad daily Use a pad when testing, Disp: , Rfl:     amoxicillin (AMOXIL) 500 mg capsule, , Disp: , Rfl:     aspirin 81 mg chewable tablet,  "Chew 1 tablet (81 mg total) daily, Disp: 30 tablet, Rfl: 0    atorvastatin (LIPITOR) 40 mg tablet, Take 1 tablet (40 mg total) by mouth daily, Disp: 30 tablet, Rfl: 0    carbidopa-levodopa (SINEMET)  mg per tablet, Take 1 tablet by mouth 3 (three) times a day, Disp: 90 tablet, Rfl: 11    chlorhexidine (PERIDEX) 0.12 % solution, Apply 15 mL to the mouth or throat 2 (two) times a day, Disp: , Rfl:     cholecalciferol (VITAMIN D3) 1,000 units tablet, Take 50 tablets (50,000 Units total) by mouth every 30 (thirty) days On the 15th of the month, Disp: , Rfl:     cyanocobalamin (VITAMIN B-12) 1000 MCG tablet, Take 1 tablet (1,000 mcg total) by mouth daily, Disp: , Rfl: 0    docusate sodium (COLACE) 100 mg capsule, Take 1 capsule (100 mg total) by mouth 2 (two) times a day, Disp: 10 capsule, Rfl: 0    Easy Comfort Lancets MISC, as needed, Disp: , Rfl:     famotidine (PEPCID) 20 mg tablet, Take 1 tablet (20 mg total) by mouth daily at bedtime, Disp: 30 tablet, Rfl: 0    ferrous gluconate (FERGON) 324 mg tablet, Take 1 tablet (324 mg total) by mouth daily before breakfast Do not start before July 30, 2023., Disp: 30 tablet, Rfl: 0    finasteride (PROSCAR) 5 mg tablet, Take 1 tablet (5 mg total) by mouth daily, Disp: 30 tablet, Rfl: 0    Glucagon, rDNA, (GLUCAGON EMERGENCY IJ), Inject 1 mL as directed Sugars less than 60 and conscious, Disp: , Rfl:     Glucose 15 g PACK, Take 15 g by mouth Per [facility] \"give 15 gram PO every 15 minutes PRN for hypoglycemia\", Disp: , Rfl:     glucose blood test strip, 1 strip as needed, Disp: , Rfl:     glycerin-hypromellose- (ARTIFICIAL TEARS) 0.2-0.2-1 % SOLN, Administer 1 drop to both eyes 2 (two) times a day as needed (for dry eyes), Disp: 15 mL, Rfl: 0    insulin glargine (LANTUS) 100 units/mL subcutaneous injection, Inject 30 Units under the skin daily at bedtime (Patient taking differently: Inject 26 Units under the skin daily at bedtime), Disp: 10 mL, Rfl: 0    " insulin lispro (HumaLOG) 100 units/mL injection, Inject 10 Units under the skin 3 (three) times a day with meals, Disp: 10 mL, Rfl: 0    lidocaine (XYLOCAINE) 5 % ointment, Apply topically 2 (two) times a day as needed for mild pain or moderate pain, Disp: 35.44 g, Rfl: 0    lisinopril (ZESTRIL) 5 mg tablet, Take 1 tablet (5 mg total) by mouth daily, Disp: , Rfl: 0    melatonin 3 mg, Take 3 mg by mouth daily at bedtime Give 2 tabs at bed time for insomnia per [facility], Disp: , Rfl:     metFORMIN (GLUCOPHAGE) 1000 MG tablet, Take 1,000 mg by mouth 2 (two) times a day with meals, Disp: , Rfl:     metoprolol succinate (TOPROL-XL) 25 mg 24 hr tablet, Take 1 tablet (25 mg total) by mouth daily, Disp: , Rfl: 0    NovoFine Autocover 30G X 8 MM MISC, , Disp: , Rfl:     nystatin (MYCOSTATIN) powder, Apply topically 2 (two) times a day, Disp: 15 g, Rfl: 0    oxybutynin (DITROPAN-XL) 5 mg 24 hr tablet, Take 5 mg by mouth daily, Disp: , Rfl:     pantoprazole (PROTONIX) 40 mg tablet, TAKE 1 TABLET (40 MG TOTAL) BY MOUTH 2 (TWO) TIMES A DAY BEFORE MEALS, Disp: 60 tablet, Rfl: 4    polyethylene glycol (MIRALAX) 17 g packet, Take 17 g by mouth daily, Disp: 14 each, Rfl: 2    ranolazine (RANEXA) 500 mg 12 hr tablet, Take 1 tablet (500 mg total) by mouth every 12 (twelve) hours, Disp: 60 tablet, Rfl: 0    senna (SENOKOT) 8.6 mg, Take 1 tablet (8.6 mg total) by mouth daily as needed for constipation, Disp: 120 tablet, Rfl: 0    sulfamethoxazole-trimethoprim (BACTRIM DS) 800-160 mg per tablet, , Disp: , Rfl:     tamsulosin (FLOMAX) 0.4 mg, Take 1 capsule (0.4 mg total) by mouth daily with dinner, Disp: 30 capsule, Rfl: 0    Past Medical History:   Diagnosis Date    Ambulatory dysfunction     uses walker with assist of 1    Anemia     Anxiety     At risk for falls     hx of falls    Benign paroxysmal vertigo     unspecified ear    BPH (benign prostatic hyperplasia)     for TURP today 1/4/2021    Calculus in bladder     for  surgical removal today 1/4/2021    Cataract     left eye    Cervicalgia     Chest pain     Chronic kidney disease     stage 3    Constipation     CTS (carpal tunnel syndrome)     left    Cyst of pancreas     Cystitis 06/23/2020    acute cystitis with hematuria    Depression     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    Dizziness     and giddiness    Dysphagia     Elevated PSA     Facial weakness     GERD (gastroesophageal reflux disease)     last assessed 5/20/16    Gross hematuria     Hematuria     Hyperlipidemia     Hypertension     Kidney disease     Kidney stone     Left-sided weakness     Long term (current) use of oral hypoglycemic drugs     Muscle weakness     Nuclear senile cataract of left eye     OA (osteoarthritis) of knee     b/l    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HCC)     Syncope and collapse     Tachycardia     UTI (urinary tract infection)     Vertebro-basilar artery syndrome     Vitamin B deficiency     Vitamin D deficiency     Vitamin D deficiency        Past Surgical History:   Procedure Laterality Date    CARDIAC CATHETERIZATION N/A 3/7/2022    Procedure: CARDIAC CATHETERIZATION;  Surgeon: Pako Reid DO;  [facility];  Service: Cardiology    CATARACT EXTRACTION      CHOLECYSTECTOMY      KNEE SURGERY      [place] LITHOLAPAXY SMPL/SM <2.5 CM N/A 1/4/2021    Procedure: Cystolitholopaxy w/laser bladder stones;  Surgeon: Kyle Jones MD;  [facility];  Service: Urology    MD TRURL ELECTROSURG RESCJ PROSTATE BLEED COMPLETE N/A 1/4/2021    Procedure: T.U.R.P.;  Surgeon: Kyle Jones MD;  [facility];  Service: Urology       Family History   Problem Relation Age of Onset    Coronary artery disease Mother     Diabetes Father         reports that he has never smoked. He has never used smokeless tobacco. He reports that he does not currently use alcohol. He reports that he does not use drugs.      Physical Exam:    /64 (BP Location: Left arm, Patient  Position: Sitting, Cuff Size: Standard)   Pulse 74   SpO2 98%     Gen: wn/wd, NAD, sitting in wheelchair, follows commands and answers questions appropriately  HEENT: anicteric, MMM, no cervical LAD  CVS: RRR, no m/r/g  CHEST: CTA b/l  ABD: +BS, soft, NT,ND, no hepatosplenomegaly  EXT: Right lower extremity swelling, small healing ulcer  NEURO: aaox3  SKIN: NO rashes

## 2024-06-03 ENCOUNTER — TELEPHONE (OUTPATIENT)
Dept: GASTROENTEROLOGY | Facility: AMBULARY SURGERY CENTER | Age: 74
End: 2024-06-03

## 2024-06-03 NOTE — TELEPHONE ENCOUNTER
Late entry.  Patient was dropped off by transportation for his visit on 5/28/24.  Patient was pleasant and cooperative but nursing facility did not provide patient work including medication list.  Patient was in very poor hygiene and clothing had a very strong odor of urine.  I attempted to call the facility 3 times to see if they would be able to fax his current medication list.  I got a voice message all three times.

## 2024-07-23 ENCOUNTER — APPOINTMENT (EMERGENCY)
Dept: CT IMAGING | Facility: HOSPITAL | Age: 74
End: 2024-07-23
Payer: MEDICARE

## 2024-07-23 ENCOUNTER — HOSPITAL ENCOUNTER (EMERGENCY)
Facility: HOSPITAL | Age: 74
Discharge: HOME | End: 2024-07-23
Attending: EMERGENCY MEDICINE
Payer: MEDICARE

## 2024-07-23 ENCOUNTER — APPOINTMENT (EMERGENCY)
Dept: RADIOLOGY | Facility: HOSPITAL | Age: 74
End: 2024-07-23
Payer: MEDICARE

## 2024-07-23 VITALS
TEMPERATURE: 98.2 F | HEART RATE: 86 BPM | OXYGEN SATURATION: 97 % | SYSTOLIC BLOOD PRESSURE: 178 MMHG | RESPIRATION RATE: 16 BRPM | DIASTOLIC BLOOD PRESSURE: 81 MMHG
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
## 2024-07-23 PROCEDURE — 72170 X-RAY EXAM OF PELVIS: CPT

## 2024-07-23 PROCEDURE — 70450 CT HEAD/BRAIN W/O DYE: CPT

## 2024-07-23 PROCEDURE — 73552 X-RAY EXAM OF FEMUR 2/>: CPT

## 2024-07-23 PROCEDURE — 99285 EMERGENCY DEPT VISIT HI MDM: CPT

## 2024-07-23 PROCEDURE — 99285 EMERGENCY DEPT VISIT HI MDM: CPT | Performed by: EMERGENCY MEDICINE

## 2024-07-23 PROCEDURE — 72125 CT NECK SPINE W/O DYE: CPT

## 2024-07-23 NOTE — ED ATTENDING ATTESTATION
7/23/2024  I, Akira Young DO, saw and evaluated the patient. I have discussed the patient with the resident/non-physician practitioner and agree with the resident's/non-physician practitioner's findings, Plan of Care, and MDM as documented in the resident's/non-physician practitioner's note, except where noted. All available labs and Radiology studies were reviewed.  I was present for key portions of any procedure(s) performed by the resident/non-physician practitioner and I was immediately available to provide assistance.       At this point I agree with the current assessment done in the Emergency Department.  I have conducted an independent evaluation of this patient a history and physical is as follows:    Patient is a 74-year-old male with a history of dementia, diabetes, hypertension who presents after a fall.  Patient resides at a nursing facility.  Patient states that he was attempting to get into bed and had the help of his aide.  He states that he lost his balance and fell onto the ground.  He does admit to head injury but denies loss of consciousness.  He was placed in a cervical collar and transported to the ED.  He complains of pain in his right thigh.  He has no other complaints at this time.  He denies any symptoms preceding the fall, including but not limited to chest pain, shortness of breath, dizziness, palpitations, other concerns.    On exam, patient is awake and alert.  He is oriented x 3.  No external signs of head trauma.  Mild midline tenderness in the mid cervical region.  No thoracic or lumbar tenderness.  Heart is regular rate and rhythm.  Breath sounds normal.  Abdomen is soft, nontender, nondistended.  Hips nontender.  Pelvis stable.  Mild pain with flexion of the right hip.  Mild edema in bilateral lower extremities.  Distal extremities are warm and well-perfused.  Moves all extremities equally.    ED workup negative for acute traumatic injury.  EMS report and patient history  "consistent with a mechanical fall. Attempted to call nursing facility multiple times but I was unable to talk to nursing staff. Patient is hemodynamically stable and do not suspect acute infectious process, cardiac dysrhythmia, or metabolic process. Patient is stable for discharge to nursing facility and continued monitoring.     Portions of the above record have been created with voice recognition software.  Occasional wrong word or \"sound alike\" substitutions may have occurred due to the inherent limitations of voice recognition software.  Read the chart carefully and recognize, using context, where substitutions may have occurred.      ED Course         Critical Care Time  Procedures      "

## 2024-07-23 NOTE — ED PROVIDER NOTES
"History  Chief Complaint   Patient presents with    Fall     Pt fell out of wheelchair, h/s,on aspirin       Fall      Prior to Admission Medications   Prescriptions Last Dose Informant Patient Reported? Taking?   ACCU-CHEK GUIDE test strip  Outside Facility (Specify), Self Yes No   Si strip as needed   Alcohol Swabs (PRO COMFORT ALCOHOL) 70 % PADS  Outside Facility (Specify), Self Yes No   Si pad daily Use a pad when testing   Easy Comfort Lancets MISC  Outside Facility (Specify), Self Yes No   Sig: as needed   Glucagon, rDNA, (GLUCAGON EMERGENCY IJ)  Self Yes No   Sig: Inject 1 mL as directed Sugars less than 60 and conscious   Glucose 15 g PACK  Self Yes No   Sig: Take 15 g by mouth Per [facility] \"give 15 gram PO every 15 minutes PRN for hypoglycemia\"   [employer] 30G X 8 MM MISC  Self Yes No   acetaminophen (TYLENOL) 325 mg tablet  Self Yes No   Sig: Take 650 mg by mouth every 6 (six) hours as needed for mild pain   amoxicillin (AMOXIL) 500 mg capsule  Self Yes No   aspirin 81 mg chewable tablet  Self No No   Sig: Chew 1 tablet (81 mg total) daily   atorvastatin (LIPITOR) 40 mg tablet  Self No No   Sig: Take 1 tablet (40 mg total) by mouth daily   carbidopa-levodopa (SINEMET)  mg per tablet   No No   Sig: Take 1 tablet by mouth 3 (three) times a day   chlorhexidine (PERIDEX) 0.12 % solution  Outside Facility (Specify) Yes No   Sig: Apply 15 mL to the mouth or throat 2 (two) times a day   cholecalciferol (VITAMIN D3) 1,000 units tablet  Self No No   Sig: Take 50 tablets (50,000 Units total) by mouth every 30 (thirty) days On the  of the month   cyanocobalamin (VITAMIN B-12) 1000 MCG tablet  Outside Facility (Specify), Self No No   Sig: Take 1 tablet (1,000 mcg total) by mouth daily   docusate sodium (COLACE) 100 mg capsule  Outside Facility (Specify), Self No No   Sig: Take 1 capsule (100 mg total) by mouth 2 (two) times a day   famotidine (PEPCID) 20 mg tablet  Self No No   Sig: " Take 1 tablet (20 mg total) by mouth daily at bedtime   ferrous gluconate (FERGON) 324 mg tablet   No No   Sig: Take 1 tablet (324 mg total) by mouth daily before breakfast Do not start before 2023.   finasteride (PROSCAR) 5 mg tablet  Outside Facility (Specify), Self No No   Sig: Take 1 tablet (5 mg total) by mouth daily   glucose blood test strip  Outside Facility (Specify), Self Yes No   Si strip as needed   glycerin-hypromellose- (ARTIFICIAL TEARS) 0.2-0.2-1 % SOLN  Self No No   Sig: Administer 1 drop to both eyes 2 (two) times a day as needed (for dry eyes)   insulin glargine (LANTUS) 100 units/mL subcutaneous injection  Self No No   Sig: Inject 30 Units under the skin daily at bedtime   Patient taking differently: Inject 26 Units under the skin daily at bedtime   insulin lispro (HumaLOG) 100 units/mL injection  Self No No   Sig: Inject 10 Units under the skin 3 (three) times a day with meals   lidocaine (XYLOCAINE) 5 % ointment  Self No No   Sig: Apply topically 2 (two) times a day as needed for mild pain or moderate pain   lisinopril (ZESTRIL) 5 mg tablet  Self No No   Sig: Take 1 tablet (5 mg total) by mouth daily   melatonin 3 mg  Self Yes No   Sig: Take 3 mg by mouth daily at bedtime Give 2 tabs at bed time for insomnia per [facility]   metFORMIN (GLUCOPHAGE) 1000 MG tablet   Yes No   Sig: Take 1,000 mg by mouth 2 (two) times a day with meals   metoprolol succinate (TOPROL-XL) 25 mg 24 hr tablet  Self No No   Sig: Take 1 tablet (25 mg total) by mouth daily   nystatin (MYCOSTATIN) powder  Self No No   Sig: Apply topically 2 (two) times a day   oxybutynin (DITROPAN-XL) 5 mg 24 hr tablet   Yes No   Sig: Take 5 mg by mouth daily   pantoprazole (PROTONIX) 40 mg tablet   No No   Sig: TAKE 1 TABLET (40 MG TOTAL) BY MOUTH 2 (TWO) TIMES A DAY BEFORE MEALS   polyethylene glycol (MIRALAX) 17 g packet  Outside Facility (Specify), Self No No   Sig: Take 17 g by mouth daily   ranolazine  (RANEXA) 500 mg 12 hr tablet  Self No No   Sig: Take 1 tablet (500 mg total) by mouth every 12 (twelve) hours   senna (SENOKOT) 8.6 mg  Self No No   Sig: Take 1 tablet (8.6 mg total) by mouth daily as needed for constipation   sulfamethoxazole-trimethoprim (BACTRIM DS) 800-160 mg per tablet  Self Yes No   tamsulosin (FLOMAX) 0.4 mg  Outside Facility (Specify), Self No No   Sig: Take 1 capsule (0.4 mg total) by mouth daily with dinner      Facility-Administered Medications: None       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    Dizziness     and giddiness    Dysphagia     Elevated PSA     Facial weakness     GERD (gastroesophageal reflux disease)     last assessed 5/20/16    Gross hematuria     Hematuria     Hyperlipidemia     Hypertension     Kidney disease     Kidney stone     Left-sided weakness     Long term (current) use of oral hypoglycemic drugs     Muscle weakness     Nuclear senile cataract of left eye     OA (osteoarthritis) of knee     b/l    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HCC)     Syncope and collapse     Tachycardia     UTI (urinary tract infection)     Vertebro-basilar artery syndrome     Vitamin B deficiency     Vitamin D deficiency     Vitamin D deficiency        Past Surgical History:   Procedure Laterality Date    CARDIAC CATHETERIZATION N/A 3/7/2022    Procedure: CARDIAC CATHETERIZATION;  Surgeon: Pako Reid DO;   Location: [facility];  Service: Cardiology    CATARACT EXTRACTION      CHOLECYSTECTOMY      KNEE SURGERY      GA LITHOLAPAXY SMPL/SM <2.5 CM N/A 1/4/2021    Procedure: Cystolitholopaxy w/laser bladder stones;  Surgeon: Kyle Jones MD;  Location: [facility];  Service: Urology    GA TRURL ELECTROSURG RESCJ PROSTATE BLEED COMPLETE N/A 1/4/2021    Procedure: T.U.R.P.;  Surgeon: Kyle Jones MD;  Location: [facility];  Service: Urology       Family History   Problem Relation Age of Onset    Coronary artery disease Mother     Diabetes Father      I have reviewed and agree with the history as documented.    E-Cigarette/Vaping    E-Cigarette Use Never User      E-Cigarette/Vaping Substances    Nicotine No     THC No     CBD No      Social History     Tobacco Use    Smoking status: Never    Smokeless tobacco: Never   Vaping Use    Vaping status: Never Used   Substance Use Topics    Alcohol use: Not Currently    Drug use: No        Review of Systems    Physical Exam  ED Triage Vitals [07/23/24 1609]   Temperature Pulse Respirations Blood Pressure SpO2   98.2 °F (36.8 °C) 93 16 149/72 96 %      Temp Source Heart Rate Source Patient Position - Orthostatic VS BP Location FiO2 (%)   Oral Monitor Sitting -- --      Pain Score       --             Orthostatic Vital Signs  Vitals:    07/23/24 1609   BP: 149/72   Pulse: 93   Patient Position - Orthostatic VS: Sitting       Physical Exam    ED Medications  Medications - No data to display    Diagnostic Studies  Results Reviewed       None                   No orders to display         Procedures  Procedures      ED Course                                       Medical Decision Making        Disposition  Final diagnoses:   None     ED Disposition       None          Follow-up Information    None         Patient's Medications   Discharge Prescriptions    No medications on file     No discharge procedures on file.    PDMP Review         Value Time User    PDMP Reviewed   Yes 8/9/2023 12:50 AM Syd Norman MD             ED Provider  Attending physically available and evaluated Tha Weems. I managed the patient along with the ED Attending.    Electronically Signed by

## 2024-07-23 NOTE — ED PROVIDER NOTES
Emergency Department Trauma Note  Tha Weems 74 y.o. male MRN: [id number]  Unit/Bed#: ED-02/ED-02 Encounter: [id number]      Trauma Alert: Trauma Acuity: C  Model of Arrival:   via    Trauma Team: Current Providers  Attending Provider: Akira Young DO  ED Technician: Marci Perez  Registered Nurse: Suzi Martin RN  Resident: Vazquez Warren DO  Registered Nurse: Michaela Barnett RN  Registered Nurse: Cayden Abrams RN  Consultants:     None      History of Present Illness     Chief Complaint:   Chief Complaint   Patient presents with    Fall     Pt fell out of wheelchair, h/s,on aspirin     HPI:  Tha Weems is a 74 y.o. male who presents for fall from standing.  Mechanism:Details of Incident: fall out of wheelchair, h/s on floor, aspirin          74-year-old male with PMHx of HPT, HLD, T2DM, CKD, GERD, and dementia presents following fall out of wheel chair.  Patient is a resident at [facility] in [place].  EMS reports the patient has dementia at baseline.  Patient reports being wheeled in wheelchair by aide and falling as she tried to rise from chair.  Patient denies blood thinning medications but is on aspirin.  Patient reports posterior head strike upon falling.  Does not report any headaches, dizziness, confusion, chest pain, or shortness of breath prior to fall and denies any LOC.      Fall  Associated symptoms: no abdominal pain, no back pain, no chest pain, no headaches, no nausea, no seizures and no vomiting      Review of Systems   Constitutional:  Negative for chills, fatigue and fever.   HENT:  Negative for ear pain and sore throat.    Eyes:  Negative for pain and visual disturbance.   Respiratory:  Negative for cough and shortness of breath.    Cardiovascular:  Negative for chest pain, palpitations and leg swelling.   Gastrointestinal:  Negative for abdominal distention, abdominal pain, constipation, diarrhea, nausea and vomiting.   Genitourinary:  Negative for dysuria and hematuria.    Musculoskeletal:  Negative for arthralgias and back pain.   Skin:  Negative for color change and rash.   Neurological:  Negative for dizziness, seizures, syncope, weakness, light-headedness and headaches.   Psychiatric/Behavioral:  Negative for agitation and confusion. The patient is not nervous/anxious.    All other systems reviewed and are negative.      Historical Information     Immunizations:   Immunization History   Administered Date(s) Administered    BCG 12/11/2018    COVID-19 J&J ([place]) vaccine 0.5 mL 01/26/2021    COVID-19 PFIZER VACCINE 0.3 ML IM 01/26/2021, 02/16/2021    Pneumococcal Polysaccharide PPV23 12/13/2018    Tdap 09/02/2016, 03/31/2018    Tuberculin Skin Test 05/14/2020, 05/24/2020       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    Dizziness     and giddiness    Dysphagia     Elevated PSA     Facial weakness     GERD (gastroesophageal reflux disease)     last assessed 5/20/16    Gross hematuria     Hematuria     Hyperlipidemia     Hypertension     Kidney disease     Kidney stone     Left-sided weakness     Long term (current) use of oral hypoglycemic drugs     Muscle weakness     Nuclear senile cataract of left eye     OA (osteoarthritis) of knee     b/l    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HCC)     Syncope and collapse     Tachycardia     UTI (urinary tract infection)     Vertebro-basilar artery syndrome     Vitamin B  "deficiency     Vitamin D deficiency     Vitamin D deficiency        Family History   Problem Relation Age of Onset    Coronary artery disease Mother     Diabetes Father      Past Surgical History:   Procedure Laterality Date    CARDIAC CATHETERIZATION N/A 3/7/2022    Procedure: CARDIAC CATHETERIZATION;  Surgeon: Pako Reid DO;  [facility];  Service: Cardiology    CATARACT EXTRACTION      CHOLECYSTECTOMY      KNEE SURGERY      SC LITHOLAPAXY SMPL/SM <2.5 CM N/A 2021    Procedure: Cystolitholopaxy w/laser bladder stones;  Surgeon: Kyle Jones MD;  Location: [facility];  Service: Urology    SC TRURL ELECTROSURG RESCJ PROSTATE BLEED COMPLETE N/A 2021    Procedure: T.U.R.P.;  Surgeon: Kyle Jones MD;  Location: [facility];  Service: Urology     Social History     Tobacco Use    Smoking status: Never    Smokeless tobacco: Never   Vaping Use    Vaping status: Never Used   Substance Use Topics    Alcohol use: Not Currently    Drug use: No     E-Cigarette/Vaping    E-Cigarette Use Never User      E-Cigarette/Vaping Substances    Nicotine No     THC No     CBD No        Family History: non-contributory    Meds/Allergies   Prior to Admission Medications   Prescriptions Last Dose Informant Patient Reported? Taking?   ACCU-CHEK GUIDE test strip  Outside Facility (Specify), Self Yes No   Si strip as needed   Alcohol Swabs (PRO COMFORT ALCOHOL) 70 % PADS  Outside Facility (Specify), Self Yes No   Si pad daily Use a pad when testing   Easy Comfort Lancets MISC  Outside Facility (Specify), Self Yes No   Sig: as needed   Glucagon, rDNA, (GLUCAGON EMERGENCY IJ)  Self Yes No   Sig: Inject 1 mL as directed Sugars less than 60 and conscious   Glucose 15 g PACK  Self Yes No   Sig: Take 15 g by mouth Per [facility] \"give 15 gram PO every 15 minutes PRN for hypoglycemia\"   NovoFine Autocover 30G X 8 MM MISC  Self Yes No   acetaminophen (TYLENOL) 325 mg tablet  Self Yes No   Sig: " Take 650 mg by mouth every 6 (six) hours as needed for mild pain   amoxicillin (AMOXIL) 500 mg capsule  Self Yes No   aspirin 81 mg chewable tablet  Self No No   Sig: Chew 1 tablet (81 mg total) daily   atorvastatin (LIPITOR) 40 mg tablet  Self No No   Sig: Take 1 tablet (40 mg total) by mouth daily   carbidopa-levodopa (SINEMET)  mg per tablet   No No   Sig: Take 1 tablet by mouth 3 (three) times a day   chlorhexidine (PERIDEX) 0.12 % solution  Outside Facility (Specify) Yes No   Sig: Apply 15 mL to the mouth or throat 2 (two) times a day   cholecalciferol (VITAMIN D3) 1,000 units tablet  Self No No   Sig: Take 50 tablets (50,000 Units total) by mouth every 30 (thirty) days On the  of the month   cyanocobalamin (VITAMIN B-12) 1000 MCG tablet  Outside Facility (Specify), Self No No   Sig: Take 1 tablet (1,000 mcg total) by mouth daily   docusate sodium (COLACE) 100 mg capsule  Outside Facility (Specify), Self No No   Sig: Take 1 capsule (100 mg total) by mouth 2 (two) times a day   famotidine (PEPCID) 20 mg tablet  Self No No   Sig: Take 1 tablet (20 mg total) by mouth daily at bedtime   ferrous gluconate (FERGON) 324 mg tablet   No No   Sig: Take 1 tablet (324 mg total) by mouth daily before breakfast Do not start before 2023.   finasteride (PROSCAR) 5 mg tablet  Outside Facility (Specify), Self No No   Sig: Take 1 tablet (5 mg total) by mouth daily   glucose blood test strip  Outside Facility (Specify), Self Yes No   Si strip as needed   glycerin-hypromellose- (ARTIFICIAL TEARS) 0.2-0.2-1 % SOLN  Self No No   Sig: Administer 1 drop to both eyes 2 (two) times a day as needed (for dry eyes)   insulin glargine (LANTUS) 100 units/mL subcutaneous injection  Self No No   Sig: Inject 30 Units under the skin daily at bedtime   Patient taking differently: Inject 26 Units under the skin daily at bedtime   insulin lispro (HumaLOG) 100 units/mL injection  Self No No   Sig: Inject 10 Units under  the skin 3 (three) times a day with meals   lidocaine (XYLOCAINE) 5 % ointment  Self No No   Sig: Apply topically 2 (two) times a day as needed for mild pain or moderate pain   lisinopril (ZESTRIL) 5 mg tablet  Self No No   Sig: Take 1 tablet (5 mg total) by mouth daily   melatonin 3 mg  Self Yes No   Sig: Take 3 mg by mouth daily at bedtime Give 2 tabs at bed time for insomnia per [facility]   metFORMIN (GLUCOPHAGE) 1000 MG tablet   Yes No   Sig: Take 1,000 mg by mouth 2 (two) times a day with meals   metoprolol succinate (TOPROL-XL) 25 mg 24 hr tablet  Self No No   Sig: Take 1 tablet (25 mg total) by mouth daily   nystatin (MYCOSTATIN) powder  Self No No   Sig: Apply topically 2 (two) times a day   oxybutynin (DITROPAN-XL) 5 mg 24 hr tablet   Yes No   Sig: Take 5 mg by mouth daily   pantoprazole (PROTONIX) 40 mg tablet   No No   Sig: TAKE 1 TABLET (40 MG TOTAL) BY MOUTH 2 (TWO) TIMES A DAY BEFORE MEALS   polyethylene glycol (MIRALAX) 17 g packet  Outside Facility (Specify), Self No No   Sig: Take 17 g by mouth daily   ranolazine (RANEXA) 500 mg 12 hr tablet  Self No No   Sig: Take 1 tablet (500 mg total) by mouth every 12 (twelve) hours   senna (SENOKOT) 8.6 mg  Self No No   Sig: Take 1 tablet (8.6 mg total) by mouth daily as needed for constipation   sulfamethoxazole-trimethoprim (BACTRIM DS) 800-160 mg per tablet  Self Yes No   tamsulosin (FLOMAX) 0.4 mg  Outside Facility (Specify), Self No No   Sig: Take 1 capsule (0.4 mg total) by mouth daily with dinner      Facility-Administered Medications: None       No Known Allergies    PHYSICAL EXAM    PE limited by: N/A    Objective   Vitals:   First set: Temperature: 98.2 °F (36.8 °C) (07/23/24 1609)  Pulse: 93 (07/23/24 1609)  Respirations: 16 (07/23/24 1609)  Blood Pressure: 149/72 (07/23/24 1609)  SpO2: 96 % (07/23/24 1609)    Primary Survey:   (A) Airway: Patent  (B) Breathing: Clear and equal B/L  (C) Circulation: Pulses:   radial  2/4  (D) Disabliity:  GCS  Total:  15  (E) Expose:  Completed    Secondary Survey: (Click on Physical Exam tab above)  Physical Exam  Constitutional:       Appearance: Normal appearance.   HENT:      Head: Normocephalic and atraumatic.   Eyes:      Extraocular Movements: Extraocular movements intact.      Conjunctiva/sclera: Conjunctivae normal.      Pupils: Pupils are equal, round, and reactive to light.   Cardiovascular:      Rate and Rhythm: Normal rate and regular rhythm.      Pulses: Normal pulses.      Heart sounds: Normal heart sounds. No murmur heard.     No friction rub. No gallop.   Abdominal:      General: Abdomen is flat. Bowel sounds are normal. There is no distension.      Palpations: Abdomen is soft.      Tenderness: There is no abdominal tenderness. There is no right CVA tenderness, left CVA tenderness, guarding or rebound.   Musculoskeletal:      Cervical back: Normal range of motion and neck supple.   Skin:     General: Skin is warm and dry.      Capillary Refill: Capillary refill takes less than 2 seconds.   Neurological:      General: No focal deficit present.      Mental Status: He is alert and oriented to person, place, and time. Mental status is at baseline.      Motor: No weakness.   Psychiatric:         Mood and Affect: Mood normal.         Behavior: Behavior normal.         Cervical spine cleared by clinical criteria? Yes    Invasive Devices       Drain  Duration             Urethral Catheter 365 days                    Lab Results:   Results Reviewed       None                   Imaging Studies:   Direct to CT: No  XR pelvis ap only 1 or 2 vw   ED Interpretation by Vazquez Warren DO (07/23 1818)   ER resident interpretation: No acute osseous abnormality      XR femur 2 views RIGHT   ED Interpretation by Vazquez Warren DO (07/23 1818)   ER resident interpretation: No acute osseous abnormality      CT spine cervical without contrast   Final Result by Chema Albarado MD (07/23 0863)      No acute cervical spine fracture  or traumatic malalignment.      The study was marked in EPIC for immediate notification.            Workstation performed: [id number]         CT head without contrast   Final Result by Chema Albarado MD (07/23 1710)      No acute intracranial abnormality.      The study was marked in EPIC for immediate notification.                  Workstation performed: [id number]               Procedures  Procedures         ED Course  ED Course as of 07/24/24 0030   Tue Jul 23, 2024   1700 74-year-old male with PMHx of HPT, HLD, T2DM, CKD, GERD, and dementia presents following fall out of wheel chair.  Patient is a resident at [facility] in [place].  EMS reports the patient has dementia at baseline.  Patient reports being wheeled in wheelchair by aide and falling as she tried to rise from chair.  Patient denies blood thinning medications but is on aspirin.  Patient reports posterior head strike upon falling.  Does not report any headaches, dizziness, confusion, chest pain, or shortness of breath prior to fall and denies any LOC.   1711 CT head without contrast  IMPRESSION:     No acute intracranial abnormality.     1715 CT spine cervical without contrast  IMPRESSION:     No acute cervical spine fracture or traumatic malalignment.   1849 XR femur 2 views RIGHT  No acute osseous abnormality noted   1849 XR pelvis ap only 1 or 2 vw  No acute osseous abnormality noted   1849 Several attempts made to contact [facility] for additional information regarding fall, unable to speak with staff   1850 Imaging reassuring for no acute pathology following fall. Pt has no additional complains at this time and is agreeable for discharge           Medical Decision Making  Amount and/or Complexity of Data Reviewed  Radiology: ordered and independent interpretation performed. Decision-making details documented in ED Course.                Disposition  [facility] Transfer: No  Final diagnoses:   Fall, initial encounter     Time  reflects when diagnosis was documented in both MDM as applicable and the Disposition within this note       Time User Action Codes Description Comment    7/23/2024  6:41 PM Vazquez Warren [W19.XXXA] Fall, initial encounter           ED Disposition       ED Disposition   Discharge    Condition   Stable    Date/Time   Tue Jul 23, 2024  6:41 PM    Comment   Tha Weems discharge to home/self care.                   Follow-up Information       Follow up With Specialties Details Why Contact Info    Geovanna Fuentes MD Palliative Care Schedule an appointment as soon as possible for a visit   [address] [place] [address]  [place] [id number] [phone]            Discharge Medication List as of 7/23/2024  6:41 PM        CONTINUE these medications which have NOT CHANGED    Details   !! ACCU-CHEK GUIDE test strip 1 strip as needed, Starting Tue 6/16/2020, Historical Med      acetaminophen (TYLENOL) 325 mg tablet Take 650 mg by mouth every 6 (six) hours as needed for mild pain, Historical Med      Alcohol Swabs (PRO COMFORT ALCOHOL) 70 % PADS 1 pad daily Use a pad when testing, Starting Tue 6/16/2020, Historical Med      amoxicillin (AMOXIL) 500 mg capsule Historical Med      aspirin 81 mg chewable tablet Chew 1 tablet (81 mg total) daily, Starting u 3/10/2022, Normal      atorvastatin (LIPITOR) 40 mg tablet Take 1 tablet (40 mg total) by mouth daily, Starting Thu 3/10/2022, Normal      carbidopa-levodopa (SINEMET)  mg per tablet Take 1 tablet by mouth 3 (three) times a day, Starting u 7/22/2021, Until Mon 4/1/2024, Normal      chlorhexidine (PERIDEX) 0.12 % solution Apply 15 mL to the mouth or throat 2 (two) times a day, Historical Med      cholecalciferol (VITAMIN D3) 1,000 units tablet Take 50 tablets (50,000 Units total) by mouth every 30 (thirty) days On the 15th of the month, Starting Sat 7/29/2023, No Print      cyanocobalamin (VITAMIN B-12) 1000 MCG tablet Take 1 tablet (1,000 mcg total) by mouth daily,  "Starting Tue 6/23/2020, No Print      docusate sodium (COLACE) 100 mg capsule Take 1 capsule (100 mg total) by mouth 2 (two) times a day, Starting Tue 6/23/2020, Normal      Easy Comfort Lancets MISC as needed, Starting Tue 6/16/2020, Historical Med      famotidine (PEPCID) 20 mg tablet Take 1 tablet (20 mg total) by mouth daily at bedtime, Starting Tue 4/5/2022, Print      ferrous gluconate (FERGON) 324 mg tablet Take 1 tablet (324 mg total) by mouth daily before breakfast Do not start before July 30, 2023., Starting Sun 7/30/2023, Until Mon 4/1/2024, Print      finasteride (PROSCAR) 5 mg tablet Take 1 tablet (5 mg total) by mouth daily, Starting Wed 5/13/2020, No Print      Glucagon, rDNA, (GLUCAGON EMERGENCY IJ) Inject 1 mL as directed Sugars less than 60 and conscious, Historical Med      Glucose 15 g PACK Take 15 g by mouth Per [facility] \"give 15 gram PO every 15 minutes PRN for hypoglycemia\", Historical Med      !! glucose blood test strip 1 strip as needed, Historical Med      glycerin-hypromellose- (ARTIFICIAL TEARS) 0.2-0.2-1 % SOLN Administer 1 drop to both eyes 2 (two) times a day as needed (for dry eyes), Starting Thu 1/5/2023, Normal      insulin glargine (LANTUS) 100 units/mL subcutaneous injection Inject 30 Units under the skin daily at bedtime, Starting Sat 7/29/2023, Print      insulin lispro (HumaLOG) 100 units/mL injection Inject 10 Units under the skin 3 (three) times a day with meals, Starting Sat 7/29/2023, Print      lidocaine (XYLOCAINE) 5 % ointment Apply topically 2 (two) times a day as needed for mild pain or moderate pain, Starting Sun 8/6/2023, Normal      lisinopril (ZESTRIL) 5 mg tablet Take 1 tablet (5 mg total) by mouth daily, Starting Wed 4/6/2022, No Print      melatonin 3 mg Take 3 mg by mouth daily at bedtime Give 2 tabs at bed time for insomnia per [facility], Historical Med      metFORMIN (GLUCOPHAGE) 1000 MG tablet Take 1,000 mg by mouth 2 (two) times a day " with meals, Historical Med      metoprolol succinate (TOPROL-XL) 25 mg 24 hr tablet Take 1 tablet (25 mg total) by mouth daily, Starting Wed 4/6/2022, No Print      NovoFine Autocover 30G X 8 MM MISC Starting Wed 12/9/2020, Historical Med      nystatin (MYCOSTATIN) powder Apply topically 2 (two) times a day, Starting Thu 1/5/2023, Normal      oxybutynin (DITROPAN-XL) 5 mg 24 hr tablet Take 5 mg by mouth daily, Historical Med      pantoprazole (PROTONIX) 40 mg tablet TAKE 1 TABLET (40 MG TOTAL) BY MOUTH 2 (TWO) TIMES A DAY BEFORE MEALS, Starting Tue 1/23/2024, Normal      polyethylene glycol (MIRALAX) 17 g packet Take 17 g by mouth daily, Starting Wed 6/3/2020, Normal      ranolazine (RANEXA) 500 mg 12 hr tablet Take 1 tablet (500 mg total) by mouth every 12 (twelve) hours, Starting Tue 4/5/2022, Print      senna (SENOKOT) 8.6 mg Take 1 tablet (8.6 mg total) by mouth daily as needed for constipation, Starting Wed 2/10/2021, Normal      sulfamethoxazole-trimethoprim (BACTRIM DS) 800-160 mg per tablet Historical Med      tamsulosin (FLOMAX) 0.4 mg Take 1 capsule (0.4 mg total) by mouth daily with dinner, Starting Wed 5/13/2020, No Print       !! - Potential duplicate medications found. Please discuss with provider.        No discharge procedures on file.    PDMP Review         Value Time User    PDMP Reviewed  Yes 8/9/2023 12:50 AM Syd Norman MD            ED Provider  Electronically Signed by           Vazquez Warren DO  07/24/24 0031

## 2024-10-22 ENCOUNTER — TELEPHONE (OUTPATIENT)
Dept: GASTROENTEROLOGY | Facility: CLINIC | Age: 74
End: 2024-10-22

## 2024-10-22 ENCOUNTER — PREP FOR PROCEDURE (OUTPATIENT)
Dept: GASTROENTEROLOGY | Facility: CLINIC | Age: 74
End: 2024-10-22

## 2024-10-22 DIAGNOSIS — R97.0 ELEVATED CARCINOEMBRYONIC ANTIGEN (CEA): Primary | ICD-10-CM

## 2024-10-22 DIAGNOSIS — D13.6 MUCINOUS CYSTADENOMA OF PANCREAS: ICD-10-CM

## 2024-10-22 NOTE — TELEPHONE ENCOUNTER
Patient is due for an EUS with Dr. Raza in December. Tried calling to schedule and he doesn't have voice mail set up. Will try again soon.

## 2024-10-24 NOTE — PROGRESS NOTES
[address]  [address] [facility], [address] [place], [place] [address]marino Vaughan  [phone]      PROGRESS NOTE  Name: Blanca Malloy  AGE: 79 y o  SEX: male    MRN: [id number]    DATE OF ENCOUNTER:  08/06/2020    Patient Location     [facility]    Reason For Visit/ Chief Complaint     Follow-up visit:  Acute cystitis, DM 2, COVID-19, mixed HLD, left-sided weakness, essential HTN, BPH, ambulatory dysfunction  Patients care was coordinated with nursing facility staff  Recent vitals, labs and updated medications were reviewed on [facility] of facility  Past Medical, surgical, social, medication and allergy history and patients previous records reviewed  1  Acute cystitis with hematuria  Assessment & Plan:  · Urine analysis results:  Urine nitrates positive leukocyte esterase trace, colony count greater than 100,000  · Still awaiting sensitivity      2  Type 2 diabetes mellitus with diabetic neuropathy, without long-term current use of insulin [facility])  Assessment & Plan:    Lab Results   Component Value Date    HGBA1C 6 2 (H) 06/14/2020   · Patient remains on a consistent carbohydrate diet  · Blood sugar today 175 mg/dL at 5:13 a m  · Blood sugars remained very mostly in the 200s  · Patient remains on metformin 500 mg b i d  · Humalog sliding scale      3  COVID-19  Assessment & Plan:  · Resolved  · No acute respiratory problems  Remains afebrile  · No signs or symptoms of Covid 19       4  Mixed hyperlipidemia  Assessment & Plan:  · Continue atorvastatin 20 mg daily      5  Left-sided weakness  Assessment & Plan:  · Chronic left-sided weakness  · Patient continues PT/OT  6  Essential hypertension  Assessment & Plan:  · Stable  · Blood pressure today 119/73  · Continue lisinopril 5 mg daily        7  Benign prostatic hyperplasia, unspecified whether lower urinary tract symptoms present  Assessment & Plan:  · Patient voiding Diagnostic wire inserted. normally  · Continue tamsulosin 0 4 mg daily and finasteride 5 mg daily  8  Ambulatory dysfunction  Assessment & Plan:  · Continue PT OT  · To address ambulatory deficits  · As per nose patient exhibits unsteady gait and impaired balance weakness  HPI      Julius Litten is a 79 y o  male who is being seen today for a follow-up visit  Staff report no acute issues currently  On examination patient appears pleasant and in a good mood  He is resting comfortably in bed and awaiting for morning care and appears in no acute distress  His vital signs are stable  Previously he complained of burning with urination  Urinalysis and urine culture were obtained and sent  Currently no sensitivities have been received from lab  He denies fever, chills, nausea, vomiting, abdominal pain, pelvic pain, flank pain  Review of Systems   Constitutional: Positive for fatigue  Negative for chills and fever  HENT: Negative for nosebleeds and rhinorrhea  Eyes: Negative for discharge and redness  Respiratory: Negative for cough, shortness of breath, wheezing and stridor  Cardiovascular: Negative for chest pain and leg swelling  Gastrointestinal: Negative for abdominal distention, abdominal pain, diarrhea and vomiting  Genitourinary: Negative for difficulty urinating, dysuria and hematuria  Musculoskeletal: Positive for gait problem  Negative for arthralgias and back pain  Skin: Negative for pallor  Neurological: Positive for weakness (Generalized)  Negative for tremors, seizures and syncope  Psychiatric/Behavioral: Positive for confusion  Negative for agitation and behavioral problems         Past Medical History:   Diagnosis Date    Ambulatory dysfunction     Anxiety     Diabetes mellitus (HCC)     GERD (gastroesophageal reflux disease)     last assessed 5/20/16    Hematuria     Hyperlipidemia     Hypertension     Kidney disease     Left-sided weakness     Nuclear senile cataract of left eye     Vitamin D deficiency        Past Surgical History:   Procedure Laterality Date    CATARACT EXTRACTION      CHOLECYSTECTOMY      KNEE SURGERY         Social History     Social History     Tobacco Use   Smoking Status Never Smoker   Smokeless Tobacco Never Used       Social History     Substance and Sexual Activity   Alcohol Use Not Currently          Family History   Problem Relation Age of Onset    Coronary artery disease Mother     Diabetes Father         No Known Allergies    Medications       Current Outpatient Medications:     ACCU-CHEK GUIDE test strip, 1 strip as needed, Disp: , Rfl:     Alcohol Swabs (PRO COMFORT ALCOHOL) 70 % PADS, 1 pad daily Use a pad when testing, Disp: , Rfl:     atorvastatin (LIPITOR) 20 mg tablet, TAKE ONE (1) TABLET BY MOUTH DAILY, Disp: 90 tablet, Rfl: 1    cholecalciferol (VITAMIN D3) 1,000 units tablet, Take 2 tablets (2,000 Units total) by mouth daily, Disp: , Rfl: 0    cyanocobalamin (VITAMIN B-12) 1000 MCG tablet, Take 1 tablet (1,000 mcg total) by mouth daily, Disp: , Rfl: 0    docusate sodium (COLACE) 100 mg capsule, Take 1 capsule (100 mg total) by mouth 2 (two) times a day, Disp: 10 capsule, Rfl: 0    Easy Comfort Lancets MISC, as needed, Disp: , Rfl:     ergocalciferol (VITAMIN D2) 50,000 units, TAKE 1 CAPSULE (50,000 UNITS TOTAL) BY MOUTH ONCE A WEEK FOR 7 DOSES, Disp: 4 capsule, Rfl: 0    finasteride (PROSCAR) 5 mg tablet, Take 1 tablet (5 mg total) by mouth daily, Disp: 30 tablet, Rfl: 0    glipiZIDE (GLUCOTROL) 10 mg tablet, TAKE 1 TABLET BY MOUTH 2 TIMES A DAY, Disp: 180 tablet, Rfl: 1    glucose blood test strip, 1 strip as needed, Disp: , Rfl:     lidocaine (LIDODERM) 5 %, Apply 1 patch topically daily Remove & Discard patch within 12 hours or as directed by MD, Disp: , Rfl: 0    lisinopril (ZESTRIL) 5 mg tablet, TAKE ONE (1) TABLET BY MOUTH DAILY, Disp: 90 tablet, Rfl: 1    meclizine (ANTIVERT) 25 mg tablet, TAKE 1 TABLET BY MOUTH EVERY 8 HOURS AS NEEDED FOR DIZZINESS DO NOT DRIVE WITH MED, Disp: 90 tablet, Rfl: 1    metFORMIN (GLUCOPHAGE) 1000 MG tablet, TAKE 1 TABLET BY MOUTH 2 TIMES A DAY, Disp: 180 tablet, Rfl: 1    polyethylene glycol (MIRALAX) 17 g packet, Take 17 g by mouth daily, Disp: 14 each, Rfl: 2    senna (SENOKOT) 8 6 mg, Take 1 tablet (8 6 mg total) by mouth daily, Disp: 120 each, Rfl: 0    simethicone (MYLICON) 80 mg chewable tablet, Chew 1 tablet (80 mg total) every 6 (six) hours as needed for flatulence, Disp: 30 tablet, Rfl: 0    tamsulosin (FLOMAX) 0 4 mg, Take 1 capsule (0 4 mg total) by mouth daily with dinner, Disp: 30 capsule, Rfl: 0    vitamin B-12 (CYANOCOBALAMIN) 100 MCG TABS, Take 100 mcg by mouth daily, Disp: , Rfl:     Updated list was reviewed in [facility] system of facility  Vitals:    08/06/20 0825   BP: 119/70   Pulse: 70   Resp: 18   Temp: 97 9 °F (36 6 °C)   SpO2: 97%       Physical Exam  Constitutional:       General: He is not in acute distress  Appearance: Normal appearance  He is well-developed and normal weight  He is not toxic-appearing or diaphoretic  HENT:      Head: Normocephalic and atraumatic  Nose: No congestion or rhinorrhea  Mouth/Throat:      Mouth: Mucous membranes are moist    Eyes:      General: No scleral icterus  Right eye: No discharge  Left eye: No discharge  Cardiovascular:      Rate and Rhythm: Normal rate  Pulses: Normal pulses  Heart sounds: Normal heart sounds  Pulmonary:      Effort: Pulmonary effort is normal  No respiratory distress  Breath sounds: No stridor  No wheezing, rhonchi or rales  Abdominal:      General: Bowel sounds are normal  There is no distension  Palpations: Abdomen is soft  Tenderness: There is no abdominal tenderness  There is no guarding  Musculoskeletal:         General: No deformity  Right lower leg: No edema  Left lower leg: No edema     Skin:     General: Skin is warm and dry       Coloration: Skin is not jaundiced or pale  Neurological:      Mental Status: He is alert  Motor: No weakness (Left-sided weakness)  Psychiatric:         Mood and Affect: Mood normal          Diagnostic Data     Recent labs were reviewed  Labs from 08/04/2020  Sodium 137, potassium 4 9, BUN 14, Cr 1 1  WBC 9 9, Hgb 11 2, Hct 36  Urinalysis-positive for the nitrites, and leukocyte esterase  Greater than 100,000 colony count  Additional Notes     Care coordination with staff  Spoke to wife and gave her an update      This was electronically signed by AJIT Camarillo

## 2024-10-30 NOTE — PROGRESS NOTES
[address]  [address] [facility], [address]  [phone]    PROGRESS NOTE    Name: Benja Peacock  AGE: 79 y o  SEX: male    MRN: [id number]    DATE OF ENCOUNTER:  08/26/2020    Patient Location     [facility]    Reason For Visit/ Chief Complaint     Follow-up visit:  Insomnia, essential HTN, mixed HLD, BPH, left-sided weakness, vitamin-D deficiency  1  Insomnia, unspecified type  Assessment & Plan:  Start patient on melatonin 3 mg q h s     Will continue to monitor  2  Essential hypertension  Assessment & Plan:  Stable 143/72  Continue lisinopril 5 mg daily  3  Mixed hyperlipidemia  Assessment & Plan:  Continue atorvastatin 20 mg daily  4  Benign prostatic hyperplasia, unspecified whether lower urinary tract symptoms present  Assessment & Plan:  Stable  No urinary issues reported  Patient remains on finasteride 5 mg daily and tamsulosin 0 5 mg daily  5  Left-sided weakness  Assessment & Plan:  Continue PT/OT  6  Vitamin D deficiency  Assessment & Plan:  Continue vitamin-D supplements weekly  HPI      Benja Peacock is a 79 y o  male who is being seen today for of follow-up visit today  Nurse reports he is having trouble sleeping  On examination he is comfortably resting on his wheelchair  Vital signs are stable  He reports he is having trouble sleeping at with like something to help him sleep  Possibly related to sleep environment issues    He reports no headache, fever, chills nausea, vomiting, abdominal pain    Review of Systems    Past Medical History:   Diagnosis Date    Ambulatory dysfunction     Anxiety     Diabetes mellitus (HCC)     GERD (gastroesophageal reflux disease)     last assessed 5/20/16    Hematuria     Hyperlipidemia     Hypertension     Kidney disease     Left-sided weakness     Nuclear senile cataract of left eye     Vitamin D deficiency Past Surgical History:   Procedure Laterality Date    CATARACT EXTRACTION      CHOLECYSTECTOMY      KNEE SURGERY         Social History     Social History     Tobacco Use   Smoking Status Never Smoker   Smokeless Tobacco Never Used       Social History     Substance and Sexual Activity   Alcohol Use Not Currently       Family History: non-contributory    No Known Allergies    Medications       Current Outpatient Medications:     ACCU-CHEK GUIDE test strip, 1 strip as needed, Disp: , Rfl:     Alcohol Swabs (PRO COMFORT ALCOHOL) 70 % PADS, 1 pad daily Use a pad when testing, Disp: , Rfl:     atorvastatin (LIPITOR) 20 mg tablet, TAKE ONE (1) TABLET BY MOUTH DAILY, Disp: 90 tablet, Rfl: 1    cholecalciferol (VITAMIN D3) 1,000 units tablet, Take 2 tablets (2,000 Units total) by mouth daily, Disp: , Rfl: 0    cyanocobalamin (VITAMIN B-12) 1000 MCG tablet, Take 1 tablet (1,000 mcg total) by mouth daily, Disp: , Rfl: 0    docusate sodium (COLACE) 100 mg capsule, Take 1 capsule (100 mg total) by mouth 2 (two) times a day, Disp: 10 capsule, Rfl: 0    Easy Comfort Lancets MISC, as needed, Disp: , Rfl:     ergocalciferol (VITAMIN D2) 50,000 units, TAKE 1 CAPSULE (50,000 UNITS TOTAL) BY MOUTH ONCE A WEEK FOR 7 DOSES, Disp: 4 capsule, Rfl: 0    finasteride (PROSCAR) 5 mg tablet, Take 1 tablet (5 mg total) by mouth daily, Disp: 30 tablet, Rfl: 0    glipiZIDE (GLUCOTROL) 10 mg tablet, TAKE 1 TABLET BY MOUTH 2 TIMES A DAY, Disp: 180 tablet, Rfl: 1    glucose blood test strip, 1 strip as needed, Disp: , Rfl:     lidocaine (LIDODERM) 5 %, Apply 1 patch topically daily Remove & Discard patch within 12 hours or as directed by MD, Disp: , Rfl: 0    lisinopril (ZESTRIL) 5 mg tablet, TAKE ONE (1) TABLET BY MOUTH DAILY, Disp: 90 tablet, Rfl: 1    meclizine (ANTIVERT) 25 mg tablet, TAKE 1 TABLET BY MOUTH EVERY 8 HOURS AS NEEDED FOR DIZZINESS DO NOT DRIVE WITH MED, Disp: 90 tablet, Rfl: 1    metFORMIN (GLUCOPHAGE) 1000 MG tablet, TAKE 1 TABLET BY MOUTH 2 TIMES A DAY, Disp: 180 tablet, Rfl: 1    polyethylene glycol (MIRALAX) 17 g packet, Take 17 g by mouth daily, Disp: 14 each, Rfl: 2    senna (SENOKOT) 8 6 mg, Take 1 tablet (8 6 mg total) by mouth daily, Disp: 120 each, Rfl: 0    simethicone (MYLICON) 80 mg chewable tablet, Chew 1 tablet (80 mg total) every 6 (six) hours as needed for flatulence, Disp: 30 tablet, Rfl: 0    tamsulosin (FLOMAX) 0 4 mg, Take 1 capsule (0 4 mg total) by mouth daily with dinner, Disp: 30 capsule, Rfl: 0    vitamin B-12 (CYANOCOBALAMIN) 100 MCG TABS, Take 100 mcg by mouth daily, Disp: , Rfl:     Updated list was reviewed in [facility] system of facility  Vitals:    08/26/20 1421   BP: 143/72   Pulse: 84   Resp: 20   Temp: 98 4 °F (36 9 °C)   SpO2: 97%       Physical Exam  Constitutional:       General: He is not in acute distress  Appearance: He is well-developed  He is not diaphoretic  HENT:      Head: Normocephalic and atraumatic  Nose: No congestion  Mouth/Throat:      Mouth: Mucous membranes are moist    Eyes:      General: No scleral icterus  Right eye: No discharge  Left eye: No discharge  Cardiovascular:      Rate and Rhythm: Normal rate and regular rhythm  Pulses: Normal pulses  Pulmonary:      Effort: Pulmonary effort is normal  No respiratory distress  Breath sounds: No wheezing  Abdominal:      General: There is no distension  Tenderness: There is no abdominal tenderness  There is no guarding  Musculoskeletal:         General: No deformity  Skin:     General: Skin is warm and dry  Neurological:      Mental Status: He is alert  Diagnostic Data     Recent labs were reviewed   08/14/2020  o Na 134, K 5 3, BUN 21, Cr 1 4  o WBC 10 4, Hgb 10 8, Hct 34    Additional Notes      Patients care was coordinated with nursing facility staff   Recent vitals, labs and updated medications were reviewed on [facility] system of facility  Past Medical, surgical, social, medication and allergy history and patients previous records reviewed         This was electronically signed by AJIT Sotelo no

## 2025-03-28 ENCOUNTER — HOSPITAL ENCOUNTER (INPATIENT)
Facility: HOSPITAL | Age: 75
LOS: 3 days | Discharge: NURSING, REHAB OR LONG-TERM CARE | End: 2025-03-31
Attending: EMERGENCY MEDICINE | Admitting: HOSPITALIST
Payer: MEDICARE

## 2025-03-28 ENCOUNTER — APPOINTMENT (EMERGENCY)
Dept: CT IMAGING | Facility: HOSPITAL | Age: 75
End: 2025-03-28
Payer: MEDICARE

## 2025-03-28 ENCOUNTER — APPOINTMENT (INPATIENT)
Dept: RADIOLOGY | Facility: HOSPITAL | Age: 75
End: 2025-03-28
Payer: MEDICARE

## 2025-03-28 DIAGNOSIS — R79.89 ELEVATED LACTIC ACID LEVEL: ICD-10-CM

## 2025-03-28 DIAGNOSIS — Z79.4 TYPE 2 DIABETES MELLITUS WITH HYPERGLYCEMIA, WITH LONG-TERM CURRENT USE OF INSULIN (HCC): ICD-10-CM

## 2025-03-28 DIAGNOSIS — N39.0 UTI (URINARY TRACT INFECTION): Primary | ICD-10-CM

## 2025-03-28 DIAGNOSIS — E11.65 TYPE 2 DIABETES MELLITUS WITH HYPERGLYCEMIA, WITH LONG-TERM CURRENT USE OF INSULIN (HCC): ICD-10-CM

## 2025-03-28 PROBLEM — R05.9 COUGH: Status: ACTIVE | Noted: 2025-03-28

## 2025-03-28 LAB
ALBUMIN SERPL BCG-MCNC: 4.1 G/DL (ref 3.5–5)
ALP SERPL-CCNC: 70 U/L (ref 34–104)
ALT SERPL W P-5'-P-CCNC: 4 U/L (ref 7–52)
AMMONIA PLAS-SCNC: 17 UMOL/L (ref 18–72)
ANION GAP SERPL CALCULATED.3IONS-SCNC: 9 MMOL/L (ref 4–13)
AST SERPL W P-5'-P-CCNC: 11 U/L (ref 13–39)
ATRIAL RATE: 88 BPM
BACTERIA UR QL AUTO: ABNORMAL /HPF
BASOPHILS # BLD AUTO: 0.04 THOUSANDS/ÂΜL (ref 0–0.1)
BASOPHILS NFR BLD AUTO: 0 % (ref 0–1)
BILIRUB SERPL-MCNC: 0.68 MG/DL (ref 0.2–1)
BILIRUB UR QL STRIP: NEGATIVE
BUDDING YEAST: PRESENT
BUN SERPL-MCNC: 19 MG/DL (ref 5–25)
CALCIUM SERPL-MCNC: 9.5 MG/DL (ref 8.4–10.2)
CHLORIDE SERPL-SCNC: 101 MMOL/L (ref 96–108)
CLARITY UR: ABNORMAL
CO2 SERPL-SCNC: 30 MMOL/L (ref 21–32)
COLOR UR: YELLOW
CREAT SERPL-MCNC: 1.22 MG/DL (ref 0.6–1.3)
EOSINOPHIL # BLD AUTO: 0.22 THOUSAND/ÂΜL (ref 0–0.61)
EOSINOPHIL NFR BLD AUTO: 2 % (ref 0–6)
ERYTHROCYTE [DISTWIDTH] IN BLOOD BY AUTOMATED COUNT: 14.2 % (ref 11.6–15.1)
EST. AVERAGE GLUCOSE BLD GHB EST-MCNC: 154 MG/DL
FLUAV AG UPPER RESP QL IA.RAPID: NEGATIVE
FLUBV AG UPPER RESP QL IA.RAPID: NEGATIVE
GFR SERPL CREATININE-BSD FRML MDRD: 58 ML/MIN/1.73SQ M
GLUCOSE SERPL-MCNC: 109 MG/DL (ref 65–140)
GLUCOSE SERPL-MCNC: 125 MG/DL (ref 65–140)
GLUCOSE SERPL-MCNC: 138 MG/DL (ref 65–140)
GLUCOSE UR STRIP-MCNC: ABNORMAL MG/DL
HBA1C MFR BLD: 7 %
HCT VFR BLD AUTO: 43.5 % (ref 36.5–49.3)
HGB BLD-MCNC: 14.2 G/DL (ref 12–17)
HGB UR QL STRIP.AUTO: ABNORMAL
IMM GRANULOCYTES # BLD AUTO: 0.16 THOUSAND/UL (ref 0–0.2)
IMM GRANULOCYTES NFR BLD AUTO: 1 % (ref 0–2)
KETONES UR STRIP-MCNC: NEGATIVE MG/DL
LACTATE SERPL-SCNC: 3.1 MMOL/L (ref 0.5–2)
LACTATE SERPL-SCNC: 3.5 MMOL/L (ref 0.5–2)
LEUKOCYTE ESTERASE UR QL STRIP: ABNORMAL
LYMPHOCYTES # BLD AUTO: 0.77 THOUSANDS/ÂΜL (ref 0.6–4.47)
LYMPHOCYTES NFR BLD AUTO: 7 % (ref 14–44)
MCH RBC QN AUTO: 29.5 PG (ref 26.8–34.3)
MCHC RBC AUTO-ENTMCNC: 32.6 G/DL (ref 31.4–37.4)
MCV RBC AUTO: 90 FL (ref 82–98)
MONOCYTES # BLD AUTO: 1.4 THOUSAND/ÂΜL (ref 0.17–1.22)
MONOCYTES NFR BLD AUTO: 12 % (ref 4–12)
NEUTROPHILS # BLD AUTO: 8.76 THOUSANDS/ÂΜL (ref 1.85–7.62)
NEUTS SEG NFR BLD AUTO: 78 % (ref 43–75)
NITRITE UR QL STRIP: NEGATIVE
NON-SQ EPI CELLS URNS QL MICRO: ABNORMAL /HPF
NRBC BLD AUTO-RTO: 0 /100 WBCS
P AXIS: 60 DEGREES
PH UR STRIP.AUTO: 5 [PH]
PLATELET # BLD AUTO: 233 THOUSANDS/UL (ref 149–390)
PMV BLD AUTO: 10.7 FL (ref 8.9–12.7)
POTASSIUM SERPL-SCNC: 4.5 MMOL/L (ref 3.5–5.3)
PR INTERVAL: 132 MS
PROT SERPL-MCNC: 7.2 G/DL (ref 6.4–8.4)
PROT UR STRIP-MCNC: ABNORMAL MG/DL
QRS AXIS: 37 DEGREES
QRSD INTERVAL: 120 MS
QT INTERVAL: 400 MS
QTC INTERVAL: 484 MS
RBC # BLD AUTO: 4.81 MILLION/UL (ref 3.88–5.62)
RBC #/AREA URNS AUTO: ABNORMAL /HPF
SARS-COV+SARS-COV-2 AG RESP QL IA.RAPID: NEGATIVE
SODIUM SERPL-SCNC: 140 MMOL/L (ref 135–147)
SP GR UR STRIP.AUTO: 1.02 (ref 1–1.03)
T WAVE AXIS: 106 DEGREES
TSH SERPL DL<=0.05 MIU/L-ACNC: 0.83 UIU/ML (ref 0.45–4.5)
UROBILINOGEN UR STRIP-ACNC: <2 MG/DL
VENTRICULAR RATE: 88 BPM
WBC # BLD AUTO: 11.35 THOUSAND/UL (ref 4.31–10.16)
WBC #/AREA URNS AUTO: ABNORMAL /HPF
WBC CLUMPS # UR AUTO: PRESENT /UL

## 2025-03-28 PROCEDURE — 87186 SC STD MICRODIL/AGAR DIL: CPT | Performed by: EMERGENCY MEDICINE

## 2025-03-28 PROCEDURE — 84443 ASSAY THYROID STIM HORMONE: CPT | Performed by: EMERGENCY MEDICINE

## 2025-03-28 PROCEDURE — 87077 CULTURE AEROBIC IDENTIFY: CPT | Performed by: EMERGENCY MEDICINE

## 2025-03-28 PROCEDURE — 36415 COLL VENOUS BLD VENIPUNCTURE: CPT | Performed by: EMERGENCY MEDICINE

## 2025-03-28 PROCEDURE — 87081 CULTURE SCREEN ONLY: CPT | Performed by: HOSPITALIST

## 2025-03-28 PROCEDURE — 99285 EMERGENCY DEPT VISIT HI MDM: CPT | Performed by: EMERGENCY MEDICINE

## 2025-03-28 PROCEDURE — 93010 ELECTROCARDIOGRAM REPORT: CPT | Performed by: INTERNAL MEDICINE

## 2025-03-28 PROCEDURE — 83605 ASSAY OF LACTIC ACID: CPT | Performed by: EMERGENCY MEDICINE

## 2025-03-28 PROCEDURE — 96365 THER/PROPH/DIAG IV INF INIT: CPT

## 2025-03-28 PROCEDURE — 99222 1ST HOSP IP/OBS MODERATE 55: CPT | Performed by: HOSPITALIST

## 2025-03-28 PROCEDURE — 80053 COMPREHEN METABOLIC PANEL: CPT | Performed by: EMERGENCY MEDICINE

## 2025-03-28 PROCEDURE — 83036 HEMOGLOBIN GLYCOSYLATED A1C: CPT | Performed by: PHYSICIAN ASSISTANT

## 2025-03-28 PROCEDURE — 71045 X-RAY EXAM CHEST 1 VIEW: CPT

## 2025-03-28 PROCEDURE — 87086 URINE CULTURE/COLONY COUNT: CPT | Performed by: EMERGENCY MEDICINE

## 2025-03-28 PROCEDURE — 81001 URINALYSIS AUTO W/SCOPE: CPT | Performed by: EMERGENCY MEDICINE

## 2025-03-28 PROCEDURE — 87811 SARS-COV-2 COVID19 W/OPTIC: CPT | Performed by: EMERGENCY MEDICINE

## 2025-03-28 PROCEDURE — 99285 EMERGENCY DEPT VISIT HI MDM: CPT

## 2025-03-28 PROCEDURE — 85025 COMPLETE CBC W/AUTO DIFF WBC: CPT | Performed by: EMERGENCY MEDICINE

## 2025-03-28 PROCEDURE — 87804 INFLUENZA ASSAY W/OPTIC: CPT | Performed by: EMERGENCY MEDICINE

## 2025-03-28 PROCEDURE — 93005 ELECTROCARDIOGRAM TRACING: CPT

## 2025-03-28 PROCEDURE — 82948 REAGENT STRIP/BLOOD GLUCOSE: CPT

## 2025-03-28 PROCEDURE — 87106 FUNGI IDENTIFICATION YEAST: CPT | Performed by: EMERGENCY MEDICINE

## 2025-03-28 PROCEDURE — 70450 CT HEAD/BRAIN W/O DYE: CPT

## 2025-03-28 PROCEDURE — 82140 ASSAY OF AMMONIA: CPT | Performed by: EMERGENCY MEDICINE

## 2025-03-28 RX ORDER — INSULIN GLARGINE 100 [IU]/ML
40 INJECTION, SOLUTION SUBCUTANEOUS
Status: DISCONTINUED | OUTPATIENT
Start: 2025-03-29 | End: 2025-03-31 | Stop reason: HOSPADM

## 2025-03-28 RX ORDER — DUTASTERIDE 0.5 MG/1
0.5 CAPSULE, LIQUID FILLED ORAL DAILY
COMMUNITY
End: 2025-03-31

## 2025-03-28 RX ORDER — INSULIN LISPRO 100 [IU]/ML
1-5 INJECTION, SOLUTION INTRAVENOUS; SUBCUTANEOUS
Status: DISCONTINUED | OUTPATIENT
Start: 2025-03-28 | End: 2025-03-31 | Stop reason: HOSPADM

## 2025-03-28 RX ORDER — POLYETHYLENE GLYCOL 3350 17 G/17G
17 POWDER, FOR SOLUTION ORAL DAILY
Status: DISCONTINUED | OUTPATIENT
Start: 2025-03-29 | End: 2025-03-31 | Stop reason: HOSPADM

## 2025-03-28 RX ORDER — SODIUM CHLORIDE, SODIUM GLUCONATE, SODIUM ACETATE, POTASSIUM CHLORIDE, MAGNESIUM CHLORIDE, SODIUM PHOSPHATE, DIBASIC, AND POTASSIUM PHOSPHATE .53; .5; .37; .037; .03; .012; .00082 G/100ML; G/100ML; G/100ML; G/100ML; G/100ML; G/100ML; G/100ML
75 INJECTION, SOLUTION INTRAVENOUS CONTINUOUS
Status: DISCONTINUED | OUTPATIENT
Start: 2025-03-28 | End: 2025-03-31 | Stop reason: HOSPADM

## 2025-03-28 RX ORDER — INSULIN GLARGINE 100 [IU]/ML
10 INJECTION, SOLUTION SUBCUTANEOUS ONCE
Status: COMPLETED | OUTPATIENT
Start: 2025-03-28 | End: 2025-03-28

## 2025-03-28 RX ORDER — FINASTERIDE 5 MG/1
5 TABLET, FILM COATED ORAL DAILY
Status: DISCONTINUED | OUTPATIENT
Start: 2025-03-29 | End: 2025-03-28 | Stop reason: SDUPTHER

## 2025-03-28 RX ORDER — ASPIRIN 81 MG/1
81 TABLET, CHEWABLE ORAL DAILY
Status: DISCONTINUED | OUTPATIENT
Start: 2025-03-29 | End: 2025-03-31 | Stop reason: HOSPADM

## 2025-03-28 RX ORDER — LORATADINE 10 MG/1
10 TABLET ORAL DAILY
Status: DISCONTINUED | OUTPATIENT
Start: 2025-03-29 | End: 2025-03-31 | Stop reason: HOSPADM

## 2025-03-28 RX ORDER — ACETAMINOPHEN 325 MG/1
650 TABLET ORAL EVERY 6 HOURS PRN
Status: DISCONTINUED | OUTPATIENT
Start: 2025-03-28 | End: 2025-03-28

## 2025-03-28 RX ORDER — CALCIUM CARBONATE 500 MG/1
1000 TABLET, CHEWABLE ORAL DAILY PRN
Status: DISCONTINUED | OUTPATIENT
Start: 2025-03-28 | End: 2025-03-31 | Stop reason: HOSPADM

## 2025-03-28 RX ORDER — ACETAMINOPHEN 325 MG/1
650 TABLET ORAL EVERY 6 HOURS PRN
Status: DISCONTINUED | OUTPATIENT
Start: 2025-03-28 | End: 2025-03-31 | Stop reason: HOSPADM

## 2025-03-28 RX ORDER — CARBIDOPA AND LEVODOPA 25; 100 MG/1; MG/1
1 TABLET ORAL 3 TIMES DAILY
Status: DISCONTINUED | OUTPATIENT
Start: 2025-03-28 | End: 2025-03-31 | Stop reason: HOSPADM

## 2025-03-28 RX ORDER — LINAGLIPTIN 5 MG/1
5 TABLET, FILM COATED ORAL DAILY
COMMUNITY

## 2025-03-28 RX ORDER — FERROUS GLUCONATE 324(38)MG
324 TABLET ORAL
Status: DISCONTINUED | OUTPATIENT
Start: 2025-03-29 | End: 2025-03-31 | Stop reason: HOSPADM

## 2025-03-28 RX ORDER — NYSTATIN 100000 [USP'U]/G
POWDER TOPICAL 2 TIMES DAILY
Status: DISCONTINUED | OUTPATIENT
Start: 2025-03-28 | End: 2025-03-31 | Stop reason: HOSPADM

## 2025-03-28 RX ORDER — INSULIN GLARGINE 100 [IU]/ML
40 INJECTION, SOLUTION SUBCUTANEOUS
Status: DISCONTINUED | OUTPATIENT
Start: 2025-03-28 | End: 2025-03-28

## 2025-03-28 RX ORDER — RANOLAZINE 500 MG/1
500 TABLET, EXTENDED RELEASE ORAL EVERY 12 HOURS SCHEDULED
Status: DISCONTINUED | OUTPATIENT
Start: 2025-03-28 | End: 2025-03-31 | Stop reason: HOSPADM

## 2025-03-28 RX ORDER — LORATADINE 10 MG/1
10 TABLET ORAL DAILY
COMMUNITY

## 2025-03-28 RX ORDER — HEPARIN SODIUM 5000 [USP'U]/ML
5000 INJECTION, SOLUTION INTRAVENOUS; SUBCUTANEOUS EVERY 8 HOURS SCHEDULED
Status: DISCONTINUED | OUTPATIENT
Start: 2025-03-28 | End: 2025-03-31 | Stop reason: HOSPADM

## 2025-03-28 RX ORDER — ONDANSETRON 2 MG/ML
4 INJECTION INTRAMUSCULAR; INTRAVENOUS EVERY 6 HOURS PRN
Status: DISCONTINUED | OUTPATIENT
Start: 2025-03-28 | End: 2025-03-31 | Stop reason: HOSPADM

## 2025-03-28 RX ORDER — FINASTERIDE 5 MG/1
5 TABLET, FILM COATED ORAL DAILY
Status: DISCONTINUED | OUTPATIENT
Start: 2025-03-29 | End: 2025-03-31 | Stop reason: HOSPADM

## 2025-03-28 RX ORDER — TAMSULOSIN HYDROCHLORIDE 0.4 MG/1
0.4 CAPSULE ORAL
Status: DISCONTINUED | OUTPATIENT
Start: 2025-03-28 | End: 2025-03-31 | Stop reason: HOSPADM

## 2025-03-28 RX ORDER — METOPROLOL SUCCINATE 25 MG/1
25 TABLET, EXTENDED RELEASE ORAL DAILY
Status: DISCONTINUED | OUTPATIENT
Start: 2025-03-29 | End: 2025-03-31 | Stop reason: HOSPADM

## 2025-03-28 RX ORDER — ATORVASTATIN CALCIUM 40 MG/1
40 TABLET, FILM COATED ORAL DAILY
Status: DISCONTINUED | OUTPATIENT
Start: 2025-03-29 | End: 2025-03-31 | Stop reason: HOSPADM

## 2025-03-28 RX ADMIN — INSULIN GLARGINE 10 UNITS: 100 INJECTION, SOLUTION SUBCUTANEOUS at 22:04

## 2025-03-28 RX ADMIN — SODIUM CHLORIDE, SODIUM GLUCONATE, SODIUM ACETATE, POTASSIUM CHLORIDE, MAGNESIUM CHLORIDE, SODIUM PHOSPHATE, DIBASIC, AND POTASSIUM PHOSPHATE 75 ML/HR: .53; .5; .37; .037; .03; .012; .00082 INJECTION, SOLUTION INTRAVENOUS at 20:30

## 2025-03-28 RX ADMIN — DEXTROSE 1000 MG: 50 INJECTION, SOLUTION INTRAVENOUS at 15:34

## 2025-03-28 RX ADMIN — CEFEPIME 1000 MG: 1 INJECTION, POWDER, FOR SOLUTION INTRAMUSCULAR; INTRAVENOUS at 20:30

## 2025-03-28 RX ADMIN — NYSTATIN: 100000 POWDER TOPICAL at 22:05

## 2025-03-28 RX ADMIN — HEPARIN SODIUM 5000 UNITS: 5000 INJECTION INTRAVENOUS; SUBCUTANEOUS at 22:04

## 2025-03-28 RX ADMIN — SODIUM CHLORIDE 1000 ML: 0.9 INJECTION, SOLUTION INTRAVENOUS at 15:23

## 2025-03-28 RX ADMIN — SODIUM CHLORIDE 1700 ML: 0.9 INJECTION, SOLUTION INTRAVENOUS at 17:55

## 2025-03-28 NOTE — ASSESSMENT & PLAN NOTE
"Lab Results   Component Value Date    HGBA1C 9.2 (H) 08/15/2023       No results for input(s): \"POCGLU\" in the last 72 hours.    Blood Sugar Average: Last 72 hrs:    Last hemoglobin A1c 9.2  8/23  Obtain updated hemoglobin A1c  Continue with home regimen 40 units basal insulin nightly  Continue with sliding scale  Hold Tradjenta and metformin  CCD  "

## 2025-03-28 NOTE — ASSESSMENT & PLAN NOTE
Continue with home regimen of metoprolol  Blood pressure currently normotensive  Continue to trend vitals

## 2025-03-28 NOTE — ASSESSMENT & PLAN NOTE
Has a previous history of known triple-vessel disease however not surgical candidate  Medically managed with GDMT therapy  Continue ASA, Ranexa, metoprolol  Continue to follow-up in the outpatient setting

## 2025-03-28 NOTE — PLAN OF CARE
Problem: PAIN - ADULT  Goal: Verbalizes/displays adequate comfort level or baseline comfort level  Description: Interventions:- Encourage patient to monitor pain and request assistance- Assess pain using appropriate pain scale- Administer analgesics based on type and severity of pain and evaluate response- Implement non-pharmacological measures as appropriate and evaluate response- Consider cultural and social influences on pain and pain management- Notify physician/advanced practitioner if interventions unsuccessful or patient reports new pain  Outcome: Progressing     Problem: INFECTION - ADULT  Goal: Absence or prevention of progression during hospitalization  Description: INTERVENTIONS:- Assess and monitor for signs and symptoms of infection- Monitor lab/diagnostic results- Monitor all insertion sites, i.e. indwelling lines, tubes, and drains- Monitor endotracheal if appropriate and nasal secretions for changes in amount and color- Kelly appropriate cooling/warming therapies per order- Administer medications as ordered- Instruct and encourage patient and family to use good hand hygiene technique- Identify and instruct in appropriate isolation precautions for identified infection/condition  Outcome: Progressing  Goal: Absence of fever/infection during neutropenic period  Description: INTERVENTIONS:- Monitor WBC  Outcome: Progressing     Problem: SAFETY ADULT  Goal: Patient will remain free of falls  Description: INTERVENTIONS:- Educate patient/family on patient safety including physical limitations- Instruct patient to call for assistance with activity - Consult OT/PT to assist with strengthening/mobility - Keep Call bell within reach- Keep bed low and locked with side rails adjusted as appropriate- Keep care items and personal belongings within reach- Initiate and maintain comfort rounds- Make Fall Risk Sign visible to staff- Offer Toileting every  Hours, in advance of need- Initiate/Maintain alarm- Obtain  necessary fall risk management equipmen- Apply yellow socks and bracelet for high fall risk patients- Consider moving patient to room near nurses station  Outcome: Progressing  Goal: Maintain or return to baseline ADL function  Description: INTERVENTIONS:-  Assess patient's ability to carry out ADLs; assess patient's baseline for ADL function and identify physical deficits which impact ability to perform ADLs (bathing, care of mouth/teeth, toileting, grooming, dressing, etc.)- Assess/evaluate cause of self-care deficits - Assess range of motion- Assess patient's mobility; develop plan if impaired- Assess patient's need for assistive devices and provide as appropriate- Encourage maximum independence but intervene and supervise when necessary- Involve family in performance of ADLs- Assess for home care needs following discharge - Consider OT consult to assist with ADL evaluation and planning for discharge- Provide patient education as appropriate  Outcome: Progressing  Goal: Maintains/Returns to pre admission functional level  Description: INTERVENTIONS:- Perform AM-PAC 6 Click Basic Mobility/ Daily Activity assessment daily.- Set and communicate daily mobility goal to care team and patient/family/caregiver. - Collaborate with rehabilitation services on mobility goals if consulted- Perform Range of Motion  times a day.- Reposition patient opal hours.- Dangle patient  times a day- Stand patient  times a day- Ambulate patient  times a day- Out of bed to chair  times a day - Out of bed for meals  times a day- Out of bed for toileting- Record patient progress and toleration of activity level   Outcome: Progressing

## 2025-03-28 NOTE — ASSESSMENT & PLAN NOTE
No SIRS criteria per vital signs; WBC does not meet SIRS  Etiology likely multifactorial in setting of metformin with UTI/mild dehydration  Trend  Fortunately, hemodynamically stable

## 2025-03-28 NOTE — ED PROVIDER NOTES
Time reflects when diagnosis was documented in both MDM as applicable and the Disposition within this note       Time User Action Codes Description Comment    3/28/2025  5:46 PM Arlyn Bustamante Add [N39.0] UTI (urinary tract infection)     3/28/2025  5:47 PM Arlyn Bustamante Add [R79.89] Elevated lactic acid level           ED Disposition       ED Disposition   Admit    Condition   Stable    Date/Time   Fri Mar 28, 2025  5:46 PM    Comment   Case was discussed with Dr. Nicolas and the patient's admission status was agreed to be Admission Status: inpatient status to the service of Dr. Nicolas.               Assessment & Plan       Medical Decision Making  Mr. Weems was sent in with change in mentation/encephalopathy.  Found to have UTI.  Lactic acid remains elevated on recheck.  3.5 down to 3.1 with fluid bolus.  He is incontinent at baseline and cath specimen is consistent with UTI.  He does take metformin although does not normally have elevated lactic acid levels.  He does not meet SIRS criteria.  Additional fluid bolus has been ordered.  Ceftriaxone given.    Amount and/or Complexity of Data Reviewed  Labs: ordered.  Radiology: ordered.    Risk  Decision regarding hospitalization.        ED Course as of 03/28/25 1919   Fri Mar 28, 2025   1405 Differential diagnosis includes but is not limited to acute encephalopathy secondary to infection such as viral or UTI.  Nonfocal exam.  Doubt ICH or CVA.  Must consider possibility of medication side effects, ACS, hypothyroidism.   1430 Phoned [facility] [place].  Call went to voicemail.  I did leave a detailed voicemail indicating who I was and that I was calling from the hospital hoping to obtain some information about what brings Mr. Weems in today.   1501 Lactic acid level is elevated.  He is on metformin as potential explanation.  IV fluids have been ordered in consideration of dehydration.       1501 Urinalysis has returned.  Large  leukocyte Eseterace present.   1526 Urine microscopy has returned with 0 epithelial cells present, innumerable white blood cells and innumerable bacteria-consistent with UTI.  Antibiotics will be initiated.  Does not meet SIRS criteria.   1558 Repeat lactic acid level will be collected shortly.  Patient is p.o. challenging.  If lactic acid significantly improved and he tolerates p.o. well anticipate discharge with oral therapy for UTI.   1739 Patient relates that he is very thirsty and hungry.  Snacks provided.  Lactic acid lowered only a tiny bit.  Additional fluid bolus ordered.  Will reach out to [facility] patient is agreeable to plan for overnight treatment.       Medications   aspirin chewable tablet 81 mg (has no administration in time range)   atorvastatin (LIPITOR) tablet 40 mg (has no administration in time range)   carbidopa-levodopa (SINEMET)  mg per tablet 1 tablet (has no administration in time range)   ferrous gluconate (FERGON) tablet 324 mg (has no administration in time range)   finasteride (PROSCAR) tablet 5 mg (has no administration in time range)   metoprolol succinate (TOPROL-XL) 24 hr tablet 25 mg (has no administration in time range)   tamsulosin (FLOMAX) capsule 0.4 mg (has no administration in time range)   ranolazine (RANEXA) 12 hr tablet 500 mg (has no administration in time range)   nystatin (MYCOSTATIN) powder (has no administration in time range)   loratadine (CLARITIN) tablet 10 mg (has no administration in time range)   insulin glargine (LANTUS) subcutaneous injection 40 Units 0.4 mL (has no administration in time range)   cefepime (MAXIPIME) 1,000 mg in dextrose 5 % 50 mL IVPB (has no administration in time range)   multi-electrolyte (Plasmalyte-A/Isolyte-S PH 7.4/Normosol-R) IV solution (has no administration in time range)   acetaminophen (TYLENOL) tablet 650 mg (has no administration in time range)   polyethylene glycol (MIRALAX) packet 17 g (has no administration in time range)  "  ondansetron (ZOFRAN) injection 4 mg (has no administration in time range)   calcium carbonate (TUMS) chewable tablet 1,000 mg (has no administration in time range)   heparin (porcine) subcutaneous injection 5,000 Units (has no administration in time range)   insulin lispro (HumALOG/ADMELOG) 100 units/mL subcutaneous injection 1-5 Units (has no administration in time range)   insulin lispro (HumALOG/ADMELOG) 100 units/mL subcutaneous injection 1-5 Units (has no administration in time range)   sodium chloride 0.9 % bolus 1,000 mL (0 mL Intravenous Stopped 3/28/25 1653)   ceftriaxone (ROCEPHIN) 1 g/50 mL in dextrose IVPB (0 mg Intravenous Stopped 3/28/25 1653)   sodium chloride 0.9 % bolus 1,700 mL (1,700 mL Intravenous New Bag 3/28/25 1755)       ED Risk Strat Scores                                                History of Present Illness       Chief Complaint   Patient presents with    Altered Mental Status     Patient to ED via EMS from [facility] with c/o lethargy and \"not acting himself\" per staff. Patient has history of dementia, GCS 14 at baseline and denies any complaints. Patient A/Ox3 on ED arrival.        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    Dizziness     and giddiness    Dysphagia     Elevated PSA     Facial weakness     GERD (gastroesophageal reflux disease)     last assessed 5/20/16    Gross hematuria     " "Hematuria     Hyperlipidemia     Hypertension     Kidney disease     Kidney stone     Left-sided weakness     Long term (current) use of oral hypoglycemic drugs     Muscle weakness     Nuclear senile cataract of left eye     OA (osteoarthritis) of knee     b/l    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HCC)     Syncope and collapse     Tachycardia     UTI (urinary tract infection)     Vertebro-basilar artery syndrome     Vitamin B deficiency     Vitamin D deficiency     Vitamin D deficiency       Past Surgical History:   Procedure Laterality Date    CARDIAC CATHETERIZATION N/A 3/7/2022    Procedure: CARDIAC CATHETERIZATION;  Surgeon: Pako Reid DO;  [facility];  Service: Cardiology    CATARACT EXTRACTION      CHOLECYSTECTOMY      KNEE SURGERY      VA LITHOLAPAXY SMPL/SM <2.5 CM N/A 1/4/2021    Procedure: Cystolitholopaxy w/laser bladder stones;  Surgeon: Kyle Jones MD;  Location: [facility];  Service: Urology    VA TRURL ELECTROSURG RESCJ PROSTATE BLEED COMPLETE N/A 1/4/2021    Procedure: T.U.R.P.;  Surgeon: Kyle Jones MD;  Location: [facility];  Service: Urology      Family History   Problem Relation Age of Onset    Coronary artery disease Mother     Diabetes Father       Social History     Tobacco Use    Smoking status: Never    Smokeless tobacco: Never   Vaping Use    Vaping status: Never Used   Substance Use Topics    Alcohol use: Not Currently    Drug use: No      E-Cigarette/Vaping    E-Cigarette Use Never User       E-Cigarette/Vaping Substances    Nicotine No     THC No     CBD No       I have reviewed and agree with the history as documented.     Mr. Weems is a 74-year-old male who presents [facility] the emergency department [facility]  Staff reported that he was \"lethargic and not his usual self.\"  He has no complaints and denies that anything has bothered him.  He specifically denies having had any headache, chest pain or abdominal discomfort.  He has " not felt short of breath, appreciated fever, nasal congestion, nausea or vomiting.  He denies having had problems with bowel movements.  He does report chronic urinary incontinence without dysuria.    Past medical history significant for dementia, hypertension, diabetes, CKD and GERD.  He reports mobility is with wheelchair.  Glucose was in the 150s.        Review of Systems   All other systems reviewed and are negative.          Objective       ED Triage Vitals [03/28/25 1342]   Temperature Pulse Blood Pressure Respirations SpO2 Patient Position - Orthostatic VS   98 °F (36.7 °C) 88 121/58 18 98 % Sitting      Temp Source Heart Rate Source BP Location FiO2 (%) Pain Score    Oral Monitor Left arm -- No Pain      Vitals      Date and Time Temp Pulse SpO2 Resp BP Pain Score FACES Pain Rating User   03/28/25 1847 -- -- -- -- -- No Pain Simultaneous filing. User may not have seen previous data. -- LB   03/28/25 1836 99.1 °F (37.3 °C) 89 98 % 18 144/73 -- -- DII   03/28/25 1342 98 °F (36.7 °C) 88 98 % 18 121/58 No Pain -- AP            Physical Exam  Vitals and nursing note reviewed.   Constitutional:       Appearance: He is well-developed.      Comments: Odor of urine appreciated.  Small amounts of dried stool feet.   HENT:      Head: Normocephalic.   Eyes:      Extraocular Movements:      Right eye: Normal extraocular motion.      Left eye: Normal extraocular motion.   Cardiovascular:      Rate and Rhythm: Normal rate and regular rhythm.   Pulmonary:      Effort: Pulmonary effort is normal.      Breath sounds: Normal breath sounds.   Abdominal:      General: Bowel sounds are normal. There is no distension.      Tenderness: There is no abdominal tenderness.   Skin:     General: Skin is warm and dry.   Neurological:      Mental Status: He is alert and oriented to person, place, and time.      Comments: No facial asymmetry.  Good strength on handgrip.  Rigid in positioning.  Unable to sit forward without large amount of  assistance.  Able to move feet well.  Unable to maintain heels extended off of stretcher.   Psychiatric:         Speech: Speech normal.         Behavior: Behavior normal.         Results Reviewed       Procedure Component Value Units Date/Time    Hemoglobin A1c w/EAG Estimation (Prechecked if no A1C within 90 days) [[id number]] Collected: 03/28/25 1846    Lab Status: No result Specimen: Blood     Lactic acid 2 Hours [[id number]]  (Abnormal) Collected: 03/28/25 1701    Lab Status: Final result Specimen: Blood from Arm, Left Updated: 03/28/25 1721     LACTIC ACID 3.1 mmol/L     Narrative:      Result may be elevated if tourniquet was used during collection.    Comprehensive metabolic panel [[id number]]  (Abnormal) Collected: 03/28/25 1411    Lab Status: Final result Specimen: Blood from Arm, Left Updated: 03/28/25 1520     Sodium 140 mmol/L      Potassium 4.5 mmol/L      Chloride 101 mmol/L      CO2 30 mmol/L      ANION GAP 9 mmol/L      BUN 19 mg/dL      Creatinine 1.22 mg/dL      Glucose 109 mg/dL      Calcium 9.5 mg/dL      AST 11 U/L      ALT 4 U/L      Alkaline Phosphatase 70 U/L      Total Protein 7.2 g/dL      Albumin 4.1 g/dL      Total Bilirubin 0.68 mg/dL      eGFR 58 ml/min/1.73sq m     Narrative:      National Kidney Disease Foundation guidelines for Chronic Kidney Disease (CKD):     Stage 1 with normal or high GFR (GFR > 90 mL/min/1.73 square meters)    Stage 2 Mild CKD (GFR = 60-89 mL/min/1.73 square meters)    Stage 3A Moderate CKD (GFR = 45-59 mL/min/1.73 square meters)    Stage 3B Moderate CKD (GFR = 30-44 mL/min/1.73 square meters)    Stage 4 Severe CKD (GFR = 15-29 mL/min/1.73 square meters)    Stage 5 End Stage CKD (GFR <15 mL/min/1.73 square meters)  Note: GFR calculation is accurate only with a steady state creatinine    TSH, 3rd generation with Free T4 reflex [[id number]]  (Normal) Collected: 03/28/25 1411    Lab Status: Final result Specimen: Blood from Arm, Left Updated: 03/28/25 1508     TSH 3RD  GENERATON 0.833 uIU/mL     Urine Microscopic [[id number]]  (Abnormal) Collected: 03/28/25 1442    Lab Status: Final result Specimen: Urine, Straight Cath Updated: 03/28/25 1502     RBC, UA Innumerable /hpf      WBC, UA Innumerable /hpf      Epithelial Cells None Seen /hpf      Bacteria, UA Innumerable /hpf      WBC Clumps Present     Budding Yeast Present    Urine culture [[id number]] Collected: 03/28/25 1442    Lab Status: In process Specimen: Urine, Straight Cath Updated: 03/28/25 1502    UA w Reflex to Microscopic w Reflex to Culture [[id number]]  (Abnormal) Collected: 03/28/25 1442    Lab Status: Final result Specimen: Urine, Straight Cath Updated: 03/28/25 1501     Color, UA Yellow     Clarity, UA Extra Turbid     Specific Gravity, UA 1.022     pH, UA 5.0     Leukocytes, UA Large     Nitrite, UA Negative     Protein, UA Trace mg/dl      Glucose, UA >=1000 (1%) mg/dl      Ketones, UA Negative mg/dl      Urobilinogen, UA <2.0 mg/dl      Bilirubin, UA Negative     Occult Blood, UA Moderate    Lactic acid, plasma (w/reflex if result > 2.0) [[id number]]  (Abnormal) Collected: 03/28/25 1411    Lab Status: Final result Specimen: Blood from Arm, Left Updated: 03/28/25 1445     LACTIC ACID 3.5 mmol/L     Narrative:      Result may be elevated if tourniquet was used during collection.    Ammonia [[id number]]  (Abnormal) Collected: 03/28/25 1411    Lab Status: Final result Specimen: Blood from Arm, Left Updated: 03/28/25 1445     Ammonia 17 umol/L     FLU/COVID Rapid Antigen (30 min. TAT) - Preferred screening test in ED [[id number]]  (Normal) Collected: 03/28/25 1411    Lab Status: Final result Specimen: Nares from Nose Updated: 03/28/25 1437     SARS COV Rapid Antigen Negative     Influenza A Rapid Antigen Negative     Influenza B Rapid Antigen Negative    Narrative:      This test has been performed using the Quidel Jennifer 2 FLU+SARS Antigen test under the Emergency Use Authorization (EUA). This test has been validated by the   and verified by the performing laboratory. The Jennifer uses lateral flow immunofluorescent sandwich assay to detect SARS-COV, Influenza A and Influenza B Antigen.     The Quidel Jennifer 2 SARS Antigen test does not differentiate between SARS-CoV and SARS-CoV-2.     Negative results are presumptive and may be confirmed with a molecular assay, if necessary, for patient management. Negative results do not rule out SARS-CoV-2 or influenza infection and should not be used as the sole basis for treatment or patient management decisions. A negative test result may occur if the level of antigen in a sample is below the limit of detection of this test.     Positive results are indicative of the presence of viral antigens, but do not rule out bacterial infection or co-infection with other viruses.     All test results should be used as an adjunct to clinical observations and other information available to the provider.    FOR PEDIATRIC PATIENTS - copy/paste COVID Guidelines URL to browser: [email or web address]    CBC and differential [[id number]]  (Abnormal) Collected: 03/28/25 1411    Lab Status: Final result Specimen: Blood from Arm, Left Updated: 03/28/25 1420     WBC 11.35 Thousand/uL      RBC 4.81 Million/uL      Hemoglobin 14.2 g/dL      Hematocrit 43.5 %      MCV 90 fL      MCH 29.5 pg      MCHC 32.6 g/dL      RDW 14.2 %      MPV 10.7 fL      Platelets 233 Thousands/uL      nRBC 0 /100 WBCs      Segmented % 78 %      Immature Grans % 1 %      Lymphocytes % 7 %      Monocytes % 12 %      Eosinophils Relative 2 %      Basophils Relative 0 %      Absolute Neutrophils 8.76 Thousands/µL      Absolute Immature Grans 0.16 Thousand/uL      Absolute Lymphocytes 0.77 Thousands/µL      Absolute Monocytes 1.40 Thousand/µL      Eosinophils Absolute 0.22 Thousand/µL      Basophils Absolute 0.04 Thousands/µL             CT head without contrast   Final Interpretation by  "Antwon Aguilar MD ( [id number])      No acute intracranial abnormality. Stable chronic microangiopathic changes                  Workstation performed: [id number]         XR chest portable    (Results Pending)       ECG 12 Lead Documentation Only    Date/Time: 3/28/2025 2:38 PM    Performed by: Arlyn Bustamante MD  Authorized by: Arlyn Bustamante MD    ECG reviewed by me, the ED Provider: yes    Patient location:  ED  Rate:     ECG rate:  89    ECG rate assessment: normal    Rhythm:     Rhythm: sinus rhythm    Ectopy:     Ectopy: none    QRS:     QRS axis:  Normal    QRS intervals:  Normal  Conduction:     Conduction: abnormal      Abnormal conduction: complete RBBB    ST segments:     ST segments:  Abnormal    Elevation:  III  T waves:     T waves: inverted      Inverted:  AVL, V2, V3 and V4      ED Medication and Procedure Management   Prior to Admission Medications   Prescriptions Last Dose Informant Patient Reported? Taking?   ACCU-CHEK GUIDE test strip  Outside Facility (Specify), Self Yes No   Si strip as needed   Alcohol Swabs (PRO COMFORT ALCOHOL) 70 % PADS  Outside Facility (Specify), Self Yes No   Si pad daily Use a pad when testing   Easy Comfort Lancets MISC  Outside Facility (Specify), Self Yes No   Sig: as needed   Glucagon, rDNA, (GLUCAGON EMERGENCY IJ)  Self Yes No   Sig: Inject 1 mL as directed Sugars less than 60 and conscious   Glucose 15 g PACK  Self Yes No   Sig: Take 15 g by mouth Per [facility] health \"give 15 gram PO every 15 minutes PRN for hypoglycemia\"   [employer] 30G X 8 MM MISC  Self Yes No   acetaminophen (TYLENOL) 325 mg tablet  Self Yes No   Sig: Take 650 mg by mouth every 6 (six) hours as needed for mild pain   amoxicillin (AMOXIL) 500 mg capsule  Self Yes No   aspirin 81 mg chewable tablet  Self No No   Sig: Chew 1 tablet (81 mg total) daily   atorvastatin (LIPITOR) 40 mg tablet  Self No No   Sig: Take 1 tablet (40 mg total) by mouth " daily   carbidopa-levodopa (SINEMET)  mg per tablet   No No   Sig: Take 1 tablet by mouth 3 (three) times a day   chlorhexidine (PERIDEX) 0.12 % solution  Outside Facility (Specify) Yes No   Sig: Apply 15 mL to the mouth or throat 2 (two) times a day   cholecalciferol (VITAMIN D3) 1,000 units tablet  Self No No   Sig: Take 50 tablets (50,000 Units total) by mouth every 30 (thirty) days On the  of the month   cyanocobalamin (VITAMIN B-12) 1000 MCG tablet  Outside Facility (Specify), Self No No   Sig: Take 1 tablet (1,000 mcg total) by mouth daily   docusate sodium (COLACE) 100 mg capsule  Outside Facility (Specify), Self No No   Sig: Take 1 capsule (100 mg total) by mouth 2 (two) times a day   dutasteride (AVODART) 0.5 mg capsule   Yes Yes   Sig: Take 0.5 mg by mouth daily   famotidine (PEPCID) 20 mg tablet  Self No No   Sig: Take 1 tablet (20 mg total) by mouth daily at bedtime   ferrous gluconate (FERGON) 324 mg tablet   No No   Sig: Take 1 tablet (324 mg total) by mouth daily before breakfast Do not start before 2023.   finasteride (PROSCAR) 5 mg tablet  Outside Facility (Specify), Self No No   Sig: Take 1 tablet (5 mg total) by mouth daily   glucose blood test strip  Outside Facility (Specify), Self Yes No   Si strip as needed   glycerin-hypromellose- (ARTIFICIAL TEARS) 0.2-0.2-1 % SOLN  Self No No   Sig: Administer 1 drop to both eyes 2 (two) times a day as needed (for dry eyes)   insulin glargine (LANTUS) 100 units/mL subcutaneous injection  Self No No   Sig: Inject 30 Units under the skin daily at bedtime   Patient taking differently: Inject 26 Units under the skin daily at bedtime   insulin lispro (HumaLOG) 100 units/mL injection  Self No No   Sig: Inject 10 Units under the skin 3 (three) times a day with meals   lidocaine (XYLOCAINE) 5 % ointment  Self No No   Sig: Apply topically 2 (two) times a day as needed for mild pain or moderate pain   linaGLIPtin (Tradjenta) 5 MG TABS    Yes Yes   Sig: Take 5 mg by mouth daily   lisinopril (ZESTRIL) 5 mg tablet  Self No No   Sig: Take 1 tablet (5 mg total) by mouth daily   loratadine (CLARITIN) 10 mg tablet   Yes Yes   Sig: Take 10 mg by mouth daily   melatonin 3 mg  Self Yes No   Sig: Take 3 mg by mouth daily at bedtime Give 2 tabs at bed time for insomnia per [facility]   metFORMIN (GLUCOPHAGE) 1000 MG tablet   Yes No   Sig: Take 1,000 mg by mouth 2 (two) times a day with meals   metoprolol succinate (TOPROL-XL) 25 mg 24 hr tablet  Self No No   Sig: Take 1 tablet (25 mg total) by mouth daily   nystatin (MYCOSTATIN) powder  Self No No   Sig: Apply topically 2 (two) times a day   oxybutynin (DITROPAN-XL) 5 mg 24 hr tablet   Yes No   Sig: Take 5 mg by mouth daily   pantoprazole (PROTONIX) 40 mg tablet   No No   Sig: TAKE 1 TABLET (40 MG TOTAL) BY MOUTH 2 (TWO) TIMES A DAY BEFORE MEALS   polyethylene glycol (MIRALAX) 17 g packet  Outside Facility (Specify), Self No No   Sig: Take 17 g by mouth daily   ranolazine (RANEXA) 500 mg 12 hr tablet  Self No No   Sig: Take 1 tablet (500 mg total) by mouth every 12 (twelve) hours   senna (SENOKOT) 8.6 mg  Self No No   Sig: Take 1 tablet (8.6 mg total) by mouth daily as needed for constipation   sulfamethoxazole-trimethoprim (BACTRIM DS) 800-160 mg per tablet  Self Yes No   tamsulosin (FLOMAX) 0.4 mg  Outside Facility (Specify), Self No No   Sig: Take 1 capsule (0.4 mg total) by mouth daily with dinner      Facility-Administered Medications: None     Current Discharge Medication List        CONTINUE these medications which have NOT CHANGED    Details   dutasteride (AVODART) 0.5 mg capsule Take 0.5 mg by mouth daily      linaGLIPtin (Tradjenta) 5 MG TABS Take 5 mg by mouth daily      loratadine (CLARITIN) 10 mg tablet Take 10 mg by mouth daily      !! ACCU-CHEK GUIDE test strip 1 strip as needed      acetaminophen (TYLENOL) 325 mg tablet Take 650 mg by mouth every 6 (six) hours as needed for mild pain       Alcohol Swabs (PRO COMFORT ALCOHOL) 70 % PADS 1 pad daily Use a pad when testing      amoxicillin (AMOXIL) 500 mg capsule       aspirin 81 mg chewable tablet Chew 1 tablet (81 mg total) daily  Qty: 30 tablet, Refills: 0    Associated Diagnoses: NSTEMI (non-ST elevated myocardial infarction) (HCC)      atorvastatin (LIPITOR) 40 mg tablet Take 1 tablet (40 mg total) by mouth daily  Qty: 30 tablet, Refills: 0    Associated Diagnoses: NSTEMI (non-ST elevated myocardial infarction) ([facility])      carbidopa-levodopa (SINEMET)  mg per tablet Take 1 tablet by mouth 3 (three) times a day  Qty: 90 tablet, Refills: 11    Associated Diagnoses: Ambulatory dysfunction; Left-sided weakness      chlorhexidine (PERIDEX) 0.12 % solution Apply 15 mL to the mouth or throat 2 (two) times a day      cholecalciferol (VITAMIN D3) 1,000 units tablet Take 50 tablets (50,000 Units total) by mouth every 30 (thirty) days On the 15th of the month    Associated Diagnoses: Vitamin D deficiency      cyanocobalamin (VITAMIN B-12) 1000 MCG tablet Take 1 tablet (1,000 mcg total) by mouth daily  Refills: 0    Associated Diagnoses: B12 deficiency      docusate sodium (COLACE) 100 mg capsule Take 1 capsule (100 mg total) by mouth 2 (two) times a day  Qty: 10 capsule, Refills: 0    Associated Diagnoses: Constipation      Easy Comfort Lancets MISC as needed      famotidine (PEPCID) 20 mg tablet Take 1 tablet (20 mg total) by mouth daily at bedtime  Qty: 30 tablet, Refills: 0    Associated Diagnoses: GERD (gastroesophageal reflux disease)      ferrous gluconate (FERGON) 324 mg tablet Take 1 tablet (324 mg total) by mouth daily before breakfast Do not start before July 30, 2023.  Qty: 30 tablet, Refills: 0    Associated Diagnoses: Anemia      finasteride (PROSCAR) 5 mg tablet Take 1 tablet (5 mg total) by mouth daily  Qty: 30 tablet, Refills: 0    Associated Diagnoses: UTI (urinary tract infection)      Glucagon, rDNA, (GLUCAGON EMERGENCY IJ) Inject 1 mL  "as directed Sugars less than 60 and conscious      Glucose 15 g PACK Take 15 g by mouth Per [facility] \"give 15 gram PO every 15 minutes PRN for hypoglycemia\"      !! glucose blood test strip 1 strip as needed      glycerin-hypromellose- (ARTIFICIAL TEARS) 0.2-0.2-1 % SOLN Administer 1 drop to both eyes 2 (two) times a day as needed (for dry eyes)  Qty: 15 mL, Refills: 0    Associated Diagnoses: Dry eyes      insulin glargine (LANTUS) 100 units/mL subcutaneous injection Inject 30 Units under the skin daily at bedtime  Qty: 10 mL, Refills: 0    Associated Diagnoses: Type 2 diabetes mellitus with hyperglycemia, with long-term current use of insulin ([facility])      insulin lispro (HumaLOG) 100 units/mL injection Inject 10 Units under the skin 3 (three) times a day with meals  Qty: 10 mL, Refills: 0    Associated Diagnoses: Type 2 diabetes mellitus with hyperglycemia, with long-term current use of insulin ([facility])      lidocaine (XYLOCAINE) 5 % ointment Apply topically 2 (two) times a day as needed for mild pain or moderate pain  Qty: 35.44 g, Refills: 0    Associated Diagnoses: Penile pain      lisinopril (ZESTRIL) 5 mg tablet Take 1 tablet (5 mg total) by mouth daily  Refills: 0    Associated Diagnoses: Type 2 MI (myocardial infarction) ([facility])      melatonin 3 mg Take 3 mg by mouth daily at bedtime Give 2 tabs at bed time for insomnia per [facility]      metFORMIN (GLUCOPHAGE) 1000 MG tablet Take 1,000 mg by mouth 2 (two) times a day with meals      metoprolol succinate (TOPROL-XL) 25 mg 24 hr tablet Take 1 tablet (25 mg total) by mouth daily  Refills: 0    Associated Diagnoses: Type 2 MI (myocardial infarction) ([facility])      NovoFine Autocover 30G X 8 MM MISC       nystatin (MYCOSTATIN) powder Apply topically 2 (two) times a day  Qty: 15 g, Refills: 0    Associated Diagnoses: Intertrigo      oxybutynin (DITROPAN-XL) 5 mg 24 hr tablet Take 5 mg by mouth daily      pantoprazole (PROTONIX) 40 mg tablet TAKE 1 " TABLET (40 MG TOTAL) BY MOUTH 2 (TWO) TIMES A DAY BEFORE MEALS  Qty: 60 tablet, Refills: 4    Associated Diagnoses: GERD (gastroesophageal reflux disease)      polyethylene glycol (MIRALAX) 17 g packet Take 17 g by mouth daily  Qty: 14 each, Refills: 2    Associated Diagnoses: Constipation      ranolazine (RANEXA) 500 mg 12 hr tablet Take 1 tablet (500 mg total) by mouth every 12 (twelve) hours  Qty: 60 tablet, Refills: 0    Associated Diagnoses: Type 2 MI (myocardial infarction) (HCC)      senna (SENOKOT) 8.6 mg Take 1 tablet (8.6 mg total) by mouth daily as needed for constipation  Qty: 120 tablet, Refills: 0    Associated Diagnoses: Constipation      sulfamethoxazole-trimethoprim (BACTRIM DS) 800-160 mg per tablet       tamsulosin (FLOMAX) 0.4 mg Take 1 capsule (0.4 mg total) by mouth daily with dinner  Qty: 30 capsule, Refills: 0    Associated Diagnoses: UTI (urinary tract infection)       !! - Potential duplicate medications found. Please discuss with provider.        No discharge procedures on file.  ED SEPSIS DOCUMENTATION   Time reflects when diagnosis was documented in both MDM as applicable and the Disposition within this note       Time User Action Codes Description Comment    3/28/2025  5:46 PM Arlyn Bustamante Add [N39.0] UTI (urinary tract infection)     3/28/2025  5:47 PM Arlyn Bustamante Add [R79.89] Elevated lactic acid level            Initial Sepsis Screening       Row Name 03/28/25 1526                Is the patient's history suggestive of a new or worsening infection? Yes (Proceed)  -SZ        Suspected source of infection urinary tract infection  -SZ        Indicate SIRS criteria --        Are two or more of the above signs & symptoms of infection both present and new to the patient? No  -SZ                  User Key  (r) = Recorded By, (t) = Taken By, (c) = Cosigned By      Initials Name Provider Type    SZ Arlyn Bustamante MD Physician                        Arlyn Bustamante MD  03/28/25 1919

## 2025-03-28 NOTE — ED NOTES
Patient provided with rosa maria ventura for PO challenge at this time     Lulú Sherman, RN  03/28/25 7467

## 2025-03-28 NOTE — ASSESSMENT & PLAN NOTE
Straight cath in ED to obtain UA  Positive for leukocytes and bacteria  Obtain urine culture  Previous history of colonization with multiple huseyin including Pseudomonas, 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 past  Given altered mental status we will start cefepime  Previous history of chronic indwelling Razo  Initiate retention protocol  C/W Proscar/Flomax

## 2025-03-28 NOTE — PLAN OF CARE
Problem: PAIN - ADULT  Goal: Verbalizes/displays adequate comfort level or baseline comfort level  Description: Interventions:- Encourage patient to monitor pain and request assistance- Assess pain using appropriate pain scale- Administer analgesics based on type and severity of pain and evaluate response- Implement non-pharmacological measures as appropriate and evaluate response- Consider cultural and social influences on pain and pain management- Notify physician/advanced practitioner if interventions unsuccessful or patient reports new pain  3/28/2025 1909 by Jayne Coffman RN  Outcome: Progressing  3/28/2025 1849 by Jayne Coffman RN  Outcome: Progressing     Problem: INFECTION - ADULT  Goal: Absence or prevention of progression during hospitalization  Description: INTERVENTIONS:- Assess and monitor for signs and symptoms of infection- Monitor lab/diagnostic results- Monitor all insertion sites, i.e. indwelling lines, tubes, and drains- Monitor endotracheal if appropriate and nasal secretions for changes in amount and color- [place] appropriate cooling/warming therapies per order- Administer medications as ordered- Instruct and encourage patient and family to use good hand hygiene technique- Identify and instruct in appropriate isolation precautions for identified infection/condition  3/28/2025 1909 by Jayne Coffman RN  Outcome: Progressing  3/28/2025 1849 by Jayne Coffman RN  Outcome: Progressing  Goal: Absence of fever/infection during neutropenic period  Description: INTERVENTIONS:- Monitor WBC  3/28/2025 1909 by Jayne Coffman RN  Outcome: Progressing  3/28/2025 1849 by Jayne Coffman RN  Outcome: Progressing     Problem: SAFETY ADULT  Goal: Patient will remain free of falls  Description: INTERVENTIONS:- Educate patient/family on patient safety including physical limitations- Instruct patient to call for assistance with activity - Consult OT/PT to assist with strengthening/mobility - Keep Call bell within  reach- Keep bed low and locked with side rails adjusted as appropriate- Keep care items and personal belongings within reach- Initiate and maintain comfort rounds- Make Fall Risk Sign visible to staff- Offer Toileting every  Hours, in advance of need- Initiate/Maintain alarm- Obtain necessary fall risk management equipmen- Apply yellow socks and bracelet for high fall risk patients- Consider moving patient to room near nurses station  3/28/2025 1909 by Jayne Coffman RN  Outcome: Progressing  3/28/2025 1849 by Jayne Coffman RN  Outcome: Progressing  Goal: Maintain or return to baseline ADL function  Description: INTERVENTIONS:-  Assess patient's ability to carry out ADLs; assess patient's baseline for ADL function and identify physical deficits which impact ability to perform ADLs (bathing, care of mouth/teeth, toileting, grooming, dressing, etc.)- Assess/evaluate cause of self-care deficits - Assess range of motion- Assess patient's mobility; develop plan if impaired- Assess patient's need for assistive devices and provide as appropriate- Encourage maximum independence but intervene and supervise when necessary- Involve family in performance of ADLs- Assess for home care needs following discharge - Consider OT consult to assist with ADL evaluation and planning for discharge- Provide patient education as appropriate  3/28/2025 1909 by Jayne Coffman RN  Outcome: Progressing  3/28/2025 1849 by Jayne Coffman RN  Outcome: Progressing  Goal: Maintains/Returns to pre admission functional level  Description: INTERVENTIONS:- Perform AM-PAC 6 Click Basic Mobility/ Daily Activity assessment daily.- Set and communicate daily mobility goal to care team and patient/family/caregiver. - Collaborate with rehabilitation services on mobility goals if consulted- Perform Range of Motion  times a day.- Reposition patient opal hours.- Dangle patient  times a day- Stand patient  times a day- Ambulate patient  times a day- Out of bed to  chair  times a day - Out of bed for meals  times a day- Out of bed for toileting- Record patient progress and toleration of activity level   3/28/2025 1909 by Jayne Coffman, RN  Outcome: Progressing  3/28/2025 1849 by Jayne Coffman, RN  Outcome: Progressing

## 2025-03-28 NOTE — H&P
"H&P - Hospitalist   Name: Tha Weems 74 y.o. male I MRN: [id number]  Unit/Bed#: ED-02 I Date of Admission: 3/28/2025   Date of Service: 3/28/2025 I Hospital Day: 0     Assessment & Plan  Acute metabolic encephalopathy  Patient presented from nursing facility with acute change in mental status.  Upon arrival he was found and foul-smelling urine and disoriented.  Patient does have baseline dementia.    Metabolic encephalopathy likely associated with UTI, does not meet SIRS criteria  TSH 0.833, ammonia 17  CT head showed no acute intracranial pathology  UA likely demonstrates UTI with leuks, and bacteria, obtain urine culture  Previous history of colonization as well as Pseudomonas with pan susceptibility, given altered mental status, will start cefepime  Monitor for clinical response  Cystitis  Straight cath in ED to obtain UA  Positive for leukocytes and bacteria  Obtain urine culture  Previous history of colonization with multiple huseyin including Pseudomonas, 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 past  Given altered mental status we will start cefepime  Previous history of chronic indwelling Razo  Initiate retention protocol  C/W Proscar/Flomax  Essential hypertension  Continue with home regimen of metoprolol  Blood pressure currently normotensive  Continue to trend vitals  Type 2 diabetes mellitus with hyperglycemia, with long-term current use of insulin ([facility])  Lab Results   Component Value Date    HGBA1C 9.2 (H) 08/15/2023       No results for input(s): \"POCGLU\" in the last 72 hours.    Blood Sugar Average: Last 72 hrs:    Last hemoglobin A1c 9.2  8/23  Obtain updated hemoglobin A1c  Continue with home regimen 40 units basal insulin nightly  Continue with sliding scale  Hold Tradjenta and metformin  CCD  Coronary artery disease involving native coronary artery of native heart without angina pectoris  Has a previous history of known triple-vessel disease however not surgical candidate  Medically managed with " GDMT therapy  Continue ASA, Ranexa, metoprolol  Continue to follow-up in the outpatient setting  Parkinson disease (HCC)  History of late stage Parkinson's  Continue with Sinemet  Cough  Request chest x-ray  Request speech therapy consult  Lactic acidosis  No SIRS criteria per vital signs; WBC does not meet SIRS  Etiology likely multifactorial in setting of metformin with UTI/mild dehydration  Trend  Fortunately, hemodynamically stable      VTE Pharmacologic Prophylaxis:   High Risk (Score >/= 5) - Pharmacological DVT Prophylaxis Ordered: heparin. Sequential Compression Devices Ordered.  Code Status: Level 1 - Full Code   Discussion with family: Updated  (wife) via phone.    Anticipated Length of Stay: Patient will be admitted on an inpatient basis with an anticipated length of stay of greater than 2 midnights secondary to UTI/SIRS.    History of Present Illness   Chief Complaint: Increased confusion    Tha Weems is a 74 y.o. male with a PMH of PAD, CAD, DM who presents with increased confusion.  Patient presented from [facility] with complaint of lower energy.  Patient noting to be more lethargic and less interactive.  On exam patient presents with no acute complaints.  Patient does have a past medical history of requiring a Razo catheter.  Patient is currently incontinent of urine and does not have chronic Razo catheter.  Patient has a history of Pseudomonas UTIs.  Patient denies abdominal pain, nausea, vomiting, chest pain, shortness of breath, fever, chills.  Patient notes he had a bowel movement 2 days ago.    Review of Systems   Constitutional:  Negative for fatigue and fever.   HENT:  Negative for congestion.    Respiratory:  Positive for cough. Negative for shortness of breath and wheezing.    Cardiovascular:  Negative for chest pain, palpitations and leg swelling.   Gastrointestinal:  Negative for abdominal distention, abdominal pain, constipation, nausea and vomiting.    Genitourinary:  Negative for difficulty urinating and dysuria.   Neurological:  Negative for dizziness, light-headedness and headaches.       Historical Information   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    Dizziness     and giddiness    Dysphagia     Elevated PSA     Facial weakness     GERD (gastroesophageal reflux disease)     last assessed 5/20/16    Gross hematuria     Hematuria     Hyperlipidemia     Hypertension     Kidney disease     Kidney stone     Left-sided weakness     Long term (current) use of oral hypoglycemic drugs     Muscle weakness     Nuclear senile cataract of left eye     OA (osteoarthritis) of knee     b/l    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HCC)     Syncope and collapse     Tachycardia     UTI (urinary tract infection)     Vertebro-basilar artery syndrome     Vitamin B deficiency     Vitamin D deficiency     Vitamin D deficiency      Past Surgical History:   Procedure Laterality Date    CARDIAC CATHETERIZATION N/A 3/7/2022    Procedure: CARDIAC CATHETERIZATION;  Surgeon: Pako Reid DO;  [facility];  Service: Cardiology    CATARACT EXTRACTION      CHOLECYSTECTOMY      KNEE SURGERY      AR LITHOLAPAXY SMPL/SM <2.5 CM N/A 1/4/2021    Procedure: Cystolitholopaxy w/laser bladder stones;  Surgeon: Kyle Jones MD;  Location: [facility] OR;  Service: Urology    AR TRURL ELECTROSURG RESCKEELEY  PROSTATE BLEED COMPLETE N/A 1/4/2021    Procedure: T.U.R.P.;  Surgeon: Kyle Jones MD;  Location: [facility];  Service: Urology     Social History     Tobacco Use    Smoking status: Never    Smokeless tobacco: Never   Vaping Use    Vaping status: Never Used   Substance and Sexual Activity    Alcohol use: Not Currently    Drug use: No    Sexual activity: Not Currently     E-Cigarette/Vaping    E-Cigarette Use Never User      E-Cigarette/Vaping Substances    Nicotine No     THC No     CBD No      Family history non-contributory  Social History:  Marital Status: /Civil Union   Occupation: N/A  Patient Pre-hospital Living Situation: Assisted Living  Patient Pre-hospital Diet Restrictions: None    Meds/Allergies   I have reveiwed home medications using records provided by .  Prior to Admission medications    Medication Sig Start Date End Date Taking? Authorizing Provider   dutasteride (AVODART) 0.5 mg capsule Take 0.5 mg by mouth daily   Yes Historical Provider, MD   linaGLIPtin (Tradjenta) 5 MG TABS Take 5 mg by mouth daily   Yes Historical Provider, MD   loratadine (CLARITIN) 10 mg tablet Take 10 mg by mouth daily   Yes Historical Provider, MD   ACCU-CHEK GUIDE test strip 1 strip as needed 6/16/20   Historical Provider, MD   acetaminophen (TYLENOL) 325 mg tablet Take 650 mg by mouth every 6 (six) hours as needed for mild pain    Historical Provider, MD   Alcohol Swabs (PRO COMFORT ALCOHOL) 70 % PADS 1 pad daily Use a pad when testing 6/16/20   Historical Provider, MD   amoxicillin (AMOXIL) 500 mg capsule     Historical Provider, MD   aspirin 81 mg chewable tablet Chew 1 tablet (81 mg total) daily 3/10/22   Anisha Burks DO   atorvastatin (LIPITOR) 40 mg tablet Take 1 tablet (40 mg total) by mouth daily 3/10/22   Anisha Burks DO   carbidopa-levodopa (SINEMET)  mg per tablet Take 1 tablet by mouth 3 (three) times a day 7/22/21 4/1/24  Aysha Holly MD   chlorhexidine (PERIDEX) 0.12 % solution Apply  "15 mL to the mouth or throat 2 (two) times a day    Historical Provider, MD   cholecalciferol (VITAMIN D3) 1,000 units tablet Take 50 tablets (50,000 Units total) by mouth every 30 (thirty) days On the 15th of the month 7/29/23   Onofre Cohen PA-C   cyanocobalamin (VITAMIN B-12) 1000 MCG tablet Take 1 tablet (1,000 mcg total) by mouth daily 6/23/20   Vinay Patel MD   docusate sodium (COLACE) 100 mg capsule Take 1 capsule (100 mg total) by mouth 2 (two) times a day 6/23/20   Vinay Patel MD   Easy Comfort Lancets MISC as needed 6/16/20   Historical Provider, MD   famotidine (PEPCID) 20 mg tablet Take 1 tablet (20 mg total) by mouth daily at bedtime 4/5/22   George Rock DO   ferrous gluconate (FERGON) 324 mg tablet Take 1 tablet (324 mg total) by mouth daily before breakfast Do not start before July 30, 2023. 7/30/23 4/1/24  Onofre Cohen PA-C   finasteride (PROSCAR) 5 mg tablet Take 1 tablet (5 mg total) by mouth daily 5/13/20   Fox Uribe PA-C   Glucagon, rDNA, (GLUCAGON EMERGENCY IJ) Inject 1 mL as directed Sugars less than 60 and conscious    Historical Provider, MD   Glucose 15 g PACK Take 15 g by mouth Per [facility] \"give 15 gram PO every 15 minutes PRN for hypoglycemia\"    Historical Provider, MD   glucose blood test strip 1 strip as needed    Historical Provider, MD   glycerin-hypromellose- (ARTIFICIAL TEARS) 0.2-0.2-1 % SOLN Administer 1 drop to both eyes 2 (two) times a day as needed (for dry eyes) 1/5/23   Tristan Leroy MD   insulin glargine (LANTUS) 100 units/mL subcutaneous injection Inject 30 Units under the skin daily at bedtime  Patient taking differently: Inject 26 Units under the skin daily at bedtime 7/29/23   Onofre Cohen PA-C   insulin lispro (HumaLOG) 100 units/mL injection Inject 10 Units under the skin 3 (three) times a day with meals 7/29/23   Onofre Cohen PA-C   lidocaine (XYLOCAINE) 5 % ointment Apply topically 2 (two) times a day as needed " for mild pain or moderate pain 8/6/23   Andrea Self MD   lisinopril (ZESTRIL) 5 mg tablet Take 1 tablet (5 mg total) by mouth daily 4/6/22   George Rock DO   melatonin 3 mg Take 3 mg by mouth daily at bedtime Give 2 tabs at bed time for insomnia per [facility]    Historical Provider, MD   metFORMIN (GLUCOPHAGE) 1000 MG tablet Take 1,000 mg by mouth 2 (two) times a day with meals    Historical Provider, MD   metoprolol succinate (TOPROL-XL) 25 mg 24 hr tablet Take 1 tablet (25 mg total) by mouth daily 4/6/22   George Rock DO   NovoFine Autocover 30G X 8 MM MISC  12/9/20   Historical Provider, MD   nystatin (MYCOSTATIN) powder Apply topically 2 (two) times a day 1/5/23   Tristan Leroy MD   oxybutynin (DITROPAN-XL) 5 mg 24 hr tablet Take 5 mg by mouth daily    Historical Provider, MD   pantoprazole (PROTONIX) 40 mg tablet TAKE 1 TABLET (40 MG TOTAL) BY MOUTH 2 (TWO) TIMES A DAY BEFORE MEALS 1/23/24   Forrest Raza MD   polyethylene glycol (MIRALAX) 17 g packet Take 17 g by mouth daily 6/3/20   Gloria Carroll DO   ranolazine (RANEXA) 500 mg 12 hr tablet Take 1 tablet (500 mg total) by mouth every 12 (twelve) hours 4/5/22   George Rock DO   senna (SENOKOT) 8.6 mg Take 1 tablet (8.6 mg total) by mouth daily as needed for constipation 2/10/21   Gloria Carroll DO   sulfamethoxazole-trimethoprim (BACTRIM DS) 800-160 mg per tablet     Historical Provider, MD   tamsulosin (FLOMAX) 0.4 mg Take 1 capsule (0.4 mg total) by mouth daily with dinner 5/13/20   Fox Uribe PA-C     No Known Allergies    Objective :  Temp:  [98 °F (36.7 °C)] 98 °F (36.7 °C)  HR:  [88] 88  BP: (121)/(58) 121/58  Resp:  [18] 18  SpO2:  [98 %] 98 %  O2 Device: None (Room air)    Physical Exam  Constitutional:       General: He is not in acute distress.     Appearance: He is not toxic-appearing.      Comments: Thin, Frail, elderly in appearance, chronically ill; soiled in urine    HENT:      Head: Normocephalic.      Right  Ear: External ear normal.      Left Ear: External ear normal.      Nose: Nose normal.      Mouth/Throat:      Mouth: Mucous membranes are moist.      Pharynx: Oropharynx is clear.   Eyes:      Extraocular Movements: Extraocular movements intact.      Conjunctiva/sclera: Conjunctivae normal.   Cardiovascular:      Rate and Rhythm: Normal rate and regular rhythm.      Pulses: Normal pulses.      Heart sounds: Normal heart sounds.   Pulmonary:      Effort: Pulmonary effort is normal. No respiratory distress.      Breath sounds: Normal breath sounds. No wheezing, rhonchi or rales.   Abdominal:      General: Abdomen is flat. Bowel sounds are normal. There is no distension.      Palpations: Abdomen is soft.      Tenderness: There is no abdominal tenderness. There is no guarding or rebound.   Musculoskeletal:         General: No swelling. Normal range of motion.      Cervical back: Normal range of motion.   Skin:     General: Skin is warm and dry.      Coloration: Skin is not jaundiced.      Findings: No bruising or lesion.   Neurological:      General: No focal deficit present.      Mental Status: He is alert. Mental status is at baseline.   Psychiatric:         Mood and Affect: Mood normal.         Behavior: Behavior normal.                       Lab Results: I have reviewed the following results:  Results from last 7 days   Lab Units 03/28/25  1411   WBC Thousand/uL 11.35*   HEMOGLOBIN g/dL 14.2   HEMATOCRIT % 43.5   PLATELETS Thousands/uL 233   SEGS PCT % 78*   LYMPHO PCT % 7*   MONO PCT % 12   EOS PCT % 2     Results from last 7 days   Lab Units 03/28/25  1411   SODIUM mmol/L 140   POTASSIUM mmol/L 4.5   CHLORIDE mmol/L 101   CO2 mmol/L 30   BUN mg/dL 19   CREATININE mg/dL 1.22   ANION GAP mmol/L 9   CALCIUM mg/dL 9.5   ALBUMIN g/dL 4.1   TOTAL BILIRUBIN mg/dL 0.68   ALK PHOS U/L 70   ALT U/L 4*   AST U/L 11*   GLUCOSE RANDOM mg/dL 109             Lab Results   Component Value Date    HGBA1C 9.2 (H) 08/15/2023     HGBA1C 12.4 (H) 07/23/2023    HGBA1C 6.4 (H) 03/05/2022     Results from last 7 days   Lab Units 03/28/25  1701 03/28/25  1411   LACTIC ACID mmol/L 3.1* 3.5*       Imaging Results Review: I reviewed radiology reports from this admission including: CT head.  Other Study Results Review: EKG was reviewed.     Administrative Statements   I have spent a total time of 65 minutes in caring for this patient on the day of the visit/encounter including Diagnostic results, Prognosis, Risks and benefits of tx options, Instructions for management, Patient and family education, Counseling / Coordination of care, Documenting in the medical record, Reviewing/placing orders in the medical record (including tests, medications, and/or procedures), Obtaining or reviewing history  , and Communicating with other healthcare professionals .    ** Please Note: This note has been constructed using a voice recognition system. **

## 2025-03-28 NOTE — ASSESSMENT & PLAN NOTE
Patient presented from nursing facility with acute change in mental status.  Upon arrival he was found and foul-smelling urine and disoriented.  Patient does have baseline dementia.    Metabolic encephalopathy likely associated with UTI, does not meet SIRS criteria  TSH 0.833, ammonia 17  CT head showed no acute intracranial pathology  UA likely demonstrates UTI with leuks, and bacteria, obtain urine culture  Previous history of colonization as well as Pseudomonas with pan susceptibility, given altered mental status, will start cefepime  Monitor for clinical response

## 2025-03-28 NOTE — SEPSIS NOTE
Sepsis Note   Tha Weems 74 y.o. male MRN: [id number]  Unit/Bed#: W -01 Encounter: [id number]       Initial Sepsis Screening       Row Name 03/28/25 1526                Is the patient's history suggestive of a new or worsening infection? Yes (Proceed)  -SZ        Suspected source of infection urinary tract infection  -SZ        Indicate SIRS criteria --        Are two or more of the above signs & symptoms of infection both present and new to the patient? No  -SZ                  User Key  (r) = Recorded By, (t) = Taken By, (c) = Cosigned By      Initials Name Provider Type    SZ Arlyn Bustamante MD Physician                        Body mass index is 29.97 kg/m².  Wt Readings from Last 1 Encounters:   03/28/25 89.4 kg (197 lb 1.5 oz)     IBW (Ideal Body Weight): 68.4 kg    Ideal body weight: 68.4 kg (150 lb 12.7 oz)  Adjusted ideal body weight: 76.8 kg (169 lb 5 oz)

## 2025-03-29 PROBLEM — E87.8 ELECTROLYTE ABNORMALITY: Status: ACTIVE | Noted: 2025-03-29

## 2025-03-29 LAB
ANION GAP SERPL CALCULATED.3IONS-SCNC: 8 MMOL/L (ref 4–13)
BUN SERPL-MCNC: 16 MG/DL (ref 5–25)
CALCIUM SERPL-MCNC: 8.7 MG/DL (ref 8.4–10.2)
CHLORIDE SERPL-SCNC: 103 MMOL/L (ref 96–108)
CO2 SERPL-SCNC: 29 MMOL/L (ref 21–32)
CREAT SERPL-MCNC: 1.01 MG/DL (ref 0.6–1.3)
ERYTHROCYTE [DISTWIDTH] IN BLOOD BY AUTOMATED COUNT: 14.4 % (ref 11.6–15.1)
GFR SERPL CREATININE-BSD FRML MDRD: 72 ML/MIN/1.73SQ M
GLUCOSE SERPL-MCNC: 110 MG/DL (ref 65–140)
GLUCOSE SERPL-MCNC: 121 MG/DL (ref 65–140)
GLUCOSE SERPL-MCNC: 122 MG/DL (ref 65–140)
GLUCOSE SERPL-MCNC: 150 MG/DL (ref 65–140)
GLUCOSE SERPL-MCNC: 166 MG/DL (ref 65–140)
HCT VFR BLD AUTO: 39.4 % (ref 36.5–49.3)
HGB BLD-MCNC: 12.9 G/DL (ref 12–17)
LACTATE SERPL-SCNC: 0.7 MMOL/L (ref 0.5–2)
MAGNESIUM SERPL-MCNC: 1.4 MG/DL (ref 1.9–2.7)
MCH RBC QN AUTO: 29.1 PG (ref 26.8–34.3)
MCHC RBC AUTO-ENTMCNC: 32.7 G/DL (ref 31.4–37.4)
MCV RBC AUTO: 89 FL (ref 82–98)
PLATELET # BLD AUTO: 200 THOUSANDS/UL (ref 149–390)
PMV BLD AUTO: 11.1 FL (ref 8.9–12.7)
POTASSIUM SERPL-SCNC: 3.8 MMOL/L (ref 3.5–5.3)
RBC # BLD AUTO: 4.43 MILLION/UL (ref 3.88–5.62)
SODIUM SERPL-SCNC: 140 MMOL/L (ref 135–147)
WBC # BLD AUTO: 9.48 THOUSAND/UL (ref 4.31–10.16)

## 2025-03-29 PROCEDURE — 83605 ASSAY OF LACTIC ACID: CPT | Performed by: PHYSICIAN ASSISTANT

## 2025-03-29 PROCEDURE — 85027 COMPLETE CBC AUTOMATED: CPT | Performed by: PHYSICIAN ASSISTANT

## 2025-03-29 PROCEDURE — 80048 BASIC METABOLIC PNL TOTAL CA: CPT | Performed by: PHYSICIAN ASSISTANT

## 2025-03-29 PROCEDURE — 99232 SBSQ HOSP IP/OBS MODERATE 35: CPT | Performed by: PHYSICIAN ASSISTANT

## 2025-03-29 PROCEDURE — 83735 ASSAY OF MAGNESIUM: CPT | Performed by: PHYSICIAN ASSISTANT

## 2025-03-29 PROCEDURE — 82948 REAGENT STRIP/BLOOD GLUCOSE: CPT

## 2025-03-29 PROCEDURE — 92610 EVALUATE SWALLOWING FUNCTION: CPT

## 2025-03-29 RX ORDER — CEPHALEXIN 500 MG/1
500 CAPSULE ORAL EVERY 6 HOURS SCHEDULED
Status: DISCONTINUED | OUTPATIENT
Start: 2025-03-29 | End: 2025-03-29

## 2025-03-29 RX ORDER — MAGNESIUM SULFATE HEPTAHYDRATE 40 MG/ML
4 INJECTION, SOLUTION INTRAVENOUS ONCE
Status: COMPLETED | OUTPATIENT
Start: 2025-03-29 | End: 2025-03-29

## 2025-03-29 RX ADMIN — ACETAMINOPHEN 650 MG: 325 TABLET, FILM COATED ORAL at 23:21

## 2025-03-29 RX ADMIN — NYSTATIN: 100000 POWDER TOPICAL at 16:27

## 2025-03-29 RX ADMIN — INSULIN LISPRO 1 UNITS: 100 INJECTION, SOLUTION INTRAVENOUS; SUBCUTANEOUS at 21:19

## 2025-03-29 RX ADMIN — MAGNESIUM SULFATE 4 G: 4 INJECTION INTRAVENOUS at 08:54

## 2025-03-29 RX ADMIN — CEFEPIME 1000 MG: 1 INJECTION, POWDER, FOR SOLUTION INTRAMUSCULAR; INTRAVENOUS at 06:22

## 2025-03-29 RX ADMIN — HEPARIN SODIUM 5000 UNITS: 5000 INJECTION INTRAVENOUS; SUBCUTANEOUS at 13:10

## 2025-03-29 RX ADMIN — INSULIN LISPRO 1 UNITS: 100 INJECTION, SOLUTION INTRAVENOUS; SUBCUTANEOUS at 16:27

## 2025-03-29 RX ADMIN — CEFEPIME 1000 MG: 1 INJECTION, POWDER, FOR SOLUTION INTRAMUSCULAR; INTRAVENOUS at 18:11

## 2025-03-29 RX ADMIN — INSULIN GLARGINE 40 UNITS: 100 INJECTION, SOLUTION SUBCUTANEOUS at 21:19

## 2025-03-29 RX ADMIN — HEPARIN SODIUM 5000 UNITS: 5000 INJECTION INTRAVENOUS; SUBCUTANEOUS at 21:18

## 2025-03-29 RX ADMIN — CARBIDOPA AND LEVODOPA 1 TABLET: 25; 100 TABLET ORAL at 21:18

## 2025-03-29 RX ADMIN — CARBIDOPA AND LEVODOPA 1 TABLET: 25; 100 TABLET ORAL at 16:28

## 2025-03-29 RX ADMIN — TAMSULOSIN HYDROCHLORIDE 0.4 MG: 0.4 CAPSULE ORAL at 16:28

## 2025-03-29 RX ADMIN — NYSTATIN: 100000 POWDER TOPICAL at 08:50

## 2025-03-29 RX ADMIN — HEPARIN SODIUM 5000 UNITS: 5000 INJECTION INTRAVENOUS; SUBCUTANEOUS at 05:06

## 2025-03-29 NOTE — PLAN OF CARE
Problem: PAIN - ADULT  Goal: Verbalizes/displays adequate comfort level or baseline comfort level  Description: Interventions:- Encourage patient to monitor pain and request assistance- Assess pain using appropriate pain scale- Administer analgesics based on type and severity of pain and evaluate response- Implement non-pharmacological measures as appropriate and evaluate response- Consider cultural and social influences on pain and pain management- Notify physician/advanced practitioner if interventions unsuccessful or patient reports new pain  Outcome: Progressing     Problem: INFECTION - ADULT  Goal: Absence or prevention of progression during hospitalization  Description: INTERVENTIONS:- Assess and monitor for signs and symptoms of infection- Monitor lab/diagnostic results- Monitor all insertion sites, i.e. indwelling lines, tubes, and drains- Monitor endotracheal if appropriate and nasal secretions for changes in amount and color- [place] appropriate cooling/warming therapies per order- Administer medications as ordered- Instruct and encourage patient and family to use good hand hygiene technique- Identify and instruct in appropriate isolation precautions for identified infection/condition  Outcome: Progressing  Goal: Absence of fever/infection during neutropenic period  Description: INTERVENTIONS:- Monitor WBC  Outcome: Progressing     Problem: SAFETY ADULT  Goal: Patient will remain free of falls  Description: INTERVENTIONS:- Educate patient/family on patient safety including physical limitations- Instruct patient to call for assistance with activity - Consult OT/PT to assist with strengthening/mobility - Keep Call bell within reach- Keep bed low and locked with side rails adjusted as appropriate- Keep care items and personal belongings within reach- Initiate and maintain comfort rounds- Make Fall Risk Sign visible to staff- Offer Toileting every  Hours, in advance of need- Initiate/Maintain alarm- Obtain  necessary fall risk management equipmen- Apply yellow socks and bracelet for high fall risk patients- Consider moving patient to room near nurses station  Outcome: Progressing  Goal: Maintain or return to baseline ADL function  Description: INTERVENTIONS:-  Assess patient's ability to carry out ADLs; assess patient's baseline for ADL function and identify physical deficits which impact ability to perform ADLs (bathing, care of mouth/teeth, toileting, grooming, dressing, etc.)- Assess/evaluate cause of self-care deficits - Assess range of motion- Assess patient's mobility; develop plan if impaired- Assess patient's need for assistive devices and provide as appropriate- Encourage maximum independence but intervene and supervise when necessary- Involve family in performance of ADLs- Assess for home care needs following discharge - Consider OT consult to assist with ADL evaluation and planning for discharge- Provide patient education as appropriate  Outcome: Progressing  Goal: Maintains/Returns to pre admission functional level  Description: INTERVENTIONS:- Perform AM-PAC 6 Click Basic Mobility/ Daily Activity assessment daily.- Set and communicate daily mobility goal to care team and patient/family/caregiver. - Collaborate with rehabilitation services on mobility goals if consulted- Perform Range of Motion  times a day.- Reposition patient opal hours.- Dangle patient  times a day- Stand patient  times a day- Ambulate patient  times a day- Out of bed to chair  times a day - Out of bed for meals  times a day- Out of bed for toileting- Record patient progress and toleration of activity level   Outcome: Progressing     Problem: DISCHARGE PLANNING  Goal: Discharge to home or other facility with appropriate resources  Description: INTERVENTIONS:- Identify barriers to discharge w/patient and caregiver- Arrange for needed discharge resources and transportation as appropriate- Identify discharge learning needs (meds, wound care,  etc.)- Arrange for interpretive services to assist at discharge as needed- Refer to Case Management Department for coordinating discharge planning if the patient needs post-hospital services based on physician/advanced practitioner order or complex needs related to functional status, cognitive ability, or social support system  Outcome: Progressing     Problem: Knowledge Deficit  Goal: Patient/family/caregiver demonstrates understanding of disease process, treatment plan, medications, and discharge instructions  Description: Complete learning assessment and assess knowledge base.Interventions:- Provide teaching at level of understanding- Provide teaching via preferred learning methods  Outcome: Progressing     Problem: Prexisting or High Potential for Compromised Skin Integrity  Goal: Skin integrity is maintained or improved  Description: INTERVENTIONS:- Identify patients at risk for skin breakdown- Assess and monitor skin integrity- Assess and monitor nutrition and hydration status- Monitor labs - Assess for incontinence - Turn and reposition patient- Assist with mobility/ambulation- Relieve pressure over bony prominences- Avoid friction and shearing- Provide appropriate hygiene as needed including keeping skin clean and dry- Evaluate need for skin moisturizer/barrier cream- Collaborate with interdisciplinary team - Patient/family teaching- Consider wound care consult   Outcome: Progressing     Problem: Nutrition/Hydration-ADULT  Goal: Nutrient/Hydration intake appropriate for improving, restoring or maintaining nutritional needs  Description: Monitor and assess patient's nutrition/hydration status for malnutrition. Collaborate with interdisciplinary team and initiate plan and interventions as ordered.  Monitor patient's weight and dietary intake as ordered or per policy. Utilize nutrition screening tool and intervene as necessary. Determine patient's food preferences and provide high-protein, high-caloric foods as  appropriate. INTERVENTIONS:- Monitor oral intake, urinary output, labs, and treatment plans- Assess nutrition and hydration status and recommend course of action- Evaluate amount of meals eaten- Assist patient with eating if necessary - Allow adequate time for meals- Recommend/ encourage appropriate diets, oral nutritional supplements, and vitamin/mineral supplements- Order, calculate, and assess calorie counts as needed- Recommend, monitor, and adjust tube feedings and TPN/PPN based on assessed needs- Assess need for intravenous fluids- Provide specific nutrition/hydration education as appropriate- Include patient/family/caregiver in decisions related to nutrition  Outcome: Progressing

## 2025-03-29 NOTE — ASSESSMENT & PLAN NOTE
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

## 2025-03-29 NOTE — PLAN OF CARE
Recommending dysphagia 3 diet with thin liquids. Maintain aspiration precautions, upright ~90 degree positioning during ALL PO intake and at least 30 minutes post PO meals, small bites, slow rate of intake, and alternate liquids/solids. Medications crushed with puree. Will continue to follow.

## 2025-03-29 NOTE — PROGRESS NOTES
Progress Note - Hospitalist   Name: Tha Weems 74 y.o. male I MRN: [id number]  Unit/Bed#: W -01 I Date of Admission: 3/28/2025   Date of Service: 3/29/2025 I Hospital Day: 1    Assessment & Plan  Acute metabolic encephalopathy  Presents from [facility] w/ confusion & increased fatigue. In ED pt w/ foul-smelling urine and disorientation.  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 past  Given altered mental status, c/w cefepime day #2, s/p dose of Rocephin in ED  Previous h/o chronic indwelling Razo, no current Razo  C/w retention protocol  C/w Proscar/Flomax  Lactic acidosis  No SIRS criteria per vital signs; WBC does not meet SIRS on admit   Etiology likely multifactorial in setting of metformin with UTI/dehydration  Tx w/ IVFs   Hemodynamically stable  3.5 > 3.1 > 0.7  Essential hypertension  C/w PTA metoprolol; no longer on ACE-I per facility list  Normotensive  Trend   Type 2 diabetes mellitus with hyperglycemia, with long-term current use of insulin ([facility])  Lab Results   Component Value Date    HGBA1C 7.0 (H) 03/28/2025     Recent Labs     03/28/25  1827 03/28/25  2101 03/29/25  0731   POCGLU 125 138 121     Blood Sugar Average: Last 72 hrs:  (P) 128  A1c 9.2 in 8/2023   Hold PTA Tradjenta and metformin  PTA Lantus 44 U qhs   C/w Lantus 40 U qhs & SSI AC/QHS   CCD  Coronary artery disease involving native coronary artery of native heart without angina pectoris  Known triple-vessel  disease however not surgical candidate  Medically managed w/ GDMT therapy  C/w ASA, Ranexa, metoprolol  Outpt cardiology f/u   Parkinson disease (HCC)  H/o late stage Parkinson's  C/w PTA Sinemet  Cough  CXR w/o acute finding  ST consulted, dysphagia 3 diet w/ thin liquids  Electrolyte abnormality  Recent Labs     03/28/25  1411 03/29/25  0502   MG  --  1.4*   K 4.5 3.8     S/p repletion  Trend    VTE Pharmacologic Prophylaxis:   High Risk (Score >/= 5) - Pharmacological DVT Prophylaxis Ordered: heparin. Sequential Compression Devices Ordered.    Mobility:   Basic Mobility Inpatient Raw Score: 10  JH-HLM Goal: 4: Move to chair/commode  JH-HLM Achieved: 2: Bed activities/Dependent transfer  JH-HLM Goal NOT achieved. Continue with multidisciplinary rounding and encourage appropriate mobility to improve upon JH-HLM goals.    Patient Centered Rounds: I performed bedside rounds with nursing staff today.   Discussions with Specialists or Other Care Team Provider: Plan of care reviewed with nursing staff    Education and Discussions with Family / Patient: I spoke to patient's wife on the phone on 03/28 during admission in ER.  I called patient's home phone and wife cell phone but both mailboxes are full.    Current Length of Stay: 1 day(s)  Current Patient Status: Inpatient   Certification Statement: The patient will continue to require additional inpatient hospital stay due to UTI/metabolic encephalopathy  Discharge Plan: Anticipate discharge in 48-72 hrs to prior assisted or independent living facility.    Code Status: Level 1 - Full Code    Subjective   Pt expresses no complaints. He is tired and would like to go back to sleep. He denies urinary issues, CP, SOB, N/V. Last BM per pt 2 days ago.     Objective :  Temp:  [98 °F (36.7 °C)-99.3 °F (37.4 °C)] 99.3 °F (37.4 °C)  HR:  [88-94] 89  BP: (121-144)/(58-73) 126/63  Resp:  [18] 18  SpO2:  [97 %-99 %] 97 %  O2 Device: None (Room air)    Body mass index is 29.97 kg/m².      Input and Output Summary (last 24 hours):     Intake/Output Summary (Last 24 hours) at 3/29/2025 0822  Last data filed at 3/28/2025 2227  Gross per 24 hour   Intake 1050 ml   Output 1 ml   Net 1049 ml       Physical Exam  Constitutional:       General: He is not in acute distress.     Appearance: He is not toxic-appearing.      Comments: Thin, Frail, elderly in appearance, chronically ill in appearance, alert but drifts asleep     HENT:      Head: Normocephalic.      Right Ear: External ear normal.      Left Ear: External ear normal.      Nose: Nose normal.      Mouth/Throat:      Mouth: Mucous membranes are moist.      Pharynx: Oropharynx is clear.   Eyes:      Extraocular Movements: Extraocular movements intact.      Conjunctiva/sclera: Conjunctivae normal.   Cardiovascular:      Rate and Rhythm: Normal rate and regular rhythm.      Pulses: Normal pulses.      Heart sounds: Normal heart sounds.   Pulmonary:      Effort: Pulmonary effort is normal. No respiratory distress.      Breath sounds: Normal breath sounds. No wheezing, rhonchi or rales.   Abdominal:      General: Abdomen is flat. Bowel sounds are normal. There is no distension.      Palpations: Abdomen is soft.      Tenderness: There is no abdominal tenderness. There is no guarding or rebound.   Musculoskeletal:         General: No swelling. Normal range of motion.      Cervical back: Normal range of motion.   Skin:     General: Skin is warm and dry.      Coloration: Skin is not jaundiced.      Findings: No bruising or lesion.   Neurological:      General: No focal deficit present.      Mental Status: Mental status is at baseline.      Comments: AAO x 1-2    Psychiatric:         Mood and Affect: Mood normal.         Behavior: Behavior normal.           Lab Results: I have reviewed the following results:   Results from last 7 days   Lab Units 03/29/25  0502 03/28/25  1411   WBC Thousand/uL 9.48 11.35*   HEMOGLOBIN g/dL 12.9 14.2   HEMATOCRIT % 39.4 43.5    PLATELETS Thousands/uL 200 233   SEGS PCT %  --  78*   LYMPHO PCT %  --  7*   MONO PCT %  --  12   EOS PCT %  --  2     Results from last 7 days   Lab Units 03/29/25  0502 03/28/25  1411   SODIUM mmol/L 140 140   POTASSIUM mmol/L 3.8 4.5   CHLORIDE mmol/L 103 101   CO2 mmol/L 29 30   BUN mg/dL 16 19   CREATININE mg/dL 1.01 1.22   ANION GAP mmol/L 8 9   CALCIUM mg/dL 8.7 9.5   ALBUMIN g/dL  --  4.1   TOTAL BILIRUBIN mg/dL  --  0.68   ALK PHOS U/L  --  70   ALT U/L  --  4*   AST U/L  --  11*   GLUCOSE RANDOM mg/dL 110 109         Results from last 7 days   Lab Units 03/29/25  0731 03/28/25  2101 03/28/25  1827   POC GLUCOSE mg/dl 121 138 125     Results from last 7 days   Lab Units 03/28/25  1846   HEMOGLOBIN A1C % 7.0*     Results from last 7 days   Lab Units 03/28/25  1701 03/28/25  1411   LACTIC ACID mmol/L 3.1* 3.5*       Recent Cultures (last 7 days):         Imaging Results Review: I reviewed radiology reports from this admission including: chest xray and CT head.  Other Study Results Review: EKG was reviewed.     Last 24 Hours Medication List:     Current Facility-Administered Medications:     acetaminophen (TYLENOL) tablet 650 mg, Q6H PRN    aspirin chewable tablet 81 mg, Daily    atorvastatin (LIPITOR) tablet 40 mg, Daily    calcium carbonate (TUMS) chewable tablet 1,000 mg, Daily PRN    carbidopa-levodopa (SINEMET)  mg per tablet 1 tablet, TID    cefepime (MAXIPIME) 1,000 mg in dextrose 5 % 50 mL IVPB, Q12H, Last Rate: 1,000 mg (03/29/25 0622)    ferrous gluconate (FERGON) tablet 324 mg, Daily Before Breakfast    finasteride (PROSCAR) tablet 5 mg, Daily    heparin (porcine) subcutaneous injection 5,000 Units, Q8H LIYAH    insulin glargine (LANTUS) subcutaneous injection 40 Units 0.4 mL, HS    insulin lispro (HumALOG/ADMELOG) 100 units/mL subcutaneous injection 1-5 Units, TID AC **AND** Fingerstick Glucose (POCT), TID AC    insulin lispro (HumALOG/ADMELOG) 100 units/mL subcutaneous injection 1-5 Units,  HS    loratadine (CLARITIN) tablet 10 mg, Daily    magnesium sulfate 4 g/100 mL IVPB (premix) 4 g, Once    metoprolol succinate (TOPROL-XL) 24 hr tablet 25 mg, Daily    multi-electrolyte (Plasmalyte-A/Isolyte-S PH 7.4/Normosol-R) IV solution, Continuous, Last Rate: 75 mL/hr (03/28/25 2030)    nystatin (MYCOSTATIN) powder, BID    ondansetron (ZOFRAN) injection 4 mg, Q6H PRN    polyethylene glycol (MIRALAX) packet 17 g, Daily    ranolazine (RANEXA) 12 hr tablet 500 mg, Q12H LIYAH    tamsulosin (FLOMAX) capsule 0.4 mg, Daily With Dinner    Administrative Statements   Today, Patient Was Seen By: Lizy Aguila PA-C  I have spent a total time of 45 minutes in caring for this patient on the day of the visit/encounter including Diagnostic results, Prognosis, Instructions for management, Patient and family education, Impressions, Counseling / Coordination of care, Documenting in the medical record, Reviewing/placing orders in the medical record (including tests, medications, and/or procedures), Obtaining or reviewing history  , and Communicating with other healthcare professionals .    **Please Note: This note may have been constructed using a voice recognition system.**

## 2025-03-29 NOTE — ASSESSMENT & PLAN NOTE
Known triple-vessel disease however not surgical candidate  Medically managed w/ GDMT therapy  C/w ASA, Ranexa, metoprolol  Outpt cardiology f/u

## 2025-03-29 NOTE — ASSESSMENT & PLAN NOTE
Lab Results   Component Value Date    HGBA1C 7.0 (H) 03/28/2025     Recent Labs     03/28/25  1827 03/28/25  2101 03/29/25  0731   POCGLU 125 138 121     Blood Sugar Average: Last 72 hrs:  (P) 128  A1c 9.2 in 8/2023   Hold PTA Tradjenta and metformin  PTA Lantus 44 U qhs   C/w Lantus 40 U qhs & SSI AC/QHS   CCD

## 2025-03-29 NOTE — SPEECH THERAPY NOTE
Speech-Language Pathology Bedside Swallow Evaluation        Patient Name: Tha Weems    Today's Date: 3/29/2025     Problem List  Principal Problem:    Acute metabolic encephalopathy  Active Problems:    Essential hypertension    Type 2 diabetes mellitus with hyperglycemia, with long-term current use of insulin (HCC)    Cystitis    Lactic acidosis    Coronary artery disease involving native coronary artery of native heart without angina pectoris    Parkinson disease (HCC)    Sepsis (HCC)    Cough         Past Medical History  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    Dizziness     and giddiness    Dysphagia     Elevated PSA     Facial weakness     GERD (gastroesophageal reflux disease)     last assessed 5/20/16    Gross hematuria     Hematuria     Hyperlipidemia     Hypertension     Kidney disease     Kidney stone     Left-sided weakness     Long term (current) use of oral hypoglycemic drugs     Muscle weakness     Nuclear senile cataract of left eye     OA (osteoarthritis) of knee     b/l    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HCC)     Syncope and collapse     Tachycardia     UTI (urinary tract infection)     Vertebro-basilar artery syndrome     Vitamin B deficiency     Vitamin D deficiency     Vitamin D deficiency        Past Surgical History  Past Surgical History:   Procedure Laterality Date    CARDIAC CATHETERIZATION N/A 3/7/2022     Procedure: CARDIAC CATHETERIZATION;  Surgeon: Pako Reid DO;  [facility];  Service: Cardiology    CATARACT EXTRACTION      CHOLECYSTECTOMY      KNEE SURGERY      WV LITHOLAPAXY SMPL/SM <2.5 CM N/A 1/4/2021    Procedure: Cystolitholopaxy w/laser bladder stones;  Surgeon: Kyle Jones MD;  Location: [facility];  Service: Urology    WV TRURL ELECTROSURG RESCJ PROSTATE BLEED COMPLETE N/A 1/4/2021    Procedure: T.U.R.P.;  Surgeon: Kyle Jones MD;  Location: [facility];  Service: Urology       Summary    Pt presents with s/s mild oropharyngeal dysphagia 2/2 edentulous oral cavity, increased mastication cycle, decreased bolus formation/manipulation, decreased A-P propulsion, baseline mild left sided facial droop causing inconsistent/minimal left sided labial leakage during PO intake, delayed swallow trigger.     Recommendations:   Diet: soft/level 3 diet and thin liquids   Meds: crushed with puree   Frequent Oral care: 4x/day  Aspiration precautions and compensatory swallowing strategies: upright posture, only feed when fully alert, slow rate of feeding, small bites/sips, and alternating bites and sips      Current Medical Status  Pt is a 74 y.o. male who presented to [facility]  with history of CAD, PAD, diabetes, Parkinson's disease, history of urinary retention requiring Razo; presents from [facility] due to more lethargy, decreased interaction, tiredness and some confusion. Upon presentation to emergency room, vital signs were stable, urine analysis abnormal with innumerable WBC RBC as well as bacteria as well as found to have lactic acidosis with mild elevation in WBC to 11.3. Hence he is being admitted for further workup and treatment as possible UTI. Upon my eval, he knows where he is but not clear about why he is here today, denies any pain on my evaluation.    Past medical history:   Please see H&P for details    Special Studies:  -CT head wo contrast 03/28/25:  No acute intracranial abnormality. Stable chronic microangiopathic changes    Social/Education/Vocational Hx:  Pt lives in SNF/ECF; [facility].    Swallow Information   Prior speech/swallowing tx: Yes; January 2023 admission; see medical records.  Current Risks for Dysphagia & Aspiration: AMS and decreased alertness  Current Symptoms/Concerns: AMS and decreased alertness  Current Diet: NPO   Baseline Diet: regular diet and thin liquids  Takes pills-whole? With thin liquids at baseline    Baseline Assessment   Behavior/Cognition: lethargic, waxing and waning arousal level, and decreased attention  Speech/Language Status: able to participate in basic conversation and able to follow commands inconsistently  Patient Positioning: upright in bed     Swallow Mechanism Exam   Facial: mild left facial droop at baseline  Labial: decreased ROM left side, decreased strength, and decreased coordination-mild  Lingual: decreased ROM and decreased coordination  Velum: unable to visualize  Mandible: adequate ROM  Dentition: edentulous  Vocal quality:clear/adequate   Volitional Cough:  fair    Respiratory: WNL    Consistencies Assessed and Performance   Consistencies Administered: thin liquids, puree, soft solids, and hard solids    Oral Stage: Pt with fair utensil stripping/bolus retrieval/labial seal with thin liquids consistencies and pureed solids, soft solids, and regular solids via spoon and cup sip. Labial/lingual/jaw ROM/coordination/strength decreased. Pt edentulous.. fair oral control/bolus manipulation with pureed solids and soft solids, decreased with regular solids, with increased mastication cycle. Mild lingual residue remained s/p swallow of pureed solids and soft solids. Pt with adequate suck-swallow technique during straw sip trials with thin liquids. decreased A-P transport with all trialed consistencies.      Pharyngeal Stage: Swallow trigger noted as decreased as evidenced via visual/tactile assessment of  hyolaryngeal elevation/excursion, suspected fair hyolaryngeal elevation/excursion upon palpation. No overt clinical s/s pen/asp observed. No coughing/throat clearing observed during PO intake. Vocal quality WFL s/p swallow.      Esophageal Concerns: none reported      Results Reviewed with: patient, RN, and MD   Dysphagia Goals:  Pt will tolerate the safest, LRD wo s/s pen/asp x 1-3x.    Speech Therapy Prognosis   Prognosis: good    Prognosis Considerations: therapeutic potential      Dana Albrecht MS, CCC-SLP  Speech & Swallow Pathologist  Available via Epic Chat

## 2025-03-29 NOTE — ASSESSMENT & PLAN NOTE
No SIRS criteria per vital signs; WBC does not meet SIRS on admit   Etiology likely multifactorial in setting of metformin with UTI/dehydration  Tx w/ IVFs   Hemodynamically stable  3.5 > 3.1 > 0.7

## 2025-03-29 NOTE — ASSESSMENT & PLAN NOTE
Presents from [facility] w/ confusion & increased fatigue. In ED pt w/ foul-smelling urine and disorientation.  Baseline dementia 2/2 advanced Parkinson's  Metabolic encephalopathy likely associated w/ UTI, does not meet SIRS criteria  TSH 0.833, ammonia 17  CXR benign   CT head w/o acute intracranial abnormality  UA indicative of UTI, tx as below   Monitor for clinical response  Delirium precautions, promote sleep/wake cycles, frequent orientation; currently pleasantly confused

## 2025-03-29 NOTE — PLAN OF CARE
Problem: PAIN - ADULT  Goal: Verbalizes/displays adequate comfort level or baseline comfort level  Description: Interventions:- Encourage patient to monitor pain and request assistance- Assess pain using appropriate pain scale- Administer analgesics based on type and severity of pain and evaluate response- Implement non-pharmacological measures as appropriate and evaluate response- Consider cultural and social influences on pain and pain management- Notify physician/advanced practitioner if interventions unsuccessful or patient reports new pain  Outcome: Progressing     Problem: INFECTION - ADULT  Goal: Absence or prevention of progression during hospitalization  Description: INTERVENTIONS:- Assess and monitor for signs and symptoms of infection- Monitor lab/diagnostic results- Monitor all insertion sites, i.e. indwelling lines, tubes, and drains- Monitor endotracheal if appropriate and nasal secretions for changes in amount and color- [place] appropriate cooling/warming therapies per order- Administer medications as ordered- Instruct and encourage patient and family to use good hand hygiene technique- Identify and instruct in appropriate isolation precautions for identified infection/condition  Outcome: Progressing  Goal: Absence of fever/infection during neutropenic period  Description: INTERVENTIONS:- Monitor WBC  Outcome: Progressing     Problem: SAFETY ADULT  Goal: Patient will remain free of falls  Description: INTERVENTIONS:- Educate patient/family on patient safety including physical limitations- Instruct patient to call for assistance with activity - Consult OT/PT to assist with strengthening/mobility - Keep Call bell within reach- Keep bed low and locked with side rails adjusted as appropriate- Keep care items and personal belongings within reach- Initiate and maintain comfort rounds- Make Fall Risk Sign visible to staff- Offer Toileting every  Hours, in advance of need- Initiate/Maintain alarm- Obtain  necessary fall risk management equipmen- Apply yellow socks and bracelet for high fall risk patients- Consider moving patient to room near nurses station  Outcome: Progressing  Goal: Maintain or return to baseline ADL function  Description: INTERVENTIONS:-  Assess patient's ability to carry out ADLs; assess patient's baseline for ADL function and identify physical deficits which impact ability to perform ADLs (bathing, care of mouth/teeth, toileting, grooming, dressing, etc.)- Assess/evaluate cause of self-care deficits - Assess range of motion- Assess patient's mobility; develop plan if impaired- Assess patient's need for assistive devices and provide as appropriate- Encourage maximum independence but intervene and supervise when necessary- Involve family in performance of ADLs- Assess for home care needs following discharge - Consider OT consult to assist with ADL evaluation and planning for discharge- Provide patient education as appropriate  Outcome: Progressing  Goal: Maintains/Returns to pre admission functional level  Description: INTERVENTIONS:- Perform AM-PAC 6 Click Basic Mobility/ Daily Activity assessment daily.- Set and communicate daily mobility goal to care team and patient/family/caregiver. - Collaborate with rehabilitation services on mobility goals if consulted- Perform Range of Motion  times a day.- Reposition patient opal hours.- Dangle patient  times a day- Stand patient  times a day- Ambulate patient  times a day- Out of bed to chair  times a day - Out of bed for meals  times a day- Out of bed for toileting- Record patient progress and toleration of activity level   Outcome: Progressing     Problem: DISCHARGE PLANNING  Goal: Discharge to home or other facility with appropriate resources  Description: INTERVENTIONS:- Identify barriers to discharge w/patient and caregiver- Arrange for needed discharge resources and transportation as appropriate- Identify discharge learning needs (meds, wound care,  etc.)- Arrange for interpretive services to assist at discharge as needed- Refer to Case Management Department for coordinating discharge planning if the patient needs post-hospital services based on physician/advanced practitioner order or complex needs related to functional status, cognitive ability, or social support system  Outcome: Progressing     Problem: Knowledge Deficit  Goal: Patient/family/caregiver demonstrates understanding of disease process, treatment plan, medications, and discharge instructions  Description: Complete learning assessment and assess knowledge base.Interventions:- Provide teaching at level of understanding- Provide teaching via preferred learning methods  Outcome: Progressing     Problem: Prexisting or High Potential for Compromised Skin Integrity  Goal: Skin integrity is maintained or improved  Description: INTERVENTIONS:- Identify patients at risk for skin breakdown- Assess and monitor skin integrity- Assess and monitor nutrition and hydration status- Monitor labs - Assess for incontinence - Turn and reposition patient- Assist with mobility/ambulation- Relieve pressure over bony prominences- Avoid friction and shearing- Provide appropriate hygiene as needed including keeping skin clean and dry- Evaluate need for skin moisturizer/barrier cream- Collaborate with interdisciplinary team - Patient/family teaching- Consider wound care consult   Outcome: Progressing

## 2025-03-30 LAB
ANION GAP SERPL CALCULATED.3IONS-SCNC: 9 MMOL/L (ref 4–13)
BUN SERPL-MCNC: 15 MG/DL (ref 5–25)
CALCIUM SERPL-MCNC: 8.4 MG/DL (ref 8.4–10.2)
CHLORIDE SERPL-SCNC: 101 MMOL/L (ref 96–108)
CO2 SERPL-SCNC: 26 MMOL/L (ref 21–32)
CREAT SERPL-MCNC: 1.03 MG/DL (ref 0.6–1.3)
ERYTHROCYTE [DISTWIDTH] IN BLOOD BY AUTOMATED COUNT: 14.3 % (ref 11.6–15.1)
GFR SERPL CREATININE-BSD FRML MDRD: 71 ML/MIN/1.73SQ M
GLUCOSE SERPL-MCNC: 121 MG/DL (ref 65–140)
GLUCOSE SERPL-MCNC: 124 MG/DL (ref 65–140)
GLUCOSE SERPL-MCNC: 135 MG/DL (ref 65–140)
GLUCOSE SERPL-MCNC: 140 MG/DL (ref 65–140)
GLUCOSE SERPL-MCNC: 213 MG/DL (ref 65–140)
HCT VFR BLD AUTO: 42.5 % (ref 36.5–49.3)
HGB BLD-MCNC: 13.7 G/DL (ref 12–17)
MAGNESIUM SERPL-MCNC: 2.3 MG/DL (ref 1.9–2.7)
MCH RBC QN AUTO: 29.3 PG (ref 26.8–34.3)
MCHC RBC AUTO-ENTMCNC: 32.2 G/DL (ref 31.4–37.4)
MCV RBC AUTO: 91 FL (ref 82–98)
MRSA NOSE QL CULT: NORMAL
PLATELET # BLD AUTO: 183 THOUSANDS/UL (ref 149–390)
PMV BLD AUTO: 11.1 FL (ref 8.9–12.7)
POTASSIUM SERPL-SCNC: 3.7 MMOL/L (ref 3.5–5.3)
RBC # BLD AUTO: 4.67 MILLION/UL (ref 3.88–5.62)
SODIUM SERPL-SCNC: 136 MMOL/L (ref 135–147)
WBC # BLD AUTO: 13.89 THOUSAND/UL (ref 4.31–10.16)

## 2025-03-30 PROCEDURE — 82948 REAGENT STRIP/BLOOD GLUCOSE: CPT

## 2025-03-30 PROCEDURE — 99232 SBSQ HOSP IP/OBS MODERATE 35: CPT

## 2025-03-30 PROCEDURE — 80048 BASIC METABOLIC PNL TOTAL CA: CPT | Performed by: INTERNAL MEDICINE

## 2025-03-30 PROCEDURE — 87040 BLOOD CULTURE FOR BACTERIA: CPT

## 2025-03-30 PROCEDURE — 85027 COMPLETE CBC AUTOMATED: CPT | Performed by: INTERNAL MEDICINE

## 2025-03-30 PROCEDURE — 83735 ASSAY OF MAGNESIUM: CPT | Performed by: INTERNAL MEDICINE

## 2025-03-30 RX ADMIN — NYSTATIN: 100000 POWDER TOPICAL at 16:26

## 2025-03-30 RX ADMIN — POLYETHYLENE GLYCOL 3350 17 G: 17 POWDER, FOR SOLUTION ORAL at 08:09

## 2025-03-30 RX ADMIN — ATORVASTATIN CALCIUM 40 MG: 40 TABLET, FILM COATED ORAL at 08:09

## 2025-03-30 RX ADMIN — CEFEPIME 1000 MG: 1 INJECTION, POWDER, FOR SOLUTION INTRAMUSCULAR; INTRAVENOUS at 18:32

## 2025-03-30 RX ADMIN — METOPROLOL SUCCINATE 25 MG: 25 TABLET, EXTENDED RELEASE ORAL at 08:09

## 2025-03-30 RX ADMIN — INSULIN LISPRO 2 UNITS: 100 INJECTION, SOLUTION INTRAVENOUS; SUBCUTANEOUS at 11:38

## 2025-03-30 RX ADMIN — HEPARIN SODIUM 5000 UNITS: 5000 INJECTION INTRAVENOUS; SUBCUTANEOUS at 13:00

## 2025-03-30 RX ADMIN — CEFEPIME 1000 MG: 1 INJECTION, POWDER, FOR SOLUTION INTRAMUSCULAR; INTRAVENOUS at 06:51

## 2025-03-30 RX ADMIN — ASPIRIN 81 MG: 81 TABLET, CHEWABLE ORAL at 08:09

## 2025-03-30 RX ADMIN — SODIUM CHLORIDE, SODIUM GLUCONATE, SODIUM ACETATE, POTASSIUM CHLORIDE, MAGNESIUM CHLORIDE, SODIUM PHOSPHATE, DIBASIC, AND POTASSIUM PHOSPHATE 75 ML/HR: .53; .5; .37; .037; .03; .012; .00082 INJECTION, SOLUTION INTRAVENOUS at 20:06

## 2025-03-30 RX ADMIN — HEPARIN SODIUM 5000 UNITS: 5000 INJECTION INTRAVENOUS; SUBCUTANEOUS at 05:09

## 2025-03-30 RX ADMIN — HEPARIN SODIUM 5000 UNITS: 5000 INJECTION INTRAVENOUS; SUBCUTANEOUS at 21:42

## 2025-03-30 RX ADMIN — LORATADINE 10 MG: 10 TABLET ORAL at 08:09

## 2025-03-30 RX ADMIN — NYSTATIN: 100000 POWDER TOPICAL at 08:11

## 2025-03-30 RX ADMIN — CARBIDOPA AND LEVODOPA 1 TABLET: 25; 100 TABLET ORAL at 20:06

## 2025-03-30 RX ADMIN — CARBIDOPA AND LEVODOPA 1 TABLET: 25; 100 TABLET ORAL at 08:09

## 2025-03-30 RX ADMIN — FERROUS GLUCONATE 324 MG: 324 TABLET ORAL at 08:09

## 2025-03-30 RX ADMIN — INSULIN GLARGINE 40 UNITS: 100 INJECTION, SOLUTION SUBCUTANEOUS at 21:42

## 2025-03-30 RX ADMIN — FINASTERIDE 5 MG: 5 TABLET, FILM COATED ORAL at 08:09

## 2025-03-30 NOTE — ASSESSMENT & PLAN NOTE
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

## 2025-03-30 NOTE — PROGRESS NOTES
Progress Note - Hospitalist   Name: Tha Weems 74 y.o. male I MRN: [id number]  Unit/Bed#: W -01 I Date of Admission: 3/28/2025   Date of Service: 3/30/2025 I Hospital Day: 2    Assessment & Plan  Acute metabolic encephalopathy  Presents from [facility] w/ confusion & increased fatigue. In ED pt w/ foul-smelling urine and disorientation.  Baseline dementia 2/2 advanced Parkinson's  Metabolic encephalopathy likely associated w/ UTI, does not meet SIRS criteria  TSH 0.833, ammonia 17  CXR benign   CT head w/o acute intracranial abnormality  UA indicative of UTI, tx as below   Monitor for clinical response  Delirium precautions, promote sleep/wake cycles, frequent orientation; currently pleasantly confused  Cystitis  S/p straight cath in ED; micro UA w/ innumerable RBC/WBC/bacteria  Urine culture showing Candida and pseudonormal species, await for final susceptibilities  Previous h/o colonization w/ multiple huseyin including Pseudomonas, 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 past  Given altered mental status, c/w cefepime day #3, s/p dose of Rocephin in ED  Previous h/o chronic indwelling Razo, no current Razo  C/w retention protocol  C/w Proscar/Flomax  Lactic acidosis  No SIRS criteria per vital signs; WBC does not meet SIRS on admit   Etiology likely multifactorial in setting of metformin with UTI/dehydration  Tx w/ IVFs   Hemodynamically stable  3.5 > 3.1 > 0.7  Essential hypertension  C/w PTA metoprolol; no longer on ACE-I per facility list  Normotensive  Trend   Type 2 diabetes mellitus with hyperglycemia, with long-term current use of insulin ([facility])  Lab Results   Component Value Date    HGBA1C 7.0 (H) 03/28/2025     Recent Labs     03/29/25  1525 03/29/25  2026 03/30/25  0800 03/30/25  1059   POCGLU 166* 150* 140 213*     Blood Sugar Average: Last 72 hrs:  (P) 146.875  A1c 9.2 in 8/2023   Hold PTA Tradjenta and metformin  PTA Lantus 44 U qhs   C/w Lantus 40 U qhs & SSI AC/QHS   CCD  Coronary artery  disease involving native coronary artery of native heart without angina pectoris  Known triple-vessel disease however not surgical candidate  Medically managed w/ GDMT therapy  C/w ASA, Ranexa, metoprolol  Outpt cardiology f/u   Parkinson disease (HCC)  H/o late stage Parkinson's  C/w PTA Sinemet  Cough  CXR w/o acute finding  ST consulted, dysphagia 3 diet w/ thin liquids  Electrolyte abnormality  Recent Labs     03/28/25  1411 03/29/25  0502 03/29/25  0502 03/30/25  0328   MG  --  1.4*   < > 2.3   K 4.5 3.8  --  3.7    < > = values in this interval not displayed.     S/p repletion  Trend    VTE Pharmacologic Prophylaxis:   High Risk (Score >/= 5) - Pharmacological DVT Prophylaxis Ordered: heparin. Sequential Compression Devices Ordered.    Mobility:   Basic Mobility Inpatient Raw Score: 9  -[facility] Goal: 3: Sit at edge of bed  JH-HLM Achieved: 2: Bed activities/Dependent transfer  JH-HLM Goal achieved. Continue to encourage appropriate mobility.    Patient Centered Rounds: I performed bedside rounds with nursing staff today.   Discussions with Specialists or Other Care Team Provider: CM    Education and Discussions with Family / Patient: Updated  (wife) via phone.    Current Length of Stay: 2 day(s)  Current Patient Status: Inpatient   Certification Statement: The patient will continue to require additional inpatient hospital stay due to continuing IV antibiotics in setting of UTI  Discharge Plan: Anticipate discharge in 24-48 hrs to prior assisted or independent living facility.    Code Status: Level 1 - Full Code    Subjective   Patient is overall poor historian however currently reports improvement 4 days prior.  Currently denies any chest pain/pressure, palpitations, lightheadedness, nausea, shortness of breath, or chills.    Objective :  Temp:  [97.5 °F (36.4 °C)-102.7 °F (39.3 °C)] 100 °F (37.8 °C)  HR:  [84-97] 84  BP: (113-144)/(55-65) 113/55  Resp:  [12-17] 17  SpO2:  [95 %-97 %] 96 %  O2  Device: None (Room air)    Body mass index is 29.97 kg/m².     Input and Output Summary (last 24 hours):     Intake/Output Summary (Last 24 hours) at 3/30/2025 1338  Last data filed at 3/30/2025 1308  Gross per 24 hour   Intake 960 ml   Output 700 ml   Net 260 ml       Physical Exam  Vitals and nursing note reviewed.   Constitutional:       General: He is not in acute distress.     Appearance: He is normal weight. He is not ill-appearing, toxic-appearing or diaphoretic.   HENT:      Head: Normocephalic.      Nose: Nose normal.      Mouth/Throat:      Mouth: Mucous membranes are moist.      Pharynx: Oropharynx is clear.   Eyes:      General: No scleral icterus.     Conjunctiva/sclera: Conjunctivae normal.      Pupils: Pupils are equal, round, and reactive to light.   Cardiovascular:      Rate and Rhythm: Normal rate and regular rhythm.      Heart sounds: No murmur heard.     No friction rub. No gallop.   Pulmonary:      Effort: Pulmonary effort is normal. No respiratory distress.      Breath sounds: Normal breath sounds. No stridor. No wheezing, rhonchi or rales.   Abdominal:      General: Abdomen is flat.      Palpations: Abdomen is soft.   Musculoskeletal:         General: Normal range of motion.      Cervical back: Normal range of motion and neck supple.      Right lower leg: No edema.      Left lower leg: No edema.   Lymphadenopathy:      Cervical: No cervical adenopathy.   Skin:     General: Skin is warm.      Coloration: Skin is not jaundiced or pale.      Findings: No bruising, erythema or lesion.   Neurological:      General: No focal deficit present.      Mental Status: He is alert and oriented to person, place, and time. Mental status is at baseline.      Cranial Nerves: No cranial nerve deficit.      Motor: No weakness.      Comments: Currently alert and oriented x 3   Psychiatric:         Mood and Affect: Mood normal.         Behavior: Behavior normal.         Thought Content: Thought content normal.            Lines/Drains:  Lines/Drains/Airways       Active Status       Name Placement date Placement time Site Days    External Urinary Catheter 03/29/25 2007  -- less than 1                            Lab Results: I have reviewed the following results:   Results from last 7 days   Lab Units 03/30/25  0328 03/29/25  0502 03/28/25  1411   WBC Thousand/uL 13.89*   < > 11.35*   HEMOGLOBIN g/dL 13.7   < > 14.2   HEMATOCRIT % 42.5   < > 43.5   PLATELETS Thousands/uL 183   < > 233   SEGS PCT %  --   --  78*   LYMPHO PCT %  --   --  7*   MONO PCT %  --   --  12   EOS PCT %  --   --  2    < > = values in this interval not displayed.     Results from last 7 days   Lab Units 03/30/25  0328 03/29/25  0502 03/28/25  1411   SODIUM mmol/L 136   < > 140   POTASSIUM mmol/L 3.7   < > 4.5   CHLORIDE mmol/L 101   < > 101   CO2 mmol/L 26   < > 30   BUN mg/dL 15   < > 19   CREATININE mg/dL 1.03   < > 1.22   ANION GAP mmol/L 9   < > 9   CALCIUM mg/dL 8.4   < > 9.5   ALBUMIN g/dL  --   --  4.1   TOTAL BILIRUBIN mg/dL  --   --  0.68   ALK PHOS U/L  --   --  70   ALT U/L  --   --  4*   AST U/L  --   --  11*   GLUCOSE RANDOM mg/dL 121   < > 109    < > = values in this interval not displayed.         Results from last 7 days   Lab Units 03/30/25  1059 03/30/25  0800 03/29/25  2026 03/29/25  1525 03/29/25  1029 03/29/25  0731 03/28/25  2101 03/28/25  1827   POC GLUCOSE mg/dl 213* 140 150* 166* 122 121 138 125     Results from last 7 days   Lab Units 03/28/25  1846   HEMOGLOBIN A1C % 7.0*     Results from last 7 days   Lab Units 03/29/25  0816 03/28/25  1701 03/28/25  1411   LACTIC ACID mmol/L 0.7 3.1* 3.5*       Recent Cultures (last 7 days):   Results from last 7 days   Lab Units 03/28/25  1442   URINE CULTURE  80,000-89,000 cfu/ml Candida glabrata*  40,000-49,000 cfu/ml Pseudomonas aeruginosa*       Imaging Results Review: I reviewed radiology reports from this admission including: chest xray.  Other Study Results Review: No additional  pertinent studies reviewed.    Last 24 Hours Medication List:     Current Facility-Administered Medications:     acetaminophen (TYLENOL) tablet 650 mg, Q6H PRN    aspirin chewable tablet 81 mg, Daily    atorvastatin (LIPITOR) tablet 40 mg, Daily    calcium carbonate (TUMS) chewable tablet 1,000 mg, Daily PRN    carbidopa-levodopa (SINEMET)  mg per tablet 1 tablet, TID    cefepime (MAXIPIME) 1,000 mg in dextrose 5 % 50 mL IVPB, Q12H, Last Rate: 1,000 mg (03/30/25 0651)    ferrous gluconate (FERGON) tablet 324 mg, Daily Before Breakfast    finasteride (PROSCAR) tablet 5 mg, Daily    heparin (porcine) subcutaneous injection 5,000 Units, Q8H LIYAH    insulin glargine (LANTUS) subcutaneous injection 40 Units 0.4 mL, HS    insulin lispro (HumALOG/ADMELOG) 100 units/mL subcutaneous injection 1-5 Units, TID AC **AND** Fingerstick Glucose (POCT), TID AC    insulin lispro (HumALOG/ADMELOG) 100 units/mL subcutaneous injection 1-5 Units, HS    loratadine (CLARITIN) tablet 10 mg, Daily    metoprolol succinate (TOPROL-XL) 24 hr tablet 25 mg, Daily    multi-electrolyte (Plasmalyte-A/Isolyte-S PH 7.4/Normosol-R) IV solution, Continuous, Last Rate: 75 mL/hr (03/28/25 2030)    nystatin (MYCOSTATIN) powder, BID    ondansetron (ZOFRAN) injection 4 mg, Q6H PRN    polyethylene glycol (MIRALAX) packet 17 g, Daily    ranolazine (RANEXA) 12 hr tablet 500 mg, Q12H LIYAH    tamsulosin (FLOMAX) capsule 0.4 mg, Daily With Dinner    Administrative Statements   Today, Patient Was Seen By: Kwadwo Tomlinson PA-C  I have spent a total time of 35 minutes in caring for this patient on the day of the visit/encounter including Documenting in the medical record, Reviewing/placing orders in the medical record (including tests, medications, and/or procedures), Obtaining or reviewing history  , and Communicating with other healthcare professionals .    **Please Note: This note may have been constructed using a voice recognition system.**

## 2025-03-30 NOTE — PLAN OF CARE
Problem: PAIN - ADULT  Goal: Verbalizes/displays adequate comfort level or baseline comfort level  Description: Interventions:- Encourage patient to monitor pain and request assistance- Assess pain using appropriate pain scale- Administer analgesics based on type and severity of pain and evaluate response- Implement non-pharmacological measures as appropriate and evaluate response- Consider cultural and social influences on pain and pain management- Notify physician/advanced practitioner if interventions unsuccessful or patient reports new pain  Outcome: Progressing     Problem: INFECTION - ADULT  Goal: Absence or prevention of progression during hospitalization  Description: INTERVENTIONS:- Assess and monitor for signs and symptoms of infection- Monitor lab/diagnostic results- Monitor all insertion sites, i.e. indwelling lines, tubes, and drains- Monitor endotracheal if appropriate and nasal secretions for changes in amount and color- [place] appropriate cooling/warming therapies per order- Administer medications as ordered- Instruct and encourage patient and family to use good hand hygiene technique- Identify and instruct in appropriate isolation precautions for identified infection/condition  Outcome: Progressing  Goal: Absence of fever/infection during neutropenic period  Description: INTERVENTIONS:- Monitor WBC  Outcome: Progressing     Problem: SAFETY ADULT  Goal: Patient will remain free of falls  Description: INTERVENTIONS:- Educate patient/family on patient safety including physical limitations- Instruct patient to call for assistance with activity - Consult OT/PT to assist with strengthening/mobility - Keep Call bell within reach- Keep bed low and locked with side rails adjusted as appropriate- Keep care items and personal belongings within reach- Initiate and maintain comfort rounds- Make Fall Risk Sign visible to staff- Offer Toileting every  Hours, in advance of need- Initiate/Maintain alarm- Obtain  necessary fall risk management equipmen- Apply yellow socks and bracelet for high fall risk patients- Consider moving patient to room near nurses station  Outcome: Progressing  Goal: Maintain or return to baseline ADL function  Description: INTERVENTIONS:-  Assess patient's ability to carry out ADLs; assess patient's baseline for ADL function and identify physical deficits which impact ability to perform ADLs (bathing, care of mouth/teeth, toileting, grooming, dressing, etc.)- Assess/evaluate cause of self-care deficits - Assess range of motion- Assess patient's mobility; develop plan if impaired- Assess patient's need for assistive devices and provide as appropriate- Encourage maximum independence but intervene and supervise when necessary- Involve family in performance of ADLs- Assess for home care needs following discharge - Consider OT consult to assist with ADL evaluation and planning for discharge- Provide patient education as appropriate  Outcome: Progressing  Goal: Maintains/Returns to pre admission functional level  Description: INTERVENTIONS:- Perform AM-PAC 6 Click Basic Mobility/ Daily Activity assessment daily.- Set and communicate daily mobility goal to care team and patient/family/caregiver. - Collaborate with rehabilitation services on mobility goals if consulted- Perform Range of Motion  times a day.- Reposition patient opal hours.- Dangle patient  times a day- Stand patient  times a day- Ambulate patient  times a day- Out of bed to chair  times a day - Out of bed for meals  times a day- Out of bed for toileting- Record patient progress and toleration of activity level   Outcome: Progressing     Problem: DISCHARGE PLANNING  Goal: Discharge to home or other facility with appropriate resources  Description: INTERVENTIONS:- Identify barriers to discharge w/patient and caregiver- Arrange for needed discharge resources and transportation as appropriate- Identify discharge learning needs (meds, wound care,  etc.)- Arrange for interpretive services to assist at discharge as needed- Refer to Case Management Department for coordinating discharge planning if the patient needs post-hospital services based on physician/advanced practitioner order or complex needs related to functional status, cognitive ability, or social support system  Outcome: Progressing     Problem: Knowledge Deficit  Goal: Patient/family/caregiver demonstrates understanding of disease process, treatment plan, medications, and discharge instructions  Description: Complete learning assessment and assess knowledge base.Interventions:- Provide teaching at level of understanding- Provide teaching via preferred learning methods  Outcome: Progressing     Problem: Prexisting or High Potential for Compromised Skin Integrity  Goal: Skin integrity is maintained or improved  Description: INTERVENTIONS:- Identify patients at risk for skin breakdown- Assess and monitor skin integrity- Assess and monitor nutrition and hydration status- Monitor labs - Assess for incontinence - Turn and reposition patient- Assist with mobility/ambulation- Relieve pressure over bony prominences- Avoid friction and shearing- Provide appropriate hygiene as needed including keeping skin clean and dry- Evaluate need for skin moisturizer/barrier cream- Collaborate with interdisciplinary team - Patient/family teaching- Consider wound care consult   Outcome: Progressing     Problem: Nutrition/Hydration-ADULT  Goal: Nutrient/Hydration intake appropriate for improving, restoring or maintaining nutritional needs  Description: Monitor and assess patient's nutrition/hydration status for malnutrition. Collaborate with interdisciplinary team and initiate plan and interventions as ordered.  Monitor patient's weight and dietary intake as ordered or per policy. Utilize nutrition screening tool and intervene as necessary. Determine patient's food preferences and provide high-protein, high-caloric foods as  appropriate. INTERVENTIONS:- Monitor oral intake, urinary output, labs, and treatment plans- Assess nutrition and hydration status and recommend course of action- Evaluate amount of meals eaten- Assist patient with eating if necessary - Allow adequate time for meals- Recommend/ encourage appropriate diets, oral nutritional supplements, and vitamin/mineral supplements- Order, calculate, and assess calorie counts as needed- Recommend, monitor, and adjust tube feedings and TPN/PPN based on assessed needs- Assess need for intravenous fluids- Provide specific nutrition/hydration education as appropriate- Include patient/family/caregiver in decisions related to nutrition  Outcome: Progressing

## 2025-03-30 NOTE — ASSESSMENT & PLAN NOTE
Recent Labs     03/28/25  1411 03/29/25  0502 03/29/25  0502 03/30/25  0328   MG  --  1.4*   < > 2.3   K 4.5 3.8  --  3.7    < > = values in this interval not displayed.     S/p repletion  Trend

## 2025-03-30 NOTE — ASSESSMENT & PLAN NOTE
Likely in setting of UTI from Pseudomonas  Met SIRS sepsis criteria with tachycardia and temperature of 102.7 °F plus lactic acidosis  Blood cultures negative  Received IV cefepime     Replied to Jt Bright sent by pt  Faxed have been faxed over to doctors offices that are in her msgs

## 2025-03-30 NOTE — ASSESSMENT & PLAN NOTE
Presents from [facility] w/ confusion & increased fatigue. In ED pt w/ foul-smelling urine and disorientation.  Baseline dementia 2/2 advanced Parkinson's  Metabolic encephalopathy likely associated w/ UTI, does not meet SIRS criteria  TSH 0.833, ammonia 17  CXR benign   CT head w/o acute intracranial abnormality  UA indicative of UTI, tx as below   Monitor for clinical response  Delirium precautions, promote sleep/wake cycles, frequent orientation; currently pleasantly confused

## 2025-03-30 NOTE — UTILIZATION REVIEW
NOTIFICATION OF INPATIENT ADMISSION   AUTHORIZATION REQUEST   SERVICING FACILITY:   [place] [address]  Tax ID: [id number]  NPI: [id number]   ATTENDING PROVIDER:  Attending Name and NPI#: Rosa Lloyd Md [[id number]]  Address: [address] [place] [address]  Phone: [phone]     ADMISSION INFORMATION:  Place of Service: Inpatient [facility]  Place of Service Code: 21  Inpatient Admission Date/Time: 3/28/25  5:48 PM  Discharge Date/Time: No discharge date for patient encounter.  Admitting Diagnosis Code/Description:  UTI (urinary tract infection) [N39.0]  Elevated lactic acid level [R79.89]     UTILIZATION REVIEW CONTACT:  [facility]   Network Utilization Review Department  Phone: [phone]  Fax: [phone]  Email: [email or web address]  Contact for approvals/pending authorizations, clinical reviews, and discharge.     PHYSICIAN ADVISORY SERVICES:  Medical Necessity Denial & Ggoe-rj-Etnk Review  Phone: [phone]  Fax: [phone]  Email: [email or web address]     DISCHARGE SUPPORT TEAM:  For Patients Discharge Needs & Updates  Phone: [phone] opt. 2 Fax: [phone]  Email: [email or web address]

## 2025-03-30 NOTE — PLAN OF CARE
Problem: PAIN - ADULT  Goal: Verbalizes/displays adequate comfort level or baseline comfort level  Description: Interventions:- Encourage patient to monitor pain and request assistance- Assess pain using appropriate pain scale- Administer analgesics based on type and severity of pain and evaluate response- Implement non-pharmacological measures as appropriate and evaluate response- Consider cultural and social influences on pain and pain management- Notify physician/advanced practitioner if interventions unsuccessful or patient reports new pain  Outcome: Progressing     Problem: INFECTION - ADULT  Goal: Absence or prevention of progression during hospitalization  Description: INTERVENTIONS:- Assess and monitor for signs and symptoms of infection- Monitor lab/diagnostic results- Monitor all insertion sites, i.e. indwelling lines, tubes, and drains- Monitor endotracheal if appropriate and nasal secretions for changes in amount and color- [place] appropriate cooling/warming therapies per order- Administer medications as ordered- Instruct and encourage patient and family to use good hand hygiene technique- Identify and instruct in appropriate isolation precautions for identified infection/condition  Outcome: Progressing  Goal: Absence of fever/infection during neutropenic period  Description: INTERVENTIONS:- Monitor WBC  Outcome: Progressing     Problem: SAFETY ADULT  Goal: Patient will remain free of falls  Description: INTERVENTIONS:- Educate patient/family on patient safety including physical limitations- Instruct patient to call for assistance with activity - Consult OT/PT to assist with strengthening/mobility - Keep Call bell within reach- Keep bed low and locked with side rails adjusted as appropriate- Keep care items and personal belongings within reach- Initiate and maintain comfort rounds- Make Fall Risk Sign visible to staff- Offer Toileting every  Hours, in advance of need- Initiate/Maintain alarm- Obtain  necessary fall risk management equipmen- Apply yellow socks and bracelet for high fall risk patients- Consider moving patient to room near nurses station  Outcome: Progressing  Goal: Maintain or return to baseline ADL function  Description: INTERVENTIONS:-  Assess patient's ability to carry out ADLs; assess patient's baseline for ADL function and identify physical deficits which impact ability to perform ADLs (bathing, care of mouth/teeth, toileting, grooming, dressing, etc.)- Assess/evaluate cause of self-care deficits - Assess range of motion- Assess patient's mobility; develop plan if impaired- Assess patient's need for assistive devices and provide as appropriate- Encourage maximum independence but intervene and supervise when necessary- Involve family in performance of ADLs- Assess for home care needs following discharge - Consider OT consult to assist with ADL evaluation and planning for discharge- Provide patient education as appropriate  Outcome: Progressing  Goal: Maintains/Returns to pre admission functional level  Description: INTERVENTIONS:- Perform AM-PAC 6 Click Basic Mobility/ Daily Activity assessment daily.- Set and communicate daily mobility goal to care team and patient/family/caregiver. - Collaborate with rehabilitation services on mobility goals if consulted- Perform Range of Motion  times a day.- Reposition patient opal hours.- Dangle patient  times a day- Stand patient  times a day- Ambulate patient  times a day- Out of bed to chair  times a day - Out of bed for meals  times a day- Out of bed for toileting- Record patient progress and toleration of activity level   Outcome: Progressing     Problem: DISCHARGE PLANNING  Goal: Discharge to home or other facility with appropriate resources  Description: INTERVENTIONS:- Identify barriers to discharge w/patient and caregiver- Arrange for needed discharge resources and transportation as appropriate- Identify discharge learning needs (meds, wound care,  etc.)- Arrange for interpretive services to assist at discharge as needed- Refer to Case Management Department for coordinating discharge planning if the patient needs post-hospital services based on physician/advanced practitioner order or complex needs related to functional status, cognitive ability, or social support system  Outcome: Progressing     Problem: Knowledge Deficit  Goal: Patient/family/caregiver demonstrates understanding of disease process, treatment plan, medications, and discharge instructions  Description: Complete learning assessment and assess knowledge base.Interventions:- Provide teaching at level of understanding- Provide teaching via preferred learning methods  Outcome: Progressing     Problem: Prexisting or High Potential for Compromised Skin Integrity  Goal: Skin integrity is maintained or improved  Description: INTERVENTIONS:- Identify patients at risk for skin breakdown- Assess and monitor skin integrity- Assess and monitor nutrition and hydration status- Monitor labs - Assess for incontinence - Turn and reposition patient- Assist with mobility/ambulation- Relieve pressure over bony prominences- Avoid friction and shearing- Provide appropriate hygiene as needed including keeping skin clean and dry- Evaluate need for skin moisturizer/barrier cream- Collaborate with interdisciplinary team - Patient/family teaching- Consider wound care consult   Outcome: Progressing     Problem: Nutrition/Hydration-ADULT  Goal: Nutrient/Hydration intake appropriate for improving, restoring or maintaining nutritional needs  Description: Monitor and assess patient's nutrition/hydration status for malnutrition. Collaborate with interdisciplinary team and initiate plan and interventions as ordered.  Monitor patient's weight and dietary intake as ordered or per policy. Utilize nutrition screening tool and intervene as necessary. Determine patient's food preferences and provide high-protein, high-caloric foods as  appropriate. INTERVENTIONS:- Monitor oral intake, urinary output, labs, and treatment plans- Assess nutrition and hydration status and recommend course of action- Evaluate amount of meals eaten- Assist patient with eating if necessary - Allow adequate time for meals- Recommend/ encourage appropriate diets, oral nutritional supplements, and vitamin/mineral supplements- Order, calculate, and assess calorie counts as needed- Recommend, monitor, and adjust tube feedings and TPN/PPN based on assessed needs- Assess need for intravenous fluids- Provide specific nutrition/hydration education as appropriate- Include patient/family/caregiver in decisions related to nutrition  Outcome: Progressing

## 2025-03-30 NOTE — UTILIZATION REVIEW
"Initial Clinical Review    Admission: Date/Time/Statement:   Admission Orders (From admission, onward)       Ordered        03/28/25 1747  INPATIENT ADMISSION  Once                          Orders Placed This Encounter   Procedures    INPATIENT ADMISSION     Standing Status:   Standing     Number of Occurrences:   1     Level of Care:   Med Surg [16]     Estimated length of stay:   More than 2 Midnights     Certification:   I certify that inpatient services are medically necessary for this patient for a duration of greater than two midnights. See H&P and MD Progress Notes for additional information about the patient's course of treatment.     ED Arrival Information       Expected   -    Arrival   3/28/2025 13:37    Acuity   Urgent              Means of arrival   Ambulance    Escorted by   [facility]    Service   Hospitalist    Admission type   Emergency              Arrival complaint   ems             Chief Complaint   Patient presents with    Altered Mental Status     Patient to ED via EMS from [facility] with c/o lethargy and \"not acting himself\" per staff. Patient has history of dementia, GCS 14 at baseline and denies any complaints. Patient A/Ox3 on ED arrival.        Initial Presentation: 74 y.o. male  to ED via EMS from assisted living.    Admitted to inpatient with Dx: Acute metabolic encephalopathy/acute Cystis/Diabetes/CAD.    Presented to ED with lethargy and not acting usual self at day of arrival.  Chronically incontinent.  Cough.   PMHx:  PAD, CAD, DM .   On exam: urine odor.  Small amount dried stool feet.   No facial asymmetry.  Good strength on handgrip.  Rigid in positioning.  Unable to sit forward without large amount of assistance.  Able to move feet well.  Unable to maintain heels extended off of stretcher.  Lactic acid 3.5>3.1.  wbc 11.35.    UA innumerable WBC and bacteria.  Large leuko.    Imaging shows no acute findings ct head.   ED treatment:  IVF bolus of 2.7 " liters.  Started on ceftriaxone.    Plan includes to follow cultures, give Cefepime.  Trend BP and continue home metoprolol. Continue ASA, Ranexa, metoprolol Hold home Tradjenta and metformin.  Start SSI and Continue home basal insulin nightly.      Anticipated Length of Stay/Certification Statement: Patient will be admitted on an inpatient basis with an anticipated length of stay of greater than 2 midnights secondary to UTI/SIRS.     Date: 3/29/25    Day 2:  wants to sleep.  Last BM 2 days ago.  On exam:  Thin, Frail, elderly in appearance, chronically ill in appearance, alert but drifts asleep.  Alert and oriented x 2.  Pleasantly confused.    Wbc 9.48.  Mg 1.4.    Continue antibiotics.  Delirium precautions.  Replete electrolytes.     Patient has crossed 3 midnights and requires ongoing care    3/30/2025 .  Patient presents with febrile last night to 102.7.  remains febrile today to 100.     On exam:  alert, disoriented to time and situation.  Follows commands.  Drowsy.  Forgetful.    Abnormal labs or imaging:  urine Culture - Candida glabrata, Pseudomonas aeruginosa.  Glucose 140 > 213.   Diagnosis/Plan    Acute Metabolic Encephalopathy/Cystitis.   Continue cefepime. Await sensitives.   IVF in progress.     ED Treatment-Medication Administration from 03/28/2025 1337 to 03/28/2025 1825         Date/Time Order Dose Route Action     03/28/2025 1523 sodium chloride 0.9 % bolus 1,000 mL 1,000 mL Intravenous New Bag     03/28/2025 1534 ceftriaxone (ROCEPHIN) 1 g/50 mL in dextrose IVPB 1,000 mg Intravenous New Bag     03/28/2025 1755 sodium chloride 0.9 % bolus 1,700 mL 1,700 mL Intravenous New Bag            Scheduled Medications:  aspirin, 81 mg, Oral, Daily  atorvastatin, 40 mg, Oral, Daily  carbidopa-levodopa, 1 tablet, Oral, TID  cefepime, 1,000 mg, Intravenous, Q12H  ferrous gluconate, 324 mg, Oral, Daily Before Breakfast  finasteride, 5 mg, Oral, Daily  heparin (porcine), 5,000 Units, Subcutaneous, Q8H  LIYAH  insulin glargine, 40 Units, Subcutaneous, HS  insulin lispro, 1-5 Units, Subcutaneous, TID AC  insulin lispro, 1-5 Units, Subcutaneous, HS  loratadine, 10 mg, Oral, Daily  metoprolol succinate, 25 mg, Oral, Daily  nystatin, , Topical, BID  polyethylene glycol, 17 g, Oral, Daily  ranolazine, 500 mg, Oral, Q12H LIYAH  tamsulosin, 0.4 mg, Oral, Daily With Dinner    insulin glargine (LANTUS) subcutaneous injection 10 Units 0.1 mL  Dose: 10 Units  Freq: Once Route: SC  Start: 03/28/25 2130 End: 03/28/25 2204    magnesium sulfate 4 g/100 mL IVPB (premix) 4 g  Dose: 4 g  Freq: Once Route: IV  Last Dose: 4 g (03/29/25 0854)  Start: 03/29/25 0800 End: 03/29/25 1254     Continuous IV Infusions:  multi-electrolyte, 75 mL/hr, Intravenous, Continuous    PRN Meds:  acetaminophen, 650 mg, Oral, Q6H PRN x 1 3/29  calcium carbonate, 1,000 mg, Oral, Daily PRN  ondansetron, 4 mg, Intravenous, Q6H PRN      ED Triage Vitals [03/28/25 1342]   Temperature Pulse Respirations Blood Pressure SpO2 Pain Score   98 °F (36.7 °C) 88 18 121/58 98 % No Pain     Weight (last 2 days)       Date/Time Weight    03/28/25 18:36:13 89.4 (197.09)            Vital Signs (last 3 days)       Date/Time Temp Pulse Resp BP MAP (mmHg) SpO2 O2 Device Patient Position - Orthostatic VS [place] Coma Scale Score Pain    03/30/25 0900 -- -- -- -- -- -- -- -- -- No Pain    03/30/25 0815 -- -- -- -- -- -- None (Room air) -- 14 --    03/30/25 08:02:05 100 °F (37.8 °C) 84 -- 113/55 74 96 % -- -- -- --    03/30/25 06:13:02 97.5 °F (36.4 °C) 85 17 121/58 79 97 % -- -- -- --    03/30/25 0042 99.6 °F (37.6 °C) -- -- -- -- -- -- -- -- --    03/29/25 23:15:26 102.7 °F (39.3 °C) 97 16 144/65 91 95 % -- -- -- --    03/29/25 2000 -- -- -- -- -- -- -- -- 14 No Pain    03/29/25 15:11:07 99.2 °F (37.3 °C) 94 12 126/61 83 97 % -- -- -- --    03/29/25 1300 -- -- -- -- -- -- -- -- -- No Pain    03/29/25 0900 -- -- -- -- -- -- None (Room air) -- 14 --    03/29/25 07:33:26 99.3 °F (37.4  °C) 89 -- 126/63 84 97 % -- -- -- --    03/29/25 07:31:55 99.3 °F (37.4 °C) 94 -- 126/63 84 99 % -- -- -- --    03/28/25 21:04:49 99.2 °F (37.3 °C) 89 -- 135/62 86 97 % -- -- -- --    03/28/25 2000 -- -- -- -- -- -- -- -- 14 No Pain    03/28/25 1847 -- -- -- -- -- -- None (Room air) -- 14 No Pain    03/28/25 18:36:13 99.1 °F (37.3 °C) 89 18 144/73 97 98 % -- -- -- --    03/28/25 1442 -- -- -- -- -- -- -- -- 14 --    03/28/25 1344 -- -- -- -- -- -- -- -- 14 --    03/28/25 1342 98 °F (36.7 °C) 88 18 121/58 84 98 % None (Room air) Sitting -- No Pain          Pertinent Labs/Diagnostic Test Results:   Radiology:  XR chest portable   Final Interpretation by Atul Queen DO (03/30 1054)      No acute cardiopulmonary disease.            Workstation performed: [id number]         CT head without contrast   Final Interpretation by Antwon Aguilar MD (03/28 7013)      No acute intracranial abnormality. Stable chronic microangiopathic changes                  Workstation performed: [id number]           Cardiology:  ECG 12 lead   Final Result by Bora Moseley MD (03/28 6205)   Normal sinus rhythm   Right bundle branch block   Possible Inferior infarct (cited on or before 04-Mar-2022)   T wave abnormality, consider lateral ischemia   Abnormal ECG   When compared with ECG of 10-Apr-2024 21:51,   No significant change was found   Confirmed by Bora Moseley ([id number]) on 3/28/2025 3:57:27 PM        GI:  No orders to display           Results from last 7 days   Lab Units 03/30/25  0328 03/29/25  0502 03/28/25  1411   WBC Thousand/uL 13.89* 9.48 11.35*   HEMOGLOBIN g/dL 13.7 12.9 14.2   HEMATOCRIT % 42.5 39.4 43.5   PLATELETS Thousands/uL 183 200 233   TOTAL NEUT ABS Thousands/µL  --   --  8.76*         Results from last 7 days   Lab Units 03/30/25  0328 03/29/25  0502 03/28/25  1411   SODIUM mmol/L 136 140 140   POTASSIUM mmol/L 3.7 3.8 4.5   CHLORIDE mmol/L 101 103 101   CO2 mmol/L 26 29 30   ANION GAP mmol/L 9 8 9  "  BUN mg/dL 15 16 19   CREATININE mg/dL 1.03 1.01 1.22   EGFR ml/min/1.73sq m 71 72 58   CALCIUM mg/dL 8.4 8.7 9.5   MAGNESIUM mg/dL 2.3 1.4*  --      Results from last 7 days   Lab Units 03/28/25  1411   AST U/L 11*   ALT U/L 4*   ALK PHOS U/L 70   TOTAL PROTEIN g/dL 7.2   ALBUMIN g/dL 4.1   TOTAL BILIRUBIN mg/dL 0.68   AMMONIA umol/L 17*     Results from last 7 days   Lab Units 03/30/25  0800 03/29/25  2026 03/29/25  1525 03/29/25  1029 03/29/25  0731 03/28/25  2101 03/28/25  1827   POC GLUCOSE mg/dl 140 150* 166* 122 121 138 125     Results from last 7 days   Lab Units 03/30/25  0328 03/29/25  0502 03/28/25  1411   GLUCOSE RANDOM mg/dL 121 110 109         Results from last 7 days   Lab Units 03/28/25  1846   HEMOGLOBIN A1C % 7.0*   EAG mg/dl 154     No results found for: \"BETA-HYDROXYBUTYRATE\"                               Results from last 7 days   Lab Units 03/28/25  1411   TSH 3RD GENERATON uIU/mL 0.833         Results from last 7 days   Lab Units 03/29/25  0816 03/28/25  1701 03/28/25  1411   LACTIC ACID mmol/L 0.7 3.1* 3.5*                                                 Results from last 7 days   Lab Units 03/28/25  1442   CLARITY UA  Extra Turbid   COLOR UA  Yellow   SPEC GRAV UA  1.022   PH UA  5.0   GLUCOSE UA mg/dl >=1000 (1%)*   KETONES UA mg/dl Negative   BLOOD UA  Moderate*   PROTEIN UA mg/dl Trace*   NITRITE UA  Negative   BILIRUBIN UA  Negative   UROBILINOGEN UA (BE) mg/dl <2.0   LEUKOCYTES UA  Large*   WBC UA /hpf Innumerable*   RBC UA /hpf Innumerable*   BACTERIA UA /hpf Innumerable*   EPITHELIAL CELLS WET PREP /hpf None Seen                                 Results from last 7 days   Lab Units 03/28/25  1442   URINE CULTURE  Culture results to follow.                   Past Medical History:   Diagnosis Date    Ambulatory dysfunction     uses walker with assist of 1    Anemia     Anxiety     At risk for falls     hx of falls    Benign paroxysmal vertigo     unspecified ear    BPH (benign prostatic " hyperplasia)     for TURP today 1/4/2021    Calculus in bladder     for surgical removal today 1/4/2021    Cataract     left eye    Cervicalgia     Chest pain     Chronic kidney disease     stage 3    Constipation     CTS (carpal tunnel syndrome)     left    Cyst of pancreas     Cystitis 06/23/2020    acute cystitis with hematuria    Depression     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    Dizziness     and giddiness    Dysphagia     Elevated PSA     Facial weakness     GERD (gastroesophageal reflux disease)     last assessed 5/20/16    Gross hematuria     Hematuria     Hyperlipidemia     Hypertension     Kidney disease     Kidney stone     Left-sided weakness     Long term (current) use of oral hypoglycemic drugs     Muscle weakness     Nuclear senile cataract of left eye     OA (osteoarthritis) of knee     b/l    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HCC)     Syncope and collapse     Tachycardia     UTI (urinary tract infection)     Vertebro-basilar artery syndrome     Vitamin B deficiency     Vitamin D deficiency     Vitamin D deficiency      Present on Admission:   Cystitis   Coronary artery disease involving native coronary artery of native heart without angina pectoris   Essential hypertension   Parkinson disease (HCC)   Acute metabolic encephalopathy   Sepsis (HCC)   Lactic acidosis      Admitting Diagnosis: UTI (urinary tract infection) [N39.0]  Elevated lactic acid level [R79.89]  Age/Sex: 74 y.o. male    Network Utilization Review Department  ATTENTION: Please call with any questions or concerns to [phone] and carefully listen to the prompts so that you are directed to the right person. All voicemails are confidential.   For Discharge needs, contact Care Management DC Support Team at [phone] opt. 2  Send all requests for admission clinical reviews, approved or denied determinations and any other requests to dedicated fax number below belonging to the campus where the patient is receiving treatment.  List of dedicated fax numbers for the Facilities:  FACILITY NAME UR FAX NUMBER   ADMISSION DENIALS (Administrative/Medical Necessity) [phone]   DISCHARGE SUPPORT TEAM (NETWORK) [phone]   PARENT CHILD HEALTH (Maternity/NICU/Pediatrics) [phone]   [facility] [phone]   [facility] [phone]   [facility] [phone]   [facility] [phone]   [facility] [phone]   [facility] [phone]   [facility] [phone]   [facility] [phone]   [facility] [phone]   [facility] [phone]   [facility] [phone]   [facility] [phone]

## 2025-03-30 NOTE — ASSESSMENT & PLAN NOTE
Lab Results   Component Value Date    HGBA1C 7.0 (H) 03/28/2025     Recent Labs     03/29/25  1525 03/29/25 2026 03/30/25  0800 03/30/25  1059   POCGLU 166* 150* 140 213*     Blood Sugar Average: Last 72 hrs:  (P) 146.875  A1c 9.2 in 8/2023   Hold PTA Tradjenta and metformin  PTA Lantus 44 U qhs   C/w Lantus 40 U qhs & SSI AC/QHS   CCD

## 2025-03-31 VITALS
SYSTOLIC BLOOD PRESSURE: 142 MMHG | WEIGHT: 197.09 LBS | TEMPERATURE: 97 F | BODY MASS INDEX: 29.87 KG/M2 | HEIGHT: 68 IN | RESPIRATION RATE: 16 BRPM | OXYGEN SATURATION: 98 % | DIASTOLIC BLOOD PRESSURE: 82 MMHG | HEART RATE: 85 BPM

## 2025-03-31 LAB
ANION GAP SERPL CALCULATED.3IONS-SCNC: 7 MMOL/L (ref 4–13)
BACTERIA UR CULT: ABNORMAL
BACTERIA UR CULT: ABNORMAL
BUN SERPL-MCNC: 16 MG/DL (ref 5–25)
CALCIUM SERPL-MCNC: 8.3 MG/DL (ref 8.4–10.2)
CHLORIDE SERPL-SCNC: 104 MMOL/L (ref 96–108)
CO2 SERPL-SCNC: 30 MMOL/L (ref 21–32)
CREAT SERPL-MCNC: 1.05 MG/DL (ref 0.6–1.3)
ERYTHROCYTE [DISTWIDTH] IN BLOOD BY AUTOMATED COUNT: 14.2 % (ref 11.6–15.1)
GFR SERPL CREATININE-BSD FRML MDRD: 69 ML/MIN/1.73SQ M
GLUCOSE SERPL-MCNC: 132 MG/DL (ref 65–140)
GLUCOSE SERPL-MCNC: 151 MG/DL (ref 65–140)
GLUCOSE SERPL-MCNC: 68 MG/DL (ref 65–140)
GLUCOSE SERPL-MCNC: 69 MG/DL (ref 65–140)
HCT VFR BLD AUTO: 39.3 % (ref 36.5–49.3)
HGB BLD-MCNC: 12.5 G/DL (ref 12–17)
MCH RBC QN AUTO: 28.9 PG (ref 26.8–34.3)
MCHC RBC AUTO-ENTMCNC: 31.8 G/DL (ref 31.4–37.4)
MCV RBC AUTO: 91 FL (ref 82–98)
PLATELET # BLD AUTO: 179 THOUSANDS/UL (ref 149–390)
PMV BLD AUTO: 10.9 FL (ref 8.9–12.7)
POTASSIUM SERPL-SCNC: 3.7 MMOL/L (ref 3.5–5.3)
RBC # BLD AUTO: 4.32 MILLION/UL (ref 3.88–5.62)
SODIUM SERPL-SCNC: 141 MMOL/L (ref 135–147)
WBC # BLD AUTO: 8.91 THOUSAND/UL (ref 4.31–10.16)

## 2025-03-31 PROCEDURE — 97167 OT EVAL HIGH COMPLEX 60 MIN: CPT

## 2025-03-31 PROCEDURE — 80048 BASIC METABOLIC PNL TOTAL CA: CPT

## 2025-03-31 PROCEDURE — 85027 COMPLETE CBC AUTOMATED: CPT

## 2025-03-31 PROCEDURE — 82948 REAGENT STRIP/BLOOD GLUCOSE: CPT

## 2025-03-31 PROCEDURE — 97163 PT EVAL HIGH COMPLEX 45 MIN: CPT

## 2025-03-31 PROCEDURE — 99239 HOSP IP/OBS DSCHRG MGMT >30: CPT | Performed by: PHYSICIAN ASSISTANT

## 2025-03-31 RX ORDER — INSULIN GLARGINE 100 [IU]/ML
26 INJECTION, SOLUTION SUBCUTANEOUS
Start: 2025-03-31

## 2025-03-31 RX ADMIN — LORATADINE 10 MG: 10 TABLET ORAL at 09:56

## 2025-03-31 RX ADMIN — ATORVASTATIN CALCIUM 40 MG: 40 TABLET, FILM COATED ORAL at 09:56

## 2025-03-31 RX ADMIN — METOPROLOL SUCCINATE 25 MG: 25 TABLET, EXTENDED RELEASE ORAL at 09:56

## 2025-03-31 RX ADMIN — HEPARIN SODIUM 5000 UNITS: 5000 INJECTION INTRAVENOUS; SUBCUTANEOUS at 13:36

## 2025-03-31 RX ADMIN — NYSTATIN: 100000 POWDER TOPICAL at 09:53

## 2025-03-31 RX ADMIN — FERROUS GLUCONATE 324 MG: 324 TABLET ORAL at 07:43

## 2025-03-31 RX ADMIN — INSULIN LISPRO 1 UNITS: 100 INJECTION, SOLUTION INTRAVENOUS; SUBCUTANEOUS at 11:22

## 2025-03-31 RX ADMIN — RANOLAZINE 500 MG: 500 TABLET, FILM COATED, EXTENDED RELEASE ORAL at 09:56

## 2025-03-31 RX ADMIN — HEPARIN SODIUM 5000 UNITS: 5000 INJECTION INTRAVENOUS; SUBCUTANEOUS at 05:51

## 2025-03-31 RX ADMIN — POLYETHYLENE GLYCOL 3350 17 G: 17 POWDER, FOR SOLUTION ORAL at 09:56

## 2025-03-31 RX ADMIN — SODIUM CHLORIDE, SODIUM GLUCONATE, SODIUM ACETATE, POTASSIUM CHLORIDE, MAGNESIUM CHLORIDE, SODIUM PHOSPHATE, DIBASIC, AND POTASSIUM PHOSPHATE 75 ML/HR: .53; .5; .37; .037; .03; .012; .00082 INJECTION, SOLUTION INTRAVENOUS at 11:27

## 2025-03-31 RX ADMIN — FINASTERIDE 5 MG: 5 TABLET, FILM COATED ORAL at 09:56

## 2025-03-31 RX ADMIN — ASPIRIN 81 MG: 81 TABLET, CHEWABLE ORAL at 09:58

## 2025-03-31 RX ADMIN — CEFEPIME 1000 MG: 1 INJECTION, POWDER, FOR SOLUTION INTRAMUSCULAR; INTRAVENOUS at 06:07

## 2025-03-31 RX ADMIN — CARBIDOPA AND LEVODOPA 1 TABLET: 25; 100 TABLET ORAL at 09:56

## 2025-03-31 NOTE — CASE MANAGEMENT
Case Management Assessment & Discharge Planning Note    Patient name Tha Weems  Location W /W -01 MRN [id number]  : 1950 Date 3/31/2025       Current Admission Date: 3/28/2025  Current Admission Diagnosis:Acute metabolic encephalopathy   Patient Active Problem List    Diagnosis Date Noted Date Diagnosed    Electrolyte abnormality 2025     Cough 2025     Sepsis ([facility]) 2023     Dry eyes 2023     Acute metabolic encephalopathy 2022     Intertrigo 2022     Balanitis 2022     Requires daily assistance for activities of daily living (ADL) and comfort needs 2022     Fall 2022     Parkinson disease ([facility]) 2022     Chest pain 2022     Leukocytosis 2022     Coronary artery disease involving native coronary artery of native heart without angina pectoris 2022     Type 2 MI (myocardial infarction) ([facility]) 2022     Generalized abdominal pain 2021     Lactic acidosis 2021     Urinary incontinence 2021     Vitamin B12 deficiency      History of recurrent UTIs 2020     Insomnia 2020     Benign paroxysmal positional vertigo 2020     BPH (benign prostatic hyperplasia) 2020     Vitamin D deficiency 2020     Left-sided weakness 2020     Cystitis 2020     Bladder stones 2020     Stage 3a chronic kidney disease ([facility]) 2020     Essential hypertension 2019     Type 2 diabetes mellitus with hyperglycemia, with long-term current use of insulin ([facility]) 2019     Nephrolithiasis 2019     Ambulatory dysfunction 2019     Paresis ([facility]) 2019     Abnormal PSA 2019     Dizziness 10/04/2017     Pancreas cyst 2016     Type 2 diabetes mellitus with diabetic neuropathy ([facility]) 2015     Hyperlipidemia 2012       LOS (days): 3  Geometric Mean LOS (GMLOS) (days):   Days to GMLOS:     OBJECTIVE:    Risk of Unplanned Readmission Score:  16.14         Current admission status: Inpatient       Preferred Pharmacy:   [facility] - [address]  [address]  [facility] [place] [address]  Phone: [phone] Fax: [phone]    [id number] [place] - [address]  [address]  [place] [address]  Phone: [phone] Fax: [phone]    Primary Care Provider: Geovanna Fuentes MD    Primary Insurance: [employer] [facility]  Secondary Insurance:     ASSESSMENT:  Active Health Care Proxies       Faustina Jeanne Slaughter Alternate Health Care Agent - Spouse   Primary Phone: [phone] (Mobile)  Home Phone: [phone]                           Readmission Root Cause  30 Day Readmission: No    Patient Information  Admitted from:: Facility  Mental Status: Confused  During Assessment patient was accompanied by: Not accompanied during assessment  Assessment information provided by:: Spouse  Primary Caregiver: Other (Comment)  Caregiver's Name:: Kayla Manor Swetha  Caregiver's Relationship to Patient:: Facility Staff  Caregiver's Telephone Number:: [phone]  Support Systems: Spouse/significant other  Home entry access options. Select all that apply.: No steps to enter home  Type of Current Residence: Facility  Upon entering residence, is there a bedroom on the main floor (no further steps)?: Yes  Upon entering residence, is there a bathroom on the main floor (no further steps)?: Yes  Living Arrangements: Lives in Facility, Lives w/ Spouse/significant other    Activities of Daily Living Prior to Admission  Functional Status: Assistance  Completes ADLs independently?: No  Level of ADL dependence: Assistance  Ambulates independently?: No  Level of ambulatory dependence: Assistance  Does patient use assisted devices?: Yes  Assisted Devices (DME) used: Wheelchair (facility stafff lifts patient into )  Does patient currently own DME?: Yes  What DME does the patient currently own?: Wheelchair  Does  patient have a history of Outpatient Therapy (PT/OT)?: No  Does the patient have a history of Short-Term Rehab?: Yes  Does patient have a history of HHC?: Yes  Does patient currently have HHC?: No         Patient Information Continued  Income Source: SSI/SSD  Does patient have prescription coverage?: Yes  Can the patient afford their medications and any related supplies (such as glucometers or test strips)?: Yes  Does patient receive dialysis treatments?: No         Means of Transportation  Means of Transport to Appts:: Other (Comment) ([employer] provides transportation)          DISCHARGE DETAILS:    Discharge planning discussed with:: Wife Jeanne  Freedom of Choice: Yes  Comments - Freedom of Choice: CM spoke to wife, she also lives at [facility]. Patient is from [facility] [place], assist with ADLs, uses a WC, staff does transfer, no O2, no HHC services at this time. [employer] provides transportation or pays for it  CM contacted family/caregiver?: Yes  Were Treatment Team discharge recommendations reviewed with patient/caregiver?: Yes  Did patient/caregiver verbalize understanding of patient care needs?: N/A- going to facility  Were patient/caregiver advised of the risks associated with not following Treatment Team discharge recommendations?: Yes    Contacts  Patient Contacts: Jeanne  Relationship to Patient:: Family  Contact Method: Phone  Phone Number: [phone]  Reason/Outcome: Discharge Planning              Other Referral/Resources/Interventions Provided:  Interventions: Facility Return  Referral Comments: Wife preferrs patient to return to [facility]

## 2025-03-31 NOTE — UTILIZATION REVIEW
NOTIFICATION OF INPATIENT ADMISSION   AUTHORIZATION REQUEST   SERVICING FACILITY:   [place] [address]  Tax ID: [id number]  NPI: [id number]   ATTENDING PROVIDER:  Attending Name and NPI#: Debby Roy Md [[id number]]  Address: [address] [place] [address]  Phone: [phone]     ADMISSION INFORMATION:  Place of Service: Inpatient [facility]  Place of Service Code: 21  Inpatient Admission Date/Time: 3/28/25  5:48 PM  Discharge Date/Time: No discharge date for patient encounter.  Admitting Diagnosis Code/Description:  UTI (urinary tract infection) [N39.0]  Elevated lactic acid level [R79.89]     UTILIZATION REVIEW CONTACT:  [facility] Utilization   Network Utilization Review Department  Phone: [phone]  Fax: [phone]  Email: [email or web address]  Contact for approvals/pending authorizations, clinical reviews, and discharge.     PHYSICIAN ADVISORY SERVICES:  Medical Necessity Denial & [place]-nd-Ntnx Review  Phone: [phone]  Fax: [phone]  Email: [email or web address]     DISCHARGE SUPPORT TEAM:  For Patients Discharge Needs & Updates  Phone: [phone] opt. 2 Fax: [phone]  Email: [email or web address]

## 2025-03-31 NOTE — CASE MANAGEMENT
Case Management Discharge Planning Note    Patient name Tha Weems  Location W /W -01 MRN [id number]  : 1950 Date 3/31/2025       Current Admission Date: 3/28/2025  Current Admission Diagnosis:Acute metabolic encephalopathy   Patient Active Problem List    Diagnosis Date Noted Date Diagnosed    Electrolyte abnormality 2025     Cough 2025     Sepsis ([facility]) 2023     Dry eyes 2023     Acute metabolic encephalopathy 2022     Intertrigo 2022     Balanitis 2022     Requires daily assistance for activities of daily living (ADL) and comfort needs 2022     Fall 2022     Parkinson disease ([facility]) 2022     Chest pain 2022     Leukocytosis 2022     Coronary artery disease involving native coronary artery of native heart without angina pectoris 2022     Type 2 MI (myocardial infarction) ([facility]) 2022     Generalized abdominal pain 2021     Lactic acidosis 2021     Urinary incontinence 2021     Vitamin B12 deficiency      History of recurrent UTIs 2020     Insomnia 2020     Benign paroxysmal positional vertigo 2020     BPH (benign prostatic hyperplasia) 2020     Vitamin D deficiency 2020     Left-sided weakness 2020     Cystitis 2020     Bladder stones 2020     Stage 3a chronic kidney disease ([facility]) 2020     Essential hypertension 2019     Type 2 diabetes mellitus with hyperglycemia, with long-term current use of insulin ([facility]) 2019     Nephrolithiasis 2019     Ambulatory dysfunction 2019     Paresis ([facility]) 2019     Abnormal PSA 2019     Dizziness 10/04/2017     Pancreas cyst 2016     Type 2 diabetes mellitus with diabetic neuropathy ([facility]) 2015     Hyperlipidemia 2012       LOS (days): 3  Geometric Mean LOS (GMLOS) (days):   Days to GMLOS:     OBJECTIVE:  Risk of Unplanned Readmission Score: 16.14          Current admission status: Inpatient   Preferred Pharmacy:   [facility] - [facility] [place] - [address] [facility]  [address]  [facility] [place] [address]  Phone: [phone] Fax: [phone]    [id number] [place] - [address]   [facility]  [place] [address]  Phone: [phone] Fax: [phone]    Primary Care Provider: Geovanna Fuentes MD    Primary Insurance: [employer] [facility]  Secondary Insurance:     DISCHARGE DETAILS:    Discharge planning discussed with:: Wife Sarah Beth Angel, RN at [facility]  [place] of Choice: Yes  Comments - Freedom of Choice: Patient for discharge back to [facility]. Spoke to Sarah Beth at [facility], patient ok to return. Wife Jeanne updated, IMM reviewed. WCV ordered. RN and attending aware  CM contacted family/caregiver?: Yes  Were Treatment Team discharge recommendations reviewed with patient/caregiver?: Yes  Did patient/caregiver verbalize understanding of patient care needs?: N/A- going to facility  Were patient/caregiver advised of the risks associated with not following Treatment Team discharge recommendations?: Yes    Contacts  Patient Contacts: Jeanne  Relationship to Patient:: Family  Contact Method: Phone  Phone Number: [phone]  Reason/Outcome: Discharge Planning              Other Referral/Resources/Interventions Provided:  Interventions: Facility Return  Referral Comments: Patient discharging back to [facility]         Treatment Team Recommendation: Facility Return  Discharge Destination Plan:: Facility Return  Transport at Discharge : Wheelchair van                             IMM Given (Date):: 03/31/25  IMM Given to:: Family   IMM reviewed with patient's caregiver, patient's caregiver agrees with discharge determination.        Accepting Facility Name, City & State : [facility]  Receiving Facility/Agency Phone Number: [phone], Sarah Beth  Facility/Agency Fax Number: [phone]

## 2025-03-31 NOTE — PLAN OF CARE
Problem: PHYSICAL THERAPY ADULT  Goal: Performs mobility at highest level of function for planned discharge setting.  See evaluation for individualized goals.  Description: Treatment/Interventions: Functional transfer training, LE strengthening/ROM, Therapeutic exercise, Endurance training, Cognitive reorientation, Patient/family training, Equipment eval/education, Bed mobility, Spoke to nursing, Spoke to case management (WC mobility training)     See flowsheet documentation for full assessment, interventions and recommendations.  Note: Prognosis: Good  Problem List: Decreased strength, Decreased range of motion, Decreased endurance, Impaired balance, Decreased mobility, Decreased cognition, Impaired judgement, Decreased safety awareness  Assessment: Pt seen for PT evaluation for mobility assessment & discharge needs. Pt admitted 3/28/2025 w/ AMS and UTI, dx Acute metabolic encephalopathy. During PT IE, pt requires MODA 2 person for bed mobility in hospital bed, MAXA1 + MINA1 for stand pivot transfer from EOB to recliner chair towards pt's R. Pt displays above outlined functional impairments & limitations, and presents close to his baseline level of functional mobility. The AM-PAC & Barthel Index outcome tools were used to assist in determining pt safety w/ mobility/self care & appropriate d/c recommendations, see above for scores. Pt is at risk of falls d/t multiple comorbidities, h/o falls, impaired balance, impaired cognition, impaired insight/safety awareness, acuity of medical illness, and ongoing medical treatment of primary dx. Pt's clinical presentation is currently unstable/unpredictable as seen in pt's presentation of varying levels of cognitive performance, increased fall risk, new onset of impairment of functional mobility, decreased endurance, and new onset of weakness. Pt will benefit from continued PT services in order to address impairments, decrease risk of falls, maximize independence w/ fnxl  mobility, & ensure safety w/ mobility for transition to next level of care. Based on pt presentation & impairments, pt would most appropriately benefit from Level III (minimal PT intensity) resources upon d/c.      Rehab Resource Intensity Level, PT: III (Minimum Resource Intensity) (return to facility with PT services)    See flowsheet documentation for full assessment.

## 2025-03-31 NOTE — PLAN OF CARE
Problem: PAIN - ADULT  Goal: Verbalizes/displays adequate comfort level or baseline comfort level  Description: Interventions:- Encourage patient to monitor pain and request assistance- Assess pain using appropriate pain scale- Administer analgesics based on type and severity of pain and evaluate response- Implement non-pharmacological measures as appropriate and evaluate response- Consider cultural and social influences on pain and pain management- Notify physician/advanced practitioner if interventions unsuccessful or patient reports new pain  Outcome: Progressing     Problem: INFECTION - ADULT  Goal: Absence or prevention of progression during hospitalization  Description: INTERVENTIONS:- Assess and monitor for signs and symptoms of infection- Monitor lab/diagnostic results- Monitor all insertion sites, i.e. indwelling lines, tubes, and drains- Monitor endotracheal if appropriate and nasal secretions for changes in amount and color- [place] appropriate cooling/warming therapies per order- Administer medications as ordered- Instruct and encourage patient and family to use good hand hygiene technique- Identify and instruct in appropriate isolation precautions for identified infection/condition  Outcome: Progressing  Goal: Absence of fever/infection during neutropenic period  Description: INTERVENTIONS:- Monitor WBC  Outcome: Progressing     Problem: SAFETY ADULT  Goal: Patient will remain free of falls  Description: INTERVENTIONS:- Educate patient/family on patient safety including physical limitations- Instruct patient to call for assistance with activity - Consult OT/PT to assist with strengthening/mobility - Keep Call bell within reach- Keep bed low and locked with side rails adjusted as appropriate- Keep care items and personal belongings within reach- Initiate and maintain comfort rounds- Make Fall Risk Sign visible to staff- Offer Toileting every  Hours, in advance of need- Initiate/Maintain alarm- Obtain  necessary fall risk management equipmen- Apply yellow socks and bracelet for high fall risk patients- Consider moving patient to room near nurses station  Outcome: Progressing  Goal: Maintain or return to baseline ADL function  Description: INTERVENTIONS:-  Assess patient's ability to carry out ADLs; assess patient's baseline for ADL function and identify physical deficits which impact ability to perform ADLs (bathing, care of mouth/teeth, toileting, grooming, dressing, etc.)- Assess/evaluate cause of self-care deficits - Assess range of motion- Assess patient's mobility; develop plan if impaired- Assess patient's need for assistive devices and provide as appropriate- Encourage maximum independence but intervene and supervise when necessary- Involve family in performance of ADLs- Assess for home care needs following discharge - Consider OT consult to assist with ADL evaluation and planning for discharge- Provide patient education as appropriate  Outcome: Progressing  Goal: Maintains/Returns to pre admission functional level  Description: INTERVENTIONS:- Perform AM-PAC 6 Click Basic Mobility/ Daily Activity assessment daily.- Set and communicate daily mobility goal to care team and patient/family/caregiver. - Collaborate with rehabilitation services on mobility goals if consulted- Perform Range of Motion  times a day.- Reposition patient opal hours.- Dangle patient  times a day- Stand patient  times a day- Ambulate patient  times a day- Out of bed to chair  times a day - Out of bed for meals  times a day- Out of bed for toileting- Record patient progress and toleration of activity level   Outcome: Progressing     Problem: DISCHARGE PLANNING  Goal: Discharge to home or other facility with appropriate resources  Description: INTERVENTIONS:- Identify barriers to discharge w/patient and caregiver- Arrange for needed discharge resources and transportation as appropriate- Identify discharge learning needs (meds, wound care,  etc.)- Arrange for interpretive services to assist at discharge as needed- Refer to Case Management Department for coordinating discharge planning if the patient needs post-hospital services based on physician/advanced practitioner order or complex needs related to functional status, cognitive ability, or social support system  Outcome: Progressing     Problem: Knowledge Deficit  Goal: Patient/family/caregiver demonstrates understanding of disease process, treatment plan, medications, and discharge instructions  Description: Complete learning assessment and assess knowledge base.Interventions:- Provide teaching at level of understanding- Provide teaching via preferred learning methods  Outcome: Progressing     Problem: Prexisting or High Potential for Compromised Skin Integrity  Goal: Skin integrity is maintained or improved  Description: INTERVENTIONS:- Identify patients at risk for skin breakdown- Assess and monitor skin integrity- Assess and monitor nutrition and hydration status- Monitor labs - Assess for incontinence - Turn and reposition patient- Assist with mobility/ambulation- Relieve pressure over bony prominences- Avoid friction and shearing- Provide appropriate hygiene as needed including keeping skin clean and dry- Evaluate need for skin moisturizer/barrier cream- Collaborate with interdisciplinary team - Patient/family teaching- Consider wound care consult   Outcome: Progressing     Problem: Nutrition/Hydration-ADULT  Goal: Nutrient/Hydration intake appropriate for improving, restoring or maintaining nutritional needs  Description: Monitor and assess patient's nutrition/hydration status for malnutrition. Collaborate with interdisciplinary team and initiate plan and interventions as ordered.  Monitor patient's weight and dietary intake as ordered or per policy. Utilize nutrition screening tool and intervene as necessary. Determine patient's food preferences and provide high-protein, high-caloric foods as  appropriate. INTERVENTIONS:- Monitor oral intake, urinary output, labs, and treatment plans- Assess nutrition and hydration status and recommend course of action- Evaluate amount of meals eaten- Assist patient with eating if necessary - Allow adequate time for meals- Recommend/ encourage appropriate diets, oral nutritional supplements, and vitamin/mineral supplements- Order, calculate, and assess calorie counts as needed- Recommend, monitor, and adjust tube feedings and TPN/PPN based on assessed needs- Assess need for intravenous fluids- Provide specific nutrition/hydration education as appropriate- Include patient/family/caregiver in decisions related to nutrition  Outcome: Progressing

## 2025-03-31 NOTE — PLAN OF CARE
Problem: OCCUPATIONAL THERAPY ADULT  Goal: Performs self-care activities at highest level of function for planned discharge setting.  See evaluation for individualized goals.  Description: Treatment Interventions: ADL retraining, Functional transfer training, Endurance training, Patient/family training, Equipment evaluation/education, Compensatory technique education, Energy conservation, Activityengagement, Cognitive reorientation          See flowsheet documentation for full assessment, interventions and recommendations.   Outcome: Progressing  Note: Limitation: Decreased ADL status, Decreased UE strength, Decreased Safe judgement during ADL, Decreased cognition, Decreased endurance, Decreased self-care trans, Decreased high-level ADLs (impaired balance, fxnl mobility, act mellissa, standing mellissa, fxnl sitting balance, fxnl sitting mellissa, attention to task, direction following, safety awareness, insight, pacing, problem solving, learning new tasks, response time)  Prognosis: Good  Assessment: Pt is a 74 y.o. male seen for OT evaluation s/p admission to [facility] on 3/28/2025 due to Acute metabolic encephalopathy. Personal and env factors supporting pt at time of IE include supportive facility staff, accessible home environment, and [facility]. Personal and env factors inhibiting engagement in occupations include advanced age, difficulty completing ADLs, and difficulty completing IADLs. Performance deficits that affect the pt’s occupational performance can be seen above. Due to pt's current functional limitations and medical complications pt is functioning below baseline. Pt would benefit from continued skilled OT treatment in order to maximize safety, independence and overall performance with ADLs, functional mobility, functional transfers, and cognition in order to achieve highest level of function.     Rehab Resource Intensity Level, OT: III (Minimum Resource Intensity)

## 2025-03-31 NOTE — ASSESSMENT & PLAN NOTE
Lab Results   Component Value Date    HGBA1C 7.0 (H) 03/28/2025     Recent Labs     03/30/25 2011 03/31/25  0733 03/31/25  1032 03/31/25  1104   POCGLU 124 68 132 151*   Blood Sugar Average: Last 72 hrs:  (P) [id number]  A1c 9.2 in 8/2023   Now much improved/at goal A1c is 7.0   Avoid hypoglycemia  Resume oral hypoglycemics at discharge-continue Lantus

## 2025-03-31 NOTE — ASSESSMENT & PLAN NOTE
Patient with prior history of Razo per chart review but not in place prior to this hospitalization and therefore s/p straight cath sample in ED  Urine culture showing Candida and pseudonormal species, reviewed sensitivities  Treated with 3 days of IV cefepime  C/w Proscar/Flomax to prevent retention

## 2025-03-31 NOTE — OCCUPATIONAL THERAPY NOTE
Occupational Therapy Evaluation     Patient Name: Tha Weems  Today's Date: 3/31/2025  Problem List  Principal Problem:    Acute metabolic encephalopathy  Active Problems:    Essential hypertension    Type 2 diabetes mellitus with hyperglycemia, with long-term current use of insulin (HCC)    Cystitis    Lactic acidosis    Coronary artery disease involving native coronary artery of native heart without angina pectoris    Parkinson disease (HCC)    Sepsis (HCC)    Cough    Electrolyte abnormality    Past Medical History  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    Dizziness     and giddiness    Dysphagia     Elevated PSA     Facial weakness     GERD (gastroesophageal reflux disease)     last assessed 5/20/16    Gross hematuria     Hematuria     Hyperlipidemia     Hypertension     Kidney disease     Kidney stone     Left-sided weakness     Long term (current) use of oral hypoglycemic drugs     Muscle weakness     Nuclear senile cataract of left eye     OA (osteoarthritis) of knee     b/l    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HCC)     Syncope and collapse     Tachycardia     UTI (urinary tract infection)     Vertebro-basilar artery syndrome     Vitamin B deficiency     Vitamin D deficiency     Vitamin D deficiency      Past Surgical History  Past Surgical History:   Procedure Laterality Date    CARDIAC CATHETERIZATION N/A 3/7/2022    Procedure:  CARDIAC CATHETERIZATION;  Surgeon: Pako Reid DO;  [facility];  Service: Cardiology    CATARACT EXTRACTION      CHOLECYSTECTOMY      KNEE SURGERY      IL LITHOLAPAXY SMPL/SM <2.5 CM N/A 1/4/2021    Procedure: Cystolitholopaxy w/laser bladder stones;  Surgeon: Kyle Jones MD;  [facility];  Service: Urology    IL TRURL ELECTROSURG RESCJ PROSTATE BLEED COMPLETE N/A 1/4/2021    Procedure: T.U.R.P.;  Surgeon: Kyle Jones MD;  [facility];  Service: Urology             03/31/25 0941   OT Last Visit   OT Visit Date 03/31/25   Note Type   Note type Evaluation   Pain Assessment   Pain Assessment Tool 0-10   Pain Score No Pain   Restrictions/Precautions   Weight Bearing Precautions Per Order No   Other Precautions Cognitive;Bed Alarm;Chair Alarm;Multiple lines;Fall Risk;Pain   Home Living   Type of Home Assisted living  ([facility])   Home Layout One level   Bathroom Shower/Tub Walk-in shower   Bathroom Toilet Raised   Bathroom Equipment Grab bars in shower;Shower chair;Grab bars around toilet   Bathroom Accessibility Accessible via wheelchair   Home Equipment Wheelchair-manual   Additional Comments Per chart review pt is Ax1-2 for SPT to/from wheelchair at baseline, able to self propel short distances with BLE - wife able to propel w/c   Prior Function   Level of [place] Needs assistance with ADLs   Lives With Spouse   Receives Help From Family;Personal care attendant   IADLs Family/Friend/Other provides transportation;Family/Friend/Other provides meals;Family/Friend/Other provides medication management   Falls in the last 6 months 1 to 4   Vocational Retired   Comments Pt an inconsistent historian, information gathered from chart review   Lifestyle   Autonomy PTA pt living with wife at [facility], pt requiring (A) with ADLs and IADLs, (+)falls, (-)drives, use of w/c at baseline   Reciprocal Relationships supportive wife + facility staff  "  Service to Others retired   Intrinsic Gratification enjoys talking about and spending time with his wife   General   Additional Pertinent History Admit due to change in mental status, found to have UTI. PMH: CAD, PAD, diabetes, Parkinson's disease   Family/Caregiver Present No   Subjective   Subjective \"I just miss her so much\" Pt tearful during session regarding missing his wife   ADL   Eating Assistance 4  Minimal Assistance   Eating Deficit Increased time to complete;Bringing food to mouth assist  (A with bringing beverage to mouth, pt then able to complete 2nd trial without assist)   Grooming Assistance 4  Minimal Assistance   Grooming Deficit Increased time to complete;Supervision/safety;Verbal cueing;Brushing hair   UB Bathing Assistance 3  Moderate Assistance   LB Bathing Assistance 2  Maximal Assistance   UB Dressing Assistance 3  Moderate Assistance   LB Dressing Assistance 2  Maximal Assistance   Toileting Assistance  1  Total Assistance   Bed Mobility   Supine to Sit 3  Moderate assistance   Additional items Assist x 2;Increased time required;Verbal cues;LE management   Additional Comments requiring max A x1 for scooting towards EOB. Able to sit EOB with (S)   Transfers   Stand pivot 2  Maximal assistance   Additional items Assist x 1;Increased time required;Verbal cues  (max Ax 1 (anteriorly) and min A x1 (posteriorly) with B knees blocked and use of RUE for support on armrest)   Additional Comments completing transfer towards pt's R side   Functional Mobility   Additional Comments unable to advance   Balance   Static Sitting Fair +   Dynamic Sitting Fair   Static Standing Poor -   Activity Tolerance   Activity Tolerance Patient tolerated treatment well   Medical Staff Made Aware PT Olesya, FLORENTINO Walker, PCA Michaela, CM Freda   RUE Assessment   RUE Assessment   (shoulder and elbow ROM and strength WFL, limited digit AROM and grasp)   LUE Assessment   LUE Assessment   (shoulder and elbow ROM and strength " WFL, limited digit AROM and grasp)   Hand Function   Gross Motor Coordination Functional   Fine Motor Coordination Impaired   Hand Function Comments limited grasp/reach for items on breakfast tray - with encouragement and initiation able to replicate   Cognition   Overall Cognitive Status Impaired   Arousal/Participation Alert;Cooperative   Attention Attends with cues to redirect   Orientation Level Oriented to person;Disoriented to place;Disoriented to time;Disoriented to situation   Memory Decreased short term memory;Decreased recall of recent events;Decreased recall of precautions   Following Commands Follows one step commands with increased time or repetition   Comments pleasantly confused, poor insight into deficits, limited problem solving. Requiring emotional support for missing his wife throughout session   Assessment   Limitation Decreased ADL status;Decreased UE strength;Decreased Safe judgement during ADL;Decreased cognition;Decreased endurance;Decreased self-care trans;Decreased high-level ADLs  (impaired balance, fxnl mobility, act mellissa, standing mellissa, fxnl sitting balance, fxnl sitting mellissa, attention to task, direction following, safety awareness, insight, pacing, problem solving, learning new tasks, response time)   Prognosis Good   Assessment Pt is a 74 y.o. male seen for OT evaluation s/p admission to [facility] on 3/28/2025 due to Acute metabolic encephalopathy. Personal and env factors supporting pt at time of IE include supportive facility staff, accessible home environment, and FFSU. Personal and env factors inhibiting engagement in occupations include advanced age, difficulty completing ADLs, and difficulty completing IADLs. Performance deficits that affect the pt’s occupational performance can be seen above. Due to pt's current functional limitations and medical complications pt is functioning below baseline. Pt would benefit from continued skilled OT treatment in order to maximize safety,  "independence and overall performance with ADLs, functional mobility, functional transfers, and cognition in order to achieve highest level of function.   Goals   Patient Goals \"to see my wife\"   LTG Time Frame 10-14   Long Term Goal see goals listed below   Plan   Treatment Interventions ADL retraining;Functional transfer training;Endurance training;Patient/family training;Equipment evaluation/education;Compensatory technique education;Energy conservation;Activityengagement;Cognitive reorientation   Goal Expiration Date 04/10/25   OT Treatment Day 0   OT Frequency 3-5x/wk   Discharge Recommendation   Rehab Resource Intensity Level, OT III (Minimum Resource Intensity)   AM-PAC Daily Activity Inpatient   Lower Body Dressing 2   Bathing 2   Toileting 2   Upper Body Dressing 2   Grooming 3   Eating 3   Daily Activity Raw Score 14   Daily Activity Standardized Score (Calc for Raw Score >=11) 33.39   AM-PAC Applied Cognition Inpatient   Following a Speech/Presentation 2   Understanding Ordinary Conversation 3   Taking Medications 1   Remembering Where Things Are Placed or Put Away 2   Remembering List of 4-5 Errands 1   Taking Care of Complicated Tasks 1   Applied Cognition Raw Score 10   Applied Cognition Standardized Score 24.98   End of Consult   Patient Position at End of Consult Bedside chair;Bed/Chair alarm activated;All needs within reach        GOALS:      -Patient will perform grooming tasks sitting at sink with overall Mod I in order to increase overall independence    -Patient will be Supervision with UB dressing using AE and AD as needed in order to increase (I) with ADLs    -Patient will be Min A  with UB bathing using AE and AD as needed in order to increase (I) with ADLs    -Patient will be Mod A  with LB dressing with use of AE and AD as needed in order to increase (I) with ADLs    -Patient will be Mod A  with LB bathing with use of AE and AD as needed in order to increase (I) with ADLs    -Patient will " complete toileting w/ Mod A  w/ G hygiene/thoroughness in order to reduce caregiver burden    -Patient will demonstrate Max A x 1 with bed mobility for ability to manage own comfort and initiate OOB tasks.     -Patient will perform functional transfers with Max A x 1 to/from all surfaces using DME as needed in order to increase (I) with functional tasks    -Patient will be Max A x 1 with functional mobility to/from bathroom for increased independence with toileting tasks    -Patient will tolerate therapeutic activities for greater than 30 min, in order to increase tolerance for functional activities.     -Patient will engage in ongoing cognitive assessment in order to assist with safe discharge planning/recommendations.        The patient's raw score on the -PAC Daily Activity Inpatient Short Form is 14. A raw score of less than 19 suggests the patient may benefit from discharge to post-acute rehabilitation services. HOWEVER please refer to the recommendation of the Occupational Therapist for safe discharge planning.    This session, pt required and most appropriately benefited from skilled OT/PT co-eval due to decreased activity tolerance and unpredictable medical and/or functional status. OT and PT goals were addressed separately as seen in documentation.     Bindu Jack MS, OTR/L

## 2025-03-31 NOTE — PHYSICAL THERAPY NOTE
PHYSICAL THERAPY EVALUATION  DATE: 03/31/25  TIME: 0923-0940    NAME:  Tha Weems  AGE:   74 y.o.  Mrn:   [id number]  Length Of Stay: 3    ADMIT DX:  UTI (urinary tract infection) [N39.0]  Elevated lactic acid level [R79.89]    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    Dizziness     and giddiness    Dysphagia     Elevated PSA     Facial weakness     GERD (gastroesophageal reflux disease)     last assessed 5/20/16    Gross hematuria     Hematuria     Hyperlipidemia     Hypertension     Kidney disease     Kidney stone     Left-sided weakness     Long term (current) use of oral hypoglycemic drugs     Muscle weakness     Nuclear senile cataract of left eye     OA (osteoarthritis) of knee     b/l    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HCC)     Syncope and collapse     Tachycardia     UTI (urinary tract infection)     Vertebro-basilar artery syndrome     Vitamin B deficiency     Vitamin D deficiency     Vitamin D deficiency      Past Surgical History:   Procedure Laterality Date    CARDIAC CATHETERIZATION N/A 3/7/2022    Procedure: CARDIAC CATHETERIZATION;  Surgeon: Pako Reid DO;  [facility];  Service: Cardiology    CATARACT EXTRACTION      CHOLECYSTECTOMY      KNEE SURGERY      VA LITHOLAPAXY SMPL/SM <2.5 CM N/A 1/4/2021    Procedure: Cystolitholopaxy w/laser bladder stones;  Surgeon: Kyle Jones MD;  [facility];  Service: Urology    GA TRURL ELECTROSURG RESCJ PROSTATE BLEED COMPLETE N/A 1/4/2021    Procedure: T.UMIMIPRocio;  Surgeon: Kyle Jones MD;  [facility];  Service: Urology       Performed at least 2 patient identifiers during session: Name, Birthday, ID bracelet, and Epic photo     03/31/25 0992   PT Last Visit   PT Visit Date 03/31/25   Note Type   Note type Evaluation   Pain Assessment   Pain Assessment Tool 0-10   Pain Score No Pain   Restrictions/Precautions   Weight Bearing Precautions Per Order No   Other Precautions Cognitive;Chair Alarm;Bed Alarm;Multiple lines;Fall Risk   Home Living   Type of Home Assisted living  ([facility])   Home Layout Access   Bathroom Shower/Tub Walk-in shower   Bathroom Toilet Raised   Bathroom Equipment Grab bars in shower;Shower chair;Grab bars around toilet   Bathroom Accessibility Accessible via wheelchair   Home Equipment Wheelchair-manual   Additional Comments Pt requires assistance with all aspects of self care and IADLs, assist of 1-2 person for pivot transfers to/from manual WC (is non ambulatory). Pt reports being familiar with [employer] but unable to recall if he uses. Reports that when in WC he is able to self propel short distances around his room with his LEs.   Prior Function   Level of [place] Needs assistance with ADLs;Needs assistance with functional mobility;Needs assistance with IADLS   Lives With Spouse   Receives Help From Family;Personal care attendant  (? PT services at facility)   IADLs Family/Friend/Other provides transportation;Family/Friend/Other provides meals;Family/Friend/Other provides medication management   Falls in the last 6 months 1 to 4   Vocational Retired   Comments Pt is a questionable historian at time of evaluation, baseline h/o dementia. Information obtained from combination of pt report + historical records in EMR.   General   Additional Pertinent History Pt is a 74 yr old male admitted 3/28/25  "with AMS, lethargy, +UTI. PMH includes dementia, Parkinsons Disease, ambulatory dysfunction, anemia, anxiety, falls, cataracts (L eye), CKD, depression, DM w/ diabetic neuropathy, HTN, paralytic syndrome.   Family/Caregiver Present No   Cognition   Overall Cognitive Status Impaired   Arousal/Participation Cooperative   Orientation Level Oriented to person;Disoriented to place;Disoriented to time;Disoriented to situation   Memory Decreased short term memory;Decreased recall of recent events;Decreased recall of precautions   Following Commands Follows one step commands with increased time or repetition   Subjective   Subjective \"I can try it.\"   RUE Assessment   RUE Assessment X  (shoulder and elbow ROM and strength WFL, limited digit AROM and grasp bilaterally)   LUE Assessment   LUE Assessment X  (shoulder and elbow ROM and strength WFL, limited digit AROM and grasp bilaterally)   RLE Assessment   RLE Assessment X  (moderate knee flexion contracture; strength grossly 2/5 to 2+/5 MMT throughout)   LLE Assessment   LLE Assessment X  (significant knee flexion contracture; strength grossly 2/5 MMT throughout)   Coordination   Movements are Fluid and Coordinated 0   Coordination and Movement Description Pt with moderate B LE tremors with mobility, reports this is baseline. Increased spasticty noted during WBing and functional pivot transfer, however was able to be utilized to pt's advantage in order to increase B LE WB during transfer.   Sensation WFL   Light Touch   RLE Light Touch Grossly intact   LLE Light Touch Grossly intact   Bed Mobility   Supine to Sit 3  Moderate assistance   Additional items Assist x 2;HOB elevated;Bedrails;Increased time required;Verbal cues;LE management  (trunk management)   Sit to Supine   (NT as pt was left seated OOB in recliner chair with alarm engaged at end of session.)   Additional Comments Pt needing MAXA for scooting and repositioning at EOB for most optimal balance and positioning; " able to then maintain upright sitting balance at EOB with S. Limited dynamic reaching outside MAHAMED, however no significant LOB noted.   Transfers   Stand pivot 2  Maximal assistance   Additional items Assist x 1;Armrests;Increased time required;Verbal cues  (+ SHAYLA 1, no AD, towards pt's R, pt's R UE support on chair armrest, B knees blocked)   Additional Comments SPT completed from EOB to recliner chair towards pt's R side.   Ambulation/Elevation   Gait pattern Not tested;Not appropriate  (pt non ambulatory at baseline)   Wheelchair Activities   Propulsion No   Balance   Static Sitting Fair +   Dynamic Sitting Fair   Static Standing Poor -   Endurance Deficit   Endurance Deficit Yes   Activity Tolerance   Activity Tolerance Patient tolerated treatment well;Patient limited by fatigue   Medical Staff Made Aware Spoke with CM Freda, RN Denise, PCA Michaela, OT Bindu   Assessment   Prognosis Good   Problem List Decreased strength;Decreased range of motion;Decreased endurance;Impaired balance;Decreased mobility;Decreased cognition;Impaired judgement;Decreased safety awareness   Assessment Pt seen for PT evaluation for mobility assessment & discharge needs. Pt admitted 3/28/2025 w/ AMS and UTI, dx Acute metabolic encephalopathy. During PT IE, pt requires MODA 2 person for bed mobility in hospital bed, MAXA1 + MINA1 for stand pivot transfer from EOB to recliner chair towards pt's R. Pt displays above outlined functional impairments & limitations, and presents close to his baseline level of functional mobility. The AM-PAC & Barthel Index outcome tools were used to assist in determining pt safety w/ mobility/self care & appropriate d/c recommendations, see above for scores. Pt is at risk of falls d/t multiple comorbidities, h/o falls, impaired balance, impaired cognition, impaired insight/safety awareness, acuity of medical illness, and ongoing medical treatment of primary dx. Pt's clinical presentation is currently  "unstable/unpredictable as seen in pt's presentation of varying levels of cognitive performance, increased fall risk, new onset of impairment of functional mobility, decreased endurance, and new onset of weakness. Pt will benefit from continued PT services in order to address impairments, decrease risk of falls, maximize independence w/ fnxl mobility, & ensure safety w/ mobility for transition to next level of care. Based on pt presentation & impairments, pt would most appropriately benefit from Level III (minimal PT intensity) resources upon d/c.   Goals   Patient Goals \"to see my wife\"   STG Expiration Date 04/14/25   Short Term Goal #1 Pt will: complete all bed mobility in flat bed with MODA in order to promote increased OOB functional mobility and simulate home environment; complete all pivot transfers with MODA in order to increase safety with functional mobility; self propel manual WC >25ft with S in order to increased in room mobility; improve B LE strength to >/= 3/5 MMT t/o in order to increase safety with functional mobility and decrease risk of falls; demonstrate understanding and independence with LE strengthening HEP; improve dynamic sitting balance balance to >/= good grade in order to promote safety and increased independence with mobility; tolerate >3hrs OOB in upright position, in order to improve muscular endurance and respiratory status; improve AM-PAC score to >/= 14/24 in order to increase independence with mobility and decrease burden of care; improve Barthel Index score to >/= 30/100 in order to increase independence and decrease risk of falls.   PT Treatment Day 0   Plan   Treatment/Interventions Functional transfer training;LE strengthening/ROM;Therapeutic exercise;Endurance training;Cognitive reorientation;Patient/family training;Equipment eval/education;Bed mobility;Spoke to nursing;Spoke to case management  (WC mobility training)   PT Frequency 2-3x/wk   Discharge Recommendation   Rehab " Resource Intensity Level, PT III (Minimum Resource Intensity)  (return to facility with PT services)   AM-PAC Basic Mobility Inpatient   Turning in Flat Bed Without Bedrails 2   Lying on Back to Sitting on Edge of Flat Bed Without Bedrails 1   Moving Bed to Chair 2   Standing Up From Chair Using Arms 2   Walk in Room 1   Climb 3-5 Stairs With Railing 1   Basic Mobility Inpatient Raw Score 9   Turning Head Towards Sound 3   Follow Simple Instructions 3   Low Function Basic Mobility Raw Score  15   Low Function Basic Mobility Standardized Score  23.9   [facility] Highest Level Of Mobility   -[facility] Goal 3: Sit at edge of bed   JH-HLM Achieved 4: Move to chair/commode   Modified [place] Scale   Modified [place] Scale 4   Barthel Index   Feeding 5   Bathing 0   Grooming Score 0   Dressing Score 5   Bladder Score 0   Bowels Score 5   Toilet Use Score 0   Transfers (Bed/Chair) Score 5   Mobility (Level Surface) Score 0   Stairs Score 0   Barthel Index Score 20   End of Consult   Patient Position at End of Consult Bedside chair;Bed/Chair alarm activated;All needs within reach     This session, pt required and most appropriately benefited from partial or full skilled PT/OT co-eval due to extensive physical assistance of SKILLED therapists, cognitive-communication impairments, decreased activity tolerance, and unpredictable medical and/or functional status. PT and OT goals were addressed separately as seen in documentation.    Based on patient's [facility] Highest Level of Mobility scores today, patient currently has a goal of [facility] Levels: 4: MOVE TO CHAIR/COMMODE, to be completed with RN staffing each shift, in order to improve overall activity tolerance and mobility, combat hospital related deconditioning, and maximize outcomes for d/c from the acute care setting.     The patient's AM-PAC Basic Mobility Inpatient Short Form Raw Score is 9. A Raw score of less than or equal to 16 suggests the patient may benefit from  discharge to post-acute rehabilitation services. Please also refer to the recommendation of the Physical Therapist for safe discharge planning.      Fern Wallace PT, DPT   Available via [employer]  NPI # [id number]  [place] License - [id number]  3/31/2025

## 2025-03-31 NOTE — DISCHARGE SUMMARY
Discharge Summary - Hospitalist   Name: Tha Weems 74 y.o. male I MRN: [id number]  Unit/Bed#: W -01 I Date of Admission: 3/28/2025   Date of Service: 3/31/2025 I Hospital Day: 3     Assessment & Plan  Acute metabolic encephalopathy  Presented from [facility] w/ confusion & increased fatigue.   Baseline dementia 2/2 advanced Parkinson's  Rest of workup was unremarkable and this was suspected to be metabolic encephalopathy likely associated w/ UTI    Sepsis  Likely in setting of UTI from Pseudomonas  Met SIRS sepsis criteria with tachycardia and temperature of 102.7 °F plus lactic acidosis  Blood cultures negative  Received IV cefepime x 4 days.  Therefore no additional antibiotics are required  Urine culture reviewed  Cystitis  Patient with prior history of Razo per chart review but not in place prior to this hospitalization and therefore s/p straight cath sample in ED  Urine culture showing Candida and pseudonormal species, reviewed sensitivities  Treated with 3 days of IV cefepime  C/w Proscar/Flomax to prevent retention  Essential hypertension  C/w PTA metoprolol; no longer on ACE-I per facility list  Normotensive  Trend   Type 2 diabetes mellitus (HCC)  Lab Results   Component Value Date    HGBA1C 7.0 (H) 03/28/2025     Recent Labs     03/30/25 2011 03/31/25  0733 03/31/25  1032 03/31/25  1104   POCGLU 124 68 132 151*   Blood Sugar Average: Last 72 hrs:  (P) [id number]  A1c 9.2 in 8/2023   Now much improved/at goal A1c is 7.0   Avoid hypoglycemia  Resume oral hypoglycemics at discharge-continue Lantus  Coronary artery disease involving native coronary artery of native heart without angina pectoris  Known triple-vessel disease however not surgical candidate  Medically managed w/ GDMT therapy  C/w ASA, Ranexa, metoprolol  Outpt cardiology f/u   Parkinson disease (HCC)  H/o late stage Parkinson's  C/w PTA Sinemet  Electrolyte abnormality  Low magnesium level at 1.4, repleted     Medical  "Problems       Resolved Problems  Date Reviewed: 3/31/2025   None       Discharging Physician / Practitioner: Angelique Best PA-C  PCP: Geovanna Fuentes MD  Admission Date:   Admission Orders (From admission, onward)       Ordered        03/28/25 1747  INPATIENT ADMISSION  Once                          Discharge Date: 03/31/25    Consultations During Hospital Stay:  none    Procedures Performed:   none    Significant Findings / Test Results:   As above    Incidental Findings:   none     Test Results Pending at Discharge (will require follow up):   none     Outpatient Tests Requested:  bmp    Complications:  none    Reason for Admission: Confusion    Hospital Course:   Tha Weems is a 74 y.o. male patient of diabetes, coronary artery disease, dementia and Parkinson's disease who originally presented to the hospital on 3/28/2025 due to increased confusion.  Urinalysis was noted to be abnormal and patient did have sepsis criteria including fever, tachycardia and lactic acidosis.  He received IV fluids.  He was placed on cefepime due to history of Pseudomonas.  Ultimately culture showed both Pseudomonas and Candida.  He received greater than 3 days of IV cefepime which was felt to be adequate to treat simple UTI.He was seen by PT and OT and was deemed to be stable to return to his assisted living    Please see above list of diagnoses and related plan for additional information.     Condition at Discharge: fair    Discharge Day Visit / Exam:   Subjective: Patient was tearful when I entered and told me that he missed his wife, with whom he was on the phone right now.  No reports of pain or other new events or concerns  Vitals: Blood Pressure: 142/82 (03/31/25 0732)  Pulse: 85 (03/31/25 0732)  Temperature: (!) 97 °F (36.1 °C) (03/31/25 0732)  Temp Source: Oral (03/30/25 0042)  Respirations: 16 (03/31/25 0732)  Height: 5' 8\" (172.7 cm) (03/29/25 1352)  Weight - Scale: 89.4 kg (197 lb 1.5 oz) (03/28/25 1836)  SpO2: 98 % " (03/31/25 5347)  Physical Exam  Vitals reviewed.   Constitutional:       General: He is not in acute distress.     Appearance: Normal appearance. He is not ill-appearing, toxic-appearing or diaphoretic.   Eyes:      General: No scleral icterus.        Right eye: No discharge.         Left eye: No discharge.      Conjunctiva/sclera: Conjunctivae normal.   Cardiovascular:      Rate and Rhythm: Normal rate and regular rhythm.      Heart sounds: No murmur heard.  Pulmonary:      Effort: No respiratory distress.      Breath sounds: No stridor. No wheezing, rhonchi or rales.   Abdominal:      General: There is no distension.      Palpations: Abdomen is soft.      Tenderness: There is no abdominal tenderness. There is no guarding.   Musculoskeletal:      Right lower leg: No edema.      Left lower leg: No edema.   Skin:     General: Skin is warm and dry.      Coloration: Skin is not jaundiced or pale.      Findings: No bruising, erythema, lesion or rash.   Neurological:      Mental Status: He is alert. Mental status is at baseline.   Psychiatric:      Comments: Mildly tearful/anxious but cooperative with no agitation noted          Discussion with Family: Updated  (wife) via phone.    Discharge instructions/Information to patient and family:   See after visit summary for information provided to patient and family.      Provisions for Follow-Up Care:  See after visit summary for information related to follow-up care and any pertinent home health orders.      Mobility at time of Discharge:   Basic Mobility Inpatient Raw Score: 9  -HLM Goal: 3: Sit at edge of bed  JH-HLM Achieved: 4: Move to chair/commode  HLM Goal achieved. Continue to encourage appropriate mobility.     Disposition:   Assisted Living Facility at [facility]    Planned Readmission: None    Discharge Medications:  See after visit summary for reconciled discharge medications provided to patient and/or family.      Administrative Statements    Discharge Statement:  I have spent a total time of 30 minutes in caring for this patient on the day of the visit/encounter.  Bedside nursing rounds were performed.  Case was discussed with case management.    **Please Note: This note may have been constructed using a voice recognition system**

## 2025-03-31 NOTE — PLAN OF CARE
Problem: PAIN - ADULT  Goal: Verbalizes/displays adequate comfort level or baseline comfort level  Description: Interventions:- Encourage patient to monitor pain and request assistance- Assess pain using appropriate pain scale- Administer analgesics based on type and severity of pain and evaluate response- Implement non-pharmacological measures as appropriate and evaluate response- Consider cultural and social influences on pain and pain management- Notify physician/advanced practitioner if interventions unsuccessful or patient reports new pain  Outcome: Progressing     Problem: INFECTION - ADULT  Goal: Absence or prevention of progression during hospitalization  Description: INTERVENTIONS:- Assess and monitor for signs and symptoms of infection- Monitor lab/diagnostic results- Monitor all insertion sites, i.e. indwelling lines, tubes, and drains- Monitor endotracheal if appropriate and nasal secretions for changes in amount and color- [place] appropriate cooling/warming therapies per order- Administer medications as ordered- Instruct and encourage patient and family to use good hand hygiene technique- Identify and instruct in appropriate isolation precautions for identified infection/condition  Outcome: Progressing  Goal: Absence of fever/infection during neutropenic period  Description: INTERVENTIONS:- Monitor WBC  Outcome: Progressing     Problem: SAFETY ADULT  Goal: Patient will remain free of falls  Description: INTERVENTIONS:- Educate patient/family on patient safety including physical limitations- Instruct patient to call for assistance with activity - Consult OT/PT to assist with strengthening/mobility - Keep Call bell within reach- Keep bed low and locked with side rails adjusted as appropriate- Keep care items and personal belongings within reach- Initiate and maintain comfort rounds- Make Fall Risk Sign visible to staff- Offer Toileting every  Hours, in advance of need- Initiate/Maintain alarm- Obtain  necessary fall risk management equipmen- Apply yellow socks and bracelet for high fall risk patients- Consider moving patient to room near nurses station  Outcome: Progressing  Goal: Maintain or return to baseline ADL function  Description: INTERVENTIONS:-  Assess patient's ability to carry out ADLs; assess patient's baseline for ADL function and identify physical deficits which impact ability to perform ADLs (bathing, care of mouth/teeth, toileting, grooming, dressing, etc.)- Assess/evaluate cause of self-care deficits - Assess range of motion- Assess patient's mobility; develop plan if impaired- Assess patient's need for assistive devices and provide as appropriate- Encourage maximum independence but intervene and supervise when necessary- Involve family in performance of ADLs- Assess for home care needs following discharge - Consider OT consult to assist with ADL evaluation and planning for discharge- Provide patient education as appropriate  Outcome: Progressing  Goal: Maintains/Returns to pre admission functional level  Description: INTERVENTIONS:- Perform AM-PAC 6 Click Basic Mobility/ Daily Activity assessment daily.- Set and communicate daily mobility goal to care team and patient/family/caregiver. - Collaborate with rehabilitation services on mobility goals if consulted- Perform Range of Motion  times a day.- Reposition patient opal hours.- Dangle patient  times a day- Stand patient  times a day- Ambulate patient  times a day- Out of bed to chair  times a day - Out of bed for meals  times a day- Out of bed for toileting- Record patient progress and toleration of activity level   Outcome: Progressing     Problem: DISCHARGE PLANNING  Goal: Discharge to home or other facility with appropriate resources  Description: INTERVENTIONS:- Identify barriers to discharge w/patient and caregiver- Arrange for needed discharge resources and transportation as appropriate- Identify discharge learning needs (meds, wound care,  etc.)- Arrange for interpretive services to assist at discharge as needed- Refer to Case Management Department for coordinating discharge planning if the patient needs post-hospital services based on physician/advanced practitioner order or complex needs related to functional status, cognitive ability, or social support system  Outcome: Progressing     Problem: Knowledge Deficit  Goal: Patient/family/caregiver demonstrates understanding of disease process, treatment plan, medications, and discharge instructions  Description: Complete learning assessment and assess knowledge base.Interventions:- Provide teaching at level of understanding- Provide teaching via preferred learning methods  Outcome: Progressing     Problem: Prexisting or High Potential for Compromised Skin Integrity  Goal: Skin integrity is maintained or improved  Description: INTERVENTIONS:- Identify patients at risk for skin breakdown- Assess and monitor skin integrity- Assess and monitor nutrition and hydration status- Monitor labs - Assess for incontinence - Turn and reposition patient- Assist with mobility/ambulation- Relieve pressure over bony prominences- Avoid friction and shearing- Provide appropriate hygiene as needed including keeping skin clean and dry- Evaluate need for skin moisturizer/barrier cream- Collaborate with interdisciplinary team - Patient/family teaching- Consider wound care consult   Outcome: Progressing     Problem: Nutrition/Hydration-ADULT  Goal: Nutrient/Hydration intake appropriate for improving, restoring or maintaining nutritional needs  Description: Monitor and assess patient's nutrition/hydration status for malnutrition. Collaborate with interdisciplinary team and initiate plan and interventions as ordered.  Monitor patient's weight and dietary intake as ordered or per policy. Utilize nutrition screening tool and intervene as necessary. Determine patient's food preferences and provide high-protein, high-caloric foods as  appropriate. INTERVENTIONS:- Monitor oral intake, urinary output, labs, and treatment plans- Assess nutrition and hydration status and recommend course of action- Evaluate amount of meals eaten- Assist patient with eating if necessary - Allow adequate time for meals- Recommend/ encourage appropriate diets, oral nutritional supplements, and vitamin/mineral supplements- Order, calculate, and assess calorie counts as needed- Recommend, monitor, and adjust tube feedings and TPN/PPN based on assessed needs- Assess need for intravenous fluids- Provide specific nutrition/hydration education as appropriate- Include patient/family/caregiver in decisions related to nutrition  Outcome: Progressing

## 2025-03-31 NOTE — ASSESSMENT & PLAN NOTE
Presented from [facility] w/ confusion & increased fatigue.   Baseline dementia 2/2 advanced Parkinson's  Rest of workup was unremarkable and this was suspected to be metabolic encephalopathy likely associated w/ UTI    Sepsis  Likely in setting of UTI from Pseudomonas  Met SIRS sepsis criteria with tachycardia and temperature of 102.7 °F plus lactic acidosis  Blood cultures negative  Received IV cefepime x 4 days.  Therefore no additional antibiotics are required  Urine culture reviewed

## 2025-04-01 NOTE — UTILIZATION REVIEW
NOTIFICATION OF ADMISSION DISCHARGE   This is a Notification of Discharge from [facility]. Please be advised that this patient has been discharge from our facility. Below you will find the admission and discharge date and time including the patient’s disposition.   UTILIZATION REVIEW CONTACT:  Utilization Review Assistants  Network Utilization Review Department  Phone: [phone] x carefully listen to the prompts. All voicemails are confidential.  Email: [email or web address]     ADMISSION INFORMATION  PRESENTATION DATE: 3/28/2025  1:38 PM  OBERVATION ADMISSION DATE: N/A  INPATIENT ADMISSION DATE: 3/28/25  5:48 PM   DISCHARGE DATE: 3/31/2025  2:09 PM   DISPOSITION:Home/Self Care    Network Utilization Review Department  ATTENTION: Please call with any questions or concerns to [phone] and carefully listen to the prompts so that you are directed to the right person. All voicemails are confidential.   For Discharge needs, contact Care Management DC Support Team at [phone] opt. 2  Send all requests for admission clinical reviews, approved or denied determinations and any other requests to dedicated fax number below belonging to the campus where the patient is receiving treatment. List of dedicated fax numbers for the Facilities:  FACILITY NAME UR FAX NUMBER   ADMISSION DENIALS (Administrative/Medical Necessity) [phone]   DISCHARGE SUPPORT TEAM ([facility]) [phone]   PARENT CHILD HEALTH (Maternity/NICU/Pediatrics) [phone]   [facility] [phone]   [facility] [phone]   [facility] [phone]   [facility] [phone]   [facility] [phone]   [facility] [phone]   [facility] [phone]   [facility] [phone]   [facility]  [facility] [phone]   [facility] [phone]   [facility] [phone]   [facility] [phone]

## 2025-04-04 LAB
BACTERIA BLD CULT: NORMAL
BACTERIA BLD CULT: NORMAL

## 2025-04-29 PROBLEM — A41.9 SEPSIS (HCC): Status: RESOLVED | Noted: 2023-07-24 | Resolved: 2025-04-29

## 2025-04-30 PROBLEM — R05.9 COUGH: Status: RESOLVED | Noted: 2025-03-28 | Resolved: 2025-04-30

## 2025-05-06 ENCOUNTER — HOSPITAL ENCOUNTER (OUTPATIENT)
Dept: CT IMAGING | Facility: HOSPITAL | Age: 75
Discharge: HOME | End: 2025-05-06
Payer: MEDICARE

## 2025-05-06 DIAGNOSIS — K86.2 PANCREAS CYST: ICD-10-CM

## 2025-05-06 PROCEDURE — 74160 CT ABDOMEN W/CONTRAST: CPT

## 2025-05-06 RX ADMIN — IOHEXOL 80 ML: 350 INJECTION, SOLUTION INTRAVENOUS at 11:20

## 2025-07-10 ENCOUNTER — APPOINTMENT (EMERGENCY)
Dept: RADIOLOGY | Facility: HOSPITAL | Age: 75
DRG: 689 | End: 2025-07-10
Payer: MEDICARE

## 2025-07-10 ENCOUNTER — HOSPITAL ENCOUNTER (INPATIENT)
Facility: HOSPITAL | Age: 75
LOS: 4 days | Discharge: NURSING, REHAB OR LONG-TERM CARE | DRG: 689 | End: 2025-07-14
Attending: EMERGENCY MEDICINE | Admitting: HOSPITALIST
Payer: MEDICARE

## 2025-07-10 ENCOUNTER — APPOINTMENT (EMERGENCY)
Dept: CT IMAGING | Facility: HOSPITAL | Age: 75
DRG: 689 | End: 2025-07-10
Payer: MEDICARE

## 2025-07-10 DIAGNOSIS — S80.00XA CONTUSION OF KNEE: ICD-10-CM

## 2025-07-10 DIAGNOSIS — G93.40 ENCEPHALOPATHY: ICD-10-CM

## 2025-07-10 DIAGNOSIS — E11.65 TYPE 2 DIABETES MELLITUS WITH HYPERGLYCEMIA, WITH LONG-TERM CURRENT USE OF INSULIN (HCC): ICD-10-CM

## 2025-07-10 DIAGNOSIS — N39.0 UTI (URINARY TRACT INFECTION): Primary | ICD-10-CM

## 2025-07-10 DIAGNOSIS — R33.9 URINARY RETENTION: ICD-10-CM

## 2025-07-10 DIAGNOSIS — N40.0 BENIGN PROSTATIC HYPERPLASIA WITHOUT LOWER URINARY TRACT SYMPTOMS: ICD-10-CM

## 2025-07-10 DIAGNOSIS — Z79.4 TYPE 2 DIABETES MELLITUS WITH HYPERGLYCEMIA, WITH LONG-TERM CURRENT USE OF INSULIN (HCC): ICD-10-CM

## 2025-07-10 DIAGNOSIS — Z87.440 HISTORY OF RECURRENT UTIS: ICD-10-CM

## 2025-07-10 LAB
ABO GROUP BLD: NORMAL
ALBUMIN SERPL BCG-MCNC: 3.9 G/DL (ref 3.5–5)
ALP SERPL-CCNC: 63 U/L (ref 34–104)
ALT SERPL W P-5'-P-CCNC: <3 U/L (ref 7–52)
ANION GAP SERPL CALCULATED.3IONS-SCNC: 8 MMOL/L (ref 4–13)
AST SERPL W P-5'-P-CCNC: 10 U/L (ref 13–39)
BACTERIA UR QL AUTO: ABNORMAL /HPF
BASOPHILS # BLD AUTO: 0.03 THOUSANDS/ÂΜL (ref 0–0.1)
BASOPHILS NFR BLD AUTO: 0 % (ref 0–1)
BILIRUB SERPL-MCNC: 0.77 MG/DL (ref 0.2–1)
BILIRUB UR QL STRIP: NEGATIVE
BLD GP AB SCN SERPL QL: NEGATIVE
BUDDING YEAST: PRESENT
BUN SERPL-MCNC: 20 MG/DL (ref 5–25)
CALCIUM SERPL-MCNC: 9.1 MG/DL (ref 8.4–10.2)
CHLORIDE SERPL-SCNC: 101 MMOL/L (ref 96–108)
CK SERPL-CCNC: 28 U/L (ref 39–308)
CLARITY UR: ABNORMAL
CO2 SERPL-SCNC: 31 MMOL/L (ref 21–32)
COLOR UR: YELLOW
CREAT SERPL-MCNC: 1.07 MG/DL (ref 0.6–1.3)
EOSINOPHIL # BLD AUTO: 0.23 THOUSAND/ÂΜL (ref 0–0.61)
EOSINOPHIL NFR BLD AUTO: 2 % (ref 0–6)
ERYTHROCYTE [DISTWIDTH] IN BLOOD BY AUTOMATED COUNT: 14.7 % (ref 11.6–15.1)
GFR SERPL CREATININE-BSD FRML MDRD: 67 ML/MIN/1.73SQ M
GLUCOSE SERPL-MCNC: 103 MG/DL (ref 65–140)
GLUCOSE SERPL-MCNC: 203 MG/DL (ref 65–140)
GLUCOSE SERPL-MCNC: 76 MG/DL (ref 65–140)
GLUCOSE SERPL-MCNC: 85 MG/DL (ref 65–140)
GLUCOSE UR STRIP-MCNC: ABNORMAL MG/DL
HCT VFR BLD AUTO: 42.8 % (ref 36.5–49.3)
HGB BLD-MCNC: 14.2 G/DL (ref 12–17)
HGB UR QL STRIP.AUTO: ABNORMAL
IMM GRANULOCYTES # BLD AUTO: 0.15 THOUSAND/UL (ref 0–0.2)
IMM GRANULOCYTES NFR BLD AUTO: 1 % (ref 0–2)
KETONES UR STRIP-MCNC: NEGATIVE MG/DL
LEUKOCYTE ESTERASE UR QL STRIP: ABNORMAL
LYMPHOCYTES # BLD AUTO: 0.97 THOUSANDS/ÂΜL (ref 0.6–4.47)
LYMPHOCYTES NFR BLD AUTO: 9 % (ref 14–44)
MCH RBC QN AUTO: 29.6 PG (ref 26.8–34.3)
MCHC RBC AUTO-ENTMCNC: 33.2 G/DL (ref 31.4–37.4)
MCV RBC AUTO: 89 FL (ref 82–98)
MONOCYTES # BLD AUTO: 0.87 THOUSAND/ÂΜL (ref 0.17–1.22)
MONOCYTES NFR BLD AUTO: 8 % (ref 4–12)
NEUTROPHILS # BLD AUTO: 8.13 THOUSANDS/ÂΜL (ref 1.85–7.62)
NEUTS SEG NFR BLD AUTO: 80 % (ref 43–75)
NITRITE UR QL STRIP: NEGATIVE
NON-SQ EPI CELLS URNS QL MICRO: ABNORMAL /HPF
NRBC BLD AUTO-RTO: 0 /100 WBCS
PH UR STRIP.AUTO: 5.5 [PH]
PLATELET # BLD AUTO: 246 THOUSANDS/UL (ref 149–390)
PMV BLD AUTO: 10.5 FL (ref 8.9–12.7)
POTASSIUM SERPL-SCNC: 3.7 MMOL/L (ref 3.5–5.3)
PROT SERPL-MCNC: 7.1 G/DL (ref 6.4–8.4)
PROT UR STRIP-MCNC: ABNORMAL MG/DL
RBC # BLD AUTO: 4.8 MILLION/UL (ref 3.88–5.62)
RBC #/AREA URNS AUTO: ABNORMAL /HPF
RH BLD: POSITIVE
SODIUM SERPL-SCNC: 140 MMOL/L (ref 135–147)
SP GR UR STRIP.AUTO: 1.03 (ref 1–1.03)
SPECIMEN EXPIRATION DATE: NORMAL
UROBILINOGEN UR STRIP-ACNC: <2 MG/DL
WBC # BLD AUTO: 10.38 THOUSAND/UL (ref 4.31–10.16)
WBC #/AREA URNS AUTO: ABNORMAL /HPF
WBC CLUMPS # UR AUTO: PRESENT /UL

## 2025-07-10 PROCEDURE — 99285 EMERGENCY DEPT VISIT HI MDM: CPT

## 2025-07-10 PROCEDURE — 73502 X-RAY EXAM HIP UNI 2-3 VIEWS: CPT

## 2025-07-10 PROCEDURE — 87086 URINE CULTURE/COLONY COUNT: CPT | Performed by: EMERGENCY MEDICINE

## 2025-07-10 PROCEDURE — 86850 RBC ANTIBODY SCREEN: CPT | Performed by: EMERGENCY MEDICINE

## 2025-07-10 PROCEDURE — 74177 CT ABD & PELVIS W/CONTRAST: CPT

## 2025-07-10 PROCEDURE — 73564 X-RAY EXAM KNEE 4 OR MORE: CPT

## 2025-07-10 PROCEDURE — 72125 CT NECK SPINE W/O DYE: CPT

## 2025-07-10 PROCEDURE — 81001 URINALYSIS AUTO W/SCOPE: CPT | Performed by: EMERGENCY MEDICINE

## 2025-07-10 PROCEDURE — 96374 THER/PROPH/DIAG INJ IV PUSH: CPT

## 2025-07-10 PROCEDURE — 86901 BLOOD TYPING SEROLOGIC RH(D): CPT | Performed by: EMERGENCY MEDICINE

## 2025-07-10 PROCEDURE — 85025 COMPLETE CBC W/AUTO DIFF WBC: CPT | Performed by: EMERGENCY MEDICINE

## 2025-07-10 PROCEDURE — 36415 COLL VENOUS BLD VENIPUNCTURE: CPT | Performed by: EMERGENCY MEDICINE

## 2025-07-10 PROCEDURE — 82948 REAGENT STRIP/BLOOD GLUCOSE: CPT

## 2025-07-10 PROCEDURE — 83036 HEMOGLOBIN GLYCOSYLATED A1C: CPT

## 2025-07-10 PROCEDURE — 87081 CULTURE SCREEN ONLY: CPT

## 2025-07-10 PROCEDURE — 82550 ASSAY OF CK (CPK): CPT | Performed by: EMERGENCY MEDICINE

## 2025-07-10 PROCEDURE — 71260 CT THORAX DX C+: CPT

## 2025-07-10 PROCEDURE — 86900 BLOOD TYPING SEROLOGIC ABO: CPT | Performed by: EMERGENCY MEDICINE

## 2025-07-10 PROCEDURE — 96361 HYDRATE IV INFUSION ADD-ON: CPT

## 2025-07-10 PROCEDURE — 70450 CT HEAD/BRAIN W/O DYE: CPT

## 2025-07-10 PROCEDURE — 80053 COMPREHEN METABOLIC PANEL: CPT | Performed by: EMERGENCY MEDICINE

## 2025-07-10 PROCEDURE — 87106 FUNGI IDENTIFICATION YEAST: CPT | Performed by: EMERGENCY MEDICINE

## 2025-07-10 PROCEDURE — 99223 1ST HOSP IP/OBS HIGH 75: CPT | Performed by: INTERNAL MEDICINE

## 2025-07-10 PROCEDURE — 99285 EMERGENCY DEPT VISIT HI MDM: CPT | Performed by: EMERGENCY MEDICINE

## 2025-07-10 RX ORDER — LIDOCAINE HYDROCHLORIDE 20 MG/ML
JELLY TOPICAL ONCE
Status: COMPLETED | OUTPATIENT
Start: 2025-07-10 | End: 2025-07-10

## 2025-07-10 RX ORDER — CARBIDOPA AND LEVODOPA 25; 100 MG/1; MG/1
1 TABLET ORAL 3 TIMES DAILY
Status: DISCONTINUED | OUTPATIENT
Start: 2025-07-10 | End: 2025-07-14 | Stop reason: HOSPADM

## 2025-07-10 RX ORDER — TAMSULOSIN HYDROCHLORIDE 0.4 MG/1
0.4 CAPSULE ORAL
Status: DISCONTINUED | OUTPATIENT
Start: 2025-07-10 | End: 2025-07-14 | Stop reason: HOSPADM

## 2025-07-10 RX ORDER — INSULIN GLARGINE 100 [IU]/ML
44 INJECTION, SOLUTION SUBCUTANEOUS EVERY MORNING
Status: DISCONTINUED | OUTPATIENT
Start: 2025-07-11 | End: 2025-07-14 | Stop reason: HOSPADM

## 2025-07-10 RX ORDER — DUTASTERIDE AND TAMSULOSIN HYDROCHLORIDE CAPSULES .5; .4 MG/1; MG/1
CAPSULE ORAL
COMMUNITY

## 2025-07-10 RX ORDER — DAPAGLIFLOZIN 10 MG/1
10 TABLET, FILM COATED ORAL DAILY
COMMUNITY
End: 2025-07-24

## 2025-07-10 RX ORDER — ASPIRIN 81 MG/1
81 TABLET, CHEWABLE ORAL DAILY
Status: DISCONTINUED | OUTPATIENT
Start: 2025-07-11 | End: 2025-07-14 | Stop reason: HOSPADM

## 2025-07-10 RX ORDER — RANOLAZINE 500 MG/1
500 TABLET, EXTENDED RELEASE ORAL EVERY 12 HOURS SCHEDULED
Status: DISCONTINUED | OUTPATIENT
Start: 2025-07-10 | End: 2025-07-14 | Stop reason: HOSPADM

## 2025-07-10 RX ORDER — FINASTERIDE 5 MG/1
5 TABLET, FILM COATED ORAL DAILY
Status: DISCONTINUED | OUTPATIENT
Start: 2025-07-11 | End: 2025-07-14 | Stop reason: HOSPADM

## 2025-07-10 RX ORDER — AMOXICILLIN 250 MG
2 CAPSULE ORAL 2 TIMES DAILY
Status: DISCONTINUED | OUTPATIENT
Start: 2025-07-10 | End: 2025-07-14 | Stop reason: HOSPADM

## 2025-07-10 RX ORDER — SODIUM CHLORIDE 9 MG/ML
75 INJECTION, SOLUTION INTRAVENOUS CONTINUOUS
Status: DISCONTINUED | OUTPATIENT
Start: 2025-07-10 | End: 2025-07-13

## 2025-07-10 RX ORDER — METOPROLOL SUCCINATE 25 MG/1
25 TABLET, EXTENDED RELEASE ORAL DAILY
Status: DISCONTINUED | OUTPATIENT
Start: 2025-07-11 | End: 2025-07-14 | Stop reason: HOSPADM

## 2025-07-10 RX ORDER — INSULIN LISPRO 100 [IU]/ML
1-5 INJECTION, SOLUTION INTRAVENOUS; SUBCUTANEOUS
Status: DISCONTINUED | OUTPATIENT
Start: 2025-07-10 | End: 2025-07-14 | Stop reason: HOSPADM

## 2025-07-10 RX ORDER — ATORVASTATIN CALCIUM 40 MG/1
40 TABLET, FILM COATED ORAL
Status: DISCONTINUED | OUTPATIENT
Start: 2025-07-11 | End: 2025-07-14 | Stop reason: HOSPADM

## 2025-07-10 RX ORDER — ENOXAPARIN SODIUM 100 MG/ML
40 INJECTION SUBCUTANEOUS DAILY
Status: DISCONTINUED | OUTPATIENT
Start: 2025-07-11 | End: 2025-07-14 | Stop reason: HOSPADM

## 2025-07-10 RX ORDER — PANTOPRAZOLE SODIUM 40 MG/1
40 TABLET, DELAYED RELEASE ORAL
Status: DISCONTINUED | OUTPATIENT
Start: 2025-07-10 | End: 2025-07-14 | Stop reason: HOSPADM

## 2025-07-10 RX ORDER — POLYETHYLENE GLYCOL 3350 17 G/17G
17 POWDER, FOR SOLUTION ORAL DAILY
Status: DISCONTINUED | OUTPATIENT
Start: 2025-07-11 | End: 2025-07-14 | Stop reason: HOSPADM

## 2025-07-10 RX ADMIN — TAMSULOSIN HYDROCHLORIDE 0.4 MG: 0.4 CAPSULE ORAL at 17:52

## 2025-07-10 RX ADMIN — PANTOPRAZOLE SODIUM 40 MG: 40 TABLET, DELAYED RELEASE ORAL at 17:52

## 2025-07-10 RX ADMIN — INSULIN LISPRO 1 UNITS: 100 INJECTION, SOLUTION INTRAVENOUS; SUBCUTANEOUS at 21:00

## 2025-07-10 RX ADMIN — SODIUM CHLORIDE 75 ML/HR: 0.9 INJECTION, SOLUTION INTRAVENOUS at 17:51

## 2025-07-10 RX ADMIN — SODIUM CHLORIDE 500 ML: 0.9 INJECTION, SOLUTION INTRAVENOUS at 13:04

## 2025-07-10 RX ADMIN — LIDOCAINE HYDROCHLORIDE 5 APPLICATION: 20 JELLY TOPICAL at 14:12

## 2025-07-10 RX ADMIN — CARBIDOPA AND LEVODOPA 1 TABLET: 25; 100 TABLET ORAL at 20:45

## 2025-07-10 RX ADMIN — IOHEXOL 97 ML: 350 INJECTION, SOLUTION INTRAVENOUS at 13:28

## 2025-07-10 RX ADMIN — RANOLAZINE 500 MG: 500 TABLET, FILM COATED, EXTENDED RELEASE ORAL at 20:45

## 2025-07-10 RX ADMIN — SENNOSIDES, DOCUSATE SODIUM 2 TABLET: 8.6; 5 TABLET ORAL at 17:52

## 2025-07-10 RX ADMIN — CEFEPIME 2000 MG: 2 INJECTION, POWDER, FOR SOLUTION INTRAVENOUS at 15:35

## 2025-07-10 RX ADMIN — LIDOCAINE HYDROCHLORIDE 5 APPLICATION: 20 JELLY TOPICAL at 16:31

## 2025-07-10 NOTE — ASSESSMENT & PLAN NOTE
Patient is presenting from [facility] after sustaining a fall from wheelchair.  Has been progressively encephalopathic during hospital stay.  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   UA shows large amount of leukocytes with occasional bacteria, more than 1000 glucose.  Patient was also having urinary retention hence now has Razo catheter draining hazy/cloudy urine.  Metabolic encephalopathy most likely secondary to recurrent UTI.  Previous cultures grew Pseudomonas.    Plan:  Continue cefepime for now.  Change antibiotics according to culture sensitivity.  Scrotal wall is erythematous, mildly tender.  Hold Farxiga during hospitalization, plan to discontinue upon discharge as SGLT2 inhibitors are linked with glucosuria.  In this patient's particular context, this can cause recurrent UTI and also pose a risk for Tosha's gangrene.  Urology consulted, appreciate recommendations.

## 2025-07-10 NOTE — ASSESSMENT & PLAN NOTE
Patient presents with fall from wheelchair.  Mentions that he was trying to move within the wheelchair when he had a fall.  Given that he is chronically wheelchair-bound, I question the utility of PT and OT evaluation and treatment.

## 2025-07-10 NOTE — ASSESSMENT & PLAN NOTE
Patient is presenting from [facility] after sustaining a fall from wheelchair.  Has been progressively encephalopathic during hospital stay.  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   UA shows large amount of leukocytes with occasional bacteria, more than 1000 glucose.  Patient was also having urinary retention hence now has Razo catheter draining hazy/cloudy urine.  Metabolic encephalopathy most likely secondary to recurrent UTI.  Previous cultures grew Pseudomonas.    Plan:  Continue cefepime for now.  Change antibiotics according to culture sensitivity.  Scrotal wall is erythematous, mildly tender.  Hold Farxiga during hospitalization, plan to discontinue upon discharge as SGLT2 inhibitors are linked with glucosuria.  In this patient's particular context, this can cause recurrent UTI and also pose a risk for Tosha's gangrene.  Urology consulted, appreciate recommendations.

## 2025-07-10 NOTE — ASSESSMENT & PLAN NOTE
Lab Results   Component Value Date    HGBA1C 7.0 (H) 03/28/2025       Recent Labs     07/10/25  1535   POCGLU 85       Blood Sugar Average: Last 72 hrs:  (P) 85  Home dose includes Lantus 44 units, Tradjenta, metformin.  Continue Lantus with sliding scale insulin.

## 2025-07-10 NOTE — H&P
H&P - Hospitalist   Name: Tha Weems 75 y.o. male I MRN: [id number]  Unit/Bed#: ED-26 I Date of Admission: 7/10/2025   Date of Service: 7/10/2025 I Hospital Day: 0     Assessment & Plan  Recurrent UTI  Metabolic encephalopathy  Patient is presenting from [facility] after sustaining a fall from wheelchair.  Has been progressively encephalopathic during hospital stay.  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   UA shows large amount of leukocytes with occasional bacteria, more than 1000 glucose.  Patient was also having urinary retention hence now has Razo catheter draining hazy/cloudy urine.  Metabolic encephalopathy most likely secondary to recurrent UTI.  Previous cultures grew Pseudomonas.    Plan:  Continue cefepime for now.  Change antibiotics according to culture sensitivity.  Scrotal wall is erythematous, mildly tender.  Hold Farxiga during hospitalization, plan to discontinue upon discharge as SGLT2 inhibitors are linked with glucosuria.  In this patient's particular context, this can cause recurrent UTI and also pose a risk for Tosha's gangrene.  Urology consulted, appreciate recommendations.  Type 2 diabetes mellitus with diabetic neuropathy (HCC)  Lab Results   Component Value Date    HGBA1C 7.0 (H) 03/28/2025       Recent Labs     07/10/25  1535   POCGLU 85       Blood Sugar Average: Last 72 hrs:  (P) 85  Home dose includes Lantus 44 units, Tradjenta, metformin.  Continue Lantus with sliding scale insulin.  Ambulatory dysfunction  Patient presents with fall from wheelchair.  Mentions that he was trying to move within the wheelchair when he had a fall.  Given that he is chronically wheelchair-bound, I question the utility of PT and OT evaluation and treatment.      VTE Pharmacologic Prophylaxis: VTE Score: 4 Moderate Risk (Score 3-4) - Pharmacological DVT Prophylaxis Ordered: enoxaparin (Lovenox).  Code Status: Prior   Discussion with  family: Updated  (wife) via phone.    Anticipated Length of Stay: Patient will be admitted on an inpatient basis with an anticipated length of stay of greater than 2 midnights secondary to metabolic encephalopathy, UTI.    History of Present Illness   Chief Complaint: Fall    Tha Weems is a 75 y.o. male with a PMH of type 2 diabetes, BPH, urinary retention, recurrent UTI secondary to Pseudomonas who presents with fall.    The entire accurate history was unable to be obtained.  Partial history obtained from chart review, patient's wife, patient himself.  However entire part of history is would not very well elucidated.  Tried calling the [facility] as well, no one either answer the call or returned my call.  So far what is known is that the patient was trying to move within the wheelchair and then sustained a fall.  He does not endorse any prodromal symptoms surrounding the fall.  Endorses questionable food and fluid intake.    Review of Systems   Reason unable to perform ROS: metabolic encephalopathy.       Historical Information   Past Medical History[1]  Past Surgical History[2]  Social History[3]  E-Cigarette/Vaping    E-Cigarette Use Never User      E-Cigarette/Vaping Substances    Nicotine No     THC No     CBD No        Social History:  Marital Status: /Civil Union   Occupation: Retired  Patient Pre-hospital Living Situation: Assisted Living  Patient Pre-hospital Level of Mobility: manual wheelchair  Patient Pre-hospital Diet Restrictions: None    Meds/Allergies   I have reveiwed home medications using records provided by [facility].  Prior to Admission medications    Medication Sig Start Date End Date Taking? Authorizing Provider   acetaminophen (TYLENOL) 325 mg tablet Take 650 mg by mouth every 6 (six) hours as needed for mild pain   Yes Historical Provider, MD   aspirin 81 mg chewable tablet Chew 1 tablet (81 mg total) daily 3/10/22  Yes Anisha Burks,    atorvastatin (LIPITOR)  40 mg tablet Take 1 tablet (40 mg total) by mouth daily 3/10/22  Yes Anisha Burks DO   dapagliflozin (Farxiga) 10 MG tablet Take 10 mg by mouth daily   Yes Historical Provider, MD   Dutasteride-Tamsulosin HCl 0.5-0.4 MG CAPS Take by mouth   Yes Historical Provider, MD   insulin glargine (LANTUS) 100 units/mL subcutaneous injection Inject 26 Units under the skin daily at bedtime  Patient taking differently: Inject 44 Units under the skin daily at bedtime 3/31/25  Yes Angelique Best PA-C   linaGLIPtin (Tradjenta) 5 MG TABS Take 5 mg by mouth in the morning.   Yes Historical Provider, MD   metFORMIN (GLUCOPHAGE) 1000 MG tablet Take 1,000 mg by mouth in the morning and 1,000 mg in the evening. Take with meals.   Yes Historical Provider, MD   metoprolol succinate (TOPROL-XL) 25 mg 24 hr tablet Take 1 tablet (25 mg total) by mouth daily 4/6/22  Yes George Rock DO   pantoprazole (PROTONIX) 40 mg tablet TAKE 1 TABLET (40 MG TOTAL) BY MOUTH 2 (TWO) TIMES A DAY BEFORE MEALS 1/23/24  Yes Forrest Raza MD   ranolazine (RANEXA) 500 mg 12 hr tablet Take 1 tablet (500 mg total) by mouth every 12 (twelve) hours 4/5/22  Yes George Rock DO   ACCU-CHEK GUIDE test strip 1 strip as needed 6/16/20   Historical Provider, MD   Alcohol Swabs (PRO COMFORT ALCOHOL) 70 % PADS 1 pad. in the morning. Use a pad when testing . 6/16/20   Historical Provider, MD   carbidopa-levodopa (SINEMET)  mg per tablet Take 1 tablet by mouth 3 (three) times a day 7/22/21 4/1/24  Aysha Holly MD   Easy Comfort Lancets MISC as needed 6/16/20   Historical Provider, MD   ferrous gluconate (FERGON) 324 mg tablet Take 1 tablet (324 mg total) by mouth daily before breakfast Do not start before July 30, 2023. 7/30/23 4/1/24  Onofre Cohen PA-C   finasteride (PROSCAR) 5 mg tablet Take 1 tablet (5 mg total) by mouth daily 5/13/20   Fox Uribe PA-C   Glucagon, rDNA, (GLUCAGON EMERGENCY IJ) Inject 1 mL as directed Sugars less than 60 and conscious  "   Historical Provider, MD   Glucose 15 g PACK Take 15 g by mouth Per [facility] \"give 15 gram PO every 15 minutes PRN for hypoglycemia\"    Historical Provider, MD   glucose blood test strip 1 strip as needed    Historical Provider, MD   glycerin-hypromellose- (ARTIFICIAL TEARS) 0.2-0.2-1 % SOLN Administer 1 drop to both eyes 2 (two) times a day as needed (for dry eyes) 1/5/23   Tristan Leroy MD   loratadine (CLARITIN) 10 mg tablet Take 10 mg by mouth in the morning.    Historical Provider, MD   NovoFine Autocover 30G X 8 MM MISC  12/9/20   Historical Provider, MD   nystatin (MYCOSTATIN) powder Apply topically 2 (two) times a day 1/5/23   Tristan Leroy MD   polyethylene glycol (MIRALAX) 17 g packet Take 17 g by mouth daily 6/3/20   Gloria Carroll DO   tamsulosin (FLOMAX) 0.4 mg Take 1 capsule (0.4 mg total) by mouth daily with dinner 5/13/20   Fox Uribe PA-C     No Known Allergies    Objective :  Temp:  [97.7 °F (36.5 °C)] 97.7 °F (36.5 °C)  HR:  [66-82] 82  BP: (113-168)/(57-77) 157/74  Resp:  [15-17] 16  SpO2:  [89 %-100 %] 97 %  O2 Device: None (Room air)    Physical Exam  Vitals and nursing note reviewed.   Constitutional:       General: He is not in acute distress.     Appearance: He is well-developed. He is ill-appearing.   HENT:      Head: Normocephalic and atraumatic.      Mouth/Throat:      Mouth: Mucous membranes are dry.     Eyes:      Conjunctiva/sclera: Conjunctivae normal.       Cardiovascular:      Rate and Rhythm: Normal rate and regular rhythm.      Heart sounds: No murmur heard.  Pulmonary:      Effort: Pulmonary effort is normal. No respiratory distress.      Breath sounds: Normal breath sounds.   Abdominal:      Palpations: Abdomen is soft.      Tenderness: There is no abdominal tenderness.   Genitourinary:     Comments: Razo catheter in situ draining cloudy urine    Musculoskeletal:         General: No swelling.      Cervical back: Neck supple.     Skin:     General: " Skin is warm and dry.      Capillary Refill: Capillary refill takes less than 2 seconds.     Neurological:      Mental Status: He is alert.     Psychiatric:         Mood and Affect: Mood normal.          Lines/Drains:  Lines/Drains/Airways       Active Status       Name Placement date Placement time Site Days    Urethral Catheter Latex 07/10/25  1649  -- less than 1                  Urinary Catheter:  Goal for removal: Voiding trial when ambulation improves               Lab Results: I have reviewed the following results:  Results from last 7 days   Lab Units 07/10/25  1303   WBC Thousand/uL 10.38*   HEMOGLOBIN g/dL 14.2   HEMATOCRIT % 42.8   PLATELETS Thousands/uL 246   SEGS PCT % 80*   LYMPHO PCT % 9*   MONO PCT % 8   EOS PCT % 2     Results from last 7 days   Lab Units 07/10/25  1258   SODIUM mmol/L 140   POTASSIUM mmol/L 3.7   CHLORIDE mmol/L 101   CO2 mmol/L 31   BUN mg/dL 20   CREATININE mg/dL 1.07   ANION GAP mmol/L 8   CALCIUM mg/dL 9.1   ALBUMIN g/dL 3.9   TOTAL BILIRUBIN mg/dL 0.77   ALK PHOS U/L 63   ALT U/L <3*   AST U/L 10*   GLUCOSE RANDOM mg/dL 76         Results from last 7 days   Lab Units 07/10/25  1535   POC GLUCOSE mg/dl 85     Lab Results   Component Value Date    HGBA1C 7.0 (H) 03/28/2025    HGBA1C 9.2 (H) 08/15/2023    HGBA1C 12.4 (H) 07/23/2023                 Administrative Statements       ** Please Note: This note has been constructed using a voice recognition system. **         [1]   Past Medical History:  Diagnosis Date    Ambulatory dysfunction     uses walker with assist of 1    Anemia     Anxiety     At risk for falls     hx of falls    Benign paroxysmal vertigo     unspecified ear    BPH (benign prostatic hyperplasia)     for TURP today 1/4/2021    Calculus in bladder     for surgical removal today 1/4/2021    Cataract     left eye    Cervicalgia     Chest pain     Chronic kidney disease     stage 3    Constipation     CTS (carpal tunnel syndrome)     left    Cyst of pancreas      Cystitis 06/23/2020    acute cystitis with hematuria    Depression     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    Dizziness     and giddiness    Dysphagia     Elevated PSA     Facial weakness     GERD (gastroesophageal reflux disease)     last assessed 5/20/16    Gross hematuria     Hematuria     Hyperlipidemia     Hypertension     Kidney disease     Kidney stone     Left-sided weakness     Long term (current) use of oral hypoglycemic drugs     Muscle weakness     Nuclear senile cataract of left eye     OA (osteoarthritis) of knee     b/l    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HCC)     Syncope and collapse     Tachycardia     UTI (urinary tract infection)     Vertebro-basilar artery syndrome     Vitamin B deficiency     Vitamin D deficiency     Vitamin D deficiency    [2]   Past Surgical History:  Procedure Laterality Date    CARDIAC CATHETERIZATION N/A 3/7/2022    Procedure: CARDIAC CATHETERIZATION;  Surgeon: Pako Reid DO;  Location: [facility];  Service: Cardiology    CATARACT EXTRACTION      CHOLECYSTECTOMY      KNEE SURGERY      WI LITHOLAPAXY SMPL/SM <2.5 CM N/A 1/4/2021    Procedure: Cystolitholopaxy w/laser bladder stones;  Surgeon: Kyle Jones MD;  Location: [facility];  Service: Urology    WI TRURL ELECTROSURG RESCJ PROSTATE BLEED COMPLETE N/A 1/4/2021    Procedure: T.U.R.P.;  Surgeon: Kyle Jones MD;  Location: [facility];  Service: Urology   [3]   Social History  Tobacco Use    Smoking status: Never    Smokeless tobacco: Never   Vaping Use    Vaping status: Never Used   Substance and Sexual Activity    Alcohol use: Not Currently    Drug use: No    Sexual activity: Not Currently

## 2025-07-10 NOTE — ED PROVIDER NOTES
Emergency Department Trauma Note  Tha Weems 75 y.o. male MRN: [id number]  Unit/Bed#: ED-26/ED-26 Encounter: [id number]      Trauma Alert: Trauma Acuity: C  Model of Arrival:   via    Trauma Team: Current Providers  Attending Provider: Arlyn Bustamante MD  Attending Provider: Speedy Thornton MD  Registered Nurse: Kelly Lorenzo  Registered Nurse: Alexandra Hewitt RN  Senior Resident: Gerry Aguayo MD  Consultants:     None      History of Present Illness     Chief Complaint:   Chief Complaint   Patient presents with    Fall     Pt was in wheelchair, when he fell out of it. +HS +ASA -LOC +HA     HPI:  Tha Weems is a 75 y.o. male who presents without complaint after fall with head strike.  He explains that he was seated in his wheelchair, leaned too far forward and fell striking the right side of his head and knees.  He immediately had pain in the head.  This is resolved.  No bleeding.  He does take aspirin.    He reports having felt well up until the fall.  Reports having been eating and drinking normally.  He does admit to some intermittent urinary issues and relates that he has had his prostate removed due to it being enlarged.  Mechanism:Details of Incident: HS + ASA          HPI  Review of Systems    Historical Information     Immunizations:   Immunization History   Administered Date(s) Administered    BCG 12/11/2018    COVID-19 J&J ([place]) vaccine 0.5 mL 01/26/2021    COVID-19 PFIZER VACCINE 0.3 ML IM 01/26/2021, 02/16/2021    Pneumococcal Polysaccharide PPV23 12/13/2018    Tdap 09/02/2016, 03/31/2018    Tuberculin Skin Test 05/14/2020, 05/24/2020       Past Medical History[1]  Family History[2]  Past Surgical History[3]  Social History[4]  E-Cigarette/Vaping    E-Cigarette Use Never User      E-Cigarette/Vaping Substances    Nicotine No     THC No     CBD No        Family History: non-contributory    Meds/Allergies   Prior to Admission Medications   Prescriptions Last Dose Informant  "Patient Reported? Taking?   ACCU-CHEK GUIDE test strip Unknown Outside Facility (Specify), Self Yes No   Si strip as needed   Alcohol Swabs (PRO COMFORT ALCOHOL) 70 % PADS Unknown Outside Facility (Specify), Self Yes No   Si pad. in the morning. Use a pad when testing .   Easy Comfort Lancets MISC Unknown Outside Facility (Specify), Self Yes No   Sig: as needed   Glucagon, rDNA, (GLUCAGON EMERGENCY IJ)  Self Yes No   Sig: Inject 1 mL as directed Sugars less than 60 and conscious   Glucose 15 g PACK  Self Yes No   Sig: Take 15 g by mouth Per [facility] \"give 15 gram PO every 15 minutes PRN for hypoglycemia\"   NovoFine [employer]ocover 30G X 8 MM MISC  Self Yes No   acetaminophen (TYLENOL) 325 mg tablet Past Week Self Yes Yes   Sig: Take 650 mg by mouth every 6 (six) hours as needed for mild pain   aspirin 81 mg chewable tablet 2025 Self No Yes   Sig: Chew 1 tablet (81 mg total) daily   atorvastatin (LIPITOR) 40 mg tablet 2025 Self No Yes   Sig: Take 1 tablet (40 mg total) by mouth daily   carbidopa-levodopa (SINEMET)  mg per tablet   No No   Sig: Take 1 tablet by mouth 3 (three) times a day   ferrous gluconate (FERGON) 324 mg tablet   No No   Sig: Take 1 tablet (324 mg total) by mouth daily before breakfast Do not start before 2023.   finasteride (PROSCAR) 5 mg tablet  Outside Facility (Specify), Self No No   Sig: Take 1 tablet (5 mg total) by mouth daily   glucose blood test strip  Outside Facility (Specify), Self Yes No   Si strip as needed   glycerin-hypromellose- (ARTIFICIAL TEARS) 0.2-0.2-1 % SOLN  Self No No   Sig: Administer 1 drop to both eyes 2 (two) times a day as needed (for dry eyes)   insulin glargine (LANTUS) 100 units/mL subcutaneous injection   No No   Sig: Inject 26 Units under the skin daily at bedtime   linaGLIPtin (Tradjenta) 5 MG TABS   Yes No   Sig: Take 5 mg by mouth daily   loratadine (CLARITIN) 10 mg tablet   Yes No   Sig: Take 10 mg by mouth daily "   metFORMIN (GLUCOPHAGE) 1000 MG tablet   Yes No   Sig: Take 1,000 mg by mouth 2 (two) times a day with meals   metoprolol succinate (TOPROL-XL) 25 mg 24 hr tablet  Self No No   Sig: Take 1 tablet (25 mg total) by mouth daily   nystatin (MYCOSTATIN) powder  Self No No   Sig: Apply topically 2 (two) times a day   pantoprazole (PROTONIX) 40 mg tablet   No No   Sig: TAKE 1 TABLET (40 MG TOTAL) BY MOUTH 2 (TWO) TIMES A DAY BEFORE MEALS   polyethylene glycol (MIRALAX) 17 g packet  Outside Facility (Specify), Self No No   Sig: Take 17 g by mouth daily   ranolazine (RANEXA) 500 mg 12 hr tablet  Self No No   Sig: Take 1 tablet (500 mg total) by mouth every 12 (twelve) hours   tamsulosin (FLOMAX) 0.4 mg  Outside Facility (Specify), Self No No   Sig: Take 1 capsule (0.4 mg total) by mouth daily with dinner      Facility-Administered Medications: None       Allergies[5]    PHYSICAL EXAM    PE limited by: N/A    Objective   Vitals:   First set: Temperature: 97.7 °F (36.5 °C) (07/10/25 1236)  Pulse: 76 (07/10/25 1236)  Respirations: 17 (07/10/25 1236)  Blood Pressure: 161/75 (07/10/25 1236)  SpO2: 99 % (07/10/25 1236)    Primary Survey:   (A) Airway: Intact.  Speech is clear.  (B) Breathing: Respirations unlabored.  Not hypoxic  (C) Circulation: Pulses:   normal  (D) Disabliity:  GCS Total:  15, Eye Opening:   Spontaneous = 4, Motor Response: Obeys commands = 6, and Verbal Response:  Oriented = 5  (E) Expose:  Completed    Secondary Survey: (Click on Physical Exam tab above)  Physical Exam  Vitals and nursing note reviewed.   Constitutional:       Comments: Resting on stretcher with cervical collar in place.  Shorts saturated in urine.   HENT:      Head: Normocephalic.      Comments: No scalp discoloration, swelling or tenderness     Mouth/Throat:      Mouth: Mucous membranes are moist.     Eyes:      Extraocular Movements: Extraocular movements intact.      Conjunctiva/sclera: Conjunctivae normal.      Pupils: Pupils are equal,  round, and reactive to light.      Comments: Pupils 2 mm and reactive to light.     Cardiovascular:      Rate and Rhythm: Normal rate and regular rhythm.   Pulmonary:      Effort: Pulmonary effort is normal.      Breath sounds: Normal breath sounds.   Abdominal:      Palpations: Abdomen is soft.      Tenderness: There is no abdominal tenderness. There is left CVA tenderness. There is no right CVA tenderness or guarding.   Genitourinary:     Comments: Disposable undergarment saturated with pungent urine.  Shorts additionally very moist with urine.    Musculoskeletal:      Comments: Diffuse tenderness of the neck posteriorly.  No thoracic discomfort.  He does have tenderness over the diffuse lower back.  Tenderness of the left hip and left knee.  He holds the hip and knee slightly flexed.  Left leg is slightly externally rotated and shortened.  No calf tenderness, foot or ankle tenderness.    Very superficial abrasions without open skin over the bilateral knees.  Right knee is nontender as is in the entirety of the right leg.  +2 PT pulses.     Skin:     General: Skin is warm and dry.      Findings: No erythema.     Neurological:      Mental Status: He is alert.      Comments: Clear speech. No facial asymmetry/ deficit appreciated.  Pupils briskly reactive to light.  EOMI.  No nystagmus. Strong eye closure & symmetric brow raise. Ability to insufflate cheeks, protrude tongue midline & range this laterally. Symmetric/ intact sensation in the upper, mid & lower portions of the face.  5/5 strength on shoulder shrug, bicep movement against resistance b/l.  5/5 strength on b/l hand  and finger abduction.  And maintaining pincer grasp.  5 out of 5 strength with right hip flexion, dorsi & plantar flexion.  4 out of 5 strength with left hip flexion.  5 out of 5 strength with left dorsi and plantarflexion.  Symmetric grossly intact sensation in the UE & LE.     Psychiatric:         Mood and Affect: Mood normal.          Cervical spine cleared by clinical criteria? No (imaging required)      Invasive Devices       Peripheral Intravenous Line  Duration             Peripheral IV 07/10/25 Left Antecubital <1 day              Drain  Duration             External Urinary Catheter 102 days                    Lab Results:   Results Reviewed       Procedure Component Value Units Date/Time    Fingerstick Glucose (POCT) [[id number]]  (Normal) Collected: 07/10/25 1535    Lab Status: Final result Specimen: Blood Updated: 07/10/25 1535     POC Glucose 85 mg/dl     Urine Microscopic [[id number]]  (Abnormal) Collected: 07/10/25 1423    Lab Status: Final result Specimen: Urine, Straight Cath Updated: 07/10/25 1502     RBC, UA 1-2 /hpf      WBC, UA Innumerable /hpf      Epithelial Cells None Seen /hpf      Bacteria, UA Occasional /hpf      WBC Clumps Present     Budding Yeast Present    Urine culture [[id number]] Collected: 07/10/25 1423    Lab Status: In process Specimen: Urine, Straight Cath Updated: 07/10/25 1502    UA w Reflex to Microscopic w Reflex to Culture [[id number]]  (Abnormal) Collected: 07/10/25 1423    Lab Status: Final result Specimen: Urine, Straight Cath Updated: 07/10/25 1456     Color, UA Yellow     Clarity, UA Extra Turbid     Specific Gravity, UA 1.031     pH, UA 5.5     Leukocytes, UA Large     Nitrite, UA Negative     Protein, UA Trace mg/dl      Glucose, UA >=1000 (1%) mg/dl      Ketones, UA Negative mg/dl      Urobilinogen, UA <2.0 mg/dl      Bilirubin, UA Negative     Occult Blood, UA Moderate    Comprehensive metabolic panel [[id number]]  (Abnormal) Collected: 07/10/25 1258    Lab Status: Final result Specimen: Blood from Arm, Left Updated: 07/10/25 1344     Sodium 140 mmol/L      Potassium 3.7 mmol/L      Chloride 101 mmol/L      CO2 31 mmol/L      ANION GAP 8 mmol/L      BUN 20 mg/dL      Creatinine 1.07 mg/dL      Glucose 76 mg/dL      Calcium 9.1 mg/dL      AST 10 U/L      ALT <3 U/L      Alkaline Phosphatase 63  U/L      Total Protein 7.1 g/dL      Albumin 3.9 g/dL      Total Bilirubin 0.77 mg/dL      eGFR 67 ml/min/1.73sq m     Narrative:      National Kidney Disease Foundation guidelines for Chronic Kidney Disease (CKD):     Stage 1 with normal or high GFR (GFR > 90 mL/min/1.73 square meters)    Stage 2 Mild CKD (GFR = 60-89 mL/min/1.73 square meters)    Stage 3A Moderate CKD (GFR = 45-59 mL/min/1.73 square meters)    Stage 3B Moderate CKD (GFR = 30-44 mL/min/1.73 square meters)    Stage 4 Severe CKD (GFR = 15-29 mL/min/1.73 square meters)    Stage 5 End Stage CKD (GFR <15 mL/min/1.73 square meters)  Note: GFR calculation is accurate only with a steady state creatinine    CK [[id number]]  (Abnormal) Collected: 07/10/25 1258    Lab Status: Final result Specimen: Blood from Arm, Left Updated: 07/10/25 1326     Total CK 28 U/L     CBC and differential [[id number]]  (Abnormal) Collected: 07/10/25 1303    Lab Status: Final result Specimen: Blood from Arm, Left Updated: 07/10/25 1311     WBC 10.38 Thousand/uL      RBC 4.80 Million/uL      Hemoglobin 14.2 g/dL      Hematocrit 42.8 %      MCV 89 fL      MCH 29.6 pg      MCHC 33.2 g/dL      RDW 14.7 %      MPV 10.5 fL      Platelets 246 Thousands/uL      nRBC 0 /100 WBCs      Segmented % 80 %      Immature Grans % 1 %      Lymphocytes % 9 %      Monocytes % 8 %      Eosinophils Relative 2 %      Basophils Relative 0 %      Absolute Neutrophils 8.13 Thousands/µL      Absolute Immature Grans 0.15 Thousand/uL      Absolute Lymphocytes 0.97 Thousands/µL      Absolute Monocytes 0.87 Thousand/µL      Eosinophils Absolute 0.23 Thousand/µL      Basophils Absolute 0.03 Thousands/µL                    Imaging Studies:   Direct to CT: Yes  TRAUMA - CT head wo contrast   Final Result by E. Alec Schoenberger, MD (07/10 2750)      No acute intracranial abnormality.                  Workstation performed: [id number]         TRAUMA - CT spine cervical wo contrast   Final Result by FRANCISCA Acevedo  Schoenberger, MD (07/10 1351)      No cervical spine fracture or traumatic malalignment.                  Workstation performed: [id number]         TRAUMA - CT chest abdomen pelvis w contrast   Final Result by E. Alec Schoenberger, MD (07/10 1408)      No acute intrathoracic or intra-abdominal injury.   Stable nonemergent findings as above.         Computerized Assisted Algorithm (CAA) may have aided analysis of applicable images.      Workstation performed: [id number]         XR knee 4+ views left injury    (Results Pending)   XR hip/pelv 2-3 vws left    (Results Pending)         Procedures  Procedures         ED Course  ED Course as of 07/10/25 1635   Thu Jul 10, 2025   1336 Chart reviewed.  Tha was admitted in March with UTI and metabolic encephalopathy.  He has required Razo catheter placement in the past.   1348 CT imaging reviewed.  I do not appreciate ICH.  Bladder is distended and with some wall thickening.  Urine specimen has already been ordered.  If unable to provide this will perform bladder scan with concern for urinary retention.  Large quantity of stool present in distal colon.  Similar appearance of stool presents on CT scan from 2022.   1353 X-ray of the hip and knee reviewed.  Degenerative changes appreciated-especially prominent in the left knee with marked narrowing of the joint space medially.  No appreciated fracture or dislocation.   1401 Patient has been unable to provide urine specimen.  Bladder scan notable for presence of 341 mL retained.  Will + straight cath.   1413 Patient feeling okay at this time.  Cervical collar removed.  Straight cath will be performed.   1435 CT chest, abdomen and pelvis has been interpreted.  No acute abnormality.  Chronic-unchanged bladder wall thickening likely result of urinary retention.  Large amount of stool persistent throughout colon.   1503 Urinalysis notable for innumerable white blood cell and occasional bacteria presents along with large leukocyte  esterase.  Concern for difficulty treating UTI with concomitant retention.    Will + indwelling catheter if patient amenable.  He will require course of antibiotics.  Culture from March urine reviewed and selecting antibiotic.  Increased bowel regimen additionally advised given large quantity of stool retention and potential contribution of this towards his urinary retention.   1524 Patient very sleepy at this point.  Arouses and returns right back to sleep.  Have concern for encephalopathy from UTI.  Will initiate IV antibiotic and place urethral catheter.    Reviewed records.  He had a catheter in 2023 for urinary retention though troubles with ventral erosion and discomfort.  SPT was advised although ultimately not performed.  Uncertain from records reviewed if appointment not scheduled or there was intentional decision making to not pursue this.    He does have some mild discomfort at the meatus presently with notable ventral defect.  Will place catheter for now, initiate antibiotic and bring in for further care.   1552 Glucose normal at 85.  Reviewed patient history and presentation with Dr. Ayala.  Will place Razo catheter.  Calling to update wife.           Medical Decision Making  Amount and/or Complexity of Data Reviewed  Labs: ordered.  Radiology: ordered.    Risk  Prescription drug management.  Decision regarding hospitalization.                Disposition  [facility] Transfer: No  Final diagnoses:   UTI (urinary tract infection)   Urinary retention   Encephalopathy   Contusion of knee     Time reflects when diagnosis was documented in both MDM as applicable and the Disposition within this note       Time User Action Codes Description Comment    7/10/2025  3:51 PM Arlyn Bustamante Add [N39.0] UTI (urinary tract infection)     7/10/2025  3:51 PM Arlyn Bustamante Add [R33.9] Urinary retention     7/10/2025  3:52 PM Arlyn Bustamante Add [G93.40] Encephalopathy      7/10/2025  3:52 PM Arlyn Bustamante Add [S80.00XA] Contusion of knee           ED Disposition       ED Disposition   Admit    Condition   Stable    Date/Time   Thu Jul 10, 2025  3:51 PM    Comment   Case was discussed with Dr. Thornton and the patient's admission status was agreed to be Admission Status: inpatient status to the service of Dr. Thornton .               Follow-up Information    None       Patient's Medications   Discharge Prescriptions    No medications on file     No discharge procedures on file.    PDMP Review         Value Time User    PDMP Reviewed  Yes 8/9/2023 12:50 AM Syd Norman MD            ED Provider  Electronically Signed by             [1]   Past Medical History:  Diagnosis Date    Ambulatory dysfunction     uses walker with assist of 1    Anemia     Anxiety     At risk for falls     hx of falls    Benign paroxysmal vertigo     unspecified ear    BPH (benign prostatic hyperplasia)     for TURP today 1/4/2021    Calculus in bladder     for surgical removal today 1/4/2021    Cataract     left eye    Cervicalgia     Chest pain     Chronic kidney disease     stage 3    Constipation     CTS (carpal tunnel syndrome)     left    Cyst of pancreas     Cystitis 06/23/2020    acute cystitis with hematuria    Depression     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    Dizziness     and giddiness    Dysphagia     Elevated PSA     Facial weakness     GERD (gastroesophageal reflux disease)     last assessed 5/20/16    Gross hematuria     Hematuria     Hyperlipidemia     Hypertension     Kidney disease     Kidney stone     Left-sided weakness     Long term (current) use of oral hypoglycemic drugs     Muscle weakness     Nuclear senile cataract of left eye     OA (osteoarthritis) of knee     b/l    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HCC)     Syncope and collapse     Tachycardia     UTI (urinary tract infection)     Vertebro-basilar artery syndrome     Vitamin B deficiency     Vitamin D  deficiency     Vitamin D deficiency    [2]   Family History  Problem Relation Name Age of Onset    Coronary artery disease Mother      Diabetes Father     [3]   Past Surgical History:  Procedure Laterality Date    CARDIAC CATHETERIZATION N/A 3/7/2022    Procedure: CARDIAC CATHETERIZATION;  Surgeon: Pako Reid DO;  [facility]: [facility];  Service: Cardiology    CATARACT EXTRACTION      CHOLECYSTECTOMY      KNEE SURGERY      OK LITHOLAPAXY SMPL/SM <2.5 CM N/A 1/4/2021    Procedure: Cystolitholopaxy w/laser bladder stones;  Surgeon: Kyle Jones MD;  Location: [facility];  Service: Urology    OK TRURL ELECTROSURG RESCJ PROSTATE BLEED COMPLETE N/A 1/4/2021    Procedure: T.U.R.P.;  Surgeon: Kyle Jones MD;  Location: [facility];  Service: Urology   [4]   Social History  Tobacco Use    Smoking status: Never    Smokeless tobacco: Never   Vaping Use    Vaping status: Never Used   Substance Use Topics    Alcohol use: Not Currently    Drug use: No   [5] No Known Allergies       Arlyn Bustamante MD  07/10/25 2570

## 2025-07-10 NOTE — LETTER
Dr. Fuentes,    Mr. Weems's urine culture returned positive for candida glabrata. After speaking with our pharmacists, there is little concern for infection, and it is more likely to be colonization. We have decided not to treat due to this, lack of symptoms, and high resistance levels to azole antifungals. Please follow up on sensitivities, and if patient develops symptoms again, please use your clinical judgment to determine if he requires further treatment. Thank you.    Sincerely,    Trish Adrian DO

## 2025-07-11 LAB
ALBUMIN SERPL BCG-MCNC: 3.4 G/DL (ref 3.5–5)
ALP SERPL-CCNC: 55 U/L (ref 34–104)
ALT SERPL W P-5'-P-CCNC: 3 U/L (ref 7–52)
ANION GAP SERPL CALCULATED.3IONS-SCNC: 4 MMOL/L (ref 4–13)
AST SERPL W P-5'-P-CCNC: 9 U/L (ref 13–39)
BASOPHILS # BLD AUTO: 0.04 THOUSANDS/ÂΜL (ref 0–0.1)
BASOPHILS NFR BLD AUTO: 0 % (ref 0–1)
BILIRUB SERPL-MCNC: 0.6 MG/DL (ref 0.2–1)
BUN SERPL-MCNC: 20 MG/DL (ref 5–25)
CALCIUM ALBUM COR SERPL-MCNC: 9.3 MG/DL (ref 8.3–10.1)
CALCIUM SERPL-MCNC: 8.8 MG/DL (ref 8.4–10.2)
CHLORIDE SERPL-SCNC: 104 MMOL/L (ref 96–108)
CO2 SERPL-SCNC: 33 MMOL/L (ref 21–32)
CREAT SERPL-MCNC: 1.25 MG/DL (ref 0.6–1.3)
EOSINOPHIL # BLD AUTO: 0.35 THOUSAND/ÂΜL (ref 0–0.61)
EOSINOPHIL NFR BLD AUTO: 3 % (ref 0–6)
ERYTHROCYTE [DISTWIDTH] IN BLOOD BY AUTOMATED COUNT: 14.9 % (ref 11.6–15.1)
EST. AVERAGE GLUCOSE BLD GHB EST-MCNC: 128 MG/DL
GFR SERPL CREATININE-BSD FRML MDRD: 55 ML/MIN/1.73SQ M
GLUCOSE SERPL-MCNC: 122 MG/DL (ref 65–140)
GLUCOSE SERPL-MCNC: 127 MG/DL (ref 65–140)
GLUCOSE SERPL-MCNC: 150 MG/DL (ref 65–140)
GLUCOSE SERPL-MCNC: 151 MG/DL (ref 65–140)
GLUCOSE SERPL-MCNC: 95 MG/DL (ref 65–140)
HBA1C MFR BLD: 6.1 %
HCT VFR BLD AUTO: 38.7 % (ref 36.5–49.3)
HGB BLD-MCNC: 12.4 G/DL (ref 12–17)
IMM GRANULOCYTES # BLD AUTO: 0.12 THOUSAND/UL (ref 0–0.2)
IMM GRANULOCYTES NFR BLD AUTO: 1 % (ref 0–2)
LYMPHOCYTES # BLD AUTO: 0.9 THOUSANDS/ÂΜL (ref 0.6–4.47)
LYMPHOCYTES NFR BLD AUTO: 8 % (ref 14–44)
MCH RBC QN AUTO: 29.2 PG (ref 26.8–34.3)
MCHC RBC AUTO-ENTMCNC: 32 G/DL (ref 31.4–37.4)
MCV RBC AUTO: 91 FL (ref 82–98)
MONOCYTES # BLD AUTO: 0.9 THOUSAND/ÂΜL (ref 0.17–1.22)
MONOCYTES NFR BLD AUTO: 8 % (ref 4–12)
NEUTROPHILS # BLD AUTO: 8.66 THOUSANDS/ÂΜL (ref 1.85–7.62)
NEUTS SEG NFR BLD AUTO: 80 % (ref 43–75)
NRBC BLD AUTO-RTO: 0 /100 WBCS
PLATELET # BLD AUTO: 199 THOUSANDS/UL (ref 149–390)
PMV BLD AUTO: 10.7 FL (ref 8.9–12.7)
POTASSIUM SERPL-SCNC: 4 MMOL/L (ref 3.5–5.3)
PROT SERPL-MCNC: 6.1 G/DL (ref 6.4–8.4)
RBC # BLD AUTO: 4.25 MILLION/UL (ref 3.88–5.62)
SODIUM SERPL-SCNC: 141 MMOL/L (ref 135–147)
WBC # BLD AUTO: 10.97 THOUSAND/UL (ref 4.31–10.16)

## 2025-07-11 PROCEDURE — 80053 COMPREHEN METABOLIC PANEL: CPT

## 2025-07-11 PROCEDURE — 85025 COMPLETE CBC W/AUTO DIFF WBC: CPT

## 2025-07-11 PROCEDURE — 99222 1ST HOSP IP/OBS MODERATE 55: CPT | Performed by: UROLOGY

## 2025-07-11 PROCEDURE — 99232 SBSQ HOSP IP/OBS MODERATE 35: CPT | Performed by: INTERNAL MEDICINE

## 2025-07-11 PROCEDURE — 82948 REAGENT STRIP/BLOOD GLUCOSE: CPT

## 2025-07-11 RX ADMIN — SENNOSIDES, DOCUSATE SODIUM 2 TABLET: 8.6; 5 TABLET ORAL at 18:19

## 2025-07-11 RX ADMIN — POLYETHYLENE GLYCOL 3350 17 G: 17 POWDER, FOR SOLUTION ORAL at 09:44

## 2025-07-11 RX ADMIN — TAMSULOSIN HYDROCHLORIDE 0.4 MG: 0.4 CAPSULE ORAL at 16:39

## 2025-07-11 RX ADMIN — ENOXAPARIN SODIUM 40 MG: 40 INJECTION SUBCUTANEOUS at 09:44

## 2025-07-11 RX ADMIN — ATORVASTATIN CALCIUM 40 MG: 40 TABLET, FILM COATED ORAL at 16:39

## 2025-07-11 RX ADMIN — CEFEPIME 1000 MG: 1 INJECTION, POWDER, FOR SOLUTION INTRAMUSCULAR; INTRAVENOUS at 02:33

## 2025-07-11 RX ADMIN — PANTOPRAZOLE SODIUM 40 MG: 40 TABLET, DELAYED RELEASE ORAL at 16:39

## 2025-07-11 RX ADMIN — INSULIN GLARGINE 44 UNITS: 100 INJECTION, SOLUTION SUBCUTANEOUS at 09:44

## 2025-07-11 RX ADMIN — SODIUM CHLORIDE 75 ML/HR: 0.9 INJECTION, SOLUTION INTRAVENOUS at 09:52

## 2025-07-11 RX ADMIN — CARBIDOPA AND LEVODOPA 1 TABLET: 25; 100 TABLET ORAL at 09:44

## 2025-07-11 RX ADMIN — RANOLAZINE 500 MG: 500 TABLET, FILM COATED, EXTENDED RELEASE ORAL at 09:44

## 2025-07-11 RX ADMIN — ASPIRIN 81 MG: 81 TABLET, CHEWABLE ORAL at 09:44

## 2025-07-11 RX ADMIN — INSULIN LISPRO 1 UNITS: 100 INJECTION, SOLUTION INTRAVENOUS; SUBCUTANEOUS at 12:13

## 2025-07-11 RX ADMIN — FINASTERIDE 5 MG: 5 TABLET, FILM COATED ORAL at 09:44

## 2025-07-11 RX ADMIN — METOPROLOL SUCCINATE 25 MG: 25 TABLET, EXTENDED RELEASE ORAL at 09:44

## 2025-07-11 RX ADMIN — CARBIDOPA AND LEVODOPA 1 TABLET: 25; 100 TABLET ORAL at 23:13

## 2025-07-11 RX ADMIN — CEFEPIME 1000 MG: 1 INJECTION, POWDER, FOR SOLUTION INTRAMUSCULAR; INTRAVENOUS at 14:12

## 2025-07-11 RX ADMIN — CARBIDOPA AND LEVODOPA 1 TABLET: 25; 100 TABLET ORAL at 16:39

## 2025-07-11 RX ADMIN — INSULIN LISPRO 1 UNITS: 100 INJECTION, SOLUTION INTRAVENOUS; SUBCUTANEOUS at 23:13

## 2025-07-11 RX ADMIN — PANTOPRAZOLE SODIUM 40 MG: 40 TABLET, DELAYED RELEASE ORAL at 06:28

## 2025-07-11 RX ADMIN — RANOLAZINE 500 MG: 500 TABLET, FILM COATED, EXTENDED RELEASE ORAL at 23:13

## 2025-07-11 RX ADMIN — SENNOSIDES, DOCUSATE SODIUM 2 TABLET: 8.6; 5 TABLET ORAL at 09:44

## 2025-07-11 NOTE — DISCHARGE INSTR - AVS FIRST PAGE
Dear Tha Weems,     It was our pleasure to care for you here at [facility].  It is our hope that we were always able to exceed the expected standards for your care during your stay.  You were hospitalized due to fall from wheelchair at nursing facility.  You were cared for on the 4th floor by Adonis Schulte MD under the service of Devaughn Navarro MD with the [facility] who covers for your primary care physician (PCP), Geovanna Fuentes MD, while you were hospitalized.  If you have any questions or concerns related to this hospitalization, you may contact us at .  For follow up as well as any medication refills, we recommend that you follow up with your primary care physician.  A registered nurse will reach out to you by phone within a few days after your discharge to answer any additional questions that you may have after going home.  However, at this time we provide for you here, the most important instructions / recommendations at discharge:     Notable Medication Adjustments -   Please PAUSE taking your dapagliflozin (Farxiga) due to your risk of recurrent urinary tract infections  Please CHANGE how you take your insulin glargine (Lantus). Please inject 40 units under the skin every morning  Important follow up information -   Please follow-up with your primary care provider within 1 week  Please follow-up with your urologist within 2 weeks for further recommendations regarding your Razo catheter and prevention of future UTIs  Other Instructions -   Please take all your medications as prescribed  Please call your primary care provider if there is any worsening of urinary symptoms like burning sensation while urinating, increasing urinary frequency, fever > 100.4F, low blood pressure, persistent lightheadedness/dizziness, or blood in your stool or urine. If you are unable to reach their office, please contact your primary care  provider  Wilkerson Cath Care instructions:  Maintain wilkerson catheter to straight drainage.   May use leg bag and shower.   May flush three times a day as needed using Rico syringe and 120 ml NSS. May use more saline ad maty to prevent/treat cath obstruction.   Remove catheter on 8th day rehab by 0600 hrs. Consult your  primary care provider or urologist   Wilkerson can remain in place for up to 4 weeks at a time. Wilkerson placed at [facility] on 7/10  Please review this entire after visit summary as additional general instructions including medication list, appointments, activity, diet, any pertinent wound care, and other additional recommendations from your care team that may be provided for you.      Sincerely,     Adonis Schulte MD

## 2025-07-11 NOTE — ASSESSMENT & PLAN NOTE
Likely secondary to Pseudomonas related UTI due to urinary retention  Patient is presenting from [facility] after sustaining a fall from wheelchair.    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   Previous ED admission in March for acute metabolic encephalopathy in the setting of UTI from Pseudomonas, received 4 days of IV cefepime.   UA shows large amount of leukocytes with occasional bacteria, more than 1000 glucose.    Plan:  -Continue cefepime for now.  Change antibiotics according to culture sensitivity.  -Urine culture pending  -Scrotal wall is erythematous, mildly tender, monitor for signs of early boston's   -Hold Farxiga during hospitalization, plan to discontinue upon discharge as SGLT2 inhibitors are linked with glucosuria.  SGLT2 inhibitors can cause recurrent UTI and also pose a risk for Boston's gangrene.  -Urology consulted, appreciate recommendations.

## 2025-07-11 NOTE — WOUND OSTOMY CARE
Consult Note - Wound   Tha Weems 75 y.o. male MRN: [id number]  Unit/Bed#: S -01 Encounter: [id number]      History and Present Illness:  75 year old male presented to the hospital status post fall.    Patient history significant for recurrent UTI, DM, ambulatory dysfunction.    Assessment Findings:   Patient agreeable to assessment.  Requires moderate assist x 1 to turn in bed.  Rzao catheter in place.    Bilateral heels intact with blanchable erythema--preventative foam dressings applied, elevated off of bed with pillows.  Patient may benefit from offloading heel boots.  Buttocks and sacrum intact, pink, and hyperpigmented, blanchable--scar tissue present. (See media photo).  Bilateral groin folds pink and intact (see media photo)--keep clean and dry, recommend Interdry for prevention.  Present on admission wound of mixed etiology to left proximal posterior thigh--likely related to pressure and friction.  Scattered pink, partial thickness tissue loss and maroon, non-blanchable intact epidermis measured together as one.  Tali-wound intact ,pink, and blanchable.  No induration or drainage.      See flowsheet for wound details.    Wound Care Plan:   1-Apply silicone bordered foam dressings to bilateral heels for prevention. Speedy with P. Peel back for skin assessments at least daily and re-apply. Change dressings every 3 days and as needed.   2-Elevate heels off of bed/chair surface to offload pressure.  3-Offloading air cushion in chair when out of bed.  4-Apply moisturizing skin cream to body daily and as needed.  5-Turn/reposition every 2 hours for pressure re-distribution on skin--use positioning wedges.   6-Apply silicone bordered foam dressing to sacrum for prevention and left ischium for treatment. Change dressing every other day and as needed.   7-Groin folds--cleanse with soap and water, pat dry. Apply Interdry in single layer to skin fold, ensure approximately 2 inches of fabric is visible outside  of fold. Remove Interdry daily for bathing and re-apply. Change Interdry sheet every other day or if visibly soiled. DO NOT USE CREAMS OR POWDERS IN AREAS WHERE INTERDRY IS IN USE.     Wound care team to follow.  Plan of care reviewed with primary RN.    Wound 07/11/25 Pressure Injury Thigh Left;Proximal;Posterior (Active)   Wound Image   07/11/25 1313   Wound Description Non-blanchable erythema;Pink 07/11/25 1313   Non-staged Wound Description Partial thickness 07/11/25 1313   Wound Length (cm) 4 cm 07/11/25 1313   Wound Width (cm) 2.5 cm 07/11/25 1313   Wound Depth (cm) 0.1 cm 07/11/25 1313   Wound Surface Area (cm^2) 7.85 cm^2 07/11/25 1313   Wound Volume (cm^3) 0.524 cm^3 07/11/25 1313   Calculated Wound Volume (cm^3) 1 cm^3 07/11/25 1313   Drainage Amount None 07/11/25 1313   Tali-wound Assessment Scar Tissue;Pink;Hyperpigmented 07/11/25 1313   Treatments Cleansed 07/11/25 1313   Dressing Foam, Silicon (eg. Allevyn, etc) 07/11/25 1313   Dressing Changed New 07/11/25 1313   Patient Tolerance Tolerated well 07/11/25 1313   Dressing Status Clean;Intact;Dry 07/11/25 1313       Yolanda Robert RN, BSN, CWON

## 2025-07-11 NOTE — ASSESSMENT & PLAN NOTE
Patient presents with fall from wheelchair.  Mentions that he was trying to move within the wheelchair when he had a fall.  Plan  - Reinforce fall precautions  - PT OT consulted.

## 2025-07-11 NOTE — PLAN OF CARE
Problem: Potential for Falls  Goal: Patient will remain free of falls  Description: INTERVENTIONS:  - Educate patient/family on patient safety including physical limitations  - Instruct patient to call for assistance with activity   - Consider consulting OT/PT to assist with strengthening/mobility based on AM PAC & -HLM score  - Consult OT/PT to assist with strengthening/mobility   - Keep Call bell within reach  - Keep bed low and locked with side rails adjusted as appropriate  - Keep care items and personal belongings within reach  - Initiate and maintain comfort rounds  - Make Fall Risk Sign visible to staff  - Offer Toileting every  Hours, in advance of need  - Initiate/Maintain alarm  - Obtain necessary fall risk management equipment:   - Apply yellow socks and bracelet for high fall risk patients  - Consider moving patient to room near nurses station  Outcome: Progressing     Problem: Prexisting or High Potential for Compromised Skin Integrity  Goal: Skin integrity is maintained or improved  Description: INTERVENTIONS:  - Identify patients at risk for skin breakdown  - Assess and monitor skin integrity including under and around medical devices   - Assess and monitor nutrition and hydration status  - Monitor labs  - Assess for incontinence   - Turn and reposition patient  - Assist with mobility/ambulation  - Relieve pressure over mili prominences   - Avoid friction and shearing  - Provide appropriate hygiene as needed including keeping skin clean and dry  - Evaluate need for skin moisturizer/barrier cream  - Collaborate with interdisciplinary team  - Patient/family teaching  - Consider wound care consult    Assess:  - Review Rahat scale daily  - Clean and moisturize skin every   - Inspect skin when repositioning, toileting, and assisting with ADLS  - Assess under medical devices such as  every   - Assess extremities for adequate circulation and sensation     Bed Management:  - Have minimal linens on bed &  keep smooth, unwrinkled  - Change linens as needed when moist or perspiring  - Avoid sitting or lying in one position for more than  hours while in bed?Keep HOB at degrees   - Toileting:  - Offer bedside commode  - Assess for incontinence every   - Use incontinent care products after each incontinent episode such as     Activity:  - Mobilize patient  times a day  - Encourage activity and walks on unit  - Encourage or provide ROM exercises   - Turn and reposition patient every Hours  - Use appropriate equipment to lift or move patient in bed  - Instruct/ Assist with weight shifting every  when out of bed in chair  - Consider limitation of chair time  hour intervals    Skin Care:  - Avoid use of baby powder, tape, friction and shearing, hot water or constrictive clothing  - Relieve pressure over bony prominences using   - Do not massage red bony areas    Next Steps:  - Teach patient strategies to minimize risks such as   - Consider consults to  interdisciplinary teams such as   Outcome: Progressing     Problem: INFECTION - ADULT  Goal: Absence or prevention of progression during hospitalization  Description: INTERVENTIONS:  - Assess and monitor for signs and symptoms of infection  - Monitor lab/diagnostic results  - Monitor all insertion sites, i.e. indwelling lines, tubes, and drains  - Monitor endotracheal if appropriate and nasal secretions for changes in amount and color  - [place] appropriate cooling/warming therapies per order  - Administer medications as ordered  - Instruct and encourage patient and family to use good hand hygiene technique  - Identify and instruct in appropriate isolation precautions for identified infection/condition  Outcome: Progressing  Goal: Absence of fever/infection during neutropenic period  Description: INTERVENTIONS:  - Monitor WBC  - Perform strict hand hygiene  - Limit to healthy visitors only  - No plants, dried, fresh or silk flowers with cochran in patient room  Outcome:  Progressing     Problem: GENITOURINARY - ADULT  Goal: Maintains or returns to baseline urinary function  Description: INTERVENTIONS:  - Assess urinary function  - Encourage oral fluids to ensure adequate hydration if ordered  - Administer IV fluids as ordered to ensure adequate hydration  - Administer ordered medications as needed  - Offer frequent toileting  - Follow urinary retention protocol if ordered  Outcome: Progressing  Goal: Absence of urinary retention  Description: INTERVENTIONS:  - Assess patient’s ability to void and empty bladder  - Monitor I/O  - Bladder scan as needed  - Discuss with physician/AP medications to alleviate retention as needed  - Discuss catheterization for long term situations as appropriate  Outcome: Progressing  Goal: Urinary catheter remains patent  Description: INTERVENTIONS:  - Assess patency of urinary catheter  - If patient has a chronic wilkerson, consider changing catheter if non-functioning  - Follow guidelines for intermittent irrigation of non-functioning urinary catheter  Outcome: Progressing

## 2025-07-11 NOTE — ASSESSMENT & PLAN NOTE
Lab Results   Component Value Date    HGBA1C 6.1 (H) 07/10/2025       Recent Labs     07/10/25  1755 07/10/25  2047 07/11/25  0725 07/11/25  1102   POCGLU 103 203* 127 151*       Blood Sugar Average: Last 72 hrs:  (P) 133.8

## 2025-07-11 NOTE — PLAN OF CARE
Problem: Potential for Falls  Goal: Patient will remain free of falls  Description: INTERVENTIONS:  - Educate patient/family on patient safety including physical limitations  - Instruct patient to call for assistance with activity   - Consider consulting OT/PT to assist with strengthening/mobility based on AM PAC & JH-HLM score  - Consult OT/PT to assist with strengthening/mobility   - Keep Call bell within reach  - Keep bed low and locked with side rails adjusted as appropriate  - Keep care items and personal belongings within reach  - Initiate and maintain comfort rounds  - Make Fall Risk Sign visible to staff  - Offer Toileting every 2 Hours, in advance of need  - Initiate/Maintain bed alarm  - Obtain necessary fall risk management equipment: w/c  - Apply yellow socks and bracelet for high fall risk patients  - Consider moving patient to room near nurses station  Outcome: Progressing  Problem: INFECTION - ADULT  Goal: Absence or prevention of progression during hospitalization  Description: INTERVENTIONS:  - Assess and monitor for signs and symptoms of infection  - Monitor lab/diagnostic results  - Monitor all insertion sites, i.e. indwelling lines, tubes, and drains  - Monitor endotracheal if appropriate and nasal secretions for changes in amount and color  - [place] appropriate cooling/warming therapies per order  - Administer medications as ordered  - Instruct and encourage patient and family to use good hand hygiene technique  - Identify and instruct in appropriate isolation precautions for identified infection/condition  Outcome: Progressing  Goal: Absence of fever/infection during neutropenic period  Description: INTERVENTIONS:  - Monitor WBC  - Perform strict hand hygiene  - Limit to healthy visitors only  - No plants, dried, fresh or silk flowers with cochran in patient room  Outcome: Progressing     Problem: GENITOURINARY - ADULT  Goal: Maintains or returns to baseline urinary function  Description:  INTERVENTIONS:  - Assess urinary function  - Encourage oral fluids to ensure adequate hydration if ordered  - Administer IV fluids as ordered to ensure adequate hydration  - Administer ordered medications as needed  - Offer frequent toileting  - Follow urinary retention protocol if ordered  Outcome: Progressing  Goal: Absence of urinary retention  Description: INTERVENTIONS:  - Assess patient’s ability to void and empty bladder  - Monitor I/O  - Bladder scan as needed  - Discuss with physician/AP medications to alleviate retention as needed  - Discuss catheterization for long term situations as appropriate  Outcome: Progressing  Goal: Urinary catheter remains patent  Description: INTERVENTIONS:  - Assess patency of urinary catheter  - If patient has a chronic wilkerson, consider changing catheter if non-functioning  - Follow guidelines for intermittent irrigation of non-functioning urinary catheter  Outcome: Progressing

## 2025-07-11 NOTE — ASSESSMENT & PLAN NOTE
Lab Results   Component Value Date    HGBA1C 6.1 (H) 07/10/2025       Recent Labs     07/10/25  1755 07/10/25  2047 07/11/25  0725 07/11/25  1102   POCGLU 103 203* 127 151*       Blood Sugar Average: Last 72 hrs:  (P) 133.8  HbA1c 6.1  Home dose includes Lantus 44 units, Tradjenta, metformin.  Plan   -Continue Lantus with sliding scale insulin.  -Plan to discontinue Farxiga on discharge  -Reassess glycemic control after he and add GLP-1 at (semaglutide) on discharge

## 2025-07-11 NOTE — QUICK NOTE
Tha Wemes is a 75-year-old male admitted for mental status in the setting of urinary retention and recurrent UTIs history of chronic Razo use post TURP, BPH, and multiple prior infections with Pseudomonas s/p 4 days of IV cefepime treatment in March 2025.  Facility ([facility]) was called directly 3 they reported that patient's baseline is confused, resides in the same room with his wife who also has dementia.  He had an unwitnessed forward fall in his room.  No changes in vitals or complaints prior to the fall.  At baseline he feeds self, does not stand independently, but is able to propel himself in his wheelchair.  Patient was seen and examined on the bedside.  He denied having any pain, lightheadedness, palpitations.  On exam it was noted that patient has erythema on the shin of his right lower extremity.     Assessment & plan    Metabolic encephalopathy likely secondary to recurrent UTI  Likely Pseudomonas related, consistent with prior infections.  UA shows large amount of leukocytes with occasional bacteria, more than thousand glucose.    Razo catheter draining cloudy urine  On physical exam patient has erythema and tenderness of scrotum  Plan   Continue cefepime   Await urine culture  Holding patient's home Farxiga as SGLT2 inhibitor can cause UTI.  Plan to discontinue Farxiga on discharge  Monitor scrotal erythema for early Tosha's     2.  Type 2 diabetes melitis  HbA1c is 6  At home on Lantus 44 units, linagliptin, metformin, Farxiga.    Discontinue Farxiga on discharge  Plan   Reassess glycemic control after he and add GLP-1 at (semaglutide) on discharge    3.  Ambulatory dysfunction/fall  Chronic wheelchair user, fell while shifting position.  No acute injury found  Plan   Reinforce fall precautions  PT OT consulted

## 2025-07-11 NOTE — CONSULTS
Consultation - Urology   Name: Tha Weems 75 y.o. male I MRN: [id number]  Unit/Bed#: S -01 I Date of Admission: 7/10/2025   Date of Service: 7/11/2025 I Hospital Day: 1   Inpatient consult to Urology  Consult performed by: Kelley Berry PA-C  Consult ordered by: Gerry Aguayo MD        Physician Requesting Evaluation: Devaughn Navarro MD   Reason for Evaluation / Principal Problem: recurrent UTI    Assessment & Plan  Recurrent UTI  75-year-old presenting from [facility] after fall from wheelchair  Metabolic encephalopathy, treating for UTI.  UA large amount of leukocytes occasional bacteria   Patient with history of BPH, TURP   He has noted ventral erosion which does not appear changed from prior exam in 2023.  Razo catheter was placed in the ED- bladder scan 394  WBC 10  VSS, afebrile.   He may undergo void trial outpatient. If fails, consider chronic catheter- Suprapubic catheter due to ventral erosion.   Urology will sign off but remain available for any further inpatient needs. Please feel free to contact the provider currently covering the Urology [facility] role for this [place] with questions or concerns.       Type 2 diabetes mellitus with diabetic neuropathy (HCC)  Lab Results   Component Value Date    HGBA1C 6.1 (H) 07/10/2025       Recent Labs     07/10/25  1755 07/10/25  2047 07/11/25  0725 07/11/25  1102   POCGLU 103 203* 127 151*       Blood Sugar Average: Last 72 hrs:  (P) 133.8          Subjective:   HPI: Shelley is a 75-year-old who presented from [facility] after sustaining a fall from wheelchair.  He is noted to have a metabolic encephalopathy and admitted to medicine for likely UTI.  He was started on cefepime.  He is known to our practice history of BPH prior TURP.  I have seen patient previously during consult in 2023 which noted ventral erosion from prior Razo catheter.  He was referred to IR for suprapubic catheter, not performed. Appears patient has been  catheter free- came into the emergency room without a wilkerson. ED noting patient with urinary issues. Bladder scan 394ml and wilkerson catheter was placed. Today evaluated patient noted mild ventral erosion not significantly changed from previous exam in 2023.  Exam with scrotal erythema, no signs of abscess.  Patient not oriented, metabolic encephalopathy.    Urology is consulted due to recurrent UTI.    Review of Systems   Constitutional:  Negative for chills and fever.   Gastrointestinal:  Negative for abdominal pain.   Limited due to metabolic encephalopathy    Objective:    Vitals: Blood pressure 136/63, pulse 83, temperature 98 °F (36.7 °C), resp. rate 16, weight 71.9 kg (158 lb 8.2 oz), SpO2 98%.,Body mass index is 24.1 kg/m².    Physical Exam  Constitutional:       General: He is not in acute distress.     Appearance: He is ill-appearing. He is not toxic-appearing.   HENT:      Head: Atraumatic.      Right Ear: External ear normal.      Left Ear: External ear normal.     Cardiovascular:      Rate and Rhythm: Normal rate.      Pulses: Normal pulses.   Pulmonary:      Effort: Pulmonary effort is normal.   Abdominal:      General: There is no distension.      Tenderness: There is no abdominal tenderness. There is no guarding.   Genitourinary:     Comments: Ventral erosion due to catheter    Scrotal erythema.  No evidence of abscess         Imaging:    CT CHEST, ABDOMEN AND PELVIS WITH IV CONTRAST     INDICATION: TRAUMA.     COMPARISON: CT abdomen/pelvis dated 5/6/2025. CT chest abdomen and pelvis dated 3/5/2022 .     TECHNIQUE: CT examination of the chest, abdomen and pelvis was performed. Multiplanar 2D reformatted images were created from the source data.     This examination, like all CT scans performed in the [facility], was performed utilizing techniques to minimize radiation dose exposure, including the use of iterative reconstruction and automated exposure control. Radiation dose length    product (DLP) for this visit: 589 mGy-cm.     IV Contrast: 97 mL of iohexol (OMNIPAQUE)  Enteric Contrast: Not administered.     FINDINGS:     CHEST     LUNGS: Stable linear scarring in the bilateral lower lobes and posterior right upper lobe.. Stable 5 mm right upper lobe nodule on image 54 of series 309... No tracheal or endobronchial lesion.     PLEURA: Unremarkable.     HEART/GREAT VESSELS: Heart size is within normal limits. Coronary artery calcifications. No thoracic aortic aneurysm.     MEDIASTINUM AND MIRIAM: Unremarkable.     CHEST WALL AND LOWER NECK: Unremarkable.     ABDOMEN     LIVER/BILIARY TREE: Stable cyst in the left lobe of liver. No suspicious liver lesion or biliary dilatation.     GALLBLADDER: Post cholecystectomy.     SPLEEN: Stable subcentimeter hypodense lesion in the spleen.     PANCREAS: Unremarkable.     ADRENAL GLANDS: Unremarkable.     KIDNEYS/URETERS: No obstructing stones or hydronephrosis. Bilateral renal cysts and hypodense renal lesions that are too small to characterize. Previously described hyperdense left renal cyst measuring 1.4 cm is stable.     STOMACH AND BOWEL: Diverticulosis without diverticulitis. No obstruction. Large amount of stool throughout the colon and rectum.     APPENDIX: Normal.     ABDOMINOPELVIC CAVITY: No ascites. No pneumoperitoneum. No lymphadenopathy.     VESSELS: Atherosclerosis without abdominal aortic aneurysm.     PELVIS     REPRODUCTIVE ORGANS: Prostate gland is mildly enlarged with TURP defect.     URINARY BLADDER: Bladder wall thickening is similar to 2022 and may be due to chronic urinary retention but suggest correlation with urinalysis.     ABDOMINAL WALL/INGUINAL REGIONS: Small fat-containing umbilical hernia.     BONES: No acute fracture. Bridging syndesmophytes suggesting ankylosing spondylitis. There is ankylosis of the SI joints     IMPRESSION:     No acute intrathoracic or intra-abdominal injury.  Stable nonemergent findings as above.         Computerized Assisted Algorithm (CAA) may have aided analysis of applicable images.     Workstation performed: [id number]  Labs:  Recent Labs     07/10/25  1303 07/11/25  0635   WBC 10.38* 10.97*       Recent Labs     07/10/25  1303 07/11/25  0635   HGB 14.2 12.4     Recent Labs     07/10/25  1303 07/11/25  0635   HCT 42.8 38.7     Recent Labs     07/10/25  1258 07/11/25  0635   CREATININE 1.07 1.25         History:  Past Medical History[1]  Social History[2]  Past Surgical History[3]  Family History[4]    Kelley Berry PA-C  Date: 7/11/2025 Time: 2:23 PM          [1]   Past Medical History:  Diagnosis Date    Ambulatory dysfunction     uses walker with assist of 1    Anemia     Anxiety     At risk for falls     hx of falls    Benign paroxysmal vertigo     unspecified ear    BPH (benign prostatic hyperplasia)     for TURP today 1/4/2021    Calculus in bladder     for surgical removal today 1/4/2021    Cataract     left eye    Cervicalgia     Chest pain     Chronic kidney disease     stage 3    Constipation     CTS (carpal tunnel syndrome)     left    Cyst of pancreas     Cystitis 06/23/2020    acute cystitis with hematuria    Depression     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    Dizziness     and giddiness    Dysphagia     Elevated PSA     Facial weakness     GERD (gastroesophageal reflux disease)     last assessed 5/20/16    Gross hematuria     Hematuria     Hyperlipidemia     Hypertension     Kidney disease     Kidney stone     Left-sided weakness     Long term (current) use of oral hypoglycemic drugs     Muscle weakness     Nuclear senile cataract of left eye     OA (osteoarthritis) of knee     b/l    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HCC)     Syncope and collapse     Tachycardia     UTI (urinary tract infection)     Vertebro-basilar artery syndrome     Vitamin B deficiency     Vitamin D deficiency     Vitamin D deficiency    [2]   Social History  Socioeconomic History    Marital status: /Civil  [place]   Tobacco Use    Smoking status: Never    Smokeless tobacco: Never   Vaping Use    Vaping status: Never Used   Substance and Sexual Activity    Alcohol use: Not Currently    Drug use: No    Sexual activity: Not Currently     Social Drivers of Health     Food Insecurity: No Food Insecurity (7/10/2025)    Nursing - Inadequate Food Risk Classification     Ran Out of Food in the Last Year: Never true   Transportation Needs: No Transportation Needs (7/10/2025)    Nursing - Transportation Risk Classification     Lack of Transportation: No   Intimate Partner Violence: Unknown (7/10/2025)    Nursing IPS     Physically Hurt by Someone: No     Hurt or Threatened by Someone: No   Housing Stability: Unknown (7/10/2025)    Nursing: Inadequate Housing Risk Classification     Unable to Pay for Housing in the Last Year: No     Has Housing: No   [3]   Past Surgical History:  Procedure Laterality Date    CARDIAC CATHETERIZATION N/A 3/7/2022    Procedure: CARDIAC CATHETERIZATION;  Surgeon: Pako Reid DO;  [facility]: [facility];  Service: Cardiology    CATARACT EXTRACTION      CHOLECYSTECTOMY      KNEE SURGERY      WY LITHOLAPAXY SMPL/SM <2.5 CM N/A 1/4/2021    Procedure: Cystolitholopaxy w/laser bladder stones;  Surgeon: Kyle Jones MD;  Location: [facility];  Service: Urology    WY TRURL ELECTROSURG RESCJ PROSTATE BLEED COMPLETE N/A 1/4/2021    Procedure: T.U.R.P.;  Surgeon: Kyle Jones MD;  Location: [facility];  Service: Urology   [4]   Family History  Problem Relation Name Age of Onset    Coronary artery disease Mother      Diabetes Father

## 2025-07-11 NOTE — ASSESSMENT & PLAN NOTE
75-year-old presenting from [facility] after fall from wheelchair  Metabolic encephalopathy, treating for UTI.  UA large amount of leukocytes occasional bacteria   Patient with history of BPH, TURP   He has noted ventral erosion which does not appear changed from prior exam in 2023.  Razo catheter was placed in the ED- bladder scan 394  WBC 10  VSS, afebrile.   He may undergo void trial outpatient. If fails, consider chronic catheter- Suprapubic catheter due to ventral erosion.   Urology will sign off but remain available for any further inpatient needs. Please feel free to contact the provider currently covering the Urology [facility] role for this campus with questions or concerns.

## 2025-07-11 NOTE — DISCHARGE INSTR - OTHER ORDERS
Wound Care Plan:   1-Remedy silicone cream to bilateral heels twice daily and as needed for skin protection.  2-Elevate heels off of bed/chair surface to offload pressure--offloading heel boots.  3-Offloading air cushion in chair when out of bed.  4-Apply lotion to body daily and as needed.  5-Turn/reposition every 2 hours for pressure re-distribution on skin--use positioning wedges.   6-Apply silicone bordered foam dressing to sacrum for prevention and left ischium for treatment. Change dressing every other day and as needed.   7-Groin folds--cleanse with soap and water, pat dry. Apply Interdry in single layer to skin fold, ensure approximately 2 inches of fabric is visible outside of fold. Remove Interdry daily for bathing and re-apply. Change Interdry sheet every other day or if visibly soiled. DO NOT USE CREAMS OR POWDERS IN AREAS WHERE INTERDRY IS IN USE.

## 2025-07-11 NOTE — UTILIZATION REVIEW
Initial Clinical Review    Admission: Date/Time/Statement:   Admission Orders (From admission, onward)       Ordered        07/10/25 1549  INPATIENT ADMISSION  Once                          Orders Placed This Encounter   Procedures    INPATIENT ADMISSION     Standing Status:   Standing     Number of Occurrences:   1     Level of Care:   Med Surg [16]     Estimated length of stay:   More than 2 Midnights     Certification:   I certify that inpatient services are medically necessary for this patient for a duration of greater than two midnights. See H&P and MD Progress Notes for additional information about the patient's course of treatment.     ED Arrival Information       Expected   -    Arrival   7/10/2025 12:34    Acuity   Emergent              Means of arrival   Ambulance    Escorted by   [facility]    Service   Hospitalist    Admission type   Emergency              Arrival complaint   *EMS*             Chief Complaint   Patient presents with    Fall     Pt was in wheelchair, when he fell out of it. +HS +ASA -LOC +HA       Initial Presentation: 75 y.o. male with a PMH of type 2 DM, GERD, HTN, HLD, BPH, Urinary retention, recurrent UTI secondary to Pseudomonas who presented by EMS to [facility] ED. Admitted as Inpatient for evaluation and treatment of Recurrent UTI.  Metabolic encephalopathy.     Presented s/p fall w/ head strike at his  assisted when trying to move within the w/c. Pt denies any prodromal symptoms. On exam, Clear speech. No facial asymmetry/ deficit appreciated. GCS 15. Very superficial abrasions without open skin over the bilateral knees.  Right knee is nontender as is in the entirety of the right leg.  Diffuse tenderness of the neck posteriorly.  No thoracic discomfort.  He does have tenderness over the diffuse lower back and tenderness on his left hip and left knee.  He holds the hip and knee slightly flexed.  Left leg is slightly externally rotated and shortened.  No calf tenderness, foot  or ankle tenderness.  Scrotal wall is erythematous, mildly tender. Disposable undergarment saturated with pungent urine. Razo cath was placed in the ED w/ cloudy urine noted. Labs: WBC 10.38, A1c 6.1, UA shows large amount of leukocytes with occasional bacteria, more than 1000 glucose. Trauma imaging neg for acute findings. Please see below med list for meds given in the ED.     Plan: Cefepime IV, f/u urine cultures.   Hold Farxiga during hospitalization, plan to discontinue upon discharge as SGLT2 inhibitors are linked with glucosuria which can cause recurrent UTI and pose a risk for Boston's gangrene. Continue Lantus and SSI. PT/OT evals. Urology consulted.      Anticipated Length of Stay/Certification Statement: Patient will be admitted on an inpatient basis with an anticipated length of stay of greater than 2 midnights secondary to metabolic encephalopathy, UTI.     Date: 7/11/25   Day 2:   Per assisted where pt resides, pt's baseline is confused. Pt denies any complaints. On exam, mucous membranes are dry. Razo in place draining cloudy urine. Scrotal wall is erythematous, mildly tender. Labs: WBC 10.97. Plan: Continue Cefepime for now, urine culture pending, Monitor for signs of early boston's. Hold Farxiga. Continue IV fluids. Urology consult pending.     Urology: Recurrent UTI. Plan: F/U urine culture. Keep catheter in place for now. We can consider outpatient trial of void. If he requires chronic catheter agree with consideration for pubic catheter given issues with ventral erosion     Certification Statement: The patient will continue to require additional inpatient hospital stay due to metabolic encephalopathy secondary to UTI     ED Treatment-Medication Administration from 07/10/2025 1234 to 07/10/2025 1957         Date/Time Order Dose Route Action     07/10/2025 1304 sodium chloride 0.9 % bolus 500 mL 500 mL Intravenous New Bag     07/10/2025 1328 iohexol (OMNIPAQUE) 350 MG/ML injection (MULTI-DOSE) 97  mL 97 mL Intravenous Given     07/10/2025 1412 lidocaine (URO-JET) 2 % urethral/mucosal gel 5 Application Topical Given     07/10/2025 1535 cefepime (MAXIPIME) 2 g/50 mL dextrose IVPB 2,000 mg Intravenous New Bag     07/10/2025 1631 lidocaine (URO-JET) 2 % urethral/mucosal gel 5 Application Topical Given     07/10/2025 1751 sodium chloride 0.9 % infusion 75 mL/hr Intravenous New Bag     07/10/2025 1752 pantoprazole (PROTONIX) EC tablet 40 mg 40 mg Oral Given     07/10/2025 1752 tamsulosin (FLOMAX) capsule 0.4 mg 0.4 mg Oral Given     07/10/2025 1752 senna-docusate sodium (SENOKOT S) 8.6-50 mg per tablet 2 tablet 2 tablet Oral Given            Scheduled Medications:  aspirin, 81 mg, Oral, Daily  atorvastatin, 40 mg, Oral, Daily With Dinner  carbidopa-levodopa, 1 tablet, Oral, TID  cefepime, 1,000 mg, Intravenous, Q12H  enoxaparin, 40 mg, Subcutaneous, Daily  finasteride, 5 mg, Oral, Daily  insulin glargine, 44 Units, Subcutaneous, QAM  insulin lispro, 1-5 Units, Subcutaneous, 4x Daily (PC & HS)  metoprolol succinate, 25 mg, Oral, Daily  pantoprazole, 40 mg, Oral, BID AC  polyethylene glycol, 17 g, Oral, Daily  ranolazine, 500 mg, Oral, Q12H LIYAH  senna-docusate sodium, 2 tablet, Oral, BID  tamsulosin, 0.4 mg, Oral, Daily With Dinner      Continuous IV Infusions:  sodium chloride, 75 mL/hr, Intravenous, Continuous      PRN Meds: NONE     ED Triage Vitals   Temperature Pulse Respirations Blood Pressure SpO2 Pain Score   07/10/25 1236 07/10/25 1235 07/10/25 1236 07/10/25 1235 07/10/25 1235 07/10/25 1300   97.7 °F (36.5 °C) 75 17 161/75 98 % 5     Weight (last 2 days)       Date/Time Weight    07/10/25 12:36:09 71.9 (158.51)            Vital Signs (last 3 days)       Date/Time Temp Pulse Resp BP MAP (mmHg) SpO2 O2 Device Patient Position - Orthostatic VS [place] Coma Scale Score Pain    07/11/25 07:26:06 -- 83 16 136/63 87 98 % -- -- -- --    07/10/25 2300 -- -- -- -- -- -- -- -- 15 --    07/10/25 2016 -- -- -- -- -- --  -- -- -- No Pain    07/10/25 20:03:49 98 °F (36.7 °C) 82 20 112/51 71 97 % -- -- -- --    07/10/25 1900 -- 79 16 109/54 78 98 % -- -- -- --    07/10/25 1830 -- 86 16 146/67 96 99 % -- -- -- --    07/10/25 1813 -- -- -- -- -- -- -- -- 15 --    07/10/25 1730 -- 82 16 157/74 106 97 % -- -- -- --    07/10/25 1650 -- 81 -- -- -- 98 % -- -- -- --    07/10/25 1645 -- 76 -- -- -- 98 % -- -- -- --    07/10/25 1640 -- 69 -- -- -- 97 % -- -- -- --    07/10/25 1635 -- 76 -- -- -- 98 % -- -- -- --    07/10/25 1630 -- 69 -- 144/69 -- 97 % -- -- -- --    07/10/25 1625 -- 66 -- -- -- 97 % -- -- -- --    07/10/25 1620 -- 69 -- -- -- 96 % -- -- -- --    07/10/25 1615 -- 69 -- -- -- 98 % -- -- -- --    07/10/25 1610 -- 72 -- -- -- 97 % -- -- -- --    07/10/25 1605 -- 77 -- -- -- 98 % -- -- -- --    07/10/25 1600 -- 73 16 141/65 93 97 % None (Room air) -- 15 --    07/10/25 1555 -- 73 -- -- -- 97 % -- -- -- --    07/10/25 1550 -- 71 -- -- -- 97 % -- -- -- --    07/10/25 1545 -- 70 -- -- -- 96 % -- -- -- --    07/10/25 1540 -- 72 -- -- -- 97 % -- -- -- --    07/10/25 1535 -- 73 -- -- -- 97 % -- -- -- --    07/10/25 1530 -- 68 16 113/59 -- 98 % None (Room air) Lying 15 --    07/10/25 1525 -- 69 -- -- -- 98 % -- -- -- --    07/10/25 1520 -- 73 -- -- -- 98 % -- -- -- --    07/10/25 1515 -- 71 -- -- -- 97 % -- -- -- --    07/10/25 1510 -- 68 -- -- -- 98 % -- -- -- --    07/10/25 1505 -- 70 -- -- -- 98 % -- -- -- --    07/10/25 1500 -- 71 16 115/57 -- 98 % None (Room air) Lying 15 --    07/10/25 1455 -- 72 -- -- -- 97 % -- -- -- --    07/10/25 1450 -- 69 -- -- -- 98 % -- -- -- --    07/10/25 1445 -- 69 -- -- -- 99 % -- -- -- --    07/10/25 1440 -- 71 -- -- -- 97 % -- -- -- --    07/10/25 1435 -- 76 -- -- -- 97 % -- -- -- --    07/10/25 1430 -- 73 16 130/62 -- 98 % None (Room air) Lying 15 5    07/10/25 1425 -- 73 -- -- -- 98 % -- -- -- --    07/10/25 1415 -- 75 -- -- -- 89 % -- -- -- --    07/10/25 1410 -- 76 -- -- -- 98 % -- -- -- --     07/10/25 1405 -- 74 -- -- -- 98 % -- -- -- --    07/10/25 1400 -- 82 15 165/77 -- 97 % None (Room air) Lying 15 --    07/10/25 1355 -- 75 -- -- -- 99 % -- -- -- --    07/10/25 1350 -- 78 -- 151/74 -- 100 % -- -- -- --    07/10/25 1345 -- -- -- 145/68 -- -- -- -- -- --    07/10/25 1330 -- 78 16 150/73 -- 99 % None (Room air) Lying 15 --    07/10/25 1315 -- 74 16 149/67 -- 98 % None (Room air) Sitting 15 --    07/10/25 1310 -- 78 -- -- -- 98 % -- -- -- --    07/10/25 1305 -- 73 -- -- -- 98 % -- -- -- --    07/10/25 1300 -- 73 16 159/74 -- 99 % None (Room air) -- 15 5    07/10/25 1255 -- 76 -- -- -- 99 % -- -- -- --    07/10/25 1250 -- 77 -- -- -- 99 % -- -- -- --    07/10/25 12:45:05 -- -- -- -- -- -- -- -- 15 --    07/10/25 1245 -- 75 16 168/76 -- 98 % None (Room air) -- 15 --    07/10/25 1240 -- 77 -- -- -- 99 % -- -- -- --    07/10/25 12:36:09 97.7 °F (36.5 °C) 76 17 161/75 -- 99 % None (Room air) Sitting -- --    07/10/25 1235 -- 75 -- 161/75 -- 98 % -- -- -- --              Pertinent Labs/Diagnostic Test Results:   Radiology:  XR knee 4+ views left injury   Final Interpretation by Pako Hardin MD (07/11 0911)      Tricompartmental osteoarthritis with small joint effusion. No fracture or dislocation seen         Computerized Assisted Algorithm (CAA) may have been used to analyze all applicable images.         Workstation performed: [id number]         XR hip/pelv 2-3 vws left   Final Interpretation by Pako Hardin MD (07/11 0910)      No acute osseous abnormality.   Arthritis in the hips and lower lumbar spine.      Computerized Assisted Algorithm (CAA) may have been used to analyze all applicable images.            Workstation performed: [id number]         TRAUMA - CT head wo contrast   Final Interpretation by E. Alec Schoenberger, MD (07/10 1350)      No acute intracranial abnormality.                  Workstation performed: [id number]         TRAUMA - CT spine cervical wo contrast   Final  Interpretation by E. Alec Schoenberger, MD (07/10 0121)      No cervical spine fracture or traumatic malalignment.                  Workstation performed: [id number]         TRAUMA - CT chest abdomen pelvis w contrast   Final Interpretation by E. Alec Schoenberger, MD (07/10 1408)      No acute intrathoracic or intra-abdominal injury.   Stable nonemergent findings as above.         Computerized Assisted Algorithm (CAA) may have aided analysis of applicable images.      Workstation performed: [id number]               Results from last 7 days   Lab Units 07/11/25  0635 07/10/25  1303   WBC Thousand/uL 10.97* 10.38*   HEMOGLOBIN g/dL 12.4 14.2   HEMATOCRIT % 38.7 42.8   PLATELETS Thousands/uL 199 246   TOTAL NEUT ABS Thousands/µL 8.66* 8.13*         Results from last 7 days   Lab Units 07/11/25  0635 07/10/25  1258   SODIUM mmol/L 141 140   POTASSIUM mmol/L 4.0 3.7   CHLORIDE mmol/L 104 101   CO2 mmol/L 33* 31   ANION GAP mmol/L 4 8   BUN mg/dL 20 20   CREATININE mg/dL 1.25 1.07   EGFR ml/min/1.73sq m 55 67   CALCIUM mg/dL 8.8 9.1     Results from last 7 days   Lab Units 07/11/25  0635 07/10/25  1258   AST U/L 9* 10*   ALT U/L 3* <3*   ALK PHOS U/L 55 63   TOTAL PROTEIN g/dL 6.1* 7.1   ALBUMIN g/dL 3.4* 3.9   TOTAL BILIRUBIN mg/dL 0.60 0.77     Results from last 7 days   Lab Units 07/11/25  0725 07/10/25  2047 07/10/25  1755 07/10/25  1535   POC GLUCOSE mg/dl 127 203* 103 85     Results from last 7 days   Lab Units 07/11/25  0635 07/10/25  1258   GLUCOSE RANDOM mg/dL 95 76         Results from last 7 days   Lab Units 07/10/25  1303   HEMOGLOBIN A1C % 6.1*   EAG mg/dl 128     Results from last 7 days   Lab Units 07/10/25  1258   CK TOTAL U/L 28*     Results from last 7 days   Lab Units 07/10/25  1423   CLARITY UA  Extra Turbid   COLOR UA  Yellow   SPEC GRAV UA  1.031*   PH UA  5.5   GLUCOSE UA mg/dl >=1000 (1%)*   KETONES UA mg/dl Negative   BLOOD UA  Moderate*   PROTEIN UA mg/dl Trace*   NITRITE UA  Negative    BILIRUBIN UA  Negative   UROBILINOGEN UA (BE) mg/dl <2.0   LEUKOCYTES UA  Large*   WBC UA /hpf Innumerable*   RBC UA /hpf 1-2   BACTERIA UA /hpf Occasional   EPITHELIAL CELLS WET PREP /hpf None Seen     Past Medical History[1]  Present on Admission:   Type 2 diabetes mellitus with diabetic neuropathy (HCC)   Ambulatory dysfunction   Recurrent UTI   Metabolic encephalopathy      Admitting Diagnosis: Contusion of knee [S80.00XA]  Urinary retention [R33.9]  UTI (urinary tract infection) [N39.0]  Weakness [R53.1]  Encephalopathy [G93.40]  History of recurrent UTIs [Z87.440]  Benign prostatic hyperplasia without lower urinary tract symptoms [N40.0]  Age/Sex: 75 y.o. male    Network Utilization Review Department  ATTENTION: Please call with any questions or concerns to [phone] and carefully listen to the prompts so that you are directed to the right person. All voicemails are confidential.   For Discharge needs, contact Care Management DC Support Team at [phone] opt. 2  Send all requests for admission clinical reviews, approved or denied determinations and any other requests to dedicated fax number below belonging to the campus where the patient is receiving treatment. List of dedicated fax numbers for the Facilities:  FACILITY NAME UR FAX NUMBER   ADMISSION DENIALS (Administrative/Medical Necessity) [phone]   DISCHARGE SUPPORT TEAM (NETWORK) [phone]   PARENT CHILD HEALTH (Maternity/NICU/Pediatrics) [phone]   [facility] [phone]   [facility] [phone]   [facility] [phone]   [facility] [phone]   [facility] [phone]   [facility] [phone]   [facility] [phone]   [facility] [phone]   [facility] [phone]   [facility]  [facility] [phone]   [facility] [phone]   [facility] [phone]              [1]   Past Medical History:  Diagnosis Date    Ambulatory dysfunction     uses walker with assist of 1    Anemia     Anxiety     At risk for falls     hx of falls    Benign paroxysmal vertigo     unspecified ear    BPH (benign prostatic hyperplasia)     for TURP today 1/4/2021    Calculus in bladder     for surgical removal today 1/4/2021    Cataract     left eye    Cervicalgia     Chest pain     Chronic kidney disease     stage 3    Constipation     CTS (carpal tunnel syndrome)     left    Cyst of pancreas     Cystitis 06/23/2020    acute cystitis with hematuria    Depression     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    Dizziness     and giddiness    Dysphagia     Elevated PSA     Facial weakness     GERD (gastroesophageal reflux disease)     last assessed 5/20/16    Gross hematuria     Hematuria     Hyperlipidemia     Hypertension     Kidney disease     Kidney stone     Left-sided weakness     Long term (current) use of oral hypoglycemic drugs     Muscle weakness     Nuclear senile cataract of left eye     OA (osteoarthritis) of knee     b/l    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HCC)     Syncope and collapse     Tachycardia     UTI (urinary tract infection)     Vertebro-basilar artery syndrome     Vitamin B deficiency     Vitamin D deficiency     Vitamin D deficiency

## 2025-07-11 NOTE — CASE MANAGEMENT
Case Management Assessment & Discharge Planning Note    Patient name Tha Weems  Location S /S -01 MRN [id number]  : 1950 Date 2025       Current Admission Date: 7/10/2025  Current Admission Diagnosis:Recurrent UTI   Patient Active Problem List    Diagnosis Date Noted    Electrolyte abnormality 2025    Dry eyes 2023    Metabolic encephalopathy 2022    Intertrigo 2022    Balanitis 2022    Requires daily assistance for activities of daily living (ADL) and comfort needs 2022    Fall 2022    Parkinson disease (HCC) 2022    Chest pain 2022    Leukocytosis 2022    Coronary artery disease involving native coronary artery of native heart without angina pectoris 2022    Type 2 MI (myocardial infarction) ([facility]) 2022    Generalized abdominal pain 2021    Lactic acidosis 2021    Urinary incontinence 2021    Vitamin B12 deficiency     History of recurrent UTIs 2020    Insomnia 2020    Benign paroxysmal positional vertigo 2020    BPH (benign prostatic hyperplasia) 2020    Vitamin D deficiency 2020    Left-sided weakness 2020    Recurrent UTI 2020    Cystitis 2020    Bladder stones 2020    Stage 3a chronic kidney disease (HCC) 2020    Essential hypertension 2019    Type 2 diabetes mellitus ([facility]) 2019    Nephrolithiasis 2019    Ambulatory dysfunction 2019    Paresis ([facility]) 2019    Abnormal PSA 2019    Dizziness 10/04/2017    Pancreas cyst 2016    Type 2 diabetes mellitus with diabetic neuropathy ([facility]) 2015    Hyperlipidemia 2012      LOS (days): 1  Geometric Mean LOS (GMLOS) (days): 3.8  Days to GMLOS:2.9     OBJECTIVE:    Risk of Unplanned Readmission Score: 15.94         Current admission status: Inpatient       Preferred Pharmacy:   [email or web address] - [place] - [email or web address]  [place]  [address]  [facility] [place] [address]  Phone: [phone] Fax: [phone]    [id number] - [place] - [address]  [place] [address]  Phone: [phone] Fax: [phone]    Primary Care Provider: Geovanna Fuentes MD    Primary Insurance: [employer] [facility]  Secondary Insurance:     ASSESSMENT:  Active Health Care Proxies       Jeanne Weems Alternate Health Care Agent - Spouse   Primary Phone: [phone] (Mobile)  Home Phone: [phone]                           Readmission Root Cause  30 Day Readmission: No    Patient Information  Admitted from:: Facility  Mental Status: Alert  During Assessment patient was accompanied by: Not accompanied during assessment  Assessment information provided by:: Patient  Primary Caregiver: Other (Comment)  Caregiver's Name:: Kayla [facility] [place]  Caregiver's Relationship to Patient:: Facility Staff  Caregiver's Telephone Number:: [phone]  Support Systems: Self, Spouse/significant other, Home care staff  County of Residence: [place]  What [facility] do you live in?: [place]  Home entry access options. Select all that apply.: No steps to enter home  Type of Current Residence: Facility  Living Arrangements: Lives w/ Spouse/significant other, Lives in Facility    Activities of Daily Living Prior to Admission  Functional Status: Assistance  Completes ADLs independently?: No  Level of ADL dependence: Assistance  Ambulates independently?: No  Level of ambulatory dependence: Assistance  Does patient use assisted devices?: Yes  Assisted Devices (DME) used: Wheelchair, Walker  Does the patient have a history of Short-Term Rehab?: Yes  Does patient have a history of HHC?: Yes  Does patient currently have HHC?: No    Patient Information Continued  Income Source: Pension/[employer]CC  Does patient have prescription coverage?: Yes  Can the patient afford their medications and any related supplies (such as glucometers or test  strips)?: Yes  Does patient receive dialysis treatments?: No  Does patient have a history of substance abuse?: No  Does patient have a history of Mental Health Diagnosis?: No    Means of Transportation  Means of Transport to Appts:: Family transport    DISCHARGE DETAILS:    Discharge planning discussed with:: Patient  Freedom of Choice: Yes  Comments - Freedom of Choice: Preference to return to [facility]  CM contacted family/caregiver?: Yes (VM left w/ Aziza at [facility])  Were Treatment Team discharge recommendations reviewed with patient/caregiver?: Yes  Did patient/caregiver verbalize understanding of patient care needs?: N/A- going to facility  Were patient/caregiver advised of the risks associated with not following Treatment Team discharge recommendations?: Yes    Contacts  Patient Contacts: Patient  Contact Method: In Person  Reason/Outcome: Continuity of Care, Discharge Planning    Other Referral/Resources/Interventions Provided:  Interventions: Assisted Living  Referral Comments: CM spoke with pt at bedside, introduced self and role with dcp. Pt reported he lives with his spouse at [facility]. Pt reported he needed assistance with ADLs, staff assisted him into his WC. CM placed call to Aziza at [facility] to verify pt prior LOF and verify he can return - no answer, VM left requesting return call to discuss same. CM will continue to follow.    Would you like to participate in our [facility] service program?  : No - Declined    Treatment Team Recommendation: Other (PT/OT pending)  Expected Discharge Disposition: Assisted Living Facility

## 2025-07-11 NOTE — PROGRESS NOTES
Progress Note - Hospitalist   Name: Tha Weems 75 y.o. male I MRN: [id number]  Unit/Bed#: S -01 I Date of Admission: 7/10/2025   Date of Service: 7/11/2025 I Hospital Day: 1    Assessment & Plan  Recurrent UTI  Metabolic encephalopathy  Likely secondary to Pseudomonas related UTI due to urinary retention  Patient is presenting from [facility] after sustaining a fall from wheelchair.    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   Previous ED admission in March for acute metabolic encephalopathy in the setting of UTI from Pseudomonas, received 4 days of IV cefepime.   UA shows large amount of leukocytes with occasional bacteria, more than 1000 glucose.    Plan:  -Continue cefepime for now.  Change antibiotics according to culture sensitivity.  -Urine culture pending  -Scrotal wall is erythematous, mildly tender, monitor for signs of early boston's   -Hold Farxiga during hospitalization, plan to discontinue upon discharge as SGLT2 inhibitors are linked with glucosuria.  SGLT2 inhibitors can cause recurrent UTI and also pose a risk for Boston's gangrene.  -Urology consulted, appreciate recommendations.    Type 2 diabetes mellitus with diabetic neuropathy (HCC)  Lab Results   Component Value Date    HGBA1C 6.1 (H) 07/10/2025       Recent Labs     07/10/25  1755 07/10/25  2047 07/11/25  0725 07/11/25  1102   POCGLU 103 203* 127 151*       Blood Sugar Average: Last 72 hrs:  (P) 133.8  HbA1c 6.1  Home dose includes Lantus 44 units, Tradjenta, metformin.  Plan   -Continue Lantus with sliding scale insulin.  -Plan to discontinue Farxiga on discharge  -Reassess glycemic control after he and add GLP-1 at (semaglutide) on discharge  Ambulatory dysfunction  Patient presents with fall from wheelchair.  Mentions that he was trying to move within the wheelchair when he had a fall.  Plan  - Reinforce fall precautions  - PT OT consulted.    VTE Pharmacologic  Prophylaxis: Lovenox subcutaneous injection 40 mg  Mobility:   Basic Mobility Inpatient Raw Score: 9  -HLM Goal: 3: Sit at edge of bed  JH-HLM Achieved: 2: Bed activities/Dependent transfer  JH-HLM Goal NOT achieved. Continue with multidisciplinary rounding and encourage appropriate mobility to improve upon JH-HLM goals.    Patient Centered Rounds: I performed bedside rounds with nursing staff today.   Discussions with Specialists or Other Care Team Provider:     Education and Discussions with Family / Patient: Updated  (wife) at bedside.    Current Length of Stay: 1 day(s)  Current Patient Status: Inpatient   Certification Statement: The patient will continue to require additional inpatient hospital stay due to metabolic encephalopathy secondary to UTI  Discharge Plan: Anticipate discharge in >72 hrs to rehab facility.    Code Status: Level 1 - Full Code    Subjective     Tha Weems is a 75-year-old male admitted for mental status in the setting of urinary retention and recurrent UTIs history of chronic Razo use post TURP, BPH, and multiple prior infections with Pseudomonas s/p 4 days of IV cefepime treatment in March 2025.  Facility ([facility]) was called directly 3 they reported that patient's baseline is confused, resides in the same room with his wife who also has dementia.  He had an unwitnessed forward fall in his room.  No changes in vitals or complaints prior to the fall.  At baseline he feeds self, does not stand independently, but is able to propel himself in his wheelchair.  Patient was seen and examined on the bedside.  He denied having any pain, lightheadedness, palpitations.  He denied any new complaints.                Objective :  Temp:  [97.7 °F (36.5 °C)-98 °F (36.7 °C)] 98 °F (36.7 °C)  HR:  [66-86] 83  BP: (109-168)/(51-77) 136/63  Resp:  [15-20] 16  SpO2:  [89 %-100 %] 98 %  O2 Device: None (Room air)    Body mass index is 24.1 kg/m².     Input and Output Summary (last  24 hours):     Intake/Output Summary (Last 24 hours) at 7/11/2025 1231  Last data filed at 7/11/2025 1201  Gross per 24 hour   Intake 550 ml   Output 1740 ml   Net -1190 ml       Physical Exam  Vitals and nursing note reviewed.   Constitutional:       General: He is not in acute distress.     Appearance: He is well-developed. He is ill-appearing.   HENT:      Head: Normocephalic and atraumatic.      Mouth/Throat:      Mouth: Mucous membranes are dry.     Eyes:      Conjunctiva/sclera: Conjunctivae normal.       Cardiovascular:      Rate and Rhythm: Normal rate and regular rhythm.      Heart sounds: No murmur heard.  Pulmonary:      Effort: Pulmonary effort is normal. No respiratory distress.      Breath sounds: Normal breath sounds.   Abdominal:      Palpations: Abdomen is soft.      Tenderness: There is no abdominal tenderness.   Genitourinary:     Comments: Folly catheter in place, draining cloudy urine     Musculoskeletal:         General: No swelling.      Cervical back: Neck supple.     Skin:     General: Skin is warm and dry.      Capillary Refill: Capillary refill takes less than 2 seconds.      Findings: Lesion (Erythematous lesion on the shin of R LE) present.     Neurological:      Mental Status: He is alert.           Lines/Drains:  Lines/Drains/Airways       Active Status       Name Placement date Placement time Site Days    Urethral Catheter Latex 18 Fr. 07/10/25  1649  -- less than 1                  Urinary Catheter:  Goal for removal: N/A - Chronic Razo                 Lab Results: I have reviewed the following results:   Results from last 7 days   Lab Units 07/11/25  0635   WBC Thousand/uL 10.97*   HEMOGLOBIN g/dL 12.4   HEMATOCRIT % 38.7   PLATELETS Thousands/uL 199   SEGS PCT % 80*   LYMPHO PCT % 8*   MONO PCT % 8   EOS PCT % 3     Results from last 7 days   Lab Units 07/11/25  0635   SODIUM mmol/L 141   POTASSIUM mmol/L 4.0   CHLORIDE mmol/L 104   CO2 mmol/L 33*   BUN mg/dL 20   CREATININE mg/dL  1.25   ANION GAP mmol/L 4   CALCIUM mg/dL 8.8   ALBUMIN g/dL 3.4*   TOTAL BILIRUBIN mg/dL 0.60   ALK PHOS U/L 55   ALT U/L 3*   AST U/L 9*   GLUCOSE RANDOM mg/dL 95         Results from last 7 days   Lab Units 07/11/25  1102 07/11/25  0725 07/10/25  2047 07/10/25  1755 07/10/25  1535   POC GLUCOSE mg/dl 151* 127 203* 103 85     Results from last 7 days   Lab Units 07/10/25  1303   HEMOGLOBIN A1C % 6.1*           Recent Cultures (last 7 days):           Last 24 Hours Medication List:     Current Facility-Administered Medications:     aspirin chewable tablet 81 mg, Daily    atorvastatin (LIPITOR) tablet 40 mg, Daily With Dinner    carbidopa-levodopa (SINEMET)  mg per tablet 1 tablet, TID    cefepime (MAXIPIME) 1,000 mg in dextrose 5 % 50 mL IVPB, Q12H, Last Rate: 1,000 mg (07/11/25 0233)    enoxaparin (LOVENOX) subcutaneous injection 40 mg, Daily    finasteride (PROSCAR) tablet 5 mg, Daily    insulin glargine (LANTUS) subcutaneous injection 44 Units 0.44 mL, QAM    insulin lispro (HumALOG/ADMELOG) 100 units/mL subcutaneous injection 1-5 Units, 4x Daily (PC & HS) **AND** Fingerstick Glucose (POCT), 4x Daily AC and at bedtime    metoprolol succinate (TOPROL-XL) 24 hr tablet 25 mg, Daily    pantoprazole (PROTONIX) EC tablet 40 mg, BID AC    polyethylene glycol (MIRALAX) packet 17 g, Daily    ranolazine (RANEXA) 12 hr tablet 500 mg, Q12H LIYAH    senna-docusate sodium (SENOKOT S) 8.6-50 mg per tablet 2 tablet, BID    sodium chloride 0.9 % infusion, Continuous, Last Rate: 75 mL/hr (07/11/25 0952)    tamsulosin (FLOMAX) capsule 0.4 mg, Daily With Dinner    Administrative Statements   Today, Patient Was Seen By: Irene Alvarez MD      **Please Note: This note may have been constructed using a voice recognition system.**

## 2025-07-11 NOTE — ASSESSMENT & PLAN NOTE
Likely secondary to Pseudomonas related UTI due to urinary retention  Patient is presenting from [facility] after sustaining a fall from wheelchair.    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   Previous ED admission in March for acute metabolic encephalopathy in the setting of UTI from Pseudomonas, received 4 days of IV cefepime.   UA shows large amount of leukocytes with occasional bacteria, more than 1000 glucose.    Plan:  -Continue cefepime for now.  Change antibiotics according to culture sensitivity.  -Urine culture pending  -Scrotal wall is erythematous, mildly tender, monitor for signs of early boston's   -Hold Farxiga during hospitalization, plan to discontinue upon discharge as SGLT2 inhibitors are linked with glucosuria.  SGLT2 inhibitors can cause recurrent UTI and also pose a risk for Boston's gangrene.  -Urology consulted, appreciate recommendations.

## 2025-07-12 LAB
ANION GAP SERPL CALCULATED.3IONS-SCNC: 6 MMOL/L (ref 4–13)
BASOPHILS # BLD AUTO: 0.05 THOUSANDS/ÂΜL (ref 0–0.1)
BASOPHILS NFR BLD AUTO: 1 % (ref 0–1)
BUN SERPL-MCNC: 18 MG/DL (ref 5–25)
CALCIUM SERPL-MCNC: 8.6 MG/DL (ref 8.4–10.2)
CHLORIDE SERPL-SCNC: 105 MMOL/L (ref 96–108)
CO2 SERPL-SCNC: 28 MMOL/L (ref 21–32)
CREAT SERPL-MCNC: 1.17 MG/DL (ref 0.6–1.3)
EOSINOPHIL # BLD AUTO: 0.42 THOUSAND/ÂΜL (ref 0–0.61)
EOSINOPHIL NFR BLD AUTO: 4 % (ref 0–6)
ERYTHROCYTE [DISTWIDTH] IN BLOOD BY AUTOMATED COUNT: 14.6 % (ref 11.6–15.1)
GFR SERPL CREATININE-BSD FRML MDRD: 60 ML/MIN/1.73SQ M
GLUCOSE SERPL-MCNC: 120 MG/DL (ref 65–140)
GLUCOSE SERPL-MCNC: 137 MG/DL (ref 65–140)
GLUCOSE SERPL-MCNC: 177 MG/DL (ref 65–140)
GLUCOSE SERPL-MCNC: 82 MG/DL (ref 65–140)
GLUCOSE SERPL-MCNC: 86 MG/DL (ref 65–140)
HCT VFR BLD AUTO: 37.1 % (ref 36.5–49.3)
HGB BLD-MCNC: 12.1 G/DL (ref 12–17)
IMM GRANULOCYTES # BLD AUTO: 0.12 THOUSAND/UL (ref 0–0.2)
IMM GRANULOCYTES NFR BLD AUTO: 1 % (ref 0–2)
LYMPHOCYTES # BLD AUTO: 0.94 THOUSANDS/ÂΜL (ref 0.6–4.47)
LYMPHOCYTES NFR BLD AUTO: 10 % (ref 14–44)
MCH RBC QN AUTO: 28.8 PG (ref 26.8–34.3)
MCHC RBC AUTO-ENTMCNC: 32.6 G/DL (ref 31.4–37.4)
MCV RBC AUTO: 88 FL (ref 82–98)
MONOCYTES # BLD AUTO: 0.83 THOUSAND/ÂΜL (ref 0.17–1.22)
MONOCYTES NFR BLD AUTO: 8 % (ref 4–12)
MRSA NOSE QL CULT: NORMAL
NEUTROPHILS # BLD AUTO: 7.58 THOUSANDS/ÂΜL (ref 1.85–7.62)
NEUTS SEG NFR BLD AUTO: 76 % (ref 43–75)
NRBC BLD AUTO-RTO: 0 /100 WBCS
PLATELET # BLD AUTO: 206 THOUSANDS/UL (ref 149–390)
PMV BLD AUTO: 10.3 FL (ref 8.9–12.7)
POTASSIUM SERPL-SCNC: 3.8 MMOL/L (ref 3.5–5.3)
RBC # BLD AUTO: 4.2 MILLION/UL (ref 3.88–5.62)
SODIUM SERPL-SCNC: 139 MMOL/L (ref 135–147)
WBC # BLD AUTO: 9.94 THOUSAND/UL (ref 4.31–10.16)

## 2025-07-12 PROCEDURE — 99232 SBSQ HOSP IP/OBS MODERATE 35: CPT | Performed by: INTERNAL MEDICINE

## 2025-07-12 PROCEDURE — 85025 COMPLETE CBC W/AUTO DIFF WBC: CPT | Performed by: INTERNAL MEDICINE

## 2025-07-12 PROCEDURE — 80048 BASIC METABOLIC PNL TOTAL CA: CPT | Performed by: INTERNAL MEDICINE

## 2025-07-12 PROCEDURE — 82948 REAGENT STRIP/BLOOD GLUCOSE: CPT

## 2025-07-12 RX ORDER — CEFUROXIME AXETIL 500 MG/1
500 TABLET ORAL EVERY 12 HOURS SCHEDULED
Start: 2025-07-12 | End: 2025-07-19

## 2025-07-12 RX ORDER — INSULIN GLARGINE 100 [IU]/ML
44 INJECTION, SOLUTION SUBCUTANEOUS
Start: 2025-07-12

## 2025-07-12 RX ADMIN — SENNOSIDES, DOCUSATE SODIUM 2 TABLET: 8.6; 5 TABLET ORAL at 10:59

## 2025-07-12 RX ADMIN — INSULIN LISPRO 1 UNITS: 100 INJECTION, SOLUTION INTRAVENOUS; SUBCUTANEOUS at 19:06

## 2025-07-12 RX ADMIN — FINASTERIDE 5 MG: 5 TABLET, FILM COATED ORAL at 10:58

## 2025-07-12 RX ADMIN — CARBIDOPA AND LEVODOPA 1 TABLET: 25; 100 TABLET ORAL at 10:58

## 2025-07-12 RX ADMIN — PANTOPRAZOLE SODIUM 40 MG: 40 TABLET, DELAYED RELEASE ORAL at 06:47

## 2025-07-12 RX ADMIN — SODIUM CHLORIDE 75 ML/HR: 0.9 INJECTION, SOLUTION INTRAVENOUS at 06:47

## 2025-07-12 RX ADMIN — CARBIDOPA AND LEVODOPA 1 TABLET: 25; 100 TABLET ORAL at 15:19

## 2025-07-12 RX ADMIN — RANOLAZINE 500 MG: 500 TABLET, FILM COATED, EXTENDED RELEASE ORAL at 22:14

## 2025-07-12 RX ADMIN — TAMSULOSIN HYDROCHLORIDE 0.4 MG: 0.4 CAPSULE ORAL at 19:06

## 2025-07-12 RX ADMIN — CEFEPIME 1000 MG: 1 INJECTION, POWDER, FOR SOLUTION INTRAMUSCULAR; INTRAVENOUS at 04:01

## 2025-07-12 RX ADMIN — CARBIDOPA AND LEVODOPA 1 TABLET: 25; 100 TABLET ORAL at 22:14

## 2025-07-12 RX ADMIN — ASPIRIN 81 MG: 81 TABLET, CHEWABLE ORAL at 10:58

## 2025-07-12 RX ADMIN — INSULIN GLARGINE 44 UNITS: 100 INJECTION, SOLUTION SUBCUTANEOUS at 10:59

## 2025-07-12 RX ADMIN — SENNOSIDES, DOCUSATE SODIUM 2 TABLET: 8.6; 5 TABLET ORAL at 19:06

## 2025-07-12 RX ADMIN — POLYETHYLENE GLYCOL 3350 17 G: 17 POWDER, FOR SOLUTION ORAL at 10:59

## 2025-07-12 RX ADMIN — PANTOPRAZOLE SODIUM 40 MG: 40 TABLET, DELAYED RELEASE ORAL at 15:19

## 2025-07-12 RX ADMIN — METOPROLOL SUCCINATE 25 MG: 25 TABLET, EXTENDED RELEASE ORAL at 11:04

## 2025-07-12 RX ADMIN — CEFEPIME 1000 MG: 1 INJECTION, POWDER, FOR SOLUTION INTRAMUSCULAR; INTRAVENOUS at 15:19

## 2025-07-12 RX ADMIN — ENOXAPARIN SODIUM 40 MG: 40 INJECTION SUBCUTANEOUS at 11:00

## 2025-07-12 RX ADMIN — RANOLAZINE 500 MG: 500 TABLET, FILM COATED, EXTENDED RELEASE ORAL at 10:59

## 2025-07-12 RX ADMIN — ATORVASTATIN CALCIUM 40 MG: 40 TABLET, FILM COATED ORAL at 19:06

## 2025-07-12 NOTE — ASSESSMENT & PLAN NOTE
Likely secondary to Pseudomonas related UTI due to urinary retention  Patient is presenting from [facility] after sustaining a fall from wheelchair.    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   Previous ED admission in March for acute metabolic encephalopathy in the setting of UTI from Pseudomonas, received 4 days of IV cefepime.   UA shows large amount of leukocytes with occasional bacteria, more than 1000 glucose.    Plan:  -Continue cefepime inpatient.   Change antibiotics according to culture sensitivity.  -Urine culture pending  -Scrotal wall is erythematous, mildly tender, monitor for signs of early boston's   -Hold Farxiga during hospitalization, plan to discontinue upon discharge as SGLT2 inhibitors are linked with glucosuria.  SGLT2 inhibitors can cause recurrent UTI and also pose a risk for Boston's gangrene.  -Urology consulted, appreciate recommendations.

## 2025-07-12 NOTE — CASE MANAGEMENT
Case Management Progress Note    Patient name Tha Weems  Location S /S -01 MRN [id number]  : 1950 Date 2025       LOS (days): 2  Geometric Mean LOS (GMLOS) (days): 3.8  Days to GMLOS:1.9        OBJECTIVE:        Current admission status: Inpatient  Preferred Pharmacy:   [facility] - [place], [place] - [address] [facility]  [address]  [address]  [facility] [address]  Phone: [phone] Fax: [phone]    [id number] - [place], [place] - [address] [address]  Phone: [phone] Fax: [phone]    Primary Care Provider: Geovanna Fuentes MD    Primary Insurance: [facility]  Secondary Insurance:     PROGRESS NOTE:  CM called [facility] several times to discuss the Pt's readiness to return to their facility today with no answer, a message left requesting a call back. CM will continue to follow.

## 2025-07-12 NOTE — ASSESSMENT & PLAN NOTE
Patient presents with fall from wheelchair.  Mentions that he was trying to move within the wheelchair when he had a fall.  Plan  - Reinforce fall precautions  - PT OT consulted.   Risks/benefits discussed with patient/surrogate

## 2025-07-12 NOTE — UTILIZATION REVIEW
Initial Clinical Review - Continued    SEE INITIAL REVIEW AT BOTTOM    Date: 7/12/25                      Day 3    Current Patient Class: Inpatient  Current Level of Care: Med Surg    HPI:75 y.o. male initially admitted on 7/10/2025 for Recurrent UTI.      Assessment/Plan:     Date: 7/12  Day 3: Has surpassed a 2nd midnight with active treatments and services. Continue IV cefepime. Follow urine culture. Scrotal wall is erythematous, mildly tender, monitor for signs of early boston's. Hold Farxiga during hospitalization, plan to discontinue upon discharge as SGLT2 inhibitors are linked with glucosuria. SGLT2 inhibitors can cause recurrent UTI and also pose a risk for Boston's gangrene. Continue Lantus with SSI. Fall precautions. PT/OT eval.       Medications:   Scheduled Medications:  aspirin, 81 mg, Oral, Daily  atorvastatin, 40 mg, Oral, Daily With Dinner  carbidopa-levodopa, 1 tablet, Oral, TID  cefepime, 1,000 mg, Intravenous, Q12H  enoxaparin, 40 mg, Subcutaneous, Daily  finasteride, 5 mg, Oral, Daily  insulin glargine, 44 Units, Subcutaneous, QAM  insulin lispro, 1-5 Units, Subcutaneous, 4x Daily (PC & HS)  metoprolol succinate, 25 mg, Oral, Daily  pantoprazole, 40 mg, Oral, BID AC  polyethylene glycol, 17 g, Oral, Daily  ranolazine, 500 mg, Oral, Q12H LIYAH  senna-docusate sodium, 2 tablet, Oral, BID  tamsulosin, 0.4 mg, Oral, Daily With Dinner      Continuous IV Infusions:  sodium chloride, 75 mL/hr, Intravenous, Continuous      PRN Meds:       Vital Signs:   Vital Signs (last 3 days)       Date/Time Temp Pulse Resp BP MAP (mmHg) SpO2 O2 Device Patient Position - Orthostatic VS Segundo Coma Scale Score Pain    07/12/25 07:09:01 98 °F (36.7 °C) 82 16 122/73 89 96 % -- -- -- --    07/12/25 0347 -- -- -- -- -- -- -- -- -- No Pain    07/11/25 22:22:21 98.3 °F (36.8 °C) 76 18 122/61 81 96 % -- -- -- No Pain    07/11/25 15:39:49 98.2 °F (36.8 °C) 72 17 136/65 89 99 % -- -- -- --    07/11/25 0800 -- -- -- -- -- --  -- -- 15 No Pain    07/11/25 07:26:06 -- 83 16 136/63 87 98 % -- -- -- --    07/10/25 2300 -- -- -- -- -- -- -- -- 15 --    07/10/25 2016 -- -- -- -- -- -- -- -- -- No Pain    07/10/25 20:03:49 98 °F (36.7 °C) 82 20 112/51 71 97 % -- -- -- --    07/10/25 1900 -- 79 16 109/54 78 98 % -- -- -- --    07/10/25 1830 -- 86 16 146/67 96 99 % -- -- -- --    07/10/25 1813 -- -- -- -- -- -- -- -- 15 --    07/10/25 1730 -- 82 16 157/74 106 97 % -- -- -- --    07/10/25 1650 -- 81 -- -- -- 98 % -- -- -- --    07/10/25 1645 -- 76 -- -- -- 98 % -- -- -- --    07/10/25 1640 -- 69 -- -- -- 97 % -- -- -- --    07/10/25 1635 -- 76 -- -- -- 98 % -- -- -- --    07/10/25 1630 -- 69 -- 144/69 -- 97 % -- -- -- --    07/10/25 1625 -- 66 -- -- -- 97 % -- -- -- --    07/10/25 1620 -- 69 -- -- -- 96 % -- -- -- --    07/10/25 1615 -- 69 -- -- -- 98 % -- -- -- --    07/10/25 1610 -- 72 -- -- -- 97 % -- -- -- --    07/10/25 1605 -- 77 -- -- -- 98 % -- -- -- --    07/10/25 1600 -- 73 16 141/65 93 97 % None (Room air) -- 15 --    07/10/25 1555 -- 73 -- -- -- 97 % -- -- -- --    07/10/25 1550 -- 71 -- -- -- 97 % -- -- -- --    07/10/25 1545 -- 70 -- -- -- 96 % -- -- -- --    07/10/25 1540 -- 72 -- -- -- 97 % -- -- -- --    07/10/25 1535 -- 73 -- -- -- 97 % -- -- -- --    07/10/25 1530 -- 68 16 113/59 -- 98 % None (Room air) Lying 15 --    07/10/25 1525 -- 69 -- -- -- 98 % -- -- -- --    07/10/25 1520 -- 73 -- -- -- 98 % -- -- -- --    07/10/25 1515 -- 71 -- -- -- 97 % -- -- -- --    07/10/25 1510 -- 68 -- -- -- 98 % -- -- -- --    07/10/25 1505 -- 70 -- -- -- 98 % -- -- -- --    07/10/25 1500 -- 71 16 115/57 -- 98 % None (Room air) Lying 15 --    07/10/25 1455 -- 72 -- -- -- 97 % -- -- -- --    07/10/25 1450 -- 69 -- -- -- 98 % -- -- -- --    07/10/25 1445 -- 69 -- -- -- 99 % -- -- -- --    07/10/25 1440 -- 71 -- -- -- 97 % -- -- -- --    07/10/25 1435 -- 76 -- -- -- 97 % -- -- -- --    07/10/25 1430 -- 73 16 130/62 -- 98 % None (Room air) Lying 15  5    07/10/25 1425 -- 73 -- -- -- 98 % -- -- -- --    07/10/25 1415 -- 75 -- -- -- 89 % -- -- -- --    07/10/25 1410 -- 76 -- -- -- 98 % -- -- -- --    07/10/25 1405 -- 74 -- -- -- 98 % -- -- -- --    07/10/25 1400 -- 82 15 165/77 -- 97 % None (Room air) Lying 15 --    07/10/25 1355 -- 75 -- -- -- 99 % -- -- -- --    07/10/25 1350 -- 78 -- 151/74 -- 100 % -- -- -- --    07/10/25 1345 -- -- -- 145/68 -- -- -- -- -- --    07/10/25 1330 -- 78 16 150/73 -- 99 % None (Room air) Lying 15 --    07/10/25 1315 -- 74 16 149/67 -- 98 % None (Room air) Sitting 15 --    07/10/25 1310 -- 78 -- -- -- 98 % -- -- -- --    07/10/25 1305 -- 73 -- -- -- 98 % -- -- -- --    07/10/25 1300 -- 73 16 159/74 -- 99 % None (Room air) -- 15 5    07/10/25 1255 -- 76 -- -- -- 99 % -- -- -- --    07/10/25 1250 -- 77 -- -- -- 99 % -- -- -- --    07/10/25 12:45:05 -- -- -- -- -- -- -- -- 15 --    07/10/25 1245 -- 75 16 168/76 -- 98 % None (Room air) -- 15 --    07/10/25 1240 -- 77 -- -- -- 99 % -- -- -- --    07/10/25 12:36:09 97.7 °F (36.5 °C) 76 17 161/75 -- 99 % None (Room air) Sitting -- --    07/10/25 1235 -- 75 -- 161/75 -- 98 % -- -- -- --              Pertinent Labs/Diagnostic Results:   Radiology:  XR knee 4+ views left injury   Final Interpretation by Pako Hardin MD (07/11 0911)      Tricompartmental osteoarthritis with small joint effusion. No fracture or dislocation seen         Computerized Assisted Algorithm (CAA) may have been used to analyze all applicable images.         Workstation performed: [id number]         XR hip/pelv 2-3 vws left   Final Interpretation by Pako Hardin MD (07/11 0910)      No acute osseous abnormality.   Arthritis in the hips and lower lumbar spine.      Computerized Assisted Algorithm (CAA) may have been used to analyze all applicable images.            Workstation performed: [id number]         TRAUMA - CT head wo contrast   Final Interpretation by E. Alec Schoenberger, MD (07/10  1350)      No acute intracranial abnormality.                  Workstation performed: [id number]         TRAUMA - CT spine cervical wo contrast   Final Interpretation by E. Alec Schoenberger, MD (07/10 1351)      No cervical spine fracture or traumatic malalignment.                  Workstation performed: [id number]         TRAUMA - CT chest abdomen pelvis w contrast   Final Interpretation by E. Alec Schoenberger, MD (07/10 1408)      No acute intrathoracic or intra-abdominal injury.   Stable nonemergent findings as above.         Computerized Assisted Algorithm (CAA) may have aided analysis of applicable images.      Workstation performed: [id number]           Cardiology:  No orders to display     GI:  No orders to display         Results from last 7 days   Lab Units 07/12/25  0423 07/11/25  0635 07/10/25  1303   WBC Thousand/uL 9.94 10.97* 10.38*   HEMOGLOBIN g/dL 12.1 12.4 14.2   HEMATOCRIT % 37.1 38.7 42.8   PLATELETS Thousands/uL 206 199 246   TOTAL NEUT ABS Thousands/µL 7.58 8.66* 8.13*         Results from last 7 days   Lab Units 07/12/25  0423 07/11/25  0635 07/10/25  1258   SODIUM mmol/L 139 141 140   POTASSIUM mmol/L 3.8 4.0 3.7   CHLORIDE mmol/L 105 104 101   CO2 mmol/L 28 33* 31   ANION GAP mmol/L 6 4 8   BUN mg/dL 18 20 20   CREATININE mg/dL 1.17 1.25 1.07   EGFR ml/min/1.73sq m 60 55 67   CALCIUM mg/dL 8.6 8.8 9.1     Results from last 7 days   Lab Units 07/11/25  0635 07/10/25  1258   AST U/L 9* 10*   ALT U/L 3* <3*   ALK PHOS U/L 55 63   TOTAL PROTEIN g/dL 6.1* 7.1   ALBUMIN g/dL 3.4* 3.9   TOTAL BILIRUBIN mg/dL 0.60 0.77     Results from last 7 days   Lab Units 07/12/25  1047 07/12/25  0708 07/11/25  2020 07/11/25  1539 07/11/25  1102 07/11/25  0725 07/10/25  2047 07/10/25  1755 07/10/25  1535   POC GLUCOSE mg/dl 137 86 150* 122 151* 127 203* 103 85     Results from last 7 days   Lab Units 07/12/25  0423 07/11/25  0635 07/10/25  1258   GLUCOSE RANDOM mg/dL 82 95 76         Results from last 7 days    Lab Units 07/10/25  1303   HEMOGLOBIN A1C % 6.1*   EAG mg/dl 128           Results from last 7 days   Lab Units 07/10/25  1258   CK TOTAL U/L 28*           Results from last 7 days   Lab Units 07/10/25  1423   CLARITY UA  Extra Turbid   COLOR UA  Yellow   SPEC GRAV UA  1.031*   PH UA  5.5   GLUCOSE UA mg/dl >=1000 (1%)*   KETONES UA mg/dl Negative   BLOOD UA  Moderate*   PROTEIN UA mg/dl Trace*   NITRITE UA  Negative   BILIRUBIN UA  Negative   UROBILINOGEN UA (BE) mg/dl <2.0   LEUKOCYTES UA  Large*   WBC UA /hpf Innumerable*   RBC UA /hpf 1-2   BACTERIA UA /hpf Occasional   EPITHELIAL CELLS WET PREP /hpf None Seen           Results from last 7 days   Lab Units 07/10/25  1423   URINE CULTURE  Culture results to follow.           Network Utilization Review Department  ATTENTION: Please call with any questions or concerns to [phone] and carefully listen to the prompts so that you are directed to the right person. All voicemails are confidential.   For Discharge needs, contact Care Management DC Support Team at [phone] opt. 2  Send all requests for admission clinical reviews, approved or denied determinations and any other requests to dedicated fax number below belonging to the campus where the patient is receiving treatment. List of dedicated fax numbers for the Facilities:  FACILITY NAME UR FAX NUMBER   ADMISSION DENIALS (Administrative/Medical Necessity) [phone]   DISCHARGE SUPPORT TEAM (NETWORK) [phone]   PARENT CHILD HEALTH (Maternity/NICU/Pediatrics) [phone]   [facility] [phone]   [facility] [phone]   [facility] [phone]   [facility] [phone]   [facility] [phone]   [facility] [phone]   [facility] [phone]   [facility] [phone]   [facility]  [facility] [phone]   [facility] [phone]   [facility] [phone]   [facility] [phone]

## 2025-07-12 NOTE — ASSESSMENT & PLAN NOTE
Likely secondary to Pseudomonas related UTI due to urinary retention  Patient is presenting from [facility] after sustaining a fall from wheelchair.    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   Previous ED admission in March for acute metabolic encephalopathy in the setting of UTI from Pseudomonas, received 4 days of IV cefepime.   UA shows large amount of leukocytes with occasional bacteria, more than 1000 glucose.    Plan:  -Continue cefepime inpatient.   Change antibiotics according to culture sensitivity.  -Urine culture pending  -Scrotal wall is erythematous, mildly tender, monitor for signs of early boston's   -Hold Farxiga during hospitalization, plan to discontinue upon discharge as SGLT2 inhibitors are linked with glucosuria.  SGLT2 inhibitors can cause recurrent UTI and also pose a risk for Boston's gangrene.  -Urology consulted, appreciate recommendations.

## 2025-07-12 NOTE — PLAN OF CARE
Problem: Potential for Falls  Goal: Patient will remain free of falls  Description: INTERVENTIONS:  - Educate patient/family on patient safety including physical limitations  - Instruct patient to call for assistance with activity   - Consider consulting OT/PT to assist with strengthening/mobility based on AM PAC & JH-HLM score  - Consult OT/PT to assist with strengthening/mobility   - Keep Call bell within reach  - Keep bed low and locked with side rails adjusted as appropriate  - Keep care items and personal belongings within reach  - Initiate and maintain comfort rounds  - Make Fall Risk Sign visible to staff  - Apply yellow socks and bracelet for high fall risk patients  - Consider moving patient to room near nurses station  Outcome: Progressing     Problem: INFECTION - ADULT  Goal: Absence or prevention of progression during hospitalization  Description: INTERVENTIONS:  - Assess and monitor for signs and symptoms of infection  - Monitor lab/diagnostic results  - Monitor all insertion sites, i.e. indwelling lines, tubes, and drains  - Monitor endotracheal if appropriate and nasal secretions for changes in amount and color  - Clifford appropriate cooling/warming therapies per order  - Administer medications as ordered  - Instruct and encourage patient and family to use good hand hygiene technique  - Identify and instruct in appropriate isolation precautions for identified infection/condition  Outcome: Progressing  Goal: Absence of fever/infection during neutropenic period  Description: INTERVENTIONS:  - Monitor WBC  - Perform strict hand hygiene  - Limit to healthy visitors only  - No plants, dried, fresh or silk flowers with cochran in patient room  Outcome: Progressing

## 2025-07-12 NOTE — PROGRESS NOTES
Progress Note - Hospitalist   Name: Tha Weems 75 y.o. male I MRN: [id number]  Unit/Bed#: S -01 I Date of Admission: 7/10/2025   Date of Service: 7/12/2025 I Hospital Day: 2    Assessment & Plan  Recurrent UTI  Metabolic encephalopathy  Likely secondary to Pseudomonas related UTI due to urinary retention  Patient is presenting from [facility] after sustaining a fall from wheelchair.    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   Previous ED admission in March for acute metabolic encephalopathy in the setting of UTI from Pseudomonas, received 4 days of IV cefepime.   UA shows large amount of leukocytes with occasional bacteria, more than 1000 glucose.    Plan:  -Continue cefepime inpatient.   Change antibiotics according to culture sensitivity.  -Urine culture pending  -Scrotal wall is erythematous, mildly tender, monitor for signs of early boston's   -Hold Farxiga during hospitalization, plan to discontinue upon discharge as SGLT2 inhibitors are linked with glucosuria.  SGLT2 inhibitors can cause recurrent UTI and also pose a risk for Boston's gangrene.  -Urology consulted, appreciate recommendations.    Type 2 diabetes mellitus with diabetic neuropathy (HCC)  Lab Results   Component Value Date    HGBA1C 6.1 (H) 07/10/2025       Recent Labs     07/11/25  0725 07/11/25  1102 07/11/25  1539 07/11/25 2020   POCGLU 127 151* 122 150*       Blood Sugar Average: Last 72 hrs:  (P) 134.5530530064065265  HbA1c 6.1  Home dose includes Lantus 44 units, Tradjenta, metformin.  Plan   -Continue Lantus with sliding scale insulin.  -Plan to discontinue Farxiga on discharge  -Reassess glycemic control after he and add GLP-1 at (semaglutide) on discharge  Ambulatory dysfunction  Patient presents with fall from wheelchair.  Mentions that he was trying to move within the wheelchair when he had a fall.  Plan  - Reinforce fall precautions  - PT OT  consulted.    VTE Pharmacologic Prophylaxis: VTE Score: 4 Moderate Risk (Score 3-4) - Pharmacological DVT Prophylaxis Ordered: enoxaparin (Lovenox).    Mobility:   Basic Mobility Inpatient Raw Score: 9  -HLM Goal: 3: Sit at edge of bed  JH-HLM Achieved: 2: Bed activities/Dependent transfer  JH-HLM Goal NOT achieved. Continue with multidisciplinary rounding and encourage appropriate mobility to improve upon JH-HLM goals.    Patient Centered Rounds: I performed bedside rounds with nursing staff today.   Discussions with Specialists or Other Care Team Provider: None    Education and Discussions with Family / Patient: Updated  (wife) via phone.    Current Length of Stay: 2 day(s)  Current Patient Status: Inpatient   Certification Statement: The patient will continue to require additional inpatient hospital stay due to recurrent UTI  Discharge Plan: Anticipate discharge in 24-48 hrs to rehab facility.    Code Status: Level 1 - Full Code    Subjective   No acute event overnight.  This morning patient was lying peacefully in bed.  Had no complaint.    Objective :  Temp:  [98.2 °F (36.8 °C)-98.3 °F (36.8 °C)] 98.3 °F (36.8 °C)  HR:  [72-83] 76  BP: (122-136)/(61-65) 122/61  Resp:  [16-18] 18  SpO2:  [96 %-99 %] 96 %    Body mass index is 24.1 kg/m².     Input and Output Summary (last 24 hours):     Intake/Output Summary (Last 24 hours) at 7/12/2025 0609  Last data filed at 7/12/2025 0419  Gross per 24 hour   Intake 0 ml   Output 2200 ml   Net -2200 ml       Physical Exam  Vitals and nursing note reviewed.   Constitutional:       General: He is not in acute distress.     Appearance: He is well-developed.   HENT:      Head: Normocephalic and atraumatic.     Eyes:      Conjunctiva/sclera: Conjunctivae normal.       Cardiovascular:      Rate and Rhythm: Normal rate and regular rhythm.      Heart sounds: No murmur heard.  Pulmonary:      Effort: Pulmonary effort is normal. No respiratory distress.      Breath  sounds: Normal breath sounds.   Abdominal:      Palpations: Abdomen is soft.      Tenderness: There is no abdominal tenderness.   Genitourinary:     Comments: Razo in placed     Musculoskeletal:         General: No swelling.      Cervical back: Neck supple.     Skin:     General: Skin is warm and dry.      Capillary Refill: Capillary refill takes less than 2 seconds.      Findings: Erythema (improved) present.     Neurological:      Mental Status: He is alert. Mental status is at baseline.     Psychiatric:         Mood and Affect: Mood normal.           Lines/Drains:  Lines/Drains/Airways       Active Status       Name Placement date Placement time Site Days    Urethral Catheter Latex 18 Fr. 07/10/25  1649  -- 1                  Urinary Catheter:  Goal for removal: N/A - Chronic Razo                 Lab Results: I have reviewed the following results:   Results from last 7 days   Lab Units 07/12/25  0423   WBC Thousand/uL 9.94   HEMOGLOBIN g/dL 12.1   HEMATOCRIT % 37.1   PLATELETS Thousands/uL 206   SEGS PCT % 76*   LYMPHO PCT % 10*   MONO PCT % 8   EOS PCT % 4     Results from last 7 days   Lab Units 07/12/25  0423 07/11/25  0635   SODIUM mmol/L 139 141   POTASSIUM mmol/L 3.8 4.0   CHLORIDE mmol/L 105 104   CO2 mmol/L 28 33*   BUN mg/dL 18 20   CREATININE mg/dL 1.17 1.25   ANION GAP mmol/L 6 4   CALCIUM mg/dL 8.6 8.8   ALBUMIN g/dL  --  3.4*   TOTAL BILIRUBIN mg/dL  --  0.60   ALK PHOS U/L  --  55   ALT U/L  --  3*   AST U/L  --  9*   GLUCOSE RANDOM mg/dL 82 95         Results from last 7 days   Lab Units 07/11/25  2020 07/11/25  1539 07/11/25  1102 07/11/25  0725 07/10/25  2047 07/10/25  1755 07/10/25  1535   POC GLUCOSE mg/dl 150* 122 151* 127 203* 103 85     Results from last 7 days   Lab Units 07/10/25  1303   HEMOGLOBIN A1C % 6.1*           Recent Cultures (last 7 days):   Results from last 7 days   Lab Units 07/10/25  1423   URINE CULTURE  Culture too young- will reincubate       Imaging Results Review: I  reviewed radiology reports from this admission including: xray(s).  Other Study Results Review: EKG was reviewed.     Last 24 Hours Medication List:     Current Facility-Administered Medications:     aspirin chewable tablet 81 mg, Daily    atorvastatin (LIPITOR) tablet 40 mg, Daily With Dinner    carbidopa-levodopa (SINEMET)  mg per tablet 1 tablet, TID    cefepime (MAXIPIME) 1,000 mg in dextrose 5 % 50 mL IVPB, Q12H, Last Rate: 1,000 mg (07/12/25 0401)    enoxaparin (LOVENOX) subcutaneous injection 40 mg, Daily    finasteride (PROSCAR) tablet 5 mg, Daily    insulin glargine (LANTUS) subcutaneous injection 44 Units 0.44 mL, QAM    insulin lispro (HumALOG/ADMELOG) 100 units/mL subcutaneous injection 1-5 Units, 4x Daily (PC & HS) **AND** Fingerstick Glucose (POCT), 4x Daily AC and at bedtime    metoprolol succinate (TOPROL-XL) 24 hr tablet 25 mg, Daily    pantoprazole (PROTONIX) EC tablet 40 mg, BID AC    polyethylene glycol (MIRALAX) packet 17 g, Daily    ranolazine (RANEXA) 12 hr tablet 500 mg, Q12H LIYAH    senna-docusate sodium (SENOKOT S) 8.6-50 mg per tablet 2 tablet, BID    sodium chloride 0.9 % infusion, Continuous, Last Rate: 75 mL/hr (07/11/25 0952)    tamsulosin (FLOMAX) capsule 0.4 mg, Daily With Dinner    Administrative Statements   Today, Patient Was Seen By: Kaitlin Arellano MD      **Please Note: This note may have been constructed using a voice recognition system.**

## 2025-07-12 NOTE — ASSESSMENT & PLAN NOTE
Lab Results   Component Value Date    HGBA1C 6.1 (H) 07/10/2025       Recent Labs     07/11/25  0725 07/11/25  1102 07/11/25  1539 07/11/25 2020   POCGLU 127 151* 122 150*       Blood Sugar Average: Last 72 hrs:  (P) 134.1965832118356870  HbA1c 6.1  Home dose includes Lantus 44 units, Tradjenta, metformin.  Plan   -Continue Lantus with sliding scale insulin.  -Plan to discontinue Farxiga on discharge  -Reassess glycemic control after he and add GLP-1 at (semaglutide) on discharge

## 2025-07-13 LAB
GLUCOSE SERPL-MCNC: 125 MG/DL (ref 65–140)
GLUCOSE SERPL-MCNC: 166 MG/DL (ref 65–140)
GLUCOSE SERPL-MCNC: 170 MG/DL (ref 65–140)
GLUCOSE SERPL-MCNC: 99 MG/DL (ref 65–140)

## 2025-07-13 PROCEDURE — 99232 SBSQ HOSP IP/OBS MODERATE 35: CPT | Performed by: INTERNAL MEDICINE

## 2025-07-13 PROCEDURE — 82948 REAGENT STRIP/BLOOD GLUCOSE: CPT

## 2025-07-13 RX ORDER — INSULIN GLARGINE 100 [IU]/ML
40 INJECTION, SOLUTION SUBCUTANEOUS EVERY MORNING
Qty: 10 ML | Refills: 0 | Status: CANCELLED | OUTPATIENT
Start: 2025-07-14

## 2025-07-13 RX ADMIN — POLYETHYLENE GLYCOL 3350 17 G: 17 POWDER, FOR SOLUTION ORAL at 09:03

## 2025-07-13 RX ADMIN — FINASTERIDE 5 MG: 5 TABLET, FILM COATED ORAL at 09:02

## 2025-07-13 RX ADMIN — CEFEPIME 1000 MG: 1 INJECTION, POWDER, FOR SOLUTION INTRAMUSCULAR; INTRAVENOUS at 03:20

## 2025-07-13 RX ADMIN — PANTOPRAZOLE SODIUM 40 MG: 40 TABLET, DELAYED RELEASE ORAL at 17:10

## 2025-07-13 RX ADMIN — RANOLAZINE 500 MG: 500 TABLET, FILM COATED, EXTENDED RELEASE ORAL at 09:02

## 2025-07-13 RX ADMIN — ATORVASTATIN CALCIUM 40 MG: 40 TABLET, FILM COATED ORAL at 17:10

## 2025-07-13 RX ADMIN — CARBIDOPA AND LEVODOPA 1 TABLET: 25; 100 TABLET ORAL at 17:10

## 2025-07-13 RX ADMIN — TAMSULOSIN HYDROCHLORIDE 0.4 MG: 0.4 CAPSULE ORAL at 17:10

## 2025-07-13 RX ADMIN — INSULIN GLARGINE 44 UNITS: 100 INJECTION, SOLUTION SUBCUTANEOUS at 09:06

## 2025-07-13 RX ADMIN — ENOXAPARIN SODIUM 40 MG: 40 INJECTION SUBCUTANEOUS at 09:03

## 2025-07-13 RX ADMIN — CEFEPIME 1000 MG: 1 INJECTION, POWDER, FOR SOLUTION INTRAMUSCULAR; INTRAVENOUS at 13:53

## 2025-07-13 RX ADMIN — SENNOSIDES, DOCUSATE SODIUM 2 TABLET: 8.6; 5 TABLET ORAL at 17:12

## 2025-07-13 RX ADMIN — RANOLAZINE 500 MG: 500 TABLET, FILM COATED, EXTENDED RELEASE ORAL at 21:08

## 2025-07-13 RX ADMIN — CARBIDOPA AND LEVODOPA 1 TABLET: 25; 100 TABLET ORAL at 21:08

## 2025-07-13 RX ADMIN — ASPIRIN 81 MG: 81 TABLET, CHEWABLE ORAL at 09:02

## 2025-07-13 RX ADMIN — SODIUM CHLORIDE 75 ML/HR: 0.9 INJECTION, SOLUTION INTRAVENOUS at 03:20

## 2025-07-13 RX ADMIN — SENNOSIDES, DOCUSATE SODIUM 2 TABLET: 8.6; 5 TABLET ORAL at 09:02

## 2025-07-13 RX ADMIN — CARBIDOPA AND LEVODOPA 1 TABLET: 25; 100 TABLET ORAL at 09:02

## 2025-07-13 RX ADMIN — INSULIN LISPRO 1 UNITS: 100 INJECTION, SOLUTION INTRAVENOUS; SUBCUTANEOUS at 11:35

## 2025-07-13 RX ADMIN — INSULIN LISPRO 1 UNITS: 100 INJECTION, SOLUTION INTRAVENOUS; SUBCUTANEOUS at 17:12

## 2025-07-13 RX ADMIN — PANTOPRAZOLE SODIUM 40 MG: 40 TABLET, DELAYED RELEASE ORAL at 06:11

## 2025-07-13 RX ADMIN — METOPROLOL SUCCINATE 25 MG: 25 TABLET, EXTENDED RELEASE ORAL at 09:02

## 2025-07-13 NOTE — PLAN OF CARE
Problem: Potential for Falls  Goal: Patient will remain free of falls  Description: INTERVENTIONS:  - Educate patient/family on patient safety including physical limitations  - Instruct patient to call for assistance with activity   - Consider consulting OT/PT to assist with strengthening/mobility based on AM PAC & JH-HLM score  - Consult OT/PT to assist with strengthening/mobility   - Keep Call bell within reach  - Keep bed low and locked with side rails adjusted as appropriate  - Keep care items and personal belongings within reach  - Initiate and maintain comfort rounds  - Make Fall Risk Sign visible to staff  - Offer Toileting every 2 Hours, in advance of need  - Initiate/Maintain bed alarm  - Obtain necessary fall risk management equipment  Problem: INFECTION - ADULT  Goal: Absence or prevention of progression during hospitalization  Description: INTERVENTIONS:  - Assess and monitor for signs and symptoms of infection  - Monitor lab/diagnostic results  - Monitor all insertion sites, i.e. indwelling lines, tubes, and drains  - Monitor endotracheal if appropriate and nasal secretions for changes in amount and color  - [place] appropriate cooling/warming therapies per order  - Administer medications as ordered  - Instruct and encourage patient and family to use good hand hygiene technique  - Identify and instruct in appropriate isolation precautions for identified infection/condition  Outcome: Progressing  Goal: Absence of fever/infection during neutropenic period  Description: INTERVENTIONS:  - Monitor WBC  - Perform strict hand hygiene  - Limit to healthy visitors only  - No plants, dried, fresh or silk flowers with cochran in patient room  Outcome: Progressing     Problem: GENITOURINARY - ADULT  Goal: Maintains or returns to baseline urinary function  Description: INTERVENTIONS:  - Assess urinary function  - Encourage oral fluids to ensure adequate hydration if ordered  - Administer IV fluids as ordered to  ensure adequate hydration  - Administer ordered medications as needed  - Offer frequent toileting  - Follow urinary retention protocol if ordered  Outcome: Progressing  Goal: Absence of urinary retention  Description: INTERVENTIONS:  - Assess patient’s ability to void and empty bladder  - Monitor I/O  - Bladder scan as needed  - Discuss with physician/AP medications to alleviate retention as needed  - Discuss catheterization for long term situations as appropriate  Outcome: Progressing  Goal: Urinary catheter remains patent  Description: INTERVENTIONS:  - Assess patency of urinary catheter  - If patient has a chronic wilkerson, consider changing catheter if non-functioning  - Follow guidelines for intermittent irrigation of non-functioning urinary catheter  Outcome: Progressing     - Apply yellow socks and bracelet for high fall risk patients  - Consider moving patient to room near nurses station  Outcome: Progressing     Problem: Prexisting or High Potential for Compromised Skin Integrity  Goal: Skin integrity is maintained or improved  Description: INTERVENTIONS:  - Identify patients at risk for skin breakdown  - Assess and monitor skin integrity including under and around medical devices   - Assess and monitor nutrition and hydration status  - Monitor labs  - Assess for incontinence   - Turn and reposition patient  - Assist with mobility/ambulation  - Relieve pressure over mili prominences   - Avoid friction and shearing  - Provide appropriate hygiene as needed including keeping skin clean and dry  - Evaluate need for skin moisturizer/barrier cream  - Collaborate with interdisciplinary team  - Patient/family teaching  - Consider wound care consult    Assess:  - Review Rahat scale daily  - Clean and moisturize skin every shift  - Inspect skin when repositioning, toileting, and assisting with ADLS  - Assess under medical devices   - Assess extremities for adequate circulation and sensation     Bed Management:  - Have  minimal linens on bed & keep smooth, unwrinkled  - Change linens as needed when moist or perspiring  - Avoid sitting or lying in one position for more than 2 hours while in bed?- Toileting:  - Offer bedside commode  - Assess for incontinence every 2 hours  - Use incontinent care products after each incontinent episode    Activity:  - Encourage activity and walks on unit  - Encourage or provide ROM exercises   - Turn and reposition patient every 2 Hours  - Use appropriate equipment to lift or move patient in bed    Skin Care:  - Avoid use of baby powder, tape, friction and shearing, hot water or constrictive clothing  - Do not massage red bony areas    Outcome: Progressing

## 2025-07-13 NOTE — ASSESSMENT & PLAN NOTE
Lab Results   Component Value Date    HGBA1C 6.1 (H) 07/10/2025       Recent Labs     07/12/25  1501 07/12/25  2047 07/13/25  0705 07/13/25  1042   POCGLU 177* 120 99 166*       Blood Sugar Average: Last 72 hrs:  (P) 132.7411650992323533  HbA1c 6.1  Home dose includes Lantus 44 units, Tradjenta, metformin.  Plan   -Continue Lantus with sliding scale insulin.  -Plan to discontinue Farxiga on discharge  -Reassess glycemic control after he and add GLP-1 at (semaglutide) on discharge

## 2025-07-13 NOTE — CASE MANAGEMENT
Case Management Progress Note    Patient name Tha Weems  Location S /S -01 MRN [id number]  : 1950 Date 2025       LOS (days): 3  Geometric Mean LOS (GMLOS) (days): 3.8  Days to GMLOS:1        OBJECTIVE:        Current admission status: Inpatient  Preferred Pharmacy:   [facility] - [facility] [place] - [address] [facility]  [address]  [facility] [address]  Phone: [phone] Fax: [phone]    [id number] - [place], [place] - [address] [address]  Phone: [phone] Fax: [phone]    Primary Care Provider: Geovanna Fuentes MD    Primary Insurance: [facility]  Secondary Insurance:     PROGRESS NOTE:  CM made several attempts once again to contact someone at [facility] [phone] regarding the Pt's readiness to return to their facility, message was left, along with a fax.    CM also made [facility] on call RN Stella aware of the CM failed attempts to get the Pt d/c back to [facility]. Stella reported she will also try to reach someone at [facility] and follow up with STACY.

## 2025-07-13 NOTE — ASSESSMENT & PLAN NOTE
Likely secondary to Pseudomonas related UTI due to urinary retention  Patient is presenting from [facility] after sustaining a fall from wheelchair.    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   Previous ED admission in March for acute metabolic encephalopathy in the setting of UTI from Pseudomonas, received 4 days of IV cefepime.   UA shows large amount of leukocytes with occasional bacteria, more than 1000 glucose.  7/13-waiting for urine culture, net I/O: -4140ml,     Plan:  7/11-Continue cefepime inpatient. Change antibiotics according to culture sensitivity.  7/11 -Scrotal wall is erythematous, mildly tender, monitor for signs of early boston's   -Hold Farxiga during hospitalization, plan to discontinue upon discharge as SGLT2 inhibitors are linked with glucosuria.  SGLT2 inhibitors can cause recurrent UTI and also pose a risk for Boston's gangrene.  - 7/11 - urology-recommended consider out -patient trial of void or have pubic catheter in place due to ventral erosion history from prior Razo catheter use  7/13-considering discharge today but could not because contact with [facility] where patient lives could not be made even after several attempts by  and care coordination team.  Planning to discharge on 7/14/2025

## 2025-07-13 NOTE — PROGRESS NOTES
Progress Note - Hospitalist   Name: Tha Weems 75 y.o. male I MRN: [id number]  Unit/Bed#: S -01 I Date of Admission: 7/10/2025   Date of Service: 7/13/2025 I Hospital Day: 3    Assessment & Plan  Recurrent UTI  Metabolic encephalopathy  Likely secondary to Pseudomonas related UTI due to urinary retention  Patient is presenting from [facility] after sustaining a fall from wheelchair.    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   Previous ED admission in March for acute metabolic encephalopathy in the setting of UTI from Pseudomonas, received 4 days of IV cefepime.   UA shows large amount of leukocytes with occasional bacteria, more than 1000 glucose.  7/13-waiting for urine culture, net I/O: -4140ml,     Plan:  7/11-Continue cefepime inpatient. Change antibiotics according to culture sensitivity.  7/11 -Scrotal wall is erythematous, mildly tender, monitor for signs of early boston's   -Hold Farxiga during hospitalization, plan to discontinue upon discharge as SGLT2 inhibitors are linked with glucosuria.  SGLT2 inhibitors can cause recurrent UTI and also pose a risk for Boston's gangrene.  - 7/11 - urology-recommended consider out -patient trial of void or have pubic catheter in place due to ventral erosion history from prior Razo catheter use  7/13-considering discharge today but could not because contact with [facility] facility where patient lives could not be made even after several attempts by  and care coordination team.  Planning to discharge on 7/14/2025    Type 2 diabetes mellitus with diabetic neuropathy (HCC)  Lab Results   Component Value Date    HGBA1C 6.1 (H) 07/10/2025       Recent Labs     07/12/25  1501 07/12/25  2047 07/13/25  0705 07/13/25  1042   POCGLU 177* 120 99 166*       Blood Sugar Average: Last 72 hrs:  (P) 132.8683077978334434  HbA1c 6.1  Home dose includes Lantus 44 units, Tradjenta,  metformin.  Plan   -Continue Lantus with sliding scale insulin.  -Plan to discontinue Farxiga on discharge  -Reassess glycemic control after he and add GLP-1 at (semaglutide) on discharge  Ambulatory dysfunction  Patient presents with fall from wheelchair.  Mentions that he was trying to move within the wheelchair when he had a fall.  Plan  - Reinforce fall precautions  - PT OT consulted.    VTE Pharmacologic Prophylaxis: VTE Score: 4 Moderate Risk (Score 3-4) - Pharmacological DVT Prophylaxis Ordered: enoxaparin (Lovenox).    Mobility:   Basic Mobility Inpatient Raw Score: 11  -HLM Goal: 4: Move to chair/commode  JH-HLM Achieved: 2: Bed activities/Dependent transfer  JH-HLM Goal NOT achieved. Continue with multidisciplinary rounding and encourage appropriate mobility to improve upon -HLM goals.    Discussions with Specialists or Other Care Team Provider: Care coordination -stated  made several attempts to contact someone at [facility] [phone] regarding the Pt's readiness to return to their facility-but did not get a reply.    Education and Discussions with Family / Patient: Updated  (wife) via phone.    Current Length of Stay: 3 day(s)  Current Patient Status: Inpatient   Certification Statement: The patient will continue to require additional inpatient hospital stay due to known no-contact to  [facility] even after multiple attempts where patient lives   Discharge Plan: Anticipate discharge tomorrow to [facility]    Code Status: Level 1 - Full Code    Subjective   Patient was seen and examined at bedside in the morning.  Patient was somnolent but followed commands.  Patient had no new complaints acute overnight events.    Objective :  Temp:  [98.1 °F (36.7 °C)-98.3 °F (36.8 °C)] 98.3 °F (36.8 °C)  HR:  [74-85] 85  BP: (102-133)/(55-62) 133/62  Resp:  [16-18] 17  SpO2:  [95 %-97 %] 97 %    Body mass index is 24.1 kg/m².     Input and Output  Summary (last 24 hours):     Intake/Output Summary (Last 24 hours) at 7/13/2025 1438  Last data filed at 7/13/2025 0944  Gross per 24 hour   Intake --   Output 1500 ml   Net -1500 ml       Physical Exam  Vitals and nursing note reviewed.   Constitutional:       General: He is not in acute distress.     Appearance: He is well-developed.   HENT:      Nose: No rhinorrhea.     Eyes:      Conjunctiva/sclera: Conjunctivae normal.       Cardiovascular:      Rate and Rhythm: Normal rate and regular rhythm.      Heart sounds: No murmur heard.  Pulmonary:      Effort: Pulmonary effort is normal. No respiratory distress.      Breath sounds: Normal breath sounds.   Abdominal:      Palpations: Abdomen is soft.      Tenderness: There is no abdominal tenderness.     Musculoskeletal:         General: No swelling.      Cervical back: Neck supple.     Skin:     General: Skin is dry.     Neurological:      Comments: Somnolent     Lines/Drains:  Lines/Drains/Airways       Active Status       Name Placement date Placement time Site Days    Urethral Catheter Latex 18 Fr. 07/10/25  4229  -- 2                  Urinary Catheter:  Goal for removal: Voiding trial upon discharge or discharge with pubic catheterdue to history of ventral erosion                 Lab Results: I have reviewed the following results:   Results from last 7 days   Lab Units 07/12/25  0423   WBC Thousand/uL 9.94   HEMOGLOBIN g/dL 12.1   HEMATOCRIT % 37.1   PLATELETS Thousands/uL 206   SEGS PCT % 76*   LYMPHO PCT % 10*   MONO PCT % 8   EOS PCT % 4     Results from last 7 days   Lab Units 07/12/25  0423 07/11/25  0635   SODIUM mmol/L 139 141   POTASSIUM mmol/L 3.8 4.0   CHLORIDE mmol/L 105 104   CO2 mmol/L 28 33*   BUN mg/dL 18 20   CREATININE mg/dL 1.17 1.25   ANION GAP mmol/L 6 4   CALCIUM mg/dL 8.6 8.8   ALBUMIN g/dL  --  3.4*   TOTAL BILIRUBIN mg/dL  --  0.60   ALK PHOS U/L  --  55   ALT U/L  --  3*   AST U/L  --  9*   GLUCOSE RANDOM mg/dL 82 95         Results from  last 7 days   Lab Units 07/13/25  1042 07/13/25  0705 07/12/25  2047 07/12/25  1501 07/12/25  1047 07/12/25  0708 07/11/25  2020 07/11/25  1539 07/11/25  1102 07/11/25  0725 07/10/25  2047 07/10/25  1755   POC GLUCOSE mg/dl 166* 99 120 177* 137 86 150* 122 151* 127 203* 103     Results from last 7 days   Lab Units 07/10/25  1303   HEMOGLOBIN A1C % 6.1*           Recent Cultures (last 7 days):   Results from last 7 days   Lab Units 07/10/25  1423   URINE CULTURE  Culture results to follow.       Imaging Results Review: I reviewed radiology reports from this admission including: X-ray hip/pelvis 2-3 VWS left `on 7/10.  Other Study Results Review: No additional pertinent studies reviewed.    Last 24 Hours Medication List:     Current Facility-Administered Medications:     aspirin chewable tablet 81 mg, Daily    atorvastatin (LIPITOR) tablet 40 mg, Daily With Dinner    carbidopa-levodopa (SINEMET)  mg per tablet 1 tablet, TID    cefepime (MAXIPIME) 1,000 mg in dextrose 5 % 50 mL IVPB, Q12H, Last Rate: 1,000 mg (07/13/25 1353)    enoxaparin (LOVENOX) subcutaneous injection 40 mg, Daily    finasteride (PROSCAR) tablet 5 mg, Daily    insulin glargine (LANTUS) subcutaneous injection 44 Units 0.44 mL, QAM    insulin lispro (HumALOG/ADMELOG) 100 units/mL subcutaneous injection 1-5 Units, 4x Daily (PC & HS) **AND** Fingerstick Glucose (POCT), 4x Daily AC and at bedtime    metoprolol succinate (TOPROL-XL) 24 hr tablet 25 mg, Daily    pantoprazole (PROTONIX) EC tablet 40 mg, BID AC    polyethylene glycol (MIRALAX) packet 17 g, Daily    ranolazine (RANEXA) 12 hr tablet 500 mg, Q12H LIYAH    senna-docusate sodium (SENOKOT S) 8.6-50 mg per tablet 2 tablet, BID    tamsulosin (FLOMAX) capsule 0.4 mg, Daily With Dinner    Administrative Statements   Today, Patient Was Seen By: Adonis Schulte MD  I have spent a total time of 15 minutes in caring for this patient on the day of the visit/encounter including history taking examination  and further plan of care.    **Please Note: This note may have been constructed using a voice recognition system.**

## 2025-07-14 VITALS
RESPIRATION RATE: 18 BRPM | DIASTOLIC BLOOD PRESSURE: 62 MMHG | HEART RATE: 74 BPM | TEMPERATURE: 98 F | SYSTOLIC BLOOD PRESSURE: 119 MMHG | BODY MASS INDEX: 24.1 KG/M2 | OXYGEN SATURATION: 95 % | WEIGHT: 158.51 LBS

## 2025-07-14 LAB
ANION GAP SERPL CALCULATED.3IONS-SCNC: 4 MMOL/L (ref 4–13)
BACTERIA UR CULT: ABNORMAL
BUN SERPL-MCNC: 18 MG/DL (ref 5–25)
CALCIUM SERPL-MCNC: 8.7 MG/DL (ref 8.4–10.2)
CHLORIDE SERPL-SCNC: 103 MMOL/L (ref 96–108)
CO2 SERPL-SCNC: 30 MMOL/L (ref 21–32)
CREAT SERPL-MCNC: 1.26 MG/DL (ref 0.6–1.3)
ERYTHROCYTE [DISTWIDTH] IN BLOOD BY AUTOMATED COUNT: 14.6 % (ref 11.6–15.1)
GFR SERPL CREATININE-BSD FRML MDRD: 55 ML/MIN/1.73SQ M
GLUCOSE SERPL-MCNC: 92 MG/DL (ref 65–140)
GLUCOSE SERPL-MCNC: 94 MG/DL (ref 65–140)
HCT VFR BLD AUTO: 40.6 % (ref 36.5–49.3)
HGB BLD-MCNC: 13 G/DL (ref 12–17)
MAGNESIUM SERPL-MCNC: 1.7 MG/DL (ref 1.9–2.7)
MCH RBC QN AUTO: 29.1 PG (ref 26.8–34.3)
MCHC RBC AUTO-ENTMCNC: 32 G/DL (ref 31.4–37.4)
MCV RBC AUTO: 91 FL (ref 82–98)
PLATELET # BLD AUTO: 204 THOUSANDS/UL (ref 149–390)
PMV BLD AUTO: 11.1 FL (ref 8.9–12.7)
POTASSIUM SERPL-SCNC: 4.1 MMOL/L (ref 3.5–5.3)
RBC # BLD AUTO: 4.47 MILLION/UL (ref 3.88–5.62)
SODIUM SERPL-SCNC: 137 MMOL/L (ref 135–147)
WBC # BLD AUTO: 9.61 THOUSAND/UL (ref 4.31–10.16)

## 2025-07-14 PROCEDURE — 83735 ASSAY OF MAGNESIUM: CPT

## 2025-07-14 PROCEDURE — 80048 BASIC METABOLIC PNL TOTAL CA: CPT

## 2025-07-14 PROCEDURE — 82948 REAGENT STRIP/BLOOD GLUCOSE: CPT

## 2025-07-14 PROCEDURE — 85027 COMPLETE CBC AUTOMATED: CPT

## 2025-07-14 PROCEDURE — 99238 HOSP IP/OBS DSCHRG MGMT 30/<: CPT | Performed by: INTERNAL MEDICINE

## 2025-07-14 RX ORDER — INSULIN GLARGINE 100 [IU]/ML
40 INJECTION, SOLUTION SUBCUTANEOUS EVERY MORNING
Qty: 12 ML | Refills: 0 | Status: CANCELLED | OUTPATIENT
Start: 2025-07-15 | End: 2025-08-14

## 2025-07-14 RX ORDER — INSULIN GLARGINE 100 [IU]/ML
40 INJECTION, SOLUTION SUBCUTANEOUS EVERY MORNING
Qty: 12 ML | Refills: 0
Start: 2025-07-15 | End: 2025-08-14

## 2025-07-14 RX ADMIN — ASPIRIN 81 MG: 81 TABLET, CHEWABLE ORAL at 08:41

## 2025-07-14 RX ADMIN — PANTOPRAZOLE SODIUM 40 MG: 40 TABLET, DELAYED RELEASE ORAL at 06:14

## 2025-07-14 RX ADMIN — POLYETHYLENE GLYCOL 3350 17 G: 17 POWDER, FOR SOLUTION ORAL at 08:41

## 2025-07-14 RX ADMIN — INSULIN GLARGINE 44 UNITS: 100 INJECTION, SOLUTION SUBCUTANEOUS at 08:41

## 2025-07-14 RX ADMIN — ENOXAPARIN SODIUM 40 MG: 40 INJECTION SUBCUTANEOUS at 08:41

## 2025-07-14 RX ADMIN — SENNOSIDES, DOCUSATE SODIUM 2 TABLET: 8.6; 5 TABLET ORAL at 08:41

## 2025-07-14 RX ADMIN — RANOLAZINE 500 MG: 500 TABLET, FILM COATED, EXTENDED RELEASE ORAL at 08:41

## 2025-07-14 RX ADMIN — METOPROLOL SUCCINATE 25 MG: 25 TABLET, EXTENDED RELEASE ORAL at 08:41

## 2025-07-14 RX ADMIN — CARBIDOPA AND LEVODOPA 1 TABLET: 25; 100 TABLET ORAL at 08:41

## 2025-07-14 RX ADMIN — FINASTERIDE 5 MG: 5 TABLET, FILM COATED ORAL at 08:41

## 2025-07-14 RX ADMIN — CEFEPIME 1000 MG: 1 INJECTION, POWDER, FOR SOLUTION INTRAMUSCULAR; INTRAVENOUS at 02:29

## 2025-07-14 NOTE — ASSESSMENT & PLAN NOTE
Likely secondary to Pseudomonas related UTI due to urinary retention  Patient is presenting from [facility] after sustaining a fall from wheelchair.    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   Previous ED admission in March for acute metabolic encephalopathy in the setting of UTI from Pseudomonas, received 4 days of IV cefepime.   UA shows large amount of leukocytes with occasional bacteria, more than 1000 glucose.  7/13-waiting for urine culture, net I/O: -4140ml,   7/14-patient had no fever, normal WBC, urine culture showed Candida glabrata most likely is due to colonization and not indicated for any treatment especially when patient is not immunocompromised or does not have uretal stent which most likely  made contact with [facility] and were ready to take in the patient    Plan:  7/11-Continue cefepime inpatient. Change antibiotics according to culture sensitivity.  7/11 -Scrotal wall is erythematous, mildly tender, monitor for signs of early boston's   -Hold Farxiga during hospitalization, plan to discontinue upon discharge as SGLT2 inhibitors are linked with glucosuria.  SGLT2 inhibitors can cause recurrent UTI and also pose a risk for Boston's gangrene.  - 7/11 - urology-recommended consider out -patient trial of void or have pubic catheter in place due to ventral erosion history from prior Razo catheter use  7/13-considering discharge today but could not because contact with [facility] where patient lives could not be made even after several attempts by  and care coordination team.  Planning to discharge on 7/14/2025 7/14-antibiotics stopped, advised to pause dapagliflozin because of increased risk of recurrent UTI patient discharged to [facility] where he lives

## 2025-07-14 NOTE — ASSESSMENT & PLAN NOTE
Lab Results   Component Value Date    HGBA1C 6.1 (H) 07/10/2025       Recent Labs     07/13/25  1042 07/13/25  1533 07/13/25  2051 07/14/25  0712   POCGLU 166* 170* 125 94       Blood Sugar Average: Last 72 hrs:  (P) 132.5900993266695080  HbA1c 6.1  Home dose includes Lantus 44 units, Tradjenta, metformin.  Plan   -Continue Lantus with sliding scale insulin.  -Plan to discontinue Farxiga on discharge  -Reassess glycemic control after he and add GLP-1 at (semaglutide) on discharge

## 2025-07-14 NOTE — CASE MANAGEMENT
Case Management Discharge Planning Note    Patient name Tha Weems  Location S /S -01 MRN [id number]  : 1950 Date 2025       Current Admission Date: 7/10/2025  Current Admission Diagnosis:Recurrent UTI   Patient Active Problem List    Diagnosis Date Noted    Electrolyte abnormality 2025    Dry eyes 2023    Metabolic encephalopathy 2022    Intertrigo 2022    Balanitis 2022    Requires daily assistance for activities of daily living (ADL) and comfort needs 2022    Fall 2022    Parkinson disease (HCC) 2022    Chest pain 2022    Leukocytosis 2022    Coronary artery disease involving native coronary artery of native heart without angina pectoris 2022    Type 2 MI (myocardial infarction) ([facility]) 2022    Generalized abdominal pain 2021    Lactic acidosis 2021    Urinary incontinence 2021    Vitamin B12 deficiency     History of recurrent UTIs 2020    Insomnia 2020    Benign paroxysmal positional vertigo 2020    BPH (benign prostatic hyperplasia) 2020    Vitamin D deficiency 2020    Left-sided weakness 2020    Recurrent UTI 2020    Cystitis 2020    Bladder stones 2020    Stage 3a chronic kidney disease (HCC) 2020    Essential hypertension 2019    Type 2 diabetes mellitus ([facility]) 2019    Nephrolithiasis 2019    Ambulatory dysfunction 2019    Paresis ([facility]) 2019    Abnormal PSA 2019    Dizziness 10/04/2017    Pancreas cyst 2016    Type 2 diabetes mellitus with diabetic neuropathy ([facility]) 2015    Hyperlipidemia 2012      LOS (days): 4  Geometric Mean LOS (GMLOS) (days): 3.8  Days to GMLOS:0     OBJECTIVE:  Risk of Unplanned Readmission Score: 15.44         Current admission status: Inpatient   Preferred Pharmacy:   [facility] - [facility] [place] - [address] [facility]  [address] [facility]  [place]  [address]  [facility] [address]  Phone: [phone] Fax: [phone]    [id number] - [place] - [address] [address] [facility]  [place] [address]  Phone: [phone] Fax: [phone]    Primary Care Provider: Geovanna Fuentes MD    Primary Insurance: [facility]  Secondary Insurance:     DISCHARGE DETAILS:    Discharge planning discussed with:: Patient, Aziza from [facility]  [place] of Choice: Yes  Comments - [place] of Choice: [employer]  CM contacted family/caregiver?: Yes  Were Treatment Team discharge recommendations reviewed with patient/caregiver?: Yes  Did patient/caregiver verbalize understanding of patient care needs?: N/A- going to facility  Were patient/caregiver advised of the risks associated with not following Treatment Team discharge recommendations?: Yes    Contacts  Patient Contacts: Patient  Contact Method: In Person  Reason/Outcome: Continuity of Care, Discharge Planning    Requested Home Health Care         Is the patient interested in HHC at discharge?: No    DME Referral Provided  Referral made for DME?: No    Other Referral/Resources/Interventions Provided:  Interventions: Transportation, Assisted Living  Referral Comments: CM placed call to Aziza from [employer]. Aziza informed pt is medically stable for dc today. As per Aziza, pt is able to return. CM requested an 11am stretcher van transport. CM placed medical necessity in pt chart on unit for transport company. CM placed call to [employer],  left with pt SW of dcp, transport time, and company. CM notified provider, primary nurse, facility, and pt of transportation time.    Would you like to participate in our [facility] service program?  : No - Declined    Treatment Team Recommendation: Facility Return, Assisted Living  Expected Discharge Disposition: Assisted Living Facility  Additional Discharge Dispositions: Assisted Living Facility  Transport at Discharge : Stretcher van     Number/Name  of Dispatcher: Round Trip  Transported by (Company and Unit #): [facility]  ETA of Transport (Date): 07/14/25  ETA of Transport (Time): 1100    IMM Given (Date):: 07/14/25  IMM Given to:: Patient  IMM reviewed with patient .  Patient agrees with discharge determination.       Accepting Facility Name, City & State : [facility]  Receiving Facility/Agency Phone Number: [phone]  Facility/Agency Fax Number: [phone]

## 2025-07-14 NOTE — DISCHARGE SUMMARY
Discharge Summary - Hospitalist   Name: Tha Weems 75 y.o. male I MRN: [id number]  Unit/Bed#: S -01 I Date of Admission: 7/10/2025   Date of Service: 7/14/2025 I Hospital Day: 4     Assessment & Plan  Recurrent UTI  Metabolic encephalopathy  Likely secondary to Pseudomonas related UTI due to urinary retention  Patient is presenting from [facility] after sustaining a fall from wheelchair.    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   Previous ED admission in March for acute metabolic encephalopathy in the setting of UTI from Pseudomonas, received 4 days of IV cefepime.   UA shows large amount of leukocytes with occasional bacteria, more than 1000 glucose.  7/13-waiting for urine culture, net I/O: -4140ml,   7/14-patient had no fever, normal WBC, urine culture showed Candida glabrata most likely is due to colonization and not indicated for any treatment especially when patient is not immunocompromised or does not have uretal stent which most likely  made contact with [facility] and were ready to take in the patient    Plan:  7/11-Continue cefepime inpatient. Change antibiotics according to culture sensitivity.  7/11 -Scrotal wall is erythematous, mildly tender, monitor for signs of early boston's   -Hold Farxiga during hospitalization, plan to discontinue upon discharge as SGLT2 inhibitors are linked with glucosuria.  SGLT2 inhibitors can cause recurrent UTI and also pose a risk for Boston's gangrene.  - 7/11 - urology-recommended consider out -patient trial of void or have pubic catheter in place due to ventral erosion history from prior Razo catheter use  7/13-considering discharge today but could not because contact with [facility] where patient lives could not be made even after several attempts by  and care coordination team.  Planning to discharge on 7/14/2025 7/14-antibiotics  stopped, advised to pause dapagliflozin because of increased risk of recurrent UTI patient discharged to [facility] where he lives    Type 2 diabetes mellitus with diabetic neuropathy (HCC)  Lab Results   Component Value Date    HGBA1C 6.1 (H) 07/10/2025       Recent Labs     07/13/25  1042 07/13/25  1533 07/13/25  2051 07/14/25  0712   POCGLU 166* 170* 125 94       Blood Sugar Average: Last 72 hrs:  (P) 132.9981641418140593  HbA1c 6.1  Home dose includes Lantus 44 units, Tradjenta, metformin.  Plan   -Continue Lantus with sliding scale insulin.  -Plan to discontinue Farxiga on discharge  -Reassess glycemic control after he and add GLP-1 at (semaglutide) on discharge  Ambulatory dysfunction  Patient presents with fall from wheelchair.  Mentions that he was trying to move within the wheelchair when he had a fall.  Plan  - Reinforce fall precautions  - PT OT consulted.     Medical Problems       Resolved Problems  Date Reviewed: 3/31/2025   None       Discharging Physician / Practitioner: Adonis Schulte MD  PCP: Geovanna Fuentes MD  Admission Date:   Admission Orders (From admission, onward)       Ordered        07/10/25 1549  INPATIENT ADMISSION  Once                          Discharge Date: 07/14/25    Next Steps for Physician/AP Assuming Care:  Primary care [id number] within the week for UTI post discharge follow-up  With urologist- follow-up with your urologist within 2 weeks for further recommendations regarding your Razo catheter and prevention of future UTIs     Test Results Pending at Discharge (will require follow up):  None    Medication Changes for Discharge & Rationale:   Please was taking your dapagliflozin (Farxiga) due to risk of recurrent UTI  Please change how you take your insulin glargine.  Inject 40 units under the skin every morning  See after visit summary for reconciled discharge medications provided to patient and/or family.     Consultations During Hospital  Stay:  Urologist ,care coordination    Procedures Performed:   Urinary catheterization    Significant Findings / Test Results:   X-ray hip/pelvis 2-3 VWS left -no acute osseous abnormality  X-ray knee (left)-no fracture  CT cervical spine without contrast-no cervical spine fracture   CT head without contrast -no acute intracranial abnormality  MRSA nares culture -no MRSA isolated  Incidental Findings:   And/      Hospital Course:   Tha Weems is a 75 y.o. male patient who originally presented to the hospital on 7/10/2025 due to fall from wheelchair.  Patient was confused and unable to provide any history at the time of ED visit and was noted to have abnormal urinalysis and urinary retention.  Patient was started on antibiotics for suspected UTI and Razo's catheter was inserted.  Patient was given cefepime and and was altered on Farxiga due to increased risk of recurrent UTI.  Patient was given 5 days of cefepime course during which blood culture came positive for Candida glabrata which most likely is due to colonization.  Patient is being discharged today (1/14/2025) with improvement in an UTI with normal WBC count, absence of fever and in place urinary catheter.        Discharge Day Visit / Exam:   Subjective: Patient was seen and examined at bedside in the morning of the day of discharge.  Patient had no new complaints.  Vitals: Blood Pressure: 119/62 (07/14/25 0645)  Pulse: 74 (07/14/25 0645)  Temperature: 98 °F (36.7 °C) (07/14/25 0645)  Temp Source: Oral (07/10/25 1236)  Respirations: 18 (07/14/25 0645)  Weight - Scale: 71.9 kg (158 lb 8.2 oz) (07/10/25 1236)  SpO2: 95 % (07/14/25 0645)  Physical Exam  Constitutional:       Appearance: Normal appearance.   HENT:      Nose: No rhinorrhea.     Cardiovascular:      Rate and Rhythm: Normal rate and regular rhythm.      Pulses: Normal pulses.      Heart sounds: Normal heart sounds.   Pulmonary:      Effort: Pulmonary effort is normal.      Breath sounds: Normal  breath sounds.     Skin:     General: Skin is warm.      Findings: Rash present.     Neurological:      Mental Status: He is alert.      Comments: Somnolent   Psychiatric:         Mood and Affect: Mood normal.         Behavior: Behavior normal.         Discussion with Family: Attempted to update  (wife) via phone. Unable to contact.    Discharge instructions/Information to patient and family:   See after visit summary for information provided to patient and family.      Provisions for Follow-Up Care:  See after visit summary for information related to follow-up care and any pertinent home health orders.      Mobility at time of Discharge:   Basic Mobility Inpatient Raw Score: 14  JH-HLM Goal: 4: Move to chair/commode  JH-HLM Achieved: 2: Bed activities/Dependent transfer  HLM Goal achieved. Continue to encourage appropriate mobility.     Disposition:   Other: Nursing facility at    Planned Readmission: No    Administrative Statements   Discharge Statement:  I have spent a total time of 20 minutes in caring for this patient on the day of the visit/encounter.

## 2025-07-14 NOTE — PLAN OF CARE
Problem: INFECTION - ADULT  Goal: Absence or prevention of progression during hospitalization  Description: INTERVENTIONS:  - Assess and monitor for signs and symptoms of infection  - Monitor lab/diagnostic results  - Monitor all insertion sites, i.e. indwelling lines, tubes, and drains  - Monitor endotracheal if appropriate and nasal secretions for changes in amount and color  - [place] appropriate cooling/warming therapies per order  - Administer medications as ordered  - Instruct and encourage patient and family to use good hand hygiene technique  - Identify and instruct in appropriate isolation precautions for identified infection/condition  Outcome: Progressing     Problem: GENITOURINARY - ADULT  Goal: Maintains or returns to baseline urinary function  Description: INTERVENTIONS:  - Assess urinary function  - Encourage oral fluids to ensure adequate hydration if ordered  - Administer IV fluids as ordered to ensure adequate hydration  - Administer ordered medications as needed  - Offer frequent toileting  - Follow urinary retention protocol if ordered  Outcome: Progressing     Problem: GENITOURINARY - ADULT  Goal: Absence of urinary retention  Description: INTERVENTIONS:  - Assess patient’s ability to void and empty bladder  - Monitor I/O  - Bladder scan as needed  - Discuss with physician/AP medications to alleviate retention as needed  - Discuss catheterization for long term situations as appropriate  Outcome: Progressing     Problem: GENITOURINARY - ADULT  Goal: Urinary catheter remains patent  Description: INTERVENTIONS:  - Assess patency of urinary catheter  - If patient has a chronic wilkerson, consider changing catheter if non-functioning  - Follow guidelines for intermittent irrigation of non-functioning urinary catheter  Outcome: Progressing

## 2025-07-14 NOTE — PLAN OF CARE
Problem: Potential for Falls  Goal: Patient will remain free of falls  Description: INTERVENTIONS:  - Educate patient/family on patient safety including physical limitations  - Instruct patient to call for assistance with activity   - Consider consulting OT/PT to assist with strengthening/mobility based on AM PAC & -HLM score  - Consult OT/PT to assist with strengthening/mobility   - Keep Call bell within reach  - Keep bed low and locked with side rails adjusted as appropriate  - Keep care items and personal belongings within reach  - Initiate and maintain comfort rounds  - Make Fall Risk Sign visible to staff  - Offer Toileting every  Hours, in advance of need  - Initiate/Maintain alarm  - Obtain necessary fall risk management equipment:   - Apply yellow socks and bracelet for high fall risk patients  - Consider moving patient to room near nurses station  Outcome: Progressing     Problem: Prexisting or High Potential for Compromised Skin Integrity  Goal: Skin integrity is maintained or improved  Description: INTERVENTIONS:  - Identify patients at risk for skin breakdown  - Assess and monitor skin integrity including under and around medical devices   - Assess and monitor nutrition and hydration status  - Monitor labs  - Assess for incontinence   - Turn and reposition patient  - Assist with mobility/ambulation  - Relieve pressure over mili prominences   - Avoid friction and shearing  - Provide appropriate hygiene as needed including keeping skin clean and dry  - Evaluate need for skin moisturizer/barrier cream  - Collaborate with interdisciplinary team  - Patient/family teaching  - Consider wound care consult    Assess:  - Review Rahat scale daily  - Clean and moisturize skin every  - Inspect skin when repositioning, toileting, and assisting with ADLS  - Assess under medical devices such as  every   - Assess extremities for adequate circulation and sensation     Bed Management:  - Have minimal linens on bed &  keep smooth, unwrinkled  - Change linens as needed when moist or perspiring  - Avoid sitting or lying in one position for more than  hours while in bed?Keep HOB at degrees   - Toileting:  - Offer bedside commode  - Assess for incontinence every   - Use incontinent care products after each incontinent episode such as     Activity:  - Mobilize patient  times a day  - Encourage activity and walks on unit  - Encourage or provide ROM exercises   - Turn and reposition patient every  Hours  - Use appropriate equipment to lift or move patient in bed  - Instruct/ Assist with weight shifting every  when out of bed in chair  - Consider limitation of chair time  hour intervals    Skin Care:  - Avoid use of baby powder, tape, friction and shearing, hot water or constrictive clothing  - Relieve pressure over bony prominences using   - Do not massage red bony areas    Next Steps:  - Teach patient strategies to minimize risks such as   - Consider consults to  interdisciplinary teams such as   Outcome: Progressing     Problem: INFECTION - ADULT  Goal: Absence or prevention of progression during hospitalization  Description: INTERVENTIONS:  - Assess and monitor for signs and symptoms of infection  - Monitor lab/diagnostic results  - Monitor all insertion sites, i.e. indwelling lines, tubes, and drains  - Monitor endotracheal if appropriate and nasal secretions for changes in amount and color  - [place] appropriate cooling/warming therapies per order  - Administer medications as ordered  - Instruct and encourage patient and family to use good hand hygiene technique  - Identify and instruct in appropriate isolation precautions for identified infection/condition  Outcome: Progressing  Goal: Absence of fever/infection during neutropenic period  Description: INTERVENTIONS:  - Monitor WBC  - Perform strict hand hygiene  - Limit to healthy visitors only  - No plants, dried, fresh or silk flowers with cochran in patient room  Outcome:  Progressing     Problem: GENITOURINARY - ADULT  Goal: Maintains or returns to baseline urinary function  Description: INTERVENTIONS:  - Assess urinary function  - Encourage oral fluids to ensure adequate hydration if ordered  - Administer IV fluids as ordered to ensure adequate hydration  - Administer ordered medications as needed  - Offer frequent toileting  - Follow urinary retention protocol if ordered  Outcome: Progressing  Goal: Absence of urinary retention  Description: INTERVENTIONS:  - Assess patient’s ability to void and empty bladder  - Monitor I/O  - Bladder scan as needed  - Discuss with physician/AP medications to alleviate retention as needed  - Discuss catheterization for long term situations as appropriate  Outcome: Progressing  Goal: Urinary catheter remains patent  Description: INTERVENTIONS:  - Assess patency of urinary catheter  - If patient has a chronic wilkerson, consider changing catheter if non-functioning  - Follow guidelines for intermittent irrigation of non-functioning urinary catheter  Outcome: Progressing

## 2025-07-14 NOTE — ASSESSMENT & PLAN NOTE
Likely secondary to Pseudomonas related UTI due to urinary retention  Patient is presenting from [facility] after sustaining a fall from wheelchair.    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   Previous ED admission in March for acute metabolic encephalopathy in the setting of UTI from Pseudomonas, received 4 days of IV cefepime.   UA shows large amount of leukocytes with occasional bacteria, more than 1000 glucose.  7/13-waiting for urine culture, net I/O: -4140ml,   7/14-patient had no fever, normal WBC, urine culture showed Candida glabrata most likely is due to colonization and not indicated for any treatment especially when patient is not immunocompromised or does not have uretal stent which most likely  made contact with [facility] and were ready to take in the patient    Plan:  7/11-Continue cefepime inpatient. Change antibiotics according to culture sensitivity.  7/11 -Scrotal wall is erythematous, mildly tender, monitor for signs of early boston's   -Hold Farxiga during hospitalization, plan to discontinue upon discharge as SGLT2 inhibitors are linked with glucosuria.  SGLT2 inhibitors can cause recurrent UTI and also pose a risk for Boston's gangrene.  - 7/11 - urology-recommended consider out -patient trial of void or have pubic catheter in place due to ventral erosion history from prior Razo catheter use  7/13-considering discharge today but could not because contact with [facility] where patient lives could not be made even after several attempts by  and care coordination team.  Planning to discharge on 7/14/2025 7/14-antibiotics stopped, advised to pause dapagliflozin because of increased risk of recurrent UTI patient discharged to [facility] where he lives

## 2025-07-21 ENCOUNTER — APPOINTMENT (EMERGENCY)
Dept: RADIOLOGY | Facility: HOSPITAL | Age: 75
End: 2025-07-21
Payer: MEDICARE

## 2025-07-21 ENCOUNTER — HOSPITAL ENCOUNTER (INPATIENT)
Facility: HOSPITAL | Age: 75
LOS: 3 days | Discharge: NURSING, REHAB OR LONG-TERM CARE | End: 2025-07-24
Attending: EMERGENCY MEDICINE | Admitting: HOSPITALIST
Payer: MEDICARE

## 2025-07-21 DIAGNOSIS — R65.20 SEVERE SEPSIS (HCC): Primary | ICD-10-CM

## 2025-07-21 DIAGNOSIS — R79.9 ELEVATED BUN: ICD-10-CM

## 2025-07-21 DIAGNOSIS — E87.20 LACTIC ACID ACIDOSIS: ICD-10-CM

## 2025-07-21 DIAGNOSIS — I21.4 NSTEMI (NON-ST ELEVATED MYOCARDIAL INFARCTION) (HCC): ICD-10-CM

## 2025-07-21 DIAGNOSIS — A41.9 SEVERE SEPSIS (HCC): Primary | ICD-10-CM

## 2025-07-21 DIAGNOSIS — N39.0 URINARY TRACT INFECTION: ICD-10-CM

## 2025-07-21 LAB
ALBUMIN SERPL BCG-MCNC: 3.7 G/DL (ref 3.5–5)
ALP SERPL-CCNC: 65 U/L (ref 34–104)
ALT SERPL W P-5'-P-CCNC: 7 U/L (ref 7–52)
ANION GAP SERPL CALCULATED.3IONS-SCNC: 11 MMOL/L (ref 4–13)
APTT PPP: 32 SECONDS (ref 23–34)
AST SERPL W P-5'-P-CCNC: 10 U/L (ref 13–39)
BACTERIA UR QL AUTO: ABNORMAL /HPF
BASOPHILS # BLD AUTO: 0.04 THOUSANDS/ÂΜL (ref 0–0.1)
BASOPHILS NFR BLD AUTO: 0 % (ref 0–1)
BILIRUB SERPL-MCNC: 0.83 MG/DL (ref 0.2–1)
BILIRUB UR QL STRIP: NEGATIVE
BUN SERPL-MCNC: 31 MG/DL (ref 5–25)
CALCIUM SERPL-MCNC: 8.9 MG/DL (ref 8.4–10.2)
CHLORIDE SERPL-SCNC: 104 MMOL/L (ref 96–108)
CLARITY UR: ABNORMAL
CO2 SERPL-SCNC: 25 MMOL/L (ref 21–32)
COLOR UR: YELLOW
CREAT SERPL-MCNC: 1.43 MG/DL (ref 0.6–1.3)
EOSINOPHIL # BLD AUTO: 0.02 THOUSAND/ÂΜL (ref 0–0.61)
EOSINOPHIL NFR BLD AUTO: 0 % (ref 0–6)
ERYTHROCYTE [DISTWIDTH] IN BLOOD BY AUTOMATED COUNT: 14.6 % (ref 11.6–15.1)
GFR SERPL CREATININE-BSD FRML MDRD: 47 ML/MIN/1.73SQ M
GLUCOSE SERPL-MCNC: 128 MG/DL (ref 65–140)
GLUCOSE SERPL-MCNC: 139 MG/DL (ref 65–140)
GLUCOSE UR STRIP-MCNC: ABNORMAL MG/DL
HCT VFR BLD AUTO: 39 % (ref 36.5–49.3)
HGB BLD-MCNC: 12.7 G/DL (ref 12–17)
HGB UR QL STRIP.AUTO: ABNORMAL
IMM GRANULOCYTES # BLD AUTO: 0.11 THOUSAND/UL (ref 0–0.2)
IMM GRANULOCYTES NFR BLD AUTO: 1 % (ref 0–2)
INR PPP: 0.96 (ref 0.85–1.19)
KETONES UR STRIP-MCNC: NEGATIVE MG/DL
LACTATE SERPL-SCNC: 0.9 MMOL/L (ref 0.5–2)
LACTATE SERPL-SCNC: 2.3 MMOL/L (ref 0.5–2)
LEUKOCYTE ESTERASE UR QL STRIP: ABNORMAL
LYMPHOCYTES # BLD AUTO: 0.54 THOUSANDS/ÂΜL (ref 0.6–4.47)
LYMPHOCYTES NFR BLD AUTO: 3 % (ref 14–44)
MCH RBC QN AUTO: 29.3 PG (ref 26.8–34.3)
MCHC RBC AUTO-ENTMCNC: 32.6 G/DL (ref 31.4–37.4)
MCV RBC AUTO: 90 FL (ref 82–98)
MONOCYTES # BLD AUTO: 1.77 THOUSAND/ÂΜL (ref 0.17–1.22)
MONOCYTES NFR BLD AUTO: 11 % (ref 4–12)
NEUTROPHILS # BLD AUTO: 13.58 THOUSANDS/ÂΜL (ref 1.85–7.62)
NEUTS SEG NFR BLD AUTO: 85 % (ref 43–75)
NITRITE UR QL STRIP: NEGATIVE
NON-SQ EPI CELLS URNS QL MICRO: ABNORMAL /HPF
NRBC BLD AUTO-RTO: 0 /100 WBCS
PH UR STRIP.AUTO: 5.5 [PH]
PLATELET # BLD AUTO: 249 THOUSANDS/UL (ref 149–390)
PMV BLD AUTO: 10.4 FL (ref 8.9–12.7)
POTASSIUM SERPL-SCNC: 4.1 MMOL/L (ref 3.5–5.3)
PROCALCITONIN SERPL-MCNC: 0.16 NG/ML
PROT SERPL-MCNC: 7.1 G/DL (ref 6.4–8.4)
PROT UR STRIP-MCNC: ABNORMAL MG/DL
PROTHROMBIN TIME: 13.5 SECONDS (ref 12.3–15)
RBC # BLD AUTO: 4.34 MILLION/UL (ref 3.88–5.62)
RBC #/AREA URNS AUTO: ABNORMAL /HPF
SODIUM SERPL-SCNC: 140 MMOL/L (ref 135–147)
SP GR UR STRIP.AUTO: 1.03 (ref 1–1.03)
UROBILINOGEN UR STRIP-ACNC: <2 MG/DL
WBC # BLD AUTO: 16.06 THOUSAND/UL (ref 4.31–10.16)
WBC #/AREA URNS AUTO: ABNORMAL /HPF
WBC CLUMPS # UR AUTO: PRESENT /UL

## 2025-07-21 PROCEDURE — 81001 URINALYSIS AUTO W/SCOPE: CPT

## 2025-07-21 PROCEDURE — 87186 SC STD MICRODIL/AGAR DIL: CPT

## 2025-07-21 PROCEDURE — 87040 BLOOD CULTURE FOR BACTERIA: CPT

## 2025-07-21 PROCEDURE — 83605 ASSAY OF LACTIC ACID: CPT

## 2025-07-21 PROCEDURE — 99285 EMERGENCY DEPT VISIT HI MDM: CPT

## 2025-07-21 PROCEDURE — 87181 SC STD AGAR DILUTION PER AGT: CPT

## 2025-07-21 PROCEDURE — 84145 PROCALCITONIN (PCT): CPT

## 2025-07-21 PROCEDURE — 85610 PROTHROMBIN TIME: CPT

## 2025-07-21 PROCEDURE — 87077 CULTURE AEROBIC IDENTIFY: CPT

## 2025-07-21 PROCEDURE — 85025 COMPLETE CBC W/AUTO DIFF WBC: CPT

## 2025-07-21 PROCEDURE — 96365 THER/PROPH/DIAG IV INF INIT: CPT

## 2025-07-21 PROCEDURE — 71045 X-RAY EXAM CHEST 1 VIEW: CPT

## 2025-07-21 PROCEDURE — 87086 URINE CULTURE/COLONY COUNT: CPT

## 2025-07-21 PROCEDURE — 36415 COLL VENOUS BLD VENIPUNCTURE: CPT

## 2025-07-21 PROCEDURE — 96361 HYDRATE IV INFUSION ADD-ON: CPT

## 2025-07-21 PROCEDURE — 99223 1ST HOSP IP/OBS HIGH 75: CPT | Performed by: INTERNAL MEDICINE

## 2025-07-21 PROCEDURE — 82948 REAGENT STRIP/BLOOD GLUCOSE: CPT

## 2025-07-21 PROCEDURE — 99291 CRITICAL CARE FIRST HOUR: CPT | Performed by: EMERGENCY MEDICINE

## 2025-07-21 PROCEDURE — 85730 THROMBOPLASTIN TIME PARTIAL: CPT

## 2025-07-21 PROCEDURE — 80053 COMPREHEN METABOLIC PANEL: CPT

## 2025-07-21 RX ORDER — FERROUS GLUCONATE 324(38)MG
324 TABLET ORAL
Status: DISCONTINUED | OUTPATIENT
Start: 2025-07-22 | End: 2025-07-24 | Stop reason: HOSPADM

## 2025-07-21 RX ORDER — METOPROLOL SUCCINATE 25 MG/1
25 TABLET, EXTENDED RELEASE ORAL DAILY
Status: DISCONTINUED | OUTPATIENT
Start: 2025-07-22 | End: 2025-07-24 | Stop reason: HOSPADM

## 2025-07-21 RX ORDER — INSULIN LISPRO 100 [IU]/ML
1-5 INJECTION, SOLUTION INTRAVENOUS; SUBCUTANEOUS
Status: DISCONTINUED | OUTPATIENT
Start: 2025-07-22 | End: 2025-07-24 | Stop reason: HOSPADM

## 2025-07-21 RX ORDER — INSULIN LISPRO 100 [IU]/ML
1-5 INJECTION, SOLUTION INTRAVENOUS; SUBCUTANEOUS
Status: DISCONTINUED | OUTPATIENT
Start: 2025-07-21 | End: 2025-07-24 | Stop reason: HOSPADM

## 2025-07-21 RX ORDER — ACETAMINOPHEN 325 MG/1
650 TABLET ORAL ONCE
Status: DISCONTINUED | OUTPATIENT
Start: 2025-07-21 | End: 2025-07-21

## 2025-07-21 RX ORDER — TAMSULOSIN HYDROCHLORIDE 0.4 MG/1
0.4 CAPSULE ORAL
Status: DISCONTINUED | OUTPATIENT
Start: 2025-07-22 | End: 2025-07-24 | Stop reason: HOSPADM

## 2025-07-21 RX ORDER — ACETAMINOPHEN 325 MG/1
650 TABLET ORAL EVERY 6 HOURS PRN
Status: DISCONTINUED | OUTPATIENT
Start: 2025-07-21 | End: 2025-07-24 | Stop reason: HOSPADM

## 2025-07-21 RX ORDER — RANOLAZINE 500 MG/1
500 TABLET, EXTENDED RELEASE ORAL EVERY 12 HOURS SCHEDULED
Status: DISCONTINUED | OUTPATIENT
Start: 2025-07-22 | End: 2025-07-24 | Stop reason: HOSPADM

## 2025-07-21 RX ORDER — OXYBUTYNIN CHLORIDE 5 MG/1
5 TABLET ORAL DAILY
Status: DISCONTINUED | OUTPATIENT
Start: 2025-07-22 | End: 2025-07-24 | Stop reason: HOSPADM

## 2025-07-21 RX ORDER — PANTOPRAZOLE SODIUM 40 MG/1
40 TABLET, DELAYED RELEASE ORAL
Status: DISCONTINUED | OUTPATIENT
Start: 2025-07-22 | End: 2025-07-24 | Stop reason: HOSPADM

## 2025-07-21 RX ORDER — ASPIRIN 81 MG/1
81 TABLET, CHEWABLE ORAL DAILY
Status: DISCONTINUED | OUTPATIENT
Start: 2025-07-22 | End: 2025-07-24 | Stop reason: HOSPADM

## 2025-07-21 RX ORDER — ENOXAPARIN SODIUM 100 MG/ML
40 INJECTION SUBCUTANEOUS DAILY
Status: DISCONTINUED | OUTPATIENT
Start: 2025-07-22 | End: 2025-07-24 | Stop reason: HOSPADM

## 2025-07-21 RX ORDER — ACETAMINOPHEN 10 MG/ML
1000 INJECTION, SOLUTION INTRAVENOUS ONCE
Status: COMPLETED | OUTPATIENT
Start: 2025-07-21 | End: 2025-07-21

## 2025-07-21 RX ORDER — INSULIN GLARGINE 100 [IU]/ML
40 INJECTION, SOLUTION SUBCUTANEOUS EVERY MORNING
Status: DISCONTINUED | OUTPATIENT
Start: 2025-07-22 | End: 2025-07-24 | Stop reason: HOSPADM

## 2025-07-21 RX ORDER — LORATADINE 10 MG/1
10 TABLET ORAL DAILY
Status: DISCONTINUED | OUTPATIENT
Start: 2025-07-22 | End: 2025-07-24 | Stop reason: HOSPADM

## 2025-07-21 RX ORDER — SENNOSIDES 8.6 MG
1 TABLET ORAL DAILY
Status: DISCONTINUED | OUTPATIENT
Start: 2025-07-22 | End: 2025-07-24 | Stop reason: HOSPADM

## 2025-07-21 RX ORDER — CARBIDOPA AND LEVODOPA 25; 100 MG/1; MG/1
1 TABLET ORAL 3 TIMES DAILY
Status: DISCONTINUED | OUTPATIENT
Start: 2025-07-22 | End: 2025-07-24 | Stop reason: HOSPADM

## 2025-07-21 RX ORDER — OXYBUTYNIN CHLORIDE 5 MG/1
5 TABLET ORAL DAILY
COMMUNITY

## 2025-07-21 RX ADMIN — CEFEPIME 2000 MG: 2 INJECTION, POWDER, FOR SOLUTION INTRAVENOUS at 21:17

## 2025-07-21 RX ADMIN — SODIUM CHLORIDE 1000 ML: 0.9 INJECTION, SOLUTION INTRAVENOUS at 20:43

## 2025-07-21 RX ADMIN — SODIUM CHLORIDE 1000 ML: 0.9 INJECTION, SOLUTION INTRAVENOUS at 22:01

## 2025-07-21 RX ADMIN — ACETAMINOPHEN 1000 MG: 10 INJECTION INTRAVENOUS at 20:43

## 2025-07-22 PROBLEM — R79.89 ELEVATED SERUM CREATININE: Status: ACTIVE | Noted: 2025-07-22

## 2025-07-22 LAB
ANION GAP SERPL CALCULATED.3IONS-SCNC: 7 MMOL/L (ref 4–13)
BASOPHILS # BLD AUTO: 0.06 THOUSANDS/ÂΜL (ref 0–0.1)
BASOPHILS NFR BLD AUTO: 0 % (ref 0–1)
BUN SERPL-MCNC: 27 MG/DL (ref 5–25)
CALCIUM SERPL-MCNC: 8.4 MG/DL (ref 8.4–10.2)
CHLORIDE SERPL-SCNC: 107 MMOL/L (ref 96–108)
CO2 SERPL-SCNC: 27 MMOL/L (ref 21–32)
CREAT SERPL-MCNC: 1.3 MG/DL (ref 0.6–1.3)
EOSINOPHIL # BLD AUTO: 0.13 THOUSAND/ÂΜL (ref 0–0.61)
EOSINOPHIL NFR BLD AUTO: 1 % (ref 0–6)
ERYTHROCYTE [DISTWIDTH] IN BLOOD BY AUTOMATED COUNT: 14.5 % (ref 11.6–15.1)
GFR SERPL CREATININE-BSD FRML MDRD: 53 ML/MIN/1.73SQ M
GLUCOSE SERPL-MCNC: 129 MG/DL (ref 65–140)
GLUCOSE SERPL-MCNC: 153 MG/DL (ref 65–140)
GLUCOSE SERPL-MCNC: 186 MG/DL (ref 65–140)
GLUCOSE SERPL-MCNC: 74 MG/DL (ref 65–140)
GLUCOSE SERPL-MCNC: 78 MG/DL (ref 65–140)
HCT VFR BLD AUTO: 41 % (ref 36.5–49.3)
HGB BLD-MCNC: 12.6 G/DL (ref 12–17)
IMM GRANULOCYTES # BLD AUTO: 0.1 THOUSAND/UL (ref 0–0.2)
IMM GRANULOCYTES NFR BLD AUTO: 1 % (ref 0–2)
LYMPHOCYTES # BLD AUTO: 0.67 THOUSANDS/ÂΜL (ref 0.6–4.47)
LYMPHOCYTES NFR BLD AUTO: 5 % (ref 14–44)
MCH RBC QN AUTO: 28.8 PG (ref 26.8–34.3)
MCHC RBC AUTO-ENTMCNC: 30.7 G/DL (ref 31.4–37.4)
MCV RBC AUTO: 94 FL (ref 82–98)
MONOCYTES # BLD AUTO: 1.64 THOUSAND/ÂΜL (ref 0.17–1.22)
MONOCYTES NFR BLD AUTO: 12 % (ref 4–12)
NEUTROPHILS # BLD AUTO: 10.97 THOUSANDS/ÂΜL (ref 1.85–7.62)
NEUTS SEG NFR BLD AUTO: 81 % (ref 43–75)
NRBC BLD AUTO-RTO: 0 /100 WBCS
PLATELET # BLD AUTO: 164 THOUSANDS/UL (ref 149–390)
PLATELET BLD QL SMEAR: ADEQUATE
PMV BLD AUTO: 11.3 FL (ref 8.9–12.7)
POTASSIUM SERPL-SCNC: 4.2 MMOL/L (ref 3.5–5.3)
RBC # BLD AUTO: 4.38 MILLION/UL (ref 3.88–5.62)
RBC MORPH BLD: NORMAL
SODIUM SERPL-SCNC: 141 MMOL/L (ref 135–147)
WBC # BLD AUTO: 13.57 THOUSAND/UL (ref 4.31–10.16)

## 2025-07-22 PROCEDURE — 80048 BASIC METABOLIC PNL TOTAL CA: CPT

## 2025-07-22 PROCEDURE — 82948 REAGENT STRIP/BLOOD GLUCOSE: CPT

## 2025-07-22 PROCEDURE — 85025 COMPLETE CBC W/AUTO DIFF WBC: CPT

## 2025-07-22 PROCEDURE — 36415 COLL VENOUS BLD VENIPUNCTURE: CPT

## 2025-07-22 RX ORDER — SODIUM CHLORIDE 9 MG/ML
100 INJECTION, SOLUTION INTRAVENOUS CONTINUOUS
Status: DISPENSED | OUTPATIENT
Start: 2025-07-22 | End: 2025-07-22

## 2025-07-22 RX ADMIN — RANOLAZINE 500 MG: 500 TABLET, FILM COATED, EXTENDED RELEASE ORAL at 01:00

## 2025-07-22 RX ADMIN — CARBIDOPA AND LEVODOPA 1 TABLET: 25; 100 TABLET ORAL at 21:04

## 2025-07-22 RX ADMIN — INSULIN LISPRO 1 UNITS: 100 INJECTION, SOLUTION INTRAVENOUS; SUBCUTANEOUS at 22:14

## 2025-07-22 RX ADMIN — RANOLAZINE 500 MG: 500 TABLET, FILM COATED, EXTENDED RELEASE ORAL at 21:04

## 2025-07-22 RX ADMIN — RANOLAZINE 500 MG: 500 TABLET, FILM COATED, EXTENDED RELEASE ORAL at 09:02

## 2025-07-22 RX ADMIN — METOPROLOL SUCCINATE 25 MG: 25 TABLET, EXTENDED RELEASE ORAL at 09:08

## 2025-07-22 RX ADMIN — SODIUM CHLORIDE 100 ML/HR: 0.9 INJECTION, SOLUTION INTRAVENOUS at 01:00

## 2025-07-22 RX ADMIN — FERROUS GLUCONATE 324 MG: 324 TABLET ORAL at 06:12

## 2025-07-22 RX ADMIN — CARBIDOPA AND LEVODOPA 1 TABLET: 25; 100 TABLET ORAL at 09:02

## 2025-07-22 RX ADMIN — ASPIRIN 81 MG: 81 TABLET, CHEWABLE ORAL at 08:57

## 2025-07-22 RX ADMIN — INSULIN GLARGINE 40 UNITS: 100 INJECTION, SOLUTION SUBCUTANEOUS at 08:52

## 2025-07-22 RX ADMIN — LORATADINE 10 MG: 10 TABLET ORAL at 08:57

## 2025-07-22 RX ADMIN — PANTOPRAZOLE SODIUM 40 MG: 40 TABLET, DELAYED RELEASE ORAL at 06:12

## 2025-07-22 RX ADMIN — PANTOPRAZOLE SODIUM 40 MG: 40 TABLET, DELAYED RELEASE ORAL at 16:51

## 2025-07-22 RX ADMIN — INSULIN LISPRO 1 UNITS: 100 INJECTION, SOLUTION INTRAVENOUS; SUBCUTANEOUS at 12:57

## 2025-07-22 RX ADMIN — CARBIDOPA AND LEVODOPA 1 TABLET: 25; 100 TABLET ORAL at 16:51

## 2025-07-22 RX ADMIN — CARBIDOPA AND LEVODOPA 1 TABLET: 25; 100 TABLET ORAL at 01:00

## 2025-07-22 RX ADMIN — CEFEPIME 2000 MG: 2 INJECTION, POWDER, FOR SOLUTION INTRAVENOUS at 21:18

## 2025-07-22 RX ADMIN — TAMSULOSIN HYDROCHLORIDE 0.4 MG: 0.4 CAPSULE ORAL at 16:51

## 2025-07-22 RX ADMIN — SENNOSIDES 8.6 MG: 8.6 TABLET, FILM COATED ORAL at 08:57

## 2025-07-22 RX ADMIN — CEFEPIME 2000 MG: 2 INJECTION, POWDER, FOR SOLUTION INTRAVENOUS at 08:52

## 2025-07-22 RX ADMIN — ENOXAPARIN SODIUM 40 MG: 40 INJECTION SUBCUTANEOUS at 08:52

## 2025-07-22 NOTE — ED PROVIDER NOTES
Time reflects when diagnosis was documented in both MDM as applicable and the Disposition within this note       Time User Action Codes Description Comment    7/21/2025 10:21 PM Luis Arenas Add [N39.0] Urinary tract infection     7/21/2025 10:21 PM Luis Arenas Add [A41.9,  R65.20] Severe sepsis (HCC)     7/21/2025 10:21 PM Luis Arenas Add [E87.20] Lactic acid acidosis     7/21/2025 10:21 PM Luis Arenas Add [R79.9] Elevated BUN     7/22/2025 12:43 AM Pako Hendrickson Modify [N39.0] Urinary tract infection     7/22/2025 12:43 AM Pako Hendrickson Modify [A41.9,  R65.20] Severe sepsis (HCC)           ED Disposition       ED Disposition   Admit    Condition   Stable    Date/Time   Mon Jul 21, 2025 10:21 PM    Comment   Case was discussed with Dr. Aguayo and the patient's admission status was agreed to be Admission Status: inpatient status to the service of Dr. Thornton.               Assessment & Plan       Medical Decision Making  75-year-old male presents with lethargy and possible confusion  At risk for anemia, electrolyte abnormality, CARLOS EDUARDO, urosepsis, pneumonia, hypo-/hyperglycemia, etc.  CBC shows leukocytosis with white blood cell count of 13.57 and elevated neutrophil count  CMP shows no electrolyte abnormalities however patient does have borderline CARLOS EDUARDO with BUN of 31 from baseline of 18 and a creatinine of 1.43 from baseline of 1.2  Patient meets SIRS criteria with leukocytosis and fever of 100.4  PT and PTT within normal limits  Lactic acid elevated at 2.3  Patient meets criteria for severe sepsis, 30 cc/kg fluid bolus initiated and patient started on cefepime  Urine appears cloudy on examination of Razo catheter likely urosepsis  Urinalysis shows large leukocytes, large occult blood, and white blood cell clumps  Blood sugar was within normal limits  Chest x-ray shows no acute cardiopulmonary disease  2 L normal saline bolus given, patient to be admitted at this time for further volume resuscitation  and treatment of likely urosepsis  Patient reevaluated after fluid resuscitation, blood pressure has remained stable and patient appears more alert and oriented at this time  Discussed findings with patient, no further questions at this time  Patient in stable condition and cleared for admission    Amount and/or Complexity of Data Reviewed  Labs: ordered.  Radiology: ordered and independent interpretation performed.    Risk  Prescription drug management.  Decision regarding hospitalization.        ED Course as of 07/22/25 1828   Mon Jul 21, 2025 2106 Sepsis alert called due to elevated lactic acid   2223 Second fluids bolus initiated.  Blood pressure has been stable.       Medications   acetaminophen (TYLENOL) tablet 650 mg (has no administration in time range)   aspirin chewable tablet 81 mg (81 mg Oral Given 7/22/25 0857)   carbidopa-levodopa (SINEMET)  mg per tablet 1 tablet (1 tablet Oral Given 7/22/25 1651)   Empagliflozin (JARDIANCE) tablet 10 mg ( Oral Held Dose 7/25/25 0900)   tamsulosin (FLOMAX) capsule 0.4 mg (0.4 mg Oral Given 7/22/25 1651)   ferrous gluconate (FERGON) tablet 324 mg (324 mg Oral Given 7/22/25 0612)   insulin glargine (LANTUS) subcutaneous injection 40 Units 0.4 mL (40 Units Subcutaneous Given 7/22/25 0852)   loratadine (CLARITIN) tablet 10 mg (10 mg Oral Given 7/22/25 0857)   metoprolol succinate (TOPROL-XL) 24 hr tablet 25 mg (25 mg Oral Given 7/22/25 0908)   pantoprazole (PROTONIX) EC tablet 40 mg (40 mg Oral Given 7/22/25 1651)   ranolazine (RANEXA) 12 hr tablet 500 mg (500 mg Oral Given 7/22/25 0902)   enoxaparin (LOVENOX) subcutaneous injection 40 mg (40 mg Subcutaneous Given 7/22/25 0852)   senna (SENOKOT) tablet 8.6 mg (8.6 mg Oral Given 7/22/25 0857)   insulin lispro (HumALOG/ADMELOG) 100 units/mL subcutaneous injection 1-5 Units ( Subcutaneous Not Given 7/22/25 1654)   insulin lispro (HumALOG/ADMELOG) 100 units/mL subcutaneous injection 1-5 Units ( Subcutaneous Not Given  7/21/25 2345)   oxybutynin (DITROPAN) tablet 5 mg ( Oral Held Dose 7/25/25 0900)   sodium chloride 0.9 % infusion (0 mL/hr Intravenous Stopped 7/22/25 0728)   cefepime (MAXIPIME) 2 g/50 mL dextrose IVPB (0 mg Intravenous Stopped 7/22/25 0920)   sodium chloride 0.9 % bolus 1,000 mL (0 mL Intravenous Stopped 7/21/25 2157)   acetaminophen (Ofirmev) injection 1,000 mg (0 mg Intravenous Stopped 7/21/25 2114)   cefepime (MAXIPIME) 2 g/50 mL dextrose IVPB (0 mg Intravenous Stopped 7/21/25 2157)   sodium chloride 0.9 % bolus 1,000 mL (0 mL Intravenous Stopped 7/21/25 2315)       ED Risk Strat Scores                    No data recorded        SBIRT 20yo+      Flowsheet Row Most Recent Value   Initial Alcohol Screen: US AUDIT-C     1. How often do you have a drink containing alcohol? 0 Filed at: 07/22/2025 0052   2. How many drinks containing alcohol do you have on a typical day you are drinking?  0 Filed at: 07/22/2025 0052   3a. Male UNDER 65: How often do you have five or more drinks on one occasion? 0 Filed at: 07/22/2025 0052   3b. FEMALE Any Age, or MALE 65+: How often do you have 4 or more drinks on one occassion? 0 Filed at: 07/22/2025 0052   Audit-C Score 0 Filed at: 07/22/2025 0052   BANDAR: How many times in the past year have you...    Used an illegal drug or used a prescription medication for non-medical reasons? Never Filed at: 07/22/2025 0052                            History of Present Illness       Chief Complaint   Patient presents with    Possible UTI     From [facility], presents for lethargy/confusion. Staff could not elaborate on baseline or medications given today.        Past Medical History[1]   Past Surgical History[2]   Family History[3]   Social History[4]   E-Cigarette/Vaping    E-Cigarette Use Never User       E-Cigarette/Vaping Substances    Nicotine No     THC No     CBD No       I have reviewed and agree with the history as documented.     75-year-old male presents from [facility] with complaints of lethargy and confusion.  Patient has chronic indwelling Razo catheter and was noted this was a bit cloudy.  Patient was recently hospitalized for urinary tract infection.  Patient is demented at baseline and a poor historian.  Patient admitting to some suprapubic abdominal pain.  Patient denies CVA pain, cough, shortness of breath, chest pain, penile pain, diarrhea or constipation.          Review of Systems   Constitutional:  Positive for activity change, chills and fever.   HENT:  Negative for ear pain and sore throat.    Eyes:  Negative for pain and visual disturbance.   Respiratory:  Negative for cough and shortness of breath.    Cardiovascular:  Negative for chest pain and palpitations.   Gastrointestinal:  Positive for abdominal pain. Negative for vomiting.   Genitourinary:  Negative for dysuria and hematuria.   Musculoskeletal:  Negative for arthralgias and back pain.   Skin:  Negative for color change and rash.   Neurological:  Negative for seizures and syncope.   Psychiatric/Behavioral:  Positive for confusion.    All other systems reviewed and are negative.          Objective       ED Triage Vitals [07/21/25 2015]   Temperature Pulse Blood Pressure Respirations SpO2 Patient Position - Orthostatic VS   100.4 °F (38 °C) 93 125/60 20 94 % Lying      Temp Source Heart Rate Source BP Location FiO2 (%) Pain Score    Oral Monitor Left arm -- --      Vitals      Date and Time Temp Pulse SpO2 Resp BP Pain Score FACES Pain Rating User   07/22/25 1800 -- 87 98 % 17 160/78 -- --    07/22/25 1600 -- 89 98 % 17 165/73 -- -- EM   07/22/25 1400 -- 82 96 % -- 128/62 -- -- HR   07/22/25 1300 -- 92 97 % 18 143/70 -- --    07/22/25 1100 -- 91 96 % 18 126/60 -- --    07/22/25 1000 -- 95 97 % -- 158/72 -- --    07/22/25 0715 -- 90 98 % -- -- -- --    07/22/25 0637 100 °F (37.8 °C) -- -- -- -- -- --    07/22/25 0600 -- 89 97 % -- 130/60 -- --    07/22/25 0500 -- 92 98 % --  144/70 -- -- MF   07/22/25 0200 -- 84 98 % 20 124/60 -- -- EB   07/21/25 2330 98.5 °F (36.9 °C) 88 97 % 20 132/63 -- -- EB   07/21/25 2300 -- 86 98 % 20 120/55 -- -- EB   07/21/25 2200 -- 83 94 % -- 105/57 -- -- EM   07/21/25 2145 -- 84 93 % -- 99/53 -- -- EM   07/21/25 2141 -- -- -- -- 100/56 -- -- EM   07/21/25 2130 -- 87 92 % 20 93/54 -- -- EM   07/21/25 2100 -- 91 93 % -- 120/58 -- -- EM   07/21/25 2030 -- 96 94 % -- 125/62 -- -- EM   07/21/25 2015 100.4 °F (38 °C) 93 94 % 20 125/60 -- -- EM            Physical Exam  Vitals and nursing note reviewed.   Constitutional:       General: He is in acute distress.      Appearance: He is well-developed. He is ill-appearing.      Comments: Chronically ill-appearing male resting in bed   HENT:      Head: Normocephalic and atraumatic.     Eyes:      General: No scleral icterus.        Right eye: No discharge.         Left eye: No discharge.      Conjunctiva/sclera: Conjunctivae normal.       Cardiovascular:      Rate and Rhythm: Normal rate and regular rhythm.      Heart sounds: No murmur heard.     No friction rub. No gallop.   Pulmonary:      Effort: Pulmonary effort is normal. No respiratory distress.      Breath sounds: Normal breath sounds. No stridor. No wheezing, rhonchi or rales.   Abdominal:      General: There is no distension.      Palpations: Abdomen is soft.      Tenderness: There is abdominal tenderness. There is no right CVA tenderness, left CVA tenderness, guarding or rebound.      Comments: Mild tenderness located in suprapubic region.  No other left or right lower quadrant abdominal pain.  No rebound or guarding.  No CVA tenderness.     Musculoskeletal:         General: No swelling.      Cervical back: Neck supple.      Right lower leg: Edema present.      Left lower leg: Edema present.      Comments: Trace lower extremity edema bilaterally     Skin:     General: Skin is warm and dry.      Capillary Refill: Capillary refill takes less than 2 seconds.       Coloration: Skin is not jaundiced or pale.      Findings: No bruising or erythema.     Neurological:      Mental Status: He is alert.      Cranial Nerves: No cranial nerve deficit.      Sensory: No sensory deficit.      Motor: No weakness.      Comments: Patient alert and oriented x 2.  Patient is demented at baseline.  Unable to determine if patient is at or near his baseline due to lack of history.   Psychiatric:         Mood and Affect: Mood normal.         Results Reviewed       Procedure Component Value Units Date/Time    Fingerstick Glucose (POCT) [[id number]]  (Normal) Collected: 07/22/25 1654    Lab Status: Final result Specimen: Blood Updated: 07/22/25 1701     POC Glucose 129 mg/dl     Fingerstick Glucose (POCT) [[id number]]  (Abnormal) Collected: 07/22/25 1151    Lab Status: Final result Specimen: Blood Updated: 07/22/25 1153     POC Glucose 186 mg/dl     Fingerstick Glucose (POCT) [[id number]]  (Normal) Collected: 07/22/25 0844    Lab Status: Final result Specimen: Blood Updated: 07/22/25 0846     POC Glucose 74 mg/dl     CBC and differential [[id number]]  (Abnormal) Collected: 07/22/25 0612    Lab Status: Final result Specimen: Blood from Arm, Right Updated: 07/22/25 0700     WBC 13.57 Thousand/uL      RBC 4.38 Million/uL      Hemoglobin 12.6 g/dL      Hematocrit 41.0 %      MCV 94 fL      MCH 28.8 pg      MCHC 30.7 g/dL      RDW 14.5 %      MPV 11.3 fL      Platelets 164 Thousands/uL      nRBC 0 /100 WBCs      Segmented % 81 %      Immature Grans % 1 %      Lymphocytes % 5 %      Monocytes % 12 %      Eosinophils Relative 1 %      Basophils Relative 0 %      Absolute Neutrophils 10.97 Thousands/µL      Absolute Immature Grans 0.10 Thousand/uL      Absolute Lymphocytes 0.67 Thousands/µL      Absolute Monocytes 1.64 Thousand/µL      Eosinophils Absolute 0.13 Thousand/µL      Basophils Absolute 0.06 Thousands/µL     Narrative:      This is an appended report.  These results have been appended to a previously  verified report.    Smear Review(Phlebs Do Not Order) [[id number]] Collected: 07/22/25 0612    Lab Status: Final result Specimen: Blood from Arm, Right Updated: 07/22/25 0700     RBC Morphology Normal     Platelet Estimate Adequate    Basic metabolic panel [[id number]]  (Abnormal) Collected: 07/22/25 0612    Lab Status: Final result Specimen: Blood from Arm, Right Updated: 07/22/25 0639     Sodium 141 mmol/L      Potassium 4.2 mmol/L      Chloride 107 mmol/L      CO2 27 mmol/L      ANION GAP 7 mmol/L      BUN 27 mg/dL      Creatinine 1.30 mg/dL      Glucose 78 mg/dL      Calcium 8.4 mg/dL      eGFR 53 ml/min/1.73sq m     Narrative:      National Kidney Disease Foundation guidelines for Chronic Kidney Disease (CKD):     Stage 1 with normal or high GFR (GFR > 90 mL/min/1.73 square meters)    Stage 2 Mild CKD (GFR = 60-89 mL/min/1.73 square meters)    Stage 3A Moderate CKD (GFR = 45-59 mL/min/1.73 square meters)    Stage 3B Moderate CKD (GFR = 30-44 mL/min/1.73 square meters)    Stage 4 Severe CKD (GFR = 15-29 mL/min/1.73 square meters)    Stage 5 End Stage CKD (GFR <15 mL/min/1.73 square meters)  Note: GFR calculation is accurate only with a steady state creatinine    Blood culture #1 [[id number]] Collected: 07/21/25 2043    Lab Status: Preliminary result Specimen: Blood from Arm, Left Updated: 07/22/25 0102     Blood Culture Received in Microbiology Lab. Culture in Progress.    Blood culture #2 [[id number]] Collected: 07/21/25 2031    Lab Status: Preliminary result Specimen: Blood from Arm, Right Updated: 07/22/25 0102     Blood Culture Received in Microbiology Lab. Culture in Progress.    Fingerstick Glucose (POCT) [[id number]]  (Normal) Collected: 07/21/25 2341    Lab Status: Final result Specimen: Blood Updated: 07/21/25 2342     POC Glucose 128 mg/dl     Lactic acid 2 Hours [[id number]]  (Normal) Collected: 07/21/25 2243    Lab Status: Final result Specimen: Blood from Arm, Left Updated: 07/21/25 2309     LACTIC ACID  0.9 mmol/L     Narrative:      Result may be elevated if tourniquet was used during collection.    Urine Microscopic [[id number]]  (Abnormal) Collected: 07/21/25 2203    Lab Status: Final result Specimen: Urine, Straight Cath Updated: 07/21/25 2220     RBC, UA Innumerable /hpf      WBC, UA Innumerable /hpf      Epithelial Cells None Seen /hpf      Bacteria, UA None Seen /hpf      WBC Clumps Present    Urine culture [[id number]] Collected: 07/21/25 2203    Lab Status: In process Specimen: Urine, Straight Cath Updated: 07/21/25 2220    UA w Reflex to Microscopic w Reflex to Culture [[id number]]  (Abnormal) Collected: 07/21/25 2203    Lab Status: Final result Specimen: Urine, Straight Cath Updated: 07/21/25 2217     Color, UA Yellow     Clarity, UA Turbid     Specific Gravity, UA 1.026     pH, UA 5.5     Leukocytes, UA Large     Nitrite, UA Negative     Protein,  (2+) mg/dl      Glucose, UA >=1000 (1%) mg/dl      Ketones, UA Negative mg/dl      Urobilinogen, UA <2.0 mg/dl      Bilirubin, UA Negative     Occult Blood, UA Large    Procalcitonin [[id number]]  (Normal) Collected: 07/21/25 2031    Lab Status: Final result Specimen: Blood from Arm, Right Updated: 07/21/25 2103     Procalcitonin 0.16 ng/ml     Lactic acid [[id number]]  (Abnormal) Collected: 07/21/25 2031    Lab Status: Final result Specimen: Blood from Arm, Right Updated: 07/21/25 2056     LACTIC ACID 2.3 mmol/L     Narrative:      Result may be elevated if tourniquet was used during collection.    Protime-INR [[id number]]  (Normal) Collected: 07/21/25 2031    Lab Status: Final result Specimen: Blood from Arm, Right Updated: 07/21/25 2054     Protime 13.5 seconds      INR 0.96    Narrative:      INR Therapeutic Range    Indication                                             INR Range      Atrial Fibrillation                                               2.0-3.0  Hypercoagulable State                                    2.0.2.3  Left Ventricular Asist Device                             2.0-3.0  Mechanical Heart Valve                                  -    Aortic(with afib, MI, embolism, HF, LA enlargement,    and/or coagulopathy)                                     2.0-3.0 (2.5-3.5)     Mitral                                                             2.5-3.5  Prosthetic/Bioprosthetic Heart Valve               2.0-3.0  Venous thromboembolism (VTE: VT, PE        2.0-3.0    APTT [[id number]]  (Normal) Collected: 07/21/25 2031    Lab Status: Final result Specimen: Blood from Arm, Right Updated: 07/21/25 2054     PTT 32 seconds     Comprehensive metabolic panel [[id number]]  (Abnormal) Collected: 07/21/25 2031    Lab Status: Final result Specimen: Blood from Arm, Right Updated: 07/21/25 2053     Sodium 140 mmol/L      Potassium 4.1 mmol/L      Chloride 104 mmol/L      CO2 25 mmol/L      ANION GAP 11 mmol/L      BUN 31 mg/dL      Creatinine 1.43 mg/dL      Glucose 139 mg/dL      Calcium 8.9 mg/dL      AST 10 U/L      ALT 7 U/L      Alkaline Phosphatase 65 U/L      Total Protein 7.1 g/dL      Albumin 3.7 g/dL      Total Bilirubin 0.83 mg/dL      eGFR 47 ml/min/1.73sq m     Narrative:      National Kidney Disease Foundation guidelines for Chronic Kidney Disease (CKD):     Stage 1 with normal or high GFR (GFR > 90 mL/min/1.73 square meters)    Stage 2 Mild CKD (GFR = 60-89 mL/min/1.73 square meters)    Stage 3A Moderate CKD (GFR = 45-59 mL/min/1.73 square meters)    Stage 3B Moderate CKD (GFR = 30-44 mL/min/1.73 square meters)    Stage 4 Severe CKD (GFR = 15-29 mL/min/1.73 square meters)    Stage 5 End Stage CKD (GFR <15 mL/min/1.73 square meters)  Note: GFR calculation is accurate only with a steady state creatinine    CBC and differential [[id number]]  (Abnormal) Collected: 07/21/25 2031    Lab Status: Final result Specimen: Blood from Arm, Right Updated: 07/21/25 2039     WBC 16.06 Thousand/uL      RBC 4.34 Million/uL      Hemoglobin 12.7 g/dL      Hematocrit 39.0 %      MCV 90 fL   "    MCH 29.3 pg      MCHC 32.6 g/dL      RDW 14.6 %      MPV 10.4 fL      Platelets 249 Thousands/uL      nRBC 0 /100 WBCs      Segmented % 85 %      Immature Grans % 1 %      Lymphocytes % 3 %      Monocytes % 11 %      Eosinophils Relative 0 %      Basophils Relative 0 %      Absolute Neutrophils 13.58 Thousands/µL      Absolute Immature Grans 0.11 Thousand/uL      Absolute Lymphocytes 0.54 Thousands/µL      Absolute Monocytes 1.77 Thousand/µL      Eosinophils Absolute 0.02 Thousand/µL      Basophils Absolute 0.04 Thousands/µL             XR chest portable   ED Interpretation by Pako Hendrickson MD ([id number])   Per my independent interpretation: No acute cardiopulmonary process      Final Interpretation by Renu Fitzgerald MD ([id number])      No acute cardiopulmonary disease.            Workstation performed: [id number]             Procedures    ED Medication and Procedure Management   Prior to Admission Medications   Prescriptions Last Dose Informant Patient Reported? Taking?   ACCU-CHEK GUIDE test strip  Outside Facility (Specify), Self Yes No   Si strip as needed   Alcohol Swabs (PRO COMFORT ALCOHOL) 70 % PADS  Outside Facility (Specify), Self Yes No   Si pad. in the morning. Use a pad when testing .   Dutasteride-Tamsulosin HCl 0.5-0.4 MG CAPS 2025  Yes Yes   Sig: Take by mouth   Easy Comfort Lancets MISC  Outside Facility (Specify), Self Yes No   Sig: as needed   Glucagon, rDNA, (GLUCAGON EMERGENCY IJ)  Self Yes No   Sig: Inject 1 mL as directed Sugars less than 60 and conscious   Glucose 15 g PACK  Self Yes No   Sig: Take 15 g by mouth Per [facility] \"give 15 gram PO every 15 minutes PRN for hypoglycemia\"   [employer] 30G X 8 MM MISC Not Taking Self Yes No   Patient not taking: Reported on 2025   acetaminophen (TYLENOL) 325 mg tablet 2025 Self Yes Yes   Sig: Take 650 mg by mouth every 6 (six) hours as needed for mild pain   aspirin 81 mg chewable tablet " 7/20/2025 Self No Yes   Sig: Chew 1 tablet (81 mg total) daily   atorvastatin (LIPITOR) 40 mg tablet Not Taking Self No No   Sig: Take 1 tablet (40 mg total) by mouth daily   Patient not taking: Reported on 7/21/2025   carbidopa-levodopa (SINEMET)  mg per tablet 7/21/2025 Morning  No Yes   Sig: Take 1 tablet by mouth 3 (three) times a day   dapagliflozin (Farxiga) 10 MG tablet 7/21/2025  Yes Yes   Sig: Take 10 mg by mouth daily   ferrous gluconate (FERGON) 324 mg tablet   No No   Sig: Take 1 tablet (324 mg total) by mouth daily before breakfast Do not start before July 30, 2023.   finasteride (PROSCAR) 5 mg tablet Not Taking Outside Facility (Specify), Self No No   Sig: Take 1 tablet (5 mg total) by mouth daily   Patient not taking: Reported on 7/21/2025   glycerin-hypromellose- (ARTIFICIAL TEARS) 0.2-0.2-1 % SOLN  Self No No   Sig: Administer 1 drop to both eyes 2 (two) times a day as needed (for dry eyes)   insulin glargine (LANTUS) 100 units/mL subcutaneous injection 7/21/2025  No Yes   Sig: Inject 40 Units under the skin every morning   linaGLIPtin (Tradjenta) 5 MG TABS 7/21/2025 Morning  Yes Yes   Sig: Take 5 mg by mouth in the morning.   loratadine (CLARITIN) 10 mg tablet   Yes No   Sig: Take 10 mg by mouth in the morning.   metFORMIN (GLUCOPHAGE) 1000 MG tablet 7/21/2025  Yes Yes   Sig: Take 1,000 mg by mouth in the morning and 1,000 mg in the evening. Take with meals.   metoprolol succinate (TOPROL-XL) 25 mg 24 hr tablet 7/21/2025 Self No Yes   Sig: Take 1 tablet (25 mg total) by mouth daily   nystatin (MYCOSTATIN) powder Not Taking Self No No   Sig: Apply topically 2 (two) times a day   Patient not taking: Reported on 7/21/2025   oxybutynin (DITROPAN) 5 mg tablet 7/21/2025  Yes Yes   Sig: Take 5 mg by mouth in the morning   pantoprazole (PROTONIX) 40 mg tablet 7/21/2025  No Yes   Sig: TAKE 1 TABLET (40 MG TOTAL) BY MOUTH 2 (TWO) TIMES A DAY BEFORE MEALS   polyethylene glycol (MIRALAX) 17 g  packet Not Taking Outside Facility (Specify), Self No No   Sig: Take 17 g by mouth daily   Patient not taking: Reported on 7/21/2025   ranolazine (RANEXA) 500 mg 12 hr tablet 7/21/2025 Morning Self No Yes   Sig: Take 1 tablet (500 mg total) by mouth every 12 (twelve) hours   tamsulosin (FLOMAX) 0.4 mg Not Taking Outside Facility (Specify), Self No No   Sig: Take 1 capsule (0.4 mg total) by mouth daily with dinner   Patient not taking: Reported on 7/21/2025      Facility-Administered Medications: None     Current Discharge Medication List        CONTINUE these medications which have NOT CHANGED    Details   acetaminophen (TYLENOL) 325 mg tablet Take 650 mg by mouth every 6 (six) hours as needed for mild pain      aspirin 81 mg chewable tablet Chew 1 tablet (81 mg total) daily  Qty: 30 tablet, Refills: 0    Associated Diagnoses: NSTEMI (non-ST elevated myocardial infarction) ([facility])      carbidopa-levodopa (SINEMET)  mg per tablet Take 1 tablet by mouth 3 (three) times a day  Qty: 90 tablet, Refills: 11    Associated Diagnoses: Ambulatory dysfunction; Left-sided weakness      dapagliflozin (Farxiga) 10 MG tablet Take 10 mg by mouth daily      Dutasteride-Tamsulosin HCl 0.5-0.4 MG CAPS Take by mouth      insulin glargine (LANTUS) 100 units/mL subcutaneous injection Inject 40 Units under the skin every morning  Qty: 12 mL, Refills: 0    Associated Diagnoses: Type 2 diabetes mellitus with hyperglycemia, with long-term current use of insulin ([facility])      linaGLIPtin (Tradjenta) 5 MG TABS Take 5 mg by mouth in the morning.      metFORMIN (GLUCOPHAGE) 1000 MG tablet Take 1,000 mg by mouth in the morning and 1,000 mg in the evening. Take with meals.      metoprolol succinate (TOPROL-XL) 25 mg 24 hr tablet Take 1 tablet (25 mg total) by mouth daily  Refills: 0    Associated Diagnoses: Type 2 MI (myocardial infarction) ([facility])      oxybutynin (DITROPAN) 5 mg tablet Take 5 mg by mouth in the morning      pantoprazole (PROTONIX)  "40 mg tablet TAKE 1 TABLET (40 MG TOTAL) BY MOUTH 2 (TWO) TIMES A DAY BEFORE MEALS  Qty: 60 tablet, Refills: 4    Associated Diagnoses: GERD (gastroesophageal reflux disease)      ranolazine (RANEXA) 500 mg 12 hr tablet Take 1 tablet (500 mg total) by mouth every 12 (twelve) hours  Qty: 60 tablet, Refills: 0    Associated Diagnoses: Type 2 MI (myocardial infarction) ([facility])      ACCU-CHEK GUIDE test strip 1 strip as needed      Alcohol Swabs (PRO COMFORT ALCOHOL) 70 % PADS 1 pad. in the morning. Use a pad when testing .      atorvastatin (LIPITOR) 40 mg tablet Take 1 tablet (40 mg total) by mouth daily  Qty: 30 tablet, Refills: 0    Associated Diagnoses: NSTEMI (non-ST elevated myocardial infarction) ([facility])      Easy Comfort Lancets MISC as needed      ferrous gluconate (FERGON) 324 mg tablet Take 1 tablet (324 mg total) by mouth daily before breakfast Do not start before July 30, 2023.  Qty: 30 tablet, Refills: 0    Associated Diagnoses: Anemia      finasteride (PROSCAR) 5 mg tablet Take 1 tablet (5 mg total) by mouth daily  Qty: 30 tablet, Refills: 0    Associated Diagnoses: UTI (urinary tract infection)      Glucagon, rDNA, (GLUCAGON EMERGENCY IJ) Inject 1 mL as directed Sugars less than 60 and conscious      Glucose 15 g PACK Take 15 g by mouth Per [facility] \"give 15 gram PO every 15 minutes PRN for hypoglycemia\"      glycerin-hypromellose- (ARTIFICIAL TEARS) 0.2-0.2-1 % SOLN Administer 1 drop to both eyes 2 (two) times a day as needed (for dry eyes)  Qty: 15 mL, Refills: 0    Associated Diagnoses: Dry eyes      loratadine (CLARITIN) 10 mg tablet Take 10 mg by mouth in the morning.      NovoFine Autocover 30G X 8 MM MISC       nystatin (MYCOSTATIN) powder Apply topically 2 (two) times a day  Qty: 15 g, Refills: 0    Associated Diagnoses: Intertrigo      polyethylene glycol (MIRALAX) 17 g packet Take 17 g by mouth daily  Qty: 14 each, Refills: 2    Associated Diagnoses: Constipation      tamsulosin " "(FLOMAX) 0.4 mg Take 1 capsule (0.4 mg total) by mouth daily with dinner  Qty: 30 capsule, Refills: 0    Associated Diagnoses: UTI (urinary tract infection)           No discharge procedures on file.  ED SEPSIS DOCUMENTATION   Time reflects when diagnosis was documented in both MDM as applicable and the Disposition within this note       Time User Action Codes Description Comment    7/21/2025 10:21 PM Luis Arenas Add [N39.0] Urinary tract infection     7/21/2025 10:21 PM Luis Arenas Add [A41.9,  R65.20] Severe sepsis (HCC)     7/21/2025 10:21 PM Luis Arenas Add [E87.20] Lactic acid acidosis     7/21/2025 10:21 PM Luis Arenas Add [R79.9] Elevated BUN     7/22/2025 12:43 AM Pako Hendrickson Modify [N39.0] Urinary tract infection     7/22/2025 12:43 AM Pako Hendrickson Modify [A41.9,  R65.20] Severe sepsis (HCC)            Initial Sepsis Screening       Row Name 07/21/25 2111                Is the patient's history suggestive of a new or worsening infection? Yes (Proceed)  -CL        Suspected source of infection urinary tract infection  -CL        Indicate SIRS criteria Tachycardia > 90 bpm;Leukocytosis (WBC > 88034 IJL) OR Leukopenia (WBC <4000 IJL) OR Bandemia (WBC >10% bands)  -CL        Are two or more of the above signs & symptoms of infection both present and new to the patient? Yes (Proceed)  -CL        Assess for evidence of organ dysfunction: Are any of the below criteria present within 6 hours of suspected infection and SIRS criteria that are NOT considered to be chronic conditions? Lactate > 2.0  -CL        Date of presentation of severe sepsis 07/21/25  -CL        Time of presentation of severe sepsis 2112  -CL        Sepsis Note: Click \"NEXT\" below (NOT \"close\") to generate sepsis note based on above information. YES (proceed by clicking \"NEXT\")  -CL                  User Key  (r) = Recorded By, (t) = Taken By, (c) = Cosigned By      Initials Name Provider Type    CL Pako Hendrickson MD " "Physician                  Default Flowsheet Data (Last 720 Hours)       Sepsis Reassess       Row Name 07/21/25 2112                   Repeat Volume Status and Tissue Perfusion Assessment Performed    Date of Reassessment: 07/21/25  -        Time of Reassessment: 2112  -        Sepsis Reassessment Note: Click \"NEXT\" below (NOT \"close\") to generate sepsis reassessment note. YES (proceed by clicking \"NEXT\")  -        Repeat Volume Status and Tissue Perfusion Assessment Performed --                  User Key  (r) = Recorded By, (t) = Taken By, (c) = Cosigned By      Initials Name Provider Type    CL Pako Hendrickson MD Physician                         [1]   Past Medical History:  Diagnosis Date    Ambulatory dysfunction     uses walker with assist of 1    Anemia     Anxiety     At risk for falls     hx of falls    Benign paroxysmal vertigo     unspecified ear    BPH (benign prostatic hyperplasia)     for TURP today 1/4/2021    Calculus in bladder     for surgical removal today 1/4/2021    Cataract     left eye    Cervicalgia     Chest pain     Chronic kidney disease     stage 3    Constipation     CTS (carpal tunnel syndrome)     left    Cyst of pancreas     Cystitis 06/23/2020    acute cystitis with hematuria    Depression     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    Dizziness     and giddiness    Dysphagia     Elevated PSA     Facial weakness     GERD (gastroesophageal reflux disease)     last assessed 5/20/16    Gross hematuria     Hematuria     Hyperlipidemia     Hypertension     Kidney disease     Kidney stone     Left-sided weakness     Long term (current) use of oral hypoglycemic drugs     Muscle weakness     Nuclear senile cataract of left eye     OA (osteoarthritis) of knee     b/l    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HCC)     Syncope and collapse     Tachycardia     UTI (urinary tract infection)     Vertebro-basilar artery syndrome     Vitamin B deficiency     Vitamin D deficiency     Vitamin D " deficiency    [2]   Past Surgical History:  Procedure Laterality Date    CARDIAC CATHETERIZATION N/A 3/7/2022    Procedure: CARDIAC CATHETERIZATION;  Surgeon: Pako Reid DO;  [facility];  Service: Cardiology    CATARACT EXTRACTION      CHOLECYSTECTOMY      KNEE SURGERY      MO LITHOLAPAXY SMPL/SM <2.5 CM N/A 1/4/2021    Procedure: Cystolitholopaxy w/laser bladder stones;  Surgeon: Kyle Jones MD;  Location: [facility];  Service: Urology    MO TRURL ELECTROSURG RESCJ PROSTATE BLEED COMPLETE N/A 1/4/2021    Procedure: T.U.R.P.;  Surgeon: Kyle Jones MD;  Location: [facility];  Service: Urology   [3]   Family History  Problem Relation Name Age of Onset    Coronary artery disease Mother      Diabetes Father     [4]   Social History  Tobacco Use    Smoking status: Never    Smokeless tobacco: Never   Vaping Use    Vaping status: Never Used   Substance Use Topics    Alcohol use: Not Currently    Drug use: No        Luis Arenas DO  07/22/25 1824

## 2025-07-22 NOTE — ASSESSMENT & PLAN NOTE
POA leukocytosis 16.06, lactic acid 2.3, elevated creatinine 1.43  Most recent lactic acid from 7/21/25 is 0.9  Today 7/22/25 leukocytosis 13.57, normal creatinine 1.30, elevated BUN 27  UA +leukocytes, glucosuria, pyuria, occult blood  The patient is still febrile but no longer hypotensive.  S/P 2 L boluses of NS with lactic acid normalization  The cause of his severe sepsis is likely UTI given his history of recurrent UTI infections and most notable his UTI with Pseudomonas four months ago    Plan:  Continue Cefepime 2g Q12H  Continue to monitor CBC to trend leukocytosis, lactic acid, creatinine, and BUN  Monitor for urine and blood culture;   Monitor vitals, particularly temperature/fevers and blood pressure status to assess for clinical improvement  Based on CBC trends, de-escalate antibiotics if possible  100 cc NS for 6H

## 2025-07-22 NOTE — UTILIZATION REVIEW
Initial Clinical Review    Admission: Date/Time/Statement:   Admission Orders (From admission, onward)       Ordered        07/21/25 2223  INPATIENT ADMISSION  Once                          Orders Placed This Encounter   Procedures    INPATIENT ADMISSION     Standing Status:   Standing     Number of Occurrences:   1     Level of Care:   Med Surg [16]     Estimated length of stay:   More than 2 Midnights     Certification:   I certify that inpatient services are medically necessary for this patient for a duration of greater than two midnights. See H&P and MD Progress Notes for additional information about the patient's course of treatment.     ED Arrival Information       Expected   -    Arrival   7/21/2025 20:11    Acuity   Urgent              Means of arrival   Ambulance    Escorted by   [facility]    Service   Hospitalist    Admission type   Emergency              Arrival complaint   ems             Chief Complaint   Patient presents with    Possible UTI     From [facility], presents for lethargy/confusion. Staff could not elaborate on baseline or medications given today.        Initial Presentation: 75 y.o. male with hx DM2, BPH prior TURP with chronic indwelling catheter, dementia 2/2 advanced Parkinson's disease , recurrent UTI's : (03/2025 Pseudomonas UTI, admission 7/10/25 for presumed UTI -urine culture grew Candida- deemed colonization )  who presents to ED via EMS from facility with 1 day of confusion and fatigue. Facility staff report pt did not want to eat nor drink since around noon, pt appeared to  be more weak. He was also not as talkative, which is not his baseline.  Facility noticed  rigors.  On exam, temp 100.4 F , suprapubic tenderness. Pt  oriented to person, place and year .Has chronic wilkerson cath .  Labs WBC 16.06, lactic acid 2.3 . Creat 1.43 from baseline 1-1.2   . UA +leukocytes, glucosuria, pyuria . Pt given 2 L IVF , IV Tylenol, IV abx in ED. Admitted as  Inpatient with severe sepsis, likely source of UTI. Metabolic encephalopathy . Elevated serum creat . PLan- IV Cefepime . F/U blood and urine cx . IVF- NSS  x 6h . Consider discontinuing Farxiga all together . Delirium monitoring. Hold Oxybutynin  that pt was recently started on for now . Monitor creat .   Anticipated Length of Stay/Certification Statement: Patient will be admitted on an inpatient basis with an anticipated length of stay of greater than 2 midnights secondary to Severe sepsis likely 2/2 UTI and AMS.     Date: 7/22     Day 2:    Pt AAO x 2 this am, not oriented to time/date . Pt reports feeling generally better today.  Lactic acid from yesterday down to 0.9 . WBC down to 13.57, creat  down to 1.30 today . Pt hypotensive overnight , BP has now improved .  Temp 100.0 F this am . Continue IV cefepime . F/U cx and de escalate abx if possible .      Date: 7/23   Day 3: Has surpassed a 2nd midnight with active treatments and services.  Pt continues IV cefepime for recurrent UTI  . WBC down to 11.93 today, afebrile . Urine cx >1000,000 E coli. Urine hazy, cloudy, yellow, indwelling cath . Swelling from the catheter insertion site has been reduced.  Denies pain .Pt AAOx3 today but mental status not at baseline; patient is not as talkative as he was reportedly before admission  GCS 14 . Creat down to 1.12 today , within baseline. PT/OT evals placed today       ED Treatment-Medication Administration from 07/21/2025 2011 to 07/22/2025 1603         Date/Time Order Dose Route Action     07/21/2025 2043 sodium chloride 0.9 % bolus 1,000 mL 1,000 mL Intravenous New Bag     07/21/2025 2043 acetaminophen (Ofirmev) injection 1,000 mg 1,000 mg Intravenous New Bag     07/21/2025 2117 cefepime (MAXIPIME) 2 g/50 mL dextrose IVPB 2,000 mg Intravenous New Bag     07/21/2025 2134 cefepime (MAXIPIME) 2 g/50 mL dextrose IVPB -- Intravenous Restarted     07/21/2025 2201 sodium chloride 0.9 % bolus 1,000 mL 1,000 mL Intravenous  New Bag     07/22/2025 0857 aspirin chewable tablet 81 mg 81 mg Oral Given     07/22/2025 0100 carbidopa-levodopa (SINEMET)  mg per tablet 1 tablet 1 tablet Oral Given     07/22/2025 0902 carbidopa-levodopa (SINEMET)  mg per tablet 1 tablet 1 tablet Oral Given     07/22/2025 0900 Empagliflozin (JARDIANCE) tablet 10 mg -- Oral Held Dose     07/23/2025 0900 Empagliflozin (JARDIANCE) tablet 10 mg -- Oral Held Dose     07/24/2025 0900 Empagliflozin (JARDIANCE) tablet 10 mg -- Oral Held Dose     07/25/2025 0900 Empagliflozin (JARDIANCE) tablet 10 mg -- Oral Held Dose     07/22/2025 0612 ferrous gluconate (FERGON) tablet 324 mg 324 mg Oral Given     07/22/2025 0852 insulin glargine (LANTUS) subcutaneous injection 40 Units 0.4 mL 40 Units Subcutaneous Given     07/22/2025 0857 loratadine (CLARITIN) tablet 10 mg 10 mg Oral Given     07/22/2025 0908 metoprolol succinate (TOPROL-XL) 24 hr tablet 25 mg 25 mg Oral Given     07/22/2025 0612 pantoprazole (PROTONIX) EC tablet 40 mg 40 mg Oral Given     07/22/2025 0100 ranolazine (RANEXA) 12 hr tablet 500 mg 500 mg Oral Given     07/22/2025 0902 ranolazine (RANEXA) 12 hr tablet 500 mg 500 mg Oral Given     07/22/2025 0852 enoxaparin (LOVENOX) subcutaneous injection 40 mg 40 mg Subcutaneous Given     07/22/2025 0857 senna (SENOKOT) tablet 8.6 mg 8.6 mg Oral Given     07/22/2025 1257 insulin lispro (HumALOG/ADMELOG) 100 units/mL subcutaneous injection 1-5 Units 1 Units Subcutaneous Given     07/22/2025 0900 oxybutynin (DITROPAN) tablet 5 mg -- Oral Held Dose     07/23/2025 0900 oxybutynin (DITROPAN) tablet 5 mg -- Oral Held Dose     07/24/2025 0900 oxybutynin (DITROPAN) tablet 5 mg -- Oral Held Dose     07/25/2025 0900 oxybutynin (DITROPAN) tablet 5 mg -- Oral Held Dose     07/22/2025 0100 sodium chloride 0.9 % infusion 100 mL/hr Intravenous New Bag     07/22/2025 0852 cefepime (MAXIPIME) 2 g/50 mL dextrose IVPB 2,000 mg Intravenous New Bag            Scheduled  Medications:  aspirin, 81 mg, Oral, Daily  carbidopa-levodopa, 1 tablet, Oral, TID  cefepime, 2,000 mg, Intravenous, Q12H  [Held by provider] Empagliflozin, 10 mg, Oral, Daily  enoxaparin, 40 mg, Subcutaneous, Daily  ferrous gluconate, 324 mg, Oral, Daily Before Breakfast  insulin glargine, 40 Units, Subcutaneous, QAM  insulin lispro, 1-5 Units, Subcutaneous, TID AC  insulin lispro, 1-5 Units, Subcutaneous, HS  loratadine, 10 mg, Oral, Daily  metoprolol succinate, 25 mg, Oral, Daily  [Held by provider] oxybutynin, 5 mg, Oral, Daily  pantoprazole, 40 mg, Oral, BID AC  ranolazine, 500 mg, Oral, Q12H LIYAH  senna, 1 tablet, Oral, Daily  tamsulosin, 0.4 mg, Oral, Daily With Dinner      Continuous IV Infusions:   sodium chloride 0.9 % infusion  Rate: 100 mL/hr Dose: 100 mL/hr  Freq: Continuous Route: IV  Last Dose: Stopped (07/22/25 0728)  Start: 07/22/25 0030 End: 07/22/25 0659  PRN Meds:  acetaminophen, 650 mg, Oral, Q6H PRN      ED Triage Vitals   Temperature Pulse Respirations Blood Pressure SpO2 Pain Score   07/21/25 2015 07/21/25 2015 07/21/25 2015 07/21/25 2015 07/21/25 2015 07/23/25 0800   100.4 °F (38 °C) 93 20 125/60 94 % No Pain     Weight (last 2 days)       Date/Time Weight    07/21/25 2300 71.9 (158.51)            Vital Signs (last 3 days)       Date/Time Temp Pulse Resp BP MAP (mmHg) SpO2 O2 Device Patient Position - Orthostatic VS Segundo Coma Scale Score Pain    07/23/25 0800 -- -- -- -- -- -- -- -- 14 No Pain    07/23/25 07:40:22 98.7 °F (37.1 °C) 83 16 115/56 76 93 % -- -- -- --    07/22/25 2219 -- -- -- -- -- -- -- -- 14 --    07/22/25 22:06:39 98 °F (36.7 °C) 88 16 100/50 67 96 % -- -- -- --    07/22/25 19:36:10 99.2 °F (37.3 °C) 91 22 118/63 81 98 % -- -- -- --    07/22/25 1900 -- 90 -- 150/75 106 98 % -- -- -- --    07/22/25 1800 -- 87 17 160/78 112 98 % None (Room air) Lying -- --    07/22/25 1600 -- 89 17 165/73 105 98 % None (Room air) Lying -- --    07/22/25 1400 -- 82 -- 128/62 89 96 % -- -- --  --    07/22/25 1300 -- 92 18 143/70 99 97 % None (Room air) Sitting -- --    07/22/25 1100 -- 91 18 126/60 87 96 % None (Room air) -- -- --    07/22/25 1000 -- 95 -- 158/72 103 97 % -- -- -- --    07/22/25 0715 -- 90 -- -- -- 98 % -- -- -- --    07/22/25 0637 100 °F (37.8 °C) -- -- -- -- -- -- -- -- --    07/22/25 0600 -- 89 -- 130/60 86 97 % -- -- -- --    07/22/25 0500 -- 92 -- 144/70 100 98 % -- -- -- --    07/22/25 0200 -- 84 20 124/60 86 98 % None (Room air) Lying -- --    07/21/25 2330 98.5 °F (36.9 °C) 88 20 132/63 90 97 % None (Room air) -- -- --    07/21/25 2315 -- -- -- -- -- -- -- -- 14 --    07/21/25 2300 -- 86 20 120/55 81 98 % None (Room air) -- -- --    07/21/25 2200 -- 83 -- 105/57 77 94 % -- -- -- --    07/21/25 2145 -- 84 -- 99/53 73 93 % -- -- -- --    07/21/25 2141 -- -- -- 100/56 -- -- -- Lying -- --    07/21/25 2138 -- -- -- -- -- -- -- -- 14 --    07/21/25 2130 -- 87 20 93/54 71 92 % None (Room air) Lying -- --    07/21/25 2100 -- 91 -- 120/58 84 93 % -- -- -- --    07/21/25 2030 -- 96 -- 125/62 88 94 % -- -- -- --    07/21/25 2017 -- -- -- -- -- -- -- -- 14 --    07/21/25 2015 100.4 °F (38 °C) 93 20 125/60 -- 94 % None (Room air) Lying -- --              Pertinent Labs/Diagnostic Test Results:   Radiology:  XR chest portable   ED Interpretation by Pako Hendrickson MD (07/21 2046)   Per my independent interpretation: No acute cardiopulmonary process      Final Interpretation by Renu Fitzgerald MD (07/22 1249)      No acute cardiopulmonary disease.            Workstation performed: [id number]           Cardiology:  No orders to display     GI:  No orders to display           Results from last 7 days   Lab Units 07/23/25  0446 07/22/25  0612 07/21/25  2031   WBC Thousand/uL 11.93* 13.57* 16.06*   HEMOGLOBIN g/dL 11.3* 12.6 12.7   HEMATOCRIT % 35.4* 41.0 39.0   PLATELETS Thousands/uL 232 164 249   TOTAL NEUT ABS Thousands/µL 9.46* 10.97* 13.58*         Results from last 7 days   Lab  Units 07/23/25  0446 07/22/25  0612 07/21/25  2031   SODIUM mmol/L 140 141 140   POTASSIUM mmol/L 3.7 4.2 4.1   CHLORIDE mmol/L 105 107 104   CO2 mmol/L 27 27 25   ANION GAP mmol/L 8 7 11   BUN mg/dL 21 27* 31*   CREATININE mg/dL 1.12 1.30 1.43*   EGFR ml/min/1.73sq m 63 53 47   CALCIUM mg/dL 8.3* 8.4 8.9     Results from last 7 days   Lab Units 07/21/25  2031   AST U/L 10*   ALT U/L 7   ALK PHOS U/L 65   TOTAL PROTEIN g/dL 7.1   ALBUMIN g/dL 3.7   TOTAL BILIRUBIN mg/dL 0.83     Results from last 7 days   Lab Units 07/23/25  1106 07/23/25  0613 07/22/25  2137 07/22/25  1654 07/22/25  1151 07/22/25  0844 07/21/25  2341   POC GLUCOSE mg/dl 165* 99 153* 129 186* 74 128     Results from last 7 days   Lab Units 07/23/25  0446 07/22/25  0612 07/21/25  2031   GLUCOSE RANDOM mg/dL 87 78 139                   Results from last 7 days   Lab Units 07/21/25 2031   PROTIME seconds 13.5   INR  0.96   PTT seconds 32         Results from last 7 days   Lab Units 07/21/25  2031   PROCALCITONIN ng/ml 0.16     Results from last 7 days   Lab Units 07/21/25  2243 07/21/25 2031   LACTIC ACID mmol/L 0.9 2.3*               Results from last 7 days   Lab Units 07/21/25 2203   CLARITY UA  Turbid   COLOR UA  Yellow   SPEC GRAV UA  1.026   PH UA  5.5   GLUCOSE UA mg/dl >=1000 (1%)*   KETONES UA mg/dl Negative   BLOOD UA  Large*   PROTEIN UA mg/dl 100 (2+)*   NITRITE UA  Negative   BILIRUBIN UA  Negative   UROBILINOGEN UA (BE) mg/dl <2.0   LEUKOCYTES UA  Large*   WBC UA /hpf Innumerable*   RBC UA /hpf Innumerable*   BACTERIA UA /hpf None Seen   EPITHELIAL CELLS WET PREP /hpf None Seen                   Results from last 7 days   Lab Units 07/21/25 2203 07/21/25 2043 07/21/25  2031   BLOOD CULTURE   --  No Growth at 24 hrs. No Growth at 24 hrs.   URINE CULTURE  >100,000 cfu/ml Gram Negative Vignesh resembling Escherichia coli*  --   --                    Past Medical History[1]  Present on Admission:   Coronary artery disease involving native  coronary artery of native heart without angina pectoris   Metabolic encephalopathy   Recurrent UTI   Type 2 diabetes mellitus (HCC)   Severe sepsis (HCC)   Parkinson disease (HCC)      Admitting Diagnosis: Weakness [R53.1]  Urinary tract infection [N39.0]  Lactic acid acidosis [E87.20]  Elevated BUN [R79.9]  Severe sepsis (HCC) [A41.9, R65.20]  Age/Sex: 75 y.o. male    Network Utilization Review Department  ATTENTION: Please call with any questions or concerns to [phone] and carefully listen to the prompts so that you are directed to the right person. All voicemails are confidential.   For Discharge needs, contact Care Management DC Support Team at [phone] opt. [phone]  Send all requests for admission clinical reviews, approved or denied determinations and any other requests to dedicated fax number below belonging to the campus where the patient is receiving treatment. List of dedicated fax numbers for the Facilities:  FACILITY NAME UR FAX NUMBER   ADMISSION DENIALS (Administrative/Medical Necessity) [phone]   DISCHARGE SUPPORT TEAM (NETWORK) [phone]   PARENT CHILD HEALTH (Maternity/NICU/Pediatrics) [phone]   [facility] [phone]   [facility] [phone]   [facility] [phone]   [facility] [phone]   [facility] [phone]   [facility] [phone]   [facility] [phone]   [facility] [phone]   [facility] [phone]   [facility] [phone]   [facility] [phone]   [facility] [phone]   [facility] [phone]   --            [1]   Past Medical History:  Diagnosis Date    Ambulatory dysfunction      uses walker with assist of 1    Anemia     Anxiety     At risk for falls     hx of falls    Benign paroxysmal vertigo     unspecified ear    BPH (benign prostatic hyperplasia)     for TURP today 1/4/2021    Calculus in bladder     for surgical removal today 1/4/2021    Cataract     left eye    Cervicalgia     Chest pain     Chronic kidney disease     stage 3    Constipation     CTS (carpal tunnel syndrome)     left    Cyst of pancreas     Cystitis 06/23/2020    acute cystitis with hematuria    Depression     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    Dizziness     and giddiness    Dysphagia     Elevated PSA     Facial weakness     GERD (gastroesophageal reflux disease)     last assessed 5/20/16    Gross hematuria     Hematuria     Hyperlipidemia     Hypertension     Kidney disease     Kidney stone     Left-sided weakness     Long term (current) use of oral hypoglycemic drugs     Muscle weakness     Nuclear senile cataract of left eye     OA (osteoarthritis) of knee     b/l    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HCC)     Syncope and collapse     Tachycardia     UTI (urinary tract infection)     Vertebro-basilar artery syndrome     Vitamin B deficiency     Vitamin D deficiency     Vitamin D deficiency

## 2025-07-22 NOTE — ASSESSMENT & PLAN NOTE
Lab Results   Component Value Date    HGBA1C 6.1 (H) 07/10/2025     Home regimen: Lantus 44U in the AM, Tradjenta, Metformin, Farxiga  Hold Tradjenta, Metformin, Farxiga  HbA1c at goal; patient would likely benefit from decreasing OP DM2 management    Plan:  Continue Lantus 44U in the AM  Hypoglycemia protocol  Consider DC Farxiga if appropriate  ISS while inpatient

## 2025-07-22 NOTE — H&P
H&P - Hospitalist   Name: Tha Weems 75 y.o. male I MRN: [id number]  Unit/Bed#: ED-22 I Date of Admission: 7/21/2025   Date of Service: 7/21/2025 I Hospital Day: 0     Assessment & Plan  Severe sepsis (HCC)  POA leukocytosis 16.06, lactic acid 2.3, elevated creatinine 1.43  UA +leukocytes, glucosuria, pyuria  S/P 2 L boluses of NS with lactic acid normalization  Source likely to be 2/2 UTI; note history of chronic indwelling catheter, recurrent UTI with recent infections of Pseudomonas in March    Plan:  Continue Cefepime 2g Q12H  Follow urine and blood culture; de-escalate antibiotics if able  100 cc NS for 6H  Recurrent UTI  History of recurrent UTI and chronic indwelling catheter; last UTI in March grew Pseudomonas  Previously recommended to discontinue Farxiga, however AM facility has continued to give to patient  UA suggestive of UTI with leukocytes, pyuria; suprapubic tenderness but not CVA tenderness to allude to pyelonephritis    Plan:  Continue IV Cefepime  Follow urine and blood culture; de-escalate antibiotics if able  Consider discontinuing Farxiga all together    Metabolic encephalopathy  Confusion and fatigue noted by facility with decreased PO intake  Baseline per chart: 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 Note he was recently started on Oxybutynin, per the facility    Plan:  See plan under severe sepsis  Delirium precautions  Continue home Sinemet  Hold Oxybutynin for now; can consider Myrbetriq if needed  Coronary artery disease involving native coronary artery of native heart without angina pectoris  Known triple vessel CAD  Continue home ASA, Ranexa, Metoprolol  Type 2 diabetes mellitus (HCC)  Lab Results   Component Value Date    HGBA1C 6.1 (H) 07/10/2025     Home regimen: Lantus 44U in the AM, Tradjenta, Metformin, Farxiga  Hold Tradjenta, Metformin, Farxiga  HbA1c at goal; patient would likely benefit from decreasing OP DM2  management    Plan:  Continue Lantus 44U in the AM  Hypoglycemia protocol  Consider DC Farxiga if appropriate  ISS while inpatient    Parkinson disease (HCC)  Continue home Sinemet  Elevated serum creatinine  POA Creatinine of 1.43; baseline 1.0-1.2  Hx S/P TURP and chronic indwelling catheter  Elevated creatinine likely in setting of sepsis  Status post 2 L NS  Monitor for now      VTE Pharmacologic Prophylaxis: VTE Score: 5 High Risk (Score >/= 5) - Pharmacological DVT Prophylaxis Ordered: enoxaparin (Lovenox). Sequential Compression Devices Ordered.  Code Status: Level 1 - Full Code   Discussion with family: Updated  (wife) via phone.    Anticipated Length of Stay: Patient will be admitted on an inpatient basis with an anticipated length of stay of greater than 2 midnights secondary to Severe sepsis likely 2/2 UTI and AMS.    History of Present Illness   Chief Complaint: fatigue and confusion    Tha Weems is a 75 y.o. male with a PMH of DM2, BPH prior TURP with chronic indwelling catheter, Parkinson's dis who presents via EMS from [facility] due to one day hx of fatigue and confusion. Patient somewhat a poor historian.  When asked why he is in the hospital, patient reported he fell. Able to know person, place and year, however did not provide further information and only wanted to discuss politics.    [facility] [place] staff member reported that patient did not want to eat nor drink since around noon. They also noticed he appeared to be more weak. He was also not as talkative, which is not his baseline.  Noticed rigors, but no fevers, falls. Staff member states that he was fine yesterday.  They did not notice any other symptoms and patient did not report any symptoms to them.     Of note, patient had a recent admission on 7/10 for presumed UTI, however urine culture grew Candida which was deemed to be colonization.  He also had an admission in March for UTI due to Pseudomonas which was  treated at that time.  In the ED, patient met severe sepsis criteria and was given 2 L of normal saline boluses.  He was also started on IV cefepime after obtaining blood cultures and exchanging chronic Razo.      Review of Systems   Unable to perform ROS: Mental status change (limited ROS)   Constitutional:  Negative for fever.   Respiratory:  Negative for shortness of breath.    Cardiovascular:  Negative for chest pain and palpitations.   Gastrointestinal:  Positive for abdominal pain. Negative for diarrhea, nausea and vomiting.       Historical Information   Past Medical History[1]  Past Surgical History[2]  Social History[3]  E-Cigarette/Vaping    E-Cigarette Use Never User      E-Cigarette/Vaping Substances    Nicotine No     THC No     CBD No        Social History:  Marital Status: /Civil Union   Occupation:   Patient Pre-hospital Living Situation: [facility] [place]  Patient Pre-hospital Level of Mobility: manual wheelchair  Patient Pre-hospital Diet Restrictions: none    Meds/Allergies   Reviewed all medications with [facility] staff over the phone..   Prior to Admission medications    Medication Sig Start Date End Date Taking? Authorizing Provider   acetaminophen (TYLENOL) 325 mg tablet Take 650 mg by mouth every 6 (six) hours as needed for mild pain   Yes Historical Provider, MD   aspirin 81 mg chewable tablet Chew 1 tablet (81 mg total) daily 3/10/22  Yes Anisha Burks DO   carbidopa-levodopa (SINEMET)  mg per tablet Take 1 tablet by mouth 3 (three) times a day 7/22/21 7/21/25 Yes Aysha Holly MD   dapagliflozin (Farxiga) 10 MG tablet Take 10 mg by mouth daily   Yes Historical Provider, MD   Dutasteride-Tamsulosin HCl 0.5-0.4 MG CAPS Take by mouth   Yes Historical Provider, MD   insulin glargine (LANTUS) 100 units/mL subcutaneous injection Inject 40 Units under the skin every morning 7/15/25 8/14/25 Yes Trish Adrian DO   linaGLIPtin (Tradjenta) 5 MG TABS Take 5 mg by mouth in  "the morning.   Yes Historical Provider, MD   metFORMIN (GLUCOPHAGE) 1000 MG tablet Take 1,000 mg by mouth in the morning and 1,000 mg in the evening. Take with meals.   Yes Historical Provider, MD   metoprolol succinate (TOPROL-XL) 25 mg 24 hr tablet Take 1 tablet (25 mg total) by mouth daily 4/6/22  Yes George Rock DO   oxybutynin (DITROPAN) 5 mg tablet Take 5 mg by mouth in the morning   Yes Historical Provider, MD   pantoprazole (PROTONIX) 40 mg tablet TAKE 1 TABLET (40 MG TOTAL) BY MOUTH 2 (TWO) TIMES A DAY BEFORE MEALS 1/23/24  Yes Forrest Raza MD   ranolazine (RANEXA) 500 mg 12 hr tablet Take 1 tablet (500 mg total) by mouth every 12 (twelve) hours 4/5/22  Yes George Rock DO   ACCU-CHEK GUIDE test strip 1 strip as needed 6/16/20   Historical Provider, MD   Alcohol Swabs (PRO COMFORT ALCOHOL) 70 % PADS 1 pad. in the morning. Use a pad when testing . 6/16/20   Historical Provider, MD   atorvastatin (LIPITOR) 40 mg tablet Take 1 tablet (40 mg total) by mouth daily  Patient not taking: Reported on 7/21/2025 3/10/22   Anisha Burks, DO   Easy Comfort Lancets MISC as needed 6/16/20   Historical Provider, MD   ferrous gluconate (FERGON) 324 mg tablet Take 1 tablet (324 mg total) by mouth daily before breakfast Do not start before July 30, 2023. 7/30/23 4/1/24  Onofre Cohen PA-C   finasteride (PROSCAR) 5 mg tablet Take 1 tablet (5 mg total) by mouth daily  Patient not taking: Reported on 7/21/2025 5/13/20   Fox Uribe PA-C   Glucagon, rDNA, (GLUCAGON EMERGENCY IJ) Inject 1 mL as directed Sugars less than 60 and conscious    Historical Provider, MD   Glucose 15 g PACK Take 15 g by mouth Per [facility] \"give 15 gram PO every 15 minutes PRN for hypoglycemia\"    Historical Provider, MD   glycerin-hypromellose- (ARTIFICIAL TEARS) 0.2-0.2-1 % SOLN Administer 1 drop to both eyes 2 (two) times a day as needed (for dry eyes) 1/5/23   Tristan Leroy MD   loratadine (CLARITIN) 10 mg tablet " Take 10 mg by mouth in the morning.    Historical Provider, MD   NovoFine Autocover 30G X 8 MM MISC  12/9/20   Historical Provider, MD   nystatin (MYCOSTATIN) powder Apply topically 2 (two) times a day  Patient not taking: Reported on 7/21/2025 1/5/23   Tristan Leroy MD   polyethylene glycol (MIRALAX) 17 g packet Take 17 g by mouth daily  Patient not taking: Reported on 7/21/2025 6/3/20   Gloria Carroll DO   tamsulosin (FLOMAX) 0.4 mg Take 1 capsule (0.4 mg total) by mouth daily with dinner  Patient not taking: Reported on 7/21/2025 5/13/20   Fox Uribe PA-C     No Known Allergies    Objective :  Temp:  [100.4 °F (38 °C)] 100.4 °F (38 °C)  HR:  [83-96] 83  BP: ()/(53-62) 105/57  Resp:  [20] 20  SpO2:  [92 %-94 %] 94 %  O2 Device: None (Room air)    Physical Exam  Constitutional:       General: He is not in acute distress.     Appearance: He is ill-appearing.   HENT:      Head: Normocephalic and atraumatic.     Cardiovascular:      Rate and Rhythm: Normal rate and regular rhythm.      Pulses: Normal pulses.      Heart sounds: Normal heart sounds.   Pulmonary:      Effort: Pulmonary effort is normal.      Breath sounds: Normal breath sounds.   Abdominal:      Tenderness: There is abdominal tenderness in the suprapubic area.     Musculoskeletal:      Right lower leg: No edema.      Left lower leg: No edema.     Skin:     General: Skin is warm.      Capillary Refill: Capillary refill takes less than 2 seconds.     Neurological:      Mental Status: He is confused.      Comments: Aox3 (person, place, year)        Lines/Drains:  Lines/Drains/Airways       Active Status       Name Placement date Placement time Site Days    Urethral Catheter Double-lumen 18 Fr. 07/21/25 2117  Double-lumen  less than 1                  Urinary Catheter:  Goal for removal: N/A - Chronic Razo               Lab Results: I have reviewed the following results:  Results from last 7 days   Lab Units 07/21/25 2031   WBC  Thousand/uL 16.06*   HEMOGLOBIN g/dL 12.7   HEMATOCRIT % 39.0   PLATELETS Thousands/uL 249   SEGS PCT % 85*   LYMPHO PCT % 3*   MONO PCT % 11   EOS PCT % 0     Results from last 7 days   Lab Units 07/21/25 2031   SODIUM mmol/L 140   POTASSIUM mmol/L 4.1   CHLORIDE mmol/L 104   CO2 mmol/L 25   BUN mg/dL 31*   CREATININE mg/dL 1.43*   ANION GAP mmol/L 11   CALCIUM mg/dL 8.9   ALBUMIN g/dL 3.7   TOTAL BILIRUBIN mg/dL 0.83   ALK PHOS U/L 65   ALT U/L 7   AST U/L 10*   GLUCOSE RANDOM mg/dL 139     Results from last 7 days   Lab Units 07/21/25 2031   INR  0.96         Lab Results   Component Value Date    HGBA1C 6.1 (H) 07/10/2025    HGBA1C 7.0 (H) 03/28/2025    HGBA1C 9.2 (H) 08/15/2023     Results from last 7 days   Lab Units 07/21/25 2243 07/21/25 2031   LACTIC ACID mmol/L 0.9 2.3*   PROCALCITONIN ng/ml  --  0.16             Administrative Statements       ** Please Note: This note has been constructed using a voice recognition system. **         [1]   Past Medical History:  Diagnosis Date    Ambulatory dysfunction     uses walker with assist of 1    Anemia     Anxiety     At risk for falls     hx of falls    Benign paroxysmal vertigo     unspecified ear    BPH (benign prostatic hyperplasia)     for TURP today 1/4/2021    Calculus in bladder     for surgical removal today 1/4/2021    Cataract     left eye    Cervicalgia     Chest pain     Chronic kidney disease     stage 3    Constipation     CTS (carpal tunnel syndrome)     left    Cyst of pancreas     Cystitis 06/23/2020    acute cystitis with hematuria    Depression     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    Dizziness     and giddiness    Dysphagia     Elevated PSA     Facial weakness     GERD (gastroesophageal reflux disease)     last assessed 5/20/16    Gross hematuria     Hematuria     Hyperlipidemia     Hypertension     Kidney disease     Kidney stone     Left-sided weakness     Long term (current) use of oral hypoglycemic drugs     Muscle  weakness     Nuclear senile cataract of left eye     OA (osteoarthritis) of knee     b/l    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HCC)     Syncope and collapse     Tachycardia     UTI (urinary tract infection)     Vertebro-basilar artery syndrome     Vitamin B deficiency     Vitamin D deficiency     Vitamin D deficiency    [2]   Past Surgical History:  Procedure Laterality Date    CARDIAC CATHETERIZATION N/A 3/7/2022    Procedure: CARDIAC CATHETERIZATION;  Surgeon: Pako Reid DO;  [facility];  Service: Cardiology    CATARACT EXTRACTION      CHOLECYSTECTOMY      KNEE SURGERY      UT LITHOLAPAXY SMPL/SM <2.5 CM N/A 1/4/2021    Procedure: Cystolitholopaxy w/laser bladder stones;  Surgeon: Kyle Jones MD;  Location: [facility];  Service: Urology    UT TRURL ELECTROSURG RESCJ PROSTATE BLEED COMPLETE N/A 1/4/2021    Procedure: T.U.R.P.;  Surgeon: Kyle Jones MD;  Location: [facility];  Service: Urology   [3]   Social History  Tobacco Use    Smoking status: Never    Smokeless tobacco: Never   Vaping Use    Vaping status: Never Used   Substance and Sexual Activity    Alcohol use: Not Currently    Drug use: No    Sexual activity: Not Currently

## 2025-07-22 NOTE — SEPSIS NOTE
"Sepsis Note   Tha Weems 75 y.o. male MRN: [id number]  Unit/Bed#: ED-22 Encounter: [id number]       Initial Sepsis Screening       Row Name 07/21/25 2111                Is the patient's history suggestive of a new or worsening infection? Yes (Proceed)  -CL        Suspected source of infection urinary tract infection  -CL        Indicate SIRS criteria Tachycardia > 90 bpm;Leukocytosis (WBC > 84225 IJL) OR Leukopenia (WBC <4000 IJL) OR Bandemia (WBC >10% bands)  -CL        Are two or more of the above signs & symptoms of infection both present and new to the patient? Yes (Proceed)  -CL        Assess for evidence of organ dysfunction: Are any of the below criteria present within 6 hours of suspected infection and SIRS criteria that are NOT considered to be chronic conditions? Lactate > 2.0  -CL        Date of presentation of severe sepsis 07/21/25  -CL        Time of presentation of severe sepsis 2112  -CL        Sepsis Note: Click \"NEXT\" below (NOT \"close\") to generate sepsis note based on above information. YES (proceed by clicking \"NEXT\")  -CL                  User Key  (r) = Recorded By, (t) = Taken By, (c) = Cosigned By      Initials Name Provider Type    CL Pako Hendrickson MD Physician                   Initial Sepsis Screening       Row Name 07/21/25 2111                   Initial Sepsis Assessment    Is the patient's history suggestive of a new or worsening infection? Yes (Proceed)  -CL        Suspected source of infection urinary tract infection  -CL        Indicate SIRS criteria Tachycardia > 90 bpm;Leukocytosis (WBC > 00809 IJL) OR Leukopenia (WBC <4000 IJL) OR Bandemia (WBC >10% bands)  -CL        Are two or more of the above signs & symptoms of infection both present and new to the patient? Yes (Proceed)  -CL           If the answer is yes to both above questions, suspicion of sepsis is present. Proceed with algorithm to determine if severe sepsis or septic shock criteria are met.    Assess for " "evidence of organ dysfunction: Are any of the below criteria present within 6 hours of suspected infection and SIRS criteria that are NOT considered to be chronic conditions? Lactate > 2.0  -CL           Based on the above information, the patient meets criteria for SEVERE sepsis. CALL A SEPSIS ALERT. Use sepsis order set.     Date of presentation of severe sepsis 07/21/25  -CL        Time of presentation of severe sepsis 2112 -CL                  User Key  (r) = Recorded By, (t) = Taken By, (c) = Cosigned By      Initials Name Provider Type    CL Pako Hendrickson MD Physician                     Default Flowsheet Data (Last 720 Hours)       Sepsis Reassess       Row Name 07/21/25 2112                   Repeat Volume Status and Tissue Perfusion Assessment Performed    Date of Reassessment: 07/21/25  -CL        Time of Reassessment: 2112 -CL        Sepsis Reassessment Note: Click \"NEXT\" below (NOT \"close\") to generate sepsis reassessment note. YES (proceed by clicking \"NEXT\")  -CL        Repeat Volume Status and Tissue Perfusion Assessment Performed --                  User Key  (r) = Recorded By, (t) = Taken By, (c) = Cosigned By      Initials Name Provider Type    CL Pako Hendrickson MD Physician                    There is no height or weight on file to calculate BMI.  Wt Readings from Last 1 Encounters:   07/10/25 71.9 kg (158 lb 8.2 oz)        Ideal body weight: 68.4 kg (150 lb 12.7 oz)  Adjusted ideal body weight: 69.8 kg (153 lb 14.1 oz)    "

## 2025-07-22 NOTE — ASSESSMENT & PLAN NOTE
History of recurrent UTI and chronic indwelling catheter; last UTI in March grew Pseudomonas  Previously recommended to discontinue Farxiga, however AM facility has continued to give to patient  UA suggestive of UTI with leukocytes, pyuria; suprapubic tenderness but not CVA tenderness to allude to pyelonephritis    Plan:  Continue IV Cefepime  Follow urine and blood culture; de-escalate antibiotics if able  Consider discontinuing Farxiga all together

## 2025-07-22 NOTE — SEPSIS NOTE
"Sepsis Note   Tha Weems 75 y.o. male MRN: [id number]  Unit/Bed#: ED-22 Encounter: [id number]       Initial Sepsis Screening       Row Name 07/21/25 2111                Is the patient's history suggestive of a new or worsening infection? Yes (Proceed)  -CL        Suspected source of infection urinary tract infection  -CL        Indicate SIRS criteria Tachycardia > 90 bpm;Leukocytosis (WBC > 19011 IJL) OR Leukopenia (WBC <4000 IJL) OR Bandemia (WBC >10% bands)  -CL        Are two or more of the above signs & symptoms of infection both present and new to the patient? Yes (Proceed)  -CL        Assess for evidence of organ dysfunction: Are any of the below criteria present within 6 hours of suspected infection and SIRS criteria that are NOT considered to be chronic conditions? Lactate > 2.0  -CL        Date of presentation of severe sepsis 07/21/25  -CL        Time of presentation of severe sepsis 2112  -CL        Sepsis Note: Click \"NEXT\" below (NOT \"close\") to generate sepsis note based on above information. YES (proceed by clicking \"NEXT\")  -CL                  User Key  (r) = Recorded By, (t) = Taken By, (c) = Cosigned By      Initials Name Provider Type    CL Pako Hendrickson MD Physician                   Initial Sepsis Screening       Row Name 07/21/25 2111                   Initial Sepsis Assessment    Is the patient's history suggestive of a new or worsening infection? Yes (Proceed)  -CL        Suspected source of infection urinary tract infection  -CL        Indicate SIRS criteria Tachycardia > 90 bpm;Leukocytosis (WBC > 75660 IJL) OR Leukopenia (WBC <4000 IJL) OR Bandemia (WBC >10% bands)  -CL        Are two or more of the above signs & symptoms of infection both present and new to the patient? Yes (Proceed)  -CL           If the answer is yes to both above questions, suspicion of sepsis is present. Proceed with algorithm to determine if severe sepsis or septic shock criteria are met.    Assess for " "evidence of organ dysfunction: Are any of the below criteria present within 6 hours of suspected infection and SIRS criteria that are NOT considered to be chronic conditions? Lactate > 2.0  -CL           Based on the above information, the patient meets criteria for SEVERE sepsis. CALL A SEPSIS ALERT. Use sepsis order set.     Date of presentation of severe sepsis 07/21/25  -CL        Time of presentation of severe sepsis 2112 -CL                  User Key  (r) = Recorded By, (t) = Taken By, (c) = Cosigned By      Initials Name Provider Type    CL Pako Hendrickson MD Physician                     Default Flowsheet Data (Last 720 Hours)       Sepsis Reassess       Row Name 07/21/25 2112                   Repeat Volume Status and Tissue Perfusion Assessment Performed    Date of Reassessment: 07/21/25  -        Time of Reassessment: 2112 -CL        Sepsis Reassessment Note: Click \"NEXT\" below (NOT \"close\") to generate sepsis reassessment note. YES (proceed by clicking \"NEXT\")  -CL        Repeat Volume Status and Tissue Perfusion Assessment Performed --                  User Key  (r) = Recorded By, (t) = Taken By, (c) = Cosigned By      Initials Name Provider Type    CL Pako Hendrickson MD Physician                    Body mass index is 24.1 kg/m².  Wt Readings from Last 1 Encounters:   07/21/25 71.9 kg (158 lb 8.2 oz)     IBW (Ideal Body Weight): 68.4 kg    Ideal body weight: 68.4 kg (150 lb 12.7 oz)  Adjusted ideal body weight: 69.8 kg (153 lb 14.1 oz)    "

## 2025-07-22 NOTE — ASSESSMENT & PLAN NOTE
Confusion and fatigue noted by facility with decreased PO intake  AAOx2 as of 7/22/25; was not oriented to time/date   Baseline per chart: 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 Note he was recently started on Oxybutynin, per the facility    Plan:  See plan under severe sepsis  Delirium precautions  Continue home Sinemet  Hold Oxybutynin for now; can consider Myrbetriq if needed

## 2025-07-22 NOTE — ASSESSMENT & PLAN NOTE
POA leukocytosis 16.06, lactic acid 2.3, elevated creatinine 1.43  UA +leukocytes, glucosuria, pyuria  S/P 2 L boluses of NS with lactic acid normalization  Source likely to be 2/2 UTI; note history of chronic indwelling catheter, recurrent UTI with recent infections of Pseudomonas in March    Plan:  Continue Cefepime 2g Q12H  Follow urine and blood culture; de-escalate antibiotics if able  100 cc NS for 6H

## 2025-07-22 NOTE — PROGRESS NOTES
Progress Note -     Name: Tha Weems 75 y.o. male I MRN: [id number]  Unit/Bed#: ED-22 I Date of Admission: 7/21/2025   Date of Service: 7/22/2025 I Hospital Day: 1     Assessment & Plan  Severe sepsis (HCC)  POA leukocytosis 16.06, lactic acid 2.3, elevated creatinine 1.43  Most recent lactic acid from 7/21/25 is 0.9  Today 7/22/25 leukocytosis 13.57, normal creatinine 1.30, elevated BUN 27  UA +leukocytes, glucosuria, pyuria, occult blood  The patient is still febrile but no longer hypotensive.  S/P 2 L boluses of NS with lactic acid normalization  The cause of his severe sepsis is likely UTI given his history of recurrent UTI infections and most notable his UTI with Pseudomonas four months ago    Plan:  Continue Cefepime 2g Q12H  Continue to monitor CBC to trend leukocytosis, lactic acid, creatinine, and BUN  Monitor for urine and blood culture;   Monitor vitals, particularly temperature/fevers and blood pressure status to assess for clinical improvement  Based on CBC trends, de-escalate antibiotics if possible  100 cc NS for 6H  Recurrent UTI  History of recurrent UTI and chronic indwelling catheter; last UTI in March grew Pseudomonas  Still taking Farxiga (SGLT2 inhibitor)  UA suggestive of UTI with innumerable RBC, WBCs; suprapubic tenderness but not CVA tenderness to allude to pyelonephritis    Plan:  Continue IV Cefepime  Continue to monitor CBC to trend leukocytosis, lactic acid, creatinine, and BUN  Monitor for urine and blood culture;   Based on CBC trends, de-escalate antibiotics if possible  100 cc NS for 6H  Farxiga may be a risk factor for UTI so could consider stopping this medication    Metabolic encephalopathy  Confusion and fatigue noted by facility with decreased PO intake  AAOx2 as of 7/22/25; was not oriented to time/date   Baseline per chart: 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 Note he was recently started on Oxybutynin, per the  facility    Plan:  See plan under severe sepsis  Delirium precautions  Continue home Sinemet  Hold Oxybutynin for now; can consider Myrbetriq if needed  Coronary artery disease involving native coronary artery of native heart without angina pectoris  Known triple vessel CAD  Continue home ASA, Ranexa, Metoprolol  Type 2 diabetes mellitus (HCC)  Lab Results   Component Value Date    HGBA1C 6.1 (H) 07/10/2025     Home regimen: Lantus 44U in the AM, Tradjenta, Metformin, Farxiga  Hold Tradjenta, Metformin, Farxiga  HbA1c at goal; patient would likely benefit from decreasing OP DM2 management    Plan:  Continue Lantus 44U in the AM  Hypoglycemia protocol  Consider DC Farxiga if appropriate  ISS while inpatient    Parkinson disease (HCC)  Continue home Sinemet  Elevated serum creatinine  POA Creatinine of 1.43; baseline 1.0-1.2  Hx S/P TURP and chronic indwelling catheter  Elevated creatinine likely in setting of sepsis  Status post 2 L NS  Monitor for now    24 Hour Events : I did not cover this with the patient today.  Subjective : This patient is a 75 year old male with PMH of recurrent UTIs, BPH, and Type II Diabetes who presented to the ED yesterday and was admitted to the hospital for UTI which progressed to sepsis. Today the patient describes feeling generally better than yesterday when he was admitted and furthermore did not endorse any sensation of fevers, chills, shortness of breath, chest pain, headaches, dizziness, abdominal pain, pain at the catheter site. He is able to eat and reports a better appetite.      Objective :  Temp:  [98.5 °F (36.9 °C)-100.4 °F (38 °C)] 100 °F (37.8 °C)  HR:  [83-96] 92  BP: ()/(53-72) 143/70  Resp:  [18-20] 18  SpO2:  [92 %-98 %] 97 %  O2 Device: None (Room air)      Physical Exam    Cardiovascular:      Rate and Rhythm: Normal rate and regular rhythm.      Heart sounds: Normal heart sounds.   Pulmonary:      Effort: Pulmonary effort is normal.      Breath sounds: Normal  breath sounds.     Neurological:      Mental Status: He is alert.         Lab Results: I have reviewed the following results:CBC/BMP:   .     07/22/25  0612   WBC 13.57*   HGB 12.6   HCT 41.0      SODIUM 141   K 4.2      CO2 27   BUN 27*   CREATININE 1.30   GLUC 78    , Lactic Acid:   .     07/21/25  2243   LACTICACID 0.9    , Procalcitonin:   Lab Results   Component Value Date    PROCALCITONI 0.16 07/21/2025   , Urinalysis:   Lab Results   Component Value Date    COLORU Yellow 07/21/2025    CLARITYU Turbid 07/21/2025    SPECGRAV 1.026 07/21/2025    PHUR 5.5 07/21/2025    LEUKOCYTESUR Large (A) 07/21/2025    NITRITE Negative 07/21/2025    GLUCOSEU >=1000 (1%) (A) 07/21/2025    KETONESU Negative 07/21/2025    BILIRUBINUR Negative 07/21/2025    BLOODU Large (A) 07/21/2025     , Urine Microscopic 7/21/25  Notable for Innumerable RBC, Innumerable WBC, WBC clumps present, no epithelial cells, no bacteria seen    Imaging Results Review: I reviewed radiology reports from this admission including: chest xray. Results from the CXR appeared normal.  Other Study Results Review: No additional pertinent studies reviewed.    VTE Pharmacologic Prophylaxis: Enoxaparin (Lovenox)  VTE Mechanical Prophylaxis: reason for no mechanical VTE prophylaxis I did not discuss this with the patient/team today.

## 2025-07-22 NOTE — ASSESSMENT & PLAN NOTE
POA Creatinine of 1.43; baseline 1.0-1.2  Hx S/P TURP and chronic indwelling catheter  Elevated creatinine likely in setting of sepsis  Status post 2 L NS  Monitor for now

## 2025-07-22 NOTE — ASSESSMENT & PLAN NOTE
History of recurrent UTI and chronic indwelling catheter; last UTI in March grew Pseudomonas  Still taking Farxiga (SGLT2 inhibitor)  UA suggestive of UTI with innumerable RBC, WBCs; suprapubic tenderness but not CVA tenderness to allude to pyelonephritis    Plan:  Continue IV Cefepime  Continue to monitor CBC to trend leukocytosis, lactic acid, creatinine, and BUN  Monitor for urine and blood culture;   Based on CBC trends, de-escalate antibiotics if possible  100 cc NS for 6H  Farxiga may be a risk factor for UTI so could consider stopping this medication

## 2025-07-22 NOTE — ASSESSMENT & PLAN NOTE
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 Note he was recently started on Oxybutynin, per the facility    Plan:  See plan under severe sepsis  Delirium precautions  Continue home Sinemet  Hold Oxybutynin for now; can consider Myrbetriq if needed

## 2025-07-22 NOTE — ED NOTES
Pt resting on stretcher. VSS. No acute distress at this time. Pt continues to ask why he needs to be here and wants to go home to see his wife. Educated pt on sepsis status due to severe uti. Pt verbalizes understanding but requires frequent reorientation to situation. Respirations even and unlabored on RA. Cloudy/sedimented urine collecting in wilkerson back at this time. Diet soda provider per pt request. Bed is locked and in lowest position, call bell within reach and pt frequently utilizing.      Lizet Zheng RN  07/22/25 1595

## 2025-07-22 NOTE — ED NOTES
Found pt tugging at his wilkerson catheter. Blood at the time. Wilkerson care performed. Ivs removed by patient. Reoriented at this time. IV replaced. Dinner meds given a this time. Pt redressed and bathed at this time. Bed is locked and in lowest position, call bell within reach.      Lizet Zheng RN  07/22/25 [id number]

## 2025-07-22 NOTE — ED ATTENDING ATTESTATION
7/21/2025  I, Pako Hendrickson MD, saw and evaluated the patient. I have discussed the patient with the resident/non-physician practitioner and agree with the resident's/non-physician practitioner's findings, Plan of Care, and MDM as documented in the resident's/non-physician practitioner's note, except where noted. All available labs and Radiology studies were reviewed.  I was present for key portions of any procedure(s) performed by the resident/non-physician practitioner and I was immediately available to provide assistance.       At this point I agree with the current assessment done in the Emergency Department.  I have conducted an independent evaluation of this patient a history and physical is as follows:    History    Patient is a 75-year-old male, with a history significant for recent admission for UTI, diabetes, paralytic disorder per my review the medical record, who presents to the ED today, via EMS from [facility], for evaluation of fatigue/confusion.  Currently, patient denies chest pain, dyspnea, abdominal pain.  In regards to to urinary symptoms, patient has chronic indwelling Razo.  No clear exacerbating or remitting factors.  Patient had recent CT imaging of the chest on pelvis that was unremarkable for acute process on the 10th of this month.    Patient is without other concerns at this time.     ROS  Patient denies: dysphagia; vision change; chest pain; dyspnea; abdominal pain; rash; weakness; numbness    Physical Exam    GENERAL APPEARANCE: Ill-appearing  NEURO: Patient is speaking clearly in short sentences.  Patient is answering appropriately and able follow commands.  Patient is moving all four extremities spontaneously.  No facial droop.  Tongue midline.  HEENT: PERRL, Moist mucous membranes, external ears normal, nose normal  Neck: No cervical adenopathy  CV: RRR. No murmurs, rubs, gallops  LUNGS: Clear to auscultation: No wheezes, stridor, rhonchi, rales  GI: Abdomen  non-distended. Soft.  Midline suprapubic abdominal pain.  No guarding or rebound.  No CVA tenderness.  : Deferred at this time  MSK: No deformity.   Skin: Warm and dry  Capillary refill: <2 seconds    Patient is currently febrile, otherwise hemodynamically stable    Assessment/Plan/MDM  Suprapubic abdominal pain, fever, confusion  -This presentation is concerning for: Metabolic disorder, uremia, CARLOS EDUARDO, electrolyte abnormality.  Patient at risk for UTI  - Will investigate with sepsis panel order set  - Will manage with Tylenol, antibiotics, fluids, further based upon workup    ED Course  ED Course as of 07/21/25 2122 Mon Jul 21, 2025 2045 WBC(!): 16.06  High, patient meets SIRS    2057 LACTIC ACID(!): 2.3  High    2113 Patient reevaluated, clinically unchanged.  Sepsis alert called   2121 Critical Care Time Statement: Upon my evaluation, this patient had a high probability of imminent or life-threatening deterioration due to severe sepsis, which required my direct attention, intervention, and personal management.  I spent a total of 35 minutes directly providing critical care services, including interpretation of complex medical databases, evaluating for the presence of life-threatening injuries or illnesses, management of organ system failure(s) , complex medical decision making (to support/prevent further life-threatening deterioration)., and interpretation of hemodynamic data. This time is exclusive of procedures, teaching, treating other patients, family meetings, and any prior time recorded by providers other than myself.            Critical Care Time  Procedures

## 2025-07-22 NOTE — SEPSIS NOTE
"Sepsis Note   Tha Weems 75 y.o. male MRN: [id number]  Unit/Bed#: ED-22 Encounter: [id number]       Initial Sepsis Screening       Row Name 07/21/25 2111                Is the patient's history suggestive of a new or worsening infection? Yes (Proceed)  -CL        Suspected source of infection urinary tract infection  -CL        Indicate SIRS criteria Tachycardia > 90 bpm;Leukocytosis (WBC > 96914 IJL) OR Leukopenia (WBC <4000 IJL) OR Bandemia (WBC >10% bands)  -CL        Are two or more of the above signs & symptoms of infection both present and new to the patient? Yes (Proceed)  -CL        Assess for evidence of organ dysfunction: Are any of the below criteria present within 6 hours of suspected infection and SIRS criteria that are NOT considered to be chronic conditions? Lactate > 2.0  -CL        Date of presentation of severe sepsis 07/21/25  -CL        Time of presentation of severe sepsis 2112  -CL        Sepsis Note: Click \"NEXT\" below (NOT \"close\") to generate sepsis note based on above information. YES (proceed by clicking \"NEXT\")  -CL                  User Key  (r) = Recorded By, (t) = Taken By, (c) = Cosigned By      Initials Name Provider Type    CL Pako Hendrickson MD Physician                   Initial Sepsis Screening       Row Name 07/21/25 2111                   Initial Sepsis Assessment    Is the patient's history suggestive of a new or worsening infection? Yes (Proceed)  -CL        Suspected source of infection urinary tract infection  -CL        Indicate SIRS criteria Tachycardia > 90 bpm;Leukocytosis (WBC > 40272 IJL) OR Leukopenia (WBC <4000 IJL) OR Bandemia (WBC >10% bands)  -CL        Are two or more of the above signs & symptoms of infection both present and new to the patient? Yes (Proceed)  -CL           If the answer is yes to both above questions, suspicion of sepsis is present. Proceed with algorithm to determine if severe sepsis or septic shock criteria are met.    Assess for " evidence of organ dysfunction: Are any of the below criteria present within 6 hours of suspected infection and SIRS criteria that are NOT considered to be chronic conditions? Lactate > 2.0  -CL           Based on the above information, the patient meets criteria for SEVERE sepsis. CALL A SEPSIS ALERT. Use sepsis order set.     Date of presentation of severe sepsis 07/21/25  -CL        Time of presentation of severe sepsis 2112  -CL                  User Key  (r) = Recorded By, (t) = Taken By, (c) = Cosigned By      Initials Name Provider Type    CL Pako Hendrickson MD Physician                         There is no height or weight on file to calculate BMI.  Wt Readings from Last 1 Encounters:   07/10/25 71.9 kg (158 lb 8.2 oz)        Ideal body weight: 68.4 kg (150 lb 12.7 oz)  Adjusted ideal body weight: 69.8 kg (153 lb 14.1 oz)

## 2025-07-22 NOTE — ED NOTES
Labs and blood cultures obtained and sent for analysis. Tylenol and IVF started at this time. Antibiotics will be administered after replacement of urinary catheter. Pt endorses some suprapubic tenderness with palpation of the region. Cloudy urine coming from current catheter. Plan to replace catheter     Lizet Zheng RN  07/21/25 2053

## 2025-07-23 PROBLEM — R65.20 SEVERE SEPSIS (HCC): Status: RESOLVED | Noted: 2019-12-31 | Resolved: 2025-07-23

## 2025-07-23 PROBLEM — A41.9 SEVERE SEPSIS (HCC): Status: RESOLVED | Noted: 2019-12-31 | Resolved: 2025-07-23

## 2025-07-23 LAB
ANION GAP SERPL CALCULATED.3IONS-SCNC: 8 MMOL/L (ref 4–13)
BASOPHILS # BLD AUTO: 0.04 THOUSANDS/ÂΜL (ref 0–0.1)
BASOPHILS NFR BLD AUTO: 0 % (ref 0–1)
BUN SERPL-MCNC: 21 MG/DL (ref 5–25)
CALCIUM SERPL-MCNC: 8.3 MG/DL (ref 8.4–10.2)
CHLORIDE SERPL-SCNC: 105 MMOL/L (ref 96–108)
CO2 SERPL-SCNC: 27 MMOL/L (ref 21–32)
CREAT SERPL-MCNC: 1.12 MG/DL (ref 0.6–1.3)
EOSINOPHIL # BLD AUTO: 0.24 THOUSAND/ÂΜL (ref 0–0.61)
EOSINOPHIL NFR BLD AUTO: 2 % (ref 0–6)
ERYTHROCYTE [DISTWIDTH] IN BLOOD BY AUTOMATED COUNT: 14.5 % (ref 11.6–15.1)
GFR SERPL CREATININE-BSD FRML MDRD: 63 ML/MIN/1.73SQ M
GLUCOSE SERPL-MCNC: 114 MG/DL (ref 65–140)
GLUCOSE SERPL-MCNC: 165 MG/DL (ref 65–140)
GLUCOSE SERPL-MCNC: 171 MG/DL (ref 65–140)
GLUCOSE SERPL-MCNC: 87 MG/DL (ref 65–140)
GLUCOSE SERPL-MCNC: 99 MG/DL (ref 65–140)
HCT VFR BLD AUTO: 35.4 % (ref 36.5–49.3)
HGB BLD-MCNC: 11.3 G/DL (ref 12–17)
IMM GRANULOCYTES # BLD AUTO: 0.12 THOUSAND/UL (ref 0–0.2)
IMM GRANULOCYTES NFR BLD AUTO: 1 % (ref 0–2)
LYMPHOCYTES # BLD AUTO: 0.71 THOUSANDS/ÂΜL (ref 0.6–4.47)
LYMPHOCYTES NFR BLD AUTO: 6 % (ref 14–44)
MCH RBC QN AUTO: 28.5 PG (ref 26.8–34.3)
MCHC RBC AUTO-ENTMCNC: 31.9 G/DL (ref 31.4–37.4)
MCV RBC AUTO: 89 FL (ref 82–98)
MONOCYTES # BLD AUTO: 1.36 THOUSAND/ÂΜL (ref 0.17–1.22)
MONOCYTES NFR BLD AUTO: 11 % (ref 4–12)
NEUTROPHILS # BLD AUTO: 9.46 THOUSANDS/ÂΜL (ref 1.85–7.62)
NEUTS SEG NFR BLD AUTO: 80 % (ref 43–75)
NRBC BLD AUTO-RTO: 0 /100 WBCS
PLATELET # BLD AUTO: 232 THOUSANDS/UL (ref 149–390)
PMV BLD AUTO: 10.8 FL (ref 8.9–12.7)
POTASSIUM SERPL-SCNC: 3.7 MMOL/L (ref 3.5–5.3)
RBC # BLD AUTO: 3.97 MILLION/UL (ref 3.88–5.62)
SODIUM SERPL-SCNC: 140 MMOL/L (ref 135–147)
WBC # BLD AUTO: 11.93 THOUSAND/UL (ref 4.31–10.16)

## 2025-07-23 PROCEDURE — 82948 REAGENT STRIP/BLOOD GLUCOSE: CPT

## 2025-07-23 PROCEDURE — 85025 COMPLETE CBC W/AUTO DIFF WBC: CPT

## 2025-07-23 PROCEDURE — 87081 CULTURE SCREEN ONLY: CPT | Performed by: HOSPITALIST

## 2025-07-23 PROCEDURE — 99232 SBSQ HOSP IP/OBS MODERATE 35: CPT | Performed by: STUDENT IN AN ORGANIZED HEALTH CARE EDUCATION/TRAINING PROGRAM

## 2025-07-23 PROCEDURE — 80048 BASIC METABOLIC PNL TOTAL CA: CPT

## 2025-07-23 RX ADMIN — RANOLAZINE 500 MG: 500 TABLET, FILM COATED, EXTENDED RELEASE ORAL at 22:36

## 2025-07-23 RX ADMIN — CARBIDOPA AND LEVODOPA 1 TABLET: 25; 100 TABLET ORAL at 17:33

## 2025-07-23 RX ADMIN — CEFTRIAXONE 1000 MG: 10 INJECTION, POWDER, FOR SOLUTION INTRAVENOUS at 22:36

## 2025-07-23 RX ADMIN — INSULIN LISPRO 1 UNITS: 100 INJECTION, SOLUTION INTRAVENOUS; SUBCUTANEOUS at 12:08

## 2025-07-23 RX ADMIN — RANOLAZINE 500 MG: 500 TABLET, FILM COATED, EXTENDED RELEASE ORAL at 10:00

## 2025-07-23 RX ADMIN — CEFEPIME 2000 MG: 2 INJECTION, POWDER, FOR SOLUTION INTRAVENOUS at 10:03

## 2025-07-23 RX ADMIN — ENOXAPARIN SODIUM 40 MG: 40 INJECTION SUBCUTANEOUS at 10:00

## 2025-07-23 RX ADMIN — PANTOPRAZOLE SODIUM 40 MG: 40 TABLET, DELAYED RELEASE ORAL at 17:33

## 2025-07-23 RX ADMIN — PANTOPRAZOLE SODIUM 40 MG: 40 TABLET, DELAYED RELEASE ORAL at 06:03

## 2025-07-23 RX ADMIN — SENNOSIDES 8.6 MG: 8.6 TABLET, FILM COATED ORAL at 10:00

## 2025-07-23 RX ADMIN — ASPIRIN 81 MG: 81 TABLET, CHEWABLE ORAL at 10:00

## 2025-07-23 RX ADMIN — INSULIN GLARGINE 40 UNITS: 100 INJECTION, SOLUTION SUBCUTANEOUS at 10:00

## 2025-07-23 RX ADMIN — CARBIDOPA AND LEVODOPA 1 TABLET: 25; 100 TABLET ORAL at 22:36

## 2025-07-23 RX ADMIN — TAMSULOSIN HYDROCHLORIDE 0.4 MG: 0.4 CAPSULE ORAL at 17:33

## 2025-07-23 RX ADMIN — LORATADINE 10 MG: 10 TABLET ORAL at 10:00

## 2025-07-23 RX ADMIN — INSULIN LISPRO 1 UNITS: 100 INJECTION, SOLUTION INTRAVENOUS; SUBCUTANEOUS at 17:34

## 2025-07-23 RX ADMIN — CARBIDOPA AND LEVODOPA 1 TABLET: 25; 100 TABLET ORAL at 10:00

## 2025-07-23 RX ADMIN — METOPROLOL SUCCINATE 25 MG: 25 TABLET, EXTENDED RELEASE ORAL at 10:00

## 2025-07-23 RX ADMIN — FERROUS GLUCONATE 324 MG: 324 TABLET ORAL at 06:03

## 2025-07-23 NOTE — PLAN OF CARE
Problem: INFECTION - ADULT  Goal: Absence or prevention of progression during hospitalization  Description: INTERVENTIONS:  - Assess and monitor for signs and symptoms of infection  - Monitor lab/diagnostic results  - Monitor all insertion sites, i.e. indwelling lines, tubes, and drains  - Monitor endotracheal if appropriate and nasal secretions for changes in amount and color  - Etlan appropriate cooling/warming therapies per order  - Administer medications as ordered  - Instruct and encourage patient and family to use good hand hygiene technique  - Identify and instruct in appropriate isolation precautions for identified infection/condition  Outcome: Progressing     Problem: GENITOURINARY - ADULT  Goal: Maintains or returns to baseline urinary function  Description: INTERVENTIONS:  - Assess urinary function  - Encourage oral fluids to ensure adequate hydration if ordered  - Administer IV fluids as ordered to ensure adequate hydration  - Administer ordered medications as needed  - Offer frequent toileting  - Follow urinary retention protocol if ordered  Outcome: Progressing     Problem: GENITOURINARY - ADULT  Goal: Absence of urinary retention  Description: INTERVENTIONS:  - Assess patient’s ability to void and empty bladder  - Monitor I/O  - Bladder scan as needed  - Discuss with physician/AP medications to alleviate retention as needed  - Discuss catheterization for long term situations as appropriate  Outcome: Progressing     Problem: GENITOURINARY - ADULT  Goal: Urinary catheter remains patent  Description: INTERVENTIONS:  - Assess patency of urinary catheter  - If patient has a chronic wilkerson, consider changing catheter if non-functioning  - Follow guidelines for intermittent irrigation of non-functioning urinary catheter  Outcome: Progressing     Problem: HEMATOLOGIC - ADULT  Goal: Maintains hematologic stability  Description: INTERVENTIONS  - Assess for signs and symptoms of bleeding or hemorrhage  -  Monitor labs  - Administer supportive blood products/factors as ordered and appropriate  Outcome: Progressing     Problem: SAFETY ADULT  Goal: Patient will remain free of falls  Description: INTERVENTIONS:  - Educate patient/family on patient safety including physical limitations  - Instruct patient to call for assistance with activity   - Consider consulting OT/PT to assist with strengthening/mobility based on AM PAC & JH-HLM score  - Consult OT/PT to assist with strengthening/mobility   - Keep Call bell within reach  - Keep bed low and locked with side rails adjusted as appropriate  - Keep care items and personal belongings within reach  - Initiate and maintain comfort rounds  - Make Fall Risk Sign visible to staff  - Offer Toileting every two hours, in advance of need  - Initiate/Maintain bed alarm  - Obtain necessary fall risk management equipment:   - Apply yellow socks and bracelet for high fall risk patients  - Consider moving patient to room near nurses station  Outcome: Progressing

## 2025-07-23 NOTE — PLAN OF CARE
Problem: Potential for Falls  Goal: Patient will remain free of falls  Description: INTERVENTIONS:  - Educate patient/family on patient safety including physical limitations  - Instruct patient to call for assistance with activity   - Consider consulting OT/PT to assist with strengthening/mobility based on AM PAC & JH-HLM score  - Consult OT/PT to assist with strengthening/mobility   - Keep Call bell within reach  - Keep bed low and locked with side rails adjusted as appropriate  - Keep care items and personal belongings within reach  - Initiate and maintain comfort rounds  - Make Fall Risk Sign visible to staff  - Offer Toileting every  Hours, in advance of need  - Initiate/Maintain alarm  - Obtain necessary fall risk management equipment:   - Apply yellow socks and bracelet for high fall risk patients  - Consider moving patient to room near nurses station  Outcome: Progressing     Problem: PAIN - ADULT  Goal: Verbalizes/displays adequate comfort level or baseline comfort level  Description: Interventions:  - Encourage patient to monitor pain and request assistance  - Assess pain using appropriate pain scale  - Administer analgesics as ordered based on type and severity of pain and evaluate response  - Implement non-pharmacological measures as appropriate and evaluate response  - Consider cultural and social influences on pain and pain management  - Notify physician/advanced practitioner if interventions unsuccessful or patient reports new pain  - Educate patient/family on pain management process including their role and importance of  reporting pain   - Provide non-pharmacologic/complimentary pain relief interventions  Outcome: Progressing     Problem: INFECTION - ADULT  Goal: Absence or prevention of progression during hospitalization  Description: INTERVENTIONS:  - Assess and monitor for signs and symptoms of infection  - Monitor lab/diagnostic results  - Monitor all insertion sites, i.e. indwelling lines,  tubes, and drains  - Monitor endotracheal if appropriate and nasal secretions for changes in amount and color  - [place] appropriate cooling/warming therapies per order  - Administer medications as ordered  - Instruct and encourage patient and family to use good hand hygiene technique  - Identify and instruct in appropriate isolation precautions for identified infection/condition  Outcome: Progressing  Goal: Absence of fever/infection during neutropenic period  Description: INTERVENTIONS:  - Monitor WBC  - Perform strict hand hygiene  - Limit to healthy visitors only  - No plants, dried, fresh or silk flowers with cochran in patient room  Outcome: Progressing     Problem: SAFETY ADULT  Goal: Patient will remain free of falls  Description: INTERVENTIONS:  - Educate patient/family on patient safety including physical limitations  - Instruct patient to call for assistance with activity   - Consider consulting OT/PT to assist with strengthening/mobility based on AM PAC & -HLM score  - Consult OT/PT to assist with strengthening/mobility   - Keep Call bell within reach  - Keep bed low and locked with side rails adjusted as appropriate  - Keep care items and personal belongings within reach  - Initiate and maintain comfort rounds  - Make Fall Risk Sign visible to staff  - Offer Toileting every Hours, in advance of need  - Initiate/Maintain alarm  - Obtain necessary fall risk management equipment:   - Apply yellow socks and bracelet for high fall risk patients  - Consider moving patient to room near nurses station  Outcome: Progressing  Goal: Maintain or return to baseline ADL function  Description: INTERVENTIONS:  -  Assess patient's ability to carry out ADLs; assess patient's baseline for ADL function and identify physical deficits which impact ability to perform ADLs (bathing, care of mouth/teeth, toileting, grooming, dressing, etc.)  - Assess/evaluate cause of self-care deficits   - Assess range of motion  - Assess  patient's mobility; develop plan if impaired  - Assess patient's need for assistive devices and provide as appropriate  - Encourage maximum independence but intervene and supervise when necessary  - Involve family in performance of ADLs  - Assess for home care needs following discharge   - Consider OT consult to assist with ADL evaluation and planning for discharge  - Provide patient education as appropriate  - Monitor functional capacity and physical performance, use of AM PAC & JH-HLM   - Monitor gait, balance and fatigue with ambulation    Outcome: Progressing  Goal: Maintains/Returns to pre admission functional level  Description: INTERVENTIONS:  - Perform AM-PAC 6 Click Basic Mobility/ Daily Activity assessment daily.  - Set and communicate daily mobility goal to care team and patient/family/caregiver.   - Collaborate with rehabilitation services on mobility goals if consulted  - Perform Range of Motion  times a day.  - Reposition patient every hours.  - Dangle patient  times a day  - Stand patient  times a day  - Ambulate patient  times a day  - Out of bed to chair  times a day   - Out of bed for meals  times a day  - Out of bed for toileting  - Record patient progress and toleration of activity level   Outcome: Progressing     Problem: DISCHARGE PLANNING  Goal: Discharge to home or other facility with appropriate resources  Description: INTERVENTIONS:  - Identify barriers to discharge w/patient and caregiver  - Arrange for needed discharge resources and transportation as appropriate  - Identify discharge learning needs (meds, wound care, etc.)  - Arrange for interpretive services to assist at discharge as needed  - Refer to Case Management Department for coordinating discharge planning if the patient needs post-hospital services based on physician/advanced practitioner order or complex needs related to functional status, cognitive ability, or social support system  Outcome: Progressing     Problem: Knowledge  Deficit  Goal: Patient/family/caregiver demonstrates understanding of disease process, treatment plan, medications, and discharge instructions  Description: Complete learning assessment and assess knowledge base.  Interventions:  - Provide teaching at level of understanding  - Provide teaching via preferred learning methods  Outcome: Progressing     Problem: GENITOURINARY - ADULT  Goal: Maintains or returns to baseline urinary function  Description: INTERVENTIONS:  - Assess urinary function  - Encourage oral fluids to ensure adequate hydration if ordered  - Administer IV fluids as ordered to ensure adequate hydration  - Administer ordered medications as needed  - Offer frequent toileting  - Follow urinary retention protocol if ordered  Outcome: Progressing  Goal: Absence of urinary retention  Description: INTERVENTIONS:  - Assess patient’s ability to void and empty bladder  - Monitor I/O  - Bladder scan as needed  - Discuss with physician/AP medications to alleviate retention as needed  - Discuss catheterization for long term situations as appropriate  Outcome: Progressing  Goal: Urinary catheter remains patent  Description: INTERVENTIONS:  - Assess patency of urinary catheter  - If patient has a chronic wilkerson, consider changing catheter if non-functioning  - Follow guidelines for intermittent irrigation of non-functioning urinary catheter  Outcome: Progressing     Problem: HEMATOLOGIC - ADULT  Goal: Maintains hematologic stability  Description: INTERVENTIONS  - Assess for signs and symptoms of bleeding or hemorrhage  - Monitor labs  - Administer supportive blood products/factors as ordered and appropriate  Outcome: Progressing     Problem: MUSCULOSKELETAL - ADULT  Goal: Maintain or return mobility to safest level of function  Description: INTERVENTIONS:  - Assess patient's ability to carry out ADLs; assess patient's baseline for ADL function and identify physical deficits which impact ability to perform ADLs  (bathing, care of mouth/teeth, toileting, grooming, dressing, etc.)  - Assess/evaluate cause of self-care deficits   - Assess range of motion  - Assess patient's mobility  - Assess patient's need for assistive devices and provide as appropriate  - Encourage maximum independence but intervene and supervise when necessary  - Involve family in performance of ADLs  - Assess for home care needs following discharge   - Consider OT consult to assist with ADL evaluation and planning for discharge  - Provide patient education as appropriate  Outcome: Progressing  Goal: Maintain proper alignment of affected body part  Description: INTERVENTIONS:  - Support, maintain and protect limb and body alignment  - Provide patient/ family with appropriate education  Outcome: Progressing     Problem: NEUROSENSORY - ADULT  Goal: Achieves stable or improved neurological status  Description: INTERVENTIONS  - Monitor and report changes in neurological status  - Monitor vital signs such as temperature, blood pressure, glucose, and any other labs ordered   - Initiate measures to prevent increased intracranial pressure  - Monitor for seizure activity and implement precautions if appropriate      Outcome: Progressing  Goal: Remains free of injury related to seizures activity  Description: INTERVENTIONS  - Maintain airway, patient safety  and administer oxygen as ordered  - Monitor patient for seizure activity, document and report duration and description of seizure to physician/advanced practitioner  - If seizure occurs,  ensure patient safety during seizure  - Reorient patient post seizure  - Seizure pads on all 4 side rails  - Instruct patient/family to notify RN of any seizure activity including if an aura is experienced  - Instruct patient/family to call for assistance with activity based on nursing assessment  - Administer anti-seizure medications if ordered    Outcome: Progressing  Goal: Achieves maximal functionality and self  care  Description: INTERVENTIONS  - Monitor swallowing and airway patency with patient fatigue and changes in neurological status  - Encourage and assist patient to increase activity and self care.   - Encourage visually impaired, hearing impaired and aphasic patients to use assistive/communication devices  Outcome: Progressing     Problem: Prexisting or High Potential for Compromised Skin Integrity  Goal: Skin integrity is maintained or improved  Description: INTERVENTIONS:  - Identify patients at risk for skin breakdown  - Assess and monitor skin integrity including under and around medical devices   - Assess and monitor nutrition and hydration status  - Monitor labs  - Assess for incontinence   - Turn and reposition patient  - Assist with mobility/ambulation  - Relieve pressure over mili prominences   - Avoid friction and shearing  - Provide appropriate hygiene as needed including keeping skin clean and dry  - Evaluate need for skin moisturizer/barrier cream  - Collaborate with interdisciplinary team  - Patient/family teaching  - Consider wound care consult    Assess:  - Review Rahat scale daily  - Clean and moisturize skin every   - Inspect skin when repositioning, toileting, and assisting with ADLS  - Assess under medical devices such as  every   - Assess extremities for adequate circulation and sensation     Bed Management:  - Have minimal linens on bed & keep smooth, unwrinkled  - Change linens as needed when moist or perspiring  - Avoid sitting or lying in one position for more than  hours while in bed?Keep HOB at degrees   - Toileting:  - Offer bedside commode  - Assess for incontinence every   - Use incontinent care products after each incontinent episode such as     Activity:  - Mobilize patient  times a day  - Encourage activity and walks on unit  - Encourage or provide ROM exercises   - Turn and reposition patient every  Hours  - Use appropriate equipment to lift or move patient in bed  - Instruct/  Assist with weight shifting every  when out of bed in chair  - Consider limitation of chair time hour intervals    Skin Care:  - Avoid use of baby powder, tape, friction and shearing, hot water or constrictive clothing  - Relieve pressure over bony prominences using   - Do not massage red bony areas    Next Steps:  - Teach patient strategies to minimize risks such as   - Consider consults to  interdisciplinary teams such as   Outcome: Progressing

## 2025-07-23 NOTE — ASSESSMENT & PLAN NOTE
POA Creatinine of 1.43; baseline 1.0-1.2  Today creatinine is 1.12, near baseline  Hx S/P TURP and chronic indwelling catheter  Status post 2 L NS  Monitor for now

## 2025-07-23 NOTE — ASSESSMENT & PLAN NOTE
History of recurrent UTI and chronic indwelling catheter; last UTI in March grew Pseudomonas  UA suggestive of UTI with innumerable RBC, WBCs; suprapubic tenderness but not CVA tenderness to allude to pyelonephritis  POA leukocytosis 16.06, lactic acid 2.3, elevated creatinine 1.43  Most recent lactic acid from 7/21/25 is 0.9  On 7/22/25 leukocytosis 13.57, normal creatinine 1.30, elevated BUN 27  Today 7/23/25 leukocytosis 11.93,  normal creatinine 1.12, normal BUN 21, hemoglobin 11.3 (down from 12.6 yesterday)  UA from admission +leukocytes, glucosuria, pyuria, occult blood  The patient is afebrile and normotensive    Plan:  Continue Cefepime 2g Q12H  Continue to monitor CBC to trend leukocytosis and hemoglobin (watching out for any signs of worsening anemia although no evidence to suggest his current level is concerning for intervention); BMP to monitor creatinine and BUN  Monitor for urine and blood culture; wait for culture results before any decision on discharge is made  Monitor vitals, particularly temperature/fevers and blood pressure status to assess for clinical improvement  Based on CBC trends, de-escalate antibiotics if possible  Assess ambulatory status and ability to take medications orally  Empagliflozin may be a risk factor for UTI so could consider stopping this medication; on hold today

## 2025-07-23 NOTE — PROGRESS NOTES
Progress Note - Hospitalist   Name: Tha Weems 75 y.o. male I MRN: [id number]  Unit/Bed#: S -01 I Date of Admission: 7/21/2025   Date of Service: 7/23/2025 I Hospital Day: 2     Assessment & Plan  Severe sepsis (HCC) (Resolved: 7/23/2025)    Recurrent UTI  History of recurrent UTI and chronic indwelling catheter; last UTI in March grew Pseudomonas  UA suggestive of UTI with innumerable RBC, WBCs; suprapubic tenderness but not CVA tenderness to allude to pyelonephritis  POA leukocytosis 16.06, lactic acid 2.3, elevated creatinine 1.43  Most recent lactic acid from 7/21/25 is 0.9  On 7/22/25 leukocytosis 13.57, normal creatinine 1.30, elevated BUN 27  Today 7/23/25 leukocytosis 11.93,  normal creatinine 1.12, normal BUN 21, hemoglobin 11.3 (down from 12.6 yesterday)  UA from admission +leukocytes, glucosuria, pyuria, occult blood  The patient is afebrile and normotensive    Plan:  Continue Cefepime 2g Q12H  Continue to monitor CBC to trend leukocytosis and hemoglobin (watching out for any signs of worsening anemia although no evidence to suggest his current level is concerning for intervention); BMP to monitor creatinine and BUN  Monitor for urine and blood culture; wait for culture results before any decision on discharge is made  Monitor vitals, particularly temperature/fevers and blood pressure status to assess for clinical improvement  Based on CBC trends, de-escalate antibiotics if possible  Assess ambulatory status and ability to take medications orally  Empagliflozin may be a risk factor for UTI so could consider stopping this medication; on hold today  Metabolic encephalopathy  Confusion and fatigue noted by facility with decreased PO intake  AAOx3 today but mental status not at baseline; patient is not as talkative as he was reportedly before admission  Baseline per chart: 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 Note he was recently started on Oxybutynin, per  the facility    Plan:  Order consult with PT/OT, assess ambulatory status  Delirium precautions  Continue home Sinemet  Hold Oxybutynin for now; can consider Myrbetriq if needed  Coronary artery disease involving native coronary artery of native heart without angina pectoris  Known triple vessel CAD  Continue home ASA, Ranexa, Metoprolol  Type 2 diabetes mellitus (HCC)  Lab Results   Component Value Date    HGBA1C 6.1 (H) 07/10/2025     Home regimen: Lantus 44U in the AM, Tradjenta, Metformin, Farxiga  Hold Tradjenta, Metformin, Farxiga  HbA1c at goal; patient would likely benefit from decreasing OP DM2 management    Plan:  Continue Lantus 44U in the AM  Hypoglycemia protocol  Consider DC Farxiga if appropriate  ISS while inpatient    Parkinson disease (HCC)  Continue home Sinemet  Elevated serum creatinine  POA Creatinine of 1.43; baseline 1.0-1.2  Today creatinine is 1.12, near baseline  Hx S/P TURP and chronic indwelling catheter  Status post 2 L NS  Monitor for now    24 Hour Events : The patient did not report any overnight events.  Subjective : This patient is a 75 year old male with PMHx of BPH, Type II diabetes, recurrent UTIs notable for Pseudomonas infection in March, here today for continued care from his admission in the hospital for sepsis thought to originate from UTI. Today the patient no longer meets the SIRS criteria for sepsis and has no chief complaints; he describes feeling better and review of systems was non-contributory (pertinent negatives include no fevers, chills, SOB, headaches, dizziness, abdominal pain, nor pain at catheter site). He is eating and drinking and his mental status is improved although not up to baseline. Finally, swelling and from the catheter insertion site has been reduced.    Objective :  Temp:  [98 °F (36.7 °C)-99.2 °F (37.3 °C)] 98.7 °F (37.1 °C)  HR:  [82-91] 83  BP: (100-165)/(50-78) 115/56  Resp:  [16-22] 16  SpO2:  [93 %-98 %] 93 %  O2 Device: None (Room  air)    Physical Exam  Constitutional:       Appearance: Normal appearance.   HENT:      Mouth/Throat:      Mouth: Mucous membranes are moist.     Eyes:      Conjunctiva/sclera: Conjunctivae normal.       Cardiovascular:      Rate and Rhythm: Normal rate and regular rhythm.      Heart sounds: Normal heart sounds.   Pulmonary:      Effort: Pulmonary effort is normal.      Breath sounds: Normal breath sounds.   Abdominal:      General: There is no distension.      Tenderness: There is no abdominal tenderness. There is no guarding.   Genitourinary:     Comments: Some remaining swelling around catheter insertion site    Musculoskeletal:      Right lower leg: No edema.      Left lower leg: No edema.     Neurological:      Mental Status: He is alert and oriented to person, place, and time.         Lab Results: I have reviewed the following results:CBC/BMP:   .     07/23/25  0446   WBC 11.93*   HGB 11.3*   HCT 35.4*      SODIUM 140   K 3.7      CO2 27   BUN 21   CREATININE 1.12   GLUC 87        Imaging Results Review: No pertinent imaging studies reviewed.  Other Study Results Review: No additional pertinent studies reviewed.    VTE Pharmacologic Prophylaxis: Enoxaparin (Lovenox)  VTE Mechanical Prophylaxis: reason for no mechanical VTE prophylaxis I did not review this with the patient today.

## 2025-07-23 NOTE — ASSESSMENT & PLAN NOTE
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 Note he was recently started on Oxybutynin, per the facility    Plan:  Order consult with PT/OT, assess ambulatory status  Delirium precautions  Continue home Sinemet  Hold Oxybutynin for now; can consider Myrbetriq if needed

## 2025-07-23 NOTE — UTILIZATION REVIEW
NOTIFICATION OF INPATIENT ADMISSION   AUTHORIZATION REQUEST   SERVICING FACILITY:   [place] [address]  Tax ID: [id number]  NPI: [id number]   ATTENDING PROVIDER:  Attending Name and NPI#: Tracy Rasmussen Md [[id number]]  Address: [address] [place] [address]  Phone: [phone]     ADMISSION INFORMATION:  Place of Service: Inpatient [facility]  Place of Service Code: 21  Inpatient Admission Date/Time: 7/21/25 10:23 PM  Discharge Date/Time: No discharge date for patient encounter.  Admitting Diagnosis Code/Description:  Weakness [R53.1]  Urinary tract infection [N39.0]  Lactic acid acidosis [E87.20]  Elevated BUN [R79.9]  Severe sepsis (HCC) [A41.9, R65.20]     UTILIZATION REVIEW CONTACT:  [facility]   Network Utilization Review Department  Phone: [phone]  Fax: [phone]  Email: [email or web address]  Contact for approvals/pending authorizations, clinical reviews, and discharge.     PHYSICIAN ADVISORY SERVICES:  Medical Necessity Denial & Cwgh-zq-Owwk Review  Phone: [phone]  Fax: [phone]  Email: [email or web address]     DISCHARGE SUPPORT TEAM:  For Patients Discharge Needs & Updates  Phone: [phone] opt. 2 Fax: [phone]  Email: [email or web address]

## 2025-07-23 NOTE — ASSESSMENT & PLAN NOTE
POA leukocytosis 16.06, lactic acid 2.3, elevated creatinine 1.43  Most recent lactic acid from 7/21/25 is 0.9  Today 7/22/25 leukocytosis 13.57, normal creatinine 1.30, elevated BUN 27  Today 7/23/25 leukocytosis 11.93,  normal creatinine 1.12, normal BUN 21  UA from admission +leukocytes, glucosuria, pyuria, occult blood  The patient is afebrile and normotensive  The cause of his severe sepsis is likely UTI given his history of recurrent UTI infections and most notable his UTI with Pseudomonas four months ago    Plan:  Continue Cefepime 2g Q12H  Continue to monitor CBC to trend leukocytosis; BMP to monitor creatinine and BUN  Monitor for urine and blood culture; wait for culture results before any decision on discharge is made  Monitor vitals, particularly temperature/fevers and blood pressure status to assess for clinical improvement  Based on CBC trends, de-escalate antibiotics if possible  Assess ambulatory status and ability to take medications orally

## 2025-07-24 VITALS
HEART RATE: 85 BPM | DIASTOLIC BLOOD PRESSURE: 59 MMHG | BODY MASS INDEX: 24.02 KG/M2 | TEMPERATURE: 97.9 F | WEIGHT: 158.51 LBS | HEIGHT: 68 IN | RESPIRATION RATE: 20 BRPM | OXYGEN SATURATION: 96 % | SYSTOLIC BLOOD PRESSURE: 136 MMHG

## 2025-07-24 LAB
ANION GAP SERPL CALCULATED.3IONS-SCNC: 5 MMOL/L (ref 4–13)
BASOPHILS # BLD AUTO: 0.05 THOUSANDS/ÂΜL (ref 0–0.1)
BASOPHILS NFR BLD AUTO: 1 % (ref 0–1)
BUN SERPL-MCNC: 19 MG/DL (ref 5–25)
CALCIUM SERPL-MCNC: 8.7 MG/DL (ref 8.4–10.2)
CHLORIDE SERPL-SCNC: 106 MMOL/L (ref 96–108)
CO2 SERPL-SCNC: 29 MMOL/L (ref 21–32)
CREAT SERPL-MCNC: 1.27 MG/DL (ref 0.6–1.3)
EOSINOPHIL # BLD AUTO: 0.25 THOUSAND/ÂΜL (ref 0–0.61)
EOSINOPHIL NFR BLD AUTO: 3 % (ref 0–6)
ERYTHROCYTE [DISTWIDTH] IN BLOOD BY AUTOMATED COUNT: 14.5 % (ref 11.6–15.1)
GFR SERPL CREATININE-BSD FRML MDRD: 54 ML/MIN/1.73SQ M
GLUCOSE SERPL-MCNC: 164 MG/DL (ref 65–140)
GLUCOSE SERPL-MCNC: 166 MG/DL (ref 65–140)
GLUCOSE SERPL-MCNC: 92 MG/DL (ref 65–140)
HCT VFR BLD AUTO: 35.7 % (ref 36.5–49.3)
HGB BLD-MCNC: 11.3 G/DL (ref 12–17)
IMM GRANULOCYTES # BLD AUTO: 0.15 THOUSAND/UL (ref 0–0.2)
IMM GRANULOCYTES NFR BLD AUTO: 2 % (ref 0–2)
LYMPHOCYTES # BLD AUTO: 0.66 THOUSANDS/ÂΜL (ref 0.6–4.47)
LYMPHOCYTES NFR BLD AUTO: 7 % (ref 14–44)
MCH RBC QN AUTO: 28.3 PG (ref 26.8–34.3)
MCHC RBC AUTO-ENTMCNC: 31.7 G/DL (ref 31.4–37.4)
MCV RBC AUTO: 90 FL (ref 82–98)
MONOCYTES # BLD AUTO: 1.12 THOUSAND/ÂΜL (ref 0.17–1.22)
MONOCYTES NFR BLD AUTO: 12 % (ref 4–12)
MRSA NOSE QL CULT: NORMAL
NEUTROPHILS # BLD AUTO: 6.8 THOUSANDS/ÂΜL (ref 1.85–7.62)
NEUTS SEG NFR BLD AUTO: 75 % (ref 43–75)
NRBC BLD AUTO-RTO: 0 /100 WBCS
PLATELET # BLD AUTO: 226 THOUSANDS/UL (ref 149–390)
PMV BLD AUTO: 10.1 FL (ref 8.9–12.7)
POTASSIUM SERPL-SCNC: 3.8 MMOL/L (ref 3.5–5.3)
RBC # BLD AUTO: 3.99 MILLION/UL (ref 3.88–5.62)
SODIUM SERPL-SCNC: 140 MMOL/L (ref 135–147)
WBC # BLD AUTO: 9.03 THOUSAND/UL (ref 4.31–10.16)

## 2025-07-24 PROCEDURE — 80048 BASIC METABOLIC PNL TOTAL CA: CPT

## 2025-07-24 PROCEDURE — 97167 OT EVAL HIGH COMPLEX 60 MIN: CPT

## 2025-07-24 PROCEDURE — 85025 COMPLETE CBC W/AUTO DIFF WBC: CPT

## 2025-07-24 PROCEDURE — 99239 HOSP IP/OBS DSCHRG MGMT >30: CPT | Performed by: STUDENT IN AN ORGANIZED HEALTH CARE EDUCATION/TRAINING PROGRAM

## 2025-07-24 PROCEDURE — 82948 REAGENT STRIP/BLOOD GLUCOSE: CPT

## 2025-07-24 RX ADMIN — RANOLAZINE 500 MG: 500 TABLET, FILM COATED, EXTENDED RELEASE ORAL at 08:54

## 2025-07-24 RX ADMIN — SENNOSIDES 8.6 MG: 8.6 TABLET, FILM COATED ORAL at 08:54

## 2025-07-24 RX ADMIN — PANTOPRAZOLE SODIUM 40 MG: 40 TABLET, DELAYED RELEASE ORAL at 06:04

## 2025-07-24 RX ADMIN — METOPROLOL SUCCINATE 25 MG: 25 TABLET, EXTENDED RELEASE ORAL at 08:54

## 2025-07-24 RX ADMIN — LORATADINE 10 MG: 10 TABLET ORAL at 08:54

## 2025-07-24 RX ADMIN — ENOXAPARIN SODIUM 40 MG: 40 INJECTION SUBCUTANEOUS at 08:54

## 2025-07-24 RX ADMIN — INSULIN GLARGINE 40 UNITS: 100 INJECTION, SOLUTION SUBCUTANEOUS at 08:55

## 2025-07-24 RX ADMIN — INSULIN LISPRO 1 UNITS: 100 INJECTION, SOLUTION INTRAVENOUS; SUBCUTANEOUS at 11:41

## 2025-07-24 RX ADMIN — FERROUS GLUCONATE 324 MG: 324 TABLET ORAL at 06:04

## 2025-07-24 RX ADMIN — INSULIN LISPRO 1 UNITS: 100 INJECTION, SOLUTION INTRAVENOUS; SUBCUTANEOUS at 08:55

## 2025-07-24 RX ADMIN — ASPIRIN 81 MG: 81 TABLET, CHEWABLE ORAL at 08:54

## 2025-07-24 RX ADMIN — CARBIDOPA AND LEVODOPA 1 TABLET: 25; 100 TABLET ORAL at 08:54

## 2025-07-24 NOTE — OCCUPATIONAL THERAPY NOTE
Occupational Therapy Evaluation       07/24/25 1045   Note Type   Note type Evaluation   Pain Assessment   Pain Assessment Tool 0-10   Pain Score No Pain   Restrictions/Precautions   Other Precautions Cognitive;Chair Alarm;Bed Alarm;Fall Risk   Home Living   Type of Home Assisted living  ([facility])   Home Layout Performs ADLs on one level   Home Equipment Wheelchair-manual   Prior Function   Level of [place] Needs assistance with ADLs;Needs assistance with IADLS   Lives With Facility staff   Receives Help From Personal care attendant   IADLs Family/Friend/Other provides transportation;Family/Friend/Other provides meals;Family/Friend/Other provides medication management   Comments Patient is a poor historian due to cognitive impairment; per chart review, patient resides at [facility]; gets assist with all ADLs, per therapy notes from 3/2025  patient was Ax1-2 for SPT to/from wheelchair at baseline   General   Additional Pertinent History Patient presented to hospital with complaints of increased confusion, increased lethargy, UTI   ADL   Eating Assistance 4  Minimal Assistance   Grooming Assistance 4  Minimal Assistance   UB Bathing Assistance 1  Total Assistance   UB Bathing Deficit   (Pt attempted to assist with upper body bathing however completing less than 10% without assist)   LB Bathing Assistance 1  Total Assistance   UB Dressing Assistance 2  Maximal Assistance   UB Dressing Deficit   (Don/doff hospital gown in supine)   LB Dressing Assistance 1  Total Assistance   Toileting Assistance  1  Total Assistance   Toileting Deficit   (Razo catheter)   Bed Mobility   Supine to Sit 2  Maximal assistance   Additional items Assist x 1   Sit to Supine 2  Maximal assistance   Additional items Assist x 2   Additional Comments Difficulty maintaining full upright posture despite total assist while seated EOB; patient only able to tolerate few minutes of static sitting then requesting to lay back down due to  fatigue   Transfers   Additional Comments Unable to assess due to poor sitting balance   Balance   Static Sitting Poor   Dynamic Sitting Poor -   Activity Tolerance   Activity Tolerance Patient limited by fatigue;Other (Comment)  (Limited by cognition, limited by generalized weakness)   RUE Assessment   RUE Assessment X  (Poor participation in assessment due to cognitive limitations; shoulder flexion noted only to approx 90%, elbow/wrist/hand ROM appear WFL)   LUE Assessment   LUE Assessment X  (Poor participation in assessment due to cognitive limitations; shoulder flexion noted only to approx 90%, elbow/wrist/hand ROM appear WFL)   Cognition   Overall Cognitive Status Impaired   Arousal/Participation Alert;Cooperative   Attention Attends with cues to redirect   Orientation Level Oriented to person;Disoriented to place;Disoriented to time;Disoriented to situation   Following Commands Follows one step commands with increased time or repetition   Comments Patient with hx of dementia; awake and alert but requiring max cues for sustained attention to task; oriented to self only; poor insight into current functional limitations   Assessment   Limitation Decreased ADL status;Decreased UE strength;Decreased Safe judgement during ADL;Decreased endurance;Decreased cognition;Decreased self-care trans;Decreased high-level ADLs   Prognosis Good   Assessment Patient evaluated by Occupational Therapy.  Patient admitted with Recurrent UTI.  The patients occupational profile, medical and therapy history includes a extensive additional review of physical, cognitive, or psychosocial history related to current functional performance.  Comorbidities affecting functional mobility and ADLS include: anxiety, GERD, hematuria, HLD, HTN, BPH, type II DM, PD, dementia .  Prior to admission, patient was requiring assist for functional mobility with stand pivot transfer to/from wheelchair with assist of 1-2, requiring assist for ADLS,  requiring assist for IADLS, and an assisted living resident.  The evaluation identifies the following performance deficits: weakness, impaired balance, decreased endurance, increased fall risk, new onset of impairment of functional mobility, decreased ADLS, decreased IADLS, decreased activity tolerance, decreased safety awareness, impaired judgement, decreased cognition, decreased strength, impaired psychosocial skills, and decreased standing and/or sitting tolerance, that result in activity limitations and/or participation restrictions. This evaluation requires clinical decision making of high complexity, because the patient presents with comorbidites that affect occupational performance and required significant modification of tasks or assistance with consideration of multiple treatment options.  The Barthel Index was used as a functional outcome tool presenting with a score of Barthel Index Score: 10, indicating marked limitations of functional mobility and ADLS.  The patient's raw score on the AM-PAC Daily Activity Inpatient Short Form is 11. A raw score of less than 19 suggests the patient may benefit from discharge to post-acute rehabilitation services. Please refer to the recommendation of the Occupational Therapist for safe discharge planning. Patient will benefit from skilled Occupational Therapy services to address above deficits and facilitate a safe return to prior level of function.   Goals   Patient Goals Unable to state due to impaired cognition   LT Time Frame 7-10  (See goals below)   Plan   Treatment Interventions ADL retraining;Functional transfer training;UE strengthening/ROM;Endurance training;Patient/family training;Equipment evaluation/education;Compensatory technique education;Continued evaluation;Activityengagement;Energy conservation   Goal Expiration Date 08/03/25   OT Frequency 2-3x/wk   Discharge Recommendation   Rehab Resource Intensity Level, OT II (Moderate Resource Intensity)    AM-PAC Daily Activity Inpatient   Lower Body Dressing 1   Bathing 1   Toileting 1   Upper Body Dressing 2   Grooming 3   Eating 3   Daily Activity Raw Score 11   Daily Activity Standardized Score (Calc for Raw Score >=11) 29.04   AM-PAC Applied Cognition Inpatient   Following a Speech/Presentation 1   Understanding Ordinary Conversation 2   Taking Medications 2   Remembering Where Things Are Placed or Put Away 2   Remembering List of 4-5 Errands 1   Taking Care of Complicated Tasks 1   Applied Cognition Raw Score 9   Applied Cognition Standardized Score 22.48   Barthel Index   Feeding 5   Bathing 0   Grooming Score 0   Dressing Score 0   Bladder Score 0   Bowels Score 0   Toilet Use Score 0   Transfers (Bed/Chair) Score 5   Mobility (Level Surface) Score 0   Stairs Score 0   Barthel Index Score 10     Goals established on initial evaluation in order to achieve goal to decrease caregiver burden    Pt will perform self feeding task Independent  for increased ADL independence.    Pt will perform grooming tasks seated Supervision for increased ADL independence    Pt will perform UB ADLs Mod A   for increased ADL independence.     Pt will perform LB ADLs Mod A   for increased ADL independence.     Pt will perform toileting Mod A  for increased ADL independence    Patient will increase stand pivot commode transfer to mod assist with least restrictive assistive device to increase performance and safety with ADLS and functional mobility    Pt will perform all functional transfers Mod A  with LRAD for increased ADL and iADL independence    Pt will perform bed mobility Min A to increase participation in ADLs, functional transfers, and decrease caregiver burden.     Patient will tolerate 10 minutes of UE ROM/strengthening to increase general activity tolerance and performance in ADLS/IADLS    Patient will improve functional activity tolerance to 10 minutes of sustained functional tasks to increase participation in basic  self-care and decrease assistance level    Patient will be able to to verbalize understanding and perform energy conservation/proper body mechanics during ADLS and functional mobility at least 75% of the time with minimal cueing to decrease signs of fatigue and increase stamina to return to prior level of function    Patient will increase static/dynamic sitting balance to fair to improve the ability to sit at edge of bed or on a chair for ADLS;  Patient will increase static/dynamic standing balance to fair- to improve postural stability and decrease fall risk during standing ADLS and transfers.      Pt will score >/= 18/24 on AM-PAC Daily Activity Inpatient scale to promote safe independence with ADLs and functional mobility    Latosha Baltazar, OTR/L

## 2025-07-24 NOTE — HOSPITAL COURSE
Tha Weems is a 75 y.o. male patient who originally presented to the hospital on 7/21/2025 due to altered mental status, noted as being less conversational than usual, worked up for UTI. Urine analysis revealed innumerable WBCs and RBCs. Urine culture was done and came back positive for E. Coli on 7/23. Chest xray was normal. Patient was treated with IV saline solution and IV cefepime for presumed sepsis due to UTI. On 7/22 patient was febrile but WBC count trended down each day after admission. On 7/23 the patient was hemodynamically stable and afebrile as assessed by vitals and lab work on 7/23; they were taken off IV fluids and remained on IV cefepime. Urine culture results positive for E. Coli were released later that afternoon and the patient was switched to IV ceftriaxone. On 7/24, patient remained afebrile and hemodynamically stable, and leukocytosis had resolved. General well-being and mental status continued to improve each day throughout the course of the hospital stay.  Day of discharge 7/24 urine culture susceptibilities returned as ESBL, patient discharged on Augmentin for total of 7-day course, ending on 7/31

## 2025-07-24 NOTE — ASSESSMENT & PLAN NOTE
Confusion and fatigue noted by facility with decreased PO intake  AAOx3 today and mental status improved  Baseline per chart: 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 Note he was recently started on Oxybutynin, per the facility    Plan:  Delirium precautions maintained while patient inpatient  Continue home Sinemet  Hold Oxybutynin for now; can consider Myrbetriq if needed

## 2025-07-24 NOTE — PROGRESS NOTES
Progress Note - Hospitalist   Name: Tha Weems 75 y.o. male I MRN: [id number]  Unit/Bed#: S -01 I Date of Admission: 7/21/2025   Date of Service: 7/24/2025 I Hospital Day: 3    Assessment & Plan  Recurrent UTI  History of recurrent UTI and chronic indwelling catheter; last UTI in March grew Pseudomonas  UA suggestive of UTI with innumerable RBC, WBCs; suprapubic tenderness but not CVA tenderness to allude to pyelonephritis  POA leukocytosis 16.06, lactic acid 2.3, elevated creatinine 1.43  Most recent lactic acid from 7/21/25 is 0.9  On 7/22/25 leukocytosis 13.57, normal creatinine 1.30, elevated BUN 27  On 7/23/25 leukocytosis 11.93,  normal creatinine 1.12, normal BUN 21, hemoglobin 11.3 (down from 12.6 yesterday)  On 7/24 leukocytosis resolved (WBC 9.03), normal creatinine 1.27, BUN 19, hemoglobin 11.3  UA from admission +leukocytes, glucosuria, pyuria, occult blood  The patient is afebrile and normotensive    Plan:  Initially on cefepime, narrowed to ceftriaxone upon culture results, sensitivities resulted as ESBL, therefore patient discharged on a full 7-day course of Augmentin, plan to end on 7/31  While inpatient, monitored with CBC to trend leukocytosis and hemoglobin (watching out for any signs of worsening anemia although no evidence to suggest his current level is concerning for intervention); BMP to monitor creatinine and BUN  Monitor for urine and blood culture; wait for culture results before any decision on discharge is made  Monitor vitals, particularly temperature/fevers and blood pressure status to assess for clinical improvement  Based on CBC trends, de-escalate antibiotics if possible  Assess ambulatory status and ability to take medications orally  Empagliflozin may be a risk factor for UTI so could consider stopping this medication; on hold today  Metabolic encephalopathy  Confusion and fatigue noted by facility with decreased PO intake  AAOx3 today and mental status improved  Baseline  per chart: 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 Note he was recently started on Oxybutynin, per the facility    Plan:  Delirium precautions maintained while patient inpatient  Continue home Sinemet  Hold Oxybutynin for now; can consider Myrbetriq if needed  Coronary artery disease involving native coronary artery of native heart without angina pectoris  Known triple vessel CAD  Continued home ASA, Ranexa, Metoprolol  Type 2 diabetes mellitus (HCC)  Lab Results   Component Value Date    HGBA1C 6.1 (H) 07/10/2025     Home regimen: Lantus 44U in the AM, Tradjenta, Metformin, Farxiga  Hold Tradjenta, Metformin, Farxiga  HbA1c at goal; patient would likely benefit from decreasing OP DM2 management    Plan:  Hypoglycemia protocol  Discontinued SGLT2 in the setting of recurrent UTI  While inpatient maintained on Lantus 40U, lispro 1-5 U SSI  Parkinson disease (HCC)  Continue home Sinemet  Elevated serum creatinine  POA Creatinine of 1.43; baseline 1.0-1.2  Today, day of discharge was 7/24, creatinine is 1.27, near baseline  Hx S/P TURP and chronic indwelling catheter    Plan:  Monitor with daily BMP while inpatient    VTE Pharmacologic Prophylaxis: VTE Score: 5 Moderate Risk (Score 3-4) - Pharmacological DVT Prophylaxis Ordered: enoxaparin (Lovenox).    Mobility:   Basic Mobility Inpatient Raw Score: 8  JH-HLM Goal: 3: Sit at edge of bed  JH-HLM Achieved: 2: Bed activities/Dependent transfer  JH-HLM Goal NOT achieved. Continue with multidisciplinary rounding and encourage appropriate mobility to improve upon JH-HLM goals.    Patient Centered Rounds: I performed bedside rounds with nursing staff today.   Discussions with Specialists or Other Care Team Provider: Occupational Therapy    Education and Discussions with Family / Patient: Updated  (wife) via phone.    Current Length of Stay: 3 day(s)  Current Patient Status: Inpatient   Certification Statement: Patient  discharged on 7/24  Discharge Plan: Anticipate discharge later today or tomorrow to prior assisted or independent living facility.    Code Status: Level 1 - Full Code    Subjective   This patient is a 75 year old male with PMH of BPH, Type II diabetes, recurrent UTIs, and an indwelling catheter here for continued care for his UTI. Today the patient has no chief complaints and again reports feeling much better. Review of systems is non-contributory (pertinent negatives: no chest pain, shortness of breath, pain at catheter site, headaches, dizziness, abdominal pain). His appetite has improved, he is able to eat and drink without difficulty, and swelling and pain from the catheter insertion site remains reduced.     Objective :  Temp:  [97.9 °F (36.6 °C)-98.2 °F (36.8 °C)] 97.9 °F (36.6 °C)  HR:  [85-86] 85  BP: (111-136)/(44-59) 136/59  Resp:  [20] 20  SpO2:  [94 %-96 %] 96 %  O2 Device: None (Room air)    Body mass index is 24.1 kg/m².     Input and Output Summary (last 24 hours):     Intake/Output Summary (Last 24 hours) at 7/24/2025 1520  Last data filed at 7/24/2025 0701  Gross per 24 hour   Intake 80 ml   Output 825 ml   Net -745 ml       Physical Exam  Physical Exam  Vitals and nursing note reviewed.   Constitutional:       General: He is not in acute distress.     Appearance: Normal appearance. He is well-developed.   HENT:      Head: Normocephalic and atraumatic.      Mouth/Throat:      Mouth: Mucous membranes are moist.      Eyes:      Conjunctiva/sclera: Conjunctivae normal.         Cardiovascular:      Rate and Rhythm: Normal rate and regular rhythm.      Pulses: Normal pulses.      Heart sounds: Normal heart sounds. No murmur heard.  Pulmonary:      Effort: Pulmonary effort is normal. No respiratory distress.      Breath sounds: Normal breath sounds.   Abdominal:      General: There is no distension.      Palpations: Abdomen is soft. There is no mass.      Tenderness: There is no abdominal tenderness.    Genitourinary:     Comments: Mild swelling and discomfort near catheter insertion site     Musculoskeletal:         General: No swelling.      Cervical back: Neck supple.      Skin:     General: Skin is warm and dry.      Capillary Refill: Capillary refill takes less than 2 seconds.      Comments: Small scabbed over wound noted on the posterior aspect of the patient's right hand      Neurological:      General: No focal deficit present.      Mental Status: He is alert.      Cranial Nerves: No cranial nerve deficit.      Comments: Oriented to self, thinks that the year is 2024, not cooperative with questioning further than this.   Psychiatric:         Mood and Affect: Mood normal.        Lines/Drains:  Lines/Drains/Airways       Active Status       Name Placement date Placement time Site Days    Urethral Catheter Double-lumen 18 Fr. 07/21/25 2117  Double-lumen  2                  Urinary Catheter:  Goal for removal: N/A - Chronic Razo                 Lab Results: I have reviewed the following results:   Results from last 7 days   Lab Units 07/24/25  0542   WBC Thousand/uL 9.03   HEMOGLOBIN g/dL 11.3*   HEMATOCRIT % 35.7*   PLATELETS Thousands/uL 226   SEGS PCT % 75   LYMPHO PCT % 7*   MONO PCT % 12   EOS PCT % 3     Results from last 7 days   Lab Units 07/24/25  0542 07/22/25  0612 07/21/25 2031   SODIUM mmol/L 140   < > 140   POTASSIUM mmol/L 3.8   < > 4.1   CHLORIDE mmol/L 106   < > 104   CO2 mmol/L 29   < > 25   BUN mg/dL 19   < > 31*   CREATININE mg/dL 1.27   < > 1.43*   ANION GAP mmol/L 5   < > 11   CALCIUM mg/dL 8.7   < > 8.9   ALBUMIN g/dL  --   --  3.7   TOTAL BILIRUBIN mg/dL  --   --  0.83   ALK PHOS U/L  --   --  65   ALT U/L  --   --  7   AST U/L  --   --  10*   GLUCOSE RANDOM mg/dL 92   < > 139    < > = values in this interval not displayed.     Results from last 7 days   Lab Units 07/21/25  2031   INR  0.96     Results from last 7 days   Lab Units 07/24/25  1125 07/24/25  0715 07/23/25 2107  07/23/25  1618 07/23/25  1106 07/23/25  0613 07/22/25  2137 07/22/25  1654 07/22/25  1151 07/22/25  0844 07/21/25  2341   POC GLUCOSE mg/dl 164* 166* 114 171* 165* 99 153* 129 186* 74 128         Results from last 7 days   Lab Units 07/21/25  2243 07/21/25  2031   LACTIC ACID mmol/L 0.9 2.3*   PROCALCITONIN ng/ml  --  0.16       Recent Cultures (last 7 days):   Results from last 7 days   Lab Units 07/21/25  2203 07/21/25  2043 07/21/25  2031   BLOOD CULTURE   --  No Growth at 48 hrs. No Growth at 48 hrs.   URINE CULTURE  >100,000 cfu/ml Escherichia coli ESBL*  --   --        No new imaging or cardiac studies in the interval since last assessment    Last 24 Hours Medication List:     Current Facility-Administered Medications:     acetaminophen (TYLENOL) tablet 650 mg, Q6H PRN    aspirin chewable tablet 81 mg, Daily    carbidopa-levodopa (SINEMET)  mg per tablet 1 tablet, TID    ceftriaxone (ROCEPHIN) 1 g/50 mL in dextrose IVPB, Q24H, Last Rate: Stopped (07/23/25 2306)    [Held by provider] Empagliflozin (JARDIANCE) tablet 10 mg, Daily    enoxaparin (LOVENOX) subcutaneous injection 40 mg, Daily    ferrous gluconate (FERGON) tablet 324 mg, Daily Before Breakfast    insulin glargine (LANTUS) subcutaneous injection 40 Units 0.4 mL, QAM    insulin lispro (HumALOG/ADMELOG) 100 units/mL subcutaneous injection 1-5 Units, TID AC **AND** Fingerstick Glucose (POCT), TID AC    insulin lispro (HumALOG/ADMELOG) 100 units/mL subcutaneous injection 1-5 Units, HS    loratadine (CLARITIN) tablet 10 mg, Daily    metoprolol succinate (TOPROL-XL) 24 hr tablet 25 mg, Daily    [Held by provider] oxybutynin (DITROPAN) tablet 5 mg, Daily    pantoprazole (PROTONIX) EC tablet 40 mg, BID AC    ranolazine (RANEXA) 12 hr tablet 500 mg, Q12H LIYAH    senna (SENOKOT) tablet 8.6 mg, Daily    tamsulosin (FLOMAX) capsule 0.4 mg, Daily With Dinner    Administrative Statements   Today, Patient Was Seen By: Pablo Torres DO      **Please Note: This  note may have been constructed using a voice recognition system.**

## 2025-07-24 NOTE — ASSESSMENT & PLAN NOTE
Confusion and fatigue noted by facility with decreased PO intake; doing better today, patient reports stronger appetite and ability to eat and drink without difficulty  AAOx3 today  Baseline per chart: 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 Note he was recently started on Oxybutynin, per the facility    Plan:  Review ambulatory status from PT/OT   Delirium precautions  Continue home Sinemet  Hold Oxybutynin for now; can consider Myrbetriq if needed

## 2025-07-24 NOTE — ASSESSMENT & PLAN NOTE
History of recurrent UTI and chronic indwelling catheter; last UTI in March grew Pseudomonas  UA suggestive of UTI with innumerable RBC, WBCs; suprapubic tenderness but not CVA tenderness to allude to pyelonephritis  POA leukocytosis 16.06, lactic acid 2.3, elevated creatinine 1.43  Most recent lactic acid from 7/21/25 is 0.9  On 7/22/25 leukocytosis 13.57, normal creatinine 1.30, elevated BUN 27  On 7/23/25 leukocytosis 11.93,  normal creatinine 1.12, normal BUN 21, hemoglobin 11.3 (down from 12.6 yesterday)  On 7/24 leukocytosis resolved (WBC 9.03), normal creatinine 1.27, BUN 19, hemoglobin 11.3  UA from admission +leukocytes, glucosuria, pyuria, occult blood  The patient is afebrile and normotensive    Plan:  Initially on cefepime, narrowed to ceftriaxone upon culture results, sensitivities resulted as ESBL, therefore patient discharged on a full 7-day course of Augmentin, plan to end on 7/31  While in patient, monitored with CBC to trend leukocytosis and hemoglobin (watching out for any signs of worsening anemia although no evidence to suggest his current level is concerning for intervention); BMP to monitor creatinine and BUN  Monitor for urine and blood culture; wait for culture results before any decision on discharge is made  Monitor vitals, particularly temperature/fevers and blood pressure status to assess for clinical improvement  Based on CBC trends, de-escalate antibiotics if possible  Assess ambulatory status and ability to take medications orally  Empagliflozin may be a risk factor for UTI so could consider stopping this medication; on hold today

## 2025-07-24 NOTE — DISCHARGE INSTR - AVS FIRST PAGE
Dear Tha Weems,     It was our pleasure to care for you here at [facility].  It is our hope that we were always able to exceed the expected standards for your care during your stay.  You were hospitalized due to urinary tract infection.  You were cared for on the fourth floor by Pablo Torres DO under the service of Tracy Rasmussen MD with the [facility] who covers for your primary care physician (PCP), Geovanna Fuentes MD, while you were hospitalized.  If you have any questions or concerns related to this hospitalization, you may contact us at .  For follow up as well as any medication refills, we recommend that you follow up with your primary care physician.  A registered nurse will reach out to you by phone within a few days after your discharge to answer any additional questions that you may have after going home.  However, at this time we provide for you here, the most important instructions / recommendations at discharge:     Notable Medication Adjustments -   You should stop taking Farxiga 10mg tablets  You should stop taking oxybutynin until instructed to begin again by your primary care doctor  Should start taking amoxicillin-clavulanate (Augmentin) 875-125 tablets, take 1 tablet by mouth every 12 hours for total of 7 days, beginning on 7/24 in the evening and ending on 7/31 in the morning.  Continue taking all other medications as previously prescribed.  Testing Required after Discharge -   No testing will be done after discharge for you.  ** Please contact your PCP to request testing orders for any of the testing recommended here **  Important follow up information -   Follow up with your primary care doctor within 1 week after your discharge date.  Other Instructions -   If you notice return or worsening of symptoms, loss or cessation of urine output, high fevers, back pain, abdominal pain, contact your primary care doctor. If  you are unable to contact or visit them, visit the hospital.  If you notice chest pain, shortness of breath, heart palpitations, dizziness, or headaches visit the hospital.  Please review this entire after visit summary as additional general instructions including medication list, appointments, activity, diet, any pertinent wound care, and other additional recommendations from your care team that may be provided for you.      Sincerely,     Pablo Torres, DO

## 2025-07-24 NOTE — ASSESSMENT & PLAN NOTE
Lab Results   Component Value Date    HGBA1C 6.1 (H) 07/10/2025     Home regimen: Lantus 44U in the AM, Tradjenta, Metformin, Farxiga  Hold Tradjenta, Metformin, Farxiga  HbA1c at goal; patient would likely benefit from decreasing OP DM2 management    Plan:  Hypoglycemia protocol  Discontinued SGLT2 in the setting of recurrent UTI  While inpatient maintained on Lantus 40U, lispro 1-5 U SSI

## 2025-07-24 NOTE — PROGRESS NOTES
Progress Note - Hospitalist   Name: Tha Weems 75 y.o. male I MRN: [id number]  Unit/Bed#: S -01 I Date of Admission: 7/21/2025   Date of Service: 7/24/2025 I Hospital Day: 3   { ?Quick Links I Problem List I PORCH I Billing Tip:70146}  Assessment & Plan  Recurrent UTI  History of recurrent UTI and chronic indwelling catheter; last UTI in March grew Pseudomonas  UA suggestive of UTI with innumerable RBC, WBCs; suprapubic tenderness but not CVA tenderness to allude to pyelonephritis  POA leukocytosis 16.06, lactic acid 2.3, elevated creatinine 1.43  Most recent lactic acid from 7/21/25 is 0.9  On 7/22/25 leukocytosis 13.57, normal creatinine 1.30, elevated BUN 27  Today 7/23/25 leukocytosis 11.93,  normal creatinine 1.12, normal BUN 21, hemoglobin 11.3 (down from 12.6 yesterday)  On 7/24/25 leukocytosis resolved (WBC 9.03), normal creatinine 1.27, BUN 19, hemoglobin 11.3  UA from admission +leukocytes, glucosuria, pyuria, occult blood  Urine culture positive for E. Coli  The patient is afebrile, normotensive, overall stable, and oral intake is possible    Plan:  Continue with IV ceftriaxone and reassess upon reviewing urine culture sensitivities  Monitor for continued hemodynamic stability to assess for clinical improvement  If not able to discharge, continue to monitor CBC to ensure leukocytosis remains resolved; BMP to monitor creatinine and BUN  Review urine culture sensitivities, assess ambulatory status from PT/OT note with goal of discharging  Empagliflozin may be a risk factor for UTI so could consider stopping this medication; on hold today  Consider voiding studies in future given recurrent history of UTIs  Metabolic encephalopathy  Confusion and fatigue noted by facility with decreased PO intake; doing better today, patient reports stronger appetite and ability to eat and drink without difficulty  AAOx3 today  Baseline per chart: 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 Note he was recently started on Oxybutynin, per the facility    Plan:  Review ambulatory status from PT/OT   Delirium precautions  Continue home Sinemet  Hold Oxybutynin for now; can consider Myrbetriq if needed  Coronary artery disease involving native coronary artery of native heart without angina pectoris  Known triple vessel CAD  Continue home ASA, Ranexa, Metoprolol  Type 2 diabetes mellitus (HCC)  Lab Results   Component Value Date    HGBA1C 6.1 (H) 07/10/2025     Home regimen: Lantus 44U in the AM, Tradjenta, Metformin, Farxiga  Hold Tradjenta, Metformin, Farxiga  HbA1c at goal; patient would likely benefit from decreasing OP DM2 management    Plan:  Continue Lantus 44U in the AM  Hypoglycemia protocol  Consider DC Farxiga if appropriate  ISS while inpatient    Parkinson disease (HCC)  Continue home Sinemet  Elevated serum creatinine  POA Creatinine of 1.43; baseline 1.0-1.2  Today creatinine is 1.12, near baseline  Hx S/P TURP and chronic indwelling catheter  Status post 2 L NS  Monitor for now    24 Hour Events : No overnight events.  Subjective : This patient is a 75 year old male with PMH of BPH, Type II diabetes, recurrent UTIs, and an indwelling catheter here for continued care for his UTI. Today the patient has no chief complaints and again reports feeling much better. Review of systems is non-contributory (pertinent negatives: no chest pain, shortness of breath, pain at catheter site, headaches, dizziness, abdominal pain). His appetite has improved, he is able to eat and drink without difficulty, and swelling and pain from the catheter insertion site remains reduced.    Objective :{?Quick Links I ICU Summary I Vitals I I/Os I LDAs I Mobility (PT/OT) I Code Status / ACP   ?Quick Links I Active Meds I Pain Meds I Antibiotics I Anticoagulants:92603}  Temp:  [97.9 °F (36.6 °C)-98.2 °F (36.8 °C)] 97.9 °F (36.6 °C)  HR:  [85-86] 85  BP: (108-136)/(44-59) 136/59  Resp:  [16-20] 20  SpO2:  [94  %-97 %] 96 %  O2 Device: None (Room air)    Physical Exam  Vitals and nursing note reviewed.   Constitutional:       General: He is not in acute distress.     Appearance: Normal appearance. He is well-developed.   HENT:      Head: Normocephalic and atraumatic.      Mouth/Throat:      Mouth: Mucous membranes are moist.     Eyes:      Conjunctiva/sclera: Conjunctivae normal.       Cardiovascular:      Rate and Rhythm: Normal rate and regular rhythm.      Pulses: Normal pulses.      Heart sounds: Normal heart sounds. No murmur heard.  Pulmonary:      Effort: Pulmonary effort is normal. No respiratory distress.      Breath sounds: Normal breath sounds.   Abdominal:      General: There is no distension.      Palpations: Abdomen is soft. There is no mass.      Tenderness: There is no abdominal tenderness.   Genitourinary:     Comments: Mild swelling and discomfort near catheter insertion site    Musculoskeletal:         General: No swelling.      Cervical back: Neck supple.     Skin:     General: Skin is warm and dry.      Capillary Refill: Capillary refill takes less than 2 seconds.      Comments: Small scabbed over wound noted on the posterior aspect of the patient's right hand     Neurological:      General: No focal deficit present.      Mental Status: He is alert.      Cranial Nerves: No cranial nerve deficit.      Comments: Oriented to self, thinks that the year is 2024, not cooperative with questioning further than this.   Psychiatric:         Mood and Affect: Mood normal.       {?Quick Links I Lab Review I Micro Results I Radiology I Cardiology:94797}  Lab Results: I have reviewed the following results:CBC/BMP:   .     07/24/25  0542   WBC 9.03   HGB 11.3*   HCT 35.7*      SODIUM 140   K 3.8      CO2 29   BUN 19   CREATININE 1.27   GLUC 92    , Urine Culture:  >100,000 cfu/ml Escherichia coli ESBL     Imaging Results Review: No pertinent imaging studies reviewed.  Other Study Results Review: No  additional pertinent studies reviewed.    VTE Pharmacologic Prophylaxis: Enoxaparin (Lovenox)  VTE Mechanical Prophylaxis: sequential compression device

## 2025-07-24 NOTE — ASSESSMENT & PLAN NOTE
History of recurrent UTI and chronic indwelling catheter; last UTI in March grew Pseudomonas  UA suggestive of UTI with innumerable RBC, WBCs; suprapubic tenderness but not CVA tenderness to allude to pyelonephritis  POA leukocytosis 16.06, lactic acid 2.3, elevated creatinine 1.43  Most recent lactic acid from 7/21/25 is 0.9  On 7/22/25 leukocytosis 13.57, normal creatinine 1.30, elevated BUN 27  Today 7/23/25 leukocytosis 11.93,  normal creatinine 1.12, normal BUN 21, hemoglobin 11.3 (down from 12.6 yesterday)  On 7/24/25 leukocytosis resolved (WBC 9.03), normal creatinine 1.27, BUN 19, hemoglobin 11.3  UA from admission +leukocytes, glucosuria, pyuria, occult blood  Urine culture positive for E. Coli  The patient is afebrile, normotensive, overall stable, and oral intake is possible    Plan:  Continue with IV ceftriaxone and reassess upon reviewing urine culture sensitivities  Monitor for continued hemodynamic stability to assess for clinical improvement  If not able to discharge, continue to monitor CBC to ensure leukocytosis remains resolved; BMP to monitor creatinine and BUN  Review urine culture sensitivities, assess ambulatory status from PT/OT note with goal of discharging  Empagliflozin may be a risk factor for UTI so could consider stopping this medication; on hold today  Consider voiding studies in future given recurrent history of UTIs

## 2025-07-24 NOTE — ASSESSMENT & PLAN NOTE
History of recurrent UTI and chronic indwelling catheter; last UTI in March grew Pseudomonas  UA suggestive of UTI with innumerable RBC, WBCs; suprapubic tenderness but not CVA tenderness to allude to pyelonephritis  POA leukocytosis 16.06, lactic acid 2.3, elevated creatinine 1.43  Most recent lactic acid from 7/21/25 is 0.9  On 7/22/25 leukocytosis 13.57, normal creatinine 1.30, elevated BUN 27  On 7/23/25 leukocytosis 11.93,  normal creatinine 1.12, normal BUN 21, hemoglobin 11.3 (down from 12.6 yesterday)  On 7/24 leukocytosis resolved (WBC 9.03), normal creatinine 1.27, BUN 19, hemoglobin 11.3  UA from admission +leukocytes, glucosuria, pyuria, occult blood  The patient is afebrile and normotensive    Plan:  Initially on cefepime, narrowed to ceftriaxone upon culture results, sensitivities resulted as ESBL, therefore patient discharged on a full 7-day course of Augmentin, plan to end on 7/31  While inpatient, monitored with CBC to trend leukocytosis and hemoglobin (watching out for any signs of worsening anemia although no evidence to suggest his current level is concerning for intervention); BMP to monitor creatinine and BUN  Monitor for urine and blood culture; wait for culture results before any decision on discharge is made  Monitor vitals, particularly temperature/fevers and blood pressure status to assess for clinical improvement  Based on CBC trends, de-escalate antibiotics if possible  Assess ambulatory status and ability to take medications orally  Empagliflozin may be a risk factor for UTI so could consider stopping this medication; on hold today

## 2025-07-24 NOTE — ASSESSMENT & PLAN NOTE
Lab Results   Component Value Date    HGBA1C 6.1 (H) 07/10/2025     Home regimen: Lantus 44U in the AM, Tradjenta, Metformin, Farxiga  Hold Tradjenta, Metformin, Farxiga  HbA1c at goal; patient would likely benefit from decreasing OP DM2 management    Plan:  Continue Lantus 44U in the AM  Hypoglycemia protocol  Consider DC Farxiga if appropriate  ISS while inpatient     English

## 2025-07-24 NOTE — CASE MANAGEMENT
Case Management Discharge Planning Note    Patient name Tha Weems  Location S /S -01 MRN [id number]  : 1950 Date 2025       Current Admission Date: 2025  Current Admission Diagnosis:Recurrent UTI   Patient Active Problem List    Diagnosis Date Noted    Elevated serum creatinine 2025    Electrolyte abnormality 2025    Dry eyes 2023    Metabolic encephalopathy 2022    Intertrigo 2022    Balanitis 2022    Requires daily assistance for activities of daily living (ADL) and comfort needs 2022    Fall 2022    Parkinson disease (HCC) 2022    Chest pain 2022    Leukocytosis 2022    Coronary artery disease involving native coronary artery of native heart without angina pectoris 2022    Type 2 MI (myocardial infarction) (HCC) 2022    Generalized abdominal pain 2021    Lactic acidosis 2021    Urinary incontinence 2021    Vitamin B12 deficiency     History of recurrent UTIs 2020    Insomnia 2020    Benign paroxysmal positional vertigo 2020    BPH (benign prostatic hyperplasia) 2020    Vitamin D deficiency 2020    Left-sided weakness 2020    Recurrent UTI 2020    Cystitis 2020    Bladder stones 2020    Stage 3a chronic kidney disease (HCC) 2020    Essential hypertension 2019    Type 2 diabetes mellitus ([facility]) 2019    Nephrolithiasis 2019    Ambulatory dysfunction 2019    Paresis ([facility]) 2019    Abnormal PSA 2019    Dizziness 10/04/2017    Pancreas cyst 2016    Type 2 diabetes mellitus with diabetic neuropathy (HCC) 2015    Hyperlipidemia 2012      LOS (days): 3  Geometric Mean LOS (GMLOS) (days): 4.9  Days to GMLOS:2.2     OBJECTIVE:  Risk of Unplanned Readmission Score: 20.43         Current admission status: Inpatient   Preferred Pharmacy:   [facility] - [place] -  [address]  [address]  [address]  [facility] [place] [address]  Phone: [phone] Fax: [phone]    [id number] - [place] - [address]  [place] [address]  Phone: [phone] Fax: [phone]    Primary Care Provider: Geovanna Fuentes MD    Primary Insurance: [facility]  Secondary Insurance:     DISCHARGE DETAILS:    Discharge planning discussed with:: Patient and Brennan at [facility]  [place] of Choice: Yes  Comments - [place] of Choice: Reviewed plans for DC back to [facility]  CM contacted family/caregiver?: No- see comments  Were Treatment Team discharge recommendations reviewed with patient/caregiver?: Yes  Did patient/caregiver verbalize understanding of patient care needs?: N/A- going to facility  Were patient/caregiver advised of the risks associated with not following Treatment Team discharge recommendations?: Yes    Contacts  Patient Contacts: Patient  Relationship to Patient:: Other (Comment)  Contact Method: In Person  Reason/Outcome: Discharge Planning, Continuity of Care    Requested Home Health Care         Is the patient interested in HHC at discharge?: No    DME Referral Provided  Referral made for DME?: No    Other Referral/Resources/Interventions Provided:  Interventions: Facility Return, Assisted Living, Transportation         Treatment Team Recommendation: Assisted Living, Facility Return  Expected Discharge Disposition: Facility Return  Additional Discharge Dispositions: Facility Return  Transport at Discharge : Stretcher van     Number/Name of Dispatcher: Requested via Roundtrip for 4:00 PM  Transported by (Company and Unit #): [employer]  ETA of Transport (Date): 07/24/25  ETA of Transport (Time): 1800              IMM Given (Date):: 07/24/25  IMM Given to:: Patient        IMM reviewed with patient.  Patient agrees with discharge determination.     CM spoke with Aziza the  at [facility] who confirmed that patient is  able to return to them today.    Stretcher van was requested via [employer] for 4:00 PM and claimed by [employer] for 6pm.    SLIM, primary nurse, patient and facility aware of the DCP.    CM reviewed the availability of treatment team to discuss questions or concerns patient and/or family may have regarding  understanding medications and recognizing signs and symptoms once discharged.  CM also encouraged patient to follow up with all recommended appointments after discharge. Patient advised of importance for patient and family to participate in managing patient's medical well being.

## 2025-07-24 NOTE — DISCHARGE SUMMARY
Discharge Summary - Hospitalist   Name: Tha Weems 75 y.o. male I MRN: [id number]  Unit/Bed#: S -01 I Date of Admission: 7/21/2025   Date of Service: 7/24/2025 I Hospital Day: 3     Assessment & Plan  Recurrent UTI  History of recurrent UTI and chronic indwelling catheter; last UTI in March grew Pseudomonas  UA suggestive of UTI with innumerable RBC, WBCs; suprapubic tenderness but not CVA tenderness to allude to pyelonephritis  POA leukocytosis 16.06, lactic acid 2.3, elevated creatinine 1.43  Most recent lactic acid from 7/21/25 is 0.9  On 7/22/25 leukocytosis 13.57, normal creatinine 1.30, elevated BUN 27  On 7/23/25 leukocytosis 11.93,  normal creatinine 1.12, normal BUN 21, hemoglobin 11.3 (down from 12.6 yesterday)  On 7/24 leukocytosis resolved (WBC 9.03), normal creatinine 1.27, BUN 19, hemoglobin 11.3  UA from admission +leukocytes, glucosuria, pyuria, occult blood  The patient is afebrile and normotensive    Plan:  Initially on cefepime, narrowed to ceftriaxone upon culture results, sensitivities resulted as ESBL, therefore patient discharged on a full 7-day course of Augmentin, plan to end on 7/31  While in patient, monitored with CBC to trend leukocytosis and hemoglobin (watching out for any signs of worsening anemia although no evidence to suggest his current level is concerning for intervention); BMP to monitor creatinine and BUN  Monitor for urine and blood culture; wait for culture results before any decision on discharge is made  Monitor vitals, particularly temperature/fevers and blood pressure status to assess for clinical improvement  Based on CBC trends, de-escalate antibiotics if possible  Assess ambulatory status and ability to take medications orally  Empagliflozin may be a risk factor for UTI so could consider stopping this medication; on hold today  Metabolic encephalopathy  Confusion and fatigue noted by facility with decreased PO intake  AAOx3 today and mental status  improved  Baseline per chart: 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 Note he was recently started on Oxybutynin, per the facility    Plan:  Delirium precautions maintained while patient inpatient  Continue home Sinemet  Hold Oxybutynin for now; can consider Myrbetriq if needed  Coronary artery disease involving native coronary artery of native heart without angina pectoris  Known triple vessel CAD  Continued home ASA, Ranexa, Metoprolol  Type 2 diabetes mellitus (HCC)  Lab Results   Component Value Date    HGBA1C 6.1 (H) 07/10/2025     Home regimen: Lantus 44U in the AM, Tradjenta, Metformin, Farxiga  Hold Tradjenta, Metformin, Farxiga  HbA1c at goal; patient would likely benefit from decreasing OP DM2 management    Plan:  Hypoglycemia protocol  Discontinued SGLT2 in the setting of recurrent UTI  While inpatient maintained on Lantus 40U, lispro 1-5 U SSI  Parkinson disease (HCC)  Continue home Sinemet  Elevated serum creatinine  POA Creatinine of 1.43; baseline 1.0-1.2  Today, day of discharge was 7/24, creatinine is 1.27, near baseline  Hx S/P TURP and chronic indwelling catheter    Plan:  Monitor with daily BMP while inpatient     Medical Problems       Resolved Problems  Date Reviewed: 3/31/2025          Resolved    Severe sepsis ([facility]) 7/23/2025     Resolved by  Meng Moreno        Discharging Physician / Practitioner: Meng Moreno  PCP: Geovanna Fuentes MD  Admission Date:   Admission Orders (From admission, onward)       Ordered        07/21/25 2223  INPATIENT ADMISSION  Once                          Discharge Date: 07/24/25    Next Steps for Physician/AP Assuming Care:  Patient noted with recurrent UTI, this time with ESBL, discharged with discontinuation of SGLT2 due to this.    Test Results Pending at Discharge (will require follow up):  None  Urine culture sensitivities    Medication Changes for Discharge & Rationale:   Farxiga discontinued in the setting of recurrent  UTI  Patient started on Augmentin for 7-day course in the setting of ESBL UTI  Oxybutynin held in the setting of altered mental status  See after visit summary for reconciled discharge medications provided to patient and/or family.     Consultations During Hospital Stay:  None.    Procedures Performed:   None.    Significant Findings / Test Results:   Urine culture positive for E. Coli    Incidental Findings:    None    Hospital Course:   Tha Weems is a 75 y.o. male patient who originally presented to the hospital on 7/21/2025 due to urinary tract infection.    Tha Weems is a 75 y.o. male patient who originally presented to the hospital on 7/21/2025 due to altered mental status, noted as being less conversational than usual, worked up for UTI. Urine analysis revealed innumerable WBCs and RBCs. Urine culture was done and came back positive for E. Coli on 7/23. Chest xray was normal. Patient was treated with IV saline solution and IV cefepime for presumed sepsis due to UTI. On 7/22 patient was febrile but WBC count trended down each day after admission. On 7/23 the patient was hemodynamically stable and afebrile as assessed by vitals and lab work on 7/23; they were taken off IV fluids and remained on IV cefepime. Urine culture results positive for E. Coli were released later that afternoon and the patient was switched to IV ceftriaxone. On 7/24, patient remained afebrile and hemodynamically stable, and leukocytosis had resolved. General well-being and mental status continued to improve each day throughout the course of the hospital stay.  Day of discharge 7/24 urine culture susceptibilities returned as ESBL, patient discharged on Augmentin for total of 7-day course, ending on 7/31      Please see above list of diagnoses and related plan for additional information.     Discharge Day Visit / Exam:   * Please refer to separate progress note for these details *    Discussion with Family: Attempted to update   (wife) via phone. Left voicemail.     Discharge instructions/Information to patient and family:   See after visit summary for information provided to patient and family.      Provisions for Follow-Up Care:  See after visit summary for information related to follow-up care and any pertinent home health orders.      Mobility at time of Discharge:   Basic Mobility Inpatient Raw Score: 8  JH-HLM Goal: 3: Sit at edge of bed  JH-HLM Achieved: 2: Bed activities/Dependent transfer  HLM Goal NOT achieved. Continue to encourage mobility in post discharge setting.     Disposition:   Other Skilled Nursing Facility at [facility]    Planned Readmission: None    Administrative Statements   Discharge Statement:  I have spent a total time of 30 minutes in caring for this patient on the day of the visit/encounter.     **Please Note: This note may have been constructed using a voice recognition system**

## 2025-07-24 NOTE — ASSESSMENT & PLAN NOTE
POA Creatinine of 1.43; baseline 1.0-1.2  Today, day of discharge was 7/24, creatinine is 1.27, near baseline  Hx S/P TURP and chronic indwelling catheter    Plan:  Monitor with daily BMP while inpatient

## 2025-07-25 LAB — BACTERIA UR CULT: ABNORMAL

## 2025-07-25 NOTE — UTILIZATION REVIEW
NOTIFICATION OF ADMISSION DISCHARGE   This is a Notification of Discharge from [facility]. Please be advised that this patient has been discharge from our facility. Below you will find the admission and discharge date and time including the patient’s disposition.   UTILIZATION REVIEW CONTACT:  Utilization Review Assistants  Network Utilization Review Department  Phone: [phone] x carefully listen to the prompts. All voicemails are confidential.  Email: [email or web address]     ADMISSION INFORMATION  PRESENTATION DATE: 7/21/2025  8:11 PM  OBERVATION ADMISSION DATE: N/A  INPATIENT ADMISSION DATE: 7/21/25 10:23 PM   DISCHARGE DATE: 7/24/2025  3:48 PM   DISPOSITION:Non [facility] SNF/TCU/SNU    Network Utilization Review Department  ATTENTION: Please call with any questions or concerns to [phone] and carefully listen to the prompts so that you are directed to the right person. All voicemails are confidential.   For Discharge needs, contact Care Management DC Support Team at [phone] opt. 2  Send all requests for admission clinical reviews, approved or denied determinations and any other requests to dedicated fax number below belonging to the campus where the patient is receiving treatment. List of dedicated fax numbers for the Facilities:  FACILITY NAME UR FAX NUMBER   ADMISSION DENIALS (Administrative/Medical Necessity) [phone]   DISCHARGE SUPPORT TEAM ([facility]) [phone]   PARENT CHILD HEALTH (Maternity/NICU/Pediatrics) [phone]   [facility] [phone]   [facility] [phone]   [facility] [phone]   [facility] [phone]   [facility] [phone]   [facility] [phone]   [facility] [phone]   [facility] [phone]   [facility]  [facility] [phone]   [facility] [phone]   [facility] [phone]   [facility] [phone]

## 2025-07-27 LAB
BACTERIA BLD CULT: NORMAL
BACTERIA BLD CULT: NORMAL

## 2025-08-05 ENCOUNTER — TELEPHONE (OUTPATIENT)
Age: 75
End: 2025-08-05

## 2025-08-09 ENCOUNTER — APPOINTMENT (EMERGENCY)
Dept: CT IMAGING | Facility: HOSPITAL | Age: 75
DRG: 698 | End: 2025-08-09
Payer: MEDICARE

## 2025-08-09 ENCOUNTER — HOSPITAL ENCOUNTER (INPATIENT)
Facility: HOSPITAL | Age: 75
LOS: 4 days | Discharge: NURSING, REHAB OR LONG-TERM CARE | DRG: 698 | End: 2025-08-14
Attending: EMERGENCY MEDICINE | Admitting: STUDENT IN AN ORGANIZED HEALTH CARE EDUCATION/TRAINING PROGRAM
Payer: MEDICARE

## 2025-08-09 DIAGNOSIS — I72.9 ANEURYSM (HCC): ICD-10-CM

## 2025-08-09 DIAGNOSIS — I67.1 BRAIN ANEURYSM: ICD-10-CM

## 2025-08-09 DIAGNOSIS — R10.30 LOWER ABDOMINAL PAIN: Primary | ICD-10-CM

## 2025-08-09 DIAGNOSIS — T83.511A: ICD-10-CM

## 2025-08-09 DIAGNOSIS — N39.0: ICD-10-CM

## 2025-08-09 DIAGNOSIS — N12 PYELONEPHRITIS: ICD-10-CM

## 2025-08-09 DIAGNOSIS — I63.9 STROKE (HCC): ICD-10-CM

## 2025-08-09 LAB
ALBUMIN SERPL BCG-MCNC: 3.3 G/DL (ref 3.5–5)
ALP SERPL-CCNC: 55 U/L (ref 34–104)
ALT SERPL W P-5'-P-CCNC: 8 U/L (ref 7–52)
ANION GAP SERPL CALCULATED.3IONS-SCNC: 9 MMOL/L (ref 4–13)
AST SERPL W P-5'-P-CCNC: 13 U/L (ref 13–39)
BACTERIA UR QL AUTO: ABNORMAL /HPF
BASOPHILS # BLD AUTO: 0.04 THOUSANDS/ÂΜL (ref 0–0.1)
BASOPHILS NFR BLD AUTO: 0 % (ref 0–1)
BILIRUB SERPL-MCNC: 0.42 MG/DL (ref 0.2–1)
BILIRUB UR QL STRIP: NEGATIVE
BUDDING YEAST: PRESENT
BUN SERPL-MCNC: 26 MG/DL (ref 5–25)
CALCIUM ALBUM COR SERPL-MCNC: 9.4 MG/DL (ref 8.3–10.1)
CALCIUM SERPL-MCNC: 8.8 MG/DL (ref 8.4–10.2)
CHLORIDE SERPL-SCNC: 105 MMOL/L (ref 96–108)
CLARITY UR: ABNORMAL
CO2 SERPL-SCNC: 27 MMOL/L (ref 21–32)
COLOR UR: YELLOW
CREAT SERPL-MCNC: 1.21 MG/DL (ref 0.6–1.3)
EOSINOPHIL # BLD AUTO: 0.46 THOUSAND/ÂΜL (ref 0–0.61)
EOSINOPHIL NFR BLD AUTO: 4 % (ref 0–6)
ERYTHROCYTE [DISTWIDTH] IN BLOOD BY AUTOMATED COUNT: 14.3 % (ref 11.6–15.1)
GFR SERPL CREATININE-BSD FRML MDRD: 58 ML/MIN/1.73SQ M
GLUCOSE SERPL-MCNC: 78 MG/DL (ref 65–140)
GLUCOSE UR STRIP-MCNC: ABNORMAL MG/DL
HCT VFR BLD AUTO: 36.5 % (ref 36.5–49.3)
HGB BLD-MCNC: 11.7 G/DL (ref 12–17)
HGB UR QL STRIP.AUTO: ABNORMAL
IMM GRANULOCYTES # BLD AUTO: 0.1 THOUSAND/UL (ref 0–0.2)
IMM GRANULOCYTES NFR BLD AUTO: 1 % (ref 0–2)
KETONES UR STRIP-MCNC: NEGATIVE MG/DL
LACTATE SERPL-SCNC: 2.8 MMOL/L (ref 0.5–2)
LEUKOCYTE ESTERASE UR QL STRIP: ABNORMAL
LIPASE SERPL-CCNC: 18 U/L (ref 11–82)
LYMPHOCYTES # BLD AUTO: 1.34 THOUSANDS/ÂΜL (ref 0.6–4.47)
LYMPHOCYTES NFR BLD AUTO: 12 % (ref 14–44)
MCH RBC QN AUTO: 28 PG (ref 26.8–34.3)
MCHC RBC AUTO-ENTMCNC: 32.1 G/DL (ref 31.4–37.4)
MCV RBC AUTO: 87 FL (ref 82–98)
MONOCYTES # BLD AUTO: 1.12 THOUSAND/ÂΜL (ref 0.17–1.22)
MONOCYTES NFR BLD AUTO: 10 % (ref 4–12)
NEUTROPHILS # BLD AUTO: 8.63 THOUSANDS/ÂΜL (ref 1.85–7.62)
NEUTS SEG NFR BLD AUTO: 73 % (ref 43–75)
NITRITE UR QL STRIP: POSITIVE
NON-SQ EPI CELLS URNS QL MICRO: ABNORMAL /HPF
NRBC BLD AUTO-RTO: 0 /100 WBCS
PH UR STRIP.AUTO: 5.5 [PH]
PLATELET # BLD AUTO: 340 THOUSANDS/UL (ref 149–390)
PMV BLD AUTO: 10.3 FL (ref 8.9–12.7)
POTASSIUM SERPL-SCNC: 3.9 MMOL/L (ref 3.5–5.3)
PROT SERPL-MCNC: 6.5 G/DL (ref 6.4–8.4)
PROT UR STRIP-MCNC: ABNORMAL MG/DL
RBC # BLD AUTO: 4.18 MILLION/UL (ref 3.88–5.62)
RBC #/AREA URNS AUTO: ABNORMAL /HPF
SODIUM SERPL-SCNC: 141 MMOL/L (ref 135–147)
SP GR UR STRIP.AUTO: 1.01 (ref 1–1.03)
UROBILINOGEN UR STRIP-ACNC: <2 MG/DL
WBC # BLD AUTO: 11.69 THOUSAND/UL (ref 4.31–10.16)
WBC #/AREA URNS AUTO: ABNORMAL /HPF
WBC CLUMPS # UR AUTO: PRESENT /UL

## 2025-08-09 PROCEDURE — 80053 COMPREHEN METABOLIC PANEL: CPT

## 2025-08-09 PROCEDURE — 93005 ELECTROCARDIOGRAM TRACING: CPT

## 2025-08-09 PROCEDURE — 87086 URINE CULTURE/COLONY COUNT: CPT

## 2025-08-09 PROCEDURE — 87040 BLOOD CULTURE FOR BACTERIA: CPT

## 2025-08-09 PROCEDURE — 87077 CULTURE AEROBIC IDENTIFY: CPT

## 2025-08-09 PROCEDURE — 81001 URINALYSIS AUTO W/SCOPE: CPT

## 2025-08-09 PROCEDURE — 99285 EMERGENCY DEPT VISIT HI MDM: CPT

## 2025-08-09 PROCEDURE — 87181 SC STD AGAR DILUTION PER AGT: CPT

## 2025-08-09 PROCEDURE — 96361 HYDRATE IV INFUSION ADD-ON: CPT

## 2025-08-09 PROCEDURE — 83605 ASSAY OF LACTIC ACID: CPT

## 2025-08-09 PROCEDURE — 83690 ASSAY OF LIPASE: CPT

## 2025-08-09 PROCEDURE — 74177 CT ABD & PELVIS W/CONTRAST: CPT

## 2025-08-09 PROCEDURE — 99285 EMERGENCY DEPT VISIT HI MDM: CPT | Performed by: EMERGENCY MEDICINE

## 2025-08-09 PROCEDURE — 85025 COMPLETE CBC W/AUTO DIFF WBC: CPT

## 2025-08-09 PROCEDURE — 87186 SC STD MICRODIL/AGAR DIL: CPT

## 2025-08-09 PROCEDURE — 36415 COLL VENOUS BLD VENIPUNCTURE: CPT

## 2025-08-09 RX ADMIN — IOHEXOL 100 ML: 350 INJECTION, SOLUTION INTRAVENOUS at 23:36

## 2025-08-09 RX ADMIN — SODIUM CHLORIDE 1000 ML: 0.9 INJECTION, SOLUTION INTRAVENOUS at 22:59

## 2025-08-10 ENCOUNTER — APPOINTMENT (INPATIENT)
Dept: CT IMAGING | Facility: HOSPITAL | Age: 75
DRG: 698 | End: 2025-08-10
Payer: MEDICARE

## 2025-08-10 PROBLEM — T83.89XA: Status: ACTIVE | Noted: 2025-08-10

## 2025-08-10 PROBLEM — N36.8: Status: ACTIVE | Noted: 2025-08-10

## 2025-08-10 PROBLEM — R82.81 STERILE PYURIA: Status: ACTIVE | Noted: 2020-05-08

## 2025-08-10 PROBLEM — G93.40 ENCEPHALOPATHY ACUTE: Status: ACTIVE | Noted: 2025-08-10

## 2025-08-10 PROBLEM — R31.0 GROSS HEMATURIA: Status: ACTIVE | Noted: 2025-08-10

## 2025-08-10 LAB
2HR DELTA HS TROPONIN: -2 NG/L
ALBUMIN SERPL BCG-MCNC: 3 G/DL (ref 3.5–5)
ALP SERPL-CCNC: 50 U/L (ref 34–104)
ALT SERPL W P-5'-P-CCNC: 4 U/L (ref 7–52)
ANION GAP SERPL CALCULATED.3IONS-SCNC: 8 MMOL/L (ref 4–13)
ANION GAP SERPL CALCULATED.3IONS-SCNC: 8 MMOL/L (ref 4–13)
APTT PPP: 34 SECONDS (ref 23–34)
AST SERPL W P-5'-P-CCNC: 11 U/L (ref 13–39)
ATRIAL RATE: 80 BPM
ATRIAL RATE: 80 BPM
BASE EX.OXY STD BLDV CALC-SCNC: 97.2 % (ref 60–80)
BASE EXCESS BLDV CALC-SCNC: 3.6 MMOL/L
BASOPHILS # BLD AUTO: 0.04 THOUSANDS/ÂΜL (ref 0–0.1)
BASOPHILS NFR BLD AUTO: 0 % (ref 0–1)
BILIRUB SERPL-MCNC: 0.48 MG/DL (ref 0.2–1)
BUN SERPL-MCNC: 21 MG/DL (ref 5–25)
BUN SERPL-MCNC: 25 MG/DL (ref 5–25)
CALCIUM ALBUM COR SERPL-MCNC: 9 MG/DL (ref 8.3–10.1)
CALCIUM SERPL-MCNC: 8.2 MG/DL (ref 8.4–10.2)
CALCIUM SERPL-MCNC: 8.4 MG/DL (ref 8.4–10.2)
CARDIAC TROPONIN I PNL SERPL HS: 10 NG/L (ref ?–50)
CARDIAC TROPONIN I PNL SERPL HS: 8 NG/L (ref ?–50)
CHLORIDE SERPL-SCNC: 104 MMOL/L (ref 96–108)
CHLORIDE SERPL-SCNC: 104 MMOL/L (ref 96–108)
CO2 SERPL-SCNC: 27 MMOL/L (ref 21–32)
CO2 SERPL-SCNC: 29 MMOL/L (ref 21–32)
CREAT SERPL-MCNC: 1.15 MG/DL (ref 0.6–1.3)
CREAT SERPL-MCNC: 1.26 MG/DL (ref 0.6–1.3)
D DIMER PPP FEU-MCNC: 0.85 UG/ML FEU
EOSINOPHIL # BLD AUTO: 0.39 THOUSAND/ÂΜL (ref 0–0.61)
EOSINOPHIL NFR BLD AUTO: 4 % (ref 0–6)
ERYTHROCYTE [DISTWIDTH] IN BLOOD BY AUTOMATED COUNT: 14 % (ref 11.6–15.1)
ERYTHROCYTE [DISTWIDTH] IN BLOOD BY AUTOMATED COUNT: 14.1 % (ref 11.6–15.1)
FIBRINOGEN PPP-MCNC: 572 MG/DL (ref 206–523)
GFR SERPL CREATININE-BSD FRML MDRD: 55 ML/MIN/1.73SQ M
GFR SERPL CREATININE-BSD FRML MDRD: 61 ML/MIN/1.73SQ M
GLUCOSE SERPL-MCNC: 129 MG/DL (ref 65–140)
GLUCOSE SERPL-MCNC: 174 MG/DL (ref 65–140)
GLUCOSE SERPL-MCNC: 178 MG/DL (ref 65–140)
GLUCOSE SERPL-MCNC: 185 MG/DL (ref 65–140)
GLUCOSE SERPL-MCNC: 186 MG/DL (ref 65–140)
GLUCOSE SERPL-MCNC: 89 MG/DL (ref 65–140)
GLUCOSE SERPL-MCNC: 93 MG/DL (ref 65–140)
HCO3 BLDV-SCNC: 27.8 MMOL/L (ref 24–30)
HCT VFR BLD AUTO: 33 % (ref 36.5–49.3)
HCT VFR BLD AUTO: 33.7 % (ref 36.5–49.3)
HGB BLD-MCNC: 10.7 G/DL (ref 12–17)
HGB BLD-MCNC: 11 G/DL (ref 12–17)
IMM GRANULOCYTES # BLD AUTO: 0.11 THOUSAND/UL (ref 0–0.2)
IMM GRANULOCYTES NFR BLD AUTO: 1 % (ref 0–2)
INR PPP: 0.97 (ref 0.85–1.19)
LACTATE SERPL-SCNC: 1.5 MMOL/L (ref 0.5–2)
LACTATE SERPL-SCNC: 2.4 MMOL/L (ref 0.5–2)
LYMPHOCYTES # BLD AUTO: 1.08 THOUSANDS/ÂΜL (ref 0.6–4.47)
LYMPHOCYTES NFR BLD AUTO: 11 % (ref 14–44)
MAGNESIUM SERPL-MCNC: 1.6 MG/DL (ref 1.9–2.7)
MCH RBC QN AUTO: 27.9 PG (ref 26.8–34.3)
MCH RBC QN AUTO: 28.3 PG (ref 26.8–34.3)
MCHC RBC AUTO-ENTMCNC: 32.4 G/DL (ref 31.4–37.4)
MCHC RBC AUTO-ENTMCNC: 32.6 G/DL (ref 31.4–37.4)
MCV RBC AUTO: 86 FL (ref 82–98)
MCV RBC AUTO: 87 FL (ref 82–98)
MONOCYTES # BLD AUTO: 0.94 THOUSAND/ÂΜL (ref 0.17–1.22)
MONOCYTES NFR BLD AUTO: 9 % (ref 4–12)
NEUTROPHILS # BLD AUTO: 7.45 THOUSANDS/ÂΜL (ref 1.85–7.62)
NEUTS SEG NFR BLD AUTO: 75 % (ref 43–75)
NRBC BLD AUTO-RTO: 0 /100 WBCS
O2 CT BLDV-SCNC: 16.7 ML/DL
P AXIS: 61 DEGREES
P AXIS: 72 DEGREES
PCO2 BLDV: 40.6 MM HG (ref 42–50)
PH BLDV: 7.45 [PH] (ref 7.3–7.4)
PLATELET # BLD AUTO: 308 THOUSANDS/UL (ref 149–390)
PLATELET # BLD AUTO: 328 THOUSANDS/UL (ref 149–390)
PMV BLD AUTO: 10.2 FL (ref 8.9–12.7)
PMV BLD AUTO: 10.3 FL (ref 8.9–12.7)
PO2 BLDV: 193.1 MM HG (ref 35–45)
POTASSIUM SERPL-SCNC: 3.6 MMOL/L (ref 3.5–5.3)
POTASSIUM SERPL-SCNC: 4 MMOL/L (ref 3.5–5.3)
PR INTERVAL: 148 MS
PR INTERVAL: 148 MS
PROT SERPL-MCNC: 5.8 G/DL (ref 6.4–8.4)
PROTHROMBIN TIME: 13.6 SECONDS (ref 12.3–15)
QRS AXIS: -2 DEGREES
QRS AXIS: 7 DEGREES
QRSD INTERVAL: 124 MS
QRSD INTERVAL: 126 MS
QT INTERVAL: 414 MS
QT INTERVAL: 428 MS
QTC INTERVAL: 477 MS
QTC INTERVAL: 493 MS
RBC # BLD AUTO: 3.83 MILLION/UL (ref 3.88–5.62)
RBC # BLD AUTO: 3.89 MILLION/UL (ref 3.88–5.62)
SODIUM SERPL-SCNC: 139 MMOL/L (ref 135–147)
SODIUM SERPL-SCNC: 141 MMOL/L (ref 135–147)
T WAVE AXIS: 100 DEGREES
T WAVE AXIS: 106 DEGREES
VENTRICULAR RATE: 80 BPM
VENTRICULAR RATE: 80 BPM
WBC # BLD AUTO: 10.01 THOUSAND/UL (ref 4.31–10.16)
WBC # BLD AUTO: 8.49 THOUSAND/UL (ref 4.31–10.16)

## 2025-08-10 PROCEDURE — 87081 CULTURE SCREEN ONLY: CPT

## 2025-08-10 PROCEDURE — 85730 THROMBOPLASTIN TIME PARTIAL: CPT

## 2025-08-10 PROCEDURE — 83605 ASSAY OF LACTIC ACID: CPT

## 2025-08-10 PROCEDURE — 93010 ELECTROCARDIOGRAM REPORT: CPT | Performed by: INTERNAL MEDICINE

## 2025-08-10 PROCEDURE — 85379 FIBRIN DEGRADATION QUANT: CPT

## 2025-08-10 PROCEDURE — 85384 FIBRINOGEN ACTIVITY: CPT

## 2025-08-10 PROCEDURE — 84484 ASSAY OF TROPONIN QUANT: CPT

## 2025-08-10 PROCEDURE — 85025 COMPLETE CBC W/AUTO DIFF WBC: CPT

## 2025-08-10 PROCEDURE — 82805 BLOOD GASES W/O2 SATURATION: CPT

## 2025-08-10 PROCEDURE — 80053 COMPREHEN METABOLIC PANEL: CPT

## 2025-08-10 PROCEDURE — 99222 1ST HOSP IP/OBS MODERATE 55: CPT | Performed by: UROLOGY

## 2025-08-10 PROCEDURE — 70498 CT ANGIOGRAPHY NECK: CPT

## 2025-08-10 PROCEDURE — 70496 CT ANGIOGRAPHY HEAD: CPT

## 2025-08-10 PROCEDURE — 82948 REAGENT STRIP/BLOOD GLUCOSE: CPT

## 2025-08-10 PROCEDURE — 85027 COMPLETE CBC AUTOMATED: CPT

## 2025-08-10 PROCEDURE — 99232 SBSQ HOSP IP/OBS MODERATE 35: CPT | Performed by: PHYSICIAN ASSISTANT

## 2025-08-10 PROCEDURE — 85610 PROTHROMBIN TIME: CPT

## 2025-08-10 PROCEDURE — 80048 BASIC METABOLIC PNL TOTAL CA: CPT

## 2025-08-10 PROCEDURE — 83735 ASSAY OF MAGNESIUM: CPT

## 2025-08-10 PROCEDURE — 96365 THER/PROPH/DIAG IV INF INIT: CPT

## 2025-08-10 PROCEDURE — 99291 CRITICAL CARE FIRST HOUR: CPT | Performed by: PSYCHIATRY & NEUROLOGY

## 2025-08-10 PROCEDURE — 93005 ELECTROCARDIOGRAM TRACING: CPT

## 2025-08-10 PROCEDURE — 36415 COLL VENOUS BLD VENIPUNCTURE: CPT

## 2025-08-10 RX ORDER — ACETAMINOPHEN 325 MG/1
650 TABLET ORAL EVERY 6 HOURS PRN
Status: DISCONTINUED | OUTPATIENT
Start: 2025-08-10 | End: 2025-08-14 | Stop reason: HOSPADM

## 2025-08-10 RX ORDER — FERROUS GLUCONATE 324(38)MG
324 TABLET ORAL
Status: DISCONTINUED | OUTPATIENT
Start: 2025-08-10 | End: 2025-08-14 | Stop reason: HOSPADM

## 2025-08-10 RX ORDER — METOPROLOL SUCCINATE 25 MG/1
25 TABLET, EXTENDED RELEASE ORAL DAILY
Status: DISCONTINUED | OUTPATIENT
Start: 2025-08-11 | End: 2025-08-14 | Stop reason: HOSPADM

## 2025-08-10 RX ORDER — INSULIN GLARGINE 100 [IU]/ML
35 INJECTION, SOLUTION SUBCUTANEOUS EVERY MORNING
Status: DISCONTINUED | OUTPATIENT
Start: 2025-08-11 | End: 2025-08-14 | Stop reason: HOSPADM

## 2025-08-10 RX ORDER — ATORVASTATIN CALCIUM 40 MG/1
40 TABLET, FILM COATED ORAL DAILY
Status: DISCONTINUED | OUTPATIENT
Start: 2025-08-11 | End: 2025-08-14 | Stop reason: HOSPADM

## 2025-08-10 RX ORDER — INSULIN LISPRO 100 [IU]/ML
1-6 INJECTION, SOLUTION INTRAVENOUS; SUBCUTANEOUS
Status: DISCONTINUED | OUTPATIENT
Start: 2025-08-10 | End: 2025-08-14 | Stop reason: HOSPADM

## 2025-08-10 RX ORDER — ENOXAPARIN SODIUM 100 MG/ML
40 INJECTION SUBCUTANEOUS DAILY
Status: DISCONTINUED | OUTPATIENT
Start: 2025-08-10 | End: 2025-08-14 | Stop reason: HOSPADM

## 2025-08-10 RX ORDER — LORATADINE 10 MG/1
10 TABLET ORAL DAILY
Status: DISCONTINUED | OUTPATIENT
Start: 2025-08-10 | End: 2025-08-14 | Stop reason: HOSPADM

## 2025-08-10 RX ORDER — ASPIRIN 81 MG/1
81 TABLET, CHEWABLE ORAL DAILY
Status: DISCONTINUED | OUTPATIENT
Start: 2025-08-11 | End: 2025-08-14 | Stop reason: HOSPADM

## 2025-08-10 RX ORDER — MAGNESIUM SULFATE HEPTAHYDRATE 40 MG/ML
4 INJECTION, SOLUTION INTRAVENOUS ONCE
Status: COMPLETED | OUTPATIENT
Start: 2025-08-10 | End: 2025-08-10

## 2025-08-10 RX ORDER — PANTOPRAZOLE SODIUM 40 MG/1
40 TABLET, DELAYED RELEASE ORAL
Status: DISCONTINUED | OUTPATIENT
Start: 2025-08-10 | End: 2025-08-14 | Stop reason: HOSPADM

## 2025-08-10 RX ORDER — CARBIDOPA AND LEVODOPA 25; 100 MG/1; MG/1
1 TABLET ORAL 3 TIMES DAILY
Status: DISCONTINUED | OUTPATIENT
Start: 2025-08-10 | End: 2025-08-14 | Stop reason: HOSPADM

## 2025-08-10 RX ORDER — RANOLAZINE 500 MG/1
500 TABLET, EXTENDED RELEASE ORAL EVERY 12 HOURS SCHEDULED
Status: DISCONTINUED | OUTPATIENT
Start: 2025-08-10 | End: 2025-08-14 | Stop reason: HOSPADM

## 2025-08-10 RX ADMIN — CARBIDOPA AND LEVODOPA 1 TABLET: 25; 100 TABLET ORAL at 21:25

## 2025-08-10 RX ADMIN — PIPERACILLIN AND TAZOBACTAM 4.5 G: 36; 4.5 INJECTION, POWDER, FOR SOLUTION INTRAVENOUS at 11:37

## 2025-08-10 RX ADMIN — CARBIDOPA AND LEVODOPA 1 TABLET: 25; 100 TABLET ORAL at 08:01

## 2025-08-10 RX ADMIN — FERROUS GLUCONATE 324 MG: 324 TABLET ORAL at 08:01

## 2025-08-10 RX ADMIN — ENOXAPARIN SODIUM 40 MG: 40 INJECTION SUBCUTANEOUS at 08:01

## 2025-08-10 RX ADMIN — RANOLAZINE 500 MG: 500 TABLET, FILM COATED, EXTENDED RELEASE ORAL at 21:27

## 2025-08-10 RX ADMIN — RANOLAZINE 500 MG: 500 TABLET, FILM COATED, EXTENDED RELEASE ORAL at 08:01

## 2025-08-10 RX ADMIN — LORATADINE 10 MG: 10 TABLET ORAL at 08:01

## 2025-08-10 RX ADMIN — MAGNESIUM SULFATE HEPTAHYDRATE 4 G: 40 INJECTION, SOLUTION INTRAVENOUS at 17:04

## 2025-08-10 RX ADMIN — PANTOPRAZOLE SODIUM 40 MG: 40 TABLET, DELAYED RELEASE ORAL at 06:11

## 2025-08-10 RX ADMIN — INSULIN LISPRO 1 UNITS: 100 INJECTION, SOLUTION INTRAVENOUS; SUBCUTANEOUS at 17:41

## 2025-08-10 RX ADMIN — PANTOPRAZOLE SODIUM 40 MG: 40 TABLET, DELAYED RELEASE ORAL at 17:40

## 2025-08-10 RX ADMIN — IOHEXOL 70 ML: 350 INJECTION, SOLUTION INTRAVENOUS at 15:56

## 2025-08-10 RX ADMIN — CARBIDOPA AND LEVODOPA 1 TABLET: 25; 100 TABLET ORAL at 17:40

## 2025-08-10 RX ADMIN — INSULIN LISPRO 1 UNITS: 100 INJECTION, SOLUTION INTRAVENOUS; SUBCUTANEOUS at 11:40

## 2025-08-10 RX ADMIN — PIPERACILLIN AND TAZOBACTAM 4.5 G: 36; 4.5 INJECTION, POWDER, FOR SOLUTION INTRAVENOUS at 17:41

## 2025-08-10 RX ADMIN — PIPERACILLIN AND TAZOBACTAM 4.5 G: 36; 4.5 INJECTION, POWDER, FOR SOLUTION INTRAVENOUS at 00:19

## 2025-08-11 ENCOUNTER — TELEPHONE (OUTPATIENT)
Age: 75
End: 2025-08-11

## 2025-08-11 ENCOUNTER — APPOINTMENT (INPATIENT)
Dept: RADIOLOGY | Facility: HOSPITAL | Age: 75
DRG: 698 | End: 2025-08-11
Payer: MEDICARE

## 2025-08-11 ENCOUNTER — APPOINTMENT (INPATIENT)
Dept: NON INVASIVE DIAGNOSTICS | Facility: HOSPITAL | Age: 75
DRG: 698 | End: 2025-08-11
Payer: MEDICARE

## 2025-08-11 LAB
ANION GAP SERPL CALCULATED.3IONS-SCNC: 7 MMOL/L (ref 4–13)
BASOPHILS # BLD AUTO: 0.05 THOUSANDS/ÂΜL (ref 0–0.1)
BASOPHILS NFR BLD AUTO: 1 % (ref 0–1)
BUN SERPL-MCNC: 19 MG/DL (ref 5–25)
CALCIUM SERPL-MCNC: 8.4 MG/DL (ref 8.4–10.2)
CHLORIDE SERPL-SCNC: 105 MMOL/L (ref 96–108)
CO2 SERPL-SCNC: 29 MMOL/L (ref 21–32)
CREAT SERPL-MCNC: 1.4 MG/DL (ref 0.6–1.3)
EOSINOPHIL # BLD AUTO: 0.45 THOUSAND/ÂΜL (ref 0–0.61)
EOSINOPHIL NFR BLD AUTO: 5 % (ref 0–6)
ERYTHROCYTE [DISTWIDTH] IN BLOOD BY AUTOMATED COUNT: 14.2 % (ref 11.6–15.1)
GFR SERPL CREATININE-BSD FRML MDRD: 48 ML/MIN/1.73SQ M
GLUCOSE SERPL-MCNC: 109 MG/DL (ref 65–140)
GLUCOSE SERPL-MCNC: 112 MG/DL (ref 65–140)
GLUCOSE SERPL-MCNC: 118 MG/DL (ref 65–140)
GLUCOSE SERPL-MCNC: 147 MG/DL (ref 65–140)
GLUCOSE SERPL-MCNC: 260 MG/DL (ref 65–140)
HCT VFR BLD AUTO: 34.6 % (ref 36.5–49.3)
HGB BLD-MCNC: 11.2 G/DL (ref 12–17)
IMM GRANULOCYTES # BLD AUTO: 0.14 THOUSAND/UL (ref 0–0.2)
IMM GRANULOCYTES NFR BLD AUTO: 2 % (ref 0–2)
LYMPHOCYTES # BLD AUTO: 1.22 THOUSANDS/ÂΜL (ref 0.6–4.47)
LYMPHOCYTES NFR BLD AUTO: 15 % (ref 14–44)
MCH RBC QN AUTO: 28.3 PG (ref 26.8–34.3)
MCHC RBC AUTO-ENTMCNC: 32.4 G/DL (ref 31.4–37.4)
MCV RBC AUTO: 87 FL (ref 82–98)
MONOCYTES # BLD AUTO: 0.85 THOUSAND/ÂΜL (ref 0.17–1.22)
MONOCYTES NFR BLD AUTO: 10 % (ref 4–12)
MRSA NOSE QL CULT: NORMAL
NEUTROPHILS # BLD AUTO: 5.7 THOUSANDS/ÂΜL (ref 1.85–7.62)
NEUTS SEG NFR BLD AUTO: 67 % (ref 43–75)
NRBC BLD AUTO-RTO: 0 /100 WBCS
PLATELET # BLD AUTO: 321 THOUSANDS/UL (ref 149–390)
PMV BLD AUTO: 10.2 FL (ref 8.9–12.7)
POTASSIUM SERPL-SCNC: 3.9 MMOL/L (ref 3.5–5.3)
RBC # BLD AUTO: 3.96 MILLION/UL (ref 3.88–5.62)
SODIUM SERPL-SCNC: 141 MMOL/L (ref 135–147)
WBC # BLD AUTO: 8.41 THOUSAND/UL (ref 4.31–10.16)

## 2025-08-11 PROCEDURE — 82948 REAGENT STRIP/BLOOD GLUCOSE: CPT

## 2025-08-11 PROCEDURE — 80048 BASIC METABOLIC PNL TOTAL CA: CPT | Performed by: STUDENT IN AN ORGANIZED HEALTH CARE EDUCATION/TRAINING PROGRAM

## 2025-08-11 PROCEDURE — 93306 TTE W/DOPPLER COMPLETE: CPT

## 2025-08-11 PROCEDURE — 71045 X-RAY EXAM CHEST 1 VIEW: CPT

## 2025-08-11 PROCEDURE — 85025 COMPLETE CBC W/AUTO DIFF WBC: CPT | Performed by: STUDENT IN AN ORGANIZED HEALTH CARE EDUCATION/TRAINING PROGRAM

## 2025-08-11 PROCEDURE — 99232 SBSQ HOSP IP/OBS MODERATE 35: CPT

## 2025-08-11 RX ORDER — NYSTATIN 100000 [USP'U]/G
POWDER TOPICAL 2 TIMES DAILY
Status: DISCONTINUED | OUTPATIENT
Start: 2025-08-11 | End: 2025-08-14 | Stop reason: HOSPADM

## 2025-08-11 RX ADMIN — PIPERACILLIN AND TAZOBACTAM 4.5 G: 36; 4.5 INJECTION, POWDER, FOR SOLUTION INTRAVENOUS at 17:17

## 2025-08-11 RX ADMIN — INSULIN GLARGINE 35 UNITS: 100 INJECTION, SOLUTION SUBCUTANEOUS at 08:43

## 2025-08-11 RX ADMIN — CARBIDOPA AND LEVODOPA 1 TABLET: 25; 100 TABLET ORAL at 08:33

## 2025-08-11 RX ADMIN — RANOLAZINE 500 MG: 500 TABLET, FILM COATED, EXTENDED RELEASE ORAL at 08:33

## 2025-08-11 RX ADMIN — PANTOPRAZOLE SODIUM 40 MG: 40 TABLET, DELAYED RELEASE ORAL at 17:18

## 2025-08-11 RX ADMIN — PIPERACILLIN AND TAZOBACTAM 4.5 G: 36; 4.5 INJECTION, POWDER, FOR SOLUTION INTRAVENOUS at 01:03

## 2025-08-11 RX ADMIN — ATORVASTATIN CALCIUM 40 MG: 40 TABLET, FILM COATED ORAL at 08:33

## 2025-08-11 RX ADMIN — INSULIN LISPRO 3 UNITS: 100 INJECTION, SOLUTION INTRAVENOUS; SUBCUTANEOUS at 12:14

## 2025-08-11 RX ADMIN — RANOLAZINE 500 MG: 500 TABLET, FILM COATED, EXTENDED RELEASE ORAL at 20:52

## 2025-08-11 RX ADMIN — PANTOPRAZOLE SODIUM 40 MG: 40 TABLET, DELAYED RELEASE ORAL at 05:18

## 2025-08-11 RX ADMIN — METOPROLOL SUCCINATE 25 MG: 25 TABLET, EXTENDED RELEASE ORAL at 08:33

## 2025-08-11 RX ADMIN — CARBIDOPA AND LEVODOPA 1 TABLET: 25; 100 TABLET ORAL at 20:52

## 2025-08-11 RX ADMIN — LORATADINE 10 MG: 10 TABLET ORAL at 08:33

## 2025-08-11 RX ADMIN — PIPERACILLIN AND TAZOBACTAM 4.5 G: 36; 4.5 INJECTION, POWDER, FOR SOLUTION INTRAVENOUS at 08:44

## 2025-08-11 RX ADMIN — FERROUS GLUCONATE 324 MG: 324 TABLET ORAL at 05:18

## 2025-08-11 RX ADMIN — CARBIDOPA AND LEVODOPA 1 TABLET: 25; 100 TABLET ORAL at 17:18

## 2025-08-11 RX ADMIN — NYSTATIN: 100000 POWDER TOPICAL at 18:47

## 2025-08-12 LAB
ANION GAP SERPL CALCULATED.3IONS-SCNC: 6 MMOL/L (ref 4–13)
AORTIC ROOT: 3.5 CM
ASCENDING AORTA: 2.9 CM
AV LVOT MEAN GRADIENT: 4 MMHG
AV LVOT PEAK GRADIENT: 6 MMHG
BASOPHILS # BLD AUTO: 0.04 THOUSANDS/ÂΜL (ref 0–0.1)
BASOPHILS NFR BLD AUTO: 0 % (ref 0–1)
BSA FOR ECHO PROCEDURE: 1.85 M2
BUN SERPL-MCNC: 17 MG/DL (ref 5–25)
CALCIUM SERPL-MCNC: 8.5 MG/DL (ref 8.4–10.2)
CHLORIDE SERPL-SCNC: 104 MMOL/L (ref 96–108)
CO2 SERPL-SCNC: 29 MMOL/L (ref 21–32)
CREAT SERPL-MCNC: 1.34 MG/DL (ref 0.6–1.3)
DOP CALC LVOT PEAK VEL VTI: 26.94 CM
DOP CALC LVOT PEAK VEL: 1.23 M/S
E WAVE DECELERATION TIME: 172 MS
E/A RATIO: 1.14
EOSINOPHIL # BLD AUTO: 0.49 THOUSAND/ÂΜL (ref 0–0.61)
EOSINOPHIL NFR BLD AUTO: 5 % (ref 0–6)
ERYTHROCYTE [DISTWIDTH] IN BLOOD BY AUTOMATED COUNT: 14.1 % (ref 11.6–15.1)
FRACTIONAL SHORTENING: 42 (ref 28–44)
GFR SERPL CREATININE-BSD FRML MDRD: 51 ML/MIN/1.73SQ M
GLUCOSE SERPL-MCNC: 158 MG/DL (ref 65–140)
GLUCOSE SERPL-MCNC: 182 MG/DL (ref 65–140)
GLUCOSE SERPL-MCNC: 243 MG/DL (ref 65–140)
GLUCOSE SERPL-MCNC: 79 MG/DL (ref 65–140)
GLUCOSE SERPL-MCNC: 89 MG/DL (ref 65–140)
HCT VFR BLD AUTO: 36.3 % (ref 36.5–49.3)
HGB BLD-MCNC: 11.5 G/DL (ref 12–17)
IMM GRANULOCYTES # BLD AUTO: 0.16 THOUSAND/UL (ref 0–0.2)
IMM GRANULOCYTES NFR BLD AUTO: 2 % (ref 0–2)
INTERVENTRICULAR SEPTUM IN DIASTOLE (PARASTERNAL SHORT AXIS VIEW): 1.1 CM
INTERVENTRICULAR SEPTUM: 1.1 CM (ref 0.6–1.1)
LAAS-AP2: 19.8 CM2
LAAS-AP4: 15.1 CM2
LEFT ATRIUM SIZE: 4 CM
LEFT ATRIUM VOLUME (MOD BIPLANE): 48 ML
LEFT ATRIUM VOLUME INDEX (MOD BIPLANE): 25.9 ML/M2
LEFT INTERNAL DIMENSION IN SYSTOLE: 2.8 CM (ref 2.1–4)
LEFT VENTRICULAR INTERNAL DIMENSION IN DIASTOLE: 4.8 CM (ref 3.5–6)
LEFT VENTRICULAR POSTERIOR WALL IN END DIASTOLE: 1.1 CM
LEFT VENTRICULAR STROKE VOLUME: 79 ML
LV EF US.2D.A4C+ESTIMATED: 54 %
LVSV (TEICH): 79 ML
LYMPHOCYTES # BLD AUTO: 1.34 THOUSANDS/ÂΜL (ref 0.6–4.47)
LYMPHOCYTES NFR BLD AUTO: 14 % (ref 14–44)
MCH RBC QN AUTO: 28 PG (ref 26.8–34.3)
MCHC RBC AUTO-ENTMCNC: 31.7 G/DL (ref 31.4–37.4)
MCV RBC AUTO: 88 FL (ref 82–98)
MONOCYTES # BLD AUTO: 0.79 THOUSAND/ÂΜL (ref 0.17–1.22)
MONOCYTES NFR BLD AUTO: 8 % (ref 4–12)
MV E'TISSUE VEL-LAT: 8 CM/S
MV E'TISSUE VEL-SEP: 8 CM/S
MV PEAK A VEL: 0.69 M/S
MV PEAK E VEL: 79 CM/S
MV STENOSIS PRESSURE HALF TIME: 50 MS
MV VALVE AREA P 1/2 METHOD: 4.4
NEUTROPHILS # BLD AUTO: 6.69 THOUSANDS/ÂΜL (ref 1.85–7.62)
NEUTS SEG NFR BLD AUTO: 71 % (ref 43–75)
NRBC BLD AUTO-RTO: 0 /100 WBCS
PLATELET # BLD AUTO: 328 THOUSANDS/UL (ref 149–390)
PMV BLD AUTO: 10.2 FL (ref 8.9–12.7)
POTASSIUM SERPL-SCNC: 3.9 MMOL/L (ref 3.5–5.3)
RBC # BLD AUTO: 4.11 MILLION/UL (ref 3.88–5.62)
RIGHT ATRIUM AREA SYSTOLE A4C: 10.9 CM2
RIGHT VENTRICLE ID DIMENSION: 2.8 CM
SL CV LEFT ATRIUM LENGTH A2C: 5.3 CM
SL CV LV EF: 60
SL CV PED ECHO LEFT VENTRICLE DIASTOLIC VOLUME (MOD BIPLANE) 2D: 109 ML
SL CV PED ECHO LEFT VENTRICLE SYSTOLIC VOLUME (MOD BIPLANE) 2D: 30 ML
SODIUM SERPL-SCNC: 139 MMOL/L (ref 135–147)
TRICUSPID ANNULAR PLANE SYSTOLIC EXCURSION: 2 CM
WBC # BLD AUTO: 9.51 THOUSAND/UL (ref 4.31–10.16)

## 2025-08-12 PROCEDURE — 93306 TTE W/DOPPLER COMPLETE: CPT | Performed by: INTERNAL MEDICINE

## 2025-08-12 PROCEDURE — 85025 COMPLETE CBC W/AUTO DIFF WBC: CPT

## 2025-08-12 PROCEDURE — 99232 SBSQ HOSP IP/OBS MODERATE 35: CPT | Performed by: FAMILY MEDICINE

## 2025-08-12 PROCEDURE — 80048 BASIC METABOLIC PNL TOTAL CA: CPT

## 2025-08-12 PROCEDURE — 82948 REAGENT STRIP/BLOOD GLUCOSE: CPT

## 2025-08-12 RX ADMIN — RANOLAZINE 500 MG: 500 TABLET, FILM COATED, EXTENDED RELEASE ORAL at 21:10

## 2025-08-12 RX ADMIN — ACETAMINOPHEN 650 MG: 325 TABLET ORAL at 00:05

## 2025-08-12 RX ADMIN — PANTOPRAZOLE SODIUM 40 MG: 40 TABLET, DELAYED RELEASE ORAL at 17:15

## 2025-08-12 RX ADMIN — CARBIDOPA AND LEVODOPA 1 TABLET: 25; 100 TABLET ORAL at 21:10

## 2025-08-12 RX ADMIN — PIPERACILLIN AND TAZOBACTAM 4.5 G: 36; 4.5 INJECTION, POWDER, FOR SOLUTION INTRAVENOUS at 09:53

## 2025-08-12 RX ADMIN — ENOXAPARIN SODIUM 40 MG: 40 INJECTION SUBCUTANEOUS at 09:49

## 2025-08-12 RX ADMIN — FERROUS GLUCONATE 324 MG: 324 TABLET ORAL at 06:08

## 2025-08-12 RX ADMIN — AMOXICILLIN AND CLAVULANATE POTASSIUM 1 TABLET: 875; 125 TABLET, FILM COATED ORAL at 17:22

## 2025-08-12 RX ADMIN — RANOLAZINE 500 MG: 500 TABLET, FILM COATED, EXTENDED RELEASE ORAL at 09:49

## 2025-08-12 RX ADMIN — CARBIDOPA AND LEVODOPA 1 TABLET: 25; 100 TABLET ORAL at 17:14

## 2025-08-12 RX ADMIN — INSULIN LISPRO 1 UNITS: 100 INJECTION, SOLUTION INTRAVENOUS; SUBCUTANEOUS at 17:16

## 2025-08-12 RX ADMIN — LORATADINE 10 MG: 10 TABLET ORAL at 09:51

## 2025-08-12 RX ADMIN — CARBIDOPA AND LEVODOPA 1 TABLET: 25; 100 TABLET ORAL at 09:49

## 2025-08-12 RX ADMIN — INSULIN LISPRO 1 UNITS: 100 INJECTION, SOLUTION INTRAVENOUS; SUBCUTANEOUS at 21:10

## 2025-08-12 RX ADMIN — METOPROLOL SUCCINATE 25 MG: 25 TABLET, EXTENDED RELEASE ORAL at 09:49

## 2025-08-12 RX ADMIN — NYSTATIN: 100000 POWDER TOPICAL at 09:51

## 2025-08-12 RX ADMIN — NYSTATIN: 100000 POWDER TOPICAL at 17:16

## 2025-08-12 RX ADMIN — ATORVASTATIN CALCIUM 40 MG: 40 TABLET, FILM COATED ORAL at 09:50

## 2025-08-12 RX ADMIN — PANTOPRAZOLE SODIUM 40 MG: 40 TABLET, DELAYED RELEASE ORAL at 06:08

## 2025-08-12 RX ADMIN — PIPERACILLIN AND TAZOBACTAM 4.5 G: 36; 4.5 INJECTION, POWDER, FOR SOLUTION INTRAVENOUS at 01:12

## 2025-08-12 RX ADMIN — INSULIN LISPRO 3 UNITS: 100 INJECTION, SOLUTION INTRAVENOUS; SUBCUTANEOUS at 12:08

## 2025-08-13 LAB
ANION GAP SERPL CALCULATED.3IONS-SCNC: 8 MMOL/L (ref 4–13)
BACTERIA UR CULT: ABNORMAL
BASOPHILS # BLD AUTO: 0.07 THOUSANDS/ÂΜL (ref 0–0.1)
BASOPHILS NFR BLD AUTO: 1 % (ref 0–1)
BUN SERPL-MCNC: 18 MG/DL (ref 5–25)
CALCIUM SERPL-MCNC: 8.7 MG/DL (ref 8.4–10.2)
CHLORIDE SERPL-SCNC: 104 MMOL/L (ref 96–108)
CO2 SERPL-SCNC: 27 MMOL/L (ref 21–32)
CREAT SERPL-MCNC: 1.24 MG/DL (ref 0.6–1.3)
EOSINOPHIL # BLD AUTO: 0.45 THOUSAND/ÂΜL (ref 0–0.61)
EOSINOPHIL NFR BLD AUTO: 4 % (ref 0–6)
ERYTHROCYTE [DISTWIDTH] IN BLOOD BY AUTOMATED COUNT: 14.2 % (ref 11.6–15.1)
GFR SERPL CREATININE-BSD FRML MDRD: 56 ML/MIN/1.73SQ M
GLUCOSE SERPL-MCNC: 136 MG/DL (ref 65–140)
GLUCOSE SERPL-MCNC: 189 MG/DL (ref 65–140)
GLUCOSE SERPL-MCNC: 190 MG/DL (ref 65–140)
GLUCOSE SERPL-MCNC: 246 MG/DL (ref 65–140)
GLUCOSE SERPL-MCNC: 411 MG/DL (ref 65–140)
HCT VFR BLD AUTO: 39.1 % (ref 36.5–49.3)
HGB BLD-MCNC: 12.4 G/DL (ref 12–17)
IMM GRANULOCYTES # BLD AUTO: 0.17 THOUSAND/UL (ref 0–0.2)
IMM GRANULOCYTES NFR BLD AUTO: 1 % (ref 0–2)
LYMPHOCYTES # BLD AUTO: 1.21 THOUSANDS/ÂΜL (ref 0.6–4.47)
LYMPHOCYTES NFR BLD AUTO: 10 % (ref 14–44)
MCH RBC QN AUTO: 27.7 PG (ref 26.8–34.3)
MCHC RBC AUTO-ENTMCNC: 31.7 G/DL (ref 31.4–37.4)
MCV RBC AUTO: 87 FL (ref 82–98)
MONOCYTES # BLD AUTO: 1.06 THOUSAND/ÂΜL (ref 0.17–1.22)
MONOCYTES NFR BLD AUTO: 8 % (ref 4–12)
NEUTROPHILS # BLD AUTO: 9.8 THOUSANDS/ÂΜL (ref 1.85–7.62)
NEUTS SEG NFR BLD AUTO: 76 % (ref 43–75)
NRBC BLD AUTO-RTO: 0 /100 WBCS
PLATELET # BLD AUTO: 344 THOUSANDS/UL (ref 149–390)
PMV BLD AUTO: 10 FL (ref 8.9–12.7)
POTASSIUM SERPL-SCNC: 3.9 MMOL/L (ref 3.5–5.3)
RBC # BLD AUTO: 4.48 MILLION/UL (ref 3.88–5.62)
SODIUM SERPL-SCNC: 139 MMOL/L (ref 135–147)
WBC # BLD AUTO: 12.76 THOUSAND/UL (ref 4.31–10.16)

## 2025-08-13 PROCEDURE — 80048 BASIC METABOLIC PNL TOTAL CA: CPT | Performed by: FAMILY MEDICINE

## 2025-08-13 PROCEDURE — 99232 SBSQ HOSP IP/OBS MODERATE 35: CPT | Performed by: FAMILY MEDICINE

## 2025-08-13 PROCEDURE — 82948 REAGENT STRIP/BLOOD GLUCOSE: CPT

## 2025-08-13 PROCEDURE — 85025 COMPLETE CBC W/AUTO DIFF WBC: CPT | Performed by: FAMILY MEDICINE

## 2025-08-13 RX ORDER — SODIUM CHLORIDE 9 MG/ML
100 INJECTION, SOLUTION INTRAVENOUS CONTINUOUS
Status: DISCONTINUED | OUTPATIENT
Start: 2025-08-13 | End: 2025-08-13

## 2025-08-13 RX ORDER — SODIUM CHLORIDE 9 MG/ML
100 INJECTION, SOLUTION INTRAVENOUS CONTINUOUS
Status: DISPENSED | OUTPATIENT
Start: 2025-08-13 | End: 2025-08-14

## 2025-08-13 RX ADMIN — NYSTATIN: 100000 POWDER TOPICAL at 09:40

## 2025-08-13 RX ADMIN — FERROUS GLUCONATE 324 MG: 324 TABLET ORAL at 05:36

## 2025-08-13 RX ADMIN — AMOXICILLIN AND CLAVULANATE POTASSIUM 1 TABLET: 875; 125 TABLET, FILM COATED ORAL at 05:36

## 2025-08-13 RX ADMIN — ASPIRIN 81 MG: 81 TABLET, CHEWABLE ORAL at 09:39

## 2025-08-13 RX ADMIN — AMOXICILLIN AND CLAVULANATE POTASSIUM 1 TABLET: 875; 125 TABLET, FILM COATED ORAL at 19:10

## 2025-08-13 RX ADMIN — RANOLAZINE 500 MG: 500 TABLET, FILM COATED, EXTENDED RELEASE ORAL at 09:39

## 2025-08-13 RX ADMIN — SODIUM CHLORIDE 100 ML/HR: 0.9 INJECTION, SOLUTION INTRAVENOUS at 16:00

## 2025-08-13 RX ADMIN — NYSTATIN: 100000 POWDER TOPICAL at 19:10

## 2025-08-13 RX ADMIN — PANTOPRAZOLE SODIUM 40 MG: 40 TABLET, DELAYED RELEASE ORAL at 16:37

## 2025-08-13 RX ADMIN — INSULIN LISPRO 2 UNITS: 100 INJECTION, SOLUTION INTRAVENOUS; SUBCUTANEOUS at 09:40

## 2025-08-13 RX ADMIN — CARBIDOPA AND LEVODOPA 1 TABLET: 25; 100 TABLET ORAL at 16:37

## 2025-08-13 RX ADMIN — PANTOPRAZOLE SODIUM 40 MG: 40 TABLET, DELAYED RELEASE ORAL at 05:36

## 2025-08-13 RX ADMIN — INSULIN GLARGINE 35 UNITS: 100 INJECTION, SOLUTION SUBCUTANEOUS at 09:40

## 2025-08-13 RX ADMIN — ENOXAPARIN SODIUM 40 MG: 40 INJECTION SUBCUTANEOUS at 09:40

## 2025-08-13 RX ADMIN — METOPROLOL SUCCINATE 25 MG: 25 TABLET, EXTENDED RELEASE ORAL at 09:39

## 2025-08-13 RX ADMIN — INSULIN LISPRO 3 UNITS: 100 INJECTION, SOLUTION INTRAVENOUS; SUBCUTANEOUS at 16:38

## 2025-08-13 RX ADMIN — INSULIN LISPRO 1 UNITS: 100 INJECTION, SOLUTION INTRAVENOUS; SUBCUTANEOUS at 21:09

## 2025-08-13 RX ADMIN — LORATADINE 10 MG: 10 TABLET ORAL at 09:39

## 2025-08-13 RX ADMIN — RANOLAZINE 500 MG: 500 TABLET, FILM COATED, EXTENDED RELEASE ORAL at 21:08

## 2025-08-13 RX ADMIN — CARBIDOPA AND LEVODOPA 1 TABLET: 25; 100 TABLET ORAL at 21:08

## 2025-08-13 RX ADMIN — ATORVASTATIN CALCIUM 40 MG: 40 TABLET, FILM COATED ORAL at 09:39

## 2025-08-13 RX ADMIN — INSULIN LISPRO 6 UNITS: 100 INJECTION, SOLUTION INTRAVENOUS; SUBCUTANEOUS at 11:35

## 2025-08-13 RX ADMIN — CARBIDOPA AND LEVODOPA 1 TABLET: 25; 100 TABLET ORAL at 09:39

## 2025-08-14 VITALS
OXYGEN SATURATION: 98 % | TEMPERATURE: 98.2 F | RESPIRATION RATE: 17 BRPM | HEIGHT: 68 IN | BODY MASS INDEX: 23.95 KG/M2 | WEIGHT: 158 LBS | HEART RATE: 83 BPM | DIASTOLIC BLOOD PRESSURE: 65 MMHG | SYSTOLIC BLOOD PRESSURE: 109 MMHG

## 2025-08-14 LAB
ANION GAP SERPL CALCULATED.3IONS-SCNC: 6 MMOL/L (ref 4–13)
BASOPHILS # BLD AUTO: 0.03 THOUSANDS/ÂΜL (ref 0–0.1)
BASOPHILS NFR BLD AUTO: 0 % (ref 0–1)
BUN SERPL-MCNC: 18 MG/DL (ref 5–25)
CALCIUM SERPL-MCNC: 8.7 MG/DL (ref 8.4–10.2)
CHLORIDE SERPL-SCNC: 105 MMOL/L (ref 96–108)
CO2 SERPL-SCNC: 28 MMOL/L (ref 21–32)
CREAT SERPL-MCNC: 1.09 MG/DL (ref 0.6–1.3)
EOSINOPHIL # BLD AUTO: 0.53 THOUSAND/ÂΜL (ref 0–0.61)
EOSINOPHIL NFR BLD AUTO: 5 % (ref 0–6)
ERYTHROCYTE [DISTWIDTH] IN BLOOD BY AUTOMATED COUNT: 14.3 % (ref 11.6–15.1)
GFR SERPL CREATININE-BSD FRML MDRD: 66 ML/MIN/1.73SQ M
GLUCOSE SERPL-MCNC: 222 MG/DL (ref 65–140)
GLUCOSE SERPL-MCNC: 81 MG/DL (ref 65–140)
GLUCOSE SERPL-MCNC: 93 MG/DL (ref 65–140)
HCT VFR BLD AUTO: 39.2 % (ref 36.5–49.3)
HGB BLD-MCNC: 12.6 G/DL (ref 12–17)
IMM GRANULOCYTES # BLD AUTO: 0.19 THOUSAND/UL (ref 0–0.2)
IMM GRANULOCYTES NFR BLD AUTO: 2 % (ref 0–2)
LYMPHOCYTES # BLD AUTO: 0.95 THOUSANDS/ÂΜL (ref 0.6–4.47)
LYMPHOCYTES NFR BLD AUTO: 8 % (ref 14–44)
MCH RBC QN AUTO: 28.1 PG (ref 26.8–34.3)
MCHC RBC AUTO-ENTMCNC: 32.1 G/DL (ref 31.4–37.4)
MCV RBC AUTO: 88 FL (ref 82–98)
MONOCYTES # BLD AUTO: 0.93 THOUSAND/ÂΜL (ref 0.17–1.22)
MONOCYTES NFR BLD AUTO: 8 % (ref 4–12)
NEUTROPHILS # BLD AUTO: 9.15 THOUSANDS/ÂΜL (ref 1.85–7.62)
NEUTS SEG NFR BLD AUTO: 77 % (ref 43–75)
NRBC BLD AUTO-RTO: 0 /100 WBCS
PLATELET # BLD AUTO: 321 THOUSANDS/UL (ref 149–390)
PMV BLD AUTO: 9.7 FL (ref 8.9–12.7)
POTASSIUM SERPL-SCNC: 3.9 MMOL/L (ref 3.5–5.3)
RBC # BLD AUTO: 4.48 MILLION/UL (ref 3.88–5.62)
SODIUM SERPL-SCNC: 139 MMOL/L (ref 135–147)
WBC # BLD AUTO: 11.78 THOUSAND/UL (ref 4.31–10.16)

## 2025-08-14 PROCEDURE — 99238 HOSP IP/OBS DSCHRG MGMT 30/<: CPT | Performed by: FAMILY MEDICINE

## 2025-08-14 PROCEDURE — 80048 BASIC METABOLIC PNL TOTAL CA: CPT

## 2025-08-14 PROCEDURE — NC001 PR NO CHARGE: Performed by: FAMILY MEDICINE

## 2025-08-14 PROCEDURE — 82948 REAGENT STRIP/BLOOD GLUCOSE: CPT

## 2025-08-14 PROCEDURE — 85025 COMPLETE CBC W/AUTO DIFF WBC: CPT

## 2025-08-14 RX ADMIN — INSULIN LISPRO 2 UNITS: 100 INJECTION, SOLUTION INTRAVENOUS; SUBCUTANEOUS at 11:58

## 2025-08-14 RX ADMIN — RANOLAZINE 500 MG: 500 TABLET, FILM COATED, EXTENDED RELEASE ORAL at 09:15

## 2025-08-14 RX ADMIN — ATORVASTATIN CALCIUM 40 MG: 40 TABLET, FILM COATED ORAL at 09:15

## 2025-08-14 RX ADMIN — LORATADINE 10 MG: 10 TABLET ORAL at 09:15

## 2025-08-14 RX ADMIN — ASPIRIN 81 MG: 81 TABLET, CHEWABLE ORAL at 09:15

## 2025-08-14 RX ADMIN — CARBIDOPA AND LEVODOPA 1 TABLET: 25; 100 TABLET ORAL at 09:15

## 2025-08-14 RX ADMIN — NYSTATIN: 100000 POWDER TOPICAL at 09:21

## 2025-08-14 RX ADMIN — AMOXICILLIN AND CLAVULANATE POTASSIUM 1 TABLET: 875; 125 TABLET, FILM COATED ORAL at 05:32

## 2025-08-14 RX ADMIN — ENOXAPARIN SODIUM 40 MG: 40 INJECTION SUBCUTANEOUS at 09:15

## 2025-08-14 RX ADMIN — FERROUS GLUCONATE 324 MG: 324 TABLET ORAL at 05:32

## 2025-08-14 RX ADMIN — INSULIN GLARGINE 35 UNITS: 100 INJECTION, SOLUTION SUBCUTANEOUS at 09:20

## 2025-08-14 RX ADMIN — PANTOPRAZOLE SODIUM 40 MG: 40 TABLET, DELAYED RELEASE ORAL at 05:32

## 2025-08-15 LAB
BACTERIA BLD CULT: NORMAL
BACTERIA BLD CULT: NORMAL

## (undated) DEVICE — BAG URINE DRAINAGE 2000ML ANTI RFLX LF

## (undated) DEVICE — GUARDIAN LVC: Brand: GUARDIAN

## (undated) DEVICE — GLOVE SRG BIOGEL 7.5

## (undated) DEVICE — TUBING SUCTION 5MM X 12 FT

## (undated) DEVICE — RUNTHROUGH NS EXTRA FLOPPY PTCA GUIDEWIRE: Brand: RUNTHROUGH

## (undated) DEVICE — TR BAND RADIAL ARTERY COMPRESSION DEVICE: Brand: TR BAND

## (undated) DEVICE — CATH URETERAL 5FR X 70 CM FLEX TIP POLYUR BARD

## (undated) DEVICE — SCD SEQUENTIAL COMPRESSION COMFORT SLEEVE MEDIUM KNEE LENGTH: Brand: KENDALL SCD

## (undated) DEVICE — CATH GUIDE LAUNCHER 6FR EBU 3.5

## (undated) DEVICE — EVACUATOR BLADDER ELLIK DISP STRL

## (undated) DEVICE — 4-PORT MANIFOLD: Brand: NEPTUNE 2

## (undated) DEVICE — GUIDEWIRE FFR VERRATA PLUS 185CM STR

## (undated) DEVICE — THE TURNPIKE CATHETERS ARE INTENDED TO BE USED TO ACCESS DISCRETE REGIONS OF THE CORONARY AND/OR PERIPHERAL VASCULATURE. THEY MAY BE USED TO FACILITATE PLACEMENT AND EXCHANGE OF GUIDEWIRES AND TO SUBSELECTIVELY INFUSE/DELIVER DIAGNOSTIC AND THERAPEUTIC AGENTS.: Brand: TURNPIKE® CATHETER

## (undated) DEVICE — GUIDEWIRE STRGHT TIP 0.035 IN  SOLO PLUS

## (undated) DEVICE — UROCATCH BAG

## (undated) DEVICE — GLIDESHEATH BASIC HYDROPHILIC COATED INTRODUCER SHEATH: Brand: GLIDESHEATH

## (undated) DEVICE — PACK TUR

## (undated) DEVICE — CATH DIAG 5FR IMPULSE 100CM FR4

## (undated) DEVICE — SPECIMEN CONTAINER STERILE PEEL PACK

## (undated) DEVICE — 3000CC GUARDIAN II: Brand: GUARDIAN

## (undated) DEVICE — LASER HOLMUIUM FIBER 1000 MIC

## (undated) DEVICE — RADIFOCUS GLIDEWIRE: Brand: GLIDEWIRE

## (undated) DEVICE — CATH GUIDE LAUNCHER 6FR JR4 110CM

## (undated) DEVICE — GLOVE INDICATOR PI UNDERGLOVE SZ 8 BLUE

## (undated) DEVICE — Device

## (undated) DEVICE — HI-TORQUE PILOT 50 GUIDE WIRE .014 STRAIGHT TIP 3.0 CM X 300 CM: Brand: HI-TORQUE PILOT

## (undated) DEVICE — CATH FOLEY 12FR 5ML 2 WAY SILICONE ELASTIMER

## (undated) DEVICE — GUIDEWIRE WHOLEY HI TORQUE INTERM MOD J .035 145CM

## (undated) DEVICE — Device: Brand: OLYMPUS

## (undated) DEVICE — RADIFOCUS OPTITORQUE ANGIOGRAPHIC CATHETER: Brand: OPTITORQUE